# Patient Record
Sex: FEMALE | Race: WHITE | Employment: UNEMPLOYED | URBAN - METROPOLITAN AREA
[De-identification: names, ages, dates, MRNs, and addresses within clinical notes are randomized per-mention and may not be internally consistent; named-entity substitution may affect disease eponyms.]

---

## 2018-05-22 ENCOUNTER — HOSPITAL ENCOUNTER (EMERGENCY)
Age: 38
Discharge: HOME | End: 2018-05-23
Attending: EMERGENCY MEDICINE
Payer: SELF-PAY

## 2018-05-22 VITALS
HEIGHT: 62 IN | TEMPERATURE: 97.9 F | HEART RATE: 86 BPM | RESPIRATION RATE: 18 BRPM | DIASTOLIC BLOOD PRESSURE: 76 MMHG | WEIGHT: 180 LBS | BODY MASS INDEX: 33.13 KG/M2 | SYSTOLIC BLOOD PRESSURE: 116 MMHG | OXYGEN SATURATION: 99 %

## 2018-05-22 DIAGNOSIS — Z87.42 HX OF OVARIAN CYST: ICD-10-CM

## 2018-05-22 DIAGNOSIS — Z86.59 HISTORY OF DEPRESSION: ICD-10-CM

## 2018-05-22 DIAGNOSIS — R10.2 PELVIC PAIN IN FEMALE: Primary | ICD-10-CM

## 2018-05-22 PROCEDURE — 99285 EMERGENCY DEPT VISIT HI MDM: CPT

## 2018-05-22 RX ORDER — SERTRALINE HYDROCHLORIDE 100 MG/1
100 TABLET, FILM COATED ORAL DAILY
COMMUNITY
End: 2019-11-28

## 2018-05-22 RX ORDER — RISPERIDONE 0.25 MG/1
0.25 TABLET, FILM COATED ORAL
COMMUNITY
End: 2019-11-28

## 2018-05-23 ENCOUNTER — APPOINTMENT (OUTPATIENT)
Dept: CT IMAGING | Age: 38
End: 2018-05-23
Attending: EMERGENCY MEDICINE
Payer: SELF-PAY

## 2018-05-23 LAB
ALBUMIN SERPL-MCNC: 3.4 G/DL (ref 3.4–5)
ALBUMIN/GLOB SERPL: 1 {RATIO} (ref 0.8–1.7)
ALP SERPL-CCNC: 139 U/L (ref 45–117)
ALT SERPL-CCNC: 26 U/L (ref 13–56)
AMPHET UR QL SCN: NEGATIVE
ANION GAP SERPL CALC-SCNC: 10 MMOL/L (ref 3–18)
APPEARANCE UR: CLEAR
AST SERPL-CCNC: 17 U/L (ref 15–37)
BARBITURATES UR QL SCN: NEGATIVE
BASOPHILS # BLD: 0 K/UL (ref 0–0.06)
BASOPHILS NFR BLD: 0 % (ref 0–2)
BENZODIAZ UR QL: POSITIVE
BILIRUB SERPL-MCNC: 0.4 MG/DL (ref 0.2–1)
BILIRUB UR QL: NEGATIVE
BUN SERPL-MCNC: 5 MG/DL (ref 7–18)
BUN/CREAT SERPL: 7 (ref 12–20)
CALCIUM SERPL-MCNC: 8.2 MG/DL (ref 8.5–10.1)
CANNABINOIDS UR QL SCN: NEGATIVE
CHLORIDE SERPL-SCNC: 109 MMOL/L (ref 100–108)
CK MB CFR SERPL CALC: 0.7 % (ref 0–4)
CK MB SERPL-MCNC: 1.9 NG/ML (ref 5–25)
CK SERPL-CCNC: 274 U/L (ref 26–192)
CO2 SERPL-SCNC: 22 MMOL/L (ref 21–32)
COCAINE UR QL SCN: NEGATIVE
COLOR UR: YELLOW
CREAT SERPL-MCNC: 0.69 MG/DL (ref 0.6–1.3)
DIFFERENTIAL METHOD BLD: ABNORMAL
EOSINOPHIL # BLD: 0.3 K/UL (ref 0–0.4)
EOSINOPHIL NFR BLD: 3 % (ref 0–5)
ERYTHROCYTE [DISTWIDTH] IN BLOOD BY AUTOMATED COUNT: 14.7 % (ref 11.6–14.5)
ETHANOL SERPL-MCNC: 31 MG/DL (ref 0–3)
GLOBULIN SER CALC-MCNC: 3.4 G/DL (ref 2–4)
GLUCOSE SERPL-MCNC: 92 MG/DL (ref 74–99)
GLUCOSE UR STRIP.AUTO-MCNC: NEGATIVE MG/DL
HCG SERPL QL: NEGATIVE
HCT VFR BLD AUTO: 36.8 % (ref 35–45)
HDSCOM,HDSCOM: ABNORMAL
HGB BLD-MCNC: 11.8 G/DL (ref 12–16)
HGB UR QL STRIP: NEGATIVE
KETONES UR QL STRIP.AUTO: NEGATIVE MG/DL
LEUKOCYTE ESTERASE UR QL STRIP.AUTO: NEGATIVE
LIPASE SERPL-CCNC: 132 U/L (ref 73–393)
LYMPHOCYTES # BLD: 3.5 K/UL (ref 0.9–3.6)
LYMPHOCYTES NFR BLD: 43 % (ref 21–52)
MAGNESIUM SERPL-MCNC: 2 MG/DL (ref 1.6–2.6)
MCH RBC QN AUTO: 28 PG (ref 24–34)
MCHC RBC AUTO-ENTMCNC: 32.1 G/DL (ref 31–37)
MCV RBC AUTO: 87.2 FL (ref 74–97)
METHADONE UR QL: NEGATIVE
MONOCYTES # BLD: 0.3 K/UL (ref 0.05–1.2)
MONOCYTES NFR BLD: 4 % (ref 3–10)
NEUTS SEG # BLD: 4 K/UL (ref 1.8–8)
NEUTS SEG NFR BLD: 50 % (ref 40–73)
NITRITE UR QL STRIP.AUTO: NEGATIVE
OPIATES UR QL: NEGATIVE
PCP UR QL: POSITIVE
PH UR STRIP: 6 [PH] (ref 5–8)
PLATELET # BLD AUTO: 292 K/UL (ref 135–420)
PMV BLD AUTO: 9.8 FL (ref 9.2–11.8)
POTASSIUM SERPL-SCNC: 3.5 MMOL/L (ref 3.5–5.5)
PROT SERPL-MCNC: 6.8 G/DL (ref 6.4–8.2)
PROT UR STRIP-MCNC: NEGATIVE MG/DL
RBC # BLD AUTO: 4.22 M/UL (ref 4.2–5.3)
SERVICE CMNT-IMP: NORMAL
SODIUM SERPL-SCNC: 141 MMOL/L (ref 136–145)
SP GR UR REFRACTOMETRY: 1.01 (ref 1–1.03)
TROPONIN I SERPL-MCNC: <0.02 NG/ML (ref 0–0.06)
UROBILINOGEN UR QL STRIP.AUTO: 0.2 EU/DL (ref 0.2–1)
WBC # BLD AUTO: 8.1 K/UL (ref 4.6–13.2)
WET PREP GENITAL: NORMAL

## 2018-05-23 PROCEDURE — 85025 COMPLETE CBC W/AUTO DIFF WBC: CPT | Performed by: EMERGENCY MEDICINE

## 2018-05-23 PROCEDURE — 84703 CHORIONIC GONADOTROPIN ASSAY: CPT | Performed by: EMERGENCY MEDICINE

## 2018-05-23 PROCEDURE — 82550 ASSAY OF CK (CPK): CPT | Performed by: EMERGENCY MEDICINE

## 2018-05-23 PROCEDURE — 74011636320 HC RX REV CODE- 636/320: Performed by: EMERGENCY MEDICINE

## 2018-05-23 PROCEDURE — 96374 THER/PROPH/DIAG INJ IV PUSH: CPT

## 2018-05-23 PROCEDURE — 83690 ASSAY OF LIPASE: CPT | Performed by: EMERGENCY MEDICINE

## 2018-05-23 PROCEDURE — 81003 URINALYSIS AUTO W/O SCOPE: CPT | Performed by: EMERGENCY MEDICINE

## 2018-05-23 PROCEDURE — 80053 COMPREHEN METABOLIC PANEL: CPT | Performed by: EMERGENCY MEDICINE

## 2018-05-23 PROCEDURE — 87210 SMEAR WET MOUNT SALINE/INK: CPT | Performed by: EMERGENCY MEDICINE

## 2018-05-23 PROCEDURE — 83735 ASSAY OF MAGNESIUM: CPT | Performed by: EMERGENCY MEDICINE

## 2018-05-23 PROCEDURE — 80307 DRUG TEST PRSMV CHEM ANLYZR: CPT | Performed by: EMERGENCY MEDICINE

## 2018-05-23 PROCEDURE — 74011250636 HC RX REV CODE- 250/636: Performed by: EMERGENCY MEDICINE

## 2018-05-23 PROCEDURE — 74177 CT ABD & PELVIS W/CONTRAST: CPT

## 2018-05-23 PROCEDURE — 87491 CHLMYD TRACH DNA AMP PROBE: CPT | Performed by: EMERGENCY MEDICINE

## 2018-05-23 PROCEDURE — 74011250637 HC RX REV CODE- 250/637: Performed by: EMERGENCY MEDICINE

## 2018-05-23 RX ORDER — NALBUPHINE HYDROCHLORIDE 10 MG/ML
10 INJECTION, SOLUTION INTRAMUSCULAR; INTRAVENOUS; SUBCUTANEOUS
Status: COMPLETED | OUTPATIENT
Start: 2018-05-23 | End: 2018-05-23

## 2018-05-23 RX ORDER — DICYCLOMINE HYDROCHLORIDE 20 MG/1
20 TABLET ORAL EVERY 6 HOURS
Qty: 20 TAB | Refills: 0 | Status: SHIPPED | OUTPATIENT
Start: 2018-05-23 | End: 2018-05-28

## 2018-05-23 RX ORDER — DICYCLOMINE HYDROCHLORIDE 10 MG/1
20 CAPSULE ORAL
Status: COMPLETED | OUTPATIENT
Start: 2018-05-23 | End: 2018-05-23

## 2018-05-23 RX ORDER — SODIUM CHLORIDE 0.9 % (FLUSH) 0.9 %
5-10 SYRINGE (ML) INJECTION AS NEEDED
Status: DISCONTINUED | OUTPATIENT
Start: 2018-05-23 | End: 2018-05-23 | Stop reason: HOSPADM

## 2018-05-23 RX ORDER — ONDANSETRON 4 MG/1
TABLET, ORALLY DISINTEGRATING ORAL
Qty: 10 TAB | Refills: 0 | Status: SHIPPED | OUTPATIENT
Start: 2018-05-23 | End: 2019-11-28

## 2018-05-23 RX ORDER — ONDANSETRON 4 MG/1
4 TABLET, ORALLY DISINTEGRATING ORAL
Status: DISCONTINUED | OUTPATIENT
Start: 2018-05-23 | End: 2018-05-23

## 2018-05-23 RX ORDER — ONDANSETRON 4 MG/1
4 TABLET, ORALLY DISINTEGRATING ORAL
Status: COMPLETED | OUTPATIENT
Start: 2018-05-23 | End: 2018-05-23

## 2018-05-23 RX ORDER — BUTORPHANOL TARTRATE 2 MG/ML
1 INJECTION INTRAMUSCULAR; INTRAVENOUS
Status: DISCONTINUED | OUTPATIENT
Start: 2018-05-23 | End: 2018-05-23 | Stop reason: HOSPADM

## 2018-05-23 RX ORDER — SODIUM CHLORIDE 0.9 % (FLUSH) 0.9 %
5-10 SYRINGE (ML) INJECTION EVERY 8 HOURS
Status: DISCONTINUED | OUTPATIENT
Start: 2018-05-23 | End: 2018-05-23 | Stop reason: HOSPADM

## 2018-05-23 RX ADMIN — IOPAMIDOL 100 ML: 612 INJECTION, SOLUTION INTRAVENOUS at 03:04

## 2018-05-23 RX ADMIN — ONDANSETRON 4 MG: 4 TABLET, ORALLY DISINTEGRATING ORAL at 01:04

## 2018-05-23 RX ADMIN — DICYCLOMINE HYDROCHLORIDE 20 MG: 10 CAPSULE ORAL at 01:04

## 2018-05-23 RX ADMIN — NALBUPHINE HYDROCHLORIDE 10 MG: 10 INJECTION, SOLUTION INTRAMUSCULAR; INTRAVENOUS; SUBCUTANEOUS at 01:04

## 2018-05-23 NOTE — ED PROVIDER NOTES
EMERGENCY DEPARTMENT HISTORY AND PHYSICAL EXAM    Date: 2018  Patient Name: Nazia Montalvo    History of Presenting Illness     Chief Complaint   Patient presents with    Abdominal Pain         History Provided By: Patient    Chief Complaint: abd pain  Duration: 2 Hours  Timing:  Acute  Location: diffuse  Quality: Sharp  Severity: Severe  Associated Symptoms: nausea    Additional History (Context):     12:36 AM    Nazia Montalvo is a M4 45 y.o. female with pertinent PMHx of pancreatitis, ovarian cysts, and CA (pancreatic) presenting via EMS to the ED c/o acute onset of 10/10 diffuse abdominal pain, right > left, x ~2 hours. Pt states that she was at rest when the pain began. Pt notes \"it feels like something is going to come out of me\", while pointing at her vaginal area. Pt states \"it feels like something explaided in my stomach. Pt notes an associated sx of nausea. Pt denies any history of abdominal surgery. Pt states that her menstrual cycle just ended yesterday. Pt denies any regular medications for ovarian cysts or hormone use. Pt does not see an OB/GYN regularly. Pt states that she had 2 beers while cooking breakfast this morning. Pt specifically denies any vaginal bleeding, blood in her stool, or fever/chills. PCP: Luisa Burch MD  Social Hx: + tobacco use, + alcohol use, - illicit drug use    There are no other complaints, changes, or physical findings at this time.      Current Facility-Administered Medications   Medication Dose Route Frequency Provider Last Rate Last Dose    sodium chloride (NS) flush 5-10 mL  5-10 mL IntraVENous Q8H Sophie Conley MD        sodium chloride (NS) flush 5-10 mL  5-10 mL IntraVENous PRN Sophie Conley MD        sodium chloride 0.9 % bolus infusion 1,000 mL  1,000 mL IntraVENous ONCE Sophie Conley MD        butorphanol (STADOL) injection 1 mg  1 mg IntraVENous NOW Sophie Conley MD         Current Outpatient Prescriptions   Medication Sig Dispense Refill    dicyclomine (BENTYL) 20 mg tablet Take 1 Tab by mouth every six (6) hours for 20 doses. 20 Tab 0    ondansetron (ZOFRAN ODT) 4 mg disintegrating tablet Take 1-2 tablets every 6-8 hours as needed for nausea and vomiting. 10 Tab 0    sertraline (ZOLOFT) 100 mg tablet Take 100 mg by mouth daily.  risperiDONE (RISPERDAL) 0.25 mg tablet Take 0.25 mg by mouth. Past History     Past Medical History:  Past Medical History:   Diagnosis Date    Asthma     Bilateral ovarian cysts     Cancer ([facility] [address]     Pancreatic and Bone Marrow    Pancreatitis     Pancreatitis        Past Surgical History:  Past Surgical History:   Procedure Laterality Date    HX GYN      D&C       Family History:  History reviewed. No pertinent family history. Social History:  Social History   Substance Use Topics    Smoking status: Current Every Day Smoker     Packs/day: 0.50    Smokeless tobacco: Never Used    Alcohol use Yes       Allergies: Allergies   Allergen Reactions    Norco [Hydrocodone-Acetaminophen] Itching     Patient states she is not allergic      Toradol [Ketorolac] Rash         Review of Systems   Review of Systems   Constitutional: Negative for chills and fever. Gastrointestinal: Positive for abdominal pain (right > left) and nausea. Negative for blood in stool and vomiting. Genitourinary: Negative for vaginal bleeding. All other systems reviewed and are negative. Physical Exam     Vitals:    05/22/18 2320   BP: 116/76   Pulse: 86   Resp: 18   Temp: 97.9 °F (36.6 °C)   SpO2: 99%   Weight: 81.6 kg (180 lb)   Height: 5' 2\" (1.575 m)     Physical Exam   Constitutional: She is oriented to person, place, and time. She appears well-developed and well-nourished. No distress. Comfortable appearing, nontoxic  Smells of ETOH   HENT:   Head: Normocephalic and atraumatic.    Right Ear: External ear normal.   Left Ear: External ear normal.   Mouth/Throat: Oropharynx is clear and moist. Mucous membranes are dry. No oropharyngeal exudate. Eyes: EOM are normal. Pupils are equal, round, and reactive to light. No scleral icterus. No pallor  Conjunctival injection   Neck: Normal range of motion. Neck supple. No JVD present. No tracheal deviation present. No thyromegaly present. Cardiovascular: Normal rate, regular rhythm and normal heart sounds. Pulmonary/Chest: Effort normal and breath sounds normal. No stridor. No respiratory distress. Abdominal: Soft. She exhibits distension (extensive, with stria). Bowel sounds are decreased. There is tenderness in the right upper quadrant and right lower quadrant. There is guarding (Abd with voluntary guarding to the RU and RL quadrants). There is no rebound. Musculoskeletal: Normal range of motion. She exhibits no edema or tenderness. No soft tissue injuries  No back TTP   Lymphadenopathy:     She has no cervical adenopathy. Neurological: She is alert and oriented to person, place, and time. She has normal reflexes. No cranial nerve deficit. Coordination normal.   Slurred speech, but no signs of ataxia or fine motor deficits at this time   Skin: Skin is warm and dry. No rash noted. She is not diaphoretic. No erythema. Psychiatric: She has a normal mood and affect. Her behavior is normal. Judgment and thought content normal.   Nursing note and vitals reviewed.       Diagnostic Study Results     Labs -     Recent Results (from the past 12 hour(s))   HCG QL SERUM    Collection Time: 05/23/18 12:55 AM   Result Value Ref Range    HCG, Ql. NEGATIVE  NEG     CBC WITH AUTOMATED DIFF    Collection Time: 05/23/18 12:55 AM   Result Value Ref Range    WBC 8.1 4.6 - 13.2 K/uL    RBC 4.22 4.20 - 5.30 M/uL    HGB 11.8 (L) 12.0 - 16.0 g/dL    HCT 36.8 35.0 - 45.0 %    MCV 87.2 74.0 - 97.0 FL    MCH 28.0 24.0 - 34.0 PG    MCHC 32.1 31.0 - 37.0 g/dL    RDW 14.7 (H) 11.6 - 14.5 %    PLATELET 623 658 - 056 K/uL    MPV 9.8 9.2 - 11.8 FL    NEUTROPHILS 50 40 - 73 % LYMPHOCYTES 43 21 - 52 %    MONOCYTES 4 3 - 10 %    EOSINOPHILS 3 0 - 5 %    BASOPHILS 0 0 - 2 %    ABS. NEUTROPHILS 4.0 1.8 - 8.0 K/UL    ABS. LYMPHOCYTES 3.5 0.9 - 3.6 K/UL    ABS. MONOCYTES 0.3 0.05 - 1.2 K/UL    ABS. EOSINOPHILS 0.3 0.0 - 0.4 K/UL    ABS. BASOPHILS 0.0 0.0 - 0.06 K/UL    DF AUTOMATED     METABOLIC PANEL, COMPREHENSIVE    Collection Time: 05/23/18 12:55 AM   Result Value Ref Range    Sodium 141 136 - 145 mmol/L    Potassium 3.5 3.5 - 5.5 mmol/L    Chloride 109 (H) 100 - 108 mmol/L    CO2 22 21 - 32 mmol/L    Anion gap 10 3.0 - 18 mmol/L    Glucose 92 74 - 99 mg/dL    BUN 5 (L) 7.0 - 18 MG/DL    Creatinine 0.69 0.6 - 1.3 MG/DL    BUN/Creatinine ratio 7 (L) 12 - 20      GFR est AA >60 >60 ml/min/1.73m2    GFR est non-AA >60 >60 ml/min/1.73m2    Calcium 8.2 (L) 8.5 - 10.1 MG/DL    Bilirubin, total 0.4 0.2 - 1.0 MG/DL    ALT (SGPT) 26 13 - 56 U/L    AST (SGOT) 17 15 - 37 U/L    Alk.  phosphatase 139 (H) 45 - 117 U/L    Protein, total 6.8 6.4 - 8.2 g/dL    Albumin 3.4 3.4 - 5.0 g/dL    Globulin 3.4 2.0 - 4.0 g/dL    A-G Ratio 1.0 0.8 - 1.7     LIPASE    Collection Time: 05/23/18 12:55 AM   Result Value Ref Range    Lipase 132 73 - 393 U/L   CARDIAC PANEL,(CK, CKMB & TROPONIN)    Collection Time: 05/23/18 12:55 AM   Result Value Ref Range     (H) 26 - 192 U/L    CK - MB 1.9 <3.6 ng/ml    CK-MB Index 0.7 0.0 - 4.0 %    Troponin-I, Qt. <0.02 0.00 - 0.06 NG/ML   MAGNESIUM    Collection Time: 05/23/18 12:55 AM   Result Value Ref Range    Magnesium 2.0 1.6 - 2.6 mg/dL   ETHYL ALCOHOL    Collection Time: 05/23/18 12:55 AM   Result Value Ref Range    ALCOHOL(ETHYL),SERUM 31 (H) 0 - 3 MG/DL   URINALYSIS W/ RFLX MICROSCOPIC    Collection Time: 05/23/18 12:56 AM   Result Value Ref Range    Color YELLOW      Appearance CLEAR      Specific gravity 1.011 1.005 - 1.030      pH (UA) 6.0 5.0 - 8.0      Protein NEGATIVE  NEG mg/dL    Glucose NEGATIVE  NEG mg/dL    Ketone NEGATIVE  NEG mg/dL    Bilirubin NEGATIVE NEG      Blood NEGATIVE  NEG      Urobilinogen 0.2 0.2 - 1.0 EU/dL    Nitrites NEGATIVE  NEG      Leukocyte Esterase NEGATIVE  NEG     DRUG SCREEN, URINE    Collection Time: 05/23/18 12:57 AM   Result Value Ref Range    BENZODIAZEPINES POSITIVE (A) NEG      BARBITURATES NEGATIVE  NEG      THC (TH-CANNABINOL) NEGATIVE  NEG      OPIATES NEGATIVE  NEG      PCP(PHENCYCLIDINE) POSITIVE (A) NEG      COCAINE NEGATIVE  NEG      AMPHETAMINES NEGATIVE  NEG      METHADONE NEGATIVE  NEG      HDSCOM (NOTE)        Radiologic Studies -   CT ABD PELV W CONT   Final Result   IMPRESSION:      No acute findings are identified. No bowel obstruction or inflammation. Normal appendix. Punctate nonobstructing right lower pole calculus. CT Results  (Last 48 hours)               05/23/18 0312  CT ABD PELV W CONT Final result    Impression:  IMPRESSION:       No acute findings are identified. No bowel obstruction or inflammation. Normal appendix. Punctate nonobstructing right lower pole calculus. Narrative:  EXAM: CT of the abdomen and pelvis       INDICATION: Abdominal pain       COMPARISON: 11/29/2015       TECHNIQUE: Axial CT imaging of the abdomen and pelvis was performed with   intravenous contrast. Multiplanar reformats were generated. One or more dose   reduction techniques were used on this CT: automated exposure control,   adjustment of the mAs and/or kVp according to patient's size, and iterative   reconstruction techniques. The specific techniques utilized on this CT exam have   been documented in the patient's electronic medical record.       _______________       FINDINGS:       LOWER CHEST: No basilar infiltrates. Stable 2 mm nodular right lower lobe   density laterally. LIVER, BILIARY: Liver is stable. No biliary dilation. Gallbladder is present. PANCREAS: Normal.       SPLEEN: Normal.       ADRENALS: Normal.       KIDNEYS: No acute findings.  Several small subcentimeter hypodensities which are   too small to characterize. No perinephric stranding. 2 mm calculus lower pole of   right kidney. LYMPH NODES: No enlarged lymph nodes. GASTROINTESTINAL TRACT: No bowel dilation or wall thickening. GI tract   evaluation limited without oral contrast. No bowel obstruction. No free air or   free fluid. Normal appendix. PELVIC ORGANS: Unremarkable. VASCULATURE: Unremarkable. BONES: No acute or aggressive osseous abnormalities identified. OTHER: None.       _______________                   Medical Decision Making   I am the first provider for this patient. I reviewed the vital signs, available nursing notes, past medical history, past surgical history, family history and social history. Vital Signs-Reviewed the patient's vital signs. Pulse Oximetry Analysis - 99% on RA     Records Reviewed: Nursing Notes and Old Medical Records    Provider Notes (Medical Decision Making): DDx: gallbladder, pancreatitis, or appendix, small intestine disease. Prolapse of urinary bladder are high on the differential. Unlikely to be vascular or cardiogenic. Multiple visits for ovarian cyst problems, but no hemorrhage or surgical intervention. PROCEDURES:  Pelvic Exam  Date/Time: 5/23/2018 4:01 AM  Performed by: attending  Procedure duration:  6 minutes. Documented by:  Franchesca Kruse ED Scribe. As dictated by:  Dr. Mariana Sutherland assisted by:  Nakul Jones RN. Type of exam performed: bimanual and speculum. External genitalia appearance: normal.    Vaginal exam:  normal.    Cervical exam:  normal.    Specimen(s) collected:  chlamydia, GC and vaginal culture.   Bimanual exam:  normal.    Patient tolerance: Patient tolerated the procedure well with no immediate complications  Comments: TTP to the suprapubic region and large intestine          MEDICATIONS GIVEN IN THE ED:  Medications   sodium chloride (NS) flush 5-10 mL (not administered)   sodium chloride (NS) flush 5-10 mL (not administered)   sodium chloride 0.9 % bolus infusion 1,000 mL (not administered)   butorphanol (STADOL) injection 1 mg (not administered)   dicyclomine (BENTYL) capsule 20 mg (20 mg Oral Given 5/23/18 0104)   nalbuphine (NUBAIN) injection 10 mg (10 mg IntraVENous Given 5/23/18 0104)   ondansetron (ZOFRAN ODT) tablet 4 mg (4 mg Oral Given 5/23/18 0104)   iopamidol (ISOVUE 300) 61 % contrast injection 100 mL (100 mL IntraVENous Given 5/23/18 0304)        ED COURSE:   12:36 AM   Initial assessment performed. PROGRESS NOTE:  4:10 AM  Pt and/or family have been updated on their results. Pt and/or pt's family are aware of the plan of care and are in agreement. Written by ULISSES Valentine, as dictated by Eduardo La MD.      Diagnosis and Disposition       DISCHARGE NOTE:  4:28 AM  The patient is ready for discharge. The patient's signs, symptoms, diagnosis, and discharge instructions have been discussed and the patient and/or family has conveyed their understanding. The patient and/or family is to follow up as recommended or return to the ER should their symptoms worsen. Plan has been discussed and the patient and/or family is in agreement. Written by ULISSES Valentine, as dictated by Eduardo La MD.     CLINICAL IMPRESSION:  1. Pelvic pain in female    2. Hx of ovarian cyst    3. History of depression        PLAN:  1. D/C Home  2. Current Discharge Medication List      START taking these medications    Details   dicyclomine (BENTYL) 20 mg tablet Take 1 Tab by mouth every six (6) hours for 20 doses. Qty: 20 Tab, Refills: 0      ondansetron (ZOFRAN ODT) 4 mg disintegrating tablet Take 1-2 tablets every 6-8 hours as needed for nausea and vomiting. Qty: 10 Tab, Refills: 0         CONTINUE these medications which have NOT CHANGED    Details   sertraline (ZOLOFT) 100 mg tablet Take 100 mg by mouth daily.       risperiDONE (RISPERDAL) 0.25 mg tablet Take 0.25 mg by mouth. 3.   Follow-up Information     Follow up With Details Comments Contact Info    Anat Main MD Schedule an appointment as soon as possible for a visit for OB/GYN follow up, as needed [facility] [id number]  [phone]      [facility] EMERGENCY DEPT  As needed, If symptoms worsen [address] [place] [id number]  [phone]        _______________________________    Attestations: This note is prepared by Ingrid Timmons, acting as Scribe for Licha Oliveira. Wojciech Calderon MD.    Licha Oliveira. Wojciech Calderon MD:  The scribe's documentation has been prepared under my direction and personally reviewed by me in its entirety.   I confirm that the note above accurately reflects all work, treatment, procedures, and medical decision making performed by me.  _______________________________

## 2018-05-23 NOTE — DISCHARGE INSTRUCTIONS
Pelvic Pain: Care Instructions  Your Care Instructions    Pelvic pain, or pain in the lower belly, can have many causes. Often pelvic pain is not serious and gets better in a few days. If your pain continues or gets worse, you may need tests and treatment. Tell your doctor about any new symptoms. These may be signs of a serious problem. Follow-up care is a key part of your treatment and safety. Be sure to make and go to all appointments, and call your doctor if you are having problems. It's also a good idea to know your test results and keep a list of the medicines you take. How can you care for yourself at home? · Rest until you feel better. Lie down, and raise your legs by placing a pillow under your knees. · Drink plenty of fluids. You may find that small, frequent sips are easier on your stomach than if you drink a lot at once. Avoid drinks with carbonation or caffeine, such as soda pop, tea, or coffee. · Try eating several small meals instead of 2 or 3 large ones. Eat mild foods, such as rice, dry toast or crackers, bananas, and applesauce. Avoid fatty and spicy foods, other fruits, and alcohol until 48 hours after your symptoms have gone away. · Take an over-the-counter pain medicine, such as acetaminophen (Tylenol), ibuprofen (Advil, Motrin), or naproxen (Aleve). Read and follow all instructions on the label. · Do not take two or more pain medicines at the same time unless the doctor told you to. Many pain medicines have acetaminophen, which is Tylenol. Too much acetaminophen (Tylenol) can be harmful. · You can put a heating pad, a warm cloth, or moist heat on your belly to relieve pain. When should you call for help? Call your doctor now or seek immediate medical care if:  · You have a new or higher fever. · You have unusual vaginal bleeding. · You have new or worse belly or pelvic pain. · You have vaginal discharge that has increased in amount or smells bad.   Watch closely for changes in your health, and be sure to contact your doctor if:  · You do not get better as expected. Where can you learn more? Go to http://denis-davin.info/. Enter 458-994-013 in the search box to learn more about \"Pelvic Pain: Care Instructions. \"  Current as of: October 13, 2016  Content Version: 11.4  © 1974-5271 [employer]. Care instructions adapted under license by [employer] (which disclaims liability or warranty for this information). If you have questions about a medical condition or this instruction, always ask your healthcare professional. [place] [address] any warranty or liability for your use of this information.

## 2018-05-23 NOTE — ED NOTES
Pt found out in lobby, this nurse informed pt that she cannot leave ER with IV in place, pt sts she was going to get  from car because he is asleep in the car

## 2018-05-23 NOTE — ED NOTES
Pt stable. No signs of acute distress. Pt being discharged home. I have reviewed discharge instructions with the patient. The patient verbalized understanding. No further questions/concerns.  Patient armband removed and shredded

## 2018-05-23 NOTE — ED TRIAGE NOTES
Patient distressed in triage, states severe sudden onset lower abdomen pain, states she feels like something is going to fall out of her vagina.

## 2018-05-23 NOTE — ED NOTES
Pt alert and oriented x4. No signs of acute distress. Pt with abdominal pain with sudden onset x 2 hours. Pt states \"I feel like something popped in my stomach\" Pt with nausea. NO vomiting. Continue to monitor.

## 2018-05-24 LAB
C TRACH RRNA SPEC QL NAA+PROBE: NEGATIVE
N GONORRHOEA RRNA SPEC QL NAA+PROBE: NEGATIVE
SPECIMEN SOURCE: NORMAL

## 2018-07-06 ENCOUNTER — APPOINTMENT (OUTPATIENT)
Dept: GENERAL RADIOLOGY | Age: 38
End: 2018-07-06
Attending: NURSE PRACTITIONER
Payer: SELF-PAY

## 2018-07-06 ENCOUNTER — HOSPITAL ENCOUNTER (EMERGENCY)
Age: 38
Discharge: HOME | End: 2018-07-06
Attending: EMERGENCY MEDICINE
Payer: SELF-PAY

## 2018-07-06 VITALS
SYSTOLIC BLOOD PRESSURE: 106 MMHG | BODY MASS INDEX: 33.13 KG/M2 | TEMPERATURE: 98.8 F | HEIGHT: 62 IN | WEIGHT: 180 LBS | DIASTOLIC BLOOD PRESSURE: 55 MMHG | RESPIRATION RATE: 18 BRPM | HEART RATE: 70 BPM | OXYGEN SATURATION: 99 %

## 2018-07-06 DIAGNOSIS — F19.20 ADDICTION TO DRUG (HCC): ICD-10-CM

## 2018-07-06 DIAGNOSIS — F41.9 ANXIETY: ICD-10-CM

## 2018-07-06 DIAGNOSIS — R00.2 PALPITATIONS: Primary | ICD-10-CM

## 2018-07-06 DIAGNOSIS — E87.6 HYPOKALEMIA: ICD-10-CM

## 2018-07-06 LAB
ALBUMIN SERPL-MCNC: 3.3 G/DL (ref 3.4–5)
ALBUMIN/GLOB SERPL: 0.9 {RATIO} (ref 0.8–1.7)
ALP SERPL-CCNC: 133 U/L (ref 45–117)
ALT SERPL-CCNC: 25 U/L (ref 13–56)
AMPHET UR QL SCN: NEGATIVE
ANION GAP SERPL CALC-SCNC: 16 MMOL/L (ref 3–18)
APPEARANCE UR: CLEAR
AST SERPL-CCNC: 13 U/L (ref 15–37)
BACTERIA URNS QL MICRO: NEGATIVE /HPF
BARBITURATES UR QL SCN: NEGATIVE
BASOPHILS # BLD: 0 K/UL (ref 0–0.06)
BASOPHILS NFR BLD: 0 % (ref 0–2)
BENZODIAZ UR QL: NEGATIVE
BILIRUB SERPL-MCNC: 0.3 MG/DL (ref 0.2–1)
BILIRUB UR QL: NEGATIVE
BUN SERPL-MCNC: 7 MG/DL (ref 7–18)
BUN/CREAT SERPL: 9 (ref 12–20)
CALCIUM SERPL-MCNC: 8.8 MG/DL (ref 8.5–10.1)
CANNABINOIDS UR QL SCN: NEGATIVE
CHLORIDE SERPL-SCNC: 106 MMOL/L (ref 100–108)
CK MB CFR SERPL CALC: NORMAL % (ref 0–4)
CK MB SERPL-MCNC: <1 NG/ML (ref 5–25)
CK SERPL-CCNC: 68 U/L (ref 26–192)
CO2 SERPL-SCNC: 17 MMOL/L (ref 21–32)
COCAINE UR QL SCN: NEGATIVE
COLOR UR: YELLOW
CREAT SERPL-MCNC: 0.76 MG/DL (ref 0.6–1.3)
D DIMER PPP FEU-MCNC: 0.36 UG/ML(FEU)
DIFFERENTIAL METHOD BLD: ABNORMAL
EOSINOPHIL # BLD: 0.1 K/UL (ref 0–0.4)
EOSINOPHIL NFR BLD: 1 % (ref 0–5)
EPITH CASTS URNS QL MICRO: ABNORMAL /LPF (ref 0–5)
ERYTHROCYTE [DISTWIDTH] IN BLOOD BY AUTOMATED COUNT: 14.5 % (ref 11.6–14.5)
GLOBULIN SER CALC-MCNC: 3.6 G/DL (ref 2–4)
GLUCOSE SERPL-MCNC: 108 MG/DL (ref 74–99)
GLUCOSE UR STRIP.AUTO-MCNC: NEGATIVE MG/DL
HCG UR QL: NEGATIVE
HCT VFR BLD AUTO: 36.6 % (ref 35–45)
HDSCOM,HDSCOM: ABNORMAL
HGB BLD-MCNC: 12 G/DL (ref 12–16)
HGB UR QL STRIP: NEGATIVE
KETONES UR QL STRIP.AUTO: NEGATIVE MG/DL
LEUKOCYTE ESTERASE UR QL STRIP.AUTO: NEGATIVE
LIPASE SERPL-CCNC: 179 U/L (ref 73–393)
LYMPHOCYTES # BLD: 2.6 K/UL (ref 0.9–3.6)
LYMPHOCYTES NFR BLD: 24 % (ref 21–52)
MCH RBC QN AUTO: 29.1 PG (ref 24–34)
MCHC RBC AUTO-ENTMCNC: 32.8 G/DL (ref 31–37)
MCV RBC AUTO: 88.6 FL (ref 74–97)
METHADONE UR QL: NEGATIVE
MONOCYTES # BLD: 0.5 K/UL (ref 0.05–1.2)
MONOCYTES NFR BLD: 4 % (ref 3–10)
MUCOUS THREADS URNS QL MICRO: ABNORMAL /LPF
NEUTS SEG # BLD: 7.5 K/UL (ref 1.8–8)
NEUTS SEG NFR BLD: 71 % (ref 40–73)
NITRITE UR QL STRIP.AUTO: NEGATIVE
OPIATES UR QL: POSITIVE
PCP UR QL: POSITIVE
PH UR STRIP: 6 [PH] (ref 5–8)
PLATELET # BLD AUTO: 220 K/UL (ref 135–420)
PMV BLD AUTO: 10.5 FL (ref 9.2–11.8)
POTASSIUM SERPL-SCNC: 3.2 MMOL/L (ref 3.5–5.5)
PROT SERPL-MCNC: 6.9 G/DL (ref 6.4–8.2)
PROT UR STRIP-MCNC: 100 MG/DL
RBC # BLD AUTO: 4.13 M/UL (ref 4.2–5.3)
RBC #/AREA URNS HPF: NEGATIVE /HPF (ref 0–5)
SODIUM SERPL-SCNC: 139 MMOL/L (ref 136–145)
SP GR UR REFRACTOMETRY: 1.03 (ref 1–1.03)
TROPONIN I SERPL-MCNC: <0.02 NG/ML (ref 0–0.06)
UROBILINOGEN UR QL STRIP.AUTO: 1 EU/DL (ref 0.2–1)
WBC # BLD AUTO: 10.7 K/UL (ref 4.6–13.2)
WBC URNS QL MICRO: ABNORMAL /HPF (ref 0–5)

## 2018-07-06 PROCEDURE — 74011250637 HC RX REV CODE- 250/637: Performed by: NURSE PRACTITIONER

## 2018-07-06 PROCEDURE — 82550 ASSAY OF CK (CPK): CPT | Performed by: NURSE PRACTITIONER

## 2018-07-06 PROCEDURE — 80307 DRUG TEST PRSMV CHEM ANLYZR: CPT | Performed by: NURSE PRACTITIONER

## 2018-07-06 PROCEDURE — 71046 X-RAY EXAM CHEST 2 VIEWS: CPT

## 2018-07-06 PROCEDURE — 81025 URINE PREGNANCY TEST: CPT

## 2018-07-06 PROCEDURE — 85379 FIBRIN DEGRADATION QUANT: CPT | Performed by: NURSE PRACTITIONER

## 2018-07-06 PROCEDURE — 80053 COMPREHEN METABOLIC PANEL: CPT | Performed by: NURSE PRACTITIONER

## 2018-07-06 PROCEDURE — 83690 ASSAY OF LIPASE: CPT | Performed by: NURSE PRACTITIONER

## 2018-07-06 PROCEDURE — 99284 EMERGENCY DEPT VISIT MOD MDM: CPT

## 2018-07-06 PROCEDURE — 85025 COMPLETE CBC W/AUTO DIFF WBC: CPT | Performed by: NURSE PRACTITIONER

## 2018-07-06 PROCEDURE — 93005 ELECTROCARDIOGRAM TRACING: CPT

## 2018-07-06 PROCEDURE — 81001 URINALYSIS AUTO W/SCOPE: CPT | Performed by: NURSE PRACTITIONER

## 2018-07-06 RX ORDER — POTASSIUM CHLORIDE 20 MEQ/1
20 TABLET, EXTENDED RELEASE ORAL DAILY
Qty: 5 TAB | Refills: 0 | Status: SHIPPED | OUTPATIENT
Start: 2018-07-06 | End: 2018-07-11

## 2018-07-06 RX ORDER — QUETIAPINE FUMARATE 400 MG/1
400 TABLET, FILM COATED ORAL
COMMUNITY
End: 2019-11-28

## 2018-07-06 RX ORDER — POTASSIUM CHLORIDE 20 MEQ/1
40 TABLET, EXTENDED RELEASE ORAL
Status: COMPLETED | OUTPATIENT
Start: 2018-07-06 | End: 2018-07-06

## 2018-07-06 RX ORDER — GUAIFENESIN 600 MG/1
600 TABLET, EXTENDED RELEASE ORAL 2 TIMES DAILY
COMMUNITY
End: 2019-08-15

## 2018-07-06 RX ADMIN — POTASSIUM CHLORIDE 40 MEQ: 20 TABLET, EXTENDED RELEASE ORAL at 16:38

## 2018-07-06 NOTE — ED TRIAGE NOTES
Pt arrives alert and oriented c/o shortness of breath. Pt states she uses 30-40 mucinex a day reports she has a addiction. Pt reports last night she had a heart burn sensation as well as this a with some shortness of breath. Pt took 30 mucinex tablets by mouth this am (reoprts that is her usual dose x 8 months). Pt states she took 2 shots of liquor this am as well.

## 2018-07-06 NOTE — ED PROVIDER NOTES
EMERGENCY DEPARTMENT HISTORY AND PHYSICAL EXAM    Date: 7/6/2018  Patient Name: Saintclair Falls    History of Presenting Illness     Chief Complaint   Patient presents with    Shortness of Breath         History Provided By: Patient    Chief Complaint: SOB  Duration: 24 Hours  Timing:  Gradual and Progressive  Location: Generalized  Quality: Burning  Modifying Factors: States that she is addicted to Mucinex  Associated Symptoms: CP, burning sensation in epigastric region, palpitations    Additional History (Context):   2:12 PM  Saintclair Falls is a 45 y.o. female with PMHX of pancreatitis, bilateral ovarian cysts, asthma, cancer, anxiety, and bipolar disorder who presents to the emergency department C/O SOB, onset last night. Associated sxs include CP, burning sensation in epigastric region, palpitations. Pt states that she has been having ~10-11 of these episodes per day for several weeks. 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 Denies FHX blood clots/premature cardiac death, recent travel, birth control use, leg pain/swelling, and any other sxs or complaints. PCP: Luisa Burch MD    Current Outpatient Prescriptions   Medication Sig Dispense Refill    QUEtiapine (SEROQUEL) 400 mg tablet Take 400 mg by mouth nightly. Indications: DEPRESSION ASSOCIATED WITH BIPOLAR DISORDER      guaiFENesin ER (MUCINEX) 600 mg ER tablet Take 600 mg by mouth two (2) times a day. Indications: \"addiction\"      potassium chloride (K-DUR, KLOR-CON) 20 mEq tablet Take 1 Tab by mouth daily for 5 days. 5 Tab 0    sertraline (ZOLOFT) 100 mg tablet Take 100 mg by mouth daily.  ondansetron (ZOFRAN ODT) 4 mg disintegrating tablet Take 1-2 tablets every 6-8 hours as needed for nausea and vomiting. 10 Tab 0    risperiDONE (RISPERDAL) 0.25 mg tablet Take 0.25 mg by mouth.          Past History     Past Medical History:  Past Medical History:   Diagnosis Date    Asthma     Bilateral ovarian cysts     Cancer ([facility] [address] Pancreatic and Bone Marrow    Pancreatitis     Pancreatitis        Past Surgical History:  Past Surgical History:   Procedure Laterality Date    HX GYN      D&C       Family History:  History reviewed. No pertinent family history. Social History:  Social History   Substance Use Topics    Smoking status: Current Every Day Smoker     Packs/day: 0.50    Smokeless tobacco: Never Used    Alcohol use Yes       Allergies: Allergies   Allergen Reactions    Norco [Hydrocodone-Acetaminophen] Itching     Patient states she is not allergic      Toradol [Ketorolac] Rash     Pt reports she is not allergic to this medication. Review of Systems   Review of Systems   Constitutional: Negative for chills and fever. Respiratory: Positive for shortness of breath. Cardiovascular: Positive for chest pain and palpitations. Negative for leg swelling. All other systems reviewed and are negative. Physical Exam     Vitals:    07/06/18 1427 07/06/18 1428 07/06/18 1632   BP: 91/60  106/55   Pulse: 92  70   Resp: 18  18   Temp: 98.9 °F (37.2 °C)  98.8 °F (37.1 °C)   SpO2: 99%  99%   Weight:  81.6 kg (180 lb)    Height:  5' 2\" (1.575 m)      Physical Exam   Constitutional: She is oriented to person, place, and time. She appears well-developed and well-nourished. Mildly anxious   HENT:   Head: Normocephalic and atraumatic. Eyes: Conjunctivae are normal.   Neck: Normal range of motion. Cardiovascular: Normal rate and regular rhythm. Pulmonary/Chest: Effort normal and breath sounds normal.   Abdominal: Soft. Bowel sounds are normal. There is no tenderness. Musculoskeletal: Normal range of motion. Neurological: She is alert and oriented to person, place, and time. Skin: Skin is warm and dry. Nursing note and vitals reviewed. Diagnostic Study Results     Labs -     No results found for this or any previous visit (from the past 12 hour(s)).     Radiologic Studies -   XR CHEST PA LAT   Final Result   IMPRESSION: No acute radiographic cardiopulmonary abnormality  As read by the radiologist.     CT Results  (Last 48 hours)    None        CXR Results  (Last 48 hours)               07/06/18 1444  XR CHEST PA LAT Final result    Impression:  IMPRESSION: No acute radiographic cardiopulmonary abnormality. Narrative:  EXAM:  XR CHEST PA LAT       INDICATION:   Chest pain for several hours       COMPARISON: March 24, 2015. FINDINGS: PA and lateral radiographs of the chest demonstrate clear lungs. The   cardiac and mediastinal contours and pulmonary vascularity are normal.  No acute   osseous abnormality. Medications given in the ED-  Medications   potassium chloride (K-DUR, KLOR-CON) SR tablet 40 mEq (40 mEq Oral Given 7/6/18 7219)         Medical Decision Making   I am the first provider for this patient. I reviewed the vital signs, available nursing notes, past medical history, past surgical history, family history and social history. Vital Signs-Reviewed the patient's vital signs. Pulse Oximetry Analysis - 99% on RA    EKG interpretation: (Preliminary)  2:10 PM   93 bpm. NSR. No STEMI. No MAR.  EKG read by Art Martin, NP at 2:10 PM     Records Reviewed: Nursing Notes    Provider Notes (Medical Decision Making): Patient with history of anxiety and addiction to mucinex. Symptoms were SOB and anxiety. Labs show mild hypokalemia which was replaced and additional script for potassium given. Cardiac and EKG Negative. Patient has low heart score and DDimer negative as patient is low risk for PE. I have encouraged her to decrease her use of Mucinex. She will follow up with  for PCP follow up. Understands reasons to return and is offering no questions or concerns at this time. Procedures:  Procedures    ED Course:   2:12 PM Initial assessment performed.  The patients presenting problems have been discussed, and they are in agreement with the care plan formulated and outlined with them. I have encouraged them to ask questions as they arise throughout their visit. 4:56 PM  Pt having no sxs. Encouraged to discontinue taking Mucinex. Will place  consult to help pt f/u with PCP. Understands reasons to return and is offering no questions or concerns at this time. CONSULT NOTE:   5:00 PM  Freeman Schilling NP spoke with Mercedez Gardner DO   Specialty: ED Medicine  Discussed pt's hx, disposition, and available diagnostic and imaging results, and he reviewed EKG  in person. Reviewed care plans. Consulting physician agrees with plans as outlined. Agrees with discharge and does not need second set of enzymes. F/u with PCP and Cardiology. Diagnosis and Disposition       DISCHARGE NOTE:  5:00 PM  Shilpi Goldstein's  results have been reviewed with her. She has been counseled regarding her diagnosis, treatment, and plan. She verbally conveys understanding and agreement of the signs, symptoms, diagnosis, treatment and prognosis and additionally agrees to follow up as discussed. She also agrees with the care-plan and conveys that all of her questions have been answered. I have also provided discharge instructions for her that include: educational information regarding their diagnosis and treatment, and list of reasons why they would want to return to the ED prior to their follow-up appointment, should her condition change. She has been provided with education for proper emergency department utilization. CLINICAL IMPRESSION:    1. Palpitations    2. Hypokalemia    3. Anxiety    4. Addiction to drug ([facility] [address]        PLAN:  1. D/C Home  2. Discharge Medication List as of 7/6/2018  5:07 PM      CONTINUE these medications which have NOT CHANGED    Details   QUEtiapine (SEROQUEL) 400 mg tablet Take 400 mg by mouth nightly.  Indications: DEPRESSION ASSOCIATED WITH BIPOLAR DISORDER, Historical Med      guaiFENesin ER (MUCINEX) 600 mg ER tablet Take 600 mg by mouth two (2) times a day. Indications: \"addiction\", Historical Med      sertraline (ZOLOFT) 100 mg tablet Take 100 mg by mouth daily. , Historical Med      ondansetron (ZOFRAN ODT) 4 mg disintegrating tablet Take 1-2 tablets every 6-8 hours as needed for nausea and vomiting., Print, Disp-10 Tab, R-0      risperiDONE (RISPERDAL) 0.25 mg tablet Take 0.25 mg by mouth., Historical Med           3. Follow-up Information     Follow up With Details Comments Contact Info    Patrizia Cochran MD Schedule an appointment as soon as possible for a visit in 2 days For follow up with PCP Kalani. [address]      Katie Maya MD Schedule an appointment as soon as possible for a visit in 2 days For cardiology follow up [address]      THE [facility] EMERGENCY DEPT Go to As needed, If symptoms worsen [address]   [facility] [id number] [phone]        _______________________________    Attestations: This note is prepared by Heber Larkin, acting as Scribe for ZENY Mccann. ZENY Mccann:  The scribe's documentation has been prepared under my direction and personally reviewed by me in its entirety.   I confirm that the note above accurately reflects all work, treatment, procedures, and medical decision making performed by me.  _______________________________

## 2018-07-06 NOTE — ED NOTES
Patient (s)  given copy of dc instructions and 1 script(s). Patient (s)  verbalized understanding of instructions and script (s). Patient given a current medication reconciliation form and verbalized understanding of their medications. Patient (s) verbalized understanding of the importance of discussing medications with  his or her physician or clinic they will be following up with. Patient alert and oriented and in no acute distress. Patient discharged home ambulatory with friend.

## 2018-07-08 LAB
ATRIAL RATE: 93 BPM
CALCULATED P AXIS, ECG09: 49 DEGREES
CALCULATED R AXIS, ECG10: 46 DEGREES
CALCULATED T AXIS, ECG11: -25 DEGREES
DIAGNOSIS, 93000: NORMAL
P-R INTERVAL, ECG05: 160 MS
Q-T INTERVAL, ECG07: 342 MS
QRS DURATION, ECG06: 76 MS
QTC CALCULATION (BEZET), ECG08: 425 MS
VENTRICULAR RATE, ECG03: 93 BPM

## 2019-08-15 ENCOUNTER — HOSPITAL ENCOUNTER (EMERGENCY)
Age: 39
Discharge: HOME | End: 2019-08-15
Attending: EMERGENCY MEDICINE
Payer: MEDICAID

## 2019-08-15 VITALS
DIASTOLIC BLOOD PRESSURE: 54 MMHG | OXYGEN SATURATION: 96 % | HEART RATE: 63 BPM | SYSTOLIC BLOOD PRESSURE: 104 MMHG | TEMPERATURE: 98.9 F | RESPIRATION RATE: 16 BRPM

## 2019-08-15 DIAGNOSIS — F29 PSYCHOSIS, UNSPECIFIED PSYCHOSIS TYPE (HCC): Primary | ICD-10-CM

## 2019-08-15 DIAGNOSIS — F19.90 RECREATIONAL DRUG USE: ICD-10-CM

## 2019-08-15 DIAGNOSIS — E87.6 HYPOKALEMIA: ICD-10-CM

## 2019-08-15 LAB
ALBUMIN SERPL-MCNC: 4 G/DL (ref 3.4–5)
ALBUMIN/GLOB SERPL: 1.2 {RATIO} (ref 0.8–1.7)
ALP SERPL-CCNC: 122 U/L (ref 45–117)
ALT SERPL-CCNC: 28 U/L (ref 13–56)
AMPHET UR QL SCN: NEGATIVE
ANION GAP SERPL CALC-SCNC: 4 MMOL/L (ref 3–18)
APAP SERPL-MCNC: <2 UG/ML (ref 10–30)
APPEARANCE UR: CLEAR
AST SERPL-CCNC: 9 U/L (ref 10–38)
BARBITURATES UR QL SCN: NEGATIVE
BASOPHILS # BLD: 0 K/UL (ref 0–0.1)
BASOPHILS NFR BLD: 0 % (ref 0–2)
BENZODIAZ UR QL: NEGATIVE
BILIRUB SERPL-MCNC: 0.8 MG/DL (ref 0.2–1)
BILIRUB UR QL: NEGATIVE
BUN SERPL-MCNC: 10 MG/DL (ref 7–18)
BUN/CREAT SERPL: 13 (ref 12–20)
CALCIUM SERPL-MCNC: 8.8 MG/DL (ref 8.5–10.1)
CANNABINOIDS UR QL SCN: NEGATIVE
CHLORIDE SERPL-SCNC: 114 MMOL/L (ref 100–111)
CO2 SERPL-SCNC: 23 MMOL/L (ref 21–32)
COCAINE UR QL SCN: NEGATIVE
COLOR UR: YELLOW
CREAT SERPL-MCNC: 0.78 MG/DL (ref 0.6–1.3)
DIFFERENTIAL METHOD BLD: ABNORMAL
EOSINOPHIL # BLD: 0.1 K/UL (ref 0–0.4)
EOSINOPHIL NFR BLD: 1 % (ref 0–5)
ERYTHROCYTE [DISTWIDTH] IN BLOOD BY AUTOMATED COUNT: 14.1 % (ref 11.6–14.5)
ETHANOL SERPL-MCNC: <3 MG/DL (ref 0–3)
GLOBULIN SER CALC-MCNC: 3.3 G/DL (ref 2–4)
GLUCOSE SERPL-MCNC: 89 MG/DL (ref 74–99)
GLUCOSE UR STRIP.AUTO-MCNC: NEGATIVE MG/DL
HCG UR QL: NEGATIVE
HCT VFR BLD AUTO: 40.3 % (ref 35–45)
HDSCOM,HDSCOM: NORMAL
HGB BLD-MCNC: 13.5 G/DL (ref 12–16)
HGB UR QL STRIP: NEGATIVE
KETONES UR QL STRIP.AUTO: NEGATIVE MG/DL
LEUKOCYTE ESTERASE UR QL STRIP.AUTO: NEGATIVE
LYMPHOCYTES # BLD: 3.4 K/UL (ref 0.9–3.6)
LYMPHOCYTES NFR BLD: 25 % (ref 21–52)
MCH RBC QN AUTO: 29.4 PG (ref 24–34)
MCHC RBC AUTO-ENTMCNC: 33.5 G/DL (ref 31–37)
MCV RBC AUTO: 87.8 FL (ref 74–97)
METHADONE UR QL: NEGATIVE
MONOCYTES # BLD: 0.7 K/UL (ref 0.05–1.2)
MONOCYTES NFR BLD: 5 % (ref 3–10)
NEUTS SEG # BLD: 9.3 K/UL (ref 1.8–8)
NEUTS SEG NFR BLD: 69 % (ref 40–73)
NITRITE UR QL STRIP.AUTO: NEGATIVE
OPIATES UR QL: NEGATIVE
PCP UR QL: NEGATIVE
PH UR STRIP: 5.5 [PH] (ref 5–8)
PLATELET # BLD AUTO: 268 K/UL (ref 135–420)
PMV BLD AUTO: 10.7 FL (ref 9.2–11.8)
POTASSIUM SERPL-SCNC: 3.1 MMOL/L (ref 3.5–5.5)
PROT SERPL-MCNC: 7.3 G/DL (ref 6.4–8.2)
PROT UR STRIP-MCNC: NEGATIVE MG/DL
RBC # BLD AUTO: 4.59 M/UL (ref 4.2–5.3)
SALICYLATES SERPL-MCNC: 3.4 MG/DL (ref 2.8–20)
SODIUM SERPL-SCNC: 141 MMOL/L (ref 136–145)
SP GR UR REFRACTOMETRY: 1.02 (ref 1–1.03)
UROBILINOGEN UR QL STRIP.AUTO: 0.2 EU/DL (ref 0.2–1)
WBC # BLD AUTO: 13.5 K/UL (ref 4.6–13.2)

## 2019-08-15 PROCEDURE — 80307 DRUG TEST PRSMV CHEM ANLYZR: CPT

## 2019-08-15 PROCEDURE — 74011250637 HC RX REV CODE- 250/637: Performed by: EMERGENCY MEDICINE

## 2019-08-15 PROCEDURE — 99285 EMERGENCY DEPT VISIT HI MDM: CPT

## 2019-08-15 PROCEDURE — 80053 COMPREHEN METABOLIC PANEL: CPT

## 2019-08-15 PROCEDURE — 93005 ELECTROCARDIOGRAM TRACING: CPT

## 2019-08-15 PROCEDURE — 85025 COMPLETE CBC W/AUTO DIFF WBC: CPT

## 2019-08-15 PROCEDURE — 81003 URINALYSIS AUTO W/O SCOPE: CPT

## 2019-08-15 PROCEDURE — 81025 URINE PREGNANCY TEST: CPT

## 2019-08-15 RX ORDER — POTASSIUM CHLORIDE 20 MEQ/1
40 TABLET, EXTENDED RELEASE ORAL
Status: COMPLETED | OUTPATIENT
Start: 2019-08-15 | End: 2019-08-15

## 2019-08-15 RX ADMIN — POTASSIUM CHLORIDE 40 MEQ: 20 TABLET, EXTENDED RELEASE ORAL at 21:23

## 2019-08-15 NOTE — ED TRIAGE NOTES
Pt presents with female visitor stating she \"needs help. \" Pt denies SI/HI, reports being off psych meds for a couple day. Pt becomes easily frustrated when asking questions. Continues to repeatedly say \"please lord josue, help me josue. \"

## 2019-08-15 NOTE — ED NOTES
Patient provided with paper scrubs to change into. Patient compliant.    Patient denies SI/HI but admits to alcohol use

## 2019-08-15 NOTE — ED PROVIDER NOTES
EMERGENCY DEPARTMENT HISTORY AND PHYSICAL EXAM    7:13 PM  Date: 8/15/2019  Patient Name: Mariela Penny    History of Presenting Illness     Chief Complaint   Patient presents with   [address] Problem        History Provided By: Patient    HPI: Mariela Penny is a 44 y.o. female with history of psychiatric disorder. Patient is presenting after ingesting 30 tablets of Mucinex at 2 PM today. Was found wandering into a [employer]. Patient denies taking any medications for her psychiatric disorder. States that she needs psychiatric help. Denies SI or HI. Reports that she ingested the Mucinex to get high. Denies hallucinations. Location:  Severity:  Timing/course: Onset/Duration:     PCP: Danna Andres MD    Past History     Past Medical History:  Past Medical History:   Diagnosis Date    Asthma     Bilateral ovarian cysts     Cancer ([facility] [address]     Pancreatic and Bone Marrow    Pancreatitis     Pancreatitis     Psychosis ([facility] [address]        Past Surgical History:  Past Surgical History:   Procedure Laterality Date    HX GYN      D&C       Family History:  History reviewed. No pertinent family history. Social History:  Social History     Tobacco Use    Smoking status: Current Every Day Smoker     Packs/day: 0.50    Smokeless tobacco: Never Used   Substance Use Topics    Alcohol use: Yes    Drug use: No     Comment: clean 4 months,        Allergies: Allergies   Allergen Reactions    Norco [Hydrocodone-Acetaminophen] Itching     Patient states she is not allergic      Toradol [Ketorolac] Rash     Pt reports she is not allergic to this medication. Review of Systems   Review of Systems   Unable to perform ROS: Psychiatric disorder        Physical Exam     Patient Vitals for the past 12 hrs:   Temp Pulse Resp BP SpO2   08/15/19 1649 98.9 °F (37.2 °C) 70 16 123/78 97 %       Physical Exam   Constitutional: She is oriented to person, place, and time. She appears well-developed and well-nourished. HENT:   Head: Normocephalic and atraumatic. Eyes: Pupils are equal, round, and reactive to light. Conjunctivae and EOM are normal.   Neck: Normal range of motion. Neck supple. Cardiovascular: Normal rate and intact distal pulses. Pulmonary/Chest: Effort normal. No respiratory distress. Musculoskeletal: Normal range of motion. She exhibits no deformity. Neurological: She is alert and oriented to person, place, and time. Skin: Skin is warm and dry. Psychiatric: Her affect is blunt. Her speech is delayed. She is slowed and withdrawn. She expresses no suicidal plans and no homicidal plans. She is inattentive. Diagnostic Study Results     Labs -  Recent Results (from the past 12 hour(s))   CBC WITH AUTOMATED DIFF    Collection Time: 08/15/19  5:00 PM   Result Value Ref Range    WBC 13.5 (H) 4.6 - 13.2 K/uL    RBC 4.59 4.20 - 5.30 M/uL    HGB 13.5 12.0 - 16.0 g/dL    HCT 40.3 35.0 - 45.0 %    MCV 87.8 74.0 - 97.0 FL    MCH 29.4 24.0 - 34.0 PG    MCHC 33.5 31.0 - 37.0 g/dL    RDW 14.1 11.6 - 14.5 %    PLATELET 061 960 - 553 K/uL    MPV 10.7 9.2 - 11.8 FL    NEUTROPHILS 69 40 - 73 %    LYMPHOCYTES 25 21 - 52 %    MONOCYTES 5 3 - 10 %    EOSINOPHILS 1 0 - 5 %    BASOPHILS 0 0 - 2 %    ABS. NEUTROPHILS 9.3 (H) 1.8 - 8.0 K/UL    ABS. LYMPHOCYTES 3.4 0.9 - 3.6 K/UL    ABS. MONOCYTES 0.7 0.05 - 1.2 K/UL    ABS. EOSINOPHILS 0.1 0.0 - 0.4 K/UL    ABS.  BASOPHILS 0.0 0.0 - 0.1 K/UL    DF AUTOMATED     METABOLIC PANEL, COMPREHENSIVE    Collection Time: 08/15/19  5:00 PM   Result Value Ref Range    Sodium 141 136 - 145 mmol/L    Potassium 3.1 (L) 3.5 - 5.5 mmol/L    Chloride 114 (H) 100 - 111 mmol/L    CO2 23 21 - 32 mmol/L    Anion gap 4 3.0 - 18 mmol/L    Glucose 89 74 - 99 mg/dL    BUN 10 7.0 - 18 MG/DL    Creatinine 0.78 0.6 - 1.3 MG/DL    BUN/Creatinine ratio 13 12 - 20      GFR est AA >60 >60 ml/min/1.73m2    GFR est non-AA >60 >60 ml/min/1.73m2    Calcium 8.8 8.5 - 10.1 MG/DL    Bilirubin, total 0.8 0.2 - 1.0 MG/DL    ALT (SGPT) 28 13 - 56 U/L    AST (SGOT) 9 (L) 10 - 38 U/L    Alk. phosphatase 122 (H) 45 - 117 U/L    Protein, total 7.3 6.4 - 8.2 g/dL    Albumin 4.0 3.4 - 5.0 g/dL    Globulin 3.3 2.0 - 4.0 g/dL    A-G Ratio 1.2 0.8 - 1.7     ETHYL ALCOHOL    Collection Time: 08/15/19  5:00 PM   Result Value Ref Range    ALCOHOL(ETHYL),SERUM <3 0 - 3 MG/DL   SALICYLATE    Collection Time: 08/15/19  5:00 PM   Result Value Ref Range    Salicylate level 3.4 2.8 - 20.0 MG/DL   ACETAMINOPHEN    Collection Time: 08/15/19  5:00 PM   Result Value Ref Range    Acetaminophen level <2 (L) 10.0 - 30.0 ug/mL   HCG URINE, QL    Collection Time: 08/15/19  5:45 PM   Result Value Ref Range    HCG urine, QL NEGATIVE  NEG     URINALYSIS W/ RFLX MICROSCOPIC    Collection Time: 08/15/19  5:45 PM   Result Value Ref Range    Color YELLOW      Appearance CLEAR      Specific gravity 1.022 1.005 - 1.030      pH (UA) 5.5 5.0 - 8.0      Protein NEGATIVE  NEG mg/dL    Glucose NEGATIVE  NEG mg/dL    Ketone NEGATIVE  NEG mg/dL    Bilirubin NEGATIVE  NEG      Blood NEGATIVE  NEG      Urobilinogen 0.2 0.2 - 1.0 EU/dL    Nitrites NEGATIVE  NEG      Leukocyte Esterase NEGATIVE  NEG     EKG, 12 LEAD, INITIAL    Collection Time: 08/15/19  6:49 PM   Result Value Ref Range    Ventricular Rate 58 BPM    Atrial Rate 58 BPM    P-R Interval 172 ms    QRS Duration 78 ms    Q-T Interval 414 ms    QTC Calculation (Bezet) 406 ms    Calculated P Axis 58 degrees    Calculated R Axis 45 degrees    Calculated T Axis 23 degrees    Diagnosis       Sinus bradycardia  Otherwise normal ECG  When compared with ECG of 06-JUL-2018 14:10,  Vent. rate has decreased BY  35 BPM  Nonspecific T wave abnormality, improved in Inferior leads  T wave inversion no longer evident in Anterior leads         Radiologic Studies -   No results found. Medical Decision Making     ED Course: Progress Notes, Reevaluation, and Consults:    7:13 PM Initial assessment performed.  The patients presenting problems have been discussed, and they/their family are in agreement with the care plan formulated and outlined with them. I have encouraged them to ask questions as they arise throughout their visit. Discussed the case with poison control and the recommendation is to give Ativan as needed for agitation. If the Mucinex was DX then the recommendation is to observe her for 6 hours from the time of ingestion which would clear her by 8 PM.  Reviewed that her labs in the EKG with them. Crisis team was updated. Provider Notes (Medical Decision Making): 42-year-old female with unknown psychiatric disorder presenting after ingesting 30 tabs of Mucinex at 2 PM.  She is presenting and what looks like a catatonic state and altered mental status. Unknown baseline. She is hemodynamically stable and has normal EKG intervals. No need for medication at this point we will continue to observe her and consult crisis team.    Procedures:     Critical Care Time:     Vital Signs-Reviewed the patient's vital signs. Reviewed pt's pulse ox reading. EKG: Interpreted by the EP. Time Interpreted:    Rate:    Rhythm: Normal Sinus Rhythm 58   Interpretation: Normal EKG   Comparison: No significant interval changes    Records Reviewed: Nursing Notes, Old Medical Records and Previous electrocardiograms (Time of Review: 7:13 PM)  -I am the first provider for this patient.  -I reviewed the vital signs, available nursing notes, past medical history, past surgical history, family history and social history. Current Outpatient Medications   Medication Sig Dispense Refill    QUEtiapine (SEROQUEL) 400 mg tablet Take 400 mg by mouth nightly. Indications: DEPRESSION ASSOCIATED WITH BIPOLAR DISORDER      guaiFENesin ER (MUCINEX) 600 mg ER tablet Take 600 mg by mouth two (2) times a day.  Indications: \"addiction\"      ondansetron (ZOFRAN ODT) 4 mg disintegrating tablet Take 1-2 tablets every 6-8 hours as needed for nausea and vomiting. 10 Tab 0    sertraline (ZOLOFT) 100 mg tablet Take 100 mg by mouth daily.  risperiDONE (RISPERDAL) 0.25 mg tablet Take 0.25 mg by mouth. Clinical Impression     Clinical Impression: No diagnosis found. Disposition: per crisis team    This note was dictated utilizing voice recognition software which may lead to typographical errors. I apologize in advance if the situation occurs. If questions arise please do not hesitate to contact me or call our department.     Radha Max MD  7:13 PM

## 2019-08-15 NOTE — ED TRIAGE NOTES
Patient arrived to ER with a friend (Susan Martinez) from [facility] for medical evaluation. Susan Martinez reports she noticed patient arrived to [facility] stating she took 30 pills of Mucinex. Patient has flat affect, minimal verbal communication. Patient does not respond if asked if she is suicidal or has taken anything. Patient states, \"tell me lord. \" \"Tell me lord. I performed a brief evaluation, including history and physical, of the patient here in triage and I have determined that pt will need further treatment and evaluation from the main side ER physician. I have placed initial orders to help in expediting patients care.      August 15, 2019 at 4:51 PM - Tosha Karimi NP        Visit Vitals  /78 (BP 1 Location: Left arm, BP Patient Position: Sitting)   Pulse 70   Temp 98.9 °F (37.2 °C)   Resp 16   SpO2 97%

## 2019-08-16 LAB
ATRIAL RATE: 58 BPM
CALCULATED P AXIS, ECG09: 58 DEGREES
CALCULATED R AXIS, ECG10: 45 DEGREES
CALCULATED T AXIS, ECG11: 23 DEGREES
DIAGNOSIS, 93000: NORMAL
P-R INTERVAL, ECG05: 172 MS
Q-T INTERVAL, ECG07: 414 MS
QRS DURATION, ECG06: 78 MS
QTC CALCULATION (BEZET), ECG08: 406 MS
VENTRICULAR RATE, ECG03: 58 BPM

## 2019-08-16 NOTE — ED NOTES
Patient is in bed resting. Friend from [facility] that was at bedside left for the night. She states, \"You guys can call me anytime and so can Adolfo Munson. \" Friend's number verified in the chart.

## 2019-08-16 NOTE — ED NOTES
Patient re-evaluated by provider. Pupils are now reactive and no delayed responses when answering providers questions.

## 2019-08-16 NOTE — ED NOTES
Patient's movements are very slow, has delayed responses when asked questions and her eyes are pinpoint. Will continue to monitor.

## 2019-08-16 NOTE — ED NOTES
7:00 PM: Pt care assumed from Dr. Andi Ribeiro, ED provider. Pt complaint(s), current treatment plan, progression and available diagnostic results have been discussed thoroughly. Rounding occurred: Yes  Intended Disposition: Pending  Pending diagnostic reports and/or labs (please list): LOREN, Crisis    9:30 PM: Patient wide-awake and has no complaints at this time. Her slight hypokalemia was repleted. UDS negative. She is medically cleared for evaluation by Crisis. She mentions that she only took the Mucinex to get high and not because she wanted to kill herself. Spoke to Osbaldo from Crisis and she will see the patient. 10:00 PM: Osbaldo saw the patient. The patient is not suicidal, homicidal or psychotic. She says she is addicted to dextromethorphan, but she does not want detox at this time. [facility] in a [facility]. Will discharge her home and provide her outpatient resources for when she is ready to go through detox.

## 2019-08-16 NOTE — ED NOTES
Spoke with poison control. Updated them on patient's status.  They will check back later for another update

## 2019-08-16 NOTE — DISCHARGE INSTRUCTIONS
Patient Education        Use of Multiple Drugs: Care Instructions  Your Care Instructions    You have had treatment to help your body get rid of a combination of any of these drugs:  · Prescription medicines  · Over-the-counter medicines  · Alcohol  · Illegal drugs  Taking some drugs together may cause a bad reaction. They can have unexpected or stronger effects on your body and mind. For example, benzodiazepines (such as alprazolam and lorazepam) and alcohol both depress the nervous system. Taken together, each one is stronger than when it is taken by itself. You are getting better, but it takes time for the drugs to leave your body. It may take up to 2 weeks for your mind to clear and your mood to improve. Depending on the drugs you took, the doctor might have:  · Watched your symptoms or done tests to find out what drugs were in your body. · Treated you to control your breathing, blood pressure, and heart rate. · Tried to remove the drugs from your body by pumping your stomach or giving you a substance by mouth that absorbs chemicals. · Given you a substance that neutralizes chemicals (antidote). · Given you oxygen to help you breathe. · Given you fluids. The doctor also watched you carefully to make sure you were recovering safely. Follow-up care is a key part of your treatment and safety. Be sure to make and go to all appointments, and call your doctor if you are having problems. It's also a good idea to know your test results and keep a list of the medicines you take. How can you care for yourself at home? · Adopt healthy habits to ease withdrawal symptoms. When you use substances like alcohol and some drugs regularly, your body gets used to them. This is called physical dependence. If you are physically dependent on substances, you may have withdrawal symptoms when you stop taking them. These symptoms may include trembling, feeling restless, and sweating.  To help get past these:  ? Get plenty of rest.  ? Drink lots of fluids. ? Stay active. ? Eat a healthy diet. · Drink fluids to soothe a sore throat. If you had a tube in your throat to help you breathe, you may have a sore throat or hoarseness that can last a few days. Drinking fluids may help. · If you use opioids, ask your doctor or pharmacist about having a naloxone rescue kit on hand. · Get help to stop using drugs. Talk to your doctor about substance use treatment programs. When should you call for help? Call 911 anytime you think you may need emergency care. For example, call if:    · You feel you cannot stop from hurting yourself or someone else.   [facility] your doctor now or seek immediate medical care if:    · You have new or worse symptoms of withdrawal, such as trembling, feeling restless, and sweating, that you can't manage at home.    Watch closely for changes in your health, and be sure to contact your doctor if:    · You do not get better as expected.     · You need help finding the right place to get help with drug or alcohol problems. Where can you learn more? Go to http://denis-davin.info/. Enter B109 in the search box to learn more about \"Use of Multiple Drugs: Care Instructions. \"  Current as of: February 5, 2019  Content Version: 12.1  © 4353-7751 [employer], [employer]. Care instructions adapted under license by [employer] (which disclaims liability or warranty for this information). If you have questions about a medical condition or this instruction, always ask your healthcare professional. [address] any warranty or liability for your use of this information.

## 2019-08-16 NOTE — BSMART NOTE
Comprehensive Assessment Form Part 1    Integrated Summary    Patient is a 44year old female who presented to the emergency room for \"I take Mucinex to get high. I steal it when I don't have the money to buy it. I'm addicted to Mucinex. \" Patient also that she \"used to smoke crack-cocaine, but has not smoked crack in about 1 year, and drinks 1 beer on occasions with last use 1-2 months, ago. \"    Patient also stated that she does not need substance abuse treatment; however, patient is willing to follow-up with an outpatient provider. Patient is alert and oriented x 4, calm, cooperative. Patient denied thoughts, plans, or intentions of harm towards self or others. Patient does not exhibit psychotic behavior. Patient verbalized that she resides at the [facility] and she plans to return there with Claire Horowitz, [phone], when discharged. Disposition    Discussed with Dr. Karyle Abrahams, patient does not meet criteria for acute psychiatric admission. Patient given list of local providers to follow-up with provider of choice. Patient discharged per ED.      Chidi Roberts, RN, BSN

## 2019-11-28 ENCOUNTER — HOSPITAL ENCOUNTER (EMERGENCY)
Age: 39
Discharge: PSYCHIATRIC OR SUBSTANCE USE FACILITY | End: 2019-11-28
Attending: EMERGENCY MEDICINE
Payer: MEDICAID

## 2019-11-28 VITALS
DIASTOLIC BLOOD PRESSURE: 64 MMHG | WEIGHT: 147 LBS | BODY MASS INDEX: 27.05 KG/M2 | SYSTOLIC BLOOD PRESSURE: 103 MMHG | HEIGHT: 62 IN | TEMPERATURE: 98.5 F | RESPIRATION RATE: 11 BRPM | OXYGEN SATURATION: 97 % | HEART RATE: 76 BPM

## 2019-11-28 DIAGNOSIS — F19.90 RECREATIONAL DRUG USE: Primary | ICD-10-CM

## 2019-11-28 DIAGNOSIS — F19.10 POLYSUBSTANCE ABUSE (HCC): ICD-10-CM

## 2019-11-28 LAB
ALBUMIN SERPL-MCNC: 3.6 G/DL (ref 3.4–5)
ALBUMIN/GLOB SERPL: 1.1 {RATIO} (ref 0.8–1.7)
ALP SERPL-CCNC: 109 U/L (ref 45–117)
ALT SERPL-CCNC: 15 U/L (ref 13–56)
AMPHET UR QL SCN: NEGATIVE
ANION GAP SERPL CALC-SCNC: 9 MMOL/L (ref 3–18)
APAP SERPL-MCNC: <2 UG/ML (ref 10–30)
APPEARANCE UR: NORMAL
AST SERPL-CCNC: 11 U/L (ref 10–38)
BARBITURATES UR QL SCN: NEGATIVE
BASOPHILS # BLD: 0 K/UL (ref 0–0.1)
BASOPHILS NFR BLD: 0 % (ref 0–2)
BENZODIAZ UR QL: NEGATIVE
BILIRUB SERPL-MCNC: 0.7 MG/DL (ref 0.2–1)
BILIRUB UR QL: NEGATIVE
BUN SERPL-MCNC: 13 MG/DL (ref 7–18)
BUN/CREAT SERPL: 17 (ref 12–20)
CALCIUM SERPL-MCNC: 8.6 MG/DL (ref 8.5–10.1)
CANNABINOIDS UR QL SCN: NEGATIVE
CHLORIDE SERPL-SCNC: 111 MMOL/L (ref 100–111)
CO2 SERPL-SCNC: 16 MMOL/L (ref 21–32)
COCAINE UR QL SCN: NEGATIVE
COLOR UR: YELLOW
CREAT SERPL-MCNC: 0.75 MG/DL (ref 0.6–1.3)
DIFFERENTIAL METHOD BLD: ABNORMAL
EOSINOPHIL # BLD: 0.2 K/UL (ref 0–0.4)
EOSINOPHIL NFR BLD: 2 % (ref 0–5)
ERYTHROCYTE [DISTWIDTH] IN BLOOD BY AUTOMATED COUNT: 14.6 % (ref 11.6–14.5)
ETHANOL SERPL-MCNC: <3 MG/DL (ref 0–3)
GLOBULIN SER CALC-MCNC: 3.2 G/DL (ref 2–4)
GLUCOSE SERPL-MCNC: 80 MG/DL (ref 74–99)
GLUCOSE UR STRIP.AUTO-MCNC: NEGATIVE MG/DL
HCT VFR BLD AUTO: 45.6 % (ref 35–45)
HDSCOM,HDSCOM: ABNORMAL
HGB BLD-MCNC: 14.7 G/DL (ref 12–16)
HGB UR QL STRIP: NEGATIVE
KETONES UR QL STRIP.AUTO: NEGATIVE MG/DL
LEUKOCYTE ESTERASE UR QL STRIP.AUTO: NEGATIVE
LYMPHOCYTES # BLD: 3.6 K/UL (ref 0.9–3.6)
LYMPHOCYTES NFR BLD: 26 % (ref 21–52)
MCH RBC QN AUTO: 28.7 PG (ref 24–34)
MCHC RBC AUTO-ENTMCNC: 32.2 G/DL (ref 31–37)
MCV RBC AUTO: 89.1 FL (ref 74–97)
METHADONE UR QL: NEGATIVE
MONOCYTES # BLD: 0.6 K/UL (ref 0.05–1.2)
MONOCYTES NFR BLD: 4 % (ref 3–10)
NEUTS SEG # BLD: 9.2 K/UL (ref 1.8–8)
NEUTS SEG NFR BLD: 68 % (ref 40–73)
NITRITE UR QL STRIP.AUTO: NEGATIVE
OPIATES UR QL: POSITIVE
PCP UR QL: POSITIVE
PH UR STRIP: 5.5 [PH] (ref 5–8)
PLATELET # BLD AUTO: 290 K/UL (ref 135–420)
PMV BLD AUTO: 10.5 FL (ref 9.2–11.8)
POTASSIUM SERPL-SCNC: 4.2 MMOL/L (ref 3.5–5.5)
PROT SERPL-MCNC: 6.8 G/DL (ref 6.4–8.2)
PROT UR STRIP-MCNC: NEGATIVE MG/DL
RBC # BLD AUTO: 5.12 M/UL (ref 4.2–5.3)
SALICYLATES SERPL-MCNC: 3.6 MG/DL (ref 2.8–20)
SODIUM SERPL-SCNC: 136 MMOL/L (ref 136–145)
SP GR UR REFRACTOMETRY: 1.02 (ref 1–1.03)
UROBILINOGEN UR QL STRIP.AUTO: 0.2 EU/DL (ref 0.2–1)
WBC # BLD AUTO: 13.6 K/UL (ref 4.6–13.2)

## 2019-11-28 PROCEDURE — 80053 COMPREHEN METABOLIC PANEL: CPT

## 2019-11-28 PROCEDURE — 81003 URINALYSIS AUTO W/O SCOPE: CPT

## 2019-11-28 PROCEDURE — 80307 DRUG TEST PRSMV CHEM ANLYZR: CPT

## 2019-11-28 PROCEDURE — 99285 EMERGENCY DEPT VISIT HI MDM: CPT

## 2019-11-28 PROCEDURE — 85025 COMPLETE CBC W/AUTO DIFF WBC: CPT

## 2019-11-28 PROCEDURE — 93005 ELECTROCARDIOGRAM TRACING: CPT

## 2019-11-28 RX ORDER — LABETALOL 100 MG/1
TABLET, FILM COATED ORAL 2 TIMES DAILY
COMMUNITY
End: 2019-12-06

## 2019-11-28 RX ORDER — ESCITALOPRAM OXALATE 10 MG/1
10 TABLET ORAL DAILY
COMMUNITY
End: 2021-12-17

## 2019-11-28 NOTE — ED TRIAGE NOTES
Patient brought in via EMS, ambulated to bed stating she wants help and wants to get clean. Patient states she has been using mucinex D for months as a way to get and then switched to crack.  Patient states she wants to get clean but before getting clean wanted to take mucinex D for a \"last time high\"

## 2019-11-28 NOTE — ED PROVIDER NOTES
EMERGENCY DEPARTMENT HISTORY AND PHYSICAL EXAM    Date: 11/28/2019  Patient Name: Madan Chao    History of Presenting Illness     Chief Complaint   Patient presents with    Drug Overdose          History Provided By: Patient    Additional History (Context):   Madan Chao is a 44 y.o. female with PMHX of polysubstance abuse presents to the emergency department by EMS requesting inpatient detox. Patient reports chronic abuse of dextromethorphan, Mucinex and states that she usually takes 12,000 mg daily. She states that she usually takes 6000 mg in the morning and 6000 mg at night. Last dose was 1 hour prior to arrival.  Patient states that she also abuses crack. Patient states that she has been alternating crack and Mucinex for several years. Denies suicidal ideation. Pt denies abdominal pain, nausea, vomiting, fever, chills, and any other sxs or complaints. PCP: García Griffith MD    Current Outpatient Medications   Medication Sig Dispense Refill    escitalopram oxalate (LEXAPRO) 10 mg tablet Take 10 mg by mouth daily.  labetalol (NORMODYNE) 100 mg tablet Take  by mouth two (2) times a day.  lurasidone (LATUDA) 80 mg tab tablet Take  by mouth. Past History     Past Medical History:  Past Medical History:   Diagnosis Date    Asthma     Bilateral ovarian cysts     Cancer ([facility] [address]     Pancreatic and Bone Marrow    Pancreatitis     Pancreatitis     Psychosis ([facility] [address]        Past Surgical History:  Past Surgical History:   Procedure Laterality Date    HX GYN      D&C, Hysterectomy       Family History:  History reviewed. No pertinent family history. Social History:  Social History     Tobacco Use    Smoking status: Current Every Day Smoker     Packs/day: 1.00    Smokeless tobacco: Never Used   Substance Use Topics    Alcohol use: Not Currently    Drug use: Yes     Comment: Crack       Allergies:   Allergies   Allergen Reactions    Norco [Hydrocodone-Acetaminophen] Itching Patient states she is not allergic      Toradol [Ketorolac] Rash     Pt reports she is not allergic to this medication. Review of Systems   Review of Systems   Constitutional: Negative for chills and fever. HENT: Negative for congestion, ear pain, sinus pain and sore throat. Eyes: Negative for pain and visual disturbance. Respiratory: Negative for cough and shortness of breath. Cardiovascular: Negative for chest pain and leg swelling. Gastrointestinal: Negative for abdominal pain, constipation, diarrhea, nausea and vomiting. Genitourinary: Negative for dysuria, hematuria, vaginal bleeding and vaginal discharge. Musculoskeletal: Negative for back pain and neck pain. Skin: Negative for rash and wound. Neurological: Negative for dizziness, tremors, weakness, light-headedness and numbness. Psychiatric/Behavioral: Negative for hallucinations, self-injury and suicidal ideas. The patient is not nervous/anxious and is not hyperactive. All other systems reviewed and are negative. Physical Exam     Vitals:    11/28/19 1730 11/28/19 1800 11/28/19 1900 11/28/19 1930   BP: (!) 125/93 122/83 134/77 103/64   Pulse: 77 79 84 76   Resp: 10 8 8 11   Temp:       SpO2: 98% 98% 97% 97%   Weight:       Height:         Physical Exam    Nursing note and vitals reviewed    Constitutional: Disheveled  female, appears older than stated age  Head: Normocephalic, Atraumatic  Eyes: Pupils are equal, round, and reactive to light, EOMI  Neck: Supple, non-tender  Cardiovascular: Regular rate and rhythm, no murmurs, rubs, or gallops  Chest: Normal work of breathing and chest excursion bilaterally  Lungs: Clear to ausculation bilaterally, no wheezes, no rhonchi  Abdomen: Soft, non tender, non distended, normoactive bowel sounds  Back: No evidence of trauma or deformity  Extremities: No evidence of trauma or deformity, no LE edema.  No streaking erythema, vesicular lesions, ulcerations or bulla  Skin: Warm and dry, normal cap refill  Neuro: Alert and appropriate, CN intact, normal speech, moving all 4 extremities freely and symmetrically      Diagnostic Study Results     Labs -   No results found for this or any previous visit (from the past 12 hour(s)). Radiologic Studies -   No orders to display     CT Results  (Last 48 hours)    None        CXR Results  (Last 48 hours)    None            Medical Decision Making   I am the first provider for this patient. I reviewed the vital signs, available nursing notes, past medical history, past surgical history, family history and social history. Vital Signs-Reviewed the patient's vital signs. Pulse Oximetry Analysis -98 % on room air    Cardiac Monitor:  Rate: 80 bpm  Rhythm: Regular    EKG interpretation: (Preliminary)  5:36 PM  Normal sinus rhythm at 76 bpm.  QTc 441 ms. No acute ST elevation  Records Reviewed: Nursing Notes and Old Medical Records    Provider Notes:   44 y.o. female presenting requesting inpatient detox from chronic Mucinex and crack abuse. On exam although she appears disheveled and older than stated age she has appropriate vital signs. Benign abdominal exam.  Will obtain labs, UDS for medical clearance. Upon medical clearance, will consult case management for voluntary inpatient detox. Procedures:  Procedures    ED Course:   5:11 PM   Initial assessment performed. The patients presenting problems have been discussed, and they are in agreement with the care plan formulated and outlined with them. I have encouraged them to ask questions as they arise throughout their visit. 6:37 PM  Patient medically cleared. 6:38 PM Discussed patient's history, exam, and available diagnostics results with Micheline Paredes, case management, will attempt to find placement. Reviewed patient's labs and past medical history. Questioning past medical history of possible bone marrow and pancreatic cancer.   Discussed with the patient who denies any prior history of cancer. 7:00 PM  Patient's presentation, labs/imaging and plan of care was reviewed with Dr. Tia Ambriz as part of sign out. They will review labs and pending consultation with case management and placement as part of the plan discussed with the patient. Ahmet Chowdary MD  Patient was transferred to inpatient drug facility without complication       Diagnosis and Disposition       DISCHARGE NOTE:    Hi Goldstein's  results have been reviewed with her. She has been counseled regarding her diagnosis, treatment, and plan. She verbally conveys understanding and agreement of the signs, symptoms, diagnosis, treatment and prognosis and additionally agrees to follow up as discussed. She also agrees with the care-plan and conveys that all of her questions have been answered. I have also provided discharge instructions for her that include: educational information regarding their diagnosis and treatment, and list of reasons why they would want to return to the ED prior to their follow-up appointment, should her condition change. She has been provided with education for proper emergency department utilization. CLINICAL IMPRESSION:    1. Recreational drug use    2. Polysubstance abuse ([facility] [address]        PLAN:  1. Transfer to inpatient rehab  2. Discharge Medication List as of 11/28/2019 10:16 PM        3. Follow-up Information    None       ____________________________________     Please note that this dictation was completed with [employer], the computer voice recognition software. Quite often unanticipated grammatical, syntax, homophones, and other interpretive errors are inadvertently transcribed by the computer software. Please disregard these errors. Please excuse any errors that have escaped final proofreading.

## 2019-11-28 NOTE — PROGRESS NOTES
Eliza received, spoke with  informed cm pt seeking inpatient drug detox, denies pt having SI, per  pt medically cleared, cm will initiate bed search for inpatient detox services in the state of [place].  contacted by ED for voluntary inpt psych placement. If at any time Patient becomes involuntary- ED will need to contact Police for a paperless ECO (Emergency Custody Order) who will then call [facility] directly to assist with TDO as needed.  will contact all facilities and they will contact ED directly for questions or acceptances. CM does not need to be notified by staff once bed confirmed to ED by facility. Once all facilities have been contacted should a bed not be available through today. CM will resume search in the morning and continue to work toward transition of care. 2058- at this time no accepting facilities.         CM contacted and provided MRN  to [facility] for review- no beds available    CM contacted and provided MRN  To [facility], and [facility] for review       CM faxed clinical information to [facility] for review- does not accept insurance    CM faxed clinical information to [facility] for review- unable to accept    CM faxed clinical information to [facility] for review     CM faxed clinical information to [facility]  for review- one bed available will review but has others waiting- does not treat pcp addiction     CM faxed clinical information to [facility]  for review    CM faxed clinical information to [facility] for review     CM faxed clinical information to [facility]  for review    CM faxed clinical information to [facility] for review- no detox beds    CM fax[place] clinical [place] [facility] [place]  for review- pt accepted @ [facility].     faxed clinical information to [facility] for review     CM faxed clinical information to [facility]  for review- no female beds available    CM faxed clinical information to [facility]  for review    CM faxed clinical information to  [facility] for review- accepts no outside referrals     CM faxed clinical information to [facility]   for review    CM faxed clinical information to [facility] for review     CM faxed clinical information to [facility]  for review- no beds available    CM faxed clinical information to [facility]  for review- no detox beds  CM faxed clinical information to [facility] for review     CM faxed clinical information to [facility] for review    CM faxed clinical information to [facility] for review    CM faxed clinical information to [facility] for review    CM faxed clinical information to [facility]  for review- no detox beds

## 2019-11-29 NOTE — ED NOTES
Called [facility] and spoke with Fadia CARMONA, in behavioral health and gave report. Was informed patient can not have IV line, informed RN will d/c before transport.

## 2019-12-01 LAB
ATRIAL RATE: 76 BPM
CALCULATED P AXIS, ECG09: 49 DEGREES
CALCULATED R AXIS, ECG10: 41 DEGREES
CALCULATED T AXIS, ECG11: 33 DEGREES
DIAGNOSIS, 93000: NORMAL
P-R INTERVAL, ECG05: 166 MS
Q-T INTERVAL, ECG07: 392 MS
QRS DURATION, ECG06: 86 MS
QTC CALCULATION (BEZET), ECG08: 441 MS
VENTRICULAR RATE, ECG03: 76 BPM

## 2019-12-06 ENCOUNTER — HOSPITAL ENCOUNTER (EMERGENCY)
Age: 39
Discharge: HOME | End: 2019-12-07
Attending: EMERGENCY MEDICINE
Payer: MEDICAID

## 2019-12-06 VITALS
DIASTOLIC BLOOD PRESSURE: 108 MMHG | BODY MASS INDEX: 28.89 KG/M2 | TEMPERATURE: 97.4 F | OXYGEN SATURATION: 98 % | HEART RATE: 89 BPM | SYSTOLIC BLOOD PRESSURE: 129 MMHG | RESPIRATION RATE: 20 BRPM | HEIGHT: 62 IN | WEIGHT: 157 LBS

## 2019-12-06 DIAGNOSIS — F19.20: Primary | ICD-10-CM

## 2019-12-06 LAB
APPEARANCE UR: CLEAR
BILIRUB UR QL: NEGATIVE
COLOR UR: YELLOW
GLUCOSE UR STRIP.AUTO-MCNC: NEGATIVE MG/DL
HGB UR QL STRIP: NEGATIVE
KETONES UR QL STRIP.AUTO: NEGATIVE MG/DL
LEUKOCYTE ESTERASE UR QL STRIP.AUTO: NEGATIVE
NITRITE UR QL STRIP.AUTO: NEGATIVE
PH UR STRIP: 5 [PH] (ref 5–8)
PROT UR STRIP-MCNC: NEGATIVE MG/DL
SP GR UR REFRACTOMETRY: 1.02 (ref 1–1.03)
UROBILINOGEN UR QL STRIP.AUTO: 0.2 EU/DL (ref 0.2–1)

## 2019-12-06 PROCEDURE — 99284 EMERGENCY DEPT VISIT MOD MDM: CPT

## 2019-12-06 PROCEDURE — 80307 DRUG TEST PRSMV CHEM ANLYZR: CPT

## 2019-12-06 PROCEDURE — 81003 URINALYSIS AUTO W/O SCOPE: CPT

## 2019-12-06 PROCEDURE — 93005 ELECTROCARDIOGRAM TRACING: CPT

## 2019-12-07 PROBLEM — F19.20: Status: ACTIVE | Noted: 2019-12-07

## 2019-12-07 LAB
ALBUMIN SERPL-MCNC: 3.4 G/DL (ref 3.4–5)
ALBUMIN/GLOB SERPL: 1.2 {RATIO} (ref 0.8–1.7)
ALP SERPL-CCNC: 105 U/L (ref 45–117)
ALT SERPL-CCNC: 43 U/L (ref 13–56)
AMPHET UR QL SCN: NEGATIVE
ANION GAP SERPL CALC-SCNC: 7 MMOL/L (ref 3–18)
APAP SERPL-MCNC: <2 UG/ML (ref 10–30)
AST SERPL-CCNC: 18 U/L (ref 10–38)
BARBITURATES UR QL SCN: NEGATIVE
BASOPHILS # BLD: 0 K/UL (ref 0–0.1)
BASOPHILS NFR BLD: 0 % (ref 0–2)
BENZODIAZ UR QL: NEGATIVE
BILIRUB SERPL-MCNC: 0.2 MG/DL (ref 0.2–1)
BUN SERPL-MCNC: 9 MG/DL (ref 7–18)
BUN/CREAT SERPL: 14 (ref 12–20)
CALCIUM SERPL-MCNC: 8.5 MG/DL (ref 8.5–10.1)
CANNABINOIDS UR QL SCN: NEGATIVE
CHLORIDE SERPL-SCNC: 112 MMOL/L (ref 100–111)
CK MB CFR SERPL CALC: NORMAL % (ref 0–4)
CK MB SERPL-MCNC: <1 NG/ML (ref 5–25)
CK SERPL-CCNC: 63 U/L (ref 26–192)
CO2 SERPL-SCNC: 22 MMOL/L (ref 21–32)
COCAINE UR QL SCN: NEGATIVE
CREAT SERPL-MCNC: 0.66 MG/DL (ref 0.6–1.3)
DIFFERENTIAL METHOD BLD: ABNORMAL
EOSINOPHIL # BLD: 0.2 K/UL (ref 0–0.4)
EOSINOPHIL NFR BLD: 2 % (ref 0–5)
ERYTHROCYTE [DISTWIDTH] IN BLOOD BY AUTOMATED COUNT: 14.6 % (ref 11.6–14.5)
ETHANOL SERPL-MCNC: <3 MG/DL (ref 0–3)
GLOBULIN SER CALC-MCNC: 2.8 G/DL (ref 2–4)
GLUCOSE SERPL-MCNC: 107 MG/DL (ref 74–99)
HCT VFR BLD AUTO: 37.2 % (ref 35–45)
HDSCOM,HDSCOM: ABNORMAL
HGB BLD-MCNC: 11.6 G/DL (ref 12–16)
LYMPHOCYTES # BLD: 3.5 K/UL (ref 0.9–3.6)
LYMPHOCYTES NFR BLD: 31 % (ref 21–52)
MCH RBC QN AUTO: 28.5 PG (ref 24–34)
MCHC RBC AUTO-ENTMCNC: 31.2 G/DL (ref 31–37)
MCV RBC AUTO: 91.4 FL (ref 74–97)
METHADONE UR QL: NEGATIVE
MONOCYTES # BLD: 0.6 K/UL (ref 0.05–1.2)
MONOCYTES NFR BLD: 5 % (ref 3–10)
NEUTS SEG # BLD: 7.2 K/UL (ref 1.8–8)
NEUTS SEG NFR BLD: 62 % (ref 40–73)
OPIATES UR QL: POSITIVE
PCP UR QL: POSITIVE
PLATELET # BLD AUTO: 274 K/UL (ref 135–420)
PMV BLD AUTO: 10.4 FL (ref 9.2–11.8)
POTASSIUM SERPL-SCNC: 4.6 MMOL/L (ref 3.5–5.5)
PROT SERPL-MCNC: 6.2 G/DL (ref 6.4–8.2)
RBC # BLD AUTO: 4.07 M/UL (ref 4.2–5.3)
SALICYLATES SERPL-MCNC: 2.7 MG/DL (ref 2.8–20)
SODIUM SERPL-SCNC: 141 MMOL/L (ref 136–145)
TROPONIN I SERPL-MCNC: <0.02 NG/ML (ref 0–0.04)
WBC # BLD AUTO: 11.5 K/UL (ref 4.6–13.2)

## 2019-12-07 PROCEDURE — 80307 DRUG TEST PRSMV CHEM ANLYZR: CPT

## 2019-12-07 PROCEDURE — 82550 ASSAY OF CK (CPK): CPT

## 2019-12-07 PROCEDURE — 85025 COMPLETE CBC W/AUTO DIFF WBC: CPT

## 2019-12-07 PROCEDURE — 80053 COMPREHEN METABOLIC PANEL: CPT

## 2019-12-07 NOTE — ED TRIAGE NOTES
Pt sts she took 40 tablets of mucinex today;  sts she was told to get checked out  Pt sts she has taken this amount in the past;

## 2019-12-07 NOTE — ED NOTES
Pt cooperative, nervous, states she had a vision that she was going to [place]. Denies auditory/visual hallucinations. Denies SI/HI. Denies taking any other substance other than musinex. Reassured pt safety.

## 2019-12-07 NOTE — ED PROVIDER NOTES
EMERGENCY DEPARTMENT HISTORY AND PHYSICAL EXAM    Date: 12/6/2019  Patient Name: Elaina Lundborg    History of Presenting Illness     Chief Complaint   Patient presents with    Drug Overdose         History Provided By: Patient      Elaina Lundborg is a 44 y.o. female who presents to the emergency department C/O drug overdose. Patient states she took 40 pills of Mucinex DM this evening with her significant other around 6 PM.  States she has been feeling some muscle spasms as well as dizziness. Its noted that she has had this problem before and was recently seen November 28, 2019 for the same problem. States that she usually takes 12,000 mg of Mucinex daily 6000 mg in the morning and 6000 at night but decided to take more this evening. She also admits to abusing crack. Denies any suicidal ideation. States she was encouraged by the ICU nurse at our facility to get evaluated as her significant other is currently admitted to the critical care unit for similar issue. PCP: Ravi Davalos MD    Current Outpatient Medications   Medication Sig Dispense Refill    trazodone HCl (TRAZODONE PO) Take  by mouth.  escitalopram oxalate (LEXAPRO) 10 mg tablet Take 10 mg by mouth daily.  lurasidone (LATUDA) 80 mg tab tablet Take  by mouth. Past History     Past Medical History:  Past Medical History:   Diagnosis Date    Asthma     Bilateral ovarian cysts     Cancer ([address]     Pancreatic and Bone Marrow    Pancreatitis     Pancreatitis     Psychosis ([address]        Past Surgical History:  Past Surgical History:   Procedure Laterality Date    HX GYN      D&C, Hysterectomy       Family History:  History reviewed. No pertinent family history. Social History:  Social History     Tobacco Use    Smoking status: Current Every Day Smoker     Packs/day: 1.00    Smokeless tobacco: Never Used   Substance Use Topics    Alcohol use: Not Currently    Drug use: Yes     Comment: Crack       Allergies:   Allergies Allergen Reactions    Norco [Hydrocodone-Acetaminophen] Itching     Patient states she is not allergic      Toradol [Ketorolac] Rash     Pt reports she is not allergic to this medication. Review of Systems   Review of Systems   Constitutional: Negative for fever. Eyes: Positive for visual disturbance. Respiratory: Negative for shortness of breath. Cardiovascular: Negative for chest pain. Gastrointestinal: Negative for abdominal pain, diarrhea, nausea and vomiting. Genitourinary: Negative for dysuria. Musculoskeletal: Positive for arthralgias and myalgias. Negative for back pain, neck pain and neck stiffness. Neurological: Positive for numbness. Negative for dizziness and weakness.          Physical Exam     Vitals:    12/06/19 2325   BP: (!) 129/108   Pulse: 89   Resp: 20   Temp: 97.4 °F (36.3 °C)   SpO2: 98%   Weight: 71.2 kg (157 lb)   Height: 5' 2\" (1.575 m)     Physical Exam    Nursing notes and vital signs reviewed    Constitutional: Non toxic appearing, no acute distress  HEENT: Normocephalic, Atraumatic, Pupils are equal, round, and reactive to light, EOMI  Cardiovascular: Regular rate and rhythm, no murmurs  Chest: Normal work of breathing and chest excursion bilaterally  Lungs: Clear to ausculation bilaterally  Abdomen: Soft, non tender, non distended, normoactive bowel sounds  Back: No evidence of trauma or deformity  Extremities: No evidence of trauma or deformity, no LE edema  Skin: Warm and dry, normal cap refill  Neuro: Alert and oriented x 3, CN intact, normal speech, strength and sensation full and symmetric bilaterally, normal coordination  Psychiatric: Patient appears intoxicated      Diagnostic Study Results     Labs -     Recent Results (from the past 72 hour(s))   URINALYSIS W/ RFLX MICROSCOPIC    Collection Time: 12/06/19 11:45 PM   Result Value Ref Range    Color YELLOW      Appearance CLEAR      Specific gravity 1.022 1.005 - 1.030      pH (UA) 5.0 5.0 - 8.0 Protein NEGATIVE  NEG mg/dL    Glucose NEGATIVE  NEG mg/dL    Ketone NEGATIVE  NEG mg/dL    Bilirubin NEGATIVE  NEG      Blood NEGATIVE  NEG      Urobilinogen 0.2 0.2 - 1.0 EU/dL    Nitrites NEGATIVE  NEG      Leukocyte Esterase NEGATIVE  NEG     DRUG SCREEN, URINE    Collection Time: 12/06/19 11:45 PM   Result Value Ref Range    BENZODIAZEPINES NEGATIVE  NEG      BARBITURATES NEGATIVE  NEG      THC (TH-CANNABINOL) NEGATIVE  NEG      OPIATES POSITIVE (A) NEG      PCP(PHENCYCLIDINE) POSITIVE (A) NEG      COCAINE NEGATIVE  NEG      AMPHETAMINES NEGATIVE  NEG      METHADONE NEGATIVE  NEG      HDSCOM (NOTE)    EKG, 12 LEAD, INITIAL    Collection Time: 12/06/19 11:49 PM   Result Value Ref Range    Ventricular Rate 72 BPM    Atrial Rate 72 BPM    P-R Interval 166 ms    QRS Duration 74 ms    Q-T Interval 378 ms    QTC Calculation (Bezet) 413 ms    Calculated P Axis 53 degrees    Calculated R Axis 26 degrees    Calculated T Axis 29 degrees    Diagnosis       Normal sinus rhythm  Normal ECG  When compared with ECG of 28-NOV-2019 17:29,  No significant change was found     CBC WITH AUTOMATED DIFF    Collection Time: 12/07/19 12:20 AM   Result Value Ref Range    WBC 11.5 4.6 - 13.2 K/uL    RBC 4.07 (L) 4.20 - 5.30 M/uL    HGB 11.6 (L) 12.0 - 16.0 g/dL    HCT 37.2 35.0 - 45.0 %    MCV 91.4 74.0 - 97.0 FL    MCH 28.5 24.0 - 34.0 PG    MCHC 31.2 31.0 - 37.0 g/dL    RDW 14.6 (H) 11.6 - 14.5 %    PLATELET 099 319 - 836 K/uL    MPV 10.4 9.2 - 11.8 FL    NEUTROPHILS 62 40 - 73 %    LYMPHOCYTES 31 21 - 52 %    MONOCYTES 5 3 - 10 %    EOSINOPHILS 2 0 - 5 %    BASOPHILS 0 0 - 2 %    ABS. NEUTROPHILS 7.2 1.8 - 8.0 K/UL    ABS. LYMPHOCYTES 3.5 0.9 - 3.6 K/UL    ABS. MONOCYTES 0.6 0.05 - 1.2 K/UL    ABS. EOSINOPHILS 0.2 0.0 - 0.4 K/UL    ABS.  BASOPHILS 0.0 0.0 - 0.1 K/UL    DF AUTOMATED     METABOLIC PANEL, COMPREHENSIVE    Collection Time: 12/07/19 12:20 AM   Result Value Ref Range    Sodium 141 136 - 145 mmol/L    Potassium 4.6 3.5 - 5.5 mmol/L    Chloride 112 (H) 100 - 111 mmol/L    CO2 22 21 - 32 mmol/L    Anion gap 7 3.0 - 18 mmol/L    Glucose 107 (H) 74 - 99 mg/dL    BUN 9 7.0 - 18 MG/DL    Creatinine 0.66 0.6 - 1.3 MG/DL    BUN/Creatinine ratio 14 12 - 20      GFR est AA >60 >60 ml/min/1.73m2    GFR est non-AA >60 >60 ml/min/1.73m2    Calcium 8.5 8.5 - 10.1 MG/DL    Bilirubin, total 0.2 0.2 - 1.0 MG/DL    ALT (SGPT) 43 13 - 56 U/L    AST (SGOT) 18 10 - 38 U/L    Alk. phosphatase 105 45 - 117 U/L    Protein, total 6.2 (L) 6.4 - 8.2 g/dL    Albumin 3.4 3.4 - 5.0 g/dL    Globulin 2.8 2.0 - 4.0 g/dL    A-G Ratio 1.2 0.8 - 1.7     SALICYLATE    Collection Time: 12/07/19 12:20 AM   Result Value Ref Range    Salicylate level 2.7 (L) 2.8 - 20.0 MG/DL   ACETAMINOPHEN    Collection Time: 12/07/19 12:20 AM   Result Value Ref Range    Acetaminophen level <2 (L) 10.0 - 30.0 ug/mL   ETHYL ALCOHOL    Collection Time: 12/07/19 12:20 AM   Result Value Ref Range    ALCOHOL(ETHYL),SERUM <3 0 - 3 MG/DL   CARDIAC PANEL,(CK, CKMB & TROPONIN)    Collection Time: 12/07/19 12:20 AM   Result Value Ref Range    CK 63 26 - 192 U/L    CK - MB <1.0 <3.6 ng/ml    CK-MB Index  0.0 - 4.0 %     CALCULATION NOT PERFORMED WHEN RESULT IS BELOW LINEAR LIMIT    Troponin-I, QT <0.02 0.0 - 0.045 NG/ML       Radiologic Studies -   No orders to display     CT Results  (Last 48 hours)    None        CXR Results  (Last 48 hours)    None          Medications given in the ED-  Medications - No data to display      Medical Decision Making   I am the first provider for this patient. I reviewed the vital signs, available nursing notes, past medical history, past surgical history, family history and social history. Vital Signs-Reviewed the patient's vital signs.     Pulse Oximetry Analysis - 98% on room air     Cardiac Monitor:  Rate: 89 bpm  Rhythm: sinus    EKG interpretation: (Preliminary)  EKG read by Dr. Polly Iraheta 72 normal sinus rhythm normal axis, no ST changes    Records Reviewed: Nursing Notes and Old Medical Records    Procedures:  Procedures    ED Course:   Laboratory findings unremarkable. Patient is awake, alert and ambulatory oriented x3. She states she would like to be discharged to spend the remainder of her night with her . I recommended she refrain from further substance abuse and follow-up with her primary care physician for long-term management of her addiction. She understands and agrees to plan    Diagnosis and Disposition       DISCHARGE NOTE:    Delvin Goldstein's  results have been reviewed with her. She has been counseled regarding her diagnosis, treatment, and plan. She verbally conveys understanding and agreement of the signs, symptoms, diagnosis, treatment and prognosis and additionally agrees to follow up as discussed. She also agrees with the care-plan and conveys that all of her questions have been answered. I have also provided discharge instructions for her that include: educational information regarding their diagnosis and treatment, and list of reasons why they would want to return to the ED prior to their follow-up appointment, should her condition change. She has been provided with education for proper emergency department utilization. CLINICAL IMPRESSION:    1. Dextromethorphan use disorder, moderate (HCC)        PLAN:  1. D/C Home  2. Current Discharge Medication List        3.    Follow-up Information     Follow up With Specialties Details Why Contact Info    Paul Wu MD [facility] Schedule an appointment as soon as possible for a visit in 2 days As needed, If symptoms worsen [facility] [address] [place] [address],[address]      [facility] Emergency Medicine Go to  If symptoms worsen [address] [address]  [phone]        _______________________________      Please note that this dictation was completed with [employer], the [employer] voice recognition software. Quite often unanticipated grammatical, syntax, homophones, and other interpretive errors are inadvertently transcribed by the computer software. Please disregard these errors. Please excuse any errors that have escaped final proofreading.

## 2019-12-08 LAB
ATRIAL RATE: 72 BPM
CALCULATED P AXIS, ECG09: 53 DEGREES
CALCULATED R AXIS, ECG10: 26 DEGREES
CALCULATED T AXIS, ECG11: 29 DEGREES
DIAGNOSIS, 93000: NORMAL
P-R INTERVAL, ECG05: 166 MS
Q-T INTERVAL, ECG07: 378 MS
QRS DURATION, ECG06: 74 MS
QTC CALCULATION (BEZET), ECG08: 413 MS
VENTRICULAR RATE, ECG03: 72 BPM

## 2019-12-12 ENCOUNTER — HOSPITAL ENCOUNTER (EMERGENCY)
Age: 39
Discharge: HOME | End: 2019-12-12
Attending: EMERGENCY MEDICINE
Payer: MEDICAID

## 2019-12-12 VITALS
HEART RATE: 74 BPM | RESPIRATION RATE: 18 BRPM | OXYGEN SATURATION: 100 % | BODY MASS INDEX: 28.89 KG/M2 | WEIGHT: 157 LBS | TEMPERATURE: 98.6 F | SYSTOLIC BLOOD PRESSURE: 155 MMHG | DIASTOLIC BLOOD PRESSURE: 126 MMHG | HEIGHT: 62 IN

## 2019-12-12 DIAGNOSIS — F19.20: Primary | ICD-10-CM

## 2019-12-12 LAB
AMPHET UR QL SCN: NEGATIVE
ANION GAP SERPL CALC-SCNC: 6 MMOL/L (ref 3–18)
APPEARANCE UR: CLEAR
ATRIAL RATE: 77 BPM
BARBITURATES UR QL SCN: NEGATIVE
BASOPHILS # BLD: 0 K/UL (ref 0–0.1)
BASOPHILS NFR BLD: 0 % (ref 0–2)
BENZODIAZ UR QL: NEGATIVE
BILIRUB UR QL: NEGATIVE
BUN SERPL-MCNC: 14 MG/DL (ref 7–18)
BUN/CREAT SERPL: 23 (ref 12–20)
CALCIUM SERPL-MCNC: 8.9 MG/DL (ref 8.5–10.1)
CALCULATED P AXIS, ECG09: 75 DEGREES
CALCULATED R AXIS, ECG10: 64 DEGREES
CALCULATED T AXIS, ECG11: 46 DEGREES
CANNABINOIDS UR QL SCN: NEGATIVE
CHLORIDE SERPL-SCNC: 112 MMOL/L (ref 100–111)
CO2 SERPL-SCNC: 20 MMOL/L (ref 21–32)
COCAINE UR QL SCN: NEGATIVE
COLOR UR: YELLOW
CREAT SERPL-MCNC: 0.61 MG/DL (ref 0.6–1.3)
DIAGNOSIS, 93000: NORMAL
DIFFERENTIAL METHOD BLD: ABNORMAL
EOSINOPHIL # BLD: 0.2 K/UL (ref 0–0.4)
EOSINOPHIL NFR BLD: 2 % (ref 0–5)
ERYTHROCYTE [DISTWIDTH] IN BLOOD BY AUTOMATED COUNT: 14.9 % (ref 11.6–14.5)
GLUCOSE SERPL-MCNC: 87 MG/DL (ref 74–99)
GLUCOSE UR STRIP.AUTO-MCNC: NEGATIVE MG/DL
HCG UR QL: NEGATIVE
HCT VFR BLD AUTO: 42.3 % (ref 35–45)
HDSCOM,HDSCOM: ABNORMAL
HGB BLD-MCNC: 13.5 G/DL (ref 12–16)
HGB UR QL STRIP: NEGATIVE
KETONES UR QL STRIP.AUTO: NEGATIVE MG/DL
LEUKOCYTE ESTERASE UR QL STRIP.AUTO: NEGATIVE
LYMPHOCYTES # BLD: 3.8 K/UL (ref 0.9–3.6)
LYMPHOCYTES NFR BLD: 31 % (ref 21–52)
MCH RBC QN AUTO: 28.6 PG (ref 24–34)
MCHC RBC AUTO-ENTMCNC: 31.9 G/DL (ref 31–37)
MCV RBC AUTO: 89.6 FL (ref 74–97)
METHADONE UR QL: NEGATIVE
MONOCYTES # BLD: 0.6 K/UL (ref 0.05–1.2)
MONOCYTES NFR BLD: 5 % (ref 3–10)
NEUTS SEG # BLD: 7.7 K/UL (ref 1.8–8)
NEUTS SEG NFR BLD: 62 % (ref 40–73)
NITRITE UR QL STRIP.AUTO: NEGATIVE
OPIATES UR QL: POSITIVE
P-R INTERVAL, ECG05: 174 MS
PCP UR QL: NEGATIVE
PH UR STRIP: 5.5 [PH] (ref 5–8)
PLATELET # BLD AUTO: 320 K/UL (ref 135–420)
PMV BLD AUTO: 10.4 FL (ref 9.2–11.8)
POTASSIUM SERPL-SCNC: 4.6 MMOL/L (ref 3.5–5.5)
PROT UR STRIP-MCNC: NEGATIVE MG/DL
Q-T INTERVAL, ECG07: 378 MS
QRS DURATION, ECG06: 70 MS
QTC CALCULATION (BEZET), ECG08: 427 MS
RBC # BLD AUTO: 4.72 M/UL (ref 4.2–5.3)
SODIUM SERPL-SCNC: 138 MMOL/L (ref 136–145)
SP GR UR REFRACTOMETRY: 1.02 (ref 1–1.03)
UROBILINOGEN UR QL STRIP.AUTO: 0.2 EU/DL (ref 0.2–1)
VENTRICULAR RATE, ECG03: 77 BPM
WBC # BLD AUTO: 12.3 K/UL (ref 4.6–13.2)

## 2019-12-12 PROCEDURE — 80048 BASIC METABOLIC PNL TOTAL CA: CPT

## 2019-12-12 PROCEDURE — 93005 ELECTROCARDIOGRAM TRACING: CPT

## 2019-12-12 PROCEDURE — 80307 DRUG TEST PRSMV CHEM ANLYZR: CPT

## 2019-12-12 PROCEDURE — 99284 EMERGENCY DEPT VISIT MOD MDM: CPT

## 2019-12-12 PROCEDURE — 85025 COMPLETE CBC W/AUTO DIFF WBC: CPT

## 2019-12-12 PROCEDURE — 81025 URINE PREGNANCY TEST: CPT

## 2019-12-12 PROCEDURE — 81003 URINALYSIS AUTO W/O SCOPE: CPT

## 2019-12-12 NOTE — ED TRIAGE NOTES
Pt states \" A few hours ago I ingested mucinex DM to get high but denies wanting to hurt herself or anyone else. \" Pt states \"I take this amount everyday but today I want help to stop this addiction. \"

## 2019-12-12 NOTE — ED PROVIDER NOTES
EMERGENCY DEPARTMENT HISTORY AND PHYSICAL EXAM    Date: 12/12/2019  Patient Name: Inna Scott    History of Presenting Illness     Chief Complaint   Patient presents with    Drug Overdose         History Provided By: Patient    2:44 PM  Inna Scott is a 44 y.o. female with PMHX of substance abuse, bipolar disorder who presents to the emergency department C/O requesting substance abuse help. Patient states she takes dextromethorphan daily and has been doing so for a long time. She also has used crack cocaine but denies any use of this in about a month. She presents today wanting inpatient substance abuse/psychiatric admission. She takes dextromethorphan recreationally \" to get high. \"  Last dose of 600 mg of dextromethorphan this morning. Usually takes 600 to 1200 mg a day. She denies any intent to harm herself and denies any suicidal or homicidal ideation. Patient has an appoint with her psychiatrist today at 5:15 PM but does not want to go to that and seeks inpatient treatment. Pt denies fever, tremors, hallucinations, any complaints of pain, dizziness, and any other sxs or complaints. PCP: Ludy Casanova MD    Current Outpatient Medications   Medication Sig Dispense Refill    trazodone HCl (TRAZODONE PO) Take  by mouth.  escitalopram oxalate (LEXAPRO) 10 mg tablet Take 10 mg by mouth daily.  lurasidone (LATUDA) 80 mg tab tablet Take  by mouth. Past History     Past Medical History:  Past Medical History:   Diagnosis Date    Asthma     Bilateral ovarian cysts     [address] [address]     Pancreatic and Bone Marrow    Pancreatitis     Pancreatitis     Psychosis ([facility] [address]        Past Surgical History:  Past Surgical History:   Procedure Laterality Date    HX GYN      D&C, Hysterectomy       Family History:  History reviewed. No pertinent family history.     Social History:  Social History     Tobacco Use    Smoking status: Current Every Day Smoker     Packs/day: 1.00    Smokeless tobacco: Never Used   Substance Use Topics    Alcohol use: Not Currently    Drug use: Yes     Comment: Crack       Allergies: Allergies   Allergen Reactions    Norco [Hydrocodone-Acetaminophen] Itching     Patient states she is not allergic      Toradol [Ketorolac] Rash     Pt reports she is not allergic to this medication. Review of Systems   Review of Systems      Physical Exam     Vitals:    12/12/19 1440 12/12/19 1500   BP: (!) 155/126    Pulse: 74    Resp: 18    Temp: 98.6 °F (37 °C)    SpO2: 100% 100%   Weight: 71.2 kg (157 lb)    Height: 5' 2\" (1.575 m)      Physical Exam    Vital signs and nursing notes reviewed. CONSTITUTIONAL: Alert. Well-appearing; well-nourished; in no apparent distress. HEAD: Normocephalic; atraumatic. EYES: PERRL; EOM's intact. No nystagmus. Conjunctiva clear. ENT: Moist mucus membranes. NECK: Supple; FROM without difficulty, non-tender; no cervical lymphadenopathy. CV: Normal S1, S2; no murmurs, rubs, or gallops. No chest wall tenderness. RESPIRATORY: Normal chest excursion with respiration; breath sounds clear and equal bilaterally; no wheezes, rhonchi, or rales. GI: Normal bowel sounds; non-distended; non-tender; no guarding or rigidity; no palpable organomegaly. No CVA tenderness. BACK:  No evidence of trauma or deformity. Non-tender to palpation. FROM without difficulty. EXT: Normal ROM in all four extremities; non-tender to palpation. No edema or calf tenderness. SKIN: Normal for age and race; warm; dry; good turgor; no apparent lesions or exudate. NEURO: A & O x3. Cranial nerves 2-12 intact. Motor 5/5 bilaterally. Sensation intact. PSYCH:  Mood and affect appropriate.          Diagnostic Study Results     Labs -     Recent Results (from the past 12 hour(s))   EKG, 12 LEAD, INITIAL    Collection Time: 12/12/19  2:42 PM   Result Value Ref Range    Ventricular Rate 77 BPM    Atrial Rate 77 BPM    P-R Interval 174 ms    QRS Duration 70 ms Q-T Interval 378 ms    QTC Calculation (Bezet) 427 ms    Calculated P Axis 75 degrees    Calculated R Axis 64 degrees    Calculated T Axis 46 degrees    Diagnosis       Normal sinus rhythm  Normal ECG  When compared with ECG of 06-DEC-2019 23:49,  No significant change was found     CBC WITH AUTOMATED DIFF    Collection Time: 12/12/19  2:45 PM   Result Value Ref Range    WBC 12.3 4.6 - 13.2 K/uL    RBC 4.72 4.20 - 5.30 M/uL    HGB 13.5 12.0 - 16.0 g/dL    HCT 42.3 35.0 - 45.0 %    MCV 89.6 74.0 - 97.0 FL    MCH 28.6 24.0 - 34.0 PG    MCHC 31.9 31.0 - 37.0 g/dL    RDW 14.9 (H) 11.6 - 14.5 %    PLATELET 950 971 - 360 K/uL    MPV 10.4 9.2 - 11.8 FL    NEUTROPHILS 62 40 - 73 %    LYMPHOCYTES 31 21 - 52 %    MONOCYTES 5 3 - 10 %    EOSINOPHILS 2 0 - 5 %    BASOPHILS 0 0 - 2 %    ABS. NEUTROPHILS 7.7 1.8 - 8.0 K/UL    ABS. LYMPHOCYTES 3.8 (H) 0.9 - 3.6 K/UL    ABS. MONOCYTES 0.6 0.05 - 1.2 K/UL    ABS. EOSINOPHILS 0.2 0.0 - 0.4 K/UL    ABS. BASOPHILS 0.0 0.0 - 0.1 K/UL    DF AUTOMATED     URINALYSIS W/ RFLX MICROSCOPIC    Collection Time: 12/12/19  3:00 PM   Result Value Ref Range    Color YELLOW      Appearance CLEAR      Specific gravity 1.021 1.005 - 1.030      pH (UA) 5.5 5.0 - 8.0      Protein NEGATIVE  NEG mg/dL    Glucose NEGATIVE  NEG mg/dL    Ketone NEGATIVE  NEG mg/dL    Bilirubin NEGATIVE  NEG      Blood NEGATIVE  NEG      Urobilinogen 0.2 0.2 - 1.0 EU/dL    Nitrites NEGATIVE  NEG      Leukocyte Esterase NEGATIVE  NEG     HCG URINE, QL. - POC    Collection Time: 12/12/19  3:39 PM   Result Value Ref Range    Pregnancy test,urine (POC) NEGATIVE  NEG         Radiologic Studies - none  No orders to display     CT Results  (Last 48 hours)    None        CXR Results  (Last 48 hours)    None          Medications given in the ED-  Medications - No data to display      Medical Decision Making   I am the first provider for this patient.     I reviewed the vital signs, available nursing notes, past medical history, past surgical history, family history and social history. Vital Signs-Reviewed the patient's vital signs. Pulse Oximetry Analysis - 100% on RA     Cardiac Monitor:  Rate: 74 bpm  Rhythm: NSR    EKG interpretation: (Preliminary)  Normal sinus rhythm, rate 77, no acute changes, normal QTC    Records Reviewed: Nursing Notes and Old Medical Records      Procedures:  Procedures    ED Course:  2:44 PM   Initial assessment performed. The patients presenting problems have been discussed, and they are in agreement with the care plan formulated and outlined with them. I have encouraged them to ask questions as they arise throughout their visit. 3:45 PM progress note  Patient states she changed her mind. She would like to keep her appointment with her psychiatrist Dr. Amadeo Paul today at 5:15 PM.  She states she has been in contact with [facility] for substance abuse counseling, but is on some type of a wait list.  She has no complaints, is nontoxic-appearing and of sound mind to make her own decisions at this time. She is not suicidal or homicidal or experiencing any obvious hallucinations, and does not appear intoxicated. She no longer seeks inpatient treatment for her substance abuse and there are no obvious complications related to this at this time. Will discharge her now, so that she can make her psychiatrist appointment. Diagnosis and Disposition       DISCHARGE NOTE:    Leesa Goldstein's  results have been reviewed with her. She has been counseled regarding her diagnosis, treatment, and plan. She verbally conveys understanding and agreement of the signs, symptoms, diagnosis, treatment and prognosis and additionally agrees to follow up as discussed. She also agrees with the care-plan and conveys that all of her questions have been answered.   I have also provided discharge instructions for her that include: educational information regarding their diagnosis and treatment, and list of reasons why they would want to return to the ED prior to their follow-up appointment, should her condition change. She has been provided with education for proper emergency department utilization. CLINICAL IMPRESSION:    1. Dextromethorphan use disorder, moderate (HCC)        PLAN:  1. D/C Home  2. Current Discharge Medication List        3. Follow-up Information     Follow up With Specialties Details Why Contact Info    Your Psychiatrist, Dr. Poornima Thakkar   today at 5:15 PM, as scheduled     Tommie Ro MD [facility] Schedule an appointment as soon as possible for a visit  [address]      [facility] Emergency Medicine  As needed, If symptoms worsen [address] [address]  [phone]        _______________________________      Please note that this dictation was completed with [employer], the computer voice recognition software. Quite often unanticipated grammatical, syntax, homophones, and other interpretive errors are inadvertently transcribed by the computer software. Please disregard these errors. Please excuse any errors that have escaped final proofreading.

## 2019-12-12 NOTE — ED NOTES
Patient requested her IV out and states her [employer] cab was on its way to take her to her therapy appt. [facility]. Patient states \"if I mess up one time I'll come back. I promise. \"     Informed patient she can always come back to the ED.

## 2019-12-12 NOTE — PROGRESS NOTES
Chart reviewed noted pt arrived to ED for mucinex ingestion ,denies wanting to harm self, noted pt does have an scheduled appointment today with her  Psychiatrist at [phone], per  pt will be discharged today.

## 2019-12-12 NOTE — ED NOTES
Patient brought to ED via EMS after c/o a drug overdose. Patient states she takes approximately 700 mg of Mucinex DM daily to \"get high. \" Patient states she \"wants help. \" Patient is alert and oriented and able to make needs known. NAD noted. Patient states she also does crack cocaine but has not in approximately one month.

## 2019-12-12 NOTE — DISCHARGE INSTRUCTIONS
Patient Education        Learning About Substance Use Disorder  What is substance use disorder? Substance use disorder means that a person uses substances even though it causes harm to themselves or others. It can range from mild to severe. The more signs of this disorder you have, the more severe it may be. Moderate to severe substance use disorder is sometimes called addiction. People who have it find it hard to control their use. This disorder can develop from the use of almost any type of substance. This includes alcohol, illegal drugs, prescription medicines, and over-the-counter medicines. Could you have substance use disorder? If there's a chance you may have substance use disorder, it's important to find out. Ask yourself the following questions. You may have substance use disorder if your answer is \"yes\" to two or more of them. · Do you use larger amounts of the substance than you ever meant to? Or have you been using it for a longer time than you ever meant to? · Are you not able to cut down or control your use? Or do you constantly wish you could cut down? · Do you spend a lot of time getting or using the substance or recovering from the effects? · Do you have strong cravings for the substance? · Do you find that you can no longer do your main jobs at work, at school, or at home? · Do you keep using, even though your substance use hurts your relationships? · Have you stopped doing important activities because of your substance use? · Do you use substances in situations where doing so is dangerous? · Do you keep using the substance even though you know it's causing health problems? · Do you need more and more of the substance to get the same effect, or do you get less effect from the same amount over time? This is called tolerance. · Do you have uncomfortable symptoms (withdrawal) when you stop using the substance or use less? Substance use disorder can range from mild to severe.  The more signs of this disorder you have, the more severe it may be. Do you think you might have substance use disorder? If you do, then you've just taken an important first step. Many people have overcome this problem. And most of them started by reaching out to others, like caring friends or family, their doctor, or a support group. How is substance use disorder treated? If you think you may have substance use disorder, talk to your doctor. You and your doctor can decide whether you have this disorder and what type of treatment might help you. If you are physically dependent on the substance, you may need to stay in a hospital at first. There you can be treated for withdrawal symptoms. One of the goals of treatment for substance use disorder is to help you get used to life without the substance. Counseling can help you prepare for people or situations that might tempt you to start using again. You can practice these skills through one-on-one counseling, family therapy, or group therapy. Therapy may be part of inpatient treatment, where you stay in a treatment center. Or it may be part of outpatient treatment, where you can fit your therapy around your job or other responsibilities. Another goal of treatment is to help you find ongoing support for your sober life. Many people find support by going to meetings like Alcoholics Anonymous, Narcotics Anonymous, or SMART Recovery. This type of support can help you feel less alone and more motivated to stay sober. You might talk to your doctor or do an online search for local treatment programs. Or you might tell a friend or loved one that you need help. Follow-up care is a key part of your treatment and safety. Be sure to make and go to all appointments, and call your doctor if you are having problems. It's also a good idea to know your test results and keep a list of the medicines you take. Where can you learn more?   Go to http://cosme.info/. Enter [phone] in the search box to learn more about \"Learning About Substance Use Disorder. \"  Current as of: February 5, 2019  Content Version: 12.2  © 4717-7523 [employer]. Care instructions adapted under license by [employer] (which disclaims liability or warranty for this information). If you have questions about a medical condition or this instruction, always ask your healthcare professional. [place] [address] any warranty or liability for your use of this information.

## 2019-12-13 ENCOUNTER — HOSPITAL ENCOUNTER (EMERGENCY)
Age: 39
Discharge: HOME | End: 2019-12-13
Attending: EMERGENCY MEDICINE
Payer: MEDICAID

## 2019-12-13 VITALS
DIASTOLIC BLOOD PRESSURE: 76 MMHG | OXYGEN SATURATION: 99 % | SYSTOLIC BLOOD PRESSURE: 126 MMHG | HEIGHT: 62 IN | WEIGHT: 157 LBS | HEART RATE: 88 BPM | TEMPERATURE: 98.2 F | BODY MASS INDEX: 28.89 KG/M2 | RESPIRATION RATE: 18 BRPM

## 2019-12-13 DIAGNOSIS — F19.20: Primary | ICD-10-CM

## 2019-12-13 LAB
ALBUMIN SERPL-MCNC: 3.7 G/DL (ref 3.4–5)
ALBUMIN/GLOB SERPL: 1.2 {RATIO} (ref 0.8–1.7)
ALP SERPL-CCNC: 121 U/L (ref 45–117)
ALT SERPL-CCNC: 33 U/L (ref 13–56)
AMPHET UR QL SCN: NEGATIVE
ANION GAP SERPL CALC-SCNC: 6 MMOL/L (ref 3–18)
APAP SERPL-MCNC: <2 UG/ML (ref 10–30)
APPEARANCE UR: CLEAR
AST SERPL-CCNC: 9 U/L (ref 10–38)
ATRIAL RATE: 90 BPM
BARBITURATES UR QL SCN: NEGATIVE
BASOPHILS # BLD: 0 K/UL (ref 0–0.1)
BASOPHILS NFR BLD: 0 % (ref 0–2)
BENZODIAZ UR QL: NEGATIVE
BILIRUB SERPL-MCNC: 0.7 MG/DL (ref 0.2–1)
BILIRUB UR QL: NEGATIVE
BUN SERPL-MCNC: 16 MG/DL (ref 7–18)
BUN/CREAT SERPL: 27 (ref 12–20)
CALCIUM SERPL-MCNC: 8.6 MG/DL (ref 8.5–10.1)
CALCULATED P AXIS, ECG09: 58 DEGREES
CALCULATED R AXIS, ECG10: 38 DEGREES
CALCULATED T AXIS, ECG11: 22 DEGREES
CANNABINOIDS UR QL SCN: NEGATIVE
CHLORIDE SERPL-SCNC: 116 MMOL/L (ref 100–111)
CO2 SERPL-SCNC: 21 MMOL/L (ref 21–32)
COCAINE UR QL SCN: NEGATIVE
COLOR UR: YELLOW
CREAT SERPL-MCNC: 0.59 MG/DL (ref 0.6–1.3)
DIAGNOSIS, 93000: NORMAL
DIFFERENTIAL METHOD BLD: ABNORMAL
EOSINOPHIL # BLD: 0.2 K/UL (ref 0–0.4)
EOSINOPHIL NFR BLD: 2 % (ref 0–5)
ERYTHROCYTE [DISTWIDTH] IN BLOOD BY AUTOMATED COUNT: 15 % (ref 11.6–14.5)
ETHANOL SERPL-MCNC: <3 MG/DL (ref 0–3)
GLOBULIN SER CALC-MCNC: 3.2 G/DL (ref 2–4)
GLUCOSE SERPL-MCNC: 65 MG/DL (ref 74–99)
GLUCOSE UR STRIP.AUTO-MCNC: NEGATIVE MG/DL
HCT VFR BLD AUTO: 41.6 % (ref 35–45)
HDSCOM,HDSCOM: NORMAL
HGB BLD-MCNC: 13.2 G/DL (ref 12–16)
HGB UR QL STRIP: NEGATIVE
KETONES UR QL STRIP.AUTO: NEGATIVE MG/DL
LEUKOCYTE ESTERASE UR QL STRIP.AUTO: NEGATIVE
LYMPHOCYTES # BLD: 2.9 K/UL (ref 0.9–3.6)
LYMPHOCYTES NFR BLD: 29 % (ref 21–52)
MCH RBC QN AUTO: 28.6 PG (ref 24–34)
MCHC RBC AUTO-ENTMCNC: 31.7 G/DL (ref 31–37)
MCV RBC AUTO: 90 FL (ref 74–97)
METHADONE UR QL: NEGATIVE
MONOCYTES # BLD: 0.5 K/UL (ref 0.05–1.2)
MONOCYTES NFR BLD: 5 % (ref 3–10)
NEUTS SEG # BLD: 6.3 K/UL (ref 1.8–8)
NEUTS SEG NFR BLD: 64 % (ref 40–73)
NITRITE UR QL STRIP.AUTO: NEGATIVE
OPIATES UR QL: NEGATIVE
P-R INTERVAL, ECG05: 154 MS
PCP UR QL: NEGATIVE
PH UR STRIP: 5 [PH] (ref 5–8)
PLATELET # BLD AUTO: 292 K/UL (ref 135–420)
PMV BLD AUTO: 10.8 FL (ref 9.2–11.8)
POTASSIUM SERPL-SCNC: 4.9 MMOL/L (ref 3.5–5.5)
PROT SERPL-MCNC: 6.9 G/DL (ref 6.4–8.2)
PROT UR STRIP-MCNC: NEGATIVE MG/DL
Q-T INTERVAL, ECG07: 364 MS
QRS DURATION, ECG06: 74 MS
QTC CALCULATION (BEZET), ECG08: 445 MS
RBC # BLD AUTO: 4.62 M/UL (ref 4.2–5.3)
SALICYLATES SERPL-MCNC: 3.1 MG/DL (ref 2.8–20)
SODIUM SERPL-SCNC: 143 MMOL/L (ref 136–145)
SP GR UR REFRACTOMETRY: 1.02 (ref 1–1.03)
UROBILINOGEN UR QL STRIP.AUTO: 0.2 EU/DL (ref 0.2–1)
VENTRICULAR RATE, ECG03: 90 BPM
WBC # BLD AUTO: 9.9 K/UL (ref 4.6–13.2)

## 2019-12-13 PROCEDURE — 80053 COMPREHEN METABOLIC PANEL: CPT

## 2019-12-13 PROCEDURE — 99284 EMERGENCY DEPT VISIT MOD MDM: CPT

## 2019-12-13 PROCEDURE — 85025 COMPLETE CBC W/AUTO DIFF WBC: CPT

## 2019-12-13 PROCEDURE — 81003 URINALYSIS AUTO W/O SCOPE: CPT

## 2019-12-13 PROCEDURE — 80307 DRUG TEST PRSMV CHEM ANLYZR: CPT

## 2019-12-13 PROCEDURE — 93005 ELECTROCARDIOGRAM TRACING: CPT

## 2019-12-13 RX ORDER — TRAZODONE HYDROCHLORIDE 50 MG/1
TABLET ORAL
Refills: 0 | COMMUNITY
Start: 2019-12-04 | End: 2021-12-17

## 2019-12-13 RX ORDER — IBUPROFEN 200 MG
TABLET ORAL
Refills: 0 | COMMUNITY
Start: 2019-12-04 | End: 2021-12-17

## 2019-12-13 RX ORDER — METFORMIN HYDROCHLORIDE 500 MG/1
TABLET ORAL
Refills: 3 | COMMUNITY
Start: 2019-11-12

## 2019-12-13 RX ORDER — ALPRAZOLAM 0.5 MG/1
TABLET ORAL
Refills: 1 | COMMUNITY
Start: 2019-12-04 | End: 2021-12-17

## 2019-12-13 NOTE — ED PROVIDER NOTES
EMERGENCY DEPARTMENT HISTORY AND PHYSICAL EXAM    Date: 12/13/2019  Patient Name: Yanira Giles    History of Presenting Illness     Chief Complaint   Patient presents with    Addiction problem         History Provided By: Patient    Additional History (Context):   Yanira Giles is a 44 y.o. female with PMHX substance abuse, nicotine dependence presents to the emergency department C/O Mucinex DM abuse and requesting inpatient detox. Patient was seen here yesterday for similar complaints however did not want inpatient detox. Saw her psychiatrist after the appointment. Reports that she changed her mind and is now requesting detox. States that she took 18 tabs of Mucinex DM. States that she usually takes 10 tabs in the morning and 10 tabs at night however because she missed a dose yesterday took more than her usual dosage. Patient denies any suicidal ideation or intent. Pt denies nausea, vomiting, chest pain, and any other sxs or complaints. Also took a dose of trazodone and Xanax that was prescribed by her psychiatrist.    PCP: Cece Thibodeaux MD    Current Outpatient Medications   Medication Sig Dispense Refill    ALPRAZolam (XANAX) 0.5 mg tablet TAKE 1 TABLET BY MOUTH ONCE DAILY AS NEEDED FOR ANXIETY  1    metFORMIN (GLUCOPHAGE) 500 mg tablet TAKE 1 TABLET BY MOUTH ONCE DAILY  3    nicotine (NICODERM CQ) 21 mg/24 hr APPLY 1 PATCH TOPICALLY TO THE SKIN DAILY FOR CRAVINGS AS DIRECTED  0    traZODone (DESYREL) 50 mg tablet TAKE 1 TABLET BY MOUTH AT BEDTIME AS NEEDED FOR INSOMNIA  0    escitalopram oxalate (LEXAPRO) 10 mg tablet Take 10 mg by mouth daily.  lurasidone (LATUDA) 80 mg tab tablet Take 80 mg by mouth daily (with dinner).          Past History     Past Medical History:  Past Medical History:   Diagnosis Date    Asthma     Bilateral ovarian cysts     Cancer ([facility] [address]     Pancreatic and Bone Marrow    Pancreatitis     Pancreatitis     Psychosis ([facility] [address]        Past Surgical History:  Past Surgical History:   Procedure Laterality Date    HX GYN      D&C, Hysterectomy       Family History:  No family history on file. Social History:  Social History     Tobacco Use    Smoking status: Current Every Day Smoker     Packs/day: 1.00    Smokeless tobacco: Never Used   Substance Use Topics    Alcohol use: Not Currently    Drug use: Yes     Comment: Crack       Allergies:  No Active Allergies      Review of Systems   Review of Systems   Constitutional: Negative for chills and fever. HENT: Negative for congestion, ear pain, sinus pain and sore throat. Eyes: Negative for pain and visual disturbance. Respiratory: Negative for cough and shortness of breath. Cardiovascular: Negative for chest pain and leg swelling. Gastrointestinal: Negative for abdominal pain, constipation, diarrhea, nausea and vomiting. Genitourinary: Negative for dysuria, hematuria, vaginal bleeding and vaginal discharge. Musculoskeletal: Negative for back pain and neck pain. Skin: Negative for rash and wound. Neurological: Negative for dizziness, tremors, weakness, light-headedness and numbness. Psychiatric/Behavioral: Negative for self-injury and suicidal ideas. The patient is nervous/anxious. All other systems reviewed and are negative.       Physical Exam     Vitals:    12/13/19 0938   BP: 124/77   Pulse: 86   Resp: 18   Temp: 98.2 °F (36.8 °C)   SpO2: 98%   Weight: 71.2 kg (157 lb)   Height: 5' 2\" (1.575 m)     Physical Exam    Nursing note and vitals reviewed    Constitutional: Young  female, appears older than stated age  Head: Normocephalic, Atraumatic  Eyes: Pupils are equal 3 mm, round, and reactive to light, EOMI  Neck: Supple, non-tender  Cardiovascular: Regular rate and rhythm, no murmurs, rubs, or gallops  Chest: Normal work of breathing and chest excursion bilaterally  Lungs: Clear to ausculation bilaterally, no wheezes, no rhonchi  Abdomen: Soft, non tender, non distended, normoactive bowel sounds  Back: No evidence of trauma or deformity  Extremities: No evidence of trauma or deformity, no LE edema. No streaking erythema, vesicular lesions, ulcerations or bulla  Skin: Warm and dry, normal cap refill  Neuro: Alert and appropriate, CN intact, normal speech, moving all 4 extremities freely and symmetrically  Psychiatric: Normal mood and affect       Diagnostic Study Results     Labs -     Recent Results (from the past 12 hour(s))   EKG, 12 LEAD, INITIAL    Collection Time: 12/13/19  9:51 AM   Result Value Ref Range    Ventricular Rate 90 BPM    Atrial Rate 90 BPM    P-R Interval 154 ms    QRS Duration 74 ms    Q-T Interval 364 ms    QTC Calculation (Bezet) 445 ms    Calculated P Axis 58 degrees    Calculated R Axis 38 degrees    Calculated T Axis 22 degrees    Diagnosis       Normal sinus rhythm  Normal ECG  When compared with ECG of 12-DEC-2019 14:42,  No significant change was found     CBC WITH AUTOMATED DIFF    Collection Time: 12/13/19 10:00 AM   Result Value Ref Range    WBC 9.9 4.6 - 13.2 K/uL    RBC 4.62 4.20 - 5.30 M/uL    HGB 13.2 12.0 - 16.0 g/dL    HCT 41.6 35.0 - 45.0 %    MCV 90.0 74.0 - 97.0 FL    MCH 28.6 24.0 - 34.0 PG    MCHC 31.7 31.0 - 37.0 g/dL    RDW 15.0 (H) 11.6 - 14.5 %    PLATELET 698 354 - 305 K/uL    MPV 10.8 9.2 - 11.8 FL    NEUTROPHILS 64 40 - 73 %    LYMPHOCYTES 29 21 - 52 %    MONOCYTES 5 3 - 10 %    EOSINOPHILS 2 0 - 5 %    BASOPHILS 0 0 - 2 %    ABS. NEUTROPHILS 6.3 1.8 - 8.0 K/UL    ABS. LYMPHOCYTES 2.9 0.9 - 3.6 K/UL    ABS. MONOCYTES 0.5 0.05 - 1.2 K/UL    ABS. EOSINOPHILS 0.2 0.0 - 0.4 K/UL    ABS.  BASOPHILS 0.0 0.0 - 0.1 K/UL    DF AUTOMATED     URINALYSIS W/ RFLX MICROSCOPIC    Collection Time: 12/13/19 10:15 AM   Result Value Ref Range    Color YELLOW      Appearance CLEAR      Specific gravity 1.022 1.005 - 1.030      pH (UA) 5.0 5.0 - 8.0      Protein NEGATIVE  NEG mg/dL    Glucose NEGATIVE  NEG mg/dL    Ketone NEGATIVE  NEG mg/dL    Bilirubin NEGATIVE  NEG Blood NEGATIVE  NEG      Urobilinogen 0.2 0.2 - 1.0 EU/dL    Nitrites NEGATIVE  NEG      Leukocyte Esterase NEGATIVE  NEG     DRUG SCREEN, URINE    Collection Time: 12/13/19 10:15 AM   Result Value Ref Range    BENZODIAZEPINES NEGATIVE  NEG      BARBITURATES NEGATIVE  NEG      THC (TH-CANNABINOL) NEGATIVE  NEG      OPIATES NEGATIVE  NEG      PCP(PHENCYCLIDINE) NEGATIVE  NEG      COCAINE NEGATIVE  NEG      AMPHETAMINES NEGATIVE  NEG      METHADONE NEGATIVE  NEG      HDSCOM (NOTE)    ACETAMINOPHEN    Collection Time: 12/13/19 10:33 AM   Result Value Ref Range    Acetaminophen level <2 (L) 10.0 - 30.0 ug/mL   ETHYL ALCOHOL    Collection Time: 12/13/19 10:33 AM   Result Value Ref Range    ALCOHOL(ETHYL),SERUM <3 0 - 3 MG/DL   METABOLIC PANEL, COMPREHENSIVE    Collection Time: 12/13/19 10:33 AM   Result Value Ref Range    Sodium 143 136 - 145 mmol/L    Potassium 4.9 3.5 - 5.5 mmol/L    Chloride 116 (H) 100 - 111 mmol/L    CO2 21 21 - 32 mmol/L    Anion gap 6 3.0 - 18 mmol/L    Glucose 65 (L) 74 - 99 mg/dL    BUN 16 7.0 - 18 MG/DL    Creatinine 0.59 (L) 0.6 - 1.3 MG/DL    BUN/Creatinine ratio 27 (H) 12 - 20      GFR est AA >60 >60 ml/min/1.73m2    GFR est non-AA >60 >60 ml/min/1.73m2    Calcium 8.6 8.5 - 10.1 MG/DL    Bilirubin, total 0.7 0.2 - 1.0 MG/DL    ALT (SGPT) 33 13 - 56 U/L    AST (SGOT) 9 (L) 10 - 38 U/L    Alk. phosphatase 121 (H) 45 - 117 U/L    Protein, total 6.9 6.4 - 8.2 g/dL    Albumin 3.7 3.4 - 5.0 g/dL    Globulin 3.2 2.0 - 4.0 g/dL    A-G Ratio 1.2 0.8 - 1.7     SALICYLATE    Collection Time: 12/13/19 10:33 AM   Result Value Ref Range    Salicylate level 3.1 2.8 - 20.0 MG/DL       Radiologic Studies -   No orders to display     CT Results  (Last 48 hours)    None        CXR Results  (Last 48 hours)    None            Medical Decision Making   I am the first provider for this patient.     I reviewed the vital signs, available nursing notes, past medical history, past surgical history, family history and social history. Vital Signs-Reviewed the patient's vital signs. Pulse Oximetry Analysis -98 % on room air    Cardiac Monitor:  Rate: 86 bpm  Rhythm: Regular    EKG interpretation: (Preliminary)  10:02 AM  Normal sinus rhythm at 90 bpm.  QTc 445 ms. No acute ST elevation    Records Reviewed: Nursing Notes and Old Medical Records    Provider Notes:   44 y.o. female with a history of polysubstance abuse, nicotine dependence, abuse Mucinex DM presenting requesting detox. On exam, patient exhibited appropriate VS, non toxic and NAD. Will obtain screening labwork including UDS, ethyl alcohol for medical clearance and consult case management for further recommendations. Procedures:  Procedures    ED Course:   9:40 AM   Initial assessment performed. The patients presenting problems have been discussed, and they are in agreement with the care plan formulated and outlined with them. I have encouraged them to ask questions as they arise throughout their visit. SMOKING CESSATION:  The patient was counseled on the dangers of tobacco use, and was advised to quit. Reviewed strategies to maximize success, including removing cigarettes and smoking materials from environment. Discussion took 3-5 minutes, and pt expressed understanding. 12:09 PM  Patient medically cleared for voluntary placement. 2:19 PM  Patient requests discharge after medical clearance. Case management has provided the patient outpatient resources. Diagnosis and Disposition       DISCHARGE NOTE:  2:19 PM    Jeniffer Goldstein's  results have been reviewed with her. She has been counseled regarding her diagnosis, treatment, and plan. She verbally conveys understanding and agreement of the signs, symptoms, diagnosis, treatment and prognosis and additionally agrees to follow up as discussed. She also agrees with the care-plan and conveys that all of her questions have been answered.   I have also provided discharge instructions for her that include: educational information regarding their diagnosis and treatment, and list of reasons why they would want to return to the ED prior to their follow-up appointment, should her condition change. She has been provided with education for proper emergency department utilization. CLINICAL IMPRESSION:    1. Dextromethorphan use disorder, moderate (HCC)        PLAN:  1. D/C Home  2. Current Discharge Medication List        3. Follow-up Information     Follow up With Specialties Details Why Contact Info    Julia Maldonado MD [facility] Schedule an appointment as soon as possible for a visit  [address] [place] [address],[address]      [facility] EMERGENCY DEPT Emergency Medicine In 2 days  [address] [facility] [address]  [phone]        ____________________________________     Please note that this dictation was completed with [employer], the computer voice recognition software. Quite often unanticipated grammatical, syntax, homophones, and other interpretive errors are inadvertently transcribed by the computer software. Please disregard these errors. Please excuse any errors that have escaped final proofreading.

## 2019-12-13 NOTE — ED NOTES
Spoke with Brenna at [facility] [place] [address] control. Recommends EKG. Aspirin, Tylenol levels. Observe 4-6 hours from ingestion. Care is symptomatic and supportive. If psychosis symptoms develop, use benzos to treat.

## 2019-12-13 NOTE — ED NOTES
Spoke from Carol at [facility], was given update, will call back in a few hours to check on patient status.

## 2019-12-13 NOTE — ED TRIAGE NOTES
Triage: pt arrives to ED via EMS with complaint of substance abuse. Pt states that she has been abusing Mucinex for approx 1 year. Typically takes 10 tablets in AM and 10 in evening. Pt reports that she took 18 tablets Mucinex DM this morning. Pt denies any intent to self-harm. States that the medication just \"mellows\" her out. Pt seen in ED yesterday for same. States that she was offered inpatient admission but declined and stated that she wanted to try to quit outpatient. Pt unable to stop taking medication. Pt desires inpatient treatment.  Last inpatient treatment was in November in [place], [place].

## 2019-12-13 NOTE — PROGRESS NOTES
MEDICATION RECONCILIATION      Medication Reconciliation has been performed by pharmacist on this patient in the ED today. Added/updated outpatient [facility] to include: [facility]. Updated PAML with current home medications as reported filled by [facility] Did not interview pt at this time to confirm date/time of last dose. Updated allergy information to remove Toradol and Norco allergies as there was documentation that pt denies these allergies. Marissa Cook is a 44 y.o. female with the following Problems:  Assessment This Admission:   Chief complaint:   Chief Complaint   Patient presents with    Addiction problem      Active Problems:    * No active hospital problems. *       Allergies as of 12/13/2019    (No Active Allergies)      Prior to Admission Medications   Prescriptions Last Dose Informant Patient Reported? Taking? ALPRAZolam (XANAX) 0.5 mg tablet   Yes No   Sig: TAKE 1 TABLET BY MOUTH ONCE DAILY AS NEEDED FOR ANXIETY   escitalopram oxalate (LEXAPRO) 10 mg tablet   Yes No   Sig: Take 10 mg by mouth daily. lurasidone (LATUDA) 80 mg tab tablet   Yes No   Sig: Take 80 mg by mouth daily (with dinner). metFORMIN (GLUCOPHAGE) 500 mg tablet   Yes No   Sig: TAKE 1 TABLET BY MOUTH ONCE DAILY   nicotine (NICODERM CQ) 21 mg/24 hr   Yes No   Sig: APPLY 1 PATCH TOPICALLY TO THE SKIN DAILY FOR CRAVINGS AS DIRECTED   traZODone (DESYREL) 50 mg tablet   Yes No   Sig: TAKE 1 TABLET BY MOUTH AT BEDTIME AS NEEDED FOR INSOMNIA      Facility-Administered Medications: None            PAML was not  marked complete and did require modification.              Damari Melendrez [facility], Alex [place]    Clinical Pharmacist  [phone]

## 2019-12-13 NOTE — ED NOTES
Pt hourly rounding competed. Safety   Pt (*) resting on stretcher with side rails up and call bell in reach. () in chair    () in parents arms. Toileting   Pt offered ()Bedpan     ()Assistance to Restroom     ()Urinal  Ongoing Updates  Updated on plan of care and status of test results. Pain Management  Inquired as to comfort and offered comfort measures:    () warm blankets   () dimmed lights    Patient resting comfortably w/no needs at current time.

## 2019-12-13 NOTE — DISCHARGE INSTRUCTIONS
You were seen and evaluated in the Emergency Department. Please understand that your work up is not all encompassing and you should follow up with your primary care physician for further management and continuity of care. Please return to Emergency Department or seek medical attention immediately if you have acute worsening in your symptoms or develop chest pain, shortness of breath, repeated vomiting, fever, altered level of consciousness, coughing up blood, or start sweating and feel clammy. If you were prescribed any medicine for home, please take as prescribed by your health-care provider. If you were given any follow-up appointments or numbers to call, please do so as instructed. Avoid any tobacco products or excessive alcohol. Patient Education        Substance Use Disorder: Care Instructions  Overview    You can improve your life and health by stopping your use of alcohol or drugs. When you don't drink or use drugs, you may feel and sleep better. You may get along better with your family, friends, and coworkers. There are medicines and programs that can help with substance use disorder. How can you care for yourself at home? · If you have been given medicine to help keep you sober or reduce your cravings, be sure to take it as prescribed. · Talk to your doctor about programs that can help you stop using drugs or drinking alcohol. · If your doctor prescribes disulfiram (Antabuse), do not drink any alcohol while you are taking this medicine. You may have severe, even life-threatening, side effects from even small amounts of alcohol. · Do not tempt yourself by keeping alcohol or drugs in your home. · Learn how to say no when other people drink or use drugs. Or don't spend time with people who drink or use drugs. · Use the time and money spent on drinking or drugs to do something fun with your family or friends. Preventing a relapse  · Do not drink alcohol or use drugs at all.  Using any amount of alcohol or drugs greatly increases your risk for relapse. · Seek help from organizations such as Alcoholics Anonymous, Narcotics Anonymous, or treatment facilities if you feel the need to drink alcohol or use drugs again. · Remember that recovery is a lifelong process. · Stay away from situations, friends, or places that may lead you to drink or use drugs. · Have a plan to spot and deal with relapse. Learn to recognize changes in your thinking that lead you to drink or use drugs. These are warning signs. Get help before you start to drink or use drugs again. · Get help as soon as you can if you relapse. Some people make a plan with another person that outlines what they want that person to do for them if they relapse. The plan usually includes how to handle the relapse and who to notify in case of relapse. · Don't give up. Remember that a relapse does not mean that you have failed. Use the experience to learn the triggers that lead you to drink or use drugs. Then quit again. Many people have several relapses before they are able to quit for good. Follow-up care is a key part of your treatment and safety. Be sure to make and go to all appointments, and call your doctor if you are having problems. It's also a good idea to know your test results and keep a list of the medicines you take. When should you call for help? Call  911 anytime you think you may need emergency care. For example, call if:    · You feel you cannot stop from hurting yourself or someone else.   [facility] your doctor now or seek immediate medical care if:    · You have serious withdrawal symptoms, such as confusion, hallucinations, severe trembling, or seizures.    Watch closely for changes in your health, and be sure to contact your doctor if:    · You have a relapse.     · You need more help or support to stop. Where can you learn more? Go to http://denis-davin.info/.   Enter [id number] in the search box to learn more about \"Substance Use Disorder: Care Instructions. \"  Current as of: February 5, 2019  Content Version: 12.2  © 2001-6391 [employer]. Care instructions adapted under license by [employer] (which disclaims liability or warranty for this information). If you have questions about a medical condition or this instruction, always ask your healthcare professional. [address] any warranty or liability for your use of this information.

## 2019-12-13 NOTE — PROGRESS NOTES
contacted by ED for voluntary inpt psych placement. If at any time Patient becomes involuntary- ED will need to contact Police for a paperless ECO (Emergency Custody Order) who will then call [facility] directly to assist with TDO as needed.  will contact all facilities and they will contact ED directly for questions or acceptances. CM does not need to be notified by staff once bed confirmed to ED by facility. Once all facilities have been contacted should a bed not be available through today. CM will resume search in the morning and continue to work toward transition of care.           CM contacted and provided MRN  to [facility], for review- unable to assist  CM contacted and provided MRN  To [facility] and [facility] for review       CM faxed clinical information to Yahir Skaggs for review- still viewing    CM faxed clinical information to [facility] for review  -  Unable to asst  CM faxed clinical information to [facility] for review not accepting    CM faxed clinical information to [facility]  for review    CM faxed clinical information to [facility]  for review-unable to assist    CM faxed clinical information to Kim Khan for review     CM faxed clinical information to [facility]  for review- unable to assist    CM faxed clinical information to [facility] for review-unable to asst    CM faxed clinical information to [address], [facility], [facility], Anabel Hawk  for review- does not meet criteria per Maria Isabel    CM faxed clinical information to [facility] for review     CM faxed clinical information to [facility]  for review    CM faxed clinical information to [facility]  for review    CM faxed clinical information to  [facility] for review     CM faxed clinical information to [facility]   for review    CM faxed clinical information to [facility] for review     CM faxed clinical information to [facility]  for review    CM faxed clinical information to [facility]  for review    CM faxed clinical information to [facility] for review     CM faxed clinical information to [facility] for review    CM faxed clinical information to Zair[facility] Askew for review    CM faxed clinical information to [facility] for review    CM faxed clinical information to [facility]  for review    1400- at this time no accepting facilities, most facilities declining due to pt not meeting criteria, at this time UDS neg, cm met updated  regarding pt not meeting criteria for inpatient after conversation cm met with pt which now is saying she wants to home, cm gave pt resources for outpt services.

## 2020-09-06 ENCOUNTER — HOSPITAL ENCOUNTER (EMERGENCY)
Age: 40
Discharge: HOME | End: 2020-09-07
Attending: EMERGENCY MEDICINE
Payer: MEDICAID

## 2020-09-06 VITALS
DIASTOLIC BLOOD PRESSURE: 67 MMHG | SYSTOLIC BLOOD PRESSURE: 108 MMHG | RESPIRATION RATE: 18 BRPM | BODY MASS INDEX: 33.13 KG/M2 | HEIGHT: 62 IN | TEMPERATURE: 98.5 F | OXYGEN SATURATION: 98 % | HEART RATE: 84 BPM | WEIGHT: 180 LBS

## 2020-09-06 DIAGNOSIS — F22 EKBOM'S DELUSIONAL PARASITOSIS (HCC): ICD-10-CM

## 2020-09-06 DIAGNOSIS — H92.02 LEFT EAR PAIN: Primary | ICD-10-CM

## 2020-09-06 PROCEDURE — 99283 EMERGENCY DEPT VISIT LOW MDM: CPT

## 2020-09-06 RX ORDER — IBUPROFEN 400 MG/1
800 TABLET ORAL
Status: COMPLETED | OUTPATIENT
Start: 2020-09-07 | End: 2020-09-07

## 2020-09-07 PROCEDURE — 74011250637 HC RX REV CODE- 250/637: Performed by: EMERGENCY MEDICINE

## 2020-09-07 RX ORDER — IBUPROFEN 800 MG/1
800 TABLET ORAL
Qty: 20 TAB | Refills: 0 | Status: SHIPPED | OUTPATIENT
Start: 2020-09-06 | End: 2020-09-13

## 2020-09-07 RX ADMIN — IBUPROFEN 800 MG: 400 TABLET, FILM COATED ORAL at 00:02

## 2020-09-07 NOTE — ED TRIAGE NOTES
Transported via medics this evening after seeking treatment at [facility] earlier in the day for having a bug in her left ear. Pt reports that it feels as though it's  Maggots that are crawling around in her ear and up to her sinus cavity.

## 2020-09-07 NOTE — ED PROVIDER NOTES
EMERGENCY DEPARTMENT HISTORY AND PHYSICAL EXAM    Date: 9/6/2020  Patient Name: Jozef Land    History of Presenting Illness     Chief Complaint   Patient presents with    Ear Pain     bug in left ear         History Provided By: Patient and EMS    Jozef Land is a 36 y.o. female who presents to the emergency department C/O pain in her left ear. Patient states she feels like there is a bug in her left ear. Patient was seen at [facility] earlier today and was diagnosed with a otitis externa on the left side and given antibiotic eardrops. They did not see any foreign body or insect in the patient's ear during that visit. . Patient states his symptoms started on Friday, 3 days ago. She states she feels like there are maggots crawling out of the left side of her ear onto her face. She states the maggots came from a fly that flew into her ear on Friday. PCP: Richard Brown MD    Current Outpatient Medications   Medication Sig Dispense Refill    ibuprofen (MOTRIN) 800 mg tablet Take 1 Tab by mouth every six (6) hours as needed for Pain for up to 7 days. 20 Tab 0    ALPRAZolam (XANAX) 0.5 mg tablet TAKE 1 TABLET BY MOUTH ONCE DAILY AS NEEDED FOR ANXIETY  1    metFORMIN (GLUCOPHAGE) 500 mg tablet TAKE 1 TABLET BY MOUTH ONCE DAILY  3    nicotine (NICODERM CQ) 21 mg/24 hr APPLY 1 PATCH TOPICALLY TO THE SKIN DAILY FOR CRAVINGS AS DIRECTED  0    traZODone (DESYREL) 50 mg tablet TAKE 1 TABLET BY MOUTH AT BEDTIME AS NEEDED FOR INSOMNIA  0    escitalopram oxalate (LEXAPRO) 10 mg tablet Take 10 mg by mouth daily.  lurasidone (LATUDA) 80 mg tab tablet Take 80 mg by mouth daily (with dinner).          Past History     Past Medical History:  Past Medical History:   Diagnosis Date    Asthma     Bilateral ovarian cysts     Pancreatitis     Pancreatitis     Psychosis ([address]        Past Surgical History:  Past Surgical History:   Procedure Laterality Date    HX GYN      D&C, Hysterectomy Family History:  History reviewed. No pertinent family history. Social History:  Social History     Tobacco Use    Smoking status: Current Every Day Smoker     Packs/day: 1.00    Smokeless tobacco: Never Used   Substance Use Topics    Alcohol use: Yes     Alcohol/week: 4.0 standard drinks     Types: 4 Cans of beer per week     Comment: 4 beers this evening    Drug use: Yes     Types: Cocaine, Marijuana     Comment: Crack       Allergies:  No Active Allergies      Review of Systems   Review of Systems   Constitutional: Negative for fever. HENT: Positive for ear discharge and ear pain. Negative for facial swelling, sneezing, sore throat and voice change. Eyes: Negative for pain. Respiratory: Negative for shortness of breath. Cardiovascular: Negative for chest pain. Gastrointestinal: Negative for abdominal pain. Neurological: Positive for headaches. All other systems reviewed and are negative.         Physical Exam     Vitals:    09/06/20 2345   BP: 108/67   Pulse: 84   Resp: 18   Temp: 98.5 °F (36.9 °C)   SpO2: 98%   Weight: 81.6 kg (180 lb)   Height: 5' 2\" (1.575 m)     Physical Exam    Nursing notes and vital signs reviewed    Airway: intact, speaking normally  Breathing: No apparent distress, no cyanosis  Circulation: Peripheral pulses equal    Constitutional: Non toxic appearing, chronically ill-appearing, unkempt  HEENT & Neck: Normocephalic, Atraumatic, PERRL, EOMI, No rhinorrhea, external nose normal, external ears normal, mucous membranes moist, No stridor, No JVD, no foreign body noted to the ears bilaterally, they appear well, non-erythematous, tympanic membrane is normal bilaterally  Cardiovascular: Regular rate and rhythm, no murmurs  Chest: Normal work of breathing and chest excursion bilaterally  Lungs: Clear to ausculation bilaterally  Abdomen: Soft, non tender, non distended, normoactive bowel sounds, No rigidity, no peritoneal signs  Musculoskeletal: No evidence of trauma or deformity to the back  Extremities: No evidence of trauma or deformity, no LE edema  Skin: Warm, No obvious rashes  Neuro: Alert and oriented x 3, CN 2-12 intact, normal speech, strength and sensation full and symmetric bilaterally, normal coordination  Psychiatric: Appears anxious and irritable      Diagnostic Study Results     Labs -   No results found for this or any previous visit (from the past 72 hour(s)). Radiologic Studies -   No orders to display     CT Results  (Last 48 hours)    None        CXR Results  (Last 48 hours)    None          Medications given in the ED-  Medications   ibuprofen (MOTRIN) tablet 800 mg (800 mg Oral Given 9/7/20 0002)         Medical Decision Making   I am the first provider for this patient. I reviewed the vital signs, available nursing notes, past medical history, past surgical history, family history and social history. Vital Signs-Reviewed the patient's vital signs. Pulse Oximetry Analysis - 98% on Room air     Cardiac Monitor:  Rate: 84 bpm  Rhythm: sinus      Records Reviewed: Nursing Notes and Old Medical Records    Procedures:  Procedures    ED Course:     Patient given Motrin for pain. I discussed with her that there is no foreign body or insect in her ear on her face. She does have history of some psychiatric disorders, question delusional parasitosis. I recommended she continue with her antibiotic eardrops and Motrin for pain. Diagnosis and Disposition         DISCHARGE NOTE:    Delvin Goldstein's  results have been reviewed with her. She has been counseled regarding her diagnosis, treatment, and plan. She verbally conveys understanding and agreement of the signs, symptoms, diagnosis, treatment and prognosis and additionally agrees to follow up as discussed. She also agrees with the care-plan and conveys that all of her questions have been answered.   I have also provided discharge instructions for her that include: educational information regarding their diagnosis and treatment, and list of reasons why they would want to return to the ED prior to their follow-up appointment, should her condition change. She has been provided with education for proper emergency department utilization. CLINICAL IMPRESSION:    1. Left ear pain    2. Ekbom's delusional parasitosis ([address]        PLAN:  1. D/C Home  2. Current Discharge Medication List      START taking these medications    Details   ibuprofen (MOTRIN) 800 mg tablet Take 1 Tab by mouth every six (6) hours as needed for Pain for up to 7 days. Qty: 20 Tab, Refills: 0           3. Follow-up Information     Follow up With Specialties Details Why Contact Info    Senia Yee MD Otolaryngology, Surgery Schedule an appointment as soon as possible for a visit  for ear nose and throat doctor [address]   [phone]          _______________________________      Please note that this dictation was completed with [employer], the computer voice recognition software. Quite often unanticipated grammatical, syntax, homophones, and other interpretive errors are inadvertently transcribed by the computer software. Please disregard these errors. Please excuse any errors that have escaped final proofreading.

## 2020-12-03 ENCOUNTER — HOSPITAL ENCOUNTER (EMERGENCY)
Age: 40
Discharge: PSYCHIATRIC OR SUBSTANCE USE FACILITY | End: 2020-12-03
Attending: EMERGENCY MEDICINE
Payer: MEDICAID

## 2020-12-03 VITALS
SYSTOLIC BLOOD PRESSURE: 117 MMHG | HEIGHT: 62 IN | OXYGEN SATURATION: 100 % | BODY MASS INDEX: 33.13 KG/M2 | DIASTOLIC BLOOD PRESSURE: 85 MMHG | HEART RATE: 65 BPM | WEIGHT: 180 LBS | TEMPERATURE: 98.5 F | RESPIRATION RATE: 16 BRPM

## 2020-12-03 DIAGNOSIS — F19.10 POLYSUBSTANCE ABUSE (HCC): ICD-10-CM

## 2020-12-03 DIAGNOSIS — R45.851 SUICIDAL IDEATION: ICD-10-CM

## 2020-12-03 DIAGNOSIS — F29 PSYCHOSIS, UNSPECIFIED PSYCHOSIS TYPE (HCC): Primary | ICD-10-CM

## 2020-12-03 LAB
ALBUMIN SERPL-MCNC: 3.9 G/DL (ref 3.4–5)
ALBUMIN/GLOB SERPL: 1.3 {RATIO} (ref 0.8–1.7)
ALP SERPL-CCNC: 93 U/L (ref 45–117)
ALT SERPL-CCNC: 35 U/L (ref 13–56)
AMORPH CRY URNS QL MICRO: ABNORMAL
AMPHET UR QL SCN: NEGATIVE
ANION GAP SERPL CALC-SCNC: 4 MMOL/L (ref 3–18)
APAP SERPL-MCNC: <2 UG/ML (ref 10–30)
APPEARANCE UR: ABNORMAL
AST SERPL-CCNC: 19 U/L (ref 10–38)
BACTERIA URNS QL MICRO: ABNORMAL /HPF
BARBITURATES UR QL SCN: NEGATIVE
BASOPHILS # BLD: 0 K/UL (ref 0–0.1)
BASOPHILS NFR BLD: 0 % (ref 0–2)
BENZODIAZ UR QL: NEGATIVE
BILIRUB SERPL-MCNC: 0.9 MG/DL (ref 0.2–1)
BILIRUB UR QL: NEGATIVE
BUN SERPL-MCNC: 17 MG/DL (ref 7–18)
BUN/CREAT SERPL: 22 (ref 12–20)
CALCIUM SERPL-MCNC: 8.8 MG/DL (ref 8.5–10.1)
CANNABINOIDS UR QL SCN: NEGATIVE
CHLORIDE SERPL-SCNC: 122 MMOL/L (ref 100–111)
CO2 SERPL-SCNC: 20 MMOL/L (ref 21–32)
COCAINE UR QL SCN: POSITIVE
COLOR UR: YELLOW
CREAT SERPL-MCNC: 0.76 MG/DL (ref 0.6–1.3)
DIFFERENTIAL METHOD BLD: ABNORMAL
EOSINOPHIL # BLD: 0.1 K/UL (ref 0–0.4)
EOSINOPHIL NFR BLD: 1 % (ref 0–5)
EPITH CASTS URNS QL MICRO: ABNORMAL /LPF (ref 0–5)
ERYTHROCYTE [DISTWIDTH] IN BLOOD BY AUTOMATED COUNT: 13.2 % (ref 11.6–14.5)
ETHANOL SERPL-MCNC: <3 MG/DL (ref 0–3)
GLOBULIN SER CALC-MCNC: 3.1 G/DL (ref 2–4)
GLUCOSE SERPL-MCNC: 82 MG/DL (ref 74–99)
GLUCOSE UR STRIP.AUTO-MCNC: NEGATIVE MG/DL
HCG SERPL QL: NEGATIVE
HCT VFR BLD AUTO: 40.4 % (ref 35–45)
HDSCOM,HDSCOM: ABNORMAL
HGB BLD-MCNC: 13.4 G/DL (ref 12–16)
HGB UR QL STRIP: ABNORMAL
KETONES UR QL STRIP.AUTO: NEGATIVE MG/DL
LEUKOCYTE ESTERASE UR QL STRIP.AUTO: NEGATIVE
LYMPHOCYTES # BLD: 2.6 K/UL (ref 0.9–3.6)
LYMPHOCYTES NFR BLD: 21 % (ref 21–52)
MCH RBC QN AUTO: 30 PG (ref 24–34)
MCHC RBC AUTO-ENTMCNC: 33.2 G/DL (ref 31–37)
MCV RBC AUTO: 90.4 FL (ref 74–97)
METHADONE UR QL: NEGATIVE
MONOCYTES # BLD: 0.6 K/UL (ref 0.05–1.2)
MONOCYTES NFR BLD: 5 % (ref 3–10)
NEUTS SEG # BLD: 8.8 K/UL (ref 1.8–8)
NEUTS SEG NFR BLD: 73 % (ref 40–73)
NITRITE UR QL STRIP.AUTO: NEGATIVE
OPIATES UR QL: POSITIVE
PCP UR QL: NEGATIVE
PH UR STRIP: 5 [PH] (ref 5–8)
PLATELET # BLD AUTO: 295 K/UL (ref 135–420)
PMV BLD AUTO: 10.3 FL (ref 9.2–11.8)
POTASSIUM SERPL-SCNC: 4 MMOL/L (ref 3.5–5.5)
PROT SERPL-MCNC: 7 G/DL (ref 6.4–8.2)
PROT UR STRIP-MCNC: 30 MG/DL
RBC # BLD AUTO: 4.47 M/UL (ref 4.2–5.3)
RBC #/AREA URNS HPF: ABNORMAL /HPF (ref 0–5)
SALICYLATES SERPL-MCNC: 2.4 MG/DL (ref 2.8–20)
SODIUM SERPL-SCNC: 146 MMOL/L (ref 136–145)
SP GR UR REFRACTOMETRY: 1.02 (ref 1–1.03)
UROBILINOGEN UR QL STRIP.AUTO: 0.2 EU/DL (ref 0.2–1)
WBC # BLD AUTO: 12 K/UL (ref 4.6–13.2)
WBC URNS QL MICRO: ABNORMAL /HPF (ref 0–5)

## 2020-12-03 PROCEDURE — 84703 CHORIONIC GONADOTROPIN ASSAY: CPT

## 2020-12-03 PROCEDURE — 0100U RESPIRATORY PANEL,PCR,NASOPHARYNGEAL: CPT

## 2020-12-03 PROCEDURE — 81001 URINALYSIS AUTO W/SCOPE: CPT

## 2020-12-03 PROCEDURE — 85025 COMPLETE CBC W/AUTO DIFF WBC: CPT

## 2020-12-03 PROCEDURE — 99285 EMERGENCY DEPT VISIT HI MDM: CPT

## 2020-12-03 PROCEDURE — 80053 COMPREHEN METABOLIC PANEL: CPT

## 2020-12-03 PROCEDURE — 87635 SARS-COV-2 COVID-19 AMP PRB: CPT

## 2020-12-03 PROCEDURE — 80307 DRUG TEST PRSMV CHEM ANLYZR: CPT

## 2020-12-03 NOTE — ED PROVIDER NOTES
EMERGENCY DEPARTMENT HISTORY AND PHYSICAL EXAM    Date: 12/3/2020  Patient Name: Maximo Lei    History of Presenting Illness     Chief Complaint   Patient presents with    Suicidal         History Provided By: Patient    2:17 PM  Maximo Lei is a 36 y.o. female with PMHX of psychosis, polysubstance abuse who presents to the emergency department C/O voices telling her to kill her self. Difficult to get history from patient and she repeatedly says \"tell her to fuck off\". When asked if she is suicidal she says \"tell her yes\". Patient is clearly talking to internal stimuli and is unable to provide any other coherent history. She does not make eye contact and is tearful. PCP: Wiliam Marinelli MD    Current Outpatient Medications   Medication Sig Dispense Refill    ALPRAZolam (XANAX) 0.5 mg tablet TAKE 1 TABLET BY MOUTH ONCE DAILY AS NEEDED FOR ANXIETY  1    metFORMIN (GLUCOPHAGE) 500 mg tablet TAKE 1 TABLET BY MOUTH ONCE DAILY  3    nicotine (NICODERM CQ) 21 mg/24 hr APPLY 1 PATCH TOPICALLY TO THE SKIN DAILY FOR CRAVINGS AS DIRECTED  0    traZODone (DESYREL) 50 mg tablet TAKE 1 TABLET BY MOUTH AT BEDTIME AS NEEDED FOR INSOMNIA  0    escitalopram oxalate (LEXAPRO) 10 mg tablet Take 10 mg by mouth daily.  lurasidone (LATUDA) 80 mg tab tablet Take 80 mg by mouth daily (with dinner). Past History     Past Medical History:  Past Medical History:   Diagnosis Date    Asthma     Bilateral ovarian cysts     Cocaine abuse [facility] [address]     Mental and behavioral problem     Pancreatitis     Pancreatitis     Psychosis ([facility] [address]        Past Surgical History:  Past Surgical History:   Procedure Laterality Date    HX GYN      D&C, Hysterectomy       Family History:  No family history on file.     Social History:  Social History     Tobacco Use    Smoking status: Current Every Day Smoker     Packs/day: 1.00    Smokeless tobacco: Never Used   Substance Use Topics    Alcohol use: Not Currently    Drug use: Yes     Types: Cocaine, Marijuana     Comment: used with in past 24 hours       Allergies:  No Active Allergies      Review of Systems   Review of Systems   Unable to perform ROS: Psychiatric disorder         Physical Exam     Vitals:    12/03/20 1414   BP: 101/62   Pulse: 77   Resp: 14   Temp: 97.7 °F (36.5 °C)   SpO2: 99%   Weight: 81.6 kg (180 lb)   Height: 5' 2\" (1.575 m)     Physical Exam    Nursing notes and vital signs reviewed    Constitutional: Non toxic appearing, moderate distress  Head: Normocephalic, Atraumatic  Eyes: EOMI  Neck: Supple  Cardiovascular: Regular rate and rhythm, no murmurs, rubs, or gallops  Chest: Normal work of breathing and chest excursion bilaterally  Lungs: Clear to ausculation bilaterally  Abdomen: Soft, non tender, non distended  Back: No evidence of trauma or deformity  Extremities: No evidence of trauma or deformity, no LE edema  Skin: Warm and dry, normal cap refill  Neuro: Alert and appropriate, face symmetric, normal speech  Psychiatric: Tearful and agitated mood and affect, does not make eye contact, is clearly speaking to internal stimuli      Diagnostic Study Results     Labs -     Recent Results (from the past 12 hour(s))   CBC WITH AUTOMATED DIFF    Collection Time: 12/03/20  2:55 PM   Result Value Ref Range    WBC 12.0 4.6 - 13.2 K/uL    RBC 4.47 4.20 - 5.30 M/uL    HGB 13.4 12.0 - 16.0 g/dL    HCT 40.4 35.0 - 45.0 %    MCV 90.4 74.0 - 97.0 FL    MCH 30.0 24.0 - 34.0 PG    MCHC 33.2 31.0 - 37.0 g/dL    RDW 13.2 11.6 - 14.5 %    PLATELET 974 469 - 507 K/uL    MPV 10.3 9.2 - 11.8 FL    NEUTROPHILS 73 40 - 73 %    LYMPHOCYTES 21 21 - 52 %    MONOCYTES 5 3 - 10 %    EOSINOPHILS 1 0 - 5 %    BASOPHILS 0 0 - 2 %    ABS. NEUTROPHILS 8.8 (H) 1.8 - 8.0 K/UL    ABS. LYMPHOCYTES 2.6 0.9 - 3.6 K/UL    ABS. MONOCYTES 0.6 0.05 - 1.2 K/UL    ABS. EOSINOPHILS 0.1 0.0 - 0.4 K/UL    ABS.  BASOPHILS 0.0 0.0 - 0.1 K/UL    DF AUTOMATED     METABOLIC PANEL, COMPREHENSIVE    Collection Time: 12/03/20  2:55 PM   Result Value Ref Range    Sodium 146 (H) 136 - 145 mmol/L    Potassium 4.0 3.5 - 5.5 mmol/L    Chloride 122 (H) 100 - 111 mmol/L    CO2 20 (L) 21 - 32 mmol/L    Anion gap 4 3.0 - 18 mmol/L    Glucose 82 74 - 99 mg/dL    BUN 17 7.0 - 18 MG/DL    Creatinine 0.76 0.6 - 1.3 MG/DL    BUN/Creatinine ratio 22 (H) 12 - 20      GFR est AA >60 >60 ml/min/1.73m2    GFR est non-AA >60 >60 ml/min/1.73m2    Calcium 8.8 8.5 - 10.1 MG/DL    Bilirubin, total 0.9 0.2 - 1.0 MG/DL    ALT (SGPT) 35 13 - 56 U/L    AST (SGOT) 19 10 - 38 U/L    Alk.  phosphatase 93 45 - 117 U/L    Protein, total 7.0 6.4 - 8.2 g/dL    Albumin 3.9 3.4 - 5.0 g/dL    Globulin 3.1 2.0 - 4.0 g/dL    A-G Ratio 1.3 0.8 - 1.7     ETHYL ALCOHOL    Collection Time: 12/03/20  2:55 PM   Result Value Ref Range    ALCOHOL(ETHYL),SERUM <3 0 - 3 MG/DL   HCG QL SERUM    Collection Time: 12/03/20  2:55 PM   Result Value Ref Range    HCG, Ql. Negative NEG     SALICYLATE    Collection Time: 12/03/20  2:55 PM   Result Value Ref Range    Salicylate level 2.4 (L) 2.8 - 20.0 MG/DL   ACETAMINOPHEN    Collection Time: 12/03/20  2:55 PM   Result Value Ref Range    Acetaminophen level <2 (L) 10.0 - 30.0 ug/mL   URINALYSIS W/ RFLX MICROSCOPIC    Collection Time: 12/03/20  5:08 PM   Result Value Ref Range    Color YELLOW      Appearance TURBID      Specific gravity 1.021 1.005 - 1.030      pH (UA) 5.0 5.0 - 8.0      Protein 30 (A) NEG mg/dL    Glucose Negative NEG mg/dL    Ketone Negative NEG mg/dL    Bilirubin Negative NEG      Blood LARGE (A) NEG      Urobilinogen 0.2 0.2 - 1.0 EU/dL    Nitrites Negative NEG      Leukocyte Esterase Negative NEG     DRUG SCREEN, URINE    Collection Time: 12/03/20  5:08 PM   Result Value Ref Range    BENZODIAZEPINES Negative NEG      BARBITURATES Negative NEG      THC (TH-CANNABINOL) Negative NEG      OPIATES Positive (A) NEG      PCP(PHENCYCLIDINE) Negative NEG      COCAINE Positive (A) NEG      AMPHETAMINES Negative NEG METHADONE Negative NEG      HDSCOM (NOTE)    URINE MICROSCOPIC ONLY    Collection Time: 12/03/20  5:08 PM   Result Value Ref Range    WBC 0 to 3 0 - 5 /hpf    RBC 20 to 30 0 - 5 /hpf    Epithelial cells 2+ 0 - 5 /lpf    Bacteria FEW (A) NEG /hpf    Amorphous Crystals 4+ (A) NEG   SARS-COV-2    Collection Time: 12/03/20  6:00 PM   Result Value Ref Range    SARS-CoV-2 PENDING     Specimen source NP SWAB     COVID-19 rapid test Not detected NOTD      Specimen type NP Swab      Health status Nasopharyngeal         Radiologic Studies -   No orders to display     CT Results  (Last 48 hours)    None        CXR Results  (Last 48 hours)    None          Medications given in the ED-  Medications - No data to display      Medical Decision Making   I am the first provider for this patient. I reviewed the vital signs, available nursing notes, past medical history, past surgical history, family history and social history. Vital Signs-Reviewed the patient's vital signs. Pulse Oximetry Analysis - 99% on room air, not hypoxic     Records Reviewed: Nursing Notes and Old Medical Records    Provider Notes (Medical Decision Making): Ajay Huggins is a 36 y.o. female presenting with psychosis and suicidal ideation in the setting of polysubstance abuse. Medically cleared. Evaluated by [facility] and TDO initiated. [facility] is actively looking for inpatient psychiatric placement. We will continue supportive care pending inpatient psychiatric bed assignment. Procedures:  Procedures    ED Course:   5:45 PM  Patient has been evaluated by [facility] who is requesting a rapid Covid test for placement. They are arranging for a TDO.    7:00 PM  Patient's presentation, labs/imaging and plan of care was reviewed with Dr. Samy Baugh as part of sign out. They will continue supportive care pending psych placement by [facility] as part of the plan discussed with the patient.     Dr. Lupe Anderson assistance in completion of this plan is greatly appreciated but it should be noted that Dr. Julisa Medina will be the provider of record for this patient. 8:14 PM  Patient accepted to [facility] by. Dr. Jessica Corral      Diagnosis and Disposition     CLINICAL IMPRESSION:    1. Psychosis, unspecified psychosis type ([facility] [address]    2. Suicidal ideation    3. Polysubstance abuse ([facility]        PLAN:  1. Transfer to [place] [id number]  _______________________________      Please note that this dictation was completed with "[employer] the computer voice recognition software. Quite often unanticipated grammatical, syntax, homophones, and other interpretive errors are inadvertently transcribed by the computer software. Please disregard these errors. Please excuse any errors that have escaped final proofreading.

## 2020-12-03 NOTE — ED TRIAGE NOTES
Patient states she wants to kill herself.   Patient obviously hearing voices she is constantly talking to self \" states tell her to fuck off\" patient  Paranoid in triage

## 2020-12-04 LAB

## 2020-12-04 NOTE — ED NOTES
Pt hourly rounding competed. Safety   Pt () resting on stretcher with side rails up and call bell in reach. () in chair    () in parents arms. Toileting   Pt offered ()Bedpan     (x)Assistance to Restroom     ()Urinal  Ongoing Updates  Updated on plan of care and status of test results.   Pain Management  Inquired as to comfort and offered comfort measures:    (x) warm blankets   (x) dimmed lights

## 2020-12-04 NOTE — ED NOTES
Pt D/c'd with AROLDO Powers to transfer to  Psych facility. Pt ambulatory, belongings sent with pt. Copy of chart sent. Copy of TDO to be scanned into chart. VSS.  Pt cooperative at ID

## 2020-12-04 NOTE — ED NOTES
Received a call from FRANCHESCA Tafoya, pt to transfer to  Psych, Dr Nicole Edwards, [address] [facility], [phone]

## 2020-12-04 NOTE — ED NOTES
Assumed c/o pt. Report taken . Sitter at bedside. Pt resting quietly at this time. TDO.  Awaiting further orders

## 2020-12-04 NOTE — ED NOTES
TRANSFER - OUT REPORT:    Verbal report given to Reji Mcgraw (name) on Sera Lopez  being transferred to [address] [facility] (unit) for routine progression of care       Report consisted of patients Situation, Background, Assessment and   Recommendations(SBAR). Information from the following report(s) SBAR, ED Summary and MAR was reviewed with the receiving nurse. Lines:       Opportunity for questions and clarification was provided.       Patient transported with:   Tech     Pt to be transferred with [facility], awaiting ETA

## 2020-12-05 LAB
COVID-19 RAPID TEST, COVR: NOT DETECTED
HEALTH STATUS, XMCV2T: NORMAL
SARS-COV-2, COV2NT: NOT DETECTED
SOURCE, COVRS: NORMAL
SPECIMEN TYPE, XMCV1T: NORMAL

## 2021-08-04 ENCOUNTER — APPOINTMENT (OUTPATIENT)
Dept: GENERAL RADIOLOGY | Age: 41
End: 2021-08-04
Attending: PHYSICIAN ASSISTANT
Payer: MEDICAID

## 2021-08-04 ENCOUNTER — HOSPITAL ENCOUNTER (EMERGENCY)
Age: 41
Discharge: HOME | End: 2021-08-04
Attending: STUDENT IN AN ORGANIZED HEALTH CARE EDUCATION/TRAINING PROGRAM
Payer: MEDICAID

## 2021-08-04 VITALS
OXYGEN SATURATION: 98 % | DIASTOLIC BLOOD PRESSURE: 64 MMHG | TEMPERATURE: 97.3 F | HEIGHT: 62 IN | RESPIRATION RATE: 15 BRPM | SYSTOLIC BLOOD PRESSURE: 108 MMHG | HEART RATE: 96 BPM | BODY MASS INDEX: 30.36 KG/M2 | WEIGHT: 165 LBS

## 2021-08-04 DIAGNOSIS — U07.1 COVID-19: Primary | ICD-10-CM

## 2021-08-04 DIAGNOSIS — Z59.00 HOMELESS: ICD-10-CM

## 2021-08-04 DIAGNOSIS — U07.1 COVID-19 VIRUS INFECTION: Primary | ICD-10-CM

## 2021-08-04 LAB
ALBUMIN SERPL-MCNC: 3.7 G/DL (ref 3.4–5)
ALBUMIN/GLOB SERPL: 1.2 {RATIO} (ref 0.8–1.7)
ALP SERPL-CCNC: 80 U/L (ref 45–117)
ALT SERPL-CCNC: 44 U/L (ref 13–56)
ANION GAP SERPL CALC-SCNC: 5 MMOL/L (ref 3–18)
AST SERPL-CCNC: 27 U/L (ref 10–38)
BASOPHILS # BLD: 0 K/UL (ref 0–0.1)
BASOPHILS NFR BLD: 0 % (ref 0–2)
BILIRUB SERPL-MCNC: 0.2 MG/DL (ref 0.2–1)
BUN SERPL-MCNC: 9 MG/DL (ref 7–18)
BUN/CREAT SERPL: 13 (ref 12–20)
CALCIUM SERPL-MCNC: 8.5 MG/DL (ref 8.5–10.1)
CHLORIDE SERPL-SCNC: 109 MMOL/L (ref 100–111)
CO2 SERPL-SCNC: 26 MMOL/L (ref 21–32)
CREAT SERPL-MCNC: 0.72 MG/DL (ref 0.6–1.3)
DIFFERENTIAL METHOD BLD: ABNORMAL
EOSINOPHIL # BLD: 0.3 K/UL (ref 0–0.4)
EOSINOPHIL NFR BLD: 5 % (ref 0–5)
ERYTHROCYTE [DISTWIDTH] IN BLOOD BY AUTOMATED COUNT: 13.7 % (ref 11.6–14.5)
GLOBULIN SER CALC-MCNC: 3.2 G/DL (ref 2–4)
GLUCOSE SERPL-MCNC: 76 MG/DL (ref 74–99)
HCT VFR BLD AUTO: 36.8 % (ref 35–45)
HGB BLD-MCNC: 11.6 G/DL (ref 12–16)
LIPASE SERPL-CCNC: 93 U/L (ref 73–393)
LYMPHOCYTES # BLD: 1.1 K/UL (ref 0.9–3.6)
LYMPHOCYTES NFR BLD: 17 % (ref 21–52)
MAGNESIUM SERPL-MCNC: 1.8 MG/DL (ref 1.6–2.6)
MCH RBC QN AUTO: 29.3 PG (ref 24–34)
MCHC RBC AUTO-ENTMCNC: 31.5 G/DL (ref 31–37)
MCV RBC AUTO: 92.9 FL (ref 74–97)
MONOCYTES # BLD: 0.8 K/UL (ref 0.05–1.2)
MONOCYTES NFR BLD: 12 % (ref 3–10)
NEUTS SEG # BLD: 4.2 K/UL (ref 1.8–8)
NEUTS SEG NFR BLD: 65 % (ref 40–73)
PLATELET # BLD AUTO: 177 K/UL (ref 135–420)
PMV BLD AUTO: 11.1 FL (ref 9.2–11.8)
POTASSIUM SERPL-SCNC: 3.6 MMOL/L (ref 3.5–5.5)
PROT SERPL-MCNC: 6.9 G/DL (ref 6.4–8.2)
RBC # BLD AUTO: 3.96 M/UL (ref 4.2–5.3)
SODIUM SERPL-SCNC: 140 MMOL/L (ref 136–145)
WBC # BLD AUTO: 6.5 K/UL (ref 4.6–13.2)

## 2021-08-04 PROCEDURE — 71045 X-RAY EXAM CHEST 1 VIEW: CPT

## 2021-08-04 PROCEDURE — 99281 EMR DPT VST MAYX REQ PHY/QHP: CPT

## 2021-08-04 PROCEDURE — 74011250636 HC RX REV CODE- 250/636: Performed by: PHYSICIAN ASSISTANT

## 2021-08-04 PROCEDURE — 74011250637 HC RX REV CODE- 250/637: Performed by: PHYSICIAN ASSISTANT

## 2021-08-04 PROCEDURE — 99284 EMERGENCY DEPT VISIT MOD MDM: CPT

## 2021-08-04 PROCEDURE — 83735 ASSAY OF MAGNESIUM: CPT

## 2021-08-04 PROCEDURE — 85025 COMPLETE CBC W/AUTO DIFF WBC: CPT

## 2021-08-04 PROCEDURE — 80053 COMPREHEN METABOLIC PANEL: CPT

## 2021-08-04 PROCEDURE — 83690 ASSAY OF LIPASE: CPT

## 2021-08-04 PROCEDURE — 96374 THER/PROPH/DIAG INJ IV PUSH: CPT

## 2021-08-04 RX ORDER — IBUPROFEN 600 MG/1
600 TABLET ORAL
Status: COMPLETED | OUTPATIENT
Start: 2021-08-04 | End: 2021-08-04

## 2021-08-04 RX ORDER — ALBUTEROL SULFATE 90 UG/1
2 AEROSOL, METERED RESPIRATORY (INHALATION)
Qty: 1 INHALER | Refills: 1 | Status: SHIPPED | OUTPATIENT
Start: 2021-08-04

## 2021-08-04 RX ORDER — IBUPROFEN 600 MG/1
600 TABLET ORAL
Qty: 20 TABLET | Refills: 0 | Status: SHIPPED | OUTPATIENT
Start: 2021-08-04 | End: 2021-12-17

## 2021-08-04 RX ORDER — BENZONATATE 100 MG/1
100 CAPSULE ORAL
Qty: 21 CAPSULE | Refills: 0 | Status: SHIPPED | OUTPATIENT
Start: 2021-08-04 | End: 2021-08-11

## 2021-08-04 RX ORDER — ONDANSETRON 2 MG/ML
4 INJECTION INTRAMUSCULAR; INTRAVENOUS
Status: COMPLETED | OUTPATIENT
Start: 2021-08-04 | End: 2021-08-04

## 2021-08-04 RX ADMIN — IBUPROFEN 600 MG: 600 TABLET, FILM COATED ORAL at 10:52

## 2021-08-04 RX ADMIN — ONDANSETRON 4 MG: 2 INJECTION INTRAMUSCULAR; INTRAVENOUS at 10:52

## 2021-08-04 RX ADMIN — SODIUM CHLORIDE 1000 ML: 900 INJECTION, SOLUTION INTRAVENOUS at 10:52

## 2021-08-04 SDOH — ECONOMIC STABILITY - HOUSING INSECURITY: HOMELESSNESS UNSPECIFIED: Z59.00

## 2021-08-04 NOTE — ED NOTES
Patient has ambulated to the bathroom x4 for urine sample. Patient has not provided one. Patient has also been told to keep her arm straight so that her IV fluids infuse. Patient is unwilling to do so.

## 2021-08-04 NOTE — ED PROVIDER NOTES
EMERGENCY DEPARTMENT HISTORY AND PHYSICAL EXAM    Date: 8/4/2021  Patient Name: Wilbert Yepez    History of Presenting Illness     Chief Complaint   Patient presents with    Abdominal Pain    Cough         History Provided By: Patient    Chief Complaint: cough, myalgias, abdominal pain    HPI(Context):   10:12 AM  Wilbert Yepez is a 39 y.o. female with PMHX of asthma, bipolar who presents to the emergency department C/O cough. Associated sxs include myalgias, headache, nausea, diarrhea, and abdominal pain. Pt notes + COVID test at [facility] [place] (roommate was sick). Sxs x 3-4 days. Pt has not taken anything for sxs. Pt denies fever, sore throat, CP, SOB, SI, HI, and any other sxs or complaints. Pt notes she is currently homeless but plans to stay with her sister-in-law. Pt  Is not vaccinated for COVID-19. Pt endorses active smoker. PCP: Marcin, MD Luisa    Current Outpatient Medications   Medication Sig Dispense Refill    albuterol (PROVENTIL HFA, VENTOLIN HFA, PROAIR HFA) 90 mcg/actuation inhaler Take 2 Puffs by inhalation every four (4) hours as needed for Wheezing or Shortness of Breath. 1 Inhaler 1    benzonatate (Tessalon Perles) 100 mg capsule Take 1 Capsule by mouth three (3) times daily as needed for Cough for up to 7 days. 21 Capsule 0    ibuprofen (MOTRIN) 600 mg tablet Take 1 Tablet by mouth every six (6) hours as needed for Pain. Take with food. 20 Tablet 0    ALPRAZolam (XANAX) 0.5 mg tablet TAKE 1 TABLET BY MOUTH ONCE DAILY AS NEEDED FOR ANXIETY  1    metFORMIN (GLUCOPHAGE) 500 mg tablet TAKE 1 TABLET BY MOUTH ONCE DAILY  3    nicotine (NICODERM CQ) 21 mg/24 hr APPLY 1 PATCH TOPICALLY TO THE SKIN DAILY FOR CRAVINGS AS DIRECTED  0    traZODone (DESYREL) 50 mg tablet TAKE 1 TABLET BY MOUTH AT BEDTIME AS NEEDED FOR INSOMNIA  0    escitalopram oxalate (LEXAPRO) 10 mg tablet Take 10 mg by mouth daily.  lurasidone (LATUDA) 80 mg tab tablet Take 80 mg by mouth daily (with dinner). Past History     Past Medical History:  Past Medical History:   Diagnosis Date    Asthma     Bilateral ovarian cysts     [facility] ([facility] [address]     Mental and behavioral problem     Pancreatitis     Pancreatitis     Psychosis ([facility] [address]        Past Surgical History:  Past Surgical History:   Procedure Laterality Date    HX GYN      D&C, Hysterectomy       Family History:  History reviewed. No pertinent family history. Social History:  Social History     Tobacco Use    Smoking status: Current Every Day Smoker     Packs/day: 1.00    Smokeless tobacco: Never Used   Substance Use Topics    Alcohol use: Not Currently    Drug use: Not Currently     Types: Cocaine, Marijuana     Comment: used with in past 24 hours       Allergies: Allergies   Allergen Reactions    Toradol [Ketorolac] Rash     Pt reports she is not allergic to this medication. Review of Systems   Review of Systems   Constitutional: Positive for chills, fatigue and fever. HENT: Positive for congestion. Negative for sore throat. Respiratory: Positive for cough. Gastrointestinal: Positive for abdominal pain, diarrhea and nausea. Musculoskeletal: Positive for myalgias. Allergic/Immunologic: Negative for immunocompromised state. Neurological: Positive for headaches. All other systems reviewed and are negative. Physical Exam     Vitals:    08/04/21 0952 08/04/21 1009 08/04/21 1015   BP: 120/88  108/64   Pulse: (!) 106 99 96   Resp: 18 16 15   Temp: 97.3 °F (36.3 °C)     SpO2: 100% 100% 98%   Weight: 74.8 kg (165 lb)     Height: 5' 2\" (1.575 m)       Physical Exam  Vitals and nursing note reviewed. Constitutional:       General: She is not in acute distress. Appearance: She is well-developed. She is not diaphoretic. Comments:  female in NAD. Alert. Appears comfortable. Mildly anxious   HENT:      Head: Normocephalic and atraumatic.       Right Ear: External ear normal.      Left Ear: External ear normal.      Nose: Nose normal.      Mouth/Throat:      Mouth: Mucous membranes are dry. Eyes:      General: No scleral icterus. Right eye: No discharge. Left eye: No discharge. Conjunctiva/sclera: Conjunctivae normal.   Cardiovascular:      Rate and Rhythm: Normal rate and regular rhythm. Heart sounds: Normal heart sounds. No murmur heard. No friction rub. No gallop. Pulmonary:      Effort: Pulmonary effort is normal. No tachypnea, accessory muscle usage or respiratory distress. Breath sounds: Normal breath sounds. No decreased breath sounds, wheezing, rhonchi or rales. Abdominal:      General: There is no distension. Palpations: Abdomen is soft. Tenderness: There is no abdominal tenderness. Musculoskeletal:         General: Normal range of motion. Cervical back: Normal range of motion. Right lower leg: No edema. Left lower leg: No edema. Lymphadenopathy:      Cervical: No cervical adenopathy. Skin:     General: Skin is warm and dry. Neurological:      Mental Status: She is alert and oriented to person, place, and time. Psychiatric:         Mood and Affect: Mood is anxious. Judgment: Judgment normal.             Diagnostic Study Results     Labs -     Recent Results (from the past 12 hour(s))   CBC WITH AUTOMATED DIFF    Collection Time: 08/04/21 10:00 AM   Result Value Ref Range    WBC 6.5 4.6 - 13.2 K/uL    RBC 3.96 (L) 4.20 - 5.30 M/uL    HGB 11.6 (L) 12.0 - 16.0 g/dL    HCT 36.8 35.0 - 45.0 %    MCV 92.9 74.0 - 97.0 FL    MCH 29.3 24.0 - 34.0 PG    MCHC 31.5 31.0 - 37.0 g/dL    RDW 13.7 11.6 - 14.5 %    PLATELET 153 640 - 790 K/uL    MPV 11.1 9.2 - 11.8 FL    NEUTROPHILS 65 40 - 73 %    LYMPHOCYTES 17 (L) 21 - 52 %    MONOCYTES 12 (H) 3 - 10 %    EOSINOPHILS 5 0 - 5 %    BASOPHILS 0 0 - 2 %    ABS. NEUTROPHILS 4.2 1.8 - 8.0 K/UL    ABS. LYMPHOCYTES 1.1 0.9 - 3.6 K/UL    ABS. MONOCYTES 0.8 0.05 - 1.2 K/UL    ABS.  EOSINOPHILS 0.3 0.0 - 0.4 K/UL    ABS. BASOPHILS 0.0 0.0 - 0.1 K/UL    DF AUTOMATED     METABOLIC PANEL, COMPREHENSIVE    Collection Time: 08/04/21 10:00 AM   Result Value Ref Range    Sodium 140 136 - 145 mmol/L    Potassium 3.6 3.5 - 5.5 mmol/L    Chloride 109 100 - 111 mmol/L    CO2 26 21 - 32 mmol/L    Anion gap 5 3.0 - 18 mmol/L    Glucose 76 74 - 99 mg/dL    BUN 9 7.0 - 18 MG/DL    Creatinine 0.72 0.6 - 1.3 MG/DL    BUN/Creatinine ratio 13 12 - 20      GFR est AA >60 >60 ml/min/1.73m2    GFR est non-AA >60 >60 ml/min/1.73m2    Calcium 8.5 8.5 - 10.1 MG/DL    Bilirubin, total 0.2 0.2 - 1.0 MG/DL    ALT (SGPT) 44 13 - 56 U/L    AST (SGOT) 27 10 - 38 U/L    Alk. phosphatase 80 45 - 117 U/L    Protein, total 6.9 6.4 - 8.2 g/dL    Albumin 3.7 3.4 - 5.0 g/dL    Globulin 3.2 2.0 - 4.0 g/dL    A-G Ratio 1.2 0.8 - 1.7     LIPASE    Collection Time: 08/04/21 10:00 AM   Result Value Ref Range    Lipase 93 73 - 393 U/L   MAGNESIUM    Collection Time: 08/04/21 10:00 AM   Result Value Ref Range    Magnesium 1.8 1.6 - 2.6 mg/dL         XR CHEST PORT   Final Result      AP chest x-ray. CT Results  (Last 48 hours)    None        CXR Results  (Last 48 hours)               08/04/21 1108  XR CHEST PORT Final result    Impression:      AP chest x-ray. Narrative:  CHEST AP PORTABLE       Indication: Covid positive, abdominal pain and sore throat. Comparison: X-ray 07/06/2018. Findings: The lungs appear clear. The cardiac silhouette and pulmonary   vascularity appear within normal limits. No evidence for pneumothorax or pleural   effusion. Medications given in the ED-  Medications   sodium chloride 0.9 % bolus infusion 1,000 mL (0 mL IntraVENous IV Completed 8/4/21 1152)   ondansetron (ZOFRAN) injection 4 mg (4 mg IntraVENous Given 8/4/21 1052)   ibuprofen (MOTRIN) tablet 600 mg (600 mg Oral Given 8/4/21 1052)         Medical Decision Making   I am the first provider for this patient.     I reviewed the vital signs, available nursing notes, past medical history, past surgical history, family history and social history. Vital Signs-Reviewed the patient's vital signs. Pulse Oximetry Analysis - 98% on RA. NORMAL     Records Reviewed: Nursing Notes and Old Medical Records    Provider Notes (Medical Decision Making): COVID, PNA, dehydration, metabolic derangement, UTI. No CP or SOB complaints. Procedures:  Procedures    ED Course:   10:12 AM Initial assessment performed. The patients presenting problems have been discussed, and they are in agreement with the care plan formulated and outlined with them. I have encouraged them to ask questions as they arise throughout their visit. Diagnosis and Disposition       Workup unremarkable. CXR clear. Lungs CTAB. Denies CP or SOB. Will tx sxs. Rest. PO fluids. Pt does not meet criteria for admission for COVID-19. Reasons to RTED discussed with pt. All questions answered. Pt feels comfortable going home at this time. Pt expressed understanding and she agrees with plan. 1. COVID-19 virus infection        PLAN:  1. D/C Home  2. Discharge Medication List as of 8/4/2021 11:44 AM      START taking these medications    Details   albuterol (PROVENTIL HFA, VENTOLIN HFA, PROAIR HFA) 90 mcg/actuation inhaler Take 2 Puffs by inhalation every four (4) hours as needed for Wheezing or Shortness of Breath., Normal, Disp-1 Inhaler, R-1      benzonatate (Tessalon Perles) 100 mg capsule Take 1 Capsule by mouth three (3) times daily as needed for Cough for up to 7 days. , Normal, Disp-21 Capsule, R-0      ibuprofen (MOTRIN) 600 mg tablet Take 1 Tablet by mouth every six (6) hours as needed for Pain.  Take with food., Normal, Disp-20 Tablet, R-0         CONTINUE these medications which have NOT CHANGED    Details   ALPRAZolam (XANAX) 0.5 mg tablet TAKE 1 TABLET BY MOUTH ONCE DAILY AS NEEDED FOR ANXIETY, Historical Med, R-1      metFORMIN (GLUCOPHAGE) 500 mg tablet TAKE 1 TABLET BY MOUTH ONCE DAILY, Historical Med, R-3      nicotine (NICODERM CQ) 21 mg/24 hr APPLY 1 PATCH TOPICALLY TO THE SKIN DAILY FOR CRAVINGS AS DIRECTED, Historical Med, R-0      traZODone (DESYREL) 50 mg tablet TAKE 1 TABLET BY MOUTH AT BEDTIME AS NEEDED FOR INSOMNIA, Historical Med, R-0      escitalopram oxalate (LEXAPRO) 10 mg tablet Take 10 mg by mouth daily. , Historical Med      lurasidone (LATUDA) 80 mg tab tablet Take 80 mg by mouth daily (with dinner). , Historical Med           3. Follow-up Information     Follow up With Specialties Details Why [address] [place] [address]  [phone]    [facility] EMERGENCY DEPT Emergency Medicine   [address] [place] [address] bypass [phone]        _______________________________    Attestations: This note is prepared by Marcela Ying PA-C.  _______________________________        Please note that this dictation was completed with [employer], the computer voice recognition software. Quite often unanticipated grammatical, syntax, homophones, and other interpretive errors are inadvertently transcribed by the computer software. Please disregard these errors. Please excuse any errors that have escaped final proofreading.

## 2021-08-05 ENCOUNTER — PATIENT OUTREACH (OUTPATIENT)
Dept: CASE MANAGEMENT | Age: 41
End: 2021-08-05

## 2021-08-05 NOTE — PROGRESS NOTES
.Date/Time:  8/5/2021 9:09 AM   Call within 2 business days of discharge: Yes   Attempted to reach patient by telephone. Left HIPPA compliant message requesting a return call. Will attempt to reach patient again.

## 2021-08-06 ENCOUNTER — PATIENT OUTREACH (OUTPATIENT)
Dept: CASE MANAGEMENT | Age: 41
End: 2021-08-06

## 2021-08-06 NOTE — PROGRESS NOTES
.Date/Time:  8/6/2021 11:56 AM   Call within 2 business days of discharge: Yes   Attempted to reach patient by telephone. Message on phone subscriber can't recieve any calls at this time. SANJUANITA COVID19 resolved.

## 2021-08-19 ENCOUNTER — HOSPITAL ENCOUNTER (EMERGENCY)
Age: 41
Discharge: HOME | End: 2021-08-19
Attending: EMERGENCY MEDICINE
Payer: MEDICAID

## 2021-08-19 ENCOUNTER — APPOINTMENT (OUTPATIENT)
Dept: GENERAL RADIOLOGY | Age: 41
End: 2021-08-19
Attending: EMERGENCY MEDICINE
Payer: MEDICAID

## 2021-08-19 VITALS
BODY MASS INDEX: 30.18 KG/M2 | WEIGHT: 165 LBS | HEART RATE: 99 BPM | SYSTOLIC BLOOD PRESSURE: 139 MMHG | DIASTOLIC BLOOD PRESSURE: 77 MMHG | TEMPERATURE: 98.8 F | RESPIRATION RATE: 16 BRPM

## 2021-08-19 DIAGNOSIS — S52.502A CLOSED FRACTURE OF DISTAL END OF LEFT RADIUS, UNSPECIFIED FRACTURE MORPHOLOGY, INITIAL ENCOUNTER: Primary | ICD-10-CM

## 2021-08-19 PROCEDURE — 73110 X-RAY EXAM OF WRIST: CPT

## 2021-08-19 PROCEDURE — 75810000053 HC SPLINT APPLICATION

## 2021-08-19 PROCEDURE — 2709999900 HC NON-CHARGEABLE SUPPLY

## 2021-08-19 PROCEDURE — 74011250637 HC RX REV CODE- 250/637: Performed by: PHYSICIAN ASSISTANT

## 2021-08-19 PROCEDURE — 99283 EMERGENCY DEPT VISIT LOW MDM: CPT

## 2021-08-19 RX ORDER — ACETAMINOPHEN 325 MG/1
975 TABLET ORAL
Status: COMPLETED | OUTPATIENT
Start: 2021-08-19 | End: 2021-08-19

## 2021-08-19 RX ADMIN — ACETAMINOPHEN 975 MG: 325 TABLET ORAL at 10:12

## 2021-08-19 NOTE — ED NOTES
I have reviewed discharge instructions with the patient. The patient verbalized understanding. Pt ambulatory out of ED at this time.

## 2021-08-19 NOTE — ED TRIAGE NOTES
Pt reports that she was walking to [address] when she tripped and fell. PT attempted to catch herself with her LEFT arm, and injured it in the process. Pt states that she is COVID POSITIVE. Pt has no other complaints. No medications administered by EMS.

## 2021-08-19 NOTE — ED PROVIDER NOTES
EMERGENCY DEPARTMENT HISTORY AND PHYSICAL EXAM    Date: 8/19/2021  Patient Name: Vashti Seals    History of Presenting Illness     Chief Complaint   Patient presents with    Wrist Pain         History Provided By: Patient    9:30 AM  Vashti Seals is a 39 y.o. female with PMHX of asthma, mental health disorder who presents to the emergency department C/O left wrist pain. Patient states she was walking outside tripped over the curb and landed on the left outstretched arm. Diagnosed with COVID-19 around 8/1/2021. Pt denies head injury, loss of consciousness, any other injuries or trauma, and any other sxs or complaints. PCP: Luisa Burch MD    Current Facility-Administered Medications   Medication Dose Route Frequency Provider Last Rate Last Admin    acetaminophen (TYLENOL) tablet 975 mg  975 mg Oral NOW YARELIS Shepard         Current Outpatient Medications   Medication Sig Dispense Refill    albuterol (PROVENTIL HFA, VENTOLIN HFA, PROAIR HFA) 90 mcg/actuation inhaler Take 2 Puffs by inhalation every four (4) hours as needed for Wheezing or Shortness of Breath. 1 Inhaler 1    ibuprofen (MOTRIN) 600 mg tablet Take 1 Tablet by mouth every six (6) hours as needed for Pain. Take with food. 20 Tablet 0    ALPRAZolam (XANAX) 0.5 mg tablet TAKE 1 TABLET BY MOUTH ONCE DAILY AS NEEDED FOR ANXIETY  1    metFORMIN (GLUCOPHAGE) 500 mg tablet TAKE 1 TABLET BY MOUTH ONCE DAILY  3    nicotine (NICODERM CQ) 21 mg/24 hr APPLY 1 PATCH TOPICALLY TO THE SKIN DAILY FOR CRAVINGS AS DIRECTED  0    traZODone (DESYREL) 50 mg tablet TAKE 1 TABLET BY MOUTH AT BEDTIME AS NEEDED FOR INSOMNIA  0    escitalopram oxalate (LEXAPRO) 10 mg tablet Take 10 mg by mouth daily.  lurasidone (LATUDA) 80 mg tab tablet Take 80 mg by mouth daily (with dinner).          Past History     Past Medical History:  Past Medical History:   Diagnosis Date    Asthma     Bilateral ovarian cysts     [facility] [address]     Mental and behavioral problem     Pancreatitis     Pancreatitis     Psychosis ([facility] [address]        Past Surgical History:  Past Surgical History:   Procedure Laterality Date    HX GYN      D&C, Hysterectomy       Family History:  No family history on file. Social History:  Social History     Tobacco Use    Smoking status: Current Every Day Smoker     Packs/day: 1.00    Smokeless tobacco: Never Used   Substance Use Topics    Alcohol use: Not Currently    Drug use: Not Currently     Types: Cocaine, Marijuana     Comment: used with in past 24 hours       Allergies: Allergies   Allergen Reactions    Toradol [Ketorolac] Rash     Pt reports she is not allergic to this medication. Review of Systems   Review of Systems   Musculoskeletal: Positive for arthralgias and myalgias. Skin: Negative. Neurological: Negative for weakness and numbness. All other systems reviewed and are negative. Physical Exam     Vitals:    08/19/21 0922 08/19/21 0924   BP: 139/77    Pulse: 99    Resp: 16    Temp:  98.8 °F (37.1 °C)   Weight: 74.8 kg (165 lb)      Physical Exam  Vital signs and nursing notes reviewed. CONSTITUTIONAL: Alert. Well-appearing; well-nourished; in mild pain distress. HEAD: Normocephalic; atraumatic. EXT: LUE: Mild swelling and generalized tenderness to left wrist without deformity. Distal sensation intact. 2+radial pulse. Able to move fingers but with pain. Cap refill less than 2 seconds. Rest of hand, fingers, proximal forearm, elbow upper arm, shoulder nontender/atraumatic. SKIN: Normal for age and race; warm; dry; good turgor; no apparent lesions or exudate. NEURO: A & O x3. PSYCH:  Mood and affect appropriate. Diagnostic Study Results     Labs -   No results found for this or any previous visit (from the past 12 hour(s)). Radiologic Studies -   Left wrist x-ray shows nondisplaced distal radius fracture.   Pending review by radiologist  XR WRIST LT AP/LAT/OBL MIN 3V   Final Result Comminuted, impacted fracture of the distal radius. CT Results  (Last 48 hours)    None        CXR Results  (Last 48 hours)    None          Medications given in the ED-  Medications   acetaminophen (TYLENOL) tablet 975 mg (has no administration in time range)         Medical Decision Making   I am the first provider for this patient. I reviewed the vital signs, available nursing notes, past medical history, past surgical history, family history and social history. Vital Signs-Reviewed the patient's vital signs. Records Reviewed: Nursing Notes      Procedures:  Procedures    ED Course:  9:30 AM   Initial assessment performed. The patients presenting problems have been discussed, and they are in agreement with the care plan formulated and outlined with them. I have encouraged them to ask questions as they arise throughout their visit. Provider Notes (Medical Decision Making): Domingo Tim is a 39 y.o. female with left wrist distal radius fracture s/p trip and fall and landing on outstretched arm PTA. No other injuries. NV intact. XR shows slightly angulated, nondisplaced comminuted distal radius fracture. Placed in volar Ortho-Glass splint, sling for comfort and referred to Ortho. Patient to alternate Tylenol and ibuprofen for pain. Would not recommend narcotics given her history of substance abuse. Diagnosis and Disposition       DISCHARGE NOTE:    Josie Goldstein's  results have been reviewed with her. She has been counseled regarding her diagnosis, treatment, and plan. She verbally conveys understanding and agreement of the signs, symptoms, diagnosis, treatment and prognosis and additionally agrees to follow up as discussed. She also agrees with the care-plan and conveys that all of her questions have been answered.   I have also provided discharge instructions for her that include: educational information regarding their diagnosis and treatment, and list of reasons why they would want to return to the ED prior to their follow-up appointment, should her condition change. She has been provided with education for proper emergency department utilization. CLINICAL IMPRESSION:    1. Closed fracture of distal end of left radius, unspecified fracture morphology, initial encounter        PLAN:  1. D/C Home  2. Current Discharge Medication List        3. Follow-up Information     Follow up With Specialties Details Why Contact Info    Jumana Fowler MD Orthopedic Surgery Schedule an appointment as soon as possible for a visit   [address] [facility]. [place] [address]      [facility] EMERGENCY DEPT Emergency Medicine  As needed, If symptoms worsen [address] [address]  [phone]        _______________________________      Please note that this dictation was completed with [employer], the computer voice recognition software. Quite often unanticipated grammatical, syntax, homophones, and other interpretive errors are inadvertently transcribed by the computer software. Please disregard these errors. Please excuse any errors that have escaped final proofreading.

## 2021-08-20 ENCOUNTER — HOSPITAL ENCOUNTER (EMERGENCY)
Age: 41
Discharge: HOME | End: 2021-08-20
Attending: EMERGENCY MEDICINE
Payer: MEDICAID

## 2021-08-20 VITALS
BODY MASS INDEX: 30.18 KG/M2 | SYSTOLIC BLOOD PRESSURE: 106 MMHG | DIASTOLIC BLOOD PRESSURE: 62 MMHG | TEMPERATURE: 98.3 F | WEIGHT: 165 LBS | HEART RATE: 84 BPM | RESPIRATION RATE: 18 BRPM

## 2021-08-20 DIAGNOSIS — S52.501A CLOSED FRACTURE OF DISTAL END OF RIGHT RADIUS, UNSPECIFIED FRACTURE MORPHOLOGY, INITIAL ENCOUNTER: Primary | ICD-10-CM

## 2021-08-20 PROCEDURE — 74011000250 HC RX REV CODE- 250: Performed by: EMERGENCY MEDICINE

## 2021-08-20 PROCEDURE — 74011250637 HC RX REV CODE- 250/637: Performed by: EMERGENCY MEDICINE

## 2021-08-20 PROCEDURE — 96372 THER/PROPH/DIAG INJ SC/IM: CPT

## 2021-08-20 PROCEDURE — 75810000053 HC SPLINT APPLICATION

## 2021-08-20 PROCEDURE — 74011250636 HC RX REV CODE- 250/636: Performed by: EMERGENCY MEDICINE

## 2021-08-20 PROCEDURE — 99284 EMERGENCY DEPT VISIT MOD MDM: CPT

## 2021-08-20 RX ORDER — FAMOTIDINE 20 MG/1
20 TABLET, FILM COATED ORAL
Status: COMPLETED | OUTPATIENT
Start: 2021-08-20 | End: 2021-08-20

## 2021-08-20 RX ORDER — HYDROCODONE BITARTRATE AND ACETAMINOPHEN 5; 325 MG/1; MG/1
1 TABLET ORAL
Qty: 12 TABLET | Refills: 0 | Status: SHIPPED | OUTPATIENT
Start: 2021-08-20 | End: 2021-08-23

## 2021-08-20 RX ORDER — BUTORPHANOL TARTRATE 2 MG/ML
1 INJECTION INTRAMUSCULAR; INTRAVENOUS
Status: COMPLETED | OUTPATIENT
Start: 2021-08-20 | End: 2021-08-20

## 2021-08-20 RX ADMIN — FAMOTIDINE 20 MG: 20 TABLET, FILM COATED ORAL at 05:37

## 2021-08-20 RX ADMIN — BUTORPHANOL TARTRATE 1 MG: 2 INJECTION, SOLUTION INTRAMUSCULAR; INTRAVENOUS at 05:36

## 2021-08-20 RX ADMIN — ALUMINUM HYDROXIDE, MAGNESIUM HYDROXIDE, AND SIMETHICONE 40 ML: 200; 200; 20 SUSPENSION ORAL at 05:36

## 2021-08-21 ENCOUNTER — PATIENT OUTREACH (OUTPATIENT)
Dept: CASE MANAGEMENT | Age: 41
End: 2021-08-21

## 2021-08-21 NOTE — PROGRESS NOTES
Date/Time:  8/21/2021 2:39 PM   Call within 2 business days of discharge: Yes     Care Transitions Nurse (CTN) made two separate attempts to contact patient on 08/21/21  to complete COVID care transition. Unable to reach due to phone number not in service. Resolving COVID Care Transitions episode.

## 2021-08-26 ENCOUNTER — HOSPITAL ENCOUNTER (EMERGENCY)
Age: 41
Discharge: HOME | End: 2021-08-26
Attending: EMERGENCY MEDICINE
Payer: MEDICAID

## 2021-08-26 VITALS
WEIGHT: 160 LBS | BODY MASS INDEX: 29.44 KG/M2 | HEART RATE: 86 BPM | OXYGEN SATURATION: 100 % | RESPIRATION RATE: 20 BRPM | SYSTOLIC BLOOD PRESSURE: 112 MMHG | HEIGHT: 62 IN | DIASTOLIC BLOOD PRESSURE: 74 MMHG | TEMPERATURE: 99.2 F

## 2021-08-26 DIAGNOSIS — S62.102D WRIST FRACTURE, LEFT, WITH ROUTINE HEALING, SUBSEQUENT ENCOUNTER: Primary | ICD-10-CM

## 2021-08-26 DIAGNOSIS — Z76.5 DRUG-SEEKING BEHAVIOR: ICD-10-CM

## 2021-08-26 PROCEDURE — 74011250637 HC RX REV CODE- 250/637: Performed by: PHYSICIAN ASSISTANT

## 2021-08-26 PROCEDURE — 99284 EMERGENCY DEPT VISIT MOD MDM: CPT

## 2021-08-26 RX ORDER — HYDROMORPHONE HYDROCHLORIDE 2 MG/1
2 TABLET ORAL
Status: COMPLETED | OUTPATIENT
Start: 2021-08-26 | End: 2021-08-26

## 2021-08-26 RX ORDER — ONDANSETRON 4 MG/1
4 TABLET, ORALLY DISINTEGRATING ORAL
Status: COMPLETED | OUTPATIENT
Start: 2021-08-26 | End: 2021-08-26

## 2021-08-26 RX ORDER — MORPHINE SULFATE 4 MG/ML
8 INJECTION INTRAVENOUS
Status: DISCONTINUED | OUTPATIENT
Start: 2021-08-26 | End: 2021-08-26

## 2021-08-26 RX ADMIN — ONDANSETRON 4 MG: 4 TABLET, ORALLY DISINTEGRATING ORAL at 21:12

## 2021-08-26 RX ADMIN — HYDROMORPHONE HYDROCHLORIDE 2 MG: 2 TABLET ORAL at 21:12

## 2021-08-27 ENCOUNTER — PATIENT OUTREACH (OUTPATIENT)
Dept: CASE MANAGEMENT | Age: 41
End: 2021-08-27

## 2021-08-27 NOTE — ED PROVIDER NOTES
EMERGENCY DEPARTMENT HISTORY AND PHYSICAL EXAM    Date: 8/26/2021  Patient Name: Lonny Blount    History of Presenting Illness     Chief Complaint   Patient presents with    Wrist Pain         History Provided By: Patient    Chief Complaint: wrist pain       Additional History (Context):   8:24 PM  Lonny Blount is a 39 y.o. female with PMHX left wrist pain presents to the emergency department C/O pain. Patient had a wrist fracture and had surgery on it by Dr. Saint Clair at [facility] today. His repair with external fixation. She does have Percocet at home which she tried taking which did not help with her pain. Patient states she tried to call her surgeon's office but they were closed. Decided she should take EMS here    PCP: Marcin, MD Luisa    Current Outpatient Medications   Medication Sig Dispense Refill    albuterol (PROVENTIL HFA, VENTOLIN HFA, PROAIR HFA) 90 mcg/actuation inhaler Take 2 Puffs by inhalation every four (4) hours as needed for Wheezing or Shortness of Breath. 1 Inhaler 1    ibuprofen (MOTRIN) 600 mg tablet Take 1 Tablet by mouth every six (6) hours as needed for Pain. Take with food. 20 Tablet 0    ALPRAZolam (XANAX) 0.5 mg tablet TAKE 1 TABLET BY MOUTH ONCE DAILY AS NEEDED FOR ANXIETY  1    metFORMIN (GLUCOPHAGE) 500 mg tablet TAKE 1 TABLET BY MOUTH ONCE DAILY  3    nicotine (NICODERM CQ) 21 mg/24 hr APPLY 1 PATCH TOPICALLY TO THE SKIN DAILY FOR CRAVINGS AS DIRECTED  0    traZODone (DESYREL) 50 mg tablet TAKE 1 TABLET BY MOUTH AT BEDTIME AS NEEDED FOR INSOMNIA  0    escitalopram oxalate (LEXAPRO) 10 mg tablet Take 10 mg by mouth daily.  lurasidone (LATUDA) 80 mg tab tablet Take 80 mg by mouth daily (with dinner).          Past History     Past Medical History:  Past Medical History:   Diagnosis Date    Asthma     Bilateral ovarian cysts     Cocaine abuse ([address]     Mental and behavioral problem     Pancreatitis     Pancreatitis     Psychosis ([address]        Past Surgical History:  Past Surgical History:   Procedure Laterality Date    HX GYN      D&C, Hysterectomy       Family History:  No family history on file. Social History:  Social History     Tobacco Use    Smoking status: Current Every Day Smoker     Packs/day: 1.00    Smokeless tobacco: Never Used   Substance Use Topics    Alcohol use: Not Currently    Drug use: Not Currently     Types: Cocaine, Marijuana     Comment: used with in past 24 hours       Allergies: Allergies   Allergen Reactions    Fish Containing Products Itching    Toradol [Ketorolac] Rash     Pt reports she is not allergic to this medication. Review of Systems   Review of Systems   Constitutional: Negative for chills and fever. Respiratory: Negative for shortness of breath. Cardiovascular: Negative for chest pain. Musculoskeletal: Positive for arthralgias (left wrist pain ). Negative for neck pain. Skin: Negative for rash. Neurological: Negative for weakness and numbness. All other systems reviewed and are negative. Physical Exam     Vitals:    08/26/21 2031   BP: 112/74   Pulse: 86   Resp: 20   Temp: 99.2 °F (37.3 °C)   SpO2: 100%   Weight: 72.6 kg (160 lb)   Height: 5' 2\" (1.575 m)     Physical Exam  Vitals and nursing note reviewed. Constitutional:       Appearance: She is well-developed. Comments: Patient is pacing around in room leaving the room yelling into the hallway demanding pain medication   HENT:      Head: Normocephalic and atraumatic. Cardiovascular:      Rate and Rhythm: Normal rate and regular rhythm. Heart sounds: Normal heart sounds. No murmur heard. Pulmonary:      Effort: Pulmonary effort is normal. No respiratory distress. Breath sounds: Normal breath sounds. No wheezing or rales. Musculoskeletal:      Cervical back: Normal range of motion and neck supple.       Comments: Left wrist TTP, wrapped in Ace wrap with external fixation device under dressing   Neurological:      Mental Status: She is alert and oriented to person, place, and time. Psychiatric:         Judgment: Judgment normal.         Diagnostic Study Results     Labs:   No results found for this or any previous visit (from the past 12 hour(s)). Radiologic Studies:   No orders to display     CT Results  (Last 48 hours)    None        CXR Results  (Last 48 hours)    None          Medical Decision Making   I am the first provider for this patient. I reviewed the vital signs, available nursing notes, past medical history, past surgical history, family history and social history. Vital Signs: Reviewed the patient's vital signs. Pulse Oximetry Analysis: 100% on RA       Records Reviewed: Nursing Notes and Old Medical Records    Procedures:  Procedures    ED Course:   8:24 PM Initial assessment performed. The patients presenting problems have been discussed, and they are in agreement with the care plan formulated and outlined with them. I have encouraged them to ask questions as they arise throughout their visit. Discussion:  Pt presents with left wrist pain. Patient has known fracture of the wrist and had surgery on it today. No new injury she has Percocet at home which she took which has not been helping with her pain so she took a ambulance to the ER for additional pain medication. Patient exhibits drug-seeking behavior. 1 dose of p.o Dilaudid 2 mg given in ED. Stressed to patient she will have to follow-up with her surgeon for any further pain management. Strict return precautions given, pt offering no questions or complaints. Diagnosis and Disposition     DISCHARGE NOTE  Madeline Goldstein's  results have been reviewed with her. She has been counseled regarding her diagnosis, treatment, and plan. She verbally conveys understanding and agreement of the signs, symptoms, diagnosis, treatment and prognosis and additionally agrees to follow up as discussed.   She also agrees with the care-plan and conveys that all of her questions have been answered. I have also provided discharge instructions for her that include: educational information regarding their diagnosis and treatment, and list of reasons why they would want to return to the ED prior to their follow-up appointment, should her condition change. She has been provided with education for proper emergency department utilization. CLINICAL IMPRESSION:    1. Wrist fracture, left, with routine healing, subsequent encounter    2. Drug-seeking behavior        PLAN:  1. D/C Home  2. Current Discharge Medication List        3. Follow-up Information     Follow up With Specialties Details Why Contact Info    Tonie Joseph MD Orthopedic Surgery Schedule an appointment as soon as possible for a visit   [address] [place] [phone]  [phone]      [facility] EMERGENCY DEPT Emergency Medicine  If symptoms worsen [address] [phone]  [phone]          Please note that this dictation was completed with [employer], the computer voice recognition software. Quite often unanticipated grammatical, syntax, homophones, and other interpretive errors are inadvertently transcribed by the computer software. Please disregard these errors. Please excuse any errors that have escaped final proofreading.

## 2021-08-27 NOTE — ED TRIAGE NOTES
Pt arrives via ems with c/o left wrist pain. Pt is s/p left wrist surgery at [facility] this morning.

## 2021-08-27 NOTE — ED NOTES
Pt in hallway multiple times demanding to be seen this instant. Pt educated that a provider will see her as soon as possible. Pt now in room screaming as loud as she possibly can. Provider aware.

## 2021-08-29 NOTE — ED PROVIDER NOTES
EMERGENCY DEPARTMENT HISTORY AND PHYSICAL EXAM    Date: 8/20/2021  Patient Name: Anand Mansfield    History of Presenting Illness     Chief Complaint   Patient presents with    Cast repair         History Provided By: Patient    Additional History (Context):   6:39 AM  Anand Mansfield is a 39 y.o. female with PMHX of recent Covid diagnosis and August 2nd of 2021, polysubstance abuse with alcohol cocaine and opiates, bipolar disorder, recreational drug use rhabdomyolysis, various electrolyte disorders including low K low mag indication pancreatitis who presents to the emergency department C/O pain after splint placement. She sustained a fracture of the distal left arm on [facility] mechanism and was placed in a long-arm splint yesterday on 19th today. She returns today complaining of pain in the same region. There is no new fall or injury. Given history of substance abuse she was given Tylenol in the ER and recommended to use Tylenol Motrin for pain control. .    Social History  She acknowledges smoking drinking alcohol but denies any recent use. She denies any recent use of any street drugs. Family History  Brother  with alcohol abuse    PCP: Luisa Burch MD    Current Outpatient Medications   Medication Sig Dispense Refill    albuterol (PROVENTIL HFA, VENTOLIN HFA, PROAIR HFA) 90 mcg/actuation inhaler Take 2 Puffs by inhalation every four (4) hours as needed for Wheezing or Shortness of Breath. 1 Inhaler 1    ibuprofen (MOTRIN) 600 mg tablet Take 1 Tablet by mouth every six (6) hours as needed for Pain. Take with food.  20 Tablet 0    ALPRAZolam (XANAX) 0.5 mg tablet TAKE 1 TABLET BY MOUTH ONCE DAILY AS NEEDED FOR ANXIETY  1    metFORMIN (GLUCOPHAGE) 500 mg tablet TAKE 1 TABLET BY MOUTH ONCE DAILY  3    nicotine (NICODERM CQ) 21 mg/24 hr APPLY 1 PATCH TOPICALLY TO THE SKIN DAILY FOR CRAVINGS AS DIRECTED  0    traZODone (DESYREL) 50 mg tablet TAKE 1 TABLET BY MOUTH AT BEDTIME AS NEEDED FOR INSOMNIA  0    escitalopram oxalate (LEXAPRO) 10 mg tablet Take 10 mg by mouth daily.  lurasidone (LATUDA) 80 mg tab tablet Take 80 mg by mouth daily (with dinner). Past History     Past Medical History:  Past Medical History:   Diagnosis Date    Asthma     Bilateral ovarian cysts     [address]     Mental and behavioral problem     Pancreatitis     Pancreatitis     Psychosis ([facility] [facility] [address]        Past Surgical History:  Past Surgical History:   Procedure Laterality Date    HX GYN      D&C, Hysterectomy       Family History:  No family history on file. Social History:  Social History     Tobacco Use    Smoking status: Current Every Day Smoker     Packs/day: 1.00    Smokeless tobacco: Never Used   Substance Use Topics    Alcohol use: Not Currently    Drug use: Not Currently     Types: Cocaine, Marijuana     Comment: used with in past 24 hours       Allergies: Allergies   Allergen Reactions    Fish Containing Products Itching    Toradol [Ketorolac] Rash     Pt reports she is not allergic to this medication. Review of Systems   Review of Systems   Constitutional: Negative. HENT: Negative. Eyes: Negative. Respiratory: Negative. Cardiovascular: Negative. Gastrointestinal: Negative. Endocrine: Negative. Genitourinary: Negative. Musculoskeletal: Positive for arthralgias. Skin: Negative. Allergic/Immunologic: Negative. Neurological: Negative. Hematological: Negative. Psychiatric/Behavioral: Positive for behavioral problems. Negative for self-injury, sleep disturbance and suicidal ideas. All other systems reviewed and are negative. Physical Exam     Vitals:    08/20/21 0430   BP: 106/62   Pulse: 84   Resp: 18   Temp: 98.3 °F (36.8 °C)   Weight: 74.8 kg (165 lb)     Physical Exam  Vitals and nursing note reviewed. Constitutional:       General: She is not in acute distress. Appearance: She is well-developed. She is not diaphoretic.    HENT:      Head: Normocephalic and atraumatic. Eyes:      General: No scleral icterus. Extraocular Movements:      Right eye: Normal extraocular motion. Left eye: Normal extraocular motion. Conjunctiva/sclera: Conjunctivae normal.      Pupils: Pupils are equal, round, and reactive to light. Neck:      Trachea: No tracheal deviation. Cardiovascular:      Rate and Rhythm: Normal rate and regular rhythm. Heart sounds: Normal heart sounds. Pulmonary:      Effort: Pulmonary effort is normal. No respiratory distress. Breath sounds: Normal breath sounds. No stridor. Abdominal:      General: Bowel sounds are normal. There is no distension. Palpations: Abdomen is soft. Tenderness: There is no abdominal tenderness. There is no rebound. Musculoskeletal:         General: No tenderness. Normal range of motion. Cervical back: Normal range of motion and neck supple. Comments: Left arm wrapped in long-arm splint with no cyanosis of fingertips. She moves them all voluntarily but with exquisite discomfort. All other extremities are grossly unremarkable without abnormalities   Skin:     General: Skin is warm and dry. Capillary Refill: Capillary refill takes less than 2 seconds. Findings: No erythema or rash. Neurological:      Mental Status: She is alert and oriented to person, place, and time. GCS: GCS eye subscore is 4. GCS verbal subscore is 5. GCS motor subscore is 6. Cranial Nerves: No cranial nerve deficit. Motor: No weakness. Psychiatric:         Attention and Perception: Attention normal.         Mood and Affect: Mood is anxious. Affect is tearful. Speech: Speech normal.         Behavior: Behavior is agitated. Thought Content: Thought content does not include homicidal or suicidal ideation. Diagnostic Study Results     Labs -  No results found for this or any previous visit (from the past 24 hour(s)).      Radiologic Studies -   No orders to display     CT Results  (Last 48 hours)    None        CXR Results  (Last 48 hours)    None          Medications given in the ED-  Medications   butorphanol (STADOL) injection 1 mg (1 mg IntraMUSCular Given 8/20/21 0536)   famotidine (PEPCID) tablet 20 mg (20 mg Oral Given 8/20/21 0537)   mylanta/viscous lidocaine (GI COCKTAIL) (40 mL Oral Given 8/20/21 0536)         Medical Decision Making   I am the first provider for this patient. I reviewed the vital signs, available nursing notes, past medical history, past surgical history, family history and social history. Vital Signs-Reviewed the patient's vital signs. Records Reviewed: NURSING NOTES AND PREVIOUS MEDICAL RECORDS    Provider Notes (Medical Decision Making):   During examination Ace wrap was removed providing immediate improvement of her pain. That said, the hand and finger showed no evidence of neurovascular compromise or changes in cap refill. We rewrapped a long-arm splint a little less tight and offer a single shot for pain relief. Patient was given information to follow-up with orthopedics for eventual casting or operative repair. Procedures:  Splint, Long Arm    Date/Time: 8/20/2021 9:21 AM  Performed by: Eileen Carreon MD  Authorized by: Eileen Carreon MD     Consent:     Consent obtained:  Verbal    Consent given by:  Patient    Risks discussed:  Discoloration, numbness, pain and swelling    Alternatives discussed:  No treatment and delayed treatment  Pre-procedure details:     Sensation:  Normal    Skin color:  Pink  Procedure details:     Laterality:  Left    Location:  Arm    Arm:  L lower arm    Splint type:  Long arm    Supplies:  Ortho-Glass  Post-procedure details:     Pain:  Improved    Sensation:  Normal    Skin color:  Pink    Patient tolerance of procedure: Tolerated well, no immediate complications        ED Course:   6:39 AM: Initial assessment performed.  The patients presenting problems have been discussed, and they are in agreement with the care plan formulated and outlined with them. I have encouraged them to ask questions as they arise throughout their visit. Diagnosis and Disposition       DISCHARGE NOTE:  7:15 AM  Gavino Goldstein's  results have been reviewed with her. She has been counseled regarding her diagnosis, treatment, and plan. She verbally conveys understanding and agreement of the signs, symptoms, diagnosis, treatment and prognosis and additionally agrees to follow up as discussed. She also agrees with the care-plan and conveys that all of her questions have been answered. I have also provided discharge instructions for her that include: educational information regarding their diagnosis and treatment, and list of reasons why they would want to return to the ED prior to their follow-up appointment, should her condition change. She has been provided with education for proper emergency department utilization. CLINICAL IMPRESSION:    1. Closed fracture of distal end of right radius, unspecified fracture morphology, initial encounter        PLAN:  1. D/C Home  2. Discharge Medication List as of 8/20/2021  7:22 AM        3. Follow-up Information     Follow up With Specialties Details Why Contact Info    Ashley Delgadillo MD Orthopedic Surgery In 1 week  [id number] [facility] [address] [id number]  [phone]          _______________________________    This note was partially transcribed via voice recognition software. Although efforts have been made to catch any discrepancies, it may contain sound alike words, grammatical errors, or nonsensical words.

## 2021-08-30 ENCOUNTER — PATIENT OUTREACH (OUTPATIENT)
Dept: CASE MANAGEMENT | Age: 41
End: 2021-08-30

## 2021-09-11 ENCOUNTER — APPOINTMENT (OUTPATIENT)
Dept: GENERAL RADIOLOGY | Age: 41
DRG: 004 | End: 2021-09-11
Attending: EMERGENCY MEDICINE
Payer: MEDICAID

## 2021-09-11 ENCOUNTER — HOSPITAL ENCOUNTER (INPATIENT)
Age: 41
LOS: 62 days | Discharge: LEFT BEFORE DISCHARGE | DRG: 004 | End: 2021-11-13
Attending: EMERGENCY MEDICINE | Admitting: FAMILY MEDICINE
Payer: MEDICAID

## 2021-09-11 DIAGNOSIS — T42.6X4A GABAPENTIN OVERDOSE, UNDETERMINED INTENT, INITIAL ENCOUNTER: Primary | ICD-10-CM

## 2021-09-11 DIAGNOSIS — R40.1 STUPOROUS: ICD-10-CM

## 2021-09-11 LAB
ALBUMIN SERPL-MCNC: 3.8 G/DL (ref 3.4–5)
ALBUMIN/GLOB SERPL: 1.3 {RATIO} (ref 0.8–1.7)
ALP SERPL-CCNC: 96 U/L (ref 45–117)
ALT SERPL-CCNC: 22 U/L (ref 13–56)
ANION GAP SERPL CALC-SCNC: 9 MMOL/L (ref 3–18)
AST SERPL-CCNC: 18 U/L (ref 10–38)
BASOPHILS # BLD: 0 K/UL (ref 0–0.1)
BASOPHILS NFR BLD: 0 % (ref 0–2)
BILIRUB SERPL-MCNC: 0.2 MG/DL (ref 0.2–1)
BUN SERPL-MCNC: 20 MG/DL (ref 7–18)
BUN/CREAT SERPL: 29 (ref 12–20)
CALCIUM SERPL-MCNC: 9.7 MG/DL (ref 8.5–10.1)
CHLORIDE SERPL-SCNC: 112 MMOL/L (ref 100–111)
CK MB CFR SERPL CALC: NORMAL % (ref 0–4)
CK MB SERPL-MCNC: <1 NG/ML (ref 5–25)
CK SERPL-CCNC: 67 U/L (ref 26–192)
CO2 SERPL-SCNC: 23 MMOL/L (ref 21–32)
CREAT SERPL-MCNC: 0.68 MG/DL (ref 0.6–1.3)
DIFFERENTIAL METHOD BLD: NORMAL
EOSINOPHIL # BLD: 0.2 K/UL (ref 0–0.4)
EOSINOPHIL NFR BLD: 3 % (ref 0–5)
ERYTHROCYTE [DISTWIDTH] IN BLOOD BY AUTOMATED COUNT: 13.1 % (ref 11.6–14.5)
GLOBULIN SER CALC-MCNC: 3 G/DL (ref 2–4)
GLUCOSE SERPL-MCNC: 88 MG/DL (ref 74–99)
HCT VFR BLD AUTO: 37.4 % (ref 35–45)
HGB BLD-MCNC: 12.5 G/DL (ref 12–16)
LYMPHOCYTES # BLD: 3.4 K/UL (ref 0.9–3.6)
LYMPHOCYTES NFR BLD: 42 % (ref 21–52)
MCH RBC QN AUTO: 29.8 PG (ref 24–34)
MCHC RBC AUTO-ENTMCNC: 33.4 G/DL (ref 31–37)
MCV RBC AUTO: 89 FL (ref 78–100)
MONOCYTES # BLD: 0.5 K/UL (ref 0.05–1.2)
MONOCYTES NFR BLD: 6 % (ref 3–10)
NEUTS SEG # BLD: 4 K/UL (ref 1.8–8)
NEUTS SEG NFR BLD: 49 % (ref 40–73)
PLATELET # BLD AUTO: 259 K/UL (ref 135–420)
PMV BLD AUTO: 10.8 FL (ref 9.2–11.8)
POTASSIUM SERPL-SCNC: 4 MMOL/L (ref 3.5–5.5)
PROT SERPL-MCNC: 6.8 G/DL (ref 6.4–8.2)
RBC # BLD AUTO: 4.2 M/UL (ref 4.2–5.3)
SODIUM SERPL-SCNC: 144 MMOL/L (ref 136–145)
TROPONIN I SERPL-MCNC: <0.02 NG/ML (ref 0–0.04)
WBC # BLD AUTO: 8.1 K/UL (ref 4.6–13.2)

## 2021-09-11 PROCEDURE — 74011250636 HC RX REV CODE- 250/636

## 2021-09-11 PROCEDURE — 82077 ASSAY SPEC XCP UR&BREATH IA: CPT

## 2021-09-11 PROCEDURE — 82553 CREATINE MB FRACTION: CPT

## 2021-09-11 PROCEDURE — 99285 EMERGENCY DEPT VISIT HI MDM: CPT

## 2021-09-11 PROCEDURE — 80179 DRUG ASSAY SALICYLATE: CPT

## 2021-09-11 PROCEDURE — 5A1955Z RESPIRATORY VENTILATION, GREATER THAN 96 CONSECUTIVE HOURS: ICD-10-PCS | Performed by: FAMILY MEDICINE

## 2021-09-11 PROCEDURE — 94762 N-INVAS EAR/PLS OXIMTRY CONT: CPT

## 2021-09-11 PROCEDURE — 74011000250 HC RX REV CODE- 250: Performed by: EMERGENCY MEDICINE

## 2021-09-11 PROCEDURE — 96376 TX/PRO/DX INJ SAME DRUG ADON: CPT

## 2021-09-11 PROCEDURE — 80143 DRUG ASSAY ACETAMINOPHEN: CPT

## 2021-09-11 PROCEDURE — 96374 THER/PROPH/DIAG INJ IV PUSH: CPT

## 2021-09-11 PROCEDURE — 31500 INSERT EMERGENCY AIRWAY: CPT

## 2021-09-11 PROCEDURE — 74011250636 HC RX REV CODE- 250/636: Performed by: EMERGENCY MEDICINE

## 2021-09-11 PROCEDURE — 71045 X-RAY EXAM CHEST 1 VIEW: CPT

## 2021-09-11 PROCEDURE — 85025 COMPLETE CBC W/AUTO DIFF WBC: CPT

## 2021-09-11 PROCEDURE — 80053 COMPREHEN METABOLIC PANEL: CPT

## 2021-09-11 PROCEDURE — 51702 INSERT TEMP BLADDER CATH: CPT

## 2021-09-11 PROCEDURE — 93005 ELECTROCARDIOGRAM TRACING: CPT

## 2021-09-11 RX ORDER — ETOMIDATE 2 MG/ML
20 INJECTION INTRAVENOUS
Status: COMPLETED | OUTPATIENT
Start: 2021-09-11 | End: 2021-09-11

## 2021-09-11 RX ORDER — LORAZEPAM 2 MG/ML
4 INJECTION INTRAMUSCULAR
Status: COMPLETED | OUTPATIENT
Start: 2021-09-11 | End: 2021-09-11

## 2021-09-11 RX ORDER — SUCCINYLCHOLINE CHLORIDE 100 MG/5ML
100 SYRINGE (ML) INTRAVENOUS
Status: COMPLETED | OUTPATIENT
Start: 2021-09-11 | End: 2021-09-11

## 2021-09-11 RX ORDER — PROPOFOL 10 MG/ML
0-50 VIAL (ML) INTRAVENOUS
Status: DISCONTINUED | OUTPATIENT
Start: 2021-09-11 | End: 2021-09-12

## 2021-09-11 RX ORDER — PROPOFOL 10 MG/ML
INJECTION, EMULSION INTRAVENOUS
Status: COMPLETED
Start: 2021-09-11 | End: 2021-09-12

## 2021-09-11 RX ORDER — LORAZEPAM 2 MG/ML
INJECTION INTRAMUSCULAR
Status: COMPLETED
Start: 2021-09-11 | End: 2021-09-11

## 2021-09-11 RX ORDER — PROPOFOL 10 MG/ML
0-50 VIAL (ML) INTRAVENOUS
Status: DISCONTINUED | OUTPATIENT
Start: 2021-09-11 | End: 2021-09-11

## 2021-09-11 RX ADMIN — PROPOFOL 25 MCG/KG/MIN: 10 INJECTION, EMULSION INTRAVENOUS at 20:25

## 2021-09-11 RX ADMIN — ETOMIDATE INJECTION 20 MG: 2 SOLUTION INTRAVENOUS at 20:12

## 2021-09-11 RX ADMIN — PROPOFOL 50 MCG/KG/MIN: 10 INJECTION, EMULSION INTRAVENOUS at 21:22

## 2021-09-11 RX ADMIN — LORAZEPAM 4 MG: 2 INJECTION INTRAMUSCULAR; INTRAVENOUS at 20:31

## 2021-09-11 RX ADMIN — PROPOFOL 50 MCG/KG/MIN: 10 INJECTION, EMULSION INTRAVENOUS at 21:59

## 2021-09-11 RX ADMIN — LORAZEPAM 4 MG: 2 INJECTION INTRAMUSCULAR; INTRAVENOUS at 21:55

## 2021-09-11 RX ADMIN — ACTIVATED CHARCOAL 50 G: 208 SUSPENSION ORAL at 21:39

## 2021-09-11 RX ADMIN — LORAZEPAM 1 MG/HR: 2 INJECTION INTRAMUSCULAR; INTRAVENOUS at 22:53

## 2021-09-11 RX ADMIN — Medication 100 MG: at 20:13

## 2021-09-11 RX ADMIN — LORAZEPAM 4 MG: 2 INJECTION INTRAMUSCULAR at 20:31

## 2021-09-11 NOTE — ED TRIAGE NOTES
Arrives via ems. Pt prescribed and filled gabapentin 300mg caps 90 caps in script. Pt found lethargic with decreased responsiveness. Pt gabapentin bottle is now empty and pt will not endorse having taking them all but pt condition is highly suspicious for gabapentin overdose. Provider at bedside.

## 2021-09-11 NOTE — Clinical Note
Status[de-identified] INPATIENT [101]   Type of Bed: Intensive Care [6]   Cardiac Monitoring Required?: Yes   Inpatient Hospitalization Certified Necessary for the Following Reasons: 4.  Patient requires ICU level of care interventions (further clarification in H&P documentation)   Admitting Diagnosis: Drug overdose, intentional self-harm, initial encounter [facility]) [[id number]]   Admitting Diagnosis: Left wrist fracture, with delayed healing, subsequent encounter [[id number]]   Admitting Physician: Leonid Armas [[id number]]   Attending Physician: Leonid Armas [[id number]]   Estimated Length of Stay: 2 Midnights   Discharge Plan[de-identified] Home with Office Follow-up

## 2021-09-12 ENCOUNTER — APPOINTMENT (OUTPATIENT)
Dept: GENERAL RADIOLOGY | Age: 41
DRG: 004 | End: 2021-09-12
Attending: INTERNAL MEDICINE
Payer: MEDICAID

## 2021-09-12 PROBLEM — H92.02 LEFT EAR PAIN: Status: RESOLVED | Noted: 2020-09-06 | Resolved: 2021-09-12

## 2021-09-12 PROBLEM — R09.02 HYPOXIA: Status: ACTIVE | Noted: 2021-09-12

## 2021-09-12 PROBLEM — I95.81 HYPOTENSION AFTER PROCEDURE: Status: ACTIVE | Noted: 2021-09-12

## 2021-09-12 PROBLEM — G93.41 ACUTE METABOLIC ENCEPHALOPATHY: Status: ACTIVE | Noted: 2021-09-12

## 2021-09-12 PROBLEM — S62.102G: Status: ACTIVE | Noted: 2021-09-12

## 2021-09-12 PROBLEM — T50.902A DRUG OVERDOSE, INTENTIONAL SELF-HARM, INITIAL ENCOUNTER (HCC): Status: ACTIVE | Noted: 2021-09-12

## 2021-09-12 LAB
ALBUMIN SERPL-MCNC: 3.5 G/DL (ref 3.4–5)
ALBUMIN/GLOB SERPL: 1.1 {RATIO} (ref 0.8–1.7)
ALP SERPL-CCNC: 87 U/L (ref 45–117)
ALT SERPL-CCNC: 17 U/L (ref 13–56)
AMPHET UR QL SCN: NEGATIVE
ANION GAP BLD CALC-SCNC: 14 MMOL/L (ref 10–20)
ANION GAP SERPL CALC-SCNC: 6 MMOL/L (ref 3–18)
APAP SERPL-MCNC: <2 UG/ML (ref 10–30)
APPEARANCE UR: CLEAR
ARTERIAL PATENCY WRIST A: POSITIVE
AST SERPL-CCNC: 20 U/L (ref 10–38)
BARBITURATES UR QL SCN: NEGATIVE
BASE DEFICIT BLD-SCNC: 1.5 MMOL/L
BASOPHILS # BLD: 0 K/UL (ref 0–0.1)
BASOPHILS NFR BLD: 0 % (ref 0–2)
BDY SITE: ABNORMAL
BENZODIAZ UR QL: NEGATIVE
BILIRUB SERPL-MCNC: 0.2 MG/DL (ref 0.2–1)
BILIRUB UR QL: NEGATIVE
BUN SERPL-MCNC: 13 MG/DL (ref 7–18)
BUN/CREAT SERPL: 26 (ref 12–20)
CA-I BLD-MCNC: 1.24 MMOL/L (ref 1.12–1.32)
CALCIUM SERPL-MCNC: 8.7 MG/DL (ref 8.5–10.1)
CANNABINOIDS UR QL SCN: NEGATIVE
CHLORIDE BLD-SCNC: 109 MMOL/L (ref 98–107)
CHLORIDE SERPL-SCNC: 115 MMOL/L (ref 100–111)
CO2 BLD-SCNC: 23 MMOL/L (ref 19–24)
CO2 SERPL-SCNC: 25 MMOL/L (ref 21–32)
COCAINE UR QL SCN: NEGATIVE
COLOR UR: YELLOW
CREAT BLD-MCNC: 0.78 MG/DL (ref 0.6–1.3)
CREAT SERPL-MCNC: 0.5 MG/DL (ref 0.6–1.3)
DIFFERENTIAL METHOD BLD: ABNORMAL
EOSINOPHIL # BLD: 0.2 K/UL (ref 0–0.4)
EOSINOPHIL NFR BLD: 3 % (ref 0–5)
ERYTHROCYTE [DISTWIDTH] IN BLOOD BY AUTOMATED COUNT: 13.2 % (ref 11.6–14.5)
ETHANOL SERPL-MCNC: <3 MG/DL (ref 0–3)
FIO2 ON VENT: 100 %
GAS FLOW.O2 O2 DELIVERY SYS: ABNORMAL L/MIN
GLOBULIN SER CALC-MCNC: 3.1 G/DL (ref 2–4)
GLUCOSE BLD-MCNC: 104 MG/DL (ref 65–100)
GLUCOSE SERPL-MCNC: 99 MG/DL (ref 74–99)
GLUCOSE UR STRIP.AUTO-MCNC: NEGATIVE MG/DL
HCG UR QL: NEGATIVE
HCO3 BLD-SCNC: 22.8 MMOL/L (ref 22–26)
HCT VFR BLD AUTO: 37.3 % (ref 35–45)
HDSCOM,HDSCOM: NORMAL
HGB BLD-MCNC: 12.2 G/DL (ref 12–16)
HGB UR QL STRIP: NEGATIVE
KETONES UR QL STRIP.AUTO: NEGATIVE MG/DL
LACTATE BLD-SCNC: 1.89 MMOL/L (ref 0.4–2)
LEUKOCYTE ESTERASE UR QL STRIP.AUTO: NEGATIVE
LYMPHOCYTES # BLD: 2.7 K/UL (ref 0.9–3.6)
LYMPHOCYTES NFR BLD: 32 % (ref 21–52)
MAGNESIUM SERPL-MCNC: 2 MG/DL (ref 1.6–2.6)
MCH RBC QN AUTO: 30.2 PG (ref 24–34)
MCHC RBC AUTO-ENTMCNC: 32.7 G/DL (ref 31–37)
MCV RBC AUTO: 92.3 FL (ref 78–100)
METHADONE UR QL: NEGATIVE
MONOCYTES # BLD: 0.9 K/UL (ref 0.05–1.2)
MONOCYTES NFR BLD: 10 % (ref 3–10)
NEUTS SEG # BLD: 4.7 K/UL (ref 1.8–8)
NEUTS SEG NFR BLD: 55 % (ref 40–73)
NITRITE UR QL STRIP.AUTO: NEGATIVE
OPIATES UR QL: NEGATIVE
PCO2 BLD: 36.1 MMHG (ref 35–45)
PCP UR QL: NEGATIVE
PH BLD: 7.41 [PH] (ref 7.35–7.45)
PH UR STRIP: 5.5 [PH] (ref 5–8)
PLATELET # BLD AUTO: 257 K/UL (ref 135–420)
PMV BLD AUTO: 10.3 FL (ref 9.2–11.8)
PO2 BLD: 511 MMHG (ref 80–100)
POTASSIUM BLD-SCNC: 3.2 MMOL/L (ref 3.5–5.1)
POTASSIUM SERPL-SCNC: 3.9 MMOL/L (ref 3.5–5.5)
PROT SERPL-MCNC: 6.6 G/DL (ref 6.4–8.2)
PROT UR STRIP-MCNC: NEGATIVE MG/DL
RBC # BLD AUTO: 4.04 M/UL (ref 4.2–5.3)
SALICYLATES SERPL-MCNC: 2.7 MG/DL (ref 2.8–20)
SAO2 % BLD: 100 %
SERVICE CMNT-IMP: ABNORMAL
SODIUM BLD-SCNC: 145 MMOL/L (ref 136–145)
SODIUM SERPL-SCNC: 146 MMOL/L (ref 136–145)
SP GR UR REFRACTOMETRY: 1.01 (ref 1–1.03)
SPECIMEN SITE: ABNORMAL
UROBILINOGEN UR QL STRIP.AUTO: 0.2 EU/DL (ref 0.2–1)
VENTILATION MODE VENT: ABNORMAL
VT SETTING VENT: 350 ML
WBC # BLD AUTO: 8.5 K/UL (ref 4.6–13.2)

## 2021-09-12 PROCEDURE — 74011250636 HC RX REV CODE- 250/636: Performed by: EMERGENCY MEDICINE

## 2021-09-12 PROCEDURE — 74011000258 HC RX REV CODE- 258: Performed by: INTERNAL MEDICINE

## 2021-09-12 PROCEDURE — 65270000029 HC RM PRIVATE

## 2021-09-12 PROCEDURE — 80053 COMPREHEN METABOLIC PANEL: CPT

## 2021-09-12 PROCEDURE — 74011250636 HC RX REV CODE- 250/636: Performed by: FAMILY MEDICINE

## 2021-09-12 PROCEDURE — 80307 DRUG TEST PRSMV CHEM ANLYZR: CPT

## 2021-09-12 PROCEDURE — 85025 COMPLETE CBC W/AUTO DIFF WBC: CPT

## 2021-09-12 PROCEDURE — 74011000250 HC RX REV CODE- 250: Performed by: FAMILY MEDICINE

## 2021-09-12 PROCEDURE — 77010033678 HC OXYGEN DAILY

## 2021-09-12 PROCEDURE — 94002 VENT MGMT INPAT INIT DAY: CPT

## 2021-09-12 PROCEDURE — 84295 ASSAY OF SERUM SODIUM: CPT

## 2021-09-12 PROCEDURE — 74011250636 HC RX REV CODE- 250/636

## 2021-09-12 PROCEDURE — 81003 URINALYSIS AUTO W/O SCOPE: CPT

## 2021-09-12 PROCEDURE — 81025 URINE PREGNANCY TEST: CPT

## 2021-09-12 PROCEDURE — 74011000250 HC RX REV CODE- 250: Performed by: INTERNAL MEDICINE

## 2021-09-12 PROCEDURE — 74011250636 HC RX REV CODE- 250/636: Performed by: INTERNAL MEDICINE

## 2021-09-12 PROCEDURE — 74018 RADEX ABDOMEN 1 VIEW: CPT

## 2021-09-12 PROCEDURE — 83735 ASSAY OF MAGNESIUM: CPT

## 2021-09-12 RX ORDER — CHLORHEXIDINE GLUCONATE 1.2 MG/ML
10 RINSE ORAL EVERY 12 HOURS
Status: DISCONTINUED | OUTPATIENT
Start: 2021-09-12 | End: 2021-10-10

## 2021-09-12 RX ORDER — ONDANSETRON 4 MG/1
4 TABLET, ORALLY DISINTEGRATING ORAL
Status: DISCONTINUED | OUTPATIENT
Start: 2021-09-12 | End: 2021-11-13 | Stop reason: HOSPADM

## 2021-09-12 RX ORDER — ENOXAPARIN SODIUM 100 MG/ML
40 INJECTION SUBCUTANEOUS DAILY
Status: DISCONTINUED | OUTPATIENT
Start: 2021-09-12 | End: 2021-09-13 | Stop reason: SDUPTHER

## 2021-09-12 RX ORDER — DEXTROSE MONOHYDRATE AND SODIUM CHLORIDE 5; .9 G/100ML; G/100ML
100 INJECTION, SOLUTION INTRAVENOUS CONTINUOUS
Status: DISCONTINUED | OUTPATIENT
Start: 2021-09-12 | End: 2021-09-16

## 2021-09-12 RX ORDER — ACETAMINOPHEN 650 MG/1
650 SUPPOSITORY RECTAL
Status: DISCONTINUED | OUTPATIENT
Start: 2021-09-12 | End: 2021-11-13 | Stop reason: HOSPADM

## 2021-09-12 RX ORDER — NOREPINEPHRINE BIT/0.9 % NACL 8 MG/250ML
INFUSION BOTTLE (ML) INTRAVENOUS
Status: DISPENSED
Start: 2021-09-12 | End: 2021-09-13

## 2021-09-12 RX ORDER — PROPOFOL 10 MG/ML
INJECTION, EMULSION INTRAVENOUS
Status: DISPENSED
Start: 2021-09-12 | End: 2021-09-12

## 2021-09-12 RX ORDER — SODIUM CHLORIDE 0.9 % (FLUSH) 0.9 %
5-40 SYRINGE (ML) INJECTION EVERY 8 HOURS
Status: DISCONTINUED | OUTPATIENT
Start: 2021-09-12 | End: 2021-10-14

## 2021-09-12 RX ORDER — SODIUM CHLORIDE 0.9 % (FLUSH) 0.9 %
5-40 SYRINGE (ML) INJECTION AS NEEDED
Status: DISCONTINUED | OUTPATIENT
Start: 2021-09-12 | End: 2021-10-14

## 2021-09-12 RX ORDER — ACETAMINOPHEN 325 MG/1
650 TABLET ORAL
Status: DISCONTINUED | OUTPATIENT
Start: 2021-09-12 | End: 2021-11-13 | Stop reason: HOSPADM

## 2021-09-12 RX ORDER — ONDANSETRON 2 MG/ML
4 INJECTION INTRAMUSCULAR; INTRAVENOUS
Status: DISCONTINUED | OUTPATIENT
Start: 2021-09-12 | End: 2021-11-10

## 2021-09-12 RX ORDER — PROPOFOL 10 MG/ML
0-50 VIAL (ML) INTRAVENOUS
Status: DISCONTINUED | OUTPATIENT
Start: 2021-09-12 | End: 2021-09-13 | Stop reason: SDUPTHER

## 2021-09-12 RX ORDER — NOREPINEPHRINE BIT/0.9 % NACL 8 MG/250ML
.5-16 INFUSION BOTTLE (ML) INTRAVENOUS
Status: DISCONTINUED | OUTPATIENT
Start: 2021-09-12 | End: 2021-09-13

## 2021-09-12 RX ORDER — LORAZEPAM 2 MG/ML
1 INJECTION INTRAMUSCULAR
Status: DISCONTINUED | OUTPATIENT
Start: 2021-09-12 | End: 2021-09-13 | Stop reason: SDUPTHER

## 2021-09-12 RX ORDER — POLYETHYLENE GLYCOL 3350 17 G/17G
17 POWDER, FOR SOLUTION ORAL DAILY PRN
Status: DISCONTINUED | OUTPATIENT
Start: 2021-09-12 | End: 2021-11-13 | Stop reason: HOSPADM

## 2021-09-12 RX ADMIN — PROPOFOL 25 MCG/KG/MIN: 10 INJECTION, EMULSION INTRAVENOUS at 05:27

## 2021-09-12 RX ADMIN — PROPOFOL 30 MCG/KG/MIN: 10 INJECTION, EMULSION INTRAVENOUS at 05:07

## 2021-09-12 RX ADMIN — PROPOFOL 50 MCG/KG/MIN: 10 INJECTION, EMULSION INTRAVENOUS at 22:14

## 2021-09-12 RX ADMIN — ENOXAPARIN SODIUM 40 MG: 40 INJECTION SUBCUTANEOUS at 09:15

## 2021-09-12 RX ADMIN — FAMOTIDINE 20 MG: 10 INJECTION, SOLUTION INTRAVENOUS at 22:04

## 2021-09-12 RX ADMIN — PROPOFOL 45 MCG/KG/MIN: 10 INJECTION, EMULSION INTRAVENOUS at 07:01

## 2021-09-12 RX ADMIN — SODIUM CHLORIDE 10 ML: 9 INJECTION, SOLUTION INTRAMUSCULAR; INTRAVENOUS; SUBCUTANEOUS at 22:00

## 2021-09-12 RX ADMIN — PROPOFOL 21.8 MCG/KG/MIN: 10 INJECTION, EMULSION INTRAVENOUS at 13:10

## 2021-09-12 RX ADMIN — DEXTROSE MONOHYDRATE AND SODIUM CHLORIDE 100 ML/HR: 5; .9 INJECTION, SOLUTION INTRAVENOUS at 22:51

## 2021-09-12 RX ADMIN — PROPOFOL 21.8 MCG/KG/MIN: 10 INJECTION, EMULSION INTRAVENOUS at 17:25

## 2021-09-12 RX ADMIN — DEXTROSE MONOHYDRATE AND SODIUM CHLORIDE 50 ML/HR: 5; .9 INJECTION, SOLUTION INTRAVENOUS at 09:29

## 2021-09-12 RX ADMIN — PROPOFOL 50 MCG/KG/MIN: 10 INJECTION, EMULSION INTRAVENOUS at 09:02

## 2021-09-12 RX ADMIN — PROPOFOL 35 MCG/KG/MIN: 10 INJECTION, EMULSION INTRAVENOUS at 06:55

## 2021-09-12 RX ADMIN — PROPOFOL 50 MCG/KG/MIN: 10 INJECTION, EMULSION INTRAVENOUS at 02:51

## 2021-09-12 RX ADMIN — LORAZEPAM 1 MG/HR: 2 INJECTION INTRAMUSCULAR; INTRAVENOUS at 13:52

## 2021-09-12 RX ADMIN — 0.12% CHLORHEXIDINE GLUCONATE 10 ML: 1.2 RINSE ORAL at 22:19

## 2021-09-12 RX ADMIN — FAMOTIDINE 20 MG: 10 INJECTION, SOLUTION INTRAVENOUS at 09:15

## 2021-09-12 RX ADMIN — PROPOFOL 50 MCG/KG/MIN: 10 INJECTION, EMULSION INTRAVENOUS at 22:28

## 2021-09-12 NOTE — H&P
History & Physical    Patient: Linn Pearce MRN: [id number]  CSN: [id number]    YOB: 1980  Age: 39 y.o. Sex: female      DOA: 2021  Primary Care Provider:  Marcin, MD Luisa      Assessment/Plan   Linn Pearce is a 39 y.o. female who is standing history of multidrug use/abuse, previous drug-seeking behavior and mental health issues otherwise unspecified arrives via EMS with acute metabolic encephalopathy and lethargy. Admitted for likely gabapentin overdose. CRITICAL CARE PLAN    Resp -    Hypoxic, presenting 02 83%  S/p intubation for safety   See vent orders, VAP bundle. HOB>30 degrees. Bronchodilators. Follow sputum cx. Daily CXR. AB.41/36.1/511/22.8  ED plans to consult intensivist     ID -   No s/sxs of infection   Will follow CBC     CVS -   Hypotension after intubation , but now resolved  Monitor HD  Wean pressors, levo for MAP>65. Heme/onc -   Follow H&H, plts. INR. Renal -   Trend BUN, Cr, follow I/O, zamora in place. Check and replace Mg, K, phos. Endocrine -  Follow FSG    Neuro -  Acute metabolic encephalopathy with unsafe combative behavior requiring intubation   Gabapentin overdose with lethargy and AMS   S/p procedural stomach emptying in ED with charcoal and sorbitol   Well-maintained on profofol and ativan gtts   ETOH level wnl   Acetaminophen and salicylate levels wnl     Sedation -   Propofol and ativan gtts  Sedation bundle.     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 female who is standing history of multidrug use/abuse, previous drug-seeking behavior and mental health issues otherwise unspecified arrives via EMS. Patient apparently prescribed and filled a prescription for gabapentin 300 mg capsules 90 capsules on the prescription. She was found lethargic with decreased responsiveness and the gabapentin bottle was noted to be empty. Highly likely for gabapentin overdose. Patient initially had slurred speech and very lethargic but then became very combative. Given sedative but patient continued to be unsafe and combative and had to be intubated for safety. History was obtained from speaking with the ER physician and chart review. Patient unable to give history as she is intubated and vented. Past Medical History:   Diagnosis Date    Asthma     Bilateral ovarian cysts     [facility] abuse [facility]     Mental and behavioral problem     Pancreatitis     Pancreatitis     Psychosis ([facility] [address]        Past Surgical History:   Procedure Laterality Date    HX GYN      D&C, Hysterectomy       No family history on file. Social History     Socioeconomic History    Marital status:      Spouse name: Not on file    Number of children: Not on file    Years of education: Not on file    Highest education level: Not on file   Tobacco Use    Smoking status: Current Every Day Smoker     Packs/day: 1.00    Smokeless tobacco: Never Used   Substance and Sexual Activity    Alcohol use: Not Currently    Drug use: Not Currently     Types: Cocaine, Marijuana     Comment: used with in past 24 hours    Sexual activity: Not Currently     Social Determinants of Health     Financial Resource Strain:     Difficulty of Paying Living Expenses:    Food Insecurity:     Worried About Running Out of Food in the Last Year:     Ran Out of Food in the Last Year:    Transportation Needs:     Lack of Transportation (Medical):      Lack of Transportation (Non-Medical):    Physical Activity:     Days of Exercise per Week:     Minutes of Exercise per Session:    Stress:     Feeling of Stress :    Social Connections:     Frequency of Communication with Friends and Family:     Frequency of Social Gatherings with Friends and Family:     Attends Restorationist Services:     Active Member of Clubs or Organizations:     Attends Club or Organization Meetings:     Marital Status:        Prior to Admission medications    Medication Sig Start Date End Date Taking? Authorizing Provider   albuterol (PROVENTIL HFA, VENTOLIN HFA, PROAIR HFA) 90 mcg/actuation inhaler Take 2 Puffs by inhalation every four (4) hours as needed for Wheezing or Shortness of Breath. 21   Teodora Ramachandran PA-C   ibuprofen (MOTRIN) 600 mg tablet Take 1 Tablet by mouth every six (6) hours as needed for Pain. Take with food. 21   Teodora Ramachandran PA-C   ALPRAZolam (XANAX) 0.5 mg tablet TAKE 1 TABLET BY MOUTH ONCE DAILY AS NEEDED FOR ANXIETY 19   Provider, Historical   metFORMIN (GLUCOPHAGE) 500 mg tablet TAKE 1 TABLET BY MOUTH ONCE DAILY 19   Provider, Historical   nicotine (NICODERM CQ) 21 mg/24 hr APPLY 1 PATCH TOPICALLY TO THE SKIN DAILY FOR CRAVINGS AS DIRECTED 19   Provider, Historical   traZODone (DESYREL) 50 mg tablet TAKE 1 TABLET BY MOUTH AT BEDTIME AS NEEDED FOR INSOMNIA 19   Provider, Historical   escitalopram oxalate (LEXAPRO) 10 mg tablet Take 10 mg by mouth daily. Other, MD Luisa   lurasidone (LATUDA) 80 mg tab tablet Take 80 mg by mouth daily (with dinner). Other, MD Luisa       Allergies   Allergen Reactions    Fish Containing Products Itching    Toradol [Ketorolac] Rash     Pt reports she is not allergic to this medication.         Review of Systems  UTO       Physical Exam:     Physical Exam:  Visit Vitals  /80   Pulse (!) 110   Temp 98.3 °F (36.8 °C)   Resp 14   Wt 72.6 kg (160 lb 0.9 oz)   LMP 05/15/2018   SpO2 100%   BMI 29.27 kg/m²      O2 Device: None (Room air)    Temp (24hrs), Av.3 °F (36.8 °C), Min:98.3 °F (36.8 °C), Max:98.3 °F (36.8 °C)    No intake/output data recorded. No intake/output data recorded. General:  Vented and intubated, sedated    Head:  Normocephalic, without obvious abnormality, atraumatic. Eyes:  Conjunctivae/corneas clear, sclera anicteric, PERRL, EOMs intact. Nose: Nares normal. No drainage or sinus tenderness. Throat: Lips, mucosa, and tongue normal.    Neck: Supple, symmetrical, trachea midline, no adenopathy. Lungs:   Clear to auscultation bilaterally. Heart:  tachycardic rate and rhythm, S1, S2 normal, no murmur, click, rub or gallop. Abdomen: Soft, non-tender. Bowel sounds normal. No masses,  No organomegaly. Extremities: Extremities normal, atraumatic, no cyanosis or edema. Capillary refill normal.   Pulses: 2+ and symmetric all extremities. Skin: Skin color as per ethnicity, turgor normal. No rashes or lesions   Neurologic: Vented and intubated      Labs Reviewed:  Recent Results (from the past 24 hour(s))   CBC WITH AUTOMATED DIFF    Collection Time: 09/11/21  8:34 PM   Result Value Ref Range    WBC 8.1 4.6 - 13.2 K/uL    RBC 4.20 4. 20 - 5.30 M/uL    HGB 12.5 12.0 - 16.0 g/dL    HCT 37.4 35.0 - 45.0 %    MCV 89.0 78.0 - 100.0 FL    MCH 29.8 24.0 - 34.0 PG    MCHC 33.4 31.0 - 37.0 g/dL    RDW 13.1 11.6 - 14.5 %    PLATELET 032 664 - 977 K/uL    MPV 10.8 9.2 - 11.8 FL    NEUTROPHILS 49 40 - 73 %    LYMPHOCYTES 42 21 - 52 %    MONOCYTES 6 3 - 10 %    EOSINOPHILS 3 0 - 5 %    BASOPHILS 0 0 - 2 %    ABS. NEUTROPHILS 4.0 1.8 - 8.0 K/UL    ABS. LYMPHOCYTES 3.4 0.9 - 3.6 K/UL    ABS. MONOCYTES 0.5 0.05 - 1.2 K/UL    ABS. EOSINOPHILS 0.2 0.0 - 0.4 K/UL    ABS.  BASOPHILS 0.0 0.0 - 0.1 K/UL    DF AUTOMATED     METABOLIC PANEL, COMPREHENSIVE    Collection Time: 09/11/21  8:34 PM   Result Value Ref Range    Sodium 144 136 - 145 mmol/L    Potassium 4.0 3.5 - 5.5 mmol/L    Chloride 112 (H) 100 - 111 mmol/L    CO2 23 21 - 32 mmol/L    Anion gap 9 3.0 - 18 mmol/L    Glucose 88 74 - 99 mg/dL    BUN 20 (H) 7.0 - 18 MG/DL    Creatinine 0.68 0.6 - 1.3 MG/DL    BUN/Creatinine ratio 29 (H) 12 - 20      GFR est AA >60 >60 ml/min/1.73m2    GFR est non-AA >60 >60 ml/min/1.73m2    Calcium 9.7 8.5 - 10.1 MG/DL    Bilirubin, total 0.2 0.2 - 1.0 MG/DL    ALT (SGPT) 22 13 - 56 U/L    AST (SGOT) 18 10 - 38 U/L    Alk.  phosphatase 96 45 - 117 U/L    Protein, total 6.8 6.4 - 8.2 g/dL    Albumin 3.8 3.4 - 5.0 g/dL    Globulin 3.0 2.0 - 4.0 g/dL    A-G Ratio 1.3 0.8 - 1.7     CARDIAC PANEL,(CK, CKMB & TROPONIN)    Collection Time: 09/11/21  8:34 PM   Result Value Ref Range    CK - MB <1.0 <3.6 ng/ml    CK-MB Index  0.0 - 4.0 %     CALCULATION NOT PERFORMED WHEN RESULT IS BELOW LINEAR LIMIT    CK 67 26 - 192 U/L    Troponin-I, QT <0.02 0.0 - 0.045 NG/ML   EKG, 12 LEAD, INITIAL    Collection Time: 09/11/21  8:46 PM   Result Value Ref Range    Ventricular Rate 118 BPM    Atrial Rate 118 BPM    P-R Interval 158 ms    QRS Duration 76 ms    Q-T Interval 322 ms    QTC Calculation (Bezet) 451 ms    Calculated P Axis 65 degrees    Calculated R Axis -7 degrees    Calculated T Axis 36 degrees    Diagnosis       Sinus tachycardia  Possible Inferior infarct , age undetermined  Abnormal ECG  When compared with ECG of 13-DEC-2019 09:51,  Borderline criteria for Inferior infarct are now present     BLOOD GAS,CHEM8,LACTIC ACID POC    Collection Time: 09/12/21 12:34 AM   Result Value Ref Range    pH (POC) 7.41 7.35 - 7.45      pCO2 (POC) 36.1 35.0 - 45.0 MMHG    pO2 (POC) 511 (H) 80 - 100 MMHG    Calcium, ionized (POC) 1.24 1.12 - 1.32 mmol/L    Base deficit (POC) 1.5 mmol/L    HCO3 (POC) 22.8 22 - 26 MMOL/L    CO2, POC 23 19 - 24 MMOL/L    O2  %    Sample source ARTERIAL      SITE RIGHT RADIAL      DERRICK'S TEST Positive      Device: AEROSOL MASK      MODE BILEVEL      FIO2 100 %    Tidal volume 350      Performed by Jessica Maurice     Sodium (POC) 145 136 - 145 mmol/L    Potassium (POC) 3.2 (L) 3.5 - 5.1 mmol/L    Glucose (POC) 104 (H) 65 - 100 mg/dL    Creatinine (POC) 0.78 0.6 - 1.3 mg/dL    Lactic Acid (POC) 1.89 0.40 - 2.00 mmol/L    Chloride (POC) 109 (H) 98 - 107 mmol/L    Anion gap, POC 14 10 - 20      GFRAA, POC >60 >60 ml/min/1.73m2    GFRNA, POC >60 >60 ml/min/1.73m2       Procedures/imaging: see electronic medical records for all procedures/Xrays and details which were not copied into this note but were reviewed prior to creation of Plan    [id number], 60 minutes of critical care time spent in the direct evaluation and treatment of this high risk patient. The reason for providing this level of medical care for this critically ill patient was due a critical illness that impaired one or more vital organ systems such that there was a high probability of imminent or life threatening deterioration in the patients condition. This care involved high complexity decision making to assess, manipulate, and support vital system functions, to treat this degreee vital organ system failure and to prevent further life threatening deterioration of the patients condition.       CC: Other, MD Luisa

## 2021-09-12 NOTE — PROGRESS NOTES
Addendum 1 PM [CC minute 5 min excluding any family discussion or procedure]  Received call from patient's RN  Patient had drop in her systolic blood pressure  Remains on propofol  Advised RN to wean off of Propofol  NS bolus 500 mL over 30 minutes ordered  Hopefully restarting Ativan would provide sedation allowing to be off of propofol and may not affect blood pressure as much  Increase maintenance IV fluid D5 NS at 100 mL/hr  Levophed ordered for backup  RN aware for parameter, in goal SBP more than 100  Monitor response and staff agrees to update    Lanie Queen MD

## 2021-09-12 NOTE — PROGRESS NOTES
Called for overdose & elective intubation; pt orally intubated per dr. Lakeisha Ramirez with size 7.5 mm et tube & secured with tube rashid @ 24 cm kirill at the teeth; breath sounds were course throughout & pt suctioned for small amount thick, white secretions; pt placed on transport vent with hr 119 [id number]

## 2021-09-12 NOTE — CONSULTS
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 Ventilated patients- aim to keep peak plateau pressure less than or equal to 30cm H2O.  Titrate FiO2 for goal SPO2> 91%  XR chest and ABG pre-intubation reviewed  Follow up ABG post-intubation and CXR ordered; monitor daily while intubated  Discuss CT chest option with pt when more stable  Not a candidate for LD CT chest  Discussed with RN to get Urine pregnancy test before any radiological test  VAP prevention bundle and sedation bundle followed, head of the bed at 30' all times  Daily sedation holiday and assessment for weaning with SBT as tolerated  Continue pulmonary hygiene care  ID: No evidence for sepsis   Lactic acid normal  CVS: Monitor Hemodynamics- stable  IVF: D5NS at 50 mL/hr  EKG in AM  Renal: Monitor renal functions, lytes  Replace lytes per unit protocol  CK in AM  Heme: Monitor CBC H/H is expected to drop with IVF  No evidence of active bleeding  Endo: TSH ordered  GI: diet - NPO  LFT normal  Neurology: sedated  Toxicology: gabapentin OD  Supportive care  CT head per clinical course  Sedation: Propofol - titrate  Hold Ativan drip as tolerated; may use PRN Ativan  Ammonia    Will defer respective systems problem management to primary and other consultant and follow patient in ICU with primary and other medical team  Further recommendations will be based on the patient's response to recommended treatment and results of the investigation ordered. Quality Care: PPI, DVT prophylaxis, HOB elevated, Infection control all reviewed and addressed. PAIN AND SEDATION: Propofol; PRN Ativan  Skin/Wound: none  Prophylaxis: DVT and GI Prophylaxis reviewed. Restraints: Wrist soft restraints for patient interfering with medical therapy/management and patient safety. PT/OT eval and treat: as needed when stable   Lines/Tubes: PIV   ETT: 9/11/21  OGT/NGT: 9/11/21  Other drains: 9/11/21  ADVANCE DIRECTIVE: Full code. DISCUSSION: Events and notes from last 24 hours reviewed. Care plan discussed with nursing, hospitalist, RT    Quality Care: PPI, DVT prophylaxis, HOB elevated, Infection control all reviewed and addressed. High complexity decision making was performed during the evaluation of this patient at high risk for decompensation with multiple organ involvement. Total critical care time spent rendering complex decision making and > 50% time spent in face to face evaluation exclusive of procedures/family discussion/coordination of care: 55 minutes. Subjective/History:   Ms. Linn Pearce has been seen and evaluated as Dr. Destiney Carrillo requested now for assisting with Acute respiratory failure and ventilator management. Patient is a 39 y.o. female with following PMhx presented to ER with lethargy via EMS and admitted for Gabapentin overdose. Pt required intubation for airways protection. Position control was contacted that recommended supportive care per ER provider. Pt seen at bedside in ER rm#2. The patient can not provide additional history due to Ventilated.      Review of Systems:  Review of systems not obtained due to patient factors. Past Medical History:  Past Medical History:   Diagnosis Date    Asthma     Bilateral ovarian cysts     Cocaine abuse ([facility] [address]     Mental and behavioral problem     Pancreatitis     Pancreatitis     Psychosis ([facility] [address]         Past Surgical History:  Past Surgical History:   Procedure Laterality Date    HX GYN      D&C, Hysterectomy        Medications:  Prior to Admission medications    Medication Sig Start Date End Date Taking? Authorizing Provider   albuterol (PROVENTIL HFA, VENTOLIN HFA, PROAIR HFA) 90 mcg/actuation inhaler Take 2 Puffs by inhalation every four (4) hours as needed for Wheezing or Shortness of Breath. 8/4/21 Marlowe Prior, MAGALYS   ibuprofen (MOTRIN) 600 mg tablet Take 1 Tablet by mouth every six (6) hours as needed for Pain. Take with food. 8/4/21 Marlowe PriorMAGALYS   ALPRAZolam (XANAX) 0.5 mg tablet TAKE 1 TABLET BY MOUTH ONCE DAILY AS NEEDED FOR ANXIETY 12/4/19   Provider, Historical   metFORMIN (GLUCOPHAGE) 500 mg tablet TAKE 1 TABLET BY MOUTH ONCE DAILY 11/12/19   Provider, Historical   nicotine (NICODERM CQ) 21 mg/24 hr APPLY 1 PATCH TOPICALLY TO THE SKIN DAILY FOR CRAVINGS AS DIRECTED 12/4/19   Provider, Historical   traZODone (DESYREL) 50 mg tablet TAKE 1 TABLET BY MOUTH AT BEDTIME AS NEEDED FOR INSOMNIA 12/4/19   Provider, Historical   escitalopram oxalate (LEXAPRO) 10 mg tablet Take 10 mg by mouth daily. Luisa Burch MD   lurasidone (LATUDA) 80 mg tab tablet Take 80 mg by mouth daily (with dinner).     Luisa Burch MD       Current Facility-Administered Medications   Medication Dose Route Frequency    sodium chloride (NS) flush 5-40 mL  5-40 mL IntraVENous Q8H    enoxaparin (LOVENOX) injection 40 mg  40 mg SubCUTAneous DAILY    famotidine (PF) (PEPCID) 20 mg in 0.9% sodium chloride 10 mL injection  20 mg IntraVENous BID    LORazepam (ATIVAN) 1 mg/mL in D5W infusion  0.2 mg/kg/hr IntraVENous TITRATE    propofol (DIPRIVAN) 10 mg/mL infusion  0-50 mcg/kg/min IntraVENous TITRATE       Allergy:  Allergies   Allergen Reactions    Fish Containing Products Itching    Toradol [Ketorolac] Rash     Pt reports she is not allergic to this medication. Social History:  Social History     Tobacco Use    Smoking status: Current Every Day Smoker     Packs/day: 1.00    Smokeless tobacco: Never Used   Substance Use Topics    Alcohol use: Not Currently    Drug use: Not Currently     Types: Cocaine, Marijuana     Comment: used with in past 24 hours        Family History:  No family history on file. Objective:   Vital Signs:    Blood pressure (!) 122/55, pulse 90, temperature 98.3 °F (36.8 °C), resp. rate 14, weight 72.6 kg (160 lb 0.9 oz), last menstrual period 05/15/2018, SpO2 100 %. Body mass index is 29.27 kg/m². O2 Device: None (Room air)       Temp (24hrs), Av.3 °F (36.8 °C), Min:98.3 °F (36.8 °C), Max:98.3 °F (36.8 °C)         Intake/Output:   Last shift:      No intake/output data recorded. Last 3 shifts: No intake/output data recorded. No intake or output data in the 24 hours ending 21 0756    Ventilator Settings:  Mode Rate Tidal Volume Pressure FiO2 PEEP   Assist control   350 ml    50 % 5 cm H20     Peak airway pressure:      Minute ventilation:        Lung protective strategy, Pl pressure goals less than or equal to 30.     Physical Exam:  General: sedated, in no respiratory distress, appears stated age, on ventilator  HEENT: PERRL, fundi benign, ET and OG tubes in place  Neck: No abnormally enlarged lymph nodes or thyroid, supple  Chest: normal  Lungs: normal air entry, breathing normal , clear to auscultation bilaterally, normal percussion bilaterally, no tenderness/ rash  Heart: Regular rate and rhythm, S1S2 present or without murmur or extra heart sounds  Abdomen: non distended, bowel sounds normoactive, tympanic, abdomen is soft without significant tenderness, masses, organomegaly or guarding, rigidity, rebound  Extremity: negative for edema, cyanosis, clubbing  Capillary refill: normal  Neuro: sedated, moves all extremities spontaneously in bed, no involuntary movements, exam limitation on ventilator  Skin: Skin color, texture, turgor fair. Skin dry, warm, non-diaphoretic    Data:     Recent Results (from the past 24 hour(s))   CBC WITH AUTOMATED DIFF    Collection Time: 09/11/21  8:34 PM   Result Value Ref Range    WBC 8.1 4.6 - 13.2 K/uL    RBC 4.20 4. 20 - 5.30 M/uL    HGB 12.5 12.0 - 16.0 g/dL    HCT 37.4 35.0 - 45.0 %    MCV 89.0 78.0 - 100.0 FL    MCH 29.8 24.0 - 34.0 PG    MCHC 33.4 31.0 - 37.0 g/dL    RDW 13.1 11.6 - 14.5 %    PLATELET 295 345 - 231 K/uL    MPV 10.8 9.2 - 11.8 FL    NEUTROPHILS 49 40 - 73 %    LYMPHOCYTES 42 21 - 52 %    MONOCYTES 6 3 - 10 %    EOSINOPHILS 3 0 - 5 %    BASOPHILS 0 0 - 2 %    ABS. NEUTROPHILS 4.0 1.8 - 8.0 K/UL    ABS. LYMPHOCYTES 3.4 0.9 - 3.6 K/UL    ABS. MONOCYTES 0.5 0.05 - 1.2 K/UL    ABS. EOSINOPHILS 0.2 0.0 - 0.4 K/UL    ABS. BASOPHILS 0.0 0.0 - 0.1 K/UL    DF AUTOMATED     METABOLIC PANEL, COMPREHENSIVE    Collection Time: 09/11/21  8:34 PM   Result Value Ref Range    Sodium 144 136 - 145 mmol/L    Potassium 4.0 3.5 - 5.5 mmol/L    Chloride 112 (H) 100 - 111 mmol/L    CO2 23 21 - 32 mmol/L    Anion gap 9 3.0 - 18 mmol/L    Glucose 88 74 - 99 mg/dL    BUN 20 (H) 7.0 - 18 MG/DL    Creatinine 0.68 0.6 - 1.3 MG/DL    BUN/Creatinine ratio 29 (H) 12 - 20      GFR est AA >60 >60 ml/min/1.73m2    GFR est non-AA >60 >60 ml/min/1.73m2    Calcium 9.7 8.5 - 10.1 MG/DL    Bilirubin, total 0.2 0.2 - 1.0 MG/DL    ALT (SGPT) 22 13 - 56 U/L    AST (SGOT) 18 10 - 38 U/L    Alk.  phosphatase 96 45 - 117 U/L    Protein, total 6.8 6.4 - 8.2 g/dL    Albumin 3.8 3.4 - 5.0 g/dL    Globulin 3.0 2.0 - 4.0 g/dL    A-G Ratio 1.3 0.8 - 1.7     CARDIAC PANEL,(CK, CKMB & TROPONIN)    Collection Time: 09/11/21  8:34 PM   Result Value Ref Range    CK - MB <1.0 <3.6 ng/ml    CK-MB Index  0.0 - 4.0 % CALCULATION NOT PERFORMED WHEN RESULT IS BELOW LINEAR LIMIT    CK 67 26 - 192 U/L    Troponin-I, QT <0.02 0.0 - 0.045 NG/ML   ETHYL ALCOHOL    Collection Time: 09/11/21  8:34 PM   Result Value Ref Range    ALCOHOL(ETHYL),SERUM <3 0 - 3 MG/DL   SALICYLATE    Collection Time: 09/11/21  8:34 PM   Result Value Ref Range    Salicylate level 2.7 (L) 2.8 - 20.0 MG/DL   ACETAMINOPHEN    Collection Time: 09/11/21  8:34 PM   Result Value Ref Range    Acetaminophen level <2 (L) 10.0 - 30.0 ug/mL   EKG, 12 LEAD, INITIAL    Collection Time: 09/11/21  8:46 PM   Result Value Ref Range    Ventricular Rate 118 BPM    Atrial Rate 118 BPM    P-R Interval 158 ms    QRS Duration 76 ms    Q-T Interval 322 ms    QTC Calculation (Bezet) 451 ms    Calculated P Axis 65 degrees    Calculated R Axis -7 degrees    Calculated T Axis 36 degrees    Diagnosis       Sinus tachycardia  Possible Inferior infarct , age undetermined  Abnormal ECG  When compared with ECG of 13-DEC-2019 09:51,  Borderline criteria for Inferior infarct are now present     BLOOD GAS,CHEM8,LACTIC ACID POC    Collection Time: 09/12/21 12:34 AM   Result Value Ref Range    pH (POC) 7.41 7.35 - 7.45      pCO2 (POC) 36.1 35.0 - 45.0 MMHG    pO2 (POC) 511 (H) 80 - 100 MMHG    Calcium, ionized (POC) 1.24 1.12 - 1.32 mmol/L    Base deficit (POC) 1.5 mmol/L    HCO3 (POC) 22.8 22 - 26 MMOL/L    CO2, POC 23 19 - 24 MMOL/L    O2  %    Sample source ARTERIAL      SITE RIGHT RADIAL      DERRICK'S TEST Positive      Device: AEROSOL MASK      MODE BILEVEL      FIO2 100 %    Tidal volume 350      Performed by Gosia Manrique     Sodium (POC) 145 136 - 145 mmol/L    Potassium (POC) 3.2 (L) 3.5 - 5.1 mmol/L    Glucose (POC) 104 (H) 65 - 100 mg/dL    Creatinine (POC) 0.78 0.6 - 1.3 mg/dL    Lactic Acid (POC) 1.89 0.40 - 2.00 mmol/L    Chloride (POC) 109 (H) 98 - 107 mmol/L    Anion gap, POC 14 10 - 20      GFRAA, POC >60 >60 ml/min/1.73m2    GFRNA, POC >60 >60 ml/min/1.73m2   URINALYSIS W/ RFLX MICROSCOPIC    Collection Time: 09/12/21 12:59 AM   Result Value Ref Range    Color YELLOW      Appearance CLEAR      Specific gravity 1.012 1.005 - 1.030      pH (UA) 5.5 5.0 - 8.0      Protein Negative NEG mg/dL    Glucose Negative NEG mg/dL    Ketone Negative NEG mg/dL    Bilirubin Negative NEG      Blood Negative NEG      Urobilinogen 0.2 0.2 - 1.0 EU/dL    Nitrites Negative NEG      Leukocyte Esterase Negative NEG     DRUG SCREEN, URINE    Collection Time: 09/12/21  1:00 AM   Result Value Ref Range    BENZODIAZEPINES Negative NEG      BARBITURATES Negative NEG      THC (TH-CANNABINOL) Negative NEG      OPIATES Negative NEG      PCP(PHENCYCLIDINE) Negative NEG      COCAINE Negative NEG      AMPHETAMINES Negative NEG      METHADONE Negative NEG      HDSCOM (NOTE)    CBC WITH AUTOMATED DIFF    Collection Time: 09/12/21  5:50 AM   Result Value Ref Range    WBC 8.5 4.6 - 13.2 K/uL    RBC 4.04 (L) 4.20 - 5.30 M/uL    HGB 12.2 12.0 - 16.0 g/dL    HCT 37.3 35.0 - 45.0 %    MCV 92.3 78.0 - 100.0 FL    MCH 30.2 24.0 - 34.0 PG    MCHC 32.7 31.0 - 37.0 g/dL    RDW 13.2 11.6 - 14.5 %    PLATELET 991 381 - 015 K/uL    MPV 10.3 9.2 - 11.8 FL    NEUTROPHILS 55 40 - 73 %    LYMPHOCYTES 32 21 - 52 %    MONOCYTES 10 3 - 10 %    EOSINOPHILS 3 0 - 5 %    BASOPHILS 0 0 - 2 %    ABS. NEUTROPHILS 4.7 1.8 - 8.0 K/UL    ABS. LYMPHOCYTES 2.7 0.9 - 3.6 K/UL    ABS. MONOCYTES 0.9 0.05 - 1.2 K/UL    ABS. EOSINOPHILS 0.2 0.0 - 0.4 K/UL    ABS.  BASOPHILS 0.0 0.0 - 0.1 K/UL    DF AUTOMATED     METABOLIC PANEL, COMPREHENSIVE    Collection Time: 09/12/21  5:50 AM   Result Value Ref Range    Sodium 146 (H) 136 - 145 mmol/L    Potassium 3.9 3.5 - 5.5 mmol/L    Chloride 115 (H) 100 - 111 mmol/L    CO2 25 21 - 32 mmol/L    Anion gap 6 3.0 - 18 mmol/L    Glucose 99 74 - 99 mg/dL    BUN 13 7.0 - 18 MG/DL    Creatinine 0.50 (L) 0.6 - 1.3 MG/DL    BUN/Creatinine ratio 26 (H) 12 - 20      GFR est AA >60 >60 ml/min/1.73m2    GFR est non-AA >60 >60 ml/min/1.73m2    Calcium 8.7 8.5 - 10.1 MG/DL    Bilirubin, total 0.2 0.2 - 1.0 MG/DL    ALT (SGPT) 17 13 - 56 U/L    AST (SGOT) 20 10 - 38 U/L    Alk. phosphatase 87 45 - 117 U/L    Protein, total 6.6 6.4 - 8.2 g/dL    Albumin 3.5 3.4 - 5.0 g/dL    Globulin 3.1 2.0 - 4.0 g/dL    A-G Ratio 1.1 0.8 - 1.7     MAGNESIUM    Collection Time: 09/12/21  5:50 AM   Result Value Ref Range    Magnesium 2.0 1.6 - 2.6 mg/dL           Recent Labs     09/12/21  0034   HCO3I 22.8   PCO2I 36.1   PHI 7.41   PO2I 511*       All Micro Results     None          Telemetry: normal sinus rhythm      Imaging:  [x]I have personally reviewed the patients chest radiographs images and report     Results from Hospital Encounter encounter on 09/11/21    XR CHEST PORT    Narrative  MEDICAL RECORDS NUMBER: [id number]    PROCEDURE:  Single view of the chest    DATE: 9/11/2021 9:09 PM    HISTORY: 39years old Female. post ett    Comparison: 8/4/2021    FINDINGS:    The patient has been intubated with appropriate placement of the endotracheal  tube. There is no enteric tube passing below the level of the diaphragm. There  is no significant effusion. There is no significant pneumothorax. Cardiomediastinal silhouette is within normal limits. There is no evidence of a  focal pulmonary infiltrate or mass. Impression  1. There is no significant or acute cardiopulmonary process. The patient has  been intubated with appropriate placement of the endotracheal tube. There is no  enteric tube passing below the level of the diaphragm. This report has been generated using voice recognition software. Results from [place] encounter on 05/22/18    CT ABD PELV W CONT    Narrative  EXAM: CT of the abdomen and pelvis    INDICATION: Abdominal pain    COMPARISON: 11/29/2015    TECHNIQUE: Axial CT imaging of the abdomen and pelvis was performed with  intravenous contrast. Multiplanar reformats were generated.  One or more dose  reduction techniques were used on this CT: automated exposure control,  adjustment of the mAs and/or kVp according to patient's size, and iterative  reconstruction techniques. The specific techniques utilized on this CT exam have  been documented in the patient's electronic medical record.    _______________    FINDINGS:    LOWER CHEST: No basilar infiltrates. Stable 2 mm nodular right lower lobe  density laterally. LIVER, BILIARY: Liver is stable. No biliary dilation. Gallbladder is present. PANCREAS: Normal.    SPLEEN: Normal.    ADRENALS: Normal.    KIDNEYS: No acute findings. Several small subcentimeter hypodensities which are  too small to characterize. No perinephric stranding. 2 mm calculus lower pole of  right kidney. LYMPH NODES: No enlarged lymph nodes. GASTROINTESTINAL TRACT: No bowel dilation or wall thickening. GI tract  evaluation limited without oral contrast. No bowel obstruction. No free air or  free fluid. Normal appendix. PELVIC ORGANS: Unremarkable. VASCULATURE: Unremarkable. BONES: No acute or aggressive osseous abnormalities identified. OTHER: None.    _______________    Impression  IMPRESSION:    No acute findings are identified. No bowel obstruction or inflammation. Normal appendix. Punctate nonobstructing right lower pole calculus.       [x]See my orders for details          Rehana Alexander MD

## 2021-09-12 NOTE — ED PROVIDER NOTES
EMERGENCY DEPARTMENT HISTORY AND PHYSICAL EXAM    Date: 9/11/2021  Patient Name: Hayley Tovar    History of Presenting Illness     Chief Complaint   Patient presents with    Drug Overdose         History Provided By: Patient and EMS    Additional History (Context):   8:58 PM  Hayley Tovar is a 39 y.o. female with PMHX of chronic pancreatitis, chronic ovarian cyst, chronic pain syndrome with psychosis, cocaine abuse polysubstance abuse and drug-seeking behavior who presents to the emergency department C/O drug overdose. Patient was brought in by ambulance after sister called stating she had overdosed on a full bottle of her newly prescribed gabapentin. They brought an empty bottle which is prescribed for her yesterday of 300 mg capsules of gabapentin x90 tablets. Patient is groggy but with a barely notable gag reflex. She does not provide any feedback for history. Because of her mental status and her drug overdose and her longstanding history of drug abuse we moved to intubate no further history is available. Social History  Known chronic polysubstance abuse known chronic smoker. Born in [place], raised in [place] by parents, GED education, has 2 y/o son    Family History  We will review old records for this.   \"Cousin with mental disorder\"    PCP: Marcin, MD Luisa    Current Facility-Administered Medications   Medication Dose Route Frequency Provider Last Rate Last Admin    LORazepam (ATIVAN) 1 mg/mL in D5W infusion  0.2 mg/kg/hr IntraVENous TITRATE Nitin Turpin MD 2 mL/hr at 09/12/21 0109 2 mg/hr at 09/12/21 0109    propofol (DIPRIVAN) 10 mg/mL infusion  0-50 mcg/kg/min IntraVENous TITRATE Nitin Turpin MD 21.8 mL/hr at 09/11/21 2159 50 mcg/kg/min at 09/11/21 2159    propofoL (DIPRIVAN) 10 mg/mL injection              Current Outpatient Medications   Medication Sig Dispense Refill    albuterol (PROVENTIL HFA, VENTOLIN HFA, PROAIR HFA) 90 mcg/actuation inhaler Take 2 Puffs by inhalation every four (4) hours as needed for Wheezing or Shortness of Breath. 1 Inhaler 1    ibuprofen (MOTRIN) 600 mg tablet Take 1 Tablet by mouth every six (6) hours as needed for Pain. Take with food. 20 Tablet 0    ALPRAZolam (XANAX) 0.5 mg tablet TAKE 1 TABLET BY MOUTH ONCE DAILY AS NEEDED FOR ANXIETY  1    metFORMIN (GLUCOPHAGE) 500 mg tablet TAKE 1 TABLET BY MOUTH ONCE DAILY  3    nicotine (NICODERM CQ) 21 mg/24 hr APPLY 1 PATCH TOPICALLY TO THE SKIN DAILY FOR CRAVINGS AS DIRECTED  0    traZODone (DESYREL) 50 mg tablet TAKE 1 TABLET BY MOUTH AT BEDTIME AS NEEDED FOR INSOMNIA  0    escitalopram oxalate (LEXAPRO) 10 mg tablet Take 10 mg by mouth daily.  lurasidone (LATUDA) 80 mg tab tablet Take 80 mg by mouth daily (with dinner). Past History     Past Medical History:  Past Medical History:   Diagnosis Date    Asthma     Bilateral ovarian cysts     Cocaine abuse ([facility] [address]     Mental and behavioral problem     Pancreatitis     Pancreatitis     Psychosis ([facility] [address]        Past Surgical History:  Past Surgical History:   Procedure Laterality Date    HX GYN      D&C, Hysterectomy       Family History:  No family history on file. Social History:  Social History     Tobacco Use    Smoking status: Current Every Day Smoker     Packs/day: 1.00    Smokeless tobacco: Never Used   Substance Use Topics    Alcohol use: Not Currently    Drug use: Not Currently     Types: Cocaine, Marijuana     Comment: used with in past 24 hours       Allergies: Allergies   Allergen Reactions    Fish Containing Products Itching    Toradol [Ketorolac] Rash     Pt reports she is not allergic to this medication.           Review of Systems   Review of Systems   Unable to perform ROS: Mental status change     Physical Exam     Vitals:    09/12/21 0000 09/12/21 0015 09/12/21 0030 09/12/21 0045   BP: 116/80 114/81 109/89 106/74   Pulse: (!) 113 (!) 109 (!) 105 (!) 104   Resp: 14 14 14 14   SpO2: 100% 100% 100% 100%   Weight:         Physical Exam  Vitals and nursing note reviewed. Constitutional:       General: She is not in acute distress. Appearance: She is well-developed. She is not diaphoretic. Comments: Obtunded and somnolent patient with poor gag reflex. O2 sats are low however she is not cyanotic. HENT:      Head: Normocephalic and atraumatic. Mouth/Throat:      Mouth: Mucous membranes are dry. Eyes:      General: No scleral icterus. Extraocular Movements:      Right eye: Normal extraocular motion. Left eye: Normal extraocular motion. Conjunctiva/sclera: Conjunctivae normal.      Pupils: Pupils are equal, round, and reactive to light. Neck:      Trachea: No tracheal deviation. Cardiovascular:      Rate and Rhythm: Regular rhythm. Tachycardia present. Heart sounds: Normal heart sounds. Pulmonary:      Effort: No respiratory distress. Breath sounds: Decreased air movement present. No stridor. Comments: Few scattered rhonchi with diminished respiratory effort  Abdominal:      General: Bowel sounds are normal. There is no distension. Palpations: Abdomen is soft. Tenderness: There is no abdominal tenderness. There is no rebound. Musculoskeletal:         General: No tenderness. Normal range of motion. Cervical back: Normal range of motion and neck supple. Comments: Exfix attached to left upper extremity. Distal perfusion looks good with cap refill of fingers between 2 and 3 seconds. Patient has briskly responsive to noxious stimuli. Grossly unremarkable without abnormalities   Skin:     General: Skin is warm and dry. Capillary Refill: Capillary refill takes less than 2 seconds. Findings: No erythema or rash. Neurological:      Mental Status: She is alert and oriented to person, place, and time. GCS: GCS eye subscore is 3. GCS verbal subscore is 3. GCS motor subscore is 5. Cranial Nerves: No cranial nerve deficit.       Coordination: Coordination abnormal.      Comments: Patient is stuporous but briskly responsive to noxious stimuli. Without noxious stimuli she is very sluggish slumped with borderline O2 saturations as well as   Psychiatric:         Mood and Affect: Mood normal.         Behavior: Behavior normal.         Thought Content: Thought content normal.         Judgment: Judgment normal.       Diagnostic Study Results     Labs -  Recent Results (from the past 24 hour(s))   CBC WITH AUTOMATED DIFF    Collection Time: 09/11/21  8:34 PM   Result Value Ref Range    WBC 8.1 4.6 - 13.2 K/uL    RBC 4.20 4. 20 - 5.30 M/uL    HGB 12.5 12.0 - 16.0 g/dL    HCT 37.4 35.0 - 45.0 %    MCV 89.0 78.0 - 100.0 FL    MCH 29.8 24.0 - 34.0 PG    MCHC 33.4 31.0 - 37.0 g/dL    RDW 13.1 11.6 - 14.5 %    PLATELET 216 700 - 730 K/uL    MPV 10.8 9.2 - 11.8 FL    NEUTROPHILS 49 40 - 73 %    LYMPHOCYTES 42 21 - 52 %    MONOCYTES 6 3 - 10 %    EOSINOPHILS 3 0 - 5 %    BASOPHILS 0 0 - 2 %    ABS. NEUTROPHILS 4.0 1.8 - 8.0 K/UL    ABS. LYMPHOCYTES 3.4 0.9 - 3.6 K/UL    ABS. MONOCYTES 0.5 0.05 - 1.2 K/UL    ABS. EOSINOPHILS 0.2 0.0 - 0.4 K/UL    ABS. BASOPHILS 0.0 0.0 - 0.1 K/UL    DF AUTOMATED     METABOLIC PANEL, COMPREHENSIVE    Collection Time: 09/11/21  8:34 PM   Result Value Ref Range    Sodium 144 136 - 145 mmol/L    Potassium 4.0 3.5 - 5.5 mmol/L    Chloride 112 (H) 100 - 111 mmol/L    CO2 23 21 - 32 mmol/L    Anion gap 9 3.0 - 18 mmol/L    Glucose 88 74 - 99 mg/dL    BUN 20 (H) 7.0 - 18 MG/DL    Creatinine 0.68 0.6 - 1.3 MG/DL    BUN/Creatinine ratio 29 (H) 12 - 20      GFR est AA >60 >60 ml/min/1.73m2    GFR est non-AA >60 >60 ml/min/1.73m2    Calcium 9.7 8.5 - 10.1 MG/DL    Bilirubin, total 0.2 0.2 - 1.0 MG/DL    ALT (SGPT) 22 13 - 56 U/L    AST (SGOT) 18 10 - 38 U/L    Alk.  phosphatase 96 45 - 117 U/L    Protein, total 6.8 6.4 - 8.2 g/dL    Albumin 3.8 3.4 - 5.0 g/dL    Globulin 3.0 2.0 - 4.0 g/dL    A-G Ratio 1.3 0.8 - 1.7     CARDIAC PANEL,(CK, CKMB & TROPONIN) Collection Time: 09/11/21  8:34 PM   Result Value Ref Range    CK - MB <1.0 <3.6 ng/ml    CK-MB Index  0.0 - 4.0 %     CALCULATION NOT PERFORMED WHEN RESULT IS BELOW LINEAR LIMIT    CK 67 26 - 192 U/L    Troponin-I, QT <0.02 0.0 - 0.045 NG/ML   EKG, 12 LEAD, INITIAL    Collection Time: 09/11/21  8:46 PM   Result Value Ref Range    Ventricular Rate 118 BPM    Atrial Rate 118 BPM    P-R Interval 158 ms    QRS Duration 76 ms    Q-T Interval 322 ms    QTC Calculation (Bezet) 451 ms    Calculated P Axis 65 degrees    Calculated R Axis -7 degrees    Calculated T Axis 36 degrees    Diagnosis       Sinus tachycardia  Possible Inferior infarct , age undetermined  Abnormal ECG  When compared with ECG of 13-DEC-2019 09:51,  Borderline criteria for Inferior infarct are now present     BLOOD GAS,CHEM8,LACTIC ACID POC    Collection Time: 09/12/21 12:34 AM   Result Value Ref Range    pH (POC) 7.41 7.35 - 7.45      pCO2 (POC) 36.1 35.0 - 45.0 MMHG    pO2 (POC) 511 (H) 80 - 100 MMHG    Calcium, ionized (POC) 1.24 1.12 - 1.32 mmol/L    Base deficit (POC) 1.5 mmol/L    HCO3 (POC) 22.8 22 - 26 MMOL/L    CO2, POC 23 19 - 24 MMOL/L    O2  %    Sample source ARTERIAL      SITE RIGHT RADIAL      DERRICK'S TEST Positive      Device: AEROSOL MASK      MODE BILEVEL      FIO2 100 %    Tidal volume 350      Performed by Gracie Quale     Sodium (POC) 145 136 - 145 mmol/L    Potassium (POC) 3.2 (L) 3.5 - 5.1 mmol/L    Glucose (POC) 104 (H) 65 - 100 mg/dL    Creatinine (POC) 0.78 0.6 - 1.3 mg/dL    Lactic Acid (POC) 1.89 0.40 - 2.00 mmol/L    Chloride (POC) 109 (H) 98 - 107 mmol/L    Anion gap, POC 14 10 - 20      GFRAA, POC >60 >60 ml/min/1.73m2    GFRNA, POC >60 >60 ml/min/1.73m2        Radiologic Studies -   XR CHEST PORT   Final Result      1. There is no significant or acute cardiopulmonary process. The patient has   been intubated with appropriate placement of the endotracheal tube.  There is no   enteric tube passing below the level of the diaphragm. This report has been generated using voice recognition software. CT Results  (Last 48 hours)    None        CXR Results  (Last 48 hours)               09/11/21 2109  XR CHEST PORT Final result    Impression:      1. There is no significant or acute cardiopulmonary process. The patient has   been intubated with appropriate placement of the endotracheal tube. There is no   enteric tube passing below the level of the diaphragm. This report has been generated using voice recognition software. Narrative:  MEDICAL RECORDS NUMBER: [id number]       PROCEDURE:  Single view of the chest       DATE: 9/11/2021 9:09 PM       HISTORY: 39years old Female. post ett       Comparison: 8/4/2021       FINDINGS:       The patient has been intubated with appropriate placement of the endotracheal   tube. There is no enteric tube passing below the level of the diaphragm. There   is no significant effusion. There is no significant pneumothorax. Cardiomediastinal silhouette is within normal limits. There is no evidence of a   focal pulmonary infiltrate or mass.                          Medications given in the ED-  Medications   LORazepam (ATIVAN) 1 mg/mL in D5W infusion (2 mg/hr IntraVENous Rate Change 9/12/21 0109)   propofol (DIPRIVAN) 10 mg/mL infusion (50 mcg/kg/min × 72.6 kg IntraVENous New Bag 9/11/21 2159)   propofoL (DIPRIVAN) 10 mg/mL injection (has no administration in time range)   etomidate (AMIDATE) 2 mg/mL injection 20 mg (20 mg IntraVENous Given 9/11/21 2012)   succinylcholine (ANECTINE) 20 mg/mL syringe 100 mg (100 mg IntraVENous Given 9/11/21 2013)   LORazepam (ATIVAN) injection 4 mg (4 mg IntraVENous Given 9/11/21 2031)   activated charcoal-sorbitoL (ACTIDOSE) oral suspension 50 g (50 g Oral Given 9/11/21 2139)   LORazepam (ATIVAN) injection 4 mg (4 mg IntraVENous Given 9/11/21 2155)         Medical Decision Making   I am the first provider for this patient. I reviewed the vital signs, available nursing notes, past medical history, past surgical history, family history and social history. Vital Signs-Reviewed the patient's vital signs. Pulse Oximetry Analysis -  83% on room air  100% on nasal cannula 2 L    Cardiac Monitor:  Rate: 126 bpm  Rhythm: Sinus tach    EKG interpretation: (Preliminary)  8:58 PM   Sinus tachycardia, rate 118, nonspecific ST changes, QTC is 451  EKG read by Blu Siegel MD      Records Reviewed: NURSING NOTES AND PREVIOUS MEDICAL RECORDS    Provider Notes (Medical Decision Making):   Patient with altered mental status in diminished cognitive impairment after drug overdose of gabapentin. She has extensive medical history of cocaine abuse and alcohol in addition other substances. We moved to intubate her early due to our known regular difficulties in managing her in addition to her significantly decrease in mental status and low O2 saturations which did respond to oxygen. We will continue to follow for toxicity although gabapentin by itself may not be problematic however we will watch for polysubstance. She tolerated intubation reasonably well but remarkably bounced back very quickly even after paralytics and etomidate and therefore gave her large bolus of both Ativan and propofol to perform an awake intubation. Procedures:  INTUBATE PATIENT    Date/Time: 9/11/2021 7:57 PM  Performed by: Lisa Barker MD  Authorized by: Lisa Barker MD     Consent:     Consent obtained:  Emergent situation  Pre-procedure details:     Patient status:  Altered mental status    Mallampati score:  III (very anterior airway)    Pretreatment meds: etomidate.     Paralytics:  Succinylcholine  Procedure details:     Preoxygenation:  Nasal cannula    CPR in progress: no      Intubation method:  Oral    Oral intubation technique:  Video-assisted    Tube size (mm):  7.5    Tube type:  Cuffed    Number of attempts:  2    Cricoid pressure: no      Tube visualized through cords: yes    Placement assessment:     ETT to teeth:  24    Tube secured with:  ETT rashid    Breath sounds:  Absent over the epigastrium and equal    Placement verification: chest rise, condensation, CXR verification, direct visualization, equal breath sounds, ETCO2 detector, fiberoptic scope and tube exhalation    Post-procedure details:     Patient tolerance of procedure: Tolerated well, no immediate complications  Comments:      Of note, she was given etomidate and Succs, unable to immediately ETT. She awoke enough (under 5 minutes)  to require second dosing of meds. Bolus Ativan 4 mg and 10 mL of propofol , pushed by me, to allow awake intubation    Extremely anterior airway req'd Glidescope to visually assist intubation, after clinical confirmation of ETT placement equal BS, good chest rise and 100 O2 sats, gastric tube was placed, any residual contents of medications and dissolved content was aspirated and 50 G of charcoal and sorbitol given to help prevent absorption for drug (unknown time of ingestion - last time known normal was 2 PM, 6 hrs prior to intubation)         ED Course:   8:58 PM: Initial assessment performed. The patients presenting problems have been discussed, and they are in agreement with the care plan formulated and outlined with them. I have encouraged them to ask questions as they arise throughout their visit. CONSULT NOTE:   12:55 AM  Carlos Manuel Lyles MD   spoke with Dr. Case Biswas  Specialty: Hospitalist  Discussed pt's hx, disposition, and available diagnostic and imaging results  over the telephone. Reviewed care plans. Consulting physician agrees with plans as outlined. Will accept patient for admission ICU. CONSULT NOTE:     Rio Lindo MD spoke with Parris Estes  Specialty: Poison control [place]  Discussed pt's hx, disposition, and available diagnostic and imaging results  over the telephone. Reviewed care plans. Consulting physician agrees with plans as outlined. Agree with management of the patient. This recommends titrating propofol and Ativan as we have been doing throughout the evening. She did add Precedex would likely be an appropriate substance to help with sedation for long-term chronic polysubstance abuse. Otherwise continue to follow for vomiting for rhabdo although her numbers have been normal. Otherwise moves towards extubation once metabolism clears substances. Deep CNS sedation along with nausea vomiting and rhabdo or the anticipated complications with gabapentin overdose. .        Diagnosis and Disposition     Critical Care Time: 77 minutes  CRITICAL CARE NOTE:  7:37 AM  I have spent 77minutes of critical care time involved in lab review, consultations with specialist, family decision-making, and documentation. During this entire length of time I was immediately available to the patient. Critical Care: The reason for providing this level of medical care for this critically ill patient was due a critical illness that impaired one or more vital organ systems such that there was a high probability of imminent or life threatening deterioration in the patients condition. This care involved high complexity decision making to assess, manipulate, and support vital system functions, to treat this degreee vital organ system failure and to prevent further life threatening deterioration of the patients condition. Core Measures:  For Hospitalized Patients:    1. Hospitalization Decision Time:  The decision to hospitalize the patient was made by Kaela Myles MD   at 8:00 PM on 9/11/2021    2. Aspirin: Aspirin was not given because the patient is a bleeding risk    7:43 AM  Patient is being admitted to the hospital by Dr. Calista Cornejo. The results of their tests and reasons for their admission have been discussed with them and/or available family.  They convey agreement and understanding for the need to be admitted and for their admission diagnosis. CONDITIONS ON ADMISSION:  Sepsis is not present at the time of admission. Deep Vein Thrombosis is not present at the time of admission. Thrombosis is not present at the time of admission. Urinary Tract Infection is not present at the time of admission. Pneumonia is not present at the time of admission. MRSA is not present at the time of admission. Wound infection is not present at the time of admission. Pressure Ulcer is not present at the time of admission. CLINICAL IMPRESSION:    1. Gabapentin overdose, undetermined intent, initial encounter    2. Stuporous        _______________________________    This note was partially transcribed via voice recognition software. Although efforts have been made to catch any discrepancies, it may contain sound alike words, grammatical errors, or nonsensical words.

## 2021-09-12 NOTE — ED NOTES
Verbal shift change report given to Davin Persaud, RN (oncoming nurse) by Maegan Mandel RN (offgoing nurse). Report included the following information SBAR, Kardex, ED Summary, [facility] and Recent Results.

## 2021-09-12 NOTE — PROGRESS NOTES
Reason for Admission:  Chart reviewed; per H&P, patient is a \"45 y.o. female who is standing history of multidrug use/abuse, previous drug-seeking behavior and mental health issues otherwise unspecified arrives via EMS. Patient apparently prescribed and filled a prescription for gabapentin 300 mg capsules 90 capsules on the prescription. She was found lethargic with decreased responsiveness and the gabapentin bottle was noted to be empty. Highly likely for gabapentin overdose. Patient initially had slurred speech and very lethargic but then became very combative. Given sedative but patient continued to be unsafe and combative and had to be intubated for safety. \"                     RUR Score:       Low, 15%              Plan for utilizing home health:          PCP: First and Last name:  Other, MD Luisa     Name of Practice:    Are you a current patient: Yes/No:    Approximate date of last visit:    Can you participate in a virtual visit with your PCP:                     Current Advanced Directive/Advance Care Plan: Full Code      Healthcare Decision Maker:   Click here to complete [employer] including selection of the Healthcare Decision Maker Relationship (ie \"Primary\")                             Transition of Care Plan:    Patient is awaiting ICU bed availability and remains in ED, intubated and on diprivan drip. Care manager will follow and assist as appropriate for discharge planning.

## 2021-09-12 NOTE — ED NOTES
Assumed c/o pt. Report taken. Pt intubated, deeply sedated, resting on the monitor. Ativan gtt at 2mg/hr and propofol infusing at 21.8mg/kg/min. D5NS infusing at 100/hr. [place] in place. OG in place, and draining coffee ground emesis. To be turned and repositioned.

## 2021-09-12 NOTE — PROGRESS NOTES
Received page from RN for BP concerns, but she said she saw the order and levophed and fluid bolus -not need provider at this point

## 2021-09-12 NOTE — PROGRESS NOTES
Et tube pulled out 1 cm per dr. Leonarda Joseph order after cxr; et tube now secured @ 23 cm kirill at teeth

## 2021-09-13 ENCOUNTER — APPOINTMENT (OUTPATIENT)
Dept: GENERAL RADIOLOGY | Age: 41
DRG: 004 | End: 2021-09-13
Attending: INTERNAL MEDICINE
Payer: MEDICAID

## 2021-09-13 ENCOUNTER — APPOINTMENT (OUTPATIENT)
Dept: CT IMAGING | Age: 41
DRG: 004 | End: 2021-09-13
Attending: HOSPITALIST
Payer: MEDICAID

## 2021-09-13 LAB
ALBUMIN SERPL-MCNC: 3 G/DL (ref 3.4–5)
ALBUMIN/GLOB SERPL: 1.1 {RATIO} (ref 0.8–1.7)
ALP SERPL-CCNC: 79 U/L (ref 45–117)
ALT SERPL-CCNC: 16 U/L (ref 13–56)
AMMONIA PLAS-SCNC: 51 UMOL/L (ref 11–32)
ANION GAP SERPL CALC-SCNC: 6 MMOL/L (ref 3–18)
ARTERIAL PATENCY WRIST A: ABNORMAL
AST SERPL-CCNC: 14 U/L (ref 10–38)
ATRIAL RATE: 115 BPM
ATRIAL RATE: 118 BPM
BASE DEFICIT BLD-SCNC: 0.9 MMOL/L
BASOPHILS # BLD: 0 K/UL (ref 0–0.1)
BASOPHILS NFR BLD: 0 % (ref 0–2)
BDY SITE: ABNORMAL
BILIRUB SERPL-MCNC: 0.4 MG/DL (ref 0.2–1)
BUN SERPL-MCNC: 5 MG/DL (ref 7–18)
BUN/CREAT SERPL: 10 (ref 12–20)
CALCIUM SERPL-MCNC: 8.3 MG/DL (ref 8.5–10.1)
CALCULATED P AXIS, ECG09: 52 DEGREES
CALCULATED P AXIS, ECG09: 65 DEGREES
CALCULATED R AXIS, ECG10: -7 DEGREES
CALCULATED R AXIS, ECG10: 66 DEGREES
CALCULATED T AXIS, ECG11: -156 DEGREES
CALCULATED T AXIS, ECG11: 36 DEGREES
CHLORIDE SERPL-SCNC: 116 MMOL/L (ref 100–111)
CK SERPL-CCNC: 81 U/L (ref 26–192)
CO2 SERPL-SCNC: 24 MMOL/L (ref 21–32)
COVID-19 RAPID TEST, COVR: NOT DETECTED
CREAT SERPL-MCNC: 0.48 MG/DL (ref 0.6–1.3)
DIAGNOSIS, 93000: NORMAL
DIAGNOSIS, 93000: NORMAL
DIFFERENTIAL METHOD BLD: ABNORMAL
EOSINOPHIL # BLD: 0.2 K/UL (ref 0–0.4)
EOSINOPHIL NFR BLD: 1 % (ref 0–5)
ERYTHROCYTE [DISTWIDTH] IN BLOOD BY AUTOMATED COUNT: 13.2 % (ref 11.6–14.5)
GAS FLOW.O2 O2 DELIVERY SYS: ABNORMAL L/MIN
GAS FLOW.O2 SETTING OXYMISER: 14 BPM
GLOBULIN SER CALC-MCNC: 2.8 G/DL (ref 2–4)
GLUCOSE BLD STRIP.AUTO-MCNC: 134 MG/DL (ref 70–110)
GLUCOSE SERPL-MCNC: 116 MG/DL (ref 74–99)
HCO3 BLD-SCNC: 25.1 MMOL/L (ref 22–26)
HCT VFR BLD AUTO: 35 % (ref 35–45)
HGB BLD-MCNC: 11.4 G/DL (ref 12–16)
LYMPHOCYTES # BLD: 1.5 K/UL (ref 0.9–3.6)
LYMPHOCYTES NFR BLD: 14 % (ref 21–52)
MAGNESIUM SERPL-MCNC: 1.9 MG/DL (ref 1.6–2.6)
MCH RBC QN AUTO: 29.5 PG (ref 24–34)
MCHC RBC AUTO-ENTMCNC: 32.6 G/DL (ref 31–37)
MCV RBC AUTO: 90.7 FL (ref 78–100)
MONOCYTES # BLD: 0.9 K/UL (ref 0.05–1.2)
MONOCYTES NFR BLD: 9 % (ref 3–10)
NEUTS SEG # BLD: 8.3 K/UL (ref 1.8–8)
NEUTS SEG NFR BLD: 76 % (ref 40–73)
O2/TOTAL GAS SETTING VFR VENT: 50 %
P-R INTERVAL, ECG05: 158 MS
P-R INTERVAL, ECG05: 188 MS
PCO2 BLD: 46.4 MMHG (ref 35–45)
PEEP RESPIRATORY: 5 CMH2O
PH BLD: 7.34 [PH] (ref 7.35–7.45)
PHOSPHATE SERPL-MCNC: 2.7 MG/DL (ref 2.5–4.9)
PIP ISTAT,IPIP: 16
PLATELET # BLD AUTO: 225 K/UL (ref 135–420)
PMV BLD AUTO: 10.3 FL (ref 9.2–11.8)
PO2 BLD: 290 MMHG (ref 80–100)
POTASSIUM SERPL-SCNC: 3.7 MMOL/L (ref 3.5–5.5)
PROT SERPL-MCNC: 5.8 G/DL (ref 6.4–8.2)
Q-T INTERVAL, ECG07: 290 MS
Q-T INTERVAL, ECG07: 322 MS
QRS DURATION, ECG06: 72 MS
QRS DURATION, ECG06: 76 MS
QTC CALCULATION (BEZET), ECG08: 401 MS
QTC CALCULATION (BEZET), ECG08: 451 MS
RBC # BLD AUTO: 3.86 M/UL (ref 4.2–5.3)
SAO2 % BLD: 99.9 % (ref 92–97)
SERVICE CMNT-IMP: ABNORMAL
SODIUM SERPL-SCNC: 146 MMOL/L (ref 136–145)
SOURCE, COVRS: NORMAL
SPECIMEN TYPE: ABNORMAL
TOTAL RESP. RATE, ITRR: 14
VENTILATION MODE VENT: ABNORMAL
VENTRICULAR RATE, ECG03: 115 BPM
VENTRICULAR RATE, ECG03: 118 BPM
VT SETTING VENT: 350 ML
WBC # BLD AUTO: 10.9 K/UL (ref 4.6–13.2)

## 2021-09-13 PROCEDURE — 74011250636 HC RX REV CODE- 250/636: Performed by: INTERNAL MEDICINE

## 2021-09-13 PROCEDURE — 94640 AIRWAY INHALATION TREATMENT: CPT

## 2021-09-13 PROCEDURE — 74011250637 HC RX REV CODE- 250/637: Performed by: INTERNAL MEDICINE

## 2021-09-13 PROCEDURE — 82140 ASSAY OF AMMONIA: CPT

## 2021-09-13 PROCEDURE — 94003 VENT MGMT INPAT SUBQ DAY: CPT

## 2021-09-13 PROCEDURE — 74011000258 HC RX REV CODE- 258: Performed by: INTERNAL MEDICINE

## 2021-09-13 PROCEDURE — 87635 SARS-COV-2 COVID-19 AMP PRB: CPT

## 2021-09-13 PROCEDURE — 65610000006 HC RM INTENSIVE CARE

## 2021-09-13 PROCEDURE — 71045 X-RAY EXAM CHEST 1 VIEW: CPT

## 2021-09-13 PROCEDURE — 74011000250 HC RX REV CODE- 250

## 2021-09-13 PROCEDURE — 84100 ASSAY OF PHOSPHORUS: CPT

## 2021-09-13 PROCEDURE — 74011000250 HC RX REV CODE- 250: Performed by: INTERNAL MEDICINE

## 2021-09-13 PROCEDURE — 83735 ASSAY OF MAGNESIUM: CPT

## 2021-09-13 PROCEDURE — 82803 BLOOD GASES ANY COMBINATION: CPT

## 2021-09-13 PROCEDURE — 85025 COMPLETE CBC W/AUTO DIFF WBC: CPT

## 2021-09-13 PROCEDURE — 36600 WITHDRAWAL OF ARTERIAL BLOOD: CPT

## 2021-09-13 PROCEDURE — 36415 COLL VENOUS BLD VENIPUNCTURE: CPT

## 2021-09-13 PROCEDURE — 74011250637 HC RX REV CODE- 250/637: Performed by: FAMILY MEDICINE

## 2021-09-13 PROCEDURE — 80053 COMPREHEN METABOLIC PANEL: CPT

## 2021-09-13 PROCEDURE — 74011250636 HC RX REV CODE- 250/636: Performed by: FAMILY MEDICINE

## 2021-09-13 PROCEDURE — 82962 GLUCOSE BLOOD TEST: CPT

## 2021-09-13 PROCEDURE — 93005 ELECTROCARDIOGRAM TRACING: CPT

## 2021-09-13 PROCEDURE — 82550 ASSAY OF CK (CPK): CPT

## 2021-09-13 PROCEDURE — 74011000250 HC RX REV CODE- 250: Performed by: FAMILY MEDICINE

## 2021-09-13 PROCEDURE — 77010033678 HC OXYGEN DAILY

## 2021-09-13 RX ORDER — IPRATROPIUM BROMIDE AND ALBUTEROL SULFATE 2.5; .5 MG/3ML; MG/3ML
3 SOLUTION RESPIRATORY (INHALATION)
Status: DISCONTINUED | OUTPATIENT
Start: 2021-09-13 | End: 2021-09-26

## 2021-09-13 RX ORDER — OLANZAPINE 2.5 MG/1
5 TABLET ORAL EVERY EVENING
Status: DISCONTINUED | OUTPATIENT
Start: 2021-09-13 | End: 2021-09-14

## 2021-09-13 RX ORDER — IPRATROPIUM BROMIDE AND ALBUTEROL SULFATE 2.5; .5 MG/3ML; MG/3ML
3 SOLUTION RESPIRATORY (INHALATION) 3 TIMES DAILY
Status: DISCONTINUED | OUTPATIENT
Start: 2021-09-13 | End: 2021-09-13

## 2021-09-13 RX ORDER — ENOXAPARIN SODIUM 100 MG/ML
40 INJECTION SUBCUTANEOUS EVERY 24 HOURS
Status: DISCONTINUED | OUTPATIENT
Start: 2021-09-13 | End: 2021-11-13 | Stop reason: HOSPADM

## 2021-09-13 RX ORDER — FENTANYL CITRATE 50 UG/ML
50 INJECTION, SOLUTION INTRAMUSCULAR; INTRAVENOUS
Status: DISCONTINUED | OUTPATIENT
Start: 2021-09-13 | End: 2021-09-16 | Stop reason: ALTCHOICE

## 2021-09-13 RX ORDER — HYDROMORPHONE HYDROCHLORIDE 1 MG/ML
1 INJECTION, SOLUTION INTRAMUSCULAR; INTRAVENOUS; SUBCUTANEOUS
Status: DISCONTINUED | OUTPATIENT
Start: 2021-09-13 | End: 2021-10-05 | Stop reason: SDUPTHER

## 2021-09-13 RX ORDER — PROPOFOL 10 MG/ML
0-50 VIAL (ML) INTRAVENOUS
Status: DISCONTINUED | OUTPATIENT
Start: 2021-09-13 | End: 2021-09-16

## 2021-09-13 RX ORDER — IPRATROPIUM BROMIDE AND ALBUTEROL SULFATE 2.5; .5 MG/3ML; MG/3ML
SOLUTION RESPIRATORY (INHALATION)
Status: COMPLETED
Start: 2021-09-13 | End: 2021-09-13

## 2021-09-13 RX ORDER — PROPOFOL 10 MG/ML
0-50 VIAL (ML) INTRAVENOUS
Status: DISCONTINUED | OUTPATIENT
Start: 2021-09-13 | End: 2021-09-13

## 2021-09-13 RX ORDER — LORAZEPAM 2 MG/ML
2 INJECTION INTRAMUSCULAR
Status: DISCONTINUED | OUTPATIENT
Start: 2021-09-13 | End: 2021-11-10

## 2021-09-13 RX ADMIN — SODIUM CHLORIDE 0.7 MCG/KG/HR: 9 INJECTION, SOLUTION INTRAVENOUS at 08:05

## 2021-09-13 RX ADMIN — LORAZEPAM 3 MG/HR: 2 INJECTION INTRAMUSCULAR; INTRAVENOUS at 08:19

## 2021-09-13 RX ADMIN — HYDROMORPHONE HYDROCHLORIDE 1 MG: 1 INJECTION, SOLUTION INTRAMUSCULAR; INTRAVENOUS; SUBCUTANEOUS at 12:04

## 2021-09-13 RX ADMIN — SODIUM CHLORIDE 10 ML: 9 INJECTION, SOLUTION INTRAMUSCULAR; INTRAVENOUS; SUBCUTANEOUS at 06:00

## 2021-09-13 RX ADMIN — ENOXAPARIN SODIUM 40 MG: 100 INJECTION SUBCUTANEOUS at 17:58

## 2021-09-13 RX ADMIN — FAMOTIDINE 20 MG: 10 INJECTION, SOLUTION INTRAVENOUS at 17:57

## 2021-09-13 RX ADMIN — LORAZEPAM 2 MG: 2 INJECTION INTRAMUSCULAR at 08:05

## 2021-09-13 RX ADMIN — PROPOFOL 45 MCG/KG/MIN: 10 INJECTION, EMULSION INTRAVENOUS at 10:39

## 2021-09-13 RX ADMIN — LORAZEPAM 7 MG/HR: 2 INJECTION INTRAMUSCULAR; INTRAVENOUS at 21:47

## 2021-09-13 RX ADMIN — PROPOFOL 25 MCG/KG/MIN: 10 INJECTION, EMULSION INTRAVENOUS at 08:44

## 2021-09-13 RX ADMIN — OLANZAPINE 5 MG: 2.5 TABLET, FILM COATED ORAL at 17:57

## 2021-09-13 RX ADMIN — DEXTROSE MONOHYDRATE AND SODIUM CHLORIDE 100 ML/HR: 5; .9 INJECTION, SOLUTION INTRAVENOUS at 08:07

## 2021-09-13 RX ADMIN — LORAZEPAM 3 MG/HR: 2 INJECTION INTRAMUSCULAR; INTRAVENOUS at 02:53

## 2021-09-13 RX ADMIN — ACETAMINOPHEN 650 MG: 325 TABLET ORAL at 21:30

## 2021-09-13 RX ADMIN — SODIUM CHLORIDE 0.4 MCG/KG/HR: 9 INJECTION, SOLUTION INTRAVENOUS at 07:42

## 2021-09-13 RX ADMIN — IPRATROPIUM BROMIDE AND ALBUTEROL SULFATE 3 ML: .5; 3 SOLUTION RESPIRATORY (INHALATION) at 15:49

## 2021-09-13 RX ADMIN — IPRATROPIUM BROMIDE AND ALBUTEROL SULFATE 3 ML: .5; 3 SOLUTION RESPIRATORY (INHALATION) at 20:17

## 2021-09-13 RX ADMIN — PROPOFOL 50 MCG/KG/MIN: 10 INJECTION, EMULSION INTRAVENOUS at 10:54

## 2021-09-13 RX ADMIN — PROPOFOL 50 MCG/KG/MIN: 10 INJECTION, EMULSION INTRAVENOUS at 17:58

## 2021-09-13 RX ADMIN — HYDROMORPHONE HYDROCHLORIDE 1 MG: 1 INJECTION, SOLUTION INTRAMUSCULAR; INTRAVENOUS; SUBCUTANEOUS at 22:57

## 2021-09-13 RX ADMIN — LORAZEPAM 3 MG/HR: 2 INJECTION INTRAMUSCULAR; INTRAVENOUS at 07:12

## 2021-09-13 RX ADMIN — SODIUM CHLORIDE 0.5 MCG/KG/HR: 9 INJECTION, SOLUTION INTRAVENOUS at 08:02

## 2021-09-13 RX ADMIN — SODIUM CHLORIDE 10 ML: 9 INJECTION, SOLUTION INTRAMUSCULAR; INTRAVENOUS; SUBCUTANEOUS at 14:45

## 2021-09-13 RX ADMIN — LORAZEPAM 2 MG: 2 INJECTION INTRAMUSCULAR at 19:28

## 2021-09-13 RX ADMIN — 0.12% CHLORHEXIDINE GLUCONATE 10 ML: 1.2 RINSE ORAL at 21:30

## 2021-09-13 RX ADMIN — PROPOFOL 50 MCG/KG/MIN: 10 INJECTION, EMULSION INTRAVENOUS at 03:54

## 2021-09-13 RX ADMIN — PROPOFOL 50 MCG/KG/MIN: 10 INJECTION, EMULSION INTRAVENOUS at 22:22

## 2021-09-13 RX ADMIN — PROPOFOL 40 MCG/KG/MIN: 10 INJECTION, EMULSION INTRAVENOUS at 10:33

## 2021-09-13 NOTE — ED NOTES
Pt becoming more agitaed again. Pt restless in the bed. Pt propofol to be increased as all talking , positiong comfort measures failed.

## 2021-09-13 NOTE — ED NOTES
Pt hourly rounding completed. Safety   Pt (x) resting on stretcher with side rails up and call bell in reach. () in chair    () in parents arms. Toileting   Pt offered ()Bedpan     ()Assistance to Restroom     ()Urinal  Ongoing Updates  Updated on plan of care and status of test results.   Pain Management  Inquired as to comfort and offered comfort measures:    (x) warm blankets   (x) dimmed lights

## 2021-09-13 NOTE — ED NOTES
TRANSFER - OUT REPORT:    Verbal report given to Rani Kowalski on Milton Sharp  being transferred to ICU for routine progression of care       Report consisted of patients Situation, Background, Assessment and   Recommendations(SBAR). Information from the following report(s) SBAR, ED Summary, Intake/Output, MAR, Recent Results and Cardiac Rhythm Sinus Tachycardia  was reviewed with the receiving nurse. Lines:   Peripheral IV 09/11/21 Right Arm (Active)   Site Assessment Clean, dry, & intact 09/11/21 2000   Phlebitis Assessment 0 09/11/21 2000   Infiltration Assessment 0 09/11/21 2000   Dressing Status Clean, dry, & intact 09/11/21 2000   Dressing Type Transparent 09/11/21 2000   Hub Color/Line Status Pink 09/11/21 2000       Peripheral IV 09/11/21 Left Arm (Active)   Site Assessment Clean, dry, & intact 09/11/21 2010   Phlebitis Assessment 0 09/11/21 2010   Infiltration Assessment 0 09/11/21 2010   Dressing Status Clean, dry, & intact 09/11/21 2010   Dressing Type Transparent 09/11/21 2010   Hub Color/Line Status Pink 09/11/21 2010       Peripheral IV 09/12/21 Right;Proximal Cephalic (Active)   Site Assessment Clean, dry, & intact 09/12/21 1410   Phlebitis Assessment 0 09/12/21 1410   Infiltration Assessment 0 09/12/21 1410   Dressing Status Clean, dry, & intact 09/12/21 1410   Dressing Type Disk with Chlorhexadine gluconate (CHG); Transparent 09/12/21 1410   Hub Color/Line Status Green 09/12/21 1410   Action Taken Other (comment) 09/12/21 1410   Alcohol Cap Used No 09/12/21 1410       Peripheral IV 09/12/21 Left;Proximal Basilic (Active)   Site Assessment Clean, dry, & intact 09/12/21 1410   Phlebitis Assessment 0 09/12/21 1410   Infiltration Assessment 0 09/12/21 1410   Dressing Status Clean, dry, & intact 09/12/21 1410   Dressing Type Disk with Chlorhexadine gluconate (CHG); Transparent 09/12/21 1410   Hub Color/Line Status Green 09/12/21 1410   Action Taken Other (comment) 09/12/21 1410   Alcohol Cap Used Yes 09/12/21 1410        Opportunity for questions and clarification was provided.       Patient transported with:   Monitor  O2 @ vent liters  Registered Nurse  Tech

## 2021-09-13 NOTE — PROGRESS NOTES
CM notified pt's point of contact information is not valid. CM looked in care everywhere and have contacted [facility] as pt has been serviced by this agency recently. [employer] has been provided with pt's 's Diana Baltazar [phone]) contact information. CM provided this information to ICU.

## 2021-09-13 NOTE — ED NOTES
Pt becoming more agitated again, restless in bed. RN attempts to calm the pt with talking and environmental changes  w/o success.

## 2021-09-13 NOTE — ED NOTES
Pt hourly rounding completed. Pt remains sedated  Safety   Pt (x) resting on stretcher with side rails up and call bell in reach. () in chair    () in parents arms. Toileting     [place] in place    Ongoing Updates  Updated on plan of care and status of test results.   Pain Management  Inquired as to comfort and offered comfort measures:    (x) warm blankets   (x) dimmed lights

## 2021-09-13 NOTE — PROGRESS NOTES
Patient is very agitated off propofol drip pulling, reaching for Et tube unable to wean off propofol even on max precedex and ativan pushes. Unable to safe extubate today   Switch to regular ventilator  Start SBT again tomorrow in AM  ICU bed should be ready soon. Full note to follow  JAIDEN Ward MD

## 2021-09-13 NOTE — PROGRESS NOTES
attempted an initial consultation and spiritual assessment for Willow Bender, who is a 39 y.o.,female. Patient is on a ventilator and not responsive to me. Her chart lists Francesca Yu - Other non-relative - and a phone number that is not in service.  Evangelinaann Eisenmenger was able to find a Pleasant Terence -  - in an older chart but the number listed for him was also not in service. Case management will continue to look for contact information. The reason the Patient came to the hospital is:   Patient Active Problem List    Diagnosis Date Noted    [facility]osis [facility]     Hyponatremia     Acute respiratory failure with hypoxia ([facility]     Left wrist fracture, with delayed healing, subsequent encounter 09/12/2021    Drug overdose, intentional self-harm, initial encounter ([facility] [facility] 09/12/2021    Hypoxia 09/12/2021    Hypotension after procedure 09/12/2021    Acute metabolic encephalopathy 12/79/7003    Ekbom's delusional parasitosis ([facility] [address] 09/06/2020    Dextromethorphan use disorder, moderate ([facility] [address] 12/07/2019      Chaplains will continue to follow and will provide pastoral care as needed or requested.  recommends bedside caregivers page  on duty if patient shows signs of acute spiritual or emotional distress. [address], M.Div.   Board Certified   [phone] - Office

## 2021-09-13 NOTE — PROGRESS NOTES
[facility]  Pulmonary, Critical Care, and Sleep Medicine    Name: Joaquina Rivas MRN: [id number]   : 1980 Hospital: [facility] [place]   Date: 2021        Pulmonary Critical Care Note    IMPRESSION:   · Acute respiratory failure · J96.00 ·   Stable 2 mm nodular right lower lobe density laterally from 2015; smoker, no prior CT chest for any additional nodule w/up · R91.1     Patient Active Problem List   Diagnosis Code    Dextromethorphan use disorder, moderate ([address] F19.20    Ekbom's delusional parasitosis ([address]    Left wrist fracture, with delayed healing, subsequent encounter S62.102G    Drug overdose, intentional self-harm, initial encounter ([address] T50.902A    Hypoxia R09.02    Hypotension after procedure I95.81    Acute metabolic encephalopathy X90.55    Psychosis ([address] F29    Hyponatremia E87.1    Acute respiratory failure with hypoxia ([address] J96.01 ·   Code status: full code   RECOMMENDATIONS:   Respiratory: Mech. Ventilated patients- aim to keep peak plateau pressure less than or equal to 30cm H2O. Titrate FiO2 for goal SPO2> 91%  Chest x-ray stable today  ABG 7.3 4//290/99 on assist control 350 FiO2 50% PEEP 5. With turn off sedation started spontaneous breathing trial patient was extremely agitated. In spite of maximum dose of Precedex and Ativan pushes. Trying to pull IVs and ET tube. Back on propofol drip. Keep daily spontaneous breathing trial off sedation  Speck DTs? Withdrawal from drugs?   Discuss CT chest option with pt when more stable  Not a candidate for LD CT chest  Discussed with RN to get Urine pregnancy test before any radiological test  VAP prevention bundle and sedation bundle followed, head of the bed at 30' all times  Daily sedation holiday and assessment for weaning with SBT as tolerated  Continue pulmonary hygiene care  ID: No evidence for sepsis   Lactic acid normal  CVS: Monitor Hemodynamics- stable  IVF: D5NS at 50 mL/hr  EKG in AM  Renal: Monitor renal functions, lytes  Replace lytes per unit protocol  CK in AM  Heme: Monitor CBC H/H is expected to drop with IVF  No evidence of active bleeding  Endo: TSH ordered  GI: diet - NPO  LFT normal  Neurology: sedated  Toxicology: gabapentin OD  Supportive care  CT head per clinical course  Sedation: Propofol - titrate  Hold Ativan drip as tolerated; may use PRN Ativan  Ammonia    Will defer respective systems problem management to primary and other consultant and follow patient in ICU with primary and other medical team  Further recommendations will be based on the patient's response to recommended treatment and results of the investigation ordered. Quality Care: PPI, DVT prophylaxis, HOB elevated, Infection control all reviewed and addressed. PAIN AND SEDATION: Propofol; PRN Ativan  Skin/Wound: none  Prophylaxis: DVT and GI Prophylaxis reviewed. Restraints: Wrist soft restraints for patient interfering with medical therapy/management and patient safety. PT/OT eval and treat: as needed when stable   Lines/Tubes: PIV   ETT: 9/11/21  OGT/NGT: 9/11/21  Other drains: 9/11/21  ADVANCE DIRECTIVE: Full code. DISCUSSION: Events and notes from last 24 hours reviewed. Care plan discussed with nursing, hospitalist, RT    Quality Care: PPI, DVT prophylaxis, HOB elevated, Infection control all reviewed and addressed. High complexity decision making was performed during the evaluation of this patient at high risk for decompensation with multiple organ involvement. Total critical care time spent rendering complex decision making and > 50% time spent in face to face evaluation exclusive of procedures/family discussion/coordination of care: 38 minutes. Subjective/History:   Ms. Juliette Gutierrez has been seen and evaluated as Dr. Shorty Cleveland requested now for assisting with Acute respiratory failure and ventilator management.   Patient is a 39 y.o. female with following PMhx presented to ER with lethargy via EMS and admitted for Gabapentin overdose. Pt required intubation for airways protection. Position control was contacted that recommended supportive care per ER provider. Pt seen at bedside in ER rm#2. The patient can not provide additional history due to Ventilated. 9/14/2021 patient evaluated in intensive care unit bed 4 and previously evacuated in emergency room bed 2. Weaning trials tried. Off sedation on Precedex. Very agitated does not follow commands. Dangerously pulls on IVs and ET tube. Back on propofol drip again try spontaneous breathing trial in the morning. Add at night low-dose of Zyprexa  Review of Systems:  Review of systems not obtained due to patient factors. Past Medical History:  Past Medical History:   Diagnosis Date    Asthma     Bilateral ovarian cysts     Cocaine abuse ([facility]     Mental and behavioral problem     Pancreatitis     Pancreatitis     Psychosis ([facility] [address]         Past Surgical History:  Past Surgical History:   Procedure Laterality Date    HX GYN      D&C, Hysterectomy        Medications:  Prior to Admission medications    Medication Sig Start Date End Date Taking? Authorizing Provider   albuterol (PROVENTIL HFA, VENTOLIN HFA, PROAIR HFA) 90 mcg/actuation inhaler Take 2 Puffs by inhalation every four (4) hours as needed for Wheezing or Shortness of Breath. 8/4/21   Kiana Chi PA-C   ibuprofen (MOTRIN) 600 mg tablet Take 1 Tablet by mouth every six (6) hours as needed for Pain. Take with food.  8/4/21   Kiana Chi PA-C   ALPRAZolam (XANAX) 0.5 mg tablet TAKE 1 TABLET BY MOUTH ONCE DAILY AS NEEDED FOR ANXIETY 12/4/19   Provider, Historical   metFORMIN (GLUCOPHAGE) 500 mg tablet TAKE 1 TABLET BY MOUTH ONCE DAILY 11/12/19   Provider, Historical   nicotine (NICODERM CQ) 21 mg/24 hr APPLY 1 PATCH TOPICALLY TO THE SKIN DAILY FOR CRAVINGS AS DIRECTED 12/4/19   Provider, Historical   traZODone (DESYREL) 50 mg tablet TAKE 1 TABLET BY MOUTH AT BEDTIME AS NEEDED FOR INSOMNIA 19   Provider, Silvino   escitalopram oxalate (LEXAPRO) 10 mg tablet Take 10 mg by mouth daily. Luisa Burch MD   lurasidone (LATUDA) 80 mg tab tablet Take 80 mg by mouth daily (with dinner). Luisa Burch MD       Current Facility-Administered Medications   Medication Dose Route Frequency    propofol (DIPRIVAN) 10 mg/mL infusion  0-50 mcg/kg/min IntraVENous TITRATE    albuterol-ipratropium (DUO-NEB) 2.5 MG-0.5 MG/3 ML  3 mL Nebulization TID    [START ON 2021] lactulose (CHRONULAC) 10 gram/15 mL solution 30 mL  20 g Oral DAILY    sodium chloride (NS) flush 5-40 mL  5-40 mL IntraVENous Q8H    enoxaparin (LOVENOX) injection 40 mg  40 mg SubCUTAneous DAILY    famotidine (PF) (PEPCID) 20 mg in 0.9% sodium chloride 10 mL injection  20 mg IntraVENous BID    chlorhexidine (PERIDEX) 0.12 % mouthwash 10 mL  10 mL Oral Q12H    dextrose 5% and 0.9% NaCl infusion  100 mL/hr IntraVENous CONTINUOUS    LORazepam (ATIVAN) 1 mg/mL in D5W infusion  0-0.1 mg/kg/hr IntraVENous TITRATE       Allergy:  Allergies   Allergen Reactions    Fish Containing Products Itching    Toradol [Ketorolac] Rash     Pt reports she is not allergic to this medication. Social History:  Social History     Tobacco Use    Smoking status: Current Every Day Smoker     Packs/day: 1.00    Smokeless tobacco: Never Used   Substance Use Topics    Alcohol use: Not Currently    Drug use: Not Currently     Types: Cocaine, Marijuana     Comment: used with in past 24 hours        Family History:  No family history on file. Objective:   Vital Signs:    Blood pressure 110/66, pulse (!) 107, temperature 99.4 °F (37.4 °C), resp. rate 22, height 5' 2\" (1.575 m), weight 72.6 kg (160 lb 0.9 oz), last menstrual period 05/15/2018, SpO2 100 %. Body mass index is 29.27 kg/m².    O2 Device: Ventilator       Temp (24hrs), Av.9 °F (37.2 °C), Min:97.8 °F (36.6 °C), Max:99.4 °F (37.4 °C) Intake/Output:   Last shift:      09/13 0701 - 09/13 1900  In: 626.2 [I.V.:626.2]  Out: 575 [Urine:575]  Last 3 shifts: 09/11 1901 - 09/13 0700  In: -   Out: 900 [Urine:900]    Intake/Output Summary (Last 24 hours) at 9/13/2021 1535  Last data filed at 9/13/2021 1219  Gross per 24 hour   Intake 626.15 ml   Output 1475 ml   Net -848. 85 ml       Ventilator Settings:  Mode Rate Tidal Volume Pressure FiO2 PEEP   Assist control, VC+   350 ml    50 % 5 cm H20     Peak airway pressure: 16 cm H2O    Minute ventilation: 7.7 l/min      Lung protective strategy, Pl pressure goals less than or equal to 30. Physical Exam: Intubated sedated off sedation moving all 4 extremities but did not follow commands  General: sedated, in no respiratory distress, appears stated age, on ventilator  HEENT: PERRL, fundi benign, ET and OG tubes in place  Neck: No abnormally enlarged lymph nodes or thyroid, supple  Chest: normal  Lungs: normal air entry, breathing normal , clear to auscultation bilaterally, slightly decreased breath sounds at the right base normal percussion bilaterally, no tenderness/ rash  Heart: Regular rate and rhythm, S1S2 present or without murmur or extra heart sounds  Abdomen: non distended, bowel sounds normoactive, tympanic, abdomen is soft without significant tenderness, masses, organomegaly or guarding, rigidity, rebound  Extremity: negative for edema, cyanosis, clubbing  Capillary refill: normal  Neuro: sedated, moves all extremities spontaneously in bed, no involuntary movements, exam limitation on ventilator  Skin: Skin color, texture, turgor fair.  Skin dry, warm, non-diaphoretic    Data:     Recent Results (from the past 24 hour(s))   BLOOD GAS, ARTERIAL POC    Collection Time: 09/13/21  5:08 AM   Result Value Ref Range    Device: ADULT VENT      FIO2 (POC) 50 %    pH (POC) 7.34 (L) 7.35 - 7.45      pCO2 (POC) 46.4 (H) 35.0 - 45.0 MMHG    pO2 (POC) 290 (H) 80 - 100 MMHG    HCO3 (POC) 25.1 22 - 26 MMOL/L sO2 (POC) 99.9 (H) 92 - 97 %    Base deficit (POC) 0.9 mmol/L    Mode ASSIST CONTROL      Tidal volume 350 ml    Set Rate 14 bpm    PEEP/CPAP (POC) 5 cmH2O    PIP (POC) 16      Allens test (POC) NOT APPLICABLE      Total resp. rate 14      Site RIGHT RADIAL      Specimen type (POC) ARTERIAL      Performed by Freedom Bunch    EKG, 12 LEAD, SUBSEQUENT    Collection Time: 09/13/21  6:08 AM   Result Value Ref Range    Ventricular Rate 115 BPM    Atrial Rate 115 BPM    P-R Interval 188 ms    QRS Duration 72 ms    Q-T Interval 290 ms    QTC Calculation (Bezet) 401 ms    Calculated P Axis 52 degrees    Calculated R Axis 66 degrees    Calculated T Axis -156 degrees    Diagnosis       Sinus tachycardia  Nonspecific T wave abnormality  Abnormal ECG  When compared with ECG of 11-SEP-2021 20:46,  Questionable change in QRS axis  Nonspecific T wave abnormality, worse in Inferior leads  Nonspecific T wave abnormality, worse in Lateral leads     COVID-19 RAPID TEST    Collection Time: 09/13/21  7:37 AM   Result Value Ref Range    Specimen source Nasopharyngeal      COVID-19 rapid test Not detected NOTD     CBC WITH AUTOMATED DIFF    Collection Time: 09/13/21 12:00 PM   Result Value Ref Range    WBC 10.9 4.6 - 13.2 K/uL    RBC 3.86 (L) 4.20 - 5.30 M/uL    HGB 11.4 (L) 12.0 - 16.0 g/dL    HCT 35.0 35.0 - 45.0 %    MCV 90.7 78.0 - 100.0 FL    MCH 29.5 24.0 - 34.0 PG    MCHC 32.6 31.0 - 37.0 g/dL    RDW 13.2 11.6 - 14.5 %    PLATELET 825 732 - 943 K/uL    MPV 10.3 9.2 - 11.8 FL    NEUTROPHILS 76 (H) 40 - 73 %    LYMPHOCYTES 14 (L) 21 - 52 %    MONOCYTES 9 3 - 10 %    EOSINOPHILS 1 0 - 5 %    BASOPHILS 0 0 - 2 %    ABS. NEUTROPHILS 8.3 (H) 1.8 - 8.0 K/UL    ABS. LYMPHOCYTES 1.5 0.9 - 3.6 K/UL    ABS. MONOCYTES 0.9 0.05 - 1.2 K/UL    ABS. EOSINOPHILS 0.2 0.0 - 0.4 K/UL    ABS.  BASOPHILS 0.0 0.0 - 0.1 K/UL    DF AUTOMATED     MAGNESIUM    Collection Time: 09/13/21 12:00 PM   Result Value Ref Range    Magnesium 1.9 1.6 - 2.6 mg/dL METABOLIC PANEL, COMPREHENSIVE    Collection Time: 09/13/21 12:00 PM   Result Value Ref Range    Sodium 146 (H) 136 - 145 mmol/L    Potassium 3.7 3.5 - 5.5 mmol/L    Chloride 116 (H) 100 - 111 mmol/L    CO2 24 21 - 32 mmol/L    Anion gap 6 3.0 - 18 mmol/L    Glucose 116 (H) 74 - 99 mg/dL    BUN 5 (L) 7.0 - 18 MG/DL    Creatinine 0.48 (L) 0.6 - 1.3 MG/DL    BUN/Creatinine ratio 10 (L) 12 - 20      GFR est AA >60 >60 ml/min/1.73m2    GFR est non-AA >60 >60 ml/min/1.73m2    Calcium 8.3 (L) 8.5 - 10.1 MG/DL    Bilirubin, total 0.4 0.2 - 1.0 MG/DL    ALT (SGPT) 16 13 - 56 U/L    AST (SGOT) 14 10 - 38 U/L    Alk. phosphatase 79 45 - 117 U/L    Protein, total 5.8 (L) 6.4 - 8.2 g/dL    Albumin 3.0 (L) 3.4 - 5.0 g/dL    Globulin 2.8 2.0 - 4.0 g/dL    A-G Ratio 1.1 0.8 - 1.7     PHOSPHORUS    Collection Time: 09/13/21 12:00 PM   Result Value Ref Range    Phosphorus 2.7 2.5 - 4.9 MG/DL   CK    Collection Time: 09/13/21 12:00 PM   Result Value Ref Range    CK 81 26 - 192 U/L   AMMONIA    Collection Time: 09/13/21 12:00 PM   Result Value Ref Range    Ammonia 51 (H) 11 - 32 UMOL/L           Recent Labs     09/13/21  0508 09/12/21  0034   FIO2I 50  --    HCO3I 25.1 22.8   PCO2I 46.4* 36.1   PHI 7.34* 7.41   PO2I 290* 511*       All Micro Results     Procedure Component Value Units Date/Time    COVID-19 RAPID TEST [[id number]] Collected: 09/13/21 0737    Order Status: Completed Specimen: Nasopharyngeal Updated: 09/13/21 0811     Specimen source Nasopharyngeal        COVID-19 rapid test Not detected        Comment: Rapid Abbott ID Now       Rapid NAAT:  The specimen is NEGATIVE for SARS-CoV-2, the novel coronavirus associated with COVID-19. Negative results should be treated as presumptive and, if inconsistent with clinical signs and symptoms or necessary for patient management, should be tested with an alternative molecular assay.   Negative results do not preclude SARS-CoV-2 infection and should not be used as the sole basis for patient management decisions. This test has been authorized by the FDA under an Emergency Use Authorization (EUA) for use by authorized laboratories. Fact sheet for Healthcare Providers: ConventionUpdate.co.nz  Fact sheet for Patients: ConventionUpdate.co.nz       Methodology: Isothermal Nucleic Acid Amplification         COVID-19 RAPID TEST [[id number]]     Order Status: Sent           Telemetry: normal sinus rhythm      Imaging:  [x]I have personally reviewed the patients chest radiographs images and report     Results from Hospital Encounter encounter on 09/11/21    XR ABD PORT  1 V    Narrative  EXAM: Frontal view of the abdomen    CLINICAL INDICATION/HISTORY: NG tube placement    COMPARISON: None.    _______________    FINDINGS:    NG/OG tube in place with the tip in the left upper quadrant in the region of the  gastric fundus. No bowel obstruction. Moderate colonic stool burden.    _______________    Impression  1. NG/OG tube is in good position with the tip in the left upper quadrant in the  region of the gastric fundus. 2. Moderate colonic stool burden. Results from [place] encounter on 05/22/18    CT ABD PELV W CONT    Narrative  EXAM: CT of the abdomen and pelvis    INDICATION: Abdominal pain    COMPARISON: 11/29/2015    TECHNIQUE: Axial CT imaging of the abdomen and pelvis was performed with  intravenous contrast. Multiplanar reformats were generated. One or more dose  reduction techniques were used on this CT: automated exposure control,  adjustment of the mAs and/or kVp according to patient's size, and iterative  reconstruction techniques. The specific techniques utilized on this CT exam have  been documented in the patient's electronic medical record.    _______________    FINDINGS:    LOWER CHEST: No basilar infiltrates. Stable 2 mm nodular right lower lobe  density laterally. LIVER, BILIARY: Liver is stable. No biliary dilation. Gallbladder is present. PANCREAS: Normal.    SPLEEN: Normal.    ADRENALS: Normal.    KIDNEYS: No acute findings. Several small subcentimeter hypodensities which are  too small to characterize. No perinephric stranding. 2 mm calculus lower pole of  right kidney. LYMPH NODES: No enlarged lymph nodes. GASTROINTESTINAL TRACT: No bowel dilation or wall thickening. GI tract  evaluation limited without oral contrast. No bowel obstruction. No free air or  free fluid. Normal appendix. PELVIC ORGANS: Unremarkable. VASCULATURE: Unremarkable. BONES: No acute or aggressive osseous abnormalities identified. OTHER: None.    _______________    Impression  IMPRESSION:    No acute findings are identified. No bowel obstruction or inflammation. Normal appendix. Punctate nonobstructing right lower pole calculus.       [x]See my orders for details          Zach Officer, MD

## 2021-09-13 NOTE — ED NOTES
Waiting for ICU bed to become available. Pt more relaxed and calm since dilaudid given. Pt resting in bed at this time.

## 2021-09-13 NOTE — PROGRESS NOTES
Hospitalist Progress Note-critical care note     Patient: Bro Taveras MRN: [id number]  CSN: [id number]    YOB: 1980  Age: 39 y.o. Sex: female    DOA: 9/11/2021 LOS:  LOS: 1 day            Chief complaint: wrist fracture , drug overdose , acute respiratory failure with hypoxia, psychosis     Assessment/Plan         Hospital Problems  Date Reviewed: 9/6/2015        Codes Class Noted POA    Psychosis ([facility] 75.) ICD-10-CM: F29  ICD-9-CM: 298.9  Unknown Unknown        Hyponatremia ICD-10-CM: E87.1  ICD-9-CM: 276.1  Unknown Unknown        Left wrist fracture, with delayed healing, subsequent encounter ICD-10-CM: S62.102G  ICD-9-CM: V54.19  9/12/2021 Unknown        * (Principal) Drug overdose, intentional self-harm, [facility] encounter ([facility] ICD-10-CM: T50.902A  ICD-9-CM: 977.9, E950.5  9/12/2021 Unknown        Hypoxia ICD-10-CM: R09.02  ICD-9-CM: 799.02  9/12/2021 Unknown        Hypotension after procedure ICD-10-CM: I95.81  ICD-9-CM: 458.29  9/12/2021 Unknown        Acute metabolic encephalopathy [facility]-08-WA: G93.41  ICD-9-CM: 348.31  9/12/2021 Unknown                  Bro Taveras is a 39 y.o. female who is standing history of multidrug use/abuse, previous drug-seeking behavior and mental health issues otherwise unspecified arrives via EMS with acute metabolic encephalopathy and lethargy. Admitted for likely gabapentin overdose.        Resp -      Acute respiratory failure with hypoxia   Intubated on 9/12     VAP bundle. HOB>30 degrees. Bronchodilators. Follow sputum cx. Daily CXR. Case discussed with dr. Duckworth Bound    cxr :No acute cardiopulmonary abnormality  covid 19 rapid negative     CVS -   Hypotension after intubation   So far bp remained well -not need vasopressor     ID -   No s/sxs of infection   UA clear      Heme/onc -   Hemoglobin mild low -like due to dilution   Follow H&H, plts.  INR.     Renal -   Hypernatremia   On d5  Continue monitoring   icu electrolytes replacement protocol   Trend BUN, Cr, follow I/O, zamora in place. Check and replace Mg, K, phos.     Endocrine -  Follow FSG     Neuro -  Acute metabolic encephalopathy with unsafe combative behavior requiring intubation   Gabapentin overdose with lethargy and AMS   S/p procedural stomach emptying in ED with charcoal and sorbitol   Continue monitoring the side affect   Need psych evaluation after extubation    On profofol and ativan gtts   ETOH level wnl   Acetaminophen and salicylate levels wnl   Mild elevated ammonia level  Will add lactulose-continue monitoring ammonia level    no ct head on admission-will order     Psych   Psychosis , hx of  Ekbom's delusional parasitosis   Need psych evaluation later     GI - NPO while intubated      Msk: left wrist fracture: immobilized -poa   Pt agitated  While weaning propofol   30 minutes of critical care time spent in the direct evaluation and treatment of this high risk patient. The reason for providing this level of medical care for this critically ill patient was due a critical illness that impaired one or more vital organ systems such that there was a high probability of imminent or life threatening deterioration in the patients condition. This care involved high complexity decision making to assess, manipulate, and support vital system functions, to treat this degreee vital organ system failure and to prevent further life threatening deterioration of the patients condition.     Disposition :tbd,   Review of systems:  Unable to obtain due to intubation   Vital signs/Intake and Output:  Visit Vitals  BP (!) 125/90 (BP 1 Location: Right lower arm, BP Patient Position: At rest)   Pulse (!) 112   Temp 99.4 °F (37.4 °C)   Resp 15   Ht 5' 2\" (1.575 m)   Wt 72.6 kg (160 lb 0.9 oz)   SpO2 100%   BMI 29.27 kg/m²     Current Shift:  09/13 0701 - 09/13 1900  In: 626.2 [I.V.:626.2]  Out: 575 [Urine:575]  Last three shifts:  09/11 1901 - 09/13 0700  In: -   Out: 900 [Urine:900]    Physical Exam:  General: Intubated    HEENT: NC, Atraumatic. ET tube noted   Lungs: CTA Bilaterally. No Wheezing/Rhonchi/Rales. Heart:  Tachy ,  No murmur, No Rubs, No Gallops  Abdomen: Soft, Non distended, Non tender. +Bowel sounds,   Extremities: No c/c.immobilzer noted on left wrist   Psych:   Calm   Neurologic:  On sedation           Labs: Results:       Chemistry Recent Labs     09/12/21  0550 09/11/21 2034   GLU 99 88   * 144   K 3.9 4.0   * 112*   CO2 25 23   BUN 13 20*   CREA 0.50* 0.68   CA 8.7 9.7   AGAP 6 9   BUCR 26* 29*   AP 87 96   TP 6.6 6.8   ALB 3.5 3.8   GLOB 3.1 3.0   AGRAT 1.1 1.3      CBC w/Diff Recent Labs     09/13/21  1200 09/12/21  0550 09/11/21 2034   WBC 10.9 8.5 8.1   RBC 3.86* 4.04* 4.20   HGB 11.4* 12.2 12.5   HCT 35.0 37.3 37.4    257 259   GRANS 76* 55 49   LYMPH 14* 32 42   EOS 1 3 3      Cardiac Enzymes Recent Labs     09/11/21 2034   CPK 67   CKND1 CALCULATION NOT PERFORMED WHEN RESULT IS BELOW LINEAR LIMIT      Coagulation No results for input(s): PTP, INR, APTT, INREXT, INREXT in the last 72 hours. Lipid Panel No results found for: CHOL, CHOLPOCT, CHOLX, CHLST, CHOLV, 275361, HDL, HDLP, LDL, LDLC, DLDLP, 612255, VLDLC, VLDL, TGLX, TRIGL, TRIGP, TGLPOCT, CHHD, CHHDX   BNP No results for input(s): BNPP in the last 72 hours.    Liver Enzymes Recent Labs     09/12/21  0550   TP 6.6   ALB 3.5   AP 87      Thyroid Studies No results found for: T4, T3U, TSH, TSHEXT, TSHEXT     Procedures/imaging: see electronic medical records for all procedures/Xrays and details which were not copied into this note but were reviewed prior to creation of Plan    XR WRIST LT AP/LAT/OBL MIN 3V    Result Date: 8/19/2021  EXAM: XR WRIST LT AP/LAT/OBL MIN 3V CLINICAL INDICATION/HISTORY: Fall with LEFT wrist pain and swelling -Additional: None COMPARISON: None TECHNIQUE: 3 views of the left wrist _______________ FINDINGS: BONES: Comminuted, impacted fracture of the distal radius with slight dorsal angulation of the distal fracture fragment. No aggressive appearing osseous lytic or blastic lesion. SOFT TISSUES: Unremarkable. _______________     Comminuted, impacted fracture of the distal radius. XR CHEST PORT    Result Date: 9/13/2021  EXAM: XR CHEST PORT CLINICAL INDICATION/HISTORY: Acute respiratory failure, ET tub position -Additional: None COMPARISON: One day prior TECHNIQUE: Portable frontal view of the chest _______________ FINDINGS: SUPPORT DEVICES: Endotracheal and enteric tubes unchanged. HEART AND MEDIASTINUM: Cardiomediastinal silhouette within normal limits. LUNGS AND PLEURAL SPACES: No dense consolidation, large effusion or pneumothorax. _______________     No acute cardiopulmonary abnormality. XR CHEST PORT    Result Date: 9/11/2021  MEDICAL RECORDS NUMBER: [id number] PROCEDURE:  Single view of the chest DATE: 9/11/2021 9:09 PM HISTORY: 39years old Female. post ett Comparison: 8/4/2021 FINDINGS: The patient has been intubated with appropriate placement of the endotracheal tube. There is no enteric tube passing below the level of the diaphragm. There is no significant effusion. There is no significant pneumothorax. Cardiomediastinal silhouette is within normal limits. There is no evidence of a focal pulmonary infiltrate or mass. 1. There is no significant or acute cardiopulmonary process. The patient has been intubated with appropriate placement of the endotracheal tube. There is no enteric tube passing below the level of the diaphragm. This report has been generated using voice recognition software. XR ABD PORT  1 V    Result Date: 9/12/2021  EXAM: Frontal view of the abdomen CLINICAL INDICATION/HISTORY: NG tube placement COMPARISON: None. _______________ FINDINGS: NG/OG tube in place with the tip in the left upper quadrant in the region of the gastric fundus. No bowel obstruction. Moderate colonic stool burden. _______________     1.  NG/OG tube is in good position with the tip in the left upper quadrant in the region of the gastric fundus. 2. Moderate colonic stool burden.       Akshat Marley MD

## 2021-09-13 NOTE — PROGRESS NOTES
1300: Patient arrived in room 4 in ICU from ED on the Ventilator and sedation/restraints/NS. 1700: Informed that patient has a fever, Tachy, restless. She is on sedation. However, even at 50 mcg of Propofol/hr, she is still restless. Completed a CIWA Score.  Aiyana Hospitalist.

## 2021-09-13 NOTE — ED NOTES
2 RNs at bedside. Pt's OG tube flushed to ensure patent  with 10 ml sterile water. Pt still restless. Pts propofol to be adjusted as pt remains restless in bed.

## 2021-09-13 NOTE — ED NOTES
Bedside report received from Hilario Fay RN. Pt in bed restless at this time. RN talks to pt able to calm pt down minimally. ICU MD at bedside aware will change pt back to propofol. RN to change when ordered. Pt has wrist restraints in place and a external fixator type of device to L wrist, doesn't seem interrupted with wrist restraint. Pt has + pulses and cap refill in place all extremities.  Pt on ventilator attempting to breathe over vent on this RN's arrival.

## 2021-09-13 NOTE — ED NOTES
Spoke with Dr Lana Tran. Aware that pt is maxed out on propofol drip. Reports plan is attempt breathing trial and extubation tomorrow. Aware pt has been in restraints with order needed.

## 2021-09-13 NOTE — ED NOTES
Pt resting more comfortable at this time. Pt arousal to verbal/spontaneous stimuli. Pt has arm soft restraints in place.

## 2021-09-13 NOTE — PROGRESS NOTES
09/13/21 1240   RCP   RCP Time 15 min. RCP Tasks Transports;ER   Transported patient to ICU from ER. Transport went well. Patient airway is secured and patent. Patient is stable. No respiratory distress is noted at this time.

## 2021-09-13 NOTE — ED NOTES
Pt hourly rounding completed. Safety   Pt () resting on stretcher with side rails up and call bell in reach. () in chair    () in parents arms. Toileting   Pt offered ()Bedpan     ()Assistance to Restroom     ()Urinal  Ongoing Updates  Updated on plan of care and status of test results.   Pain Management  Inquired as to comfort and offered comfort measures:    () warm blankets   () dimmed lights

## 2021-09-14 ENCOUNTER — APPOINTMENT (OUTPATIENT)
Dept: GENERAL RADIOLOGY | Age: 41
DRG: 004 | End: 2021-09-14
Attending: INTERNAL MEDICINE
Payer: MEDICAID

## 2021-09-14 PROBLEM — R79.89 INCREASED AMMONIA LEVEL: Status: ACTIVE | Noted: 2021-09-14

## 2021-09-14 LAB
ALBUMIN SERPL-MCNC: 2.5 G/DL (ref 3.4–5)
ALBUMIN/GLOB SERPL: 0.9 {RATIO} (ref 0.8–1.7)
ALP SERPL-CCNC: 73 U/L (ref 45–117)
ALT SERPL-CCNC: 18 U/L (ref 13–56)
AMMONIA PLAS-SCNC: 74 UMOL/L (ref 11–32)
ANION GAP SERPL CALC-SCNC: 7 MMOL/L (ref 3–18)
ARTERIAL PATENCY WRIST A: POSITIVE
AST SERPL-CCNC: 11 U/L (ref 10–38)
B PERT DNA SPEC QL NAA+PROBE: NOT DETECTED
BASE DEFICIT BLD-SCNC: 2.2 MMOL/L
BASOPHILS # BLD: 0 K/UL (ref 0–0.1)
BASOPHILS NFR BLD: 0 % (ref 0–2)
BDY SITE: ABNORMAL
BILIRUB SERPL-MCNC: 0.2 MG/DL (ref 0.2–1)
BORDETELLA PARAPERTUSSIS PCR, BORPAR: NOT DETECTED
BUN SERPL-MCNC: 5 MG/DL (ref 7–18)
BUN/CREAT SERPL: 15 (ref 12–20)
C PNEUM DNA SPEC QL NAA+PROBE: NOT DETECTED
CA-I SERPL-SCNC: 1.17 MMOL/L (ref 1.12–1.32)
CALCIUM SERPL-MCNC: 7.9 MG/DL (ref 8.5–10.1)
CHLORIDE SERPL-SCNC: 115 MMOL/L (ref 100–111)
CO2 SERPL-SCNC: 23 MMOL/L (ref 21–32)
CREAT SERPL-MCNC: 0.33 MG/DL (ref 0.6–1.3)
DIFFERENTIAL METHOD BLD: ABNORMAL
EOSINOPHIL # BLD: 0.2 K/UL (ref 0–0.4)
EOSINOPHIL NFR BLD: 2 % (ref 0–5)
ERYTHROCYTE [DISTWIDTH] IN BLOOD BY AUTOMATED COUNT: 13.4 % (ref 11.6–14.5)
EST. AVERAGE GLUCOSE BLD GHB EST-MCNC: 105 MG/DL
FLUAV H1 2009 PAND RNA SPEC QL NAA+PROBE: NOT DETECTED
FLUAV H1 RNA SPEC QL NAA+PROBE: NOT DETECTED
FLUAV H3 RNA SPEC QL NAA+PROBE: NOT DETECTED
FLUAV SUBTYP SPEC NAA+PROBE: NOT DETECTED
FLUBV RNA SPEC QL NAA+PROBE: NOT DETECTED
GAS FLOW.O2 O2 DELIVERY SYS: ABNORMAL L/MIN
GAS FLOW.O2 SETTING OXYMISER: 12 BPM
GLOBULIN SER CALC-MCNC: 2.7 G/DL (ref 2–4)
GLUCOSE BLD STRIP.AUTO-MCNC: 102 MG/DL (ref 70–110)
GLUCOSE BLD STRIP.AUTO-MCNC: 123 MG/DL (ref 70–110)
GLUCOSE BLD STRIP.AUTO-MCNC: 136 MG/DL (ref 70–110)
GLUCOSE BLD STRIP.AUTO-MCNC: 94 MG/DL (ref 70–110)
GLUCOSE SERPL-MCNC: 113 MG/DL (ref 74–99)
HADV DNA SPEC QL NAA+PROBE: NOT DETECTED
HBA1C MFR BLD: 5.3 % (ref 4.2–5.6)
HCO3 BLD-SCNC: 23.1 MMOL/L (ref 22–26)
HCOV 229E RNA SPEC QL NAA+PROBE: NOT DETECTED
HCOV HKU1 RNA SPEC QL NAA+PROBE: NOT DETECTED
HCOV NL63 RNA SPEC QL NAA+PROBE: NOT DETECTED
HCOV OC43 RNA SPEC QL NAA+PROBE: NOT DETECTED
HCT VFR BLD AUTO: 32.5 % (ref 35–45)
HGB BLD-MCNC: 10.4 G/DL (ref 12–16)
HMPV RNA SPEC QL NAA+PROBE: NOT DETECTED
HPIV1 RNA SPEC QL NAA+PROBE: NOT DETECTED
HPIV2 RNA SPEC QL NAA+PROBE: NOT DETECTED
HPIV3 RNA SPEC QL NAA+PROBE: NOT DETECTED
HPIV4 RNA SPEC QL NAA+PROBE: NOT DETECTED
LACTATE SERPL-SCNC: 1.6 MMOL/L (ref 0.4–2)
LYMPHOCYTES # BLD: 1.5 K/UL (ref 0.9–3.6)
LYMPHOCYTES NFR BLD: 14 % (ref 21–52)
M PNEUMO DNA SPEC QL NAA+PROBE: NOT DETECTED
MAGNESIUM SERPL-MCNC: 1.9 MG/DL (ref 1.6–2.6)
MAGNESIUM SERPL-MCNC: 2 MG/DL (ref 1.6–2.6)
MCH RBC QN AUTO: 30.6 PG (ref 24–34)
MCHC RBC AUTO-ENTMCNC: 32 G/DL (ref 31–37)
MCV RBC AUTO: 95.6 FL (ref 78–100)
MONOCYTES # BLD: 1 K/UL (ref 0.05–1.2)
MONOCYTES NFR BLD: 10 % (ref 3–10)
NEUTS SEG # BLD: 7.7 K/UL (ref 1.8–8)
NEUTS SEG NFR BLD: 73 % (ref 40–73)
O2/TOTAL GAS SETTING VFR VENT: 30 %
PCO2 BLD: 40.9 MMHG (ref 35–45)
PEEP RESPIRATORY: 5 CMH2O
PH BLD: 7.36 [PH] (ref 7.35–7.45)
PHOSPHATE SERPL-MCNC: 1.9 MG/DL (ref 2.5–4.9)
PHOSPHATE SERPL-MCNC: 2.7 MG/DL (ref 2.5–4.9)
PLATELET # BLD AUTO: 192 K/UL (ref 135–420)
PMV BLD AUTO: 11.2 FL (ref 9.2–11.8)
PO2 BLD: 95 MMHG (ref 80–100)
POTASSIUM SERPL-SCNC: 3.5 MMOL/L (ref 3.5–5.5)
POTASSIUM SERPL-SCNC: 4 MMOL/L (ref 3.5–5.5)
PROT SERPL-MCNC: 5.2 G/DL (ref 6.4–8.2)
RBC # BLD AUTO: 3.4 M/UL (ref 4.2–5.3)
RSV RNA SPEC QL NAA+PROBE: NOT DETECTED
RV+EV RNA SPEC QL NAA+PROBE: NOT DETECTED
SAO2 % BLD: 97.1 % (ref 92–97)
SARS-COV-2 PCR, COVPCR: NOT DETECTED
SERVICE CMNT-IMP: ABNORMAL
SODIUM SERPL-SCNC: 145 MMOL/L (ref 136–145)
SPECIMEN TYPE: ABNORMAL
VENTILATION MODE VENT: ABNORMAL
VT SETTING VENT: 350 ML
WBC # BLD AUTO: 10.5 K/UL (ref 4.6–13.2)

## 2021-09-14 PROCEDURE — 65610000006 HC RM INTENSIVE CARE

## 2021-09-14 PROCEDURE — 71045 X-RAY EXAM CHEST 1 VIEW: CPT

## 2021-09-14 PROCEDURE — 83036 HEMOGLOBIN GLYCOSYLATED A1C: CPT

## 2021-09-14 PROCEDURE — 87449 NOS EACH ORGANISM AG IA: CPT

## 2021-09-14 PROCEDURE — 74011000258 HC RX REV CODE- 258: Performed by: INTERNAL MEDICINE

## 2021-09-14 PROCEDURE — 77010033678 HC OXYGEN DAILY

## 2021-09-14 PROCEDURE — 74011250636 HC RX REV CODE- 250/636: Performed by: INTERNAL MEDICINE

## 2021-09-14 PROCEDURE — 83605 ASSAY OF LACTIC ACID: CPT

## 2021-09-14 PROCEDURE — 83735 ASSAY OF MAGNESIUM: CPT

## 2021-09-14 PROCEDURE — 74011250636 HC RX REV CODE- 250/636: Performed by: FAMILY MEDICINE

## 2021-09-14 PROCEDURE — 74011250637 HC RX REV CODE- 250/637: Performed by: FAMILY MEDICINE

## 2021-09-14 PROCEDURE — 85025 COMPLETE CBC W/AUTO DIFF WBC: CPT

## 2021-09-14 PROCEDURE — 74011000250 HC RX REV CODE- 250: Performed by: INTERNAL MEDICINE

## 2021-09-14 PROCEDURE — 94640 AIRWAY INHALATION TREATMENT: CPT

## 2021-09-14 PROCEDURE — 82140 ASSAY OF AMMONIA: CPT

## 2021-09-14 PROCEDURE — 74011250637 HC RX REV CODE- 250/637: Performed by: HOSPITALIST

## 2021-09-14 PROCEDURE — 87040 BLOOD CULTURE FOR BACTERIA: CPT

## 2021-09-14 PROCEDURE — 36600 WITHDRAWAL OF ARTERIAL BLOOD: CPT

## 2021-09-14 PROCEDURE — 76450000000

## 2021-09-14 PROCEDURE — 82330 ASSAY OF CALCIUM: CPT

## 2021-09-14 PROCEDURE — 84132 ASSAY OF SERUM POTASSIUM: CPT

## 2021-09-14 PROCEDURE — 84100 ASSAY OF PHOSPHORUS: CPT

## 2021-09-14 PROCEDURE — 74011000250 HC RX REV CODE- 250: Performed by: FAMILY MEDICINE

## 2021-09-14 PROCEDURE — 82962 GLUCOSE BLOOD TEST: CPT

## 2021-09-14 PROCEDURE — 74011250637 HC RX REV CODE- 250/637: Performed by: INTERNAL MEDICINE

## 2021-09-14 PROCEDURE — 94003 VENT MGMT INPAT SUBQ DAY: CPT

## 2021-09-14 PROCEDURE — 0202U NFCT DS 22 TRGT SARS-COV-2: CPT

## 2021-09-14 PROCEDURE — 36415 COLL VENOUS BLD VENIPUNCTURE: CPT

## 2021-09-14 PROCEDURE — 82803 BLOOD GASES ANY COMBINATION: CPT

## 2021-09-14 RX ORDER — OLANZAPINE 10 MG/1
10 TABLET ORAL EVERY EVENING
Status: DISCONTINUED | OUTPATIENT
Start: 2021-09-14 | End: 2021-09-27

## 2021-09-14 RX ORDER — BUDESONIDE 0.5 MG/2ML
500 INHALANT ORAL
Status: DISCONTINUED | OUTPATIENT
Start: 2021-09-14 | End: 2021-09-29

## 2021-09-14 RX ORDER — INSULIN LISPRO 100 [IU]/ML
INJECTION, SOLUTION INTRAVENOUS; SUBCUTANEOUS EVERY 6 HOURS
Status: DISCONTINUED | OUTPATIENT
Start: 2021-09-14 | End: 2021-10-25

## 2021-09-14 RX ORDER — VANCOMYCIN 1.75 GRAM/500 ML IN 0.9 % SODIUM CHLORIDE INTRAVENOUS
1750 ONCE
Status: COMPLETED | OUTPATIENT
Start: 2021-09-14 | End: 2021-09-15

## 2021-09-14 RX ORDER — MAGNESIUM SULFATE 100 %
4 CRYSTALS MISCELLANEOUS AS NEEDED
Status: DISCONTINUED | OUTPATIENT
Start: 2021-09-14 | End: 2021-11-13 | Stop reason: HOSPADM

## 2021-09-14 RX ORDER — ARFORMOTEROL TARTRATE 15 UG/2ML
15 SOLUTION RESPIRATORY (INHALATION)
Status: DISCONTINUED | OUTPATIENT
Start: 2021-09-14 | End: 2021-09-29

## 2021-09-14 RX ORDER — POTASSIUM CHLORIDE 750 MG/1
10 TABLET, EXTENDED RELEASE ORAL
Status: COMPLETED | OUTPATIENT
Start: 2021-09-14 | End: 2021-09-14

## 2021-09-14 RX ORDER — DEXTROSE 50 % IN WATER (D50W) INTRAVENOUS SYRINGE
25-50 AS NEEDED
Status: DISCONTINUED | OUTPATIENT
Start: 2021-09-14 | End: 2021-11-13 | Stop reason: HOSPADM

## 2021-09-14 RX ORDER — MAGNESIUM SULFATE 1 G/100ML
1 INJECTION INTRAVENOUS ONCE
Status: COMPLETED | OUTPATIENT
Start: 2021-09-14 | End: 2021-09-14

## 2021-09-14 RX ORDER — SODIUM CHLORIDE 9 MG/ML
1000 INJECTION, SOLUTION INTRAVENOUS ONCE
Status: COMPLETED | OUTPATIENT
Start: 2021-09-14 | End: 2021-09-15

## 2021-09-14 RX ADMIN — SODIUM CHLORIDE 10 ML: 9 INJECTION, SOLUTION INTRAMUSCULAR; INTRAVENOUS; SUBCUTANEOUS at 21:08

## 2021-09-14 RX ADMIN — PROPOFOL 50 MCG/KG/MIN: 10 INJECTION, EMULSION INTRAVENOUS at 03:17

## 2021-09-14 RX ADMIN — POTASSIUM BICARBONATE 40 MEQ: 782 TABLET, EFFERVESCENT ORAL at 09:10

## 2021-09-14 RX ADMIN — LACTULOSE 30 ML: 10 SOLUTION ORAL at 16:05

## 2021-09-14 RX ADMIN — ENOXAPARIN SODIUM 40 MG: 100 INJECTION SUBCUTANEOUS at 16:05

## 2021-09-14 RX ADMIN — VANCOMYCIN HYDROCHLORIDE 1750 MG: 10 INJECTION, POWDER, LYOPHILIZED, FOR SOLUTION INTRAVENOUS at 18:26

## 2021-09-14 RX ADMIN — ACETAMINOPHEN 650 MG: 325 TABLET ORAL at 12:26

## 2021-09-14 RX ADMIN — 0.12% CHLORHEXIDINE GLUCONATE 10 ML: 1.2 RINSE ORAL at 20:13

## 2021-09-14 RX ADMIN — HYDROMORPHONE HYDROCHLORIDE 1 MG: 1 INJECTION, SOLUTION INTRAMUSCULAR; INTRAVENOUS; SUBCUTANEOUS at 09:13

## 2021-09-14 RX ADMIN — DEXTROSE MONOHYDRATE AND SODIUM CHLORIDE 100 ML/HR: 5; .9 INJECTION, SOLUTION INTRAVENOUS at 01:41

## 2021-09-14 RX ADMIN — HYDROMORPHONE HYDROCHLORIDE 1 MG: 1 INJECTION, SOLUTION INTRAMUSCULAR; INTRAVENOUS; SUBCUTANEOUS at 16:32

## 2021-09-14 RX ADMIN — LACTULOSE 15 ML: 20 SOLUTION ORAL at 09:11

## 2021-09-14 RX ADMIN — DEXTROSE MONOHYDRATE AND SODIUM CHLORIDE 100 ML/HR: 5; .9 INJECTION, SOLUTION INTRAVENOUS at 16:06

## 2021-09-14 RX ADMIN — LORAZEPAM 2 MG: 2 INJECTION INTRAMUSCULAR at 20:12

## 2021-09-14 RX ADMIN — SODIUM CHLORIDE 1000 ML/HR: 900 INJECTION, SOLUTION INTRAVENOUS at 17:00

## 2021-09-14 RX ADMIN — SODIUM CHLORIDE 10 ML: 9 INJECTION, SOLUTION INTRAMUSCULAR; INTRAVENOUS; SUBCUTANEOUS at 16:07

## 2021-09-14 RX ADMIN — IPRATROPIUM BROMIDE AND ALBUTEROL SULFATE 3 ML: .5; 3 SOLUTION RESPIRATORY (INHALATION) at 14:00

## 2021-09-14 RX ADMIN — PROPOFOL 50 MCG/KG/MIN: 10 INJECTION, EMULSION INTRAVENOUS at 07:30

## 2021-09-14 RX ADMIN — HYDROMORPHONE HYDROCHLORIDE 1 MG: 1 INJECTION, SOLUTION INTRAMUSCULAR; INTRAVENOUS; SUBCUTANEOUS at 06:18

## 2021-09-14 RX ADMIN — IPRATROPIUM BROMIDE AND ALBUTEROL SULFATE 3 ML: .5; 3 SOLUTION RESPIRATORY (INHALATION) at 08:00

## 2021-09-14 RX ADMIN — FENTANYL CITRATE 50 MCG: 50 INJECTION INTRAMUSCULAR; INTRAVENOUS at 23:02

## 2021-09-14 RX ADMIN — DOXYCYCLINE 100 MG: 100 INJECTION, POWDER, LYOPHILIZED, FOR SOLUTION INTRAVENOUS at 18:24

## 2021-09-14 RX ADMIN — POTASSIUM CHLORIDE 10 MEQ: 750 TABLET, EXTENDED RELEASE ORAL at 20:13

## 2021-09-14 RX ADMIN — PROPOFOL 50 MCG/KG/MIN: 10 INJECTION, EMULSION INTRAVENOUS at 15:00

## 2021-09-14 RX ADMIN — PROPOFOL 50 MCG/KG/MIN: 10 INJECTION, EMULSION INTRAVENOUS at 22:59

## 2021-09-14 RX ADMIN — LORAZEPAM 7 MG/HR: 2 INJECTION INTRAMUSCULAR; INTRAVENOUS at 18:26

## 2021-09-14 RX ADMIN — DEXTROSE MONOHYDRATE AND SODIUM CHLORIDE 100 ML/HR: 5; .9 INJECTION, SOLUTION INTRAVENOUS at 16:19

## 2021-09-14 RX ADMIN — ACETAMINOPHEN 650 MG: 325 TABLET ORAL at 16:05

## 2021-09-14 RX ADMIN — MAGNESIUM SULFATE HEPTAHYDRATE 1 G: 1 INJECTION, SOLUTION INTRAVENOUS at 09:10

## 2021-09-14 RX ADMIN — HYDROMORPHONE HYDROCHLORIDE 1 MG: 1 INJECTION, SOLUTION INTRAMUSCULAR; INTRAVENOUS; SUBCUTANEOUS at 20:30

## 2021-09-14 RX ADMIN — LORAZEPAM 7 MG/HR: 2 INJECTION INTRAMUSCULAR; INTRAVENOUS at 05:32

## 2021-09-14 RX ADMIN — FAMOTIDINE 20 MG: 10 INJECTION, SOLUTION INTRAVENOUS at 17:34

## 2021-09-14 RX ADMIN — FAMOTIDINE 20 MG: 10 INJECTION, SOLUTION INTRAVENOUS at 05:22

## 2021-09-14 RX ADMIN — LACTULOSE 30 ML: 10 SOLUTION ORAL at 21:07

## 2021-09-14 RX ADMIN — POTASSIUM PHOSPHATE, MONOBASIC AND POTASSIUM PHOSPHATE, DIBASIC: 224; 236 INJECTION, SOLUTION, CONCENTRATE INTRAVENOUS at 20:12

## 2021-09-14 RX ADMIN — OLANZAPINE 10 MG: 10 TABLET, FILM COATED ORAL at 17:34

## 2021-09-14 RX ADMIN — 0.12% CHLORHEXIDINE GLUCONATE 10 ML: 1.2 RINSE ORAL at 09:10

## 2021-09-14 RX ADMIN — ACETAMINOPHEN 650 MG: 325 TABLET ORAL at 03:18

## 2021-09-14 RX ADMIN — PIPERACILLIN AND TAZOBACTAM 3.38 G: 3; .375 INJECTION, POWDER, LYOPHILIZED, FOR SOLUTION INTRAVENOUS at 21:08

## 2021-09-14 RX ADMIN — PIPERACILLIN AND TAZOBACTAM 3.38 G: 3; .375 INJECTION, POWDER, LYOPHILIZED, FOR SOLUTION INTRAVENOUS at 16:05

## 2021-09-14 RX ADMIN — HYDROMORPHONE HYDROCHLORIDE 1 MG: 1 INJECTION, SOLUTION INTRAMUSCULAR; INTRAVENOUS; SUBCUTANEOUS at 12:58

## 2021-09-14 NOTE — PROGRESS NOTES
Zosyn (Piperacillin/Tazobactam) Extended Infusion    Earnestine Vance, a 39 y.o. yo female, has been converted to an extended infusion of Zosyn while in the intensive care unit. A loading dose of 3.375 or 4.5 gm will be given over 30 minutes depending on indication. Extended infusions will run over 4 hours (240 minutes).     Recent Labs     09/14/21  0500 09/13/21  1200 09/12/21  0550   CREA 0.33* 0.48* 0.50*     Ht Readings from Last 1 Encounters:   09/14/21 157.5 cm (62\")     Wt Readings from Last 1 Encounters:   09/13/21 71.3 kg (157 lb 3 oz)       CrCl : Serum creatinine: 0.33 mg/dL (L) 09/14/21 0500  Estimated creatinine clearance: 97.8 mL/min (A)    Renal adjustment of extended infusion of Zosyn  3.375 or 4.5 gm every 8 hours for CrCl >/= 20 ml/min  3.375 or 4.5 gm every 12 hours for CrCl < 20 ml/min, intermittent HD or PD

## 2021-09-14 NOTE — PROGRESS NOTES
[facility]  Pulmonary, Critical Care, and Sleep Medicine    Name: Lyssa Lang MRN: [id number]   : 1980 Hospital: [facility] [place]   Date: 2021        Pulmonary Critical Care Note    IMPRESSION:   · Acute respiratory failure · J96.00 ·   Stable 2 mm nodular right lower lobe density laterally from 2015; smoker, no prior CT chest for any additional nodule w/up · R91.1     Patient Active Problem List   Diagnosis Code    Dextromethorphan use disorder, moderate ([address] F19.20    Ekbom's delusional parasitosis ([address]    Left wrist fracture, with delayed healing, subsequent encounter S62.102G    Drug overdose, intentional self-harm, initial encounter ([address] T50.902A    Hypoxia R09.02    Hypotension after procedure I95.81    Acute metabolic encephalopathy S50.31    Psychosis ([facility] F29    Hyponatremia E87.1    Acute respiratory failure with hypoxia ([address] J96.01    Increased ammonia level R79.89 ·   Code status: full code   RECOMMENDATIONS:   Respiratory: Mech. Ventilated patients- aim to keep peak plateau pressure less than or equal to 30cm H2O. Titrate FiO2 for goal SPO2> 91%  Ac 350/12/30/5  ABG 7.36/40/95/97  Chest x-ray developing pneumonia atelectasis RLL  Repeat  again covid test but suspect aspiration pneumonia   Fever 101  Failed SBT  On 2021  With turn off sedation started spontaneous breathing trial patient was extremely agitated. In spite of maximum dose of Precedex and Ativan pushes. Trying to pull IVs and ET tube. Back on propofol drip and low dose ativan drip   Prn opiates   Keep daily spontaneous breathing trial off sedation  Speck DTs? Withdrawal from drugs?   Discuss CT chest option with pt when more stable  Not a candidate for LD CT chest  Discussed with RN to get Urine pregnancy test before any radiological test  VAP prevention bundle and sedation bundle followed, head of the bed at 30' all times  Daily sedation holiday and assessment for weaning with SBT as tolerated  Continue pulmonary hygiene care  ID:  Fever  WBC stable  initial culture negative   I suspect aspiration pneumonia but I will reapt Covid again today due to persistent fever and lack of vaccination  Urine strep and legionellla pending  On triple abx van/zosyn/doxy  procal and lactic acid repeat   CVS: Monitor Hemodynamics- stable  IVF: D5NS at 50 mL/hr  EKG in AM  Renal: Monitor renal functions, lytes  Replace lytes per unit protocol  CK in AM  Heme: Monitor CBC H/H is expected to drop with IVF  No evidence of active bleeding  Endo: TSH ordered  GI: diet - NPO  LFT normal  Neurology: sedated  CT of the head ordered on 9/13 still not done spoke to staff to do ASAP  Toxicology: gabapentin OD  Supportive care  CT head pending   Sedation: Propofol - titrate  Hold Ativan drip as tolerated; may use PRN Ativan  Ammonia    Will defer respective systems problem management to primary and other consultant and follow patient in ICU with primary and other medical team  Further recommendations will be based on the patient's response to recommended treatment and results of the investigation ordered. Quality Care: PPI, DVT prophylaxis, HOB elevated, Infection control all reviewed and addressed. PAIN AND SEDATION: Propofol; PRN Ativan  Skin/Wound: none  Prophylaxis: DVT and GI Prophylaxis reviewed. Restraints: Wrist soft restraints for patient interfering with medical therapy/management and patient safety. PT/OT eval and treat: as needed when stable   Lines/Tubes: PIV   ETT: 9/11/21  OGT/NGT: 9/11/21  Other drains: 9/11/21  ADVANCE DIRECTIVE: Full code. DISCUSSION: Events and notes from last 24 hours reviewed. Care plan discussed with nursing, hospitalist, RT    Quality Care: PPI, DVT prophylaxis, HOB elevated, Infection control all reviewed and addressed.     High complexity decision making was performed during the evaluation of this patient at high risk for decompensation with multiple organ involvement. Cc time 45 minutes   Total critical care time spent rendering complex decision making and > 50% time spent in face to face evaluation exclusive of procedures/family discussion/coordination of care: 38 minutes. Subjective/History:   Ms. Corrales  has been seen and evaluated as Dr. Michael Lopes requested now for assisting with Acute respiratory failure and ventilator management. Patient is a 39 y.o. female with following PMhx presented to ER with lethargy via EMS and admitted for Gabapentin overdose. Pt required intubation for airways protection. Position control was contacted that recommended supportive care per ER provider. Pt seen at bedside in ER rm#2. The patient can not provide additional history due to Ventilated. 2021  Remains in ICU bed 4. Intubated sedated. Failed spontaneous breathing trial.  Persistent fever. Yesterday COVID-19 test was negative but persistent fever and white blood cells not elevated I will repeat again panel today. On antibiotics. Give IV fluids. Wean sedation and spontaneous breathing trials daily. No family. We called multiple times    Review of Systems:  Review of systems not obtained due to patient factors. Past Medical History:  Past Medical History:   Diagnosis Date    Asthma     Bilateral ovarian cysts     Cocaine abuse ([facility]     Mental and behavioral problem     Pancreatitis     Pancreatitis     Psychosis ([facility] [address] [facility]         Past Surgical History:  Past Surgical History:   Procedure Laterality Date    HX GYN      D&C, Hysterectomy        Medications:  Prior to Admission medications    Medication Sig Start Date End Date Taking? Authorizing Provider   albuterol (PROVENTIL HFA, VENTOLIN HFA, PROAIR HFA) 90 mcg/actuation inhaler Take 2 Puffs by inhalation every four (4) hours as needed for Wheezing or Shortness of Breath.  21   Lisa Valenzuela PA-C   ibuprofen (MOTRIN) 600 mg tablet Take 1 Tablet by mouth every six (6) hours as needed for Pain. Take with food. 8/4/21   Pradip Espinal PA-C   ALPRAZolam (XANAX) 0.5 mg tablet TAKE 1 TABLET BY MOUTH ONCE DAILY AS NEEDED FOR ANXIETY 12/4/19   Provider, Historical   metFORMIN (GLUCOPHAGE) 500 mg tablet TAKE 1 TABLET BY MOUTH ONCE DAILY 11/12/19   Provider, Historical   nicotine (NICODERM CQ) 21 mg/24 hr APPLY 1 PATCH TOPICALLY TO THE SKIN DAILY FOR CRAVINGS AS DIRECTED 12/4/19   Provider, Historical   traZODone (DESYREL) 50 mg tablet TAKE 1 TABLET BY MOUTH AT BEDTIME AS NEEDED FOR INSOMNIA 12/4/19   Provider, Historical   escitalopram oxalate (LEXAPRO) 10 mg tablet Take 10 mg by mouth daily. Other, MD Luisa   lurasidone (LATUDA) 80 mg tab tablet Take 80 mg by mouth daily (with dinner).     Other, MD Luisa       Current Facility-Administered Medications   Medication Dose Route Frequency    insulin lispro (HUMALOG) injection   SubCUTAneous Q6H    lactulose (CHRONULAC) 10 gram/15 mL solution 30 mL  30 mL Oral TID    arformoteroL (BROVANA) neb solution 15 mcg  15 mcg Nebulization BID RT    budesonide (PULMICORT) 500 mcg/2 ml nebulizer suspension  500 mcg Nebulization BID RT    piperacillin-tazobactam (ZOSYN) 3.375 g in 0.9% sodium chloride (MBP/ADV) 100 mL MBP  3.375 g IntraVENous Q8H    doxycycline (VIBRAMYCIN) 100 mg in 0.9% sodium chloride (MBP/ADV) 100 mL MBP  100 mg IntraVENous Q12H    OLANZapine (ZyPREXA) tablet 10 mg  10 mg Oral QPM    0.9% sodium chloride infusion  1,000 mL/hr IntraVENous ONCE    albuterol-ipratropium (DUO-NEB) 2.5 MG-0.5 MG/3 ML  3 mL Nebulization TID RT    enoxaparin (LOVENOX) injection 40 mg  40 mg SubCUTAneous Q24H    propofol (DIPRIVAN) 10 mg/mL infusion  0-50 mcg/kg/min IntraVENous TITRATE    sodium chloride (NS) flush 5-40 mL  5-40 mL IntraVENous Q8H    famotidine (PF) (PEPCID) 20 mg in 0.9% sodium chloride 10 mL injection  20 mg IntraVENous BID    chlorhexidine (PERIDEX) 0.12 % mouthwash 10 mL  10 mL Oral Q12H    dextrose 5% and 0.9% NaCl infusion  100 mL/hr IntraVENous CONTINUOUS    LORazepam (ATIVAN) 1 mg/mL in D5W infusion  0-0.1 mg/kg/hr IntraVENous TITRATE       Allergy:  Allergies   Allergen Reactions    Fish Containing Products Itching    Toradol [Ketorolac] Rash     Pt reports she is not allergic to this medication. Social History:  Social History     Tobacco Use    Smoking status: Current Every Day Smoker     Packs/day: 1.00    Smokeless tobacco: Never Used   Substance Use Topics    Alcohol use: Not Currently    Drug use: Not Currently     Types: Cocaine, Marijuana     Comment: used with in past 24 hours        Family History:  No family history on file. Objective:   Vital Signs:    Blood pressure 119/68, pulse (!) 110, temperature (!) 101.3 °F (38.5 °C), resp. rate 24, height 5' 2\" (1.575 m), weight 71.3 kg (157 lb 3 oz), last menstrual period 05/15/2018, SpO2 100 %. Body mass index is 28.75 kg/m². O2 Device: Ventilator       Temp (24hrs), Av.5 °F (38.1 °C), Min:98.6 °F (37 °C), Max:103 °F (39.4 °C)         Intake/Output:   Last shift:       07 -  1900  In: 1784.7 [I.V.:1284.7]  Out: 975 [Urine:650]  Last 3 shifts: 1901 -  0700  In: 4385.9 [P.O.:30; I.V.:4177.9]  Out: 4616 [XWCFT:3320]    Intake/Output Summary (Last 24 hours) at 2021 1649  Last data filed at 2021 1200  Gross per 24 hour   Intake 5544.5 ml   Output 2925 ml   Net 2619.5 ml       Ventilator Settings:  Mode Rate Tidal Volume Pressure FiO2 PEEP   Assist control, VC+   350 ml  7 cm H2O 30 % 5 cm H20     Peak airway pressure: 16 cm H2O    Minute ventilation: 7.61 l/min      Lung protective strategy, Pl pressure goals less than or equal to 30.     Physical Exam: Intubated sedated off sedation moving all 4 extremities but did not follow commands  General: sedated, in no respiratory distress, appears stated age, on ventilator  HEENT: PERRL, fundi benign, ET and OG tubes in place  Neck: No abnormally enlarged lymph nodes or thyroid, supple  Chest: normal  Lungs: normal air entry, breathing normal , clear to auscultation bilaterally, slightly decreased breath sounds at the right base normal percussion bilaterally, no tenderness/ rash  Heart: Regular rate and rhythm, S1S2 present or without murmur or extra heart sounds  Abdomen: non distended, bowel sounds normoactive, tympanic, abdomen is soft without significant tenderness, masses, organomegaly or guarding, rigidity, rebound  Extremity: negative for edema, cyanosis, clubbing  Capillary refill: normal  Neuro: sedated, moves all extremities spontaneously in bed, no involuntary movements, exam limitation on ventilator  Skin: Skin color, texture, turgor fair.  Skin dry, warm, non-diaphoretic    Data:     Recent Results (from the past 24 hour(s))   GLUCOSE, POC    Collection Time: 09/13/21 11:58 PM   Result Value Ref Range    Glucose (POC) 134 (H) 70 - 110 mg/dL   BLOOD GAS, ARTERIAL POC    Collection Time: 09/14/21  4:52 AM   Result Value Ref Range    Device: ADULT VENT      FIO2 (POC) 30 %    pH (POC) 7.36 7.35 - 7.45      pCO2 (POC) 40.9 35.0 - 45.0 MMHG    pO2 (POC) 95 80 - 100 MMHG    HCO3 (POC) 23.1 22 - 26 MMOL/L    sO2 (POC) 97.1 (H) 92 - 97 %    Base deficit (POC) 2.2 mmol/L    Mode ASSIST CONTROL      Tidal volume 350 ml    Set Rate 12 bpm    PEEP/CPAP (POC) 5 cmH2O    Allens test (POC) Positive      Site RIGHT RADIAL      Specimen type (POC) ARTERIAL      Performed by Gabriella Fernández    CBC WITH AUTOMATED DIFF    Collection Time: 09/14/21  5:00 AM   Result Value Ref Range    WBC 10.5 4.6 - 13.2 K/uL    RBC 3.40 (L) 4.20 - 5.30 M/uL    HGB 10.4 (L) 12.0 - 16.0 g/dL    HCT 32.5 (L) 35.0 - 45.0 %    MCV 95.6 78.0 - 100.0 FL    MCH 30.6 24.0 - 34.0 PG    MCHC 32.0 31.0 - 37.0 g/dL    RDW 13.4 11.6 - 14.5 %    PLATELET 045 271 - 316 K/uL    MPV 11.2 9.2 - 11.8 FL    NEUTROPHILS 73 40 - 73 %    LYMPHOCYTES 14 (L) 21 - 52 %    MONOCYTES 10 3 - 10 %    EOSINOPHILS 2 0 - 5 %    BASOPHILS 0 0 - 2 %    ABS. NEUTROPHILS 7.7 1.8 - 8.0 K/UL    ABS. LYMPHOCYTES 1.5 0.9 - 3.6 K/UL    ABS. MONOCYTES 1.0 0.05 - 1.2 K/UL    ABS. EOSINOPHILS 0.2 0.0 - 0.4 K/UL    ABS. BASOPHILS 0.0 0.0 - 0.1 K/UL    DF AUTOMATED     MAGNESIUM    Collection Time: 09/14/21  5:00 AM   Result Value Ref Range    Magnesium 1.9 1.6 - 2.6 mg/dL   METABOLIC PANEL, COMPREHENSIVE    Collection Time: 09/14/21  5:00 AM   Result Value Ref Range    Sodium 145 136 - 145 mmol/L    Potassium 3.5 3.5 - 5.5 mmol/L    Chloride 115 (H) 100 - 111 mmol/L    CO2 23 21 - 32 mmol/L    Anion gap 7 3.0 - 18 mmol/L    Glucose 113 (H) 74 - 99 mg/dL    BUN 5 (L) 7.0 - 18 MG/DL    Creatinine 0.33 (L) 0.6 - 1.3 MG/DL    BUN/Creatinine ratio 15 12 - 20      GFR est AA >60 >60 ml/min/1.73m2    GFR est non-AA >60 >60 ml/min/1.73m2    Calcium 7.9 (L) 8.5 - 10.1 MG/DL    Bilirubin, total 0.2 0.2 - 1.0 MG/DL    ALT (SGPT) 18 13 - 56 U/L    AST (SGOT) 11 10 - 38 U/L    Alk.  phosphatase 73 45 - 117 U/L    Protein, total 5.2 (L) 6.4 - 8.2 g/dL    Albumin 2.5 (L) 3.4 - 5.0 g/dL    Globulin 2.7 2.0 - 4.0 g/dL    A-G Ratio 0.9 0.8 - 1.7     PHOSPHORUS    Collection Time: 09/14/21  5:00 AM   Result Value Ref Range    Phosphorus 2.7 2.5 - 4.9 MG/DL   AMMONIA    Collection Time: 09/14/21  5:00 AM   Result Value Ref Range    Ammonia 74 (H) 11 - 32 UMOL/L   CULTURE, BLOOD    Collection Time: 09/14/21  5:00 AM    Specimen: Blood   Result Value Ref Range    Special Requests: NO SPECIAL REQUESTS      Culture result: NO GROWTH AFTER 1 HOUR     HEMOGLOBIN A1C WITH EAG    Collection Time: 09/14/21  5:00 AM   Result Value Ref Range    Hemoglobin A1c 5.3 4.2 - 5.6 %    Est. average glucose 105 mg/dL   GLUCOSE, POC    Collection Time: 09/14/21  5:10 AM   Result Value Ref Range    Glucose (POC) 136 (H) 70 - 110 mg/dL   CULTURE, BLOOD    Collection Time: 09/14/21  5:15 AM    Specimen: Blood   Result Value Ref Range    Special Requests: NO SPECIAL REQUESTS      Culture result: NO GROWTH AFTER 1 HOUR     GLUCOSE, POC    Collection Time: 09/14/21 11:48 AM   Result Value Ref Range    Glucose (POC) 94 70 - 110 mg/dL           Recent Labs     09/14/21  0452 09/13/21  0508 09/12/21  0034   FIO2I 30 50  --    HCO3I 23.1 25.1 22.8   PCO2I 40.9 46.4* 36.1   PHI 7.36 7.34* 7.41   PO2I 95 290* 511*       All Micro Results     Procedure Component Value Units Date/Time    RESPIRATORY VIRUS PANEL W/COVID-19, PCR [[id number]]     Order Status: Sent Specimen: NASOPHARYNGEAL SWAB     STREP PNEUMO AG, URINE [[id number]]     Order Status: Sent Specimen: Urine     LEGIONELLA PNEUMOPHILA AG, URINE [[id number]]     Order Status: Sent Specimen: Urine     CULTURE, BLOOD [[id number]]     Order Status: Sent Specimen: Blood     CULTURE, BLOOD [[id number]] Collected: 09/14/21 0515    Order Status: Completed Specimen: Blood Updated: 09/14/21 0732     Special Requests: NO SPECIAL REQUESTS        Culture result: NO GROWTH AFTER 1 HOUR       CULTURE, BLOOD [[id number]] Collected: 09/14/21 0500    Order Status: Completed Specimen: Blood Updated: 09/14/21 0732     Special Requests: NO SPECIAL REQUESTS        Culture result: NO GROWTH AFTER 1 HOUR       CULTURE, URINE [[id number]]     Order Status: Sent Specimen: Cath Urine     RESPIRATORY VIRUS PANEL W/COVID-19, PCR [[id number]]     Order Status: Canceled Specimen: NASOPHARYNGEAL SWAB     COVID-19 RAPID TEST [[id number]]     Order Status: Canceled     COVID-19 RAPID TEST [[id number]] Collected: 09/13/21 0737    Order Status: Completed Specimen: Nasopharyngeal Updated: 09/13/21 0811     Specimen source Nasopharyngeal        COVID-19 rapid test Not detected        Comment: Rapid Abbott ID Now       Rapid NAAT:  The specimen is NEGATIVE for SARS-CoV-2, the novel coronavirus associated with COVID-19.        Negative results should be treated as presumptive and, if inconsistent with clinical signs and symptoms or necessary for patient management, should be tested with an alternative molecular assay.  Negative results do not preclude SARS-CoV-2 infection and should not be used as the sole basis for patient management decisions. This test has been authorized by the FDA under an Emergency Use Authorization (EUA) for use by authorized laboratories. Fact sheet for Healthcare Providers: ConventionUpdate.co.nz  Fact sheet for Patients: ConventionUpdate.co.nz       Methodology: Isothermal Nucleic Acid Amplification         COVID-19 RAPID TEST [[id number]]     Order Status: Canceled           Telemetry: normal sinus rhythm      Imaging:  [x]I have personally reviewed the patients chest radiographs images and report     Results from Hospital Encounter encounter on 09/11/21    XR ABD PORT  1 V    Narrative  EXAM: Frontal view of the abdomen    CLINICAL INDICATION/HISTORY: NG tube placement    COMPARISON: None.    _______________    FINDINGS:    NG/OG tube in place with the tip in the left upper quadrant in the region of the  gastric fundus. No bowel obstruction. Moderate colonic stool burden.    _______________    Impression  1. NG/OG tube is in good position with the tip in the left upper quadrant in the  region of the gastric fundus. 2. Moderate colonic stool burden. Results from [place] encounter on 05/22/18    CT ABD PELV W CONT    Narrative  EXAM: CT of the abdomen and pelvis    INDICATION: Abdominal pain    COMPARISON: 11/29/2015    TECHNIQUE: Axial CT imaging of the abdomen and pelvis was performed with  intravenous contrast. Multiplanar reformats were generated. One or more dose  reduction techniques were used on this CT: automated exposure control,  adjustment of the mAs and/or kVp according to patient's size, and iterative  reconstruction techniques. The specific techniques utilized on this CT exam have  been documented in the patient's electronic medical record.    _______________    FINDINGS:    LOWER CHEST: No basilar infiltrates.  Stable 2 mm nodular right lower lobe  density laterally. LIVER, BILIARY: Liver is stable. No biliary dilation. Gallbladder is present. PANCREAS: Normal.    SPLEEN: Normal.    ADRENALS: Normal.    KIDNEYS: No acute findings. Several small subcentimeter hypodensities which are  too small to characterize. No perinephric stranding. 2 mm calculus lower pole of  right kidney. LYMPH NODES: No enlarged lymph nodes. GASTROINTESTINAL TRACT: No bowel dilation or wall thickening. GI tract  evaluation limited without oral contrast. No bowel obstruction. No free air or  free fluid. Normal appendix. PELVIC ORGANS: Unremarkable. VASCULATURE: Unremarkable. BONES: No acute or aggressive osseous abnormalities identified. OTHER: None.    _______________    Impression  IMPRESSION:    No acute findings are identified. No bowel obstruction or inflammation. Normal appendix. Punctate nonobstructing right lower pole calculus.       [x]See my orders for details          Carolann Pitts MD

## 2021-09-14 NOTE — PROGRESS NOTES
1900: Bedside and Verbal shift change report given to [address] [place] [address] (oncoming nurse) by Julio Cesar Cuevas RN (offgoing nurse). Report included the following information SBAR, Kardex, ED Summary, Intake/Output, MAR, Recent Results, Med Rec Status, Cardiac Rhythm Sinus Tach and Alarm Parameters . 2000: Shift assessment completed. Pt agitated - (SEE MAR) but tolerating ventilator settings at this time with SPO2 of 100%. Ortho fixation device noted on left lower arm, no redness or inflammation noted. Color, temp, pulses and sensation present and palpable. 2234: Paged Dr. Mikey Uriarte concerning persistent agitation. 2308: Dr. Mikey Uriarte returned page and orders received for Fentanyl 50mcg IVP q3 PRN. 0000: Reassessment completed. 0400: Reassessment completed. 0715: Bedside and Verbal shift change report given to Julio Cesar Cuevas RN (oncoming nurse) by Nikki Hamilton RN (offgoing nurse). Report included the following information SBAR, Kardex, Intake/Output, MAR, Recent Results, Med Rec Status, Cardiac Rhythm Sinus Tach and Alarm Parameters .

## 2021-09-14 NOTE — PROGRESS NOTES
09/14/21 1103   Weaning Parameters   Spontaneous Breathing Trial Complete Yes   Resp Rate Observed 17   Ve 7      RSBI 45   Patient tolerated SBT with PS 7/PEEP 5 Fio2 30% for 1hour 45 minutes Placed patient back on full vent support to rest. Dr. Duckworth Bound aware.

## 2021-09-14 NOTE — PROGRESS NOTES
Pharmacy Dosing Services: Vancomycin    Indication: Other:    Day of therapy: 1    Other Antimicrobials (Include dose, start day & day of therapy):  Doxycycline, Piperacillin/Tazobactam (Zosyn), and Vancomycin  Loading dose (date given): 1750 mg  Goal Vancomycin Level: Vancomycin Trough: 15 - 20 mcg/mL (most infections)  AUC between 400-600    Vancomycin Level (if drawn): No results for input(s): Celesta Horseman in the last 72 hours. Pt Weight Weight: 71.3 kg (157 lb 3 oz)   Temperature Temp: (!) 101.3 °F (38.5 °C)   Temp (72hrs), Av.4 °F (38 °C), Min:97.8 °F (36.6 °C), Max:103 °F (39.4 °C)     HR Pulse (Heart Rate): (!) 110     BP BP: 119/68     Recent Labs     21  0500 21  1200 21  0550   CREA 0.33* 0.48* 0.50*   BUN 5* 5* 13   WBC 10.5 10.9 8.5     Estimated Creatinine Clearance Estimated Creatinine Clearance: 97.8 mL/min (A) (by C-G formula based on SCr of 0.33 mg/dL (L)). Estimated Creatinine Clearance (using IBW): 83.6 mL/min      CAPD, Hemodialysis or Renal Replacement Therapy: N/A  Significant Cultures: pending      Regimen assessment: Febrile/Temp 101. Asp Pneumonia poss. Maintenance dose:  New Start  Next scheduled level: [employer] will follow daily and adjust medications as appropriate for renal function and/or serum levels.     Thank you,  Mario King 46 Pharmacist  [phone]

## 2021-09-14 NOTE — PROGRESS NOTES
Hospitalist Progress Note-critical care note     Patient: Ginger Severs MRN: [id number]  CSN: [id number]    YOB: 1980  Age: 39 y.o. Sex: female    DOA: 9/11/2021 LOS:  LOS: 2 days            Chief complaint: wrist fracture , drug overdose , acute respiratory failure with hypoxia, psychosis     Assessment/Plan         Hospital Problems  Date Reviewed: 9/6/2015        Codes Class Noted POA    Psychosis ([facility] ICD-10-CM: F29  ICD-9-CM: 298.9  Unknown Unknown        Hyponatremia ICD-10-CM: E87.1  ICD-9-CM: 276.1  Unknown Yes        Acute respiratory failure with hypoxia ([facility] ICD-10-CM: J96.01  ICD-9-CM: 518.81  Unknown Yes        Left wrist fracture, with delayed healing, subsequent encounter ICD-10-CM: S62.102G  ICD-9-CM: V54.19  9/12/2021 Unknown        * (Principal) Drug overdose, intentional self-harm, initial encounter ([facility] ICD-10-CM: T50.902A  ICD-9-CM: 977.9, E950.5  9/12/2021 Yes        Hypoxia ICD-10-CM: R09.02  ICD-9-CM: 799.02  9/12/2021 Yes        Hypotension after procedure ICD-10-CM: I95.81  ICD-9-CM: 458.29  9/12/2021 Yes        Acute metabolic encephalopathy [facility]--MW: G93.41  ICD-9-CM: 348.31  9/12/2021 Yes                  Ginger Severs is a 39 y.o. female who is standing history of multidrug use/abuse, previous drug-seeking behavior and mental health issues otherwise unspecified arrives via EMS with acute metabolic encephalopathy and lethargy. Admitted for likely gabapentin overdose.        Resp -      Acute respiratory failure with hypoxia   Intubated on 9/12     VAP bundle. HOB>30 degrees.    Weaning per protocol    cxr :No acute cardiopulmonary abnormality  covid 19 rapid negative     CVS -   Hypotension after intubation   So far bp remained well -not need vasopressor     ID -   No s/sxs of infection   UA clear      Heme/onc -   Anemia-so far no bleeding reported and will continue monitoring        Renal -   Hypernatremia resolved per On d5  Continue monitoring   icu electrolytes replacement protocol      Endocrine -  Follow FSG     Neuro -  Acute metabolic encephalopathy with unsafe combative behavior requiring intubation   Gabapentin overdose with lethargy and AMS   S/p procedural stomach emptying in ED with charcoal and sorbitol   Continue monitoring the side affect   On profofol and ativan gtts   ETOH level wnl   Acetaminophen and salicylate levels wnl    elevated ammonia level  Will increase  Lactulose   ct head ordered-not done yet     Psych   Psychosis , hx of  Ekbom's delusional parasitosis   Need psych evaluation later     GI - ppi      Msk: left wrist fracture: immobilized -poa       Disposition :tbd,   Review of systems:  Unable to obtain due to intubation   Vital signs/Intake and Output:  Visit Vitals  /75   Pulse 99   Temp 98.6 °F (37 °C)   Resp 14   Ht 5' 2\" (1.575 m)   Wt 71.3 kg (157 lb 3 oz)   SpO2 100%   BMI 28.75 kg/m²     Current Shift:  09/14 0701 - 09/14 1900  In: -   Out: 650 [Urine:650]  Last three shifts:  09/12 1901 - 09/14 0700  In: 4385.9 [P.O.:30; I.V.:4177.9]  Out: 3425 [Urine:3425]    Physical Exam:  General: Intubated    HEENT: NC, Atraumatic. ET tube noted   Lungs: CTA Bilaterally. No Wheezing/Rhonchi/Rales. Heart:  Tachy ,  No murmur, No Rubs, No Gallops  Abdomen: Soft, Non distended, Non tender.   +Bowel sounds,   Extremities: No c/c.immobilzer noted on left wrist   Psych:   Calm   Neurologic:  On sedation           Labs: Results:       Chemistry Recent Labs     09/14/21  0500 09/13/21  1200 09/12/21  0550   * 116* 99    146* 146*   K 3.5 3.7 3.9   * 116* 115*   CO2 23 24 25   BUN 5* 5* 13   CREA 0.33* 0.48* 0.50*   CA 7.9* 8.3* 8.7   AGAP 7 6 6   BUCR 15 10* 26*   AP 73 79 87   TP 5.2* 5.8* 6.6   ALB 2.5* 3.0* 3.5   GLOB 2.7 2.8 3.1   AGRAT 0.9 1.1 1.1      CBC w/Diff Recent Labs     09/14/21  0500 09/13/21  1200 09/12/21  0550   WBC 10.5 10.9 8.5   RBC 3.40* 3.86* 4.04*   HGB 10.4* 11.4* 12.2   HCT 32.5* 35.0 37.3  225 257   GRANS 73 76* 55   LYMPH 14* 14* 32   EOS 2 1 3      Cardiac Enzymes Recent Labs     09/13/21  1200 09/11/21  2034   CPK 81 67   CKND1  --  CALCULATION NOT PERFORMED WHEN RESULT IS BELOW LINEAR LIMIT      Coagulation No results for input(s): PTP, INR, APTT, INREXT, INREXT in the last 72 hours. Lipid Panel No results found for: CHOL, CHOLPOCT, CHOLX, CHLST, CHOLV, 047408, HDL, HDLP, LDL, LDLC, DLDLP, 912826, VLDLC, VLDL, TGLX, TRIGL, TRIGP, TGLPOCT, CHHD, CHHDX   BNP No results for input(s): BNPP in the last 72 hours. Liver Enzymes Recent Labs     09/14/21  0500   TP 5.2*   ALB 2.5*   AP 73      Thyroid Studies No results found for: T4, T3U, TSH, TSHEXT, TSHEXT     Procedures/imaging: see electronic medical records for all procedures/Xrays and details which were not copied into this note but were reviewed prior to creation of Plan    XR WRIST LT AP/LAT/OBL MIN 3V    Result Date: 8/19/2021  EXAM: XR WRIST LT AP/LAT/OBL MIN 3V CLINICAL INDICATION/HISTORY: Fall with LEFT wrist pain and swelling -Additional: None COMPARISON: None TECHNIQUE: 3 views of the left wrist _______________ FINDINGS: BONES: Comminuted, impacted fracture of the distal radius with slight dorsal angulation of the distal fracture fragment. No aggressive appearing osseous lytic or blastic lesion. SOFT TISSUES: Unremarkable. _______________     Comminuted, impacted fracture of the distal radius. XR CHEST PORT    Result Date: 9/13/2021  EXAM: XR CHEST PORT CLINICAL INDICATION/HISTORY: Acute respiratory failure, ET tub position -Additional: None COMPARISON: One day prior TECHNIQUE: Portable frontal view of the chest _______________ FINDINGS: SUPPORT DEVICES: Endotracheal and enteric tubes unchanged. HEART AND MEDIASTINUM: Cardiomediastinal silhouette within normal limits. LUNGS AND PLEURAL SPACES: No dense consolidation, large effusion or pneumothorax. _______________     No acute cardiopulmonary abnormality.     XR CHEST PORT    Result Date: 9/11/2021  MEDICAL RECORDS NUMBER: [id number] PROCEDURE:  Single view of the chest DATE: 9/11/2021 9:09 PM HISTORY: 39years old Female. post ett Comparison: 8/4/2021 FINDINGS: The patient has been intubated with appropriate placement of the endotracheal tube. There is no enteric tube passing below the level of the diaphragm. There is no significant effusion. There is no significant pneumothorax. Cardiomediastinal silhouette is within normal limits. There is no evidence of a focal pulmonary infiltrate or mass. 1. There is no significant or acute cardiopulmonary process. The patient has been intubated with appropriate placement of the endotracheal tube. There is no enteric tube passing below the level of the diaphragm. This report has been generated using voice recognition software. XR ABD PORT  1 V    Result Date: 9/12/2021  EXAM: Frontal view of the abdomen CLINICAL INDICATION/HISTORY: NG tube placement COMPARISON: None. _______________ FINDINGS: NG/OG tube in place with the tip in the left upper quadrant in the region of the gastric fundus. No bowel obstruction. Moderate colonic stool burden. _______________     1. NG/OG tube is in good position with the tip in the left upper quadrant in the region of the gastric fundus. 2. Moderate colonic stool burden.       Mela Fink MD

## 2021-09-14 NOTE — PROGRESS NOTES
0700: Received report from Dang Del Rio RN. Received updates. Plan: sedation vacation on patient. 0900: AM medications administered. Dilaudid administered to help with w/d's.     7085: Paused sedation (Propofol and Ativan). RT informed. Sedation vacation in process. [id number] [facility] from [facility] 7715: Sedation restarted; HR Tachy, Agitated, CIWA w/d's.     1600: Discussed the need for further assessment with Dr. Aris Nissen d/t change in secretions and the inability to stabalize her temperature. It remains elevated >101.0. Patient placed on Airborne precautions. 1800: Respiratory Viral Panel Sent to lab to test for COVID amongst other items.

## 2021-09-14 NOTE — PROGRESS NOTES
Care manager rounded in ICU and performed chart review, patient remains intubated since 9/11 and is on diprivan and ativan to assist with protecting airway; continuing to attempt to locate family to assist with patient care decisions.

## 2021-09-14 NOTE — PROGRESS NOTES
None of the numbers in the chart are working. Unable to contact any family or friend  JAIDEN Hernandez MD

## 2021-09-14 NOTE — DIABETES MGMT
GLYCEMIC CONTROL PLAN OF CARE        Diabetes Management:      Assessment: known h/o T2DM, HbA1C ordered per protocol, oral home regimen  Admitted for acute metabolic encephalopathy and lethargy.  Admitted for likely gabapentin overdose.      Recommend:  - Humalog Normal Insulin Sensitivity Corrective Coverage ordered per protocol    BG in target range (non-ICU\" < 180 ; -180):  [] Yes  [x] No      Steroids: decadron 6 mg x1    TDD previous day = 0    Most recent blood glucose results:   Lab Results   Component Value Date/Time     (H) 09/14/2021 05:00 AM     (H) 09/13/2021 12:00 PM    GLUCPOC 136 (H) 09/14/2021 05:10 AM    GLUCPOC 134 (H) 09/13/2021 11:58 PM         Hypo: no    HbA1C: equivalent  to ave BGlucose of  mg/dl for 2-3 months prior to admission (pending)    Adequate glycemic control PTA:  [] Yes  [] No      Home diabetes medications:  Key Antihyperglycemic Medications             metFORMIN (GLUCOPHAGE) 500 mg tablet TAKE 1 TABLET BY MOUTH ONCE DAILY          Goals:  Blood glucose will be within target range of 70 - 180 mg/dL by: 9/30    Diet:   Active Orders   Diet    DIET NPO       Education:  _____ Refer to Diabetes Education Record                       ___X__ Education not indicated at this time      Shelia Delgado, RN, CDE  Glycemic Control Team  [phone]  Pager [phone] (M-TH 8:00-4:30P)  *After Hours pager [phone]

## 2021-09-14 NOTE — PROGRESS NOTES
Comprehensive Nutrition Assessment    Type and Reason for Visit: Initial, NPO/clear liquid    Nutrition Recommendations/Plan: Recommend starting tube feeding per MD when possible. If extubated, advance diet past NPO and clear liquids to meet nutritional needs- will evaluate for ONS if extubated. 09/14/21 1032   Enteral Nutrition   Feeding Route Orogastric   EN Formula Diabetic  (Glucerna 1.5)   Schedule Continuous   Feeding Regimen Goal rate @ 45ml. Start at 10ml/hr, increase by 10ml Q6 until goal rate. Water Flushes Will defer to MD   Goal EN & Flush Order Provides Glucerna 1.5 @ 45ml will provide 1485kcals, 82g PRO, 132g CHO, and 751ml free water. Nutrition Assessment:  H/o multidrug use/abuse, prevous drug-seeking behavior and mental issue. patient admitted for gabapentin overdose, S/p intubation for safety, S/p procedural stomach emptying in ED. Patient currently intubated in ICU. Estimated Daily Nutrient Needs:  Energy (kcal): 1525; Weight Used for Energy Requirements: Current  Protein (g): 71; Weight Used for Protein Requirements: Current (1g)  Fluid (ml/day): 1525; Method Used for Fluid Requirements: 1 ml/kcal    Nutrition Related Findings:  Labs reviewed. Med reviewed- D5% and 0.9% @ 100ml. No recent BM noted. NPO x3 days      Wounds:    None       Current Nutrition Therapies:  DIET NPO    Anthropometric Measures:  · Height:  5' 2\" (157.5 cm)  · Current Body Wt:  71.3 kg (157 lb 3 oz)   · Usual Body Wt:  72.6 kg (160 lb) (8/2021)     · Ideal Body Wt:  110 lbs:  142.9 %   · BMI Category: Overweight (BMI 25.0-29. 9)       Nutrition Diagnosis:   · Inadequate oral intake related to impaired respiratory function as evidenced by NPO or clear liquid status due to medical condition, intubation    Nutrition Interventions:   Food and/or Nutrient Delivery: Start tube feeding  Nutrition Education and Counseling: No recommendations at this time  Coordination of Nutrition Care: Continue to monitor while inpatient, Interdisciplinary rounds    Goals:  Start tube feeding or PO diet if extubated within the enxt 2-3 days to meet nutritional needs. Nutrition Monitoring and Evaluation:   Behavioral-Environmental Outcomes: None identified  Food/Nutrient Intake Outcomes: Diet advancement/tolerance  Physical Signs/Symptoms Outcomes: Biochemical data, Weight, Skin, GI status    Discharge Planning:     Too soon to determine     Electronically signed by Mackenzie Mooney RD on 9/14/2021 at 10:37 AM

## 2021-09-14 NOTE — PROGRESS NOTES
Palliative Medicine    Thank you for the consult to the Palliative Medicine team. Chart review in progress, We will see Seth Steve as quickly as our schedule allows.     Giana Morse RN, MSN  Palliative Medicine  [phone]

## 2021-09-14 NOTE — ACP (ADVANCE CARE PLANNING)
Please note that none of the phone numbers we have for patient are in service.  called home health and got name of  Stanford Santiago and two phone numbers but both have been disconnected. So has Other Non-relative [employer] - number no longer in service. Found number for Vanesa Shaffer - [phone] - listed as emergency contact from Sept 2020. Left text on her number as mailbox was full.  will reach out to patient's PCP for more info. [address] [facility]   Board Certified   [phone] - Office

## 2021-09-15 ENCOUNTER — APPOINTMENT (OUTPATIENT)
Dept: VASCULAR SURGERY | Age: 41
DRG: 004 | End: 2021-09-15
Attending: INTERNAL MEDICINE
Payer: MEDICAID

## 2021-09-15 ENCOUNTER — APPOINTMENT (OUTPATIENT)
Dept: GENERAL RADIOLOGY | Age: 41
DRG: 004 | End: 2021-09-15
Attending: INTERNAL MEDICINE
Payer: MEDICAID

## 2021-09-15 ENCOUNTER — APPOINTMENT (OUTPATIENT)
Dept: CT IMAGING | Age: 41
DRG: 004 | End: 2021-09-15
Attending: INTERNAL MEDICINE
Payer: MEDICAID

## 2021-09-15 LAB
ALBUMIN SERPL-MCNC: 2.2 G/DL (ref 3.4–5)
ALBUMIN/GLOB SERPL: 0.8 {RATIO} (ref 0.8–1.7)
ALP SERPL-CCNC: 80 U/L (ref 45–117)
ALT SERPL-CCNC: 26 U/L (ref 13–56)
AMMONIA PLAS-SCNC: 67 UMOL/L (ref 11–32)
ANION GAP SERPL CALC-SCNC: 7 MMOL/L (ref 3–18)
ARTERIAL PATENCY WRIST A: ABNORMAL
AST SERPL-CCNC: 33 U/L (ref 10–38)
BASE DEFICIT BLD-SCNC: 4.1 MMOL/L
BASOPHILS # BLD: 0 K/UL (ref 0–0.1)
BASOPHILS NFR BLD: 0 % (ref 0–2)
BDY SITE: ABNORMAL
BILIRUB SERPL-MCNC: 0.5 MG/DL (ref 0.2–1)
BODY TEMPERATURE: 103
BUN SERPL-MCNC: 4 MG/DL (ref 7–18)
BUN/CREAT SERPL: 7 (ref 12–20)
CA-I SERPL-SCNC: 1.12 MMOL/L (ref 1.12–1.32)
CALCIUM SERPL-MCNC: 7.8 MG/DL (ref 8.5–10.1)
CHLORIDE SERPL-SCNC: 116 MMOL/L (ref 100–111)
CO2 SERPL-SCNC: 22 MMOL/L (ref 21–32)
CREAT SERPL-MCNC: 0.55 MG/DL (ref 0.6–1.3)
DIFFERENTIAL METHOD BLD: ABNORMAL
EOSINOPHIL # BLD: 0.2 K/UL (ref 0–0.4)
EOSINOPHIL NFR BLD: 2 % (ref 0–5)
ERYTHROCYTE [DISTWIDTH] IN BLOOD BY AUTOMATED COUNT: 13.2 % (ref 11.6–14.5)
GAS FLOW.O2 O2 DELIVERY SYS: ABNORMAL L/MIN
GAS FLOW.O2 SETTING OXYMISER: 14 BPM
GLOBULIN SER CALC-MCNC: 2.8 G/DL (ref 2–4)
GLUCOSE BLD STRIP.AUTO-MCNC: 104 MG/DL (ref 70–110)
GLUCOSE BLD STRIP.AUTO-MCNC: 110 MG/DL (ref 70–110)
GLUCOSE BLD STRIP.AUTO-MCNC: 116 MG/DL (ref 70–110)
GLUCOSE BLD STRIP.AUTO-MCNC: 97 MG/DL (ref 70–110)
GLUCOSE SERPL-MCNC: 103 MG/DL (ref 74–99)
HCO3 BLD-SCNC: 20.8 MMOL/L (ref 22–26)
HCT VFR BLD AUTO: 32.6 % (ref 35–45)
HGB BLD-MCNC: 10.3 G/DL (ref 12–16)
INR PPP: 1.2 (ref 0.8–1.2)
INSPIRATION.DURATION SETTING TIME VENT: 1.2 SEC
L PNEUMO AG UR QL IA: NEGATIVE
LACTATE SERPL-SCNC: 1 MMOL/L (ref 0.4–2)
LYMPHOCYTES # BLD: 1.9 K/UL (ref 0.9–3.6)
LYMPHOCYTES NFR BLD: 15 % (ref 21–52)
MAGNESIUM SERPL-MCNC: 1.8 MG/DL (ref 1.6–2.6)
MAGNESIUM SERPL-MCNC: 2.2 MG/DL (ref 1.6–2.6)
MCH RBC QN AUTO: 30.2 PG (ref 24–34)
MCHC RBC AUTO-ENTMCNC: 31.6 G/DL (ref 31–37)
MCV RBC AUTO: 95.6 FL (ref 78–100)
MONOCYTES # BLD: 1.2 K/UL (ref 0.05–1.2)
MONOCYTES NFR BLD: 10 % (ref 3–10)
NEUTS SEG # BLD: 8.9 K/UL (ref 1.8–8)
NEUTS SEG NFR BLD: 73 % (ref 40–73)
O2/TOTAL GAS SETTING VFR VENT: 30 %
PAW @ MEAN EXP FLOW ON VENT: 8.6 CMH2O
PCO2 BLD: 40 MMHG (ref 35–45)
PEEP RESPIRATORY: 5 CMH2O
PH BLD: 7.33 [PH] (ref 7.35–7.45)
PHOSPHATE SERPL-MCNC: 2.1 MG/DL (ref 2.5–4.9)
PHOSPHATE SERPL-MCNC: 2.8 MG/DL (ref 2.5–4.9)
PIP ISTAT,IPIP: 16
PLATELET # BLD AUTO: 186 K/UL (ref 135–420)
PMV BLD AUTO: 10.8 FL (ref 9.2–11.8)
PO2 BLD: 77 MMHG (ref 80–100)
POTASSIUM SERPL-SCNC: 4.4 MMOL/L (ref 3.5–5.5)
PROCALCITONIN SERPL-MCNC: <0.05 NG/ML
PROT SERPL-MCNC: 5 G/DL (ref 6.4–8.2)
PROTHROMBIN TIME: 14.5 SEC (ref 11.5–15.2)
RBC # BLD AUTO: 3.41 M/UL (ref 4.2–5.3)
S PNEUM AG UR QL: NEGATIVE
SAO2 % BLD: 92.1 % (ref 92–97)
SERVICE CMNT-IMP: ABNORMAL
SODIUM SERPL-SCNC: 145 MMOL/L (ref 136–145)
SPECIMEN TYPE: ABNORMAL
TOTAL RESP. RATE, ITRR: 18
VENTILATION MODE VENT: ABNORMAL
VT SETTING VENT: 350 ML
WBC # BLD AUTO: 12.2 K/UL (ref 4.6–13.2)

## 2021-09-15 PROCEDURE — 74011250637 HC RX REV CODE- 250/637: Performed by: HOSPITALIST

## 2021-09-15 PROCEDURE — 74011000250 HC RX REV CODE- 250: Performed by: FAMILY MEDICINE

## 2021-09-15 PROCEDURE — 74011000258 HC RX REV CODE- 258: Performed by: INTERNAL MEDICINE

## 2021-09-15 PROCEDURE — 74011250636 HC RX REV CODE- 250/636: Performed by: INTERNAL MEDICINE

## 2021-09-15 PROCEDURE — 84100 ASSAY OF PHOSPHORUS: CPT

## 2021-09-15 PROCEDURE — 85610 PROTHROMBIN TIME: CPT

## 2021-09-15 PROCEDURE — 94003 VENT MGMT INPAT SUBQ DAY: CPT

## 2021-09-15 PROCEDURE — 36600 WITHDRAWAL OF ARTERIAL BLOOD: CPT

## 2021-09-15 PROCEDURE — 82803 BLOOD GASES ANY COMBINATION: CPT

## 2021-09-15 PROCEDURE — 36415 COLL VENOUS BLD VENIPUNCTURE: CPT

## 2021-09-15 PROCEDURE — 83605 ASSAY OF LACTIC ACID: CPT

## 2021-09-15 PROCEDURE — 70450 CT HEAD/BRAIN W/O DYE: CPT

## 2021-09-15 PROCEDURE — 71045 X-RAY EXAM CHEST 1 VIEW: CPT

## 2021-09-15 PROCEDURE — 74018 RADEX ABDOMEN 1 VIEW: CPT

## 2021-09-15 PROCEDURE — 85025 COMPLETE CBC W/AUTO DIFF WBC: CPT

## 2021-09-15 PROCEDURE — 74011000636 HC RX REV CODE- 636: Performed by: FAMILY MEDICINE

## 2021-09-15 PROCEDURE — 74011250637 HC RX REV CODE- 250/637: Performed by: FAMILY MEDICINE

## 2021-09-15 PROCEDURE — 82140 ASSAY OF AMMONIA: CPT

## 2021-09-15 PROCEDURE — 65610000006 HC RM INTENSIVE CARE

## 2021-09-15 PROCEDURE — 82330 ASSAY OF CALCIUM: CPT

## 2021-09-15 PROCEDURE — 93005 ELECTROCARDIOGRAM TRACING: CPT

## 2021-09-15 PROCEDURE — 83735 ASSAY OF MAGNESIUM: CPT

## 2021-09-15 PROCEDURE — 80053 COMPREHEN METABOLIC PANEL: CPT

## 2021-09-15 PROCEDURE — 99223 1ST HOSP IP/OBS HIGH 75: CPT | Performed by: NURSE PRACTITIONER

## 2021-09-15 PROCEDURE — 94640 AIRWAY INHALATION TREATMENT: CPT

## 2021-09-15 PROCEDURE — 74011250636 HC RX REV CODE- 250/636: Performed by: FAMILY MEDICINE

## 2021-09-15 PROCEDURE — 74011250637 HC RX REV CODE- 250/637: Performed by: INTERNAL MEDICINE

## 2021-09-15 PROCEDURE — 87389 HIV-1 AG W/HIV-1&-2 AB AG IA: CPT

## 2021-09-15 PROCEDURE — 93970 EXTREMITY STUDY: CPT

## 2021-09-15 PROCEDURE — 84145 PROCALCITONIN (PCT): CPT

## 2021-09-15 PROCEDURE — 71260 CT THORAX DX C+: CPT

## 2021-09-15 PROCEDURE — 77010033678 HC OXYGEN DAILY

## 2021-09-15 PROCEDURE — 82962 GLUCOSE BLOOD TEST: CPT

## 2021-09-15 PROCEDURE — 74011000250 HC RX REV CODE- 250: Performed by: INTERNAL MEDICINE

## 2021-09-15 RX ORDER — TRIPROLIDINE/PSEUDOEPHEDRINE 2.5MG-60MG
400 TABLET ORAL
Status: DISCONTINUED | OUTPATIENT
Start: 2021-09-15 | End: 2021-10-16

## 2021-09-15 RX ORDER — SODIUM,POTASSIUM PHOSPHATES 280-250MG
2 POWDER IN PACKET (EA) ORAL
Status: COMPLETED | OUTPATIENT
Start: 2021-09-15 | End: 2021-09-15

## 2021-09-15 RX ORDER — MIDAZOLAM IN 0.9 % SOD.CHLORID 1 MG/ML
0-10 PLASTIC BAG, INJECTION (ML) INTRAVENOUS
Status: DISCONTINUED | OUTPATIENT
Start: 2021-09-15 | End: 2021-09-18

## 2021-09-15 RX ORDER — VANCOMYCIN HYDROCHLORIDE
1250 EVERY 12 HOURS
Status: DISCONTINUED | OUTPATIENT
Start: 2021-09-15 | End: 2021-09-16 | Stop reason: DRUGHIGH

## 2021-09-15 RX ORDER — MAGNESIUM SULFATE 1 G/100ML
1 INJECTION INTRAVENOUS ONCE
Status: COMPLETED | OUTPATIENT
Start: 2021-09-15 | End: 2021-09-15

## 2021-09-15 RX ADMIN — BUDESONIDE 500 MCG: 0.5 INHALANT RESPIRATORY (INHALATION) at 20:25

## 2021-09-15 RX ADMIN — LACTULOSE 30 ML: 10 SOLUTION ORAL at 08:34

## 2021-09-15 RX ADMIN — POTASSIUM & SODIUM PHOSPHATES POWDER PACK 280-160-250 MG 2 PACKET: 280-160-250 PACK at 11:15

## 2021-09-15 RX ADMIN — IBUPROFEN 400 MG: 100 SUSPENSION ORAL at 18:20

## 2021-09-15 RX ADMIN — ACETAMINOPHEN 650 MG: 325 TABLET ORAL at 14:30

## 2021-09-15 RX ADMIN — FAMOTIDINE 20 MG: 10 INJECTION, SOLUTION INTRAVENOUS at 05:05

## 2021-09-15 RX ADMIN — MIDAZOLAM 2 MG/HR: 5 INJECTION, SOLUTION INTRAMUSCULAR; INTRAVENOUS at 13:45

## 2021-09-15 RX ADMIN — OLANZAPINE 10 MG: 10 TABLET, FILM COATED ORAL at 18:20

## 2021-09-15 RX ADMIN — POTASSIUM & SODIUM PHOSPHATES POWDER PACK 280-160-250 MG 2 PACKET: 280-160-250 PACK at 10:06

## 2021-09-15 RX ADMIN — ARFORMOTEROL TARTRATE 15 MCG: 15 SOLUTION RESPIRATORY (INHALATION) at 07:51

## 2021-09-15 RX ADMIN — PIPERACILLIN AND TAZOBACTAM 3.38 G: 3; .375 INJECTION, POWDER, LYOPHILIZED, FOR SOLUTION INTRAVENOUS at 14:00

## 2021-09-15 RX ADMIN — 0.12% CHLORHEXIDINE GLUCONATE 10 ML: 1.2 RINSE ORAL at 09:00

## 2021-09-15 RX ADMIN — PROPOFOL 50 MCG/KG/MIN: 10 INJECTION, EMULSION INTRAVENOUS at 10:06

## 2021-09-15 RX ADMIN — HYDROMORPHONE HYDROCHLORIDE 1 MG: 1 INJECTION, SOLUTION INTRAMUSCULAR; INTRAVENOUS; SUBCUTANEOUS at 08:34

## 2021-09-15 RX ADMIN — HYDROMORPHONE HYDROCHLORIDE 1 MG: 1 INJECTION, SOLUTION INTRAMUSCULAR; INTRAVENOUS; SUBCUTANEOUS at 04:21

## 2021-09-15 RX ADMIN — POTASSIUM & SODIUM PHOSPHATES POWDER PACK 280-160-250 MG 2 PACKET: 280-160-250 PACK at 08:33

## 2021-09-15 RX ADMIN — PIPERACILLIN AND TAZOBACTAM 3.38 G: 3; .375 INJECTION, POWDER, LYOPHILIZED, FOR SOLUTION INTRAVENOUS at 21:14

## 2021-09-15 RX ADMIN — PIPERACILLIN AND TAZOBACTAM 3.38 G: 3; .375 INJECTION, POWDER, LYOPHILIZED, FOR SOLUTION INTRAVENOUS at 05:06

## 2021-09-15 RX ADMIN — PROPOFOL 50 MCG/KG/MIN: 10 INJECTION, EMULSION INTRAVENOUS at 04:12

## 2021-09-15 RX ADMIN — ARFORMOTEROL TARTRATE 15 MCG: 15 SOLUTION RESPIRATORY (INHALATION) at 20:25

## 2021-09-15 RX ADMIN — IPRATROPIUM BROMIDE AND ALBUTEROL SULFATE 3 ML: .5; 3 SOLUTION RESPIRATORY (INHALATION) at 20:25

## 2021-09-15 RX ADMIN — FAMOTIDINE 20 MG: 10 INJECTION, SOLUTION INTRAVENOUS at 18:20

## 2021-09-15 RX ADMIN — ACETAMINOPHEN 650 MG: 325 TABLET ORAL at 07:01

## 2021-09-15 RX ADMIN — ENOXAPARIN SODIUM 40 MG: 100 INJECTION SUBCUTANEOUS at 17:00

## 2021-09-15 RX ADMIN — DOXYCYCLINE 100 MG: 100 INJECTION, POWDER, LYOPHILIZED, FOR SOLUTION INTRAVENOUS at 04:13

## 2021-09-15 RX ADMIN — LORAZEPAM 2 MG: 2 INJECTION INTRAMUSCULAR at 15:50

## 2021-09-15 RX ADMIN — DEXTROSE MONOHYDRATE AND SODIUM CHLORIDE 100 ML/HR: 5; .9 INJECTION, SOLUTION INTRAVENOUS at 10:06

## 2021-09-15 RX ADMIN — SODIUM CHLORIDE 10 ML: 9 INJECTION, SOLUTION INTRAMUSCULAR; INTRAVENOUS; SUBCUTANEOUS at 05:06

## 2021-09-15 RX ADMIN — HYDROMORPHONE HYDROCHLORIDE 1 MG: 1 INJECTION, SOLUTION INTRAMUSCULAR; INTRAVENOUS; SUBCUTANEOUS at 18:20

## 2021-09-15 RX ADMIN — IPRATROPIUM BROMIDE AND ALBUTEROL SULFATE 3 ML: .5; 3 SOLUTION RESPIRATORY (INHALATION) at 13:14

## 2021-09-15 RX ADMIN — ACETAMINOPHEN 650 MG: 325 TABLET ORAL at 00:56

## 2021-09-15 RX ADMIN — LACTULOSE 30 ML: 10 SOLUTION ORAL at 21:14

## 2021-09-15 RX ADMIN — MAGNESIUM SULFATE HEPTAHYDRATE 1 G: 1 INJECTION, SOLUTION INTRAVENOUS at 08:34

## 2021-09-15 RX ADMIN — PROPOFOL 50 MCG/KG/MIN: 10 INJECTION, EMULSION INTRAVENOUS at 23:20

## 2021-09-15 RX ADMIN — IPRATROPIUM BROMIDE AND ALBUTEROL SULFATE 3 ML: .5; 3 SOLUTION RESPIRATORY (INHALATION) at 07:51

## 2021-09-15 RX ADMIN — IPRATROPIUM BROMIDE AND ALBUTEROL SULFATE 3 ML: .5; 3 SOLUTION RESPIRATORY (INHALATION) at 13:02

## 2021-09-15 RX ADMIN — 0.12% CHLORHEXIDINE GLUCONATE 10 ML: 1.2 RINSE ORAL at 20:02

## 2021-09-15 RX ADMIN — PROPOFOL 50 MCG/KG/MIN: 10 INJECTION, EMULSION INTRAVENOUS at 14:33

## 2021-09-15 RX ADMIN — LORAZEPAM 7 MG/HR: 2 INJECTION INTRAMUSCULAR; INTRAVENOUS at 03:13

## 2021-09-15 RX ADMIN — HYDROMORPHONE HYDROCHLORIDE 1 MG: 1 INJECTION, SOLUTION INTRAMUSCULAR; INTRAVENOUS; SUBCUTANEOUS at 00:46

## 2021-09-15 RX ADMIN — SODIUM CHLORIDE 10 ML: 9 INJECTION, SOLUTION INTRAMUSCULAR; INTRAVENOUS; SUBCUTANEOUS at 21:15

## 2021-09-15 RX ADMIN — PROPOFOL 50 MCG/KG/MIN: 10 INJECTION, EMULSION INTRAVENOUS at 19:24

## 2021-09-15 RX ADMIN — BUDESONIDE 500 MCG: 0.5 INHALANT RESPIRATORY (INHALATION) at 07:51

## 2021-09-15 RX ADMIN — IOPAMIDOL 100 ML: 755 INJECTION, SOLUTION INTRAVENOUS at 13:00

## 2021-09-15 RX ADMIN — VANCOMYCIN HYDROCHLORIDE 1250 MG: 10 INJECTION, POWDER, LYOPHILIZED, FOR SOLUTION INTRAVENOUS at 13:50

## 2021-09-15 NOTE — CONSULTS
[facility]  (A Division of [address] [place] [address])                                                                                                                       Heather Rivera MD  Office #:     [phone]   Office Fax:      Date of Admission: 9/11/2021Date of Note: 9/15/2021      Reason for Referral: I was asked to see this critically ill  patient by Dr Dea Flaherty for evaluation and antibiotic management of fever. Thank you for involving me in the care of this patient. Please do not hesitate to contact me on the above number if question or concern. Current Antimicrobials:    Prior Antimicrobials:  Zosyn  Vanco  Doxycycline   NA       Assessment- ID related:  --------------------------------------------------------------------------  · Fever-- in drug over dose patient. Etiology unclear-no acute CPD on cxr, viral test including COVID neg        Urine leg/pneumo--neg  · Intubated 9/11  · History of positive drug screen--opiates/cocaine. Drug screen on admission: negative    covid test rapid neg, RVP neg  HIV test pending-9/15  Other Medical Issues- Mx per respective team:    Drug overdose( stated neurontin)   Recommendation for ID issues I am following:  ------------------------------------------------------------------------------    CT CAP and head --report  pending  Check procal. Lactic acid nl  Order TTE    Cont broad ABX coverage with vanco and zosyn. Will hold off doxycycline    Type of abx and further CORNEJO TBD based on data on CORNEJO so far and her progress. Will follow closely       HPI:  Gloria Lundberg is a 39 y.o. WHITE/NON- with PMH as listed below. Patient is intubated, limited history found on her and  ICU team.MD.    Admitted with drug overdose - Neurontin. Not much history known before that. On admission, afebrile, wbc of 8.1, CMP ok, drug screen for opaites/cocaine/benzo neg.  UA was normal.  She was intubated 9/11/21. Developed fever to 102.9 on 9/13, no leucocytosis but NH3 elevated, Legionella/Pneum urine ag good. NO DVT  On LE 9/15. Currently on VAnco/Zosyn and doxycycline. Further CORNEJO with CT for source work up in progress       C/Remy Smith 1106 Problems    Diagnosis Date Noted    Increased ammonia level 09/14/2021    Psychosis ([facility]     Hyponatremia     Acute respiratory failure with hypoxia (HCC)     Left wrist fracture, with delayed healing, subsequent encounter 09/12/2021    Drug overdose, intentional self-harm, initial encounter ([facility] 09/12/2021    Hypoxia 09/12/2021    Hypotension after procedure 09/12/2021    Acute metabolic encephalopathy 14/96/5542     Past Medical History:   Diagnosis Date    Asthma     Bilateral ovarian cysts     Cocaine abuse ([facility]     Mental and behavioral problem     Pancreatitis     Pancreatitis     Psychosis ([facility]      Past Surgical History:   Procedure Laterality Date    HX GYN      D&C, Hysterectomy     No family history on file. Social History     Socioeconomic History    Marital status:      Spouse name: Not on file    Number of children: Not on file    Years of education: Not on file    Highest education level: Not on file   Occupational History    Not on file   Tobacco Use    Smoking status: Current Every Day Smoker     Packs/day: 1.00    Smokeless tobacco: Never Used   Substance and Sexual Activity    Alcohol use: Not Currently    Drug use: Not Currently     Types: Cocaine, Marijuana     Comment: used with in past 24 hours    Sexual activity: Not Currently   Other Topics Concern    Not on file   Social History Narrative    Not on file     Social Determinants of Health     Financial Resource Strain:     Difficulty of Paying Living Expenses:    Food Insecurity:     Worried About Running Out of Food in the Last Year:     Ran Out of Food in the Last Year:    Transportation Needs:     Lack of Transportation (Medical):      Lack of Transportation (Non-Medical):    Physical Activity:     Days of Exercise per Week:     Minutes of Exercise per Session:    Stress:     Feeling of Stress :    Social Connections:     Frequency of Communication with Friends and Family:     Frequency of Social Gatherings with Friends and Family:     Attends Latter-day Services:     Active Member of Clubs or Organizations:     Attends Club or Organization Meetings:     Marital Status:    Intimate Partner Violence:     Fear of Current or Ex-Partner:     Emotionally Abused:     Physically Abused:     Sexually Abused:         Allergies:  Fish containing products and Toradol [ketorolac]     Medications:  Current Facility-Administered Medications   Medication Dose Route Frequency    potassium, sodium phosphates (NEUTRA-PHOS) packet 2 Packet  2 Packet Oral Q2H    midazolam in normal saline (VERSED) 1 mg/mL infusion  2-4 mg/hr IntraVENous TITRATE    insulin lispro (HUMALOG) injection   SubCUTAneous Q6H    glucose chewable tablet 16 g  4 Tablet Oral PRN    glucagon (GLUCAGEN) injection 1 mg  1 mg IntraMUSCular PRN    dextrose (D50W) injection syrg 12.5-25 g  25-50 mL IntraVENous PRN    lactulose (CHRONULAC) 10 gram/15 mL solution 30 mL  30 mL Oral TID    arformoteroL (BROVANA) neb solution 15 mcg  15 mcg Nebulization BID RT    budesonide (PULMICORT) 500 mcg/2 ml nebulizer suspension  500 mcg Nebulization BID RT    piperacillin-tazobactam (ZOSYN) 3.375 g in 0.9% sodium chloride (MBP/ADV) 100 mL MBP  3.375 g IntraVENous Q8H    doxycycline (VIBRAMYCIN) 100 mg in 0.9% sodium chloride (MBP/ADV) 100 mL MBP  100 mg IntraVENous Q12H    OLANZapine (ZyPREXA) tablet 10 mg  10 mg Oral QPM    Vancomycin Pharmacy to Dose  1 Each Other Rx Dosing/Monitoring    LORazepam (ATIVAN) injection 2 mg  2 mg IntraVENous Q6H PRN    HYDROmorphone (PF) (DILAUDID) injection 1 mg  1 mg IntraVENous Q4H PRN    albuterol-ipratropium (DUO-NEB) 2.5 MG-0.5 MG/3 ML  3 mL Nebulization TID RT    enoxaparin (LOVENOX) injection 40 mg  40 mg SubCUTAneous Q24H    propofol (DIPRIVAN) 10 mg/mL infusion  0-50 mcg/kg/min IntraVENous TITRATE    fentaNYL citrate (PF) injection 50 mcg  50 mcg IntraVENous Q3H PRN    sodium chloride (NS) flush 5-40 mL  5-40 mL IntraVENous Q8H    sodium chloride (NS) flush 5-40 mL  5-40 mL IntraVENous PRN    acetaminophen (TYLENOL) tablet 650 mg  650 mg Oral Q6H PRN    Or    acetaminophen (TYLENOL) suppository 650 mg  650 mg Rectal Q6H PRN    polyethylene glycol (MIRALAX) packet 17 g  17 g Oral DAILY PRN    ondansetron (ZOFRAN ODT) tablet 4 mg  4 mg Oral Q8H PRN    Or    ondansetron (ZOFRAN) injection 4 mg  4 mg IntraVENous Q6H PRN    famotidine (PF) (PEPCID) 20 mg in 0.9% sodium chloride 10 mL injection  20 mg IntraVENous BID    chlorhexidine (PERIDEX) 0.12 % mouthwash 10 mL  10 mL Oral Q12H    ELECTROLYTE REPLACEMENT PROTOCOL - Potassium Standard Dosing   1 Each Other PRN    ELECTROLYTE REPLACEMENT PROTOCOL - Magnesium   1 Each Other PRN    ELECTROLYTE REPLACEMENT PROTOCOL - Phosphorus  Standard Dosing  1 Each Other PRN    ELECTROLYTE REPLACEMENT PROTOCOL - Calcium   1 Each Other PRN    dextrose 5% and 0.9% NaCl infusion  100 mL/hr IntraVENous CONTINUOUS        ROS:  Review of systems not obtained due to patient factors. Denies nausea or vomiting or abd pain or diarrhea  Denies chest pain, productive cough  Denies new joint pian or swelling  Denies dysuria/Flank pain/ hematuria  Denies ext weakness-new onset  Denies rash/ itching         Physical Exam:    Temp (24hrs), Av.7 °F (38.7 °C), Min:98.6 °F (37 °C), Max:103.8 °F (39.9 °C)    Visit Vitals  /67   Pulse (!) 106   Temp 98.6 °F (37 °C)   Resp 19   Ht 5' 2\" (1.575 m)   Wt 71.3 kg (157 lb 3 oz)   LMP 05/15/2018   SpO2 (!) 89%   BMI 28.75 kg/m²          GEN: WD Inbutaed and restless. HEENT: Unicteric. EOMI intact  CHEST: Non laboured breathing. CTA  CVS:RRR, no mur/gallop  ABD: Obese/soft.  Non tender. ULISES: Deferred  EXT: No apparent swelling or redness on UE/LE joints. Skin: Dry and intact. No rash, no redness. CNS: Restrained, moves all extremities.       Microbiology  All Micro Results     Procedure Component Value Units Date/Time    LEGIONELLA PNEUMOPHILA AG, URINE [[id number]] Collected: 09/14/21 2315    Order Status: Completed Specimen: Urine, random Updated: 09/15/21 1042     Legionella Ag, urine Negative       STREP PNEUMO AG, URINE [[id number]] Collected: 09/14/21 2315    Order Status: Completed Specimen: Urine, random Updated: 09/15/21 1042     Strep pneumo Ag, urine Negative       CULTURE, BLOOD [[id number]] Collected: 09/14/21 0500    Order Status: Completed Specimen: Blood Updated: 09/15/21 0754     Special Requests: NO SPECIAL REQUESTS        Culture result: NO GROWTH 1 DAY       CULTURE, BLOOD [[id number]] Collected: 09/14/21 0515    Order Status: Completed Specimen: Blood Updated: 09/15/21 0754     Special Requests: NO SPECIAL REQUESTS        Culture result: NO GROWTH 1 DAY       RESPIRATORY VIRUS PANEL W/COVID-19, PCR [[id number]] Collected: 09/14/21 1832    Order Status: Completed Specimen: Nasopharyngeal Updated: 09/14/21 2234     Adenovirus Not detected        Coronavirus 229E Not detected        Coronavirus HKU1 Not detected        Coronavirus CVNL63 Not detected        Coronavirus OC43 Not detected        SARS-CoV-2, PCR Not detected        Metapneumovirus Not detected        Rhinovirus and Enterovirus Not detected        Influenza A Not detected        Influenza A, subtype H1 Not detected        Influenza A, subtype H3 Not detected        INFLUENZA A H1N1 PCR Not detected        Influenza B Not detected        Parainfluenza 1 Not detected        Parainfluenza 2 Not detected        Parainfluenza 3 Not detected        Parainfluenza virus 4 Not detected        RSV by PCR Not detected        B. parapertussis, PCR Not detected        Bordetella pertussis - PCR Not detected Chlamydophila pneumoniae DNA, QL, PCR Not detected        Mycoplasma pneumoniae DNA, QL, PCR Not detected       CULTURE, BLOOD [[id number]] Collected: 09/14/21 1700    Order Status: Canceled Specimen: Blood     CULTURE, URINE [[id number]]     Order Status: Sent Specimen: Cath Urine     RESPIRATORY VIRUS PANEL W/COVID-19, PCR [[id number]]     Order Status: Canceled Specimen: NASOPHARYNGEAL SWAB     COVID-19 RAPID TEST [[id number]]     Order Status: Canceled     COVID-19 RAPID TEST [[id number]] Collected: 09/13/21 0737    Order Status: Completed Specimen: Nasopharyngeal Updated: 09/13/21 0811     Specimen source Nasopharyngeal        COVID-19 rapid test Not detected        Comment: Rapid Abbott ID Now       Rapid NAAT:  The specimen is NEGATIVE for SARS-CoV-2, the novel coronavirus associated with COVID-19. Negative results should be treated as presumptive and, if inconsistent with clinical signs and symptoms or necessary for patient management, should be tested with an alternative molecular assay. Negative results do not preclude SARS-CoV-2 infection and should not be used as the sole basis for patient management decisions. This test has been authorized by the FDA under an Emergency Use Authorization (EUA) for use by authorized laboratories.    Fact sheet for Healthcare Providers: ConventionUpdate.co.nz  Fact sheet for Patients: ConventionUpdate.co.nz       Methodology: Isothermal Nucleic Acid Amplification         COVID-19 RAPID TEST [[id number]]     Order Status: Canceled            Lab results:    Chemistry  Recent Labs     09/15/21  0555 09/14/21  1715 09/14/21  0500 09/13/21  1200 09/13/21  1200   *  --  113*  --  116*     --  145  --  146*   K 4.4 4.0 3.5   < > 3.7   *  --  115*  --  116*   CO2 22  --  23  --  24   BUN 4*  --  5*  --  5*   CREA 0.55*  --  0.33*  --  0.48*   CA 7.8*  --  7.9*  --  8.3*   AGAP 7  --  7  --  6   BUCR 7*  --  15  -- 10*   AP 80  --  73  --  79   TP 5.0*  --  5.2*  --  5.8*   ALB 2.2*  --  2.5*  --  3.0*   GLOB 2.8  --  2.7  --  2.8   AGRAT 0.8  --  0.9  --  1.1    < > = values in this interval not displayed.        CBC w/ Diff  Recent Labs     09/15/21  0555 09/14/21  0500 09/13/21  1200   WBC 12.2 10.5 10.9   RBC 3.41* 3.40* 3.86*   HGB 10.3* 10.4* 11.4*   HCT 32.6* 32.5* 35.0    192 225   GRANS 73 73 76*   LYMPH 15* 14* 14*   EOS 2 2 1       Imaging: report posted below as per radiologist

## 2021-09-15 NOTE — PROGRESS NOTES
0700 Bedside and Verbal shift change report given to DIA Carter (oncoming nurse) by Bernard Medrano RN (offgoing nurse). Report included the following information SBAR, Kardex, Intake/Output, Recent Results and Cardiac Rhythm ST.     0800 Shift assessment completed, see flowsheet. Patient remains intubated and sedated. Restraints in place for patient safety and line integrity. Fixator to L wrist intact. Will plan for CT of head today and stop Ativan gtt for neuro assessment. Temperature decreasing. Respiratory panel negative, will D/C COVID precautions. Electrolyte replacement as indicated. Critical care continues. 0830 Spoke w/ CT. Will coordinate time with RT to complete CT.     1000 Rounds completed. Will do SBT after CT. Message left with CT to coordinate time. 65 Brenna with poison control provided with update. 1200 Reassessment, no changes. 1300 Patient transported to CT accompanied by RT.     1550 Reassessment completed Tylenol given for fever. Ice packs placed to axilla. Ativan given for agitation. 1715 No improvement with fever. Dr. Devika newton, order obtained for Ibuprofen. 1900 Bedside and Verbal shift change report given to Bernard Medrano, KRISTINE (oncoming nurse) by Jil Wei RN (offgoing nurse).  Report included the following information SBAR, Kardex, Intake/Output, Recent Results and Cardiac Rhythm ST.

## 2021-09-15 NOTE — PROGRESS NOTES
@2000 pt awake , restless in bed ,on ventilator via et tube. OG tube in-situ remains clamped. [place] in-situ draining bonnie urine. Pt remains on Diprivan and Ativan drips. External fixator seen on left lower hand to wrist remains intact. Assessment completed and charted in appropriate flow sheets. Nursing management continues. @0000 pt reassessed no new developments care continues. @0400 reassessment completed no changes pt continues to be monitored. @6414 Bedside and Verbal shift change report given to Yvonne Gutiérrez (oncoming nurse) by Sweta Andrews (offgoing nurse). Report included the following information SBAR, Kardex, Intake/Output, MAR, Recent Results, Med Rec Status and Alarm Parameters .

## 2021-09-15 NOTE — PROGRESS NOTES
Called patient's \"foster sister\" Kaleigh Cheung to follow up and let her know that patient's  Denisha Paul has been contacted at the custodial and will be the one to make consents if needed. She stated that Yolanda's parents adopted her when she was 15. She is her sister and lives with her. Patient's mother is dead so patient's father who lives in [place] would be the legal next of kin after the  if needed. We do not have his contact info at this time. [address] [facility], M.Div.   Board Certified   [phone] - Office

## 2021-09-15 NOTE — PROGRESS NOTES
[facility]  Pulmonary, Critical Care, and Sleep Medicine    Name: Earnestine Vance MRN: [id number]   : 1980 Hospital: [facility] [place]   Date: 9/15/2021        Pulmonary Critical Care Note    IMPRESSION:   · Acute respiratory failure · J96.00 ·   Stable 2 mm nodular right lower lobe density laterally from 2015; smoker, no prior CT chest for any additional nodule w/up · R91.1     Patient Active Problem List   Diagnosis Code    Dextromethorphan use disorder, moderate ([address] F19.20    Ekbom's delusional parasitosis ([address]    Left wrist fracture, with delayed healing, subsequent encounter S62.102G    Drug overdose, intentional self-harm, initial encounter ([facility] T50.902A    Hypoxia R09.02    Hypotension after procedure I95.81    Acute metabolic encephalopathy U42.85    Psychosis ([facility] F29    Hyponatremia E87.1    Acute respiratory failure with hypoxia ([facility] J96.01    Increased ammonia level R79.89 ·   Code status: full code   RECOMMENDATIONS:   Respiratory: Mech. Ventilated patients- aim to keep peak plateau pressure less than or equal to 30cm H2O. Titrate FiO2 for goal SPO2> 91%  7.33/40/77/92 on ac 350/14 30/5    IMPRESSION     Endotracheal tube tip in the lower thoracic trachea 1.5 cm above the dipak.      Bilateral lower lobar atelectasis, possible endobronchial lesion/mucous plug in  the left lower lobe bronchus.      No acute intra-abdominal abnormality. Ac 350/12/30/5  ABG 7.36/40/95/97  Chest x-ray developing pneumonia atelectasis RLL  Repeat  again covid test but suspect aspiration pneumonia   Fever 101  Failed SBT  On 2021  With turn off sedation started spontaneous breathing trial patient was extremely agitated. In spite of maximum dose of Precedex and Ativan pushes. Trying to pull IVs and ET tube. Back on propofol drip and low dose ativan drip   Prn opiates   Keep daily spontaneous breathing trial off sedation  Speck DTs? Withdrawal from drugs?   Discuss CT chest option with pt when more stable  Not a candidate for LD CT chest  Discussed with RN to get Urine pregnancy test before any radiological test  VAP prevention bundle and sedation bundle followed, head of the bed at 30' all times  Daily sedation holiday and assessment for weaning with SBT as tolerated  Continue pulmonary hygiene care  ID:contineu abx  Second covid test negative  CAP on triple abx   Add chest pt/bronchodilators and consider steroids if not better  Fever blood culure negative   WBC stable  initial culture negative   I suspect aspiration pneumonia/CAP  Urine strep and legionellla pending  On triple abx van/zosyn/doxy  procal and lactic acid repeat   CVS: Monitor Hemodynamics- stable  IVF: D5NS at 50 mL/hr  EKG in AM  Renal: Monitor renal functions, lytes  Replace lytes per unit protocol  CK in AM  Heme: Monitor CBC H/H is expected to drop with IVF  No evidence of active bleeding  Endo: TSH ordered  GI: diet - NPO  LFT normal  Neurology: sedated  CT of the head ordered on 9/13 still not done spoke to staff to do ASAP  Toxicology: gabapentin OD  Supportive care  CT head pending   Sedation: Propofol - titrate  Hold Ativan drip as tolerated; may use PRN Ativan  Ammonia    Will defer respective systems problem management to primary and other consultant and follow patient in ICU with primary and other medical team  Further recommendations will be based on the patient's response to recommended treatment and results of the investigation ordered. Quality Care: PPI, DVT prophylaxis, HOB elevated, Infection control all reviewed and addressed. PAIN AND SEDATION: Propofol; PRN Ativan  Skin/Wound: none  Prophylaxis: DVT and GI Prophylaxis reviewed. Restraints: Wrist soft restraints for patient interfering with medical therapy/management and patient safety. PT/OT eval and treat: as needed when stable   Lines/Tubes: PIV   ETT: 9/11/21  OGT/NGT: 9/11/21  Other drains: 9/11/21  ADVANCE DIRECTIVE: Full code. DISCUSSION: Events and notes from last 24 hours reviewed. Care plan discussed with nursing, hospitalist, RT    Quality Care: PPI, DVT prophylaxis, HOB elevated, Infection control all reviewed and addressed. No family made multiple attempts  High complexity decision making was performed during the evaluation of this patient at high risk for decompensation with multiple organ involvement. Cc time 40 minutes   Total critical care time spent rendering complex decision making and > 50% time spent in face to face evaluation exclusive of procedures/family discussion/coordination of care: 40 minutes. Subjective/History:   Ms. Lida Rae has been seen and evaluated as Dr. Marlyn Gordon requested now for assisting with Acute respiratory failure and ventilator management. Patient is a 39 y.o. female with following PMhx presented to ER with lethargy via EMS and admitted for Gabapentin overdose. Pt required intubation for airways protection. Position control was contacted that recommended supportive care per ER provider. Pt seen at bedside in ER rm#2. The patient can not provide additional history due to Ventilated. 9/14/2021  Remains in ICU bed 4. Intubated sedated. The of the chest pneumonia. Mucus plugging. Add chest PT bronchodilators. CT of the head normal.  Sedation try again spontaneous breathing trial.  Overnight no phone calls    Review of Systems:  Review of systems not obtained due to patient factors. Past Medical History:  Past Medical History:   Diagnosis Date    Asthma     Bilateral ovarian cysts     Cocaine abuse ([address]     Mental and behavioral problem     Pancreatitis     Pancreatitis     Psychosis ([facility]         Past Surgical History:  Past Surgical History:   Procedure Laterality Date    HX GYN      D&C, Hysterectomy        Medications:  Prior to Admission medications    Medication Sig Start Date End Date Taking?  Authorizing Provider   albuterol (PROVENTIL HFA, VENTOLIN HFA, PROAIR HFA) 90 mcg/actuation inhaler Take 2 Puffs by inhalation every four (4) hours as needed for Wheezing or Shortness of Breath. 8/4/21   Tracy Gandhi PA-C   ibuprofen (MOTRIN) 600 mg tablet Take 1 Tablet by mouth every six (6) hours as needed for Pain. Take with food. 8/4/21   Tracy Gandhi PA-C   ALPRAZolam (XANAX) 0.5 mg tablet TAKE 1 TABLET BY MOUTH ONCE DAILY AS NEEDED FOR ANXIETY 12/4/19   Provider, Historical   metFORMIN (GLUCOPHAGE) 500 mg tablet TAKE 1 TABLET BY MOUTH ONCE DAILY 11/12/19   Provider, Historical   nicotine (NICODERM CQ) 21 mg/24 hr APPLY 1 PATCH TOPICALLY TO THE SKIN DAILY FOR CRAVINGS AS DIRECTED 12/4/19   Provider, Historical   traZODone (DESYREL) 50 mg tablet TAKE 1 TABLET BY MOUTH AT BEDTIME AS NEEDED FOR INSOMNIA 12/4/19   Provider, Historical   escitalopram oxalate (LEXAPRO) 10 mg tablet Take 10 mg by mouth daily. Other, MD Luisa   lurasidone (LATUDA) 80 mg tab tablet Take 80 mg by mouth daily (with dinner).     Other, MD Luisa       Current Facility-Administered Medications   Medication Dose Route Frequency    midazolam in normal saline (VERSED) 1 mg/mL infusion  2-4 mg/hr IntraVENous TITRATE    vancomycin (VANCOCIN) 1250 mg in  ml infusion  1,250 mg IntraVENous Q12H    insulin lispro (HUMALOG) injection   SubCUTAneous Q6H    lactulose (CHRONULAC) 10 gram/15 mL solution 30 mL  30 mL Oral TID    arformoteroL (BROVANA) neb solution 15 mcg  15 mcg Nebulization BID RT    budesonide (PULMICORT) 500 mcg/2 ml nebulizer suspension  500 mcg Nebulization BID RT    piperacillin-tazobactam (ZOSYN) 3.375 g in 0.9% sodium chloride (MBP/ADV) 100 mL MBP  3.375 g IntraVENous Q8H    OLANZapine (ZyPREXA) tablet 10 mg  10 mg Oral QPM    Vancomycin Pharmacy to Dose  1 Each Other Rx Dosing/Monitoring    albuterol-ipratropium (DUO-NEB) 2.5 MG-0.5 MG/3 ML  3 mL Nebulization TID RT    enoxaparin (LOVENOX) injection 40 mg  40 mg SubCUTAneous Q24H    propofol (DIPRIVAN) 10 mg/mL infusion  0-50 mcg/kg/min IntraVENous TITRATE    sodium chloride (NS) flush 5-40 mL  5-40 mL IntraVENous Q8H    famotidine (PF) (PEPCID) 20 mg in 0.9% sodium chloride 10 mL injection  20 mg IntraVENous BID    chlorhexidine (PERIDEX) 0.12 % mouthwash 10 mL  10 mL Oral Q12H    dextrose 5% and 0.9% NaCl infusion  100 mL/hr IntraVENous CONTINUOUS       Allergy:  Allergies   Allergen Reactions    Fish Containing Products Itching    Toradol [Ketorolac] Rash     Pt reports she is not allergic to this medication. Social History:  Social History     Tobacco Use    Smoking status: Current Every Day Smoker     Packs/day: 1.00    Smokeless tobacco: Never Used   Substance Use Topics    Alcohol use: Not Currently    Drug use: Not Currently     Types: Cocaine, Marijuana     Comment: used with in past 24 hours        Family History:  No family history on file. Objective:   Vital Signs:    Blood pressure 124/68, pulse (!) 116, temperature (!) 101.5 °F (38.6 °C), resp. rate 22, height 5' 2\" (1.575 m), weight 71.3 kg (157 lb 3 oz), last menstrual period 05/15/2018, SpO2 98 %. Body mass index is 28.75 kg/m². O2 Device: Croup tent, Ventilator       Temp (24hrs), Av.5 °F (38.6 °C), Min:98.4 °F (36.9 °C), Max:103.8 °F (39.9 °C)         Intake/Output:   Last shift:      09/15 0701 - 09/15 1900  In: -   Out: 9104 [Urine:1750]  Last 3 shifts: 1901 - 09/15 07  In: 8970.8 [P.O.:30; I.V.:8262.8]  Out: 4375 [Urine:4050]    Intake/Output Summary (Last 24 hours) at 9/15/2021 1626  Last data filed at 9/15/2021 1622  Gross per 24 hour   Intake 3426.34 ml   Output 3201 ml   Net 225.34 ml       Ventilator Settings:  Mode Rate Tidal Volume Pressure FiO2 PEEP   VC+   350 ml  7 cm H2O 30 % 5 cm H20     Peak airway pressure: 15 cm H2O    Minute ventilation: 9.18 l/min      Lung protective strategy, Pl pressure goals less than or equal to 30.     Physical Exam: Intubated sedated off sedation moving all 4 extremities but did not follow commands  General: sedated, in no respiratory distress, appears stated age, on ventilator  HEENT: PERRL, fundi benign, ET and OG tubes in place  Neck: No abnormally enlarged lymph nodes or thyroid, supple  Chest: normal  Lungs: normal air entry, breathing normal , clear to auscultation bilaterally, slightly decreased breath sounds at the right base normal percussion bilaterally, no tenderness/ rash  Heart: Regular rate and rhythm, S1S2 present or without murmur or extra heart sounds  Abdomen: non distended, bowel sounds normoactive, tympanic, abdomen is soft without significant tenderness, masses, organomegaly or guarding, rigidity, rebound  Extremity: negative for edema, cyanosis, clubbing  Capillary refill: normal  Neuro: sedated, moves all extremities spontaneously in bed, no involuntary movements, exam limitation on ventilator  Skin: Skin color, texture, turgor fair.  Skin dry, warm, non-diaphoretic    Data:     Recent Results (from the past 24 hour(s))   POTASSIUM    Collection Time: 09/14/21  5:15 PM   Result Value Ref Range    Potassium 4.0 3.5 - 5.5 mmol/L   MAGNESIUM    Collection Time: 09/14/21  5:15 PM   Result Value Ref Range    Magnesium 2.0 1.6 - 2.6 mg/dL   LACTIC ACID    Collection Time: 09/14/21  5:15 PM   Result Value Ref Range    Lactic acid 1.6 0.4 - 2.0 MMOL/L   CALCIUM, IONIZED    Collection Time: 09/14/21  5:15 PM   Result Value Ref Range    Ionized Calcium 1.17 1.12 - 1.32 MMOL/L   PHOSPHORUS    Collection Time: 09/14/21  5:15 PM   Result Value Ref Range    Phosphorus 1.9 (L) 2.5 - 4.9 MG/DL   GLUCOSE, POC    Collection Time: 09/14/21  5:18 PM   Result Value Ref Range    Glucose (POC) 123 (H) 70 - 110 mg/dL   RESPIRATORY VIRUS PANEL W/COVID-19, PCR    Collection Time: 09/14/21  6:32 PM    Specimen: Nasopharyngeal   Result Value Ref Range    Adenovirus Not detected NOTD      Coronavirus 229E Not detected NOTD      Coronavirus HKU1 Not detected NOTD Coronavirus CVNL63 Not detected NOTD      Coronavirus OC43 Not detected NOTD      SARS-CoV-2, PCR Not detected NOTD      Metapneumovirus Not detected NOTD      Rhinovirus and Enterovirus Not detected NOTD      Influenza A Not detected NOTD      Influenza A, subtype H1 Not detected NOTD      Influenza A, subtype H3 Not detected NOTD      INFLUENZA A H1N1 PCR Not detected NOTD      Influenza B Not detected NOTD      Parainfluenza 1 Not detected NOTD      Parainfluenza 2 Not detected NOTD      Parainfluenza 3 Not detected NOTD      Parainfluenza virus 4 Not detected NOTD      RSV by PCR Not detected NOTD      B. parapertussis, PCR Not detected NOTD      Bordetella pertussis - PCR Not detected NOTD      Chlamydophila pneumoniae DNA, QL, PCR Not detected NOTD      Mycoplasma pneumoniae DNA, QL, PCR Not detected NOTD     GLUCOSE, POC    Collection Time: 09/14/21 11:11 PM   Result Value Ref Range    Glucose (POC) 102 70 - 110 mg/dL   LEGIONELLA PNEUMOPHILA AG, URINE    Collection Time: 09/14/21 11:15 PM    Specimen: Urine, random   Result Value Ref Range    Legionella Ag, urine Negative NEG     STREP PNEUMO AG, URINE    Collection Time: 09/14/21 11:15 PM    Specimen: Urine, random   Result Value Ref Range    Strep pneumo Ag, urine Negative NEG     GLUCOSE, POC    Collection Time: 09/15/21  5:10 AM   Result Value Ref Range    Glucose (POC) 110 70 - 110 mg/dL   BLOOD GAS, ARTERIAL POC    Collection Time: 09/15/21  5:30 AM   Result Value Ref Range    Device: ADULT VENT      FIO2 (POC) 30 %    pH (POC) 7.33 (L) 7.35 - 7.45      pCO2 (POC) 40.0 35.0 - 45.0 MMHG    pO2 (POC) 77 (L) 80 - 100 MMHG    HCO3 (POC) 20.8 (L) 22 - 26 MMOL/L    sO2 (POC) 92.1 92 - 97 %    Base deficit (POC) 4.1 mmol/L    Mode ASSIST CONTROL      Tidal volume 350 ml    Set Rate 14 bpm    PEEP/CPAP (POC) 5 cmH2O    Mean Airway Pressure 8.6 cmH2O    PIP (POC) 16      Allens test (POC) NOT APPLICABLE      Inspiratory Time 1.2 sec    Total resp.  rate 18 Site RIGHT RADIAL      Patient temp. 103      Specimen type (POC) ARTERIAL      Performed by Palma Soto    CBC WITH AUTOMATED DIFF    Collection Time: 09/15/21  5:55 AM   Result Value Ref Range    WBC 12.2 4.6 - 13.2 K/uL    RBC 3.41 (L) 4.20 - 5.30 M/uL    HGB 10.3 (L) 12.0 - 16.0 g/dL    HCT 32.6 (L) 35.0 - 45.0 %    MCV 95.6 78.0 - 100.0 FL    MCH 30.2 24.0 - 34.0 PG    MCHC 31.6 31.0 - 37.0 g/dL    RDW 13.2 11.6 - 14.5 %    PLATELET 378 554 - 945 K/uL    MPV 10.8 9.2 - 11.8 FL    NEUTROPHILS 73 40 - 73 %    LYMPHOCYTES 15 (L) 21 - 52 %    MONOCYTES 10 3 - 10 %    EOSINOPHILS 2 0 - 5 %    BASOPHILS 0 0 - 2 %    ABS. NEUTROPHILS 8.9 (H) 1.8 - 8.0 K/UL    ABS. LYMPHOCYTES 1.9 0.9 - 3.6 K/UL    ABS. MONOCYTES 1.2 0.05 - 1.2 K/UL    ABS. EOSINOPHILS 0.2 0.0 - 0.4 K/UL    ABS. BASOPHILS 0.0 0.0 - 0.1 K/UL    DF AUTOMATED     MAGNESIUM    Collection Time: 09/15/21  5:55 AM   Result Value Ref Range    Magnesium 1.8 1.6 - 2.6 mg/dL   METABOLIC PANEL, COMPREHENSIVE    Collection Time: 09/15/21  5:55 AM   Result Value Ref Range    Sodium 145 136 - 145 mmol/L    Potassium 4.4 3.5 - 5.5 mmol/L    Chloride 116 (H) 100 - 111 mmol/L    CO2 22 21 - 32 mmol/L    Anion gap 7 3.0 - 18 mmol/L    Glucose 103 (H) 74 - 99 mg/dL    BUN 4 (L) 7.0 - 18 MG/DL    Creatinine 0.55 (L) 0.6 - 1.3 MG/DL    BUN/Creatinine ratio 7 (L) 12 - 20      GFR est AA >60 >60 ml/min/1.73m2    GFR est non-AA >60 >60 ml/min/1.73m2    Calcium 7.8 (L) 8.5 - 10.1 MG/DL    Bilirubin, total 0.5 0.2 - 1.0 MG/DL    ALT (SGPT) 26 13 - 56 U/L    AST (SGOT) 33 10 - 38 U/L    Alk.  phosphatase 80 45 - 117 U/L    Protein, total 5.0 (L) 6.4 - 8.2 g/dL    Albumin 2.2 (L) 3.4 - 5.0 g/dL    Globulin 2.8 2.0 - 4.0 g/dL    A-G Ratio 0.8 0.8 - 1.7     PHOSPHORUS    Collection Time: 09/15/21  5:55 AM   Result Value Ref Range    Phosphorus 2.1 (L) 2.5 - 4.9 MG/DL   AMMONIA    Collection Time: 09/15/21  5:55 AM   Result Value Ref Range    Ammonia 67 (H) 11 - 32 UMOL/L CALCIUM, IONIZED    Collection Time: 09/15/21  5:55 AM   Result Value Ref Range    Ionized Calcium 1.12 1.12 - 1.32 MMOL/L   PROTHROMBIN TIME + INR    Collection Time: 09/15/21 12:00 PM   Result Value Ref Range    Prothrombin time 14.5 11.5 - 15.2 sec    INR 1.2 0.8 - 1.2     LACTIC ACID    Collection Time: 09/15/21 12:00 PM   Result Value Ref Range    Lactic acid 1.0 0.4 - 2.0 MMOL/L   PHOSPHORUS    Collection Time: 09/15/21 12:10 PM   Result Value Ref Range    Phosphorus 2.8 2.5 - 4.9 MG/DL   MAGNESIUM    Collection Time: 09/15/21 12:10 PM   Result Value Ref Range    Magnesium 2.2 1.6 - 2.6 mg/dL   GLUCOSE, POC    Collection Time: 09/15/21 12:15 PM   Result Value Ref Range    Glucose (POC) 97 70 - 110 mg/dL           Recent Labs     09/15/21  0530 09/14/21  0452 09/13/21  0508   FIO2I 30 30 50   HCO3I 20.8* 23.1 25.1   PCO2I 40.0 40.9 46.4*   PHI 7.33* 7.36 7.34*   PO2I 77* 95 290*       All Micro Results     Procedure Component Value Units Date/Time    LEGIONELLA PNEUMOPHILA AG, URINE [[id number]] Collected: 09/14/21 2315    Order Status: Completed Specimen: Urine, random Updated: 09/15/21 1042     Legionella Ag, urine Negative       STREP PNEUMO AG, URINE [[id number]] Collected: 09/14/21 2315    Order Status: Completed Specimen: Urine, random Updated: 09/15/21 1042     Strep pneumo Ag, urine Negative       CULTURE, BLOOD [[id number]] Collected: 09/14/21 0500    Order Status: Completed Specimen: Blood Updated: 09/15/21 0754     Special Requests: NO SPECIAL REQUESTS        Culture result: NO GROWTH 1 DAY       CULTURE, BLOOD [[id number]] Collected: 09/14/21 0515    Order Status: Completed Specimen: Blood Updated: 09/15/21 0754     Special Requests: NO SPECIAL REQUESTS        Culture result: NO GROWTH 1 DAY       RESPIRATORY VIRUS PANEL W/COVID-19, PCR [[id number]] Collected: 09/14/21 1832    Order Status: Completed Specimen: Nasopharyngeal Updated: 09/14/21 6701     Adenovirus Not detected        Coronavirus 229E Not detected        Coronavirus HKU1 Not detected        Coronavirus CVNL63 Not detected        Coronavirus OC43 Not detected        SARS-CoV-2, PCR Not detected        Metapneumovirus Not detected        Rhinovirus and Enterovirus Not detected        Influenza A Not detected        Influenza A, subtype H1 Not detected        Influenza A, subtype H3 Not detected        INFLUENZA A H1N1 PCR Not detected        Influenza B Not detected        Parainfluenza 1 Not detected        Parainfluenza 2 Not detected        Parainfluenza 3 Not detected        Parainfluenza virus 4 Not detected        RSV by PCR Not detected        B. parapertussis, PCR Not detected        Bordetella pertussis - PCR Not detected        Chlamydophila pneumoniae DNA, QL, PCR Not detected        Mycoplasma pneumoniae DNA, QL, PCR Not detected       CULTURE, BLOOD [[id number]] Collected: 09/14/21 1700    Order Status: Canceled Specimen: Blood     CULTURE, URINE [[id number]]     Order Status: Sent Specimen: Cath Urine     RESPIRATORY VIRUS PANEL W/COVID-19, PCR [[id number]]     Order Status: Canceled Specimen: NASOPHARYNGEAL SWAB     COVID-19 RAPID TEST [[id number]]     Order Status: Canceled     COVID-19 RAPID TEST [[id number]] Collected: 09/13/21 0737    Order Status: Completed Specimen: Nasopharyngeal Updated: 09/13/21 0811     Specimen source Nasopharyngeal        COVID-19 rapid test Not detected        Comment: Rapid Abbott ID Now       Rapid NAAT:  The specimen is NEGATIVE for SARS-CoV-2, the novel coronavirus associated with COVID-19. Negative results should be treated as presumptive and, if inconsistent with clinical signs and symptoms or necessary for patient management, should be tested with an alternative molecular assay. Negative results do not preclude SARS-CoV-2 infection and should not be used as the sole basis for patient management decisions.        This test has been authorized by the FDA under an Emergency Use Authorization (EUA) for use by authorized laboratories. Fact sheet for Healthcare Providers: ConventionUpdate.co.nz  Fact sheet for Patients: ConventionUpdate.co.nz       Methodology: Isothermal Nucleic Acid Amplification         COVID-19 RAPID TEST [[id number]]     Order Status: Canceled           Telemetry: normal sinus rhythm      Imaging:  [x]I have personally reviewed the patients chest radiographs images and report     Results from Hospital Encounter encounter on 09/11/21    XR ABD (KUB)    Addendum 9/15/2021 10:32 AM  Addendum: Dany Agrawal: SUPINE ABDOMINAL RADIOGRAPH    CLINICAL INDICATION/HISTORY: ogt placement  -Additional: None    COMPARISON: 9/12/2021    TECHNIQUE: Single supine view of the abdomen.    _______________    FINDINGS:    BOWEL GAS PATTERN: Enteric tube within the distal stomach. No evidence of  obstruction. No visible free air, given limitations of supine view. BONES: Unremarkable. OTHER: None.    _______________    IMPRESSION:    Nonobstructive bowel gas pattern. Enteric tube within the distal stomach. Narrative  EXAM: XR ABD (KUB)    CLINICAL INDICATION/HISTORY: ogt placement  -Additional: None    COMPARISON: 2 days prior    TECHNIQUE: Frontal view of the chest    _______________    FINDINGS:    HEART AND MEDIASTINUM: Normal cardiac size and mediastinal contours. LUNGS AND PLEURAL SPACES: No focal pneumonic consolidation, pneumothorax, or  pleural effusion. BONY THORAX AND SOFT TISSUES: No acute osseous abnormality    _______________    Impression  Nonobstructive bowel gas pattern.           [x]See my orders for details          Natalie Mcknight MD

## 2021-09-15 NOTE — PROGRESS NOTES
Hospitalist Progress Note-critical care note     Patient: Deann Gallagher MRN: [id number]  CSN: [id number]    YOB: 1980  Age: 39 y.o. Sex: female    DOA: 9/11/2021 LOS:  LOS: 3 days            Chief complaint: wrist fracture , drug overdose , acute respiratory failure with hypoxia, psychosis     Assessment/Plan         Hospital Problems  Date Reviewed: 9/6/2015        Codes Class Noted POA    Increased ammonia level ICD-10-CM: R79.89  ICD-9-CM: 790.6  9/14/2021 Unknown        Psychosis ([facility] ICD-10-CM: F29  ICD-9-CM: 298.9  Unknown Unknown        Hyponatremia ICD-10-CM: E87.1  ICD-9-CM: 276.1  Unknown Yes        Acute respiratory failure with hypoxia ([facility] ICD-10-CM: J96.01  ICD-9-CM: 518.81  Unknown Yes        Left wrist fracture, with delayed healing, subsequent encounter ICD-10-CM: S62.102G  ICD-9-CM: V54.19  9/12/2021 Unknown        * (Principal) Drug overdose, intentional self-harm, initial encounter ([facility] ICD-10-CM: T50.902A  ICD-9-CM: 977.9, E950.5  9/12/2021 Yes        Hypoxia ICD-10-CM: R09.02  ICD-9-CM: 799.02  9/12/2021 Yes        Hypotension after procedure ICD-10-CM: I95.81  ICD-9-CM: 458.29  9/12/2021 Yes        Acute metabolic encephalopathy ABL-33-IW: G93.41  ICD-9-CM: 348.31  9/12/2021 Yes                  Deann Gallagher is a 39 y.o. female who is standing history of multidrug use/abuse, previous drug-seeking behavior and mental health issues otherwise unspecified arrives via EMS with acute metabolic encephalopathy and lethargy. Admitted for likely gabapentin overdose.        Resp -      Acute respiratory failure with hypoxia   Intubated on 9/12     VAP bundle. HOB>30 degrees.    Weaning per protocol   sbt per protocol    cxr :No acute cardiopulmonary abnormality  covid 19 rapid negative     CVS -   Hypotension after intubation   So far bp remained well     ID -   No s/sxs of infection   UA clear      Heme/onc -   Anemia-  Stable so far no bleeding reported and will continue monitoring   Upper PVL negative for dvt      Renal -   Hypernatremia resolved   Mg replace as protocol   Continue monitoring   icu electrolytes replacement protocol      Endocrine -  Follow FSG     Neuro -  Acute metabolic encephalopathy   Ct has been ordered, not done     Gabapentin overdose with lethargy and AMS   S/p procedural stomach emptying in ED with charcoal and sorbitol   Continue monitoring the side affect   On profofol and ativan gtts   ETOH and Acetaminophen and salicylate levels wnl on admission      elevated ammonia level  Improving per  Lactulose, continue monitoring        Psych   Psychosis , hx of  Ekbom's delusional parasitosis   Need psych evaluation later   On olanzapine     GI - ppi      Msk: left wrist fracture: immobilized -poa       Disposition :tbd,   Review of systems:  Unable to obtain due to intubation   Vital signs/Intake and Output:  Visit Vitals  /67   Pulse (!) 106   Temp 98.6 °F (37 °C)   Resp 19   Ht 5' 2\" (1.575 m)   Wt 71.3 kg (157 lb 3 oz)   SpO2 (!) 89%   BMI 28.75 kg/m²     Current Shift:  09/15 0701 - 09/15 1900  In: -   Out: 1001 [Urine:1000]  Last three shifts:  09/13 1901 - 09/15 0700  In: 8970.8 [P.O.:30; I.V.:8262.8]  Out: 4375 [Urine:4050]    Physical Exam:  General: Intubated    HEENT: NC, Atraumatic. ET tube noted   Lungs: CTA Bilaterally. No Wheezing/Rhonchi/Rales. Heart:  Tachy ,  No murmur, No Rubs, No Gallops  Abdomen: Soft, Non distended, Non tender.   +Bowel sounds,   Extremities: No c/c.immobilzer noted on left wrist   Psych:   Calm   Neurologic:  On sedation           Labs: Results:       Chemistry Recent Labs     09/15/21  0555 09/14/21  1715 09/14/21  0500 09/13/21  1200 09/13/21  1200   *  --  113*  --  116*     --  145  --  146*   K 4.4 4.0 3.5   < > 3.7   *  --  115*  --  116*   CO2 22  --  23  --  24   BUN 4*  --  5*  --  5*   CREA 0.55*  --  0.33*  --  0.48*   CA 7.8*  --  7.9*  --  8.3*   AGAP 7  --  7  --  6   BUCR 7*  --  15 --  10*   AP 80  --  73  --  79   TP 5.0*  --  5.2*  --  5.8*   ALB 2.2*  --  2.5*  --  3.0*   GLOB 2.8  --  2.7  --  2.8   AGRAT 0.8  --  0.9  --  1.1    < > = values in this interval not displayed. CBC w/Diff Recent Labs     09/15/21  0555 09/14/21  0500 09/13/21  1200   WBC 12.2 10.5 10.9   RBC 3.41* 3.40* 3.86*   HGB 10.3* 10.4* 11.4*   HCT 32.6* 32.5* 35.0    192 225   GRANS 73 73 76*   LYMPH 15* 14* 14*   EOS 2 2 1      Cardiac Enzymes Recent Labs     09/13/21  1200   CPK 81      Coagulation No results for input(s): PTP, INR, APTT, INREXT, INREXT in the last 72 hours. Lipid Panel No results found for: CHOL, CHOLPOCT, CHOLX, CHLST, CHOLV, 782811, HDL, HDLP, LDL, LDLC, DLDLP, 408907, VLDLC, VLDL, TGLX, TRIGL, TRIGP, TGLPOCT, CHHD, CHHDX   BNP No results for input(s): BNPP in the last 72 hours. Liver Enzymes Recent Labs     09/15/21  0555   TP 5.0*   ALB 2.2*   AP 80      Thyroid Studies No results found for: T4, T3U, TSH, TSHEXT, TSHEXT     Procedures/imaging: see electronic medical records for all procedures/Xrays and details which were not copied into this note but were reviewed prior to creation of Plan    XR WRIST LT AP/LAT/OBL MIN 3V    Result Date: 8/19/2021  EXAM: XR WRIST LT AP/LAT/OBL MIN 3V CLINICAL INDICATION/HISTORY: Fall with LEFT wrist pain and swelling -Additional: None COMPARISON: None TECHNIQUE: 3 views of the left wrist _______________ FINDINGS: BONES: Comminuted, impacted fracture of the distal radius with slight dorsal angulation of the distal fracture fragment. No aggressive appearing osseous lytic or blastic lesion. SOFT TISSUES: Unremarkable. _______________     Comminuted, impacted fracture of the distal radius.     XR CHEST PORT    Result Date: 9/13/2021  EXAM: XR CHEST PORT CLINICAL INDICATION/HISTORY: Acute respiratory failure, ET tub position -Additional: None COMPARISON: One day prior TECHNIQUE: Portable frontal view of the chest _______________ FINDINGS: SUPPORT DEVICES: Endotracheal and enteric tubes unchanged. HEART AND MEDIASTINUM: Cardiomediastinal silhouette within normal limits. LUNGS AND PLEURAL SPACES: No dense consolidation, large effusion or pneumothorax. _______________     No acute cardiopulmonary abnormality. XR CHEST PORT    Result Date: 9/11/2021  MEDICAL RECORDS NUMBER: [id number] PROCEDURE:  Single view of the chest DATE: 9/11/2021 9:09 PM HISTORY: 39years old Female. post ett Comparison: 8/4/2021 FINDINGS: The patient has been intubated with appropriate placement of the endotracheal tube. There is no enteric tube passing below the level of the diaphragm. There is no significant effusion. There is no significant pneumothorax. Cardiomediastinal silhouette is within normal limits. There is no evidence of a focal pulmonary infiltrate or mass. 1. There is no significant or acute cardiopulmonary process. The patient has been intubated with appropriate placement of the endotracheal tube. There is no enteric tube passing below the level of the diaphragm. This report has been generated using voice recognition software. XR ABD PORT  1 V    Result Date: 9/12/2021  EXAM: Frontal view of the abdomen CLINICAL INDICATION/HISTORY: NG tube placement COMPARISON: None. _______________ FINDINGS: NG/OG tube in place with the tip in the left upper quadrant in the region of the gastric fundus. No bowel obstruction. Moderate colonic stool burden. _______________     1. NG/OG tube is in good position with the tip in the left upper quadrant in the region of the gastric fundus. 2. Moderate colonic stool burden.       Shira Diaz MD

## 2021-09-15 NOTE — ACP (ADVANCE CARE PLANNING)
Advance Care Planning     General Advance Care Planning (ACP) Conversation    Date of Conversation: 9/11/2021  Conducted with: Healthcare Decision Maker: Next of Kin by law (only applies in absence of a Healthcare Power of  or Legal Guardian)    Healthcare Decision Maker:     Primary Decision Maker: [facility]. He is currently incarcerated at the [facility]. He has a court date on October 18, 2021. The phone number for the correctional facility is [phone]. Today we documented Decision Maker(s) consistent with Legal Next of Kin hierarchy. Content/Action Overview:   Has NO ACP documents/care preferences - information provided, considering goals and options  Reviewed DNR/DNI and patient elects Full Code (Attempt Resuscitation)    9/15/2021 0925 Seen today in room ICU 4 along with Chino Vargas NP. Intubated/ventilated. Receiving dilaudid IVP regularly. Diprivan infusing    Came to the ED on 9/11/2021 per EMS. She had been found lethargic with a recently filled bottle of gabapentin 300 mg (90 caps) near by. Urgently intubated in ED for airway protection. OGT placed and charcoal/sorbitol given. Poison control consulted. Admitted to ICU for ventilator management, sedation and medical management. Has been febrile and had COVID testing done which came back negative (she had COVID earlier this year) She has remained intubated. ICU staff and Care management staff have had no success locating family to discuss case    PMH significant for polysubstance abuse, mental health disorder (multiple diagnoses seen in medical record review to include Bipolar 1, substance abuse, OCD, panic disorder, PTSD) hysterectomy     1055: Ms Yakov Solano was able to find a phone number for Bear Amezcua (Yolanda's 's sister) Jamil Thornton said that her brother, Swathi Marshall, is  to [facility] and he is currently incarcerated in [place]. Medical update given and she was very taken aback.  She aid that Anat Andersen has a brother (name unknown) who is currently incarcerated in [place] and a foster sister Brittaney Hinton. I contacted the [employer] and the deputy was kind enough to allow Kaci Khoury to call us. When he called I gave a brief medical update. Explained that he is the legal next of kin and that until his wife is ab le to make her own medical decisions, that responsibility would fall to him. Discussed code status and his options for no CPR in the event of cardiac arrest or aggressive resuscitation in that same event. He chose full aggressive measures. I explained that if his wife cannot be liberated from the ventilator, there may need to be discussion about possible tracheostomy. Verbalized understanding. 1300: Phone call from Brittaney Hinton (foster sister). She stated that she was the person who called the ambulance on the day Anat Andersen came to the ED. Anat Andersen lives with her. Brittaney Hinton said that she expected to get a call from Anat Andersen in a few days that she was ready to be discharged from a [facility] hospital as that scenario had happened many times before. Brief medical update given. Explained that annalee is the legal NOK and the default decision maker. Stressed to [address] that one person should be the [employer] and that is who should call the hospital for informations. It is too taxing on the nursing staff to tell multiple people the same things. They both verbalized understanding. Also stressed that there is no visitation in the ICU currently 2/2 COVID restrictions. CODE STATUS:  FULL CODE    Disposition plan: to be determined based on response to treatment    Palliative care will continue to follow Magnus Acosta  and her family during her hospitalization and support them as they make healthcare decisions and define goals of care.       Kate Gay, RN, MSN  Palliative Medicine  P: [phone]

## 2021-09-15 NOTE — CONSULTS
Palliative Medicine Consult    Patient Name: Shanell Hinton  YOB: 1980    Date of Initial Consult: 9/15/2021  Reason for Consult: Goals of care discussions  Requesting Provider: Dr. Kyle Hernandez  Primary Care Physician: Luisa Burch MD      SUMMARY:   Shanell Hinton is a 39 y.o. with a past history of multi drug use/abuse, previous drug-seeking behavior, and mental health disorders with psychosis, who was admitted on 9/11/2021 from home with a diagnosis of Drug overdose, acute respiratory failure with hypoxia, wrist fracture, and psychosis. Current medical issues leading to Palliative Medicine involvement include: support and goals of care discussions. PALLIATIVE DIAGNOSES:   1. Goals of care discussions  2. Acute respiratory failure with hypoxia  3. Drug overdose  4. Unspecified mental health disorder       PLAN:   1. Goals of care discussions: Palliative medicine team including Celestine Ibarra RN and I met with patient at patient's bedside. Patient is orally intubated and sedated, no response to verbal or tactile stimuli. Staff at bedside attempting blood draw. Numerous phone numbers in patient's chart were out of service; it was difficult to obtain information for emergency contacts. Reviewed care everywhere and we were able to get in contact with her sister-in-law Jazmine Lopes who informed us patient's legal next of kin is patient's spouse Mustapha Moreira who is currently an inmate at [facility]. We were able to successfully speak with spouse Mustapha Moreira who confirms he is legally  to patient. During discussion with Ellerbe Karthikeyan and Oj Mckinley, we introduced our role as palliative medicine and provided a medical update. Per Oj Mckinley, patient reportedly is known for taking more medication than prescribed to help manage pain.  Discussed the benefits and burdens of CPR in the event of cardiopulmonary arrest. At this time Oj Mckinley (spouse/LNOK) requests to continue full code with full interventions. Since updating Klarissa Landers and Aydin Frances, multiple family/friends have called our team requesting information. Encouraged family and friends to elect a spokesperson for daily updates, and this spokesperson can share the information with the rest of the group. Klarissa Landers states he has money on a card that allows him to make phone calls and he will call the ICU daily for an update. If Klarissa Landers needs to be reached for urgent healthcare decisions, please contact [facility] at [phone]. We will continue to follow closely for support and further discussions. 2. Acute respiratory failure with hypoxia: Secondary to #3. She is orally intubated on a mechanical ventilator at this time for airway protection. She is receiving care by the ICU team.  3. Drug overdose: Patient presented with acute metabolic encephalopathy with unsafe combative behavior which required intubation for airway protection. Suspect gabapentin overdose, she is status post procedural stomach emptying in the ED with charcoal and sorbitol  4. Unspecified mental health disorder: Patient presented with psychosis. Per discussion with family she has had multiple admissions to behavioral health units. 5. Initial consult note routed to primary continuity provider  6. Communicated plan of care with: Palliative IDT       GOALS OF CARE / TREATMENT PREFERENCES:   [====Goals of Care====]  GOALS OF CARE: Full code with full interventions  Patient/Health Care Proxy Stated Goals: Prolong life      TREATMENT PREFERENCES:   Code Status: Full Code    Advance Care Planning:  No flowsheet data found.     Medical Interventions: Full interventions           The palliative care team has discussed with patient / health care proxy about goals of care / treatment preferences for patient.  [====Goals of Care====]         HISTORY:     History obtained from: patient's chart, family    CHIEF COMPLAINT: n/a    HPI/SUBJECTIVE:    The patient is:   [] Verbal and participatory  [x] Non-participatory due to:   Orally intubated and sedated    Clinical Pain Assessment (nonverbal scale for severity on nonverbal patients): Adult Nonverbal Pain Scale  Face: No particular expression or smile  Activity (Movement): Lying quietly, normal position  Guarding: Lying quietly, no positioning of hands over areas of body  Physiology (Vital Signs): Stable vital signs  Respiratory: Baseline RR/SpO2 compliant with ventilator  Total Score: 0       FUNCTIONAL ASSESSMENT:     Palliative Performance Scale (PPS):  PPS: 30       PSYCHOSOCIAL/SPIRITUAL SCREENING:     Advance Care Planning:  No flowsheet data found. Any spiritual / Mormon concerns: Unable to assess  [] Yes /  [] No    Caregiver Burnout: Unable to assess  [] Yes /  [] No /  [] No Caregiver Present      Anticipatory grief assessment: Unable to assess  [] Normal  / [] Maladaptive            REVIEW OF SYSTEMS:     Positive and pertinent negative findings in ROS are noted above in HPI. The following systems were [] reviewed / [x] unable to be reviewed as noted in HPI  Other findings are noted below. Systems: constitutional, ears/nose/mouth/throat, respiratory, gastrointestinal, genitourinary, musculoskeletal, integumentary, neurologic, psychiatric, endocrine. Positive findings noted below. Modified ESAS Completed by: provider                                            PHYSICAL EXAM:     From RN flowsheet:  Wt Readings from Last 3 Encounters:   09/13/21 71.3 kg (157 lb 3 oz)   08/26/21 72.6 kg (160 lb)   08/20/21 74.8 kg (165 lb)     Blood pressure 129/88, pulse (!) 116, temperature 99 °F (37.2 °C), resp. rate 22, height 5' 2\" (1.575 m), weight 71.3 kg (157 lb 3 oz), last menstrual period 05/15/2018, SpO2 100 %.     Pain Scale 1: Adult Nonverbal Pain Scale  Pain Intensity 1: 0                     Constitutional: sedated and orally intubated  Eyes: closed  ENMT: orally intubated  Cardiovascular: regular rhythm, distal pulses intact  Respiratory: orally intubated on mechanical ventilation   Gastrointestinal: soft, +bowel sounds  Musculoskeletal: no deformity  Skin: warm, dry  Neurologic: sedated           HISTORY:     Principal Problem:    Drug overdose, intentional self-harm, initial encounter ([facility] (9/12/2021)    Active Problems:    Left wrist fracture, with delayed healing, subsequent encounter (9/12/2021)      Hypoxia (9/12/2021)      Hypotension after procedure (9/12/2021)      Acute metabolic encephalopathy (0/30/8300)      Psychosis (HCC) ()      Hyponatremia ()      Acute respiratory failure with hypoxia (HCC) ()      Increased ammonia level (9/14/2021)      Past Medical History:   Diagnosis Date    Asthma     Bilateral ovarian cysts     Cocaine abuse ([facility]     Mental and behavioral problem     Pancreatitis     Pancreatitis     Psychosis ([facility]       Past Surgical History:   Procedure Laterality Date    HX GYN      D&C, Hysterectomy      No family history on file. History reviewed, no pertinent family history. Social History     Tobacco Use    Smoking status: Current Every Day Smoker     Packs/day: 1.00    Smokeless tobacco: Never Used   Substance Use Topics    Alcohol use: Not Currently     Allergies   Allergen Reactions    Fish Containing Products Itching    Toradol [Ketorolac] Rash     Pt reports she is not allergic to this medication.        Current Facility-Administered Medications   Medication Dose Route Frequency    midazolam in normal saline (VERSED) 1 mg/mL infusion  2-4 mg/hr IntraVENous TITRATE    vancomycin (VANCOCIN) 1250 mg in  ml infusion  1,250 mg IntraVENous Q12H    iopamidoL (ISOVUE-370) 76 % injection 100 mL  100 mL IntraVENous RAD ONCE    insulin lispro (HUMALOG) injection   SubCUTAneous Q6H    glucose chewable tablet 16 g  4 Tablet Oral PRN    glucagon (GLUCAGEN) injection 1 mg  1 mg IntraMUSCular PRN    dextrose (D50W) injection syrg 12.5-25 g  25-50 mL IntraVENous PRN    lactulose (CHRONULAC) 10 gram/15 mL solution 30 mL  30 mL Oral TID    arformoteroL (BROVANA) neb solution 15 mcg  15 mcg Nebulization BID RT    budesonide (PULMICORT) 500 mcg/2 ml nebulizer suspension  500 mcg Nebulization BID RT    piperacillin-tazobactam (ZOSYN) 3.375 g in 0.9% sodium chloride (MBP/ADV) 100 mL MBP  3.375 g IntraVENous Q8H    doxycycline (VIBRAMYCIN) 100 mg in 0.9% sodium chloride (MBP/ADV) 100 mL MBP  100 mg IntraVENous Q12H    OLANZapine (ZyPREXA) tablet 10 mg  10 mg Oral QPM    Vancomycin Pharmacy to Dose  1 Each Other Rx Dosing/Monitoring    LORazepam (ATIVAN) injection 2 mg  2 mg IntraVENous Q6H PRN    HYDROmorphone (PF) (DILAUDID) injection 1 mg  1 mg IntraVENous Q4H PRN    albuterol-ipratropium (DUO-NEB) 2.5 MG-0.5 MG/3 ML  3 mL Nebulization TID RT    enoxaparin (LOVENOX) injection 40 mg  40 mg SubCUTAneous Q24H    propofol (DIPRIVAN) 10 mg/mL infusion  0-50 mcg/kg/min IntraVENous TITRATE    fentaNYL citrate (PF) injection 50 mcg  50 mcg IntraVENous Q3H PRN    sodium chloride (NS) flush 5-40 mL  5-40 mL IntraVENous Q8H    sodium chloride (NS) flush 5-40 mL  5-40 mL IntraVENous PRN    acetaminophen (TYLENOL) tablet 650 mg  650 mg Oral Q6H PRN    Or    acetaminophen (TYLENOL) suppository 650 mg  650 mg Rectal Q6H PRN    polyethylene glycol (MIRALAX) packet 17 g  17 g Oral DAILY PRN    ondansetron (ZOFRAN ODT) tablet 4 mg  4 mg Oral Q8H PRN    Or    ondansetron (ZOFRAN) injection 4 mg  4 mg IntraVENous Q6H PRN    famotidine (PF) (PEPCID) 20 mg in 0.9% sodium chloride 10 mL injection  20 mg IntraVENous BID    chlorhexidine (PERIDEX) 0.12 % mouthwash 10 mL  10 mL Oral Q12H    ELECTROLYTE REPLACEMENT PROTOCOL - Potassium Standard Dosing   1 Each Other PRN    ELECTROLYTE REPLACEMENT PROTOCOL - Magnesium   1 Each Other PRN    ELECTROLYTE REPLACEMENT PROTOCOL - Phosphorus  Standard Dosing  1 Each Other PRN    ELECTROLYTE REPLACEMENT PROTOCOL - Calcium   1 Each Other PRN    dextrose 5% and 0.9% NaCl infusion  100 mL/hr IntraVENous CONTINUOUS          LAB AND IMAGING FINDINGS:     Lab Results   Component Value Date/Time    WBC 12.2 09/15/2021 05:55 AM    HGB 10.3 (L) 09/15/2021 05:55 AM    PLATELET 026 29/94/7618 05:55 AM     Lab Results   Component Value Date/Time    Sodium 145 09/15/2021 05:55 AM    Potassium 4.4 09/15/2021 05:55 AM    Chloride 116 (H) 09/15/2021 05:55 AM    CO2 22 09/15/2021 05:55 AM    BUN 4 (L) 09/15/2021 05:55 AM    Creatinine 0.55 (L) 09/15/2021 05:55 AM    Calcium 7.8 (L) 09/15/2021 05:55 AM    Magnesium 2.2 09/15/2021 12:10 PM    Phosphorus 2.8 09/15/2021 12:10 PM      Lab Results   Component Value Date/Time    Alk. phosphatase 80 09/15/2021 05:55 AM    Protein, total 5.0 (L) 09/15/2021 05:55 AM    Albumin 2.2 (L) 09/15/2021 05:55 AM    Globulin 2.8 09/15/2021 05:55 AM     Lab Results   Component Value Date/Time    INR 1.2 09/15/2021 12:00 PM    Prothrombin time 14.5 09/15/2021 12:00 PM      No results found for: IRON, FE, TIBC, IBCT, PSAT, FERR   No results found for: PH, PCO2, PO2  No components found for: [place]   Lab Results   Component Value Date/Time    CK 81 09/13/2021 12:00 PM    CK - MB <1.0 09/11/2021 08:34 PM                Total time: 70 minutes  Counseling / coordination time, spent as noted above: 65 minutes  > 50% counseling / coordination?: yes, RN and MD, family      Prolonged service was provided for  []30 min   []75 min in face to face time in the presence of the patient, spent as noted above. Time Start:   Time End:   Note: this can only be billed with 09767 (initial) or 21272 (follow up). If multiple start / stop times, list each separately.

## 2021-09-15 NOTE — PROGRESS NOTES
Problem: Ventilator Management  Goal: *Adequate oxygenation and ventilation  Outcome: Progressing Towards Goal  Goal: *Patient maintains clear airway/free of aspiration  Outcome: Progressing Towards Goal  Goal: *Absence of infection signs and symptoms  Outcome: Progressing Towards Goal  Goal: *Normal spontaneous ventilation  Outcome: Progressing Towards Goal     Problem: Patient Education: Go to Patient Education Activity  Goal: Patient/Family Education  Outcome: Progressing Towards Goal     Problem: Non-Violent Restraints  Goal: Removal from restraints as soon as assessed to be safe  Outcome: Progressing Towards Goal  Goal: No harm/injury to patient while restraints in use  Outcome: Progressing Towards Goal  Goal: Patient's dignity will be maintained  Outcome: Progressing Towards Goal  Goal: Patient Interventions  Outcome: Progressing Towards Goal     Problem: Falls - Risk of  Goal: *Absence of Falls  Description: Document Shaheedtennille Fausto Fall Risk and appropriate interventions in the flowsheet. Outcome: Progressing Towards Goal  Note: Fall Risk Interventions:       Mentation Interventions: Adequate sleep, hydration, pain control, Door open when patient unattended, Evaluate medications/consider consulting pharmacy, Reorient patient, Toileting rounds    Medication Interventions: Evaluate medications/consider consulting pharmacy    Elimination Interventions: Call light in reach, Toileting schedule/hourly rounds              Problem: Patient Education: Go to Patient Education Activity  Goal: Patient/Family Education  Outcome: Progressing Towards Goal     Problem: Risk for Spread of Infection  Goal: Prevent transmission of infectious organism to others  Description: Prevent the transmission of infectious organisms to other patients, staff members, and visitors.   Outcome: Progressing Towards Goal     Problem: Patient Education:  Go to Education Activity  Goal: Patient/Family Education  Outcome: Progressing Towards Goal

## 2021-09-15 NOTE — PROGRESS NOTES
Unable to perform SBT at this time. Patient waiting to go to CT and vascular study at this time. Waiting for sedation to be turned off.

## 2021-09-15 NOTE — PROGRESS NOTES
Pharmacy Dosing Services: Vancomycin    Consult for Vancomycin Dosing by Pharmacy by Dr. Tiffanie Kendrick provided for this 39y.o. year old female , for indication of Aspiration Pneumonia. Day of Therapy 2    Ht Readings from Last 1 Encounters:   09/14/21 157.5 cm (62\")        Wt Readings from Last 1 Encounters:   09/13/21 71.3 kg (157 lb 3 oz)        Other Current Antibiotics Zosyn  / doxycycline   Significant Cultures pending   Serum Creatinine Lab Results   Component Value Date/Time    Creatinine 0.55 (L) 09/15/2021 05:55 AM    Creatinine (POC) 0.78 09/12/2021 12:34 AM      Creatinine Clearance Estimated Creatinine Clearance: 97.8 mL/min (A) (by C-G formula based on SCr of 0.55 mg/dL (L)). BUN Lab Results   Component Value Date/Time    BUN 4 (L) 09/15/2021 05:55 AM      WBC Lab Results   Component Value Date/Time    WBC 12.2 09/15/2021 05:55 AM      H/H Lab Results   Component Value Date/Time    HGB 10.3 (L) 09/15/2021 05:55 AM      Platelets Lab Results   Component Value Date/Time    PLATELET 559 09/75/1043 05:55 AM      Temp 98.6 °F (37 °C)       Continue therapy with maintenance dose:  Vancomycin 1250 mg IV q12h, next dose scheduled for 9/15/21 at ~ 13:00    Estimated AUC: 462 mg/L.hr      Goal AUC:  400 - 600 mg/L/hr     Pharmacy to follow daily and will make changes to dose and/or frequency based on clinical status.     Pharmacist Nathan Miller, [facility]

## 2021-09-16 ENCOUNTER — APPOINTMENT (OUTPATIENT)
Dept: GENERAL RADIOLOGY | Age: 41
DRG: 004 | End: 2021-09-16
Attending: INTERNAL MEDICINE
Payer: MEDICAID

## 2021-09-16 ENCOUNTER — APPOINTMENT (OUTPATIENT)
Dept: INTERVENTIONAL RADIOLOGY/VASCULAR | Age: 41
DRG: 004 | End: 2021-09-16
Attending: INTERNAL MEDICINE
Payer: MEDICAID

## 2021-09-16 LAB
ALBUMIN SERPL-MCNC: 2 G/DL (ref 3.4–5)
ALBUMIN/GLOB SERPL: 0.7 {RATIO} (ref 0.8–1.7)
ALP SERPL-CCNC: 72 U/L (ref 45–117)
ALT SERPL-CCNC: 21 U/L (ref 13–56)
AMMONIA PLAS-SCNC: 83 UMOL/L (ref 11–32)
ANION GAP SERPL CALC-SCNC: 7 MMOL/L (ref 3–18)
APPEARANCE CSF: COLORLESS
ARTERIAL PATENCY WRIST A: ABNORMAL
AST SERPL-CCNC: 24 U/L (ref 10–38)
ATRIAL RATE: 96 BPM
BASE DEFICIT BLD-SCNC: 1.6 MMOL/L
BASOPHILS # BLD: 0 K/UL (ref 0–0.1)
BASOPHILS NFR BLD: 0 % (ref 0–2)
BDY SITE: ABNORMAL
BILIRUB SERPL-MCNC: 0.4 MG/DL (ref 0.2–1)
BODY TEMPERATURE: 98.3
BUN SERPL-MCNC: 7 MG/DL (ref 7–18)
BUN/CREAT SERPL: 18 (ref 12–20)
CA-I SERPL-SCNC: 1.12 MMOL/L (ref 1.12–1.32)
CALCIUM SERPL-MCNC: 7.8 MG/DL (ref 8.5–10.1)
CALCULATED P AXIS, ECG09: 57 DEGREES
CALCULATED R AXIS, ECG10: 54 DEGREES
CALCULATED T AXIS, ECG11: 52 DEGREES
CHLORIDE SERPL-SCNC: 117 MMOL/L (ref 100–111)
CO2 SERPL-SCNC: 23 MMOL/L (ref 21–32)
COLOR CSF: CLEAR
COLOR SPUN CSF: CLEAR
COVID-19 RAPID TEST, COVR: NOT DETECTED
CREAT SERPL-MCNC: 0.38 MG/DL (ref 0.6–1.3)
DIAGNOSIS, 93000: NORMAL
DIFFERENTIAL METHOD BLD: ABNORMAL
EOSINOPHIL # BLD: 0.3 K/UL (ref 0–0.4)
EOSINOPHIL NFR BLD: 4 % (ref 0–5)
ERYTHROCYTE [DISTWIDTH] IN BLOOD BY AUTOMATED COUNT: 13.1 % (ref 11.6–14.5)
GAS FLOW.O2 O2 DELIVERY SYS: ABNORMAL L/MIN
GAS FLOW.O2 SETTING OXYMISER: 14 BPM
GLOBULIN SER CALC-MCNC: 2.7 G/DL (ref 2–4)
GLUCOSE BLD STRIP.AUTO-MCNC: 110 MG/DL (ref 70–110)
GLUCOSE BLD STRIP.AUTO-MCNC: 121 MG/DL (ref 70–110)
GLUCOSE BLD STRIP.AUTO-MCNC: 121 MG/DL (ref 70–110)
GLUCOSE CSF-MCNC: 74 MG/DL (ref 40–70)
GLUCOSE SERPL-MCNC: 95 MG/DL (ref 74–99)
HCO3 BLD-SCNC: 22.3 MMOL/L (ref 22–26)
HCT VFR BLD AUTO: 30.9 % (ref 35–45)
HGB BLD-MCNC: 9.7 G/DL (ref 12–16)
HIV 1+2 AB+HIV1 P24 AG SERPL QL IA: NONREACTIVE
HIV12 RESULT COMMENT, HHIVC: NORMAL
INSPIRATION.DURATION SETTING TIME VENT: 1.2 SEC
LYMPHOCYTES # BLD: 1.2 K/UL (ref 0.9–3.6)
LYMPHOCYTES NFR BLD: 16 % (ref 21–52)
MAGNESIUM SERPL-MCNC: 2 MG/DL (ref 1.6–2.6)
MCH RBC QN AUTO: 29.8 PG (ref 24–34)
MCHC RBC AUTO-ENTMCNC: 31.4 G/DL (ref 31–37)
MCV RBC AUTO: 95.1 FL (ref 78–100)
MONOCYTES # BLD: 0.7 K/UL (ref 0.05–1.2)
MONOCYTES NFR BLD: 9 % (ref 3–10)
NEUTS SEG # BLD: 5.6 K/UL (ref 1.8–8)
NEUTS SEG NFR BLD: 71 % (ref 40–73)
O2/TOTAL GAS SETTING VFR VENT: 30 %
P-R INTERVAL, ECG05: 170 MS
PAW @ MEAN EXP FLOW ON VENT: 8.7 CMH2O
PCO2 BLD: 33.4 MMHG (ref 35–45)
PEEP RESPIRATORY: 5 CMH2O
PH BLD: 7.43 [PH] (ref 7.35–7.45)
PHOSPHATE SERPL-MCNC: 3.2 MG/DL (ref 2.5–4.9)
PIP ISTAT,IPIP: 14
PLATELET # BLD AUTO: 187 K/UL (ref 135–420)
PMV BLD AUTO: 10.8 FL (ref 9.2–11.8)
PO2 BLD: 90 MMHG (ref 80–100)
POTASSIUM SERPL-SCNC: 3.6 MMOL/L (ref 3.5–5.5)
POTASSIUM SERPL-SCNC: 4 MMOL/L (ref 3.5–5.5)
POTASSIUM SERPL-SCNC: 4.9 MMOL/L (ref 3.5–5.5)
PROT CSF-MCNC: 14 MG/DL (ref 15–45)
PROT SERPL-MCNC: 4.7 G/DL (ref 6.4–8.2)
Q-T INTERVAL, ECG07: 332 MS
QRS DURATION, ECG06: 68 MS
QTC CALCULATION (BEZET), ECG08: 419 MS
RBC # BLD AUTO: 3.25 M/UL (ref 4.2–5.3)
RBC # CSF: 1 /CU MM
SAO2 % BLD: 97.3 % (ref 92–97)
SERVICE CMNT-IMP: ABNORMAL
SODIUM SERPL-SCNC: 147 MMOL/L (ref 136–145)
SOURCE, COVRS: NORMAL
SPECIMEN TYPE: ABNORMAL
TOTAL RESP. RATE, ITRR: 20
TUBE # CSF: 1
TUBE # CSF: 1
TUBE # CSF: 2
VANCOMYCIN SERPL-MCNC: 7.1 UG/ML (ref 5–40)
VENTILATION MODE VENT: ABNORMAL
VENTRICULAR RATE, ECG03: 96 BPM
VT SETTING VENT: 350 ML
WBC # BLD AUTO: 7.8 K/UL (ref 4.6–13.2)
WBC # CSF: 3 /CU MM

## 2021-09-16 PROCEDURE — 74011000258 HC RX REV CODE- 258: Performed by: INTERNAL MEDICINE

## 2021-09-16 PROCEDURE — 83735 ASSAY OF MAGNESIUM: CPT

## 2021-09-16 PROCEDURE — 87015 SPECIMEN INFECT AGNT CONCNTJ: CPT

## 2021-09-16 PROCEDURE — 94640 AIRWAY INHALATION TREATMENT: CPT

## 2021-09-16 PROCEDURE — 80202 ASSAY OF VANCOMYCIN: CPT

## 2021-09-16 PROCEDURE — 74011250637 HC RX REV CODE- 250/637: Performed by: HOSPITALIST

## 2021-09-16 PROCEDURE — 65610000006 HC RM INTENSIVE CARE

## 2021-09-16 PROCEDURE — 82962 GLUCOSE BLOOD TEST: CPT

## 2021-09-16 PROCEDURE — 74011250637 HC RX REV CODE- 250/637: Performed by: FAMILY MEDICINE

## 2021-09-16 PROCEDURE — 87529 HSV DNA AMP PROBE: CPT

## 2021-09-16 PROCEDURE — 74011250637 HC RX REV CODE- 250/637: Performed by: INTERNAL MEDICINE

## 2021-09-16 PROCEDURE — 84132 ASSAY OF SERUM POTASSIUM: CPT

## 2021-09-16 PROCEDURE — 74011250636 HC RX REV CODE- 250/636: Performed by: INTERNAL MEDICINE

## 2021-09-16 PROCEDURE — 74011000250 HC RX REV CODE- 250: Performed by: INTERNAL MEDICINE

## 2021-09-16 PROCEDURE — 82803 BLOOD GASES ANY COMBINATION: CPT

## 2021-09-16 PROCEDURE — 87205 SMEAR GRAM STAIN: CPT

## 2021-09-16 PROCEDURE — 82140 ASSAY OF AMMONIA: CPT

## 2021-09-16 PROCEDURE — 74011000250 HC RX REV CODE- 250: Performed by: FAMILY MEDICINE

## 2021-09-16 PROCEDURE — 77010033678 HC OXYGEN DAILY

## 2021-09-16 PROCEDURE — 82945 GLUCOSE OTHER FLUID: CPT

## 2021-09-16 PROCEDURE — 85025 COMPLETE CBC W/AUTO DIFF WBC: CPT

## 2021-09-16 PROCEDURE — 87635 SARS-COV-2 COVID-19 AMP PRB: CPT

## 2021-09-16 PROCEDURE — 74011250636 HC RX REV CODE- 250/636: Performed by: FAMILY MEDICINE

## 2021-09-16 PROCEDURE — 84100 ASSAY OF PHOSPHORUS: CPT

## 2021-09-16 PROCEDURE — 94003 VENT MGMT INPAT SUBQ DAY: CPT

## 2021-09-16 PROCEDURE — 89050 BODY FLUID CELL COUNT: CPT

## 2021-09-16 PROCEDURE — 82330 ASSAY OF CALCIUM: CPT

## 2021-09-16 PROCEDURE — 88160 CYTOPATH SMEAR OTHER SOURCE: CPT

## 2021-09-16 PROCEDURE — 93005 ELECTROCARDIOGRAM TRACING: CPT

## 2021-09-16 PROCEDURE — 87483 CNS DNA AMP PROBE TYPE 12-25: CPT

## 2021-09-16 PROCEDURE — 71045 X-RAY EXAM CHEST 1 VIEW: CPT

## 2021-09-16 PROCEDURE — 36415 COLL VENOUS BLD VENIPUNCTURE: CPT

## 2021-09-16 PROCEDURE — 36600 WITHDRAWAL OF ARTERIAL BLOOD: CPT

## 2021-09-16 PROCEDURE — 84157 ASSAY OF PROTEIN OTHER: CPT

## 2021-09-16 PROCEDURE — 009U3ZX DRAINAGE OF SPINAL CANAL, PERCUTANEOUS APPROACH, DIAGNOSTIC: ICD-10-PCS | Performed by: PHYSICIAN ASSISTANT

## 2021-09-16 PROCEDURE — 62270 DX LMBR SPI PNXR: CPT

## 2021-09-16 RX ORDER — FLUCONAZOLE 10 MG/ML
100 POWDER, FOR SUSPENSION ORAL
Status: COMPLETED | OUTPATIENT
Start: 2021-09-16 | End: 2021-09-16

## 2021-09-16 RX ORDER — IBUPROFEN 200 MG
1 TABLET ORAL DAILY
Status: DISCONTINUED | OUTPATIENT
Start: 2021-09-16 | End: 2021-09-21

## 2021-09-16 RX ORDER — CLONAZEPAM 0.5 MG/1
0.25 TABLET ORAL 2 TIMES DAILY
Status: DISCONTINUED | OUTPATIENT
Start: 2021-09-16 | End: 2021-09-17

## 2021-09-16 RX ORDER — POTASSIUM CHLORIDE 7.45 MG/ML
10 INJECTION INTRAVENOUS
Status: COMPLETED | OUTPATIENT
Start: 2021-09-16 | End: 2021-09-16

## 2021-09-16 RX ORDER — HALOPERIDOL 5 MG/ML
4 INJECTION INTRAMUSCULAR
Status: COMPLETED | OUTPATIENT
Start: 2021-09-16 | End: 2021-09-29

## 2021-09-16 RX ORDER — VANCOMYCIN/0.9 % SOD CHLORIDE 1.5G/250ML
1500 PLASTIC BAG, INJECTION (ML) INTRAVENOUS EVERY 12 HOURS
Status: DISCONTINUED | OUTPATIENT
Start: 2021-09-16 | End: 2021-09-17

## 2021-09-16 RX ORDER — LIDOCAINE HYDROCHLORIDE 10 MG/ML
1-10 INJECTION, SOLUTION EPIDURAL; INFILTRATION; INTRACAUDAL; PERINEURAL
Status: COMPLETED | OUTPATIENT
Start: 2021-09-16 | End: 2021-09-16

## 2021-09-16 RX ORDER — CHLORPHENIRAMINE MALEATE 4 MG
TABLET ORAL 2 TIMES DAILY
Status: DISCONTINUED | OUTPATIENT
Start: 2021-09-16 | End: 2021-09-21

## 2021-09-16 RX ADMIN — LACTULOSE 45 ML: 20 SOLUTION ORAL at 22:21

## 2021-09-16 RX ADMIN — VANCOMYCIN HYDROCHLORIDE 1250 MG: 10 INJECTION, POWDER, LYOPHILIZED, FOR SOLUTION INTRAVENOUS at 02:18

## 2021-09-16 RX ADMIN — HYDROMORPHONE HYDROCHLORIDE 1 MG: 1 INJECTION, SOLUTION INTRAMUSCULAR; INTRAVENOUS; SUBCUTANEOUS at 14:38

## 2021-09-16 RX ADMIN — PROPOFOL 50 MCG/KG/MIN: 10 INJECTION, EMULSION INTRAVENOUS at 10:29

## 2021-09-16 RX ADMIN — CLOTRIMAZOLE: 10 CREAM TOPICAL at 20:56

## 2021-09-16 RX ADMIN — CLONAZEPAM 0.25 MG: 0.5 TABLET ORAL at 15:38

## 2021-09-16 RX ADMIN — LORAZEPAM 2 MG: 2 INJECTION INTRAMUSCULAR at 07:42

## 2021-09-16 RX ADMIN — IPRATROPIUM BROMIDE AND ALBUTEROL SULFATE 3 ML: .5; 3 SOLUTION RESPIRATORY (INHALATION) at 15:58

## 2021-09-16 RX ADMIN — POTASSIUM BICARBONATE 40 MEQ: 782 TABLET, EFFERVESCENT ORAL at 15:38

## 2021-09-16 RX ADMIN — IPRATROPIUM BROMIDE AND ALBUTEROL SULFATE 3 ML: .5; 3 SOLUTION RESPIRATORY (INHALATION) at 20:25

## 2021-09-16 RX ADMIN — 0.12% CHLORHEXIDINE GLUCONATE 10 ML: 1.2 RINSE ORAL at 07:48

## 2021-09-16 RX ADMIN — SODIUM CHLORIDE 10 ML: 9 INJECTION, SOLUTION INTRAMUSCULAR; INTRAVENOUS; SUBCUTANEOUS at 05:26

## 2021-09-16 RX ADMIN — LACTULOSE 45 ML: 20 SOLUTION ORAL at 15:38

## 2021-09-16 RX ADMIN — POTASSIUM CHLORIDE 10 MEQ: 7.46 INJECTION, SOLUTION INTRAVENOUS at 07:46

## 2021-09-16 RX ADMIN — FENTANYL CITRATE 50 MCG/HR: 50 INJECTION, SOLUTION INTRAMUSCULAR; INTRAVENOUS at 15:35

## 2021-09-16 RX ADMIN — PIPERACILLIN AND TAZOBACTAM 3.38 G: 3; .375 INJECTION, POWDER, LYOPHILIZED, FOR SOLUTION INTRAVENOUS at 15:38

## 2021-09-16 RX ADMIN — LACTULOSE 30 ML: 10 SOLUTION ORAL at 09:00

## 2021-09-16 RX ADMIN — BUDESONIDE 500 MCG: 0.5 INHALANT RESPIRATORY (INHALATION) at 07:11

## 2021-09-16 RX ADMIN — 0.12% CHLORHEXIDINE GLUCONATE 10 ML: 1.2 RINSE ORAL at 20:56

## 2021-09-16 RX ADMIN — FENTANYL CITRATE 125 MCG/HR: 50 INJECTION, SOLUTION INTRAMUSCULAR; INTRAVENOUS at 22:19

## 2021-09-16 RX ADMIN — LIDOCAINE HYDROCHLORIDE 5 ML: 10 INJECTION, SOLUTION EPIDURAL; INFILTRATION; INTRACAUDAL; PERINEURAL at 14:59

## 2021-09-16 RX ADMIN — MIDAZOLAM 4 MG/HR: 5 INJECTION, SOLUTION INTRAMUSCULAR; INTRAVENOUS at 17:05

## 2021-09-16 RX ADMIN — OLANZAPINE 10 MG: 10 TABLET, FILM COATED ORAL at 17:06

## 2021-09-16 RX ADMIN — FAMOTIDINE 20 MG: 10 INJECTION, SOLUTION INTRAVENOUS at 17:06

## 2021-09-16 RX ADMIN — FLUCONAZOLE 100 MG: 10 POWDER, FOR SUSPENSION ORAL at 20:48

## 2021-09-16 RX ADMIN — ARFORMOTEROL TARTRATE 15 MCG: 15 SOLUTION RESPIRATORY (INHALATION) at 20:25

## 2021-09-16 RX ADMIN — LORAZEPAM 2 MG: 2 INJECTION INTRAMUSCULAR at 14:14

## 2021-09-16 RX ADMIN — PIPERACILLIN AND TAZOBACTAM 3.38 G: 3; .375 INJECTION, POWDER, LYOPHILIZED, FOR SOLUTION INTRAVENOUS at 05:28

## 2021-09-16 RX ADMIN — ARFORMOTEROL TARTRATE 15 MCG: 15 SOLUTION RESPIRATORY (INHALATION) at 07:11

## 2021-09-16 RX ADMIN — CLONAZEPAM 0.25 MG: 0.5 TABLET ORAL at 20:56

## 2021-09-16 RX ADMIN — ACETAMINOPHEN 650 MG: 325 TABLET ORAL at 17:06

## 2021-09-16 RX ADMIN — ENOXAPARIN SODIUM 40 MG: 100 INJECTION SUBCUTANEOUS at 16:56

## 2021-09-16 RX ADMIN — IBUPROFEN 400 MG: 100 SUSPENSION ORAL at 08:21

## 2021-09-16 RX ADMIN — VANCOMYCIN HYDROCHLORIDE 1500 MG: 10 INJECTION, POWDER, LYOPHILIZED, FOR SOLUTION INTRAVENOUS at 20:56

## 2021-09-16 RX ADMIN — FENTANYL CITRATE 100 MCG/HR: 50 INJECTION, SOLUTION INTRAMUSCULAR; INTRAVENOUS at 17:45

## 2021-09-16 RX ADMIN — BUDESONIDE 500 MCG: 0.5 INHALANT RESPIRATORY (INHALATION) at 20:25

## 2021-09-16 RX ADMIN — SODIUM CHLORIDE 10 ML: 9 INJECTION, SOLUTION INTRAMUSCULAR; INTRAVENOUS; SUBCUTANEOUS at 22:16

## 2021-09-16 RX ADMIN — FAMOTIDINE 20 MG: 10 INJECTION, SOLUTION INTRAVENOUS at 05:28

## 2021-09-16 RX ADMIN — PROPOFOL 50 MCG/KG/MIN: 10 INJECTION, EMULSION INTRAVENOUS at 04:53

## 2021-09-16 RX ADMIN — PIPERACILLIN AND TAZOBACTAM 3.38 G: 3; .375 INJECTION, POWDER, LYOPHILIZED, FOR SOLUTION INTRAVENOUS at 22:21

## 2021-09-16 RX ADMIN — IPRATROPIUM BROMIDE AND ALBUTEROL SULFATE 3 ML: .5; 3 SOLUTION RESPIRATORY (INHALATION) at 07:11

## 2021-09-16 NOTE — PROCEDURES
Interventional Radiology Brief Procedure Note    Performed By: Rosmery Alonso PA-C    Attending Radiologist: Tawanda Mendenhall MD    Pre-operative Diagnosis: Persistent fever    Post-operative Diagnosis: Same as pre-operative diagnosis    Procedure(s) Performed:  Fluoroscopic Guided Lumbar Puncture    Anesthesia:  Local    Findings:  Informed verbal telephone consent was obtained from the patient's . Lumbar puncture performed with fluorscopic guidance at L3. Accurate opening pressures were not able to be obtained due to patient's positioning and she was restless and coughing during the procedure. 16 cc of clear CSF was removed. Please see final dictated report for full details. Complications: None immediate    Estimated Blood Loss:  None    Specimens:  16 cc of CSF was sent to the lab as requested    Plan:  Return to nursing unit.   Plan per primary team.    Rosmery Alonso PA-C  [facility]  Vascular & Interventional Radiology  9/16/2021

## 2021-09-16 NOTE — PROGRESS NOTES
Nutrition Assessment     Type and Reason for Visit: Reassess    Nutrition Recommendations/Plan: EN: will order Glucerna 1.5 TF. Initiate feeds per rec below to avoid prolong NPO.     09/16/21 1116   Enteral Nutrition   Feeding Route Orogastric   EN Formula Diabetic  (Glucerna 1.5)   Schedule Continuous   Feeding Regimen Goal rate @ 45ml. Start at 10ml/hr, increase by 10ml Q6 until goal rate. Water Flushes defer to MD   Goal EN & Flush Order Provides Glucerna 1.5 @ 45ml will provide 1485kcals, 82g PRO, 132g CHO, and 751ml free water. Nutrition Assessment:  H/o multidrug use/abuse, prevous drug-seeking behavior and mental issue, Ekbom's delusional parasitosis, Per Diabetes Mgmt note, known h/o of T2DM. Patient admitted for gabapentin overdose and AMS, S/p procedural stomach emptying in ED. Patient remains in ICU- intubated and sedated. Noted SBT trails. Estimated Daily Nutrient Needs:  Energy (kcal):  1525  Protein (g):  71       Fluid (ml/day):  1525    Nutrition Related Findings:  Lab: Na 147. Med: lactulose, humalog, pepcid, D5% and 0.9% @ 100ml. Per rounds- had BM yesterday 9/15. Current Nutrition Therapies:  DIET NPO    Anthropometric Measures:  Height:  5' 2\" (157.5 cm)  Current Body Wt:  71.3 kg (157 lb 3 oz)  BMI: 28.7    Nutrition Diagnosis:   Inadequate oral intake related to impaired respiratory function as evidenced by NPO or clear liquid status due to medical condition, intubation    Nutrition Intervention:  Food and/or Nutrient Delivery: Start tube feeding  Nutrition Education and Counseling: No recommendations at this time  Coordination of Nutrition Care: Continue to monitor while inpatient, Interdisciplinary rounds    Goals:  Start tube feeding and increase to goal within the next 3-5 days to meet nutritional needs.        Nutrition Monitoring and Evaluation:   Behavioral-Environmental Outcomes: None identified  Food/Nutrient Intake Outcomes: Diet advancement/tolerance  Physical Signs/Symptoms Outcomes: Biochemical data, Skin, Weight, GI status, Nausea/vomiting    Discharge Planning:     Too soon to determine     Electronically signed by Greyson Montoya RD on 2021 at 11:17 AM

## 2021-09-16 NOTE — PROGRESS NOTES
0700 Bedside and Verbal shift change report given to DIA Henry (oncoming nurse) by Deb Kelley RN (offgoing nurse). Report included the following information SBAR, Kardex, Intake/Output, Recent Results and Cardiac Rhythm ST.     0715 Patient restless. Sedation stopped for SBT. 0740 Ativan given. 6782 Patient did not tolerate SBT. Became tachypneic, tachycardic, and hypertensive. Tolerated about 5 minutes before being placed back on full vent support. 0930 Consent obtained from  Filipe Oaethan for LP later today. Will also send another rapid COVID due to ongoing fevers. 1200 Reassessment. Urine is now green in color. 1400 Patient transported to angio for LP, accompanied by RT.     1414 Ativan given for agitation during LP.     1438 Dilaudid given during LP as patient was uncomfortable. 1600 Reassessment, no changes. No leakage noted from lumbar site. 1900 Bedside and Verbal shift change report given to Tato Vera RN (oncoming nurse) by Fay Hodgkins, RN (offgoing nurse).  Report included the following information SBAR, Kardex, Intake/Output, Recent Results and Cardiac Rhythm ST.

## 2021-09-16 NOTE — PROGRESS NOTES
[facility]  Pulmonary, Critical Care, and Sleep Medicine    Name: Savanna Montoya MRN: [id number]   : 1980 Hospital: [facility] [place]   Date: 2021        Pulmonary Critical Care Note    IMPRESSION:   · Acute respiratory failure · J96.00 ·   Stable 2 mm nodular right lower lobe density laterally from 2015; smoker, no prior CT chest for any additional nodule w/up · R91.1     Patient Active Problem List   Diagnosis Code    Dextromethorphan use disorder, moderate ([address] F19.20    Ekbom's delusional parasitosis ([address]    Left wrist fracture, with delayed healing, subsequent encounter S62.102G    Drug overdose, intentional self-harm, initial encounter ([facility] [address] T50.902A    Hypoxia R09.02    Hypotension after procedure I95.81    Acute metabolic encephalopathy N01.54    Psychosis ([facility] [address] F29    Hyponatremia E87.1    Acute respiratory failure with hypoxia ([facility] [address] J96.01    Increased ammonia level R79.89 ·   Code status: full code   RECOMMENDATIONS:   Respiratory: Mech. Ventilated patients- aim to keep peak plateau pressure less than or equal to 30cm H2O. Titrate FiO2 for goal SPO2> 91%   Failed Spontaneous breathing trial on 2021  7.4 3/90/97 on [facility] 350/14/30 percent/5  CXR personally reviewed   IMPRESSION     Bilateral layering effusions, right greater than left with adjacent atelectasis     No acute intra-abdominal abnormality. Repeat  again covid test but suspect aspiration pneumonia   Fever persistent fever in spite of antibiotics LP today  Failed SBT daily  With turn off sedation started spontaneous breathing trial patient was extremely agitated. In spite of maximum dose of Precedex and Ativan pushes. Trying to pull IVs and ET tube. Sedation patient was on propofol drip and as needed opiates and low-dose of Versed however we noticed some urine discoloration so high risk for propofol syndrome. Stop propofol.   Start Versed drip, start fentanyl, at as needed Haldol with monitoring QTCs  Keep daily spontaneous breathing trial off sedation  Speck DTs? Withdrawal from drugs? Discuss CT chest option with pt when more stable  Not a candidate for LD CT chest  Discussed with RN to get Urine pregnancy test before any radiological test  VAP prevention bundle and sedation bundle followed, head of the bed at 30' all times  Daily sedation holiday and assessment for weaning with SBT as tolerated  Continue pulmonary hygiene care  ID:contineu abx  Covid test x3 -. Persistent fever in spite of antibiotics for aspiration and community-acquired pneumonia. Lumbar puncture today. HIV test negative. White blood cells low procalcitonin low if lumbar puncture negative consider drug fever? [place] Billing CAP on triple abx   Add chest pt/bronchodilators and consider steroids if not better  Fever blood culure negative   WBC stable  initial culture negative   I suspect aspiration pneumonia/CAP  Urine strep and legionellla pending  On triple abx van/zosyn doxy for 2 days now off.    procal and lactic acid repeat   CVS: Monitor Hemodynamics- stable  IVF: D5NS at 50 mL/hr  EKG in AM  Renal: Monitor renal functions, lytes  Replace lytes per unit protocol  CK in AM  Heme: Monitor CBC H/H is expected to drop with IVF  No evidence of active bleeding  Endo: TSH ordered  GI: diet - NPO  LFT normal  Neurology: sedated  CT of the head ordered on 9/13 still not done spoke to staff to do ASAP  Toxicology: gabapentin OD  Supportive care  CT head pending   Sedation: Propofol - titrate  Hold Ativan drip as tolerated; may use PRN Ativan  Ammonia    Will defer respective systems problem management to primary and other consultant and follow patient in ICU with primary and other medical team  Further recommendations will be based on the patient's response to recommended treatment and results of the investigation ordered. Quality Care: PPI, DVT prophylaxis, HOB elevated, Infection control all reviewed and addressed.   PAIN AND SEDATION: Propofol; PRN Ativan  Skin/Wound: none  Prophylaxis: DVT and GI Prophylaxis reviewed. Restraints: Wrist soft restraints for patient interfering with medical therapy/management and patient safety. PT/OT eval and treat: as needed when stable   Lines/Tubes: PIV   ETT: 9/11/21  OGT/NGT: 9/11/21  Other drains: 9/11/21  ADVANCE DIRECTIVE: Full code. DISCUSSION: Events and notes from last 24 hours reviewed. Care plan discussed with nursing, hospitalist, RT   from intermediate called. Had long discussion with him updated on all events. He also gives permission for lumbar puncture  Quality Care: PPI, DVT prophylaxis, HOB elevated, Infection control all reviewed and addressed. High complexity decision making was performed during the evaluation of this patient at high risk for decompensation with multiple organ involvement. Cc time 40 minutes   Total critical care time spent rendering complex decision making and > 50% time spent in face to face evaluation exclusive of procedures/family discussion/coordination of care: 40 minutes. Subjective/History:   Ms. Zoe Mondragon has been seen and evaluated as Dr. Dalila Hidalgo requested now for assisting with Acute respiratory failure and ventilator management. Patient is a 39 y.o. female with following PMhx presented to ER with lethargy via EMS and admitted for Gabapentin overdose. Pt required intubation for airways protection. Position control was contacted that recommended supportive care per ER provider. Pt seen at bedside in ER rm#2. The patient can not provide additional history due to Ventilated. 9/14/2021  Remains in ICU bed 4. Intubated sedated. Sedation very agitated moving all 4 extremities but does not follow commands. Failed spontaneous breathing trial.  Due to persistent fever in spite of antibiotics lumbar puncture. Review of Systems:  Review of systems not obtained due to patient factors.         Past Medical History:  Past Medical History:   Diagnosis Date    Asthma     Bilateral ovarian cysts     Cocaine abuse ([facility] [address]     Mental and behavioral problem     Pancreatitis     Pancreatitis     Psychosis ([facility] [address]         Past Surgical History:  Past Surgical History:   Procedure Laterality Date    HX GYN      D&C, Hysterectomy        Medications:  Prior to Admission medications    Medication Sig Start Date End Date Taking? Authorizing Provider   albuterol (PROVENTIL HFA, VENTOLIN HFA, PROAIR HFA) 90 mcg/actuation inhaler Take 2 Puffs by inhalation every four (4) hours as needed for Wheezing or Shortness of Breath. 8/4/21   Vasquez Singh PA-C   ibuprofen (MOTRIN) 600 mg tablet Take 1 Tablet by mouth every six (6) hours as needed for Pain. Take with food. 8/4/21   Vasquez Singh PA-C   ALPRAZolam (XANAX) 0.5 mg tablet TAKE 1 TABLET BY MOUTH ONCE DAILY AS NEEDED FOR ANXIETY 12/4/19   Provider, Historical   metFORMIN (GLUCOPHAGE) 500 mg tablet TAKE 1 TABLET BY MOUTH ONCE DAILY 11/12/19   Provider, Historical   nicotine (NICODERM CQ) 21 mg/24 hr APPLY 1 PATCH TOPICALLY TO THE SKIN DAILY FOR CRAVINGS AS DIRECTED 12/4/19   Provider, Historical   traZODone (DESYREL) 50 mg tablet TAKE 1 TABLET BY MOUTH AT BEDTIME AS NEEDED FOR INSOMNIA 12/4/19   Provider, Historical   escitalopram oxalate (LEXAPRO) 10 mg tablet Take 10 mg by mouth daily. Marcin, MD Luisa   lurasidone (LATUDA) 80 mg tab tablet Take 80 mg by mouth daily (with dinner).     Marcin, MD Luisa       Current Facility-Administered Medications   Medication Dose Route Frequency    Vancomycin Random Level due 9/16/21 at 12:00  1 Each Other ONCE    nicotine (NICODERM CQ) 14 mg/24 hr patch 1 Patch  1 Patch TransDERmal DAILY    clonazePAM (KlonoPIN) tablet 0.25 mg  0.25 mg Oral BID    lactulose (CHRONULAC) 10 gram/15 mL solution 45 mL  45 mL Oral TID    potassium bicarb-citric acid (EFFER-K) tablet 40 mEq  40 mEq Oral NOW    fentaNYL (PF) 900 mcg/30 ml infusion soln  0-200 mcg/hr IntraVENous TITRATE    fluconazole (DIFLUCAN) 10 mg/mL oral suspension 100 mg  100 mg Per NG tube NOW    clotrimazole (LOTRIMIN) 1 % cream   Topical BID    midazolam in normal saline (VERSED) 1 mg/mL infusion  0-10 mg/hr IntraVENous TITRATE    vancomycin (VANCOCIN) 1250 mg in  ml infusion  1,250 mg IntraVENous Q12H    insulin lispro (HUMALOG) injection   SubCUTAneous Q6H    arformoteroL (BROVANA) neb solution 15 mcg  15 mcg Nebulization BID RT    budesonide (PULMICORT) 500 mcg/2 ml nebulizer suspension  500 mcg Nebulization BID RT    piperacillin-tazobactam (ZOSYN) 3.375 g in 0.9% sodium chloride (MBP/ADV) 100 mL MBP  3.375 g IntraVENous Q8H    OLANZapine (ZyPREXA) tablet 10 mg  10 mg Oral QPM    Vancomycin Pharmacy to Dose  1 Each Other Rx Dosing/Monitoring    albuterol-ipratropium (DUO-NEB) 2.5 MG-0.5 MG/3 ML  3 mL Nebulization TID RT    enoxaparin (LOVENOX) injection 40 mg  40 mg SubCUTAneous Q24H    sodium chloride (NS) flush 5-40 mL  5-40 mL IntraVENous Q8H    famotidine (PF) (PEPCID) 20 mg in 0.9% sodium chloride 10 mL injection  20 mg IntraVENous BID    chlorhexidine (PERIDEX) 0.12 % mouthwash 10 mL  10 mL Oral Q12H       Allergy:  Allergies   Allergen Reactions    Fish Containing Products Itching    Toradol [Ketorolac] Rash     Pt reports she is not allergic to this medication. Social History:  Social History     Tobacco Use    Smoking status: Current Every Day Smoker     Packs/day: 1.00    Smokeless tobacco: Never Used   Substance Use Topics    Alcohol use: Not Currently    Drug use: Not Currently     Types: Cocaine, Marijuana     Comment: used with in past 24 hours        Family History:  No family history on file. Objective:   Vital Signs:    Blood pressure (!) 143/84, pulse (!) 105, temperature 98.8 °F (37.1 °C), resp. rate 19, height 5' 2\" (1.575 m), weight 71.3 kg (157 lb 3 oz), last menstrual period 05/15/2018, SpO2 100 %. Body mass index is 28.75 kg/m².    O2 Device: Ventilator, Endotracheal tube       Temp (24hrs), Av.8 °F (37.7 °C), Min:97.5 °F (36.4 °C), Max:101.5 °F (38.6 °C)         Intake/Output:   Last shift:      701 - 1900  In: -   Out: 600 [Urine:600]  Last 3 shifts: 1901 - 700  In: 1853.9 [I.V.:1643.9]  Out: 2172 [Urine:3575]    Intake/Output Summary (Last 24 hours) at 2021 1533  Last data filed at 2021 1130  Gross per 24 hour   Intake 924.84 ml   Output 2275 ml   Net -1350.16 ml       Ventilator Settings:  Mode Rate Tidal Volume Pressure FiO2 PEEP   Spontaneous   350 ml  7 cm H2O 25 % 5 cm H20     Peak airway pressure: 17 cm H2O    Minute ventilation: 10.6 l/min      Lung protective strategy, Pl pressure goals less than or equal to 30. Physical Exam: Intubated sedated off sedation moving all 4 extremities but did not follow commands  General: sedated, in no respiratory distress, appears stated age, on ventilator  HEENT: PERRL, fundi benign, ET and OG tubes in place  Neck: No abnormally enlarged lymph nodes or thyroid, supple  Chest: normal  Lungs: normal air entry, breathing normal , clear to auscultation bilaterally, slightly decreased breath sounds at the right base normal percussion bilaterally, no tenderness/ rash mild wheezing. Bilaterally. Heart: Regular rate and rhythm, S1S2 present or without murmur or extra heart sounds  Abdomen: non distended, bowel sounds normoactive, tympanic, abdomen is soft without significant tenderness, masses, organomegaly or guarding, rigidity, rebound  Extremity: negative for edema, cyanosis, clubbing  Capillary refill: normal  Neuro: sedated, moves all extremities spontaneously in bed, no involuntary movements, exam limitation on ventilator  Skin: Skin color, texture, turgor fair.  Skin dry, warm, non-diaphoretic    Data:     Recent Results (from the past 24 hour(s))   GLUCOSE, POC    Collection Time: 09/15/21  5:24 PM   Result Value Ref Range    Glucose (POC) 104 70 - 110 mg/dL   GLUCOSE, POC    Collection Time: 09/15/21 11:02 PM   Result Value Ref Range    Glucose (POC) 116 (H) 70 - 110 mg/dL   CBC WITH AUTOMATED DIFF    Collection Time: 09/16/21  3:54 AM   Result Value Ref Range    WBC 7.8 4.6 - 13.2 K/uL    RBC 3.25 (L) 4.20 - 5.30 M/uL    HGB 9.7 (L) 12.0 - 16.0 g/dL    HCT 30.9 (L) 35.0 - 45.0 %    MCV 95.1 78.0 - 100.0 FL    MCH 29.8 24.0 - 34.0 PG    MCHC 31.4 31.0 - 37.0 g/dL    RDW 13.1 11.6 - 14.5 %    PLATELET 819 773 - 165 K/uL    MPV 10.8 9.2 - 11.8 FL    NEUTROPHILS 71 40 - 73 %    LYMPHOCYTES 16 (L) 21 - 52 %    MONOCYTES 9 3 - 10 %    EOSINOPHILS 4 0 - 5 %    BASOPHILS 0 0 - 2 %    ABS. NEUTROPHILS 5.6 1.8 - 8.0 K/UL    ABS. LYMPHOCYTES 1.2 0.9 - 3.6 K/UL    ABS. MONOCYTES 0.7 0.05 - 1.2 K/UL    ABS. EOSINOPHILS 0.3 0.0 - 0.4 K/UL    ABS. BASOPHILS 0.0 0.0 - 0.1 K/UL    DF AUTOMATED     MAGNESIUM    Collection Time: 09/16/21  3:54 AM   Result Value Ref Range    Magnesium 2.0 1.6 - 2.6 mg/dL   METABOLIC PANEL, COMPREHENSIVE    Collection Time: 09/16/21  3:54 AM   Result Value Ref Range    Sodium 147 (H) 136 - 145 mmol/L    Potassium 4.0 3.5 - 5.5 mmol/L    Chloride 117 (H) 100 - 111 mmol/L    CO2 23 21 - 32 mmol/L    Anion gap 7 3.0 - 18 mmol/L    Glucose 95 74 - 99 mg/dL    BUN 7 7.0 - 18 MG/DL    Creatinine 0.38 (L) 0.6 - 1.3 MG/DL    BUN/Creatinine ratio 18 12 - 20      GFR est AA >60 >60 ml/min/1.73m2    GFR est non-AA >60 >60 ml/min/1.73m2    Calcium 7.8 (L) 8.5 - 10.1 MG/DL    Bilirubin, total 0.4 0.2 - 1.0 MG/DL    ALT (SGPT) 21 13 - 56 U/L    AST (SGOT) 24 10 - 38 U/L    Alk.  phosphatase 72 45 - 117 U/L    Protein, total 4.7 (L) 6.4 - 8.2 g/dL    Albumin 2.0 (L) 3.4 - 5.0 g/dL    Globulin 2.7 2.0 - 4.0 g/dL    A-G Ratio 0.7 (L) 0.8 - 1.7     PHOSPHORUS    Collection Time: 09/16/21  3:54 AM   Result Value Ref Range    Phosphorus 3.2 2.5 - 4.9 MG/DL   AMMONIA    Collection Time: 09/16/21  3:54 AM   Result Value Ref Range    Ammonia 83 (H) 11 - 32 UMOL/L   BLOOD GAS, ARTERIAL POC    Collection Time: 09/16/21  4:56 AM   Result Value Ref Range    Device: ADULT VENT      FIO2 (POC) 30 %    pH (POC) 7.43 7.35 - 7.45      pCO2 (POC) 33.4 (L) 35.0 - 45.0 MMHG    pO2 (POC) 90 80 - 100 MMHG    HCO3 (POC) 22.3 22 - 26 MMOL/L    sO2 (POC) 97.3 (H) 92 - 97 %    Base deficit (POC) 1.6 mmol/L    Mode ASSIST CONTROL      Tidal volume 350 ml    Set Rate 14 bpm    PEEP/CPAP (POC) 5 cmH2O    Mean Airway Pressure 8.7 cmH2O    PIP (POC) 14      Allens test (POC) NOT APPLICABLE      Inspiratory Time 1.2 sec    Total resp.  rate 20      Site RIGHT RADIAL      Patient temp. 98.3      Specimen type (POC) ARTERIAL      Performed by Farideh SLATER POC    Collection Time: 09/16/21  5:00 AM   Result Value Ref Range    Glucose (POC) 110 70 - 110 mg/dL   COVID-19 RAPID TEST    Collection Time: 09/16/21 10:00 AM   Result Value Ref Range    Specimen source Nasopharyngeal      COVID-19 rapid test Not detected NOTD     GLUCOSE, POC    Collection Time: 09/16/21 11:10 AM   Result Value Ref Range    Glucose (POC) 121 (H) 70 - 110 mg/dL   POTASSIUM    Collection Time: 09/16/21 11:50 AM   Result Value Ref Range    Potassium 3.6 3.5 - 5.5 mmol/L   VANCOMYCIN, RANDOM    Collection Time: 09/16/21 11:50 AM   Result Value Ref Range    Vancomycin, random 7.1 5.0 - 40.0 UG/ML           Recent Labs     09/16/21  0456 09/15/21  0530 09/14/21  0452   FIO2I 30 30 30   HCO3I 22.3 20.8* 23.1   PCO2I 33.4* 40.0 40.9   PHI 7.43 7.33* 7.36   PO2I 90 77* 95       All Micro Results     Procedure Component Value Units Date/Time    MENINGITIS PATHOGENS PANEL, CSF (BY PCR) [[id number]]     Order Status: Sent Specimen: Cerebrospinal Fluid     MENINGITIS PATHOGENS PANEL, CSF (BY PCR) [[id number]] Collected: 09/16/21 1434    Order Status: Completed Specimen: Cerebrospinal Fluid Updated: 09/16/21 1502    HSV 1 AND 2 BY PCR [[id number]] Collected: 09/16/21 3809    Order Status: Completed Specimen: Other Updated: 09/16/21 7926 CULTURE, CSF  TUBE 2 [[id number]] Collected: 09/16/21 1434    Order Status: Completed Specimen: Cerebrospinal Fluid Updated: 09/16/21 1458    COVID-19 RAPID TEST [[id number]] Collected: 09/16/21 1000    Order Status: Completed Specimen: Nasopharyngeal Updated: 09/16/21 1032     Specimen source Nasopharyngeal        COVID-19 rapid test Not detected        Comment: Rapid Abbott ID Now       Rapid NAAT:  The specimen is NEGATIVE for SARS-CoV-2, the novel coronavirus associated with COVID-19. Negative results should be treated as presumptive and, if inconsistent with clinical signs and symptoms or necessary for patient management, should be tested with an alternative molecular assay. Negative results do not preclude SARS-CoV-2 infection and should not be used as the sole basis for patient management decisions. This test has been authorized by the FDA under an Emergency Use Authorization (EUA) for use by authorized laboratories.    Fact sheet for Healthcare Providers: ConventionUpdate.co.nz  Fact sheet for Patients: ConventionUpdate.co.nz       Methodology: Isothermal Nucleic Acid Amplification         CULTURE, BLOOD [[id number]] Collected: 09/14/21 0515    Order Status: Completed Specimen: Blood Updated: 09/16/21 0734     Special Requests: NO SPECIAL REQUESTS        Culture result: NO GROWTH 2 DAYS       CULTURE, BLOOD [[id number]] Collected: 09/14/21 0500    Order Status: Completed Specimen: Blood Updated: 09/16/21 0734     Special Requests: NO SPECIAL REQUESTS        Culture result: NO GROWTH 2 DAYS       LEGIONELLA PNEUMOPHILA AG, URINE [[id number]] Collected: 09/14/21 2315    Order Status: Completed Specimen: Urine, random Updated: 09/15/21 1042     Legionella Ag, urine Negative       STREP PNEUMO AG, URINE [[id number]] Collected: 09/14/21 2315    Order Status: Completed Specimen: Urine, random Updated: 09/15/21 1042     Strep pneumo Ag, urine Negative       RESPIRATORY VIRUS PANEL W/COVID-19, PCR [[id number]] Collected: 09/14/21 1832    Order Status: Completed Specimen: Nasopharyngeal Updated: 09/14/21 2234     Adenovirus Not detected        Coronavirus 229E Not detected        Coronavirus HKU1 Not detected        Coronavirus CVNL63 Not detected        Coronavirus OC43 Not detected        SARS-CoV-2, PCR Not detected        Metapneumovirus Not detected        Rhinovirus and Enterovirus Not detected        Influenza A Not detected        Influenza A, subtype H1 Not detected        Influenza A, subtype H3 Not detected        INFLUENZA A H1N1 PCR Not detected        Influenza B Not detected        Parainfluenza 1 Not detected        Parainfluenza 2 Not detected        Parainfluenza 3 Not detected        Parainfluenza virus 4 Not detected        RSV by PCR Not detected        B. parapertussis, PCR Not detected        Bordetella pertussis - PCR Not detected        Chlamydophila pneumoniae DNA, QL, PCR Not detected        Mycoplasma pneumoniae DNA, QL, PCR Not detected       CULTURE, BLOOD [[id number]] Collected: 09/14/21 1700    Order Status: Canceled Specimen: Blood     CULTURE, URINE [[id number]] Collected: 09/13/21 2330    Order Status: Canceled Specimen: Cath Urine     RESPIRATORY VIRUS PANEL W/COVID-19, PCR [[id number]]     Order Status: Canceled Specimen: NASOPHARYNGEAL SWAB     COVID-19 RAPID TEST [[id number]]     Order Status: Canceled     COVID-19 RAPID TEST [[id number]] Collected: 09/13/21 0737    Order Status: Completed Specimen: Nasopharyngeal Updated: 09/13/21 0811     Specimen source Nasopharyngeal        COVID-19 rapid test Not detected        Comment: Rapid Abbott ID Now       Rapid NAAT:  The specimen is NEGATIVE for SARS-CoV-2, the novel coronavirus associated with COVID-19.        Negative results should be treated as presumptive and, if inconsistent with clinical signs and symptoms or necessary for patient management, should be tested with an alternative molecular assay.  Negative results do not preclude SARS-CoV-2 infection and should not be used as the sole basis for patient management decisions. This test has been authorized by the FDA under an Emergency Use Authorization (EUA) for use by authorized laboratories. Fact sheet for Healthcare Providers: ConventionUpdate.co.nz  Fact sheet for Patients: ConventionUpdate.co.nz       Methodology: Isothermal Nucleic Acid Amplification         COVID-19 RAPID TEST [[id number]]     Order Status: Canceled           Telemetry: normal sinus rhythm      Imaging:  [x]I have personally reviewed the patients chest radiographs images and report     Results from Hospital Encounter encounter on 09/11/21    XR ABD (KUB)    Addendum 9/15/2021 10:32 AM  Addendum: Masoud Griffin: SUPINE ABDOMINAL RADIOGRAPH    CLINICAL INDICATION/HISTORY: ogt placement  -Additional: None    COMPARISON: 9/12/2021    TECHNIQUE: Single supine view of the abdomen.    _______________    FINDINGS:    BOWEL GAS PATTERN: Enteric tube within the distal stomach. No evidence of  obstruction. No visible free air, given limitations of supine view. BONES: Unremarkable. OTHER: None.    _______________    IMPRESSION:    Nonobstructive bowel gas pattern. Enteric tube within the distal stomach. Narrative  EXAM: XR ABD (KUB)    CLINICAL INDICATION/HISTORY: ogt placement  -Additional: None    COMPARISON: 2 days prior    TECHNIQUE: Frontal view of the chest    _______________    FINDINGS:    HEART AND MEDIASTINUM: Normal cardiac size and mediastinal contours. LUNGS AND PLEURAL SPACES: No focal pneumonic consolidation, pneumothorax, or  pleural effusion. BONY THORAX AND SOFT TISSUES: No acute osseous abnormality    _______________    Impression  Nonobstructive bowel gas pattern.           [x]See my orders for details          Frida Kulkarni MD

## 2021-09-16 NOTE — PROGRESS NOTES
@2000 pt awake , restless in bed ,on ventilator via et tube. OG tube in-situ remains clamped. Barahona in-situ draining bonnie urine. Pt remains on Diprivan and versed drips. External fixator seen on left lower hand to wrist remains intact. Assessment done and documented in relevant  flow sheets. Nursing management continues. @0000 pt reassessed no changes, care continues. @0400 reassessment continues no changes. @2590 Bedside and Verbal shift change report given to Angie Murillo (oncoming nurse) by Noemi Tobias (offgoing nurse).  Report included the following information SBAR, Kardex, Intake/Output, MAR, Recent Results, Med Rec Status and Alarm Parameters .

## 2021-09-16 NOTE — PROGRESS NOTES
is healthcare proxy he is incarcerated. I have discussed with him current medical status. Severity of the disease. He understands his wife is critically ill in ICU. He gave us permission today for lumbar puncture. We will give him updates daily he will be calling from correction every day.   Sharlene Fernández MD

## 2021-09-16 NOTE — PROGRESS NOTES
SBT trialed for 5 minutes. Talked with patient before and during SBT. RR in the 40's , , patient kicking. Patient had ativan before SBT.

## 2021-09-16 NOTE — PROGRESS NOTES
Pharmacy Dosing Services: Vancomycin    Consult for Vancomycin Dosing by Pharmacy by Dr. Bernie Nguyen provided for this 39y.o. year old female , for indication of Aspiration Pneumonia  Day of Therapy 3    Scr = 0.38  CrCl > 100 ml/min   WBC = 7.8     Vancomycin Random Level:  7.1 (9/16/21 at 11:50)     Increase dose to:  Vancomycin 1500 mg IV q12h, next dose scheduled for 9/16/21 at 18:00. Goal AUC:  400 - 600 mg/L.hr    Pharmacy to follow daily and will make changes to dose and/or frequency based on clinical status. Ht Readings from Last 1 Encounters:   09/16/21 157.5 cm (62\")        Wt Readings from Last 1 Encounters:   09/13/21 71.3 kg (157 lb 3 oz)        Previous Regimen Vancomycin 1250 mg IV q12h   Other Current Antibiotics Zosyn    Serum Creatinine Lab Results   Component Value Date/Time    Creatinine 0.38 (L) 09/16/2021 03:54 AM    Creatinine (POC) 0.78 09/12/2021 12:34 AM      Creatinine Clearance Estimated Creatinine Clearance: 97.8 mL/min (A) (by C-G formula based on SCr of 0.38 mg/dL (L)).    BUN Lab Results   Component Value Date/Time    BUN 7 09/16/2021 03:54 AM      WBC Lab Results   Component Value Date/Time    WBC 7.8 09/16/2021 03:54 AM      H/H Lab Results   Component Value Date/Time    HGB 9.7 (L) 09/16/2021 03:54 AM      Platelets Lab Results   Component Value Date/Time    PLATELET 116 41/50/4323 03:54 AM      Temp 98.8 °F (37.1 °C)     Pharmacist Kendal Edward, [facility]

## 2021-09-16 NOTE — PROGRESS NOTES
Care manager participated in [address]; patient has been intubated sicne 9/11 and is currently on diprivan and versed; SBT this morning lasted 5 minutes due to patient's aggitation. Lumbar puncture scheduled for 1330. Will continue to follow.     Care Management Interventions  Transition of Care Consult (CM Consult): Discharge Planning  The Plan for Transition of Care is Related to the Following Treatment Goals : intentional drug overdose  Discharge Location  Discharge Placement:  (TBD)

## 2021-09-16 NOTE — PROGRESS NOTES
Palliative Medicine    CODE STATUS: FULL CODE     AMD Status: No AMD on file. She is  and her , Kalyani Hobbs, is her legal next of kin. Contacting him can be difficult as he is incarcerated at [facility].The phone number for the correctional facility is [phone].     9/16/2021 0857 Seen today in room 104 along with Doug Steve NP. Seen through the window in accordance with COVID protocol. Intubated/ventilated/sedated. Propofol, sedated, and potassium gtts infusing. Lying supine. Occasionally moves her legs. Left wrist external fixator on. Continues to have fevers. There are plans for LP. Disposition plan: to be determined based on response to treatment and family decisions    Palliative care will continue to follow Leesa Gordillo  and her family during her hospitalization and support them as they make healthcare decisions and define goals of care.       Andrew Black RN, MSN  Palliative Medicine  P: [phone]

## 2021-09-16 NOTE — PROGRESS NOTES
Hospitalist Progress Note-critical care note     Patient: Shayna Sofia MRN: [id number]  CSN: [id number]    YOB: 1980  Age: 39 y.o. Sex: female    DOA: 9/11/2021 LOS:  LOS: 4 days            Chief complaint: wrist fracture , drug overdose , acute respiratory failure with hypoxia, psychosis     Assessment/Plan         Hospital Problems  Date Reviewed: 9/6/2015        Codes Class Noted POA    Increased ammonia level ICD-10-CM: R79.89  ICD-9-CM: 790.6  9/14/2021 Unknown        Psychosis ([facility] ICD-10-CM: F29  ICD-9-CM: 298.9  Unknown Unknown        Hyponatremia ICD-10-CM: E87.1  ICD-9-CM: 276.1  Unknown Yes        Acute respiratory failure with hypoxia ([facility] ICD-10-CM: J96.01  ICD-9-CM: 518.81  Unknown Yes        Left wrist fracture, with delayed healing, subsequent encounter ICD-10-CM: S62.102G  ICD-9-CM: V54.19  9/12/2021 Unknown        * (Principal) Drug overdose, intentional self-harm, initial encounter ([facility] ICD-10-CM: T50.902A  ICD-9-CM: 977.9, E950.5  9/12/2021 Yes        Hypoxia ICD-10-CM: R09.02  ICD-9-CM: 799.02  9/12/2021 Yes        Hypotension after procedure ICD-10-CM: I95.81  ICD-9-CM: 458.29  9/12/2021 Yes        Acute metabolic encephalopathy YANDY-39-GW: G93.41  ICD-9-CM: 348.31  9/12/2021 Yes                  Shayna Sofia is a 39 y.o. female who is standing history of multidrug use/abuse, previous drug-seeking behavior and mental health issues otherwise unspecified arrives via EMS with acute metabolic encephalopathy and lethargy. Admitted for likely gabapentin overdose. Acute respiratory failure with hypoxia   Intubated on 9/12     VAP bundle. HOB>30 degrees.    sbt per protocol    cxr :No acute cardiopulmonary abnormality  covid 19 rapid negative     Hypotension after intubation   So far bp remained well          Anemia-Stable so far no bleeding reported and will continue monitoring   Upper PVL negative for dvt        Hypernatremia resolved   Mg replace as protocol Continue monitoring   icu electrolytes replacement protocol        Acute metabolic encephalopathy   Ct head no acute process       Gabapentin overdose with lethargy and AMS   S/p procedural stomach emptying in ED with charcoal and sorbitol   Continue monitoring the side affect        elevated ammonia level  Worsening, increase Lactulose, continue monitoring      Psych   Psychosis , hx of  Ekbom's delusional parasitosis   Need psych evaluation later   On olanzapine   Add klonopin     left wrist fracture: immobilized -poa     rn agitated on vent      Will give ativan   Disposition :tbd,   Review of systems:  Unable to obtain due to intubation   Vital signs/Intake and Output:  Visit Vitals  BP (!) 155/83   Pulse (!) 123   Temp 100.4 °F (38 °C)   Resp (!) 32   Ht 5' 2\" (1.575 m)   Wt 71.3 kg (157 lb 3 oz)   SpO2 97%   BMI 28.75 kg/m²     Current Shift:  No intake/output data recorded. Last three shifts:  09/14 1901 - 09/16 0700  In: 1853.9 [I.V.:1643.9]  Out: 3576 [Urine:3575]    Physical Exam:  General: Intubated    HEENT: NC, Atraumatic. ET tube noted   Lungs: CTA Bilaterally. No Wheezing/Rhonchi/Rales. Heart:  Tachy ,  No murmur, No Rubs, No Gallops  Abdomen: Soft, Non distended, Non tender. +Bowel sounds,   Extremities: No c/c.immobilzer noted on left wrist   Psych:   Calm   Neurologic:  On sedation           Labs: Results:       Chemistry Recent Labs     09/16/21  0354 09/15/21  0555 09/14/21  1715 09/14/21  0500 09/14/21  0500   GLU 95 103*  --   --  113*   * 145  --   --  145   K 4.0 4.4 4.0   < > 3.5   * 116*  --   --  115*   CO2 23 22  --   --  23   BUN 7 4*  --   --  5*   CREA 0.38* 0.55*  --   --  0.33*   CA 7.8* 7.8*  --   --  7.9*   AGAP 7 7  --   --  7   BUCR 18 7*  --   --  15   AP 72 80  --   --  73   TP 4.7* 5.0*  --   --  5.2*   ALB 2.0* 2.2*  --   --  2.5*   GLOB 2.7 2.8  --   --  2.7   AGRAT 0.7* 0.8  --   --  0.9    < > = values in this interval not displayed.       CBC w/Diff Recent Labs     09/16/21  0354 09/15/21  0555 09/14/21  0500   WBC 7.8 12.2 10.5   RBC 3.25* 3.41* 3.40*   HGB 9.7* 10.3* 10.4*   HCT 30.9* 32.6* 32.5*    186 192   GRANS 71 73 73   LYMPH 16* 15* 14*   EOS 4 2 2      Cardiac Enzymes Recent Labs     09/13/21  1200   CPK 81      Coagulation Recent Labs     09/15/21  1200   PTP 14.5   INR 1.2       Lipid Panel No results found for: CHOL, CHOLPOCT, CHOLX, CHLST, CHOLV, 486453, HDL, HDLP, LDL, LDLC, DLDLP, 311053, VLDLC, VLDL, TGLX, TRIGL, TRIGP, TGLPOCT, CHHD, CHHDX   BNP No results for input(s): BNPP in the last 72 hours. Liver Enzymes Recent Labs     09/16/21  0354   TP 4.7*   ALB 2.0*   AP 72      Thyroid Studies No results found for: T4, T3U, TSH, TSHEXT, TSHEXT     Procedures/imaging: see electronic medical records for all procedures/Xrays and details which were not copied into this note but were reviewed prior to creation of Plan    XR WRIST LT AP/LAT/OBL MIN 3V    Result Date: 8/19/2021  EXAM: XR WRIST LT AP/LAT/OBL MIN 3V CLINICAL INDICATION/HISTORY: Fall with LEFT wrist pain and swelling -Additional: None COMPARISON: None TECHNIQUE: 3 views of the left wrist _______________ FINDINGS: BONES: Comminuted, impacted fracture of the distal radius with slight dorsal angulation of the distal fracture fragment. No aggressive appearing osseous lytic or blastic lesion. SOFT TISSUES: Unremarkable. _______________     Comminuted, impacted fracture of the distal radius. XR CHEST PORT    Result Date: 9/13/2021  EXAM: XR CHEST PORT CLINICAL INDICATION/HISTORY: Acute respiratory failure, ET tub position -Additional: None COMPARISON: One day prior TECHNIQUE: Portable frontal view of the chest _______________ FINDINGS: SUPPORT DEVICES: Endotracheal and enteric tubes unchanged. HEART AND MEDIASTINUM: Cardiomediastinal silhouette within normal limits.  LUNGS AND PLEURAL SPACES: No dense consolidation, large effusion or pneumothorax. _______________     No acute cardiopulmonary abnormality. XR CHEST PORT    Result Date: 9/11/2021  MEDICAL RECORDS NUMBER: [id number] PROCEDURE:  Single view of the chest DATE: 9/11/2021 9:09 PM HISTORY: 39years old Female. post ett Comparison: 8/4/2021 FINDINGS: The patient has been intubated with appropriate placement of the endotracheal tube. There is no enteric tube passing below the level of the diaphragm. There is no significant effusion. There is no significant pneumothorax. Cardiomediastinal silhouette is within normal limits. There is no evidence of a focal pulmonary infiltrate or mass. 1. There is no significant or acute cardiopulmonary process. The patient has been intubated with appropriate placement of the endotracheal tube. There is no enteric tube passing below the level of the diaphragm. This report has been generated using voice recognition software. XR ABD PORT  1 V    Result Date: 9/12/2021  EXAM: Frontal view of the abdomen CLINICAL INDICATION/HISTORY: NG tube placement COMPARISON: None. _______________ FINDINGS: NG/OG tube in place with the tip in the left upper quadrant in the region of the gastric fundus. No bowel obstruction. Moderate colonic stool burden. _______________     1. NG/OG tube is in good position with the tip in the left upper quadrant in the region of the gastric fundus. 2. Moderate colonic stool burden.       Akshat Marley MD

## 2021-09-16 NOTE — PROGRESS NOTES
Problem: Ventilator Management  Goal: *Adequate oxygenation and ventilation  Outcome: Progressing Towards Goal  Goal: *Patient maintains clear airway/free of aspiration  Outcome: Progressing Towards Goal  Goal: *Absence of infection signs and symptoms  Outcome: Progressing Towards Goal  Goal: *Normal spontaneous ventilation  Outcome: Progressing Towards Goal     Problem: Patient Education: Go to Patient Education Activity  Goal: Patient/Family Education  Outcome: Progressing Towards Goal     Problem: Non-Violent Restraints  Goal: Removal from restraints as soon as assessed to be safe  Outcome: Progressing Towards Goal  Goal: No harm/injury to patient while restraints in use  Outcome: Progressing Towards Goal  Goal: Patient's dignity will be maintained  Outcome: Progressing Towards Goal  Goal: Patient Interventions  Outcome: Progressing Towards Goal     Problem: Falls - Risk of  Goal: *Absence of Falls  Description: Document Danne Lobe Fall Risk and appropriate interventions in the flowsheet. Outcome: Progressing Towards Goal  Note: Fall Risk Interventions:       Mentation Interventions: Adequate sleep, hydration, pain control, Door open when patient unattended, Evaluate medications/consider consulting pharmacy, Reorient patient, More frequent rounding, Toileting rounds    Medication Interventions: Evaluate medications/consider consulting pharmacy    Elimination Interventions: Toileting schedule/hourly rounds              Problem: Patient Education: Go to Patient Education Activity  Goal: Patient/Family Education  Outcome: Progressing Towards Goal     Problem: Risk for Spread of Infection  Goal: Prevent transmission of infectious organism to others  Description: Prevent the transmission of infectious organisms to other patients, staff members, and visitors.   Outcome: Progressing Towards Goal     Problem: Patient Education:  Go to Education Activity  Goal: Patient/Family Education  Outcome: Progressing Towards Goal

## 2021-09-16 NOTE — PROGRESS NOTES
[facility]  (A Division of [address] [place] [address])                                                                                                                       Heather Cesar MD  Office #:     [phone]  [id number] [phone]   Office Fax: [phone]     Date of Admission: 9/11/2021Date of Note: 9/16/2021      Reason for FU: Fever in patient with OD    Current Antimicrobials:    Prior Antimicrobials:  Zosyn 9/14 to date  Vanco 9/14 to date     Doxycycline 9/14 to 9/15       Assessment- ID related:  --------------------------------------------------------------------------      · Fever-- in drug over dose patient. Onset 48 hours after admission-- Etiology unclear-no acute CPD on CXR initially but showing layering effusion with atelectasis on current cxr, viral test including COVID neg. BCX negative so far. Withdrawal?        Urine leg/pneumo--neg        CT CAP-no infectious finding on CT        No acute DVT on LE  · Intubated 9/11  · History of positive drug screen--opiates/cocaine. Drug screen on admission: negative  · Left wrist fracture with external fixer-- site look mariola    covid test rapid neg, RVP neg  HIV test neg-9/15    Other Medical Issues- Mx per respective team:    Drug overdose( stated neurontin)  Hyperammonia level   Recommendation for ID issues I am following:  ------------------------------------------------------------------------------      Cont Vanco and Zosyn    FU cultures until final  Echo ordered    LP pending- for source CORNEJO-- routine CSF analysis + MEP panel. Hold 1-2 tubes in case additional labs needed. DW ICU MD       Condition: remains in critical care- intubated/ fever    Subjective:  Non verbal  Fever recurrent, <101  Not on pressor  Not much secretion via trach  Current abx-- vanco/zosyn, no issue with rash           HPI:  Jayla Hare is a 39 y.o. WHITE/NON- with PMH as listed below.  Patient is intubated, limited history found on her and  ICU team.MD.    Admitted with drug overdose - Neurontin. Not much history known before that. On admission, afebrile, wbc of 8.1, CMP ok, drug screen for opaites/cocaine/benzo neg. UA was normal.  She was intubated 9/11/21. Developed fever to 102.9 on 9/13, no leucocytosis but NH3 elevated, Legionella/Pneum urine ag good. NO DVT  On LE 9/15. Currently on VAnco/Zosyn and doxycycline. Further CORNEJO with CT for source work up in progress       C/Remy Smith 1106 Problems    Diagnosis Date Noted    Increased ammonia level 09/14/2021    [facility]osis [facility] [facility]     Hyponatremia     Acute respiratory failure with hypoxia (HCC)     Left wrist fracture, with delayed healing, subsequent encounter 09/12/2021    Drug overdose, intentional self-harm, initial encounter ([facility] [address] 09/12/2021    Hypoxia 09/12/2021    Hypotension after procedure 09/12/2021    Acute metabolic encephalopathy 72/70/8187     Past Medical History:   Diagnosis Date    Asthma     Bilateral ovarian cysts     Cocaine abuse ([facility] [address] [facility]     Mental and behavioral problem     Pancreatitis     Pancreatitis     Psychosis ([facility] [address]      Past Surgical History:   Procedure Laterality Date    HX GYN      D&C, Hysterectomy     No family history on file.   Social History     Socioeconomic History    Marital status:      Spouse name: Not on file    Number of children: Not on file    Years of education: Not on file    Highest education level: Not on file   Occupational History    Not on file   Tobacco Use    Smoking status: Current Every Day Smoker     Packs/day: 1.00    Smokeless tobacco: Never Used   Substance and Sexual Activity    Alcohol use: Not Currently    Drug use: Not Currently     Types: Cocaine, Marijuana     Comment: used with in past 24 hours    Sexual activity: Not Currently   Other Topics Concern    Not on file   Social History Narrative    Not on file     Social Determinants of Health     Financial Resource Strain:     Difficulty of Paying Living Expenses:    Food Insecurity:     Worried About Running Out of Food in the Last Year:     [address] in the Last Year:    Transportation Needs:     Lack of Transportation (Medical):  Lack of Transportation (Non-Medical):    Physical Activity:     Days of Exercise per Week:     Minutes of Exercise per Session:    Stress:     Feeling of Stress :    Social Connections:     Frequency of Communication with Friends and Family:     Frequency of Social Gatherings with Friends and Family:     Attends Holiness Services:     Active Member of Clubs or Organizations:     Attends Club or Organization Meetings:     Marital Status:    Intimate Partner Violence:     Fear of Current or Ex-Partner:     Emotionally Abused:     Physically Abused:     Sexually Abused:         Allergies:  Fish containing products and Toradol [ketorolac]     Medications:  Current Facility-Administered Medications   Medication Dose Route Frequency    Vancomycin Random Level due 9/16/21 at 12:00  1 Each Other ONCE    nicotine (NICODERM CQ) 14 mg/24 hr patch 1 Patch  1 Patch TransDERmal DAILY    clonazePAM (KlonoPIN) tablet 0.25 mg  0.25 mg Oral BID    lactulose (CHRONULAC) 10 gram/15 mL solution 45 mL  45 mL Oral TID    lidocaine (PF) (XYLOCAINE) 10 mg/mL (1 %) injection 1-10 mL  1-10 mL SubCUTAneous RAD ONCE    potassium bicarb-citric acid (EFFER-K) tablet 40 mEq  40 mEq Oral NOW    midazolam in normal saline (VERSED) 1 mg/mL infusion  2-4 mg/hr IntraVENous TITRATE    vancomycin (VANCOCIN) 1250 mg in  ml infusion  1,250 mg IntraVENous Q12H    ibuprofen (ADVIL;MOTRIN) 100 mg/5 mL oral suspension 400 mg  400 mg Per NG tube Q6H PRN    insulin lispro (HUMALOG) injection   SubCUTAneous Q6H    glucose chewable tablet 16 g  4 Tablet Oral PRN    glucagon (GLUCAGEN) injection 1 mg  1 mg IntraMUSCular PRN    dextrose (D50W) injection syrg 12.5-25 g  25-50 mL IntraVENous PRN    arformoteroL (BROVANA) neb solution 15 mcg  15 mcg Nebulization BID RT    budesonide (PULMICORT) 500 mcg/2 ml nebulizer suspension  500 mcg Nebulization BID RT    piperacillin-tazobactam (ZOSYN) 3.375 g in 0.9% sodium chloride (MBP/ADV) 100 mL MBP  3.375 g IntraVENous Q8H    OLANZapine (ZyPREXA) tablet 10 mg  10 mg Oral QPM    Vancomycin Pharmacy to Dose  1 Each Other Rx Dosing/Monitoring    LORazepam (ATIVAN) injection 2 mg  2 mg IntraVENous Q6H PRN    HYDROmorphone (PF) (DILAUDID) injection 1 mg  1 mg IntraVENous Q4H PRN    albuterol-ipratropium (DUO-NEB) 2.5 MG-0.5 MG/3 ML  3 mL Nebulization TID RT    enoxaparin (LOVENOX) injection 40 mg  40 mg SubCUTAneous Q24H    propofol (DIPRIVAN) 10 mg/mL infusion  0-50 mcg/kg/min IntraVENous TITRATE    sodium chloride (NS) flush 5-40 mL  5-40 mL IntraVENous Q8H    sodium chloride (NS) flush 5-40 mL  5-40 mL IntraVENous PRN    acetaminophen (TYLENOL) tablet 650 mg  650 mg Oral Q6H PRN    Or    acetaminophen (TYLENOL) suppository 650 mg  650 mg Rectal Q6H PRN    polyethylene glycol (MIRALAX) packet 17 g  17 g Oral DAILY PRN    ondansetron (ZOFRAN ODT) tablet 4 mg  4 mg Oral Q8H PRN    Or    ondansetron (ZOFRAN) injection 4 mg  4 mg IntraVENous Q6H PRN    famotidine (PF) (PEPCID) 20 mg in 0.9% sodium chloride 10 mL injection  20 mg IntraVENous BID    chlorhexidine (PERIDEX) 0.12 % mouthwash 10 mL  10 mL Oral Q12H    ELECTROLYTE REPLACEMENT PROTOCOL - Potassium Standard Dosing   1 Each Other PRN    ELECTROLYTE REPLACEMENT PROTOCOL - Magnesium   1 Each Other PRN    ELECTROLYTE REPLACEMENT PROTOCOL - Phosphorus  Standard Dosing  1 Each Other PRN    ELECTROLYTE REPLACEMENT PROTOCOL - Calcium   1 Each Other PRN        ROS:  Review of systems not obtained due to patient factors.   Denies nausea or vomiting or abd pain or diarrhea  Denies chest pain, productive cough  Denies new joint pian or swelling  Denies dysuria/Flank pain/ hematuria  Denies ext weakness-new onset  Denies rash/ itching         Physical Exam:    Temp (24hrs), Av.9 °F (37.7 °C), Min:97.5 °F (36.4 °C), Max:101.5 °F (38.6 °C)    Visit Vitals  /60   Pulse 94   Temp 99.1 °F (37.3 °C)   Resp 15   Ht 5' 2\" (1.575 m)   Wt 71.3 kg (157 lb 3 oz)   LMP 05/15/2018   SpO2 95%   BMI 28.75 kg/m²          GEN: WD Inbutaed /sedated  HEENT: Unicteric. EOMI intact  CHEST: Non laboured breathing. CTA  CVS:RRR, no mur/gallop  ABD: Obese/soft. Non tender. ULISES: Deferred  EXT: No apparent swelling or redness on UE/LE joints. Skin: Dry and intact. No rash, no redness. CNS: Restrained, moves all extremities. Microbiology  All Micro Results     Procedure Component Value Units Date/Time    MENINGITIS PATHOGENS PANEL, CSF (BY PCR) [[id number]]     Order Status: Sent Specimen: Cerebrospinal Fluid     HSV 1 AND 2 BY PCR [[id number]]     Order Status: Sent Specimen: Other     CULTURE, CSF  TUBE 2 [[id number]]     Order Status: Sent Specimen: Cerebrospinal Fluid     COVID-19 RAPID TEST [[id number]] Collected: 21 1000    Order Status: Completed Specimen: Nasopharyngeal Updated: 21 1032     Specimen source Nasopharyngeal        COVID-19 rapid test Not detected        Comment: Rapid Abbott ID Now       Rapid NAAT:  The specimen is NEGATIVE for SARS-CoV-2, the novel coronavirus associated with COVID-19. Negative results should be treated as presumptive and, if inconsistent with clinical signs and symptoms or necessary for patient management, should be tested with an alternative molecular assay. Negative results do not preclude SARS-CoV-2 infection and should not be used as the sole basis for patient management decisions. This test has been authorized by the FDA under an Emergency Use Authorization (EUA) for use by authorized laboratories.    Fact sheet for Healthcare Providers: ConventionUpdate.co.nz  Fact sheet for Patients: ConventionUpdate.co.nz       Methodology: Isothermal Nucleic Acid Amplification         CULTURE, BLOOD [[id number]] Collected: 09/14/21 0515    Order Status: Completed Specimen: Blood Updated: 09/16/21 0734     Special Requests: NO SPECIAL REQUESTS        Culture result: NO GROWTH 2 DAYS       CULTURE, BLOOD [[id number]] Collected: 09/14/21 0500    Order Status: Completed Specimen: Blood Updated: 09/16/21 0734     Special Requests: NO SPECIAL REQUESTS        Culture result: NO GROWTH 2 DAYS       LEGIONELLA PNEUMOPHILA AG, URINE [[id number]] Collected: 09/14/21 2315    Order Status: Completed Specimen: Urine, random Updated: 09/15/21 1042     Legionella Ag, urine Negative       STREP PNEUMO AG, URINE [[id number]] Collected: 09/14/21 2315    Order Status: Completed Specimen: Urine, random Updated: 09/15/21 1042     Strep pneumo Ag, urine Negative       RESPIRATORY VIRUS PANEL W/COVID-19, PCR [[id number]] Collected: 09/14/21 1832    Order Status: Completed Specimen: Nasopharyngeal Updated: 09/14/21 2234     Adenovirus Not detected        Coronavirus 229E Not detected        Coronavirus HKU1 Not detected        Coronavirus CVNL63 Not detected        Coronavirus OC43 Not detected        SARS-CoV-2, PCR Not detected        Metapneumovirus Not detected        Rhinovirus and Enterovirus Not detected        Influenza A Not detected        Influenza A, subtype H1 Not detected        Influenza A, subtype H3 Not detected        INFLUENZA A H1N1 PCR Not detected        Influenza B Not detected        Parainfluenza 1 Not detected        Parainfluenza 2 Not detected        Parainfluenza 3 Not detected        Parainfluenza virus 4 Not detected        RSV by PCR Not detected        B. parapertussis, PCR Not detected        Bordetella pertussis - PCR Not detected        Chlamydophila pneumoniae DNA, QL, PCR Not detected        Mycoplasma pneumoniae DNA, QL, PCR Not detected       CULTURE, BLOOD [[id number]] Collected: 09/14/21 1700    Order Status: Canceled Specimen: Blood     CULTURE, URINE [[id number]] Collected: 09/13/21 2330    Order Status: Canceled Specimen: Cath Urine     RESPIRATORY VIRUS PANEL W/COVID-19, PCR [[id number]]     Order Status: Canceled Specimen: NASOPHARYNGEAL SWAB     COVID-19 RAPID TEST [[id number]]     Order Status: Canceled     COVID-19 RAPID TEST [[id number]] Collected: 09/13/21 0737    Order Status: Completed Specimen: Nasopharyngeal Updated: 09/13/21 0811     Specimen source Nasopharyngeal        COVID-19 rapid test Not detected        Comment: Rapid Abbott ID Now       Rapid NAAT:  The specimen is NEGATIVE for SARS-CoV-2, the novel coronavirus associated with COVID-19. Negative results should be treated as presumptive and, if inconsistent with clinical signs and symptoms or necessary for patient management, should be tested with an alternative molecular assay. Negative results do not preclude SARS-CoV-2 infection and should not be used as the sole basis for patient management decisions. This test has been authorized by the FDA under an Emergency Use Authorization (EUA) for use by authorized laboratories. Fact sheet for Healthcare Providers: ConventionUpdate.co.nz  Fact sheet for Patients: ConventionUpdate.co.nz       Methodology: Isothermal Nucleic Acid Amplification         COVID-19 RAPID TEST [[id number]]     Order Status: Canceled            Lab results:    Chemistry  Recent Labs     09/16/21  1150 09/16/21  0354 09/15/21  0555 09/14/21  1715 09/14/21  0500   GLU  --  95 103*  --  113*   NA  --  147* 145  --  145   K 3.6 4.0 4.4   < > 3.5   CL  --  117* 116*  --  115*   CO2  --  23 22  --  23   BUN  --  7 4*  --  5*   CREA  --  0.38* 0.55*  --  0.33*   CA  --  7.8* 7.8*  --  7.9*   AGAP  --  7 7  --  7   BUCR  --  18 7*  --  15   AP  --  72 80  --  73   TP  --  4.7* 5.0*  --  5.2*   ALB  --  2.0* 2.2*  --  2.5*   GLOB  --  2.7 2.8  --  2.7   AGRAT  --  0.7* 0.8  --  0.9    < > = values in this interval not displayed. CBC w/ Diff  Recent Labs     09/16/21  0354 09/15/21  0555 09/14/21  0500   WBC 7.8 12.2 10.5   RBC 3.25* 3.41* 3.40*   HGB 9.7* 10.3* 10.4*   HCT 30.9* 32.6* 32.5*    186 192   GRANS 71 73 73   LYMPH 16* 15* 14*   EOS 4 2 2       Imaging: report posted below as per radiologist

## 2021-09-17 ENCOUNTER — APPOINTMENT (OUTPATIENT)
Dept: GENERAL RADIOLOGY | Age: 41
DRG: 004 | End: 2021-09-17
Attending: INTERNAL MEDICINE
Payer: MEDICAID

## 2021-09-17 ENCOUNTER — APPOINTMENT (OUTPATIENT)
Dept: NON INVASIVE DIAGNOSTICS | Age: 41
DRG: 004 | End: 2021-09-17
Attending: INTERNAL MEDICINE
Payer: MEDICAID

## 2021-09-17 LAB
ALBUMIN SERPL-MCNC: 1.9 G/DL (ref 3.4–5)
ALBUMIN/GLOB SERPL: 0.7 {RATIO} (ref 0.8–1.7)
ALP SERPL-CCNC: 87 U/L (ref 45–117)
ALT SERPL-CCNC: 32 U/L (ref 13–56)
AMMONIA PLAS-SCNC: 79 UMOL/L (ref 11–32)
ANION GAP SERPL CALC-SCNC: 7 MMOL/L (ref 3–18)
AST SERPL-CCNC: 33 U/L (ref 10–38)
BASOPHILS # BLD: 0 K/UL (ref 0–0.1)
BASOPHILS NFR BLD: 0 % (ref 0–2)
BILIRUB SERPL-MCNC: 0.5 MG/DL (ref 0.2–1)
BUN SERPL-MCNC: 7 MG/DL (ref 7–18)
BUN/CREAT SERPL: 21 (ref 12–20)
CALCIUM SERPL-MCNC: 7.8 MG/DL (ref 8.5–10.1)
CHLORIDE SERPL-SCNC: 113 MMOL/L (ref 100–111)
CO2 SERPL-SCNC: 25 MMOL/L (ref 21–32)
CREAT SERPL-MCNC: 0.33 MG/DL (ref 0.6–1.3)
CRYPTOCOCCUS NEOFORMANS/GATTII, CRNEOG: NOT DETECTED
CYTOMEGALOVIRUS, CYMEG: NOT DETECTED
DIFFERENTIAL METHOD BLD: ABNORMAL
ECHO AO ROOT DIAM: 3.07 CM
ECHO AV AREA PEAK VELOCITY: 3.34 CM2
ECHO AV AREA VTI: 3.14 CM2
ECHO AV AREA/BSA PEAK VELOCITY: 1.9 CM2/M2
ECHO AV AREA/BSA VTI: 1.8 CM2/M2
ECHO AV MEAN GRADIENT: 6.46 MMHG
ECHO AV PEAK GRADIENT: 11.08 MMHG
ECHO AV VTI: 29.14 CM
ECHO EST RA PRESSURE: 8 MMHG
ECHO IVC PROX: 2.19 CM
ECHO LA MAJOR AXIS: 3.59 CM
ECHO LA MINOR AXIS: 2.09 CM
ECHO LA VOL 2C: 50.22 ML (ref 22–52)
ECHO LA VOL 4C: 36.85 ML (ref 22–52)
ECHO LA VOL BP: 45.14 ML (ref 22–52)
ECHO LA VOL/BSA BIPLANE: 26.24 ML/M2 (ref 16–28)
ECHO LA VOLUME INDEX A2C: 29.2 ML/M2 (ref 16–28)
ECHO LA VOLUME INDEX A4C: 21.42 ML/M2 (ref 16–28)
ECHO LV E' LATERAL VELOCITY: 12 CM/S
ECHO LV E' SEPTAL VELOCITY: 12 CM/S
ECHO LV EDV A2C: 37.81 ML
ECHO LV EDV A4C: 120.01 ML
ECHO LV EDV BP: 77.6 ML (ref 56–104)
ECHO LV EDV INDEX A4C: 69.8 ML/M2
ECHO LV EDV INDEX BP: 45.1 ML/M2
ECHO LV EDV NDEX A2C: 22 ML/M2
ECHO LV EJECTION FRACTION A2C: 68 PERCENT
ECHO LV EJECTION FRACTION A4C: 68 PERCENT
ECHO LV EJECTION FRACTION BIPLANE: 68.2 PERCENT (ref 55–100)
ECHO LV ESV A2C: 12.04 ML
ECHO LV ESV A4C: 38.73 ML
ECHO LV ESV BP: 24.68 ML (ref 19–49)
ECHO LV ESV INDEX A2C: 7 ML/M2
ECHO LV ESV INDEX A4C: 22.5 ML/M2
ECHO LV ESV INDEX BP: 14.3 ML/M2
ECHO LV INTERNAL DIMENSION DIASTOLIC: 4.89 CM (ref 3.9–5.3)
ECHO LV INTERNAL DIMENSION SYSTOLIC: 2.96 CM
ECHO LV IVSD: 0.79 CM (ref 0.6–0.9)
ECHO LV MASS 2D: 132.9 G (ref 67–162)
ECHO LV MASS INDEX 2D: 77.2 G/M2 (ref 43–95)
ECHO LV POSTERIOR WALL DIASTOLIC: 0.83 CM (ref 0.6–0.9)
ECHO LVOT DIAM: 2.27 CM
ECHO LVOT PEAK GRADIENT: 7.56 MMHG
ECHO LVOT PEAK VELOCITY: 137.46 CM/S
ECHO LVOT SV: 25.8 ML
ECHO LVOT SV: 91.4 ML
ECHO LVOT VTI: 22.6 CM
ECHO MV E VELOCITY: 85 CM/S
ECHO MV E/E' LATERAL: 7.08
ECHO MV E/E' RATIO (AVERAGED): 7.08
ECHO MV E/E' SEPTAL: 7.08
ECHO RA AREA 4C: 13.74 CM2
ECHO RIGHT VENTRICULAR SYSTOLIC PRESSURE (RVSP): 28.98 MMHG
ECHO RV TAPSE: 2.7 CM (ref 1.5–2)
ECHO TV REGURGITANT MAX VELOCITY: 229.02 CM/S
ECHO TV REGURGITANT PEAK GRADIENT: 20.98 MMHG
ENTEROVIRUS, ENTVIR: NOT DETECTED
EOSINOPHIL # BLD: 0.2 K/UL (ref 0–0.4)
EOSINOPHIL NFR BLD: 3 % (ref 0–5)
ERYTHROCYTE [DISTWIDTH] IN BLOOD BY AUTOMATED COUNT: 13.2 % (ref 11.6–14.5)
ESCHERICHIA COLI K1, ECK1: NOT DETECTED
GLOBULIN SER CALC-MCNC: 2.8 G/DL (ref 2–4)
GLUCOSE BLD STRIP.AUTO-MCNC: 103 MG/DL (ref 70–110)
GLUCOSE BLD STRIP.AUTO-MCNC: 104 MG/DL (ref 70–110)
GLUCOSE BLD STRIP.AUTO-MCNC: 108 MG/DL (ref 70–110)
GLUCOSE BLD STRIP.AUTO-MCNC: 113 MG/DL (ref 70–110)
GLUCOSE SERPL-MCNC: 96 MG/DL (ref 74–99)
HAEMOPHILUS INFLUENZAE, HAEFLU: NOT DETECTED
HBV SURFACE AG SER QL: <0.1 INDEX
HBV SURFACE AG SER QL: NEGATIVE
HCT VFR BLD AUTO: 26.2 % (ref 35–45)
HCV AB SER IA-ACNC: 0.08 INDEX
HCV AB SERPL QL IA: NEGATIVE
HCV COMMENT,HCGAC: NORMAL
HERPES SIMPLEX VIRUS 2, HSIMV2: NOT DETECTED
HGB BLD-MCNC: 8.5 G/DL (ref 12–16)
HSV1 DNA CSF QL NAA+PROBE: NOT DETECTED
HUMAN HERPESVIRUS 6, HUHV6: NOT DETECTED
HUMAN PARECHOVIRUS, HUPARV: NOT DETECTED
LISTERIA MONOCYTOGENES, LISMON: NOT DETECTED
LVOT MG: 4.51 MMHG
LYMPHOCYTES # BLD: 1.4 K/UL (ref 0.9–3.6)
LYMPHOCYTES NFR BLD: 21 % (ref 21–52)
MAGNESIUM SERPL-MCNC: 2 MG/DL (ref 1.6–2.6)
MCH RBC QN AUTO: 30 PG (ref 24–34)
MCHC RBC AUTO-ENTMCNC: 32.4 G/DL (ref 31–37)
MCV RBC AUTO: 92.6 FL (ref 78–100)
MONOCYTES # BLD: 0.6 K/UL (ref 0.05–1.2)
MONOCYTES NFR BLD: 9 % (ref 3–10)
NEISSERIA MENINGITIDIS, NEIMEN: NOT DETECTED
NEUTS SEG # BLD: 4.4 K/UL (ref 1.8–8)
NEUTS SEG NFR BLD: 66 % (ref 40–73)
PHOSPHATE SERPL-MCNC: 3.5 MG/DL (ref 2.5–4.9)
PLATELET # BLD AUTO: 212 K/UL (ref 135–420)
PMV BLD AUTO: 10.5 FL (ref 9.2–11.8)
POTASSIUM SERPL-SCNC: 3.8 MMOL/L (ref 3.5–5.5)
PROT SERPL-MCNC: 4.7 G/DL (ref 6.4–8.2)
RBC # BLD AUTO: 2.83 M/UL (ref 4.2–5.3)
SODIUM SERPL-SCNC: 145 MMOL/L (ref 136–145)
STREPTOCOCCUS AGALACTIAE, SAGA: NOT DETECTED
STREPTOCOCCUS PNEUMONIAE, STRPNE: NOT DETECTED
VARICELLA ZOSTER VIRUS, VAZOVI: NOT DETECTED
WBC # BLD AUTO: 6.6 K/UL (ref 4.6–13.2)

## 2021-09-17 PROCEDURE — 74011250637 HC RX REV CODE- 250/637: Performed by: FAMILY MEDICINE

## 2021-09-17 PROCEDURE — 94640 AIRWAY INHALATION TREATMENT: CPT

## 2021-09-17 PROCEDURE — 85025 COMPLETE CBC W/AUTO DIFF WBC: CPT

## 2021-09-17 PROCEDURE — 36415 COLL VENOUS BLD VENIPUNCTURE: CPT

## 2021-09-17 PROCEDURE — 74011250637 HC RX REV CODE- 250/637: Performed by: INTERNAL MEDICINE

## 2021-09-17 PROCEDURE — 82140 ASSAY OF AMMONIA: CPT

## 2021-09-17 PROCEDURE — 74011000250 HC RX REV CODE- 250: Performed by: INTERNAL MEDICINE

## 2021-09-17 PROCEDURE — P9047 ALBUMIN (HUMAN), 25%, 50ML: HCPCS | Performed by: HOSPITALIST

## 2021-09-17 PROCEDURE — 87340 HEPATITIS B SURFACE AG IA: CPT

## 2021-09-17 PROCEDURE — 71045 X-RAY EXAM CHEST 1 VIEW: CPT

## 2021-09-17 PROCEDURE — 86704 HEP B CORE ANTIBODY TOTAL: CPT

## 2021-09-17 PROCEDURE — 74011000258 HC RX REV CODE- 258: Performed by: INTERNAL MEDICINE

## 2021-09-17 PROCEDURE — 93306 TTE W/DOPPLER COMPLETE: CPT

## 2021-09-17 PROCEDURE — 83735 ASSAY OF MAGNESIUM: CPT

## 2021-09-17 PROCEDURE — 82962 GLUCOSE BLOOD TEST: CPT

## 2021-09-17 PROCEDURE — 74011250636 HC RX REV CODE- 250/636: Performed by: FAMILY MEDICINE

## 2021-09-17 PROCEDURE — 80053 COMPREHEN METABOLIC PANEL: CPT

## 2021-09-17 PROCEDURE — 94003 VENT MGMT INPAT SUBQ DAY: CPT

## 2021-09-17 PROCEDURE — 74011250637 HC RX REV CODE- 250/637: Performed by: HOSPITALIST

## 2021-09-17 PROCEDURE — 74011000250 HC RX REV CODE- 250: Performed by: FAMILY MEDICINE

## 2021-09-17 PROCEDURE — 74011250636 HC RX REV CODE- 250/636: Performed by: INTERNAL MEDICINE

## 2021-09-17 PROCEDURE — 77010033678 HC OXYGEN DAILY

## 2021-09-17 PROCEDURE — 74011250636 HC RX REV CODE- 250/636: Performed by: HOSPITALIST

## 2021-09-17 PROCEDURE — 65610000006 HC RM INTENSIVE CARE

## 2021-09-17 PROCEDURE — 86803 HEPATITIS C AB TEST: CPT

## 2021-09-17 PROCEDURE — 84100 ASSAY OF PHOSPHORUS: CPT

## 2021-09-17 RX ORDER — ALBUMIN HUMAN 250 G/1000ML
25 SOLUTION INTRAVENOUS EVERY 6 HOURS
Status: DISCONTINUED | OUTPATIENT
Start: 2021-09-17 | End: 2021-09-18

## 2021-09-17 RX ORDER — CLONAZEPAM 0.5 MG/1
0.5 TABLET ORAL 2 TIMES DAILY
Status: DISCONTINUED | OUTPATIENT
Start: 2021-09-17 | End: 2021-10-06

## 2021-09-17 RX ORDER — FUROSEMIDE 10 MG/ML
20 INJECTION INTRAMUSCULAR; INTRAVENOUS DAILY
Status: COMPLETED | OUTPATIENT
Start: 2021-09-17 | End: 2021-09-19

## 2021-09-17 RX ADMIN — POTASSIUM BICARBONATE 20 MEQ: 782 TABLET, EFFERVESCENT ORAL at 08:08

## 2021-09-17 RX ADMIN — IPRATROPIUM BROMIDE AND ALBUTEROL SULFATE 3 ML: .5; 3 SOLUTION RESPIRATORY (INHALATION) at 22:02

## 2021-09-17 RX ADMIN — ARFORMOTEROL TARTRATE 15 MCG: 15 SOLUTION RESPIRATORY (INHALATION) at 07:30

## 2021-09-17 RX ADMIN — ARFORMOTEROL TARTRATE 15 MCG: 15 SOLUTION RESPIRATORY (INHALATION) at 22:02

## 2021-09-17 RX ADMIN — FENTANYL CITRATE 125 MCG/HR: 50 INJECTION, SOLUTION INTRAMUSCULAR; INTRAVENOUS at 19:47

## 2021-09-17 RX ADMIN — IPRATROPIUM BROMIDE AND ALBUTEROL SULFATE 3 ML: .5; 3 SOLUTION RESPIRATORY (INHALATION) at 14:43

## 2021-09-17 RX ADMIN — PIPERACILLIN AND TAZOBACTAM 3.38 G: 3; .375 INJECTION, POWDER, LYOPHILIZED, FOR SOLUTION INTRAVENOUS at 21:40

## 2021-09-17 RX ADMIN — FUROSEMIDE 20 MG: 10 INJECTION, SOLUTION INTRAMUSCULAR; INTRAVENOUS at 11:46

## 2021-09-17 RX ADMIN — ACETAMINOPHEN 650 MG: 325 TABLET ORAL at 11:57

## 2021-09-17 RX ADMIN — LACTULOSE 45 ML: 20 SOLUTION ORAL at 21:13

## 2021-09-17 RX ADMIN — SODIUM CHLORIDE 10 ML: 9 INJECTION, SOLUTION INTRAMUSCULAR; INTRAVENOUS; SUBCUTANEOUS at 14:05

## 2021-09-17 RX ADMIN — BUDESONIDE 500 MCG: 0.5 INHALANT RESPIRATORY (INHALATION) at 07:30

## 2021-09-17 RX ADMIN — OLANZAPINE 10 MG: 10 TABLET, FILM COATED ORAL at 17:52

## 2021-09-17 RX ADMIN — IPRATROPIUM BROMIDE AND ALBUTEROL SULFATE 3 ML: .5; 3 SOLUTION RESPIRATORY (INHALATION) at 07:30

## 2021-09-17 RX ADMIN — FENTANYL CITRATE 125 MCG/HR: 50 INJECTION, SOLUTION INTRAMUSCULAR; INTRAVENOUS at 12:37

## 2021-09-17 RX ADMIN — 0.12% CHLORHEXIDINE GLUCONATE 10 ML: 1.2 RINSE ORAL at 08:09

## 2021-09-17 RX ADMIN — ENOXAPARIN SODIUM 40 MG: 100 INJECTION SUBCUTANEOUS at 17:52

## 2021-09-17 RX ADMIN — ALBUMIN (HUMAN) 25 G: 0.25 INJECTION, SOLUTION INTRAVENOUS at 23:38

## 2021-09-17 RX ADMIN — CLOTRIMAZOLE: 10 CREAM TOPICAL at 21:39

## 2021-09-17 RX ADMIN — ALBUMIN (HUMAN) 25 G: 0.25 INJECTION, SOLUTION INTRAVENOUS at 11:46

## 2021-09-17 RX ADMIN — CLOTRIMAZOLE: 10 CREAM TOPICAL at 08:10

## 2021-09-17 RX ADMIN — SODIUM CHLORIDE 10 ML: 9 INJECTION, SOLUTION INTRAMUSCULAR; INTRAVENOUS; SUBCUTANEOUS at 05:12

## 2021-09-17 RX ADMIN — FAMOTIDINE 20 MG: 10 INJECTION, SOLUTION INTRAVENOUS at 17:52

## 2021-09-17 RX ADMIN — DEXMEDETOMIDINE HYDROCHLORIDE 0.4 MCG/KG/HR: 100 INJECTION, SOLUTION INTRAVENOUS at 13:28

## 2021-09-17 RX ADMIN — FAMOTIDINE 20 MG: 10 INJECTION, SOLUTION INTRAVENOUS at 05:11

## 2021-09-17 RX ADMIN — CLONAZEPAM 0.25 MG: 0.5 TABLET ORAL at 08:09

## 2021-09-17 RX ADMIN — BUDESONIDE 500 MCG: 0.5 INHALANT RESPIRATORY (INHALATION) at 22:02

## 2021-09-17 RX ADMIN — 0.12% CHLORHEXIDINE GLUCONATE 10 ML: 1.2 RINSE ORAL at 21:39

## 2021-09-17 RX ADMIN — VANCOMYCIN HYDROCHLORIDE 1500 MG: 10 INJECTION, POWDER, LYOPHILIZED, FOR SOLUTION INTRAVENOUS at 05:22

## 2021-09-17 RX ADMIN — CLONAZEPAM 0.5 MG: 0.5 TABLET ORAL at 21:39

## 2021-09-17 RX ADMIN — LORAZEPAM 2 MG: 2 INJECTION INTRAMUSCULAR at 11:39

## 2021-09-17 RX ADMIN — LORAZEPAM 2 MG: 2 INJECTION INTRAMUSCULAR at 05:11

## 2021-09-17 RX ADMIN — ALBUMIN (HUMAN) 25 G: 0.25 INJECTION, SOLUTION INTRAVENOUS at 17:52

## 2021-09-17 RX ADMIN — MIDAZOLAM 1 MG/HR: 5 INJECTION, SOLUTION INTRAMUSCULAR; INTRAVENOUS at 23:38

## 2021-09-17 RX ADMIN — PIPERACILLIN AND TAZOBACTAM 3.38 G: 3; .375 INJECTION, POWDER, LYOPHILIZED, FOR SOLUTION INTRAVENOUS at 05:11

## 2021-09-17 RX ADMIN — PIPERACILLIN AND TAZOBACTAM 3.38 G: 3; .375 INJECTION, POWDER, LYOPHILIZED, FOR SOLUTION INTRAVENOUS at 13:22

## 2021-09-17 RX ADMIN — LACTULOSE 45 ML: 20 SOLUTION ORAL at 08:08

## 2021-09-17 RX ADMIN — FENTANYL CITRATE 125 MCG/HR: 50 INJECTION, SOLUTION INTRAMUSCULAR; INTRAVENOUS at 05:25

## 2021-09-17 NOTE — PROGRESS NOTES
[facility]  (A Division of [address] [place] [address])                                                                                                                                                                                Heather Monreal MD                                                         Office #:     [phone]  [phone] [phone]                                                          Office Fax: [phone]     Date of Admission: 9/11/2021Date of Note: 9/17/2021  Reason for FU: Fever in patient with OD    Dr Madi Quiroz covering during the weekend. Please call via  or [employer] for questions/consults. I will be back Monday, Sept 20 2021. Thanks. Current Antimicrobials:    Prior Antimicrobials:  Zosyn 9/14 to date  Vanco 9/14 to date     Doxycycline 9/14 to 9/15       Assessment- ID related:  --------------------------------------------------------------------------    · Fever-- in drug over dose patient. Suspect withdrawal Vs aspiration. Onset 48 hours after admission-- Etiology unclear-no acute CPD on CXR initially but showing layering effusion with atelectasis on current cxr, viral test including COVID neg. BCX negative so far. Withdrawal?        Urine leg/pneumo--neg        CT CAP-no infectious finding on CT        No acute DVT on LE        LP- CSF appears benign. MEP negative  · Intubated 9/11  · History of positive drug screen--opiates/cocaine.  Drug screen on admission: negative  · Left wrist fracture with external fixer-- site look mariola    covid test rapid neg, RVP neg  HIV test neg-9/15    Other Medical Issues- Mx per respective team:    Drug overdose( stated neurontin)  Hyperammonia level- etiology?  anemia   Recommendation for ID issues I am following:  ------------------------------------------------------------------------------    Cont Zosyn to complete 7 days for emperic asp coverage  DC vanco  LP looks benign    FU cultures until final- [facility]  FU Echo report    Dont see reason to escalate up as withdrawal or asp seem likely source for fever. Complete just with Zosyn course. If hypotension/clinical decline, pancutlure and broaden to meropenem + vanco. DW Dr Joleen Odell  LP pending- for source CORNEJO-- routine CSF analysis + MEP panel. Hold 1-2 tubes in case additional labs needed. DW ICU MD           Condition: remains in critical care- intubated/ fever    Subjective:  Non verbal/ weaning off vent  Fever curve decreased  LP finding benign  CXR with stable changes   Not on pressor  Not much secretion via trach    Current abx-- vanco/zosyn, no issue with rash           HPI:  Anjelica Bush is a 39 y.o. WHITE/NON- with PMH as listed below. Patient is intubated, limited history found on her and DW ICU team.MD.    Admitted with drug overdose - Neurontin. Not much history known before that. On admission, afebrile, wbc of 8.1, CMP ok, drug screen for opaites/cocaine/benzo neg. UA was normal.  She was intubated 9/11/21. Developed fever to 102.9 on 9/13, no leucocytosis but NH3 elevated, Legionella/Pneum urine ag good. NO DVT  On LE 9/15. Currently on VAnco/Zosyn and doxycycline.  Further CORNEJO with CT for source work up in progress       C/Remy Smith 1106 Problems    Diagnosis Date Noted    Increased ammonia level 09/14/2021    [facility]osis [facility]     Hyponatremia     Acute respiratory failure with hypoxia (HCC)     Left wrist fracture, with delayed healing, subsequent encounter 09/12/2021    Drug overdose, intentional self-harm, initial encounter ([address] 09/12/2021    Hypoxia 09/12/2021    Hypotension after procedure 09/12/2021    Acute metabolic encephalopathy 27/91/0148     Past Medical History:   Diagnosis Date    Asthma     Bilateral ovarian cysts     Cocaine abuse ([address]     Mental and behavioral problem     Pancreatitis     Pancreatitis     Psychosis ([address]      Past Surgical History:   Procedure Laterality Date    HX GYN D&C, Hysterectomy     No family history on file. Social History     Socioeconomic History    Marital status:      Spouse name: Not on file    Number of children: Not on file    Years of education: Not on file    Highest education level: Not on file   Occupational History    Not on file   Tobacco Use    Smoking status: Current Every Day Smoker     Packs/day: 1.00    Smokeless tobacco: Never Used   Substance and Sexual Activity    Alcohol use: Not Currently    Drug use: Not Currently     Types: Cocaine, Marijuana     Comment: used with in past 24 hours    Sexual activity: Not Currently   Other Topics Concern    Not on file   Social History Narrative    Not on file     Social Determinants of Health     Financial Resource Strain:     Difficulty of Paying Living Expenses:    Food Insecurity:     Worried About Running Out of Food in the Last Year:     Ran Out of Food in the Last Year:    Transportation Needs:     Lack of Transportation (Medical):  Lack of Transportation (Non-Medical):    Physical Activity:     Days of Exercise per Week:     Minutes of Exercise per Session:    Stress:     Feeling of Stress :    Social Connections:     Frequency of Communication with Friends and Family:     Frequency of Social Gatherings with Friends and Family:     Attends Zoroastrian Services:     Active Member of Clubs or Organizations:     Attends Club or Organization Meetings:     Marital Status:    Intimate Partner Violence:     Fear of Current or Ex-Partner:     Emotionally Abused:     Physically Abused:     Sexually Abused:         Allergies:  Fish containing products and Toradol [ketorolac]     Medications:  Current Facility-Administered Medications   Medication Dose Route Frequency    furosemide (LASIX) injection 20 mg  20 mg IntraVENous DAILY    clonazePAM (KlonoPIN) tablet 0.5 mg  0.5 mg Oral BID    dexmedeTOMidine (PRECEDEX) 400 mcg in 0.9% sodium chloride 100 mL infusion  0.1-1.5 mcg/kg/hr IntraVENous TITRATE    albumin human 25% (BUMINATE) solution 25 g  25 g IntraVENous Q6H    nicotine (NICODERM CQ) 14 mg/24 hr patch 1 Patch  1 Patch TransDERmal DAILY    lactulose (CHRONULAC) 10 gram/15 mL solution 45 mL  45 mL Oral TID    fentaNYL (PF) 900 mcg/30 ml infusion soln  0-200 mcg/hr IntraVENous TITRATE    haloperidol lactate (HALDOL) injection 4 mg  4 mg IntraVENous Q6H PRN    clotrimazole (LOTRIMIN) 1 % cream   Topical BID    vancomycin (VANCOCIN) 1500 mg in  ml infusion  1,500 mg IntraVENous Q12H    midazolam in normal saline (VERSED) 1 mg/mL infusion  0-10 mg/hr IntraVENous TITRATE    ibuprofen (ADVIL;MOTRIN) 100 mg/5 mL oral suspension 400 mg  400 mg Per NG tube Q6H PRN    insulin lispro (HUMALOG) injection   SubCUTAneous Q6H    glucose chewable tablet 16 g  4 Tablet Oral PRN    glucagon (GLUCAGEN) injection 1 mg  1 mg IntraMUSCular PRN    dextrose (D50W) injection syrg 12.5-25 g  25-50 mL IntraVENous PRN    arformoteroL (BROVANA) neb solution 15 mcg  15 mcg Nebulization BID RT    budesonide (PULMICORT) 500 mcg/2 ml nebulizer suspension  500 mcg Nebulization BID RT    piperacillin-tazobactam (ZOSYN) 3.375 g in 0.9% sodium chloride (MBP/ADV) 100 mL MBP  3.375 g IntraVENous Q8H    OLANZapine (ZyPREXA) tablet 10 mg  10 mg Oral QPM    Vancomycin Pharmacy to Dose  1 Each Other Rx Dosing/Monitoring    LORazepam (ATIVAN) injection 2 mg  2 mg IntraVENous Q6H PRN    HYDROmorphone (PF) (DILAUDID) injection 1 mg  1 mg IntraVENous Q4H PRN    albuterol-ipratropium (DUO-NEB) 2.5 MG-0.5 MG/3 ML  3 mL Nebulization TID RT    enoxaparin (LOVENOX) injection 40 mg  40 mg SubCUTAneous Q24H    sodium chloride (NS) flush 5-40 mL  5-40 mL IntraVENous Q8H    sodium chloride (NS) flush 5-40 mL  5-40 mL IntraVENous PRN    acetaminophen (TYLENOL) tablet 650 mg  650 mg Oral Q6H PRN    Or    acetaminophen (TYLENOL) suppository 650 mg  650 mg Rectal Q6H PRN    polyethylene glycol (MIRALAX) packet 17 g  17 g Oral DAILY PRN    ondansetron (ZOFRAN ODT) tablet 4 mg  4 mg Oral Q8H PRN    Or    ondansetron (ZOFRAN) injection 4 mg  4 mg IntraVENous Q6H PRN    famotidine (PF) (PEPCID) 20 mg in 0.9% sodium chloride 10 mL injection  20 mg IntraVENous BID    chlorhexidine (PERIDEX) 0.12 % mouthwash 10 mL  10 mL Oral Q12H    ELECTROLYTE REPLACEMENT PROTOCOL - Potassium Standard Dosing   1 Each Other PRN    ELECTROLYTE REPLACEMENT PROTOCOL - Magnesium   1 Each Other PRN    ELECTROLYTE REPLACEMENT PROTOCOL - Phosphorus  Standard Dosing  1 Each Other PRN    ELECTROLYTE REPLACEMENT PROTOCOL - Calcium   1 Each Other PRN        ROS:  Review of systems not obtained due to patient factors. Denies nausea or vomiting or abd pain or diarrhea  Denies chest pain, productive cough  Denies new joint pian or swelling  Denies dysuria/Flank pain/ hematuria  Denies ext weakness-new onset  Denies rash/ itching         Physical Exam:    Temp (24hrs), Av.5 °F (38.1 °C), Min:99.1 °F (37.3 °C), Max:101.1 °F (38.4 °C)    Visit Vitals  BP (!) 87/48   Pulse (!) 104   Temp 100.4 °F (38 °C)   Resp 16   Ht 5' 2\" (1.575 m)   Wt 71.2 kg (157 lb)   LMP 05/15/2018   SpO2 95%   BMI 28.72 kg/m²          GEN: WD Inbutaed /sedated  HEENT: Unicteric. EOMI intact  CHEST: Non laboured breathing. CTA  CVS:RRR, no mur/gallop  ABD: Obese/soft. Non tender. ULISES: Deferred  EXT: No apparent swelling or redness on UE/LE joints. Skin: Dry and intact. No rash, no redness. CNS: Restrained, moves all extremities.       Microbiology  All Micro Results     Procedure Component Value Units Date/Time    CULTURE, BLOOD [[id number]] Collected: 21 0500    Order Status: Completed Specimen: Blood Updated: 21     Special Requests: NO SPECIAL REQUESTS        Culture result: NO GROWTH 3 DAYS       CULTURE, BLOOD [[id number]] Collected: 21 0515    Order Status: Completed Specimen: Blood Updated: 21     Special Requests: NO SPECIAL REQUESTS        Culture result: NO GROWTH 3 DAYS       MENINGITIS PATHOGENS PANEL, CSF (BY PCR) [[id number]] Collected: 09/16/21 1434    Order Status: Completed Specimen: Cerebrospinal Fluid Updated: 09/17/21 0050     Escherichia coli K1 Not detected        Haemophilus Influenzae Not detected        Listeria Monocytogenes Not detected        Neisseria Meningitidis Not detected        Streptococcus Agalactiae Not detected        Streptococcus Pneumoniae Not detected        Cytomegalovirus Not detected        Enterovirus Not detected        Herpes Simplex Virus 1 Not detected        Comment: In patients who have negative herpes simplex 1 and 2 PCR results, do not modify treatment, confirm with alternate testing. Herpes Simplex Virus 2 Not detected        Comment: In patients who have negative herpes simplex 1 and 2 PCR results, do not modify treatment, confirm with alternate testing. Human Herpesvirus 6 Not detected        Human Parechovirus Not detected        Varicella Zoster Virus Not detected        Crypto. neoformans/gattii Not detected       CULTURE, CSF  TUBE 2 [[id number]] Collected: 09/16/21 1434    Order Status: Completed Specimen: Cerebrospinal Fluid Updated: 09/16/21 2341     Special Requests: TUBE 3, CULTURE AND SENSITIVTY AND GRAM STAIN.      GRAM STAIN RARE WBCS SEEN         NO ORGANISMS SEEN        Culture result: PENDING    MENINGITIS PATHOGENS PANEL, CSF (BY PCR) [[id number]]     Order Status: Sent Specimen: Cerebrospinal Fluid     HSV 1 AND 2 BY PCR [[id number]] Collected: 09/16/21 1434    Order Status: Completed Specimen: Other Updated: 09/16/21 1502    COVID-19 RAPID TEST [[id number]] Collected: 09/16/21 1000    Order Status: Completed Specimen: Nasopharyngeal Updated: 09/16/21 1032     Specimen source Nasopharyngeal        COVID-19 rapid test Not detected        Comment: Rapid Abbott ID Now       Rapid NAAT:  The specimen is NEGATIVE for SARS-CoV-2, the novel coronavirus associated with COVID-19. Negative results should be treated as presumptive and, if inconsistent with clinical signs and symptoms or necessary for patient management, should be tested with an alternative molecular assay. Negative results do not preclude SARS-CoV-2 infection and should not be used as the sole basis for patient management decisions. This test has been authorized by the FDA under an Emergency Use Authorization (EUA) for use by authorized laboratories.    Fact sheet for Healthcare Providers: [employer] Florally.co.nz  Fact sheet for Patients: [employer] coinco.nz       Methodology: Isothermal Nucleic Acid Amplification         LEGIONELLA PNEUMOPHILA AG, URINE [[id number]] Collected: 09/14/21 2315    Order Status: Completed Specimen: Urine, random Updated: 09/15/21 1042     Legionella Ag, urine Negative       STREP PNEUMO AG, URINE [[id number]] Collected: 09/14/21 2315    Order Status: Completed Specimen: Urine, random Updated: 09/15/21 1042     Strep pneumo Ag, urine Negative       RESPIRATORY VIRUS PANEL W/COVID-19, PCR [[id number]] Collected: 09/14/21 1832    Order Status: Completed Specimen: Nasopharyngeal Updated: 09/14/21 2234     Adenovirus Not detected        Coronavirus 229E Not detected        Coronavirus HKU1 Not detected        Coronavirus CVNL63 Not detected        Coronavirus OC43 Not detected        SARS-CoV-2, PCR Not detected        Metapneumovirus Not detected        Rhinovirus and Enterovirus Not detected        Influenza A Not detected        Influenza A, subtype H1 Not detected        Influenza A, subtype H3 Not detected        INFLUENZA A H1N1 PCR Not detected        Influenza B Not detected        Parainfluenza 1 Not detected        Parainfluenza 2 Not detected        Parainfluenza 3 Not detected        Parainfluenza virus 4 Not detected        RSV by PCR Not detected        B. parapertussis, PCR Not detected        Bordetella pertussis - PCR Not detected        Chlamydophila pneumoniae DNA, QL, PCR Not detected        Mycoplasma pneumoniae DNA, QL, PCR Not detected       CULTURE, BLOOD [[id number]] Collected: 09/14/21 1700    Order Status: Canceled Specimen: Blood     CULTURE, URINE [[id number]] Collected: 09/13/21 2330    Order Status: Canceled Specimen: Cath Urine     RESPIRATORY VIRUS PANEL W/COVID-19, PCR [[id number]]     Order Status: Canceled Specimen: NASOPHARYNGEAL SWAB     COVID-19 RAPID TEST [[id number]]     Order Status: Canceled     COVID-19 RAPID TEST [[id number]] Collected: 09/13/21 0737    Order Status: Completed Specimen: Nasopharyngeal Updated: 09/13/21 0811     Specimen source Nasopharyngeal        COVID-19 rapid test Not detected        Comment: Rapid Abbott ID Now       Rapid NAAT:  The specimen is NEGATIVE for SARS-CoV-2, the novel coronavirus associated with COVID-19. Negative results should be treated as presumptive and, if inconsistent with clinical signs and symptoms or necessary for patient management, should be tested with an alternative molecular assay. Negative results do not preclude SARS-CoV-2 infection and should not be used as the sole basis for patient management decisions. This test has been authorized by the FDA under an Emergency Use Authorization (EUA) for use by authorized laboratories.    Fact sheet for Healthcare Providers: [employer].co.nz  Fact sheet for Patients: [employer].co.nz       Methodology: Isothermal Nucleic Acid Amplification         COVID-19 RAPID TEST [[id number]]     Order Status: Canceled            Lab results:    Chemistry  Recent Labs     09/17/21  0520 09/16/21  1700 09/16/21  1150 09/16/21  0354 09/16/21  0354 09/15/21  0555 09/15/21  0555   GLU 96  --   --   --  95  --  103*     --   --   --  147*  --  145   K 3.8 4.9 3.6   < > 4.0   < > 4.4   *  --   --   --  117*  --  116*   CO2 25  --   --   --  23  --  22   BUN 7 --   --   --  7  --  4*   CREA 0.33*  --   --   --  0.38*  --  0.55*   CA 7.8*  --   --   --  7.8*  --  7.8*   AGAP 7  --   --   --  7  --  7   BUCR 21*  --   --   --  18  --  7*   AP 87  --   --   --  72  --  80   TP 4.7*  --   --   --  4.7*  --  5.0*   ALB 1.9*  --   --   --  2.0*  --  2.2*   GLOB 2.8  --   --   --  2.7  --  2.8   AGRAT 0.7*  --   --   --  0.7*  --  0.8    < > = values in this interval not displayed. CBC w/ Diff  Recent Labs     09/17/21  0520 09/16/21  0354 09/15/21  0555   WBC 6.6 7.8 12.2   RBC 2.83* 3.25* 3.41*   HGB 8.5* 9.7* 10.3*   HCT 26.2* 30.9* 32.6*    187 186   GRANS 66 71 73   LYMPH 21 16* 15*   EOS 3 4 2       Imaging: report posted below as per radiologist   CXR- 9/17    1. Unchanged, adequately positioned endotracheal tube and enteric tube. 2. Unchanged left and right basilar patchy opacities, atelectasis versus  infiltrate.

## 2021-09-17 NOTE — PROGRESS NOTES
Physician Progress Note      Jessie Ellis  [facility] #:                  [id number]  :                       1980  ADMIT DATE:       2021 7:44 PM  [address] [place] DATE:  RESPONDING  PROVIDER #:        Cole ARAMBULA MD          QUERY TEXT:    Patient admitted with Gabapentin overdose. Noted documentation of intubation for airway protection in ED notes, H&P, and Pulm consult. Documentation of acute respiratory failure in Pulmonary Consult and in  IM progress note Acute respiratory failure with hypoxia Intubated on . Please indicate one of the following and document in the medical record: The medical record reflects the following:    Risk Factors: Lethargy, acute metabolic encephalopathy 2/2 OD    Clinical Indicators: ED MD- We moved to intubate her early due to our known regular difficulties in managing her in addition to her significantly decrease in mental status and low O2 saturations which did respond to oxygen. H&P- Hypoxic, presenting 02 83% S/p intubation for safety  Pulm cons--  presented to ER with lethargy via EMS and admitted for Gabapentin overdose. Pt required intubation for airways protection. Acute respiratory failure. - IM- Acute respiratory failure with hypoxia Intubated on   Admitting ABGs- ABGs- pH 7.34- pCO2 46.4; pO2 290- sO2  99.9 on vent  Pulse ox sat 88%; respirations 12    Treatment: Intubation    Thank you,  [address] [place] [address],[address], CCDS  [phone]  Options provided:  -- Intubated for Acute Respiratory Failure as evidenced by, Please document evidence.   -- Intubated for airway protection only, Acute Respiratory Failure ruled out after study  -- Other - I will add my own diagnosis  -- Disagree - Not applicable / Not valid  -- Disagree - Clinically unable to determine / Unknown  -- Refer to Clinical Documentation Reviewer    PROVIDER RESPONSE TEXT:    This patient was intubated for airway protection only, Acute Respiratory Failure has been ruled out after study.    Query created by:  Yolie Loera on 9/15/2021 3:46 PM      Electronically signed by:  Faby Richter MD 9/17/2021 5:09 PM

## 2021-09-17 NOTE — PROGRESS NOTES
[facility]ists  Pulmonary, Critical Care, and Sleep Medicine    Name: Jose Kunz MRN: [id number]   : 1980 Hospital: [facility] [place]   Date: 2021        Pulmonary Critical Care Note    IMPRESSION:   · Acute respiratory failure · J96.00 ·   Stable 2 mm nodular right lower lobe density laterally from 2015; smoker, no prior CT chest for any additional nodule w/up · R91.1     Patient Active Problem List   Diagnosis Code    Dextromethorphan use disorder, moderate ([address] F19.20    Ekbom's delusional parasitosis ([address]    Left wrist fracture, with delayed healing, subsequent encounter S62.102G    Drug overdose, intentional self-harm, initial encounter ([facility] T50.902A    Hypoxia R09.02    Hypotension after procedure I95.81    Acute metabolic encephalopathy Z60.58    Psychosis ([facility] F29    Hyponatremia E87.1    Acute respiratory failure with hypoxia ([facility] J96.01    Increased ammonia level R79.89 ·   Code status: full code   RECOMMENDATIONS:   Respiratory: Mech. Ventilated patients- aim to keep peak plateau pressure less than or equal to 30cm H2O. Titrate FiO2 for goal SPO2> 91%  Aspiration pneumonia improved    Failed Spontaneous breathing trial on  due to severe agitation  chanaged to precedex   Wean versed to off  7.43/33/90/97  Ac 350/20/30%/5  CXR personally reviewed   IMPRESSION     IMPRESSION     1. Unchanged, adequately positioned endotracheal tube and enteric tube. 2. Unchanged left and right basilar patchy opacities, atelectasis versus  infiltrate.     No acute intra-abdominal abnormality. Covid test negative x2  To fever will perform lumbar puncture negative for meningitis  Failed SBT daily  Severe delirium due to EtOH, smoking, drug use. Trying to pull IVs and ET tube. Sedation patient was on propofol drip and as needed opiates and low-dose of Versed however we noticed some urine discoloration so high risk for propofol syndrome. Stop propofol.   Switch Versed to Precedex, start fentanyl, at as needed Haldol with monitoring QTCs  Keep daily spontaneous breathing trial off sedation  Speck DTs? Withdrawal from drugs? Discussed with RN to get Urine pregnancy test before any radiological test  VAP prevention bundle and sedation bundle followed, head of the bed at 30' all times  Daily sedation holiday and assessment for weaning with SBT as tolerated  Continue pulmonary hygiene care  ID: White blood cells normal all cultures negative including lumbar puncture negative meningeal panel. No signs of meningitis. Discussed with infectious disease. Downgrade antibiotics. Continue Zosyn for aspiration pneumonia but DC vancomycin. Possible drug-related fever? Does not have any rash. PVL negative for DVT  CT confirming aspiration pneumonia CT of the abdomen normal  IMPRESSION     Endotracheal tube tip in the lower thoracic trachea 1.5 cm above the dipak.      Bilateral lower lobar atelectasis, possible endobronchial lesion/mucous plug in  the left lower lobe bronchus.      No acute intra-abdominal abnormality  Covid test x3 -. Lumbar puncture negative including viral encephalitis  HIV test negative. White blood cells low procalcitonin low if lumbar puncture negative consider drug fever? Lilly Mitchell     CAP on triple abx   Add chest pt/bronchodilators and consider steroids if not better  Fever blood culure negative   WBC stable  initial culture negative   I suspect aspiration pneumonia/CAP  Urine strep and legionellla pending  procal and lactic acid labile  CVS: Monitor Hemodynamics- stable  IVF: Reduce IV fluids received large amount on first 24 hours  EKG in AM  Renal: Monitor renal functions, lytes  Replace lytes per unit protocol  CK in AM  Heme: Monitor CBC H/H is expected to drop with IVF  No evidence of active bleeding  Endo: TSH ordered  GI: diet - NPO  LFT normal  Neurology: sedated  CT of the head ordered on 9/13 still not done spoke to staff to do ASAP  Toxicology: gabapentin OD  Supportive care  CT head pending   Sedation: Propofol - titrate  Hold Ativan drip as tolerated; may use PRN Ativan  Ammonia    Will defer respective systems problem management to primary and other consultant and follow patient in ICU with primary and other medical team  Further recommendations will be based on the patient's response to recommended treatment and results of the investigation ordered. Quality Care: PPI, DVT prophylaxis, HOB elevated, Infection control all reviewed and addressed. PAIN AND SEDATION: Propofol; PRN Ativan  Skin/Wound: none  Prophylaxis: DVT and GI Prophylaxis reviewed. Restraints: Wrist soft restraints for patient interfering with medical therapy/management and patient safety. PT/OT eval and treat: as needed when stable   Lines/Tubes: PIV   ETT: 9/11/21  OGT/NGT: 9/11/21  Other drains: 9/11/21  ADVANCE DIRECTIVE: Full code. DISCUSSION: Events and notes from last 24 hours reviewed. Care plan discussed with nursing, hospitalist, RT   from senior care called I updated 9/17/2021  Had long discussion with him updated on all events. He also gives permission for lumbar puncture  Quality Care: PPI, DVT prophylaxis, HOB elevated, Infection control all reviewed and addressed. High complexity decision making was performed during the evaluation of this patient at high risk for decompensation with multiple organ involvement. Cc time 38 minutes   Total critical care time spent rendering complex decision making and > 50% time spent in face to face evaluation exclusive of procedures/family discussion/coordination of care: 38minutes. Subjective/History:   Ms. Natalio Cary has been seen and evaluated as Dr. Vonda Aranda requested now for assisting with Acute respiratory failure and ventilator management. Patient is a 39 y.o. female with following PMhx presented to ER with lethargy via EMS and admitted for Gabapentin overdose.  Pt required intubation for airways protection. Position control was contacted that recommended supportive care per ER provider. Pt seen at bedside in ER rm#2. The patient can not provide additional history due to Ventilated. 9/14/2021  Remains in ICU bed 4. Intubated failed spontaneous breathing trial today primarily due to extreme agitation pulling IVs and ET tube. Does not follow command but moves all 4 extremities. Lumbar puncture is negative she does not have meningitis with downgrade antibiotics. I will switch Versed to Precedex. Continue pain management. Other neuroleptics for agitation. Patient has a history of drug use and smoker and alcohol use. Continue daily spontaneous breathing trials. Infectious disease on board. Pulmonary critical care was not called last night    Review of Systems:  Review of systems not obtained due to patient factors. Past Medical History:  Past Medical History:   Diagnosis Date    Asthma     Bilateral ovarian cysts     Cocaine abuse ([facility]     Mental and behavioral problem     Pancreatitis     Pancreatitis     Psychosis ([facility]         Past Surgical History:  Past Surgical History:   Procedure Laterality Date    HX GYN      D&C, Hysterectomy        Medications:  Prior to Admission medications    Medication Sig Start Date End Date Taking? Authorizing Provider   albuterol (PROVENTIL HFA, VENTOLIN HFA, PROAIR HFA) 90 mcg/actuation inhaler Take 2 Puffs by inhalation every four (4) hours as needed for Wheezing or Shortness of Breath. 8/4/21   Floridalma Campuzano PA-C   ibuprofen (MOTRIN) 600 mg tablet Take 1 Tablet by mouth every six (6) hours as needed for Pain. Take with food.  8/4/21   Floridalma Campuzano PA-C   ALPRAZolam (XANAX) 0.5 mg tablet TAKE 1 TABLET BY MOUTH ONCE DAILY AS NEEDED FOR ANXIETY 12/4/19   Provider, Historical   metFORMIN (GLUCOPHAGE) 500 mg tablet TAKE 1 TABLET BY MOUTH ONCE DAILY 11/12/19   Provider, Historical   nicotine (NICODERM CQ) 21 mg/24 hr APPLY 1 PATCH TOPICALLY TO THE SKIN DAILY FOR CRAVINGS AS DIRECTED 12/4/19   Provider, Historical   traZODone (DESYREL) 50 mg tablet TAKE 1 TABLET BY MOUTH AT BEDTIME AS NEEDED FOR INSOMNIA 12/4/19   Provider, Historical   escitalopram oxalate (LEXAPRO) 10 mg tablet Take 10 mg by mouth daily. Luisa Burch MD   lurasidone (LATUDA) 80 mg tab tablet Take 80 mg by mouth daily (with dinner).     Luisa Burch MD       Current Facility-Administered Medications   Medication Dose Route Frequency    furosemide (LASIX) injection 20 mg  20 mg IntraVENous DAILY    clonazePAM (KlonoPIN) tablet 0.5 mg  0.5 mg Oral BID    dexmedeTOMidine (PRECEDEX) 400 mcg in 0.9% sodium chloride 100 mL infusion  0.1-1.5 mcg/kg/hr IntraVENous TITRATE    albumin human 25% (BUMINATE) solution 25 g  25 g IntraVENous Q6H    nicotine (NICODERM CQ) 14 mg/24 hr patch 1 Patch  1 Patch TransDERmal DAILY    lactulose (CHRONULAC) 10 gram/15 mL solution 45 mL  45 mL Oral TID    fentaNYL (PF) 900 mcg/30 ml infusion soln  0-200 mcg/hr IntraVENous TITRATE    clotrimazole (LOTRIMIN) 1 % cream   Topical BID    midazolam in normal saline (VERSED) 1 mg/mL infusion  0-10 mg/hr IntraVENous TITRATE    insulin lispro (HUMALOG) injection   SubCUTAneous Q6H    arformoteroL (BROVANA) neb solution 15 mcg  15 mcg Nebulization BID RT    budesonide (PULMICORT) 500 mcg/2 ml nebulizer suspension  500 mcg Nebulization BID RT    piperacillin-tazobactam (ZOSYN) 3.375 g in 0.9% sodium chloride (MBP/ADV) 100 mL MBP  3.375 g IntraVENous Q8H    OLANZapine (ZyPREXA) tablet 10 mg  10 mg Oral QPM    albuterol-ipratropium (DUO-NEB) 2.5 MG-0.5 MG/3 ML  3 mL Nebulization TID RT    enoxaparin (LOVENOX) injection 40 mg  40 mg SubCUTAneous Q24H    sodium chloride (NS) flush 5-40 mL  5-40 mL IntraVENous Q8H    famotidine (PF) (PEPCID) 20 mg in 0.9% sodium chloride 10 mL injection  20 mg IntraVENous BID    chlorhexidine (PERIDEX) 0.12 % mouthwash 10 mL  10 mL Oral Q12H       Allergy:  Allergies Allergen Reactions    Fish Containing Products Itching    Toradol [Ketorolac] Rash     Pt reports she is not allergic to this medication. Social History:  Social History     Tobacco Use    Smoking status: Current Every Day Smoker     Packs/day: 1.00    Smokeless tobacco: Never Used   Substance Use Topics    Alcohol use: Not Currently    Drug use: Not Currently     Types: Cocaine, Marijuana     Comment: used with in past 24 hours        Family History:  No family history on file. Objective:   Vital Signs:    Blood pressure (!) 87/48, pulse 93, temperature 100.4 °F (38 °C), resp. rate 16, height 5' 2\" (1.575 m), weight 71.2 kg (157 lb), last menstrual period 05/15/2018, SpO2 95 %. Body mass index is 28.72 kg/m². O2 Device: Endotracheal tube       Temp (24hrs), Av.5 °F (38.1 °C), Min:99.1 °F (37.3 °C), Max:101.1 °F (38.4 °C)         Intake/Output:   Last shift:       07 - 1900  In: 27 [I.V.:7]  Out: 600 [Urine:600]  Last 3 shifts: 09/15 1901 -  0700  In: 1820.6 [I.V.:1345.6]  Out: 2611 [Urine:3175]    Intake/Output Summary (Last 24 hours) at 2021 1456  Last data filed at 2021 1245  Gross per 24 hour   Intake 1727.57 ml   Output 2050 ml   Net -322.43 ml       Ventilator Settings:  Mode Rate Tidal Volume Pressure FiO2 PEEP   Assist control, VC+   350 ml  7 cm H2O 25 % 5 cm H20     Peak airway pressure: 12 cm H2O    Minute ventilation: 8.35 l/min      Lung protective strategy, Pl pressure goals less than or equal to 30.     Physical Exam: Intubated sedated off sedation moving all 4 extremities but did not follow commands but nonfocal  General: sedated, in no respiratory distress, appears stated age, on ventilator  HEENT: PERRL, fundi benign, ET and OG tubes in place  Neck: No abnormally enlarged lymph nodes or thyroid, supple  Chest: normal  Lungs: normal air entry, breathing normal , right lower lobe crackles, slightly decreased breath sounds at the right base normal percussion bilaterally, no tenderness/ rash mild wheezing. Bilaterally. Heart: Regular rate and rhythm, S1S2 present or without murmur or extra heart sounds  Abdomen: non distended, bowel sounds normoactive, tympanic, abdomen is soft without significant tenderness, masses, organomegaly or guarding, rigidity, rebound  Extremity: negative for edema, cyanosis, clubbing  Capillary refill: normal  Neuro: sedated, moves all extremities spontaneously in bed, no involuntary movements, exam limitation on ventilator  Skin: Skin color, texture, turgor fair.  Skin dry, warm, non-diaphoretic    Data:     Recent Results (from the past 24 hour(s))   EKG, 12 LEAD, INITIAL    Collection Time: 09/16/21  3:40 PM   Result Value Ref Range    Ventricular Rate 96 BPM    Atrial Rate 96 BPM    P-R Interval 170 ms    QRS Duration 68 ms    Q-T Interval 332 ms    QTC Calculation (Bezet) 419 ms    Calculated P Axis 57 degrees    Calculated R Axis 54 degrees    Calculated T Axis 52 degrees    Diagnosis       Normal sinus rhythm  Normal ECG  Confirmed by Debora Tran MD, RUST ([id number]) on 9/16/2021 8:59:56 PM     CALCIUM, IONIZED    Collection Time: 09/16/21  5:00 PM   Result Value Ref Range    Ionized Calcium 1.12 1.12 - 1.32 MMOL/L   POTASSIUM    Collection Time: 09/16/21  5:00 PM   Result Value Ref Range    Potassium 4.9 3.5 - 5.5 mmol/L   GLUCOSE, POC    Collection Time: 09/16/21 11:08 PM   Result Value Ref Range    Glucose (POC) 121 (H) 70 - 110 mg/dL   GLUCOSE, POC    Collection Time: 09/17/21  5:07 AM   Result Value Ref Range    Glucose (POC) 103 70 - 110 mg/dL   CBC WITH AUTOMATED DIFF    Collection Time: 09/17/21  5:20 AM   Result Value Ref Range    WBC 6.6 4.6 - 13.2 K/uL    RBC 2.83 (L) 4.20 - 5.30 M/uL    HGB 8.5 (L) 12.0 - 16.0 g/dL    HCT 26.2 (L) 35.0 - 45.0 %    MCV 92.6 78.0 - 100.0 FL    MCH 30.0 24.0 - 34.0 PG    MCHC 32.4 31.0 - 37.0 g/dL    RDW 13.2 11.6 - 14.5 %    PLATELET 115 026 - 541 K/uL    MPV 10.5 9.2 - 11.8 FL NEUTROPHILS 66 40 - 73 %    LYMPHOCYTES 21 21 - 52 %    MONOCYTES 9 3 - 10 %    EOSINOPHILS 3 0 - 5 %    BASOPHILS 0 0 - 2 %    ABS. NEUTROPHILS 4.4 1.8 - 8.0 K/UL    ABS. LYMPHOCYTES 1.4 0.9 - 3.6 K/UL    ABS. MONOCYTES 0.6 0.05 - 1.2 K/UL    ABS. EOSINOPHILS 0.2 0.0 - 0.4 K/UL    ABS. BASOPHILS 0.0 0.0 - 0.1 K/UL    DF AUTOMATED     MAGNESIUM    Collection Time: 09/17/21  5:20 AM   Result Value Ref Range    Magnesium 2.0 1.6 - 2.6 mg/dL   METABOLIC PANEL, COMPREHENSIVE    Collection Time: 09/17/21  5:20 AM   Result Value Ref Range    Sodium 145 136 - 145 mmol/L    Potassium 3.8 3.5 - 5.5 mmol/L    Chloride 113 (H) 100 - 111 mmol/L    CO2 25 21 - 32 mmol/L    Anion gap 7 3.0 - 18 mmol/L    Glucose 96 74 - 99 mg/dL    BUN 7 7.0 - 18 MG/DL    Creatinine 0.33 (L) 0.6 - 1.3 MG/DL    BUN/Creatinine ratio 21 (H) 12 - 20      GFR est AA >60 >60 ml/min/1.73m2    GFR est non-AA >60 >60 ml/min/1.73m2    Calcium 7.8 (L) 8.5 - 10.1 MG/DL    Bilirubin, total 0.5 0.2 - 1.0 MG/DL    ALT (SGPT) 32 13 - 56 U/L    AST (SGOT) 33 10 - 38 U/L    Alk. phosphatase 87 45 - 117 U/L    Protein, total 4.7 (L) 6.4 - 8.2 g/dL    Albumin 1.9 (L) 3.4 - 5.0 g/dL    Globulin 2.8 2.0 - 4.0 g/dL    A-G Ratio 0.7 (L) 0.8 - 1.7     PHOSPHORUS    Collection Time: 09/17/21  5:20 AM   Result Value Ref Range    Phosphorus 3.5 2.5 - 4.9 MG/DL   AMMONIA    Collection Time: 09/17/21  5:20 AM   Result Value Ref Range    Ammonia 79 (H) 11 - 32 UMOL/L   HEPATITIS C AB    Collection Time: 09/17/21  5:20 AM   Result Value Ref Range    Hepatitis C virus Ab 0.08 <0.80 Index    Hep C virus Ab Interp. Negative NEG      Hep C  virus Ab comment         HEP B SURFACE AG    Collection Time: 09/17/21  5:20 AM   Result Value Ref Range    Hepatitis B surface Ag <0.10 <1.00 Index    Hep B surface Ag Interp.  Negative NEG     ECHO ADULT COMPLETE    Collection Time: 09/17/21 10:41 AM   Result Value Ref Range    IVSd 0.79 0.60 - 0.90 cm    LVIDd 4.89 3.90 - 5.30 cm    LVIDs 2.96 cm    LVOT d 2.27 cm    LVPWd 0.83 0.60 - 0.90 cm    BP EF 68.2 55.0 - 100.0 percent    LV Ejection Fraction MOD 2C 68 percent    LV Ejection Fraction MOD 4C 68 percent    LV ED Vol A2C 37.81 mL    LV ED Vol A4C 120.01 mL    LV ED Vol BP 77.60 56.0 - 104.0 mL    LV ES Vol A2C 12.04 mL    LV ES Vol A4C 38.73 mL    LV ES Vol BP 24.68 19.0 - 49.0 mL    LVOT SV 25.8 mL    LVOT Peak Gradient 7.56 mmHg    Left Ventricular Outflow Tract Mean Gradient 4.51 mmHg    LVOT SV 91.4 mL    LVOT Peak Velocity 137.46 cm/s    LVOT VTI 22.60 cm    RVSP 28.98 mmHg    Left Atrium Major Axis 3.59 cm    LA Volume 45.14 22.0 - 52.0 mL    LA Vol 2C 50.22 22.00 - 52.00 mL    LA Vol 4C 36.85 22.00 - 52.00 mL    Right Atrial Area 4C 13.74 cm2    Est. RA Pressure 8.00 mmHg    AoV PG 11.08 mmHg    Aortic Valve Systolic Mean Gradient 5.43 mmHg    AoV VTI 29.14 cm    Aortic Valve Area by Continuity of Peak Velocity 3.34 cm2    Aortic Valve Area by Continuity of VTI 3.14 cm2    Triscuspid Valve Regurgitation Peak Gradient 20.98 mmHg    TR Max Velocity 229.02 cm/s    Ao Root D 3.07 cm    IVC proximal 2.19 cm    LV E' Septal Velocity 12.00 cm/s    LV E' Lateral Velocity 12.00 cm/s    MV E Steve 85.00 cm/s    LV Mass .9 67.0 - 162.0 g    LV Mass AL Index 77.2 43.0 - 95.0 g/m2    E/E' lateral 7.08     E/E' septal 7.08     E/E' ratio (averaged) 7.08     LVES Vol Index BP 14.3 mL/m2    LVED Vol Index BP 45.1 mL/m2    Left Atrium Minor Axis 2.09 cm    Tapse 2.70 1.50 - 2.00 cm    LA Vol Index 26.24 16.00 - 28.00 ml/m2    LA Vol Index 29.20 16.00 - 28.00 ml/m2    LA Vol Index 21.42 16.00 - 28.00 ml/m2    LVED Vol Index A4C 69.8 mL/m2    LVED Vol Index A2C 22.0 mL/m2    LVES Vol Index A4C 22.5 mL/m2    LVES Vol Index A2C 7.0 mL/m2    GONZÁLEZ/BSA Pk Steve 1.9 cm2/m2    GONZÁLEZ/BSA VTI 1.8 cm2/m2   GLUCOSE, POC    Collection Time: 09/17/21 11:25 AM   Result Value Ref Range    Glucose (POC) 104 70 - 110 mg/dL           Recent Labs     09/16/21  0456 09/15/21  0591 FIO2I 30 30   HCO3I 22.3 20.8*   PCO2I 33.4* 40.0   PHI 7.43 7.33*   PO2I 90 77*       All Micro Results     Procedure Component Value Units Date/Time    CULTURE, BLOOD [[id number]] Collected: 09/14/21 0500    Order Status: Completed Specimen: Blood Updated: 09/17/21 0806     Special Requests: NO SPECIAL REQUESTS        Culture result: NO GROWTH 3 DAYS       CULTURE, BLOOD [[id number]] Collected: 09/14/21 0515    Order Status: Completed Specimen: Blood Updated: 09/17/21 0806     Special Requests: NO SPECIAL REQUESTS        Culture result: NO GROWTH 3 DAYS       MENINGITIS PATHOGENS PANEL, CSF (BY PCR) [[id number]] Collected: 09/16/21 1434    Order Status: Completed Specimen: Cerebrospinal Fluid Updated: 09/17/21 0050     Escherichia coli K1 Not detected        Haemophilus Influenzae Not detected        Listeria Monocytogenes Not detected        Neisseria Meningitidis Not detected        Streptococcus Agalactiae Not detected        Streptococcus Pneumoniae Not detected        Cytomegalovirus Not detected        Enterovirus Not detected        Herpes Simplex Virus 1 Not detected        Comment: In patients who have negative herpes simplex 1 and 2 PCR results, do not modify treatment, confirm with alternate testing. Herpes Simplex Virus 2 Not detected        Comment: In patients who have negative herpes simplex 1 and 2 PCR results, do not modify treatment, confirm with alternate testing. Human Herpesvirus 6 Not detected        Human Parechovirus Not detected        Varicella Zoster Virus Not detected        Crypto. neoformans/gattii Not detected       CULTURE, CSF  TUBE 2 [[id number]] Collected: 09/16/21 1434    Order Status: Completed Specimen: Cerebrospinal Fluid Updated: 09/16/21 2341     Special Requests: TUBE 3, CULTURE AND SENSITIVTY AND GRAM STAIN.      GRAM STAIN RARE WBCS SEEN         NO ORGANISMS SEEN        Culture result: PENDING    MENINGITIS PATHOGENS PANEL, CSF (BY PCR) [[id number]]     Order Status: Sent Specimen: Cerebrospinal Fluid     HSV 1 AND 2 BY PCR [[id number]] Collected: 09/16/21 1434    Order Status: Completed Specimen: Other Updated: 09/16/21 1502    COVID-19 RAPID TEST [[id number]] Collected: 09/16/21 1000    Order Status: Completed Specimen: Nasopharyngeal Updated: 09/16/21 1032     Specimen source Nasopharyngeal        COVID-19 rapid test Not detected        Comment: Rapid Abbott ID Now       Rapid NAAT:  The specimen is NEGATIVE for SARS-CoV-2, the novel coronavirus associated with COVID-19. Negative results should be treated as presumptive and, if inconsistent with clinical signs and symptoms or necessary for patient management, should be tested with an alternative molecular assay. Negative results do not preclude SARS-CoV-2 infection and should not be used as the sole basis for patient management decisions. This test has been authorized by the FDA under an Emergency Use Authorization (EUA) for use by authorized laboratories.    Fact sheet for Healthcare Providers: PoliticalMakeover.com.ee  Fact sheet for Patients: PoliticalMakeover.com.ee       Methodology: Isothermal Nucleic Acid Amplification         LEGIONELLA PNEUMOPHILA AG, URINE [[id number]] Collected: 09/14/21 2315    Order Status: Completed Specimen: Urine, random Updated: 09/15/21 1042     Legionella Ag, urine Negative       STREP PNEUMO AG, URINE [[id number]] Collected: 09/14/21 2315    Order Status: Completed Specimen: Urine, random Updated: 09/15/21 1042     Strep pneumo Ag, urine Negative       RESPIRATORY VIRUS PANEL W/COVID-19, PCR [[id number]] Collected: 09/14/21 1832    Order Status: Completed Specimen: Nasopharyngeal Updated: 09/14/21 2234     Adenovirus Not detected        Coronavirus 229E Not detected        Coronavirus HKU1 Not detected        Coronavirus CVNL63 Not detected        Coronavirus OC43 Not detected        SARS-CoV-2, PCR Not detected        Metapneumovirus Not detected        Rhinovirus and Enterovirus Not detected        Influenza A Not detected        Influenza A, subtype H1 Not detected        Influenza A, subtype H3 Not detected        INFLUENZA A H1N1 PCR Not detected        Influenza B Not detected        Parainfluenza 1 Not detected        Parainfluenza 2 Not detected        Parainfluenza 3 Not detected        Parainfluenza virus 4 Not detected        RSV by PCR Not detected        B. parapertussis, PCR Not detected        Bordetella pertussis - PCR Not detected        Chlamydophila pneumoniae DNA, QL, PCR Not detected        Mycoplasma pneumoniae DNA, QL, PCR Not detected       CULTURE, BLOOD [[id number]] Collected: 09/14/21 1700    Order Status: Canceled Specimen: Blood     CULTURE, URINE [[id number]] Collected: 09/13/21 2330    Order Status: Canceled Specimen: Cath Urine     RESPIRATORY VIRUS PANEL W/COVID-19, PCR [[id number]]     Order Status: Canceled Specimen: NASOPHARYNGEAL SWAB     COVID-19 RAPID TEST [[id number]]     Order Status: Canceled     COVID-19 RAPID TEST [[id number]] Collected: 09/13/21 0737    Order Status: Completed Specimen: Nasopharyngeal Updated: 09/13/21 0811     Specimen source Nasopharyngeal        COVID-19 rapid test Not detected        Comment: Rapid Abbott ID Now       Rapid NAAT:  The specimen is NEGATIVE for SARS-CoV-2, the novel coronavirus associated with COVID-19. Negative results should be treated as presumptive and, if inconsistent with clinical signs and symptoms or necessary for patient management, should be tested with an alternative molecular assay. Negative results do not preclude SARS-CoV-2 infection and should not be used as the sole basis for patient management decisions. This test has been authorized by the FDA under an Emergency Use Authorization (EUA) for use by authorized laboratories.    Fact sheet for Healthcare Providers: ConventionUpdate.co.nz  Fact sheet for Patients: ConventionUpdate.co.nz       Methodology: Isothermal Nucleic Acid Amplification         COVID-19 RAPID TEST [[id number]]     Order Status: Canceled           Telemetry: normal sinus rhythm      Imaging:  [x]I have personally reviewed the patients chest radiographs images and report     Results from Hospital Encounter encounter on 09/11/21    XR ABD (KUB)    Addendum 9/15/2021 10:32 AM  Addendum: Koffi Chen: SUPINE ABDOMINAL RADIOGRAPH    CLINICAL INDICATION/HISTORY: ogt placement  -Additional: None    COMPARISON: 9/12/2021    TECHNIQUE: Single supine view of the abdomen.    _______________    FINDINGS:    BOWEL GAS PATTERN: Enteric tube within the distal stomach. No evidence of  obstruction. No visible free air, given limitations of supine view. BONES: Unremarkable. OTHER: None.    _______________    IMPRESSION:    Nonobstructive bowel gas pattern. Enteric tube within the distal stomach. Narrative  EXAM: XR ABD (KUB)    CLINICAL INDICATION/HISTORY: ogt placement  -Additional: None    COMPARISON: 2 days prior    TECHNIQUE: Frontal view of the chest    _______________    FINDINGS:    HEART AND MEDIASTINUM: Normal cardiac size and mediastinal contours. LUNGS AND PLEURAL SPACES: No focal pneumonic consolidation, pneumothorax, or  pleural effusion. BONY THORAX AND SOFT TISSUES: No acute osseous abnormality    _______________    Impression  Nonobstructive bowel gas pattern.           [x]See my orders for details          Isha Rai MD

## 2021-09-17 NOTE — PROGRESS NOTES
Hospitalist Progress Note-critical care note     Patient: Zoe Mondragon MRN: [id number]  [facility]: [id number]    YOB: 1980  Age: 39 y.o. Sex: female    DOA: 9/11/2021 LOS:  LOS: 5 days            Chief complaint: wrist fracture , drug overdose , acute respiratory failure with hypoxia, psychosis     Assessment/Plan         Hospital Problems  Date Reviewed: 9/6/2015        Codes Class Noted POA    Increased ammonia level ICD-10-CM: R79.89  ICD-9-CM: 790.6  9/14/2021 Unknown        Psychosis ([facility] ICD-10-CM: F29  ICD-9-CM: 298.9  Unknown Unknown        Hyponatremia ICD-10-CM: E87.1  ICD-9-CM: 276.1  Unknown Yes        Acute respiratory failure with hypoxia ([facility] ICD-10-CM: J96.01  ICD-9-CM: 518.81  Unknown Yes        Left wrist fracture, with delayed healing, subsequent encounter ICD-10-CM: S62.102G  ICD-9-CM: V54.19  9/12/2021 Unknown        * (Principal) Drug overdose, intentional self-harm, initial encounter ([facility] ICD-10-CM: T50.902A  ICD-9-CM: 977.9, E950.5  9/12/2021 Yes        Hypoxia ICD-10-CM: R09.02  ICD-9-CM: 799.02  9/12/2021 Yes        Hypotension after procedure ICD-10-CM: I95.81  ICD-9-CM: 458.29  9/12/2021 Yes        Acute metabolic encephalopathy LFB-10-FB: G93.41  ICD-9-CM: 348.31  9/12/2021 Yes                  Zoe Mondragon is a 39 y.o. female who is standing history of multidrug use/abuse, previous drug-seeking behavior and mental health issues otherwise unspecified arrives via EMS with acute metabolic encephalopathy and lethargy. Admitted for likely gabapentin overdose. She was intubated in ER, ct head no acute issue, LP done due to fever even on abx          Acute respiratory failure with hypoxia   Intubated on 9/12     VAP bundle. HOB>30 degrees.    sbt per protocol    cxr :No acute cardiopulmonary abnormality  covid 19 rapid negative     Hypotension after intubation   Add albumin for maintain bp     Fever   On vanc and zosyn   LP done no bacterial meningitis indicated Anemia-Stable so far no bleeding reported and will continue monitoring   Upper PVL negative for dvt        Hypernatremia resolved   Mg replace as protocol   Continue monitoring   icu electrolytes replacement protocol        Acute metabolic encephalopathy   Ct head no acute process       Gabapentin overdose with lethargy and AMS   S/p procedural stomach emptying in ED with charcoal and sorbitol   Continue monitoring the side affect        elevated ammonia level  Improving  Lactulose, continue monitoring      Psych   Psychosis , hx of  Ekbom's delusional parasitosis   Need psych evaluation later   On olanzapine and klonopin     left wrist fracture: immobilized -poa     Case discussed with Dr. Yury Majano      Disposition :tbd,   Review of systems:  Unable to obtain due to intubation   Vital signs/Intake and Output:  Visit Vitals  BP (!) 87/48   Pulse (!) 104   Temp 100.4 °F (38 °C)   Resp 16   Ht 5' 2\" (1.575 m)   Wt 71.2 kg (157 lb)   SpO2 95%   BMI 28.72 kg/m²     Current Shift:  09/17 0701 - 09/17 1900  In: 27 [I.V.:7]  Out: -   Last three shifts:  09/15 1901 - 09/17 0700  In: 1820.6 [I.V.:1345.6]  Out: 5446 [Urine:3175]    Physical Exam:  General: Intubated    HEENT: NC, Atraumatic. ET tube noted   Lungs: CTA Bilaterally. No Wheezing/Rhonchi/Rales. Heart:  Tachy ,  No murmur, No Rubs, No Gallops  Abdomen: Soft, Non distended, Non tender.   +Bowel sounds,   Extremities: No c/c.immobilzer noted on left wrist   Psych:   Calm   Neurologic:  On sedation           Labs: Results:       Chemistry Recent Labs     09/17/21  0520 09/16/21  1700 09/16/21  1150 09/16/21  0354 09/16/21  0354 09/15/21  0555 09/15/21  0555   GLU 96  --   --   --  95  --  103*     --   --   --  147*  --  145   K 3.8 4.9 3.6   < > 4.0   < > 4.4   *  --   --   --  117*  --  116*   CO2 25  --   --   --  23  --  22   BUN 7  --   --   --  7  --  4*   CREA 0.33*  --   --   --  0.38*  --  0.55*   CA 7.8*  --   --   --  7.8*  --  7.8*   AGAP 7  --   --   --  7  --  7   BUCR 21*  --   --   --  18  --  7*   AP 87  --   --   --  72  --  80   TP 4.7*  --   --   --  4.7*  --  5.0*   ALB 1.9*  --   --   --  2.0*  --  2.2*   GLOB 2.8  --   --   --  2.7  --  2.8   AGRAT 0.7*  --   --   --  0.7*  --  0.8    < > = values in this interval not displayed. CBC w/Diff Recent Labs     09/17/21  0520 09/16/21  0354 09/15/21  0555   WBC 6.6 7.8 12.2   RBC 2.83* 3.25* 3.41*   HGB 8.5* 9.7* 10.3*   HCT 26.2* 30.9* 32.6*    187 186   GRANS 66 71 73   LYMPH 21 16* 15*   EOS 3 4 2      Cardiac Enzymes No results for input(s): CPK, CKND1, ZENON in the last 72 hours. No lab exists for component: CKRMB, TROIP   Coagulation Recent Labs     09/15/21  1200   PTP 14.5   INR 1.2       Lipid Panel No results found for: CHOL, CHOLPOCT, CHOLX, CHLST, CHOLV, 971606, HDL, HDLP, LDL, LDLC, DLDLP, 806063, VLDLC, VLDL, TGLX, TRIGL, TRIGP, TGLPOCT, CHHD, CHHDX   BNP No results for input(s): BNPP in the last 72 hours. Liver Enzymes Recent Labs     09/17/21  0520   TP 4.7*   ALB 1.9*   AP 87      Thyroid Studies No results found for: T4, T3U, TSH, TSHEXT, TSHEXT     Procedures/imaging: see electronic medical records for all procedures/Xrays and details which were not copied into this note but were reviewed prior to creation of Plan    XR WRIST LT AP/LAT/OBL MIN 3V    Result Date: 8/19/2021  EXAM: XR WRIST LT AP/LAT/OBL MIN 3V CLINICAL INDICATION/HISTORY: Fall with LEFT wrist pain and swelling -Additional: None COMPARISON: None TECHNIQUE: 3 views of the left wrist _______________ FINDINGS: BONES: Comminuted, impacted fracture of the distal radius with slight dorsal angulation of the distal fracture fragment. No aggressive appearing osseous lytic or blastic lesion. SOFT TISSUES: Unremarkable. _______________     Comminuted, impacted fracture of the distal radius.     XR CHEST PORT    Result Date: 9/13/2021  EXAM: XR CHEST PORT CLINICAL INDICATION/HISTORY: Acute respiratory failure, ET tub position -Additional: None COMPARISON: One day prior TECHNIQUE: Portable frontal view of the chest _______________ FINDINGS: SUPPORT DEVICES: Endotracheal and enteric tubes unchanged. HEART AND MEDIASTINUM: Cardiomediastinal silhouette within normal limits. LUNGS AND PLEURAL SPACES: No dense consolidation, large effusion or pneumothorax. _______________     No acute cardiopulmonary abnormality. XR CHEST PORT    Result Date: 9/11/2021  MEDICAL RECORDS NUMBER: [id number] PROCEDURE:  Single view of the chest DATE: 9/11/2021 9:09 PM HISTORY: 39years old Female. post ett Comparison: 8/4/2021 FINDINGS: The patient has been intubated with appropriate placement of the endotracheal tube. There is no enteric tube passing below the level of the diaphragm. There is no significant effusion. There is no significant pneumothorax. Cardiomediastinal silhouette is within normal limits. There is no evidence of a focal pulmonary infiltrate or mass. 1. There is no significant or acute cardiopulmonary process. The patient has been intubated with appropriate placement of the endotracheal tube. There is no enteric tube passing below the level of the diaphragm. This report has been generated using voice recognition software. XR ABD PORT  1 V    Result Date: 9/12/2021  EXAM: Frontal view of the abdomen CLINICAL INDICATION/HISTORY: NG tube placement COMPARISON: None. _______________ FINDINGS: NG/OG tube in place with the tip in the left upper quadrant in the region of the gastric fundus. No bowel obstruction. Moderate colonic stool burden. _______________     1. NG/OG tube is in good position with the tip in the left upper quadrant in the region of the gastric fundus. 2. Moderate colonic stool burden.       David López MD

## 2021-09-17 NOTE — PROGRESS NOTES
Palliative Medicine    CODE STATUS: FULL CODE                AMD Status: No AMD on file. She is  and her , Axel Johnson, is her legal next of kin. Contacting him can be difficult as he is incarcerated at [facility] phone number for the correctional facility [facility] [phone].     9/17/2021 0841 Seen today in room ICU 4 along with Zoya Mary NP. Seen through the window in accordance with COVID protocol. Intubated/ventilated/sedated. Fentanyl and versed gtts infusing. Sedation vacation attempted today without success. Left wrist external fixator remains. ICU staff has been communicating with her incarcerated . Ammonia elevated (51 on 9/13, apex yesterday at 83, today level is 79). LP done yesterday--micro pending. All COVID tests have been negative. Continues to run temperatures averaging 100 this morning. Disposition plan: to be determined based on response to treatment and family decisions    Palliative care will continue to follow Dory Mccrary  and her family during her hospitalization and support them as they make healthcare decisions and define goals of care.       James Healy, RN, MSN  Palliative Medicine  P: [phone]

## 2021-09-17 NOTE — PROGRESS NOTES
0730-Received pt resting in bed with eyes closed, no sign of distress on sedation and ventilator, will continue to monitor  1000-Rounded on pt with treatment team,  updated on pt status  1140-Pt unable to tolerate sedation vacation, O2sat dropped to high 80s, pt thrashing in bed and reaching for tube, RN attempted to reorient patient but pt needs reinforcement, physician aware, plan to start precedex to assist with wean  1300-Pt had large dark green liquid stool, pericare performed and pad changed, tolerated well  1515-Pt tolerating breathing trial so far, will continue to monitor  1800-Pt status remains stable, with precedex added, no sign of distress  1920-Bedside and Verbal shift change report given to [facility] (oncoming nurse) by Rayna Aggarwal RN (offgoing nurse). Report included the following information SBAR, Kardex, Intake/Output, MAR, Recent Results and Cardiac Rhythm SR/ST.

## 2021-09-17 NOTE — PROGRESS NOTES
RESPIRATORY NOTE:       Pt was comfortable and on sedation but easily arousable, initiated SBT 7/5 for approximately 1 hour then back to full support, tolerated well, will continue to monitor.

## 2021-09-18 ENCOUNTER — ANESTHESIA (OUTPATIENT)
Dept: ICU | Age: 41
DRG: 004 | End: 2021-09-18
Payer: MEDICAID

## 2021-09-18 ENCOUNTER — ANESTHESIA EVENT (OUTPATIENT)
Dept: ICU | Age: 41
DRG: 004 | End: 2021-09-18
Payer: MEDICAID

## 2021-09-18 ENCOUNTER — APPOINTMENT (OUTPATIENT)
Dept: GENERAL RADIOLOGY | Age: 41
DRG: 004 | End: 2021-09-18
Attending: INTERNAL MEDICINE
Payer: MEDICAID

## 2021-09-18 LAB
ALBUMIN SERPL-MCNC: 3 G/DL (ref 3.4–5)
ALBUMIN/GLOB SERPL: 1.1 {RATIO} (ref 0.8–1.7)
ALP SERPL-CCNC: 104 U/L (ref 45–117)
ALT SERPL-CCNC: 40 U/L (ref 13–56)
AMMONIA PLAS-SCNC: 72 UMOL/L (ref 11–32)
ANION GAP SERPL CALC-SCNC: 7 MMOL/L (ref 3–18)
ARTERIAL PATENCY WRIST A: NORMAL
AST SERPL-CCNC: 30 U/L (ref 10–38)
BASE EXCESS BLD CALC-SCNC: 0.4 MMOL/L
BASOPHILS # BLD: 0 K/UL (ref 0–0.1)
BASOPHILS NFR BLD: 0 % (ref 0–2)
BDY SITE: NORMAL
BILIRUB SERPL-MCNC: 0.4 MG/DL (ref 0.2–1)
BODY TEMPERATURE: 99.5
BUN SERPL-MCNC: 10 MG/DL (ref 7–18)
BUN/CREAT SERPL: 27 (ref 12–20)
CALCIUM SERPL-MCNC: 8.7 MG/DL (ref 8.5–10.1)
CHLORIDE SERPL-SCNC: 110 MMOL/L (ref 100–111)
CO2 SERPL-SCNC: 26 MMOL/L (ref 21–32)
CREAT SERPL-MCNC: 0.37 MG/DL (ref 0.6–1.3)
DIFFERENTIAL METHOD BLD: ABNORMAL
EOSINOPHIL # BLD: 0.3 K/UL (ref 0–0.4)
EOSINOPHIL NFR BLD: 5 % (ref 0–5)
ERYTHROCYTE [DISTWIDTH] IN BLOOD BY AUTOMATED COUNT: 13.2 % (ref 11.6–14.5)
GAS FLOW.O2 O2 DELIVERY SYS: NORMAL L/MIN
GAS FLOW.O2 SETTING OXYMISER: 14 BPM
GLOBULIN SER CALC-MCNC: 2.8 G/DL (ref 2–4)
GLUCOSE BLD STRIP.AUTO-MCNC: 108 MG/DL (ref 70–110)
GLUCOSE BLD STRIP.AUTO-MCNC: 147 MG/DL (ref 70–110)
GLUCOSE BLD STRIP.AUTO-MCNC: 163 MG/DL (ref 70–110)
GLUCOSE BLD STRIP.AUTO-MCNC: 99 MG/DL (ref 70–110)
GLUCOSE SERPL-MCNC: 109 MG/DL (ref 74–99)
HBV CORE AB SERPL QL IA: NEGATIVE
HCO3 BLD-SCNC: 25.6 MMOL/L (ref 22–26)
HCT VFR BLD AUTO: 25.2 % (ref 35–45)
HGB BLD-MCNC: 8.1 G/DL (ref 12–16)
LYMPHOCYTES # BLD: 1.7 K/UL (ref 0.9–3.6)
LYMPHOCYTES NFR BLD: 26 % (ref 21–52)
MAGNESIUM SERPL-MCNC: 2.2 MG/DL (ref 1.6–2.6)
MCH RBC QN AUTO: 29.8 PG (ref 24–34)
MCHC RBC AUTO-ENTMCNC: 32.1 G/DL (ref 31–37)
MCV RBC AUTO: 92.6 FL (ref 78–100)
MONOCYTES # BLD: 0.6 K/UL (ref 0.05–1.2)
MONOCYTES NFR BLD: 9 % (ref 3–10)
NEUTS SEG # BLD: 4.1 K/UL (ref 1.8–8)
NEUTS SEG NFR BLD: 60 % (ref 40–73)
O2/TOTAL GAS SETTING VFR VENT: 50 %
PCO2 BLD: 43.8 MMHG (ref 35–45)
PEEP RESPIRATORY: 7 CMH2O
PH BLD: 7.38 [PH] (ref 7.35–7.45)
PHOSPHATE SERPL-MCNC: 3.4 MG/DL (ref 2.5–4.9)
PLATELET # BLD AUTO: 252 K/UL (ref 135–420)
PMV BLD AUTO: 10.4 FL (ref 9.2–11.8)
PO2 BLD: 91 MMHG (ref 80–100)
POTASSIUM SERPL-SCNC: 3.8 MMOL/L (ref 3.5–5.5)
PROT SERPL-MCNC: 5.8 G/DL (ref 6.4–8.2)
RBC # BLD AUTO: 2.72 M/UL (ref 4.2–5.3)
SAO2 % BLD: 96.6 % (ref 92–97)
SERVICE CMNT-IMP: NORMAL
SODIUM SERPL-SCNC: 143 MMOL/L (ref 136–145)
SPECIMEN TYPE: NORMAL
VENTILATION MODE VENT: NORMAL
VT SETTING VENT: 400 ML
WBC # BLD AUTO: 6.7 K/UL (ref 4.6–13.2)

## 2021-09-18 PROCEDURE — 71045 X-RAY EXAM CHEST 1 VIEW: CPT

## 2021-09-18 PROCEDURE — 74011250636 HC RX REV CODE- 250/636: Performed by: INTERNAL MEDICINE

## 2021-09-18 PROCEDURE — 94640 AIRWAY INHALATION TREATMENT: CPT

## 2021-09-18 PROCEDURE — 74011250636 HC RX REV CODE- 250/636: Performed by: HOSPITALIST

## 2021-09-18 PROCEDURE — 65610000006 HC RM INTENSIVE CARE

## 2021-09-18 PROCEDURE — 74011000250 HC RX REV CODE- 250: Performed by: FAMILY MEDICINE

## 2021-09-18 PROCEDURE — 74011250636 HC RX REV CODE- 250/636: Performed by: FAMILY MEDICINE

## 2021-09-18 PROCEDURE — 74011000250 HC RX REV CODE- 250

## 2021-09-18 PROCEDURE — P9047 ALBUMIN (HUMAN), 25%, 50ML: HCPCS | Performed by: HOSPITALIST

## 2021-09-18 PROCEDURE — 31500 INSERT EMERGENCY AIRWAY: CPT

## 2021-09-18 PROCEDURE — 74011250637 HC RX REV CODE- 250/637: Performed by: INTERNAL MEDICINE

## 2021-09-18 PROCEDURE — 36415 COLL VENOUS BLD VENIPUNCTURE: CPT

## 2021-09-18 PROCEDURE — 36600 WITHDRAWAL OF ARTERIAL BLOOD: CPT

## 2021-09-18 PROCEDURE — 74011636637 HC RX REV CODE- 636/637: Performed by: INTERNAL MEDICINE

## 2021-09-18 PROCEDURE — 80053 COMPREHEN METABOLIC PANEL: CPT

## 2021-09-18 PROCEDURE — 77030020291 HC FLEXSEAL FMS BMS -C

## 2021-09-18 PROCEDURE — 85025 COMPLETE CBC W/AUTO DIFF WBC: CPT

## 2021-09-18 PROCEDURE — 83735 ASSAY OF MAGNESIUM: CPT

## 2021-09-18 PROCEDURE — 94003 VENT MGMT INPAT SUBQ DAY: CPT

## 2021-09-18 PROCEDURE — 82962 GLUCOSE BLOOD TEST: CPT

## 2021-09-18 PROCEDURE — 0BH17EZ INSERTION OF ENDOTRACHEAL AIRWAY INTO TRACHEA, VIA NATURAL OR ARTIFICIAL OPENING: ICD-10-PCS | Performed by: FAMILY MEDICINE

## 2021-09-18 PROCEDURE — 74011000250 HC RX REV CODE- 250: Performed by: INTERNAL MEDICINE

## 2021-09-18 PROCEDURE — 82803 BLOOD GASES ANY COMBINATION: CPT

## 2021-09-18 PROCEDURE — 84100 ASSAY OF PHOSPHORUS: CPT

## 2021-09-18 PROCEDURE — 74011250636 HC RX REV CODE- 250/636

## 2021-09-18 PROCEDURE — 82140 ASSAY OF AMMONIA: CPT

## 2021-09-18 PROCEDURE — 74011250637 HC RX REV CODE- 250/637: Performed by: HOSPITALIST

## 2021-09-18 PROCEDURE — 74011000258 HC RX REV CODE- 258: Performed by: INTERNAL MEDICINE

## 2021-09-18 RX ORDER — FENTANYL CITRATE 50 UG/ML
50 INJECTION, SOLUTION INTRAMUSCULAR; INTRAVENOUS
Status: DISCONTINUED | OUTPATIENT
Start: 2021-09-18 | End: 2021-09-28

## 2021-09-18 RX ORDER — FENTANYL CITRATE 50 UG/ML
100 INJECTION, SOLUTION INTRAMUSCULAR; INTRAVENOUS ONCE
Status: COMPLETED | OUTPATIENT
Start: 2021-09-19 | End: 2021-09-18

## 2021-09-18 RX ADMIN — ALBUMIN (HUMAN) 25 G: 0.25 INJECTION, SOLUTION INTRAVENOUS at 11:24

## 2021-09-18 RX ADMIN — DEXMEDETOMIDINE HYDROCHLORIDE 1 MCG/KG/HR: 100 INJECTION, SOLUTION INTRAVENOUS at 14:41

## 2021-09-18 RX ADMIN — HALOPERIDOL LACTATE 4 MG: 5 INJECTION, SOLUTION INTRAMUSCULAR at 05:24

## 2021-09-18 RX ADMIN — LORAZEPAM 2 MG: 2 INJECTION INTRAMUSCULAR at 13:34

## 2021-09-18 RX ADMIN — INSULIN LISPRO 2 UNITS: 100 INJECTION, SOLUTION INTRAVENOUS; SUBCUTANEOUS at 18:13

## 2021-09-18 RX ADMIN — FENTANYL CITRATE 100 MCG: 50 INJECTION, SOLUTION INTRAMUSCULAR; INTRAVENOUS at 23:19

## 2021-09-18 RX ADMIN — METHYLPREDNISOLONE SODIUM SUCCINATE 40 MG: 40 INJECTION, POWDER, FOR SOLUTION INTRAMUSCULAR; INTRAVENOUS at 23:14

## 2021-09-18 RX ADMIN — LORAZEPAM 2 MG: 2 INJECTION INTRAMUSCULAR at 22:55

## 2021-09-18 RX ADMIN — ENOXAPARIN SODIUM 40 MG: 100 INJECTION SUBCUTANEOUS at 17:14

## 2021-09-18 RX ADMIN — IPRATROPIUM BROMIDE AND ALBUTEROL SULFATE 3 ML: .5; 3 SOLUTION RESPIRATORY (INHALATION) at 13:07

## 2021-09-18 RX ADMIN — FAMOTIDINE 20 MG: 10 INJECTION, SOLUTION INTRAVENOUS at 05:47

## 2021-09-18 RX ADMIN — BUDESONIDE 500 MCG: 0.5 INHALANT RESPIRATORY (INHALATION) at 08:18

## 2021-09-18 RX ADMIN — IPRATROPIUM BROMIDE AND ALBUTEROL SULFATE 3 ML: .5; 3 SOLUTION RESPIRATORY (INHALATION) at 08:18

## 2021-09-18 RX ADMIN — 0.12% CHLORHEXIDINE GLUCONATE 10 ML: 1.2 RINSE ORAL at 08:33

## 2021-09-18 RX ADMIN — PIPERACILLIN AND TAZOBACTAM 3.38 G: 3; .375 INJECTION, POWDER, LYOPHILIZED, FOR SOLUTION INTRAVENOUS at 21:02

## 2021-09-18 RX ADMIN — CLONAZEPAM 0.5 MG: 0.5 TABLET ORAL at 08:31

## 2021-09-18 RX ADMIN — ARFORMOTEROL TARTRATE 15 MCG: 15 SOLUTION RESPIRATORY (INHALATION) at 08:18

## 2021-09-18 RX ADMIN — CLONAZEPAM 0.5 MG: 0.5 TABLET ORAL at 20:50

## 2021-09-18 RX ADMIN — FENTANYL CITRATE 125 MCG/HR: 50 INJECTION, SOLUTION INTRAMUSCULAR; INTRAVENOUS at 02:57

## 2021-09-18 RX ADMIN — PIPERACILLIN AND TAZOBACTAM 3.38 G: 3; .375 INJECTION, POWDER, LYOPHILIZED, FOR SOLUTION INTRAVENOUS at 05:47

## 2021-09-18 RX ADMIN — METHYLPREDNISOLONE SODIUM SUCCINATE 60 MG: 125 INJECTION, POWDER, FOR SOLUTION INTRAMUSCULAR; INTRAVENOUS at 12:28

## 2021-09-18 RX ADMIN — METHYLPREDNISOLONE SODIUM SUCCINATE 40 MG: 40 INJECTION, POWDER, FOR SOLUTION INTRAMUSCULAR; INTRAVENOUS at 20:50

## 2021-09-18 RX ADMIN — SODIUM CHLORIDE 10 ML: 9 INJECTION, SOLUTION INTRAMUSCULAR; INTRAVENOUS; SUBCUTANEOUS at 13:25

## 2021-09-18 RX ADMIN — PIPERACILLIN AND TAZOBACTAM 3.38 G: 3; .375 INJECTION, POWDER, LYOPHILIZED, FOR SOLUTION INTRAVENOUS at 13:34

## 2021-09-18 RX ADMIN — ALBUMIN (HUMAN) 25 G: 0.25 INJECTION, SOLUTION INTRAVENOUS at 17:13

## 2021-09-18 RX ADMIN — DEXMEDETOMIDINE HYDROCHLORIDE 1.1 MCG/KG/HR: 100 INJECTION, SOLUTION INTRAVENOUS at 21:58

## 2021-09-18 RX ADMIN — BUDESONIDE 500 MCG: 0.5 INHALANT RESPIRATORY (INHALATION) at 19:14

## 2021-09-18 RX ADMIN — 0.12% CHLORHEXIDINE GLUCONATE 10 ML: 1.2 RINSE ORAL at 20:50

## 2021-09-18 RX ADMIN — METHYLPREDNISOLONE SODIUM SUCCINATE 60 MG: 40 INJECTION, POWDER, FOR SOLUTION INTRAMUSCULAR; INTRAVENOUS at 12:28

## 2021-09-18 RX ADMIN — IPRATROPIUM BROMIDE AND ALBUTEROL SULFATE 3 ML: .5; 3 SOLUTION RESPIRATORY (INHALATION) at 19:14

## 2021-09-18 RX ADMIN — ALBUMIN (HUMAN) 25 G: 0.25 INJECTION, SOLUTION INTRAVENOUS at 05:47

## 2021-09-18 RX ADMIN — OLANZAPINE 10 MG: 10 TABLET, FILM COATED ORAL at 17:38

## 2021-09-18 RX ADMIN — LACTULOSE 45 ML: 20 SOLUTION ORAL at 20:50

## 2021-09-18 RX ADMIN — FAMOTIDINE 20 MG: 10 INJECTION, SOLUTION INTRAVENOUS at 17:14

## 2021-09-18 RX ADMIN — FUROSEMIDE 20 MG: 10 INJECTION, SOLUTION INTRAMUSCULAR; INTRAVENOUS at 08:31

## 2021-09-18 RX ADMIN — DEXMEDETOMIDINE HYDROCHLORIDE 0.4 MCG/KG/HR: 100 INJECTION, SOLUTION INTRAVENOUS at 01:58

## 2021-09-18 RX ADMIN — HYDROMORPHONE HYDROCHLORIDE 1 MG: 1 INJECTION, SOLUTION INTRAMUSCULAR; INTRAVENOUS; SUBCUTANEOUS at 05:24

## 2021-09-18 RX ADMIN — HYDROMORPHONE HYDROCHLORIDE 1 MG: 1 INJECTION, SOLUTION INTRAMUSCULAR; INTRAVENOUS; SUBCUTANEOUS at 17:38

## 2021-09-18 RX ADMIN — ARFORMOTEROL TARTRATE 15 MCG: 15 SOLUTION RESPIRATORY (INHALATION) at 19:14

## 2021-09-18 RX ADMIN — DEXMEDETOMIDINE HYDROCHLORIDE 1 MCG/KG/HR: 100 INJECTION, SOLUTION INTRAVENOUS at 20:49

## 2021-09-18 RX ADMIN — LACTULOSE 45 ML: 20 SOLUTION ORAL at 08:31

## 2021-09-18 RX ADMIN — RACEPINEPHRINE HYDROCHLORIDE 0.5 ML: 11.25 SOLUTION RESPIRATORY (INHALATION) at 12:17

## 2021-09-18 RX ADMIN — HYDROMORPHONE HYDROCHLORIDE 1 MG: 1 INJECTION, SOLUTION INTRAMUSCULAR; INTRAVENOUS; SUBCUTANEOUS at 21:20

## 2021-09-18 NOTE — PROGRESS NOTES
Hospitalist Progress Note    Patient: Natalio Cary MRN: [id number]  CSN: [id number]    YOB: 1980  Age: 39 y.o. Sex: female    DOA: 9/11/2021 LOS:  LOS: 6 days                Assessment and Plan:    Principal Problem:    Drug overdose, intentional self-harm, initial encounter ([facility] (9/12/2021)    Active Problems:    Left wrist fracture, with delayed healing, subsequent encounter (9/12/2021)      Hypoxia (9/12/2021)      Hypotension after procedure (9/12/2021)      Acute metabolic encephalopathy (0/76/3468)      Psychosis ([facility] ()      Hyponatremia ()      Acute respiratory failure with hypoxia (HCC) ()      Increased ammonia level (9/14/2021)        Acute respiratory failure with hypoxia: she failed extubation trial at night    Hypotension: Seems improved we will stop the albumin     Fever :no clear source. Infectious disease has been consulted on empiric antibiotics currently      Chief complaint:  Myriam Hernandes a 39 y.o. female who is standing history of multidrug use/abuse, previous drug-seeking behavior and mental health issues otherwise unspecified arrives via EMS with acute metabolic encephalopathy and lethargy. Admitted for likely gabapentin overdose. She was intubated in ER, ct head no acute issue, LP done due to fever even on abx       Subjective:    Sedated and intubated      Review of systems:    General: No fevers or chills. Cardiovascular: No chest pain or pressure. No palpitations. Pulmonary: No shortness of breath. Gastrointestinal: No nausea, vomiting. Objective:    Vital signs/Intake and Output:  Visit Vitals  /82   Pulse 83   Temp 99.5 °F (37.5 °C)   Resp 14   Ht 5' 2\" (1.575 m)   Wt 71.2 kg (157 lb)   SpO2 98%   BMI 28.72 kg/m²     Current Shift:  09/18 0701 - 09/18 1900  In: 480.5 [I.V.:400.5]  Out: 1500 [Urine:1500]  Last three shifts:  09/16 1901 - 09/18 0700  In: 2174.6 [I.V.:1149.6]  Out: 5314 [Urine:3150]    Physical Exam:  General: NAD, AAOx3. Non-toxic. HEENT: NC/AT. PERRLA, EOMI.  MMM. Lungs: Nml inspection. CTA B/L. No wheezing, rales or rhonchi. Heart:  S1S2 RRR,  PMI mid 5th IC space. No M/RG. Abdomen: Soft, NT/ND.  BS+. No peritoneal signs. Extremities: No C/C/E. Psych:   Nml affect. Neurologic:  2-12 intact. Strength 5/5 throughout. Sensation symmetrical.          Labs: Results:       Chemistry Recent Labs     09/18/21  0525 09/17/21  0520 09/16/21  1700 09/16/21  1150 09/16/21  0354   * 96  --   --  95    145  --   --  147*   K 3.8 3.8 4.9   < > 4.0    113*  --   --  117*   CO2 26 25  --   --  23   BUN 10 7  --   --  7   CREA 0.37* 0.33*  --   --  0.38*   CA 8.7 7.8*  --   --  7.8*   AGAP 7 7  --   --  7   BUCR 27* 21*  --   --  18    87  --   --  72   TP 5.8* 4.7*  --   --  4.7*   ALB 3.0* 1.9*  --   --  2.0*   GLOB 2.8 2.8  --   --  2.7   AGRAT 1.1 0.7*  --   --  0.7*    < > = values in this interval not displayed. CBC w/Diff Recent Labs     09/18/21 0525 09/17/21  0520 09/16/21  0354   WBC 6.7 6.6 7.8   RBC 2.72* 2.83* 3.25*   HGB 8.1* 8.5* 9.7*   HCT 25.2* 26.2* 30.9*    212 187   GRANS 60 66 71   LYMPH 26 21 16*   EOS 5 3 4      Cardiac Enzymes No results for input(s): CPK, CKND1, ZENON in the last 72 hours. No lab exists for component: CKRMB, TROIP   Coagulation No results for input(s): PTP, INR, APTT, INREXT in the last 72 hours. Lipid Panel No results found for: CHOL, CHOLPOCT, CHOLX, CHLST, CHOLV, 745836, HDL, HDLP, LDL, LDLC, DLDLP, 682376, VLDLC, VLDL, TGLX, TRIGL, TRIGP, TGLPOCT, CHHD, CHHDX   BNP No results for input(s): BNPP in the last 72 hours.    Liver Enzymes Recent Labs     09/18/21  0525   TP 5.8*   ALB 3.0*         Thyroid Studies No results found for: T4, T3U, TSH, TSHEXT     Procedures/imaging: see electronic medical records for all procedures/Xrays and details which were not copied into this note but were reviewed prior to creation of Plan

## 2021-09-18 NOTE — PROGRESS NOTES
09/18/21 1001 09/18/21 1056   Weaning Parameters   Spontaneous Breathing Trial Complete  --  Yes   Resp Rate Observed 9 9   Ve 7.6 7    704   RSBI 14 14   Gar Agitation Sedation Scale (RASS) -1 -1   Patient tested positive for cuff leak.

## 2021-09-18 NOTE — PROGRESS NOTES
Pulm/CC    Patient did well during SBT; had cuff leak present. Patient was extubated; she remains confused and agitated. She has harsh breath sounds from the neck; she had mild hemoptysis immediately after extubation. Plan for nebulized racemic epinephrine. Solu-Medrol 60 mg IV 1 dose. Monitor closely; if patient not improving, will need to consider reintubation. I have called and updated patient's sister Taj Jaimes.     Aneta Brennan MD

## 2021-09-18 NOTE — ANESTHESIA PROCEDURE NOTES
Emergent Intubation  Performed by: Demetrius Lesch, CRNA  Authorized by: Demetrius Lesch, CRNA     Emergent Intubation:   Location:  ICU  Date/Time:  9/18/2021 12:48 PM  Indications:  Impending respiratory failure  Spontaneous Ventilation: present    Level of Consciousness: unresponsive  Preoxygenated: Yes      Airway Documentation:   Airway:  ETT - Cuffed  Technique:  Direct laryngoscopy  Advanced Technique:  Edmondson  Insertion Site:  Oral  Blade Type:  Shravan  Blade Size:  3  ETT size (mm):  7.0  ETT Line Nacho:  Lips  ETT Insertion depth (cm):  22  Attempts:  2  Difficult airway: Yes    100 mg propofol, 100mg succinylcholine administered IV prior to intubation. First attempt unsuccessful. Patient's airway VERY edematous with secretions present. Second attempt with poor to no view ; O2 saturations dropped to 0; pulse weak; 200mcg phenylephrine IV administered. Good color change; breath sounds coarse but equal bilaterally. Manual ventilation by respiratory therapist and O2 saturation increased to 90's, strong pulse present. ICU MD updated on intubation.

## 2021-09-18 NOTE — PROGRESS NOTES
[facility]  Pulmonary, Critical Care, and Sleep Medicine    Name: Sudhakar Rao MRN: [id number]   : 1980 Hospital: [facility] [place]   Date: 2021        Pulmonary Critical Care Note    IMPRESSION:   · Acute respiratory failure · J96.00         Patient Active Problem List   Diagnosis Code    Dextromethorphan use disorder, moderate ([facility] F19.20    Ekbom's delusional parasitosis ([facility] [address]    Left wrist fracture, with delayed healing, subsequent encounter S62.102G    Drug overdose, intentional self-harm, initial encounter ([facility] T50.902A    Hypoxia R09.02    Hypotension after procedure I95.81    Acute metabolic encephalopathy G80.28    Psychosis ([facility] F29    Hyponatremia E87.1    Acute respiratory failure with hypoxia (HCC) J96.01    Increased ammonia level R79.89 ·   Code status: full code     RECOMMENDATIONS:   Respiratory: Patient on ventilator support; seems to be improving from respiratory standpoint. ABG reviewedstable; PaO2/FiO2 ratio 300  Chest x-ray reviewedstable ET tube and OG tube position; mild bibasal atelectasis/effusions; otherwise lungs clear with no focal infiltrates or consolidations  Ventilator and sedation bundles reviewed  Sedationmidazolam and fentanyl stopped; continue Precedex drip considering history of substance abuse  Patient doing well on spontaneous breathing trial; check cuff leak; plan for trial of extubation  today morning  Continue bronchodilators via nebulization  ID: Patient on Zosyn antibiotic. S/p LP with negative CSF cultures. Covid test negative this admission  CVS: Stable hemodynamics;  Lasix 20 mg daily for 3 days  Renal: Stable renal function and urine output; continue to monitor; replace electrolytes as needed  Heme: Mild anemia; no active bleeding issues; continue to monitor  Endo: Monitor blood sugarsstable; no active hypoglycemic episodes  GI: diet -tube feeding; currently on hold for spontaneous breathing trials  Neurology: Wean sedation; continue Precedex drip for now  Inocente Montes today; continue lactulose twice daily  CT headnil acute  Toxicology: gabapentin OD  Skin: ICU nursing care  MS: Left wrist fracture in immobilization external fixator  Prophylaxis: Enoxaparin and famotidine  Restraints: Wrist soft restraints for patient interfering with medical therapy/management and patient safety. Prognosis guarded overall. CODE STATUSfull code. Palliative care on case. Management plans discussed with ICU RN and RT in detail. Quality Care: PPI, DVT prophylaxis, HOB elevated, Infection control all reviewed and addressed. Lines/Tubes: PIV   ETT: 9/11/21  OGT/NGT: 9/11/21  Other drains: 9/11/21  ADVANCE DIRECTIVE: Full code. Patient's  in correction; he had called this morning and discussed with ICU RN. High complexity decision making was performed during the evaluation of this patient at high risk for decompensation with multiple organ involvement. Critical care time excludes discussion or procedures37 minutes. Devon Mancera sister-in-law Other Non-relative   [phone]     I have called and updated patient's sister-in-law; discussed plans for breathing trials, and possibly trial of extubation today if patient remains stable. Subjective/History:   Ms. Shabbir Bailey has been seen and evaluated as Dr. Linda Burrows requested now for assisting with Acute respiratory failure and ventilator management. Patient is a 39 y.o. female with following PMhx presented to ER with lethargy via EMS and admitted for Gabapentin overdose. Pt required intubation for airways protection. Position control was contacted that recommended supportive care per ER provider. Pt seen at bedside in ER rm#2. The patient can not provide additional history due to Ventilated. 9/18/2021  Patient seen in [address] [place] [address] 4. Patient remains intubated and mildly sedated.   Patient arousable; appears generally weak.  No acute issues overnight. No significant respiratory secretions reported. Patient has a history of drug use and smoker and alcohol use. Review of Systems:  Review of systems not obtained due to patient factors. Past Medical History:  Past Medical History:   Diagnosis Date    Asthma     Bilateral ovarian cysts     Cocaine abuse ([facility]     Mental and behavioral problem     Pancreatitis     Pancreatitis     Psychosis ([facility]         Past Surgical History:  Past Surgical History:   Procedure Laterality Date    HX GYN      D&C, Hysterectomy        Medications:  Prior to Admission medications    Medication Sig Start Date End Date Taking? Authorizing Provider   albuterol (PROVENTIL HFA, VENTOLIN HFA, PROAIR HFA) 90 mcg/actuation inhaler Take 2 Puffs by inhalation every four (4) hours as needed for Wheezing or Shortness of Breath. 8/4/21   Pauline Burkett PA-C   ibuprofen (MOTRIN) 600 mg tablet Take 1 Tablet by mouth every six (6) hours as needed for Pain. Take with food. 8/4/21   Pauline Burkett PA-C   ALPRAZolam (XANAX) 0.5 mg tablet TAKE 1 TABLET BY MOUTH ONCE DAILY AS NEEDED FOR ANXIETY 12/4/19   Provider, Historical   metFORMIN (GLUCOPHAGE) 500 mg tablet TAKE 1 TABLET BY MOUTH ONCE DAILY 11/12/19   Provider, Historical   nicotine (NICODERM CQ) 21 mg/24 hr APPLY 1 PATCH TOPICALLY TO THE SKIN DAILY FOR CRAVINGS AS DIRECTED 12/4/19   Provider, Historical   traZODone (DESYREL) 50 mg tablet TAKE 1 TABLET BY MOUTH AT BEDTIME AS NEEDED FOR INSOMNIA 12/4/19   Provider, Historical   escitalopram oxalate (LEXAPRO) 10 mg tablet Take 10 mg by mouth daily. Luisa Burch MD   lurasidone (LATUDA) 80 mg tab tablet Take 80 mg by mouth daily (with dinner).     Luisa Burch MD       Current Facility-Administered Medications   Medication Dose Route Frequency    furosemide (LASIX) injection 20 mg  20 mg IntraVENous DAILY    clonazePAM (KlonoPIN) tablet 0.5 mg  0.5 mg Oral BID    dexmedeTOMidine (PRECEDEX) 400 mcg in 0.9% sodium chloride 100 mL infusion  0.1-1.5 mcg/kg/hr IntraVENous TITRATE    albumin human 25% (BUMINATE) solution 25 g  25 g IntraVENous Q6H    nicotine (NICODERM CQ) 14 mg/24 hr patch 1 Patch  1 Patch TransDERmal DAILY    lactulose (CHRONULAC) 10 gram/15 mL solution 45 mL  45 mL Oral TID    fentaNYL (PF) 900 mcg/30 ml infusion soln  0-200 mcg/hr IntraVENous TITRATE    clotrimazole (LOTRIMIN) 1 % cream   Topical BID    midazolam in normal saline (VERSED) 1 mg/mL infusion  0-10 mg/hr IntraVENous TITRATE    insulin lispro (HUMALOG) injection   SubCUTAneous Q6H    arformoteroL (BROVANA) neb solution 15 mcg  15 mcg Nebulization BID RT    budesonide (PULMICORT) 500 mcg/2 ml nebulizer suspension  500 mcg Nebulization BID RT    piperacillin-tazobactam (ZOSYN) 3.375 g in 0.9% sodium chloride (MBP/ADV) 100 mL MBP  3.375 g IntraVENous Q8H    OLANZapine (ZyPREXA) tablet 10 mg  10 mg Oral QPM    albuterol-ipratropium (DUO-NEB) 2.5 MG-0.5 MG/3 ML  3 mL Nebulization TID RT    enoxaparin (LOVENOX) injection 40 mg  40 mg SubCUTAneous Q24H    sodium chloride (NS) flush 5-40 mL  5-40 mL IntraVENous Q8H    famotidine (PF) (PEPCID) 20 mg in 0.9% sodium chloride 10 mL injection  20 mg IntraVENous BID    chlorhexidine (PERIDEX) 0.12 % mouthwash 10 mL  10 mL Oral Q12H       Allergy:  Allergies   Allergen Reactions    Fish Containing Products Itching    Toradol [Ketorolac] Rash     Pt reports she is not allergic to this medication. Social History:  Social History     Tobacco Use    Smoking status: Current Every Day Smoker     Packs/day: 1.00    Smokeless tobacco: Never Used   Substance Use Topics    Alcohol use: Not Currently    Drug use: Not Currently     Types: Cocaine, Marijuana     Comment: used with in past 24 hours        Family History:  No family history on file. Objective:   Vital Signs:    Blood pressure (!) 101/59, pulse 95, temperature 98.6 °F (37 °C), resp.  rate 15, height 5' 2\" (1.575 m), weight 71.2 kg (157 lb), last menstrual period 05/15/2018, SpO2 95 %. Body mass index is 28.72 kg/m². O2 Device: Endotracheal tube, Ventilator       Temp (24hrs), Av.5 °F (37.5 °C), Min:98.2 °F (36.8 °C), Max:100.9 °F (38.3 °C)         Intake/Output:   Last shift:       07 - 1900  In: 333.8 [I.V.:253.8]  Out: -   Last 3 shifts: 1901 -  0700  In: 2174.6 [I.V.:1149.6]  Out: 3150 [Urine:3150]    Intake/Output Summary (Last 24 hours) at 2021 1031  Last data filed at 2021 0900  Gross per 24 hour   Intake 1145.47 ml   Output 1700 ml   Net -554.53 ml       Ventilator Settings:  Mode Rate Tidal Volume Pressure FiO2 PEEP   Assist control, VC+   350 ml  7 cm H2O 25 % 5 cm H20     Peak airway pressure: 14 cm H2O    Minute ventilation: 7 l/min      Lung protective strategy, Pl pressure goals less than or equal to 30.     Physical Exam:   Patient appears older than stated age; appears generally weak; appears comfortable; acyanotic   HEENT: pupils not dilated, reactive, no scleral jaundice, moist oral mucosa, no nasal drainage; neck supple  Neck: No adenopathy or thyroid swelling  Orotracheal and orogastric tubeno bleeding or secretions  CVS: S1S2 no murmurs; JVD not elevated; telemetrysinus rhythm  RS: Mod air entry bilaterally, decreased BS at bases, no wheezes or crackles; not tachypneic or in distress  Abd: soft, non tender, no hepatosplenomegaly, no abd distension, no guarding or rigidity, bowel sounds heard  Neuro: Mildly sedated; arousable; not purposeful; moving extremities  Extrm: Mild bilateral pitting feet edema; no focal swelling or clubbing  Skin: no rash  Lymphatic: no cervical or supraclavicular adenopathy  Vasc: DPs palpable bilateral; no distal ischemia findings      Data:     Recent Results (from the past 12 hour(s))   GLUCOSE, POC    Collection Time: 21 11:10 PM   Result Value Ref Range    Glucose (POC) 113 (H) 70 - 110 mg/dL   GLUCOSE, POC Collection Time: 09/18/21  5:10 AM   Result Value Ref Range    Glucose (POC) 108 70 - 110 mg/dL   CBC WITH AUTOMATED DIFF    Collection Time: 09/18/21  5:25 AM   Result Value Ref Range    WBC 6.7 4.6 - 13.2 K/uL    RBC 2.72 (L) 4.20 - 5.30 M/uL    HGB 8.1 (L) 12.0 - 16.0 g/dL    HCT 25.2 (L) 35.0 - 45.0 %    MCV 92.6 78.0 - 100.0 FL    MCH 29.8 24.0 - 34.0 PG    MCHC 32.1 31.0 - 37.0 g/dL    RDW 13.2 11.6 - 14.5 %    PLATELET 418 863 - 688 K/uL    MPV 10.4 9.2 - 11.8 FL    NEUTROPHILS 60 40 - 73 %    LYMPHOCYTES 26 21 - 52 %    MONOCYTES 9 3 - 10 %    EOSINOPHILS 5 0 - 5 %    BASOPHILS 0 0 - 2 %    ABS. NEUTROPHILS 4.1 1.8 - 8.0 K/UL    ABS. LYMPHOCYTES 1.7 0.9 - 3.6 K/UL    ABS. MONOCYTES 0.6 0.05 - 1.2 K/UL    ABS. EOSINOPHILS 0.3 0.0 - 0.4 K/UL    ABS. BASOPHILS 0.0 0.0 - 0.1 K/UL    DF AUTOMATED     MAGNESIUM    Collection Time: 09/18/21  5:25 AM   Result Value Ref Range    Magnesium 2.2 1.6 - 2.6 mg/dL   METABOLIC PANEL, COMPREHENSIVE    Collection Time: 09/18/21  5:25 AM   Result Value Ref Range    Sodium 143 136 - 145 mmol/L    Potassium 3.8 3.5 - 5.5 mmol/L    Chloride 110 100 - 111 mmol/L    CO2 26 21 - 32 mmol/L    Anion gap 7 3.0 - 18 mmol/L    Glucose 109 (H) 74 - 99 mg/dL    BUN 10 7.0 - 18 MG/DL    Creatinine 0.37 (L) 0.6 - 1.3 MG/DL    BUN/Creatinine ratio 27 (H) 12 - 20      GFR est AA >60 >60 ml/min/1.73m2    GFR est non-AA >60 >60 ml/min/1.73m2    Calcium 8.7 8.5 - 10.1 MG/DL    Bilirubin, total 0.4 0.2 - 1.0 MG/DL    ALT (SGPT) 40 13 - 56 U/L    AST (SGOT) 30 10 - 38 U/L    Alk.  phosphatase 104 45 - 117 U/L    Protein, total 5.8 (L) 6.4 - 8.2 g/dL    Albumin 3.0 (L) 3.4 - 5.0 g/dL    Globulin 2.8 2.0 - 4.0 g/dL    A-G Ratio 1.1 0.8 - 1.7     PHOSPHORUS    Collection Time: 09/18/21  5:25 AM   Result Value Ref Range    Phosphorus 3.4 2.5 - 4.9 MG/DL   AMMONIA    Collection Time: 09/18/21  5:25 AM   Result Value Ref Range    Ammonia 72 (H) 11 - 32 UMOL/L             Recent Labs     09/16/21  0458 FIO2I 30   HCO3I 22.3   PCO2I 33.4*   PHI 7.43   PO2I 90       All Micro Results     Procedure Component Value Units Date/Time    CULTURE, BLOOD [[id number]] Collected: 09/14/21 0515    Order Status: Completed Specimen: Blood Updated: 09/18/21 0711     Special Requests: NO SPECIAL REQUESTS        Culture result: NO GROWTH 4 DAYS       CULTURE, BLOOD [[id number]] Collected: 09/14/21 0500    Order Status: Completed Specimen: Blood Updated: 09/18/21 0711     Special Requests: NO SPECIAL REQUESTS        Culture result: NO GROWTH 4 DAYS       CULTURE, CSF  TUBE 2 [[id number]] Collected: 09/16/21 1434    Order Status: Completed Specimen: Cerebrospinal Fluid Updated: 09/17/21 1615     Special Requests: TUBE 3, CULTURE AND SENSITIVTY AND GRAM STAIN. GRAM STAIN RARE WBCS SEEN         NO ORGANISMS SEEN        Culture result:       NO GROWTH THUS FAR HOLDING 7 DAYS          MENINGITIS PATHOGENS PANEL, CSF (BY PCR) [[id number]] Collected: 09/16/21 1434    Order Status: Completed Specimen: Cerebrospinal Fluid Updated: 09/17/21 0050     Escherichia coli K1 Not detected        Haemophilus Influenzae Not detected        Listeria Monocytogenes Not detected        Neisseria Meningitidis Not detected        Streptococcus Agalactiae Not detected        Streptococcus Pneumoniae Not detected        Cytomegalovirus Not detected        Enterovirus Not detected        Herpes Simplex Virus 1 Not detected        Comment: In patients who have negative herpes simplex 1 and 2 PCR results, do not modify treatment, confirm with alternate testing. Herpes Simplex Virus 2 Not detected        Comment: In patients who have negative herpes simplex 1 and 2 PCR results, do not modify treatment, confirm with alternate testing. Human Herpesvirus 6 Not detected        Human Parechovirus Not detected        Varicella Zoster Virus Not detected        Crypto.  neoformans/gattii Not detected       MENINGITIS PATHOGENS PANEL, CSF (BY PCR) [[id number]]     Order Status: Sent Specimen: Cerebrospinal Fluid     HSV 1 AND 2 BY PCR [[id number]] Collected: 09/16/21 1434    Order Status: Completed Specimen: Other Updated: 09/16/21 1502    COVID-19 RAPID TEST [[id number]] Collected: 09/16/21 1000    Order Status: Completed Specimen: Nasopharyngeal Updated: 09/16/21 1032     Specimen source Nasopharyngeal        COVID-19 rapid test Not detected        Comment: Rapid Abbott ID Now       Rapid NAAT:  The specimen is NEGATIVE for SARS-CoV-2, the novel coronavirus associated with COVID-19. Negative results should be treated as presumptive and, if inconsistent with clinical signs and symptoms or necessary for patient management, should be tested with an alternative molecular assay. Negative results do not preclude SARS-CoV-2 infection and should not be used as the sole basis for patient management decisions. This test has been authorized by the FDA under an Emergency Use Authorization (EUA) for use by authorized laboratories.    Fact sheet for Healthcare Providers: ConventionUpdate.co.nz  Fact sheet for Patients: ConventionUpdate.co.nz       Methodology: Isothermal Nucleic Acid Amplification         LEGIONELLA PNEUMOPHILA AG, URINE [[id number]] Collected: 09/14/21 2315    Order Status: Completed Specimen: Urine, random Updated: 09/15/21 1042     Legionella Ag, urine Negative       STREP PNEUMO AG, URINE [[id number]] Collected: 09/14/21 2315    Order Status: Completed Specimen: Urine, random Updated: 09/15/21 1042     Strep pneumo Ag, urine Negative       RESPIRATORY VIRUS PANEL W/COVID-19, PCR [[id number]] Collected: 09/14/21 1832    Order Status: Completed Specimen: Nasopharyngeal Updated: 09/14/21 2234     Adenovirus Not detected        Coronavirus 229E Not detected        Coronavirus HKU1 Not detected        Coronavirus CVNL63 Not detected        Coronavirus OC43 Not detected        SARS-CoV-2, PCR Not detected Eugune Salvia Not detected        Rhinovirus and Enterovirus Not detected        Influenza A Not detected        Influenza A, subtype H1 Not detected        Influenza A, subtype H3 Not detected        INFLUENZA A H1N1 PCR Not detected        Influenza B Not detected        Parainfluenza 1 Not detected        Parainfluenza 2 Not detected        Parainfluenza 3 Not detected        Parainfluenza virus 4 Not detected        RSV by PCR Not detected        B. parapertussis, PCR Not detected        Bordetella pertussis - PCR Not detected        Chlamydophila pneumoniae DNA, QL, PCR Not detected        Mycoplasma pneumoniae DNA, QL, PCR Not detected       CULTURE, BLOOD [[id number]] Collected: 09/14/21 1700    Order Status: Canceled Specimen: Blood     CULTURE, URINE [[id number]] Collected: 09/13/21 2330    Order Status: Canceled Specimen: Cath Urine     RESPIRATORY VIRUS PANEL W/COVID-19, PCR [[id number]]     Order Status: Canceled Specimen: NASOPHARYNGEAL SWAB     COVID-19 RAPID TEST [[id number]]     Order Status: Canceled     COVID-19 RAPID TEST [[id number]] Collected: 09/13/21 0737    Order Status: Completed Specimen: Nasopharyngeal Updated: 09/13/21 0811     Specimen source Nasopharyngeal        COVID-19 rapid test Not detected        Comment: Rapid Abbott ID Now       Rapid NAAT:  The specimen is NEGATIVE for SARS-CoV-2, the novel coronavirus associated with COVID-19. Negative results should be treated as presumptive and, if inconsistent with clinical signs and symptoms or necessary for patient management, should be tested with an alternative molecular assay. Negative results do not preclude SARS-CoV-2 infection and should not be used as the sole basis for patient management decisions. This test has been authorized by the FDA under an Emergency Use Authorization (EUA) for use by authorized laboratories.    Fact sheet for Healthcare Providers: ConventionUpdate.co.nz  Fact sheet for Patients: ChristianaCareUpdate.co.nz       Methodology: Isothermal Nucleic Acid Amplification         COVID-19 RAPID TEST [[id number]]     Order Status: Canceled             Imaging:  [x]I have personally reviewed the patients chest radiographs images and report       Chest x-ray 9/18  COMPARISON: One day prior  TECHNIQUE: Portable frontal view of the chest  FINDINGS:  SUPPORT DEVICES: Endotracheal and enteric tubes unchanged. HEART AND MEDIASTINUM: Cardiomediastinal silhouette within normal limits. LUNGS AND PLEURAL SPACES: Bilateral layering effusions, right greater than left  with adjacent atelectasis. No pneumothorax. IMPRESSION  Bilateral layering effusions, right greater than left, with adjacent  atelectasis, unchanged.       CT head 9/15/1021  IMPRESSION   No acute intracranial abnormality. CT chest, abdomen, pelvis 9/15/1021  IMPRESSION   Endotracheal tube tip in the lower thoracic trachea 1.5 cm above the dipak.    Bilateral lower lobar atelectasis, possible endobronchial lesion/mucous plug in  the left lower lobe bronchus.    No acute intra-abdominal abnormality. Please note: Voice-recognition software may have been used to generate this report, which may have resulted in some phonetic-based errors in grammar and contents. Even though attempts were made to correct all the mistakes, some may have been missed, and remained in the body of the document.       Severiano Good, MD

## 2021-09-18 NOTE — PROGRESS NOTES
1900- shift change report given to JAIDEN Holly (oncoming nurse) by Parris Grissom (offgoing nurse). Report included the following information SBAR, Kardex, ED Summary, Intake/Output, MAR, Recent Results, Med Rec Status and Cardiac Rhythm NSR.      2000- Shift assessment completed. Pt sedated and intubated. Squeezes hands on commands and responds to voice. Does not appear to be in pain. Oral, long, and external catheter care performed. Pt resting comfortably in bed. Will continue to monitor. 2120- Pt vent alarming and pt restless, moving around in bed and grimacing. When asked if having pain the pt nodded yes. Dilaudid 1mg given, and precedex titrated up to 1.1mcg. Will continue to monitor. 2200- When sitting down to chart noticed that the restraints hadn't been renewed during the day. Dr. Seb Singh pageten and restraints reordered. 2255- Pt becoming agitated and thrashing around in bed. Can't be calmed by words. Ativan 2mg IV push given. 2310- Pt agitated and thrashing around in bed still despite Ativan. Dr. Seb Singh pageten and fentanyl 100mcg one time IV push ordered along w/ 50mcg PRN IV pushes. 2319- Scheduled fentanyl 100mcg one time IV push given. Will continue to monitor. 0000- Reassessment completed. No changes from previous assessment. 0244- Fentanyl 50mcg IV push given to pt d/t elevated vitals, alarming vent, and thrashing in the bed. Will continue to monitor. 0400- Reassessment completed. No changes from previous assessment. 0444- Fentanyl 50mcg IV push given to pt d/t elevated vitals, alarming vent, and thrashing in the bed. Will continue to monitor. 0723- Fentanyl 50mcg IV push given to pt d/t elevated vitals, alarming vent, and thrashing in the bed. Will continue to monitor. 0700- shift change report given to NORA Love (oncoming nurse) by JAIDEN Holly (offgoing nurse).  Report included the following information SBAR, Kardex, ED Summary, Intake/Output, MAR, Recent Results, Med Rec Status and Cardiac Rhythm NSR.

## 2021-09-18 NOTE — PROGRESS NOTES
0720-Received pt resting in bed with eyes closed, no sign of distress, vss on ventilator and sedation, will continue to monitor  0800-Pt status discussed with intensivist plan for breathing trial this AM  0950-Poison control updated via phone, will continue to follow every other day  1207-Pt extubated, coughing a lot, thick secretions, some bloody and some clear  1217-Pt has labored breathing, stridor, physician aware, receiving recepinephrine with mild improvement  1228-Solumedrol given to decrease swelling  1235-Pt still in distress, anesthesia called to reintubate  1255-Pt O2 sats stable now and appears in less distress with sedation and ventilator back on board, difficult intubation with swelling  1400-Large dark green watery stool, long care performed, linen and pad changed, tolerated well  1600-Resting in bed with eyes closed, no sign of distress on precedex drip  1800-No sign of distress, vss  1908-Bedside and Verbal shift change report given to [facility] (oncoming nurse) by Billie Chris RN (offgoing nurse). Report included the following information SBAR, Kardex, Intake/Output, MAR, Recent Results and Cardiac Rhythm SR/ST.

## 2021-09-18 NOTE — PROGRESS NOTES
Problem: Ventilator Management  Goal: *Adequate oxygenation and ventilation  Outcome: Progressing Towards Goal  Goal: *Patient maintains clear airway/free of aspiration  Outcome: Progressing Towards Goal  Goal: *Absence of infection signs and symptoms  Outcome: Progressing Towards Goal  Goal: *Normal spontaneous ventilation  Outcome: Progressing Towards Goal     Problem: Patient Education: Go to Patient Education Activity  Goal: Patient/Family Education  Outcome: Progressing Towards Goal     Problem: Non-Violent Restraints  Goal: Removal from restraints as soon as assessed to be safe  Outcome: Progressing Towards Goal  Goal: No harm/injury to patient while restraints in use  Outcome: Progressing Towards Goal  Goal: Patient's dignity will be maintained  Outcome: Progressing Towards Goal  Goal: Patient Interventions  Outcome: Progressing Towards Goal     Problem: Falls - Risk of  Goal: *Absence of Falls  Description: Document Martinez Hidalgo Fall Risk and appropriate interventions in the flowsheet.   9/17/2021 2259 by Radha Galo RN  Outcome: Progressing Towards Goal  Note: Fall Risk Interventions:       Mentation Interventions: Bed/chair exit alarm, Adequate sleep, hydration, pain control, Evaluate medications/consider consulting pharmacy, Door open when patient unattended, More frequent rounding, Reorient patient, Room close to nurse's station, Toileting rounds    Medication Interventions: Bed/chair exit alarm, Evaluate medications/consider consulting pharmacy    Elimination Interventions: Bed/chair exit alarm, Toileting schedule/hourly rounds    9/17/2021 2259 by Radha Galo RN  Note: Fall Risk Interventions:       Mentation Interventions: Bed/chair exit alarm, Adequate sleep, hydration, pain control, Evaluate medications/consider consulting pharmacy, Door open when patient unattended, More frequent rounding, Reorient patient, Room close to nurse's station, Toileting rounds    Medication Interventions: Bed/chair exit alarm, Evaluate medications/consider consulting pharmacy    Elimination Interventions: Bed/chair exit alarm, Toileting schedule/hourly rounds    Problem: Patient Education: Go to Patient Education Activity  Goal: Patient/Family Education  Outcome: Progressing Towards Goal     Problem: Risk for Spread of Infection  Goal: Prevent transmission of infectious organism to others  Description: Prevent the transmission of infectious organisms to other patients, staff members, and visitors. Outcome: Progressing Towards Goal     Problem: Patient Education:  Go to Education Activity  Goal: Patient/Family Education  Outcome: Progressing Towards Goal     Problem: Pressure Injury - Risk of  Goal: *Prevention of pressure injury  Description: Document Remy Scale and appropriate interventions in the flowsheet.   Outcome: Progressing Towards Goal     Problem: Patient Education: Go to Patient Education Activity  Goal: Patient/Family Education  Outcome: Progressing Towards Goal     Anna Moralez RN

## 2021-09-18 NOTE — PROGRESS NOTES
Pulm/CC    Patient intubated this afternoon afetr post-extubation failure and stridor; VC edematous during intubation by anesthesia CRNA; will likely need trach early next week due to associated drug abuse and psych hx. VCV vent support; CXR stable findings post intubation.     Silas Torres MD

## 2021-09-19 ENCOUNTER — APPOINTMENT (OUTPATIENT)
Dept: GENERAL RADIOLOGY | Age: 41
DRG: 004 | End: 2021-09-19
Attending: INTERNAL MEDICINE
Payer: MEDICAID

## 2021-09-19 LAB
ALBUMIN SERPL-MCNC: 4 G/DL (ref 3.4–5)
ALBUMIN/GLOB SERPL: 1.3 {RATIO} (ref 0.8–1.7)
ALP SERPL-CCNC: 138 U/L (ref 45–117)
ALT SERPL-CCNC: 62 U/L (ref 13–56)
AMMONIA PLAS-SCNC: 94 UMOL/L (ref 11–32)
ANION GAP SERPL CALC-SCNC: 11 MMOL/L (ref 3–18)
AST SERPL-CCNC: 56 U/L (ref 10–38)
ATRIAL RATE: 109 BPM
BASOPHILS # BLD: 0 K/UL (ref 0–0.1)
BASOPHILS NFR BLD: 0 % (ref 0–2)
BILIRUB SERPL-MCNC: 0.5 MG/DL (ref 0.2–1)
BUN SERPL-MCNC: 16 MG/DL (ref 7–18)
BUN/CREAT SERPL: 48 (ref 12–20)
CALCIUM SERPL-MCNC: 9.7 MG/DL (ref 8.5–10.1)
CALCULATED P AXIS, ECG09: 53 DEGREES
CALCULATED R AXIS, ECG10: 50 DEGREES
CALCULATED T AXIS, ECG11: 42 DEGREES
CHLORIDE SERPL-SCNC: 108 MMOL/L (ref 100–111)
CO2 SERPL-SCNC: 25 MMOL/L (ref 21–32)
CREAT SERPL-MCNC: 0.33 MG/DL (ref 0.6–1.3)
DIAGNOSIS, 93000: NORMAL
DIFFERENTIAL METHOD BLD: ABNORMAL
EOSINOPHIL # BLD: 0 K/UL (ref 0–0.4)
EOSINOPHIL NFR BLD: 0 % (ref 0–5)
ERYTHROCYTE [DISTWIDTH] IN BLOOD BY AUTOMATED COUNT: 12.5 % (ref 11.6–14.5)
GLOBULIN SER CALC-MCNC: 3 G/DL (ref 2–4)
GLUCOSE BLD STRIP.AUTO-MCNC: 157 MG/DL (ref 70–110)
GLUCOSE BLD STRIP.AUTO-MCNC: 157 MG/DL (ref 70–110)
GLUCOSE BLD STRIP.AUTO-MCNC: 165 MG/DL (ref 70–110)
GLUCOSE BLD STRIP.AUTO-MCNC: 178 MG/DL (ref 70–110)
GLUCOSE SERPL-MCNC: 137 MG/DL (ref 74–99)
HCT VFR BLD AUTO: 27 % (ref 35–45)
HGB BLD-MCNC: 8.8 G/DL (ref 12–16)
LYMPHOCYTES # BLD: 0.9 K/UL (ref 0.9–3.6)
LYMPHOCYTES NFR BLD: 10 % (ref 21–52)
MAGNESIUM SERPL-MCNC: 2.5 MG/DL (ref 1.6–2.6)
MCH RBC QN AUTO: 29.4 PG (ref 24–34)
MCHC RBC AUTO-ENTMCNC: 32.6 G/DL (ref 31–37)
MCV RBC AUTO: 90.3 FL (ref 78–100)
MONOCYTES # BLD: 0.2 K/UL (ref 0.05–1.2)
MONOCYTES NFR BLD: 2 % (ref 3–10)
NEUTS SEG # BLD: 7.1 K/UL (ref 1.8–8)
NEUTS SEG NFR BLD: 86 % (ref 40–73)
P-R INTERVAL, ECG05: 168 MS
PHOSPHATE SERPL-MCNC: 3 MG/DL (ref 2.5–4.9)
PLATELET # BLD AUTO: 313 K/UL (ref 135–420)
PMV BLD AUTO: 10.7 FL (ref 9.2–11.8)
POTASSIUM SERPL-SCNC: 4.1 MMOL/L (ref 3.5–5.5)
PROT SERPL-MCNC: 7 G/DL (ref 6.4–8.2)
Q-T INTERVAL, ECG07: 320 MS
QRS DURATION, ECG06: 72 MS
QTC CALCULATION (BEZET), ECG08: 430 MS
RBC # BLD AUTO: 2.99 M/UL (ref 4.2–5.3)
SODIUM SERPL-SCNC: 144 MMOL/L (ref 136–145)
VENTRICULAR RATE, ECG03: 109 BPM
WBC # BLD AUTO: 8.3 K/UL (ref 4.6–13.2)

## 2021-09-19 PROCEDURE — 74011250636 HC RX REV CODE- 250/636: Performed by: INTERNAL MEDICINE

## 2021-09-19 PROCEDURE — 82962 GLUCOSE BLOOD TEST: CPT

## 2021-09-19 PROCEDURE — 84100 ASSAY OF PHOSPHORUS: CPT

## 2021-09-19 PROCEDURE — 80053 COMPREHEN METABOLIC PANEL: CPT

## 2021-09-19 PROCEDURE — 94640 AIRWAY INHALATION TREATMENT: CPT

## 2021-09-19 PROCEDURE — 74011000258 HC RX REV CODE- 258: Performed by: INTERNAL MEDICINE

## 2021-09-19 PROCEDURE — 94003 VENT MGMT INPAT SUBQ DAY: CPT

## 2021-09-19 PROCEDURE — 71045 X-RAY EXAM CHEST 1 VIEW: CPT

## 2021-09-19 PROCEDURE — 74011636637 HC RX REV CODE- 636/637: Performed by: INTERNAL MEDICINE

## 2021-09-19 PROCEDURE — 74011000250 HC RX REV CODE- 250: Performed by: FAMILY MEDICINE

## 2021-09-19 PROCEDURE — 85025 COMPLETE CBC W/AUTO DIFF WBC: CPT

## 2021-09-19 PROCEDURE — 65610000006 HC RM INTENSIVE CARE

## 2021-09-19 PROCEDURE — 82140 ASSAY OF AMMONIA: CPT

## 2021-09-19 PROCEDURE — 74011000250 HC RX REV CODE- 250: Performed by: INTERNAL MEDICINE

## 2021-09-19 PROCEDURE — 36415 COLL VENOUS BLD VENIPUNCTURE: CPT

## 2021-09-19 PROCEDURE — 31500 INSERT EMERGENCY AIRWAY: CPT

## 2021-09-19 PROCEDURE — 74011250637 HC RX REV CODE- 250/637: Performed by: INTERNAL MEDICINE

## 2021-09-19 PROCEDURE — 74011250636 HC RX REV CODE- 250/636: Performed by: FAMILY MEDICINE

## 2021-09-19 PROCEDURE — 83735 ASSAY OF MAGNESIUM: CPT

## 2021-09-19 RX ORDER — ASPIRIN 325 MG/1
100 TABLET, FILM COATED ORAL DAILY
Status: DISCONTINUED | OUTPATIENT
Start: 2021-09-19 | End: 2021-11-05

## 2021-09-19 RX ORDER — MIDAZOLAM IN 0.9 % SOD.CHLORID 1 MG/ML
2-10 PLASTIC BAG, INJECTION (ML) INTRAVENOUS
Status: DISPENSED | OUTPATIENT
Start: 2021-09-19 | End: 2021-09-26

## 2021-09-19 RX ORDER — FOLIC ACID 1 MG/1
1 TABLET ORAL DAILY
Status: DISCONTINUED | OUTPATIENT
Start: 2021-09-19 | End: 2021-10-23

## 2021-09-19 RX ADMIN — METHYLPREDNISOLONE SODIUM SUCCINATE 40 MG: 40 INJECTION, POWDER, FOR SOLUTION INTRAMUSCULAR; INTRAVENOUS at 11:00

## 2021-09-19 RX ADMIN — FUROSEMIDE 20 MG: 10 INJECTION, SOLUTION INTRAMUSCULAR; INTRAVENOUS at 08:26

## 2021-09-19 RX ADMIN — FAMOTIDINE 20 MG: 10 INJECTION, SOLUTION INTRAVENOUS at 05:55

## 2021-09-19 RX ADMIN — FENTANYL CITRATE 50 MCG: 50 INJECTION INTRAMUSCULAR; INTRAVENOUS at 02:44

## 2021-09-19 RX ADMIN — INSULIN LISPRO 2 UNITS: 100 INJECTION, SOLUTION INTRAVENOUS; SUBCUTANEOUS at 05:55

## 2021-09-19 RX ADMIN — DEXMEDETOMIDINE HYDROCHLORIDE 1.5 MCG/KG/HR: 100 INJECTION, SOLUTION INTRAVENOUS at 11:59

## 2021-09-19 RX ADMIN — SODIUM CHLORIDE 10 ML: 9 INJECTION, SOLUTION INTRAMUSCULAR; INTRAVENOUS; SUBCUTANEOUS at 13:30

## 2021-09-19 RX ADMIN — BUDESONIDE 500 MCG: 0.5 INHALANT RESPIRATORY (INHALATION) at 21:15

## 2021-09-19 RX ADMIN — INSULIN LISPRO 2 UNITS: 100 INJECTION, SOLUTION INTRAVENOUS; SUBCUTANEOUS at 17:20

## 2021-09-19 RX ADMIN — FAMOTIDINE 20 MG: 10 INJECTION, SOLUTION INTRAVENOUS at 17:09

## 2021-09-19 RX ADMIN — DEXMEDETOMIDINE HYDROCHLORIDE 1.4 MCG/KG/HR: 100 INJECTION, SOLUTION INTRAVENOUS at 07:39

## 2021-09-19 RX ADMIN — ARFORMOTEROL TARTRATE 15 MCG: 15 SOLUTION RESPIRATORY (INHALATION) at 08:19

## 2021-09-19 RX ADMIN — HYDROMORPHONE HYDROCHLORIDE 1 MG: 1 INJECTION, SOLUTION INTRAMUSCULAR; INTRAVENOUS; SUBCUTANEOUS at 08:26

## 2021-09-19 RX ADMIN — HALOPERIDOL LACTATE 4 MG: 5 INJECTION, SOLUTION INTRAMUSCULAR at 10:59

## 2021-09-19 RX ADMIN — METHYLPREDNISOLONE SODIUM SUCCINATE 40 MG: 40 INJECTION, POWDER, FOR SOLUTION INTRAMUSCULAR; INTRAVENOUS at 05:55

## 2021-09-19 RX ADMIN — PIPERACILLIN AND TAZOBACTAM 3.38 G: 3; .375 INJECTION, POWDER, LYOPHILIZED, FOR SOLUTION INTRAVENOUS at 21:06

## 2021-09-19 RX ADMIN — INSULIN LISPRO 2 UNITS: 100 INJECTION, SOLUTION INTRAVENOUS; SUBCUTANEOUS at 11:45

## 2021-09-19 RX ADMIN — FOLIC ACID 1 MG: 1 TABLET ORAL at 13:34

## 2021-09-19 RX ADMIN — PIPERACILLIN AND TAZOBACTAM 3.38 G: 3; .375 INJECTION, POWDER, LYOPHILIZED, FOR SOLUTION INTRAVENOUS at 13:34

## 2021-09-19 RX ADMIN — METHYLPREDNISOLONE SODIUM SUCCINATE 40 MG: 40 INJECTION, POWDER, FOR SOLUTION INTRAMUSCULAR; INTRAVENOUS at 17:08

## 2021-09-19 RX ADMIN — FENTANYL CITRATE 50 MCG: 50 INJECTION INTRAMUSCULAR; INTRAVENOUS at 11:19

## 2021-09-19 RX ADMIN — 0.12% CHLORHEXIDINE GLUCONATE 10 ML: 1.2 RINSE ORAL at 21:06

## 2021-09-19 RX ADMIN — FENTANYL CITRATE 50 MCG: 50 INJECTION INTRAMUSCULAR; INTRAVENOUS at 06:21

## 2021-09-19 RX ADMIN — PIPERACILLIN AND TAZOBACTAM 3.38 G: 3; .375 INJECTION, POWDER, LYOPHILIZED, FOR SOLUTION INTRAVENOUS at 05:55

## 2021-09-19 RX ADMIN — IPRATROPIUM BROMIDE AND ALBUTEROL SULFATE 3 ML: .5; 3 SOLUTION RESPIRATORY (INHALATION) at 08:19

## 2021-09-19 RX ADMIN — FENTANYL CITRATE 50 MCG: 50 INJECTION INTRAMUSCULAR; INTRAVENOUS at 04:44

## 2021-09-19 RX ADMIN — CLOTRIMAZOLE: 10 CREAM TOPICAL at 21:06

## 2021-09-19 RX ADMIN — CLONAZEPAM 0.5 MG: 0.5 TABLET ORAL at 08:26

## 2021-09-19 RX ADMIN — IPRATROPIUM BROMIDE AND ALBUTEROL SULFATE 3 ML: .5; 3 SOLUTION RESPIRATORY (INHALATION) at 21:15

## 2021-09-19 RX ADMIN — OLANZAPINE 10 MG: 10 TABLET, FILM COATED ORAL at 17:09

## 2021-09-19 RX ADMIN — IPRATROPIUM BROMIDE AND ALBUTEROL SULFATE 3 ML: .5; 3 SOLUTION RESPIRATORY (INHALATION) at 14:41

## 2021-09-19 RX ADMIN — 0.12% CHLORHEXIDINE GLUCONATE 10 ML: 1.2 RINSE ORAL at 08:26

## 2021-09-19 RX ADMIN — DEXMEDETOMIDINE HYDROCHLORIDE 1.5 MCG/KG/HR: 100 INJECTION, SOLUTION INTRAVENOUS at 19:58

## 2021-09-19 RX ADMIN — DEXMEDETOMIDINE HYDROCHLORIDE 1.2 MCG/KG/HR: 100 INJECTION, SOLUTION INTRAVENOUS at 02:53

## 2021-09-19 RX ADMIN — LACTULOSE 45 ML: 20 SOLUTION ORAL at 08:26

## 2021-09-19 RX ADMIN — BUDESONIDE 500 MCG: 0.5 INHALANT RESPIRATORY (INHALATION) at 08:19

## 2021-09-19 RX ADMIN — CLONAZEPAM 0.5 MG: 0.5 TABLET ORAL at 21:06

## 2021-09-19 RX ADMIN — DEXMEDETOMIDINE HYDROCHLORIDE 1.5 MCG/KG/HR: 100 INJECTION, SOLUTION INTRAVENOUS at 15:34

## 2021-09-19 RX ADMIN — LACTULOSE 45 ML: 20 SOLUTION ORAL at 21:06

## 2021-09-19 RX ADMIN — MIDAZOLAM 8 MG/HR: 5 INJECTION, SOLUTION INTRAMUSCULAR; INTRAVENOUS at 18:22

## 2021-09-19 RX ADMIN — ARFORMOTEROL TARTRATE 15 MCG: 15 SOLUTION RESPIRATORY (INHALATION) at 21:15

## 2021-09-19 RX ADMIN — Medication 1 TABLET: at 13:34

## 2021-09-19 RX ADMIN — LORAZEPAM 2 MG: 2 INJECTION INTRAMUSCULAR at 10:59

## 2021-09-19 RX ADMIN — FENTANYL CITRATE 50 MCG: 50 INJECTION INTRAMUSCULAR; INTRAVENOUS at 15:09

## 2021-09-19 RX ADMIN — ENOXAPARIN SODIUM 40 MG: 100 INJECTION SUBCUTANEOUS at 17:09

## 2021-09-19 RX ADMIN — THIAMINE HCL TAB 100 MG 100 MG: 100 TAB at 13:34

## 2021-09-19 NOTE — PROGRESS NOTES
Assumed pt care approx 0730. Assessments performed, see flowsheet. Pt intubated w/precedex infusing, bilat wrist restraints in place. Pt agitated and restless during shift change, precedex titrated and eventually settles down. Pt assessed, oral and long care performed. Pt restless/agitated in bed, kicking legs and wiggling head. Precedex increased to 1.4mcg and pt given dilaudid. Pt settles down. Pt restless in bed, pt given haldol and ativan. No improvement. Pt begins kicking more frequently, attempting to get oob, and thrashing head. Precedex increased to 1.5mcg and fentanyl given. Notified  of pt's increased restlessness and agitation despite medication and repositioning. Md reorders versed gtt. Versed started at 2mg/hr. Pt has bouts of restlessness and agitation, versed increased and fentanyl given prn. Spoke w/ concerning pt's restlessness/agitation. Md aware of current sedation settings and given meds. Md states to continue to give prn pushes prn. Pt had large loose bm, rectal tube placed after speaking w/md.     Pt eventually calms down. Pt reassessed, resting comfortably.  No distress noted, vss.

## 2021-09-19 NOTE — PROGRESS NOTES
Hospitalist Progress Note    Patient: Lito Mo MRN: [id number]  CSN: [id number]    YOB: 1980  Age: 39 y.o. Sex: female    DOA: 9/11/2021 LOS:  LOS: 7 days                Assessment and Plan:    Principal Problem:    Drug overdose, intentional self-harm, initial encounter ([facility] (9/12/2021)    Active Problems:    Left wrist fracture, with delayed healing, subsequent encounter (9/12/2021)      Hypoxia (9/12/2021)      Hypotension after procedure (9/12/2021)      Acute metabolic encephalopathy (6/99/1655)      Psychosis ([facility] ()      Hyponatremia ()      Acute respiratory failure with hypoxia (HCC) ()      Increased ammonia level (9/14/2021)        Acute respiratory failure with hypoxia: she failed extubation trial and we are now considering trach. See note from pulmonary       Hypotension: Seems improved stopped the albumin and watching with the sedation   Fever :no clear source. Infectious disease has been consulted on empiric antibiotics currently     Discussed with nursing staff       Chief complaint:  Robina Young a 39 y.o. female who is standing history of multidrug use/abuse, previous drug-seeking behavior and mental health issues otherwise unspecified arrives via EMS with acute metabolic encephalopathy and lethargy. Admitted for likely gabapentin overdose. She was intubated in ER, ct head no acute issue, LP done due to fever even on abx          Subjective:    Very wild at times and requiring a lot of meds for calming to keep her from hurting herself or self extubating       Review of systems:    General: No fevers or chills. Cardiovascular: No chest pain or pressure. No palpitations. Pulmonary: No shortness of breath. Gastrointestinal: No nausea, vomiting.      Objective:    Vital signs/Intake and Output:  Visit Vitals  /81   Pulse 77   Temp 98.2 °F (36.8 °C)   Resp 14   Ht 5' 2\" (1.575 m)   Wt 71.2 kg (157 lb)   SpO2 94%   BMI 28.72 kg/m²     Current Shift:  09/19 0701 - 09/19 1900  In: 110   Out: 1650 [HDKDV:0001]  Last three shifts:  09/17 1901 - 09/19 0700  In: 710.5 [I.V.:400.5]  Out: 3500 [Urine:3500]    Physical Exam:  General: Nsedated and intubated  HEENT: NC/AT. PERRLA, EOMI.  MMM. Lungs: Nml inspection. CTA B/L. No wheezing, rales or rhonchi. Heart:  S1S2 RRR,  PMI mid 5th IC space. No M/RG. Abdomen: Soft, NT/ND.  BS+. No peritoneal signs. Extremities: No C/C/E. Psych:   Nml affect. Neurologic:  No purpoeful movements but will move all extremities l. Labs: Results:       Chemistry Recent Labs     09/19/21  0607 09/18/21  0525 09/17/21  0520   * 109* 96    143 145   K 4.1 3.8 3.8    110 113*   CO2 25 26 25   BUN 16 10 7   CREA 0.33* 0.37* 0.33*   CA 9.7 8.7 7.8*   AGAP 11 7 7   BUCR 48* 27* 21*   * 104 87   TP 7.0 5.8* 4.7*   ALB 4.0 3.0* 1.9*   GLOB 3.0 2.8 2.8   AGRAT 1.3 1.1 0.7*      CBC w/Diff Recent Labs     09/19/21  0607 09/18/21  0525 09/17/21  0520   WBC 8.3 6.7 6.6   RBC 2.99* 2.72* 2.83*   HGB 8.8* 8.1* 8.5*   HCT 27.0* 25.2* 26.2*    252 212   GRANS 86* 60 66   LYMPH 10* 26 21   EOS 0 5 3      Cardiac Enzymes No results for input(s): CPK, CKND1, ZENON in the last 72 hours. No lab exists for component: CKRMB, TROIP   Coagulation No results for input(s): PTP, INR, APTT, INREXT in the last 72 hours. Lipid Panel No results found for: CHOL, CHOLPOCT, CHOLX, CHLST, CHOLV, 725180, HDL, HDLP, LDL, LDLC, DLDLP, 105720, VLDLC, VLDL, TGLX, TRIGL, TRIGP, TGLPOCT, CHHD, CHHDX   BNP No results for input(s): BNPP in the last 72 hours.    Liver Enzymes Recent Labs     09/19/21  0607   TP 7.0   ALB 4.0   *      Thyroid Studies No results found for: T4, T3U, TSH, TSHEXT     Procedures/imaging: see electronic medical records for all procedures/Xrays and details which were not copied into this note but were reviewed prior to creation of Plan

## 2021-09-19 NOTE — PROGRESS NOTES
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    Left wrist fracture, with delayed healing, subsequent encounter S62.102G    Drug overdose, intentional self-harm, initial encounter ([facility] T50.902A    Hypoxia R09.02    Hypotension after procedure I95.81    Acute metabolic encephalopathy R44.41    Psychosis ([facility] F29    Hyponatremia E87.1    Acute respiratory failure with hypoxia (HCC) J96.01    Increased ammonia level R79.89 ·   Code status: full code     RECOMMENDATIONS:   Respiratory: Patient on ventilator support; intubated 2021 due to encephalopathy and drug overdose. Patient extubated yesterday, and reintubated within an hour due to respiratory distress and upper airway edema. Due to prolonged intubation, and patient agitation with history of substance abuserecommend tracheostomy tube placement. Consult ENTDr. Colt Montelongo aware. Chest x-ray reviewedET tube and OG tube in good position; bibasal atelectasis noted. ABGstable; FiO2 35%; PEEP 7; VCV mode of ventilation  Continue ventilator and sedation bundles. SedationPrecedex with agitation reported on top of that; hence midazolam infusion added today; prn fentanyl. Continue bronchodilators via nebulization  ID: Patient on Zosyn antibiotic. S/p LP with negative CSF cultures.   Covid test negative this admission  CVS: Blood pressure stable; telemetrysinus rhythm; continue to monitor   Renal: Normal renal function and good urine output; creatinine normal; continue to monitor; replace electrolytes as needed per ICU protocol  Heme: Chronic mild anemia; hemoglobin remained stable; no active bleeding issues; platelets normal; continue to monitor  Endo: Stable blood sugars; continue tube feeding; no hypoglycemic episodes  GI: Continue tube feeding as tolerated; thiamine, folic acid and multivitamin supplements  Neurology: Wean sedation; continue Precedex drip for now  Ammoniaremains elevated94 today; continue lactulose2 times daily  CT headnil acute  Toxicology: gabapentin OD  Skin: ICU nursing care  MS: Left wrist fracture in immobilization external fixator  Prophylaxis: Enoxaparin and famotidine  Restraints: Wrist soft restraints for patient interfering with medical therapy/management and patient safety. Prognosis guarded overall. CODE STATUSfull code. Palliative care on case. Management plans discussed with ICU RN and RT in detail. Quality Care: PPI, DVT prophylaxis, HOB elevated, Infection control all reviewed and addressed. Lines/Tubes: PIV   ETT: 9/11/21  OGT/NGT: 9/11/21  Other drains: 9/11/21  ADVANCE DIRECTIVE: Full code. Patient's  in nursing home; he had called this morning and discussed with ICU RN. High complexity decision making was performed during the evaluation of this patient at high risk for decompensation with multiple organ involvement. Critical care time excludes discussion or procedures36 minutes. Andreina Howard   sister-in-law   [phone]     Patient  Mr. Kirsty Patel called ICU; he is currently in nursing home; updated management plans; discussed that patient failed extubation yesterday, and needed to be reintubated due to upper airway swelling from prolonged intubation; due to patient's medical condition, recommended tracheostomy, and patient  is agreeable.   Patient  would like information to be provided as needed to his 2 sisterNiravHetalsneha Patel (above) and Nick Rascon [[phone]]; patient's  will call ICU daily from nursing home to get updates; he does not want information to be provided to anyone else other than his 2 sisters. Subjective/History:   Ms. Justina Flowers has been seen and evaluated as Dr. Madelyn Hoffman requested now for assisting with Acute respiratory failure and ventilator management. Patient is a 39 y.o. female with following PMhx presented to ER with lethargy via EMS and admitted for Gabapentin overdose. Pt required intubation for airways protection. Position control was contacted that recommended supportive care per ER provider. Pt seen at bedside in ER rm#2. The patient can not provide additional history due to Ventilated. 9/19/2021  Patient seen in [address] [place] [address] 4. Intubated 9/11/2021. Patient remains intubated and mildly sedated. Patient failed extubation yesterday; upper airway edema, and needed to be reintubated shortly thereafter  Patient on  Precedex infusion; agitated  Otherwise, no acute issues overnight. No significant respiratory secretions reported. Patient has a history of drug use and smoker and alcohol use. Review of Systems:  Review of systems not obtained due to patient factors. Past Medical History:  Past Medical History:   Diagnosis Date    Asthma     Bilateral ovarian cysts     Cocaine abuse ([facility] [facility]     Mental and behavioral problem     Pancreatitis     Pancreatitis     Psychosis ([facility] [facility]         Past Surgical History:  Past Surgical History:   Procedure Laterality Date    HX GYN      D&C, Hysterectomy        Medications:  Prior to Admission medications    Medication Sig Start Date End Date Taking? Authorizing Provider   albuterol (PROVENTIL HFA, VENTOLIN HFA, PROAIR HFA) 90 mcg/actuation inhaler Take 2 Puffs by inhalation every four (4) hours as needed for Wheezing or Shortness of Breath. 8/4/21   Oakes Morning, PA-C   ibuprofen (MOTRIN) 600 mg tablet Take 1 Tablet by mouth every six (6) hours as needed for Pain. Take with food.  8/4/21   Mark Morning, PA-C   ALPRAZolam (XANAX) 0.5 mg tablet TAKE 1 TABLET BY MOUTH ONCE DAILY AS NEEDED FOR ANXIETY 12/4/19 Provider, Historical   metFORMIN (GLUCOPHAGE) 500 mg tablet TAKE 1 TABLET BY MOUTH ONCE DAILY 11/12/19   Provider, Historical   nicotine (NICODERM CQ) 21 mg/24 hr APPLY 1 PATCH TOPICALLY TO THE SKIN DAILY FOR CRAVINGS AS DIRECTED 12/4/19   Provider, Historical   traZODone (DESYREL) 50 mg tablet TAKE 1 TABLET BY MOUTH AT BEDTIME AS NEEDED FOR INSOMNIA 12/4/19   Provider, Historical   escitalopram oxalate (LEXAPRO) 10 mg tablet Take 10 mg by mouth daily. Other, MD Luisa   lurasidone (LATUDA) 80 mg tab tablet Take 80 mg by mouth daily (with dinner).     Marcin, MD Luisa       Current Facility-Administered Medications   Medication Dose Route Frequency    midazolam in normal saline (VERSED) 1 mg/mL infusion  2-10 mg/hr IntraVENous TITRATE    lactulose (CHRONULAC) 10 gram/15 mL solution 45 mL  30 g Oral BID    methylPREDNISolone (PF) (SOLU-MEDROL) injection 40 mg  40 mg IntraVENous Q6H    clonazePAM (KlonoPIN) tablet 0.5 mg  0.5 mg Oral BID    dexmedeTOMidine (PRECEDEX) 400 mcg in 0.9% sodium chloride 100 mL infusion  0.1-1.5 mcg/kg/hr IntraVENous TITRATE    nicotine (NICODERM CQ) 14 mg/24 hr patch 1 Patch  1 Patch TransDERmal DAILY    clotrimazole (LOTRIMIN) 1 % cream   Topical BID    insulin lispro (HUMALOG) injection   SubCUTAneous Q6H    arformoteroL (BROVANA) neb solution 15 mcg  15 mcg Nebulization BID RT    budesonide (PULMICORT) 500 mcg/2 ml nebulizer suspension  500 mcg Nebulization BID RT    piperacillin-tazobactam (ZOSYN) 3.375 g in 0.9% sodium chloride (MBP/ADV) 100 mL MBP  3.375 g IntraVENous Q8H    OLANZapine (ZyPREXA) tablet 10 mg  10 mg Oral QPM    albuterol-ipratropium (DUO-NEB) 2.5 MG-0.5 MG/3 ML  3 mL Nebulization TID RT    enoxaparin (LOVENOX) injection 40 mg  40 mg SubCUTAneous Q24H    sodium chloride (NS) flush 5-40 mL  5-40 mL IntraVENous Q8H    famotidine (PF) (PEPCID) 20 mg in 0.9% sodium chloride 10 mL injection  20 mg IntraVENous BID    chlorhexidine (PERIDEX) 0.12 % mouthwash 10 mL  10 mL Oral Q12H       Allergy:  Allergies   Allergen Reactions    Fish Containing Products Itching    Toradol [Ketorolac] Rash     Pt reports she is not allergic to this medication. Social History:  Social History     Tobacco Use    Smoking status: Current Every Day Smoker     Packs/day: 1.00    Smokeless tobacco: Never Used   Substance Use Topics    Alcohol use: Not Currently    Drug use: Not Currently     Types: Cocaine, Marijuana     Comment: used with in past 24 hours        Family History:  No family history on file. Objective:   Vital Signs:    Blood pressure (!) 148/94, pulse 73, temperature 98.4 °F (36.9 °C), resp. rate 15, height 5' 2\" (1.575 m), weight 71.2 kg (157 lb), last menstrual period 05/15/2018, SpO2 96 %. Body mass index is 28.72 kg/m². O2 Device: Endotracheal tube, Ventilator       Temp (24hrs), Av.1 °F (36.7 °C), Min:97.8 °F (36.6 °C), Max:98.4 °F (36.9 °C)         Intake/Output:   Last shift:       07 - 1900  In: 110   Out: 0430 [Urine:1650]  Last 3 shifts: 1901 -  0700  In: 710.5 [I.V.:400.5]  Out: 3500 [Urine:3500]    Intake/Output Summary (Last 24 hours) at 2021 1241  Last data filed at 2021 1103  Gross per 24 hour   Intake 376.74 ml   Output 2950 ml   Net -2573.26 ml       Ventilator Settings:  Mode Rate Tidal Volume Pressure FiO2 PEEP   Assist control, VC+   400 ml  7 cm H2O 30 % 7 cm H20     Peak airway pressure: 15 cm H2O    Minute ventilation: 7.4 l/min      Lung protective strategy, Pl pressure goals less than or equal to 30.     Physical Exam:   Patient appears older than stated age; appears generally weak; appears comfortable; acyanotic   HEENT: pupils not dilated, reactive, no scleral jaundice, moist oral mucosa, no nasal drainage; neck supple  Neck: No adenopathy or thyroid swelling  Orotracheal and orogastric tubeno bleeding or secretions  CVS: Normal heart sounds; S1 and S2 with no murmurs; telemetrysinus rhythm; JVD not elevated   RS: Breath sounds symmetrical; moderate air entry bilateral; no obvious wheezes or crackles; not tachypneic or in distress   Abd: soft, no tenderness, no distention, no guarding or rigidity, bowel sounds present, no hepatosplenomegaly rigidity  Neuro: Sedated; limited exam  Extrm: Mild bilateral pitting feet edema; no focal swelling or clubbing  Skin: no rash  Lymphatic: no cervical or supraclavicular adenopathy  Vasc: DPs palpable bilateral; no distal ischemia findings      Data:     Recent Results (from the past 12 hour(s))   GLUCOSE, POC    Collection Time: 09/19/21  5:02 AM   Result Value Ref Range    Glucose (POC) 165 (H) 70 - 110 mg/dL   CBC WITH AUTOMATED DIFF    Collection Time: 09/19/21  6:07 AM   Result Value Ref Range    WBC 8.3 4.6 - 13.2 K/uL    RBC 2.99 (L) 4.20 - 5.30 M/uL    HGB 8.8 (L) 12.0 - 16.0 g/dL    HCT 27.0 (L) 35.0 - 45.0 %    MCV 90.3 78.0 - 100.0 FL    MCH 29.4 24.0 - 34.0 PG    MCHC 32.6 31.0 - 37.0 g/dL    RDW 12.5 11.6 - 14.5 %    PLATELET 377 489 - 477 K/uL    MPV 10.7 9.2 - 11.8 FL    NEUTROPHILS 86 (H) 40 - 73 %    LYMPHOCYTES 10 (L) 21 - 52 %    MONOCYTES 2 (L) 3 - 10 %    EOSINOPHILS 0 0 - 5 %    BASOPHILS 0 0 - 2 %    ABS. NEUTROPHILS 7.1 1.8 - 8.0 K/UL    ABS. LYMPHOCYTES 0.9 0.9 - 3.6 K/UL    ABS. MONOCYTES 0.2 0.05 - 1.2 K/UL    ABS. EOSINOPHILS 0.0 0.0 - 0.4 K/UL    ABS.  BASOPHILS 0.0 0.0 - 0.1 K/UL    DF AUTOMATED     MAGNESIUM    Collection Time: 09/19/21  6:07 AM   Result Value Ref Range    Magnesium 2.5 1.6 - 2.6 mg/dL   METABOLIC PANEL, COMPREHENSIVE    Collection Time: 09/19/21  6:07 AM   Result Value Ref Range    Sodium 144 136 - 145 mmol/L    Potassium 4.1 3.5 - 5.5 mmol/L    Chloride 108 100 - 111 mmol/L    CO2 25 21 - 32 mmol/L    Anion gap 11 3.0 - 18 mmol/L    Glucose 137 (H) 74 - 99 mg/dL    BUN 16 7.0 - 18 MG/DL    Creatinine 0.33 (L) 0.6 - 1.3 MG/DL    BUN/Creatinine ratio 48 (H) 12 - 20      GFR est AA >60 >60 ml/min/1.73m2    GFR est non-AA >60 >60 ml/min/1.73m2    Calcium 9.7 8.5 - 10.1 MG/DL    Bilirubin, total 0.5 0.2 - 1.0 MG/DL    ALT (SGPT) 62 (H) 13 - 56 U/L    AST (SGOT) 56 (H) 10 - 38 U/L    Alk. phosphatase 138 (H) 45 - 117 U/L    Protein, total 7.0 6.4 - 8.2 g/dL    Albumin 4.0 3.4 - 5.0 g/dL    Globulin 3.0 2.0 - 4.0 g/dL    A-G Ratio 1.3 0.8 - 1.7     PHOSPHORUS    Collection Time: 09/19/21  6:07 AM   Result Value Ref Range    Phosphorus 3.0 2.5 - 4.9 MG/DL   AMMONIA    Collection Time: 09/19/21  6:07 AM   Result Value Ref Range    Ammonia 94 (H) 11 - 32 UMOL/L   GLUCOSE, POC    Collection Time: 09/19/21 11:23 AM   Result Value Ref Range    Glucose (POC) 157 (H) 70 - 110 mg/dL             Recent Labs     09/18/21  1401   FIO2I 50   HCO3I 25.6   PCO2I 43.8   PHI 7.38   PO2I 91       All Micro Results     Procedure Component Value Units Date/Time    CULTURE, BLOOD [[id number]] Collected: 09/14/21 0515    Order Status: Completed Specimen: Blood Updated: 09/19/21 0716     Special Requests: NO SPECIAL REQUESTS        Culture result: NO GROWTH 5 DAYS       CULTURE, BLOOD [[id number]] Collected: 09/14/21 0500    Order Status: Completed Specimen: Blood Updated: 09/19/21 0716     Special Requests: NO SPECIAL REQUESTS        Culture result: NO GROWTH 5 DAYS       CULTURE, CSF  TUBE 2 [[id number]] Collected: 09/16/21 1434    Order Status: Completed Specimen: Cerebrospinal Fluid Updated: 09/17/21 1615     Special Requests: TUBE 3, CULTURE AND SENSITIVTY AND GRAM STAIN.      GRAM STAIN RARE WBCS SEEN         NO ORGANISMS SEEN        Culture result:       NO GROWTH THUS FAR HOLDING 7 DAYS          MENINGITIS PATHOGENS PANEL, CSF (BY PCR) [[id number]] Collected: 09/16/21 1434    Order Status: Completed Specimen: Cerebrospinal Fluid Updated: 09/17/21 0050     Escherichia coli K1 Not detected        Haemophilus Influenzae Not detected        Listeria Monocytogenes Not detected        Neisseria Meningitidis Not detected        Streptococcus Agalactiae Not detected        Streptococcus Pneumoniae Not detected        Cytomegalovirus Not detected        Enterovirus Not detected        Herpes Simplex Virus 1 Not detected        Comment: In patients who have negative herpes simplex 1 and 2 PCR results, do not modify treatment, confirm with alternate testing. Herpes Simplex Virus 2 Not detected        Comment: In patients who have negative herpes simplex 1 and 2 PCR results, do not modify treatment, confirm with alternate testing. Human Herpesvirus 6 Not detected        Human Parechovirus Not detected        Varicella Zoster Virus Not detected        Crypto. neoformans/gattii Not detected       MENINGITIS PATHOGENS PANEL, CSF (BY PCR) [[id number]] Collected: 09/16/21 1545    Order Status: Canceled Specimen: Cerebrospinal Fluid     HSV 1 AND 2 BY PCR [[id number]] Collected: 09/16/21 1434    Order Status: Completed Specimen: Other Updated: 09/16/21 1502    COVID-19 RAPID TEST [[id number]] Collected: 09/16/21 1000    Order Status: Completed Specimen: Nasopharyngeal Updated: 09/16/21 1032     Specimen source Nasopharyngeal        COVID-19 rapid test Not detected        Comment: Rapid Abbott ID Now       Rapid NAAT:  The specimen is NEGATIVE for SARS-CoV-2, the novel coronavirus associated with COVID-19. Negative results should be treated as presumptive and, if inconsistent with clinical signs and symptoms or necessary for patient management, should be tested with an alternative molecular assay. Negative results do not preclude SARS-CoV-2 infection and should not be used as the sole basis for patient management decisions. This test has been authorized by the FDA under an Emergency Use Authorization (EUA) for use by authorized laboratories.    Fact sheet for Healthcare Providers: ConventionUpdate.co.nz  Fact sheet for Patients: ConventionUpdate.co.nz       Methodology: Isothermal Nucleic Acid Amplification LEGIONELLA PNEUMOPHILA AG, URINE [[id number]] Collected: 09/14/21 2315    Order Status: Completed Specimen: Urine, random Updated: 09/15/21 1042     Legionella Ag, urine Negative       STREP PNEUMO AG, URINE [[id number]] Collected: 09/14/21 2315    Order Status: Completed Specimen: Urine, random Updated: 09/15/21 1042     Strep pneumo Ag, urine Negative       RESPIRATORY VIRUS PANEL W/COVID-19, PCR [[id number]] Collected: 09/14/21 1832    Order Status: Completed Specimen: Nasopharyngeal Updated: 09/14/21 2234     Adenovirus Not detected        Coronavirus 229E Not detected        Coronavirus HKU1 Not detected        Coronavirus CVNL63 Not detected        Coronavirus OC43 Not detected        SARS-CoV-2, PCR Not detected        Metapneumovirus Not detected        Rhinovirus and Enterovirus Not detected        Influenza A Not detected        Influenza A, subtype H1 Not detected        Influenza A, subtype H3 Not detected        INFLUENZA A H1N1 PCR Not detected        Influenza B Not detected        Parainfluenza 1 Not detected        Parainfluenza 2 Not detected        Parainfluenza 3 Not detected        Parainfluenza virus 4 Not detected        RSV by PCR Not detected        B. parapertussis, PCR Not detected        Bordetella pertussis - PCR Not detected        Chlamydophila pneumoniae DNA, QL, PCR Not detected        Mycoplasma pneumoniae DNA, QL, PCR Not detected       CULTURE, BLOOD [[id number]] Collected: 09/14/21 1700    Order Status: Canceled Specimen: Blood     CULTURE, URINE [[id number]] Collected: 09/13/21 2330    Order Status: Canceled Specimen: Cath Urine     RESPIRATORY VIRUS PANEL W/COVID-19, PCR [[id number]]     Order Status: Canceled Specimen: NASOPHARYNGEAL SWAB     COVID-19 RAPID TEST [[id number]]     Order Status: Canceled     COVID-19 RAPID TEST [[id number]] Collected: 09/13/21 0737    Order Status: Completed Specimen: Nasopharyngeal Updated: 09/13/21 0811     Specimen source Nasopharyngeal COVID-19 rapid test Not detected        Comment: Rapid Abbott ID Now       Rapid NAAT:  The specimen is NEGATIVE for SARS-CoV-2, the novel coronavirus associated with COVID-19. Negative results should be treated as presumptive and, if inconsistent with clinical signs and symptoms or necessary for patient management, should be tested with an alternative molecular assay. Negative results do not preclude SARS-CoV-2 infection and should not be used as the sole basis for patient management decisions. This test has been authorized by the FDA under an Emergency Use Authorization (EUA) for use by authorized laboratories. Fact sheet for Healthcare Providers: kstattoo.com  Fact sheet for Patients: kstattoo.com       Methodology: Isothermal Nucleic Acid Amplification         COVID-19 RAPID TEST [[id number]]     Order Status: Canceled             Imaging:  [x]I have personally reviewed the patients chest radiographs images and report       Chest x-ray 9/19  COMPARISON: One day prior   TECHNIQUE: Portable frontal view of the chest   FINDINGS:   SUPPORT DEVICES: Endotracheal and enteric tubes unchanged.   HEART AND MEDIASTINUM: Cardiomediastinal silhouette within normal limits.   LUNGS AND PLEURAL SPACES: Bilateral mid to lower lung zone opacities,  atelectasis/effusion, improved from prior study. No pneumothorax. IMPRESSION   Bilateral mid to lower lung zone opacities, atelectasis/effusion, improved from  prior study.        CT head 9/15/1021  IMPRESSION   No acute intracranial abnormality. CT chest, abdomen, pelvis 9/15/1021  IMPRESSION   Endotracheal tube tip in the lower thoracic trachea 1.5 cm above the dipak.    Bilateral lower lobar atelectasis, possible endobronchial lesion/mucous plug in  the left lower lobe bronchus.    No acute intra-abdominal abnormality.         Please note: Voice-recognition software may have been used to generate this report, which may have resulted in some phonetic-based errors in grammar and contents. Even though attempts were made to correct all the mistakes, some may have been missed, and remained in the body of the document.       Ligia Mann MD

## 2021-09-19 NOTE — PROGRESS NOTES
Reason for Admission:   CM reviewed chart and H&P \"  Patient is a 39 y.o. female with following PMhx presented to ER with lethargy via EMS and admitted for Gabapentin overdose. Pt required intubation for airways protection. Position control was contacted that recommended supportive care per ER provider. Pt seen at bedside in ER rm#2. The patient can not provide additional history due to Ventilated.      9/19/2021  Patient seen in [address] [place] [address]. Intubated 9/11/2021. Patient remains intubated and mildly sedated. Patient failed extubation yesterday; upper airway edema, and needed to be reintubated shortly thereafter  Patient on  Precedex infusion; agitated  Otherwise, no acute issues overnight. No significant respiratory secretions reported.     Patient has a history of drug use and smoker and alcohol use. \"                 RUR Score:     32%      PCP: First and Last name:  Luisa Bucrh MD     Name of Practice:   Are you a current patient: Yes/No:   Approximate date of last visit:    Can you do a virtual visit with your PCP:              Resources/supports as identified by patient/family:                   Top Challenges facing patient (as identified by patient/family and CM): Finances/Medication cost?                    Transportation? Support system or lack thereof? Living arrangements? Self-care/ADLs/Cognition?             Current Advanced Directive/Advance Care Plan:  Full Code      Healthcare Decision Maker:   Click here to complete HealthCare Decision Makers including selection of the Healthcare Decision Maker Relationship (ie \"Primary\")      Primary Decision Maker: Katia Macias - [phone]    Payor Source Payor: [facility] MEDICAID / Plan: Karen WELLS CCCP / Product Type: Managed Care Medicaid /                             Plan for utilizing home health:    Not at this time                 Transition of Care Plan: Patient remains intubated since 9/11.  CM here to assist with any discharge needs

## 2021-09-19 NOTE — PROGRESS NOTES
Problem: Ventilator Management  Goal: *Adequate oxygenation and ventilation  Outcome: Progressing Towards Goal  Goal: *Patient maintains clear airway/free of aspiration  Outcome: Progressing Towards Goal  Goal: *Absence of infection signs and symptoms  Outcome: Progressing Towards Goal  Goal: *Normal spontaneous ventilation  Outcome: Progressing Towards Goal     Problem: Patient Education: Go to Patient Education Activity  Goal: Patient/Family Education  Outcome: Progressing Towards Goal     Problem: Non-Violent Restraints  Goal: Removal from restraints as soon as assessed to be safe  Outcome: Progressing Towards Goal  Goal: No harm/injury to patient while restraints in use  Outcome: Progressing Towards Goal  Goal: Patient's dignity will be maintained  Outcome: Progressing Towards Goal  Goal: Patient Interventions  Outcome: Progressing Towards Goal     Problem: Falls - Risk of  Goal: *Absence of Falls  Description: Document Laurens Fall Risk and appropriate interventions in the flowsheet. Outcome: Progressing Towards Goal  Note: Fall Risk Interventions:       Mentation Interventions: Bed/chair exit alarm, Door open when patient unattended, Adequate sleep, hydration, pain control, Evaluate medications/consider consulting pharmacy, More frequent rounding, Room close to nurse's station, Toileting rounds    Medication Interventions: Bed/chair exit alarm, Evaluate medications/consider consulting pharmacy    Elimination Interventions: Bed/chair exit alarm, Toileting schedule/hourly rounds    Problem: Patient Education: Go to Patient Education Activity  Goal: Patient/Family Education  Outcome: Progressing Towards Goal     Problem: Risk for Spread of Infection  Goal: Prevent transmission of infectious organism to others  Description: Prevent the transmission of infectious organisms to other patients, staff members, and visitors.   Outcome: Progressing Towards Goal     Problem: Patient Education:  Go to Education Activity  Goal: Patient/Family Education  Outcome: Progressing Towards Goal     Problem: Pressure Injury - Risk of  Goal: *Prevention of pressure injury  Description: Document Remy Scale and appropriate interventions in the flowsheet.   Outcome: Progressing Towards Goal     Problem: Patient Education: Go to Patient Education Activity  Goal: Patient/Family Education  Outcome: Progressing Towards Goal     Ashley Lowe RN

## 2021-09-20 ENCOUNTER — APPOINTMENT (OUTPATIENT)
Dept: GENERAL RADIOLOGY | Age: 41
DRG: 004 | End: 2021-09-20
Attending: INTERNAL MEDICINE
Payer: MEDICAID

## 2021-09-20 LAB
ALBUMIN SERPL-MCNC: 3.7 G/DL (ref 3.4–5)
ALBUMIN/GLOB SERPL: 1.2 {RATIO} (ref 0.8–1.7)
ALP SERPL-CCNC: 146 U/L (ref 45–117)
ALT SERPL-CCNC: 172 U/L (ref 13–56)
AMMONIA PLAS-SCNC: 72 UMOL/L (ref 11–32)
ANION GAP SERPL CALC-SCNC: 8 MMOL/L (ref 3–18)
AST SERPL-CCNC: 119 U/L (ref 10–38)
BACTERIA SPEC CULT: NORMAL
BACTERIA SPEC CULT: NORMAL
BASOPHILS # BLD: 0 K/UL (ref 0–0.1)
BASOPHILS NFR BLD: 0 % (ref 0–2)
BILIRUB SERPL-MCNC: 0.3 MG/DL (ref 0.2–1)
BUN SERPL-MCNC: 22 MG/DL (ref 7–18)
BUN/CREAT SERPL: 49 (ref 12–20)
CALCIUM SERPL-MCNC: 9.4 MG/DL (ref 8.5–10.1)
CHLORIDE SERPL-SCNC: 110 MMOL/L (ref 100–111)
CO2 SERPL-SCNC: 26 MMOL/L (ref 21–32)
CREAT SERPL-MCNC: 0.45 MG/DL (ref 0.6–1.3)
DIFFERENTIAL METHOD BLD: ABNORMAL
EOSINOPHIL # BLD: 0 K/UL (ref 0–0.4)
EOSINOPHIL NFR BLD: 0 % (ref 0–5)
ERYTHROCYTE [DISTWIDTH] IN BLOOD BY AUTOMATED COUNT: 12.4 % (ref 11.6–14.5)
GLOBULIN SER CALC-MCNC: 3.1 G/DL (ref 2–4)
GLUCOSE BLD STRIP.AUTO-MCNC: 115 MG/DL (ref 70–110)
GLUCOSE BLD STRIP.AUTO-MCNC: 123 MG/DL (ref 70–110)
GLUCOSE BLD STRIP.AUTO-MCNC: 139 MG/DL (ref 70–110)
GLUCOSE BLD STRIP.AUTO-MCNC: 160 MG/DL (ref 70–110)
GLUCOSE SERPL-MCNC: 146 MG/DL (ref 74–99)
HCT VFR BLD AUTO: 30 % (ref 35–45)
HGB BLD-MCNC: 9.7 G/DL (ref 12–16)
LYMPHOCYTES # BLD: 0.9 K/UL (ref 0.9–3.6)
LYMPHOCYTES NFR BLD: 9 % (ref 21–52)
MAGNESIUM SERPL-MCNC: 2.5 MG/DL (ref 1.6–2.6)
MCH RBC QN AUTO: 30.1 PG (ref 24–34)
MCHC RBC AUTO-ENTMCNC: 32.3 G/DL (ref 31–37)
MCV RBC AUTO: 93.2 FL (ref 78–100)
MONOCYTES # BLD: 0.6 K/UL (ref 0.05–1.2)
MONOCYTES NFR BLD: 6 % (ref 3–10)
NEUTS SEG # BLD: 8.2 K/UL (ref 1.8–8)
NEUTS SEG NFR BLD: 81 % (ref 40–73)
PHOSPHATE SERPL-MCNC: 3.2 MG/DL (ref 2.5–4.9)
PLATELET # BLD AUTO: 406 K/UL (ref 135–420)
PMV BLD AUTO: 10.1 FL (ref 9.2–11.8)
POTASSIUM SERPL-SCNC: 4 MMOL/L (ref 3.5–5.5)
PROT SERPL-MCNC: 6.8 G/DL (ref 6.4–8.2)
RBC # BLD AUTO: 3.22 M/UL (ref 4.2–5.3)
SERVICE CMNT-IMP: NORMAL
SERVICE CMNT-IMP: NORMAL
SODIUM SERPL-SCNC: 144 MMOL/L (ref 136–145)
WBC # BLD AUTO: 10.2 K/UL (ref 4.6–13.2)

## 2021-09-20 PROCEDURE — 74011250636 HC RX REV CODE- 250/636: Performed by: INTERNAL MEDICINE

## 2021-09-20 PROCEDURE — 74011000258 HC RX REV CODE- 258: Performed by: INTERNAL MEDICINE

## 2021-09-20 PROCEDURE — 65610000006 HC RM INTENSIVE CARE

## 2021-09-20 PROCEDURE — 82140 ASSAY OF AMMONIA: CPT

## 2021-09-20 PROCEDURE — 74011250637 HC RX REV CODE- 250/637: Performed by: INTERNAL MEDICINE

## 2021-09-20 PROCEDURE — 36415 COLL VENOUS BLD VENIPUNCTURE: CPT

## 2021-09-20 PROCEDURE — 85025 COMPLETE CBC W/AUTO DIFF WBC: CPT

## 2021-09-20 PROCEDURE — 74011000250 HC RX REV CODE- 250: Performed by: INTERNAL MEDICINE

## 2021-09-20 PROCEDURE — 74011250636 HC RX REV CODE- 250/636: Performed by: FAMILY MEDICINE

## 2021-09-20 PROCEDURE — 84100 ASSAY OF PHOSPHORUS: CPT

## 2021-09-20 PROCEDURE — 80053 COMPREHEN METABOLIC PANEL: CPT

## 2021-09-20 PROCEDURE — 71045 X-RAY EXAM CHEST 1 VIEW: CPT

## 2021-09-20 PROCEDURE — 74011636637 HC RX REV CODE- 636/637: Performed by: INTERNAL MEDICINE

## 2021-09-20 PROCEDURE — 94640 AIRWAY INHALATION TREATMENT: CPT

## 2021-09-20 PROCEDURE — 74011000250 HC RX REV CODE- 250: Performed by: FAMILY MEDICINE

## 2021-09-20 PROCEDURE — 82962 GLUCOSE BLOOD TEST: CPT

## 2021-09-20 PROCEDURE — 83735 ASSAY OF MAGNESIUM: CPT

## 2021-09-20 PROCEDURE — 94003 VENT MGMT INPAT SUBQ DAY: CPT

## 2021-09-20 RX ORDER — FAMOTIDINE 20 MG/1
20 TABLET, FILM COATED ORAL 2 TIMES DAILY
Status: DISCONTINUED | OUTPATIENT
Start: 2021-09-20 | End: 2021-11-05

## 2021-09-20 RX ORDER — FAMOTIDINE 20 MG/1
20 TABLET, FILM COATED ORAL 2 TIMES DAILY
Status: DISCONTINUED | OUTPATIENT
Start: 2021-09-20 | End: 2021-09-20

## 2021-09-20 RX ORDER — PROPOFOL 10 MG/ML
0-50 VIAL (ML) INTRAVENOUS
Status: DISCONTINUED | OUTPATIENT
Start: 2021-09-20 | End: 2021-09-27

## 2021-09-20 RX ADMIN — FENTANYL CITRATE 50 MCG/HR: 0.05 INJECTION, SOLUTION INTRAMUSCULAR; INTRAVENOUS at 09:45

## 2021-09-20 RX ADMIN — PIPERACILLIN AND TAZOBACTAM 3.38 G: 3; .375 INJECTION, POWDER, LYOPHILIZED, FOR SOLUTION INTRAVENOUS at 13:43

## 2021-09-20 RX ADMIN — PROPOFOL 40 MCG/KG/MIN: 10 INJECTION, EMULSION INTRAVENOUS at 23:52

## 2021-09-20 RX ADMIN — DEXMEDETOMIDINE HYDROCHLORIDE 1.5 MCG/KG/HR: 100 INJECTION, SOLUTION INTRAVENOUS at 04:34

## 2021-09-20 RX ADMIN — METHYLPREDNISOLONE SODIUM SUCCINATE 40 MG: 40 INJECTION, POWDER, FOR SOLUTION INTRAMUSCULAR; INTRAVENOUS at 12:56

## 2021-09-20 RX ADMIN — BUDESONIDE 500 MCG: 0.5 INHALANT RESPIRATORY (INHALATION) at 23:12

## 2021-09-20 RX ADMIN — HYDROMORPHONE HYDROCHLORIDE 1 MG: 1 INJECTION, SOLUTION INTRAMUSCULAR; INTRAVENOUS; SUBCUTANEOUS at 02:54

## 2021-09-20 RX ADMIN — FOLIC ACID 1 MG: 1 TABLET ORAL at 08:02

## 2021-09-20 RX ADMIN — METHYLPREDNISOLONE SODIUM SUCCINATE 40 MG: 40 INJECTION, POWDER, FOR SOLUTION INTRAMUSCULAR; INTRAVENOUS at 06:25

## 2021-09-20 RX ADMIN — CLOTRIMAZOLE: 10 CREAM TOPICAL at 21:40

## 2021-09-20 RX ADMIN — FAMOTIDINE 20 MG: 10 INJECTION, SOLUTION INTRAVENOUS at 06:25

## 2021-09-20 RX ADMIN — CLONAZEPAM 0.5 MG: 0.5 TABLET ORAL at 08:02

## 2021-09-20 RX ADMIN — THIAMINE HCL TAB 100 MG 100 MG: 100 TAB at 08:02

## 2021-09-20 RX ADMIN — 0.12% CHLORHEXIDINE GLUCONATE 10 ML: 1.2 RINSE ORAL at 21:39

## 2021-09-20 RX ADMIN — INSULIN LISPRO 2 UNITS: 100 INJECTION, SOLUTION INTRAVENOUS; SUBCUTANEOUS at 06:25

## 2021-09-20 RX ADMIN — FENTANYL CITRATE 150 MCG/HR: 0.05 INJECTION, SOLUTION INTRAMUSCULAR; INTRAVENOUS at 21:44

## 2021-09-20 RX ADMIN — LORAZEPAM 2 MG: 2 INJECTION INTRAMUSCULAR at 09:28

## 2021-09-20 RX ADMIN — Medication 1 TABLET: at 08:02

## 2021-09-20 RX ADMIN — IPRATROPIUM BROMIDE AND ALBUTEROL SULFATE 3 ML: .5; 3 SOLUTION RESPIRATORY (INHALATION) at 23:12

## 2021-09-20 RX ADMIN — 0.12% CHLORHEXIDINE GLUCONATE 10 ML: 1.2 RINSE ORAL at 08:01

## 2021-09-20 RX ADMIN — METHYLPREDNISOLONE SODIUM SUCCINATE 40 MG: 40 INJECTION, POWDER, FOR SOLUTION INTRAMUSCULAR; INTRAVENOUS at 00:25

## 2021-09-20 RX ADMIN — IPRATROPIUM BROMIDE AND ALBUTEROL SULFATE 3 ML: .5; 3 SOLUTION RESPIRATORY (INHALATION) at 07:49

## 2021-09-20 RX ADMIN — ENOXAPARIN SODIUM 40 MG: 100 INJECTION SUBCUTANEOUS at 17:40

## 2021-09-20 RX ADMIN — MIDAZOLAM 8 MG/HR: 5 INJECTION, SOLUTION INTRAMUSCULAR; INTRAVENOUS at 21:36

## 2021-09-20 RX ADMIN — HALOPERIDOL LACTATE 4 MG: 5 INJECTION, SOLUTION INTRAMUSCULAR at 09:28

## 2021-09-20 RX ADMIN — ARFORMOTEROL TARTRATE 15 MCG: 15 SOLUTION RESPIRATORY (INHALATION) at 23:12

## 2021-09-20 RX ADMIN — FENTANYL CITRATE 200 MCG/HR: 0.05 INJECTION, SOLUTION INTRAMUSCULAR; INTRAVENOUS at 13:30

## 2021-09-20 RX ADMIN — OLANZAPINE 10 MG: 10 TABLET, FILM COATED ORAL at 17:38

## 2021-09-20 RX ADMIN — CLONAZEPAM 0.5 MG: 0.5 TABLET ORAL at 21:39

## 2021-09-20 RX ADMIN — FAMOTIDINE 20 MG: 20 TABLET ORAL at 21:39

## 2021-09-20 RX ADMIN — FENTANYL CITRATE 50 MCG: 50 INJECTION INTRAMUSCULAR; INTRAVENOUS at 05:16

## 2021-09-20 RX ADMIN — PROPOFOL 40 MCG/KG/MIN: 10 INJECTION, EMULSION INTRAVENOUS at 18:13

## 2021-09-20 RX ADMIN — PIPERACILLIN AND TAZOBACTAM 3.38 G: 3; .375 INJECTION, POWDER, LYOPHILIZED, FOR SOLUTION INTRAVENOUS at 21:39

## 2021-09-20 RX ADMIN — BUDESONIDE 500 MCG: 0.5 INHALANT RESPIRATORY (INHALATION) at 07:49

## 2021-09-20 RX ADMIN — ARFORMOTEROL TARTRATE 15 MCG: 15 SOLUTION RESPIRATORY (INHALATION) at 07:49

## 2021-09-20 RX ADMIN — SODIUM CHLORIDE 10 ML: 9 INJECTION, SOLUTION INTRAMUSCULAR; INTRAVENOUS; SUBCUTANEOUS at 14:00

## 2021-09-20 RX ADMIN — FENTANYL CITRATE 200 MCG/HR: 0.05 INJECTION, SOLUTION INTRAMUSCULAR; INTRAVENOUS at 17:17

## 2021-09-20 RX ADMIN — MIDAZOLAM 8 MG/HR: 5 INJECTION, SOLUTION INTRAMUSCULAR; INTRAVENOUS at 07:23

## 2021-09-20 RX ADMIN — LACTULOSE 45 ML: 20 SOLUTION ORAL at 08:01

## 2021-09-20 RX ADMIN — IPRATROPIUM BROMIDE AND ALBUTEROL SULFATE 3 ML: .5; 3 SOLUTION RESPIRATORY (INHALATION) at 15:00

## 2021-09-20 RX ADMIN — PIPERACILLIN AND TAZOBACTAM 3.38 G: 3; .375 INJECTION, POWDER, LYOPHILIZED, FOR SOLUTION INTRAVENOUS at 06:25

## 2021-09-20 RX ADMIN — FENTANYL CITRATE 50 MCG: 50 INJECTION INTRAMUSCULAR; INTRAVENOUS at 07:59

## 2021-09-20 RX ADMIN — DEXMEDETOMIDINE HYDROCHLORIDE 1.5 MCG/KG/HR: 100 INJECTION, SOLUTION INTRAVENOUS at 08:21

## 2021-09-20 RX ADMIN — PROPOFOL 20 MCG/KG/MIN: 10 INJECTION, EMULSION INTRAVENOUS at 13:26

## 2021-09-20 NOTE — PROGRESS NOTES
Patient's  called and stated that he does not wish for Katy Gomes to have any information. He stated, Jaspal Rudolph is not a blood sister and she is bad news. \"

## 2021-09-20 NOTE — PROGRESS NOTES
Assumed pt care approx 0730. Assessments performed, see flowsheet. Pt remains intubated, w/precedex and versed infusing. Bilat wrist restraints remain in place. Pt restless/agitated upon arival, versed increased. Pt remains agitated and restless, pt given prn fentanyl. Tube feeds held to help prevent aspiration d/t pt scooting down in bed. Pt remains agitated, restless and attempting to get oob. Pt given haldol and ativan. Pt remains agitated, spoke w/. Order for fentanyl gtt obtained and started. Fentanyl increased to decrease agitation restlessness. Spoke w/ again concerning pt's agitation, restlessness and attempts to get oob despite maxing out on gtts and prn pushes given. Md states to start propofol. Propofol obtained and titrated for restlessness and agitation. Pt has breakthrough restlessness and agitation, gtts titrated accordingly. Pt reassessed, oral care attempted. Pt thrashes head around. Pt repositioned. Pt reassessed, pt thrashes head when oral care attempted. Pt settles down after cleaning attempts stop.     No distress noted, vss.

## 2021-09-20 NOTE — PROGRESS NOTES
Clinical Pharmacy Note: IV to PO Automatic Conversion    Patient Name: Te Gustafson   YOB: 1980   Medical Record Number: [id number]   Date of Admission: 9/11/2021    Daily Progress Note: 9/20/2021 2:56 PM     Please note the following medication(s) (pepcid) has/have been changed from IV to PO based on the following critiera:    Patient is taking scheduled oral medications  Patient is tolerating tube feeds at goal rate or an NPO (except for meds), full liquid, soft or regular diet      Current Diet Orders  Active Orders   Diet    DIET NPO        New Order:  pepcid po 20 mg twice a day    This IV to PO conversion is based on the [facility] [employer] approved automatic conversion policy for eligible patients. Please call with questions.     Wanda Teixeira, [facility]  Clinical Pharmacist  [phone]

## 2021-09-20 NOTE — DIABETES MGMT
GLYCEMIC CONTROL PLAN OF CARE        Diabetes Management:      Assessment: known h/o T2DM, HbA1C within recommended range for age + comorbids, oral home regimen  Admitted for acute metabolic encephalopathy and lethargy.  Admitted for likely gabapentin overdose.      Recommend:  Continue corrective coverage    BG in target range (non-ICU\" < 180 ; -180):  [] Yes  [x] No      Steroids: Solumedrol 40 mg q6h, steroid associated hyperglycemia    TDD previous day = 6 - Humalog Normal Insulin Sensitivity Corrective Coverage    Most recent blood glucose results:   Lab Results   Component Value Date/Time     (H) 09/20/2021 05:57 AM    GLUCPOC 160 (H) 09/20/2021 05:17 AM    GLUCPOC 178 (H) 09/19/2021 11:25 PM    GLUCPOC 157 (H) 09/19/2021 05:16 PM         Hypo: no    HbA1C: equivalent  to ave BGlucose of 105 mg/dl for 2-3 months prior to admission (pending)    Lab Results   Component Value Date/Time    Hemoglobin A1c 5.3 09/14/2021 05:00 AM         Adequate glycemic control PTA:  [x] Yes  [] No      Home diabetes medications:  Key Antihyperglycemic Medications             metFORMIN (GLUCOPHAGE) 500 mg tablet TAKE 1 TABLET BY MOUTH ONCE DAILY          Goals:  Blood glucose will be within target range of 70 - 180 mg/dL by: 9/30    Diet:   Active Orders   Diet    DIET NPO       Education:  _____ Refer to Diabetes Education Record                       ___X__ Education not indicated at this time      Louis Ivey 87, RN, CDE  Glycemic Control Team  [phone]  Pager [phone] (M-TH 8:00-4:30P)  *After Hours pager [phone]

## 2021-09-20 NOTE — PROGRESS NOTES
Comprehensive Nutrition Assessment    Type and Reason for Visit: Reassess    Nutrition Recommendations/Plan: continue EN at goal. Monitor        09/20/21 0925   Enteral Nutrition   Feeding Route Orogastric   EN Formula Diabetic   Schedule Continuous   Feeding Regimen Will [email or web address] provides 1485kcal, 82g protein, 132g CHO. 751ml free water. No additional free water at this time. Nutrition Assessment:  PMH: drug use/abuse. Pt admit for gabapentin overdose, pt remaind in ICU intubated and sedated. Estimated Daily Nutrient Needs:  Energy (kcal): 6721; Weight Used for Energy Requirements: Current  Protein (g): 71g; Weight Used for Protein Requirements: Current (1g/kg)  Fluid (ml/day): 1525; Method Used for Fluid Requirements: 1 ml/kcal      Nutrition Related Findings:  Labs: glucose 146, ammonia-72. Meds: lactulose, MVI+M, folic acid, pepcid, thiamine. BM 9/19. Skin intact. No wt loss noted. Enteral nutrition at goal      Wounds:    None       Current Nutrition Therapies:  Current Tube Feeding (TF) Orders:   Feeding Route: Orogastric  Formula: Diabetic  Schedule:Continuous    Regimen: (P) Will [email or web address] provides 1485kcal, 82g protein, 132g CHO. 751ml free water. No additional free water at this time. Water Flushes: defer to MD  Current TF & Flush Orders Provides:    Goal TF & Flush Orders Provides: Will 1.5 @ 45ml will provide 1485kcals, 82g PRO, 132g CHO, and 751ml free water. Anthropometric Measures:  Height:  5' 2\" (157.5 cm)  Current Body Wt:  71.2 kg (156 lb 15.5 oz)   Usual Body Wt:  72 kg (158 lb 11.7 oz)     Ideal Body Wt:  110 lbs:  142.7 %   BMI Category: Overweight (BMI 25.0-29. 9)       Nutrition Diagnosis:   Inadequate oral intake related to impaired respiratory function as evidenced by NPO or clear liquid status due to medical condition, intubation, nutrition support-enteral nutrition    Nutrition Interventions:   Food and/or Nutrient Delivery: Continue tube feeding  Nutrition Education and Counseling: No recommendations at this time  Coordination of Nutrition Care: Continue to monitor while inpatient, Interdisciplinary rounds    Goals:  Continue enteral nutrition at goal throughout the next 5-7 days       Nutrition Monitoring and Evaluation:   Behavioral-Environmental Outcomes: None identified  Food/Nutrient Intake Outcomes: Diet advancement/tolerance  Physical Signs/Symptoms Outcomes: Biochemical data, GI status, Weight, Nutrition focused physical findings    Discharge Planning:     Too soon to determine     Electronically signed by Leo Queen RD on 9/20/2021 at 9:27 AM

## 2021-09-20 NOTE — PROGRESS NOTES
1900- shift change report given to JAIDEN Healy (oncoming nurse) by NORA Love (offgoing nurse). Report included the following information SBAR, Kardex, ED Summary, Intake/Output, MAR, Recent Results, Med Rec Status and Cardiac Rhythm NSR.      2000- Shift assessment completed. Pt sedated and intubated. No command following and only opens eyes to pain. Oral, long, and external catheter care performed. Pt resting comfortably in bed. Will continue to monitor. 0000- Reassessment completed. No changes from previous assessment. 0254- Dilaudid 1mg IV push given to pt d/t vent alarming, pt thrashing in bed, and elevated vitals. Will continue to monitor. 0400- Reassessment completed. No changes from previous assessment. Pt given a full CHG bath. 1212- Fentanyl 50mcg IV push given to pt d/t vent alarming, pt thrashing in bed, and elevated vitals. Will continue to monitor. 0700- shift change report given to NORA Love (oncoming nurse) by JAIDEN Butler (offgoing nurse). Report included the following information SBAR, Kardex, ED Summary, Intake/Output, MAR, Recent Results, Med Rec Status and Cardiac Rhythm NSR.

## 2021-09-20 NOTE — PROGRESS NOTES
[facility]  (A Division of [address] [place] [address])                                                                                                                                                                                Heather Cannon MD                                                         Office #:     [phone]  [phone]                                                          Office Fax: [phone]     Date of Admission: 9/11/2021Date of Note: 9/20/2021  Reason for FU: Fever in patient with OD      Current Antimicrobials:    Prior Antimicrobials:  Zosyn 9/14 to date       Doxycycline 9/14 to 9/15  Vanco 9/14 to 9/17       Assessment- ID related:  --------------------------------------------------------------------------    · Fever-- in drug over dose patient. Suspect withdrawal Vs aspiration.- improved         Onset 48 hours after admission        Urine leg/pneumo--neg        CT CAP-no infectious finding on CT        No acute DVT on LE        LP- CSF appears benign. MEP/cx  negative  · Intubated 9/11, re-intubated 9/18  · History of positive drug screen--opiates/cocaine. Drug screen on admission: negative  · Left wrist fracture with external fixer-- site look mariola    covid test rapid neg, RVP neg  HIV test neg-9/15    Other Medical Issues- Mx per respective team:    Drug overdose( stated neurontin)  Hyperammonia level- etiology?  anemia   Recommendation for ID issues I am following:  ------------------------------------------------------------------------------    Last dose of Zosyn- Day 7 today, dc    Supportive care per ICU    No additional suggestion from ID side    Will sign off. Please re-consult as needed.                Condition: remains in critical care- reintubated 9/18    Subjective:  Non verbal  Needed re-intubation in short while on 9/18- restless/agitation  Fever improved  No new changes on CXR in am  Not on pressor    Labs/imaging/Notes reviewed         HPI:  Shruthi Mireles is a 39 y.o. WHITE/NON- with PMH as listed below. Patient is intubated, limited history found on her and [facility] ICU team.MD.    Admitted with drug overdose - Neurontin. Not much history known before that. On admission, afebrile, wbc of 8.1, CMP ok, drug screen for opaites/cocaine/benzo neg. UA was normal.  She was intubated 9/11/21. Developed fever to 102.9 on 9/13, no leucocytosis but NH3 elevated, Legionella/Pneum urine ag good. NO DVT  On LE 9/15. Currently on VAnco/Zosyn and doxycycline. Further CORNEJO with CT for source work up in progress       C/Remy Smith 1106 Problems    Diagnosis Date Noted    Increased ammonia level 09/14/2021    Psychosis ([facility]     Hyponatremia     Acute respiratory failure with hypoxia (HCC)     Left wrist fracture, with delayed healing, subsequent encounter 09/12/2021    Drug overdose, intentional self-harm, initial encounter ([facility] 09/12/2021    Hypoxia 09/12/2021    Hypotension after procedure 09/12/2021    Acute metabolic encephalopathy 09/29/5393     Past Medical History:   Diagnosis Date    Asthma     Bilateral ovarian cysts     Cocaine abuse ([facility]     Mental and behavioral problem     Pancreatitis     Pancreatitis     Psychosis ([facility]      Past Surgical History:   Procedure Laterality Date    HX GYN      D&C, Hysterectomy     No family history on file.   Social History     Socioeconomic History    Marital status:      Spouse name: Not on file    Number of children: Not on file    Years of education: Not on file    Highest education level: Not on file   Occupational History    Not on file   Tobacco Use    Smoking status: Current Every Day Smoker     Packs/day: 1.00    Smokeless tobacco: Never Used   Substance and Sexual Activity    Alcohol use: Not Currently    Drug use: Not Currently     Types: Cocaine, Marijuana     Comment: used with in past 24 hours    Sexual activity: Not Currently   Other Topics Concern  Not on file   Social History Narrative    Not on file     Social Determinants of Health     Financial Resource Strain:     Difficulty of Paying Living Expenses:    Food Insecurity:     Worried About Running Out of Food in the Last Year:     [address] in the Last Year:    Transportation Needs:     Lack of Transportation (Medical):  Lack of Transportation (Non-Medical):    Physical Activity:     Days of Exercise per Week:     Minutes of Exercise per Session:    Stress:     Feeling of Stress :    Social Connections:     Frequency of Communication with Friends and Family:     Frequency of Social Gatherings with Friends and Family:     Attends Protestant Services:     Active Member of Clubs or Organizations:     Attends Club or Organization Meetings:     Marital Status:    Intimate Partner Violence:     Fear of Current or Ex-Partner:     Emotionally Abused:     Physically Abused:     Sexually Abused:         Allergies:  Fish containing products and Toradol [ketorolac]     Medications:  Current Facility-Administered Medications   Medication Dose Route Frequency    fentaNYL (PF) 900 mcg/30 ml infusion soln  0-200 mcg/hr IntraVENous TITRATE    propofol (DIPRIVAN) 10 mg/mL infusion  0-50 mcg/kg/min IntraVENous TITRATE    famotidine (PEPCID) tablet 20 mg  20 mg Oral BID    midazolam in normal saline (VERSED) 1 mg/mL infusion  2-10 mg/hr IntraVENous TITRATE    thiamine mononitrate (B-1) tablet 100 mg  100 mg Oral DAILY    folic acid (FOLVITE) tablet 1 mg  1 mg Oral DAILY    multivitamin, tx-iron-ca-min (THERA-M w/ IRON) tablet 1 Tablet  1 Tablet Oral DAILY    lactulose (CHRONULAC) 10 gram/15 mL solution 45 mL  30 g Oral BID    fentaNYL citrate (PF) injection 50 mcg  50 mcg IntraVENous Q2H PRN    clonazePAM (KlonoPIN) tablet 0.5 mg  0.5 mg Oral BID    nicotine (NICODERM CQ) 14 mg/24 hr patch 1 Patch  1 Patch TransDERmal DAILY    haloperidol lactate (HALDOL) injection 4 mg  4 mg IntraVENous Q6H PRN    clotrimazole (LOTRIMIN) 1 % cream   Topical BID    ibuprofen (ADVIL;MOTRIN) 100 mg/5 mL oral suspension 400 mg  400 mg Per NG tube Q6H PRN    insulin lispro (HUMALOG) injection   SubCUTAneous Q6H    glucose chewable tablet 16 g  4 Tablet Oral PRN    glucagon (GLUCAGEN) injection 1 mg  1 mg IntraMUSCular PRN    dextrose (D50W) injection syrg 12.5-25 g  25-50 mL IntraVENous PRN    arformoteroL (BROVANA) neb solution 15 mcg  15 mcg Nebulization BID RT    budesonide (PULMICORT) 500 mcg/2 ml nebulizer suspension  500 mcg Nebulization BID RT    piperacillin-tazobactam (ZOSYN) 3.375 g in 0.9% sodium chloride (MBP/ADV) 100 mL MBP  3.375 g IntraVENous Q8H    OLANZapine (ZyPREXA) tablet 10 mg  10 mg Oral QPM    LORazepam (ATIVAN) injection 2 mg  2 mg IntraVENous Q6H PRN    HYDROmorphone (PF) (DILAUDID) injection 1 mg  1 mg IntraVENous Q4H PRN    albuterol-ipratropium (DUO-NEB) 2.5 MG-0.5 MG/3 ML  3 mL Nebulization TID RT    enoxaparin (LOVENOX) injection 40 mg  40 mg SubCUTAneous Q24H    sodium chloride (NS) flush 5-40 mL  5-40 mL IntraVENous Q8H    sodium chloride (NS) flush 5-40 mL  5-40 mL IntraVENous PRN    acetaminophen (TYLENOL) tablet 650 mg  650 mg Oral Q6H PRN    Or    acetaminophen (TYLENOL) suppository 650 mg  650 mg Rectal Q6H PRN    polyethylene glycol (MIRALAX) packet 17 g  17 g Oral DAILY PRN    ondansetron (ZOFRAN ODT) tablet 4 mg  4 mg Oral Q8H PRN    Or    ondansetron (ZOFRAN) injection 4 mg  4 mg IntraVENous Q6H PRN    chlorhexidine (PERIDEX) 0.12 % mouthwash 10 mL  10 mL Oral Q12H    ELECTROLYTE REPLACEMENT PROTOCOL - Potassium Standard Dosing   1 Each Other PRN    ELECTROLYTE REPLACEMENT PROTOCOL - Magnesium   1 Each Other PRN    ELECTROLYTE REPLACEMENT PROTOCOL - Phosphorus  Standard Dosing  1 Each Other PRN    ELECTROLYTE REPLACEMENT PROTOCOL - Calcium   1 Each Other PRN     Physical Exam:    Temp (24hrs), Av.9 °F (36.6 °C), Min:97.4 °F (36.3 °C), Max:98.2 °F (36.8 °C)    Visit Vitals  /87   Pulse 60   Temp 97.9 °F (36.6 °C)   Resp 14   Ht 5' 2\" (1.575 m)   Wt 71.2 kg (157 lb)   LMP 05/15/2018   SpO2 99%   BMI 28.72 kg/m²          GEN: WD Intubated /sedated  HEENT: Unicteric  CHEST: Non laboured breathing. CTA  CVS:RRR, no mur/gallop  ABD: Obese/soft. Non tender. ULISES: Deferred  EXT: No apparent swelling or redness on UE/LE joints. Skin: Dry and intact. No rash, no redness. CNS: Restrained, moves all extremities. Microbiology  All Micro Results     Procedure Component Value Units Date/Time    CULTURE, BLOOD [[id number]] Collected: 09/14/21 0515    Order Status: Completed Specimen: Blood Updated: 09/20/21 0832     Special Requests: NO SPECIAL REQUESTS        Culture result: NO GROWTH 6 DAYS       CULTURE, BLOOD [[id number]] Collected: 09/14/21 0500    Order Status: Completed Specimen: Blood Updated: 09/20/21 0832     Special Requests: NO SPECIAL REQUESTS        Culture result: NO GROWTH 6 DAYS       CULTURE, CSF  TUBE 2 [[id number]] Collected: 09/16/21 1434    Order Status: Completed Specimen: Cerebrospinal Fluid Updated: 09/17/21 1615     Special Requests: TUBE 3, CULTURE AND SENSITIVTY AND GRAM STAIN. GRAM STAIN RARE WBCS SEEN         NO ORGANISMS SEEN        Culture result:       NO GROWTH THUS FAR HOLDING 7 DAYS          MENINGITIS PATHOGENS PANEL, CSF (BY PCR) [[id number]] Collected: 09/16/21 1434    Order Status: Completed Specimen: Cerebrospinal Fluid Updated: 09/17/21 0050     Escherichia coli K1 Not detected        Haemophilus Influenzae Not detected        Listeria Monocytogenes Not detected        Neisseria Meningitidis Not detected        Streptococcus Agalactiae Not detected        Streptococcus Pneumoniae Not detected        Cytomegalovirus Not detected        Enterovirus Not detected        Herpes Simplex Virus 1 Not detected        Comment:  In patients who have negative herpes simplex 1 and 2 PCR results, do not modify treatment, confirm with alternate testing. Herpes Simplex Virus 2 Not detected        Comment: In patients who have negative herpes simplex 1 and 2 PCR results, do not modify treatment, confirm with alternate testing. Human Herpesvirus 6 Not detected        Human Parechovirus Not detected        Varicella Zoster Virus Not detected        Crypto. neoformans/gattii Not detected       MENINGITIS PATHOGENS PANEL, CSF (BY PCR) [[id number]] Collected: 09/16/21 1545    Order Status: Canceled Specimen: Cerebrospinal Fluid     HSV 1 AND 2 BY PCR [[id number]] Collected: 09/16/21 1434    Order Status: Completed Specimen: Other Updated: 09/16/21 1502    COVID-19 RAPID TEST [[id number]] Collected: 09/16/21 1000    Order Status: Completed Specimen: Nasopharyngeal Updated: 09/16/21 1032     Specimen source Nasopharyngeal        COVID-19 rapid test Not detected        Comment: Rapid Abbott ID Now       Rapid NAAT:  The specimen is NEGATIVE for SARS-CoV-2, the novel coronavirus associated with COVID-19. Negative results should be treated as presumptive and, if inconsistent with clinical signs and symptoms or necessary for patient management, should be tested with an alternative molecular assay. Negative results do not preclude SARS-CoV-2 infection and should not be used as the sole basis for patient management decisions. This test has been authorized by the FDA under an Emergency Use Authorization (EUA) for use by authorized laboratories.    Fact sheet for Healthcare Providers: ConventionUpdate.co.nz  Fact sheet for Patients: ConventionUpdate.co.nz       Methodology: Isothermal Nucleic Acid Amplification         LEGIONELLA PNEUMOPHILA AG, URINE [[id number]] Collected: 09/14/21 2315    Order Status: Completed Specimen: Urine, random Updated: 09/15/21 1042     Legionella Ag, urine Negative       STREP PNEUMO AG, URINE [[id number]] Collected: 09/14/21 2315    Order Status: Completed Specimen: Urine, random Updated: 09/15/21 1042     Strep pneumo Ag, urine Negative       RESPIRATORY VIRUS PANEL W/COVID-19, PCR [[id number]] Collected: 09/14/21 1832    Order Status: Completed Specimen: Nasopharyngeal Updated: 09/14/21 2234     Adenovirus Not detected        Coronavirus 229E Not detected        Coronavirus HKU1 Not detected        Coronavirus CVNL63 Not detected        Coronavirus OC43 Not detected        SARS-CoV-2, PCR Not detected        Metapneumovirus Not detected        Rhinovirus and Enterovirus Not detected        Influenza A Not detected        Influenza A, subtype H1 Not detected        Influenza A, subtype H3 Not detected        INFLUENZA A H1N1 PCR Not detected        Influenza B Not detected        Parainfluenza 1 Not detected        Parainfluenza 2 Not detected        Parainfluenza 3 Not detected        Parainfluenza virus 4 Not detected        RSV by PCR Not detected        B. parapertussis, PCR Not detected        Bordetella pertussis - PCR Not detected        Chlamydophila pneumoniae DNA, QL, PCR Not detected        Mycoplasma pneumoniae DNA, QL, PCR Not detected       CULTURE, BLOOD [[id number]] Collected: 09/14/21 1700    Order Status: Canceled Specimen: Blood     CULTURE, URINE [[id number]] Collected: 09/13/21 2330    Order Status: Canceled Specimen: Cath Urine     RESPIRATORY VIRUS PANEL W/COVID-19, PCR [[id number]]     Order Status: Canceled Specimen: NASOPHARYNGEAL SWAB     COVID-19 RAPID TEST [[id number]]     Order Status: Canceled     COVID-19 RAPID TEST [[id number]] Collected: 09/13/21 0737    Order Status: Completed Specimen: Nasopharyngeal Updated: 09/13/21 0811     Specimen source Nasopharyngeal        COVID-19 rapid test Not detected        Comment: Rapid Abbott ID Now       Rapid NAAT:  The specimen is NEGATIVE for SARS-CoV-2, the novel coronavirus associated with COVID-19.        Negative results should be treated as presumptive and, if inconsistent with clinical signs and symptoms or necessary for patient management, should be tested with an alternative molecular assay. Negative results do not preclude SARS-CoV-2 infection and should not be used as the sole basis for patient management decisions. This test has been authorized by the FDA under an Emergency Use Authorization (EUA) for use by authorized laboratories. Fact sheet for Healthcare Providers: ConventionUpdate.co.nz  Fact sheet for Patients: ConventionUpdate.co.nz       Methodology: Isothermal Nucleic Acid Amplification         COVID-19 RAPID TEST [[id number]]     Order Status: Canceled            Lab results:    Chemistry  Recent Labs     09/20/21  0557 09/19/21  0607 09/18/21  0525   * 137* 109*    144 143   K 4.0 4.1 3.8    108 110   CO2 26 25 26   BUN 22* 16 10   CREA 0.45* 0.33* 0.37*   CA 9.4 9.7 8.7   AGAP 8 11 7   BUCR 49* 48* 27*   * 138* 104   TP 6.8 7.0 5.8*   ALB 3.7 4.0 3.0*   GLOB 3.1 3.0 2.8   AGRAT 1.2 1.3 1.1       CBC w/ Diff  Recent Labs     09/20/21  0557 09/19/21  0607 09/18/21  0525   WBC 10.2 8.3 6.7   RBC 3.22* 2.99* 2.72*   HGB 9.7* 8.8* 8.1*   HCT 30.0* 27.0* 25.2*    313 252   GRANS 81* 86* 60   LYMPH 9* 10* 26   EOS 0 0 5       Imaging: report posted below as per radiologist   CXR- 9/17    1. Unchanged, adequately positioned endotracheal tube and enteric tube. 2. Unchanged left and right basilar patchy opacities, atelectasis versus  infiltrate.

## 2021-09-20 NOTE — PROGRESS NOTES
Palliative Medicine    CODE STATUS: FULL CODE    AMD Status: No AMD on file. She is  and her , Bailey Campuzano, is her legal next of kin. Contacting him can be difficult as he is incarcerated at [facility] phone number for the correctional facility PO [phone].     9/20/2021 9056 Seen today in room ICU 4 along with Lili Blankenship, NP. Intubated/ventilated/sedated. Fentanyl and versed gtt infusing. Yesterday (9/19/2021) pt was extubated but developed stridor and required reintubation. May require tracheostomy. Her  has been calling in daily from the correctional facility to get medical updates. Disposition plan: to be determined based on response to treatment and family decisions    Palliative care will continue to follow Karin Vera  and her family during her hospitalization and support them as they make healthcare decisions and define goals of care.       Tari Noel RN, MSN  Palliative Medicine  P: [phone]

## 2021-09-20 NOTE — PROGRESS NOTES
Problem: Ventilator Management  Goal: *Adequate oxygenation and ventilation  Outcome: Progressing Towards Goal  Goal: *Patient maintains clear airway/free of aspiration  Outcome: Progressing Towards Goal  Goal: *Absence of infection signs and symptoms  Outcome: Progressing Towards Goal  Goal: *Normal spontaneous ventilation  Outcome: Progressing Towards Goal     Problem: Patient Education: Go to Patient Education Activity  Goal: Patient/Family Education  Outcome: Progressing Towards Goal     Problem: Non-Violent Restraints  Goal: Removal from restraints as soon as assessed to be safe  Outcome: Progressing Towards Goal  Goal: No harm/injury to patient while restraints in use  Outcome: Progressing Towards Goal  Goal: Patient's dignity will be maintained  Outcome: Progressing Towards Goal  Goal: Patient Interventions  Outcome: Progressing Towards Goal     Problem: Falls - Risk of  Goal: *Absence of Falls  Description: Document Kenneth Flow Fall Risk and appropriate interventions in the flowsheet. Outcome: Progressing Towards Goal  Note: Fall Risk Interventions:     Mentation Interventions: Bed/chair exit alarm, Adequate sleep, hydration, pain control, Evaluate medications/consider consulting pharmacy, Reorient patient, More frequent rounding, Toileting rounds    Medication Interventions: Bed/chair exit alarm, Evaluate medications/consider consulting pharmacy    Elimination Interventions: Bed/chair exit alarm, Toileting schedule/hourly rounds      Problem: Patient Education: Go to Patient Education Activity  Goal: Patient/Family Education  Outcome: Progressing Towards Goal     Problem: Risk for Spread of Infection  Goal: Prevent transmission of infectious organism to others  Description: Prevent the transmission of infectious organisms to other patients, staff members, and visitors.   Outcome: Progressing Towards Goal     Problem: Patient Education:  Go to Education Activity  Goal: Patient/Family Education  Outcome: Progressing Towards Goal     Problem: Pressure Injury - Risk of  Goal: *Prevention of pressure injury  Description: Document Remy Scale and appropriate interventions in the flowsheet. Outcome: Progressing Towards Goal  Note: Pressure Injury Interventions:  Sensory Interventions: Assess changes in LOC, Avoid rigorous massage over bony prominences, Float heels, Keep linens dry and wrinkle-free, Minimize linen layers, Pressure redistribution bed/mattress (bed type), Turn and reposition approx. every two hours (pillows and wedges if needed)    Moisture Interventions: Absorbent underpads, Check for incontinence Q2 hours and as needed, Internal/External urinary devices, Internal/External fecal devices, Minimize layers    Activity Interventions: Pressure redistribution bed/mattress(bed type)    Mobility Interventions: HOB 30 degrees or less, Pressure redistribution bed/mattress (bed type), Turn and reposition approx.  every two hours(pillow and wedges)    Nutrition Interventions: Document food/fluid/supplement intake    Friction and Shear Interventions: HOB 30 degrees or less, Minimize layers    Problem: Patient Education: Go to Patient Education Activity  Goal: Patient/Family Education  Outcome: Progressing Towards Goal     Deepak Echeverria RN

## 2021-09-20 NOTE — PROGRESS NOTES
Care manager rounded and discussed patient case w/ primary nurse and performed chart review; patient has remained very active on vent despite being on precedex, versed and fentanyl; has required soft restraints due to danger of self extubation. Will continue to follow and anticipate placement by initiating a UAI.     Care Management Interventions  Transition of Care Consult (CM Consult): Discharge Planning  The Plan for Transition of Care is Related to the Following Treatment Goals : intentional drug overdose  Discharge Location  Discharge Placement:  (TBD)

## 2021-09-20 NOTE — PROGRESS NOTES
[facility]ists  Pulmonary, Critical Care, and Sleep Medicine    Name: Alexis Pinzon MRN: [id number]   : 1980 Hospital: [facility]   Date: 2021        Pulmonary Critical Care Note    IMPRESSION:   · Acute respiratory failure · J96.00         Patient Active Problem List   Diagnosis Code    Dextromethorphan use disorder, moderate ([facility] F19.20    Ekbom's delusional parasitosis ([facility] [address]    Left wrist fracture, with delayed healing, subsequent encounter S62.102G    Drug overdose, intentional self-harm, initial encounter ([facility] [address] T50.902A    Hypoxia R09.02    Hypotension after procedure I95.81    Acute metabolic encephalopathy Y17.07    Psychosis ([facility] [address] F29    Hyponatremia E87.1    Acute respiratory failure with hypoxia (HCC) J96.01    Increased ammonia level R79.89 ·   Code status: full code     RECOMMENDATIONS:   Respiratory: Patient on ventilator support; intubated 2021 due to encephalopathy and drug overdose. Patient was extubated on 2021, and reintubated within an hour due to respiratory distress and upper airway edema. Continue Solu-Medrol 40 mg IV every 6 hours x8 doses started on 2021 for upper airway edema. Due to prolonged intubation, and patient agitation with history of substance abuserecommend tracheostomy tube placement. Waiting for Dr. Scott Menon evaluation for tracheostomy placement. Chest x-ray reviewed: ETT at dipak; discussed with RN to pull ETT outward by 2 cm; order placed to RT.  AB.38/43.8/91/96.6%. On [facility] 14/400/30/7  Continue ventilator bundle and sedation. Sedation: Even with Versed drip 10 Precedex drip, she is agitated and so Precedex drip changed to fentanyl drip with improved agitation. Bronchodilators: DuoNeb 3 times daily, Brovana twice daily, Pulmicort twice daily. ID: No fever or leukocytosis. Patient on Zosyn antibiotic. S/p LP with negative CSF cultures.   Covid test 2021 negative this admission  CVS: Blood pressure stable; telemetrysinus rhythm; continue to monitor  Echo 9/17/2021: LVEF 60 to 65%. RVSP 29 mmHg. Renal: Normal creatinine and urine output; monitor. Replace electrolytes per ICU protocol. Heme: Chronic mild anemia; hemoglobin remained stable; no active bleeding issues; platelets normal; continue to monitor  Endo: Stable blood sugars; continue tube feeding; no hypoglycemic episodes  GI: Continue tube feeding as tolerated; thiamine, folic acid and multivitamin supplements. AST and ALT and ALP increasing; monitor closely. Neurology: Sedation as above. Ammonia improving to 72; continue lactulose 30 g twice daily. CT headnil acute  Toxicology: gabapentin OD  Skin: ICU nursing care  MS: Left wrist fracture in immobilization external fixator  Prophylaxis: Enoxaparin and famotidine  Restraints: Wrist soft restraints for patient interfering with medical therapy/management and patient safety. Prognosis guarded overall. CODE STATUSfull code. Palliative care on case. Quality Care: PPI, DVT prophylaxis, HOB elevated, Infection control all reviewed and addressed. Lines/Tubes: PIV   ETT: 9/11/21  OGT/NGT: 9/11/21    ADVANCE DIRECTIVE: Full code. Patient's  in [facility]; he had called this morning and discussed with ICU RN. Discussed with RN, RT, MDR. High complexity decision making was performed during the evaluation of this patient at high risk for decompensation with multiple organ involvement. Critical care time excludes discussion or procedure: 37 minutes. Alice Wooten   sister-in-law   [phone]       Family discussion 9/19/2021:Patient  Mr. Myra De Oliveira called ICU; he is currently in [facility]; updated management plans; discussed that patient failed extubation yesterday, and needed to be reintubated due to upper airway swelling from prolonged intubation; due to patient's medical condition, recommended tracheostomy, and patient  is agreeable.   Patient  would like information to be provided as needed to his 2 sisterMary Jane Batres (above) and Jeffy Hurley [[phone]]; patient's  will call ICU daily from residential to get updates; he does not want information to be provided to anyone else other than his 2 sisters. Subjective/History:   Ms. Te Gustafson has been seen and evaluated as Dr. Abbe Brewer requested now for assisting with Acute respiratory failure and ventilator management. Patient is a 39 y.o. female with following PMhx presented to ER with lethargy via EMS and admitted for Gabapentin overdose. Pt required intubation for airways protection. Position control was contacted that recommended supportive care per ER provider. Pt seen at bedside in ER rm#2. The patient can not provide additional history due to Ventilated. 9/20/2021   Remains in ICU room 104. Intubated and sedated. With Versed 8 mg/h and fentanyl as needed and Precedex at 1.5; but still agitated and so Precedex drip changed to fentanyl drip. Waiting for Dr. KARSON RUTH [facility] consult for tracheostomy. No fever. Hemodynamically stable. No other overnight issues reported. PCCM was not called for any issues overnight. Patient has a history of drug use and smoker and alcohol use. Review of Systems:  Review of systems not obtained due to patient factors. Past Medical History:  Past Medical History:   Diagnosis Date    Asthma     Bilateral ovarian cysts     Cocaine abuse ([facility]     Mental and behavioral problem     Pancreatitis     Pancreatitis     Psychosis ([facility] [facility]         Past Surgical History:  Past Surgical History:   Procedure Laterality Date    HX GYN      D&C, Hysterectomy        Medications:  Prior to Admission medications    Medication Sig Start Date End Date Taking? Authorizing Provider   albuterol (PROVENTIL HFA, VENTOLIN HFA, PROAIR HFA) 90 mcg/actuation inhaler Take 2 Puffs by inhalation every four (4) hours as needed for Wheezing or Shortness of Breath. 8/4/21   Horacio Rodarte PA-C   ibuprofen (MOTRIN) 600 mg tablet Take 1 Tablet by mouth every six (6) hours as needed for Pain. Take with food. 8/4/21   Horacio Rodarte PA-C   ALPRAZolam (XANAX) 0.5 mg tablet TAKE 1 TABLET BY MOUTH ONCE DAILY AS NEEDED FOR ANXIETY 12/4/19   Provider, Historical   metFORMIN (GLUCOPHAGE) 500 mg tablet TAKE 1 TABLET BY MOUTH ONCE DAILY 11/12/19   Provider, Historical   nicotine (NICODERM CQ) 21 mg/24 hr APPLY 1 PATCH TOPICALLY TO THE SKIN DAILY FOR CRAVINGS AS DIRECTED 12/4/19   Provider, Historical   traZODone (DESYREL) 50 mg tablet TAKE 1 TABLET BY MOUTH AT BEDTIME AS NEEDED FOR INSOMNIA 12/4/19   Provider, Historical   escitalopram oxalate (LEXAPRO) 10 mg tablet Take 10 mg by mouth daily. Other, MD Luisa   lurasidone (LATUDA) 80 mg tab tablet Take 80 mg by mouth daily (with dinner).     Other, MD Luisa       Current Facility-Administered Medications   Medication Dose Route Frequency    fentaNYL (PF) 900 mcg/30 ml infusion soln  0-200 mcg/hr IntraVENous TITRATE    midazolam in normal saline (VERSED) 1 mg/mL infusion  2-10 mg/hr IntraVENous TITRATE    thiamine mononitrate (B-1) tablet 100 mg  100 mg Oral DAILY    folic acid (FOLVITE) tablet 1 mg  1 mg Oral DAILY    multivitamin, tx-iron-ca-min (THERA-M w/ IRON) tablet 1 Tablet  1 Tablet Oral DAILY    lactulose (CHRONULAC) 10 gram/15 mL solution 45 mL  30 g Oral BID    methylPREDNISolone (PF) (SOLU-MEDROL) injection 40 mg  40 mg IntraVENous Q6H    clonazePAM (KlonoPIN) tablet 0.5 mg  0.5 mg Oral BID    nicotine (NICODERM CQ) 14 mg/24 hr patch 1 Patch  1 Patch TransDERmal DAILY    clotrimazole (LOTRIMIN) 1 % cream   Topical BID    insulin lispro (HUMALOG) injection   SubCUTAneous Q6H    arformoteroL (BROVANA) neb solution 15 mcg  15 mcg Nebulization BID RT    budesonide (PULMICORT) 500 mcg/2 ml nebulizer suspension  500 mcg Nebulization BID RT    piperacillin-tazobactam (ZOSYN) 3.375 g in 0.9% sodium chloride (MBP/ADV) 100 mL MBP  3.375 g IntraVENous Q8H    OLANZapine (ZyPREXA) tablet 10 mg  10 mg Oral QPM    albuterol-ipratropium (DUO-NEB) 2.5 MG-0.5 MG/3 ML  3 mL Nebulization TID RT    enoxaparin (LOVENOX) injection 40 mg  40 mg SubCUTAneous Q24H    sodium chloride (NS) flush 5-40 mL  5-40 mL IntraVENous Q8H    famotidine (PF) (PEPCID) 20 mg in 0.9% sodium chloride 10 mL injection  20 mg IntraVENous BID    chlorhexidine (PERIDEX) 0.12 % mouthwash 10 mL  10 mL Oral Q12H       Allergy:  Allergies   Allergen Reactions    Fish Containing Products Itching    Toradol [Ketorolac] Rash     Pt reports she is not allergic to this medication. Social History:  Social History     Tobacco Use    Smoking status: Current Every Day Smoker     Packs/day: 1.00    Smokeless tobacco: Never Used   Substance Use Topics    Alcohol use: Not Currently    Drug use: Not Currently     Types: Cocaine, Marijuana     Comment: used with in past 24 hours        Family History:  No family history on file. Objective:   Vital Signs:    Blood pressure (!) 145/94, pulse 60, temperature 98.1 °F (36.7 °C), resp. rate 16, height 5' 2\" (1.575 m), weight 71.2 kg (157 lb), last menstrual period 05/15/2018, SpO2 98 %. Body mass index is 28.72 kg/m².    O2 Device: Endotracheal tube, Ventilator       Temp (24hrs), Av °F (36.7 °C), Min:97.4 °F (36.3 °C), Max:98.4 °F (36.9 °C)         Intake/Output:   Last shift:       07 - 1900  In: -   Out: 525 [Urine:525]  Last 3 shifts:  190 -  0700  In: 360   Out: 2400 [Urine:2400]    Intake/Output Summary (Last 24 hours) at 2021 1031  Last data filed at 2021 0723  Gross per 24 hour   Intake 130 ml   Output 2025 ml   Net -1895 ml       Ventilator Settings:  Mode Rate Tidal Volume Pressure FiO2 PEEP   Assist control, VC+   400 ml  7 cm H2O 30 % 7 cm H20     Peak airway pressure: 20 cm H2O    Minute ventilation: 7 l/min      Lung protective strategy, Pl pressure goals less than or equal to 30. Physical Exam:     General/Neurology: Intubated and sedated. NAD. Head:   NCAT. Eye:   No icterus/pallor/cyanosis. Nose:   No nasal drainage/discharge. Neck:   Trachea midline. Lung: Moderate air entry bilateral equal.  No rales, rhonchi. No wheezing or stridor. No prolonged expiration or accessory muscle use. Heart:   S1 S2 present. No murmur or JVD. Abdomen:  Soft. NT. ND. No palpable masses. Extremities:  No edema. No cyanosis or clubbing. Pulses: 2+ and symmetric in DP. Data:     Recent Results (from the past 12 hour(s))   GLUCOSE, POC    Collection Time: 09/19/21 11:25 PM   Result Value Ref Range    Glucose (POC) 178 (H) 70 - 110 mg/dL   AMMONIA    Collection Time: 09/20/21 12:57 AM   Result Value Ref Range    Ammonia 72 (H) 11 - 32 UMOL/L   GLUCOSE, POC    Collection Time: 09/20/21  5:17 AM   Result Value Ref Range    Glucose (POC) 160 (H) 70 - 110 mg/dL   CBC WITH AUTOMATED DIFF    Collection Time: 09/20/21  5:57 AM   Result Value Ref Range    WBC 10.2 4.6 - 13.2 K/uL    RBC 3.22 (L) 4.20 - 5.30 M/uL    HGB 9.7 (L) 12.0 - 16.0 g/dL    HCT 30.0 (L) 35.0 - 45.0 %    MCV 93.2 78.0 - 100.0 FL    MCH 30.1 24.0 - 34.0 PG    MCHC 32.3 31.0 - 37.0 g/dL    RDW 12.4 11.6 - 14.5 %    PLATELET 348 770 - 088 K/uL    MPV 10.1 9.2 - 11.8 FL    NEUTROPHILS 81 (H) 40 - 73 %    LYMPHOCYTES 9 (L) 21 - 52 %    MONOCYTES 6 3 - 10 %    EOSINOPHILS 0 0 - 5 %    BASOPHILS 0 0 - 2 %    ABS. NEUTROPHILS 8.2 (H) 1.8 - 8.0 K/UL    ABS. LYMPHOCYTES 0.9 0.9 - 3.6 K/UL    ABS. MONOCYTES 0.6 0.05 - 1.2 K/UL    ABS. EOSINOPHILS 0.0 0.0 - 0.4 K/UL    ABS.  BASOPHILS 0.0 0.0 - 0.1 K/UL    DF AUTOMATED     MAGNESIUM    Collection Time: 09/20/21  5:57 AM   Result Value Ref Range    Magnesium 2.5 1.6 - 2.6 mg/dL   METABOLIC PANEL, COMPREHENSIVE    Collection Time: 09/20/21  5:57 AM   Result Value Ref Range    Sodium 144 136 - 145 mmol/L    Potassium 4.0 3.5 - 5.5 mmol/L    Chloride 110 100 - 111 mmol/L CO2 26 21 - 32 mmol/L    Anion gap 8 3.0 - 18 mmol/L    Glucose 146 (H) 74 - 99 mg/dL    BUN 22 (H) 7.0 - 18 MG/DL    Creatinine 0.45 (L) 0.6 - 1.3 MG/DL    BUN/Creatinine ratio 49 (H) 12 - 20      GFR est AA >60 >60 ml/min/1.73m2    GFR est non-AA >60 >60 ml/min/1.73m2    Calcium 9.4 8.5 - 10.1 MG/DL    Bilirubin, total 0.3 0.2 - 1.0 MG/DL    ALT (SGPT) 172 (H) 13 - 56 U/L    AST (SGOT) 119 (H) 10 - 38 U/L    Alk. phosphatase 146 (H) 45 - 117 U/L    Protein, total 6.8 6.4 - 8.2 g/dL    Albumin 3.7 3.4 - 5.0 g/dL    Globulin 3.1 2.0 - 4.0 g/dL    A-G Ratio 1.2 0.8 - 1.7     PHOSPHORUS    Collection Time: 09/20/21  5:57 AM   Result Value Ref Range    Phosphorus 3.2 2.5 - 4.9 MG/DL             Recent Labs     09/18/21  1401   FIO2I 50   HCO3I 25.6   PCO2I 43.8   PHI 7.38   PO2I 91       All Micro Results     Procedure Component Value Units Date/Time    CULTURE, BLOOD [[id number]] Collected: 09/14/21 0515    Order Status: Completed Specimen: Blood Updated: 09/20/21 0832     Special Requests: NO SPECIAL REQUESTS        Culture result: NO GROWTH 6 DAYS       CULTURE, BLOOD [[id number]] Collected: 09/14/21 0500    Order Status: Completed Specimen: Blood Updated: 09/20/21 0832     Special Requests: NO SPECIAL REQUESTS        Culture result: NO GROWTH 6 DAYS       CULTURE, CSF  TUBE 2 [[id number]] Collected: 09/16/21 1434    Order Status: Completed Specimen: Cerebrospinal Fluid Updated: 09/17/21 1615     Special Requests: TUBE 3, CULTURE AND SENSITIVTY AND GRAM STAIN.      GRAM STAIN RARE WBCS SEEN         NO ORGANISMS SEEN        Culture result:       NO GROWTH THUS FAR HOLDING 7 DAYS          MENINGITIS PATHOGENS PANEL, CSF (BY PCR) [[id number]] Collected: 09/16/21 1434    Order Status: Completed Specimen: Cerebrospinal Fluid Updated: 09/17/21 0050     Escherichia coli K1 Not detected        Haemophilus Influenzae Not detected        Listeria Monocytogenes Not detected        Neisseria Meningitidis Not detected Streptococcus Agalactiae Not detected        Streptococcus Pneumoniae Not detected        Cytomegalovirus Not detected        Enterovirus Not detected        Herpes Simplex Virus 1 Not detected        Comment: In patients who have negative herpes simplex 1 and 2 PCR results, do not modify treatment, confirm with alternate testing. Herpes Simplex Virus 2 Not detected        Comment: In patients who have negative herpes simplex 1 and 2 PCR results, do not modify treatment, confirm with alternate testing. Human Herpesvirus 6 Not detected        Human Parechovirus Not detected        Varicella Zoster Virus Not detected        Crypto. neoformans/gattii Not detected       MENINGITIS PATHOGENS PANEL, CSF (BY PCR) [[id number]] Collected: 09/16/21 1545    Order Status: Canceled Specimen: Cerebrospinal Fluid     HSV 1 AND 2 BY PCR [[id number]] Collected: 09/16/21 1434    Order Status: Completed Specimen: Other Updated: 09/16/21 1502    COVID-19 RAPID TEST [[id number]] Collected: 09/16/21 1000    Order Status: Completed Specimen: Nasopharyngeal Updated: 09/16/21 1032     Specimen source Nasopharyngeal        COVID-19 rapid test Not detected        Comment: Rapid Abbott ID Now       Rapid NAAT:  The specimen is NEGATIVE for SARS-CoV-2, the novel coronavirus associated with COVID-19. Negative results should be treated as presumptive and, if inconsistent with clinical signs and symptoms or necessary for patient management, should be tested with an alternative molecular assay. Negative results do not preclude SARS-CoV-2 infection and should not be used as the sole basis for patient management decisions. This test has been authorized by the FDA under an Emergency Use Authorization (EUA) for use by authorized laboratories.    Fact sheet for Healthcare Providers: ConventionUpdate.co.nz  Fact sheet for Patients: ConventionUpdate.co.nz       Methodology: Isothermal Nucleic Acid Amplification         LEGIONELLA PNEUMOPHILA AG, URINE [[id number]] Collected: 09/14/21 2315    Order Status: Completed Specimen: Urine, random Updated: 09/15/21 1042     Legionella Ag, urine Negative       STREP PNEUMO AG, URINE [[id number]] Collected: 09/14/21 2315    Order Status: Completed Specimen: Urine, random Updated: 09/15/21 1042     Strep pneumo Ag, urine Negative       RESPIRATORY VIRUS PANEL W/COVID-19, PCR [[id number]] Collected: 09/14/21 1832    Order Status: Completed Specimen: Nasopharyngeal Updated: 09/14/21 2234     Adenovirus Not detected        Coronavirus 229E Not detected        Coronavirus HKU1 Not detected        Coronavirus CVNL63 Not detected        Coronavirus OC43 Not detected        SARS-CoV-2, PCR Not detected        Metapneumovirus Not detected        Rhinovirus and Enterovirus Not detected        Influenza A Not detected        Influenza A, subtype H1 Not detected        Influenza A, subtype H3 Not detected        INFLUENZA A H1N1 PCR Not detected        Influenza B Not detected        Parainfluenza 1 Not detected        Parainfluenza 2 Not detected        Parainfluenza 3 Not detected        Parainfluenza virus 4 Not detected        RSV by PCR Not detected        B. parapertussis, PCR Not detected        Bordetella pertussis - PCR Not detected        Chlamydophila pneumoniae DNA, QL, PCR Not detected        Mycoplasma pneumoniae DNA, QL, PCR Not detected       CULTURE, BLOOD [[id number]] Collected: 09/14/21 1700    Order Status: Canceled Specimen: Blood     CULTURE, URINE [[id number]] Collected: 09/13/21 2330    Order Status: Canceled Specimen: Cath Urine     RESPIRATORY VIRUS PANEL W/COVID-19, PCR [[id number]]     Order Status: Canceled Specimen: NASOPHARYNGEAL SWAB     COVID-19 RAPID TEST [[id number]]     Order Status: Canceled     COVID-19 RAPID TEST [[id number]] Collected: 09/13/21 0737    Order Status: Completed Specimen: Nasopharyngeal Updated: 09/13/21 0811     Specimen source Nasopharyngeal        COVID-19 rapid test Not detected        Comment: Rapid Abbott ID Now       Rapid NAAT:  The specimen is NEGATIVE for SARS-CoV-2, the novel coronavirus associated with COVID-19. Negative results should be treated as presumptive and, if inconsistent with clinical signs and symptoms or necessary for patient management, should be tested with an alternative molecular assay. Negative results do not preclude SARS-CoV-2 infection and should not be used as the sole basis for patient management decisions. This test has been authorized by the FDA under an Emergency Use Authorization (EUA) for use by authorized laboratories. Fact sheet for Healthcare Providers: [employer]bles Alley.co.nz  Fact sheet for Patients: [employer] Al[employer].co.nz       Methodology: Isothermal Nucleic Acid Amplification         COVID-19 RAPID TEST [[id number]]     Order Status: Canceled         Echo 9/17/2021:  Left Ventricle Normal cavity size, wall thickness and systolic function (ejection fraction normal). The estimated EF is 60 - 65%. There is inconclusive left ventricular diastolic function E/E' ratio = 7.08  Heart rate is over 100. Wall Scoring The left ventricular wall motion is normal.            Left Atrium Normal cavity size. Right Ventricle Normal cavity size and global systolic function. Assessment of RV function:   TAPSE = 27 mm. Right Atrium Normal cavity size. Interatrial Septum Interatrial septum not well visualized   Aortic Valve Aortic valve not well visualized. Trileaflet valve structure, no stenosis and no regurgitation. Mitral Valve No stenosis. Mitral valve non-specific thickening. Mild regurgitation. Tricuspid Valve Tricuspid valve not well visualized. No stenosis. Mild regurgitation. Pulmonic Valve Pulmonic valve not well visualized. No stenosis and no regurgitation. Aorta The aorta was not well visualized. Normal aortic root.    Pulmonary Artery Pulmonary arteries not well visualized. Pulmonary arterial systolic pressure (PASP) is 29 mmHg. Pulmonary hypertension not suggested by Doppler findings. IVC/Hepatic Veins Mechanically ventilated; cannot use inferior caval vein diameter to estimate central venous pressure. Pericardium Normal pericardium and no evidence of pericardial effusion. CT head 9/15/1021  IMPRESSION   No acute intracranial abnormality. CT chest, abdomen, pelvis 9/15/1021  IMPRESSION   Endotracheal tube tip in the lower thoracic trachea 1.5 cm above the dipak.    Bilateral lower lobar atelectasis, possible endobronchial lesion/mucous plug in  the left lower lobe bronchus.    No acute intra-abdominal abnormality. Imaging:  [x]I have personally reviewed the patients chest radiographs images and report     Results from Hospital Encounter encounter on 09/11/21    XR CHEST PORT    Narrative  EXAM:  AP Portable Chest X-ray 1 view    INDICATION: ET tube placement    COMPARISON: September 18, 2021    _______________    FINDINGS: There is an endotracheal tube 2.4 cm above the dipak. There is an  orogastric sump pump tube with tip below the diaphragm with tip overlying the  fundus of the stomach. Heart and mediastinal contours are within normal limits for portable radiograph  there are bibasilar densities. There is silhouetting of the left hemidiaphragm. Findings are suggestive of atelectasis and/or infiltrate. Underlying left  effusion not excluded. No acute osseous findings. ________________    Impression  Bibasilar densities suggesting atelectasis and/or infiltrate with  possible left effusion. Support tubes in place. Please note: Voice-recognition software may have been used to generate this report, which may have resulted in some phonetic-based errors in grammar and contents.  Even though attempts were made to correct all the mistakes, some may have been missed, and remained in the body of the document.       Jen Zhang MD  9/20/2021

## 2021-09-20 NOTE — PROGRESS NOTES
Hospitalist Progress Note-critical care note     Patient: Earnestine Vance MRN: [id number]  [facility]: [id number]    YOB: 1980  Age: 39 y.o. Sex: female    DOA: 9/11/2021 LOS:  LOS: 8 days            Chief complaint: wrist fracture , drug overdose , acute respiratory failure with hypoxia, psychosis     Assessment/Plan         Hospital Problems  Date Reviewed: 9/6/2015        Codes Class Noted POA    Increased ammonia level ICD-10-CM: R79.89  ICD-9-CM: 790.6  9/14/2021 Unknown        Psychosis ([facility] ICD-10-CM: F29  ICD-9-CM: 298.9  Unknown Unknown        Hyponatremia ICD-10-CM: E87.1  ICD-9-CM: 276.1  Unknown Yes        Acute respiratory failure with hypoxia ([facility] ICD-10-CM: J96.01  ICD-9-CM: 518.81  Unknown Yes        Left wrist fracture, with delayed healing, subsequent encounter ICD-10-CM: S62.102G  ICD-9-CM: V54.19  9/12/2021 Unknown        * (Principal) Drug overdose, intentional self-harm, initial encounter ([facility] ICD-10-CM: T50.902A  ICD-9-CM: 977.9, E950.5  9/12/2021 Yes        Hypoxia ICD-10-CM: R09.02  ICD-9-CM: 799.02  9/12/2021 Yes        Hypotension after procedure ICD-10-CM: I95.81  ICD-9-CM: 458.29  9/12/2021 Yes        Acute metabolic encephalopathy FGO-67-KO: G93.41  ICD-9-CM: 348.31  9/12/2021 Yes                  Earnestine Vance is a 39 y.o. female who is standing history of multidrug use/abuse, previous drug-seeking behavior and mental health issues otherwise unspecified arrives via EMS with acute metabolic encephalopathy and lethargy. Admitted for likely gabapentin overdose. She was intubated in ER, ct head no acute issue, LP done due to fever even on abx          Acute respiratory failure with hypoxia   Intubated on 9/12    Have to increase  Sedation due to agiation on vent last night    VAP bundle. HOB>30 degrees.    sbt per protocol    covid 19 rapid negative     Hypotension after intubation   Received albumin   So far bp remained well     Fever   Afebrile overnight zosyn    LP done no bacterial meningitis indicated     Anemia-Stable so far no bleeding reported and will continue monitoring   Upper PVL negative for dvt        Hypernatremia resolved   Mg replace as protocol   Continue monitoring   icu electrolytes replacement protocol        Acute metabolic encephalopathy   Ct head no acute process       Gabapentin overdose with lethargy and AMS   S/p procedural stomach emptying in ED with charcoal and sorbitol   Continue monitoring the side affect        elevated ammonia level  Continue   Lactulose, ammonia level check     Psych   Psychosis , hx of  Ekbom's delusional parasitosis   Need psych evaluation later   On olanzapine and klonopin     left wrist fracture: immobilized -poa     RN agitated on vent, have to increase the sedation     Disposition :tbd,   Review of systems:  Unable to obtain due to intubation   Vital signs/Intake and Output:  Visit Vitals  BP (!) 145/94   Pulse 60   Temp 98.1 °F (36.7 °C)   Resp 16   Ht 5' 2\" (1.575 m)   Wt 71.2 kg (157 lb)   SpO2 98%   BMI 28.72 kg/m²     Current Shift:  09/20 0701 - 09/20 1900  In: -   Out: 525 [Urine:525]  Last three shifts:  09/18 1901 - 09/20 0700  In: 360   Out: 2400 [Urine:2400]    Physical Exam:  General: Intubated    HEENT: NC, Atraumatic. ET tube noted   Lungs: CTA Bilaterally. No Wheezing/Rhonchi/Rales. Heart:  rrr ,  No murmur, No Rubs, No Gallops  Abdomen: Soft, Non distended, Non tender.   +Bowel sounds,   Extremities: No c/c.immobilzer noted on left wrist   Psych:   Calm   Neurologic:  On sedation           Labs: Results:       Chemistry Recent Labs     09/20/21  0557 09/19/21  0607 09/18/21  0525   * 137* 109*    144 143   K 4.0 4.1 3.8    108 110   CO2 26 25 26   BUN 22* 16 10   CREA 0.45* 0.33* 0.37*   CA 9.4 9.7 8.7   AGAP 8 11 7   BUCR 49* 48* 27*   * 138* 104   TP 6.8 7.0 5.8*   ALB 3.7 4.0 3.0*   GLOB 3.1 3.0 2.8   AGRAT 1.2 1.3 1.1      CBC w/Diff Recent Labs     09/20/21  0557 09/19/21  6034 09/18/21  0525   WBC 10.2 8.3 6.7   RBC 3.22* 2.99* 2.72*   HGB 9.7* 8.8* 8.1*   HCT 30.0* 27.0* 25.2*    313 252   GRANS 81* 86* 60   LYMPH 9* 10* 26   EOS 0 0 5      Cardiac Enzymes No results for input(s): CPK, CKND1, ZENON in the last 72 hours. No lab exists for component: CKRMB, TROIP   Coagulation No results for input(s): PTP, INR, APTT, INREXT, INREXT in the last 72 hours. Lipid Panel No results found for: CHOL, CHOLPOCT, CHOLX, CHLST, CHOLV, 666272, HDL, HDLP, LDL, LDLC, DLDLP, 306587, VLDLC, VLDL, TGLX, TRIGL, TRIGP, TGLPOCT, CHHD, CHHDX   BNP No results for input(s): BNPP in the last 72 hours. Liver Enzymes Recent Labs     09/20/21  0557   TP 6.8   ALB 3.7   *      Thyroid Studies No results found for: T4, T3U, TSH, TSHEXT, TSHEXT     Procedures/imaging: see electronic medical records for all procedures/Xrays and details which were not copied into this note but were reviewed prior to creation of Plan    XR WRIST LT AP/LAT/OBL MIN 3V    Result Date: 8/19/2021  EXAM: XR WRIST LT AP/LAT/OBL MIN 3V CLINICAL INDICATION/HISTORY: Fall with LEFT wrist pain and swelling -Additional: None COMPARISON: None TECHNIQUE: 3 views of the left wrist _______________ FINDINGS: BONES: Comminuted, impacted fracture of the distal radius with slight dorsal angulation of the distal fracture fragment. No aggressive appearing osseous lytic or blastic lesion. SOFT TISSUES: Unremarkable. _______________     Comminuted, impacted fracture of the distal radius. XR CHEST PORT    Result Date: 9/13/2021  EXAM: XR CHEST PORT CLINICAL INDICATION/HISTORY: Acute respiratory failure, ET tub position -Additional: None COMPARISON: One day prior TECHNIQUE: Portable frontal view of the chest _______________ FINDINGS: SUPPORT DEVICES: Endotracheal and enteric tubes unchanged. HEART AND MEDIASTINUM: Cardiomediastinal silhouette within normal limits. LUNGS AND PLEURAL SPACES: No dense consolidation, large effusion or pneumothorax. _______________     No acute cardiopulmonary abnormality. XR CHEST PORT    Result Date: 9/11/2021  MEDICAL RECORDS NUMBER: [id number] PROCEDURE:  Single view of the chest DATE: 9/11/2021 9:09 PM HISTORY: 39years old Female. post ett Comparison: 8/4/2021 FINDINGS: The patient has been intubated with appropriate placement of the endotracheal tube. There is no enteric tube passing below the level of the diaphragm. There is no significant effusion. There is no significant pneumothorax. Cardiomediastinal silhouette is within normal limits. There is no evidence of a focal pulmonary infiltrate or mass. 1. There is no significant or acute cardiopulmonary process. The patient has been intubated with appropriate placement of the endotracheal tube. There is no enteric tube passing below the level of the diaphragm. This report has been generated using voice recognition software. XR ABD PORT  1 V    Result Date: 9/12/2021  EXAM: Frontal view of the abdomen CLINICAL INDICATION/HISTORY: NG tube placement COMPARISON: None. _______________ FINDINGS: NG/OG tube in place with the tip in the left upper quadrant in the region of the gastric fundus. No bowel obstruction. Moderate colonic stool burden. _______________     1. NG/OG tube is in good position with the tip in the left upper quadrant in the region of the gastric fundus. 2. Moderate colonic stool burden.       Mukul Roberts MD

## 2021-09-21 ENCOUNTER — APPOINTMENT (OUTPATIENT)
Dept: GENERAL RADIOLOGY | Age: 41
DRG: 004 | End: 2021-09-21
Attending: INTERNAL MEDICINE
Payer: MEDICAID

## 2021-09-21 PROBLEM — E87.6 HYPOKALEMIA: Status: ACTIVE | Noted: 2021-09-21

## 2021-09-21 LAB
ALBUMIN SERPL-MCNC: 3.1 G/DL (ref 3.4–5)
ALBUMIN/GLOB SERPL: 1.1 {RATIO} (ref 0.8–1.7)
ALP SERPL-CCNC: 115 U/L (ref 45–117)
ALT SERPL-CCNC: 136 U/L (ref 13–56)
AMMONIA PLAS-SCNC: 44 UMOL/L (ref 11–32)
ANION GAP SERPL CALC-SCNC: 7 MMOL/L (ref 3–18)
AST SERPL-CCNC: 49 U/L (ref 10–38)
BASOPHILS # BLD: 0 K/UL (ref 0–0.1)
BASOPHILS NFR BLD: 0 % (ref 0–2)
BILIRUB SERPL-MCNC: 0.3 MG/DL (ref 0.2–1)
BUN SERPL-MCNC: 21 MG/DL (ref 7–18)
BUN/CREAT SERPL: 42 (ref 12–20)
CALCIUM SERPL-MCNC: 9 MG/DL (ref 8.5–10.1)
CHLORIDE SERPL-SCNC: 111 MMOL/L (ref 100–111)
CO2 SERPL-SCNC: 28 MMOL/L (ref 21–32)
CREAT SERPL-MCNC: 0.5 MG/DL (ref 0.6–1.3)
DIFFERENTIAL METHOD BLD: ABNORMAL
EOSINOPHIL # BLD: 0 K/UL (ref 0–0.4)
EOSINOPHIL NFR BLD: 0 % (ref 0–5)
ERYTHROCYTE [DISTWIDTH] IN BLOOD BY AUTOMATED COUNT: 12.4 % (ref 11.6–14.5)
GLOBULIN SER CALC-MCNC: 2.8 G/DL (ref 2–4)
GLUCOSE BLD STRIP.AUTO-MCNC: 101 MG/DL (ref 70–110)
GLUCOSE BLD STRIP.AUTO-MCNC: 103 MG/DL (ref 70–110)
GLUCOSE BLD STRIP.AUTO-MCNC: 111 MG/DL (ref 70–110)
GLUCOSE BLD STRIP.AUTO-MCNC: 95 MG/DL (ref 70–110)
GLUCOSE SERPL-MCNC: 98 MG/DL (ref 74–99)
HCT VFR BLD AUTO: 27.5 % (ref 35–45)
HGB BLD-MCNC: 8.9 G/DL (ref 12–16)
LYMPHOCYTES # BLD: 2.1 K/UL (ref 0.9–3.6)
LYMPHOCYTES NFR BLD: 20 % (ref 21–52)
MAGNESIUM SERPL-MCNC: 2.4 MG/DL (ref 1.6–2.6)
MCH RBC QN AUTO: 29.8 PG (ref 24–34)
MCHC RBC AUTO-ENTMCNC: 32.4 G/DL (ref 31–37)
MCV RBC AUTO: 92 FL (ref 78–100)
MONOCYTES # BLD: 0.1 K/UL (ref 0.05–1.2)
MONOCYTES NFR BLD: 1 % (ref 3–10)
NEUTS BAND NFR BLD MANUAL: 3 % (ref 0–5)
NEUTS SEG # BLD: 8.1 K/UL (ref 1.8–8)
NEUTS SEG NFR BLD: 76 % (ref 40–73)
PHOSPHATE SERPL-MCNC: 2.8 MG/DL (ref 2.5–4.9)
PLATELET # BLD AUTO: 437 K/UL (ref 135–420)
PLATELET COMMENTS,PCOM: ABNORMAL
PMV BLD AUTO: 9.6 FL (ref 9.2–11.8)
POTASSIUM SERPL-SCNC: 3.3 MMOL/L (ref 3.5–5.5)
POTASSIUM SERPL-SCNC: 3.8 MMOL/L (ref 3.5–5.5)
PROT SERPL-MCNC: 5.9 G/DL (ref 6.4–8.2)
RBC # BLD AUTO: 2.99 M/UL (ref 4.2–5.3)
RBC MORPH BLD: ABNORMAL
SODIUM SERPL-SCNC: 146 MMOL/L (ref 136–145)
WBC # BLD AUTO: 10.3 K/UL (ref 4.6–13.2)

## 2021-09-21 PROCEDURE — 85025 COMPLETE CBC W/AUTO DIFF WBC: CPT

## 2021-09-21 PROCEDURE — 36415 COLL VENOUS BLD VENIPUNCTURE: CPT

## 2021-09-21 PROCEDURE — 71045 X-RAY EXAM CHEST 1 VIEW: CPT

## 2021-09-21 PROCEDURE — 74011250637 HC RX REV CODE- 250/637: Performed by: INTERNAL MEDICINE

## 2021-09-21 PROCEDURE — 74011000258 HC RX REV CODE- 258: Performed by: INTERNAL MEDICINE

## 2021-09-21 PROCEDURE — 65610000006 HC RM INTENSIVE CARE

## 2021-09-21 PROCEDURE — 84132 ASSAY OF SERUM POTASSIUM: CPT

## 2021-09-21 PROCEDURE — 74011250636 HC RX REV CODE- 250/636: Performed by: INTERNAL MEDICINE

## 2021-09-21 PROCEDURE — 82140 ASSAY OF AMMONIA: CPT

## 2021-09-21 PROCEDURE — 82962 GLUCOSE BLOOD TEST: CPT

## 2021-09-21 PROCEDURE — 74011250636 HC RX REV CODE- 250/636: Performed by: FAMILY MEDICINE

## 2021-09-21 PROCEDURE — 84100 ASSAY OF PHOSPHORUS: CPT

## 2021-09-21 PROCEDURE — 94003 VENT MGMT INPAT SUBQ DAY: CPT

## 2021-09-21 PROCEDURE — 83735 ASSAY OF MAGNESIUM: CPT

## 2021-09-21 PROCEDURE — 74011000250 HC RX REV CODE- 250: Performed by: INTERNAL MEDICINE

## 2021-09-21 PROCEDURE — 94640 AIRWAY INHALATION TREATMENT: CPT

## 2021-09-21 PROCEDURE — 77010033678 HC OXYGEN DAILY

## 2021-09-21 RX ADMIN — LACTULOSE 45 ML: 20 SOLUTION ORAL at 21:16

## 2021-09-21 RX ADMIN — PROPOFOL 50 MCG/KG/MIN: 10 INJECTION, EMULSION INTRAVENOUS at 15:33

## 2021-09-21 RX ADMIN — FAMOTIDINE 20 MG: 20 TABLET ORAL at 21:17

## 2021-09-21 RX ADMIN — HALOPERIDOL LACTATE 4 MG: 5 INJECTION, SOLUTION INTRAMUSCULAR at 00:31

## 2021-09-21 RX ADMIN — CLONAZEPAM 0.5 MG: 0.5 TABLET ORAL at 08:32

## 2021-09-21 RX ADMIN — POTASSIUM BICARBONATE 10 MEQ: 391 TABLET, EFFERVESCENT ORAL at 12:26

## 2021-09-21 RX ADMIN — Medication 1 TABLET: at 08:32

## 2021-09-21 RX ADMIN — POTASSIUM BICARBONATE 10 MEQ: 391 TABLET, EFFERVESCENT ORAL at 08:34

## 2021-09-21 RX ADMIN — BUDESONIDE 500 MCG: 0.5 INHALANT RESPIRATORY (INHALATION) at 07:42

## 2021-09-21 RX ADMIN — FENTANYL CITRATE 150 MCG/HR: 0.05 INJECTION, SOLUTION INTRAMUSCULAR; INTRAVENOUS at 03:41

## 2021-09-21 RX ADMIN — ARFORMOTEROL TARTRATE 15 MCG: 15 SOLUTION RESPIRATORY (INHALATION) at 19:49

## 2021-09-21 RX ADMIN — LORAZEPAM 2 MG: 2 INJECTION INTRAMUSCULAR at 00:21

## 2021-09-21 RX ADMIN — FENTANYL CITRATE 150 MCG/HR: 0.05 INJECTION, SOLUTION INTRAMUSCULAR; INTRAVENOUS at 21:42

## 2021-09-21 RX ADMIN — CLONAZEPAM 0.5 MG: 0.5 TABLET ORAL at 21:17

## 2021-09-21 RX ADMIN — LACTULOSE 45 ML: 20 SOLUTION ORAL at 08:32

## 2021-09-21 RX ADMIN — POTASSIUM BICARBONATE 10 MEQ: 391 TABLET, EFFERVESCENT ORAL at 11:25

## 2021-09-21 RX ADMIN — PROPOFOL 50 MCG/KG/MIN: 10 INJECTION, EMULSION INTRAVENOUS at 20:14

## 2021-09-21 RX ADMIN — FENTANYL CITRATE 150 MCG/HR: 0.05 INJECTION, SOLUTION INTRAMUSCULAR; INTRAVENOUS at 09:44

## 2021-09-21 RX ADMIN — POTASSIUM BICARBONATE 10 MEQ: 391 TABLET, EFFERVESCENT ORAL at 09:22

## 2021-09-21 RX ADMIN — IPRATROPIUM BROMIDE AND ALBUTEROL SULFATE 3 ML: .5; 3 SOLUTION RESPIRATORY (INHALATION) at 07:42

## 2021-09-21 RX ADMIN — IPRATROPIUM BROMIDE AND ALBUTEROL SULFATE 3 ML: .5; 3 SOLUTION RESPIRATORY (INHALATION) at 19:49

## 2021-09-21 RX ADMIN — OLANZAPINE 10 MG: 10 TABLET, FILM COATED ORAL at 17:07

## 2021-09-21 RX ADMIN — POTASSIUM BICARBONATE 10 MEQ: 391 TABLET, EFFERVESCENT ORAL at 10:21

## 2021-09-21 RX ADMIN — 0.12% CHLORHEXIDINE GLUCONATE 10 ML: 1.2 RINSE ORAL at 21:16

## 2021-09-21 RX ADMIN — BUDESONIDE 500 MCG: 0.5 INHALANT RESPIRATORY (INHALATION) at 19:49

## 2021-09-21 RX ADMIN — FENTANYL CITRATE 150 MCG/HR: 0.05 INJECTION, SOLUTION INTRAMUSCULAR; INTRAVENOUS at 15:41

## 2021-09-21 RX ADMIN — FENTANYL CITRATE 50 MCG: 50 INJECTION INTRAMUSCULAR; INTRAVENOUS at 00:56

## 2021-09-21 RX ADMIN — THIAMINE HCL TAB 100 MG 100 MG: 100 TAB at 08:32

## 2021-09-21 RX ADMIN — IPRATROPIUM BROMIDE AND ALBUTEROL SULFATE 3 ML: .5; 3 SOLUTION RESPIRATORY (INHALATION) at 14:31

## 2021-09-21 RX ADMIN — POTASSIUM BICARBONATE 20 MEQ: 782 TABLET, EFFERVESCENT ORAL at 19:43

## 2021-09-21 RX ADMIN — 0.12% CHLORHEXIDINE GLUCONATE 10 ML: 1.2 RINSE ORAL at 08:08

## 2021-09-21 RX ADMIN — PROPOFOL 50 MCG/KG/MIN: 10 INJECTION, EMULSION INTRAVENOUS at 09:48

## 2021-09-21 RX ADMIN — SODIUM CHLORIDE 10 ML: 9 INJECTION, SOLUTION INTRAMUSCULAR; INTRAVENOUS; SUBCUTANEOUS at 15:34

## 2021-09-21 RX ADMIN — FAMOTIDINE 20 MG: 20 TABLET ORAL at 08:32

## 2021-09-21 RX ADMIN — PROPOFOL 50 MCG/KG/MIN: 10 INJECTION, EMULSION INTRAVENOUS at 05:07

## 2021-09-21 RX ADMIN — ARFORMOTEROL TARTRATE 15 MCG: 15 SOLUTION RESPIRATORY (INHALATION) at 07:42

## 2021-09-21 RX ADMIN — FOLIC ACID 1 MG: 1 TABLET ORAL at 08:32

## 2021-09-21 RX ADMIN — ENOXAPARIN SODIUM 40 MG: 100 INJECTION SUBCUTANEOUS at 17:07

## 2021-09-21 RX ADMIN — MIDAZOLAM 7 MG/HR: 5 INJECTION, SOLUTION INTRAMUSCULAR; INTRAVENOUS at 10:52

## 2021-09-21 NOTE — DIABETES MGMT
GLYCEMIC CONTROL PLAN OF CARE        Diabetes Management:      Assessment: known h/o T2DM, HbA1C within recommended range for age + comorbids, oral home regimen  Admitted for acute metabolic encephalopathy and lethargy.  Admitted for likely gabapentin overdose.      Recommend:  Continue corrective coverage    BG in target range (non-ICU\" < 180 ; -180):  [] Yes  [x] No      Steroids: Solumedrol 40 mg q6h, steroid associated hyperglycemia    TDD previous day = 2 - Humalog Normal Insulin Sensitivity Corrective Coverage    Most recent blood glucose results:   Lab Results   Component Value Date/Time    GLU 98 09/21/2021 04:52 AM    GLUCPOC 103 09/21/2021 05:07 AM    GLUCPOC 115 (H) 09/20/2021 11:24 PM    GLUCPOC 139 (H) 09/20/2021 05:16 PM     Hypo: no    HbA1C: equivalent  to ave BGlucose of 105 mg/dl for 2-3 months prior to admission (pending)    Lab Results   Component Value Date/Time    Hemoglobin A1c 5.3 09/14/2021 05:00 AM     Adequate glycemic control PTA:  [x] Yes  [] No      Home diabetes medications:  Key Antihyperglycemic Medications             metFORMIN (GLUCOPHAGE) 500 mg tablet TAKE 1 TABLET BY MOUTH ONCE DAILY          Goals:  Blood glucose will be within target range of 70 - 180 mg/dL by: 9/30    Diet:   Active Orders   Diet    DIET NPO       Education:  _____ Refer to Diabetes Education Record                       ___X__ Education not indicated at this time      Donnie Ivey 87, RN, CDE  Glycemic Control Team  [phone]  Pager [phone] (M-TH 8:00-4:30P)  *After Hours pager [phone]

## 2021-09-21 NOTE — PROGRESS NOTES
0720-Received pt resting in bed with eyes closed, vss on sedation and ventilator, will continue to monitor  0900-Pt status discussed with intensivist  1100-Lucian updated on pt status via phone  1400-Sister in law Jamil Thornton updated on pt status via phone  1600-Pt resting in bed with eyes closed, no sign of distress, vss  1745-Pt remains stable on ventilator, no sign of distress  1911-Bedside and Verbal shift change report given to [facility] (oncoming nurse) by Vargas Triplett RN (offgoing nurse). Report included the following information SBAR, Kardex, Intake/Output, MAR, Recent Results and Cardiac Rhythm SR/ST.

## 2021-09-21 NOTE — PROGRESS NOTES
Physician Progress Note      Fannie Lazo  CSN #:                  [id number]  :                       1980  ADMIT DATE:       2021 7:44 PM  [address] [place] DATE:  RESPONDING  PROVIDER #:        Emily ARAMBULA MD        QUERY TEXT:    Type of Anemia: Please provide further specificity, if known. Clinical indicators include: anemia, bleeding, rbc, hgb, hct  Options provided:  -- Anemia due to acute blood loss  -- Anemia due to chronic blood loss  -- Anemia due to iron deficiency  -- Anemia due to postoperative blood loss  -- Anemia due to chronic disease  -- Other - I will add my own diagnosis  -- Disagree - Not applicable / Not valid  -- Disagree - Clinically Unable to determine / Unknown        PROVIDER RESPONSE TEXT:    Provider was unable to determine a response for this query.       Electronically signed by:  Emily ARAMBULA MD 2021 4:29 PM

## 2021-09-21 NOTE — PROGRESS NOTES
Hospitalist Progress Note-critical care note     Patient: Justina Flowers MRN: [id number]  CSN: [id number]    YOB: 1980  Age: 39 y.o. Sex: female    DOA: 9/11/2021 LOS:  LOS: 9 days            Chief complaint: wrist fracture , drug overdose , acute respiratory failure with hypoxia, psychosis     Assessment/Plan         Hospital Problems  Date Reviewed: 9/6/2015        Codes Class Noted POA    Increased ammonia level ICD-10-CM: R79.89  ICD-9-CM: 790.6  9/14/2021 Unknown        Psychosis ([facility].) ICD-10-CM: F29  ICD-9-CM: 298.9  Unknown Unknown        Hyponatremia ICD-10-CM: E87.1  ICD-9-CM: 276.1  Unknown Yes        Acute respiratory failure with hypoxia ([facility] ICD-10-CM: J96.01  ICD-9-CM: 518.81  Unknown Yes        Left wrist fracture, with delayed healing, subsequent encounter ICD-10-CM: S62.102G  ICD-9-CM: V54.19  9/12/2021 Unknown        * (Principal) Drug overdose, intentional self-harm, initial encounter ([facility] ICD-10-CM: T50.902A  ICD-9-CM: 977.9, E950.5  9/12/2021 Yes        Hypoxia ICD-10-CM: R09.02  ICD-9-CM: 799.02  9/12/2021 Yes        Hypotension after procedure ICD-10-CM: I95.81  ICD-9-CM: 458.29  9/12/2021 Yes        Acute metabolic encephalopathy PNU-57-DZ: G93.41  ICD-9-CM: 348.31  9/12/2021 Yes                  Justina Flowers is a 39 y.o. female who is standing history of multidrug use/abuse, previous drug-seeking behavior and mental health issues otherwise unspecified arrives via EMS with acute metabolic encephalopathy and lethargy. Admitted for likely gabapentin overdose. She was intubated in ER, ct head no acute issue, LP done due to fever even on abx          Acute respiratory failure with hypoxia   Intubated on 9/12    Have to increase  Sedation due to agiation on vent last night    VAP bundle. HOB>30 degrees.    Failed sbt ,  Possible trach per ent    covid 19 rapid negative     Hypotension after intubation   Received albumin   So far bp remained well     Fever   Afebrile overnight   ID on board, will complete last day zosyn   LP done no bacterial meningitis indicated     Anemia-Stable so far no bleeding reported and will continue monitoring   Upper PVL negative for dvt        Hypernatremia resolved   Mg replace as protocol     Hypokalemia   K replacement  AM   Continue monitoring   icu electrolytes replacement protocol        Acute metabolic encephalopathy   Ct head no acute process       Gabapentin overdose with lethargy and AMS   S/p procedural stomach emptying in ED with charcoal and sorbitol   Continue monitoring the side affect        elevated ammonia level  Continue   Lactulose, ammonia level check     Psych   Psychosis , hx of  Ekbom's delusional parasitosis   Need psych evaluation later   On olanzapine and klonopin     left wrist fracture: immobilized -poa     RN agitated on vent when weaning sedation   Disposition :tbd,   Review of systems:  Unable to obtain due to intubation   Vital signs/Intake and Output:  Visit Vitals  BP (!) 93/51   Pulse 73   Temp 97.6 °F (36.4 °C)   Resp 14   Ht 5' 2\" (1.575 m)   Wt 71.2 kg (157 lb)   SpO2 97%   BMI 28.72 kg/m²     Current Shift:  No intake/output data recorded. Last three shifts:  09/19 1901 - 09/21 0700  In: 850.4 [I.V.:360.4]  Out: 2050 [Urine:2050]    Physical Exam:  General: Intubated    HEENT: NC, Atraumatic. ET tube noted   Lungs: CTA Bilaterally. No Wheezing/Rhonchi/Rales. Heart:  rrr ,  No murmur, No Rubs, No Gallops  Abdomen: Soft, Non distended, Non tender.   +Bowel sounds,   Extremities: No c/c.immobilzer noted on left wrist   Psych:   Calm   Neurologic:  On sedation           Labs: Results:       Chemistry Recent Labs     09/21/21  0452 09/20/21  0557 09/19/21  0607   GLU 98 146* 137*   * 144 144   K 3.3* 4.0 4.1    110 108   CO2 28 26 25   BUN 21* 22* 16   CREA 0.50* 0.45* 0.33*   CA 9.0 9.4 9.7   AGAP 7 8 11   BUCR 42* 49* 48*    146* 138*   TP 5.9* 6.8 7.0   ALB 3.1* 3.7 4.0   GLOB 2.8 3.1 3.0 AGRAT 1.1 1.2 1.3      CBC w/Diff Recent Labs     09/21/21  0452 09/20/21  0557 09/19/21  0607   WBC 10.3 10.2 8.3   RBC 2.99* 3.22* 2.99*   HGB 8.9* 9.7* 8.8*   HCT 27.5* 30.0* 27.0*   * 406 313   GRANS 76* 81* 86*   LYMPH 20* 9* 10*   EOS 0 0 0      Cardiac Enzymes No results for input(s): CPK, CKND1, ZENON in the last 72 hours. No lab exists for component: CKRMB, TROIP   Coagulation No results for input(s): PTP, INR, APTT, INREXT, INREXT in the last 72 hours. Lipid Panel No results found for: CHOL, CHOLPOCT, CHOLX, CHLST, CHOLV, 004900, HDL, HDLP, LDL, LDLC, DLDLP, 364303, VLDLC, VLDL, TGLX, TRIGL, TRIGP, TGLPOCT, CHHD, CHHDX   BNP No results for input(s): BNPP in the last 72 hours. Liver Enzymes Recent Labs     09/21/21  0452   TP 5.9*   ALB 3.1*         Thyroid Studies No results found for: T4, T3U, TSH, TSHEXT, TSHEXT     Procedures/imaging: see electronic medical records for all procedures/Xrays and details which were not copied into this note but were reviewed prior to creation of Plan    XR WRIST LT AP/LAT/OBL MIN 3V    Result Date: 8/19/2021  EXAM: XR WRIST LT AP/LAT/OBL MIN 3V CLINICAL INDICATION/HISTORY: Fall with LEFT wrist pain and swelling -Additional: None COMPARISON: None TECHNIQUE: 3 views of the left wrist _______________ FINDINGS: BONES: Comminuted, impacted fracture of the distal radius with slight dorsal angulation of the distal fracture fragment. No aggressive appearing osseous lytic or blastic lesion. SOFT TISSUES: Unremarkable. _______________     Comminuted, impacted fracture of the distal radius. XR CHEST PORT    Result Date: 9/13/2021  EXAM: XR CHEST PORT CLINICAL INDICATION/HISTORY: Acute respiratory failure, ET tub position -Additional: None COMPARISON: One day prior TECHNIQUE: Portable frontal view of the chest _______________ FINDINGS: SUPPORT DEVICES: Endotracheal and enteric tubes unchanged.  HEART AND MEDIASTINUM: Cardiomediastinal silhouette within normal limits. LUNGS AND PLEURAL SPACES: No dense consolidation, large effusion or pneumothorax. _______________     No acute cardiopulmonary abnormality. XR CHEST PORT    Result Date: 9/11/2021  MEDICAL RECORDS NUMBER: [id number] PROCEDURE:  Single view of the chest DATE: 9/11/2021 9:09 PM HISTORY: 39years old Female. post ett Comparison: 8/4/2021 FINDINGS: The patient has been intubated with appropriate placement of the endotracheal tube. There is no enteric tube passing below the level of the diaphragm. There is no significant effusion. There is no significant pneumothorax. Cardiomediastinal silhouette is within normal limits. There is no evidence of a focal pulmonary infiltrate or mass. 1. There is no significant or acute cardiopulmonary process. The patient has been intubated with appropriate placement of the endotracheal tube. There is no enteric tube passing below the level of the diaphragm. This report has been generated using voice recognition software. XR ABD PORT  1 V    Result Date: 9/12/2021  EXAM: Frontal view of the abdomen CLINICAL INDICATION/HISTORY: NG tube placement COMPARISON: None. _______________ FINDINGS: NG/OG tube in place with the tip in the left upper quadrant in the region of the gastric fundus. No bowel obstruction. Moderate colonic stool burden. _______________     1. NG/OG tube is in good position with the tip in the left upper quadrant in the region of the gastric fundus. 2. Moderate colonic stool burden.       Asya Norris MD

## 2021-09-21 NOTE — PROGRESS NOTES
Palliative Medicine    Goals of care are well defined. The Palliative Care team will sign off. Please reconsult team as needed/if appropriate. Thank you for the Palliative Medicine consult and allowing us to participate in the care of Max Perez. Jason Gillespie [facility]  Palliative Medicine Inpatient   [address] [place] [address]: [phone] ([phone])

## 2021-09-21 NOTE — PROGRESS NOTES
1930- Report and care received, assessment completed per flow sheet. Intubated, sedated, NAD, soft bilateral wrist restraints in place to protect integrity of lines/tubes, flow sheet in progress. 2300- Reassessment without change, calm, tube feeding restarted at previous rate. Bath and linen change completed. 0400- Reassessment without change.

## 2021-09-21 NOTE — PROGRESS NOTES
[facility] Specialists  Pulmonary, Critical Care, and Sleep Medicine    Name: Shayna Sofia MRN: [id number]   : 1980 Hospital: [facility] [place]   Date: 2021        Pulmonary Critical Care Note    IMPRESSION:   · Acute respiratory failure · J96.00         Patient Active Problem List   Diagnosis Code    Dextromethorphan use disorder, moderate ([facility] [address] F19.20    Ekbom's delusional parasitosis ([facility] [address]    Left wrist fracture, with delayed healing, subsequent encounter S62.102G    Drug overdose, intentional self-harm, initial encounter ([facility] [address] T50.902A    Hypoxia R09.02    Hypotension after procedure I95.81    Acute metabolic encephalopathy L22.96    Psychosis ([facility] [address] F29    Hyponatremia E87.1    Acute respiratory failure with hypoxia (HCC) J96.01    Increased ammonia level R79.89 ·   Code status: full code     RECOMMENDATIONS:   Respiratory: Patient on ventilator support; intubated 2021 due to encephalopathy and drug overdose. Patient was extubated on 2021, and reintubated within an hour due to respiratory distress and upper airway edema. Completed Solu-Medrol 40 mg IV every 6 hours x8 doses  for upper airway edema. Due to prolonged intubation, and patient agitation with history of substance abuserecommend tracheostomy tube placement. Discussed with Dr. Laith Newell 2021: Waiting for evaluation and plan for tracheostomy. Chest x-ray reviewed: ETT and OGT in place. Subsegmental basilar atelectasis. On AC 14//30/7; unchanged. Continue ventilator bundle and sedation. Sedation: On Versed 8, fentanyl 150, propofol 50. Severe agitation when sedation lowered, due to underlying psych issues. Bronchodilators: DuoNeb 3 times daily, Brovana twice daily, Pulmicort twice daily. ID: No fever or leukocytosis. S/p LP with negative CSF cultures. Covid test 2021 negative this admission  Completed zosyn for 5 days for aspiration on 2021. ID has signed off.    CVS: NSR on telemetry. Hemodynamically stable; monitor. Echo 9/17/2021: LVEF 60 to 65%. RVSP 29 mmHg. Renal: Normal creatinine and urine output; monitor. Replace electrolytes per ICU protocol. Hypokalemia being replaced. Mild hypernatremia; add free water 250 mL every 4 hours through OGT feed. Heme: Chronic mild anemia; hemoglobin low today but no active external bleeding; platelets normal; continue to monitor  Endo: Stable blood sugars; continue tube feeding, tolerating tube feed at 45 mill per hour; no hypoglycemic episodes  GI: Continue tube feeding as tolerated; thiamine, folic acid and multivitamin supplements. LFT improving; continue to monitor. Neurology: Sedation as above. Ammonia improving to 44; continue lactulose 30 g twice daily. CT headnil acute  Psych: Continue Zyprexa and Klonopin. Toxicology: gabapentin OD  Skin: ICU nursing care  MS: Left wrist fracture in immobilization external fixator  Prophylaxis: Enoxaparin and famotidine  Restraints: Wrist soft restraints for patient interfering with medical therapy/management and patient safety. Prognosis guarded overall. CODE STATUSfull code. Palliative care on case. Quality Care: PPI, DVT prophylaxis, HOB elevated, Infection control all reviewed and addressed. Lines/Tubes: PIV   ETT: 9/11/21  NGT: 9/11/21  [place]: 9/11/21  FMS in place    ADVANCE DIRECTIVE: Full code. Patient's  in [facility]; he had called this morning and discussed with ICU RN. Discussed with RN, RT, MDR. High complexity decision making was performed during the evaluation of this patient at high risk for decompensation with multiple organ involvement. Critical care time excludes discussion or procedure: 36 minutes. Master Beaulieu   sister-in-law   [phone]       Family discussion 9/19/2021:Patient  Mr. Mina Crabtree called ICU; he is currently in [facility]; updated management plans; discussed that patient failed extubation yesterday, and needed to be reintubated due to upper airway swelling from prolonged intubation; due to patient's medical condition, recommended tracheostomy, and patient  is agreeable. Patient  would like information to be provided as needed to his 2 sisteraMry Jane Ray (above) and [place] [[phone]]; patient's  will call ICU daily from residential to get updates; he does not want information to be provided to anyone else other than his 2 sisters. Subjective/History:   Ms. Deann Gallagher has been seen and evaluated as Dr. Shoshana Wang requested now for assisting with Acute respiratory failure and ventilator management. Patient is a 39 y.o. female with following PMhx presented to ER with lethargy via EMS and admitted for Gabapentin overdose. Pt required intubation for airways protection. Position control was contacted that recommended supportive care per ER provider. Pt seen at bedside in ER rm#2. The patient can not provide additional history due to Ventilated. 9/21/2021   Remains in ICU room 104. Intubated and sedated. Currently on Versed 8, fentanyl 150, propofol 50. When sedation lowered down, she gets agitated. Waiting for Dr. Shaista Downey for tracheostomy placement decision. No fever. Hemodynamically stable. No ETT secretions. CXR stable. Ammonia improved to normal on lactulose with loose bowel movement in FMS. Mild hypernatremia, will start free water. Hypokalemia being replaced. Patient's  called from [facility] yesterday; was updated by RN. PCCM was not called for any issues. No other overnight issues reported. Patient has a history of drug use and smoker and alcohol use. Review of Systems:  Review of systems not obtained due to patient factors.         Past Medical History:  Past Medical History:   Diagnosis Date    Asthma     Bilateral ovarian cysts     Cocaine abuse ([facility] [address]     Mental and behavioral problem     Pancreatitis     Pancreatitis     Psychosis ([facility] [address] Past Surgical History:  Past Surgical History:   Procedure Laterality Date    HX GYN      D&C, Hysterectomy        Medications:  Prior to Admission medications    Medication Sig Start Date End Date Taking? Authorizing Provider   albuterol (PROVENTIL HFA, VENTOLIN HFA, PROAIR HFA) 90 mcg/actuation inhaler Take 2 Puffs by inhalation every four (4) hours as needed for Wheezing or Shortness of Breath. 8/4/21   Lisa Gamez PA-C   ibuprofen (MOTRIN) 600 mg tablet Take 1 Tablet by mouth every six (6) hours as needed for Pain. Take with food. 8/4/21   Lisa Gamez PA-C   ALPRAZolam (XANAX) 0.5 mg tablet TAKE 1 TABLET BY MOUTH ONCE DAILY AS NEEDED FOR ANXIETY 12/4/19   Provider, Historical   metFORMIN (GLUCOPHAGE) 500 mg tablet TAKE 1 TABLET BY MOUTH ONCE DAILY 11/12/19   Provider, Historical   nicotine (NICODERM CQ) 21 mg/24 hr APPLY 1 PATCH TOPICALLY TO THE SKIN DAILY FOR CRAVINGS AS DIRECTED 12/4/19   Provider, Historical   traZODone (DESYREL) 50 mg tablet TAKE 1 TABLET BY MOUTH AT BEDTIME AS NEEDED FOR INSOMNIA 12/4/19   Provider, Historical   escitalopram oxalate (LEXAPRO) 10 mg tablet Take 10 mg by mouth daily. Other, MD Luisa   lurasidone (LATUDA) 80 mg tab tablet Take 80 mg by mouth daily (with dinner).     Other, MD Luisa       Current Facility-Administered Medications   Medication Dose Route Frequency    potassium bicarb-citric acid (EFFER-K) tablet 10 mEq  10 mEq Oral Q1H    fentaNYL (PF) 900 mcg/30 ml infusion soln  0-200 mcg/hr IntraVENous TITRATE    propofol (DIPRIVAN) 10 mg/mL infusion  0-50 mcg/kg/min IntraVENous TITRATE    famotidine (PEPCID) tablet 20 mg  20 mg Oral BID    midazolam in normal saline (VERSED) 1 mg/mL infusion  2-10 mg/hr IntraVENous TITRATE    thiamine mononitrate (B-1) tablet 100 mg  100 mg Oral DAILY    folic acid (FOLVITE) tablet 1 mg  1 mg Oral DAILY    multivitamin, tx-iron-ca-min (THERA-M w/ IRON) tablet 1 Tablet  1 Tablet Oral DAILY    lactulose (CHRONULAC) 10 gram/15 mL solution 45 mL  30 g Oral BID    clonazePAM (KlonoPIN) tablet 0.5 mg  0.5 mg Oral BID    insulin lispro (HUMALOG) injection   SubCUTAneous Q6H    arformoteroL (BROVANA) neb solution 15 mcg  15 mcg Nebulization BID RT    budesonide (PULMICORT) 500 mcg/2 ml nebulizer suspension  500 mcg Nebulization BID RT    OLANZapine (ZyPREXA) tablet 10 mg  10 mg Oral QPM    albuterol-ipratropium (DUO-NEB) 2.5 MG-0.5 MG/3 ML  3 mL Nebulization TID RT    enoxaparin (LOVENOX) injection 40 mg  40 mg SubCUTAneous Q24H    sodium chloride (NS) flush 5-40 mL  5-40 mL IntraVENous Q8H    chlorhexidine (PERIDEX) 0.12 % mouthwash 10 mL  10 mL Oral Q12H       Allergy:  Allergies   Allergen Reactions    Fish Containing Products Itching    Toradol [Ketorolac] Rash     Pt reports she is not allergic to this medication. Social History:  Social History     Tobacco Use    Smoking status: Current Every Day Smoker     Packs/day: 1.00    Smokeless tobacco: Never Used   Substance Use Topics    Alcohol use: Not Currently    Drug use: Not Currently     Types: Cocaine, Marijuana     Comment: used with in past 24 hours        Family History:  No family history on file. Objective:   Vital Signs:    Blood pressure 120/74, pulse (!) 113, temperature 97.6 °F (36.4 °C), resp. rate 12, height 5' 2\" (1.575 m), weight 71.2 kg (157 lb), last menstrual period 05/15/2018, SpO2 100 %. Body mass index is 28.72 kg/m².    O2 Device: Endotracheal tube, Ventilator       Temp (24hrs), Av.6 °F (36.4 °C), Min:97.4 °F (36.3 °C), Max:97.9 °F (36.6 °C)         Intake/Output:   Last shift:      701 - 1900  In: 408.8 [I.V.:68.8]  Out: -   Last 3 shifts: 1901 - 700  In: 850.4 [I.V.:360.4]  Out:  [Urine:]    Intake/Output Summary (Last 24 hours) at 2021 1027  Last data filed at 2021 0900  Gross per 24 hour   Intake 1129.2 ml   Output 1525 ml   Net -395.8 ml       Ventilator Settings:  Mode Rate Tidal Volume Pressure FiO2 PEEP   Assist control, VC+   400 ml  7 cm H2O 30 % 7 cm H20     Peak airway pressure: 17 cm H2O    Minute ventilation: 6.96 l/min      Lung protective strategy, Pl pressure goals less than or equal to 30. Physical Exam:       General/Neurology: Intubated and sedated. Head:   NCAT. Eye:   No icterus/pallor/cyanosis. Nose:   No nasal drainage/discharge. Neck:   Trachea midline. Lung: Moderate air entry bilateral equal.  No rales, rhonchi. No wheezing or stridor. No prolonged expiration or accessory muscle use. Heart:   S1 S2 present. No murmur or JVD. Abdomen:  Soft. NT. ND. No palpable masses. Extremities:  No edema. No cyanosis or clubbing. Pulses: 2+ and symmetric in DP. Data:     Recent Results (from the past 12 hour(s))   GLUCOSE, POC    Collection Time: 09/20/21 11:24 PM   Result Value Ref Range    Glucose (POC) 115 (H) 70 - 110 mg/dL   CBC WITH AUTOMATED DIFF    Collection Time: 09/21/21  4:52 AM   Result Value Ref Range    WBC 10.3 4.6 - 13.2 K/uL    RBC 2.99 (L) 4.20 - 5.30 M/uL    HGB 8.9 (L) 12.0 - 16.0 g/dL    HCT 27.5 (L) 35.0 - 45.0 %    MCV 92.0 78.0 - 100.0 FL    MCH 29.8 24.0 - 34.0 PG    MCHC 32.4 31.0 - 37.0 g/dL    RDW 12.4 11.6 - 14.5 %    PLATELET 383 (H) 102 - 420 K/uL    MPV 9.6 9.2 - 11.8 FL    NEUTROPHILS 76 (H) 40 - 73 %    BAND NEUTROPHILS 3 0 - 5 %    LYMPHOCYTES 20 (L) 21 - 52 %    MONOCYTES 1 (L) 3 - 10 %    EOSINOPHILS 0 0 - 5 %    BASOPHILS 0 0 - 2 %    ABS. NEUTROPHILS 8.1 (H) 1.8 - 8.0 K/UL    ABS. LYMPHOCYTES 2.1 0.9 - 3.6 K/UL    ABS. MONOCYTES 0.1 0.05 - 1.2 K/UL    ABS. EOSINOPHILS 0.0 0.0 - 0.4 K/UL    ABS.  BASOPHILS 0.0 0.0 - 0.1 K/UL    DF MANUAL      PLATELET COMMENTS Increased Platelets      RBC COMMENTS NORMOCYTIC, NORMOCHROMIC     MAGNESIUM    Collection Time: 09/21/21  4:52 AM   Result Value Ref Range    Magnesium 2.4 1.6 - 2.6 mg/dL   METABOLIC PANEL, COMPREHENSIVE    Collection Time: 09/21/21  4:52 AM   Result Value Ref Range    Sodium 146 (H) 136 - 145 mmol/L    Potassium 3.3 (L) 3.5 - 5.5 mmol/L    Chloride 111 100 - 111 mmol/L    CO2 28 21 - 32 mmol/L    Anion gap 7 3.0 - 18 mmol/L    Glucose 98 74 - 99 mg/dL    BUN 21 (H) 7.0 - 18 MG/DL    Creatinine 0.50 (L) 0.6 - 1.3 MG/DL    BUN/Creatinine ratio 42 (H) 12 - 20      GFR est AA >60 >60 ml/min/1.73m2    GFR est non-AA >60 >60 ml/min/1.73m2    Calcium 9.0 8.5 - 10.1 MG/DL    Bilirubin, total 0.3 0.2 - 1.0 MG/DL    ALT (SGPT) 136 (H) 13 - 56 U/L    AST (SGOT) 49 (H) 10 - 38 U/L    Alk. phosphatase 115 45 - 117 U/L    Protein, total 5.9 (L) 6.4 - 8.2 g/dL    Albumin 3.1 (L) 3.4 - 5.0 g/dL    Globulin 2.8 2.0 - 4.0 g/dL    A-G Ratio 1.1 0.8 - 1.7     PHOSPHORUS    Collection Time: 09/21/21  4:52 AM   Result Value Ref Range    Phosphorus 2.8 2.5 - 4.9 MG/DL   AMMONIA    Collection Time: 09/21/21  4:52 AM   Result Value Ref Range    Ammonia 44 (H) 11 - 32 UMOL/L   GLUCOSE, POC    Collection Time: 09/21/21  5:07 AM   Result Value Ref Range    Glucose (POC) 103 70 - 110 mg/dL             Recent Labs     09/18/21  1401   FIO2I 50   HCO3I 25.6   PCO2I 43.8   PHI 7.38   PO2I 91       All Micro Results     Procedure Component Value Units Date/Time    CULTURE, BLOOD [[id number]] Collected: 09/14/21 0515    Order Status: Completed Specimen: Blood Updated: 09/20/21 0832     Special Requests: NO SPECIAL REQUESTS        Culture result: NO GROWTH 6 DAYS       CULTURE, BLOOD [[id number]] Collected: 09/14/21 0500    Order Status: Completed Specimen: Blood Updated: 09/20/21 0832     Special Requests: NO SPECIAL REQUESTS        Culture result: NO GROWTH 6 DAYS       CULTURE, CSF  TUBE 2 [[id number]] Collected: 09/16/21 1434    Order Status: Completed Specimen: Cerebrospinal Fluid Updated: 09/17/21 1615     Special Requests: TUBE 3, CULTURE AND SENSITIVTY AND GRAM STAIN.      GRAM STAIN RARE WBCS SEEN         NO ORGANISMS SEEN        Culture result:       NO GROWTH THUS FAR HOLDING 7 DAYS MENINGITIS PATHOGENS PANEL, CSF (BY PCR) [[id number]] Collected: 09/16/21 1434    Order Status: Completed Specimen: Cerebrospinal Fluid Updated: 09/17/21 0050     Escherichia coli K1 Not detected        Haemophilus Influenzae Not detected        Listeria Monocytogenes Not detected        Neisseria Meningitidis Not detected        Streptococcus Agalactiae Not detected        Streptococcus Pneumoniae Not detected        Cytomegalovirus Not detected        Enterovirus Not detected        Herpes Simplex Virus 1 Not detected        Comment: In patients who have negative herpes simplex 1 and 2 PCR results, do not modify treatment, confirm with alternate testing. Herpes Simplex Virus 2 Not detected        Comment: In patients who have negative herpes simplex 1 and 2 PCR results, do not modify treatment, confirm with alternate testing. Human Herpesvirus 6 Not detected        Human Parechovirus Not detected        Varicella Zoster Virus Not detected        Crypto. neoformans/gattii Not detected       MENINGITIS PATHOGENS PANEL, CSF (BY PCR) [[id number]] Collected: 09/16/21 1545    Order Status: Canceled Specimen: Cerebrospinal Fluid     HSV 1 AND 2 BY PCR [[id number]] Collected: 09/16/21 1434    Order Status: Completed Specimen: Other Updated: 09/16/21 1502    COVID-19 RAPID TEST [[id number]] Collected: 09/16/21 1000    Order Status: Completed Specimen: Nasopharyngeal Updated: 09/16/21 1032     Specimen source Nasopharyngeal        COVID-19 rapid test Not detected        Comment: Rapid Abbott ID Now       Rapid NAAT:  The specimen is NEGATIVE for SARS-CoV-2, the novel coronavirus associated with COVID-19. Negative results should be treated as presumptive and, if inconsistent with clinical signs and symptoms or necessary for patient management, should be tested with an alternative molecular assay.   Negative results do not preclude SARS-CoV-2 infection and should not be used as the sole basis for patient management decisions. This test has been authorized by the FDA under an Emergency Use Authorization (EUA) for use by authorized laboratories.    Fact sheet for Healthcare Providers: ConventionUpdate.co.nz  Fact sheet for Patients: ConventionUpdate.co.nz       Methodology: Isothermal Nucleic Acid Amplification         LEGIONELLA PNEUMOPHILA AG, URINE [[id number]] Collected: 09/14/21 2315    Order Status: Completed Specimen: Urine, random Updated: 09/15/21 1042     Legionella Ag, urine Negative       STREP PNEUMO AG, URINE [[id number]] Collected: 09/14/21 2315    Order Status: Completed Specimen: Urine, random Updated: 09/15/21 1042     Strep pneumo Ag, urine Negative       RESPIRATORY VIRUS PANEL W/COVID-19, PCR [[id number]] Collected: 09/14/21 1832    Order Status: Completed Specimen: Nasopharyngeal Updated: 09/14/21 2234     Adenovirus Not detected        Coronavirus 229E Not detected        Coronavirus HKU1 Not detected        Coronavirus CVNL63 Not detected        Coronavirus OC43 Not detected        SARS-CoV-2, PCR Not detected        Metapneumovirus Not detected        Rhinovirus and Enterovirus Not detected        Influenza A Not detected        Influenza A, subtype H1 Not detected        Influenza A, subtype H3 Not detected        INFLUENZA A H1N1 PCR Not detected        Influenza B Not detected        Parainfluenza 1 Not detected        Parainfluenza 2 Not detected        Parainfluenza 3 Not detected        Parainfluenza virus 4 Not detected        RSV by PCR Not detected        B. parapertussis, PCR Not detected        Bordetella pertussis - PCR Not detected        Chlamydophila pneumoniae DNA, QL, PCR Not detected        Mycoplasma pneumoniae DNA, QL, PCR Not detected       CULTURE, BLOOD [[id number]] Collected: 09/14/21 1700    Order Status: Canceled Specimen: Blood     CULTURE, URINE [[id number]] Collected: 09/13/21 2330    Order Status: Canceled Specimen: Cath Urine RESPIRATORY VIRUS PANEL W/COVID-19, PCR [[id number]]     Order Status: Canceled Specimen: NASOPHARYNGEAL SWAB     COVID-19 RAPID TEST [[id number]]     Order Status: Canceled     COVID-19 RAPID TEST [[id number]] Collected: 09/13/21 0737    Order Status: Completed Specimen: Nasopharyngeal Updated: 09/13/21 0811     Specimen source Nasopharyngeal        COVID-19 rapid test Not detected        Comment: Rapid Abbott ID Now       Rapid NAAT:  The specimen is NEGATIVE for SARS-CoV-2, the novel coronavirus associated with COVID-19. Negative results should be treated as presumptive and, if inconsistent with clinical signs and symptoms or necessary for patient management, should be tested with an alternative molecular assay. Negative results do not preclude SARS-CoV-2 infection and should not be used as the sole basis for patient management decisions. This test has been authorized by the FDA under an Emergency Use Authorization (EUA) for use by authorized laboratories. Fact sheet for Healthcare Providers: iTendency.uy  Fact sheet for Patients: iTendency.uy       Methodology: Isothermal Nucleic Acid Amplification         COVID-19 RAPID TEST [[id number]]     Order Status: Canceled         Echo 9/17/2021:  Left Ventricle Normal cavity size, wall thickness and systolic function (ejection fraction normal). The estimated EF is 60 - 65%. There is inconclusive left ventricular diastolic function E/E' ratio = 7.08  Heart rate is over 100. Wall Scoring The left ventricular wall motion is normal.            Left Atrium Normal cavity size. Right Ventricle Normal cavity size and global systolic function. Assessment of RV function:   TAPSE = 27 mm. Right Atrium Normal cavity size. Interatrial Septum Interatrial septum not well visualized   Aortic Valve Aortic valve not well visualized. Trileaflet valve structure, no stenosis and no regurgitation.    Mitral Valve No stenosis. Mitral valve non-specific thickening. Mild regurgitation. Tricuspid Valve Tricuspid valve not well visualized. No stenosis. Mild regurgitation. Pulmonic Valve Pulmonic valve not well visualized. No stenosis and no regurgitation. Aorta The aorta was not well visualized. Normal aortic root. Pulmonary Artery Pulmonary arteries not well visualized. Pulmonary arterial systolic pressure (PASP) is 29 mmHg. Pulmonary hypertension not suggested by Doppler findings. IVC/Hepatic Veins Mechanically ventilated; cannot use inferior caval vein diameter to estimate central venous pressure. Pericardium Normal pericardium and no evidence of pericardial effusion. CT head 9/15/1021  IMPRESSION   No acute intracranial abnormality. CT chest, abdomen, pelvis 9/15/1021  IMPRESSION   Endotracheal tube tip in the lower thoracic trachea 1.5 cm above the dipak.    Bilateral lower lobar atelectasis, possible endobronchial lesion/mucous plug in  the left lower lobe bronchus.    No acute intra-abdominal abnormality. Imaging:  [x]I have personally reviewed the patients chest radiographs images and report     Results from Hospital Encounter encounter on 09/11/21    XR CHEST PORT    Narrative  EXAM:  AP Portable Chest X-ray 1 view    INDICATION: ET tube placement    COMPARISON: September 18, 2021    _______________    FINDINGS: There is an endotracheal tube 2.4 cm above the dipak. There is an  orogastric sump pump tube with tip below the diaphragm with tip overlying the  fundus of the stomach. Heart and mediastinal contours are within normal limits for portable radiograph  there are bibasilar densities. There is silhouetting of the left hemidiaphragm. Findings are suggestive of atelectasis and/or infiltrate. Underlying left  effusion not excluded. No acute osseous findings.     ________________    Impression  Bibasilar densities suggesting atelectasis and/or infiltrate with  possible left effusion. Support tubes in place. Please note: Voice-recognition software may have been used to generate this report, which may have resulted in some phonetic-based errors in grammar and contents. Even though attempts were made to correct all the mistakes, some may have been missed, and remained in the body of the document.       eBe Mahan MD  9/21/2021

## 2021-09-22 ENCOUNTER — ANESTHESIA EVENT (OUTPATIENT)
Dept: SURGERY | Age: 41
DRG: 004 | End: 2021-09-22
Payer: MEDICAID

## 2021-09-22 ENCOUNTER — APPOINTMENT (OUTPATIENT)
Dept: GENERAL RADIOLOGY | Age: 41
DRG: 004 | End: 2021-09-22
Attending: INTERNAL MEDICINE
Payer: MEDICAID

## 2021-09-22 ENCOUNTER — ANESTHESIA (OUTPATIENT)
Dept: SURGERY | Age: 41
DRG: 004 | End: 2021-09-22
Payer: MEDICAID

## 2021-09-22 LAB
ALBUMIN SERPL-MCNC: 3.1 G/DL (ref 3.4–5)
ALBUMIN/GLOB SERPL: 1.1 {RATIO} (ref 0.8–1.7)
ALP SERPL-CCNC: 116 U/L (ref 45–117)
ALT SERPL-CCNC: 146 U/L (ref 13–56)
AMMONIA PLAS-SCNC: 33 UMOL/L (ref 11–32)
ANION GAP SERPL CALC-SCNC: 8 MMOL/L (ref 3–18)
AST SERPL-CCNC: 29 U/L (ref 10–38)
BASOPHILS # BLD: 0 K/UL (ref 0–0.1)
BASOPHILS NFR BLD: 0 % (ref 0–2)
BILIRUB SERPL-MCNC: 0.4 MG/DL (ref 0.2–1)
BUN SERPL-MCNC: 23 MG/DL (ref 7–18)
BUN/CREAT SERPL: 48 (ref 12–20)
CALCIUM SERPL-MCNC: 8.9 MG/DL (ref 8.5–10.1)
CHLORIDE SERPL-SCNC: 109 MMOL/L (ref 100–111)
CO2 SERPL-SCNC: 29 MMOL/L (ref 21–32)
CREAT SERPL-MCNC: 0.48 MG/DL (ref 0.6–1.3)
DIFFERENTIAL METHOD BLD: ABNORMAL
EOSINOPHIL # BLD: 0.1 K/UL (ref 0–0.4)
EOSINOPHIL NFR BLD: 1 % (ref 0–5)
ERYTHROCYTE [DISTWIDTH] IN BLOOD BY AUTOMATED COUNT: 13.1 % (ref 11.6–14.5)
GLOBULIN SER CALC-MCNC: 2.8 G/DL (ref 2–4)
GLUCOSE BLD STRIP.AUTO-MCNC: 102 MG/DL (ref 70–110)
GLUCOSE BLD STRIP.AUTO-MCNC: 104 MG/DL (ref 70–110)
GLUCOSE BLD STRIP.AUTO-MCNC: 104 MG/DL (ref 70–110)
GLUCOSE BLD STRIP.AUTO-MCNC: 99 MG/DL (ref 70–110)
GLUCOSE SERPL-MCNC: 93 MG/DL (ref 74–99)
HCG SERPL QL: NEGATIVE
HCT VFR BLD AUTO: 30.7 % (ref 35–45)
HGB BLD-MCNC: 9.7 G/DL (ref 12–16)
LYMPHOCYTES # BLD: 3.5 K/UL (ref 0.9–3.6)
LYMPHOCYTES NFR BLD: 33 % (ref 21–52)
MAGNESIUM SERPL-MCNC: 2.2 MG/DL (ref 1.6–2.6)
MCH RBC QN AUTO: 29.7 PG (ref 24–34)
MCHC RBC AUTO-ENTMCNC: 31.6 G/DL (ref 31–37)
MCV RBC AUTO: 93.9 FL (ref 78–100)
MONOCYTES # BLD: 0.7 K/UL (ref 0.05–1.2)
MONOCYTES NFR BLD: 7 % (ref 3–10)
NEUTS SEG # BLD: 5.4 K/UL (ref 1.8–8)
NEUTS SEG NFR BLD: 51 % (ref 40–73)
PHOSPHATE SERPL-MCNC: 4.9 MG/DL (ref 2.5–4.9)
PLATELET # BLD AUTO: 485 K/UL (ref 135–420)
PMV BLD AUTO: 9.4 FL (ref 9.2–11.8)
POTASSIUM SERPL-SCNC: 4.1 MMOL/L (ref 3.5–5.5)
PROT SERPL-MCNC: 5.9 G/DL (ref 6.4–8.2)
RBC # BLD AUTO: 3.27 M/UL (ref 4.2–5.3)
SODIUM SERPL-SCNC: 146 MMOL/L (ref 136–145)
WBC # BLD AUTO: 10.5 K/UL (ref 4.6–13.2)

## 2021-09-22 PROCEDURE — 0B110F4 BYPASS TRACHEA TO CUTANEOUS WITH TRACHEOSTOMY DEVICE, OPEN APPROACH: ICD-10-PCS | Performed by: OTOLARYNGOLOGY

## 2021-09-22 PROCEDURE — 77030002996 HC SUT SLK J&J -A: Performed by: OTOLARYNGOLOGY

## 2021-09-22 PROCEDURE — 74011000250 HC RX REV CODE- 250

## 2021-09-22 PROCEDURE — 84100 ASSAY OF PHOSPHORUS: CPT

## 2021-09-22 PROCEDURE — 74011000250 HC RX REV CODE- 250: Performed by: INTERNAL MEDICINE

## 2021-09-22 PROCEDURE — 74011000258 HC RX REV CODE- 258: Performed by: INTERNAL MEDICINE

## 2021-09-22 PROCEDURE — 74011250637 HC RX REV CODE- 250/637: Performed by: INTERNAL MEDICINE

## 2021-09-22 PROCEDURE — 74011250636 HC RX REV CODE- 250/636

## 2021-09-22 PROCEDURE — 83735 ASSAY OF MAGNESIUM: CPT

## 2021-09-22 PROCEDURE — 74011250636 HC RX REV CODE- 250/636: Performed by: INTERNAL MEDICINE

## 2021-09-22 PROCEDURE — 82140 ASSAY OF AMMONIA: CPT

## 2021-09-22 PROCEDURE — 84703 CHORIONIC GONADOTROPIN ASSAY: CPT

## 2021-09-22 PROCEDURE — 77030018390 HC SPNG HEMSTAT2 J&J -B: Performed by: OTOLARYNGOLOGY

## 2021-09-22 PROCEDURE — 65610000006 HC RM INTENSIVE CARE

## 2021-09-22 PROCEDURE — 2709999900 HC NON-CHARGEABLE SUPPLY: Performed by: OTOLARYNGOLOGY

## 2021-09-22 PROCEDURE — 36415 COLL VENOUS BLD VENIPUNCTURE: CPT

## 2021-09-22 PROCEDURE — 74011000250 HC RX REV CODE- 250: Performed by: OTOLARYNGOLOGY

## 2021-09-22 PROCEDURE — 76060000032 HC ANESTHESIA 0.5 TO 1 HR: Performed by: OTOLARYNGOLOGY

## 2021-09-22 PROCEDURE — 80053 COMPREHEN METABOLIC PANEL: CPT

## 2021-09-22 PROCEDURE — 71045 X-RAY EXAM CHEST 1 VIEW: CPT

## 2021-09-22 PROCEDURE — 94640 AIRWAY INHALATION TREATMENT: CPT

## 2021-09-22 PROCEDURE — 94003 VENT MGMT INPAT SUBQ DAY: CPT

## 2021-09-22 PROCEDURE — 77030040361 HC SLV COMPR DVT MDII -B: Performed by: OTOLARYNGOLOGY

## 2021-09-22 PROCEDURE — 76010000138 HC OR TIME 0.5 TO 1 HR: Performed by: OTOLARYNGOLOGY

## 2021-09-22 PROCEDURE — 77030020010 HC FCPS BPLR DISP INLC -E: Performed by: OTOLARYNGOLOGY

## 2021-09-22 PROCEDURE — 82962 GLUCOSE BLOOD TEST: CPT

## 2021-09-22 PROCEDURE — 85025 COMPLETE CBC W/AUTO DIFF WBC: CPT

## 2021-09-22 RX ORDER — PROPOFOL 10 MG/ML
INJECTION, EMULSION INTRAVENOUS AS NEEDED
Status: DISCONTINUED | OUTPATIENT
Start: 2021-09-22 | End: 2021-09-22 | Stop reason: HOSPADM

## 2021-09-22 RX ORDER — MIDAZOLAM HYDROCHLORIDE 1 MG/ML
INJECTION, SOLUTION INTRAMUSCULAR; INTRAVENOUS AS NEEDED
Status: DISCONTINUED | OUTPATIENT
Start: 2021-09-22 | End: 2021-09-22 | Stop reason: HOSPADM

## 2021-09-22 RX ORDER — DEXMEDETOMIDINE HYDROCHLORIDE 100 UG/ML
INJECTION, SOLUTION INTRAVENOUS AS NEEDED
Status: DISCONTINUED | OUTPATIENT
Start: 2021-09-22 | End: 2021-09-22 | Stop reason: HOSPADM

## 2021-09-22 RX ORDER — FENTANYL CITRATE 50 UG/ML
INJECTION, SOLUTION INTRAMUSCULAR; INTRAVENOUS AS NEEDED
Status: DISCONTINUED | OUTPATIENT
Start: 2021-09-22 | End: 2021-09-22 | Stop reason: HOSPADM

## 2021-09-22 RX ORDER — ROCURONIUM BROMIDE 10 MG/ML
INJECTION, SOLUTION INTRAVENOUS AS NEEDED
Status: DISCONTINUED | OUTPATIENT
Start: 2021-09-22 | End: 2021-09-22 | Stop reason: HOSPADM

## 2021-09-22 RX ORDER — SODIUM CHLORIDE, SODIUM LACTATE, POTASSIUM CHLORIDE, CALCIUM CHLORIDE 600; 310; 30; 20 MG/100ML; MG/100ML; MG/100ML; MG/100ML
INJECTION, SOLUTION INTRAVENOUS
Status: DISCONTINUED | OUTPATIENT
Start: 2021-09-22 | End: 2021-09-22 | Stop reason: HOSPADM

## 2021-09-22 RX ADMIN — MIDAZOLAM 7 MG/HR: 5 INJECTION, SOLUTION INTRAMUSCULAR; INTRAVENOUS at 18:05

## 2021-09-22 RX ADMIN — CLONAZEPAM 0.5 MG: 0.5 TABLET ORAL at 09:06

## 2021-09-22 RX ADMIN — FENTANYL CITRATE 50 MCG: 50 INJECTION, SOLUTION INTRAMUSCULAR; INTRAVENOUS at 18:58

## 2021-09-22 RX ADMIN — LACTULOSE 45 ML: 20 SOLUTION ORAL at 20:15

## 2021-09-22 RX ADMIN — PROPOFOL 50 MG: 10 INJECTION, EMULSION INTRAVENOUS at 18:25

## 2021-09-22 RX ADMIN — BUDESONIDE 500 MCG: 0.5 INHALANT RESPIRATORY (INHALATION) at 19:56

## 2021-09-22 RX ADMIN — 0.12% CHLORHEXIDINE GLUCONATE 10 ML: 1.2 RINSE ORAL at 09:06

## 2021-09-22 RX ADMIN — LACTULOSE 45 ML: 20 SOLUTION ORAL at 09:05

## 2021-09-22 RX ADMIN — THIAMINE HCL TAB 100 MG 100 MG: 100 TAB at 09:06

## 2021-09-22 RX ADMIN — 0.12% CHLORHEXIDINE GLUCONATE 10 ML: 1.2 RINSE ORAL at 20:15

## 2021-09-22 RX ADMIN — FOLIC ACID 1 MG: 1 TABLET ORAL at 09:06

## 2021-09-22 RX ADMIN — OLANZAPINE 10 MG: 10 TABLET, FILM COATED ORAL at 18:07

## 2021-09-22 RX ADMIN — FENTANYL CITRATE 150 MCG/HR: 0.05 INJECTION, SOLUTION INTRAMUSCULAR; INTRAVENOUS at 03:42

## 2021-09-22 RX ADMIN — PROPOFOL 50 MCG/KG/MIN: 10 INJECTION, EMULSION INTRAVENOUS at 06:40

## 2021-09-22 RX ADMIN — PROPOFOL 50 MCG/KG/MIN: 10 INJECTION, EMULSION INTRAVENOUS at 16:30

## 2021-09-22 RX ADMIN — SODIUM CHLORIDE 10 ML: 9 INJECTION, SOLUTION INTRAMUSCULAR; INTRAVENOUS; SUBCUTANEOUS at 14:46

## 2021-09-22 RX ADMIN — MIDAZOLAM HYDROCHLORIDE 2 MG: 1 INJECTION, SOLUTION INTRAMUSCULAR; INTRAVENOUS at 18:25

## 2021-09-22 RX ADMIN — PROPOFOL 50 MG: 10 INJECTION, EMULSION INTRAVENOUS at 18:36

## 2021-09-22 RX ADMIN — FAMOTIDINE 20 MG: 20 TABLET ORAL at 20:15

## 2021-09-22 RX ADMIN — FENTANYL CITRATE 50 MCG: 50 INJECTION, SOLUTION INTRAMUSCULAR; INTRAVENOUS at 18:56

## 2021-09-22 RX ADMIN — MIDAZOLAM HYDROCHLORIDE 3 MG: 1 INJECTION, SOLUTION INTRAMUSCULAR; INTRAVENOUS at 19:17

## 2021-09-22 RX ADMIN — FENTANYL CITRATE 50 MCG: 50 INJECTION, SOLUTION INTRAMUSCULAR; INTRAVENOUS at 18:49

## 2021-09-22 RX ADMIN — PROPOFOL 50 MCG/KG/MIN: 10 INJECTION, EMULSION INTRAVENOUS at 22:37

## 2021-09-22 RX ADMIN — MIDAZOLAM 7 MG/HR: 5 INJECTION, SOLUTION INTRAMUSCULAR; INTRAVENOUS at 02:14

## 2021-09-22 RX ADMIN — IPRATROPIUM BROMIDE AND ALBUTEROL SULFATE 3 ML: .5; 3 SOLUTION RESPIRATORY (INHALATION) at 08:18

## 2021-09-22 RX ADMIN — FENTANYL CITRATE 50 MCG: 50 INJECTION, SOLUTION INTRAMUSCULAR; INTRAVENOUS at 19:01

## 2021-09-22 RX ADMIN — IPRATROPIUM BROMIDE AND ALBUTEROL SULFATE 3 ML: .5; 3 SOLUTION RESPIRATORY (INHALATION) at 19:56

## 2021-09-22 RX ADMIN — CLONAZEPAM 0.5 MG: 0.5 TABLET ORAL at 20:15

## 2021-09-22 RX ADMIN — SODIUM CHLORIDE, SODIUM LACTATE, POTASSIUM CHLORIDE, AND CALCIUM CHLORIDE: 600; 310; 30; 20 INJECTION, SOLUTION INTRAVENOUS at 18:32

## 2021-09-22 RX ADMIN — BUDESONIDE 500 MCG: 0.5 INHALANT RESPIRATORY (INHALATION) at 08:18

## 2021-09-22 RX ADMIN — FENTANYL CITRATE 50 MCG: 50 INJECTION INTRAMUSCULAR; INTRAVENOUS at 19:50

## 2021-09-22 RX ADMIN — PROPOFOL 50 MCG/KG/MIN: 10 INJECTION, EMULSION INTRAVENOUS at 01:01

## 2021-09-22 RX ADMIN — FENTANYL CITRATE 175 MCG/HR: 0.05 INJECTION, SOLUTION INTRAMUSCULAR; INTRAVENOUS at 22:36

## 2021-09-22 RX ADMIN — FENTANYL CITRATE 150 MCG/HR: 0.05 INJECTION, SOLUTION INTRAMUSCULAR; INTRAVENOUS at 15:35

## 2021-09-22 RX ADMIN — FAMOTIDINE 20 MG: 20 TABLET ORAL at 09:06

## 2021-09-22 RX ADMIN — DEXMEDETOMIDINE HYDROCHLORIDE 6 MCG: 100 INJECTION, SOLUTION INTRAVENOUS at 19:12

## 2021-09-22 RX ADMIN — DEXMEDETOMIDINE HYDROCHLORIDE 6 MCG: 100 INJECTION, SOLUTION INTRAVENOUS at 19:05

## 2021-09-22 RX ADMIN — ROCURONIUM BROMIDE 50 MG: 10 INJECTION, SOLUTION INTRAVENOUS at 18:37

## 2021-09-22 RX ADMIN — Medication 1 TABLET: at 09:06

## 2021-09-22 RX ADMIN — IPRATROPIUM BROMIDE AND ALBUTEROL SULFATE 3 ML: .5; 3 SOLUTION RESPIRATORY (INHALATION) at 13:25

## 2021-09-22 RX ADMIN — PROPOFOL 50 MCG/KG/MIN: 10 INJECTION, EMULSION INTRAVENOUS at 12:25

## 2021-09-22 RX ADMIN — FENTANYL CITRATE 150 MCG/HR: 0.05 INJECTION, SOLUTION INTRAMUSCULAR; INTRAVENOUS at 09:38

## 2021-09-22 RX ADMIN — ARFORMOTEROL TARTRATE 15 MCG: 15 SOLUTION RESPIRATORY (INHALATION) at 19:55

## 2021-09-22 RX ADMIN — ARFORMOTEROL TARTRATE 15 MCG: 15 SOLUTION RESPIRATORY (INHALATION) at 08:18

## 2021-09-22 NOTE — NURSE NAVIGATOR
Telephone consent obtained for surgical procedure and anesthesia from spouse, Aj Ratliff. Done per hospital protocol with two R.N. as witnesses.

## 2021-09-22 NOTE — CONSULTS
[id number] [facility]    Name:  Oj Jaimes  MR#:   [id number]  :  1980  ACCOUNT #:  [de-identified]  DATE OF SERVICE:  2021      REASON FOR EVALUATION:  Ventilator dependency. HISTORY OF PRESENT ILLNESS:  The patient is a 43-year-old female who presents with past medical history after presenting in the emergency room with lethargy. The patient was noted to have an overdose of gabapentin. She was intubated, requiring airway protection. The patient was weaned from vent; however, the patient was noted to have some significant stridor within one hour of extubation. The patient was reintubated due to the difficulties. The patient now being evaluated for tracheotomy placement. ALLERGIES:  TORADOL. MEDICATION USE:  Includes the medications listed in nursing notes. PAST SURGICAL HISTORY:  Includes history of D and C and hysterectomy. PAST MEDICAL HISTORY:  Significant for asthma, cocaine abuse, mental and behavioral problems, pancreatitis, psychosis, attempted suicide. REVIEW OF SYSTEMS:  Noncontributory. PHYSICAL EXAMINATION:  GENERAL:  Reveals a well-developed, well-nourished 43-year-old female, intubated and sedated. HEENT:  Head:  Normocephalic, atraumatic. Eyes:  No icterus. Nose:  Nasal drainage. NECK:  Trachea midline. CHEST:  No wheezing or stridor. HEART:  S1, S2.  ABDOMEN:  Soft, nontender. EXTREMITIES:  Unremarkable. Pulse is symmetric. IMPRESSION:  Ventilator dependency as well as some significant stridor after the patient was reintubated. PLAN:  We will make arrangements for tracheotomy. Once the patient's trach has settled down, re-examination of the larynx may be indicated. My suspicion is the cause of the stridor may have been simply some inflammatory change from irritation from the endotracheal tube. Hopefully, with removal of the source of irritation, the irritation may resolve and swelling may resolve as well.   The patient will need further endoscopy once trach has been placed and inflammation has settled down. Thank you for consult.       Gayle Anton MD      [id number]  D:  09/21/2021 22:14  T:  09/21/2021 23:46  JOB #:  [id number]

## 2021-09-22 NOTE — PROGRESS NOTES
Problem: Ventilator Management  Goal: *Adequate oxygenation and ventilation  Outcome: Progressing Towards Goal  Goal: *Patient maintains clear airway/free of aspiration  Outcome: Progressing Towards Goal  Goal: *Absence of infection signs and symptoms  Outcome: Progressing Towards Goal  Goal: *Normal spontaneous ventilation  Outcome: Progressing Towards Goal     Problem: Patient Education: Go to Patient Education Activity  Goal: Patient/Family Education  Outcome: Progressing Towards Goal     Problem: Non-Violent Restraints  Goal: Removal from restraints as soon as assessed to be safe  Outcome: Progressing Towards Goal  Goal: No harm/injury to patient while restraints in use  Outcome: Progressing Towards Goal  Goal: Patient's dignity will be maintained  Outcome: Progressing Towards Goal  Goal: Patient Interventions  Outcome: Progressing Towards Goal     Problem: Falls - Risk of  Goal: *Absence of Falls  Description: Document [facility] Fall Risk and appropriate interventions in the flowsheet. Outcome: Progressing Towards Goal  Note: Fall Risk Interventions:       Mentation Interventions: Bed/chair exit alarm, Adequate sleep, hydration, pain control, Door open when patient unattended, Evaluate medications/consider consulting pharmacy, More frequent rounding, Room close to nurse's station, Toileting rounds    Medication Interventions: Evaluate medications/consider consulting pharmacy, Bed/chair exit alarm    Elimination Interventions: Bed/chair exit alarm, Toileting schedule/hourly rounds     Problem: Patient Education: Go to Patient Education Activity  Goal: Patient/Family Education  Outcome: Progressing Towards Goal     Problem: Risk for Spread of Infection  Goal: Prevent transmission of infectious organism to others  Description: Prevent the transmission of infectious organisms to other patients, staff members, and visitors.   Outcome: Progressing Towards Goal     Problem: Patient Education:  Go to Education Activity  Goal: Patient/Family Education  Outcome: Progressing Towards Goal     Problem: Pressure Injury - Risk of  Goal: *Prevention of pressure injury  Description: Document Remy Scale and appropriate interventions in the flowsheet.   Outcome: Progressing Towards Goal     Problem: Patient Education: Go to Patient Education Activity  Goal: Patient/Family Education  Outcome: Progressing Towards Goal     Shai Walters RN

## 2021-09-22 NOTE — BRIEF OP NOTE
Brief Postoperative Note    Patient: Natalio Cary  YOB: 1980  MRN: [id number]    Date of Procedure: 9/22/2021     Pre-Op Diagnosis: VENTILATOR DEPENDENCY    Post-Op Diagnosis: Same as preoperative diagnosis.       Procedure(s):  SKIN FLAP TRACHEOSTOMY, PT IS IN ICU BED 4    Surgeon(s):  Aure Templeton MD    Surgical Assistant: None    Anesthesia: General     Estimated Blood Loss (mL): Minimal    Complications: None    Specimens: * No specimens in log *     Implants: * No implants in log *    Drains:   Nasogastric Tube 09/18/21 (Active)   Site Assessment Clean, dry, & intact 09/22/21 1600   Securement Device Tape 09/22/21 1600   G Port Status Clamped 09/22/21 1600   Action Taken Placement verified (comment) 09/22/21 1600   Drainage Description Tan 09/22/21 0800   Gastric Residual (mL) 10 ml 09/22/21 0800   Tube Feeding/Formula Options Diabetic 09/21/21 2000   Tube Feeding/Verify Rate (mL/hr) 0 09/22/21 0800   Water Flush Volume (mL) 240 mL 09/22/21 0800   Intake (ml) 30 ml 09/21/21 2000   Medication Volume 100 ml 09/22/21 0800       Fecal Management (Active)   Stool Consistency Liquid 09/22/21 1600   Position of Indicator Line Visible 09/22/21 1600   Signal Indicator Bubble Appropriate 09/22/21 1600   Skin Assessment of the Anal Area Unremarkable 09/22/21 1600   Tube Irrigated No 09/22/21 1600   Drainage Bag Level (mL) 700 09/22/21 0400   Output (ml) 400 ml 09/22/21 0400       [REMOVED] Orogastric Tube 09/11/21 (Removed)   Site Assessment Clean, dry, & intact 09/18/21 0800   Securement Device Tape 09/18/21 0800   G Port Status Infusing 09/17/21 2000   Action Taken Placement verified (comment) 09/18/21 0800   Drainage Description Tan 09/18/21 0800   Gastric Residual (mL) 10 ml 09/18/21 0800   Tube Feeding/Formula Options Diabetic 09/18/21 0800   Tube Feeding/Verify Rate (mL/hr) 0 09/18/21 0900   Water Flush Volume (mL) 30 mL 09/17/21 2000   Intake (ml) 40 ml 09/18/21 0900   Medication Volume 50 ml 09/17/21 2000       Findings: trach placed trach ring #2    Electronically Signed by Joanie Braden MD on 9/22/2021 at 7:36 PM

## 2021-09-22 NOTE — PROGRESS NOTES
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    Left wrist fracture, with delayed healing, subsequent encounter S62.102G    Drug overdose, intentional self-harm, initial encounter ([facility] [address] T50.902A    Hypoxia R09.02    Hypotension after procedure I95.81    Acute metabolic encephalopathy X98.75    Psychosis ([facility] [address] F29    Hyponatremia E87.1    Acute respiratory failure with hypoxia (HCC) J96.01    Increased ammonia level R79.89    Hypokalemia E87.6 ·   Code status: full code     RECOMMENDATIONS:   Respiratory: Patient on ventilator support; intubated 2021 due to encephalopathy and drug overdose. Patient was extubated on 2021, and reintubated within an hour due to respiratory distress and upper airway edema. Completed Solu-Medrol 40 mg IV every 6 hours x8 doses  for upper airway edema. Due to prolonged intubation, and patient agitation with history of substance abuse and unable to wean from ventilator/sedation; scheduled for tracheostomy later today 2021. CXR reviewed: ETT and OGT in place. Subsegmental atelectasis; unchanged. On AC 14//30/7; unchanged. Continue ventilator bundle and sedation. Sedation: On Versed, fentanyl, propofol drip. Severe agitation when sedation lowered, due to underlying psych issues. Bronchodilators: DuoNeb 3 times daily, Brovana twice daily, Pulmicort twice daily. ID: No fever or leukocytosis. S/p LP with negative CSF cultures. Covid test 2021 negative this admission  Completed zosyn for 5 days for aspiration on 2021. ID has signed off.    CVS: NSR on telemetry. Hemodynamically stable; monitor. Echo 9/17/2021: LVEF 60 to 65%. RVSP 29 mmHg. Renal: Normal creatinine and urine output; monitor. Replace electrolytes per ICU protocol. Hypokalemia replaced. Mild hypernatremia; stable sodium 146; continue free water 250 mL every 4 hours through OGT feed which is on hold for PEG tube placement. Heme: Chronic mild anemia; hemoglobin low today but no active external bleeding; platelets normal; continue to monitor  Endo: Stable blood sugars; continue tube feeding, tolerating tube feed at 45 mill per hour; no hypoglycemic episodes  GI: Continue tube feeding as tolerated; thiamine, folic acid and multivitamin supplements. LFT overall improving; continue to monitor. hCG negative. Neurology: Sedation as above. Ammonia improving to 33; continue lactulose 30 g twice daily. CT headnil acute  Psych: Continue Zyprexa and Klonopin. Toxicology: gabapentin OD  Skin: ICU nursing care  MS: Left wrist fracture in immobilization external fixator  Prophylaxis: Lovenox held for tracheostomy placement. Continue famotidine. Restraints: Wrist soft restraints for patient interfering with medical therapy/management and patient safety. Prognosis guarded overall. CODE STATUSfull code. Palliative care on case. Quality Care: PPI, DVT prophylaxis, HOB elevated, Infection control all reviewed and addressed. Lines/Tubes: PIV   ETT: 9/11/21  NGT: 9/11/21  [place]: 9/11/21  FMS in place    ADVANCE DIRECTIVE: Full code. Patient's  in residential; he had called this morning and discussed with ICU RN. Discussed with RN, RT, MDR. High complexity decision making was performed during the evaluation of this patient at high risk for decompensation with multiple organ involvement. Critical care time excludes discussion or procedure: 37 minutes. Humble Arora   sister-in-law   [phone]       Family discussion 9/19/2021:Patient  Mr. Corazon Jackson called ICU; he is currently in long term; updated management plans; discussed that patient failed extubation yesterday, and needed to be reintubated due to upper airway swelling from prolonged intubation; due to patient's medical condition, recommended tracheostomy, and patient  is agreeable. Patient  would like information to be provided as needed to his 2 sisterMary Jane Lopes (above) and Arianne Dekomal [[phone]]; patient's  will call ICU daily from long term to get updates; he does not want information to be provided to anyone else other than his 2 sisters. Patient's  is calling every day and nurses are operating him. Subjective/History:   Ms. Lyssa Lang has been seen and evaluated as Dr. Sangita Forte requested now for assisting with Acute respiratory failure and ventilator management. Patient is a 39 y.o. female with following PMhx presented to ER with lethargy via EMS and admitted for Gabapentin overdose. Pt required intubation for airways protection. Position control was contacted that recommended supportive care per ER provider. Pt seen at bedside in ER rm#2. The patient can not provide additional history due to Ventilated. 9/22/2021   Remains in ICU room 104. Remains intubated and sedated. When sedation lowered, she moves all around and agitated and not cooperative. Currently on Versed 7, fentanyl 150, propofol 50. Scheduled for tracheostomy later today. Tube feed on hold. No fever. Hemodynamically stable. No ETT secretions. No other overnight issues reported. PCCM was not called for any issues overnight. Patient's  called from long term; nurses has updated him. Patient has a history of drug use and smoker and alcohol use. Review of Systems:  Review of systems not obtained due to patient factors.         Past Medical History:  Past Medical History:   Diagnosis Date    Asthma     Bilateral ovarian cysts     Cocaine abuse ([facility] [address]     Mental and behavioral problem  Pancreatitis     Pancreatitis     Psychosis ([facility] [address]         Past Surgical History:  Past Surgical History:   Procedure Laterality Date    HX GYN      D&C, Hysterectomy        Medications:  Prior to Admission medications    Medication Sig Start Date End Date Taking? Authorizing Provider   albuterol (PROVENTIL HFA, VENTOLIN HFA, PROAIR HFA) 90 mcg/actuation inhaler Take 2 Puffs by inhalation every four (4) hours as needed for Wheezing or Shortness of Breath. 8/4/21   Masha Arredondo PA-C   ibuprofen (MOTRIN) 600 mg tablet Take 1 Tablet by mouth every six (6) hours as needed for Pain. Take with food. 8/4/21   Masha Arredondo PA-C   ALPRAZolam (XANAX) 0.5 mg tablet TAKE 1 TABLET BY MOUTH ONCE DAILY AS NEEDED FOR ANXIETY 12/4/19   Provider, Historical   metFORMIN (GLUCOPHAGE) 500 mg tablet TAKE 1 TABLET BY MOUTH ONCE DAILY 11/12/19   Provider, Historical   nicotine (NICODERM CQ) 21 mg/24 hr APPLY 1 PATCH TOPICALLY TO THE SKIN DAILY FOR CRAVINGS AS DIRECTED 12/4/19   Provider, Historical   traZODone (DESYREL) 50 mg tablet TAKE 1 TABLET BY MOUTH AT BEDTIME AS NEEDED FOR INSOMNIA 12/4/19   Provider, Historical   escitalopram oxalate (LEXAPRO) 10 mg tablet Take 10 mg by mouth daily. Other, MD Luisa   lurasidone (LATUDA) 80 mg tab tablet Take 80 mg by mouth daily (with dinner).     Other, MD Luisa       Current Facility-Administered Medications   Medication Dose Route Frequency    fentaNYL (PF) 900 mcg/30 ml infusion soln  0-200 mcg/hr IntraVENous TITRATE    propofol (DIPRIVAN) 10 mg/mL infusion  0-50 mcg/kg/min IntraVENous TITRATE    famotidine (PEPCID) tablet 20 mg  20 mg Oral BID    midazolam in normal saline (VERSED) 1 mg/mL infusion  2-10 mg/hr IntraVENous TITRATE    thiamine mononitrate (B-1) tablet 100 mg  100 mg Oral DAILY    folic acid (FOLVITE) tablet 1 mg  1 mg Oral DAILY    multivitamin, tx-iron-ca-min (THERA-M w/ IRON) tablet 1 Tablet  1 Tablet Oral DAILY    lactulose (CHRONULAC) 10 gram/15 mL solution 45 mL  30 g Oral BID    clonazePAM (KlonoPIN) tablet 0.5 mg  0.5 mg Oral BID    insulin lispro (HUMALOG) injection   SubCUTAneous Q6H    arformoteroL (BROVANA) neb solution 15 mcg  15 mcg Nebulization BID RT    budesonide (PULMICORT) 500 mcg/2 ml nebulizer suspension  500 mcg Nebulization BID RT    OLANZapine (ZyPREXA) tablet 10 mg  10 mg Oral QPM    albuterol-ipratropium (DUO-NEB) 2.5 MG-0.5 MG/3 ML  3 mL Nebulization TID RT    [Held by provider] enoxaparin (LOVENOX) injection 40 mg  40 mg SubCUTAneous Q24H    sodium chloride (NS) flush 5-40 mL  5-40 mL IntraVENous Q8H    chlorhexidine (PERIDEX) 0.12 % mouthwash 10 mL  10 mL Oral Q12H       Allergy:  Allergies   Allergen Reactions    Fish Containing Products Itching    Toradol [Ketorolac] Rash     Pt reports she is not allergic to this medication. Social History:  Social History     Tobacco Use    Smoking status: Current Every Day Smoker     Packs/day: 1.00    Smokeless tobacco: Never Used   Substance Use Topics    Alcohol use: Not Currently    Drug use: Not Currently     Types: Cocaine, Marijuana     Comment: used with in past 24 hours        Family History:  No family history on file. Objective:   Vital Signs:    Blood pressure 109/60, pulse (!) 118, temperature 98.5 °F (36.9 °C), resp. rate 14, height 5' 2\" (1.575 m), weight 71.2 kg (157 lb), last menstrual period 05/15/2018, SpO2 (!) 89 %. Body mass index is 28.72 kg/m².    O2 Device: Endotracheal tube, Ventilator       Temp (24hrs), Av.5 °F (36.9 °C), Min:98.3 °F (36.8 °C), Max:98.8 °F (37.1 °C)         Intake/Output:   Last shift:       07 - 1900  In: -   Out: 500 [Urine:500]  Last 3 shifts: 1901 -  0700  In: 2530.6 [I.V.:685.6]  Out: 2175 [Urine:1475; Drains:700]    Intake/Output Summary (Last 24 hours) at 2021 1113  Last data filed at 2021 0800  Gross per 24 hour   Intake 1311.35 ml   Output 1775 ml   Net -463.65 ml       Ventilator Settings:  Mode Rate Tidal Volume Pressure FiO2 PEEP   Assist control, VC+   400 ml  7 cm H2O 30 % 7 cm H20     Peak airway pressure: 17 cm H2O    Minute ventilation: 7.46 l/min      Lung protective strategy, Pl pressure goals less than or equal to 30. Physical Exam:     General/Neurology: Intubated and sedated. Head:   NCAT. Eye:   No icterus/pallor/cyanosis. Nose:   No nasal drainage/discharge. Neck:   Trachea midline. Lung: Moderate air entry bilateral equal.  No rales, rhonchi. No wheezing or stridor. No prolonged expiration or accessory muscle use. Heart:   S1 S2 present. No murmur or JVD. Abdomen:  Soft. NT. ND. No palpable masses. Extremities:  No edema. No cyanosis or clubbing. Pulses: 2+ and symmetric in DP. Data:     Recent Results (from the past 12 hour(s))   GLUCOSE, POC    Collection Time: 09/21/21 11:15 PM   Result Value Ref Range    Glucose (POC) 101 70 - 110 mg/dL   HCG QL SERUM    Collection Time: 09/22/21  4:19 AM   Result Value Ref Range    HCG, Ql. Negative NEG     CBC WITH AUTOMATED DIFF    Collection Time: 09/22/21  4:19 AM   Result Value Ref Range    WBC 10.5 4.6 - 13.2 K/uL    RBC 3.27 (L) 4.20 - 5.30 M/uL    HGB 9.7 (L) 12.0 - 16.0 g/dL    HCT 30.7 (L) 35.0 - 45.0 %    MCV 93.9 78.0 - 100.0 FL    MCH 29.7 24.0 - 34.0 PG    MCHC 31.6 31.0 - 37.0 g/dL    RDW 13.1 11.6 - 14.5 %    PLATELET 613 (H) 573 - 420 K/uL    MPV 9.4 9.2 - 11.8 FL    NEUTROPHILS 51 40 - 73 %    LYMPHOCYTES 33 21 - 52 %    MONOCYTES 7 3 - 10 %    EOSINOPHILS 1 0 - 5 %    BASOPHILS 0 0 - 2 %    ABS. NEUTROPHILS 5.4 1.8 - 8.0 K/UL    ABS. LYMPHOCYTES 3.5 0.9 - 3.6 K/UL    ABS. MONOCYTES 0.7 0.05 - 1.2 K/UL    ABS. EOSINOPHILS 0.1 0.0 - 0.4 K/UL    ABS.  BASOPHILS 0.0 0.0 - 0.1 K/UL    DF AUTOMATED     MAGNESIUM    Collection Time: 09/22/21  4:19 AM   Result Value Ref Range    Magnesium 2.2 1.6 - 2.6 mg/dL   METABOLIC PANEL, COMPREHENSIVE    Collection Time: 09/22/21  4:19 AM   Result Value Ref Range Sodium 146 (H) 136 - 145 mmol/L    Potassium 4.1 3.5 - 5.5 mmol/L    Chloride 109 100 - 111 mmol/L    CO2 29 21 - 32 mmol/L    Anion gap 8 3.0 - 18 mmol/L    Glucose 93 74 - 99 mg/dL    BUN 23 (H) 7.0 - 18 MG/DL    Creatinine 0.48 (L) 0.6 - 1.3 MG/DL    BUN/Creatinine ratio 48 (H) 12 - 20      GFR est AA >60 >60 ml/min/1.73m2    GFR est non-AA >60 >60 ml/min/1.73m2    Calcium 8.9 8.5 - 10.1 MG/DL    Bilirubin, total 0.4 0.2 - 1.0 MG/DL    ALT (SGPT) 146 (H) 13 - 56 U/L    AST (SGOT) 29 10 - 38 U/L    Alk. phosphatase 116 45 - 117 U/L    Protein, total 5.9 (L) 6.4 - 8.2 g/dL    Albumin 3.1 (L) 3.4 - 5.0 g/dL    Globulin 2.8 2.0 - 4.0 g/dL    A-G Ratio 1.1 0.8 - 1.7     PHOSPHORUS    Collection Time: 09/22/21  4:19 AM   Result Value Ref Range    Phosphorus 4.9 2.5 - 4.9 MG/DL   AMMONIA    Collection Time: 09/22/21  4:19 AM   Result Value Ref Range    Ammonia 33 (H) 11 - 32 UMOL/L   GLUCOSE, POC    Collection Time: 09/22/21  5:23 AM   Result Value Ref Range    Glucose (POC) 99 70 - 110 mg/dL             No results for input(s): FIO2I, IFO2, HCO3I, IHCO3, HCOPOC, PCO2I, PCOPOC, IPHI, PHI, PHPOC, PO2I, PO2POC in the last 72 hours. No lab exists for component: IPOC2    All Micro Results     Procedure Component Value Units Date/Time    CULTURE, BLOOD [[id number]] Collected: 09/14/21 0515    Order Status: Completed Specimen: Blood Updated: 09/20/21 0832     Special Requests: NO SPECIAL REQUESTS        Culture result: NO GROWTH 6 DAYS       CULTURE, BLOOD [[id number]] Collected: 09/14/21 0500    Order Status: Completed Specimen: Blood Updated: 09/20/21 0832     Special Requests: NO SPECIAL REQUESTS        Culture result: NO GROWTH 6 DAYS       CULTURE, CSF  TUBE 2 [[id number]] Collected: 09/16/21 1434    Order Status: Completed Specimen: Cerebrospinal Fluid Updated: 09/17/21 1615     Special Requests: TUBE 3, CULTURE AND SENSITIVTY AND GRAM STAIN.      GRAM STAIN RARE WBCS SEEN         NO ORGANISMS SEEN        Culture result: NO GROWTH THUS FAR HOLDING 7 DAYS          MENINGITIS PATHOGENS PANEL, CSF (BY PCR) [[id number]] Collected: 09/16/21 1434    Order Status: Completed Specimen: Cerebrospinal Fluid Updated: 09/17/21 0050     Escherichia coli K1 Not detected        Haemophilus Influenzae Not detected        Listeria Monocytogenes Not detected        Neisseria Meningitidis Not detected        Streptococcus Agalactiae Not detected        Streptococcus Pneumoniae Not detected        Cytomegalovirus Not detected        Enterovirus Not detected        Herpes Simplex Virus 1 Not detected        Comment: In patients who have negative herpes simplex 1 and 2 PCR results, do not modify treatment, confirm with alternate testing. Herpes Simplex Virus 2 Not detected        Comment: In patients who have negative herpes simplex 1 and 2 PCR results, do not modify treatment, confirm with alternate testing. Human Herpesvirus 6 Not detected        Human Parechovirus Not detected        Varicella Zoster Virus Not detected        Crypto. neoformans/gattii Not detected       MENINGITIS PATHOGENS PANEL, CSF (BY PCR) [[id number]] Collected: 09/16/21 1545    Order Status: Canceled Specimen: Cerebrospinal Fluid     HSV 1 AND 2 BY PCR [[id number]] Collected: 09/16/21 1434    Order Status: Completed Specimen: Other Updated: 09/16/21 1502    COVID-19 RAPID TEST [[id number]] Collected: 09/16/21 1000    Order Status: Completed Specimen: Nasopharyngeal Updated: 09/16/21 1032     Specimen source Nasopharyngeal        COVID-19 rapid test Not detected        Comment: Rapid Abbott ID Now       Rapid NAAT:  The specimen is NEGATIVE for SARS-CoV-2, the novel coronavirus associated with COVID-19. Negative results should be treated as presumptive and, if inconsistent with clinical signs and symptoms or necessary for patient management, should be tested with an alternative molecular assay.   Negative results do not preclude SARS-CoV-2 infection and should not be used as the sole basis for patient management decisions. This test has been authorized by the FDA under an Emergency Use Authorization (EUA) for use by authorized laboratories.    Fact sheet for Healthcare Providers: ConventionUpdate.co.nz  Fact sheet for Patients: ConventionUpdate.co.nz       Methodology: Isothermal Nucleic Acid Amplification         LEGIONELLA PNEUMOPHILA AG, URINE [[id number]] Collected: 09/14/21 2315    Order Status: Completed Specimen: Urine, random Updated: 09/15/21 1042     Legionella Ag, urine Negative       STREP PNEUMO AG, URINE [[id number]] Collected: 09/14/21 2315    Order Status: Completed Specimen: Urine, random Updated: 09/15/21 1042     Strep pneumo Ag, urine Negative       RESPIRATORY VIRUS PANEL W/COVID-19, PCR [[id number]] Collected: 09/14/21 1832    Order Status: Completed Specimen: Nasopharyngeal Updated: 09/14/21 2234     Adenovirus Not detected        Coronavirus 229E Not detected        Coronavirus HKU1 Not detected        Coronavirus CVNL63 Not detected        Coronavirus OC43 Not detected        SARS-CoV-2, PCR Not detected        Metapneumovirus Not detected        Rhinovirus and Enterovirus Not detected        Influenza A Not detected        Influenza A, subtype H1 Not detected        Influenza A, subtype H3 Not detected        INFLUENZA A H1N1 PCR Not detected        Influenza B Not detected        Parainfluenza 1 Not detected        Parainfluenza 2 Not detected        Parainfluenza 3 Not detected        Parainfluenza virus 4 Not detected        RSV by PCR Not detected        B. parapertussis, PCR Not detected        Bordetella pertussis - PCR Not detected        Chlamydophila pneumoniae DNA, QL, PCR Not detected        Mycoplasma pneumoniae DNA, QL, PCR Not detected       CULTURE, BLOOD [[id number]] Collected: 09/14/21 1700    Order Status: Canceled Specimen: Blood     CULTURE, URINE [[id number]] Collected: 09/13/21 2330 Order Status: Canceled Specimen: Cath Urine     RESPIRATORY VIRUS PANEL W/COVID-19, PCR [[id number]]     Order Status: Canceled Specimen: NASOPHARYNGEAL SWAB     COVID-19 RAPID TEST [[id number]]     Order Status: Canceled     COVID-19 RAPID TEST [[id number]] Collected: 09/13/21 0737    Order Status: Completed Specimen: Nasopharyngeal Updated: 09/13/21 0811     Specimen source Nasopharyngeal        COVID-19 rapid test Not detected        Comment: Rapid Abbott ID Now       Rapid NAAT:  The specimen is NEGATIVE for SARS-CoV-2, the novel coronavirus associated with COVID-19. Negative results should be treated as presumptive and, if inconsistent with clinical signs and symptoms or necessary for patient management, should be tested with an alternative molecular assay. Negative results do not preclude SARS-CoV-2 infection and should not be used as the sole basis for patient management decisions. This test has been authorized by the FDA under an Emergency Use Authorization (EUA) for use by authorized laboratories. Fact sheet for Healthcare Providers: ConventionUpdate.co.nz  Fact sheet for Patients: ConventionUpdate.co.nz       Methodology: Isothermal Nucleic Acid Amplification         COVID-19 RAPID TEST [[id number]]     Order Status: Canceled         Echo 9/17/2021:  Left Ventricle Normal cavity size, wall thickness and systolic function (ejection fraction normal). The estimated EF is 60 - 65%. There is inconclusive left ventricular diastolic function E/E' ratio = 7.08  Heart rate is over 100. Wall Scoring The left ventricular wall motion is normal.            Left Atrium Normal cavity size. Right Ventricle Normal cavity size and global systolic function. Assessment of RV function:   TAPSE = 27 mm. Right Atrium Normal cavity size. Interatrial Septum Interatrial septum not well visualized   Aortic Valve Aortic valve not well visualized.  Trileaflet valve structure, no stenosis and no regurgitation. Mitral Valve No stenosis. Mitral valve non-specific thickening. Mild regurgitation. Tricuspid Valve Tricuspid valve not well visualized. No stenosis. Mild regurgitation. Pulmonic Valve Pulmonic valve not well visualized. No stenosis and no regurgitation. Aorta The aorta was not well visualized. Normal aortic root. Pulmonary Artery Pulmonary arteries not well visualized. Pulmonary arterial systolic pressure (PASP) is 29 mmHg. Pulmonary hypertension not suggested by Doppler findings. IVC/Hepatic Veins Mechanically ventilated; cannot use inferior caval vein diameter to estimate central venous pressure. Pericardium Normal pericardium and no evidence of pericardial effusion. CT head 9/15/1021  IMPRESSION   No acute intracranial abnormality. CT chest, abdomen, pelvis 9/15/1021  IMPRESSION   Endotracheal tube tip in the lower thoracic trachea 1.5 cm above the dipak.    Bilateral lower lobar atelectasis, possible endobronchial lesion/mucous plug in  the left lower lobe bronchus.    No acute intra-abdominal abnormality. Imaging:  [x]I have personally reviewed the patients chest radiographs images and report     Results from Hospital Encounter encounter on 09/11/21    XR CHEST PORT    Narrative  EXAM: XR CHEST PORT    CLINICAL INDICATION/HISTORY: Acute respiratory failure, ET tub position  -Additional: None    COMPARISON: 9/21/2021    TECHNIQUE: Portable frontal view of the chest    _______________    FINDINGS:    SUPPORT DEVICES: Endotracheal tube and visualized nasogastric tube both project  in stable position. HEART AND MEDIASTINUM: Stable appearing cardiac size and mediastinal contours. LUNGS AND PLEURAL SPACES: Similar degree of pulmonary inflation with areas of  bandlike opacity at the lung bases, right greater than left. No pneumothorax or  pleural effusion. BONY THORAX AND SOFT TISSUES: Unremarkable.    _______________    Impression  1. Support devices in stable/appropriate position as visualized. 2.  Bandlike areas of atelectasis. Essentially unchanged. **          Please note: Voice-recognition software may have been used to generate this report, which may have resulted in some phonetic-based errors in grammar and contents. Even though attempts were made to correct all the mistakes, some may have been missed, and remained in the body of the document.       Kym Decker MD  9/22/2021

## 2021-09-22 NOTE — ANESTHESIA PREPROCEDURE EVALUATION
Relevant Problems   No relevant active problems       Anesthetic History          Comments: Intubated due to overdose     Review of Systems / Medical History  Patient summary reviewed, nursing notes reviewed and pertinent labs reviewed    Pulmonary            Asthma        Neuro/Psych              Cardiovascular                  Exercise tolerance: <4 METS     GI/Hepatic/Renal                Endo/Other             Other Findings              Physical Exam    Airway          Intubated   Cardiovascular               Dental         Pulmonary                 Abdominal         Other Findings            Anesthetic Plan    ASA: 4  Anesthesia type: general            Anesthetic plan and risks discussed with: Patient and Family      Spoke to  on phone and reviewed the record

## 2021-09-22 NOTE — PROGRESS NOTES
0720-received report from [facility] included SBAR MAR and Kardex. Shift Summary-Pt maintained on sedation and vent. [place] in place. FMS draining liquid stool. PT taken to OR for trach placement. Consent obtained from  via phone conversation. Bedside and Verbal shift change report given to Angelica Ralph RN (oncoming nurse) by Mila Nolan RN (offgoing nurse). Report included the following information SBAR, Kardex and MAR.

## 2021-09-22 NOTE — PROGRESS NOTES
Hospitalist Progress Note-critical care note     Patient: Julia Montana MRN: [id number]  CSN: [id number]    YOB: 1980  Age: 39 y.o. Sex: female    DOA: 9/11/2021 LOS:  LOS: 10 days            Chief complaint: wrist fracture , drug overdose , acute respiratory failure with hypoxia, psychosis     Assessment/Plan         Hospital Problems  Date Reviewed: 9/6/2015        Codes Class Noted POA    Hypokalemia ICD-10-CM: E87.6  ICD-9-CM: 276.8  9/21/2021 Unknown        Increased ammonia level ICD-10-CM: R79.89  ICD-9-CM: 790.6  9/14/2021 Unknown        [facility] ([facility] ICD-10-CM: F29  ICD-9-CM: 298.9  Unknown Unknown        Hyponatremia ICD-10-CM: E87.1  ICD-9-CM: 276.1  Unknown Yes        Acute respiratory failure with hypoxia ([facility] ICD-10-CM: J96.01  ICD-9-CM: 518.81  Unknown Yes        Left wrist fracture, with delayed healing, subsequent encounter ICD-10-CM: S62.102G  ICD-9-CM: V54.19  9/12/2021 Unknown        * (Principal) Drug overdose, intentional self-harm, initial encounter ([facility] ICD-10-CM: T50.902A  ICD-9-CM: 977.9, E950.5  9/12/2021 Yes        Hypoxia ICD-10-CM: R09.02  ICD-9-CM: 799.02  9/12/2021 Yes        Hypotension after procedure ICD-10-CM: I95.81  ICD-9-CM: 458.29  9/12/2021 Yes        Acute metabolic encephalopathy UGS-85-VO: G93.41  ICD-9-CM: 348.31  9/12/2021 Yes                  Julia Montana is a 39 y.o. female who is standing history of multidrug use/abuse, previous drug-seeking behavior and mental health issues otherwise unspecified arrives via EMS with acute metabolic encephalopathy and lethargy. Admitted for likely gabapentin overdose. She was intubated in ER, ct head no acute issue, LP done due to fever even on abx          Acute respiratory failure with hypoxia   Intubated on 9/12    Have to increase  Sedation due to agiation on vent last night    VAP bundle. HOB>30 degrees.    Failed sbt ,planning trach tonight    covid 19 rapid negative     Hypotension after intubation Received albumin   So far bp remained well     Fever   Afebrile overnight   ID on board, completed zosyn   LP done no bacterial meningitis indicated     Anemia-Stable so far no bleeding reported and will continue monitoring   Upper PVL negative for dvt        Hypernatremia resolved   Mg replace as protocol     Hypokalemia   K replacement  AM   Continue monitoring   icu electrolytes replacement protocol        Acute metabolic encephalopathy   Ct head no acute process       Gabapentin overdose with lethargy and AMS   S/p procedural stomach emptying in ED with charcoal and sorbitol   Continue monitoring the side affect        elevated ammonia level  Continue   Lactulose,   Continue ammonia monitoring     Psych   Psychosis , hx of  Ekbom's delusional parasitosis   Need psych evaluation later   On olanzapine and klonopin     left wrist fracture: immobilized -poa     RN trach tonight   Disposition :tbd,   Review of systems:  Unable to obtain due to intubation   Vital signs/Intake and Output:  Visit Vitals  BP (!) 104/55   Pulse 92   Temp 98.3 °F (36.8 °C)   Resp 14   Ht 5' 2\" (1.575 m)   Wt 71.2 kg (157 lb)   SpO2 92%   BMI 28.72 kg/m²     Current Shift:  No intake/output data recorded. Last three shifts:  09/20 1901 - 09/22 0700  In: 2530.6 [I.V.:685.6]  Out: 2175 [Urine:1475; Drains:700]    Physical Exam:  General: Intubated ,eyes open, not following the command   HEENT: NC, Atraumatic. ET tube noted   Lungs: CTA Bilaterally. No Wheezing/Rhonchi/Rales. Heart:  rrr ,  No murmur, No Rubs, No Gallops  Abdomen: Soft, Non distended, Non tender.   +Bowel sounds,   Extremities: No c/c.immobilzer noted on left wrist   Psych:   Calm   Neurologic:  On sedation           Labs: Results:       Chemistry Recent Labs     09/22/21  0419 09/21/21  1646 09/21/21  0452 09/20/21  0557 09/20/21  0557   GLU 93  --  98  --  146*   *  --  146*  --  144   K 4.1 3.8 3.3*   < > 4.0     --  111  --  110   CO2 29  --  28  --  26 BUN 23*  --  21*  --  22*   CREA 0.48*  --  0.50*  --  0.45*   CA 8.9  --  9.0  --  9.4   AGAP 8  --  7  --  8   BUCR 48*  --  42*  --  49*     --  115  --  146*   TP 5.9*  --  5.9*  --  6.8   ALB 3.1*  --  3.1*  --  3.7   GLOB 2.8  --  2.8  --  3.1   AGRAT 1.1  --  1.1  --  1.2    < > = values in this interval not displayed. CBC w/Diff Recent Labs     09/22/21  0419 09/21/21  0452 09/20/21  0557   WBC 10.5 10.3 10.2   RBC 3.27* 2.99* 3.22*   HGB 9.7* 8.9* 9.7*   HCT 30.7* 27.5* 30.0*   * 437* 406   GRANS 51 76* 81*   LYMPH 33 20* 9*   EOS 1 0 0      Cardiac Enzymes No results for input(s): CPK, CKND1, ZENON in the last 72 hours. No lab exists for component: CKRMB, TROIP   Coagulation No results for input(s): PTP, INR, APTT, INREXT, INREXT in the last 72 hours. Lipid Panel No results found for: CHOL, CHOLPOCT, CHOLX, CHLST, CHOLV, 994887, HDL, HDLP, LDL, LDLC, DLDLP, 398384, VLDLC, VLDL, TGLX, TRIGL, TRIGP, TGLPOCT, CHHD, CHHDX   BNP No results for input(s): BNPP in the last 72 hours. Liver Enzymes Recent Labs     09/22/21  0419   TP 5.9*   ALB 3.1*         Thyroid Studies No results found for: T4, T3U, TSH, TSHEXT, TSHEXT     Procedures/imaging: see electronic medical records for all procedures/Xrays and details which were not copied into this note but were reviewed prior to creation of Plan    XR WRIST LT AP/LAT/OBL MIN 3V    Result Date: 8/19/2021  EXAM: XR WRIST LT AP/LAT/OBL MIN 3V CLINICAL INDICATION/HISTORY: Fall with LEFT wrist pain and swelling -Additional: None COMPARISON: None TECHNIQUE: 3 views of the left wrist _______________ FINDINGS: BONES: Comminuted, impacted fracture of the distal radius with slight dorsal angulation of the distal fracture fragment. No aggressive appearing osseous lytic or blastic lesion. SOFT TISSUES: Unremarkable. _______________     Comminuted, impacted fracture of the distal radius.     XR CHEST PORT    Result Date: 9/13/2021  EXAM: XR CHEST PORT CLINICAL INDICATION/HISTORY: Acute respiratory failure, ET tub position -Additional: None COMPARISON: One day prior TECHNIQUE: Portable frontal view of the chest _______________ FINDINGS: SUPPORT DEVICES: Endotracheal and enteric tubes unchanged. HEART AND MEDIASTINUM: Cardiomediastinal silhouette within normal limits. LUNGS AND PLEURAL SPACES: No dense consolidation, large effusion or pneumothorax. _______________     No acute cardiopulmonary abnormality. XR CHEST PORT    Result Date: 9/11/2021  MEDICAL RECORDS NUMBER: [id number] PROCEDURE:  Single view of the chest DATE: 9/11/2021 9:09 PM HISTORY: 39years old Female. post ett Comparison: 8/4/2021 FINDINGS: The patient has been intubated with appropriate placement of the endotracheal tube. There is no enteric tube passing below the level of the diaphragm. There is no significant effusion. There is no significant pneumothorax. Cardiomediastinal silhouette is within normal limits. There is no evidence of a focal pulmonary infiltrate or mass. 1. There is no significant or acute cardiopulmonary process. The patient has been intubated with appropriate placement of the endotracheal tube. There is no enteric tube passing below the level of the diaphragm. This report has been generated using voice recognition software. XR ABD PORT  1 V    Result Date: 9/12/2021  EXAM: Frontal view of the abdomen CLINICAL INDICATION/HISTORY: NG tube placement COMPARISON: None. _______________ FINDINGS: NG/OG tube in place with the tip in the left upper quadrant in the region of the gastric fundus. No bowel obstruction. Moderate colonic stool burden. _______________     1. NG/OG tube is in good position with the tip in the left upper quadrant in the region of the gastric fundus. 2. Moderate colonic stool burden.       Jose Thurston MD

## 2021-09-23 ENCOUNTER — APPOINTMENT (OUTPATIENT)
Dept: GENERAL RADIOLOGY | Age: 41
DRG: 004 | End: 2021-09-23
Attending: INTERNAL MEDICINE
Payer: MEDICAID

## 2021-09-23 LAB
ALBUMIN SERPL-MCNC: 3.1 G/DL (ref 3.4–5)
ALBUMIN/GLOB SERPL: 1.1 {RATIO} (ref 0.8–1.7)
ALP SERPL-CCNC: 114 U/L (ref 45–117)
ALT SERPL-CCNC: 98 U/L (ref 13–56)
AMMONIA PLAS-SCNC: 44 UMOL/L (ref 11–32)
ANION GAP SERPL CALC-SCNC: 10 MMOL/L (ref 3–18)
AST SERPL-CCNC: 17 U/L (ref 10–38)
BACTERIA SPEC CULT: NORMAL
BASOPHILS # BLD: 0 K/UL (ref 0–0.1)
BASOPHILS NFR BLD: 0 % (ref 0–2)
BILIRUB SERPL-MCNC: 0.3 MG/DL (ref 0.2–1)
BUN SERPL-MCNC: 15 MG/DL (ref 7–18)
BUN/CREAT SERPL: 33 (ref 12–20)
CALCIUM SERPL-MCNC: 8.7 MG/DL (ref 8.5–10.1)
CHLORIDE SERPL-SCNC: 106 MMOL/L (ref 100–111)
CO2 SERPL-SCNC: 26 MMOL/L (ref 21–32)
CREAT SERPL-MCNC: 0.46 MG/DL (ref 0.6–1.3)
DIFFERENTIAL METHOD BLD: ABNORMAL
EOSINOPHIL # BLD: 0.3 K/UL (ref 0–0.4)
EOSINOPHIL NFR BLD: 2 % (ref 0–5)
ERYTHROCYTE [DISTWIDTH] IN BLOOD BY AUTOMATED COUNT: 13.3 % (ref 11.6–14.5)
GLOBULIN SER CALC-MCNC: 2.8 G/DL (ref 2–4)
GLUCOSE BLD STRIP.AUTO-MCNC: 108 MG/DL (ref 70–110)
GLUCOSE BLD STRIP.AUTO-MCNC: 115 MG/DL (ref 70–110)
GLUCOSE BLD STRIP.AUTO-MCNC: 118 MG/DL (ref 70–110)
GLUCOSE BLD STRIP.AUTO-MCNC: 120 MG/DL (ref 70–110)
GLUCOSE SERPL-MCNC: 101 MG/DL (ref 74–99)
GRAM STN SPEC: NORMAL
GRAM STN SPEC: NORMAL
HCT VFR BLD AUTO: 33.5 % (ref 35–45)
HGB BLD-MCNC: 10.3 G/DL (ref 12–16)
LYMPHOCYTES # BLD: 3.1 K/UL (ref 0.9–3.6)
LYMPHOCYTES NFR BLD: 22 % (ref 21–52)
Lab: NORMAL
MAGNESIUM SERPL-MCNC: 2 MG/DL (ref 1.6–2.6)
MCH RBC QN AUTO: 29.8 PG (ref 24–34)
MCHC RBC AUTO-ENTMCNC: 30.7 G/DL (ref 31–37)
MCV RBC AUTO: 96.8 FL (ref 78–100)
MONOCYTES # BLD: 0.8 K/UL (ref 0.05–1.2)
MONOCYTES NFR BLD: 6 % (ref 3–10)
NEUTS SEG # BLD: 9.8 K/UL (ref 1.8–8)
NEUTS SEG NFR BLD: 70 % (ref 40–73)
PHOSPHATE SERPL-MCNC: 5.8 MG/DL (ref 2.5–4.9)
PLATELET # BLD AUTO: 489 K/UL (ref 135–420)
PLATELET COMMENTS,PCOM: ABNORMAL
PMV BLD AUTO: 9.4 FL (ref 9.2–11.8)
POTASSIUM SERPL-SCNC: 4.4 MMOL/L (ref 3.5–5.5)
PROT SERPL-MCNC: 5.9 G/DL (ref 6.4–8.2)
RBC # BLD AUTO: 3.46 M/UL (ref 4.2–5.3)
RBC MORPH BLD: ABNORMAL
REFERENCE LAB,REFLB: NORMAL
SERVICE CMNT-IMP: NORMAL
SODIUM SERPL-SCNC: 142 MMOL/L (ref 136–145)
TEST DESCRIPTION:,ATST: NORMAL
WBC # BLD AUTO: 14 K/UL (ref 4.6–13.2)

## 2021-09-23 PROCEDURE — 74011000258 HC RX REV CODE- 258: Performed by: INTERNAL MEDICINE

## 2021-09-23 PROCEDURE — 74011250637 HC RX REV CODE- 250/637: Performed by: INTERNAL MEDICINE

## 2021-09-23 PROCEDURE — 82962 GLUCOSE BLOOD TEST: CPT

## 2021-09-23 PROCEDURE — 36415 COLL VENOUS BLD VENIPUNCTURE: CPT

## 2021-09-23 PROCEDURE — 74011250636 HC RX REV CODE- 250/636: Performed by: INTERNAL MEDICINE

## 2021-09-23 PROCEDURE — 74011000250 HC RX REV CODE- 250: Performed by: INTERNAL MEDICINE

## 2021-09-23 PROCEDURE — 84100 ASSAY OF PHOSPHORUS: CPT

## 2021-09-23 PROCEDURE — 80053 COMPREHEN METABOLIC PANEL: CPT

## 2021-09-23 PROCEDURE — 94003 VENT MGMT INPAT SUBQ DAY: CPT

## 2021-09-23 PROCEDURE — 83735 ASSAY OF MAGNESIUM: CPT

## 2021-09-23 PROCEDURE — 74011250637 HC RX REV CODE- 250/637: Performed by: FAMILY MEDICINE

## 2021-09-23 PROCEDURE — 77030020291 HC FLEXSEAL FMS BMS -C

## 2021-09-23 PROCEDURE — 94640 AIRWAY INHALATION TREATMENT: CPT

## 2021-09-23 PROCEDURE — 65610000006 HC RM INTENSIVE CARE

## 2021-09-23 PROCEDURE — 85025 COMPLETE CBC W/AUTO DIFF WBC: CPT

## 2021-09-23 PROCEDURE — 77030041175 HC BAG DIGNSHLD BARD -A

## 2021-09-23 PROCEDURE — 71045 X-RAY EXAM CHEST 1 VIEW: CPT

## 2021-09-23 PROCEDURE — 82140 ASSAY OF AMMONIA: CPT

## 2021-09-23 RX ADMIN — ARFORMOTEROL TARTRATE 15 MCG: 15 SOLUTION RESPIRATORY (INHALATION) at 07:11

## 2021-09-23 RX ADMIN — IPRATROPIUM BROMIDE AND ALBUTEROL SULFATE 3 ML: .5; 3 SOLUTION RESPIRATORY (INHALATION) at 15:47

## 2021-09-23 RX ADMIN — FAMOTIDINE 20 MG: 20 TABLET ORAL at 08:31

## 2021-09-23 RX ADMIN — OLANZAPINE 10 MG: 10 TABLET, FILM COATED ORAL at 19:05

## 2021-09-23 RX ADMIN — LORAZEPAM 2 MG: 2 INJECTION INTRAMUSCULAR at 21:36

## 2021-09-23 RX ADMIN — ACETAMINOPHEN 650 MG: 325 TABLET ORAL at 21:10

## 2021-09-23 RX ADMIN — FAMOTIDINE 20 MG: 20 TABLET ORAL at 20:25

## 2021-09-23 RX ADMIN — FENTANYL CITRATE 175 MCG/HR: 0.05 INJECTION, SOLUTION INTRAMUSCULAR; INTRAVENOUS at 08:48

## 2021-09-23 RX ADMIN — BUDESONIDE 500 MCG: 0.5 INHALANT RESPIRATORY (INHALATION) at 07:11

## 2021-09-23 RX ADMIN — PROPOFOL 50 MCG/KG/MIN: 10 INJECTION, EMULSION INTRAVENOUS at 08:31

## 2021-09-23 RX ADMIN — IPRATROPIUM BROMIDE AND ALBUTEROL SULFATE 3 ML: .5; 3 SOLUTION RESPIRATORY (INHALATION) at 21:53

## 2021-09-23 RX ADMIN — ARFORMOTEROL TARTRATE 15 MCG: 15 SOLUTION RESPIRATORY (INHALATION) at 21:53

## 2021-09-23 RX ADMIN — MIDAZOLAM 9 MG/HR: 5 INJECTION, SOLUTION INTRAMUSCULAR; INTRAVENOUS at 23:27

## 2021-09-23 RX ADMIN — THIAMINE HCL TAB 100 MG 100 MG: 100 TAB at 08:31

## 2021-09-23 RX ADMIN — IPRATROPIUM BROMIDE AND ALBUTEROL SULFATE 3 ML: .5; 3 SOLUTION RESPIRATORY (INHALATION) at 07:11

## 2021-09-23 RX ADMIN — FENTANYL CITRATE 175 MCG/HR: 0.05 INJECTION, SOLUTION INTRAMUSCULAR; INTRAVENOUS at 03:37

## 2021-09-23 RX ADMIN — LACTULOSE 45 ML: 20 SOLUTION ORAL at 08:54

## 2021-09-23 RX ADMIN — CLONAZEPAM 0.5 MG: 0.5 TABLET ORAL at 08:31

## 2021-09-23 RX ADMIN — LACTULOSE 45 ML: 20 SOLUTION ORAL at 20:24

## 2021-09-23 RX ADMIN — FOLIC ACID 1 MG: 1 TABLET ORAL at 08:31

## 2021-09-23 RX ADMIN — FENTANYL CITRATE 175 MCG/HR: 0.05 INJECTION, SOLUTION INTRAMUSCULAR; INTRAVENOUS at 19:00

## 2021-09-23 RX ADMIN — SODIUM CHLORIDE 10 ML: 9 INJECTION, SOLUTION INTRAMUSCULAR; INTRAVENOUS; SUBCUTANEOUS at 21:37

## 2021-09-23 RX ADMIN — PROPOFOL 50 MCG/KG/MIN: 10 INJECTION, EMULSION INTRAVENOUS at 03:37

## 2021-09-23 RX ADMIN — PROPOFOL 50 MCG/KG/MIN: 10 INJECTION, EMULSION INTRAVENOUS at 23:31

## 2021-09-23 RX ADMIN — Medication 1 TABLET: at 08:31

## 2021-09-23 RX ADMIN — SODIUM CHLORIDE 10 ML: 9 INJECTION, SOLUTION INTRAMUSCULAR; INTRAVENOUS; SUBCUTANEOUS at 13:08

## 2021-09-23 RX ADMIN — CLONAZEPAM 0.5 MG: 0.5 TABLET ORAL at 20:25

## 2021-09-23 RX ADMIN — BUDESONIDE 500 MCG: 0.5 INHALANT RESPIRATORY (INHALATION) at 21:53

## 2021-09-23 RX ADMIN — MIDAZOLAM 8 MG/HR: 5 INJECTION, SOLUTION INTRAMUSCULAR; INTRAVENOUS at 08:33

## 2021-09-23 RX ADMIN — FENTANYL CITRATE 175 MCG/HR: 0.05 INJECTION, SOLUTION INTRAMUSCULAR; INTRAVENOUS at 13:53

## 2021-09-23 RX ADMIN — 0.12% CHLORHEXIDINE GLUCONATE 10 ML: 1.2 RINSE ORAL at 20:25

## 2021-09-23 RX ADMIN — 0.12% CHLORHEXIDINE GLUCONATE 10 ML: 1.2 RINSE ORAL at 08:55

## 2021-09-23 RX ADMIN — PROPOFOL 50 MCG/KG/MIN: 10 INJECTION, EMULSION INTRAVENOUS at 19:01

## 2021-09-23 NOTE — OP NOTES
[facility]  OPERATIVE REPORT    Name:  Oj Jaimes  MR#:   [id number]  :  1980  ACCOUNT #:  [de-identified]  DATE OF SERVICE:  2021    PREOPERATIVE DIAGNOSIS:  Ventilator dependency. POSTOPERATIVE DIAGNOSIS:  Ventilator dependency. PROCEDURE PERFORMED:  Skin flap tracheotomy. SURGEON:  Marilee Nguyễn MD    ASSISTANT:  None. ANESTHESIA:  General endotracheal anesthesia. COMPLICATIONS:  None. DRAINS:  None. SPECIMENS REMOVED:  None. IMPLANTS:  #8 Shiley cuffed trach tube. ESTIMATED BLOOD LOSS:  5ml. PROCEDURE:  The patient was brought to the operating room and placed supine on the operating room table. General endotracheal anesthesia was given per Anesthesiology Department. Once the patient was sufficiently anesthetized, 1% lidocaine with epinephrine was injected in the patient's anterior skin approximately at the level of the cricoid. Once this was accomplished and a proper time-out was obtained, a standard tracheotomy incision was made approximately one fingerbreadth above the sternal notch. A lower base skin flap was then created to facilitate skin flap tracheotomy. After this was complete, the strap muscles were identified and opened in the midline and taken down to the level of the thyroid isthmus. The thyroid isthmus was  from the cricoid and then a plane was made just above the trachea in the pretracheal fascia just below the thyroid isthmus. Thyroid isthmus was then ligated with the use of the harmonic scalpel and the bipolar cautery was then used to cauterize the cut edges of the thyroid isthmus. The thyroid isthmus was retracted laterally. Pretracheal fascia was once more cleared and the cricoid hook was then placed at the cricoid. The cricoid was elevated superiorly to facilitate the tracheotomy incision itself. An inferior trapdoor incision was made in tracheal ring #2.   This was sutured into the skin thus facilitating the skin flap tracheotomy. After this was complete, the endotracheal tube was then withdrawn to a point just above the tracheotomy site and a #8 Shiley trach tube was then passed. Good air exchange was demonstrated. Surgicel soaked with thrombin was then packed around the tracheotomy tube and the tracheotomy tube sutured to the skin. Javy Mounds ties were then placed. The patient was then subsequently taken from the operating room back to the ICU in stable condition. The patient's sister was contacted regarding the above.       MD PAYAL Gilliam/[email or web address]  D:  09/22/2021 20:50  T:  09/23/2021 4:02  JOB #:  [id number]

## 2021-09-23 NOTE — ADDENDUM NOTE
Addendum  created 09/22/21 2114 by Estelita Dickerson MD    Attestation recorded in [facility], [facility] 97 filed

## 2021-09-23 NOTE — PROGRESS NOTES
09/22/21 2000   RCP   RCP Time 30 min. RCP Tasks Other (comment)  (Patient returned from getting a trach.  6 DCT& 8DCT at bedsid)

## 2021-09-23 NOTE — PROGRESS NOTES
1900 - Bedside and Verbal shift change report given to Kei Moscoso RN (oncoming nurse) by EV Bauer RN. Report included the following information SBAR, Kardex, Intake/Output, MAR and Recent Results. 2000 - Patient back in room from placement of tracheostomy. Pt showed signs of pain and restlessness. Infusions continued and PRN medication given (see MAR). Restraints maintained. RT at bedside to resume ventilator settings. 0000 - Reassessment completed. No changes at this time. 0400 - Reassessment completed. No changes at this time. 0700 - Bedside and Verbal shift change report given to EV Bauer RN (oncoming nurse) by Sebas Gomez RN  (offgoing nurse). Report included the following information SBAR, Kardex, Intake/Output, MAR, Recent Results and Cardiac Rhythm NSR.

## 2021-09-23 NOTE — PROGRESS NOTES
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    Left wrist fracture, with delayed healing, subsequent encounter S62.102G    Drug overdose, intentional self-harm, initial encounter ([facility] T50.902A    Hypoxia R09.02    Hypotension after procedure I95.81    Acute metabolic encephalopathy E18.45    Psychosis ([facility] F29    Hyponatremia E87.1    Acute respiratory failure with hypoxia (HCC) J96.01    Increased ammonia level R79.89    Hypokalemia E87.6 ·   Code status: full code     RECOMMENDATIONS:   Respiratory: Patient on ventilator support; intubated 2021 due to encephalopathy and drug overdose. Patient was extubated on 2021, and reintubated within an hour due to respiratory distress and upper airway edema. Completed Solu-Medrol 40 mg IV every 6 hours x8 doses  for upper airway edema. S/p tracheostomy 2021. CXR reviewed: Tracheostomy in place. Left basilar atelectasis. No tracheal secretions. On [facility] 14/400/30/7. Sedation: On Versed, fentanyl, propofol; propofol actively being titrated down. Consider Precedex if required when turning off other sedation. Nurses reported severe agitation when sedation lowered down; likely due to underlying psych issues. Continue ventilator bundle and sedation bundle/protocol. Bronchodilators: DuoNeb 3 times daily, Brovana twice daily, Pulmicort twice daily. ID: No fever or leukocytosis. S/p LP with negative CSF cultures. Covid test 2021 negative this admission  Completed zosyn for 5 days for aspiration on 2021. ID has signed off.    Mild leukocytosis but no fever; likely due to stress after tracheostomy placement. Monitor. CVS: NSR on telemetry. Hemodynamically stable; monitor. Echo 9/17/2021: LVEF 60 to 65%. RVSP 29 mmHg. Renal: Normal creatinine and urine output; monitor. Replace electrolytes per ICU protocol. Hypernatremia resolved. Heme: Chronic mild anemia, stable; platelets normal; continue to monitor  Endo: Stable blood sugars; continue NGT feeds Glucerna at 45 mL/h with free water to 50 mL every 4 hours. GI: Continue tube feeding as tolerated; thiamine, folic acid and multivitamin supplements. LFT continue to improve; monitor. hCG negative. Neurology: Sedation as above. Ammonia slightly increased to 44 today; continue lactulose 30 g twice daily. CT headnil acute  Psych: Continue Zyprexa and Klonopin. Toxicology: gabapentin OD  Skin: ICU nursing care  MS: Left wrist fracture in immobilization external fixator  Prophylaxis: Restart Lovenox for DVT prophylaxis (monitor for any bleeding at tracheostomy site). Continue famotidine. Restraints: Wrist soft restraints for patient interfering with medical therapy/management and patient safety. Prognosis guarded overall. CODE STATUSfull code. Palliative care on case. Quality Care: PPI, DVT prophylaxis, HOB elevated, Infection control all reviewed and addressed. Lines/Tubes: PIV   ETT: 9/11/219/22/2021. NGT: 9/11/21  [place]: 9/11/21  FMS in place    ADVANCE DIRECTIVE: Full code. Patient's  in halfway; he had called this morning and discussed with ICU RN. Discussed with RN, RT, MDR. High complexity decision making was performed during the evaluation of this patient at high risk for decompensation with multiple organ involvement. Critical care time excludes discussion or procedure: 36 minutes. López Singh   sister-in-law   [phone]       Family discussion 9/19/2021:Patient  Mr. Shaista Lopes called ICU; he is currently in halfway; updated management plans; discussed that patient failed extubation yesterday, and needed to be reintubated due to upper airway swelling from prolonged intubation; due to patient's medical condition, recommended tracheostomy, and patient  is agreeable. Patient  would like information to be provided as needed to his 2 sisterMary Jane Westfall (above) and Michelle Riojas [[phone]]; patient's  will call ICU daily from half-way to get updates; he does not want information to be provided to anyone else other than his 2 sisters. Patient's  is calling every day and nurses are operating him. Subjective/History:   Ms. Juliette Gutierrez has been seen and evaluated as Dr. Shorty Cleveland requested now for assisting with Acute respiratory failure and ventilator management. Patient is a 39 y.o. female with following PMhx presented to ER with lethargy via EMS and admitted for Gabapentin overdose. Pt required intubation for airways protection. Position control was contacted that recommended supportive care per ER provider. Pt seen at bedside in ER rm#2. The patient can not provide additional history due to Ventilated. 9/23/2021   Remains in ICU room 104. S/p tracheostomy yesterday. No bleeding at tracheostomy site. On ventilator. Sedated with Versed 8, fentanyl 175, propofol 50. Discussed with RN to titrate off propofol first and then after we can consider changing sedation to Precedex drip. No fever. Hemodynamically stable. No tracheal secretions. No overnight pulmonary issues reported. [facility]. Patient's  calls from half-way, nurses are operating room. Patient has a history of drug use and smoker and alcohol use. Review of Systems:  Review of systems not obtained due to patient factors.         Past Medical History:  Past Medical History:   Diagnosis Date    Asthma     Bilateral ovarian cysts     Cocaine abuse ([facility]     Mental and behavioral problem     Pancreatitis     Pancreatitis     Psychosis [facility])         Past Surgical History:  Past Surgical History:   Procedure Laterality Date    HX GYN      D&C, Hysterectomy        Medications:  Prior to Admission medications    Medication Sig Start Date End Date Taking? Authorizing Provider   albuterol (PROVENTIL HFA, VENTOLIN HFA, PROAIR HFA) 90 mcg/actuation inhaler Take 2 Puffs by inhalation every four (4) hours as needed for Wheezing or Shortness of Breath. 8/4/21   Dinorah Lorenzo PA-C   ibuprofen (MOTRIN) 600 mg tablet Take 1 Tablet by mouth every six (6) hours as needed for Pain. Take with food. 8/4/21   Dinorah Lorenzo PA-C   ALPRAZolam (XANAX) 0.5 mg tablet TAKE 1 TABLET BY MOUTH ONCE DAILY AS NEEDED FOR ANXIETY 12/4/19   Provider, Historical   metFORMIN (GLUCOPHAGE) 500 mg tablet TAKE 1 TABLET BY MOUTH ONCE DAILY 11/12/19   Provider, Historical   nicotine (NICODERM CQ) 21 mg/24 hr APPLY 1 PATCH TOPICALLY TO THE SKIN DAILY FOR CRAVINGS AS DIRECTED 12/4/19   Provider, Historical   traZODone (DESYREL) 50 mg tablet TAKE 1 TABLET BY MOUTH AT BEDTIME AS NEEDED FOR INSOMNIA 12/4/19   Provider, Historical   escitalopram oxalate (LEXAPRO) 10 mg tablet Take 10 mg by mouth daily. Other, MD Luisa   lurasidone (LATUDA) 80 mg tab tablet Take 80 mg by mouth daily (with dinner).     Other, MD Luisa       Current Facility-Administered Medications   Medication Dose Route Frequency    fentaNYL (PF) 900 mcg/30 ml infusion soln  0-200 mcg/hr IntraVENous TITRATE    propofol (DIPRIVAN) 10 mg/mL infusion  0-50 mcg/kg/min IntraVENous TITRATE    famotidine (PEPCID) tablet 20 mg  20 mg Oral BID    midazolam in normal saline (VERSED) 1 mg/mL infusion  2-10 mg/hr IntraVENous TITRATE    thiamine mononitrate (B-1) tablet 100 mg  100 mg Oral DAILY    folic acid (FOLVITE) tablet 1 mg  1 mg Oral DAILY    multivitamin, tx-iron-ca-min (THERA-M w/ IRON) tablet 1 Tablet  1 Tablet Oral DAILY    lactulose (CHRONULAC) 10 gram/15 mL solution 45 mL  30 g Oral BID    clonazePAM (KlonoPIN) tablet 0.5 mg  0.5 mg Oral BID    insulin lispro (HUMALOG) injection   SubCUTAneous Q6H    arformoteroL (BROVANA) neb solution 15 mcg  15 mcg Nebulization BID RT    budesonide (PULMICORT) 500 mcg/2 ml nebulizer suspension  500 mcg Nebulization BID RT    OLANZapine (ZyPREXA) tablet 10 mg  10 mg Oral QPM    albuterol-ipratropium (DUO-NEB) 2.5 MG-0.5 MG/3 ML  3 mL Nebulization TID RT    [Held by provider] enoxaparin (LOVENOX) injection 40 mg  40 mg SubCUTAneous Q24H    sodium chloride (NS) flush 5-40 mL  5-40 mL IntraVENous Q8H    chlorhexidine (PERIDEX) 0.12 % mouthwash 10 mL  10 mL Oral Q12H       Allergy:  Allergies   Allergen Reactions    Fish Containing Products Itching    Toradol [Ketorolac] Rash     Pt reports she is not allergic to this medication. Social History:  Social History     Tobacco Use    Smoking status: Current Every Day Smoker     Packs/day: 1.00    Smokeless tobacco: Never Used   Substance Use Topics    Alcohol use: Not Currently    Drug use: Not Currently     Types: Cocaine, Marijuana     Comment: used with in past 24 hours        Family History:  History reviewed. No pertinent family history. Objective:   Vital Signs:    Blood pressure (!) 139/92, pulse (!) 123, temperature (!) 100.9 °F (38.3 °C), resp. rate 16, height 5' 2\" (1.575 m), weight 71.2 kg (157 lb), last menstrual period 05/15/2018, SpO2 94 %. Body mass index is 28.72 kg/m². O2 Device: Tracheostomy, Ventilator       Temp (24hrs), Av.1 °F (37.3 °C), Min:98.2 °F (36.8 °C), Max:100.9 °F (38.3 °C)         Intake/Output:   Last shift:      No intake/output data recorded.   Last 3 shifts:  1901 -  0700  In: 3912 [I.V.:]  Out: 2625 [Urine:2100; Drains:525]    Intake/Output Summary (Last 24 hours) at 2021 1355  Last data filed at 2021 0400  Gross per 24 hour   Intake 2074.02 ml   Output 1375 ml   Net 699.02 ml       Ventilator Settings:  Mode Rate Tidal Volume Pressure FiO2 PEEP   Assist control   400 ml  7 cm H2O 30 % 7 cm H20     Peak airway pressure: 17 cm H2O    Minute ventilation: 7.61 l/min      Lung protective strategy, Pl pressure goals less than or equal to 30. Physical Exam:     General/Neurology: Sedated. On ventilator through tracheostomy. Head:   NCAT. Eye:   No icterus/pallor/cyanosis. Nose:   No nasal drainage/discharge. Neck:   Trachea midline. Lung: Moderate air entry bilateral equal.  No rales, rhonchi. No wheezing or stridor. No prolonged expiration or accessory muscle use. Heart:   S1 S2 present. No murmur or JVD. Abdomen:  Soft. NT. ND. No palpable masses. Extremities:  No edema. No cyanosis or clubbing. Pulses: 2+ and symmetric in DP. Data:     Recent Results (from the past 12 hour(s))   GLUCOSE, POC    Collection Time: 09/23/21  5:16 AM   Result Value Ref Range    Glucose (POC) 108 70 - 110 mg/dL   CBC WITH AUTOMATED DIFF    Collection Time: 09/23/21  6:15 AM   Result Value Ref Range    WBC 14.0 (H) 4.6 - 13.2 K/uL    RBC 3.46 (L) 4.20 - 5.30 M/uL    HGB 10.3 (L) 12.0 - 16.0 g/dL    HCT 33.5 (L) 35.0 - 45.0 %    MCV 96.8 78.0 - 100.0 FL    MCH 29.8 24.0 - 34.0 PG    MCHC 30.7 (L) 31.0 - 37.0 g/dL    RDW 13.3 11.6 - 14.5 %    PLATELET 097 (H) 924 - 420 K/uL    MPV 9.4 9.2 - 11.8 FL    NEUTROPHILS 70 40 - 73 %    LYMPHOCYTES 22 21 - 52 %    MONOCYTES 6 3 - 10 %    EOSINOPHILS 2 0 - 5 %    BASOPHILS 0 0 - 2 %    ABS. NEUTROPHILS 9.8 (H) 1.8 - 8.0 K/UL    ABS. LYMPHOCYTES 3.1 0.9 - 3.6 K/UL    ABS. MONOCYTES 0.8 0.05 - 1.2 K/UL    ABS. EOSINOPHILS 0.3 0.0 - 0.4 K/UL    ABS.  BASOPHILS 0.0 0.0 - 0.1 K/UL    DF AUTOMATED      PLATELET COMMENTS Increased Platelets      RBC COMMENTS NORMOCYTIC, NORMOCHROMIC     MAGNESIUM    Collection Time: 09/23/21  6:15 AM   Result Value Ref Range    Magnesium 2.0 1.6 - 2.6 mg/dL   METABOLIC PANEL, COMPREHENSIVE    Collection Time: 09/23/21  6:15 AM   Result Value Ref Range    Sodium 142 136 - 145 mmol/L    Potassium 4.4 3.5 - 5.5 mmol/L    Chloride 106 100 - 111 mmol/L    CO2 26 21 - 32 mmol/L    Anion gap 10 3.0 - 18 mmol/L    Glucose 101 (H) 74 - 99 mg/dL    BUN 15 7.0 - 18 MG/DL    Creatinine 0.46 (L) 0.6 - 1.3 MG/DL    BUN/Creatinine ratio 33 (H) 12 - 20      GFR est AA >60 >60 ml/min/1.73m2    GFR est non-AA >60 >60 ml/min/1.73m2    Calcium 8.7 8.5 - 10.1 MG/DL    Bilirubin, total 0.3 0.2 - 1.0 MG/DL    ALT (SGPT) 98 (H) 13 - 56 U/L    AST (SGOT) 17 10 - 38 U/L    Alk. phosphatase 114 45 - 117 U/L    Protein, total 5.9 (L) 6.4 - 8.2 g/dL    Albumin 3.1 (L) 3.4 - 5.0 g/dL    Globulin 2.8 2.0 - 4.0 g/dL    A-G Ratio 1.1 0.8 - 1.7     PHOSPHORUS    Collection Time: 09/23/21  6:15 AM   Result Value Ref Range    Phosphorus 5.8 (H) 2.5 - 4.9 MG/DL   AMMONIA    Collection Time: 09/23/21  6:15 AM   Result Value Ref Range    Ammonia 44 (H) 11 - 32 UMOL/L   GLUCOSE, POC    Collection Time: 09/23/21 12:51 PM   Result Value Ref Range    Glucose (POC) 120 (H) 70 - 110 mg/dL             No results for input(s): FIO2I, IFO2, HCO3I, IHCO3, HCOPOC, PCO2I, PCOPOC, IPHI, PHI, PHPOC, PO2I, PO2POC in the last 72 hours. No lab exists for component: IPOC2    All Micro Results     Procedure Component Value Units Date/Time    CULTURE, CSF  TUBE 2 [[id number]] Collected: 09/16/21 1434    Order Status: Completed Specimen: Cerebrospinal Fluid Updated: 09/23/21 1241     Special Requests: TUBE 3, CULTURE AND SENSITIVTY AND GRAM STAIN.      GRAM STAIN RARE WBCS SEEN         NO ORGANISMS SEEN        Culture result: NO GROWTH 7 DAYS       CULTURE, BLOOD [[id number]] Collected: 09/14/21 0515    Order Status: Completed Specimen: Blood Updated: 09/20/21 0832     Special Requests: NO SPECIAL REQUESTS        Culture result: NO GROWTH 6 DAYS       CULTURE, BLOOD [[id number]] Collected: 09/14/21 0500    Order Status: Completed Specimen: Blood Updated: 09/20/21 0832     Special Requests: NO SPECIAL REQUESTS        Culture result: NO GROWTH 6 DAYS       MENINGITIS PATHOGENS PANEL, CSF (BY PCR) [[id number]] Collected: 09/16/21 1434    Order Status: Completed Specimen: Cerebrospinal Fluid Updated: 09/17/21 0050     Escherichia coli K1 Not detected        Haemophilus Influenzae Not detected        Listeria Monocytogenes Not detected        Neisseria Meningitidis Not detected        Streptococcus Agalactiae Not detected        Streptococcus Pneumoniae Not detected        Cytomegalovirus Not detected        Enterovirus Not detected        Herpes Simplex Virus 1 Not detected        Comment: In patients who have negative herpes simplex 1 and 2 PCR results, do not modify treatment, confirm with alternate testing. Herpes Simplex Virus 2 Not detected        Comment: In patients who have negative herpes simplex 1 and 2 PCR results, do not modify treatment, confirm with alternate testing. Human Herpesvirus 6 Not detected        Human Parechovirus Not detected        Varicella Zoster Virus Not detected        Crypto. neoformans/gattii Not detected       MENINGITIS PATHOGENS PANEL, CSF (BY PCR) [[id number]] Collected: 09/16/21 1545    Order Status: Canceled Specimen: Cerebrospinal Fluid     HSV 1 AND 2 BY PCR [[id number]] Collected: 09/16/21 1434    Order Status: Completed Specimen: Other Updated: 09/16/21 1502    COVID-19 RAPID TEST [[id number]] Collected: 09/16/21 1000    Order Status: Completed Specimen: Nasopharyngeal Updated: 09/16/21 1032     Specimen source Nasopharyngeal        COVID-19 rapid test Not detected        Comment: Rapid Abbott ID Now       Rapid NAAT:  The specimen is NEGATIVE for SARS-CoV-2, the novel coronavirus associated with COVID-19. Negative results should be treated as presumptive and, if inconsistent with clinical signs and symptoms or necessary for patient management, should be tested with an alternative molecular assay.   Negative results do not preclude SARS-CoV-2 infection and should not be used as the sole basis for patient management decisions. This test has been authorized by the FDA under an Emergency Use Authorization (EUA) for use by authorized laboratories.    Fact sheet for Healthcare Providers: ConventionUpdate.co.nz  Fact sheet for Patients: ConventionUpdate.co.nz       Methodology: Isothermal Nucleic Acid Amplification         LEGIONELLA PNEUMOPHILA AG, URINE [[id number]] Collected: 09/14/21 2315    Order Status: Completed Specimen: Urine, random Updated: 09/15/21 1042     Legionella Ag, urine Negative       STREP PNEUMO AG, URINE [[id number]] Collected: 09/14/21 2315    Order Status: Completed Specimen: Urine, random Updated: 09/15/21 1042     Strep pneumo Ag, urine Negative       RESPIRATORY VIRUS PANEL W/COVID-19, PCR [[id number]] Collected: 09/14/21 1832    Order Status: Completed Specimen: Nasopharyngeal Updated: 09/14/21 2234     Adenovirus Not detected        Coronavirus 229E Not detected        Coronavirus HKU1 Not detected        Coronavirus CVNL63 Not detected        Coronavirus OC43 Not detected        SARS-CoV-2, PCR Not detected        Metapneumovirus Not detected        Rhinovirus and Enterovirus Not detected        Influenza A Not detected        Influenza A, subtype H1 Not detected        Influenza A, subtype H3 Not detected        INFLUENZA A H1N1 PCR Not detected        Influenza B Not detected        Parainfluenza 1 Not detected        Parainfluenza 2 Not detected        Parainfluenza 3 Not detected        Parainfluenza virus 4 Not detected        RSV by PCR Not detected        B. parapertussis, PCR Not detected        Bordetella pertussis - PCR Not detected        Chlamydophila pneumoniae DNA, QL, PCR Not detected        Mycoplasma pneumoniae DNA, QL, PCR Not detected       CULTURE, BLOOD [[id number]] Collected: 09/14/21 1700    Order Status: Canceled Specimen: Blood     CULTURE, URINE [[id number]] Collected: 09/13/21 2330    Order Status: Canceled Specimen: Cath Urine     RESPIRATORY VIRUS PANEL W/COVID-19, PCR [[id number]]     Order Status: Canceled Specimen: NASOPHARYNGEAL SWAB     COVID-19 RAPID TEST [[id number]]     Order Status: Canceled     COVID-19 RAPID TEST [[id number]] Collected: 09/13/21 0737    Order Status: Completed Specimen: Nasopharyngeal Updated: 09/13/21 0811     Specimen source Nasopharyngeal        COVID-19 rapid test Not detected        Comment: Rapid Abbott ID Now       Rapid NAAT:  The specimen is NEGATIVE for SARS-CoV-2, the novel coronavirus associated with COVID-19. Negative results should be treated as presumptive and, if inconsistent with clinical signs and symptoms or necessary for patient management, should be tested with an alternative molecular assay. Negative results do not preclude SARS-CoV-2 infection and should not be used as the sole basis for patient management decisions. This test has been authorized by the FDA under an Emergency Use Authorization (EUA) for use by authorized laboratories. Fact sheet for Healthcare Providers: ConventionInterleukin Geneticsdate.co.nz  Fact sheet for Patients: ConventionInterleukin Geneticsdate.co.nz       Methodology: Isothermal Nucleic Acid Amplification         COVID-19 RAPID TEST [[id number]]     Order Status: Canceled         Echo 9/17/2021:  Left Ventricle Normal cavity size, wall thickness and systolic function (ejection fraction normal). The estimated EF is 60 - 65%. There is inconclusive left ventricular diastolic function E/E' ratio = 7.08  Heart rate is over 100. Wall Scoring The left ventricular wall motion is normal.            Left Atrium Normal cavity size. Right Ventricle Normal cavity size and global systolic function. Assessment of RV function:   TAPSE = 27 mm. Right Atrium Normal cavity size. Interatrial Septum Interatrial septum not well visualized   Aortic Valve Aortic valve not well visualized. Trileaflet valve structure, no stenosis and no regurgitation. Mitral Valve No stenosis. Mitral valve non-specific thickening. Mild regurgitation. Tricuspid Valve Tricuspid valve not well visualized. No stenosis. Mild regurgitation. Pulmonic Valve Pulmonic valve not well visualized. No stenosis and no regurgitation. Aorta The aorta was not well visualized. Normal aortic root. Pulmonary Artery Pulmonary arteries not well visualized. Pulmonary arterial systolic pressure (PASP) is 29 mmHg. Pulmonary hypertension not suggested by Doppler findings. IVC/Hepatic Veins Mechanically ventilated; cannot use inferior caval vein diameter to estimate central venous pressure. Pericardium Normal pericardium and no evidence of pericardial effusion. CT head 9/15/1021  IMPRESSION   No acute intracranial abnormality. CT chest, abdomen, pelvis 9/15/1021  IMPRESSION   Endotracheal tube tip in the lower thoracic trachea 1.5 cm above the dipak.    Bilateral lower lobar atelectasis, possible endobronchial lesion/mucous plug in  the left lower lobe bronchus.    No acute intra-abdominal abnormality. Imaging:  [x]I have personally reviewed the patients chest radiographs images and report     Results from Hospital Encounter encounter on 09/11/21    XR CHEST PORT    Narrative  EXAM: Portable frontal view of the chest.    CLINICAL INDICATION/HISTORY: Tracheostomy tube placement    COMPARISON: 9/22/2021    _______________    FINDINGS:    Interval placement tracheostomy tube in satisfactory position with NG tube tip  in fundus of stomach. Normal mediastinal and cardiac silhouettes. Interval  development mild left basilar atelectasis with remainder of the lungs clear with  no pleural effusion or pneumothorax.    _______________    Impression  1. Satisfactory position tracheostomy tube. 2. Interval development mild left basilar atelectasis.           Please note: Voice-recognition software may have been used to generate this report, which may have resulted in some phonetic-based errors in grammar and contents. Even though attempts were made to correct all the mistakes, some may have been missed, and remained in the body of the document.       Arlette Raza MD  9/23/2021

## 2021-09-23 NOTE — ANESTHESIA POSTPROCEDURE EVALUATION
Procedure(s):  TRACHEOSTOMY, PT IS IN ICU BED 4.    general    Anesthesia Post Evaluation      Multimodal analgesia: multimodal analgesia not used between 6 hours prior to anesthesia start to PACU discharge  Patient location during evaluation: bedside  Patient participation: complete - patient cannot participate  Post-procedure mental status: sedated and paralyzed. Pain score: 0  Airway patency: patent  Anesthetic complications: no  Cardiovascular status: acceptable  Respiratory status: ventilator  Hydration status: acceptable  Post anesthesia nausea and vomiting:  none  Final Post Anesthesia Temperature Assessment:  Normothermia (36.0-37.5 degrees C)      INITIAL Post-op Vital signs:   Vitals Value Taken Time   /85 09/22/21 2100   Temp 36.8 °C (98.2 °F) 09/22/21 2000   Pulse 111 09/22/21 2109   Resp 14 09/22/21 2109   SpO2 97 % 09/22/21 2109   Vitals shown include unvalidated device data.

## 2021-09-23 NOTE — PROGRESS NOTES
DC Plan: [facility] when medically stable    Care manager noted that patient had tracheostomy yesterday; to prepare for plan for [facility], will complete [facility].     Care Management Interventions  Transition of Care Consult (CM Consult): Discharge Planning  The Plan for Transition of Care is Related to the Following Treatment Goals : intentional drug overdose  Discharge Location  Discharge Placement:  (TBD) - [facility]

## 2021-09-23 NOTE — PROGRESS NOTES
Hospitalist Progress Note    Patient: Willow Bender MRN: [id number]  CSN: [id number]    YOB: 1980  Age: 39 y.o. Sex: female    DOA: 9/11/2021 LOS:  LOS: 11 days          Chief Complaint:    Overdose and resp failure      Assessment/Plan     Flores Goldstein is a 39 y.o. female with  standing history of multidrug use/abuse, previous drug-seeking behavior and mental health issues otherwise unspecified arrived via EMS with acute metabolic encephalopathy and lethargy. Admitted for likely gabapentin overdose.   She was intubated in ER, ct head no acute issue, LP done due to fever even on abx      Acute respiratory failure with hypoxia   Intubated on 9/12    Now with trach placed    covid 19 rapid negative      BP stable  Labs reviewed  Sedation as per intensivist     completed zosyn course  LP done no bacterial meningitis indicated      Anemia-Stable so far no bleeding reported and will continue monitoring   Upper PVL negative for dvt      Hypernatremia resolved     Hypokalemia resolved   icu electrolytes replacement protocol      Severe Acute metabolic encephalopathy   Ct head no acute process      Gabapentin overdose with lethargy and AMS   S/p procedural stomach emptying in ED with charcoal and sorbitol      elevated ammonia level  Continue Lactulose     Psychiatric disease  Psychosis , hx of  Ekbom's delusional parasitosis   Need psych evaluation once able and if able to participate  On olanzapine and klonopin      left wrist fracture: immobilized -poa     Will need LTAC lacement     Disposition :  Patient Active Problem List   Diagnosis Code    Dextromethorphan use disorder, moderate ([facility] [address] F19.20    Ekbom's delusional parasitosis ([facility] [address] F22    Left wrist fracture, with delayed healing, subsequent encounter S62.102G    Drug overdose, intentional self-harm, initial encounter ([facility] [address] T50.902A    Hypoxia R09.02    Hypotension after procedure I95.81    Acute metabolic encephalopathy H27.72    Psychosis ([address] F29    Hyponatremia E87.1    Acute respiratory failure with hypoxia (HCC) J96.01    Increased ammonia level R79.89    Hypokalemia E87.6       Subjective:    No new events  Patient has FMS  Intubated  trached  sedated    Review of systems:    UTO      Vital signs/Intake and Output:  Visit Vitals  /75   Pulse (!) 113   Temp 99.7 °F (37.6 °C)   Resp 14   Ht 5' 2\" (1.575 m)   Wt 71.2 kg (157 lb)   SpO2 90%   BMI 28.72 kg/m²     Current Shift:  No intake/output data recorded. Last three shifts:  09/21 1901 - 09/23 0700  In: 3912 [I.V.:1917]  Out: 2625 [Urine:2100; Drains:525]    Exam:    General: sedated WF NAD  Head/Neck: NCAT, supple, No masses, No lymphadenopathy  CVS:Regular rate and rhythm, no M/R/G, S1/S2 heard, no thrill  Lungs:Clear to auscultation bilaterally, no wheezes, rhonchi, or rales  Abdomen: Soft, Nontender, No distention, Normal Bowel sounds  Extremities: No C/C/E, pulses palpable 2+                  Labs: Results:       Chemistry Recent Labs     09/23/21  0615 09/22/21  0419 09/21/21  1646 09/21/21  0452 09/21/21  0452   * 93  --   --  98    146*  --   --  146*   K 4.4 4.1 3.8   < > 3.3*    109  --   --  111   CO2 26 29  --   --  28   BUN 15 23*  --   --  21*   CREA 0.46* 0.48*  --   --  0.50*   CA 8.7 8.9  --   --  9.0   AGAP 10 8  --   --  7   BUCR 33* 48*  --   --  42*    116  --   --  115   TP 5.9* 5.9*  --   --  5.9*   ALB 3.1* 3.1*  --   --  3.1*   GLOB 2.8 2.8  --   --  2.8   AGRAT 1.1 1.1  --   --  1.1    < > = values in this interval not displayed. CBC w/Diff Recent Labs     09/23/21  0615 09/22/21  0419 09/21/21  0452   WBC 14.0* 10.5 10.3   RBC 3.46* 3.27* 2.99*   HGB 10.3* 9.7* 8.9*   HCT 33.5* 30.7* 27.5*   * 485* 437*   GRANS 70 51 76*   LYMPH 22 33 20*   EOS 2 1 0      Cardiac Enzymes No results for input(s): CPK, CKND1, ZENON in the last 72 hours.     No lab exists for component: CKRMB, TROIP   Coagulation No results for input(s): PTP, INR, APTT, INREXT in the last 72 hours. Lipid Panel No results found for: CHOL, CHOLPOCT, CHOLX, CHLST, CHOLV, 547794, HDL, HDLP, LDL, LDLC, DLDLP, 237712, VLDLC, VLDL, TGLX, TRIGL, TRIGP, TGLPOCT, CHHD, CHHDX   BNP No results for input(s): BNPP in the last 72 hours.    Liver Enzymes Recent Labs     09/23/21  0615   TP 5.9*   ALB 3.1*         Thyroid Studies No results found for: T4, T3U, TSH, TSHEXT     Procedures/imaging: see electronic medical records for all procedures/Xrays and details which were not copied into this note but were reviewed prior to creation of Cindy Varela MD

## 2021-09-23 NOTE — PROGRESS NOTES
0710-received report from Gomez Holly RN included SBAR MAR and Kardex. 1800-Dr Curtis Arzola ordered lovenox held until 9/24 1200. Shift Summary- maintained on vent with trach and sedation. FMS and zamora in place. sedation maintained due to elevated heart rate when attempted to lower. Bedside and Verbal shift change report given to Glen Bumpers RN (oncoming nurse) by Katerine Mendez RN (offgoing nurse). Report included the following information SBAR, Kardex and MAR.

## 2021-09-24 ENCOUNTER — APPOINTMENT (OUTPATIENT)
Dept: GENERAL RADIOLOGY | Age: 41
DRG: 004 | End: 2021-09-24
Attending: INTERNAL MEDICINE
Payer: MEDICAID

## 2021-09-24 LAB
ALBUMIN SERPL-MCNC: 3.1 G/DL (ref 3.4–5)
ALBUMIN/GLOB SERPL: 1.1 {RATIO} (ref 0.8–1.7)
ALP SERPL-CCNC: 109 U/L (ref 45–117)
ALT SERPL-CCNC: 67 U/L (ref 13–56)
AMMONIA PLAS-SCNC: 22 UMOL/L (ref 11–32)
ANION GAP SERPL CALC-SCNC: 11 MMOL/L (ref 3–18)
APPEARANCE UR: CLEAR
ARTERIAL PATENCY WRIST A: POSITIVE
AST SERPL-CCNC: 12 U/L (ref 10–38)
BACTERIA URNS QL MICRO: ABNORMAL /HPF
BASE EXCESS BLD CALC-SCNC: 1 MMOL/L
BASOPHILS # BLD: 0 K/UL (ref 0–0.1)
BASOPHILS # BLD: 0 K/UL (ref 0–0.1)
BASOPHILS NFR BLD: 0 % (ref 0–2)
BASOPHILS NFR BLD: 0 % (ref 0–2)
BDY SITE: ABNORMAL
BILIRUB SERPL-MCNC: 0.4 MG/DL (ref 0.2–1)
BILIRUB UR QL: NEGATIVE
BODY TEMPERATURE: 99
BUN SERPL-MCNC: 15 MG/DL (ref 7–18)
BUN/CREAT SERPL: 32 (ref 12–20)
CALCIUM SERPL-MCNC: 9.1 MG/DL (ref 8.5–10.1)
CHLORIDE SERPL-SCNC: 102 MMOL/L (ref 100–111)
CO2 SERPL-SCNC: 27 MMOL/L (ref 21–32)
COLOR UR: YELLOW
COVID-19 RAPID TEST, COVR: NOT DETECTED
CREAT SERPL-MCNC: 0.47 MG/DL (ref 0.6–1.3)
DIFFERENTIAL METHOD BLD: ABNORMAL
DIFFERENTIAL METHOD BLD: ABNORMAL
EOSINOPHIL # BLD: 0 K/UL (ref 0–0.4)
EOSINOPHIL # BLD: 0.9 K/UL (ref 0–0.4)
EOSINOPHIL NFR BLD: 0 % (ref 0–5)
EOSINOPHIL NFR BLD: 4 % (ref 0–5)
EPITH CASTS URNS QL MICRO: ABNORMAL /LPF (ref 0–5)
ERYTHROCYTE [DISTWIDTH] IN BLOOD BY AUTOMATED COUNT: 13.2 % (ref 11.6–14.5)
ERYTHROCYTE [DISTWIDTH] IN BLOOD BY AUTOMATED COUNT: 13.2 % (ref 11.6–14.5)
GAS FLOW.O2 O2 DELIVERY SYS: ABNORMAL L/MIN
GAS FLOW.O2 SETTING OXYMISER: 16 BPM
GLOBULIN SER CALC-MCNC: 2.8 G/DL (ref 2–4)
GLUCOSE BLD STRIP.AUTO-MCNC: 118 MG/DL (ref 70–110)
GLUCOSE BLD STRIP.AUTO-MCNC: 119 MG/DL (ref 70–110)
GLUCOSE BLD STRIP.AUTO-MCNC: 136 MG/DL (ref 70–110)
GLUCOSE SERPL-MCNC: 119 MG/DL (ref 74–99)
GLUCOSE UR STRIP.AUTO-MCNC: NEGATIVE MG/DL
HCO3 BLD-SCNC: 26.2 MMOL/L (ref 22–26)
HCT VFR BLD AUTO: 31.6 % (ref 35–45)
HCT VFR BLD AUTO: 33.1 % (ref 35–45)
HGB BLD-MCNC: 10.5 G/DL (ref 12–16)
HGB BLD-MCNC: 10.6 G/DL (ref 12–16)
HGB UR QL STRIP: NEGATIVE
KETONES UR QL STRIP.AUTO: ABNORMAL MG/DL
LACTATE SERPL-SCNC: 0.7 MMOL/L (ref 0.4–2)
LEUKOCYTE ESTERASE UR QL STRIP.AUTO: ABNORMAL
LYMPHOCYTES # BLD: 2.8 K/UL (ref 0.9–3.6)
LYMPHOCYTES # BLD: 3.4 K/UL (ref 0.9–3.6)
LYMPHOCYTES NFR BLD: 15 % (ref 21–52)
LYMPHOCYTES NFR BLD: 9 % (ref 21–52)
MAGNESIUM SERPL-MCNC: 2 MG/DL (ref 1.6–2.6)
MCH RBC QN AUTO: 29.4 PG (ref 24–34)
MCH RBC QN AUTO: 29.7 PG (ref 24–34)
MCHC RBC AUTO-ENTMCNC: 32 G/DL (ref 31–37)
MCHC RBC AUTO-ENTMCNC: 33.2 G/DL (ref 31–37)
MCV RBC AUTO: 89.5 FL (ref 78–100)
MCV RBC AUTO: 91.9 FL (ref 78–100)
MONOCYTES # BLD: 0.2 K/UL (ref 0.05–1.2)
MONOCYTES # BLD: 0.9 K/UL (ref 0.05–1.2)
MONOCYTES NFR BLD: 1 % (ref 3–10)
MONOCYTES NFR BLD: 3 % (ref 3–10)
NEUTS BAND NFR BLD MANUAL: 1 % (ref 0–5)
NEUTS BAND NFR BLD MANUAL: 2 % (ref 0–5)
NEUTS SEG # BLD: 18.4 K/UL (ref 1.8–8)
NEUTS SEG # BLD: 27 K/UL (ref 1.8–8)
NEUTS SEG NFR BLD: 78 % (ref 40–73)
NEUTS SEG NFR BLD: 87 % (ref 40–73)
NITRITE UR QL STRIP.AUTO: NEGATIVE
O2/TOTAL GAS SETTING VFR VENT: 30 %
PCO2 BLD: 43.7 MMHG (ref 35–45)
PEEP RESPIRATORY: 7 CMH2O
PH BLD: 7.39 [PH] (ref 7.35–7.45)
PH UR STRIP: 6.5 [PH] (ref 5–8)
PHOSPHATE SERPL-MCNC: 4.6 MG/DL (ref 2.5–4.9)
PLATELET # BLD AUTO: 407 K/UL (ref 135–420)
PLATELET # BLD AUTO: 431 K/UL (ref 135–420)
PLATELET COMMENTS,PCOM: ABNORMAL
PLATELET COMMENTS,PCOM: ABNORMAL
PMV BLD AUTO: 9.3 FL (ref 9.2–11.8)
PMV BLD AUTO: 9.9 FL (ref 9.2–11.8)
PO2 BLD: 68 MMHG (ref 80–100)
POTASSIUM SERPL-SCNC: 4.5 MMOL/L (ref 3.5–5.5)
PROT SERPL-MCNC: 5.9 G/DL (ref 6.4–8.2)
PROT UR STRIP-MCNC: ABNORMAL MG/DL
RBC # BLD AUTO: 3.53 M/UL (ref 4.2–5.3)
RBC # BLD AUTO: 3.6 M/UL (ref 4.2–5.3)
RBC #/AREA URNS HPF: ABNORMAL /HPF (ref 0–5)
RBC MORPH BLD: ABNORMAL
RBC MORPH BLD: ABNORMAL
SAO2 % BLD: 92.8 % (ref 92–97)
SERVICE CMNT-IMP: ABNORMAL
SODIUM SERPL-SCNC: 140 MMOL/L (ref 136–145)
SOURCE, COVRS: NORMAL
SP GR UR REFRACTOMETRY: >1.03 (ref 1–1.03)
SPECIMEN TYPE: ABNORMAL
UROBILINOGEN UR QL STRIP.AUTO: 0.2 EU/DL (ref 0.2–1)
VENTILATION MODE VENT: ABNORMAL
VT SETTING VENT: 531 ML
WBC # BLD AUTO: 22.9 K/UL (ref 4.6–13.2)
WBC # BLD AUTO: 30.7 K/UL (ref 4.6–13.2)
WBC URNS QL MICRO: ABNORMAL /HPF (ref 0–5)

## 2021-09-24 PROCEDURE — 87070 CULTURE OTHR SPECIMN AEROBIC: CPT

## 2021-09-24 PROCEDURE — 84100 ASSAY OF PHOSPHORUS: CPT

## 2021-09-24 PROCEDURE — 74011000258 HC RX REV CODE- 258: Performed by: INTERNAL MEDICINE

## 2021-09-24 PROCEDURE — 36415 COLL VENOUS BLD VENIPUNCTURE: CPT

## 2021-09-24 PROCEDURE — 77030006998

## 2021-09-24 PROCEDURE — 94003 VENT MGMT INPAT SUBQ DAY: CPT

## 2021-09-24 PROCEDURE — 87040 BLOOD CULTURE FOR BACTERIA: CPT

## 2021-09-24 PROCEDURE — 83735 ASSAY OF MAGNESIUM: CPT

## 2021-09-24 PROCEDURE — 74011250637 HC RX REV CODE- 250/637: Performed by: FAMILY MEDICINE

## 2021-09-24 PROCEDURE — 74011250637 HC RX REV CODE- 250/637: Performed by: HOSPITALIST

## 2021-09-24 PROCEDURE — 87077 CULTURE AEROBIC IDENTIFY: CPT

## 2021-09-24 PROCEDURE — 71045 X-RAY EXAM CHEST 1 VIEW: CPT

## 2021-09-24 PROCEDURE — 74011250636 HC RX REV CODE- 250/636: Performed by: FAMILY MEDICINE

## 2021-09-24 PROCEDURE — 82962 GLUCOSE BLOOD TEST: CPT

## 2021-09-24 PROCEDURE — 87186 SC STD MICRODIL/AGAR DIL: CPT

## 2021-09-24 PROCEDURE — 82803 BLOOD GASES ANY COMBINATION: CPT

## 2021-09-24 PROCEDURE — 74011250637 HC RX REV CODE- 250/637: Performed by: INTERNAL MEDICINE

## 2021-09-24 PROCEDURE — 87635 SARS-COV-2 COVID-19 AMP PRB: CPT

## 2021-09-24 PROCEDURE — 74011000250 HC RX REV CODE- 250: Performed by: INTERNAL MEDICINE

## 2021-09-24 PROCEDURE — 74011000258 HC RX REV CODE- 258: Performed by: FAMILY MEDICINE

## 2021-09-24 PROCEDURE — 94640 AIRWAY INHALATION TREATMENT: CPT

## 2021-09-24 PROCEDURE — 65610000006 HC RM INTENSIVE CARE

## 2021-09-24 PROCEDURE — 74011250636 HC RX REV CODE- 250/636: Performed by: INTERNAL MEDICINE

## 2021-09-24 PROCEDURE — 77030018798 HC PMP KT ENTRL FED COVD -A

## 2021-09-24 PROCEDURE — 82140 ASSAY OF AMMONIA: CPT

## 2021-09-24 PROCEDURE — 81001 URINALYSIS AUTO W/SCOPE: CPT

## 2021-09-24 PROCEDURE — 77010033678 HC OXYGEN DAILY

## 2021-09-24 PROCEDURE — 80053 COMPREHEN METABOLIC PANEL: CPT

## 2021-09-24 PROCEDURE — 85025 COMPLETE CBC W/AUTO DIFF WBC: CPT

## 2021-09-24 PROCEDURE — 36600 WITHDRAWAL OF ARTERIAL BLOOD: CPT

## 2021-09-24 PROCEDURE — 77030010538 HC BG FLEXSEAL FMS BMS -A

## 2021-09-24 PROCEDURE — 2709999900 HC NON-CHARGEABLE SUPPLY

## 2021-09-24 PROCEDURE — 83605 ASSAY OF LACTIC ACID: CPT

## 2021-09-24 RX ADMIN — ENOXAPARIN SODIUM 40 MG: 100 INJECTION SUBCUTANEOUS at 17:59

## 2021-09-24 RX ADMIN — PIPERACILLIN AND TAZOBACTAM 3.38 G: 3; .375 INJECTION, POWDER, LYOPHILIZED, FOR SOLUTION INTRAVENOUS at 23:42

## 2021-09-24 RX ADMIN — FAMOTIDINE 20 MG: 20 TABLET ORAL at 08:16

## 2021-09-24 RX ADMIN — IBUPROFEN 400 MG: 100 SUSPENSION ORAL at 00:11

## 2021-09-24 RX ADMIN — OLANZAPINE 10 MG: 10 TABLET, FILM COATED ORAL at 17:59

## 2021-09-24 RX ADMIN — FENTANYL CITRATE 175 MCG/HR: 0.05 INJECTION, SOLUTION INTRAMUSCULAR; INTRAVENOUS at 00:06

## 2021-09-24 RX ADMIN — SODIUM CHLORIDE 10 ML: 9 INJECTION, SOLUTION INTRAMUSCULAR; INTRAVENOUS; SUBCUTANEOUS at 05:21

## 2021-09-24 RX ADMIN — DEXMEDETOMIDINE HYDROCHLORIDE 0.4 MCG/KG/HR: 100 INJECTION, SOLUTION INTRAVENOUS at 10:18

## 2021-09-24 RX ADMIN — FAMOTIDINE 20 MG: 20 TABLET ORAL at 21:08

## 2021-09-24 RX ADMIN — PROPOFOL 50 MCG/KG/MIN: 10 INJECTION, EMULSION INTRAVENOUS at 14:10

## 2021-09-24 RX ADMIN — PIPERACILLIN AND TAZOBACTAM 3.38 G: 3; .375 INJECTION, POWDER, LYOPHILIZED, FOR SOLUTION INTRAVENOUS at 05:21

## 2021-09-24 RX ADMIN — BUDESONIDE 500 MCG: 0.5 INHALANT RESPIRATORY (INHALATION) at 20:32

## 2021-09-24 RX ADMIN — MIDAZOLAM 9 MG/HR: 5 INJECTION, SOLUTION INTRAMUSCULAR; INTRAVENOUS at 23:41

## 2021-09-24 RX ADMIN — BUDESONIDE 500 MCG: 0.5 INHALANT RESPIRATORY (INHALATION) at 08:06

## 2021-09-24 RX ADMIN — FOLIC ACID 1 MG: 1 TABLET ORAL at 08:16

## 2021-09-24 RX ADMIN — PIPERACILLIN AND TAZOBACTAM 3.38 G: 3; .375 INJECTION, POWDER, LYOPHILIZED, FOR SOLUTION INTRAVENOUS at 12:36

## 2021-09-24 RX ADMIN — PROPOFOL 50 MCG/KG/MIN: 10 INJECTION, EMULSION INTRAVENOUS at 09:15

## 2021-09-24 RX ADMIN — SODIUM CHLORIDE 10 ML: 9 INJECTION, SOLUTION INTRAMUSCULAR; INTRAVENOUS; SUBCUTANEOUS at 13:05

## 2021-09-24 RX ADMIN — LACTULOSE 45 ML: 20 SOLUTION ORAL at 21:08

## 2021-09-24 RX ADMIN — IPRATROPIUM BROMIDE AND ALBUTEROL SULFATE 3 ML: .5; 3 SOLUTION RESPIRATORY (INHALATION) at 20:32

## 2021-09-24 RX ADMIN — LORAZEPAM 2 MG: 2 INJECTION INTRAMUSCULAR at 06:58

## 2021-09-24 RX ADMIN — CLONAZEPAM 0.5 MG: 0.5 TABLET ORAL at 21:09

## 2021-09-24 RX ADMIN — ARFORMOTEROL TARTRATE 15 MCG: 15 SOLUTION RESPIRATORY (INHALATION) at 20:32

## 2021-09-24 RX ADMIN — FENTANYL CITRATE 175 MCG/HR: 0.05 INJECTION, SOLUTION INTRAMUSCULAR; INTRAVENOUS at 10:16

## 2021-09-24 RX ADMIN — Medication 1 TABLET: at 08:16

## 2021-09-24 RX ADMIN — ACETAMINOPHEN 650 MG: 325 TABLET ORAL at 18:19

## 2021-09-24 RX ADMIN — IPRATROPIUM BROMIDE AND ALBUTEROL SULFATE 3 ML: .5; 3 SOLUTION RESPIRATORY (INHALATION) at 08:06

## 2021-09-24 RX ADMIN — MIDAZOLAM 9 MG/HR: 5 INJECTION, SOLUTION INTRAMUSCULAR; INTRAVENOUS at 11:50

## 2021-09-24 RX ADMIN — ARFORMOTEROL TARTRATE 15 MCG: 15 SOLUTION RESPIRATORY (INHALATION) at 08:06

## 2021-09-24 RX ADMIN — CLONAZEPAM 0.5 MG: 0.5 TABLET ORAL at 08:16

## 2021-09-24 RX ADMIN — ACETAMINOPHEN 650 MG: 325 TABLET ORAL at 12:36

## 2021-09-24 RX ADMIN — SODIUM CHLORIDE 10 ML: 9 INJECTION, SOLUTION INTRAMUSCULAR; INTRAVENOUS; SUBCUTANEOUS at 21:09

## 2021-09-24 RX ADMIN — PROPOFOL 50 MCG/KG/MIN: 10 INJECTION, EMULSION INTRAVENOUS at 19:24

## 2021-09-24 RX ADMIN — 0.12% CHLORHEXIDINE GLUCONATE 10 ML: 1.2 RINSE ORAL at 08:16

## 2021-09-24 RX ADMIN — PROPOFOL 50 MCG/KG/MIN: 10 INJECTION, EMULSION INTRAVENOUS at 04:29

## 2021-09-24 RX ADMIN — PIPERACILLIN AND TAZOBACTAM 3.38 G: 3; .375 INJECTION, POWDER, LYOPHILIZED, FOR SOLUTION INTRAVENOUS at 17:59

## 2021-09-24 RX ADMIN — THIAMINE HCL TAB 100 MG 100 MG: 100 TAB at 08:16

## 2021-09-24 RX ADMIN — DEXMEDETOMIDINE HYDROCHLORIDE 0.4 MCG/KG/HR: 100 INJECTION, SOLUTION INTRAVENOUS at 22:28

## 2021-09-24 RX ADMIN — LACTULOSE 45 ML: 20 SOLUTION ORAL at 08:16

## 2021-09-24 RX ADMIN — FENTANYL CITRATE 175 MCG/HR: 0.05 INJECTION, SOLUTION INTRAMUSCULAR; INTRAVENOUS at 05:15

## 2021-09-24 RX ADMIN — IPRATROPIUM BROMIDE AND ALBUTEROL SULFATE 3 ML: .5; 3 SOLUTION RESPIRATORY (INHALATION) at 15:32

## 2021-09-24 RX ADMIN — 0.12% CHLORHEXIDINE GLUCONATE 10 ML: 1.2 RINSE ORAL at 21:08

## 2021-09-24 NOTE — PROGRESS NOTES
Nutrition Note    Due to product shortage, will need to modify current TF order from Glucerna 1.5 to Jevity 1.5 with goal rate of 45ml/hr. Modification will add an additional 82g CHO. Please increase blood sugar control as needed. 09/24/21 0910   Enteral Nutrition   Feeding Route Nasogastric   EN Formula Standard with fiber  (Jevity 1.5)   Schedule Continuous   Feeding Regimen Switch from Glucerna 1.5 to Jevity 1.5. Goal rate of 45ml/hr. Water Flushes 250ml Q4   Current EN & Flush Order Provides Current:     Glucerna 1.5 @ 45ml + water flushes provides:   1485kcals, 82g PRO, 132g CHO, and 2251ml free water. Goal EN & Flush Order Provides New formula:     Jevity 1.5 @ 45ml + water flushes provides: 1485kcals, 63g PRO, 214g CHO, and 2252ml free water.      Electronically signed by Dima Mccauley RD on 9/24/2021 at 9:13 AM

## 2021-09-24 NOTE — PROGRESS NOTES
Cleaned trach and changed inner cannula. Replaced trach tie. Increased Fio2 to 50% for a Spo2 90%.  Patient currently has a temperature of 102.5 and

## 2021-09-24 NOTE — PROGRESS NOTES
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    Left wrist fracture, with delayed healing, subsequent encounter S62.102G    Drug overdose, intentional self-harm, initial encounter ([facility] [address] [facility] T50.902A    Hypoxia R09.02    Hypotension after procedure I95.81    Acute metabolic encephalopathy N47.07    Psychosis ([facility] [address] [facility] F29    Hyponatremia E87.1    Acute respiratory failure with hypoxia (HCC) J96.01    Increased ammonia level R79.89    Hypokalemia E87.6 ·   Code status: full code     RECOMMENDATIONS:   Respiratory: Patient on ventilator support; intubated 2021 due to encephalopathy and drug overdose. Patient was extubated on 2021, and reintubated within an hour due to respiratory distress and upper airway edema. Completed Solu-Medrol 40 mg IV every 6 hours x8 doses  for upper airway edema. S/p tracheostomy 2021. CXR 2021 reviewed: Tracheostomy in place. Left basilar minimal atelectasis. No acute infiltrates. No tracheal secretions. ABG 2021: 7.39/43.7/68/92.8%. [facility]30[facility]7. Unchanged. Discussed with RT to attempt SBT when sedation changed to Precedex drip. Sedation: On Versed, fentanyl, propofol. Discussed with RN to start Precedex and stop fentanyl. Also aggressively titrate off Versed and propofol. Nurses reported severe agitation when sedation lowered down; likely due to underlying psych issues. Continue ventilator bundle and sedation bundle/protocol. Bronchodilators: DuoNeb 3 times daily, Brovana twice daily, Pulmicort twice daily. ID: No fever or leukocytosis.   S/p LP with negative CSF cultures. Covid test 9/16/2021 negative this admission  Completed zosyn for 5 days for aspiration on 9/20/2021. Restarted Zosyn 9/23/2021 night for fever and leukocytosis. CXR left basilar atelectasis. Sputum culture 9/24/2021: Pending. Blood culture 9/24/2021: Pending. Discussed with RN to change the Barahona catheter. ID has signed off; reconsult as needed. CVS: NSR on telemetry. Hemodynamically stable; monitor. Echo 9/17/2021: LVEF 60 to 65%. RVSP 29 mmHg. Renal: Normal creatinine and urine output; monitor. Replace electrolytes per ICU protocol. Hypernatremia resolved. Heme: Chronic mild anemia, stable; platelets normal; continue to monitor  Endo: Stable blood sugars; continue NGT feeds Glucerna at 45 mL/h with free water to 50 mL every 4 hours. GI: Continue tube feeding as tolerated; thiamine, folic acid and multivitamin supplements. LFT normalized. hCG negative. Nutrition: Tolerating NGT feed at 45 mill per hour with free water to 50 mL every 4 hours. Neurology: Sedation as above. Ammonia improved/normalized to 22; continue lactulose 30 g twice daily. CT headnil acute  Psych: Continue Zyprexa and Klonopin. Toxicology: gabapentin OD  Skin: ICU nursing care  MS: Left wrist fracture in immobilization external fixator  Prophylaxis: Restart Lovenox for DVT prophylaxis (monitor for any bleeding at tracheostomy site). Continue famotidine. Restraints: Wrist soft restraints for patient interfering with medical therapy/management and patient safety. Prognosis guarded overall. CODE STATUSfull code. Palliative care on case. Quality Care: PPI, DVT prophylaxis, HOB elevated, Infection control all reviewed and addressed. Lines/Tubes: PIV   ETT: 9/11/219/22/2021. Tracheostomy 9/22/2021. NGT: 9/11/21  Barahona: 9/11/21; to be changed 9/24/2021. FMS in place    ADVANCE DIRECTIVE: Full code. Patient's  in assisted; he had called this morning and discussed with ICU RN.     Discussed with RN, RT, MDR. High complexity decision making was performed during the evaluation of this patient at high risk for decompensation with multiple organ involvement. Critical care time excludes discussion or procedure: 36 minutes. Roselyn Phillips   sister-in-law   [phone]       Family discussion 9/19/2021:Patient  Mr. Otto Morrell called ICU; he is currently in long-term; updated management plans; discussed that patient failed extubation yesterday, and needed to be reintubated due to upper airway swelling from prolonged intubation; due to patient's medical condition, recommended tracheostomy, and patient  is agreeable. Patient  would like information to be provided as needed to his 2 sisterMary Jane Morrell (above) and Estefani Enid [[phone]]; patient's  will call ICU daily from long-term to get updates; he does not want information to be provided to anyone else other than his 2 sisters. Patient's  is calling every day and nurses are operating him. Subjective/History:   Ms. Hayley Tovar has been seen and evaluated as Dr. Teresa Gomez requested now for assisting with Acute respiratory failure and ventilator management. Patient is a 39 y.o. female with following PMhx presented to ER with lethargy via EMS and admitted for Gabapentin overdose. Pt required intubation for airways protection. Position control was contacted that recommended supportive care per ER provider. Pt seen at bedside in ER rm#2. The patient can not provide additional history due to Ventilated. 9/24/2021   Remains in ICU room 104. On ventilator through tracheostomy. Sedated with Versed 9, propofol 50, fentanyl 175. Discussed with RN to start Precedex drip, stop fentanyl drip and then I aggressively titrate off Versed and propofol. Temperatures max 103. No ETT secretions. Discussed with RN to change Barahona catheter. Sputum culture and blood culture were sent last night.   Blood pressure stable. No vomiting or diarrhea reported. No other overnight issues reported. [facility]. Patient's  calls from [facility], nurses are operating room. Patient has a history of drug use and smoker and alcohol use. Review of Systems:  Review of systems not obtained due to patient factors. Past Medical History:  Past Medical History:   Diagnosis Date    Asthma     Bilateral ovarian cysts     Cocaine abuse ([facility]     Mental and behavioral problem     Pancreatitis     Pancreatitis     Psychosis ([facility]         Past Surgical History:  Past Surgical History:   Procedure Laterality Date    HX GYN      D&C, Hysterectomy        Medications:  Prior to Admission medications    Medication Sig Start Date End Date Taking? Authorizing Provider   albuterol (PROVENTIL HFA, VENTOLIN HFA, PROAIR HFA) 90 mcg/actuation inhaler Take 2 Puffs by inhalation every four (4) hours as needed for Wheezing or Shortness of Breath. 8/4/21   Henry Zhang PA-C   ibuprofen (MOTRIN) 600 mg tablet Take 1 Tablet by mouth every six (6) hours as needed for Pain. Take with food. 8/4/21 Marlowe PriorMAGALYS   ALPRAZolam (XANAX) 0.5 mg tablet TAKE 1 TABLET BY MOUTH ONCE DAILY AS NEEDED FOR ANXIETY 12/4/19   Provider, Historical   metFORMIN (GLUCOPHAGE) 500 mg tablet TAKE 1 TABLET BY MOUTH ONCE DAILY 11/12/19   Provider, Historical   nicotine (NICODERM CQ) 21 mg/24 hr APPLY 1 PATCH TOPICALLY TO THE SKIN DAILY FOR CRAVINGS AS DIRECTED 12/4/19   Provider, Historical   traZODone (DESYREL) 50 mg tablet TAKE 1 TABLET BY MOUTH AT BEDTIME AS NEEDED FOR INSOMNIA 12/4/19   Provider, Historical   escitalopram oxalate (LEXAPRO) 10 mg tablet Take 10 mg by mouth daily. Luisa Burch MD   lurasidone (LATUDA) 80 mg tab tablet Take 80 mg by mouth daily (with dinner).     Luisa Burch MD       Current Facility-Administered Medications   Medication Dose Route Frequency    piperacillin-tazobactam (ZOSYN) 3.375 g in 0.9% sodium chloride (MBP/ADV) 100 mL MBP  3.375 g IntraVENous Q6H    dexmedeTOMidine (PRECEDEX) 400 mcg in 0.9% sodium chloride 100 mL infusion  0.1-1.5 mcg/kg/hr IntraVENous TITRATE    fentaNYL (PF) 900 mcg/30 ml infusion soln  0-200 mcg/hr IntraVENous TITRATE    propofol (DIPRIVAN) 10 mg/mL infusion  0-50 mcg/kg/min IntraVENous TITRATE    famotidine (PEPCID) tablet 20 mg  20 mg Oral BID    midazolam in normal saline (VERSED) 1 mg/mL infusion  2-10 mg/hr IntraVENous TITRATE    thiamine mononitrate (B-1) tablet 100 mg  100 mg Oral DAILY    folic acid (FOLVITE) tablet 1 mg  1 mg Oral DAILY    multivitamin, tx-iron-ca-min (THERA-M w/ IRON) tablet 1 Tablet  1 Tablet Oral DAILY    lactulose (CHRONULAC) 10 gram/15 mL solution 45 mL  30 g Oral BID    clonazePAM (KlonoPIN) tablet 0.5 mg  0.5 mg Oral BID    insulin lispro (HUMALOG) injection   SubCUTAneous Q6H    arformoteroL (BROVANA) neb solution 15 mcg  15 mcg Nebulization BID RT    budesonide (PULMICORT) 500 mcg/2 ml nebulizer suspension  500 mcg Nebulization BID RT    OLANZapine (ZyPREXA) tablet 10 mg  10 mg Oral QPM    albuterol-ipratropium (DUO-NEB) 2.5 MG-0.5 MG/3 ML  3 mL Nebulization TID RT    enoxaparin (LOVENOX) injection 40 mg  40 mg SubCUTAneous Q24H    sodium chloride (NS) flush 5-40 mL  5-40 mL IntraVENous Q8H    chlorhexidine (PERIDEX) 0.12 % mouthwash 10 mL  10 mL Oral Q12H       Allergy:  Allergies   Allergen Reactions    Fish Containing Products Itching    Toradol [Ketorolac] Rash     Pt reports she is not allergic to this medication. Social History:  Social History     Tobacco Use    Smoking status: Current Every Day Smoker     Packs/day: 1.00    Smokeless tobacco: Never Used   Substance Use Topics    Alcohol use: Not Currently    Drug use: Not Currently     Types: Cocaine, Marijuana     Comment: used with in past 24 hours        Family History:  History reviewed. No pertinent family history.        Objective: Vital Signs:    Blood pressure 118/77, pulse (!) 127, temperature (!) 101.3 °F (38.5 °C), resp. rate 14, height 5' 2\" (1.575 m), weight 71.2 kg (157 lb), last menstrual period 05/15/2018, SpO2 90 %. Body mass index is 28.72 kg/m². O2 Device: Ventilator, Tracheostomy       Temp (24hrs), Av.1 °F (38.4 °C), Min:99 °F (37.2 °C), Max:103.2 °F (39.6 °C)         Intake/Output:   Last shift:      No intake/output data recorded. Last 3 shifts:  1901 -  0700  In: 4441.9 [I.V.:2116.9]  Out: 4488 [Urine:3000; Drains:1950]    Intake/Output Summary (Last 24 hours) at 2021 1107  Last data filed at 2021 0600  Gross per 24 hour   Intake 2120.71 ml   Output 3575 ml   Net -1454.29 ml       Ventilator Settings:  Mode Rate Tidal Volume Pressure FiO2 PEEP   Assist control, VC+   400 ml  7 cm H2O 30 % 7 cm H20     Peak airway pressure: 17 cm H2O    Minute ventilation: 10.1 l/min      Lung protective strategy, Pl pressure goals less than or equal to 30. Physical Exam:     General/Neurology: Sedated. On ventilator through tracheostomy. No bleeding at trach site. Head:   NCAT. Eye:   No icterus/pallor/cyanosis. Nose:   No nasal drainage/discharge. Neck:   Trachea midline. Lung: Moderate air entry bilateral equal.  No rales, rhonchi. No wheezing or stridor. No prolonged expiration or accessory muscle use. Heart:   S1 S2 present. No murmur or JVD. Abdomen:  Soft. NT. ND. No palpable masses. Extremities:  No edema. No cyanosis or clubbing. Pulses: 2+ and symmetric in DP.       Data:     Recent Results (from the past 12 hour(s))   CBC WITH AUTOMATED DIFF    Collection Time: 21  3:41 AM   Result Value Ref Range    WBC 22.9 (H) 4.6 - 13.2 K/uL    RBC 3.60 (L) 4.20 - 5.30 M/uL    HGB 10.6 (L) 12.0 - 16.0 g/dL    HCT 33.1 (L) 35.0 - 45.0 %    MCV 91.9 78.0 - 100.0 FL    MCH 29.4 24.0 - 34.0 PG    MCHC 32.0 31.0 - 37.0 g/dL    RDW 13.2 11.6 - 14.5 %    PLATELET 891 (H) 274 - 420 K/uL    MPV 9.3 9.2 - 11.8 FL    NEUTROPHILS 78 (H) 40 - 73 %    BAND NEUTROPHILS 2 0 - 5 %    LYMPHOCYTES 15 (L) 21 - 52 %    MONOCYTES 1 (L) 3 - 10 %    EOSINOPHILS 4 0 - 5 %    BASOPHILS 0 0 - 2 %    ABS. NEUTROPHILS 18.4 (H) 1.8 - 8.0 K/UL    ABS. LYMPHOCYTES 3.4 0.9 - 3.6 K/UL    ABS. MONOCYTES 0.2 0.05 - 1.2 K/UL    ABS. EOSINOPHILS 0.9 (H) 0.0 - 0.4 K/UL    ABS. BASOPHILS 0.0 0.0 - 0.1 K/UL    DF MANUAL      PLATELET COMMENTS Increased Platelets      RBC COMMENTS NORMOCYTIC, NORMOCHROMIC     MAGNESIUM    Collection Time: 09/24/21  3:41 AM   Result Value Ref Range    Magnesium 2.0 1.6 - 2.6 mg/dL   METABOLIC PANEL, COMPREHENSIVE    Collection Time: 09/24/21  3:41 AM   Result Value Ref Range    Sodium 140 136 - 145 mmol/L    Potassium 4.5 3.5 - 5.5 mmol/L    Chloride 102 100 - 111 mmol/L    CO2 27 21 - 32 mmol/L    Anion gap 11 3.0 - 18 mmol/L    Glucose 119 (H) 74 - 99 mg/dL    BUN 15 7.0 - 18 MG/DL    Creatinine 0.47 (L) 0.6 - 1.3 MG/DL    BUN/Creatinine ratio 32 (H) 12 - 20      GFR est AA >60 >60 ml/min/1.73m2    GFR est non-AA >60 >60 ml/min/1.73m2    Calcium 9.1 8.5 - 10.1 MG/DL    Bilirubin, total 0.4 0.2 - 1.0 MG/DL    ALT (SGPT) 67 (H) 13 - 56 U/L    AST (SGOT) 12 10 - 38 U/L    Alk.  phosphatase 109 45 - 117 U/L    Protein, total 5.9 (L) 6.4 - 8.2 g/dL    Albumin 3.1 (L) 3.4 - 5.0 g/dL    Globulin 2.8 2.0 - 4.0 g/dL    A-G Ratio 1.1 0.8 - 1.7     PHOSPHORUS    Collection Time: 09/24/21  3:41 AM   Result Value Ref Range    Phosphorus 4.6 2.5 - 4.9 MG/DL   AMMONIA    Collection Time: 09/24/21  3:41 AM   Result Value Ref Range    Ammonia 22 11 - 32 UMOL/L   BLOOD GAS, ARTERIAL POC    Collection Time: 09/24/21  4:28 AM   Result Value Ref Range    Device: ADULT VENT      FIO2 (POC) 30 %    pH (POC) 7.39 7.35 - 7.45      pCO2 (POC) 43.7 35.0 - 45.0 MMHG    pO2 (POC) 68 (L) 80 - 100 MMHG    HCO3 (POC) 26.2 (H) 22 - 26 MMOL/L    sO2 (POC) 92.8 92 - 97 %    Base excess (POC) 1.0 mmol/L    Mode ASSIST CONTROL      Tidal volume 531 ml    Set Rate 16 bpm    PEEP/CPAP (POC) 7 cmH2O    Allens test (POC) Positive      Site RIGHT RADIAL      Patient temp. 99      Specimen type (POC) ARTERIAL      Performed by DAINA Turpin    Collection Time: 09/24/21  5:12 AM   Result Value Ref Range    Glucose (POC) 119 (H) 70 - 110 mg/dL             Recent Labs     09/24/21  0428   FIO2I 30   HCO3I 26.2*   PCO2I 43.7   PHI 7.39   PO2I 68*       All Micro Results     Procedure Component Value Units Date/Time    COVID-19 RAPID TEST [[id number]]     Order Status: Sent     CULTURE, URINE [[id number]]     Order Status: Sent Specimen: Urine from [address] [facility], BLOOD [[id number]] Collected: 09/24/21 0740    Order Status: Completed Specimen: Blood Updated: 09/24/21 0752    CULTURE, RESPIRATORY/SPUTUM/BRONCH [facility] [employer] STAIN [[id number]] Collected: 09/24/21 0654    Order Status: Completed Specimen: Sputum from Tracheal Aspirate Updated: 09/24/21 0704    CULTURE, CSF  TUBE 2 [[id number]] Collected: 09/16/21 1434    Order Status: Completed Specimen: Cerebrospinal Fluid Updated: 09/23/21 1241     Special Requests: TUBE 3, CULTURE AND SENSITIVTY AND GRAM STAIN.      GRAM STAIN RARE WBCS SEEN         NO ORGANISMS SEEN        Culture result: NO GROWTH 7 DAYS       CULTURE, BLOOD [[id number]] Collected: 09/14/21 0515    Order Status: Completed Specimen: Blood Updated: 09/20/21 0832     Special Requests: NO SPECIAL REQUESTS        Culture result: NO GROWTH 6 DAYS       CULTURE, BLOOD [[id number]] Collected: 09/14/21 0500    Order Status: Completed Specimen: Blood Updated: 09/20/21 0832     Special Requests: NO SPECIAL REQUESTS        Culture result: NO GROWTH 6 DAYS       MENINGITIS PATHOGENS PANEL, CSF (BY PCR) [[id number]] Collected: 09/16/21 1434    Order Status: Completed Specimen: Cerebrospinal Fluid Updated: 09/17/21 0050     Escherichia coli K1 Not detected        Haemophilus Influenzae Not detected        Listeria Monocytogenes Not detected Neisseria Meningitidis Not detected        Streptococcus Agalactiae Not detected        Streptococcus Pneumoniae Not detected        Cytomegalovirus Not detected        Enterovirus Not detected        Herpes Simplex Virus 1 Not detected        Comment: In patients who have negative herpes simplex 1 and 2 PCR results, do not modify treatment, confirm with alternate testing. Herpes Simplex Virus 2 Not detected        Comment: In patients who have negative herpes simplex 1 and 2 PCR results, do not modify treatment, confirm with alternate testing. Human Herpesvirus 6 Not detected        Human Parechovirus Not detected        Varicella Zoster Virus Not detected        Crypto. neoformans/gattii Not detected       MENINGITIS PATHOGENS PANEL, CSF (BY PCR) [[id number]] Collected: 09/16/21 1545    Order Status: Canceled Specimen: Cerebrospinal Fluid     HSV 1 AND 2 BY PCR [[id number]] Collected: 09/16/21 1434    Order Status: Canceled Specimen: Other     EGMWB-02 RAPID TEST [[id number]] Collected: 09/16/21 1000    Order Status: Completed Specimen: Nasopharyngeal Updated: 09/16/21 1032     Specimen source Nasopharyngeal        COVID-19 rapid test Not detected        Comment: Rapid Abbott ID Now       Rapid NAAT:  The specimen is NEGATIVE for SARS-CoV-2, the novel coronavirus associated with COVID-19. Negative results should be treated as presumptive and, if inconsistent with clinical signs and symptoms or necessary for patient management, should be tested with an alternative molecular assay. Negative results do not preclude SARS-CoV-2 infection and should not be used as the sole basis for patient management decisions. This test has been authorized by the FDA under an Emergency Use Authorization (EUA) for use by authorized laboratories.    Fact sheet for Healthcare Providers: ConventionUpdate.co.nz  Fact sheet for Patients: ConventionUpdate.co.nz       Methodology: Isothermal Nucleic Acid Amplification         LEGIONELLA PNEUMOPHILA AG, URINE [[id number]] Collected: 09/14/21 2315    Order Status: Completed Specimen: Urine, random Updated: 09/15/21 1042     Legionella Ag, urine Negative       STREP PNEUMO AG, URINE [[id number]] Collected: 09/14/21 2315    Order Status: Completed Specimen: Urine, random Updated: 09/15/21 1042     Strep pneumo Ag, urine Negative       RESPIRATORY VIRUS PANEL W/COVID-19, PCR [[id number]] Collected: 09/14/21 1832    Order Status: Completed Specimen: Nasopharyngeal Updated: 09/14/21 2234     Adenovirus Not detected        Coronavirus 229E Not detected        Coronavirus HKU1 Not detected        Coronavirus CVNL63 Not detected        Coronavirus OC43 Not detected        SARS-CoV-2, PCR Not detected        Metapneumovirus Not detected        Rhinovirus and Enterovirus Not detected        Influenza A Not detected        Influenza A, subtype H1 Not detected        Influenza A, subtype H3 Not detected        INFLUENZA A H1N1 PCR Not detected        Influenza B Not detected        Parainfluenza 1 Not detected        Parainfluenza 2 Not detected        Parainfluenza 3 Not detected        Parainfluenza virus 4 Not detected        RSV by PCR Not detected        B. parapertussis, PCR Not detected        Bordetella pertussis - PCR Not detected        Chlamydophila pneumoniae DNA, QL, PCR Not detected        Mycoplasma pneumoniae DNA, QL, PCR Not detected       CULTURE, BLOOD [[id number]] Collected: 09/14/21 1700    Order Status: Canceled Specimen: Blood     CULTURE, URINE [[id number]] Collected: 09/13/21 2330    Order Status: Canceled Specimen: Cath Urine     RESPIRATORY VIRUS PANEL W/COVID-19, PCR [[id number]]     Order Status: Canceled Specimen: NASOPHARYNGEAL SWAB     COVID-19 RAPID TEST [[id number]]     Order Status: Canceled     COVID-19 RAPID TEST [[id number]] Collected: 09/13/21 0737    Order Status: Completed Specimen: Nasopharyngeal Updated: 09/13/21 2684 Specimen source Nasopharyngeal        COVID-19 rapid test Not detected        Comment: Rapid Abbott ID Now       Rapid NAAT:  The specimen is NEGATIVE for SARS-CoV-2, the novel coronavirus associated with COVID-19. Negative results should be treated as presumptive and, if inconsistent with clinical signs and symptoms or necessary for patient management, should be tested with an alternative molecular assay. Negative results do not preclude SARS-CoV-2 infection and should not be used as the sole basis for patient management decisions. This test has been authorized by the FDA under an Emergency Use Authorization (EUA) for use by authorized laboratories. Fact sheet for Healthcare Providers: [employer].co.nz  Fact sheet for Patients: DC Devices.co.nz       Methodology: Isothermal Nucleic Acid Amplification         COVID-19 RAPID TEST [[id number]]     Order Status: Canceled         Echo 9/17/2021:  Left Ventricle Normal cavity size, wall thickness and systolic function (ejection fraction normal). The estimated EF is 60 - 65%. There is inconclusive left ventricular diastolic function E/E' ratio = 7.08  Heart rate is over 100. Wall Scoring The left ventricular wall motion is normal.            Left Atrium Normal cavity size. Right Ventricle Normal cavity size and global systolic function. Assessment of RV function:   TAPSE = 27 mm. Right Atrium Normal cavity size. Interatrial Septum Interatrial septum not well visualized   Aortic Valve Aortic valve not well visualized. Trileaflet valve structure, no stenosis and no regurgitation. Mitral Valve No stenosis. Mitral valve non-specific thickening. Mild regurgitation. Tricuspid Valve Tricuspid valve not well visualized. No stenosis. Mild regurgitation. Pulmonic Valve Pulmonic valve not well visualized. No stenosis and no regurgitation. Aorta The aorta was not well visualized. Normal aortic root. Pulmonary Artery Pulmonary arteries not well visualized. Pulmonary arterial systolic pressure (PASP) is 29 mmHg. Pulmonary hypertension not suggested by Doppler findings. IVC/Hepatic Veins Mechanically ventilated; cannot use inferior caval vein diameter to estimate central venous pressure. Pericardium Normal pericardium and no evidence of pericardial effusion. CT head 9/15/1021  IMPRESSION   No acute intracranial abnormality. CT chest, abdomen, pelvis 9/15/1021  IMPRESSION   Endotracheal tube tip in the lower thoracic trachea 1.5 cm above the dipak.    Bilateral lower lobar atelectasis, possible endobronchial lesion/mucous plug in  the left lower lobe bronchus.    No acute intra-abdominal abnormality. Imaging:  [x]I have personally reviewed the patients chest radiographs images and report     Results from Hospital Encounter encounter on 09/11/21    XR CHEST PORT    Narrative  EXAM: XR CHEST PORT    CLINICAL INDICATION/HISTORY: Acute respiratory failure, ET tub position  -Additional: None    COMPARISON: 9/23/2021    TECHNIQUE: Portable frontal view of the chest    _______________    FINDINGS:    SUPPORT DEVICES: Tracheostomy tube and nasogastric tube both project in stable  position. HEART AND MEDIASTINUM: Normal cardiac size and mediastinal contours. LUNGS AND PLEURAL SPACES: Similar degree of pulmonary inflation. Faint lower  lung zone opacities noted without evidence of pneumothorax or pleural effusion. No dense alveolar opacity. BONY THORAX AND SOFT TISSUES: Unremarkable.    _______________    Impression  1. Support devices in stable/appropriate position. 2. Mild bibasilar atelectasis. Please note: Voice-recognition software may have been used to generate this report, which may have resulted in some phonetic-based errors in grammar and contents.  Even though attempts were made to correct all the mistakes, some may have been missed, and remained in the body of the document.       Luis E Gonzalez MD  9/24/2021

## 2021-09-24 NOTE — PROGRESS NOTES
Barahona catheter changed today. Send urinalysis with micro. Discussed with RN.   Torsten Blakely MD 9/24/2021 3:18 PM

## 2021-09-24 NOTE — PROGRESS NOTES
1900 - Bedside and Verbal shift change report given to Kei Blanco RN (oncoming nurse) by EV Medina RN (offgoing nurse). Report included the following information SBAR, Kardex, Intake/Output, MAR, Recent Results and Cardiac Rhythm Sinus tach. 2000 - Shift assessment. Pt remains on ventilator with tracheostomy in place. Sedation infusions maintained per order. Bilateral wrist restraints continued due to periods of restlessness. Barahona catheter and FMS patent and draining. 2100 - Pt febrile. Ice packs applied to axillae and groin. PRN Tylenol given via NG tube. 0000 - Reassessment. Pt remained febrile. PRN Motrin given via NG tube. 0400 - Reassessment. Temp within normal limits at this time. 0500 - Dr. Mounika Turner made aware of upward trend in [facility] and temps for shift. Orders entered for blood and sputum cultures and IV antibiotic.     0600 - Bathed with CHG wipes. Cary care, mouth care, and linen changed. Sputum cx collected and sent to lab.     0700 - Bedside and Verbal shift change report given to EV Medina RN (oncoming nurse) by Regla Massey RN  (offgoing nurse). Report included the following information SBAR, Kardex, Intake/Output, MAR, Recent Results and Cardiac Rhythm sinus tachycardia.

## 2021-09-24 NOTE — PROGRESS NOTES
Chart reviewed, noted pt with elevated temp and WBC, cm will cont to monitor and initiate LTACH search once temp and wbc start to wean down, facilities require patients to be 24.hr free of temp.

## 2021-09-24 NOTE — PROGRESS NOTES
Hospitalist Progress Note    Patient: Jayla Hare MRN: [id number]  CSN: [id number]    YOB: 1980  Age: 39 y.o. Sex: female    DOA: 9/11/2021 LOS:  LOS: 12 days          Chief Complaint:    resp failure      Assessment/Plan        Jayla Hare is a 39 y.o. female with  standing history of multidrug use/abuse, previous drug-seeking behavior and mental health issues otherwise unspecified arrived via EMS with acute metabolic encephalopathy and lethargy. Admitted for likely gabapentin overdose.        Acute respiratory failure with hypoxia   Intubated on 9/12    Now with trach placed    Fevers, tachycardia, leukocytosis  Blood cx sent  Zosyn started empirically  Get UA and urine cx this am  Repeat covid test     covid 19 rapid negative on admission     BP stable  Labs reviewed  Sedation as per intensivist     LP done no bacterial meningitis indicated      Anemia-Stable so far no bleeding reported and will continue monitoring   Upper PVL negative for dvt      Hypernatremia resolved      Hypokalemia resolved   icu electrolytes replacement protocol      Severe Acute metabolic encephalopathy   Ct head no acute process      Gabapentin overdose with lethargy and AMS   S/p procedural stomach emptying in ED with charcoal and sorbitol      elevated ammonia level  Continue Lactulose      Psychiatric disease  Psychosis , hx of  Ekbom's delusional parasitosis   Need psych evaluation once able to participate  On olanzapine and klonopin      left wrist fracture: immobilized -poa     DVT prev-lovenox    Critical illness  Will need LTAC placement and extensive recovery with psych as well  Disposition :  Patient Active Problem List   Diagnosis Code    Dextromethorphan use disorder, moderate ([address] F19.20    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    Left wrist fracture, with delayed healing, subsequent encounter S62.102G    Drug overdose, intentional self-harm, initial encounter ([address] T50.902A    Hypoxia R09.02 Hypotension after procedure F70.30    Acute metabolic encephalopathy B97.11    Psychosis ([facility]CC) F29    Hyponatremia E87.1    Acute respiratory failure with hypoxia (HCC) J96.01    Increased ammonia level R79.89    Hypokalemia E87.6       Subjective:  Fevers  Tests ordered  FMS in place  Barahona to be changed out        Review of systems:    UTO  Sedate on vent      Vital signs/Intake and Output:  Visit Vitals  /77   Pulse (!) 124   Temp (!) 101.3 °F (38.5 °C)   Resp 18   Ht 5' 2\" (1.575 m)   Wt 71.2 kg (157 lb)   SpO2 92%   BMI 28.72 kg/m²     Current Shift:  No intake/output data recorded. Last three shifts:  09/22 1901 - 09/24 0700  In: 4441.9 [I.V.:2116.9]  Out: 6661 [Urine:3000; Drains:1950]    Exam:    General: WF, sedate, intubated to trach  CVS:Regular rate and rhythm, no M/R/G, S1/S2 heard, no thrill  Lungs:Clear to auscultation bilaterally, no wheezes, rhonchi, or rales  Abdomen: Soft, Nontender, No distention, Normal Bowel sounds  Extremities: No C/C/E, pulses palpable 2+                  Labs: Results:       Chemistry Recent Labs     09/24/21 0341 09/23/21 0615 09/22/21 0419   * 101* 93    142 146*   K 4.5 4.4 4.1    106 109   CO2 27 26 29   BUN 15 15 23*   CREA 0.47* 0.46* 0.48*   CA 9.1 8.7 8.9   AGAP 11 10 8   BUCR 32* 33* 48*    114 116   TP 5.9* 5.9* 5.9*   ALB 3.1* 3.1* 3.1*   GLOB 2.8 2.8 2.8   AGRAT 1.1 1.1 1.1      CBC w/Diff Recent Labs     09/24/21 0341 09/23/21 0615 09/22/21 0419   WBC 22.9* 14.0* 10.5   RBC 3.60* 3.46* 3.27*   HGB 10.6* 10.3* 9.7*   HCT 33.1* 33.5* 30.7*   * 489* 485*   GRANS 78* 70 51   LYMPH 15* 22 33   EOS 4 2 1      Cardiac Enzymes No results for input(s): CPK, CKND1, ZENON in the last 72 hours. No lab exists for component: CKRMB, TROIP   Coagulation No results for input(s): PTP, INR, APTT, INREXT in the last 72 hours.     Lipid Panel No results found for: CHOL, CHOLPOCT, CHOLX, CHLST, CHOLV, 800198, HDL, HDLP, LDL, LDLC, DLDLP, 354113, VLDLC, VLDL, TGLX, TRIGL, TRIGP, TGLPOCT, CHHD, CHHDX   BNP No results for input(s): BNPP in the last 72 hours.    Liver Enzymes Recent Labs     09/24/21  0341   TP 5.9*   ALB 3.1*         Thyroid Studies No results found for: T4, T3U, TSH, TSHEXT     Procedures/imaging: see electronic medical records for all procedures/Xrays and details which were not copied into this note but were reviewed prior to creation of Sejal Weathers MD

## 2021-09-24 NOTE — PROGRESS NOTES
0710-received report from [address] included SBAR MAR and Kardex. Shift Summary-UA and Rapid Covid sent. Fentanyl stopped and precedex started. Elevated temp of 102.5 tylenol given. Maintained on vent and sedation. Bedside and Verbal shift change report given to Lenka Perdue RN (oncoming nurse) by Kevin Simpson RN (offgoing nurse). Report included the following information SBAR, Kardex and MAR.

## 2021-09-25 ENCOUNTER — APPOINTMENT (OUTPATIENT)
Dept: GENERAL RADIOLOGY | Age: 41
DRG: 004 | End: 2021-09-25
Attending: INTERNAL MEDICINE
Payer: MEDICAID

## 2021-09-25 LAB
ALBUMIN SERPL-MCNC: 2.5 G/DL (ref 3.4–5)
ALBUMIN/GLOB SERPL: 0.8 {RATIO} (ref 0.8–1.7)
ALP SERPL-CCNC: 100 U/L (ref 45–117)
ALT SERPL-CCNC: 44 U/L (ref 13–56)
AMMONIA PLAS-SCNC: 27 UMOL/L (ref 11–32)
ANION GAP SERPL CALC-SCNC: 9 MMOL/L (ref 3–18)
ARTERIAL PATENCY WRIST A: ABNORMAL
AST SERPL-CCNC: 17 U/L (ref 10–38)
BASE EXCESS BLD CALC-SCNC: 2.6 MMOL/L
BASOPHILS # BLD: 0 K/UL (ref 0–0.1)
BASOPHILS NFR BLD: 0 % (ref 0–2)
BILIRUB SERPL-MCNC: 0.3 MG/DL (ref 0.2–1)
BODY TEMPERATURE: 98.5
BUN SERPL-MCNC: 15 MG/DL (ref 7–18)
BUN/CREAT SERPL: 43 (ref 12–20)
CALCIUM SERPL-MCNC: 8.8 MG/DL (ref 8.5–10.1)
CHLORIDE SERPL-SCNC: 105 MMOL/L (ref 100–111)
CO2 SERPL-SCNC: 26 MMOL/L (ref 21–32)
CREAT SERPL-MCNC: 0.35 MG/DL (ref 0.6–1.3)
DIFFERENTIAL METHOD BLD: ABNORMAL
EOSINOPHIL # BLD: 0.5 K/UL (ref 0–0.4)
EOSINOPHIL NFR BLD: 2 % (ref 0–5)
ERYTHROCYTE [DISTWIDTH] IN BLOOD BY AUTOMATED COUNT: 13.3 % (ref 11.6–14.5)
GAS FLOW.O2 O2 DELIVERY SYS: ABNORMAL L/MIN
GAS FLOW.O2 SETTING OXYMISER: 14 BPM
GLOBULIN SER CALC-MCNC: 3.2 G/DL (ref 2–4)
GLUCOSE BLD STRIP.AUTO-MCNC: 118 MG/DL (ref 70–110)
GLUCOSE BLD STRIP.AUTO-MCNC: 130 MG/DL (ref 70–110)
GLUCOSE BLD STRIP.AUTO-MCNC: 145 MG/DL (ref 70–110)
GLUCOSE BLD STRIP.AUTO-MCNC: 147 MG/DL (ref 70–110)
GLUCOSE SERPL-MCNC: 119 MG/DL (ref 74–99)
HCO3 BLD-SCNC: 28.2 MMOL/L (ref 22–26)
HCT VFR BLD AUTO: 28.7 % (ref 35–45)
HGB BLD-MCNC: 9.3 G/DL (ref 12–16)
LYMPHOCYTES # BLD: 2.1 K/UL (ref 0.9–3.6)
LYMPHOCYTES NFR BLD: 9 % (ref 21–52)
MAGNESIUM SERPL-MCNC: 2.3 MG/DL (ref 1.6–2.6)
MCH RBC QN AUTO: 29.5 PG (ref 24–34)
MCHC RBC AUTO-ENTMCNC: 32.4 G/DL (ref 31–37)
MCV RBC AUTO: 91.1 FL (ref 78–100)
MONOCYTES # BLD: 1.4 K/UL (ref 0.05–1.2)
MONOCYTES NFR BLD: 6 % (ref 3–10)
NEUTS SEG # BLD: 19.5 K/UL (ref 1.8–8)
NEUTS SEG NFR BLD: 83 % (ref 40–73)
O2/TOTAL GAS SETTING VFR VENT: 50 %
PCO2 BLD: 47.3 MMHG (ref 35–45)
PH BLD: 7.38 [PH] (ref 7.35–7.45)
PHOSPHATE SERPL-MCNC: 4.6 MG/DL (ref 2.5–4.9)
PLATELET # BLD AUTO: 336 K/UL (ref 135–420)
PLATELET COMMENTS,PCOM: ABNORMAL
PMV BLD AUTO: 9.9 FL (ref 9.2–11.8)
PO2 BLD: 92 MMHG (ref 80–100)
POTASSIUM SERPL-SCNC: 3.9 MMOL/L (ref 3.5–5.5)
PROT SERPL-MCNC: 5.7 G/DL (ref 6.4–8.2)
RBC # BLD AUTO: 3.15 M/UL (ref 4.2–5.3)
RBC MORPH BLD: ABNORMAL
SAO2 % BLD: 96.9 % (ref 92–97)
SERVICE CMNT-IMP: ABNORMAL
SODIUM SERPL-SCNC: 140 MMOL/L (ref 136–145)
SPECIMEN TYPE: ABNORMAL
VENTILATION MODE VENT: ABNORMAL
VT SETTING VENT: 400 ML
WBC # BLD AUTO: 23.5 K/UL (ref 4.6–13.2)

## 2021-09-25 PROCEDURE — 84100 ASSAY OF PHOSPHORUS: CPT

## 2021-09-25 PROCEDURE — 74011000258 HC RX REV CODE- 258: Performed by: INTERNAL MEDICINE

## 2021-09-25 PROCEDURE — 74011250636 HC RX REV CODE- 250/636: Performed by: FAMILY MEDICINE

## 2021-09-25 PROCEDURE — 82803 BLOOD GASES ANY COMBINATION: CPT

## 2021-09-25 PROCEDURE — 74011250636 HC RX REV CODE- 250/636: Performed by: INTERNAL MEDICINE

## 2021-09-25 PROCEDURE — 82962 GLUCOSE BLOOD TEST: CPT

## 2021-09-25 PROCEDURE — 65610000006 HC RM INTENSIVE CARE

## 2021-09-25 PROCEDURE — 74011000250 HC RX REV CODE- 250: Performed by: INTERNAL MEDICINE

## 2021-09-25 PROCEDURE — 94003 VENT MGMT INPAT SUBQ DAY: CPT

## 2021-09-25 PROCEDURE — 82140 ASSAY OF AMMONIA: CPT

## 2021-09-25 PROCEDURE — 85025 COMPLETE CBC W/AUTO DIFF WBC: CPT

## 2021-09-25 PROCEDURE — 74011000258 HC RX REV CODE- 258: Performed by: FAMILY MEDICINE

## 2021-09-25 PROCEDURE — 74011250637 HC RX REV CODE- 250/637: Performed by: INTERNAL MEDICINE

## 2021-09-25 PROCEDURE — 80053 COMPREHEN METABOLIC PANEL: CPT

## 2021-09-25 PROCEDURE — 36415 COLL VENOUS BLD VENIPUNCTURE: CPT

## 2021-09-25 PROCEDURE — 74011250637 HC RX REV CODE- 250/637: Performed by: FAMILY MEDICINE

## 2021-09-25 PROCEDURE — 94640 AIRWAY INHALATION TREATMENT: CPT

## 2021-09-25 PROCEDURE — 83735 ASSAY OF MAGNESIUM: CPT

## 2021-09-25 PROCEDURE — 71045 X-RAY EXAM CHEST 1 VIEW: CPT

## 2021-09-25 RX ORDER — VANCOMYCIN 1.75 GRAM/500 ML IN 0.9 % SODIUM CHLORIDE INTRAVENOUS
1750 ONCE
Status: COMPLETED | OUTPATIENT
Start: 2021-09-25 | End: 2021-09-25

## 2021-09-25 RX ORDER — POTASSIUM CHLORIDE 7.45 MG/ML
10 INJECTION INTRAVENOUS ONCE
Status: COMPLETED | OUTPATIENT
Start: 2021-09-25 | End: 2021-09-26

## 2021-09-25 RX ORDER — VANCOMYCIN HYDROCHLORIDE
1250 EVERY 12 HOURS
Status: DISCONTINUED | OUTPATIENT
Start: 2021-09-25 | End: 2021-09-27

## 2021-09-25 RX ADMIN — PROPOFOL 50 MCG/KG/MIN: 10 INJECTION, EMULSION INTRAVENOUS at 00:23

## 2021-09-25 RX ADMIN — SODIUM CHLORIDE 10 ML: 9 INJECTION, SOLUTION INTRAMUSCULAR; INTRAVENOUS; SUBCUTANEOUS at 14:00

## 2021-09-25 RX ADMIN — PROPOFOL 30 MCG/KG/MIN: 10 INJECTION, EMULSION INTRAVENOUS at 15:19

## 2021-09-25 RX ADMIN — FAMOTIDINE 20 MG: 20 TABLET ORAL at 08:40

## 2021-09-25 RX ADMIN — POTASSIUM CHLORIDE 10 MEQ: 7.46 INJECTION, SOLUTION INTRAVENOUS at 07:16

## 2021-09-25 RX ADMIN — ACETAMINOPHEN 650 MG: 325 TABLET ORAL at 20:34

## 2021-09-25 RX ADMIN — OLANZAPINE 10 MG: 10 TABLET, FILM COATED ORAL at 17:28

## 2021-09-25 RX ADMIN — LACTULOSE 45 ML: 20 SOLUTION ORAL at 20:34

## 2021-09-25 RX ADMIN — ENOXAPARIN SODIUM 40 MG: 100 INJECTION SUBCUTANEOUS at 17:29

## 2021-09-25 RX ADMIN — VANCOMYCIN HYDROCHLORIDE 1250 MG: 10 INJECTION, POWDER, LYOPHILIZED, FOR SOLUTION INTRAVENOUS at 21:00

## 2021-09-25 RX ADMIN — ARFORMOTEROL TARTRATE 15 MCG: 15 SOLUTION RESPIRATORY (INHALATION) at 08:15

## 2021-09-25 RX ADMIN — VANCOMYCIN HYDROCHLORIDE 1750 MG: 10 INJECTION, POWDER, LYOPHILIZED, FOR SOLUTION INTRAVENOUS at 11:28

## 2021-09-25 RX ADMIN — CLONAZEPAM 0.5 MG: 0.5 TABLET ORAL at 08:40

## 2021-09-25 RX ADMIN — IPRATROPIUM BROMIDE AND ALBUTEROL SULFATE 3 ML: .5; 3 SOLUTION RESPIRATORY (INHALATION) at 13:00

## 2021-09-25 RX ADMIN — PIPERACILLIN AND TAZOBACTAM 3.38 G: 3; .375 INJECTION, POWDER, LYOPHILIZED, FOR SOLUTION INTRAVENOUS at 06:15

## 2021-09-25 RX ADMIN — BUDESONIDE 500 MCG: 0.5 INHALANT RESPIRATORY (INHALATION) at 20:45

## 2021-09-25 RX ADMIN — THIAMINE HCL TAB 100 MG 100 MG: 100 TAB at 08:40

## 2021-09-25 RX ADMIN — DEXMEDETOMIDINE HYDROCHLORIDE 0.4 MCG/KG/HR: 100 INJECTION, SOLUTION INTRAVENOUS at 14:09

## 2021-09-25 RX ADMIN — LACTULOSE 45 ML: 20 SOLUTION ORAL at 08:40

## 2021-09-25 RX ADMIN — MEROPENEM 1 G: 1 INJECTION, POWDER, FOR SOLUTION INTRAVENOUS at 10:00

## 2021-09-25 RX ADMIN — CLONAZEPAM 0.5 MG: 0.5 TABLET ORAL at 20:34

## 2021-09-25 RX ADMIN — SODIUM CHLORIDE 10 ML: 9 INJECTION, SOLUTION INTRAMUSCULAR; INTRAVENOUS; SUBCUTANEOUS at 06:15

## 2021-09-25 RX ADMIN — PROPOFOL 30 MCG/KG/MIN: 10 INJECTION, EMULSION INTRAVENOUS at 23:28

## 2021-09-25 RX ADMIN — BUDESONIDE 500 MCG: 0.5 INHALANT RESPIRATORY (INHALATION) at 08:15

## 2021-09-25 RX ADMIN — Medication 1 TABLET: at 08:40

## 2021-09-25 RX ADMIN — ARFORMOTEROL TARTRATE 15 MCG: 15 SOLUTION RESPIRATORY (INHALATION) at 20:45

## 2021-09-25 RX ADMIN — IPRATROPIUM BROMIDE AND ALBUTEROL SULFATE 3 ML: .5; 3 SOLUTION RESPIRATORY (INHALATION) at 20:45

## 2021-09-25 RX ADMIN — MEROPENEM 1 G: 1 INJECTION, POWDER, FOR SOLUTION INTRAVENOUS at 17:28

## 2021-09-25 RX ADMIN — 0.12% CHLORHEXIDINE GLUCONATE 10 ML: 1.2 RINSE ORAL at 20:35

## 2021-09-25 RX ADMIN — FAMOTIDINE 20 MG: 20 TABLET ORAL at 20:34

## 2021-09-25 RX ADMIN — PROPOFOL 30 MCG/KG/MIN: 10 INJECTION, EMULSION INTRAVENOUS at 07:20

## 2021-09-25 RX ADMIN — MIDAZOLAM 9 MG/HR: 5 INJECTION, SOLUTION INTRAMUSCULAR; INTRAVENOUS at 10:59

## 2021-09-25 RX ADMIN — SODIUM CHLORIDE 10 ML: 9 INJECTION, SOLUTION INTRAMUSCULAR; INTRAVENOUS; SUBCUTANEOUS at 21:00

## 2021-09-25 RX ADMIN — IPRATROPIUM BROMIDE AND ALBUTEROL SULFATE 3 ML: .5; 3 SOLUTION RESPIRATORY (INHALATION) at 08:15

## 2021-09-25 RX ADMIN — 0.12% CHLORHEXIDINE GLUCONATE 10 ML: 1.2 RINSE ORAL at 08:40

## 2021-09-25 RX ADMIN — FOLIC ACID 1 MG: 1 TABLET ORAL at 08:40

## 2021-09-25 RX ADMIN — ACETAMINOPHEN 650 MG: 325 TABLET ORAL at 00:55

## 2021-09-25 NOTE — PROGRESS NOTES
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    Left wrist fracture, with delayed healing, subsequent encounter S62.102G    Drug overdose, intentional self-harm, initial encounter ([facility] [address] T50.902A    Hypoxia R09.02    Hypotension after procedure I95.81    Acute metabolic encephalopathy C46.17    Psychosis ([facility] [address] F29    Hyponatremia E87.1    Acute respiratory failure with hypoxia (HCC) J96.01    Increased ammonia level R79.89    Hypokalemia E87.6 ·   Code status: full code     RECOMMENDATIONS:   Respiratory: Patient on ventilator support; intubated 2021 due to encephalopathy and drug overdose. Patient was extubated on 2021, and reintubated within an hour due to respiratory distress and upper airway edema. Completed Solu-Medrol 40 mg IV every 6 hours x8 doses  for upper airway edema. S/p tracheostomy 2021. CXR 2021 reviewed: Tracheostomy in place. RLL atelectasis/collapse with volume loss. Endotracheal suctioning ordered. ABG 2021: 7.38/47.3/92/96.9%. [facility]30/7. Unchanged. Sedation: On Versed, propofol, Precedex 0.4. Discussed with RN to titrate off Versed drip 1st followed by propofol and then attempt SBT. Nurses reported severe agitation when sedation lowered down; likely due to underlying psych issues. Continue ventilator bundle and sedation bundle/protocol. Bronchodilators: DuoNeb 3 times daily, Brovana twice daily, Pulmicort twice daily. ID: No fever or leukocytosis. S/p LP with negative CSF cultures. Covid test 2021 and 2021: Negative.   CXR with RLL atelectasis/volume loss, likely mucous plug; will do directional suctioning. Sputum culture 9/24/2021: No organisms; culture pending. Blood culture 9/24/2021: NGTD. Barahona catheter changed on 9/24/2021. Urinalysis 9/24/2021: Negative nitrites, small leukocyte esterase, WBC 0-3, bacteria 1+. Normal lactate 0.7. Leukocytosis improving  Loose bowel movements due to lactulose. Antibiotics: Completed Zosyn 5-day course for aspiration on 9/20/2021. Zosyn was restarted 9/20/2021 night due to fever and leukocytosis. Since patient has recently completed Zosyn, will change to meropenem 9/24/2021. Start vancomycin as well. Follow cultures and narrow antibiotics accordingly. ID has signed off; reconsult as needed. CVS: NSR on telemetry. Hemodynamically stable. Monitor. Echo 9/17/2021: LVEF 60 to 65%. RVSP 29 mmHg. Renal: Normal creatinine and electrolytes; monitor. Hypernatremia resolved. Heme: Chronic mild anemia, stable; platelets normal; continue to monitor  Endo: Stable blood sugars. GI: Continue tube feeding as tolerated; thiamine, folic acid and multivitamin supplements. LFT normalized. hCG negative. Nutrition: Tolerating NGT feed at 45 mill per hour with free water 250 mL every 4 hours. Neurology: Sedation as above. Ammonia improved/normalized to 27; continue lactulose 30 g twice daily. CT headnil acute  Psych: Continue Zyprexa and Klonopin. Toxicology: gabapentin OD  Skin: ICU nursing care  MS: Left wrist fracture in immobilization external fixator  Prophylaxis: DVT prophylaxis: Lovenox 40 daily. GI prophylaxis: Famotidine. Restraints: Wrist soft restraints as needed for patient interfering with medical therapy/management and patient safety. Prognosis guarded overall. CODE STATUSfull code. Palliative care on case. Quality Care: PPI, DVT prophylaxis, HOB elevated, Infection control all reviewed and addressed. Lines/Tubes: PIV   ETT: 9/11/219/22/2021. Tracheostomy 9/22/2021.   NGT: 9/11/21  Barahona: 9/11/21; changed 9/24/2021. FMS in place    ADVANCE DIRECTIVE: Full code. Patient's  in snf; he had called this morning and discussed with ICU RN. Discussed with RN, RT, MDR. High complexity decision making was performed during the evaluation of this patient at high risk for decompensation with multiple organ involvement. Critical care time excludes discussion or procedure: 37 minutes. Jose Cheng   sister-in-law   [phone]       Family discussion 9/19/2021:Patient  Mr. Devin Lambert called ICU; he is currently in snf; updated management plans; discussed that patient failed extubation yesterday, and needed to be reintubated due to upper airway swelling from prolonged intubation; due to patient's medical condition, recommended tracheostomy, and patient  is agreeable. Patient  would like information to be provided as needed to his 2 sisterMary Jane Lambert (above) and Saulo Loja [[phone]]; patient's  will call ICU daily from snf to get updates; he does not want information to be provided to anyone else other than his 2 sisters. Patient's  is calling every day and nurses are operating him. Subjective/History:   Ms. Linn Pearce has been seen and evaluated as Dr. Destiney Carrillo requested now for assisting with Acute respiratory failure and ventilator management. Patient is a 39 y.o. female with following PMhx presented to ER with lethargy via EMS and admitted for Gabapentin overdose. Pt required intubation for airways protection. Position control was contacted that recommended supportive care per ER provider. Pt seen at bedside in ER rm#2. The patient can not provide additional history due to Ventilated. 9/25/2021   Remains in ICU room 102. On ventilator through tracheostomy. Sedated with Versed 9, propofol 30, Precedex 0.4; discussed with RN to titrate off Versed. Tolerating tube feed Jevity 1.5 at 45 mL/h. Temperature max 102.7.   CXR with RLL volume loss with possible collapse/atelectasis; discussed with RN to do endotracheal suctioning. Leukocytosis peaked to 30, now improving. On Zosyn. Barahona catheter changed 9/24/2021; UA noted. Blood pressure stable. Lactic acid normal.  No vomiting. Loose bowel movement in FMS due to lactulose. No other overnight issues reported. PCCM was not called for any issues overnight. Patient's  calls from halfway, nurses are operating room. Patient has a history of drug use and smoker and alcohol use. Review of Systems:  Review of systems not obtained due to patient factors. Past Medical History:  Past Medical History:   Diagnosis Date    Asthma     Bilateral ovarian cysts     Cocaine abuse ([facility]     Mental and behavioral problem     Pancreatitis     Pancreatitis     Psychosis ([facility] [address]         Past Surgical History:  Past Surgical History:   Procedure Laterality Date    HX GYN      D&C, Hysterectomy        Medications:  Prior to Admission medications    Medication Sig Start Date End Date Taking? Authorizing Provider   albuterol (PROVENTIL HFA, VENTOLIN HFA, PROAIR HFA) 90 mcg/actuation inhaler Take 2 Puffs by inhalation every four (4) hours as needed for Wheezing or Shortness of Breath. 8/4/21   Vu Kaba PA-C   ibuprofen (MOTRIN) 600 mg tablet Take 1 Tablet by mouth every six (6) hours as needed for Pain. Take with food.  8/4/21   Vu Kaba PA-C   ALPRAZolam (XANAX) 0.5 mg tablet TAKE 1 TABLET BY MOUTH ONCE DAILY AS NEEDED FOR ANXIETY 12/4/19   Provider, Historical   metFORMIN (GLUCOPHAGE) 500 mg tablet TAKE 1 TABLET BY MOUTH ONCE DAILY 11/12/19   Provider, Historical   nicotine (NICODERM CQ) 21 mg/24 hr APPLY 1 PATCH TOPICALLY TO THE SKIN DAILY FOR CRAVINGS AS DIRECTED 12/4/19   Provider, Historical   traZODone (DESYREL) 50 mg tablet TAKE 1 TABLET BY MOUTH AT BEDTIME AS NEEDED FOR INSOMNIA 12/4/19   Provider, Historical   escitalopram oxalate (LEXAPRO) 10 mg tablet Take 10 mg by mouth daily. Luisa Burch MD   lurasidone (LATUDA) 80 mg tab tablet Take 80 mg by mouth daily (with dinner). Luisa Burch MD       Current Facility-Administered Medications   Medication Dose Route Frequency    piperacillin-tazobactam (ZOSYN) 3.375 g in 0.9% sodium chloride (MBP/ADV) 100 mL MBP  3.375 g IntraVENous Q6H    dexmedeTOMidine (PRECEDEX) 400 mcg in 0.9% sodium chloride 100 mL infusion  0.1-1.5 mcg/kg/hr IntraVENous TITRATE    fentaNYL (PF) 900 mcg/30 ml infusion soln  0-200 mcg/hr IntraVENous TITRATE    propofol (DIPRIVAN) 10 mg/mL infusion  0-50 mcg/kg/min IntraVENous TITRATE    famotidine (PEPCID) tablet 20 mg  20 mg Oral BID    midazolam in normal saline (VERSED) 1 mg/mL infusion  2-10 mg/hr IntraVENous TITRATE    thiamine mononitrate (B-1) tablet 100 mg  100 mg Oral DAILY    folic acid (FOLVITE) tablet 1 mg  1 mg Oral DAILY    multivitamin, tx-iron-ca-min (THERA-M w/ IRON) tablet 1 Tablet  1 Tablet Oral DAILY    lactulose (CHRONULAC) 10 gram/15 mL solution 45 mL  30 g Oral BID    clonazePAM (KlonoPIN) tablet 0.5 mg  0.5 mg Oral BID    insulin lispro (HUMALOG) injection   SubCUTAneous Q6H    arformoteroL (BROVANA) neb solution 15 mcg  15 mcg Nebulization BID RT    budesonide (PULMICORT) 500 mcg/2 ml nebulizer suspension  500 mcg Nebulization BID RT    OLANZapine (ZyPREXA) tablet 10 mg  10 mg Oral QPM    albuterol-ipratropium (DUO-NEB) 2.5 MG-0.5 MG/3 ML  3 mL Nebulization TID RT    enoxaparin (LOVENOX) injection 40 mg  40 mg SubCUTAneous Q24H    sodium chloride (NS) flush 5-40 mL  5-40 mL IntraVENous Q8H    chlorhexidine (PERIDEX) 0.12 % mouthwash 10 mL  10 mL Oral Q12H       Allergy:  Allergies   Allergen Reactions    Fish Containing Products Itching    Toradol [Ketorolac] Rash     Pt reports she is not allergic to this medication.          Social History:  Social History     Tobacco Use    Smoking status: Current Every Day Smoker     Packs/day: 1.00    Smokeless tobacco: Never Used   Substance Use Topics    Alcohol use: Not Currently    Drug use: Not Currently     Types: Cocaine, Marijuana     Comment: used with in past 24 hours        Family History:  History reviewed. No pertinent family history. Objective:   Vital Signs:    Blood pressure 113/82, pulse 99, temperature 98.7 °F (37.1 °C), resp. rate 19, height 5' 2\" (1.575 m), weight 71.2 kg (157 lb), last menstrual period 05/15/2018, SpO2 98 %. Body mass index is 28.72 kg/m². O2 Device: Tracheostomy, Ventilator       Temp (24hrs), Av.8 °F (38.2 °C), Min:98.5 °F (36.9 °C), Max:102.7 °F (39.3 °C)         Intake/Output:   Last shift:      No intake/output data recorded. Last 3 shifts:  1901 -  0700  In: 4197.2 [I.V.:1677.2]  Out: 8952 [Urine:3140; Drains:1400]    Intake/Output Summary (Last 24 hours) at 2021 0853  Last data filed at 2021 7432  Gross per 24 hour   Intake 2410.21 ml   Output 2965 ml   Net -554.79 ml       Ventilator Settings:  Mode Rate Tidal Volume Pressure FiO2 PEEP   Assist control, VC+   400 ml  7 cm H2O 50 % 7 cm H20     Peak airway pressure: 19 cm H2O    Minute ventilation: 8.3 l/min      Lung protective strategy, Pl pressure goals less than or equal to 30. Physical Exam:     General/Neurology: On ventilator through tracheostomy. Sedated. No bleeding at trach site. Head:   NCAT. Eye:   Pupils reactive to light. No icterus/pallor/cyanosis. Nose:   No nasal drainage/discharge. Neck:   Trachea midline. Lung: Moderate air entry bilateral equal.  No rales, rhonchi. No wheezing or stridor. No prolonged expiration or accessory muscle use. Heart:   S1 S2 present. No murmur or JVD. Abdomen:  Soft. NT. ND. No palpable masses. Extremities:  No edema. No cyanosis or clubbing. Pulses: 2+ and symmetric in DP.       Data:     Recent Results (from the past 12 hour(s))   LACTIC ACID    Collection Time: 21  9:30 PM   Result Value Ref Range    Lactic acid 0.7 0.4 - 2.0 MMOL/L   GLUCOSE, POC    Collection Time: 09/25/21  1:07 AM   Result Value Ref Range    Glucose (POC) 130 (H) 70 - 110 mg/dL   BLOOD GAS, ARTERIAL POC    Collection Time: 09/25/21  3:55 AM   Result Value Ref Range    Device: ADULT VENT      FIO2 (POC) 50 %    pH (POC) 7.38 7.35 - 7.45      pCO2 (POC) 47.3 (H) 35.0 - 45.0 MMHG    pO2 (POC) 92 80 - 100 MMHG    HCO3 (POC) 28.2 (H) 22 - 26 MMOL/L    sO2 (POC) 96.9 92 - 97 %    Base excess (POC) 2.6 mmol/L    Mode ASSIST CONTROL      Tidal volume 400 ml    Set Rate 14 bpm    Allens test (POC) NOT APPLICABLE      Patient temp. 98.5      Specimen type (POC) ARTERIAL      Performed by [address], POC    Collection Time: 09/25/21  5:10 AM   Result Value Ref Range    Glucose (POC) 145 (H) 70 - 110 mg/dL   CBC WITH AUTOMATED DIFF    Collection Time: 09/25/21  5:48 AM   Result Value Ref Range    WBC 23.5 (H) 4.6 - 13.2 K/uL    RBC 3.15 (L) 4.20 - 5.30 M/uL    HGB 9.3 (L) 12.0 - 16.0 g/dL    HCT 28.7 (L) 35.0 - 45.0 %    MCV 91.1 78.0 - 100.0 FL    MCH 29.5 24.0 - 34.0 PG    MCHC 32.4 31.0 - 37.0 g/dL    RDW 13.3 11.6 - 14.5 %    PLATELET 992 800 - 826 K/uL    MPV 9.9 9.2 - 11.8 FL    NEUTROPHILS 83 (H) 40 - 73 %    LYMPHOCYTES 9 (L) 21 - 52 %    MONOCYTES 6 3 - 10 %    EOSINOPHILS 2 0 - 5 %    BASOPHILS 0 0 - 2 %    ABS. NEUTROPHILS 19.5 (H) 1.8 - 8.0 K/UL    ABS. LYMPHOCYTES 2.1 0.9 - 3.6 K/UL    ABS. MONOCYTES 1.4 (H) 0.05 - 1.2 K/UL    ABS. EOSINOPHILS 0.5 (H) 0.0 - 0.4 K/UL    ABS.  BASOPHILS 0.0 0.0 - 0.1 K/UL    DF AUTOMATED      PLATELET COMMENTS ADEQUATE PLATELETS      RBC COMMENTS        POLYCHROMASIA  SLIGHT  BASOPHILIC STIPPLING  SLIGHT     MAGNESIUM    Collection Time: 09/25/21  5:48 AM   Result Value Ref Range    Magnesium 2.3 1.6 - 2.6 mg/dL   METABOLIC PANEL, COMPREHENSIVE    Collection Time: 09/25/21  5:48 AM   Result Value Ref Range    Sodium 140 136 - 145 mmol/L    Potassium 3.9 3.5 - 5.5 mmol/L    Chloride 105 100 - 111 mmol/L CO2 26 21 - 32 mmol/L    Anion gap 9 3.0 - 18 mmol/L    Glucose 119 (H) 74 - 99 mg/dL    BUN 15 7.0 - 18 MG/DL    Creatinine 0.35 (L) 0.6 - 1.3 MG/DL    BUN/Creatinine ratio 43 (H) 12 - 20      GFR est AA >60 >60 ml/min/1.73m2    GFR est non-AA >60 >60 ml/min/1.73m2    Calcium 8.8 8.5 - 10.1 MG/DL    Bilirubin, total 0.3 0.2 - 1.0 MG/DL    ALT (SGPT) 44 13 - 56 U/L    AST (SGOT) 17 10 - 38 U/L    Alk. phosphatase 100 45 - 117 U/L    Protein, total 5.7 (L) 6.4 - 8.2 g/dL    Albumin 2.5 (L) 3.4 - 5.0 g/dL    Globulin 3.2 2.0 - 4.0 g/dL    A-G Ratio 0.8 0.8 - 1.7     PHOSPHORUS    Collection Time: 09/25/21  5:48 AM   Result Value Ref Range    Phosphorus 4.6 2.5 - 4.9 MG/DL   AMMONIA    Collection Time: 09/25/21  5:48 AM   Result Value Ref Range    Ammonia 27 11 - 32 UMOL/L             Recent Labs     09/25/21  0355 09/24/21  0428   FIO2I 50 30   HCO3I 28.2* 26.2*   PCO2I 47.3* 43.7   PHI 7.38 7.39   PO2I 92 68*       All Micro Results     Procedure Component Value Units Date/Time    CULTURE, BLOOD [[id number]] Collected: 09/24/21 0740    Order Status: Completed Specimen: Blood Updated: 09/25/21 0730     Special Requests: NO SPECIAL REQUESTS        Culture result: NO GROWTH AFTER 23 HOURS       CULTURE, RESPIRATORY/SPUTUM/BRONCH Elicia Mulch STAIN [[id number]] Collected: 09/24/21 0654    Order Status: Completed Specimen: Sputum from Tracheal Aspirate Updated: 09/25/21 0040     Special Requests: NO SPECIAL REQUESTS        GRAM STAIN RARE WBCS SEEN               RARE EPITHELIAL CELLS SEEN            NO ORGANISMS SEEN        Culture result: PENDING    COVID-19 RAPID TEST [[id number]] Collected: 09/24/21 1700    Order Status: Completed Specimen: Nasopharyngeal Updated: 09/24/21 1849     Specimen source Nasopharyngeal        COVID-19 rapid test Not detected        Comment: Rapid Abbott ID Now       Rapid NAAT:  The specimen is NEGATIVE for SARS-CoV-2, the novel coronavirus associated with COVID-19.        Negative results should be treated as presumptive and, if inconsistent with clinical signs and symptoms or necessary for patient management, should be tested with an alternative molecular assay. Negative results do not preclude SARS-CoV-2 infection and should not be used as the sole basis for patient management decisions. This test has been authorized by the FDA under an Emergency Use Authorization (EUA) for use by authorized laboratories. Fact sheet for Healthcare Providers: ConventionFlowtowndate.co.nz  Fact sheet for Patients: [employer] Labdate.co.nz       Methodology: Isothermal Nucleic Acid Amplification         CULTURE, BLOOD [[id number]]     Order Status: Sent Specimen: Blood     CULTURE, BLOOD [[id number]]     Order Status: Sent Specimen: Blood     CULTURE, URINE [[id number]]     Order Status: Canceled Specimen: Urine from Clean catch     CULTURE, CSF  TUBE 2 [[id number]] Collected: 09/16/21 1434    Order Status: Completed Specimen: Cerebrospinal Fluid Updated: 09/23/21 1241     Special Requests: TUBE 3, CULTURE AND SENSITIVTY AND GRAM STAIN.      GRAM STAIN RARE WBCS SEEN         NO ORGANISMS SEEN        Culture result: NO GROWTH 7 DAYS       CULTURE, BLOOD [[id number]] Collected: 09/14/21 0515    Order Status: Completed Specimen: Blood Updated: 09/20/21 0832     Special Requests: NO SPECIAL REQUESTS        Culture result: NO GROWTH 6 DAYS       CULTURE, BLOOD [[id number]] Collected: 09/14/21 0500    Order Status: Completed Specimen: Blood Updated: 09/20/21 0832     Special Requests: NO SPECIAL REQUESTS        Culture result: NO GROWTH 6 DAYS       MENINGITIS PATHOGENS PANEL, CSF (BY PCR) [[id number]] Collected: 09/16/21 1434    Order Status: Completed Specimen: Cerebrospinal Fluid Updated: 09/17/21 0050     Escherichia coli K1 Not detected        Haemophilus Influenzae Not detected        Listeria Monocytogenes Not detected        Neisseria Meningitidis Not detected        Streptococcus Agalactiae Not detected        Streptococcus Pneumoniae Not detected        Cytomegalovirus Not detected        Enterovirus Not detected        Herpes Simplex Virus 1 Not detected        Comment: In patients who have negative herpes simplex 1 and 2 PCR results, do not modify treatment, confirm with alternate testing. Herpes Simplex Virus 2 Not detected        Comment: In patients who have negative herpes simplex 1 and 2 PCR results, do not modify treatment, confirm with alternate testing. Human Herpesvirus 6 Not detected        Human Parechovirus Not detected        Varicella Zoster Virus Not detected        Crypto. neoformans/gattii Not detected       MENINGITIS PATHOGENS PANEL, CSF (BY PCR) [[id number]] Collected: 09/16/21 1545    Order Status: Canceled Specimen: Cerebrospinal Fluid     HSV 1 AND 2 BY PCR [[id number]] Collected: 09/16/21 1434    Order Status: Canceled Specimen: Other     HPMBI-76 RAPID TEST [[id number]] Collected: 09/16/21 1000    Order Status: Completed Specimen: Nasopharyngeal Updated: 09/16/21 1032     Specimen source Nasopharyngeal        COVID-19 rapid test Not detected        Comment: Rapid Abbott ID Now       Rapid NAAT:  The specimen is NEGATIVE for SARS-CoV-2, the novel coronavirus associated with COVID-19. Negative results should be treated as presumptive and, if inconsistent with clinical signs and symptoms or necessary for patient management, should be tested with an alternative molecular assay. Negative results do not preclude SARS-CoV-2 infection and should not be used as the sole basis for patient management decisions. This test has been authorized by the FDA under an Emergency Use Authorization (EUA) for use by authorized laboratories.    Fact sheet for Healthcare Providers: ConventionUpdate.co.nz  Fact sheet for Patients: ConventionUpdate.co.nz       Methodology: Isothermal Nucleic Acid Amplification         LEGIONELLA PNEUMOPHILA AG, URINE [[id number]] Collected: 09/14/21 2315    Order Status: Completed Specimen: Urine, random Updated: 09/15/21 1042     Legionella Ag, urine Negative       STREP PNEUMO AG, URINE [[id number]] Collected: 09/14/21 2315    Order Status: Completed Specimen: Urine, random Updated: 09/15/21 1042     Strep pneumo Ag, urine Negative       RESPIRATORY VIRUS PANEL W/COVID-19, PCR [[id number]] Collected: 09/14/21 1832    Order Status: Completed Specimen: Nasopharyngeal Updated: 09/14/21 2234     Adenovirus Not detected        Coronavirus 229E Not detected        Coronavirus HKU1 Not detected        Coronavirus CVNL63 Not detected        Coronavirus OC43 Not detected        SARS-CoV-2, PCR Not detected        Metapneumovirus Not detected        Rhinovirus and Enterovirus Not detected        Influenza A Not detected        Influenza A, subtype H1 Not detected        Influenza A, subtype H3 Not detected        INFLUENZA A H1N1 PCR Not detected        Influenza B Not detected        Parainfluenza 1 Not detected        Parainfluenza 2 Not detected        Parainfluenza 3 Not detected        Parainfluenza virus 4 Not detected        RSV by PCR Not detected        B. parapertussis, PCR Not detected        Bordetella pertussis - PCR Not detected        Chlamydophila pneumoniae DNA, QL, PCR Not detected        Mycoplasma pneumoniae DNA, QL, PCR Not detected       CULTURE, BLOOD [[id number]] Collected: 09/14/21 1700    Order Status: Canceled Specimen: Blood     CULTURE, URINE [[id number]] Collected: 09/13/21 2330    Order Status: Canceled Specimen: Cath Urine     RESPIRATORY VIRUS PANEL W/COVID-19, PCR [[id number]]     Order Status: Canceled Specimen: NASOPHARYNGEAL SWAB     COVID-19 RAPID TEST [[id number]]     Order Status: Canceled     COVID-19 RAPID TEST [[id number]] Collected: 09/13/21 0737    Order Status: Completed Specimen: Nasopharyngeal Updated: 09/13/21 0811     Specimen source Nasopharyngeal        COVID-19 rapid test Not detected Comment: Rapid Abbott ID Now       Rapid NAAT:  The specimen is NEGATIVE for SARS-CoV-2, the novel coronavirus associated with COVID-19. Negative results should be treated as presumptive and, if inconsistent with clinical signs and symptoms or necessary for patient management, should be tested with an alternative molecular assay. Negative results do not preclude SARS-CoV-2 infection and should not be used as the sole basis for patient management decisions. This test has been authorized by the FDA under an Emergency Use Authorization (EUA) for use by authorized laboratories. Fact sheet for Healthcare Providers: ConventionKarmaKeydate.co.nz  Fact sheet for Patients: Adlogixdate.co.nz       Methodology: Isothermal Nucleic Acid Amplification         COVID-19 RAPID TEST [[id number]]     Order Status: Canceled         Echo 9/17/2021:  Left Ventricle Normal cavity size, wall thickness and systolic function (ejection fraction normal). The estimated EF is 60 - 65%. There is inconclusive left ventricular diastolic function E/E' ratio = 7.08  Heart rate is over 100. Wall Scoring The left ventricular wall motion is normal.            Left Atrium Normal cavity size. Right Ventricle Normal cavity size and global systolic function. Assessment of RV function:   TAPSE = 27 mm. Right Atrium Normal cavity size. Interatrial Septum Interatrial septum not well visualized   Aortic Valve Aortic valve not well visualized. Trileaflet valve structure, no stenosis and no regurgitation. Mitral Valve No stenosis. Mitral valve non-specific thickening. Mild regurgitation. Tricuspid Valve Tricuspid valve not well visualized. No stenosis. Mild regurgitation. Pulmonic Valve Pulmonic valve not well visualized. No stenosis and no regurgitation. Aorta The aorta was not well visualized. Normal aortic root. Pulmonary Artery Pulmonary arteries not well visualized.  Pulmonary arterial systolic pressure (PASP) is 29 mmHg. Pulmonary hypertension not suggested by Doppler findings. IVC/Hepatic Veins Mechanically ventilated; cannot use inferior caval vein diameter to estimate central venous pressure. Pericardium Normal pericardium and no evidence of pericardial effusion. CT head 9/15/1021  IMPRESSION   No acute intracranial abnormality. CT chest, abdomen, pelvis 9/15/1021  IMPRESSION   Endotracheal tube tip in the lower thoracic trachea 1.5 cm above the dipak.    Bilateral lower lobar atelectasis, possible endobronchial lesion/mucous plug in  the left lower lobe bronchus.    No acute intra-abdominal abnormality. Imaging:  [x]I have personally reviewed the patients chest radiographs images and report     Results from Hospital Encounter encounter on 09/11/21    XR CHEST PORT    Narrative  EXAM: XR CHEST PORT    CLINICAL INDICATION/HISTORY: Acute respiratory failure, ET tub position  > Additional: None. COMPARISON: September 24, 2021    TECHNIQUE: Portable chest    _______________    FINDINGS:    SUPPORT DEVICES: Tracheostomy tube and nasogastric tube remain unchanged. HEART AND MEDIASTINUM: The heart is stable in size and contour. LUNGS AND PLEURAL SPACES: Volume loss and atelectasis with partial collapse of  the lower lobes is unchanged. Lungs are otherwise clear. BONY THORAX AND SOFT TISSUES: No acute osseous abnormality. _______________    Impression  1. Support lines and tubes as above. 2. Ongoing bibasilar volume loss and partial collapse/atelectasis of the lower  lobes. Please note: Voice-recognition software may have been used to generate this report, which may have resulted in some phonetic-based errors in grammar and contents. Even though attempts were made to correct all the mistakes, some may have been missed, and remained in the body of the document.       Bee Mahan MD  9/25/2021

## 2021-09-25 NOTE — PROGRESS NOTES
Vancomycin - Pharmacy to Dose    Consult provided for this 39y.o. year old ,female for indication of Sepsis of Unknown Etiology. Therapy day 1        Wt Readings from Last 1 Encounters:   09/17/21 71.2 kg (157 lb)       Ht Readings from Last 1 Encounters:   09/20/21 157.5 cm (62\")     Dosing Weight:  71.2 kg     Date:  9/25/21   Lab Results   Component Value Date/Time    Creatinine 0.35 (L) 09/25/2021 05:48 AM    Creatinine (POC) 0.78 09/12/2021 12:34 AM     creatinine clearance:  97 ml/min    white blood cell count:  23.5    Will dose Vancomycin 1750 mg IV now, then 1250 mg IV q12hrs. Est  mg/l.hr   Tr 12.8 mg/l    Pharmacy to follow daily and will make changes to dose and/or frequency based on clinical status.       [id number] [facility]

## 2021-09-25 NOTE — PROGRESS NOTES
1915: Report received from EV Pederson RN. Assumed care of pt at this time. 2000: Shift assessment completed. Pt remains on ventilator with tracheostomy in place. No command following noted -- responds to painful stimuli. Pupils brisk and reactive to light. Sedation infusions maintained per order. Bilateral wrist restraints continued due to periods of restlessness. Barahona catheter and FMS patent and draining. Pt febrile. Ice packs applied to axillae and groin. 2130: Lactic acid resulted 0.7.      0055: Reassessment completed. Pt remains febrile. PRN Tylenol given via NG tube.    0400: Patient's temperature is 98.5.

## 2021-09-25 NOTE — PROGRESS NOTES
Bedside shift change report given to Ruby Giang (oncoming nurse) by Avinash Craig RN (offgoing nurse). Report included the following information SBAR, Kardex and MAR.

## 2021-09-25 NOTE — PROGRESS NOTES
Bedside shift change report given to Artur Rolon RN (oncoming nurse) by Leila Dakins, RN (offgoing nurse). Report included the following information SBAR, Kardex and MAR.

## 2021-09-25 NOTE — PROGRESS NOTES
Pharmacy Dosing Services:  Meropenem    Indication:  Sepsis of Unknown Etiology  Previous Regimen Meropenem 1 gm IV q24hrs   Serum Creatinine Lab Results   Component Value Date/Time    Creatinine 0.35 (L) 09/25/2021 05:48 AM    Creatinine (POC) 0.78 09/12/2021 12:34 AM      Creatinine Clearance Estimated Creatinine Clearance: 97.7 mL/min (A) (by C-G formula based on SCr of 0.35 mg/dL (L)). BUN Lab Results   Component Value Date/Time    BUN 15 09/25/2021 05:48 AM         Dose administration notes:   Changed to Meropenem 1 gm IV q8hrs per renal dosing protocol    Plan:  Continue to monitor     Mark D. Tama Goldmann  [phone]

## 2021-09-25 NOTE — PROGRESS NOTES
Hospitalist Progress Note    Patient: Shayna Sofia MRN: [id number]  CSN: [id number]    YOB: 1980  Age: 39 y.o. Sex: female    DOA: 9/11/2021 LOS:  LOS: 13 days          Chief Complaint:    resp failure      Assessment/Plan        Shayna Sofia is a 39 y.o. female with  standing history of multidrug use/abuse, previous drug-seeking behavior and mental health issues otherwise unspecified arrived via EMS with acute metabolic encephalopathy and lethargy. Admitted for likely gabapentin overdose.        Acute respiratory failure with hypoxia   Intubated on 9/12    Now with trach placed    Fevers, tachycardia, leukocytosis  Blood cx sent  Meropenemstarted empirically  Get UA and urine cx this am  Repeat covid test neg  lekocytosis slowly improving     covid 19 rapid negative on admission     BP stable  Labs reviewed  Sedation as per intensivist     LP done no bacterial meningitis indicated      Anemia-Stable so far no bleeding reported and will continue monitoring   Upper PVL negative for dvt      Hypernatremia resolved      Hypokalemia resolved   icu electrolytes replacement protocol      Severe Acute metabolic encephalopathy   Ct head no acute process      Gabapentin overdose with lethargy and AMS   S/p procedural stomach emptying in ED with charcoal and sorbitol      elevated ammonia level  Continue Lactulose      Psychiatric disease  Psychosis , hx of  Ekbom's delusional parasitosis   Need psych evaluation once able to participate  On olanzapine and klonopin      left wrist fracture: immobilized -poa     DVT prev-lovenox    Critical illness  Will need LTAC placement and extensive recovery with psych as well  Disposition :  Patient Active Problem List   Diagnosis Code    Dextromethorphan use disorder, moderate ([address] F19.20    Ekbom's delusional parasitosis ([facility] [address] F22    Left wrist fracture, with delayed healing, subsequent encounter S62.102G    Drug overdose, intentional self-harm, initial encounter ([facility] [address] T50.902A    Hypoxia R09.02    Hypotension after procedure I95.81    Acute metabolic encephalopathy K15.15    Psychosis (HCC) F29    Hyponatremia E87.1    Acute respiratory failure with hypoxia (HCC) J96.01    Increased ammonia level R79.89    Hypokalemia E87.6       Subjective:  Fevers  Mostly nocuturnal  FMS not cdiff  No real change weaning off of versed        Review of systems:    UTO  Sedate on vent      Vital signs/Intake and Output:  Visit Vitals  /82   Pulse 97   Temp 98.7 °F (37.1 °C)   Resp 25   Ht 5' 2\" (1.575 m)   Wt 71.2 kg (157 lb)   SpO2 98%   BMI 28.72 kg/m²     Current Shift:  No intake/output data recorded. Last three shifts:  09/23 1901 - 09/25 0700  In: 4197.2 [I.V.:1677.2]  Out: 2577 [Urine:3140; Drains:1400]    Exam:    General: WF, sedate, intubated to trach  CVS:Regular rate and rhythm, no M/R/G, S1/S2 heard, no thrill  Lungs:Clear to auscultation bilaterally, no wheezes, rhonchi, or rales  Abdomen: Soft, Nontender, No distention, Normal Bowel sounds  Extremities: No C/C/E, pulses palpable 2+                  Labs: Results:       Chemistry Recent Labs     09/25/21  0548 09/24/21  0341 09/23/21  0615   * 119* 101*    140 142   K 3.9 4.5 4.4    102 106   CO2 26 27 26   BUN 15 15 15   CREA 0.35* 0.47* 0.46*   CA 8.8 9.1 8.7   AGAP 9 11 10   BUCR 43* 32* 33*    109 114   TP 5.7* 5.9* 5.9*   ALB 2.5* 3.1* 3.1*   GLOB 3.2 2.8 2.8   AGRAT 0.8 1.1 1.1      CBC w/Diff Recent Labs     09/25/21  0548 09/24/21  1811 09/24/21  0341   WBC 23.5* 30.7* 22.9*   RBC 3.15* 3.53* 3.60*   HGB 9.3* 10.5* 10.6*   HCT 28.7* 31.6* 33.1*    407 431*   GRANS 83* 87* 78*   LYMPH 9* 9* 15*   EOS 2 0 4      Cardiac Enzymes No results for input(s): CPK, CKND1, ZENON in the last 72 hours. No lab exists for component: CKRMB, TROIP   Coagulation No results for input(s): PTP, INR, APTT, INREXT, INREXT in the last 72 hours.     Lipid Panel No results found for: CHOL, CHOLPOCT, CHOLX, CHLST, CHOLV, 330662, HDL, HDLP, LDL, LDLC, DLDLP, 462611, VLDLC, VLDL, TGLX, TRIGL, TRIGP, TGLPOCT, CHHD, CHHDX   BNP No results for input(s): BNPP in the last 72 hours.    Liver Enzymes Recent Labs     09/25/21  0548   TP 5.7*   ALB 2.5*         Thyroid Studies No results found for: T4, T3U, TSH, TSHEXT, TSHEXT     Procedures/imaging: see electronic medical records for all procedures/Xrays and details which were not copied into this note but were reviewed prior to creation of Tonya Suarez MD

## 2021-09-26 ENCOUNTER — APPOINTMENT (OUTPATIENT)
Dept: GENERAL RADIOLOGY | Age: 41
DRG: 004 | End: 2021-09-26
Attending: INTERNAL MEDICINE
Payer: MEDICAID

## 2021-09-26 LAB
ALBUMIN SERPL-MCNC: 2.3 G/DL (ref 3.4–5)
ALBUMIN/GLOB SERPL: 0.7 {RATIO} (ref 0.8–1.7)
ALP SERPL-CCNC: 105 U/L (ref 45–117)
ALT SERPL-CCNC: 44 U/L (ref 13–56)
AMMONIA PLAS-SCNC: 40 UMOL/L (ref 11–32)
ANION GAP SERPL CALC-SCNC: 7 MMOL/L (ref 3–18)
AST SERPL-CCNC: 20 U/L (ref 10–38)
BASOPHILS # BLD: 0 K/UL (ref 0–0.1)
BASOPHILS NFR BLD: 0 % (ref 0–2)
BILIRUB SERPL-MCNC: 0.2 MG/DL (ref 0.2–1)
BUN SERPL-MCNC: 11 MG/DL (ref 7–18)
BUN/CREAT SERPL: 39 (ref 12–20)
CALCIUM SERPL-MCNC: 8.6 MG/DL (ref 8.5–10.1)
CHLORIDE SERPL-SCNC: 107 MMOL/L (ref 100–111)
CO2 SERPL-SCNC: 27 MMOL/L (ref 21–32)
CREAT SERPL-MCNC: 0.28 MG/DL (ref 0.6–1.3)
DIFFERENTIAL METHOD BLD: ABNORMAL
EOSINOPHIL # BLD: 0.5 K/UL (ref 0–0.4)
EOSINOPHIL NFR BLD: 2 % (ref 0–5)
ERYTHROCYTE [DISTWIDTH] IN BLOOD BY AUTOMATED COUNT: 13.3 % (ref 11.6–14.5)
GLOBULIN SER CALC-MCNC: 3.4 G/DL (ref 2–4)
GLUCOSE BLD STRIP.AUTO-MCNC: 124 MG/DL (ref 70–110)
GLUCOSE BLD STRIP.AUTO-MCNC: 128 MG/DL (ref 70–110)
GLUCOSE BLD STRIP.AUTO-MCNC: 152 MG/DL (ref 70–110)
GLUCOSE BLD STRIP.AUTO-MCNC: 154 MG/DL (ref 70–110)
GLUCOSE SERPL-MCNC: 143 MG/DL (ref 74–99)
HCT VFR BLD AUTO: 27.9 % (ref 35–45)
HGB BLD-MCNC: 8.8 G/DL (ref 12–16)
LYMPHOCYTES # BLD: 1.4 K/UL (ref 0.9–3.6)
LYMPHOCYTES NFR BLD: 7 % (ref 21–52)
MAGNESIUM SERPL-MCNC: 2 MG/DL (ref 1.6–2.6)
MCH RBC QN AUTO: 28.8 PG (ref 24–34)
MCHC RBC AUTO-ENTMCNC: 31.5 G/DL (ref 31–37)
MCV RBC AUTO: 91.2 FL (ref 78–100)
MONOCYTES # BLD: 1 K/UL (ref 0.05–1.2)
MONOCYTES NFR BLD: 5 % (ref 3–10)
NEUTS SEG # BLD: 17.2 K/UL (ref 1.8–8)
NEUTS SEG NFR BLD: 85 % (ref 40–73)
PHOSPHATE SERPL-MCNC: 2.8 MG/DL (ref 2.5–4.9)
PLATELET # BLD AUTO: 342 K/UL (ref 135–420)
PMV BLD AUTO: 10 FL (ref 9.2–11.8)
POTASSIUM SERPL-SCNC: 3.8 MMOL/L (ref 3.5–5.5)
POTASSIUM SERPL-SCNC: 5.3 MMOL/L (ref 3.5–5.5)
PROT SERPL-MCNC: 5.7 G/DL (ref 6.4–8.2)
RBC # BLD AUTO: 3.06 M/UL (ref 4.2–5.3)
SODIUM SERPL-SCNC: 141 MMOL/L (ref 136–145)
WBC # BLD AUTO: 20.4 K/UL (ref 4.6–13.2)

## 2021-09-26 PROCEDURE — 74011250637 HC RX REV CODE- 250/637: Performed by: INTERNAL MEDICINE

## 2021-09-26 PROCEDURE — 74011250636 HC RX REV CODE- 250/636: Performed by: INTERNAL MEDICINE

## 2021-09-26 PROCEDURE — 74011250636 HC RX REV CODE- 250/636: Performed by: FAMILY MEDICINE

## 2021-09-26 PROCEDURE — 71045 X-RAY EXAM CHEST 1 VIEW: CPT

## 2021-09-26 PROCEDURE — 83735 ASSAY OF MAGNESIUM: CPT

## 2021-09-26 PROCEDURE — 84132 ASSAY OF SERUM POTASSIUM: CPT

## 2021-09-26 PROCEDURE — 77030018798 HC PMP KT ENTRL FED COVD -A

## 2021-09-26 PROCEDURE — 82962 GLUCOSE BLOOD TEST: CPT

## 2021-09-26 PROCEDURE — 84100 ASSAY OF PHOSPHORUS: CPT

## 2021-09-26 PROCEDURE — 94003 VENT MGMT INPAT SUBQ DAY: CPT

## 2021-09-26 PROCEDURE — 85025 COMPLETE CBC W/AUTO DIFF WBC: CPT

## 2021-09-26 PROCEDURE — 65610000006 HC RM INTENSIVE CARE

## 2021-09-26 PROCEDURE — 74011000258 HC RX REV CODE- 258: Performed by: INTERNAL MEDICINE

## 2021-09-26 PROCEDURE — 36415 COLL VENOUS BLD VENIPUNCTURE: CPT

## 2021-09-26 PROCEDURE — 74011636637 HC RX REV CODE- 636/637: Performed by: INTERNAL MEDICINE

## 2021-09-26 PROCEDURE — 74011000250 HC RX REV CODE- 250: Performed by: INTERNAL MEDICINE

## 2021-09-26 PROCEDURE — 94640 AIRWAY INHALATION TREATMENT: CPT

## 2021-09-26 PROCEDURE — 82140 ASSAY OF AMMONIA: CPT

## 2021-09-26 PROCEDURE — 74011250637 HC RX REV CODE- 250/637: Performed by: FAMILY MEDICINE

## 2021-09-26 RX ORDER — MIDAZOLAM IN 0.9 % SOD.CHLORID 1 MG/ML
0-10 PLASTIC BAG, INJECTION (ML) INTRAVENOUS
Status: DISCONTINUED | OUTPATIENT
Start: 2021-09-26 | End: 2021-09-27

## 2021-09-26 RX ORDER — IPRATROPIUM BROMIDE AND ALBUTEROL SULFATE 2.5; .5 MG/3ML; MG/3ML
3 SOLUTION RESPIRATORY (INHALATION)
Status: DISCONTINUED | OUTPATIENT
Start: 2021-09-26 | End: 2021-11-13 | Stop reason: HOSPADM

## 2021-09-26 RX ORDER — IPRATROPIUM BROMIDE 0.5 MG/2.5ML
0.5 SOLUTION RESPIRATORY (INHALATION)
Status: DISCONTINUED | OUTPATIENT
Start: 2021-09-26 | End: 2021-09-29

## 2021-09-26 RX ORDER — POTASSIUM CHLORIDE 7.45 MG/ML
10 INJECTION INTRAVENOUS
Status: COMPLETED | OUTPATIENT
Start: 2021-09-26 | End: 2021-09-26

## 2021-09-26 RX ADMIN — IPRATROPIUM BROMIDE 0.5 MG: 0.5 SOLUTION RESPIRATORY (INHALATION) at 19:57

## 2021-09-26 RX ADMIN — OLANZAPINE 10 MG: 10 TABLET, FILM COATED ORAL at 19:48

## 2021-09-26 RX ADMIN — PROPOFOL 30 MCG/KG/MIN: 10 INJECTION, EMULSION INTRAVENOUS at 16:39

## 2021-09-26 RX ADMIN — CLONAZEPAM 0.5 MG: 0.5 TABLET ORAL at 19:51

## 2021-09-26 RX ADMIN — 0.12% CHLORHEXIDINE GLUCONATE 10 ML: 1.2 RINSE ORAL at 09:09

## 2021-09-26 RX ADMIN — FAMOTIDINE 20 MG: 20 TABLET ORAL at 09:09

## 2021-09-26 RX ADMIN — ARFORMOTEROL TARTRATE 15 MCG: 15 SOLUTION RESPIRATORY (INHALATION) at 19:57

## 2021-09-26 RX ADMIN — BUDESONIDE 500 MCG: 0.5 INHALANT RESPIRATORY (INHALATION) at 08:04

## 2021-09-26 RX ADMIN — ENOXAPARIN SODIUM 40 MG: 100 INJECTION SUBCUTANEOUS at 16:40

## 2021-09-26 RX ADMIN — LACTULOSE 45 ML: 20 SOLUTION ORAL at 19:52

## 2021-09-26 RX ADMIN — FAMOTIDINE 20 MG: 20 TABLET ORAL at 19:51

## 2021-09-26 RX ADMIN — ACETAMINOPHEN 650 MG: 325 TABLET ORAL at 19:48

## 2021-09-26 RX ADMIN — CLONAZEPAM 0.5 MG: 0.5 TABLET ORAL at 09:09

## 2021-09-26 RX ADMIN — MEROPENEM 1 G: 1 INJECTION, POWDER, FOR SOLUTION INTRAVENOUS at 02:28

## 2021-09-26 RX ADMIN — IPRATROPIUM BROMIDE 0.5 MG: 0.5 SOLUTION RESPIRATORY (INHALATION) at 15:02

## 2021-09-26 RX ADMIN — SODIUM CHLORIDE 10 ML: 9 INJECTION, SOLUTION INTRAMUSCULAR; INTRAVENOUS; SUBCUTANEOUS at 16:41

## 2021-09-26 RX ADMIN — POTASSIUM CHLORIDE 10 MEQ: 10 INJECTION, SOLUTION INTRAVENOUS at 07:45

## 2021-09-26 RX ADMIN — DEXMEDETOMIDINE HYDROCHLORIDE 0.6 MCG/KG/HR: 100 INJECTION, SOLUTION INTRAVENOUS at 11:55

## 2021-09-26 RX ADMIN — VANCOMYCIN HYDROCHLORIDE 1250 MG: 10 INJECTION, POWDER, LYOPHILIZED, FOR SOLUTION INTRAVENOUS at 22:46

## 2021-09-26 RX ADMIN — MEROPENEM 1 G: 1 INJECTION, POWDER, FOR SOLUTION INTRAVENOUS at 19:48

## 2021-09-26 RX ADMIN — MEROPENEM 1 G: 1 INJECTION, POWDER, FOR SOLUTION INTRAVENOUS at 09:09

## 2021-09-26 RX ADMIN — 0.12% CHLORHEXIDINE GLUCONATE 10 ML: 1.2 RINSE ORAL at 22:45

## 2021-09-26 RX ADMIN — SODIUM CHLORIDE 10 ML: 9 INJECTION, SOLUTION INTRAMUSCULAR; INTRAVENOUS; SUBCUTANEOUS at 06:06

## 2021-09-26 RX ADMIN — DEXMEDETOMIDINE HYDROCHLORIDE 0.6 MCG/KG/HR: 100 INJECTION, SOLUTION INTRAVENOUS at 20:19

## 2021-09-26 RX ADMIN — FENTANYL CITRATE 50 MCG: 50 INJECTION INTRAMUSCULAR; INTRAVENOUS at 19:52

## 2021-09-26 RX ADMIN — INSULIN LISPRO 2 UNITS: 100 INJECTION, SOLUTION INTRAVENOUS; SUBCUTANEOUS at 06:05

## 2021-09-26 RX ADMIN — MIDAZOLAM 6 MG/HR: 5 INJECTION, SOLUTION INTRAMUSCULAR; INTRAVENOUS at 13:43

## 2021-09-26 RX ADMIN — VANCOMYCIN HYDROCHLORIDE 1250 MG: 10 INJECTION, POWDER, LYOPHILIZED, FOR SOLUTION INTRAVENOUS at 11:52

## 2021-09-26 RX ADMIN — ARFORMOTEROL TARTRATE 15 MCG: 15 SOLUTION RESPIRATORY (INHALATION) at 08:04

## 2021-09-26 RX ADMIN — THIAMINE HCL TAB 100 MG 100 MG: 100 TAB at 09:09

## 2021-09-26 RX ADMIN — PROPOFOL 30 MCG/KG/MIN: 10 INJECTION, EMULSION INTRAVENOUS at 07:37

## 2021-09-26 RX ADMIN — DEXMEDETOMIDINE HYDROCHLORIDE 0.6 MCG/KG/HR: 100 INJECTION, SOLUTION INTRAVENOUS at 01:56

## 2021-09-26 RX ADMIN — Medication 1 TABLET: at 09:09

## 2021-09-26 RX ADMIN — BUDESONIDE 500 MCG: 0.5 INHALANT RESPIRATORY (INHALATION) at 19:57

## 2021-09-26 RX ADMIN — POTASSIUM CHLORIDE 10 MEQ: 10 INJECTION, SOLUTION INTRAVENOUS at 09:07

## 2021-09-26 RX ADMIN — IPRATROPIUM BROMIDE AND ALBUTEROL SULFATE 3 ML: .5; 3 SOLUTION RESPIRATORY (INHALATION) at 08:04

## 2021-09-26 RX ADMIN — MIDAZOLAM 8 MG/HR: 5 INJECTION, SOLUTION INTRAMUSCULAR; INTRAVENOUS at 00:06

## 2021-09-26 RX ADMIN — LACTULOSE 45 ML: 20 SOLUTION ORAL at 09:09

## 2021-09-26 RX ADMIN — FOLIC ACID 1 MG: 1 TABLET ORAL at 09:10

## 2021-09-26 NOTE — PROGRESS NOTES
0805-received report from [address] [place] [address] included SBAR MAR and Kardex. Bedside and Verbal shift change report given to Hanh Roman RN (oncoming nurse) by Emanuel Cruz RN (offgoing nurse). Report included the following information SBAR, Kardex and MAR.

## 2021-09-26 NOTE — PROGRESS NOTES
Problem: Ventilator Management  Goal: *Adequate oxygenation and ventilation  Outcome: Progressing Towards Goal  Goal: *Patient maintains clear airway/free of aspiration  Outcome: Progressing Towards Goal  Goal: *Absence of infection signs and symptoms  Outcome: Progressing Towards Goal  Goal: *Normal spontaneous ventilation  Outcome: Progressing Towards Goal     Problem: Patient Education: Go to Patient Education Activity  Goal: Patient/Family Education  Outcome: Progressing Towards Goal     Problem: Non-Violent Restraints  Goal: Removal from restraints as soon as assessed to be safe  Outcome: Progressing Towards Goal  Goal: No harm/injury to patient while restraints in use  Outcome: Progressing Towards Goal  Goal: Patient's dignity will be maintained  Outcome: Progressing Towards Goal  Goal: Patient Interventions  Outcome: Progressing Towards Goal     Problem: Falls - Risk of  Goal: *Absence of Falls  Description: Document [facility] Fall Risk and appropriate interventions in the flowsheet.   Outcome: Progressing Towards Goal  Note: Fall Risk Interventions:       Mentation Interventions: Door open when patient unattended, Adequate sleep, hydration, pain control, Evaluate medications/consider consulting pharmacy, More frequent rounding, Room close to nurse's station, Toileting rounds, Update white board    Medication Interventions: Bed/chair exit alarm, Evaluate medications/consider consulting pharmacy    Elimination Interventions: Bed/chair exit alarm, Call light in reach, Toileting schedule/hourly rounds    History of Falls Interventions: Bed/chair exit alarm, Consult care management for discharge planning, Door open when patient unattended, Evaluate medications/consider consulting pharmacy, Investigate reason for fall, Room close to nurse's station         Problem: Patient Education: Go to Patient Education Activity  Goal: Patient/Family Education  Outcome: Progressing Towards Goal     Problem: Risk for Spread of Infection  Goal: Prevent transmission of infectious organism to others  Description: Prevent the transmission of infectious organisms to other patients, staff members, and visitors. Outcome: Progressing Towards Goal     Problem: Patient Education:  Go to Education Activity  Goal: Patient/Family Education  Outcome: Progressing Towards Goal     Problem: Pressure Injury - Risk of  Goal: *Prevention of pressure injury  Description: Document Remy Scale and appropriate interventions in the flowsheet. Outcome: Progressing Towards Goal  Note: Pressure Injury Interventions:  Sensory Interventions: Assess changes in LOC, Check visual cues for pain, Pressure redistribution bed/mattress (bed type), Turn and reposition approx. every two hours (pillows and wedges if needed)    Moisture Interventions: Absorbent underpads, Check for incontinence Q2 hours and as needed, Internal/External fecal devices, Internal/External urinary devices    Activity Interventions: Pressure redistribution bed/mattress(bed type)    Mobility Interventions: Pressure redistribution bed/mattress (bed type), Turn and reposition approx.  every two hours(pillow and wedges), Suspension boots    Nutrition Interventions: Document food/fluid/supplement intake    Friction and Shear Interventions: HOB 30 degrees or less, Lift sheet, Transferring/repositioning devices                Problem: Patient Education: Go to Patient Education Activity  Goal: Patient/Family Education  Outcome: Progressing Towards Goal

## 2021-09-26 NOTE — PROGRESS NOTES
[facility]  Pulmonary, Critical Care, and Sleep Medicine    Name: Ginger Severs MRN: [id number]   : 1980 Hospital: 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    Left wrist fracture, with delayed healing, subsequent encounter S62.102G    Drug overdose, intentional self-harm, initial encounter ([facility] [address] [facility] T50.902A    Hypoxia R09.02    Hypotension after procedure I95.81    Acute metabolic encephalopathy P44.67    Psychosis ([facility] [facility] F29    Hyponatremia E87.1    Acute respiratory failure with hypoxia (HCC) J96.01    Increased ammonia level R79.89    Hypokalemia E87.6 ·   Code status: full code     RECOMMENDATIONS:   Respiratory: Patient on ventilator support; intubated 2021 due to encephalopathy and drug overdose. Patient was extubated on 2021, and reintubated within an hour due to respiratory distress and upper airway edema. Completed Solu-Medrol 40 mg IV every 6 hours x8 doses  for upper airway edema. S/p tracheostomy 2021. CXR 2021 reviewed: Tracheostomy in place. Persistent RML/RLL atelectasis/collapse and volume loss more than LLL. Continue endotracheal suctioning. Vibrating bed/chest percussion ordered. ABG 2021: 7.38/47.3/92/96.9%. Today's ABG pending. On [facility], [place], operated by [facility] 14/400/50/7. FiO2 requirement increased compared to yesterday. Sedation: On Versed, propofol, Precedex. Discussed with RN to titrate off Versed first followed by propofol. Once off sedation, then attempt SBT. Nurses reported severe agitation when sedation lowered down; likely due to underlying psych issues. Continue ventilator bundle and sedation bundle/protocol. Bronchodilators: Continue Brovana twice daily, Pulmicort twice daily.   Change DuoNeb to as needed. Start ipratropium 4 times daily. ID:   S/p LP with negative CSF cultures. Covid test 9/16/2021 and 9/24/2021: Negative. CXR as above. Sputum culture 9/24/2021: Possible staph aureus; final culture and sensitivity pending. Blood culture 9/24/2021: NGTD. Barahona catheter changed on 9/24/2021. Urinalysis 9/24/2021: Negative nitrites, small leukocyte esterase, WBC 0-3, bacteria 1+. Normal lactate 0.7. Fever and leukocytosis improving. Loose bowel movements due to lactulose. Antibiotics: Completed Zosyn 5-day course for aspiration on 9/20/2021. Zosyn was restarted 9/20/2021 night due to fever and leukocytosis. Zosyn was changed to meropenem and added vancomycin on 9/25/2021. Follow cultures and narrow antibiotics accordingly. ID has signed off; reconsult as needed. CVS: NSR on telemetry. Hemodynamically stable. Monitor. Echo 9/17/2021: LVEF 60 to 65%. RVSP 29 mmHg. Renal: Normal creatinine and electrolytes; monitor. Hyponatremia resolved; monitor. Heme: Chronic mild anemia; stable. Normal platelets. Monitor. Endo: Stable blood sugars. GI: Continue tube feeding as tolerated; thiamine, folic acid and multivitamin supplements. LFT normalized. hCG negative. Nutrition: Tolerating NGT feed at 45 mill per hour with free water 250 mL every 4 hours. Neurology: Sedation as above. Ammonia intermittently increasing, 40 today; continue lactulose 30 g twice daily. CT headnil acute  Psych: Continue Zyprexa and Klonopin. Toxicology: gabapentin OD  Skin: ICU nursing care  MS: Left wrist fracture in immobilization external fixator  Prophylaxis: DVT prophylaxis: Lovenox 40 daily. GI prophylaxis: Famotidine. Restraints: Wrist soft restraints as needed for patient interfering with medical therapy/management and patient safety. Prognosis guarded overall. CODE STATUSfull code. Palliative care on case.   Quality Care: PPI, DVT prophylaxis, HOB elevated, Infection control all reviewed and addressed. Lines/Tubes: PIV   S/p ETT: 9/11/219/22/2021. Tracheostomy 9/22/2021. NGT: 9/11/21  Barahona: 9/11/21; changed 9/24/2021. FMS in place    ADVANCE DIRECTIVE: Full code. Patient's  in correction; he had called this morning and discussed with ICU RN. Discussed with RN, RT, MDR. High complexity decision making was performed during the evaluation of this patient at high risk for decompensation with multiple organ involvement. Critical care time excludes discussion or procedure: 38 minutes. Roseann Mendoza   sister-in-law   [phone]     Patient  would like information to be provided as needed to his 2 sisterMary Jane Mora (above) and Ashley Ford [[phone]]; patient's  will call ICU daily from correction to get updates; he does not want information to be provided to anyone else other than his 2 sisters. Patient's  is calling every day and nurses are updating him. Family discussion 9/26/2021: I called Jennifer Germain and spoke with her and updated; answered all questions to her satisfaction. Subjective/History:   Ms. Anjelica Buhs has been seen and evaluated as Dr. Greg Saeed requested now for assisting with Acute respiratory failure and ventilator management. Patient is a 39 y.o. female with following PMhx presented to ER with lethargy via EMS and admitted for Gabapentin overdose. Pt required intubation for airways protection. Position control was contacted that recommended supportive care per ER provider. Pt seen at bedside in ER rm#2. The patient can not provide additional history due to Ventilated. 9/26/2021  Remains in ICU room 102. ([address] RN who is taking care of the patient along with hospital supervisor role). Remains on ventilator through tracheostomy.   Sedated with Versed 8, propofol 30, Precedex 0.6; RN not able to titrate off sedation; discussed with RN to titrate off Versed first.  Tolerating tube feed at 45 mill per hour.  Barahona catheter was changed on 9/24/2021. Zosyn was changed to [address] on 9/25/2021. Temperature max 101.3; improving. Hemodynamically stable. Endotracheal aspirate culture: Moderate probable Staph aureus. Leukocytosis improving. Anemia but no external bleeding. No vomiting. Persistent loose bowel movement in FMS likely due to lactulose use. Abdomen soft and benign on exam.  No other overnight issues reported. PCCM was not called for any issues overnight. Patient's  calls from nursing home, nurses are operating room. Patient has a history of drug use and smoker and alcohol use. Review of Systems:  Review of systems not obtained due to patient factors. Past Medical History:  Past Medical History:   Diagnosis Date    Asthma     Bilateral ovarian cysts     Cocaine abuse ([facility] [address] [facility]     Mental and behavioral problem     Pancreatitis     Pancreatitis     Psychosis ([facility] [address]         Past Surgical History:  Past Surgical History:   Procedure Laterality Date    HX GYN      D&C, Hysterectomy        Medications:  Prior to Admission medications    Medication Sig Start Date End Date Taking? Authorizing Provider   albuterol (PROVENTIL HFA, VENTOLIN HFA, PROAIR HFA) 90 mcg/actuation inhaler Take 2 Puffs by inhalation every four (4) hours as needed for Wheezing or Shortness of Breath. 8/4/21   Mary Palm PA-C   ibuprofen (MOTRIN) 600 mg tablet Take 1 Tablet by mouth every six (6) hours as needed for Pain. Take with food.  8/4/21   Mary Palm PA-C   ALPRAZolam (XANAX) 0.5 mg tablet TAKE 1 TABLET BY MOUTH ONCE DAILY AS NEEDED FOR ANXIETY 12/4/19   Provider, Historical   metFORMIN (GLUCOPHAGE) 500 mg tablet TAKE 1 TABLET BY MOUTH ONCE DAILY 11/12/19   Provider, Historical   nicotine (NICODERM CQ) 21 mg/24 hr APPLY 1 PATCH TOPICALLY TO THE SKIN DAILY FOR CRAVINGS AS DIRECTED 12/4/19   Provider, Historical   traZODone (DESYREL) 50 mg tablet TAKE 1 TABLET BY MOUTH AT BEDTIME AS NEEDED FOR INSOMNIA 12/4/19   Provider, Historical   escitalopram oxalate (LEXAPRO) 10 mg tablet Take 10 mg by mouth daily. Luisa Burch MD   lurasidone (LATUDA) 80 mg tab tablet Take 80 mg by mouth daily (with dinner).     Luisa Burch MD       Current Facility-Administered Medications   Medication Dose Route Frequency    potassium chloride 10 mEq in 100 ml IVPB  10 mEq IntraVENous Q1H    Vancomycin - Pharmacy to Dose  1 Each Other Rx Dosing/Monitoring    vancomycin (VANCOCIN) 1250 mg in  ml infusion  1,250 mg IntraVENous Q12H    meropenem (MERREM) 1 g in sterile water (preservative free) 20 mL IV syringe  1 g IntraVENous Q8H    dexmedeTOMidine (PRECEDEX) 400 mcg in 0.9% sodium chloride 100 mL infusion  0.1-1.5 mcg/kg/hr IntraVENous TITRATE    fentaNYL (PF) 900 mcg/30 ml infusion soln  0-200 mcg/hr IntraVENous TITRATE    propofol (DIPRIVAN) 10 mg/mL infusion  0-50 mcg/kg/min IntraVENous TITRATE    famotidine (PEPCID) tablet 20 mg  20 mg Oral BID    midazolam in normal saline (VERSED) 1 mg/mL infusion  2-10 mg/hr IntraVENous TITRATE    thiamine mononitrate (B-1) tablet 100 mg  100 mg Oral DAILY    folic acid (FOLVITE) tablet 1 mg  1 mg Oral DAILY    multivitamin, tx-iron-ca-min (THERA-M w/ IRON) tablet 1 Tablet  1 Tablet Oral DAILY    lactulose (CHRONULAC) 10 gram/15 mL solution 45 mL  30 g Oral BID    clonazePAM (KlonoPIN) tablet 0.5 mg  0.5 mg Oral BID    insulin lispro (HUMALOG) injection   SubCUTAneous Q6H    arformoteroL (BROVANA) neb solution 15 mcg  15 mcg Nebulization BID RT    budesonide (PULMICORT) 500 mcg/2 ml nebulizer suspension  500 mcg Nebulization BID RT    OLANZapine (ZyPREXA) tablet 10 mg  10 mg Oral QPM    albuterol-ipratropium (DUO-NEB) 2.5 MG-0.5 MG/3 ML  3 mL Nebulization TID RT    enoxaparin (LOVENOX) injection 40 mg  40 mg SubCUTAneous Q24H    sodium chloride (NS) flush 5-40 mL  5-40 mL IntraVENous Q8H    chlorhexidine (PERIDEX) 0.12 % mouthwash 10 mL  10 mL Oral Q12H Allergy:  Allergies   Allergen Reactions    Fish Containing Products Itching    Toradol [Ketorolac] Rash     Pt reports she is not allergic to this medication. Social History:  Social History     Tobacco Use    Smoking status: Current Every Day Smoker     Packs/day: 1.00    Smokeless tobacco: Never Used   Substance Use Topics    Alcohol use: Not Currently    Drug use: Not Currently     Types: Cocaine, Marijuana     Comment: used with in past 24 hours        Family History:  History reviewed. No pertinent family history. Objective:   Vital Signs:    Blood pressure 120/74, pulse 97, temperature 99.3 °F (37.4 °C), resp. rate 22, height 5' 2\" (1.575 m), weight 71.2 kg (157 lb), last menstrual period 05/15/2018, SpO2 97 %. Body mass index is 28.72 kg/m². O2 Device: Tracheostomy, Ventilator       Temp (24hrs), Av.8 °F (37.7 °C), Min:98.8 °F (37.1 °C), Max:101.3 °F (38.5 °C)         Intake/Output:   Last shift:       07 - 1900  In: -   Out: 301 [Urine:300]  Last 3 shifts: 1901 -  07  In: 2515.3 [I.V.:1465.3]  Out: 3343 [Urine:3025; Drains:1200]    Intake/Output Summary (Last 24 hours) at 2021 0917  Last data filed at 2021 0904  Gross per 24 hour   Intake 971.2 ml   Output 3076 ml   Net -2104.8 ml       Ventilator Settings:  Mode Rate Tidal Volume Pressure FiO2 PEEP   Assist control, VC+   400 ml  7 cm H2O 50 % 7 cm H20     Peak airway pressure: 23 cm H2O    Minute ventilation: 12 l/min      Lung protective strategy, Pl pressure goals less than or equal to 30. Physical Exam:     General/Neurology: On ventilator through tracheostomy. Sedated. No bleeding at trach site. Head:   NCAT. Eye:   No icterus/pallor/cyanosis. Nose:   No nasal drainage/discharge. Neck:   Trachea midline. Lung: Moderate air entry bilateral equal.  No rales, rhonchi. No wheezing or stridor. No prolonged expiration or accessory muscle use. Heart:   S1 S2 present.  No murmur or JVD. Abdomen:  Soft. NT. ND. No palpable masses. Extremities:  No edema. No cyanosis or clubbing. Pulses: 2+ and symmetric in DP      Data:     Recent Results (from the past 12 hour(s))   GLUCOSE, POC    Collection Time: 09/26/21  1:04 AM   Result Value Ref Range    Glucose (POC) 152 (H) 70 - 110 mg/dL   GLUCOSE, POC    Collection Time: 09/26/21  6:04 AM   Result Value Ref Range    Glucose (POC) 154 (H) 70 - 110 mg/dL   CBC WITH AUTOMATED DIFF    Collection Time: 09/26/21  6:25 AM   Result Value Ref Range    WBC 20.4 (H) 4.6 - 13.2 K/uL    RBC 3.06 (L) 4.20 - 5.30 M/uL    HGB 8.8 (L) 12.0 - 16.0 g/dL    HCT 27.9 (L) 35.0 - 45.0 %    MCV 91.2 78.0 - 100.0 FL    MCH 28.8 24.0 - 34.0 PG    MCHC 31.5 31.0 - 37.0 g/dL    RDW 13.3 11.6 - 14.5 %    PLATELET 865 770 - 048 K/uL    MPV 10.0 9.2 - 11.8 FL    NEUTROPHILS 85 (H) 40 - 73 %    LYMPHOCYTES 7 (L) 21 - 52 %    MONOCYTES 5 3 - 10 %    EOSINOPHILS 2 0 - 5 %    BASOPHILS 0 0 - 2 %    ABS. NEUTROPHILS 17.2 (H) 1.8 - 8.0 K/UL    ABS. LYMPHOCYTES 1.4 0.9 - 3.6 K/UL    ABS. MONOCYTES 1.0 0.05 - 1.2 K/UL    ABS. EOSINOPHILS 0.5 (H) 0.0 - 0.4 K/UL    ABS. BASOPHILS 0.0 0.0 - 0.1 K/UL    DF AUTOMATED     MAGNESIUM    Collection Time: 09/26/21  6:25 AM   Result Value Ref Range    Magnesium 2.0 1.6 - 2.6 mg/dL   METABOLIC PANEL, COMPREHENSIVE    Collection Time: 09/26/21  6:25 AM   Result Value Ref Range    Sodium 141 136 - 145 mmol/L    Potassium 3.8 3.5 - 5.5 mmol/L    Chloride 107 100 - 111 mmol/L    CO2 27 21 - 32 mmol/L    Anion gap 7 3.0 - 18 mmol/L    Glucose 143 (H) 74 - 99 mg/dL    BUN 11 7.0 - 18 MG/DL    Creatinine 0.28 (L) 0.6 - 1.3 MG/DL    BUN/Creatinine ratio 39 (H) 12 - 20      GFR est AA >60 >60 ml/min/1.73m2    GFR est non-AA >60 >60 ml/min/1.73m2    Calcium 8.6 8.5 - 10.1 MG/DL    Bilirubin, total 0.2 0.2 - 1.0 MG/DL    ALT (SGPT) 44 13 - 56 U/L    AST (SGOT) 20 10 - 38 U/L    Alk.  phosphatase 105 45 - 117 U/L    Protein, total 5.7 (L) 6.4 - 8.2 g/dL    Albumin 2.3 (L) 3.4 - 5.0 g/dL    Globulin 3.4 2.0 - 4.0 g/dL    A-G Ratio 0.7 (L) 0.8 - 1.7     PHOSPHORUS    Collection Time: 09/26/21  6:25 AM   Result Value Ref Range    Phosphorus 2.8 2.5 - 4.9 MG/DL   AMMONIA    Collection Time: 09/26/21  6:25 AM   Result Value Ref Range    Ammonia 40 (H) 11 - 32 UMOL/L             Recent Labs     09/25/21  0355 09/24/21  0428   FIO2I 50 30   HCO3I 28.2* 26.2*   PCO2I 47.3* 43.7   PHI 7.38 7.39   PO2I 92 68*       All Micro Results     Procedure Component Value Units Date/Time    CULTURE, RESPIRATORY/SPUTUM/BRONCH Alejandro Pryor STAIN [[id number]]  (Abnormal) Collected: 09/24/21 0654    Order Status: Completed Specimen: Sputum from Tracheal Aspirate Updated: 09/25/21 1406     Special Requests: NO SPECIAL REQUESTS        GRAM STAIN RARE WBCS SEEN               RARE EPITHELIAL CELLS SEEN            NO ORGANISMS SEEN        Culture result:       MODERATE PROBABLE STAPHYLOCOCCUS AUREUS                  NO NORMAL RESPIRATORY DINA ISOLATED          CULTURE, BLOOD [[id number]] Collected: 09/24/21 0740    Order Status: Completed Specimen: Blood Updated: 09/25/21 0730     Special Requests: NO SPECIAL REQUESTS        Culture result: NO GROWTH AFTER 23 HOURS       COVID-19 RAPID TEST [[id number]] Collected: 09/24/21 1700    Order Status: Completed Specimen: Nasopharyngeal Updated: 09/24/21 1849     Specimen source Nasopharyngeal        COVID-19 rapid test Not detected        Comment: Rapid Abbott ID Now       Rapid NAAT:  The specimen is NEGATIVE for SARS-CoV-2, the novel coronavirus associated with COVID-19. Negative results should be treated as presumptive and, if inconsistent with clinical signs and symptoms or necessary for patient management, should be tested with an alternative molecular assay. Negative results do not preclude SARS-CoV-2 infection and should not be used as the sole basis for patient management decisions.        This test has been authorized by the FDA under an Emergency Use Authorization (EUA) for use by authorized laboratories. Fact sheet for Healthcare Providers: ConventionUpdate.co.nz  Fact sheet for Patients: ConventionUpdate.co.nz       Methodology: Isothermal Nucleic Acid Amplification         CULTURE, BLOOD [[id number]]     Order Status: Sent Specimen: Blood     CULTURE, BLOOD [[id number]]     Order Status: Sent Specimen: Blood     CULTURE, URINE [[id number]]     Order Status: Canceled Specimen: Urine from Clean catch     CULTURE, CSF  TUBE 2 [[id number]] Collected: 09/16/21 1434    Order Status: Completed Specimen: Cerebrospinal Fluid Updated: 09/23/21 1241     Special Requests: TUBE 3, CULTURE AND SENSITIVTY AND GRAM STAIN. GRAM STAIN RARE WBCS SEEN         NO ORGANISMS SEEN        Culture result: NO GROWTH 7 DAYS       CULTURE, BLOOD [[id number]] Collected: 09/14/21 0515    Order Status: Completed Specimen: Blood Updated: 09/20/21 0832     Special Requests: NO SPECIAL REQUESTS        Culture result: NO GROWTH 6 DAYS       CULTURE, BLOOD [[id number]] Collected: 09/14/21 0500    Order Status: Completed Specimen: Blood Updated: 09/20/21 0832     Special Requests: NO SPECIAL REQUESTS        Culture result: NO GROWTH 6 DAYS       MENINGITIS PATHOGENS PANEL, CSF (BY PCR) [[id number]] Collected: 09/16/21 1434    Order Status: Completed Specimen: Cerebrospinal Fluid Updated: 09/17/21 0050     Escherichia coli K1 Not detected        Haemophilus Influenzae Not detected        Listeria Monocytogenes Not detected        Neisseria Meningitidis Not detected        Streptococcus Agalactiae Not detected        Streptococcus Pneumoniae Not detected        Cytomegalovirus Not detected        Enterovirus Not detected        Herpes Simplex Virus 1 Not detected        Comment: In patients who have negative herpes simplex 1 and 2 PCR results, do not modify treatment, confirm with alternate testing.         Herpes Simplex Virus 2 Not detected Comment: In patients who have negative herpes simplex 1 and 2 PCR results, do not modify treatment, confirm with alternate testing. Human Herpesvirus 6 Not detected        Human Parechovirus Not detected        Varicella Zoster Virus Not detected        Crypto. neoformans/gattii Not detected       MENINGITIS PATHOGENS PANEL, CSF (BY PCR) [[id number]] Collected: 09/16/21 1545    Order Status: Canceled Specimen: Cerebrospinal Fluid     HSV 1 AND 2 BY PCR [[id number]] Collected: 09/16/21 1434    Order Status: Canceled Specimen: Other     IEIFP-34 RAPID TEST [[id number]] Collected: 09/16/21 1000    Order Status: Completed Specimen: Nasopharyngeal Updated: 09/16/21 1032     Specimen source Nasopharyngeal        COVID-19 rapid test Not detected        Comment: Rapid Abbott ID Now       Rapid NAAT:  The specimen is NEGATIVE for SARS-CoV-2, the novel coronavirus associated with COVID-19. Negative results should be treated as presumptive and, if inconsistent with clinical signs and symptoms or necessary for patient management, should be tested with an alternative molecular assay. Negative results do not preclude SARS-CoV-2 infection and should not be used as the sole basis for patient management decisions. This test has been authorized by the FDA under an Emergency Use Authorization (EUA) for use by authorized laboratories.    Fact sheet for Healthcare Providers: ConventionUpdate.co.nz  Fact sheet for Patients: ConventionUpdate.co.nz       Methodology: Isothermal Nucleic Acid Amplification         LEGIONELLA PNEUMOPHILA AG, URINE [[id number]] Collected: 09/14/21 2315    Order Status: Completed Specimen: Urine, random Updated: 09/15/21 1042     Legionella Ag, urine Negative       STREP PNEUMO AG, URINE [[id number]] Collected: 09/14/21 2315    Order Status: Completed Specimen: Urine, random Updated: 09/15/21 1042     Strep pneumo Ag, urine Negative       RESPIRATORY VIRUS PANEL W/COVID-19, PCR [[id number]] Collected: 09/14/21 1832    Order Status: Completed Specimen: Nasopharyngeal Updated: 09/14/21 2234     Adenovirus Not detected        Coronavirus 229E Not detected        Coronavirus HKU1 Not detected        Coronavirus CVNL63 Not detected        Coronavirus OC43 Not detected        SARS-CoV-2, PCR Not detected        Metapneumovirus Not detected        Rhinovirus and Enterovirus Not detected        Influenza A Not detected        Influenza A, subtype H1 Not detected        Influenza A, subtype H3 Not detected        INFLUENZA A H1N1 PCR Not detected        Influenza B Not detected        Parainfluenza 1 Not detected        Parainfluenza 2 Not detected        Parainfluenza 3 Not detected        Parainfluenza virus 4 Not detected        RSV by PCR Not detected        B. parapertussis, PCR Not detected        Bordetella pertussis - PCR Not detected        Chlamydophila pneumoniae DNA, QL, PCR Not detected        Mycoplasma pneumoniae DNA, QL, PCR Not detected       CULTURE, BLOOD [[id number]] Collected: 09/14/21 1700    Order Status: Canceled Specimen: Blood     CULTURE, URINE [[id number]] Collected: 09/13/21 2330    Order Status: Canceled Specimen: Cath Urine     RESPIRATORY VIRUS PANEL W/COVID-19, PCR [[id number]]     Order Status: Canceled Specimen: NASOPHARYNGEAL SWAB     COVID-19 RAPID TEST [[id number]]     Order Status: Canceled     COVID-19 RAPID TEST [[id number]] Collected: 09/13/21 0737    Order Status: Completed Specimen: Nasopharyngeal Updated: 09/13/21 0811     Specimen source Nasopharyngeal        COVID-19 rapid test Not detected        Comment: Rapid Abbott ID Now       Rapid NAAT:  The specimen is NEGATIVE for SARS-CoV-2, the novel coronavirus associated with COVID-19.        Negative results should be treated as presumptive and, if inconsistent with clinical signs and symptoms or necessary for patient management, should be tested with an alternative molecular assay.  Negative results do not preclude SARS-CoV-2 infection and should not be used as the sole basis for patient management decisions. This test has been authorized by the FDA under an Emergency Use Authorization (EUA) for use by authorized laboratories. Fact sheet for Healthcare Providers: ConventionUpdate.co.nz  Fact sheet for Patients: ConventionUpdate.co.nz       Methodology: Isothermal Nucleic Acid Amplification         COVID-19 RAPID TEST [[id number]]     Order Status: Canceled         Echo 9/17/2021:  Left Ventricle Normal cavity size, wall thickness and systolic function (ejection fraction normal). The estimated EF is 60 - 65%. There is inconclusive left ventricular diastolic function E/E' ratio = 7.08  Heart rate is over 100. Wall Scoring The left ventricular wall motion is normal.            Left Atrium Normal cavity size. Right Ventricle Normal cavity size and global systolic function. Assessment of RV function:   TAPSE = 27 mm. Right Atrium Normal cavity size. Interatrial Septum Interatrial septum not well visualized   Aortic Valve Aortic valve not well visualized. Trileaflet valve structure, no stenosis and no regurgitation. Mitral Valve No stenosis. Mitral valve non-specific thickening. Mild regurgitation. Tricuspid Valve Tricuspid valve not well visualized. No stenosis. Mild regurgitation. Pulmonic Valve Pulmonic valve not well visualized. No stenosis and no regurgitation. Aorta The aorta was not well visualized. Normal aortic root. Pulmonary Artery Pulmonary arteries not well visualized. Pulmonary arterial systolic pressure (PASP) is 29 mmHg. Pulmonary hypertension not suggested by Doppler findings. IVC/Hepatic Veins Mechanically ventilated; cannot use inferior caval vein diameter to estimate central venous pressure. Pericardium Normal pericardium and no evidence of pericardial effusion.          CT head 9/15/1021  IMPRESSION   No acute intracranial abnormality. CT chest, abdomen, pelvis 9/15/1021  IMPRESSION   Endotracheal tube tip in the lower thoracic trachea 1.5 cm above the dipak.    Bilateral lower lobar atelectasis, possible endobronchial lesion/mucous plug in  the left lower lobe bronchus.    No acute intra-abdominal abnormality. Imaging:  [x]I have personally reviewed the patients chest radiographs images and report     Results from Hospital Encounter encounter on 09/11/21    XR CHEST PORT    Narrative  EXAM: XR CHEST PORT    CLINICAL INDICATION/HISTORY: Acute respiratory failure, ET tub position  > Additional: None. COMPARISON: September 25, 2021    TECHNIQUE: Portable chest    _______________    FINDINGS:    SUPPORT DEVICES: Tracheostomy tube and nasogastric tubes remain unchanged. Additional overlying monitor leads noted. HEART AND MEDIASTINUM: Normal size and contour. Normal pulmonary vasculature. LUNGS AND PLEURAL SPACES: There is bibasilar volume loss and atelectasis  including the right middle and lower lobes as well as the left lower lobe. BONY THORAX AND SOFT TISSUES: No acute osseous abnormality. _______________    Impression  1. Support lines and tubes remain unchanged as above. 2. Ongoing bibasilar volume loss and atelectasis. Please note: Voice-recognition software may have been used to generate this report, which may have resulted in some phonetic-based errors in grammar and contents. Even though attempts were made to correct all the mistakes, some may have been missed, and remained in the body of the document.       Pascale Lee MD  9/26/2021

## 2021-09-26 NOTE — PROGRESS NOTES
1900: Report received from Rafa Trinidad RN. Assumed care of pt at this time. 1930: Dr. Claude Dorantes rounded on patient -- aware of persistent temperature. 2000: Shift assessment completed. Pt remains on ventilator with tracheostomy in place. No command following noted -- responds to painful stimuli. Pupils brisk and reactive to light. Sedation infusions maintained per order. Bilateral wrist restraints continued due to periods of restlessness. Barahona catheter and FMS patent and draining. Pt febrile. Ice packs applied to axillae and groin.     0000: Reassessment completed. Pt remains febrile.      0400: Reassessment completed. Patient is not in distress. HR remains 100-110s.     0805: Shift change report given to EV Barnes RN (oncoming nurse) by Tonia VALENZUELA RN (offgoing nurse). Report included the following information SBAR, Kardex and MAR.

## 2021-09-27 ENCOUNTER — APPOINTMENT (OUTPATIENT)
Dept: GENERAL RADIOLOGY | Age: 41
DRG: 004 | End: 2021-09-27
Attending: INTERNAL MEDICINE
Payer: MEDICAID

## 2021-09-27 LAB
ALBUMIN SERPL-MCNC: 2.2 G/DL (ref 3.4–5)
ALBUMIN/GLOB SERPL: 0.7 {RATIO} (ref 0.8–1.7)
ALP SERPL-CCNC: 132 U/L (ref 45–117)
ALT SERPL-CCNC: 115 U/L (ref 13–56)
AMMONIA PLAS-SCNC: 51 UMOL/L (ref 11–32)
ANION GAP SERPL CALC-SCNC: 7 MMOL/L (ref 3–18)
ARTERIAL PATENCY WRIST A: NORMAL
AST SERPL-CCNC: 83 U/L (ref 10–38)
BACTERIA SPEC CULT: ABNORMAL
BACTERIA SPEC CULT: ABNORMAL
BASE EXCESS BLD CALC-SCNC: 1.3 MMOL/L
BASOPHILS # BLD: 0 K/UL (ref 0–0.1)
BASOPHILS NFR BLD: 0 % (ref 0–2)
BDY SITE: NORMAL
BILIRUB SERPL-MCNC: 0.2 MG/DL (ref 0.2–1)
BODY TEMPERATURE: 100.1
BUN SERPL-MCNC: 14 MG/DL (ref 7–18)
BUN/CREAT SERPL: 48 (ref 12–20)
CALCIUM SERPL-MCNC: 8.5 MG/DL (ref 8.5–10.1)
CHLORIDE SERPL-SCNC: 110 MMOL/L (ref 100–111)
CO2 SERPL-SCNC: 26 MMOL/L (ref 21–32)
CREAT SERPL-MCNC: 0.29 MG/DL (ref 0.6–1.3)
DIFFERENTIAL METHOD BLD: ABNORMAL
EOSINOPHIL # BLD: 0.4 K/UL (ref 0–0.4)
EOSINOPHIL NFR BLD: 2 % (ref 0–5)
ERYTHROCYTE [DISTWIDTH] IN BLOOD BY AUTOMATED COUNT: 13.2 % (ref 11.6–14.5)
GAS FLOW.O2 O2 DELIVERY SYS: NORMAL L/MIN
GAS FLOW.O2 SETTING OXYMISER: 16 BPM
GLOBULIN SER CALC-MCNC: 3.3 G/DL (ref 2–4)
GLUCOSE BLD STRIP.AUTO-MCNC: 109 MG/DL (ref 70–110)
GLUCOSE BLD STRIP.AUTO-MCNC: 117 MG/DL (ref 70–110)
GLUCOSE BLD STRIP.AUTO-MCNC: 133 MG/DL (ref 70–110)
GLUCOSE BLD STRIP.AUTO-MCNC: 134 MG/DL (ref 70–110)
GLUCOSE BLD STRIP.AUTO-MCNC: 134 MG/DL (ref 70–110)
GLUCOSE BLD STRIP.AUTO-MCNC: 142 MG/DL (ref 70–110)
GLUCOSE SERPL-MCNC: 116 MG/DL (ref 74–99)
GRAM STN SPEC: ABNORMAL
HCO3 BLD-SCNC: 25.5 MMOL/L (ref 22–26)
HCT VFR BLD AUTO: 27.1 % (ref 35–45)
HGB BLD-MCNC: 8.6 G/DL (ref 12–16)
INSPIRATION.DURATION SETTING TIME VENT: 1.2 SEC
LYMPHOCYTES # BLD: 2.3 K/UL (ref 0.9–3.6)
LYMPHOCYTES NFR BLD: 13 % (ref 21–52)
MAGNESIUM SERPL-MCNC: 1.8 MG/DL (ref 1.6–2.6)
MAGNESIUM SERPL-MCNC: 2 MG/DL (ref 1.6–2.6)
MCH RBC QN AUTO: 30.3 PG (ref 24–34)
MCHC RBC AUTO-ENTMCNC: 31.7 G/DL (ref 31–37)
MCV RBC AUTO: 95.4 FL (ref 78–100)
MONOCYTES # BLD: 1 K/UL (ref 0.05–1.2)
MONOCYTES NFR BLD: 6 % (ref 3–10)
NEUTS SEG # BLD: 13.4 K/UL (ref 1.8–8)
NEUTS SEG NFR BLD: 77 % (ref 40–73)
O2/TOTAL GAS SETTING VFR VENT: 50 %
PAW @ MEAN EXP FLOW ON VENT: 11 CMH2O
PCO2 BLD: 39.1 MMHG (ref 35–45)
PEEP RESPIRATORY: 7 CMH2O
PH BLD: 7.43 [PH] (ref 7.35–7.45)
PHOSPHATE SERPL-MCNC: 3 MG/DL (ref 2.5–4.9)
PIP ISTAT,IPIP: 20
PLATELET # BLD AUTO: 336 K/UL (ref 135–420)
PMV BLD AUTO: 9.9 FL (ref 9.2–11.8)
PO2 BLD: 86 MMHG (ref 80–100)
POTASSIUM SERPL-SCNC: 3.8 MMOL/L (ref 3.5–5.5)
POTASSIUM SERPL-SCNC: 4 MMOL/L (ref 3.5–5.5)
PROT SERPL-MCNC: 5.5 G/DL (ref 6.4–8.2)
RBC # BLD AUTO: 2.84 M/UL (ref 4.2–5.3)
SAO2 % BLD: 96.4 % (ref 92–97)
SERVICE CMNT-IMP: ABNORMAL
SERVICE CMNT-IMP: NORMAL
SODIUM SERPL-SCNC: 143 MMOL/L (ref 136–145)
SPECIMEN TYPE: NORMAL
VANCOMYCIN SERPL-MCNC: 21.4 UG/ML (ref 5–40)
VENTILATION MODE VENT: NORMAL
VT SETTING VENT: 400 ML
WBC # BLD AUTO: 17.3 K/UL (ref 4.6–13.2)

## 2021-09-27 PROCEDURE — 94640 AIRWAY INHALATION TREATMENT: CPT

## 2021-09-27 PROCEDURE — 85025 COMPLETE CBC W/AUTO DIFF WBC: CPT

## 2021-09-27 PROCEDURE — 74011250637 HC RX REV CODE- 250/637: Performed by: HOSPITALIST

## 2021-09-27 PROCEDURE — 74011000250 HC RX REV CODE- 250: Performed by: INTERNAL MEDICINE

## 2021-09-27 PROCEDURE — 71045 X-RAY EXAM CHEST 1 VIEW: CPT

## 2021-09-27 PROCEDURE — 84100 ASSAY OF PHOSPHORUS: CPT

## 2021-09-27 PROCEDURE — 74011250636 HC RX REV CODE- 250/636: Performed by: INTERNAL MEDICINE

## 2021-09-27 PROCEDURE — 36600 WITHDRAWAL OF ARTERIAL BLOOD: CPT

## 2021-09-27 PROCEDURE — 84132 ASSAY OF SERUM POTASSIUM: CPT

## 2021-09-27 PROCEDURE — 74011250637 HC RX REV CODE- 250/637: Performed by: INTERNAL MEDICINE

## 2021-09-27 PROCEDURE — 82962 GLUCOSE BLOOD TEST: CPT

## 2021-09-27 PROCEDURE — 74011250636 HC RX REV CODE- 250/636: Performed by: FAMILY MEDICINE

## 2021-09-27 PROCEDURE — 82803 BLOOD GASES ANY COMBINATION: CPT

## 2021-09-27 PROCEDURE — 94003 VENT MGMT INPAT SUBQ DAY: CPT

## 2021-09-27 PROCEDURE — 36415 COLL VENOUS BLD VENIPUNCTURE: CPT

## 2021-09-27 PROCEDURE — 87040 BLOOD CULTURE FOR BACTERIA: CPT

## 2021-09-27 PROCEDURE — 83735 ASSAY OF MAGNESIUM: CPT

## 2021-09-27 PROCEDURE — 74011250637 HC RX REV CODE- 250/637: Performed by: FAMILY MEDICINE

## 2021-09-27 PROCEDURE — 65610000006 HC RM INTENSIVE CARE

## 2021-09-27 PROCEDURE — 74011000258 HC RX REV CODE- 258: Performed by: INTERNAL MEDICINE

## 2021-09-27 PROCEDURE — 80202 ASSAY OF VANCOMYCIN: CPT

## 2021-09-27 PROCEDURE — 82140 ASSAY OF AMMONIA: CPT

## 2021-09-27 RX ORDER — LEVOFLOXACIN 5 MG/ML
750 INJECTION, SOLUTION INTRAVENOUS EVERY 24 HOURS
Status: DISCONTINUED | OUTPATIENT
Start: 2021-09-27 | End: 2021-09-27

## 2021-09-27 RX ORDER — MAGNESIUM SULFATE 1 G/100ML
1 INJECTION INTRAVENOUS ONCE
Status: COMPLETED | OUTPATIENT
Start: 2021-09-27 | End: 2021-09-27

## 2021-09-27 RX ORDER — POTASSIUM CHLORIDE 7.45 MG/ML
10 INJECTION INTRAVENOUS
Status: COMPLETED | OUTPATIENT
Start: 2021-09-27 | End: 2021-09-27

## 2021-09-27 RX ORDER — LEVOFLOXACIN 5 MG/ML
750 INJECTION, SOLUTION INTRAVENOUS EVERY 24 HOURS
Status: COMPLETED | OUTPATIENT
Start: 2021-09-27 | End: 2021-10-01

## 2021-09-27 RX ADMIN — BUDESONIDE 500 MCG: 0.5 INHALANT RESPIRATORY (INHALATION) at 08:08

## 2021-09-27 RX ADMIN — FAMOTIDINE 20 MG: 20 TABLET ORAL at 08:30

## 2021-09-27 RX ADMIN — SODIUM CHLORIDE 10 ML: 9 INJECTION, SOLUTION INTRAMUSCULAR; INTRAVENOUS; SUBCUTANEOUS at 21:03

## 2021-09-27 RX ADMIN — MEROPENEM 1 G: 1 INJECTION, POWDER, FOR SOLUTION INTRAVENOUS at 03:31

## 2021-09-27 RX ADMIN — LORAZEPAM 2 MG: 2 INJECTION INTRAMUSCULAR at 20:11

## 2021-09-27 RX ADMIN — FENTANYL CITRATE 50 MCG: 50 INJECTION INTRAMUSCULAR; INTRAVENOUS at 03:24

## 2021-09-27 RX ADMIN — DEXMEDETOMIDINE HYDROCHLORIDE 1.1 MCG/KG/HR: 100 INJECTION, SOLUTION INTRAVENOUS at 18:31

## 2021-09-27 RX ADMIN — IPRATROPIUM BROMIDE 0.5 MG: 0.5 SOLUTION RESPIRATORY (INHALATION) at 11:06

## 2021-09-27 RX ADMIN — MIDAZOLAM 6 MG/HR: 5 INJECTION, SOLUTION INTRAMUSCULAR; INTRAVENOUS at 07:43

## 2021-09-27 RX ADMIN — VANCOMYCIN HYDROCHLORIDE 750 MG: 750 INJECTION, POWDER, LYOPHILIZED, FOR SOLUTION INTRAVENOUS at 13:33

## 2021-09-27 RX ADMIN — PROPOFOL 30 MCG/KG/MIN: 10 INJECTION, EMULSION INTRAVENOUS at 00:34

## 2021-09-27 RX ADMIN — MEROPENEM 1 G: 1 INJECTION, POWDER, FOR SOLUTION INTRAVENOUS at 08:42

## 2021-09-27 RX ADMIN — FOLIC ACID 1 MG: 1 TABLET ORAL at 08:30

## 2021-09-27 RX ADMIN — POTASSIUM CHLORIDE 10 MEQ: 10 INJECTION, SOLUTION INTRAVENOUS at 08:42

## 2021-09-27 RX ADMIN — IPRATROPIUM BROMIDE 0.5 MG: 0.5 SOLUTION RESPIRATORY (INHALATION) at 16:08

## 2021-09-27 RX ADMIN — IPRATROPIUM BROMIDE 0.5 MG: 0.5 SOLUTION RESPIRATORY (INHALATION) at 19:54

## 2021-09-27 RX ADMIN — IPRATROPIUM BROMIDE 0.5 MG: 0.5 SOLUTION RESPIRATORY (INHALATION) at 08:08

## 2021-09-27 RX ADMIN — FAMOTIDINE 20 MG: 20 TABLET ORAL at 21:02

## 2021-09-27 RX ADMIN — Medication 1 TABLET: at 08:30

## 2021-09-27 RX ADMIN — ACETAMINOPHEN 650 MG: 325 TABLET ORAL at 20:22

## 2021-09-27 RX ADMIN — 0.12% CHLORHEXIDINE GLUCONATE 10 ML: 1.2 RINSE ORAL at 21:03

## 2021-09-27 RX ADMIN — LACTULOSE 45 ML: 20 SOLUTION ORAL at 21:03

## 2021-09-27 RX ADMIN — FENTANYL CITRATE 50 MCG: 50 INJECTION INTRAMUSCULAR; INTRAVENOUS at 00:34

## 2021-09-27 RX ADMIN — DEXMEDETOMIDINE HYDROCHLORIDE 1.4 MCG/KG/HR: 100 INJECTION, SOLUTION INTRAVENOUS at 23:39

## 2021-09-27 RX ADMIN — CLONAZEPAM 0.5 MG: 0.5 TABLET ORAL at 20:22

## 2021-09-27 RX ADMIN — IBUPROFEN 400 MG: 100 SUSPENSION ORAL at 00:34

## 2021-09-27 RX ADMIN — 0.12% CHLORHEXIDINE GLUCONATE 10 ML: 1.2 RINSE ORAL at 08:30

## 2021-09-27 RX ADMIN — ENOXAPARIN SODIUM 40 MG: 100 INJECTION SUBCUTANEOUS at 17:41

## 2021-09-27 RX ADMIN — PROPOFOL 30 MCG/KG/MIN: 10 INJECTION, EMULSION INTRAVENOUS at 08:46

## 2021-09-27 RX ADMIN — CLONAZEPAM 0.5 MG: 0.5 TABLET ORAL at 08:30

## 2021-09-27 RX ADMIN — POTASSIUM CHLORIDE 10 MEQ: 10 INJECTION, SOLUTION INTRAVENOUS at 12:00

## 2021-09-27 RX ADMIN — DEXMEDETOMIDINE HYDROCHLORIDE 0.6 MCG/KG/HR: 100 INJECTION, SOLUTION INTRAVENOUS at 07:43

## 2021-09-27 RX ADMIN — ARFORMOTEROL TARTRATE 15 MCG: 15 SOLUTION RESPIRATORY (INHALATION) at 08:08

## 2021-09-27 RX ADMIN — FENTANYL CITRATE 50 MCG: 50 INJECTION INTRAMUSCULAR; INTRAVENOUS at 20:25

## 2021-09-27 RX ADMIN — LEVOFLOXACIN 750 MG: 5 INJECTION, SOLUTION INTRAVENOUS at 17:41

## 2021-09-27 RX ADMIN — ACETAMINOPHEN 650 MG: 325 TABLET ORAL at 04:11

## 2021-09-27 RX ADMIN — LACTULOSE 45 ML: 20 SOLUTION ORAL at 08:30

## 2021-09-27 RX ADMIN — IBUPROFEN 400 MG: 100 SUSPENSION ORAL at 17:49

## 2021-09-27 RX ADMIN — ARFORMOTEROL TARTRATE 15 MCG: 15 SOLUTION RESPIRATORY (INHALATION) at 19:53

## 2021-09-27 RX ADMIN — BUDESONIDE 500 MCG: 0.5 INHALANT RESPIRATORY (INHALATION) at 19:54

## 2021-09-27 RX ADMIN — MAGNESIUM SULFATE HEPTAHYDRATE 1 G: 1 INJECTION, SOLUTION INTRAVENOUS at 08:30

## 2021-09-27 RX ADMIN — ACETAMINOPHEN 650 MG: 325 TABLET ORAL at 14:00

## 2021-09-27 RX ADMIN — THIAMINE HCL TAB 100 MG 100 MG: 100 TAB at 08:30

## 2021-09-27 NOTE — PROGRESS NOTES
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    Left wrist fracture, with delayed healing, subsequent encounter S62.102G    Drug overdose, intentional self-harm, initial encounter ([facility] [address] T50.902A    Hypoxia R09.02    Hypotension after procedure I95.81    Acute metabolic encephalopathy E78.83    Psychosis ([facility] [address] [facility] F29    Hyponatremia E87.1    Acute respiratory failure with hypoxia (HCC) J96.01    Increased ammonia level R79.89    Hypokalemia E87.6 ·   Code status: full code     RECOMMENDATIONS:   Respiratory: Patient on ventilator support; intubated 2021 due to encephalopathy and drug overdose. Patient was extubated on 2021, and reintubated within an hour due to respiratory distress and upper airway edema. S/p tracheostomy 2021. Chest x-ray reviewedtracheostomy tube stable; bibasal atelectasis; no focal consolidations or worsening pleural effusions noted; NG tube in good position. Wean off sedation; if stable, plan for spontaneous breathing trials. Sedationmultiplecurrently Precedex, fentanyl, midazolam, propofol. Discontinue all sedation except for Precedexdiscussed with ICU RN. Continue ventilator and sedation bundles. Continue bronchodilators. ID: Leukocytosis improving. S/p LP with negative CSF cultures. Covid test 2021 and 2021: Negative. Sputum culture 2021: MSSA. Blood culture 2021: NGTD. Barahona catheter changed on 2021. Urinalysis 2021: Negative nitrites, small leukocyte esterase, WBC 0-3, bacteria 1+. Normal lactate 0.7.   Antibioticsrespiratory cultures MSSA; chest x-ray stable findingsno focal consolidation; meropenem and vancomycin discontinued, and switched to levofloxacin x 5 doses. ID has signed off; reconsult as needed. CVS: Stable hemodynamics; continue to monitor. Echo 9/17/2021: LVEF 60 to 65%. RVSP 29 mmHg. Renal: Stable renal function and urine output; continue to monitor. Heme: Monitor hemoglobin and platelets; no active bleeding issues. Endo: Stable blood sugarsmonitor; sliding scale insulin as needed. GI: Continue tube feeding as tolerated; patient has NG tube. If not able to wean patient off the ventilator support, would need to consider IR guided PEG tube placement in the next few days. hCG negative on admission. Nutrition: Continue tube feeding as tolerated, along with free water. Neurology: Sedation as above; continue lactulose twice daily; ammonia51. CT headnil acute  Psych: On Klonopin. Toxicology: Gabapentin OD on admission. Skin: ICU nursing care  MS: Left wrist fracture in immobilization external fixator  Prophylaxis: DVT prophylaxis: Lovenox 40 sc daily. GI prophylaxis: Famotidine. Restraints: Wrist soft restraints as needed for patient interfering with medical therapy/management and patient safety. Prognosis guarded overall. CODE STATUSfull code. Palliative care on case. Quality Care: PPI, DVT prophylaxis, HOB elevated, Infection control all reviewed and addressed. Lines/Tubes: PIV   S/p ETT: 9/11/219/22/2021. Tracheostomy 9/22/2021. NGT: 9/11/21  Barahona: 9/11/21; changed 9/24/2021. Discontinue Barahona catheter today. ADVANCE DIRECTIVE: Full code. Patient's  in custodial. Discussed with RN, RT. High complexity decision making was performed during the evaluation of this patient at high risk for decompensation with multiple organ involvement.     Braden Bledsoe   sister-in-law   [phone]            Subjective/History:   Ms. Sameera Reed has been seen and evaluated as Dr. Samantha Lilly requested now for assisting with Acute respiratory failure and ventilator management. Patient is a 39 y.o. female with following PMhx presented to ER with lethargy via EMS and admitted for Gabapentin overdose. Pt required intubation for airways protection. Position control was contacted that recommended supportive care per ER provider. Pt seen at bedside in ER rm#2. The patient can not provide additional history due to Ventilated. 9/27/2021  Remains in ICU room 102. Patient on ventilator support, and sedated; s/p trach. Afebrile; blood pressure stable; telemetry is sinus rhythm. Urine outputgood. Patient has a history of drug use and smoker and alcohol use. Review of Systems:  Review of systems not obtained due to patient factors.         Past Medical History:  Past Medical History:   Diagnosis Date    Asthma     Bilateral ovarian cysts     Cocaine abuse ([facility] [address] [facility]     Mental and behavioral problem     Pancreatitis     Pancreatitis     Psychosis ([facility] [address]         Past Surgical History:  Past Surgical History:   Procedure Laterality Date    HX GYN      D&C, Hysterectomy        Medications:    Current Facility-Administered Medications   Medication Dose Route Frequency    vancomycin (VANCOCIN) 750 mg in 0.9% sodium chloride 250 mL (VIAL-MATE)  750 mg IntraVENous Q12H    ipratropium (ATROVENT) 0.02 % nebulizer solution 0.5 mg  0.5 mg Nebulization QID RT    midazolam in normal saline (VERSED) 1 mg/mL infusion  0-10 mg/hr IntraVENous TITRATE    Vancomycin - Pharmacy to Dose  1 Each Other Rx Dosing/Monitoring    meropenem (MERREM) 1 g in sterile water (preservative free) 20 mL IV syringe  1 g IntraVENous Q8H    dexmedeTOMidine (PRECEDEX) 400 mcg in 0.9% sodium chloride 100 mL infusion  0.1-1.5 mcg/kg/hr IntraVENous TITRATE    fentaNYL (PF) 900 mcg/30 ml infusion soln  0-200 mcg/hr IntraVENous TITRATE    propofol (DIPRIVAN) 10 mg/mL infusion  0-50 mcg/kg/min IntraVENous TITRATE    famotidine (PEPCID) tablet 20 mg  20 mg Oral BID    thiamine mononitrate (B-1) tablet 100 mg  100 mg Oral DAILY    folic acid (FOLVITE) tablet 1 mg  1 mg Oral DAILY    multivitamin, tx-iron-ca-min (THERA-M w/ IRON) tablet 1 Tablet  1 Tablet Oral DAILY    lactulose (CHRONULAC) 10 gram/15 mL solution 45 mL  30 g Oral BID    clonazePAM (KlonoPIN) tablet 0.5 mg  0.5 mg Oral BID    insulin lispro (HUMALOG) injection   SubCUTAneous Q6H    arformoteroL (BROVANA) neb solution 15 mcg  15 mcg Nebulization BID RT    budesonide (PULMICORT) 500 mcg/2 ml nebulizer suspension  500 mcg Nebulization BID RT    OLANZapine (ZyPREXA) tablet 10 mg  10 mg Oral QPM    enoxaparin (LOVENOX) injection 40 mg  40 mg SubCUTAneous Q24H    sodium chloride (NS) flush 5-40 mL  5-40 mL IntraVENous Q8H    chlorhexidine (PERIDEX) 0.12 % mouthwash 10 mL  10 mL Oral Q12H       Allergy:  Allergies   Allergen Reactions    Fish Containing Products Itching    Toradol [Ketorolac] Rash     Pt reports she is not allergic to this medication. Social History:  Social History     Tobacco Use    Smoking status: Current Every Day Smoker     Packs/day: 1.00    Smokeless tobacco: Never Used   Substance Use Topics    Alcohol use: Not Currently    Drug use: Not Currently     Types: Cocaine, Marijuana     Comment: used with in past 24 hours          Objective:   Vital Signs:    Blood pressure 124/79, pulse 98, temperature 99.6 °F (37.6 °C), resp. rate 19, height 5' 2\" (1.575 m), weight 71.2 kg (157 lb), last menstrual period 05/15/2018, SpO2 97 %. Body mass index is 28.72 kg/m².    O2 Device: Tracheostomy, Ventilator       Temp (24hrs), Av °F (37.8 °C), Min:98.7 °F (37.1 °C), Max:101.9 °F (38.8 °C)         Intake/Output:   Last shift:      701 -  1900  In: -   Out: 500 [Urine:500]  Last 3 shifts: 1901 -  0700  In: 3526.8 [I.V.:1986.8]  Out: 4502 [Urine:245; Drains:]    Intake/Output Summary (Last 24 hours) at 2021 1404  Last data filed at 9/27/2021 1109  Gross per 24 hour   Intake 1685.72 ml   Output 2776 ml   Net -1090.28 ml       ABG:    Lab Results   Component Value Date/Time    PHI 7.43 09/27/2021 04:17 AM    PCO2I 39.1 09/27/2021 04:17 AM    PO2I 86 09/27/2021 04:17 AM    HCO3I 25.5 09/27/2021 04:17 AM    FIO2I 50 09/27/2021 04:17 AM     Ventilator Mode: Assist control, VC+  Respiratory Rate  Resp Rate Observed: 9  Back-Up Rate: 14  Insp Time (sec): 1.2 sec  I:E Ratio: 1:1.1  Ventilator Volumes  Vt Set (ml): 400 ml  Vt Exhaled (Machine Breath) (ml): 432 ml  Vt Spont (ml): 565 ml  Ve Observed (l/min): 93 l/min  Ventilator Pressures  Pressure Support (cm H2O): 7 cm H2O  PIP Observed (cm H2O): 19 cm H2O  Plateau Pressure (cm H2O):  (Unable to obtain)  MAP (cm H2O): 20  PEEP/VENT (cm H2O): 7 cm H20  Auto PEEP Observed (cm H2O):  (Unable to obtain)       Physical Exam:   Patient appears older than stated age; on ventilator support; sedated; acyanotic  HEENT: pupils not dilated, reactive, no scleral jaundice, moist oral mucosa, no nasal drainage  Neck: No adenopathy or thyroid swelling; s/p tracheostomy tubeno active bleeding issues  CVS: S1S2 no murmurs; JVD not elevated; telemetrysinus rhythm  RS: Mod air entry bilaterally, decreased BS at bases, no wheezes or crackles  Abd: soft, non tender, no hepatosplenomegaly, no abd distension, no guarding or rigidity, bowel sounds heard  Neuro: Intubated and sedated; limited exam   Extrm: no leg edema or swelling or clubbing  Skin: no rash  Lymphatic: no cervical or supraclavicular adenopathy  Psych: Sedated        Data:     Recent Results (from the past 12 hour(s))   CBC WITH AUTOMATED DIFF    Collection Time: 09/27/21  2:55 AM   Result Value Ref Range    WBC 17.3 (H) 4.6 - 13.2 K/uL    RBC 2.84 (L) 4.20 - 5.30 M/uL    HGB 8.6 (L) 12.0 - 16.0 g/dL    HCT 27.1 (L) 35.0 - 45.0 %    MCV 95.4 78.0 - 100.0 FL    MCH 30.3 24.0 - 34.0 PG    MCHC 31.7 31.0 - 37.0 g/dL    RDW 13.2 11.6 - 14.5 %    PLATELET 336 135 - 420 K/uL    MPV 9.9 9.2 - 11.8 FL    NEUTROPHILS 77 (H) 40 - 73 %    LYMPHOCYTES 13 (L) 21 - 52 %    MONOCYTES 6 3 - 10 %    EOSINOPHILS 2 0 - 5 %    BASOPHILS 0 0 - 2 %    ABS. NEUTROPHILS 13.4 (H) 1.8 - 8.0 K/UL    ABS. LYMPHOCYTES 2.3 0.9 - 3.6 K/UL    ABS. MONOCYTES 1.0 0.05 - 1.2 K/UL    ABS. EOSINOPHILS 0.4 0.0 - 0.4 K/UL    ABS. BASOPHILS 0.0 0.0 - 0.1 K/UL    DF AUTOMATED     MAGNESIUM    Collection Time: 09/27/21  2:55 AM   Result Value Ref Range    Magnesium 1.8 1.6 - 2.6 mg/dL   METABOLIC PANEL, COMPREHENSIVE    Collection Time: 09/27/21  2:55 AM   Result Value Ref Range    Sodium 143 136 - 145 mmol/L    Potassium 3.8 3.5 - 5.5 mmol/L    Chloride 110 100 - 111 mmol/L    CO2 26 21 - 32 mmol/L    Anion gap 7 3.0 - 18 mmol/L    Glucose 116 (H) 74 - 99 mg/dL    BUN 14 7.0 - 18 MG/DL    Creatinine 0.29 (L) 0.6 - 1.3 MG/DL    BUN/Creatinine ratio 48 (H) 12 - 20      GFR est AA >60 >60 ml/min/1.73m2    GFR est non-AA >60 >60 ml/min/1.73m2    Calcium 8.5 8.5 - 10.1 MG/DL    Bilirubin, total 0.2 0.2 - 1.0 MG/DL    ALT (SGPT) 115 (H) 13 - 56 U/L    AST (SGOT) 83 (H) 10 - 38 U/L    Alk.  phosphatase 132 (H) 45 - 117 U/L    Protein, total 5.5 (L) 6.4 - 8.2 g/dL    Albumin 2.2 (L) 3.4 - 5.0 g/dL    Globulin 3.3 2.0 - 4.0 g/dL    A-G Ratio 0.7 (L) 0.8 - 1.7     PHOSPHORUS    Collection Time: 09/27/21  2:55 AM   Result Value Ref Range    Phosphorus 3.0 2.5 - 4.9 MG/DL   VANCOMYCIN, RANDOM    Collection Time: 09/27/21  2:55 AM   Result Value Ref Range    Vancomycin, random 21.4 5.0 - 40.0 UG/ML   AMMONIA    Collection Time: 09/27/21  2:56 AM   Result Value Ref Range    Ammonia 51 (H) 11 - 32 UMOL/L   BLOOD GAS, ARTERIAL POC    Collection Time: 09/27/21  4:17 AM   Result Value Ref Range    Device: ADULT VENT      FIO2 (POC) 50 %    pH (POC) 7.43 7.35 - 7.45      pCO2 (POC) 39.1 35.0 - 45.0 MMHG    pO2 (POC) 86 80 - 100 MMHG    HCO3 (POC) 25.5 22 - 26 MMOL/L    sO2 (POC) 96.4 92 - 97 %    Base excess (POC) 1.3 mmol/L    Mode ASSIST CONTROL      Tidal volume 400 ml    Set Rate 16 bpm    PEEP/CPAP (POC) 7 cmH2O    Mean Airway Pressure 11 cmH2O    PIP (POC) 20      Allens test (POC) NOT APPLICABLE      Inspiratory Time 1.2 sec    Site RIGHT RADIAL      Patient temp. 100.1      Specimen type (POC) ARTERIAL      Performed by Calista Xavier    GLUCOSE, POC    Collection Time: 09/27/21  5:08 AM   Result Value Ref Range    Glucose (POC) 133 (H) 70 - 110 mg/dL   GLUCOSE, POC    Collection Time: 09/27/21 11:22 AM   Result Value Ref Range    Glucose (POC) 134 (H) 70 - 110 mg/dL             Recent Labs     09/27/21  0417 09/25/21  0355   FIO2I 50 50   HCO3I 25.5 28.2*   PCO2I 39.1 47.3*   PHI 7.43 7.38   PO2I 86 92       All Micro Results     Procedure Component Value Units Date/Time    CULTURE, RESPIRATORY/SPUTUM/BRONCH W GRAM STAIN [[id number]]  (Abnormal)  (Susceptibility) Collected: 09/24/21 0654    Order Status: Completed Specimen: Sputum from Tracheal Aspirate Updated: 09/27/21 1147     Special Requests: NO SPECIAL REQUESTS        GRAM STAIN RARE WBCS SEEN               RARE EPITHELIAL CELLS SEEN            NO ORGANISMS SEEN        Culture result:       MODERATE STAPHYLOCOCCUS AUREUS                  NO NORMAL RESPIRATORY DINA ISOLATED          CULTURE, BLOOD [[id number]] Collected: 09/27/21 1005    Order Status: Completed Specimen: Blood Updated: 09/27/21 1019    CULTURE, BLOOD [[id number]] Collected: 09/27/21 1005    Order Status: Completed Specimen: Blood Updated: 09/27/21 1019    CULTURE, BLOOD [[id number]] Collected: 09/24/21 0740    Order Status: Completed Specimen: Blood Updated: 09/27/21 0734     Special Requests: NO SPECIAL REQUESTS        Culture result: NO GROWTH 3 DAYS       COVID-19 RAPID TEST [[id number]] Collected: 09/24/21 1700    Order Status: Completed Specimen: Nasopharyngeal Updated: 09/24/21 1849     Specimen source Nasopharyngeal        COVID-19 rapid test Not detected        Comment: Rapid Abbott ID Now Rapid NAAT:  The specimen is NEGATIVE for SARS-CoV-2, the novel coronavirus associated with COVID-19. Negative results should be treated as presumptive and, if inconsistent with clinical signs and symptoms or necessary for patient management, should be tested with an alternative molecular assay. Negative results do not preclude SARS-CoV-2 infection and should not be used as the sole basis for patient management decisions. This test has been authorized by the FDA under an Emergency Use Authorization (EUA) for use by authorized laboratories. Fact sheet for Healthcare Providers: [employer]co.nz  Fact sheet for Patients: [employer]co[employer]nz       Methodology: Isothermal Nucleic Acid Amplification         CULTURE, URINE [[id number]]     Order Status: Canceled Specimen: Urine from Clean catch     CULTURE, CSF  TUBE 2 [[id number]] Collected: 09/16/21 1434    Order Status: Completed Specimen: Cerebrospinal Fluid Updated: 09/23/21 1241     Special Requests: TUBE 3, CULTURE AND SENSITIVTY AND GRAM STAIN.      GRAM STAIN RARE WBCS SEEN         NO ORGANISMS SEEN        Culture result: NO GROWTH 7 DAYS       CULTURE, BLOOD [[id number]] Collected: 09/14/21 0515    Order Status: Completed Specimen: Blood Updated: 09/20/21 0832     Special Requests: NO SPECIAL REQUESTS        Culture result: NO GROWTH 6 DAYS       CULTURE, BLOOD [[id number]] Collected: 09/14/21 0500    Order Status: Completed Specimen: Blood Updated: 09/20/21 0832     Special Requests: NO SPECIAL REQUESTS        Culture result: NO GROWTH 6 DAYS       MENINGITIS PATHOGENS PANEL, CSF (BY PCR) [[id number]] Collected: 09/16/21 1434    Order Status: Completed Specimen: Cerebrospinal Fluid Updated: 09/17/21 0050     Escherichia coli K1 Not detected        Haemophilus Influenzae Not detected        Listeria Monocytogenes Not detected        Neisseria Meningitidis Not detected        Streptococcus Agalactiae Not detected        Streptococcus Pneumoniae Not detected        Cytomegalovirus Not detected        Enterovirus Not detected        Herpes Simplex Virus 1 Not detected        Comment: In patients who have negative herpes simplex 1 and 2 PCR results, do not modify treatment, confirm with alternate testing. Herpes Simplex Virus 2 Not detected        Comment: In patients who have negative herpes simplex 1 and 2 PCR results, do not modify treatment, confirm with alternate testing. Human Herpesvirus 6 Not detected        Human Parechovirus Not detected        Varicella Zoster Virus Not detected        Crypto. neoformans/gattii Not detected       MENINGITIS PATHOGENS PANEL, CSF (BY PCR) [[id number]] Collected: 09/16/21 1545    Order Status: Canceled Specimen: Cerebrospinal Fluid     HSV 1 AND 2 BY PCR [[id number]] Collected: 09/16/21 1434    Order Status: Canceled Specimen: Other     TDQEC-88 RAPID TEST [[id number]] Collected: 09/16/21 1000    Order Status: Completed Specimen: Nasopharyngeal Updated: 09/16/21 1032     Specimen source Nasopharyngeal        COVID-19 rapid test Not detected        Comment: Rapid Abbott ID Now       Rapid NAAT:  The specimen is NEGATIVE for SARS-CoV-2, the novel coronavirus associated with COVID-19. Negative results should be treated as presumptive and, if inconsistent with clinical signs and symptoms or necessary for patient management, should be tested with an alternative molecular assay. Negative results do not preclude SARS-CoV-2 infection and should not be used as the sole basis for patient management decisions. This test has been authorized by the FDA under an Emergency Use Authorization (EUA) for use by authorized laboratories.    Fact sheet for Healthcare Providers: ConventionUpdate.co.nz  Fact sheet for Patients: ConventionUpdate.co.nz       Methodology: Isothermal Nucleic Acid Amplification         LEGIONELLA PNEUMOPHILA AG, URINE [[id number]] Collected: 09/14/21 2315    Order Status: Completed Specimen: Urine, random Updated: 09/15/21 1042     Legionella Ag, urine Negative       STREP PNEUMO AG, URINE [[id number]] Collected: 09/14/21 2315    Order Status: Completed Specimen: Urine, random Updated: 09/15/21 1042     Strep pneumo Ag, urine Negative       RESPIRATORY VIRUS PANEL W/COVID-19, PCR [[id number]] Collected: 09/14/21 1832    Order Status: Completed Specimen: Nasopharyngeal Updated: 09/14/21 2234     Adenovirus Not detected        Coronavirus 229E Not detected        Coronavirus HKU1 Not detected        Coronavirus CVNL63 Not detected        Coronavirus OC43 Not detected        SARS-CoV-2, PCR Not detected        Metapneumovirus Not detected        Rhinovirus and Enterovirus Not detected        Influenza A Not detected        Influenza A, subtype H1 Not detected        Influenza A, subtype H3 Not detected        INFLUENZA A H1N1 PCR Not detected        Influenza B Not detected        Parainfluenza 1 Not detected        Parainfluenza 2 Not detected        Parainfluenza 3 Not detected        Parainfluenza virus 4 Not detected        RSV by PCR Not detected        B. parapertussis, PCR Not detected        Bordetella pertussis - PCR Not detected        Chlamydophila pneumoniae DNA, QL, PCR Not detected        Mycoplasma pneumoniae DNA, QL, PCR Not detected       CULTURE, BLOOD [[id number]] Collected: 09/14/21 1700    Order Status: Canceled Specimen: Blood     CULTURE, URINE [[id number]] Collected: 09/13/21 2330    Order Status: Canceled Specimen: Cath Urine     RESPIRATORY VIRUS PANEL W/COVID-19, PCR [[id number]]     Order Status: Canceled Specimen: NASOPHARYNGEAL SWAB     COVID-19 RAPID TEST [[id number]]     Order Status: Canceled     COVID-19 RAPID TEST [[id number]] Collected: 09/13/21 0737    Order Status: Completed Specimen: Nasopharyngeal Updated: 09/13/21 0811     Specimen source Nasopharyngeal        COVID-19 rapid test Not detected Comment: Rapid Abbott ID Now       Rapid NAAT:  The specimen is NEGATIVE for SARS-CoV-2, the novel coronavirus associated with COVID-19. Negative results should be treated as presumptive and, if inconsistent with clinical signs and symptoms or necessary for patient management, should be tested with an alternative molecular assay. Negative results do not preclude SARS-CoV-2 infection and should not be used as the sole basis for patient management decisions. This test has been authorized by the FDA under an Emergency Use Authorization (EUA) for use by authorized laboratories. Fact sheet for Healthcare Providers: [employer] Pharmaceuticaldate.co.nz  Fact sheet for Patients: Wellbeatsdate.co.nz       Methodology: Isothermal Nucleic Acid Amplification         COVID-19 RAPID TEST [[id number]]     Order Status: Canceled         Echo 9/17/2021:  Left Ventricle Normal cavity size, wall thickness and systolic function (ejection fraction normal). The estimated EF is 60 - 65%. There is inconclusive left ventricular diastolic function E/E' ratio = 7.08  Heart rate is over 100. Wall Scoring The left ventricular wall motion is normal.            Left Atrium Normal cavity size. Right Ventricle Normal cavity size and global systolic function. Assessment of RV function:   TAPSE = 27 mm. Right Atrium Normal cavity size. Interatrial Septum Interatrial septum not well visualized   Aortic Valve Aortic valve not well visualized. Trileaflet valve structure, no stenosis and no regurgitation. Mitral Valve No stenosis. Mitral valve non-specific thickening. Mild regurgitation. Tricuspid Valve Tricuspid valve not well visualized. No stenosis. Mild regurgitation. Pulmonic Valve Pulmonic valve not well visualized. No stenosis and no regurgitation. Aorta The aorta was not well visualized. Normal aortic root. Pulmonary Artery Pulmonary arteries not well visualized.  Pulmonary arterial systolic pressure (PASP) is 29 mmHg. Pulmonary hypertension not suggested by Doppler findings. IVC/Hepatic Veins Mechanically ventilated; cannot use inferior caval vein diameter to estimate central venous pressure. Pericardium Normal pericardium and no evidence of pericardial effusion. CT head 9/15/1021  IMPRESSION   No acute intracranial abnormality. CT chest, abdomen, pelvis 9/15/1021  IMPRESSION   Endotracheal tube tip in the lower thoracic trachea 1.5 cm above the dipak.    Bilateral lower lobar atelectasis, possible endobronchial lesion/mucous plug in  the left lower lobe bronchus.    No acute intra-abdominal abnormality. Imaging:  [x]I have personally reviewed the patients chest radiographs images and report    Chest x-ray 9/27/1021  Results from Hospital Encounter encounter on 09/11/21    XR CHEST PORT    Narrative  EXAM: XR CHEST PORT    CLINICAL INDICATION/HISTORY: Acute respiratory failure, ET tub position  -Additional: None    COMPARISON: Several prior studies, 10/26/2021    TECHNIQUE: Portable frontal view of the chest    _______________    FINDINGS:    SUPPORT DEVICES: Tracheostomy catheter and nasogastric tube both project in  stable position. Tip of the NG tube is collimated from view. HEART AND MEDIASTINUM: Stable appearing cardiac size and mediastinal contours. LUNGS AND PLEURAL SPACES: Lower lung zone opacities are demonstrated. No  evidence of pneumothorax or definite pleural effusion. BONY THORAX AND SOFT TISSUES: Unremarkable.    _______________    Impression  1. Support devices in stable/appropriate position as visualized. 2. Bibasilar atelectasis overall unchanged. *  *   *      Please note: Voice-recognition software may have been used to generate this report, which may have resulted in some phonetic-based errors in grammar and contents.  Even though attempts were made to correct all the mistakes, some may have been missed, and remained in the body of the document.       Silviano Hernandez MD  9/27/2021

## 2021-09-27 NOTE — PROGRESS NOTES
DC Plan: LTC or TBD    Care manager performed chart review - patient had trach performed 9/22 and is on precedex, diprivan and versed; care manager will perform a UAI to assist with placement but anticipate may have to also request a Level II review due to patient's psych hx. Will update as information is available.     Care Management Interventions  Transition of Care Consult (CM Consult): Discharge Planning  The Plan for Transition of Care is Related to the Following Treatment Goals : intentional drug overdose  Discharge Location  Discharge Placement:  (TBD)

## 2021-09-27 NOTE — PROGRESS NOTES
Problem: Ventilator Management  Goal: *Adequate oxygenation and ventilation  Outcome: Progressing Towards Goal  Goal: *Patient maintains clear airway/free of aspiration  Outcome: Progressing Towards Goal  Goal: *Absence of infection signs and symptoms  Outcome: Progressing Towards Goal  Goal: *Normal spontaneous ventilation  Outcome: Progressing Towards Goal     Problem: Patient Education: Go to Patient Education Activity  Goal: Patient/Family Education  Outcome: Progressing Towards Goal     Problem: Non-Violent Restraints  Goal: Removal from restraints as soon as assessed to be safe  Outcome: Progressing Towards Goal  Goal: No harm/injury to patient while restraints in use  Outcome: Progressing Towards Goal  Goal: Patient's dignity will be maintained  Outcome: Progressing Towards Goal  Goal: Patient Interventions  Outcome: Progressing Towards Goal     Problem: Falls - Risk of  Goal: *Absence of Falls  Description: Document Jackson Fall Risk and appropriate interventions in the flowsheet. Outcome: Progressing Towards Goal  Note: Fall Risk Interventions:       Mentation Interventions: Adequate sleep, hydration, pain control, Bed/chair exit alarm, Door open when patient unattended, More frequent rounding    Medication Interventions: Bed/chair exit alarm, Evaluate medications/consider consulting pharmacy    Elimination Interventions: Toileting schedule/hourly rounds    History of Falls Interventions: Bed/chair exit alarm, Consult care management for discharge planning, Door open when patient unattended         Problem: Patient Education: Go to Patient Education Activity  Goal: Patient/Family Education  Outcome: Progressing Towards Goal     Problem: Risk for Spread of Infection  Goal: Prevent transmission of infectious organism to others  Description: Prevent the transmission of infectious organisms to other patients, staff members, and visitors.   Outcome: Progressing Towards Goal     Problem: Patient Education:  Go to Education Activity  Goal: Patient/Family Education  Outcome: Progressing Towards Goal     Problem: Pressure Injury - Risk of  Goal: *Prevention of pressure injury  Description: Document Remy Scale and appropriate interventions in the flowsheet.   Outcome: Progressing Towards Goal  Note: Pressure Injury Interventions:  Sensory Interventions: Assess changes in LOC, Check visual cues for pain, Keep linens dry and wrinkle-free, Monitor skin under medical devices, Pressure redistribution bed/mattress (bed type)    Moisture Interventions: Absorbent underpads, Internal/External fecal devices, Internal/External urinary devices, Maintain skin hydration (lotion/cream)    Activity Interventions: Pressure redistribution bed/mattress(bed type)    Mobility Interventions: HOB 30 degrees or less, Pressure redistribution bed/mattress (bed type), Float heels    Nutrition Interventions: Document food/fluid/supplement intake, Discuss nutritional consult with provider    Friction and Shear Interventions: HOB 30 degrees or less, Lift sheet, Lift team/patient mobility team                Problem: Patient Education: Go to Patient Education Activity  Goal: Patient/Family Education  Outcome: Progressing Towards Goal

## 2021-09-27 NOTE — PROGRESS NOTES
Nutrition Assessment     Type and Reason for Visit: Reassess    Nutrition Recommendations/Plan: EN: continue with tube feed to meet nutritional needs. Tube feed currently at goal.     09/27/21 0825   Enteral Nutrition   Feeding Route Nasogastric   EN Formula Standard without fiber  (Jevity 1.5)   Schedule Continuous   Feeding Regimen Jevity 1.5 @ 45ml/hr   Water Flushes 250ml Q4   Current EN & Flush Order Provides Currently at goal:     Jevity 1.5 @ 45ml + water flushes provides 1485kcals, 63g PRO, 214g CHO, and 2252ml free water. Nutrition Assessment:  PMH: drug use/abuse. Pt admit for gabapentin overdose, pt remaind in ICU s/p trach, and sedated. Remains on TF at goal rate. Estimated Daily Nutrient Needs:  Energy (kcal):  1525  Protein (g):  71       Fluid (ml/day):  1525    Nutrition Related Findings:  labs reviewed. meds: thiamine, propofol @ 12.8 (338kcals), KCl, miralax. mvi, mag sulfate, lactlose, humalog, folic acid, pepcid. Currently on Jevity 1.5 @ 45ml/hr-goal. +BM 9/26. Current Nutrition Therapies:  DIET NPO  ADULT TUBE FEEDING Nasogastric; Standard with Fiber; Delivery Method: Continuous; Continuous Initial Rate (mL/hr): 10; Continuous Advance Tube Feeding: Yes; Advancement Volume (mL/hr): 10; Advancement Frequency: Q 6 hours; Continuous Goal Rate (mL. ..     Anthropometric Measures:  Height:  5' 2\" (157.5 cm)  Current Body Wt:  71.2 kg (156 lb 15.5 oz)  BMI: 28.7    Nutrition Diagnosis:   Inadequate oral intake related to impaired respiratory function as evidenced by NPO or clear liquid status due to medical condition, intubation, nutrition support-enteral nutrition    Nutrition Intervention:  Food and/or Nutrient Delivery: Continue tube feeding  Nutrition Education and Counseling: No recommendations at this time  Coordination of Nutrition Care: Continue to monitor while inpatient, Interdisciplinary rounds    Goals:  Continue to meet nutritionla needs via enteral nutrition at goal throughout the next 5-7 days       Nutrition Monitoring and Evaluation:   Behavioral-Environmental Outcomes: None identified  Food/Nutrient Intake Outcomes: Diet advancement/tolerance, Enteral nutrition intake/tolerance  Physical Signs/Symptoms Outcomes: Biochemical data, Skin, Weight, GI status    Discharge Planning:     Too soon to determine     Electronically signed by Susan Angeles RD on 9/27/2021 at 8:26 AM

## 2021-09-27 NOTE — PROGRESS NOTES
0700 Bedside and Verbal shift change report given to DIA Montaño (oncoming nurse) by EV Hernandes, KRISTINE (offgoing nurse). Report included the following information SBAR, Kardex, Intake/Output and Recent Results. 0800 Shift assessment completed. Remains ventilated via trach. Jun wrist restraints in place for patient safety. Trach site, red, swollen, with brownish tan drainage. Critical care continues. 1200 Reassessment, no changes. 1330 Sedation titrated off. Will continue with precedex. 1600 Reassessment, no changes. 1900 Bedside and Verbal shift change report given to EV Davison and Edgardo Villegas RN (oncoming nurse) by Cristy Chavez RN (offgoing nurse). Report included the following information SBAR, Kardex, Intake/Output and Recent Results.

## 2021-09-27 NOTE — PROGRESS NOTES
Hospitalist Progress Note-critical care note     Patient: Deann Gallagher MRN: [id number]  [facility]: [id number]    YOB: 1980  Age: 39 y.o. Sex: female    DOA: 9/11/2021 LOS:  LOS: 15 days            Chief complaint: wrist fracture , drug overdose , acute respiratory failure with hypoxia, psychosis     Assessment/Plan         Hospital Problems  Date Reviewed: 9/6/2015        Codes Class Noted POA    Hypokalemia ICD-10-CM: E87.6  ICD-9-CM: 276.8  9/21/2021 Unknown        Increased ammonia level ICD-10-CM: R79.89  ICD-9-CM: 790.6  9/14/2021 Unknown        Psychosis ([facility] ICD-10-CM: F29  ICD-9-CM: 298.9  Unknown Unknown        Hyponatremia ICD-10-CM: E87.1  ICD-9-CM: 276.1  Unknown Yes        Acute respiratory failure with hypoxia ([facility] ICD-10-CM: J96.01  ICD-9-CM: 518.81  Unknown Yes        Left wrist fracture, with delayed healing, subsequent encounter ICD-10-CM: S62.102G  ICD-9-CM: V54.19  9/12/2021 Unknown        * (Principal) Drug overdose, intentional self-harm, initial encounter ([facility] ICD-10-CM: T50.902A  ICD-9-CM: 977.9, E950.5  9/12/2021 Yes        Hypoxia ICD-10-CM: R09.02  ICD-9-CM: 799.02  9/12/2021 Yes        Hypotension after procedure ICD-10-CM: I95.81  ICD-9-CM: 458.29  9/12/2021 Yes        Acute metabolic encephalopathy [facility]-24-K: G93.41  ICD-9-CM: 348.31  9/12/2021 Yes                  Deann Gallagher is a 39 y.o. female who is standing history of multidrug use/abuse, previous drug-seeking behavior and mental health issues otherwise unspecified arrives via EMS with acute metabolic encephalopathy and lethargy. Admitted for likely gabapentin overdose. She was intubated in ER, ct head no acute issue, LP done due to fever even on abx          Acute respiratory failure with hypoxia   Intubated on 9/12    Trach on 9/20/21. Continue trach care   Continue weaning vent    VAP bundle.  HOB>30 degrees.    covid 19 rapid negative       Hypotension after intubation   bp so far remain   Received midodrine  Received albumin   So far bp remained well     Fever   Afebrile overnight   ID on board, completed zosyn   LP done no bacterial meningitis indicated     Anemia  Stable so far no bleeding reported and will continue monitoring   Upper PVL negative for dvt           Acute metabolic encephalopathy   Ct head no acute process       Gabapentin overdose with lethargy and AMS   S/p procedural stomach emptying in ED with charcoal and sorbitol   Continue monitoring the side affect        elevated ammonia level  Continue   Lactulose, ammonia 51 today   Continue ammonia monitoring     Psych   Psychosis , hx of  Ekbom's delusional parasitosis   Need psych evaluation later   On olanzapine and klonopin     left wrist fracture: immobilized -poa     Will have peg today and LTAC placement     RN so far bp remained,   Disposition :tbd,   Review of systems:  Unable to obtain due to intubation   Vital signs/Intake and Output:  Visit Vitals  /83   Pulse (!) 103   Temp 100.4 °F (38 °C)   Resp (!) 6   Ht 5' 2\" (1.575 m)   Wt 71.2 kg (157 lb)   SpO2 98%   BMI 28.72 kg/m²     Current Shift:  09/27 0701 - 09/27 1900  In: -   Out: 1000 [Urine:1000]  Last three shifts:  09/25 1901 - 09/27 0700  In: 3526.8 [I.V.:1986.8]  Out: 4502 [Urine:2450; Drains:2050]    Physical Exam:  General: Intubated ,  HEENT: NC, Atraumatic. Trach noted   Lungs: CTA Bilaterally. No Wheezing/Rhonchi/Rales. Heart:  rrr ,  No murmur, No Rubs, No Gallops  Abdomen: Soft, Non distended, Non tender.   +Bowel sounds,   Extremities: No c/c.immobilzer noted on left wrist   Psych:   Calm   Neurologic:  On sedation           Labs: Results:       Chemistry Recent Labs     09/27/21  0255 09/26/21  1403 09/26/21  0625 09/25/21  0548 09/25/21  0548   *  --  143*  --  119*     --  141  --  140   K 3.8 5.3 3.8   < > 3.9     --  107  --  105   CO2 26  --  27  --  26   BUN 14  --  11  --  15   CREA 0.29*  --  0.28*  --  0.35*   CA 8.5  --  8.6  --  8.8 AGAP 7  --  7  --  9   BUCR 48*  --  39*  --  43*   *  --  105  --  100   TP 5.5*  --  5.7*  --  5.7*   ALB 2.2*  --  2.3*  --  2.5*   GLOB 3.3  --  3.4  --  3.2   AGRAT 0.7*  --  0.7*  --  0.8    < > = values in this interval not displayed. CBC w/Diff Recent Labs     09/27/21  0255 09/26/21  0625 09/25/21  0548   WBC 17.3* 20.4* 23.5*   RBC 2.84* 3.06* 3.15*   HGB 8.6* 8.8* 9.3*   HCT 27.1* 27.9* 28.7*    342 336   GRANS 77* 85* 83*   LYMPH 13* 7* 9*   EOS 2 2 2      Cardiac Enzymes No results for input(s): CPK, CKND1, ZENON in the last 72 hours. No lab exists for component: CKRMB, TROIP   Coagulation No results for input(s): PTP, INR, APTT, INREXT, INREXT in the last 72 hours. Lipid Panel No results found for: CHOL, CHOLPOCT, CHOLX, CHLST, CHOLV, 306144, HDL, HDLP, LDL, LDLC, DLDLP, 302015, VLDLC, VLDL, TGLX, TRIGL, TRIGP, TGLPOCT, CHHD, CHHDX   BNP No results for input(s): BNPP in the last 72 hours. Liver Enzymes Recent Labs     09/27/21  0255   TP 5.5*   ALB 2.2*   *      Thyroid Studies No results found for: T4, T3U, TSH, TSHEXT, TSHEXT     Procedures/imaging: see electronic medical records for all procedures/Xrays and details which were not copied into this note but were reviewed prior to creation of Plan    XR WRIST LT AP/LAT/OBL MIN 3V    Result Date: 8/19/2021  EXAM: XR WRIST LT AP/LAT/OBL MIN 3V CLINICAL INDICATION/HISTORY: Fall with LEFT wrist pain and swelling -Additional: None COMPARISON: None TECHNIQUE: 3 views of the left wrist _______________ FINDINGS: BONES: Comminuted, impacted fracture of the distal radius with slight dorsal angulation of the distal fracture fragment. No aggressive appearing osseous lytic or blastic lesion. SOFT TISSUES: Unremarkable. _______________     Comminuted, impacted fracture of the distal radius.     XR CHEST PORT    Result Date: 9/13/2021  EXAM: XR CHEST PORT CLINICAL INDICATION/HISTORY: Acute respiratory failure, ET tub position -Additional: None COMPARISON: One day prior TECHNIQUE: Portable frontal view of the chest _______________ FINDINGS: SUPPORT DEVICES: Endotracheal and enteric tubes unchanged. HEART AND MEDIASTINUM: Cardiomediastinal silhouette within normal limits. LUNGS AND PLEURAL SPACES: No dense consolidation, large effusion or pneumothorax. _______________     No acute cardiopulmonary abnormality. XR CHEST PORT    Result Date: 9/11/2021  MEDICAL RECORDS NUMBER: [id number] PROCEDURE:  Single view of the chest DATE: 9/11/2021 9:09 PM HISTORY: 39years old Female. post ett Comparison: 8/4/2021 FINDINGS: The patient has been intubated with appropriate placement of the endotracheal tube. There is no enteric tube passing below the level of the diaphragm. There is no significant effusion. There is no significant pneumothorax. Cardiomediastinal silhouette is within normal limits. There is no evidence of a focal pulmonary infiltrate or mass. 1. There is no significant or acute cardiopulmonary process. The patient has been intubated with appropriate placement of the endotracheal tube. There is no enteric tube passing below the level of the diaphragm. This report has been generated using voice recognition software. XR ABD PORT  1 V    Result Date: 9/12/2021  EXAM: Frontal view of the abdomen CLINICAL INDICATION/HISTORY: NG tube placement COMPARISON: None. _______________ FINDINGS: NG/OG tube in place with the tip in the left upper quadrant in the region of the gastric fundus. No bowel obstruction. Moderate colonic stool burden. _______________     1. NG/OG tube is in good position with the tip in the left upper quadrant in the region of the gastric fundus. 2. Moderate colonic stool burden.       Trevor Alcocer MD

## 2021-09-27 NOTE — PROGRESS NOTES
1900 - Bedside and Verbal shift change report given to Kei Mariee, RN (oncoming nurse) by EV Bates RN (offgoing nurse). Report included the following information SBAR, Kardex, Intake/Output, MAR, Recent Results and Cardiac Rhythm NSR, Sinus tach. 2000 - Assessment completed. Febrile at this time, PRN medications given via NG tube (see MAR). Pt remains on ventilator via tracheostomy. Thick, yellow secretions retrieved via inline suction. Incision is red, zack restless at times with IV sedation infusing per order. Bilateral soft wrist restraints maintained for safety. Barahona catheter and FMS patent and draining. 0000 - Reassessment completed. Temp improving.    0400 - Reassessment completed. Pt afebrile at this time. 0700 - Bedside and Verbal shift change report given to Mick Duane, RN (oncoming nurse) by Emerald Pennington RN  (offgoing nurse). Report included the following information SBAR, Kardex, Intake/Output, MAR and Recent Results.

## 2021-09-27 NOTE — PROGRESS NOTES
Hospitalist Progress Note    Patient: Lyssa Lang MRN: [id number]  CSN: [id number]    YOB: 1980  Age: 39 y.o. Sex: female    DOA: 9/11/2021 LOS:  LOS: 14 days          Chief Complaint:    resp failure      Assessment/Plan        Lyssa Lang is a 39 y.o. female with  standing history of multidrug use/abuse, previous drug-seeking behavior and mental health issues otherwise unspecified arrived via EMS with acute metabolic encephalopathy and lethargy. Admitted for likely gabapentin overdose.        Acute respiratory failure with hypoxia   Intubated on 9/12    Now with trach placed    Fevers, tachycardia, leukocytosis  Blood cx sent  Meropenemstarted empirically  Get UA and urine cx this am  Repeat covid test neg  lekocytosis slowly improving     covid 19 rapid negative on admission     BP stable  Labs reviewed  Sedation as per intensivist     LP done no bacterial meningitis indicated      Anemia-Stable so far no bleeding reported and will continue monitoring   Upper PVL negative for dvt      Hypernatremia resolved      Hypokalemia resolved   icu electrolytes replacement protocol      Severe Acute metabolic encephalopathy   Ct head no acute process      Gabapentin overdose with lethargy and AMS   S/p procedural stomach emptying in ED with charcoal and sorbitol      elevated ammonia level  Continue Lactulose      Psychiatric disease  Psychosis , hx of  Ekbom's delusional parasitosis   Need psych evaluation once able to participate  On olanzapine and klonopin      left wrist fracture: immobilized -poa     DVT prev-lovenox    Critical illness  Will need LTAC placement and extensive recovery with psych as well  Disposition :  Patient Active Problem List   Diagnosis Code    Dextromethorphan use disorder, moderate ([address] F19.20    Ekbom's delusional parasitosis ([address] F22    Left wrist fracture, with delayed healing, subsequent encounter S62.102G    Drug overdose, intentional self-harm, initial encounter ([address] T50.902A    Hypoxia R09.02    Hypotension after procedure I95.81    Acute metabolic encephalopathy C66.16    Psychosis (HCC) F29    Hyponatremia E87.1    Acute respiratory failure with hypoxia (HCC) J96.01    Increased ammonia level R79.89    Hypokalemia E87.6       Subjective:  Fevers  Mostly nocuturnal  FMS not cdiff  No real change weaning off of versed        Review of systems:    UTO  Sedate on vent      Vital signs/Intake and Output:  Visit Vitals  /71   Pulse (!) 105   Temp 100.3 °F (37.9 °C)   Resp 22   Ht 5' 2\" (1.575 m)   Wt 71.2 kg (157 lb)   SpO2 95%   BMI 28.72 kg/m²     Current Shift:  09/26 1901 - 09/27 0700  In: -   Out: 600 [Drains:600]  Last three shifts:  09/25 0701 - 09/26 1900  In: 3327.3 [I.V.:2267.3]  Out: 4827 [Urine:3725; Drains:1100]    Exam:    General: WF, sedate, intubated to trach  CVS:Regular rate and rhythm, no M/R/G, S1/S2 heard, no thrill  Lungs:Clear to auscultation bilaterally, no wheezes, rhonchi, or rales  Abdomen: Soft, Nontender, No distention, Normal Bowel sounds  Extremities: No C/C/E, pulses palpable 2+                  Labs: Results:       Chemistry Recent Labs     09/26/21  1403 09/26/21  0625 09/25/21  0548 09/24/21  0341 09/24/21  0341   GLU  --  143* 119*  --  119*   NA  --  141 140  --  140   K 5.3 3.8 3.9   < > 4.5   CL  --  107 105  --  102   CO2  --  27 26  --  27   BUN  --  11 15  --  15   CREA  --  0.28* 0.35*  --  0.47*   CA  --  8.6 8.8  --  9.1   AGAP  --  7 9  --  11   BUCR  --  39* 43*  --  32*   AP  --  105 100  --  109   TP  --  5.7* 5.7*  --  5.9*   ALB  --  2.3* 2.5*  --  3.1*   GLOB  --  3.4 3.2  --  2.8   AGRAT  --  0.7* 0.8  --  1.1    < > = values in this interval not displayed.       CBC w/Diff Recent Labs     09/26/21  0625 09/25/21  0548 09/24/21  1811   WBC 20.4* 23.5* 30.7*   RBC 3.06* 3.15* 3.53*   HGB 8.8* 9.3* 10.5*   HCT 27.9* 28.7* 31.6*    336 407   GRANS 85* 83* 87*   LYMPH 7* 9* 9*   EOS 2 2 0 Cardiac Enzymes No results for input(s): CPK, CKND1, ZENON in the last 72 hours. No lab exists for component: CKRMB, TROIP   Coagulation No results for input(s): PTP, INR, APTT, INREXT, INREXT in the last 72 hours. Lipid Panel No results found for: CHOL, CHOLPOCT, CHOLX, CHLST, CHOLV, 243288, HDL, HDLP, LDL, LDLC, DLDLP, 359156, VLDLC, VLDL, TGLX, TRIGL, TRIGP, TGLPOCT, CHHD, CHHDX   BNP No results for input(s): BNPP in the last 72 hours.    Liver Enzymes Recent Labs     09/26/21  0625   TP 5.7*   ALB 2.3*         Thyroid Studies No results found for: T4, T3U, TSH, TSHEXT, TSHEXT     Procedures/imaging: see electronic medical records for all procedures/Xrays and details which were not copied into this note but were reviewed prior to creation of Clifford Yu MD

## 2021-09-27 NOTE — PROGRESS NOTES
Patient Name: Milton Sharp   Age: 39 y.o. Sex:  female  YOB: 1980   Medical Record Number: [id number]      Antimicrobial  Current Antimicrobial Therapy (168h ago, onward)       Ordered     Start Stop    09/27/21 0657  vancomycin (VANCOCIN) 750 mg in 0.9% sodium chloride 250 mL (VIAL-MATE)  750 mg,   IntraVENous,   EVERY 12 HOURS      09/27/21 1100 --    09/25/21 0951  meropenem (MERREM) 1 g in sterile water (preservative free) 20 mL IV syringe  1 g,   IntraVENous,   EVERY 8 HOURS      09/25/21 1000 --                   Indication Sepsis   Last Level (if applicable) No results found for: DOSE, TMG, DTG  Vancomycin   Lab Results   Component Value Date/Time    Vancomycin, random 21.4 09/27/2021 02:55 AM      Gentamicin   No results found for: GENP, GENT, GENR]  Tobramycin   No results found for: TOBP, TOBT, TOBR   Amikacin   No results found for: Norva Kevin, BROCK, JANINA WILLETT     Cultures (7 most recent)   GRAM STAIN   Date Value Ref Range Status   09/24/2021 RARE WBCS SEEN   Preliminary   09/24/2021 RARE EPITHELIAL CELLS SEEN   Preliminary   09/24/2021 NO ORGANISMS SEEN   Preliminary     Culture result:   Date Value Ref Range Status   09/24/2021 NO GROWTH 2 DAYS   Preliminary   09/24/2021 (A)   Preliminary    MODERATE PROBABLE STAPHYLOCOCCUS AUREUS Preliminary report of probable S. aureus (Negative for antigen detection) confirmation and sensitivities to follow. 09/24/2021 NO NORMAL RESPIRATORY DINA ISOLATED   Preliminary   09/16/2021 NO GROWTH 7 DAYS   Final   09/14/2021 NO GROWTH 6 DAYS   Final   09/14/2021 NO GROWTH 6 DAYS   Final   09/06/2015    Final    NO AEROMONAS,SALMONELLA,SHIGELLA,E. COLI 0:157 OR CAMPYLOBACTER ISOLATED   09/06/2015    Final    Toxigenic C. difficile POSITIVE                         Toxin producing C. difficile target DNA sequences are detected. 09/06/2015 CALLED TO AND CORRECTLY REPEATED BY:   Final   09/06/2015 HARPREET HECTOR TO DF AT 1401 ON 9/8/15.   Final Renal Overview (72 hr)         Recent Labs     2125 21  0548   BUN 14 11 15   CREA 0.29* 0.28* 0.35*       CrCl (Current): Serum creatinine: 0.29 mg/dL (L) 21  Estimated creatinine clearance: 97.7 mL/min (A)      Lactic Acid    (Sepsis Criteria: >2.0 mmol/L) Lab Results   Component Value Date/Time    Lactic acid 0.7 2021 09:30 PM    Lactic acid 1.0 09/15/2021 12:00 PM    Lactic acid 1.6 2021 05:15 PM      Procalcitonin   Lab Results   Component Value Date/Time    Procalcitonin <0.05 09/15/2021 12:00 PM     Suspected Sepsis:   <0.50 ng/mL      Low likelihood of sepsis. 0.50-2.00 ng/mL     Increased likelihood of sepsis. Antibioticsencouraged. >2.00 ng/mL   High risk of sepsis/shock. Antibiotics strongly encouraged. Suspected Lower Resp Tract Infections:   <0.24 ng/mL     Low likelihood of bacterial infection. >0.24 ng/mL     Increased likelihood of bacterial infection. Antibiotics encouraged. Hepatic Overview (72 hr) Recent Labs     21  0548 21  0548   *   < > 44   < > 44   *  --  105  --  100    < > = values in this interval not displayed. Temp (24-hr Max) Temp (24hrs), Av.9 °F (37.7 °C), Min:98.7 °F (37.1 °C), Max:101.9 °F (38.8 °C)       Hematology Recent Labs     21  0548 21   WBC 17.3* 20.4* 23.5*  --    < > 30.7*   HGB 8.6* 8.8* 9.3*  --    < > 10.5*   HCT 27.1* 27.9* 28.7*  --    < > 31.6*    342 336  --    < > 407   GRANS 77* 85* 83*  --    < > 87*   BANDS  --   --   --   --   --  1   ANEU 13.4* 17.2* 19.5*  --    < > 27.0*   LAC  --   --   --  0.7  --   --     < > = values in this interval not displayed.         DOT  03     Notes Random level came back as 21.4 on [email or web address] p[employer] insight model showed a trend of supra therapeutic AUC of 736 mg/L hr with current dose.  Changed and Decreased dose to Vancomycin 750 mg IV q12 with an AUC of 449 mg/L hr for 11am today.          [place]  Clinical Pharmacist  [phone]

## 2021-09-28 ENCOUNTER — APPOINTMENT (OUTPATIENT)
Dept: GENERAL RADIOLOGY | Age: 41
DRG: 004 | End: 2021-09-28
Attending: PHYSICIAN ASSISTANT
Payer: MEDICAID

## 2021-09-28 ENCOUNTER — APPOINTMENT (OUTPATIENT)
Dept: GENERAL RADIOLOGY | Age: 41
DRG: 004 | End: 2021-09-28
Attending: INTERNAL MEDICINE
Payer: MEDICAID

## 2021-09-28 PROBLEM — Z93.0 STATUS POST TRACHEOSTOMY (HCC): Status: ACTIVE | Noted: 2021-09-28

## 2021-09-28 LAB
ALBUMIN SERPL-MCNC: 2.3 G/DL (ref 3.4–5)
ALBUMIN/GLOB SERPL: 0.7 {RATIO} (ref 0.8–1.7)
ALP SERPL-CCNC: 142 U/L (ref 45–117)
ALT SERPL-CCNC: 122 U/L (ref 13–56)
AMMONIA PLAS-SCNC: 54 UMOL/L (ref 11–32)
ANION GAP SERPL CALC-SCNC: 7 MMOL/L (ref 3–18)
AST SERPL-CCNC: 47 U/L (ref 10–38)
BASOPHILS # BLD: 0 K/UL (ref 0–0.1)
BASOPHILS NFR BLD: 0 % (ref 0–2)
BILIRUB SERPL-MCNC: 0.3 MG/DL (ref 0.2–1)
BUN SERPL-MCNC: 13 MG/DL (ref 7–18)
BUN/CREAT SERPL: 43 (ref 12–20)
CALCIUM SERPL-MCNC: 9 MG/DL (ref 8.5–10.1)
CHLORIDE SERPL-SCNC: 111 MMOL/L (ref 100–111)
CO2 SERPL-SCNC: 26 MMOL/L (ref 21–32)
CREAT SERPL-MCNC: 0.3 MG/DL (ref 0.6–1.3)
DIFFERENTIAL METHOD BLD: ABNORMAL
EOSINOPHIL # BLD: 0.2 K/UL (ref 0–0.4)
EOSINOPHIL NFR BLD: 2 % (ref 0–5)
ERYTHROCYTE [DISTWIDTH] IN BLOOD BY AUTOMATED COUNT: 13.2 % (ref 11.6–14.5)
GLOBULIN SER CALC-MCNC: 3.2 G/DL (ref 2–4)
GLUCOSE BLD STRIP.AUTO-MCNC: 111 MG/DL (ref 70–110)
GLUCOSE BLD STRIP.AUTO-MCNC: 126 MG/DL (ref 70–110)
GLUCOSE BLD STRIP.AUTO-MCNC: 128 MG/DL (ref 70–110)
GLUCOSE BLD STRIP.AUTO-MCNC: 130 MG/DL (ref 70–110)
GLUCOSE SERPL-MCNC: 96 MG/DL (ref 74–99)
HCT VFR BLD AUTO: 23.9 % (ref 35–45)
HCT VFR BLD AUTO: 26.7 % (ref 35–45)
HEMOCCULT STL QL: NEGATIVE
HGB BLD-MCNC: 7.7 G/DL (ref 12–16)
HGB BLD-MCNC: 8.5 G/DL (ref 12–16)
LYMPHOCYTES # BLD: 1.7 K/UL (ref 0.9–3.6)
LYMPHOCYTES NFR BLD: 13 % (ref 21–52)
MAGNESIUM SERPL-MCNC: 2.1 MG/DL (ref 1.6–2.6)
MCH RBC QN AUTO: 29.5 PG (ref 24–34)
MCHC RBC AUTO-ENTMCNC: 32.2 G/DL (ref 31–37)
MCV RBC AUTO: 91.6 FL (ref 78–100)
MONOCYTES # BLD: 0.9 K/UL (ref 0.05–1.2)
MONOCYTES NFR BLD: 7 % (ref 3–10)
NEUTS SEG # BLD: 9.9 K/UL (ref 1.8–8)
NEUTS SEG NFR BLD: 77 % (ref 40–73)
PHOSPHATE SERPL-MCNC: 3.2 MG/DL (ref 2.5–4.9)
PLATELET # BLD AUTO: 325 K/UL (ref 135–420)
PMV BLD AUTO: 10.2 FL (ref 9.2–11.8)
POTASSIUM SERPL-SCNC: 3.8 MMOL/L (ref 3.5–5.5)
POTASSIUM SERPL-SCNC: 4.3 MMOL/L (ref 3.5–5.5)
PROT SERPL-MCNC: 5.5 G/DL (ref 6.4–8.2)
RBC # BLD AUTO: 2.61 M/UL (ref 4.2–5.3)
SODIUM SERPL-SCNC: 144 MMOL/L (ref 136–145)
WBC # BLD AUTO: 12.8 K/UL (ref 4.6–13.2)

## 2021-09-28 PROCEDURE — 74011250636 HC RX REV CODE- 250/636: Performed by: INTERNAL MEDICINE

## 2021-09-28 PROCEDURE — 71045 X-RAY EXAM CHEST 1 VIEW: CPT

## 2021-09-28 PROCEDURE — 74011000258 HC RX REV CODE- 258: Performed by: INTERNAL MEDICINE

## 2021-09-28 PROCEDURE — 74011250636 HC RX REV CODE- 250/636: Performed by: FAMILY MEDICINE

## 2021-09-28 PROCEDURE — 74011250637 HC RX REV CODE- 250/637: Performed by: INTERNAL MEDICINE

## 2021-09-28 PROCEDURE — 84100 ASSAY OF PHOSPHORUS: CPT

## 2021-09-28 PROCEDURE — 77030013140 HC MSK NEB VYRM -A

## 2021-09-28 PROCEDURE — 74011000250 HC RX REV CODE- 250: Performed by: INTERNAL MEDICINE

## 2021-09-28 PROCEDURE — 77010033678 HC OXYGEN DAILY

## 2021-09-28 PROCEDURE — 83735 ASSAY OF MAGNESIUM: CPT

## 2021-09-28 PROCEDURE — 94003 VENT MGMT INPAT SUBQ DAY: CPT

## 2021-09-28 PROCEDURE — 94640 AIRWAY INHALATION TREATMENT: CPT

## 2021-09-28 PROCEDURE — 36415 COLL VENOUS BLD VENIPUNCTURE: CPT

## 2021-09-28 PROCEDURE — A4217 STERILE WATER/SALINE, 500 ML: HCPCS

## 2021-09-28 PROCEDURE — 85018 HEMOGLOBIN: CPT

## 2021-09-28 PROCEDURE — 65610000006 HC RM INTENSIVE CARE

## 2021-09-28 PROCEDURE — 85025 COMPLETE CBC W/AUTO DIFF WBC: CPT

## 2021-09-28 PROCEDURE — 84132 ASSAY OF SERUM POTASSIUM: CPT

## 2021-09-28 PROCEDURE — 73110 X-RAY EXAM OF WRIST: CPT

## 2021-09-28 PROCEDURE — 82962 GLUCOSE BLOOD TEST: CPT

## 2021-09-28 PROCEDURE — 82272 OCCULT BLD FECES 1-3 TESTS: CPT

## 2021-09-28 PROCEDURE — 82140 ASSAY OF AMMONIA: CPT

## 2021-09-28 RX ORDER — POTASSIUM CHLORIDE 20 MEQ/1
20 TABLET, EXTENDED RELEASE ORAL ONCE
Status: COMPLETED | OUTPATIENT
Start: 2021-09-28 | End: 2021-09-28

## 2021-09-28 RX ADMIN — LACTULOSE 45 ML: 20 SOLUTION ORAL at 21:06

## 2021-09-28 RX ADMIN — THIAMINE HCL TAB 100 MG 100 MG: 100 TAB at 09:56

## 2021-09-28 RX ADMIN — IPRATROPIUM BROMIDE 0.5 MG: 0.5 SOLUTION RESPIRATORY (INHALATION) at 11:44

## 2021-09-28 RX ADMIN — LEVOFLOXACIN 750 MG: 5 INJECTION, SOLUTION INTRAVENOUS at 16:37

## 2021-09-28 RX ADMIN — ARFORMOTEROL TARTRATE 15 MCG: 15 SOLUTION RESPIRATORY (INHALATION) at 20:16

## 2021-09-28 RX ADMIN — CLONAZEPAM 0.5 MG: 0.5 TABLET ORAL at 09:56

## 2021-09-28 RX ADMIN — DEXMEDETOMIDINE HYDROCHLORIDE 1.1 MCG/KG/HR: 100 INJECTION, SOLUTION INTRAVENOUS at 21:06

## 2021-09-28 RX ADMIN — Medication 1 TABLET: at 09:56

## 2021-09-28 RX ADMIN — DEXMEDETOMIDINE HYDROCHLORIDE 1.4 MCG/KG/HR: 100 INJECTION, SOLUTION INTRAVENOUS at 03:51

## 2021-09-28 RX ADMIN — FOLIC ACID 1 MG: 1 TABLET ORAL at 09:56

## 2021-09-28 RX ADMIN — DEXMEDETOMIDINE HYDROCHLORIDE 1.1 MCG/KG/HR: 100 INJECTION, SOLUTION INTRAVENOUS at 15:19

## 2021-09-28 RX ADMIN — FENTANYL CITRATE 50 MCG: 50 INJECTION INTRAMUSCULAR; INTRAVENOUS at 12:19

## 2021-09-28 RX ADMIN — FAMOTIDINE 20 MG: 20 TABLET ORAL at 09:56

## 2021-09-28 RX ADMIN — IPRATROPIUM BROMIDE 0.5 MG: 0.5 SOLUTION RESPIRATORY (INHALATION) at 15:36

## 2021-09-28 RX ADMIN — ARFORMOTEROL TARTRATE 15 MCG: 15 SOLUTION RESPIRATORY (INHALATION) at 07:14

## 2021-09-28 RX ADMIN — IPRATROPIUM BROMIDE 0.5 MG: 0.5 SOLUTION RESPIRATORY (INHALATION) at 20:17

## 2021-09-28 RX ADMIN — SODIUM CHLORIDE 10 ML: 9 INJECTION, SOLUTION INTRAMUSCULAR; INTRAVENOUS; SUBCUTANEOUS at 21:08

## 2021-09-28 RX ADMIN — HYDROMORPHONE HYDROCHLORIDE 1 MG: 1 INJECTION, SOLUTION INTRAMUSCULAR; INTRAVENOUS; SUBCUTANEOUS at 09:56

## 2021-09-28 RX ADMIN — CLONAZEPAM 0.5 MG: 0.5 TABLET ORAL at 21:07

## 2021-09-28 RX ADMIN — 0.12% CHLORHEXIDINE GLUCONATE 10 ML: 1.2 RINSE ORAL at 09:00

## 2021-09-28 RX ADMIN — POTASSIUM CHLORIDE 20 MEQ: 20 TABLET, EXTENDED RELEASE ORAL at 16:37

## 2021-09-28 RX ADMIN — 0.12% CHLORHEXIDINE GLUCONATE 10 ML: 1.2 RINSE ORAL at 21:06

## 2021-09-28 RX ADMIN — LORAZEPAM 2 MG: 2 INJECTION INTRAMUSCULAR at 16:38

## 2021-09-28 RX ADMIN — BUDESONIDE 500 MCG: 0.5 INHALANT RESPIRATORY (INHALATION) at 07:14

## 2021-09-28 RX ADMIN — BUDESONIDE 500 MCG: 0.5 INHALANT RESPIRATORY (INHALATION) at 20:17

## 2021-09-28 RX ADMIN — LACTULOSE 45 ML: 20 SOLUTION ORAL at 09:56

## 2021-09-28 RX ADMIN — IPRATROPIUM BROMIDE 0.5 MG: 0.5 SOLUTION RESPIRATORY (INHALATION) at 07:14

## 2021-09-28 RX ADMIN — ENOXAPARIN SODIUM 40 MG: 100 INJECTION SUBCUTANEOUS at 16:37

## 2021-09-28 RX ADMIN — FAMOTIDINE 20 MG: 20 TABLET ORAL at 21:07

## 2021-09-28 NOTE — PROGRESS NOTES
Hospitalist Progress Note-critical care note     Patient: Jayla Hare MRN: [id number]  CSN: [id number]    YOB: 1980  Age: 39 y.o. Sex: female    DOA: 9/11/2021 LOS:  LOS: 16 days            Chief complaint: wrist fracture , drug overdose , acute respiratory failure with hypoxia, psychosis     Assessment/Plan         Hospital Problems  Date Reviewed: 9/6/2015        Codes Class Noted POA    Hypokalemia ICD-10-CM: E87.6  ICD-9-CM: 276.8  9/21/2021 Unknown        Increased ammonia level ICD-10-CM: R79.89  ICD-9-CM: 790.6  9/14/2021 Unknown        Psychosis ([facility] ICD-10-CM: F29  ICD-9-CM: 298.9  Unknown Unknown        Hyponatremia ICD-10-CM: E87.1  ICD-9-CM: 276.1  Unknown Yes        Acute respiratory failure with hypoxia ([facility] ICD-10-CM: J96.01  ICD-9-CM: 518.81  Unknown Yes        Left wrist fracture, with delayed healing, subsequent encounter ICD-10-CM: S62.102G  ICD-9-CM: V54.19  9/12/2021 Unknown        * (Principal) Drug overdose, intentional self-harm, initial encounter ([facility] ICD-10-CM: T50.902A  ICD-9-CM: 977.9, E950.5  9/12/2021 Yes        Hypoxia ICD-10-CM: R09.02  ICD-9-CM: 799.02  9/12/2021 Yes        Hypotension after procedure ICD-10-CM: I95.81  ICD-9-CM: 458.29  9/12/2021 Yes        Acute metabolic encephalopathy PVK-93-GD: G93.41  ICD-9-CM: 348.31  9/12/2021 Yes                  Jayla Hare is a 39 y.o. female who is standing history of multidrug use/abuse, previous drug-seeking behavior and mental health issues otherwise unspecified arrives via EMS with acute metabolic encephalopathy and lethargy. Admitted for likely gabapentin overdose. She was intubated in ER, ct head no acute issue, LP done due to fever even on abx, no meningitis noted. Acute respiratory failure with hypoxia   Intubated on 9/12    Trach on 9/20/21. Continue trach care   Will try trach collar today  Continue weaning vent    VAP bundle. HOB>30 degrees  Continue breathing tx .      Post tracheostomy Continue local trach care   ent following  up     Anemia  Stable so far no bleeding reported and will continue monitoring   Upper PVL negative for dvt           Acute metabolic encephalopathy   Ct head no acute process   Alert and follow the command   On precedex       Gabapentin overdose with lethargy and AMS   S/p procedural stomach emptying in ED with charcoal and sorbitol   Continue monitoring the side affect          elevated ammonia level  Continue   Lactulose,   Continue ammonia monitoring       Psychosis , hx of  Ekbom's delusional parasitosis   Need psych evaluation later   On olanzapine and klonopin     left wrist fracture: immobilized -poa     Peg tube is hold for how to see if she can tolerate trach collar. Rn: ent saw pt last night     RN so far bp remained,   Disposition :tbd,   Review of systems:  Unable to obtain due to trach on vent   Vital signs/Intake and Output:  Visit Vitals  BP (!) 104/56   Pulse 76   Temp 99.5 °F (37.5 °C)   Resp 16   Ht 5' 2\" (1.575 m)   Wt 71.2 kg (157 lb)   SpO2 96%   BMI 28.72 kg/m²     Current Shift:  No intake/output data recorded. Last three shifts:  09/26 1901 - 09/28 0700  In: 4097.8 [I.V.:1742.8]  Out: 4125 [Urine:2425; Drains:1700]    Physical Exam:  General: Alert. HEENT: NC, Atraumatic. Trach noted   Lungs: CTA Bilaterally. No Wheezing/Rhonchi/Rales. Heart:  rrr ,  No murmur, No Rubs, No Gallops  Abdomen: Soft, Non distended, Non tender. +Bowel sounds,   Extremities: No c/c.immobilzer noted on left wrist   Psych:   Calm   Neurologic:  Open eyes per voice.  On precedex           Labs: Results:       Chemistry Recent Labs     09/28/21  0445 09/27/21  1715 09/27/21  0255 09/26/21  1403 09/26/21  0625   GLU 96  --  116*  --  143*     --  143  --  141   K 3.8 4.0 3.8   < > 3.8     --  110  --  107   CO2 26  --  26  --  27   BUN 13  --  14  --  11   CREA 0.30*  --  0.29*  --  0.28*   CA 9.0  --  8.5  --  8.6   AGAP 7  --  7  --  7   BUCR 43*  -- 48*  --  39*   *  --  132*  --  105   TP 5.5*  --  5.5*  --  5.7*   ALB 2.3*  --  2.2*  --  2.3*   GLOB 3.2  --  3.3  --  3.4   AGRAT 0.7*  --  0.7*  --  0.7*    < > = values in this interval not displayed. CBC w/Diff Recent Labs     09/28/21  0445 09/27/21  0255 09/26/21  0625   WBC 12.8 17.3* 20.4*   RBC 2.61* 2.84* 3.06*   HGB 7.7* 8.6* 8.8*   HCT 23.9* 27.1* 27.9*    336 342   GRANS 77* 77* 85*   LYMPH 13* 13* 7*   EOS 2 2 2      Cardiac Enzymes No results for input(s): CPK, CKND1, ZENON in the last 72 hours. No lab exists for component: CKRMB, TROIP   Coagulation No results for input(s): PTP, INR, APTT, INREXT, INREXT in the last 72 hours. Lipid Panel No results found for: CHOL, CHOLPOCT, CHOLX, CHLST, CHOLV, 622151, HDL, HDLP, LDL, LDLC, DLDLP, 679153, VLDLC, VLDL, TGLX, TRIGL, TRIGP, TGLPOCT, CHHD, CHHDX   BNP No results for input(s): BNPP in the last 72 hours. Liver Enzymes Recent Labs     09/28/21  0445   TP 5.5*   ALB 2.3*   *      Thyroid Studies No results found for: T4, T3U, TSH, TSHEXT, TSHEXT     Procedures/imaging: see electronic medical records for all procedures/Xrays and details which were not copied into this note but were reviewed prior to creation of Plan    XR WRIST LT AP/LAT/OBL MIN 3V    Result Date: 8/19/2021  EXAM: XR WRIST LT AP/LAT/OBL MIN 3V CLINICAL INDICATION/HISTORY: Fall with LEFT wrist pain and swelling -Additional: None COMPARISON: None TECHNIQUE: 3 views of the left wrist _______________ FINDINGS: BONES: Comminuted, impacted fracture of the distal radius with slight dorsal angulation of the distal fracture fragment. No aggressive appearing osseous lytic or blastic lesion. SOFT TISSUES: Unremarkable. _______________     Comminuted, impacted fracture of the distal radius.     XR CHEST PORT    Result Date: 9/13/2021  EXAM: XR CHEST PORT CLINICAL INDICATION/HISTORY: Acute respiratory failure, ET tub position -Additional: None COMPARISON: One day prior TECHNIQUE: Portable frontal view of the chest _______________ FINDINGS: SUPPORT DEVICES: Endotracheal and enteric tubes unchanged. HEART AND MEDIASTINUM: Cardiomediastinal silhouette within normal limits. LUNGS AND PLEURAL SPACES: No dense consolidation, large effusion or pneumothorax. _______________     No acute cardiopulmonary abnormality. XR CHEST PORT    Result Date: 9/11/2021  MEDICAL RECORDS NUMBER: [id number] PROCEDURE:  Single view of the chest DATE: 9/11/2021 9:09 PM HISTORY: 39years old Female. post ett Comparison: 8/4/2021 FINDINGS: The patient has been intubated with appropriate placement of the endotracheal tube. There is no enteric tube passing below the level of the diaphragm. There is no significant effusion. There is no significant pneumothorax. Cardiomediastinal silhouette is within normal limits. There is no evidence of a focal pulmonary infiltrate or mass. 1. There is no significant or acute cardiopulmonary process. The patient has been intubated with appropriate placement of the endotracheal tube. There is no enteric tube passing below the level of the diaphragm. This report has been generated using voice recognition software. XR ABD PORT  1 V    Result Date: 9/12/2021  EXAM: Frontal view of the abdomen CLINICAL INDICATION/HISTORY: NG tube placement COMPARISON: None. _______________ FINDINGS: NG/OG tube in place with the tip in the left upper quadrant in the region of the gastric fundus. No bowel obstruction. Moderate colonic stool burden. _______________     1. NG/OG tube is in good position with the tip in the left upper quadrant in the region of the gastric fundus. 2. Moderate colonic stool burden.       Dietrich Rinne, MD

## 2021-09-28 NOTE — PROGRESS NOTES
1930 - Bedside and Verbal shift change report given to Kei Mead RN (oncoming nurse) by Sangeetha Mclain RN (offgoing nurse). Report included the following information SBAR, Kardex, Intake/Output, MAR and Recent Results. 2000 - Shift assessment completed. Patient found throwing BLE out of bed. Bilateral soft wrist restraints maintained for safety. Vent unable to provide synchronized respirations due to patient's agitation. PRN IV medications given. 2020 - Patient continued to show signs of agitation. IV sedation infusion titrated up per order. 0000 - Reassessment completed. Patient appears more calm compared to previous assessment, as pt is lying in bed resting with eyes intermittently open. 0400 - Reassessment completed. Patient resting quietly in bed with eyes closed, NAD noted. 0730 - Bedside and Verbal shift change report given to J. Taft Boeck (oncoming nurse) by Vane Greene RN  (offgoing nurse). Report included the following information SBAR, Kardex, Intake/Output, MAR and Recent Results.

## 2021-09-28 NOTE — PROGRESS NOTES
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    Left wrist fracture, with delayed healing, subsequent encounter S62.102G    Drug overdose, intentional self-harm, initial encounter ([address] T50.902A    Hypoxia R09.02    Hypotension after procedure I95.81    Acute metabolic encephalopathy P32.63    Psychosis ([facility] [address] F29    Hyponatremia E87.1    Acute respiratory failure with hypoxia (HCC) J96.01    Increased ammonia level R79.89    Hypokalemia E87.6    Status post tracheostomy ([facility] [address] Z93.0 ·   Code status: full code     RECOMMENDATIONS:   Respiratory: Patient on ventilator support; intubated 2021 due to encephalopathy and drug overdose. Patient was extubated on 2021, and reintubated within an hour due to respiratory distress and upper airway edema. S/p tracheostomy 2021. Patient tolerating trach collar trials; continue as tolerated; if patient has any respiratory difficulty, okay to place on pressure support 10 on ventilator at nighttime. Chest x-raystable findings of bibasal atelectasis/densities; no worsening pleural effusion; trach tube in good position; NG tube in good position. Sedationwean Precedex infusion as tolerated; avoid continuous benzodiazepines. Prn Dilaudid and lorazepam.  ID: Leukocytosis shows further improvement; afebrile overnight. S/p LP with negative CSF cultures. Covid test 2021 and 2021: Negative. Sputum culture 2021: MSSA. Blood culture 2021: NGTD. Barahona catheter changed on 2021.   Antibioticsrespiratory cultures MSSA; antibiotics have been narrowed to levofloxacin x 5 doses. ID has signed off; reconsult as needed. CVS: Stable hemodynamics; continue to monitor. Echo 9/17/2021: LVEF 60 to 65%. RVSP 29 mmHg. Renal: Stable renal function and urine output; creatinine normal; electrolytes stable; continue to monitor. Heme: Hemoglobin drifting downlikely due to ICU stay with daily blood draws; platelets normal; check stool for occult blood; monitor for any bleeding issues. Check H&H this afternoon. Endo: Stable blood sugarsmonitor; sliding scale insulin as needed. GI: Patient tolerating tube feeding wellcontinue; patient has NG tube. SLP nurse consulted since patient on trach collar now. I would hold off on consulting IR for PEG tube placement at this time. hCG negative on admission. Nutrition: Continue tube feeding as tolerated, along with free water; daily chest x-rays while on NG tube feeding. Neurology: Patient confused and delirious currently. Avoid continuous sedation with benzodiazepines. Monitor LFTs; ammonia 54; continue lactulose twice daily. CT headnil acute  Psych: On Klonopin. Toxicology: Gabapentin OD on admission. Skin: ICU nursing care  MS: Left wrist fracture in immobilization external fixator  Prophylaxis: DVT prophylaxis: Lovenox 40 sc daily. GI prophylaxis: Famotidine. Restraints: Wrist soft restraints as needed for patient interfering with medical therapy/management and patient safety. Consulted PT, OT and SLP teams. Prognosis guarded overall. CODE STATUSfull code. Palliative care on case. Quality Care: PPI, DVT prophylaxis, HOB elevated, Infection control all reviewed and addressed. Lines/Tubes: PIV   S/p ETT: 9/11/219/22/2021. Tracheostomy 9/22/2021. NGT: 9/11/21  Barahona: 9/11/21; changed 9/24/2021. Discontinue Barahona catheter today. ADVANCE DIRECTIVE: Full code. Patient's  in senior living. Discussed with RN, RT.     High complexity decision making was performed during the evaluation of this patient at high risk for decompensation with multiple organ involvement. Sharlene Ram   sister-in-law   [phone]     Patient's  called this morning from [facility]; updated regarding patient's medical condition in detail. Subjective/History:   Ms. Lani Turk has been seen and evaluated as Dr. Gloria Engel requested now for assisting with Acute respiratory failure and ventilator management. Patient is a 39 y.o. female with following PMhx presented to ER with lethargy via EMS and admitted for Gabapentin overdose. Pt required intubation for airways protection. Position control was contacted that recommended supportive care per ER provider. Pt seen at bedside in ER rm#2. The patient can not provide additional history due to Ventilated. 9/28/2021  Remains in ICU room 102. Patient placed on trach collar trials this morning. Stable respiratory symptoms; secretions around trach site. Afebrile overnight; telemetrysinus rhythm; blood pressure stable. Patient has a history of drug use and smoker and alcohol use. Review of Systems:  Review of systems not obtained due to patient factors.         Past Medical History:  Past Medical History:   Diagnosis Date    Asthma     Bilateral ovarian cysts     Cocaine abuse ([address]     Mental and behavioral problem     Pancreatitis     Pancreatitis     Psychosis ([facility] [address]         Past Surgical History:  Past Surgical History:   Procedure Laterality Date    HX GYN      D&C, Hysterectomy        Medications:    Current Facility-Administered Medications   Medication Dose Route Frequency    levoFLOXacin (LEVAQUIN) 750 mg in D5W IVPB  750 mg IntraVENous Q24H    ipratropium (ATROVENT) 0.02 % nebulizer solution 0.5 mg  0.5 mg Nebulization QID RT    dexmedeTOMidine (PRECEDEX) 400 mcg in 0.9% sodium chloride 100 mL infusion  0.1-1.5 mcg/kg/hr IntraVENous TITRATE    famotidine (PEPCID) tablet 20 mg  20 mg Oral BID    thiamine mononitrate (B-1) tablet 100 mg  100 mg Oral DAILY    folic acid (FOLVITE) tablet 1 mg  1 mg Oral DAILY    multivitamin, tx-iron-ca-min (THERA-M w/ IRON) tablet 1 Tablet  1 Tablet Oral DAILY    lactulose (CHRONULAC) 10 gram/15 mL solution 45 mL  30 g Oral BID    clonazePAM (KlonoPIN) tablet 0.5 mg  0.5 mg Oral BID    insulin lispro (HUMALOG) injection   SubCUTAneous Q6H    arformoteroL (BROVANA) neb solution 15 mcg  15 mcg Nebulization BID RT    budesonide (PULMICORT) 500 mcg/2 ml nebulizer suspension  500 mcg Nebulization BID RT    enoxaparin (LOVENOX) injection 40 mg  40 mg SubCUTAneous Q24H    sodium chloride (NS) flush 5-40 mL  5-40 mL IntraVENous Q8H    chlorhexidine (PERIDEX) 0.12 % mouthwash 10 mL  10 mL Oral Q12H       Allergy:  Allergies   Allergen Reactions    Fish Containing Products Itching    Toradol [Ketorolac] Rash     Pt reports she is not allergic to this medication. Social History:  Social History     Tobacco Use    Smoking status: Current Every Day Smoker     Packs/day: 1.00    Smokeless tobacco: Never Used   Substance Use Topics    Alcohol use: Not Currently    Drug use: Not Currently     Types: Cocaine, Marijuana     Comment: used with in past 24 hours          Objective:   Vital Signs:    Blood pressure (!) 104/56, pulse 76, temperature 99.5 °F (37.5 °C), resp. rate 16, height 5' 2\" (1.575 m), weight 71.2 kg (157 lb), last menstrual period 05/15/2018, SpO2 96 %. Body mass index is 28.72 kg/m². O2 Device: Tracheal collar       Temp (24hrs), Av.6 °F (37.6 °C), Min:98.5 °F (36.9 °C), Max:101.6 °F (38.7 °C)         Intake/Output:   Last shift:      No intake/output data recorded.   Last 3 shifts:  1901 -  0700  In: 4097.8 [I.V.:1742.8]  Out: 4125 [Urine:2425; Drains:1700]    Intake/Output Summary (Last 24 hours) at 2021 1415  Last data filed at 2021 0646  Gross per 24 hour   Intake 2271.75 ml   Output 2250 ml   Net 21.75 ml       ABG:    No results found for: PH, PHI, PCO2, PCO2I, PO2, PO2I, HCO3, HCO3I, FIO2, FIO2I  Ventilator Mode: Spontaneous  Respiratory Rate  Resp Rate Observed: 9  Back-Up Rate: 14  Insp Time (sec): 1.2 sec  I:E Ratio: 1:2.58  Ventilator Volumes  Vt Set (ml): 400 ml  Vt Exhaled (Machine Breath) (ml): 416 ml  Vt Spont (ml): 481 ml  Ve Observed (l/min): 18 l/min  Ventilator Pressures  Pressure Support (cm H2O): 10 cm H2O  PIP Observed (cm H2O): 14 cm H2O  Plateau Pressure (cm H2O): 12 cm H2O  MAP (cm H2O): 10  PEEP/VENT (cm H2O): 5 cm H20  Auto PEEP Observed (cm H2O): 0 cm H2O       Physical Exam:   Patient appears older than stated age; appears chronically ill; on trach collar support; acyanotic  HEENT: pupils not dilated, reactive, no scleral jaundice, moist oral mucosa, no nasal drainage  Neck: No adenopathy or thyroid swelling; s/p tracheostomy tubeno active bleeding issues; thick clear secretions noted around trach site  Patient head position towards the right side; finds it painful and stiff to move head to the midline  CVS: Normal heart sounds; S1 and S2 with no murmur; telemetrysinus rhythm; JVD not elevated   RS: Symmetrical breath sounds; air entry is moderate bilaterally; no wheezing or crackles; not tachypneic or in distress  Abd: soft, no tenderness, no distention, no guarding or rigidity; bowel sounds present; no hepatosplenomegaly  Neuro: Intubated; awake and confused; not purposeful; moving all extremities; limited exam   Extrm: no leg edema or swelling or clubbing  Skin: no rash  Lymphatic: no cervical or supraclavicular adenopathy  Vasc: Good DPs in both feet         Data:     Recent Results (from the past 12 hour(s))   CBC WITH AUTOMATED DIFF    Collection Time: 09/28/21  4:45 AM   Result Value Ref Range    WBC 12.8 4.6 - 13.2 K/uL    RBC 2.61 (L) 4.20 - 5.30 M/uL    HGB 7.7 (L) 12.0 - 16.0 g/dL    HCT 23.9 (L) 35.0 - 45.0 %    MCV 91.6 78.0 - 100.0 FL    MCH 29.5 24.0 - 34.0 PG    MCHC 32.2 31.0 - 37.0 g/dL    RDW 13.2 11.6 - 14.5 %    PLATELET 928 347 - 420 K/uL    MPV 10.2 9.2 - 11.8 FL    NEUTROPHILS 77 (H) 40 - 73 %    LYMPHOCYTES 13 (L) 21 - 52 %    MONOCYTES 7 3 - 10 %    EOSINOPHILS 2 0 - 5 %    BASOPHILS 0 0 - 2 %    ABS. NEUTROPHILS 9.9 (H) 1.8 - 8.0 K/UL    ABS. LYMPHOCYTES 1.7 0.9 - 3.6 K/UL    ABS. MONOCYTES 0.9 0.05 - 1.2 K/UL    ABS. EOSINOPHILS 0.2 0.0 - 0.4 K/UL    ABS. BASOPHILS 0.0 0.0 - 0.1 K/UL    DF AUTOMATED     MAGNESIUM    Collection Time: 09/28/21  4:45 AM   Result Value Ref Range    Magnesium 2.1 1.6 - 2.6 mg/dL   METABOLIC PANEL, COMPREHENSIVE    Collection Time: 09/28/21  4:45 AM   Result Value Ref Range    Sodium 144 136 - 145 mmol/L    Potassium 3.8 3.5 - 5.5 mmol/L    Chloride 111 100 - 111 mmol/L    CO2 26 21 - 32 mmol/L    Anion gap 7 3.0 - 18 mmol/L    Glucose 96 74 - 99 mg/dL    BUN 13 7.0 - 18 MG/DL    Creatinine 0.30 (L) 0.6 - 1.3 MG/DL    BUN/Creatinine ratio 43 (H) 12 - 20      GFR est AA >60 >60 ml/min/1.73m2    GFR est non-AA >60 >60 ml/min/1.73m2    Calcium 9.0 8.5 - 10.1 MG/DL    Bilirubin, total 0.3 0.2 - 1.0 MG/DL    ALT (SGPT) 122 (H) 13 - 56 U/L    AST (SGOT) 47 (H) 10 - 38 U/L    Alk.  phosphatase 142 (H) 45 - 117 U/L    Protein, total 5.5 (L) 6.4 - 8.2 g/dL    Albumin 2.3 (L) 3.4 - 5.0 g/dL    Globulin 3.2 2.0 - 4.0 g/dL    A-G Ratio 0.7 (L) 0.8 - 1.7     PHOSPHORUS    Collection Time: 09/28/21  4:45 AM   Result Value Ref Range    Phosphorus 3.2 2.5 - 4.9 MG/DL   AMMONIA    Collection Time: 09/28/21  4:45 AM   Result Value Ref Range    Ammonia 54 (H) 11 - 32 UMOL/L   GLUCOSE, POC    Collection Time: 09/28/21  5:03 AM   Result Value Ref Range    Glucose (POC) 111 (H) 70 - 110 mg/dL   GLUCOSE, POC    Collection Time: 09/28/21 11:08 AM   Result Value Ref Range    Glucose (POC) 130 (H) 70 - 110 mg/dL             Recent Labs     09/27/21  0417   FIO2I 50   HCO3I 25.5   PCO2I 39.1   PHI 7.43   PO2I 86       All Micro Results     Procedure Component Value Units Date/Time    CULTURE, BLOOD [[id number]] Collected: 09/27/21 1005 Order Status: Completed Specimen: Blood Updated: 09/28/21 0648     Special Requests: NO SPECIAL REQUESTS        Culture result: NO GROWTH AFTER 20 HOURS       CULTURE, BLOOD [[id number]] Collected: 09/27/21 1005    Order Status: Completed Specimen: Blood Updated: 09/28/21 0648     Special Requests: NO SPECIAL REQUESTS        Culture result: NO GROWTH AFTER 20 HOURS       CULTURE, BLOOD [[id number]] Collected: 09/24/21 0740    Order Status: Completed Specimen: Blood Updated: 09/28/21 0647     Special Requests: NO SPECIAL REQUESTS        Culture result: NO GROWTH 4 DAYS       CULTURE, RESPIRATORY/SPUTUM/BRONCH W GRAM STAIN [[id number]]  (Abnormal)  (Susceptibility) Collected: 09/24/21 0654    Order Status: Completed Specimen: Sputum from Tracheal Aspirate Updated: 09/27/21 1147     Special Requests: NO SPECIAL REQUESTS        GRAM STAIN RARE WBCS SEEN               RARE EPITHELIAL CELLS SEEN            NO ORGANISMS SEEN        Culture result:       MODERATE STAPHYLOCOCCUS AUREUS                  NO NORMAL RESPIRATORY DINA ISOLATED          COVID-19 RAPID TEST [[id number]] Collected: 09/24/21 1700    Order Status: Completed Specimen: Nasopharyngeal Updated: 09/24/21 1849     Specimen source Nasopharyngeal        COVID-19 rapid test Not detected        Comment: Rapid Abbott ID Now       Rapid NAAT:  The specimen is NEGATIVE for SARS-CoV-2, the novel coronavirus associated with COVID-19. Negative results should be treated as presumptive and, if inconsistent with clinical signs and symptoms or necessary for patient management, should be tested with an alternative molecular assay. Negative results do not preclude SARS-CoV-2 infection and should not be used as the sole basis for patient management decisions. This test has been authorized by the FDA under an Emergency Use Authorization (EUA) for use by authorized laboratories.    Fact sheet for Healthcare Providers: ConventionUpdate.co.nz  Fact sheet for Patients: ConventionUpdate.co.nz       Methodology: Isothermal Nucleic Acid Amplification         CULTURE, URINE [[id number]]     Order Status: Canceled Specimen: Urine from Clean catch     CULTURE, CSF  TUBE 2 [[id number]] Collected: 09/16/21 1434    Order Status: Completed Specimen: Cerebrospinal Fluid Updated: 09/23/21 1241     Special Requests: TUBE 3, CULTURE AND SENSITIVTY AND GRAM STAIN. GRAM STAIN RARE WBCS SEEN         NO ORGANISMS SEEN        Culture result: NO GROWTH 7 DAYS       CULTURE, BLOOD [[id number]] Collected: 09/14/21 0515    Order Status: Completed Specimen: Blood Updated: 09/20/21 0832     Special Requests: NO SPECIAL REQUESTS        Culture result: NO GROWTH 6 DAYS       CULTURE, BLOOD [[id number]] Collected: 09/14/21 0500    Order Status: Completed Specimen: Blood Updated: 09/20/21 0832     Special Requests: NO SPECIAL REQUESTS        Culture result: NO GROWTH 6 DAYS       MENINGITIS PATHOGENS PANEL, CSF (BY PCR) [[id number]] Collected: 09/16/21 1434    Order Status: Completed Specimen: Cerebrospinal Fluid Updated: 09/17/21 0050     Escherichia coli K1 Not detected        Haemophilus Influenzae Not detected        Listeria Monocytogenes Not detected        Neisseria Meningitidis Not detected        Streptococcus Agalactiae Not detected        Streptococcus Pneumoniae Not detected        Cytomegalovirus Not detected        Enterovirus Not detected        Herpes Simplex Virus 1 Not detected        Comment: In patients who have negative herpes simplex 1 and 2 PCR results, do not modify treatment, confirm with alternate testing. Herpes Simplex Virus 2 Not detected        Comment: In patients who have negative herpes simplex 1 and 2 PCR results, do not modify treatment, confirm with alternate testing. Human Herpesvirus 6 Not detected        Human Parechovirus Not detected        Varicella Zoster Virus Not detected        Crypto.  neoformans/gattii Not detected       MENINGITIS PATHOGENS PANEL, CSF (BY PCR) [[id number]] Collected: 09/16/21 1545    Order Status: Canceled Specimen: Cerebrospinal Fluid     HSV 1 AND 2 BY PCR [[id number]] Collected: 09/16/21 1434    Order Status: Canceled Specimen: Other     CHARLES-Gabrielle RAPID TEST [[id number]] Collected: 09/16/21 1000    Order Status: Completed Specimen: Nasopharyngeal Updated: 09/16/21 1032     Specimen source Nasopharyngeal        COVID-19 rapid test Not detected        Comment: Rapid Abbott ID Now       Rapid NAAT:  The specimen is NEGATIVE for SARS-CoV-2, the novel coronavirus associated with COVID-19. Negative results should be treated as presumptive and, if inconsistent with clinical signs and symptoms or necessary for patient management, should be tested with an alternative molecular assay. Negative results do not preclude SARS-CoV-2 infection and should not be used as the sole basis for patient management decisions. This test has been authorized by the FDA under an Emergency Use Authorization (EUA) for use by authorized laboratories.    Fact sheet for Healthcare Providers: ConventionUpdate.co.nz  Fact sheet for Patients: ConventionUpdate.co.nz       Methodology: Isothermal Nucleic Acid Amplification         LEGIONELLA PNEUMOPHILA AG, URINE [[id number]] Collected: 09/14/21 2315    Order Status: Completed Specimen: Urine, random Updated: 09/15/21 1042     Legionella Ag, urine Negative       STREP PNEUMO AG, URINE [[id number]] Collected: 09/14/21 2315    Order Status: Completed Specimen: Urine, random Updated: 09/15/21 1042     Strep pneumo Ag, urine Negative       RESPIRATORY VIRUS PANEL W/COVID-19, PCR [[id number]] Collected: 09/14/21 1832    Order Status: Completed Specimen: Nasopharyngeal Updated: 09/14/21 2234     Adenovirus Not detected        Coronavirus 229E Not detected        Coronavirus HKU1 Not detected        Coronavirus CVNL63 Not detected        Coronavirus OC43 Not detected        SARS-CoV-2, PCR Not detected        Metapneumovirus Not detected        Rhinovirus and Enterovirus Not detected        Influenza A Not detected        Influenza A, subtype H1 Not detected        Influenza A, subtype H3 Not detected        INFLUENZA A H1N1 PCR Not detected        Influenza B Not detected        Parainfluenza 1 Not detected        Parainfluenza 2 Not detected        Parainfluenza 3 Not detected        Parainfluenza virus 4 Not detected        RSV by PCR Not detected        B. parapertussis, PCR Not detected        Bordetella pertussis - PCR Not detected        Chlamydophila pneumoniae DNA, QL, PCR Not detected        Mycoplasma pneumoniae DNA, QL, PCR Not detected       CULTURE, BLOOD [[id number]] Collected: 09/14/21 1700    Order Status: Canceled Specimen: Blood     CULTURE, URINE [[id number]] Collected: 09/13/21 2330    Order Status: Canceled Specimen: Cath Urine     RESPIRATORY VIRUS PANEL W/COVID-19, PCR [[id number]]     Order Status: Canceled Specimen: NASOPHARYNGEAL SWAB     COVID-19 RAPID TEST [[id number]]     Order Status: Canceled     COVID-19 RAPID TEST [[id number]] Collected: 09/13/21 0737    Order Status: Completed Specimen: Nasopharyngeal Updated: 09/13/21 0811     Specimen source Nasopharyngeal        COVID-19 rapid test Not detected        Comment: Rapid Abbott ID Now       Rapid NAAT:  The specimen is NEGATIVE for SARS-CoV-2, the novel coronavirus associated with COVID-19. Negative results should be treated as presumptive and, if inconsistent with clinical signs and symptoms or necessary for patient management, should be tested with an alternative molecular assay. Negative results do not preclude SARS-CoV-2 infection and should not be used as the sole basis for patient management decisions. This test has been authorized by the FDA under an Emergency Use Authorization (EUA) for use by authorized laboratories.    Fact sheet for Healthcare Providers: ConventionUpdate.co.nz  Fact sheet for Patients: ConventionUpdate.co.nz       Methodology: Isothermal Nucleic Acid Amplification         COVID-19 RAPID TEST [[id number]]     Order Status: Canceled         Echo 9/17/2021:  Left Ventricle Normal cavity size, wall thickness and systolic function (ejection fraction normal). The estimated EF is 60 - 65%. There is inconclusive left ventricular diastolic function E/E' ratio = 7.08  Heart rate is over 100. Wall Scoring The left ventricular wall motion is normal.            Left Atrium Normal cavity size. Right Ventricle Normal cavity size and global systolic function. Assessment of RV function:   TAPSE = 27 mm. Right Atrium Normal cavity size. Interatrial Septum Interatrial septum not well visualized   Aortic Valve Aortic valve not well visualized. Trileaflet valve structure, no stenosis and no regurgitation. Mitral Valve No stenosis. Mitral valve non-specific thickening. Mild regurgitation. Tricuspid Valve Tricuspid valve not well visualized. No stenosis. Mild regurgitation. Pulmonic Valve Pulmonic valve not well visualized. No stenosis and no regurgitation. Aorta The aorta was not well visualized. Normal aortic root. Pulmonary Artery Pulmonary arteries not well visualized. Pulmonary arterial systolic pressure (PASP) is 29 mmHg. Pulmonary hypertension not suggested by Doppler findings. IVC/Hepatic Veins Mechanically ventilated; cannot use inferior caval vein diameter to estimate central venous pressure. Pericardium Normal pericardium and no evidence of pericardial effusion. CT head 9/15/1021  IMPRESSION   No acute intracranial abnormality.       CT chest, abdomen, pelvis 9/15/1021  IMPRESSION   Endotracheal tube tip in the lower thoracic trachea 1.5 cm above the dipak.    Bilateral lower lobar atelectasis, possible endobronchial lesion/mucous plug in  the left lower lobe bronchus.    No acute intra-abdominal abnormality. Imaging:  [x]I have personally reviewed the patients chest radiographs images and report    Chest x-ray 9/28/1021  Results from Hospital Encounter encounter on 09/11/21    XR CHEST PORT    Narrative  EXAM: XR CHEST PORT    CLINICAL INDICATION/HISTORY: Acute respiratory failure, ET tub position  -Additional: None    COMPARISON: One day prior    TECHNIQUE: Portable frontal view of the chest    _______________    FINDINGS:    SUPPORT DEVICES: Tracheostomy. Enteric tube unchanged. HEART AND MEDIASTINUM: Cardiomediastinal silhouette within normal limits. LUNGS AND PLEURAL SPACES: Bilateral mid to lower lung zone opacities,  atelectasis/effusion, unchanged. No pneumothorax.    _______________    Impression  Bilateral mid to lower lung zone opacities, atelectasis/effusion, unchanged. Please note: Voice-recognition software may have been used to generate this report, which may have resulted in some phonetic-based errors in grammar and contents. Even though attempts were made to correct all the mistakes, some may have been missed, and remained in the body of the document.       Benedict Bartholomew MD  9/28/2021

## 2021-09-29 ENCOUNTER — APPOINTMENT (OUTPATIENT)
Dept: GENERAL RADIOLOGY | Age: 41
DRG: 004 | End: 2021-09-29
Attending: INTERNAL MEDICINE
Payer: MEDICAID

## 2021-09-29 LAB
ALBUMIN SERPL-MCNC: 2.1 G/DL (ref 3.4–5)
ALBUMIN/GLOB SERPL: 0.7 {RATIO} (ref 0.8–1.7)
ALP SERPL-CCNC: 177 U/L (ref 45–117)
ALT SERPL-CCNC: 188 U/L (ref 13–56)
AMMONIA PLAS-SCNC: 65 UMOL/L (ref 11–32)
ANION GAP SERPL CALC-SCNC: 9 MMOL/L (ref 3–18)
AST SERPL-CCNC: 92 U/L (ref 10–38)
BASOPHILS # BLD: 0 K/UL (ref 0–0.1)
BASOPHILS NFR BLD: 0 % (ref 0–2)
BILIRUB SERPL-MCNC: 0.2 MG/DL (ref 0.2–1)
BUN SERPL-MCNC: 14 MG/DL (ref 7–18)
BUN/CREAT SERPL: 54 (ref 12–20)
CA-I SERPL-SCNC: 1.16 MMOL/L (ref 1.12–1.32)
CALCIUM SERPL-MCNC: 8.9 MG/DL (ref 8.5–10.1)
CHLORIDE SERPL-SCNC: 109 MMOL/L (ref 100–111)
CO2 SERPL-SCNC: 25 MMOL/L (ref 21–32)
CREAT SERPL-MCNC: 0.26 MG/DL (ref 0.6–1.3)
DIFFERENTIAL METHOD BLD: ABNORMAL
EOSINOPHIL # BLD: 0.2 K/UL (ref 0–0.4)
EOSINOPHIL NFR BLD: 2 % (ref 0–5)
ERYTHROCYTE [DISTWIDTH] IN BLOOD BY AUTOMATED COUNT: 13 % (ref 11.6–14.5)
GLOBULIN SER CALC-MCNC: 3.2 G/DL (ref 2–4)
GLUCOSE BLD STRIP.AUTO-MCNC: 111 MG/DL (ref 70–110)
GLUCOSE BLD STRIP.AUTO-MCNC: 114 MG/DL (ref 70–110)
GLUCOSE BLD STRIP.AUTO-MCNC: 115 MG/DL (ref 70–110)
GLUCOSE BLD STRIP.AUTO-MCNC: 122 MG/DL (ref 70–110)
GLUCOSE SERPL-MCNC: 106 MG/DL (ref 74–99)
HCT VFR BLD AUTO: 24.8 % (ref 35–45)
HGB BLD-MCNC: 7.9 G/DL (ref 12–16)
LYMPHOCYTES # BLD: 1.7 K/UL (ref 0.9–3.6)
LYMPHOCYTES NFR BLD: 14 % (ref 21–52)
MAGNESIUM SERPL-MCNC: 1.9 MG/DL (ref 1.6–2.6)
MCH RBC QN AUTO: 28.7 PG (ref 24–34)
MCHC RBC AUTO-ENTMCNC: 31.9 G/DL (ref 31–37)
MCV RBC AUTO: 90.2 FL (ref 78–100)
MONOCYTES # BLD: 0.9 K/UL (ref 0.05–1.2)
MONOCYTES NFR BLD: 7 % (ref 3–10)
NEUTS SEG # BLD: 9 K/UL (ref 1.8–8)
NEUTS SEG NFR BLD: 76 % (ref 40–73)
PHOSPHATE SERPL-MCNC: 4.1 MG/DL (ref 2.5–4.9)
PLATELET # BLD AUTO: 365 K/UL (ref 135–420)
PMV BLD AUTO: 9.9 FL (ref 9.2–11.8)
POTASSIUM SERPL-SCNC: 3.6 MMOL/L (ref 3.5–5.5)
POTASSIUM SERPL-SCNC: 4.5 MMOL/L (ref 3.5–5.5)
PROT SERPL-MCNC: 5.3 G/DL (ref 6.4–8.2)
RBC # BLD AUTO: 2.75 M/UL (ref 4.2–5.3)
SODIUM SERPL-SCNC: 143 MMOL/L (ref 136–145)
WBC # BLD AUTO: 11.9 K/UL (ref 4.6–13.2)

## 2021-09-29 PROCEDURE — 74011250636 HC RX REV CODE- 250/636: Performed by: INTERNAL MEDICINE

## 2021-09-29 PROCEDURE — 71045 X-RAY EXAM CHEST 1 VIEW: CPT

## 2021-09-29 PROCEDURE — 94640 AIRWAY INHALATION TREATMENT: CPT

## 2021-09-29 PROCEDURE — 84100 ASSAY OF PHOSPHORUS: CPT

## 2021-09-29 PROCEDURE — 74011000250 HC RX REV CODE- 250: Performed by: INTERNAL MEDICINE

## 2021-09-29 PROCEDURE — 82330 ASSAY OF CALCIUM: CPT

## 2021-09-29 PROCEDURE — 74011250637 HC RX REV CODE- 250/637: Performed by: INTERNAL MEDICINE

## 2021-09-29 PROCEDURE — 2709999900 HC NON-CHARGEABLE SUPPLY

## 2021-09-29 PROCEDURE — 85025 COMPLETE CBC W/AUTO DIFF WBC: CPT

## 2021-09-29 PROCEDURE — 74011250636 HC RX REV CODE- 250/636: Performed by: FAMILY MEDICINE

## 2021-09-29 PROCEDURE — 77010033678 HC OXYGEN DAILY

## 2021-09-29 PROCEDURE — 84132 ASSAY OF SERUM POTASSIUM: CPT

## 2021-09-29 PROCEDURE — 82962 GLUCOSE BLOOD TEST: CPT

## 2021-09-29 PROCEDURE — 74011250637 HC RX REV CODE- 250/637: Performed by: FAMILY MEDICINE

## 2021-09-29 PROCEDURE — 65610000006 HC RM INTENSIVE CARE

## 2021-09-29 PROCEDURE — 82140 ASSAY OF AMMONIA: CPT

## 2021-09-29 PROCEDURE — 83735 ASSAY OF MAGNESIUM: CPT

## 2021-09-29 PROCEDURE — 36415 COLL VENOUS BLD VENIPUNCTURE: CPT

## 2021-09-29 PROCEDURE — P9047 ALBUMIN (HUMAN), 25%, 50ML: HCPCS | Performed by: INTERNAL MEDICINE

## 2021-09-29 PROCEDURE — 74011000258 HC RX REV CODE- 258: Performed by: INTERNAL MEDICINE

## 2021-09-29 RX ORDER — FUROSEMIDE 10 MG/ML
20 INJECTION INTRAMUSCULAR; INTRAVENOUS ONCE
Status: COMPLETED | OUTPATIENT
Start: 2021-09-29 | End: 2021-09-29

## 2021-09-29 RX ORDER — ALBUMIN HUMAN 250 G/1000ML
12.5 SOLUTION INTRAVENOUS ONCE
Status: COMPLETED | OUTPATIENT
Start: 2021-09-29 | End: 2021-09-29

## 2021-09-29 RX ORDER — POTASSIUM CHLORIDE 7.45 MG/ML
10 INJECTION INTRAVENOUS
Status: COMPLETED | OUTPATIENT
Start: 2021-09-29 | End: 2021-09-29

## 2021-09-29 RX ORDER — POTASSIUM CHLORIDE 20 MEQ/1
40 TABLET, EXTENDED RELEASE ORAL EVERY 4 HOURS
Status: ACTIVE | OUTPATIENT
Start: 2021-09-29 | End: 2021-09-29

## 2021-09-29 RX ADMIN — CLONAZEPAM 0.5 MG: 0.5 TABLET ORAL at 08:32

## 2021-09-29 RX ADMIN — ENOXAPARIN SODIUM 40 MG: 100 INJECTION SUBCUTANEOUS at 16:04

## 2021-09-29 RX ADMIN — SODIUM CHLORIDE 10 ML: 9 INJECTION, SOLUTION INTRAMUSCULAR; INTRAVENOUS; SUBCUTANEOUS at 21:57

## 2021-09-29 RX ADMIN — HALOPERIDOL LACTATE 4 MG: 5 INJECTION, SOLUTION INTRAMUSCULAR at 22:12

## 2021-09-29 RX ADMIN — DEXMEDETOMIDINE HYDROCHLORIDE 1.5 MCG/KG/HR: 100 INJECTION, SOLUTION INTRAVENOUS at 18:49

## 2021-09-29 RX ADMIN — FAMOTIDINE 20 MG: 20 TABLET ORAL at 08:32

## 2021-09-29 RX ADMIN — HALOPERIDOL LACTATE 4 MG: 5 INJECTION, SOLUTION INTRAMUSCULAR at 11:33

## 2021-09-29 RX ADMIN — SODIUM CHLORIDE 10 ML: 9 INJECTION, SOLUTION INTRAMUSCULAR; INTRAVENOUS; SUBCUTANEOUS at 15:38

## 2021-09-29 RX ADMIN — 0.12% CHLORHEXIDINE GLUCONATE 10 ML: 1.2 RINSE ORAL at 21:56

## 2021-09-29 RX ADMIN — FAMOTIDINE 20 MG: 20 TABLET ORAL at 21:56

## 2021-09-29 RX ADMIN — CLONAZEPAM 0.5 MG: 0.5 TABLET ORAL at 21:56

## 2021-09-29 RX ADMIN — LEVOFLOXACIN 750 MG: 5 INJECTION, SOLUTION INTRAVENOUS at 15:37

## 2021-09-29 RX ADMIN — HYDROMORPHONE HYDROCHLORIDE 1 MG: 1 INJECTION, SOLUTION INTRAMUSCULAR; INTRAVENOUS; SUBCUTANEOUS at 11:15

## 2021-09-29 RX ADMIN — DEXMEDETOMIDINE HYDROCHLORIDE 1.1 MCG/KG/HR: 100 INJECTION, SOLUTION INTRAVENOUS at 08:37

## 2021-09-29 RX ADMIN — POTASSIUM CHLORIDE 10 MEQ: 7.46 INJECTION, SOLUTION INTRAVENOUS at 08:32

## 2021-09-29 RX ADMIN — 0.12% CHLORHEXIDINE GLUCONATE 10 ML: 1.2 RINSE ORAL at 08:32

## 2021-09-29 RX ADMIN — ARFORMOTEROL TARTRATE 15 MCG: 15 SOLUTION RESPIRATORY (INHALATION) at 11:57

## 2021-09-29 RX ADMIN — Medication 1 TABLET: at 08:32

## 2021-09-29 RX ADMIN — FUROSEMIDE 20 MG: 10 INJECTION, SOLUTION INTRAMUSCULAR; INTRAVENOUS at 13:56

## 2021-09-29 RX ADMIN — HYDROMORPHONE HYDROCHLORIDE 1 MG: 1 INJECTION, SOLUTION INTRAMUSCULAR; INTRAVENOUS; SUBCUTANEOUS at 06:36

## 2021-09-29 RX ADMIN — THIAMINE HCL TAB 100 MG 100 MG: 100 TAB at 08:32

## 2021-09-29 RX ADMIN — HYDROMORPHONE HYDROCHLORIDE 1 MG: 1 INJECTION, SOLUTION INTRAMUSCULAR; INTRAVENOUS; SUBCUTANEOUS at 23:12

## 2021-09-29 RX ADMIN — BUDESONIDE 500 MCG: 0.5 INHALANT RESPIRATORY (INHALATION) at 11:57

## 2021-09-29 RX ADMIN — IPRATROPIUM BROMIDE 0.5 MG: 0.5 SOLUTION RESPIRATORY (INHALATION) at 11:57

## 2021-09-29 RX ADMIN — POTASSIUM CHLORIDE 10 MEQ: 7.46 INJECTION, SOLUTION INTRAVENOUS at 09:30

## 2021-09-29 RX ADMIN — FOLIC ACID 1 MG: 1 TABLET ORAL at 08:32

## 2021-09-29 RX ADMIN — LACTULOSE 45 ML: 20 SOLUTION ORAL at 08:32

## 2021-09-29 RX ADMIN — POTASSIUM CHLORIDE 10 MEQ: 7.46 INJECTION, SOLUTION INTRAVENOUS at 12:00

## 2021-09-29 RX ADMIN — LACTULOSE 45 ML: 20 SOLUTION ORAL at 21:57

## 2021-09-29 RX ADMIN — POTASSIUM CHLORIDE 10 MEQ: 7.46 INJECTION, SOLUTION INTRAVENOUS at 10:50

## 2021-09-29 RX ADMIN — DEXMEDETOMIDINE HYDROCHLORIDE 1.5 MCG/KG/HR: 100 INJECTION, SOLUTION INTRAVENOUS at 22:36

## 2021-09-29 RX ADMIN — SODIUM CHLORIDE 10 ML: 9 INJECTION, SOLUTION INTRAMUSCULAR; INTRAVENOUS; SUBCUTANEOUS at 05:18

## 2021-09-29 RX ADMIN — DEXMEDETOMIDINE HYDROCHLORIDE 1.1 MCG/KG/HR: 100 INJECTION, SOLUTION INTRAVENOUS at 02:28

## 2021-09-29 RX ADMIN — HYDROMORPHONE HYDROCHLORIDE 1 MG: 1 INJECTION, SOLUTION INTRAMUSCULAR; INTRAVENOUS; SUBCUTANEOUS at 18:47

## 2021-09-29 RX ADMIN — ACETAMINOPHEN 650 MG: 325 TABLET ORAL at 05:17

## 2021-09-29 RX ADMIN — LORAZEPAM 2 MG: 2 INJECTION INTRAMUSCULAR at 16:04

## 2021-09-29 RX ADMIN — ALBUMIN (HUMAN) 12.5 G: 0.25 INJECTION, SOLUTION INTRAVENOUS at 13:56

## 2021-09-29 RX ADMIN — DEXMEDETOMIDINE HYDROCHLORIDE 1.1 MCG/KG/HR: 100 INJECTION, SOLUTION INTRAVENOUS at 14:33

## 2021-09-29 NOTE — PROGRESS NOTES
Hospitalist Progress Note-critical care note     Patient: Mike Warner MRN: [id number]  [facility]: [id number]    YOB: 1980  Age: 39 y.o. Sex: female    DOA: 9/11/2021 LOS:  LOS: 17 days            Chief complaint: wrist fracture , drug overdose , acute respiratory failure with hypoxia, psychosis     Assessment/Plan         Hospital Problems  Date Reviewed: 9/6/2015        Codes Class Noted POA    Status post tracheostomy ([facility] ICD-10-CM: Z93.0  ICD-9-CM: V44.0  9/28/2021 Unknown        Hypokalemia ICD-10-CM: E87.6  ICD-9-CM: 276.8  9/21/2021 Unknown        Increased ammonia level ICD-10-CM: R79.89  ICD-9-CM: 790.6  9/14/2021 Unknown        Psychosis ([facility] ICD-10-CM: F29  ICD-9-CM: 298.9  Unknown Unknown        Hyponatremia ICD-10-CM: E87.1  ICD-9-CM: 276.1  Unknown Yes        Acute respiratory failure with hypoxia ([facility] ICD-10-CM: J96.01  ICD-9-CM: 518.81  Unknown Yes        Left wrist fracture, with delayed healing, subsequent encounter ICD-10-CM: S62.102G  ICD-9-CM: V54.19  9/12/2021 Unknown        * (Principal) Drug overdose, intentional self-harm, initial encounter ([facility] ICD-10-CM: T50.902A  ICD-9-CM: 977.9, E950.5  9/12/2021 Yes        Hypoxia ICD-10-CM: R09.02  ICD-9-CM: 799.02  9/12/2021 Yes        Hypotension after procedure ICD-10-CM: I95.81  ICD-9-CM: 458.29  9/12/2021 Yes        Acute metabolic encephalopathy MSX-49-TI: G93.41  ICD-9-CM: 348.31  9/12/2021 Yes                  Mike Warner is a 39 y.o. female who is standing history of multidrug use/abuse, previous drug-seeking behavior and mental health issues otherwise unspecified arrives via EMS with acute metabolic encephalopathy and lethargy. Admitted for likely gabapentin overdose. She was intubated in ER, ct head no acute issue, LP done due to fever even on abx, no meningitis noted. Acute respiratory failure with hypoxia   Intubated on 9/12    Trach on 9/20/21.    Continue trach care   On trach collar -so fat tolerated Continue weaning vent per pulm   Continue breathing tx . Post tracheostomy   Continue local trach care       Anemia  Stable so far no bleeding reported and will continue monitoring   Upper PVL negative for dvt           Acute metabolic encephalopathy   Open eyes per voice stimuli and follow some commands   Ct head no acute process   Alert and follow the command   On precedex       Gabapentin overdose with lethargy and AMS   S/p procedural stomach emptying in ED with charcoal and sorbitol   Continue monitoring the side affect          elevated ammonia level  Continue   Lactulose,   Continue ammonia monitoring       Psychosis , hx of  Ekbom's delusional parasitosis   Need psych evaluation later   On olanzapine and klonopin     left wrist fracture: immobilized -poa   Appreciated ortho on board-f/u with Dr. Alon Almonte as out-pt     Speech follow up    Disposition :tbd,   Review of systems:  Unable to obtain due to trach   Vital signs/Intake and Output:  Visit Vitals  /69   Pulse 70   Temp 99.2 °F (37.3 °C)   Resp 19   Ht 5' 2\" (1.575 m)   Wt 71.2 kg (157 lb)   SpO2 98%   BMI 28.72 kg/m²     Current Shift:  09/29 0701 - 09/29 1900  In: 315.1 [I.V.:255.1]  Out: 500 [Drains:500]  Last three shifts:  09/27 1901 - 09/29 0700  In: 4005.6 [I.V.:643.6]  Out: 3317 [Urine:2250; Drains:1400]    Physical Exam:  General: Alert. HEENT: NC, Atraumatic. Trach noted   Lungs: CTA Bilaterally. No Wheezing/Rhonchi/Rales. Heart:  rrr ,  No murmur, No Rubs, No Gallops  Abdomen: Soft, Non distended, Non tender. +Bowel sounds,   Extremities: No c/c.immobilzer noted on left wrist   Psych:   Calm   Neurologic:  Open eyes per voice.  Follow some command  On precedex           Labs: Results:       Chemistry Recent Labs     09/29/21  0523 09/28/21  1905 09/28/21  0445 09/27/21  1715 09/27/21  0255   *  --  96  --  116*     --  144  --  143   K 3.6 4.3 3.8   < > 3.8     --  111  --  110   CO2 25  --  26  --  26   BUN 14  --  13  --  14   CREA 0.26*  --  0.30*  --  0.29*   CA 8.9  --  9.0  --  8.5   AGAP 9  --  7  --  7   BUCR 54*  --  43*  --  48*   *  --  142*  --  132*   TP 5.3*  --  5.5*  --  5.5*   ALB 2.1*  --  2.3*  --  2.2*   GLOB 3.2  --  3.2  --  3.3   AGRAT 0.7*  --  0.7*  --  0.7*    < > = values in this interval not displayed. CBC w/Diff Recent Labs     09/29/21  0523 09/28/21  1500 09/28/21  0445 09/27/21  0255 09/27/21  0255   WBC 11.9  --  12.8  --  17.3*   RBC 2.75*  --  2.61*  --  2.84*   HGB 7.9* 8.5* 7.7*   < > 8.6*   HCT 24.8* 26.7* 23.9*   < > 27.1*     --  325  --  336   GRANS 76*  --  77*  --  77*   LYMPH 14*  --  13*  --  13*   EOS 2  --  2  --  2    < > = values in this interval not displayed. Cardiac Enzymes No results for input(s): CPK, CKND1, ZENON in the last 72 hours. No lab exists for component: CKRMB, TROIP   Coagulation No results for input(s): PTP, INR, APTT, INREXT, INREXT in the last 72 hours. Lipid Panel No results found for: CHOL, CHOLPOCT, CHOLX, CHLST, CHOLV, 924081, HDL, HDLP, LDL, LDLC, DLDLP, 525345, VLDLC, VLDL, TGLX, TRIGL, TRIGP, TGLPOCT, CHHD, CHHDX   BNP No results for input(s): BNPP in the last 72 hours. Liver Enzymes Recent Labs     09/29/21  0523   TP 5.3*   ALB 2.1*   *      Thyroid Studies No results found for: T4, T3U, TSH, TSHEXT, TSHEXT     Procedures/imaging: see electronic medical records for all procedures/Xrays and details which were not copied into this note but were reviewed prior to creation of Plan    XR WRIST LT AP/LAT/OBL MIN 3V    Result Date: 8/19/2021  EXAM: XR WRIST LT AP/LAT/OBL MIN 3V CLINICAL INDICATION/HISTORY: Fall with LEFT wrist pain and swelling -Additional: None COMPARISON: None TECHNIQUE: 3 views of the left wrist _______________ FINDINGS: BONES: Comminuted, impacted fracture of the distal radius with slight dorsal angulation of the distal fracture fragment. No aggressive appearing osseous lytic or blastic lesion. SOFT TISSUES: Unremarkable. _______________     Comminuted, impacted fracture of the distal radius. XR CHEST PORT    Result Date: 9/13/2021  EXAM: XR CHEST PORT CLINICAL INDICATION/HISTORY: Acute respiratory failure, ET tub position -Additional: None COMPARISON: One day prior TECHNIQUE: Portable frontal view of the chest _______________ FINDINGS: SUPPORT DEVICES: Endotracheal and enteric tubes unchanged. HEART AND MEDIASTINUM: Cardiomediastinal silhouette within normal limits. LUNGS AND PLEURAL SPACES: No dense consolidation, large effusion or pneumothorax. _______________     No acute cardiopulmonary abnormality. XR CHEST PORT    Result Date: 9/11/2021  MEDICAL RECORDS NUMBER: [id number] PROCEDURE:  Single view of the chest DATE: 9/11/2021 9:09 PM HISTORY: 39years old Female. post ett Comparison: 8/4/2021 FINDINGS: The patient has been intubated with appropriate placement of the endotracheal tube. There is no enteric tube passing below the level of the diaphragm. There is no significant effusion. There is no significant pneumothorax. Cardiomediastinal silhouette is within normal limits. There is no evidence of a focal pulmonary infiltrate or mass. 1. There is no significant or acute cardiopulmonary process. The patient has been intubated with appropriate placement of the endotracheal tube. There is no enteric tube passing below the level of the diaphragm. This report has been generated using voice recognition software. XR ABD PORT  1 V    Result Date: 9/12/2021  EXAM: Frontal view of the abdomen CLINICAL INDICATION/HISTORY: NG tube placement COMPARISON: None. _______________ FINDINGS: NG/OG tube in place with the tip in the left upper quadrant in the region of the gastric fundus. No bowel obstruction. Moderate colonic stool burden. _______________     1. NG/OG tube is in good position with the tip in the left upper quadrant in the region of the gastric fundus.  2. Moderate colonic stool burden.       Arnulfo Ordoñez MD

## 2021-09-29 NOTE — PROGRESS NOTES
Patient on trach collar and became extremely agitated, kicking, trying to get out of bed and not cooperative. DR and RN at bedside. Patient placed on vent with pressure support 10/ PEEP 5 Fio2 35%. After sedation patient doing well on Pressure support, with good tidal volumes and SPO2 96% RR 16. Will monitor.

## 2021-09-29 NOTE — PROGRESS NOTES
Occupational Therapy Evaluation Attempt     OT eval orders received. Chart reviewed. Pt currently sedated and intubated. Not appropriate for skilled therapy services at this time. Will discontinue current orders. Please refer again when pt medically stable.    Thank you for the referral.     Pablito Haq OTR/L

## 2021-09-29 NOTE — PROGRESS NOTES
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    Left wrist fracture, with delayed healing, subsequent encounter S62.102G    Drug overdose, intentional self-harm, initial encounter ([address] T50.902A    Hypoxia R09.02    Hypotension after procedure I95.81    Acute metabolic encephalopathy B02.76    Psychosis ([facility] [address] [facility] F29    Hyponatremia E87.1    Acute respiratory failure with hypoxia (HCC) J96.01    Increased ammonia level R79.89    Hypokalemia E87.6    Status post tracheostomy ([facility] [address] Z93.0 ·   Code status: full code     RECOMMENDATIONS:   Respiratory: Patient on ventilator support; intubated 2021 due to encephalopathy and drug overdose. Patient was extubated on 2021, and reintubated within an hour due to respiratory distress and upper airway edema. S/p tracheostomy 2021. Patient was on trach collar; placed back on CPAP pressure support 10 due to agitation and dyspnea; check chest x-ray. Patient on Precedex infusion; prn lorazepam, Dilaudid and haloperidol; will consider phenobarbital while weaning Precedex in the coming days. Continue ventilator and sedation bundles. Chest x-raytrach tube in good position; bibasal densities and small pleural effusion; will give 1 dose of Lasix with albumin; NG tube in good position in the stomach. ID: WBC improved; afebrile. S/p LP with negative CSF cultures. Covid test 2021 and 2021: Negative. Sputum culture 2021: MSSA. Blood culture 2021: NGTD. Barahona catheter changed on 2021.   Antibioticsrespiratory cultures MSSA; antibiotics have been narrowed to levofloxacin x 5 doses. ID has signed off; reconsult as needed. CVS: Stable hemodynamics; continue to monitor. Echo 9/17/2021: LVEF 60 to 65%. RVSP 29 mmHg. Ultrasound leg 9/15negative study for DVTs. Renal: Stable renal function and urine output; creatinine normal.  Replace potassium. Heme: Hemoglobin drifting down, but stable; continue to monitor; no active bleeding; stool occult blood 9/28negative. Endo: Stable blood sugarsmonitor; sliding scale insulin as needed. GI: Continue tube feeding; NG tube in good position. Patient not able to cooperate with SLP nurse since agitated and confused; consult IR for PEG tube placement. hCG negative on admission. Ammonia 65; continue lactulose twice daily. Recent CT abdomen Liver is unremarkable. No abnormal biliary dilation. Gallbladder is unremarkable. Nutrition: Patient tolerating tube feed well; continue free water via NG tube. Patient on thiamine and folic acid. Neurology: Patient confused and delirious currently; known patient with psych issues and drug overdose on presentation during this admission; continue to avoid continuous sedation with benzodiazepines. Transaminases remain elevatedcheck hepatitis virus panel. CT headnil acute  Psych: On Klonopin. Toxicology: Gabapentin OD on admission. Skin: ICU nursing care  MS: Left wrist fracture in immobilization external fixator  Prophylaxis: DVT prophylaxis: Lovenox 40 sc daily. GI prophylaxis: Famotidine. Restraints: Wrist soft restraints as needed for patient interfering with medical therapy/management and patient safety. Consulted PT, OT and SLP teamsappreciate input. Prognosis guarded overall. CODE STATUSfull code. Palliative care on case. Quality Care: PPI, DVT prophylaxis, HOB elevated, Infection control all reviewed and addressed. Lines/Tubes: PIV   S/p ETT: 9/11/219/22/2021. Tracheostomy 9/22/2021. NGT: 9/11/21  Barahona: 9/11/21; changed 9/24/2021.   Discontinue Barahona catheter today.    ADVANCE DIRECTIVE: Full code. Discussed with RN, RT. High complexity decision making was performed during the evaluation of this patient at high risk for decompensation with multiple organ involvement. Steve Pressley   sister-in-law   [phone]            Subjective/History:   Ms. Lito Mo has been seen and evaluated as Dr. Chandana Vidal requested now for assisting with Acute respiratory failure and ventilator management. Patient is a 39 y.o. female with following PMhx presented to ER with lethargy via EMS and admitted for Gabapentin overdose. Pt required intubation for airways protection. Position control was contacted that recommended supportive care per ER provider. Pt seen at bedside in ER rm#2. The patient can not provide additional history due to Ventilated. 9/29/2021  Remains in ICU room 102. Patient has been on trach collar this morning; later, patient started getting agitated and having difficulty in breathing, and was placed on ventilator with pressure support mode. No significant respiratory secretions or tube feeds on suctioning from the airways. No bleeding from trach site. Afebrile; blood pressure stable; telemetrysinus rhythm. Urine output1.2 L yesterday. Patient has a history of drug use and smoker and alcohol use. Review of Systems:  Review of systems not obtained due to patient factors.         Past Medical History:  Past Medical History:   Diagnosis Date    Asthma     Bilateral ovarian cysts     Cocaine abuse ([facility] [facility]     Mental and behavioral problem     Pancreatitis     Pancreatitis     Psychosis ([facility] [address]         Past Surgical History:  Past Surgical History:   Procedure Laterality Date    HX GYN      D&C, Hysterectomy        Medications:    Current Facility-Administered Medications   Medication Dose Route Frequency    levoFLOXacin (LEVAQUIN) 750 mg in D5W IVPB  750 mg IntraVENous Q24H    ipratropium (ATROVENT) 0.02 % nebulizer solution 0.5 mg 0.5 mg Nebulization QID RT    dexmedeTOMidine (PRECEDEX) 400 mcg in 0.9% sodium chloride 100 mL infusion  0.1-1.5 mcg/kg/hr IntraVENous TITRATE    famotidine (PEPCID) tablet 20 mg  20 mg Oral BID    thiamine mononitrate (B-1) tablet 100 mg  100 mg Oral DAILY    folic acid (FOLVITE) tablet 1 mg  1 mg Oral DAILY    multivitamin, tx-iron-ca-min (THERA-M w/ IRON) tablet 1 Tablet  1 Tablet Oral DAILY    lactulose (CHRONULAC) 10 gram/15 mL solution 45 mL  30 g Oral BID    clonazePAM (KlonoPIN) tablet 0.5 mg  0.5 mg Oral BID    insulin lispro (HUMALOG) injection   SubCUTAneous Q6H    arformoteroL (BROVANA) neb solution 15 mcg  15 mcg Nebulization BID RT    budesonide (PULMICORT) 500 mcg/2 ml nebulizer suspension  500 mcg Nebulization BID RT    enoxaparin (LOVENOX) injection 40 mg  40 mg SubCUTAneous Q24H    sodium chloride (NS) flush 5-40 mL  5-40 mL IntraVENous Q8H    chlorhexidine (PERIDEX) 0.12 % mouthwash 10 mL  10 mL Oral Q12H       Allergy:  Allergies   Allergen Reactions    Fish Containing Products Itching    Toradol [Ketorolac] Rash     Pt reports she is not allergic to this medication. Social History:  Social History     Tobacco Use    Smoking status: Current Every Day Smoker     Packs/day: 1.00    Smokeless tobacco: Never Used   Substance Use Topics    Alcohol use: Not Currently    Drug use: Not Currently     Types: Cocaine, Marijuana     Comment: used with in past 24 hours          Objective:   Vital Signs:    Blood pressure 104/72, pulse 83, temperature 98.1 °F (36.7 °C), resp. rate 15, height 5' 2\" (1.575 m), weight 71.2 kg (157 lb), last menstrual period 05/15/2018, SpO2 93 %. Body mass index is 28.72 kg/m².    O2 Device: Ventilator       Temp (24hrs), Av.2 °F (37.3 °C), Min:98.1 °F (36.7 °C), Max:100.3 °F (37.9 °C)         Intake/Output:   Last shift:       07 -  1900  In: 693.5 [I.V.:633.5]  Out: 950 [Urine:450; Drains:500]  Last 3 shifts: 1901 -  0700  In: 4005.6 [I.V.:643.6]  Out: 3650 [Urine:2250; Drains:1400]    Intake/Output Summary (Last 24 hours) at 9/29/2021 1233  Last data filed at 9/29/2021 1200  Gross per 24 hour   Intake 3930.5 ml   Output 3600 ml   Net 330.5 ml       ABG:    No results found for: PH, PHI, PCO2, PCO2I, PO2, PO2I, HCO3, HCO3I, FIO2, FIO2I  Ventilator Mode: Spontaneous  Respiratory Rate  Resp Rate Observed: 9  Back-Up Rate: 14  Insp Time (sec): 1.2 sec  I:E Ratio: 1:2.58  Ventilator Volumes  Vt Set (ml): 400 ml  Vt Exhaled (Machine Breath) (ml): 540 ml  Vt Spont (ml): 481 ml  Ve Observed (l/min): 7.72 l/min  Ventilator Pressures  Pressure Support (cm H2O): 10 cm H2O  PIP Observed (cm H2O): 16 cm H2O  Plateau Pressure (cm H2O): 12 cm H2O  MAP (cm H2O): 7.9  PEEP/VENT (cm H2O): 5 cm H20  Auto PEEP Observed (cm H2O): 0 cm H2O       Physical Exam:   Patient appears older than stated age; appears chronically ill; on trach collar support; acyanotic  HEENT: pupils not dilated, reactive, no scleral jaundice, no nasal drainage  Neck: No adenopathy or thyroid swelling  Tracheostomy tubeno bleeding seen; occasionally,thick clear secretions noted around trach site  Patient head position towards the right side; finds it painful and stiff to move head to the midline  CVS: S1-S2; no murmurs; JVD not elevated; telemetrysinus rhythm  RS: Moderate air entry bilaterally; no wheezing;  few bibasal crackles; not tachypneic or in distress currently   Abd: Soft, no tenderness, not distended, no guarding or rigidity or rebound; bowel sounds present; no hepatosplenomegaly  Neuro: Intubated; confused and agitated; not purposeful; moving all extremities; limited exam   Extrm: no leg edema or swelling or clubbing  Skin: no rash  Lymphatic: no cervical or supraclavicular adenopathy  Vasc: Good DPs in both feet         Data:     Recent Results (from the past 12 hour(s))   GLUCOSE, POC    Collection Time: 09/29/21  5:09 AM   Result Value Ref Range    Glucose (POC) 122 (H) 70 - 110 mg/dL   CBC WITH AUTOMATED DIFF    Collection Time: 09/29/21  5:23 AM   Result Value Ref Range    WBC 11.9 4.6 - 13.2 K/uL    RBC 2.75 (L) 4.20 - 5.30 M/uL    HGB 7.9 (L) 12.0 - 16.0 g/dL    HCT 24.8 (L) 35.0 - 45.0 %    MCV 90.2 78.0 - 100.0 FL    MCH 28.7 24.0 - 34.0 PG    MCHC 31.9 31.0 - 37.0 g/dL    RDW 13.0 11.6 - 14.5 %    PLATELET 341 452 - 664 K/uL    MPV 9.9 9.2 - 11.8 FL    NEUTROPHILS 76 (H) 40 - 73 %    LYMPHOCYTES 14 (L) 21 - 52 %    MONOCYTES 7 3 - 10 %    EOSINOPHILS 2 0 - 5 %    BASOPHILS 0 0 - 2 %    ABS. NEUTROPHILS 9.0 (H) 1.8 - 8.0 K/UL    ABS. LYMPHOCYTES 1.7 0.9 - 3.6 K/UL    ABS. MONOCYTES 0.9 0.05 - 1.2 K/UL    ABS. EOSINOPHILS 0.2 0.0 - 0.4 K/UL    ABS. BASOPHILS 0.0 0.0 - 0.1 K/UL    DF AUTOMATED     MAGNESIUM    Collection Time: 09/29/21  5:23 AM   Result Value Ref Range    Magnesium 1.9 1.6 - 2.6 mg/dL   METABOLIC PANEL, COMPREHENSIVE    Collection Time: 09/29/21  5:23 AM   Result Value Ref Range    Sodium 143 136 - 145 mmol/L    Potassium 3.6 3.5 - 5.5 mmol/L    Chloride 109 100 - 111 mmol/L    CO2 25 21 - 32 mmol/L    Anion gap 9 3.0 - 18 mmol/L    Glucose 106 (H) 74 - 99 mg/dL    BUN 14 7.0 - 18 MG/DL    Creatinine 0.26 (L) 0.6 - 1.3 MG/DL    BUN/Creatinine ratio 54 (H) 12 - 20      GFR est AA >60 >60 ml/min/1.73m2    GFR est non-AA >60 >60 ml/min/1.73m2    Calcium 8.9 8.5 - 10.1 MG/DL    Bilirubin, total 0.2 0.2 - 1.0 MG/DL    ALT (SGPT) 188 (H) 13 - 56 U/L    AST (SGOT) 92 (H) 10 - 38 U/L    Alk.  phosphatase 177 (H) 45 - 117 U/L    Protein, total 5.3 (L) 6.4 - 8.2 g/dL    Albumin 2.1 (L) 3.4 - 5.0 g/dL    Globulin 3.2 2.0 - 4.0 g/dL    A-G Ratio 0.7 (L) 0.8 - 1.7     PHOSPHORUS    Collection Time: 09/29/21  5:23 AM   Result Value Ref Range    Phosphorus 4.1 2.5 - 4.9 MG/DL   AMMONIA    Collection Time: 09/29/21  5:23 AM   Result Value Ref Range    Ammonia 65 (H) 11 - 32 UMOL/L   CALCIUM, IONIZED    Collection Time: 09/29/21  5:23 AM   Result Value Ref Range Ionized Calcium 1.16 1.12 - 1.32 MMOL/L   GLUCOSE, POC    Collection Time: 09/29/21 11:22 AM   Result Value Ref Range    Glucose (POC) 115 (H) 70 - 110 mg/dL             Recent Labs     09/27/21  0417   FIO2I 50   HCO3I 25.5   PCO2I 39.1   PHI 7.43   PO2I 86       All Micro Results     Procedure Component Value Units Date/Time    CULTURE, BLOOD [[id number]] Collected: 09/27/21 1005    Order Status: Completed Specimen: Blood Updated: 09/29/21 0743     Special Requests: NO SPECIAL REQUESTS        Culture result: NO GROWTH 2 DAYS       CULTURE, BLOOD [[id number]] Collected: 09/27/21 1005    Order Status: Completed Specimen: Blood Updated: 09/29/21 0743     Special Requests: NO SPECIAL REQUESTS        Culture result: NO GROWTH 2 DAYS       CULTURE, BLOOD [[id number]] Collected: 09/24/21 0740    Order Status: Completed Specimen: Blood Updated: 09/29/21 0743     Special Requests: NO SPECIAL REQUESTS        Culture result: NO GROWTH 5 DAYS       CULTURE, RESPIRATORY/SPUTUM/BRONCH W GRAM STAIN [[id number]]  (Abnormal)  (Susceptibility) Collected: 09/24/21 0654    Order Status: Completed Specimen: Sputum from Tracheal Aspirate Updated: 09/27/21 1147     Special Requests: NO SPECIAL REQUESTS        GRAM STAIN RARE WBCS SEEN               RARE EPITHELIAL CELLS SEEN            NO ORGANISMS SEEN        Culture result:       MODERATE STAPHYLOCOCCUS AUREUS                  NO NORMAL RESPIRATORY DINA ISOLATED          COVID-19 RAPID TEST [[id number]] Collected: 09/24/21 1700    Order Status: Completed Specimen: Nasopharyngeal Updated: 09/24/21 1849     Specimen source Nasopharyngeal        COVID-19 rapid test Not detected        Comment: Rapid Abbott ID Now       Rapid NAAT:  The specimen is NEGATIVE for SARS-CoV-2, the novel coronavirus associated with COVID-19.        Negative results should be treated as presumptive and, if inconsistent with clinical signs and symptoms or necessary for patient management, should be tested with an alternative molecular assay. Negative results do not preclude SARS-CoV-2 infection and should not be used as the sole basis for patient management decisions. This test has been authorized by the FDA under an Emergency Use Authorization (EUA) for use by authorized laboratories. Fact sheet for Healthcare Providers: ConventionUpdate.co.nz  Fact sheet for Patients: ConventionUpdate.co.nz       Methodology: Isothermal Nucleic Acid Amplification         CULTURE, URINE [[id number]]     Order Status: Canceled Specimen: Urine from Clean catch     CULTURE, CSF  TUBE 2 [[id number]] Collected: 09/16/21 1434    Order Status: Completed Specimen: Cerebrospinal Fluid Updated: 09/23/21 1241     Special Requests: TUBE 3, CULTURE AND SENSITIVTY AND GRAM STAIN. GRAM STAIN RARE WBCS SEEN         NO ORGANISMS SEEN        Culture result: NO GROWTH 7 DAYS       CULTURE, BLOOD [[id number]] Collected: 09/14/21 0515    Order Status: Completed Specimen: Blood Updated: 09/20/21 0832     Special Requests: NO SPECIAL REQUESTS        Culture result: NO GROWTH 6 DAYS       CULTURE, BLOOD [[id number]] Collected: 09/14/21 0500    Order Status: Completed Specimen: Blood Updated: 09/20/21 0832     Special Requests: NO SPECIAL REQUESTS        Culture result: NO GROWTH 6 DAYS       MENINGITIS PATHOGENS PANEL, CSF (BY PCR) [[id number]] Collected: 09/16/21 1434    Order Status: Completed Specimen: Cerebrospinal Fluid Updated: 09/17/21 0050     Escherichia coli K1 Not detected        Haemophilus Influenzae Not detected        Listeria Monocytogenes Not detected        Neisseria Meningitidis Not detected        Streptococcus Agalactiae Not detected        Streptococcus Pneumoniae Not detected        Cytomegalovirus Not detected        Enterovirus Not detected        Herpes Simplex Virus 1 Not detected        Comment:  In patients who have negative herpes simplex 1 and 2 PCR results, do not modify treatment, confirm with alternate testing. Herpes Simplex Virus 2 Not detected        Comment: In patients who have negative herpes simplex 1 and 2 PCR results, do not modify treatment, confirm with alternate testing. Human Herpesvirus 6 Not detected        Human Parechovirus Not detected        Varicella Zoster Virus Not detected        Crypto. neoformans/gattii Not detected       MENINGITIS PATHOGENS PANEL, CSF (BY PCR) [[id number]] Collected: 09/16/21 1545    Order Status: Canceled Specimen: Cerebrospinal Fluid     HSV 1 AND 2 BY PCR [[id number]] Collected: 09/16/21 1434    Order Status: Canceled Specimen: Other     FDLWE-09 RAPID TEST [[id number]] Collected: 09/16/21 1000    Order Status: Completed Specimen: Nasopharyngeal Updated: 09/16/21 1032     Specimen source Nasopharyngeal        COVID-19 rapid test Not detected        Comment: Rapid Abbott ID Now       Rapid NAAT:  The specimen is NEGATIVE for SARS-CoV-2, the novel coronavirus associated with COVID-19. Negative results should be treated as presumptive and, if inconsistent with clinical signs and symptoms or necessary for patient management, should be tested with an alternative molecular assay. Negative results do not preclude SARS-CoV-2 infection and should not be used as the sole basis for patient management decisions. This test has been authorized by the FDA under an Emergency Use Authorization (EUA) for use by authorized laboratories.    Fact sheet for Healthcare Providers: ConventionUpdate.co.nz  Fact sheet for Patients: ConventionUpdate.co.nz       Methodology: Isothermal Nucleic Acid Amplification         LEGIONELLA PNEUMOPHILA AG, URINE [[id number]] Collected: 09/14/21 2315    Order Status: Completed Specimen: Urine, random Updated: 09/15/21 1042     Legionella Ag, urine Negative       STREP PNEUMO AG, URINE [[id number]] Collected: 09/14/21 2315    Order Status: Completed Specimen: Urine, random Updated: 09/15/21 1042     Strep pneumo Ag, urine Negative       RESPIRATORY VIRUS PANEL W/COVID-19, PCR [[id number]] Collected: 09/14/21 1832    Order Status: Completed Specimen: Nasopharyngeal Updated: 09/14/21 2234     Adenovirus Not detected        Coronavirus 229E Not detected        Coronavirus HKU1 Not detected        Coronavirus CVNL63 Not detected        Coronavirus OC43 Not detected        SARS-CoV-2, PCR Not detected        Metapneumovirus Not detected        Rhinovirus and Enterovirus Not detected        Influenza A Not detected        Influenza A, subtype H1 Not detected        Influenza A, subtype H3 Not detected        INFLUENZA A H1N1 PCR Not detected        Influenza B Not detected        Parainfluenza 1 Not detected        Parainfluenza 2 Not detected        Parainfluenza 3 Not detected        Parainfluenza virus 4 Not detected        RSV by PCR Not detected        B. parapertussis, PCR Not detected        Bordetella pertussis - PCR Not detected        Chlamydophila pneumoniae DNA, QL, PCR Not detected        Mycoplasma pneumoniae DNA, QL, PCR Not detected       CULTURE, BLOOD [[id number]] Collected: 09/14/21 1700    Order Status: Canceled Specimen: Blood     CULTURE, URINE [[id number]] Collected: 09/13/21 2330    Order Status: Canceled Specimen: Cath Urine     RESPIRATORY VIRUS PANEL W/COVID-19, PCR [[id number]]     Order Status: Canceled Specimen: NASOPHARYNGEAL SWAB     COVID-19 RAPID TEST [[id number]]     Order Status: Canceled     COVID-19 RAPID TEST [[id number]] Collected: 09/13/21 0737    Order Status: Completed Specimen: Nasopharyngeal Updated: 09/13/21 0811     Specimen source Nasopharyngeal        COVID-19 rapid test Not detected        Comment: Rapid Abbott ID Now       Rapid NAAT:  The specimen is NEGATIVE for SARS-CoV-2, the novel coronavirus associated with COVID-19.        Negative results should be treated as presumptive and, if inconsistent with clinical signs and symptoms or necessary for patient management, should be tested with an alternative molecular assay. Negative results do not preclude SARS-CoV-2 infection and should not be used as the sole basis for patient management decisions. This test has been authorized by the FDA under an Emergency Use Authorization (EUA) for use by authorized laboratories. Fact sheet for Healthcare Providers: ConventionOakmonkeydate.co.nz  Fact sheet for Patients: ConventionOakmonkeydate.co.nz       Methodology: Isothermal Nucleic Acid Amplification         COVID-19 RAPID TEST [[id number]]     Order Status: Canceled         Echo 9/17/2021:  Left Ventricle Normal cavity size, wall thickness and systolic function (ejection fraction normal). The estimated EF is 60 - 65%. There is inconclusive left ventricular diastolic function E/E' ratio = 7.08  Heart rate is over 100. Wall Scoring The left ventricular wall motion is normal.            Left Atrium Normal cavity size. Right Ventricle Normal cavity size and global systolic function. Assessment of RV function:   TAPSE = 27 mm. Right Atrium Normal cavity size. Interatrial Septum Interatrial septum not well visualized   Aortic Valve Aortic valve not well visualized. Trileaflet valve structure, no stenosis and no regurgitation. Mitral Valve No stenosis. Mitral valve non-specific thickening. Mild regurgitation. Tricuspid Valve Tricuspid valve not well visualized. No stenosis. Mild regurgitation. Pulmonic Valve Pulmonic valve not well visualized. No stenosis and no regurgitation. Aorta The aorta was not well visualized. Normal aortic root. Pulmonary Artery Pulmonary arteries not well visualized. Pulmonary arterial systolic pressure (PASP) is 29 mmHg. Pulmonary hypertension not suggested by Doppler findings. IVC/Hepatic Veins Mechanically ventilated; cannot use inferior caval vein diameter to estimate central venous pressure.    Pericardium Normal pericardium and no evidence of pericardial effusion. CT head 9/15/1021  IMPRESSION   No acute intracranial abnormality. CT chest, abdomen, pelvis 9/15/1021  IMPRESSION   Endotracheal tube tip in the lower thoracic trachea 1.5 cm above the dipak.    Bilateral lower lobar atelectasis, possible endobronchial lesion/mucous plug in  the left lower lobe bronchus.    No acute intra-abdominal abnormality. Imaging:  [x]I have personally reviewed the patients chest radiographs images and report    X-ray 9/28/1021  Results from Hospital Encounter encounter on 09/11/21    XR WRIST LT AP/LAT/OBL MIN 3V    Narrative  EXAM: XR WRIST LT AP/LAT/OBL MIN 3V    CLINICAL INDICATION/HISTORY: fracture  -Additional: Left wrist pain    COMPARISON: None    TECHNIQUE: 3 views of the left wrist    _______________    FINDINGS:    BONES: Patient is imaged with an external fixation device in situ. Percutaneous  pin fixation noted reducing and transfixing a comminuted, intra-articular distal  left radius fracture. Patchy osseous demineralization in keeping with limited  mobility/usage. No superimposed acute fracture. SOFT TISSUES: Diffuse soft tissue swelling, greatest along the dorsum of the  right hand.    _______________    Impression  1. Reduced and externally fixated comminuted intra-articular distal left radius  fracture. 2. Diffuse extremity soft tissue swelling, greatest over the dorsum of the left  hand. Chest x-ray 9/29   CLINICAL INDICATION/HISTORY: Acute respiratory failure, ET tub position  -Additional: None   COMPARISON: One day prior   TECHNIQUE: Portable frontal view of the chest   FINDINGS:   SUPPORT DEVICES: Tracheostomy. Enteric tube unchanged.   HEART AND MEDIASTINUM: Cardiomediastinal silhouette within normal limits.   LUNGS AND PLEURAL SPACES: Bilateral mid to lower lung zone opacities,  atelectasis/effusion, unchanged.  No pneumothorax.    IMPRESSION   Bilateral mid to lower lung zone opacities, atelectasis/effusion, unchanged. Please note: Voice-recognition software may have been used to generate this report, which may have resulted in some phonetic-based errors in grammar and contents. Even though attempts were made to correct all the mistakes, some may have been missed, and remained in the body of the document.       Shelia Dunn MD  9/29/2021

## 2021-09-29 NOTE — CONSULTS
Consult Note    Patient: Te Gustafson               Sex: female          DOA: 9/11/2021         YOB: 1980      Age:  39 y.o.        LOS:  LOS: 17 days              HPI:     Te Gustafson is a 39 y.o. female who has been seen for orthopedic evaluation status post external fixation of left distal radius fracture done by Dr. Ludwig Jennings on 8/26/2021. History is obtained from the chart due to patient factors. Review of the chart patient initially sustained a fracture on 8/19/2021 was discharged from the ED and external fixation was performed by Dr. Ludwig Jennings on 8/26/2021 as per the chart. Patient is currently intubated with a trach and sedated so it is difficult to obtain history. She does report pain at the wrist.  But she does report she has light touch and sensation throughout as she is able to nod her head. She is admitted for drug overdose and respiratory failure. Past Medical History:   Diagnosis Date    Asthma     Bilateral ovarian cysts     Cocaine abuse ([facility] [address]     Mental and behavioral problem     Pancreatitis     Pancreatitis     Psychosis ([facility] [address] [facility]        Past Surgical History:   Procedure Laterality Date    HX GYN      D&C, Hysterectomy       History reviewed. No pertinent family history.     Social History     Socioeconomic History    Marital status:      Spouse name: Not on file    Number of children: Not on file    Years of education: Not on file    Highest education level: Not on file   Tobacco Use    Smoking status: Current Every Day Smoker     Packs/day: 1.00    Smokeless tobacco: Never Used   Substance and Sexual Activity    Alcohol use: Not Currently    Drug use: Not Currently     Types: Cocaine, Marijuana     Comment: used with in past 24 hours    Sexual activity: Not Currently     Social Determinants of Health     Financial Resource Strain:     Difficulty of Paying Living Expenses:    Food Insecurity:     Worried About Running Out of Food in the Last Year:  Ran Out of Food in the Last Year:    Transportation Needs:     Lack of Transportation (Medical):  Lack of Transportation (Non-Medical):    Physical Activity:     Days of Exercise per Week:     Minutes of Exercise per Session:    Stress:     Feeling of Stress :    Social Connections:     Frequency of Communication with Friends and Family:     Frequency of Social Gatherings with Friends and Family:     Attends Denominational Services:     Active Member of Clubs or Organizations:     Attends Club or Organization Meetings:     Marital Status:        Prior to Admission medications    Medication Sig Start Date End Date Taking? Authorizing Provider   albuterol (PROVENTIL HFA, VENTOLIN HFA, PROAIR HFA) 90 mcg/actuation inhaler Take 2 Puffs by inhalation every four (4) hours as needed for Wheezing or Shortness of Breath. 8/4/21   Henry Zhang PA-C   ibuprofen (MOTRIN) 600 mg tablet Take 1 Tablet by mouth every six (6) hours as needed for Pain. Take with food. 8/4/21   Henry Zhang PA-C   ALPRAZolam (XANAX) 0.5 mg tablet TAKE 1 TABLET BY MOUTH ONCE DAILY AS NEEDED FOR ANXIETY 12/4/19   Provider, Historical   metFORMIN (GLUCOPHAGE) 500 mg tablet TAKE 1 TABLET BY MOUTH ONCE DAILY 11/12/19   Provider, Historical   nicotine (NICODERM CQ) 21 mg/24 hr APPLY 1 PATCH TOPICALLY TO THE SKIN DAILY FOR CRAVINGS AS DIRECTED 12/4/19   Provider, Historical   traZODone (DESYREL) 50 mg tablet TAKE 1 TABLET BY MOUTH AT BEDTIME AS NEEDED FOR INSOMNIA 12/4/19   Provider, Historical   escitalopram oxalate (LEXAPRO) 10 mg tablet Take 10 mg by mouth daily. Luisa Burch MD   lurasidone (LATUDA) 80 mg tab tablet Take 80 mg by mouth daily (with dinner). Luisa Burch MD       Allergies   Allergen Reactions    Fish Containing Products Itching    Toradol [Ketorolac] Rash     Pt reports she is not allergic to this medication. Review of Systems  Review of systems not obtained due to patient factors.       Physical Exam: Visit Vitals  /70   Pulse 78   Temp 99.2 °F (37.3 °C)   Resp 15   Ht 5' 2\" (1.575 m)   Wt 71.2 kg (157 lb)   SpO2 96%   BMI 28.72 kg/m²       Physical Exam:  There is an external fixator to the left wrist.  Small open wound volarly where there is a percutaneous pin. There is no active bleeding. Pin sites show no evidence of infection. There is no erythema, discharge, or bleeding. Patient has no lymphadenitis and there is no streaking. Patient is intact to light touch and sensation throughout the entirety of the left hand. She does have weakened  strength but it is intact. She is able to move all digits on command. He has brisk capillary refill throughout all digits. Again patient is sedated so exam was difficult. There is some swelling distally over the dorsum of the hand but this is to be expected. Labs Reviewed:  EXAM: XR WRIST LT AP/LAT/OBL MIN 3V     CLINICAL INDICATION/HISTORY: fracture  -Additional: Left wrist pain     COMPARISON: None     TECHNIQUE: 3 views of the left wrist     _______________     FINDINGS:     BONES: Patient is imaged with an external fixation device in situ. Percutaneous  pin fixation noted reducing and transfixing a comminuted, intra-articular distal  left radius fracture. Patchy osseous demineralization in keeping with limited  mobility/usage. No superimposed acute fracture.     SOFT TISSUES: Diffuse soft tissue swelling, greatest along the dorsum of the  right hand.     _______________     IMPRESSION        1. Reduced and externally fixated comminuted intra-articular distal left radius  fracture. 2. Diffuse extremity soft tissue swelling, greatest over the dorsum of the left  hand.     Assessment/Plan     Principal Problem:    Drug overdose, intentional self-harm, initial encounter ([facility] [address] (9/12/2021)    Active Problems:    Left wrist fracture, with delayed healing, subsequent encounter (9/12/2021)      Hypoxia (9/12/2021)      Hypotension after procedure (9/12/2021)      Acute metabolic encephalopathy (3/72/9317)      Psychosis ([facility] [address] ()      Hyponatremia ()      Acute respiratory failure with hypoxia (HCC) ()      Increased ammonia level (9/14/2021)      Hypokalemia (9/21/2021)      Status post tracheostomy ([facility] (9/28/2021)        Plan was discussed with Dr. Kitty Palacios who did review the studies and the presentation. Plan is for outpatient follow-up with Dr. Saint Clair who performed the surgery. Dressing changes to the pin sites as needed. Facture appears stable and there is no evidence of infection. No plan for further surgical intervention at this time to remove the external fixator. Will defer to Dr. Saint Clair outpatient. Patient is orthopedically stable and we are able to sign off at this point.

## 2021-09-29 NOTE — PROGRESS NOTES
Patient remains on Pressure support, appears comfortable with good exhaled volumes, SPO2 97% , RR 14. Will continue monitoring for agitation or increased work of breathing.

## 2021-09-29 NOTE — PROGRESS NOTES
@2000 pt awake , restless in bed ,on ventilator via trach. OG tube in-situ infusing tube feeds. Barahona in-situ draining bonnie urine, flexi seal fecal management continues to drain liquid stool. Pt remains on Precedex drip. External fixator on left lower hand to wrist remains intact. Assessment done  and recorded in relevant flow sheets. Nursing management continues. @0000 pt reassessed no changes, relative called to check in on pt , presented pass code, brief update given, care continues. @0400 reassessment completed care continues. @4053 Bedside and Verbal shift change report given to Herman Overton (oncoming nurse) by Morgan Donato (offgoing nurse).  Report included the following information SBAR, Kardex, Intake/Output, MAR, Recent Results, Med Rec Status and Alarm Parameters .

## 2021-09-29 NOTE — PROGRESS NOTES
9/29/2021 PT note: consult received and chart reviewed. At this time pt remains on vent with trach collar and on Precedex. Will discontinue PT orders at this time; please re-consult when pt medically stable for participation with PT intervention. Nurse notified of above.  Thank you for this referral.  Gregory Rubio, PT

## 2021-09-29 NOTE — PROGRESS NOTES
Spoke with RN [address] [place] [address] in regards to PEG placement. Patients tube feeds have not been held until just now. RN aware that no MD to do procedure tomorrow, PEG will be placed Friday. RN aware for tube feed holds and lovenox hold for Thursday for PEG Friday.

## 2021-09-29 NOTE — PROGRESS NOTES
Bedside shift change report given to Chanel Higginbotham RN (oncoming nurse) by Ross Tim RN (offgoing nurse). Report included the following information SBAR, Kardex and MAR.

## 2021-09-29 NOTE — PROGRESS NOTES
Speech-Language Pathology Evaluation/Treatment Attempt     Chart reviewed. Attempted Speech-Language Pathology Evaluation/Treatment, however, patient unable to be seen due to:  []  Nausea/vomiting  []  Eating  []  Pain  [x]  Patient too lethargic: was kicking side of bed, medicated for pain per nurse, asleep; nursing, Tai Villanueva requests do not awaken patient  []  Off Unit for testing/procedure  []  Dialysis treatment in progress   []  Telemetry Results  [x]  Other: Patient has trach, on/off ventilator support, on precedex; tube fed     Will f/u later as patient's schedule allows. Thank you for this referral.  MARIANNA Baker.Ed, [address] [facility]

## 2021-09-29 NOTE — PROGRESS NOTES
Bedside shift change report given to 1980 Shashank Simmons RN (oncoming nurse) by [address], RN (offgoing nurse). Report included the following information SBAR, Kardex and MAR.

## 2021-09-30 ENCOUNTER — APPOINTMENT (OUTPATIENT)
Dept: GENERAL RADIOLOGY | Age: 41
DRG: 004 | End: 2021-09-30
Attending: INTERNAL MEDICINE
Payer: MEDICAID

## 2021-09-30 LAB
ALBUMIN SERPL-MCNC: 2.7 G/DL (ref 3.4–5)
ALBUMIN/GLOB SERPL: 0.8 {RATIO} (ref 0.8–1.7)
ALP SERPL-CCNC: 199 U/L (ref 45–117)
ALT SERPL-CCNC: 179 U/L (ref 13–56)
AMMONIA PLAS-SCNC: 18 UMOL/L (ref 11–32)
ANION GAP SERPL CALC-SCNC: 7 MMOL/L (ref 3–18)
AST SERPL-CCNC: 56 U/L (ref 10–38)
BACTERIA SPEC CULT: NORMAL
BASOPHILS # BLD: 0 K/UL (ref 0–0.1)
BASOPHILS NFR BLD: 0 % (ref 0–2)
BILIRUB SERPL-MCNC: 0.5 MG/DL (ref 0.2–1)
BUN SERPL-MCNC: 13 MG/DL (ref 7–18)
BUN/CREAT SERPL: 35 (ref 12–20)
CA-I SERPL-SCNC: 1.22 MMOL/L (ref 1.12–1.32)
CALCIUM SERPL-MCNC: 9.5 MG/DL (ref 8.5–10.1)
CHLORIDE SERPL-SCNC: 105 MMOL/L (ref 100–111)
CO2 SERPL-SCNC: 27 MMOL/L (ref 21–32)
CREAT SERPL-MCNC: 0.37 MG/DL (ref 0.6–1.3)
DIFFERENTIAL METHOD BLD: ABNORMAL
EOSINOPHIL # BLD: 0.2 K/UL (ref 0–0.4)
EOSINOPHIL NFR BLD: 1 % (ref 0–5)
ERYTHROCYTE [DISTWIDTH] IN BLOOD BY AUTOMATED COUNT: 12.7 % (ref 11.6–14.5)
GLOBULIN SER CALC-MCNC: 3.5 G/DL (ref 2–4)
GLUCOSE BLD STRIP.AUTO-MCNC: 104 MG/DL (ref 70–110)
GLUCOSE BLD STRIP.AUTO-MCNC: 106 MG/DL (ref 70–110)
GLUCOSE BLD STRIP.AUTO-MCNC: 92 MG/DL (ref 70–110)
GLUCOSE BLD STRIP.AUTO-MCNC: 96 MG/DL (ref 70–110)
GLUCOSE SERPL-MCNC: 99 MG/DL (ref 74–99)
HAV IGM SER QL: NEGATIVE
HBV CORE IGM SER QL: NEGATIVE
HBV SURFACE AG SER QL: <0.1 INDEX
HBV SURFACE AG SER QL: NEGATIVE
HCT VFR BLD AUTO: 25.8 % (ref 35–45)
HCV AB SER IA-ACNC: 0.05 INDEX
HCV AB SERPL QL IA: NEGATIVE
HCV COMMENT,HCGAC: NORMAL
HGB BLD-MCNC: 8.5 G/DL (ref 12–16)
LYMPHOCYTES # BLD: 1.6 K/UL (ref 0.9–3.6)
LYMPHOCYTES NFR BLD: 11 % (ref 21–52)
MAGNESIUM SERPL-MCNC: 1.9 MG/DL (ref 1.6–2.6)
MCH RBC QN AUTO: 29.1 PG (ref 24–34)
MCHC RBC AUTO-ENTMCNC: 32.9 G/DL (ref 31–37)
MCV RBC AUTO: 88.4 FL (ref 78–100)
MONOCYTES # BLD: 0.9 K/UL (ref 0.05–1.2)
MONOCYTES NFR BLD: 6 % (ref 3–10)
NEUTS SEG # BLD: 11.2 K/UL (ref 1.8–8)
NEUTS SEG NFR BLD: 80 % (ref 40–73)
PHOSPHATE SERPL-MCNC: 4.4 MG/DL (ref 2.5–4.9)
PLATELET # BLD AUTO: 477 K/UL (ref 135–420)
PMV BLD AUTO: 10.5 FL (ref 9.2–11.8)
POTASSIUM SERPL-SCNC: 3.8 MMOL/L (ref 3.5–5.5)
POTASSIUM SERPL-SCNC: 4.6 MMOL/L (ref 3.5–5.5)
PROT SERPL-MCNC: 6.2 G/DL (ref 6.4–8.2)
RBC # BLD AUTO: 2.92 M/UL (ref 4.2–5.3)
SERVICE CMNT-IMP: NORMAL
SODIUM SERPL-SCNC: 139 MMOL/L (ref 136–145)
SP1: NORMAL
SP2: NORMAL
SP3: NORMAL
WBC # BLD AUTO: 14 K/UL (ref 4.6–13.2)

## 2021-09-30 PROCEDURE — 82330 ASSAY OF CALCIUM: CPT

## 2021-09-30 PROCEDURE — 84132 ASSAY OF SERUM POTASSIUM: CPT

## 2021-09-30 PROCEDURE — 74011250636 HC RX REV CODE- 250/636: Performed by: INTERNAL MEDICINE

## 2021-09-30 PROCEDURE — 77010033678 HC OXYGEN DAILY

## 2021-09-30 PROCEDURE — 80074 ACUTE HEPATITIS PANEL: CPT

## 2021-09-30 PROCEDURE — 71045 X-RAY EXAM CHEST 1 VIEW: CPT

## 2021-09-30 PROCEDURE — 83735 ASSAY OF MAGNESIUM: CPT

## 2021-09-30 PROCEDURE — 74011000258 HC RX REV CODE- 258: Performed by: INTERNAL MEDICINE

## 2021-09-30 PROCEDURE — 77030006998

## 2021-09-30 PROCEDURE — 74011250637 HC RX REV CODE- 250/637: Performed by: INTERNAL MEDICINE

## 2021-09-30 PROCEDURE — 82140 ASSAY OF AMMONIA: CPT

## 2021-09-30 PROCEDURE — 74011000250 HC RX REV CODE- 250: Performed by: INTERNAL MEDICINE

## 2021-09-30 PROCEDURE — 94003 VENT MGMT INPAT SUBQ DAY: CPT

## 2021-09-30 PROCEDURE — 36415 COLL VENOUS BLD VENIPUNCTURE: CPT

## 2021-09-30 PROCEDURE — 85025 COMPLETE CBC W/AUTO DIFF WBC: CPT

## 2021-09-30 PROCEDURE — 82962 GLUCOSE BLOOD TEST: CPT

## 2021-09-30 PROCEDURE — 65610000006 HC RM INTENSIVE CARE

## 2021-09-30 PROCEDURE — 84100 ASSAY OF PHOSPHORUS: CPT

## 2021-09-30 RX ORDER — CALCIUM CARB/MAGNESIUM CARB 311-232MG
5 TABLET ORAL
Status: DISCONTINUED | OUTPATIENT
Start: 2021-09-30 | End: 2021-11-13 | Stop reason: HOSPADM

## 2021-09-30 RX ORDER — PROPOFOL 10 MG/ML
5 VIAL (ML) INTRAVENOUS
Status: DISCONTINUED | OUTPATIENT
Start: 2021-09-30 | End: 2021-09-30

## 2021-09-30 RX ORDER — PROPOFOL 10 MG/ML
0-50 VIAL (ML) INTRAVENOUS
Status: DISCONTINUED | OUTPATIENT
Start: 2021-09-30 | End: 2021-10-03

## 2021-09-30 RX ORDER — FENTANYL CITRATE 50 UG/ML
50 INJECTION, SOLUTION INTRAMUSCULAR; INTRAVENOUS
Status: DISCONTINUED | OUTPATIENT
Start: 2021-09-30 | End: 2021-10-30

## 2021-09-30 RX ORDER — POTASSIUM CHLORIDE 7.45 MG/ML
10 INJECTION INTRAVENOUS ONCE
Status: COMPLETED | OUTPATIENT
Start: 2021-09-30 | End: 2021-09-30

## 2021-09-30 RX ORDER — HALOPERIDOL 5 MG/ML
4 INJECTION INTRAMUSCULAR
Status: DISCONTINUED | OUTPATIENT
Start: 2021-09-30 | End: 2021-10-03

## 2021-09-30 RX ORDER — MIDAZOLAM HYDROCHLORIDE 1 MG/ML
1 INJECTION, SOLUTION INTRAMUSCULAR; INTRAVENOUS ONCE
Status: COMPLETED | OUTPATIENT
Start: 2021-09-30 | End: 2021-09-30

## 2021-09-30 RX ORDER — PHENOBARBITAL SODIUM 65 MG/ML
65 INJECTION INTRAMUSCULAR ONCE
Status: COMPLETED | OUTPATIENT
Start: 2021-09-30 | End: 2021-09-30

## 2021-09-30 RX ADMIN — FAMOTIDINE 20 MG: 20 TABLET ORAL at 08:47

## 2021-09-30 RX ADMIN — DEXMEDETOMIDINE HYDROCHLORIDE 1.5 MCG/KG/HR: 100 INJECTION, SOLUTION INTRAVENOUS at 07:02

## 2021-09-30 RX ADMIN — HYDROMORPHONE HYDROCHLORIDE 1 MG: 1 INJECTION, SOLUTION INTRAMUSCULAR; INTRAVENOUS; SUBCUTANEOUS at 11:26

## 2021-09-30 RX ADMIN — PHENOBARBITAL SODIUM 65 MG: 65 INJECTION INTRAMUSCULAR at 15:31

## 2021-09-30 RX ADMIN — LORAZEPAM 2 MG: 2 INJECTION INTRAMUSCULAR at 20:13

## 2021-09-30 RX ADMIN — SODIUM CHLORIDE 10 ML: 9 INJECTION, SOLUTION INTRAMUSCULAR; INTRAVENOUS; SUBCUTANEOUS at 21:30

## 2021-09-30 RX ADMIN — PROPOFOL 25 MCG/KG/MIN: 10 INJECTION, EMULSION INTRAVENOUS at 22:24

## 2021-09-30 RX ADMIN — THIAMINE HCL TAB 100 MG 100 MG: 100 TAB at 08:47

## 2021-09-30 RX ADMIN — 0.12% CHLORHEXIDINE GLUCONATE 10 ML: 1.2 RINSE ORAL at 20:13

## 2021-09-30 RX ADMIN — CLONAZEPAM 0.5 MG: 0.5 TABLET ORAL at 08:47

## 2021-09-30 RX ADMIN — CLONAZEPAM 0.5 MG: 0.5 TABLET ORAL at 20:14

## 2021-09-30 RX ADMIN — DEXMEDETOMIDINE HYDROCHLORIDE 1.5 MCG/KG/HR: 100 INJECTION, SOLUTION INTRAVENOUS at 11:19

## 2021-09-30 RX ADMIN — LACTULOSE 45 ML: 20 SOLUTION ORAL at 08:46

## 2021-09-30 RX ADMIN — DEXMEDETOMIDINE HYDROCHLORIDE 1.5 MCG/KG/HR: 100 INJECTION, SOLUTION INTRAVENOUS at 02:43

## 2021-09-30 RX ADMIN — 0.12% CHLORHEXIDINE GLUCONATE 10 ML: 1.2 RINSE ORAL at 08:47

## 2021-09-30 RX ADMIN — HYDROMORPHONE HYDROCHLORIDE 1 MG: 1 INJECTION, SOLUTION INTRAMUSCULAR; INTRAVENOUS; SUBCUTANEOUS at 03:04

## 2021-09-30 RX ADMIN — SODIUM CHLORIDE 10 ML: 9 INJECTION, SOLUTION INTRAMUSCULAR; INTRAVENOUS; SUBCUTANEOUS at 05:36

## 2021-09-30 RX ADMIN — Medication 1 TABLET: at 08:47

## 2021-09-30 RX ADMIN — HALOPERIDOL LACTATE 4 MG: 5 INJECTION, SOLUTION INTRAMUSCULAR at 21:27

## 2021-09-30 RX ADMIN — FAMOTIDINE 20 MG: 20 TABLET ORAL at 20:14

## 2021-09-30 RX ADMIN — HYDROMORPHONE HYDROCHLORIDE 1 MG: 1 INJECTION, SOLUTION INTRAMUSCULAR; INTRAVENOUS; SUBCUTANEOUS at 19:17

## 2021-09-30 RX ADMIN — SODIUM CHLORIDE 10 ML: 9 INJECTION, SOLUTION INTRAMUSCULAR; INTRAVENOUS; SUBCUTANEOUS at 14:00

## 2021-09-30 RX ADMIN — LORAZEPAM 2 MG: 2 INJECTION INTRAMUSCULAR at 16:00

## 2021-09-30 RX ADMIN — MIDAZOLAM HYDROCHLORIDE 1 MG: 1 INJECTION, SOLUTION INTRAMUSCULAR; INTRAVENOUS at 23:00

## 2021-09-30 RX ADMIN — Medication 5 MG: at 23:05

## 2021-09-30 RX ADMIN — DEXMEDETOMIDINE HYDROCHLORIDE 1.5 MCG/KG/HR: 100 INJECTION, SOLUTION INTRAVENOUS at 22:15

## 2021-09-30 RX ADMIN — FENTANYL CITRATE 50 MCG: 50 INJECTION INTRAMUSCULAR; INTRAVENOUS at 21:48

## 2021-09-30 RX ADMIN — LORAZEPAM 2 MG: 2 INJECTION INTRAMUSCULAR at 06:02

## 2021-09-30 RX ADMIN — LEVOFLOXACIN 750 MG: 5 INJECTION, SOLUTION INTRAVENOUS at 15:31

## 2021-09-30 RX ADMIN — POTASSIUM CHLORIDE 10 MEQ: 7.46 INJECTION, SOLUTION INTRAVENOUS at 07:12

## 2021-09-30 RX ADMIN — FOLIC ACID 1 MG: 1 TABLET ORAL at 08:47

## 2021-09-30 NOTE — PROGRESS NOTES
@2000 pt awake , restless in bed ,on ventilator via trach. OG tube in-situ infusing tube feeds. Barahona in-situ draining bonnie urine, flexi seal fecal management continues to drain liquid stool. Pt remains on Precedex drip. External fixator on left lower hand to wrist remains intact. Assessment completed and charted in relevant flow sheets. Nursing management continues. @0000 pt reassessed no changes care continues. @0400 reassessment completed no new changes continues to be monitored . @0700 Bedside and Verbal shift change report given to Cynthia Zaragoza (oncoming nurse) by Syeda Zhu (offgoing nurse). Report included the following information SBAR, Kardex, Intake/Output, MAR, Recent Results, Med Rec Status and Alarm Parameters .

## 2021-09-30 NOTE — PROGRESS NOTES
09/30/21 0843   Oxygen Therapy   O2 Sat (%) 98 %   Pulse via Oximetry 80 beats per minute   O2 Device Tracheal collar   O2 Flow Rate (L/min) 8 l/min   FIO2 (%) 35 %   Placed patient on trach collar. Vitals stable SPO2 100%, RR 15. Tolerating well at this time.

## 2021-09-30 NOTE — PROGRESS NOTES
Care manager rounded on patient who was on trach collar and precedex - group home in bed with leg over side rail; repositioned per nursing. Continuing to complete UAI to see if Level II needs to be completed due to psych hx.     Care Management Interventions  Transition of Care Consult (CM Consult): Discharge Planning  The Plan for Transition of Care is Related to the Following Treatment Goals : intentional drug overdose  Discharge Location  Discharge Placement:  (TBD)

## 2021-09-30 NOTE — PROGRESS NOTES
Speech-Language Pathology Evaluation/Attempt - Discharge order     Chart reviewed. Attempted Speech-Language Pathology Evaluation, however, patient unable to be seen due to:    []  Nausea/vomiting  []  Eating  []  Pain  [x]  Patient too lethargic: Nursing, Devan Adams, states patient either asleep or agitated when awake throughout the day  []  Off Unit for testing/procedure  []  Dialysis treatment in progress   []  Telemetry Results  [x]  Other: Patient has trach, on/off ventilator support, on precedex; tube fed     Will discharge order from caseload at this time. Please re-consult ST if needed. Thank you,    Malissa ATKINSON  [address] [facility] [place], [address] [facility]

## 2021-09-30 NOTE — PROGRESS NOTES
09/30/21 1807   Airway - Tracheostomy Tube 09/22/21 Shiley; Cuffed   Placement Date/Time: 09/22/21 1910   Number of Attempts: 1  Inserted By: Dr. Marilee Millan  Present on Admission/Arrival: No  Placement Verified: Auscultation;BBS;EtCO2;Placement not verified (comment)  Airway Types: Endotracheal, cuffed  Trach Tube Type: Sh... Trach Dressing Changed Yes   Trach Cleaned With Normal saline   Trach Tie Changed Yes   Trach Cannula Changed Yes   Inner Cannula #8 Shiley; Cuffed, cuff inflated     Trach care provided.

## 2021-09-30 NOTE — PROGRESS NOTES
Hospitalist Progress Note-critical care note     Patient: Milton Sharp MRN: [id number]  CSN: [id number]    YOB: 1980  Age: 39 y.o. Sex: female    DOA: 9/11/2021 LOS:  LOS: 18 days            Chief complaint: wrist fracture , drug overdose , acute respiratory failure with hypoxia, psychosis     Assessment/Plan         Hospital Problems  Date Reviewed: 9/6/2015        Codes Class Noted POA    Status post tracheostomy ([facility] ICD-10-CM: Z93.0  ICD-9-CM: V44.0  9/28/2021 Unknown        Hypokalemia ICD-10-CM: E87.6  ICD-9-CM: 276.8  9/21/2021 Unknown        Increased ammonia level ICD-10-CM: R79.89  ICD-9-CM: 790.6  9/14/2021 Unknown        Psychosis ([facility] ICD-10-CM: F29  ICD-9-CM: 298.9  Unknown Unknown        Hyponatremia ICD-10-CM: E87.1  ICD-9-CM: 276.1  Unknown Yes        Acute respiratory failure with hypoxia ([facility] ICD-10-CM: J96.01  ICD-9-CM: 518.81  Unknown Yes        Left wrist fracture, with delayed healing, subsequent encounter ICD-10-CM: S62.102G  ICD-9-CM: V54.19  9/12/2021 Unknown        * (Principal) Drug overdose, intentional self-harm, initial encounter ([facility] ICD-10-CM: T50.902A  ICD-9-CM: 977.9, E950.5  9/12/2021 Yes        Hypoxia ICD-10-CM: R09.02  ICD-9-CM: 799.02  9/12/2021 Yes        Hypotension after procedure ICD-10-CM: I95.81  ICD-9-CM: 458.29  9/12/2021 Yes        Acute metabolic encephalopathy IIV-88-BP: G93.41  ICD-9-CM: 348.31  9/12/2021 Yes                  Milton Sharp is a 39 y.o. female who is standing history of multidrug use/abuse, previous drug-seeking behavior and mental health issues otherwise unspecified arrives via EMS with acute metabolic encephalopathy and lethargy. Admitted for likely gabapentin overdose. She was intubated in ER, ct head no acute issue, LP done due to fever even on abx, no meningitis noted. Acute respiratory failure with hypoxia   Intubated on 9/12    Trach on 9/20/21.    Continue trach care   On trach collar -trail day time, put back vent last night due to agitation, will retry again   Continue weaning vent per pulm   Continue breathing tx . Post tracheostomy   Continue local trach care       Anemia  Stable hb 8.5 today, no bleeding reported   Upper PVL negative for dvt           Acute metabolic encephalopathy   Agitated last night received  Iv haloidal   Open eyes per voice stimuli and follow some commands   Ct head no acute process   On precedex       Gabapentin overdose with lethargy and AMS   S/p procedural stomach emptying in ED with charcoal and sorbitol   Continue monitoring the side affect          elevated ammonia level  Continue   Lactulose,   Continue ammonia monitoring       Psychosis , hx of  Ekbom's delusional parasitosis   Need psych evaluation later   On olanzapine and klonopin     left wrist fracture: immobilized -poa   Appreciated ortho on board-f/u with Dr. Daniela Abad as out-pt     Speech follow up    Will have peg tube placement tomorrow     Disposition :tbd,   Review of systems:  Unable to obtain due to trach   Vital signs/Intake and Output:  Visit Vitals  /66   Pulse 79   Temp 98.6 °F (37 °C)   Resp 17   Ht 5' 2\" (1.575 m)   Wt 71.2 kg (157 lb)   SpO2 99%   BMI 28.72 kg/m²     Current Shift:  09/30 0701 - 09/30 1900  In: -   Out: 300 [Urine:300]  Last three shifts:  09/28 1901 - 09/30 0700  In: 2228.9 [I.V.:911.9]  Out: 6998 [Urine:4075; Drains:900]    Physical Exam:  General: Alert. HEENT: NC, Atraumatic. Trach collar noted   Lungs: CTA Bilaterally. No Wheezing/Rhonchi/Rales. Heart:  rrr ,  No murmur, No Rubs, No Gallops  Abdomen: Soft, Non distended, Non tender. +Bowel sounds,   Extremities: No c/c.immobilzer noted on left wrist   Psych:   Calm   Neurologic:  Open eyes per voice.  Follow some command  On precedex           Labs: Results:       Chemistry Recent Labs     09/30/21  1306 09/30/21  0443 09/29/21  1542 09/29/21  0523 09/29/21  0523 09/28/21  1905 09/28/21  0445   GLU  --  99  --   --  106*  -- 96   NA  --  139  --   --  143  --  144   K 4.6 3.8 4.5   < > 3.6   < > 3.8   CL  --  105  --   --  109  --  111   CO2  --  27  --   --  25  --  26   BUN  --  13  --   --  14  --  13   CREA  --  0.37*  --   --  0.26*  --  0.30*   CA  --  9.5  --   --  8.9  --  9.0   AGAP  --  7  --   --  9  --  7   BUCR  --  35*  --   --  54*  --  43*   AP  --  199*  --   --  177*  --  142*   TP  --  6.2*  --   --  5.3*  --  5.5*   ALB  --  2.7*  --   --  2.1*  --  2.3*   GLOB  --  3.5  --   --  3.2  --  3.2   AGRAT  --  0.8  --   --  0.7*  --  0.7*    < > = values in this interval not displayed. CBC w/Diff Recent Labs     09/30/21  0443 09/29/21  0523 09/28/21  1500 09/28/21  0445 09/28/21 0445   WBC 14.0* 11.9  --   --  12.8   RBC 2.92* 2.75*  --   --  2.61*   HGB 8.5* 7.9* 8.5*   < > 7.7*   HCT 25.8* 24.8* 26.7*   < > 23.9*   * 365  --   --  325   GRANS 80* 76*  --   --  77*   LYMPH 11* 14*  --   --  13*   EOS 1 2  --   --  2    < > = values in this interval not displayed. Cardiac Enzymes No results for input(s): CPK, CKND1, ZENON in the last 72 hours. No lab exists for component: CKRMB, TROIP   Coagulation No results for input(s): PTP, INR, APTT, INREXT, INREXT in the last 72 hours. Lipid Panel No results found for: CHOL, CHOLPOCT, CHOLX, CHLST, CHOLV, 330410, HDL, HDLP, LDL, LDLC, DLDLP, 642534, VLDLC, VLDL, TGLX, TRIGL, TRIGP, TGLPOCT, CHHD, CHHDX   BNP No results for input(s): BNPP in the last 72 hours.    Liver Enzymes Recent Labs     09/30/21  0443   TP 6.2*   ALB 2.7*   *      Thyroid Studies No results found for: T4, T3U, TSH, TSHEXT, TSHEXT     Procedures/imaging: see electronic medical records for all procedures/Xrays and details which were not copied into this note but were reviewed prior to creation of Plan    XR WRIST LT AP/LAT/OBL MIN 3V    Result Date: 8/19/2021  EXAM: XR WRIST LT AP/LAT/OBL MIN 3V CLINICAL INDICATION/HISTORY: Fall with LEFT wrist pain and swelling -Additional: None COMPARISON: None TECHNIQUE: 3 views of the left wrist _______________ FINDINGS: BONES: Comminuted, impacted fracture of the distal radius with slight dorsal angulation of the distal fracture fragment. No aggressive appearing osseous lytic or blastic lesion. SOFT TISSUES: Unremarkable. _______________     Comminuted, impacted fracture of the distal radius. XR CHEST PORT    Result Date: 9/13/2021  EXAM: XR CHEST PORT CLINICAL INDICATION/HISTORY: Acute respiratory failure, ET tub position -Additional: None COMPARISON: One day prior TECHNIQUE: Portable frontal view of the chest _______________ FINDINGS: SUPPORT DEVICES: Endotracheal and enteric tubes unchanged. HEART AND MEDIASTINUM: Cardiomediastinal silhouette within normal limits. LUNGS AND PLEURAL SPACES: No dense consolidation, large effusion or pneumothorax. _______________     No acute cardiopulmonary abnormality. XR CHEST PORT    Result Date: 9/11/2021  MEDICAL RECORDS NUMBER: [id number] PROCEDURE:  Single view of the chest DATE: 9/11/2021 9:09 PM HISTORY: 39years old Female. post ett Comparison: 8/4/2021 FINDINGS: The patient has been intubated with appropriate placement of the endotracheal tube. There is no enteric tube passing below the level of the diaphragm. There is no significant effusion. There is no significant pneumothorax. Cardiomediastinal silhouette is within normal limits. There is no evidence of a focal pulmonary infiltrate or mass. 1. There is no significant or acute cardiopulmonary process. The patient has been intubated with appropriate placement of the endotracheal tube. There is no enteric tube passing below the level of the diaphragm. This report has been generated using voice recognition software.      XR ABD PORT  1 V    Result Date: 9/12/2021  EXAM: Frontal view of the abdomen CLINICAL INDICATION/HISTORY: NG tube placement COMPARISON: None. _______________ FINDINGS: NG/OG tube in place with the tip in the left upper quadrant in the region of the gastric fundus. No bowel obstruction. Moderate colonic stool burden. _______________     1. NG/OG tube is in good position with the tip in the left upper quadrant in the region of the gastric fundus. 2. Moderate colonic stool burden.       Debora Whyte MD

## 2021-09-30 NOTE — PROGRESS NOTES
0730-Received pt resting in bed with eyes open, no sign of distress, vss on ventilator through tracheostomy and precedex drip, pt awake and alert, will continue to monitor  0943-Pt not tolerating trach collar, mouthing \"I Can't breathe\" repeatedly  1000-Pt appears more comfortable on ventilator but still easily agitated and restless  1226-Pt frequently agitated, turning sideways in bed, mouthing for pain medicine, given, will continue to monitor  1230-Bedside and Verbal shift change report given to Venice Finch RN (oncoming nurse) by Allan Santos RN (offgoing nurse). Report included the following information SBAR, Kardex, Intake/Output, MAR, Recent Results and Cardiac Rhythm SR/ST.

## 2021-09-30 NOTE — PROGRESS NOTES
Called to bedside by RN due to increased work of breathing and reduced SPO2. Patient coughing, agitated  suctioned for copious amounts of tenacious green secretions from trach, mouth, and nose. Switched patient from trach collar to vent on Pressure support 10/5 and 35% FIO2. . Patient much improved, RR 18 SPo2 95%. RN and Dr. chambers.

## 2021-09-30 NOTE — PROGRESS NOTES
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    Left wrist fracture, with delayed healing, subsequent encounter S62.102G    Drug overdose, intentional self-harm, initial encounter ([facility] [address] T50.902A    Hypoxia R09.02    Hypotension after procedure I95.81    Acute metabolic encephalopathy O70.24    Psychosis ([facility] [address] F29    Hyponatremia E87.1    Acute respiratory failure with hypoxia (HCC) J96.01    Increased ammonia level R79.89    Hypokalemia E87.6    Status post tracheostomy ([facility] [address] Z93.0 ·   Code status: full code     RECOMMENDATIONS:   Respiratory: Patient on ventilator support; intubated 2021 due to encephalopathy and drug overdose. Patient was extubated on 2021, and reintubated within an hour due to respiratory distress and upper airway edema. S/p tracheostomy 2021. Trach collar during the daytime as tolerated; and patient gets agitated, she goes back on CPAP mode with pressure support 10. Chest x-raystable findings; tracheostomy tube and NG tube in good position; lungs clear with no focal consolidation; Daily chest x-ray while patient on NG tube feeding. Continue Precedex infusion; prn lorazepam, Dilaudid and haloperidol. Continue ventilator and sedation bundles. ID: S/p LP with negative CSF cultures. Covid test 2021 and 2021: Negative. Sputum culture 2021: MSSA. Blood culture 2021: NGTD. Barahona catheter changed on 2021. Antibioticsrespiratory cultures MSSA; complete levofloxacin course. ID has signed off; reconsult as needed.   CVS: Blood pressure stable; continue to monitor. Echo 9/17/2021: LVEF 60 to 65%. RVSP 29 mmHg. Ultrasound leg 9/15negative study for DVTs. Renal: Good urine output; normal renal function; creatinine remains normal; electrolytes stable, and replace as needed. Heme: Stable hemoglobin and platelets; mild anemia due to chronic ICU stay and frequent blood draws; no active bleeding; hemoglobin 8.5 today. Stool occult blood 9/28negative. Endo: Stable blood sugarsmonitor; sliding scale insulin as needed. GI: Patient tolerating tube feeding; NG tube in good position. IR consulted for PEG tube placementplanned for tomorrow. hCG negative on admission. Ammonia down to 18; continue lactulose daily. Transaminases remain elevatedhepatitis virus panelnegative study. Recent CT abdomen Liver is unremarkable. No abnormal biliary dilation. Gallbladder is unremarkable. Nutrition: Patient tolerating tube feed well; continue free water via NG tube. Patient on thiamine and folic acid. Neurology: Patient confused and delirious currently; known patient with psych issues and drug overdose on presentation during this admission; continue to avoid continuous sedation with benzodiazepines. CT headnil acute  Psych: On Klonopin. Phenobarbital 65 mg x 1 dose. Toxicology: Gabapentin OD on admission. Skin: ICU nursing care  MS: Left wrist fracture in immobilization external fixator; patient has been seen by orthopedics during this admission. Prophylaxis: DVT prophylaxis: Lovenox 40 sc dailyhold for PEG tube placement tomorrow. GI prophylaxis: Famotidine. Restraints: Wrist soft restraints as needed for patient interfering with medical therapy/management and patient safety. Consulted PT, OT and SLP teamsappreciate input. Prognosis guarded overall. CODE STATUSfull code. Palliative care on case. Quality Care: PPI, DVT prophylaxis, HOB elevated, Infection control all reviewed and addressed. Lines/Tubes: PIV   S/p ETT: 9/11/219/22/2021.   Tracheostomy 9/22/2021. NGT: 9/11/21  Barahona: 9/11/21; changed 9/24/2021. Discontinue Barahona catheter today. ADVANCE DIRECTIVE: Full code. Discussed with RN, RT. High complexity decision making was performed during the evaluation of this patient at high risk for decompensation with multiple organ involvement. Andreina Howard   sister-in-law   [phone]            Subjective/History:   Ms. Shasha Salinas has been seen and evaluated as Dr. China Murphy requested now for assisting with Acute respiratory failure and ventilator management. Patient is a 39 y.o. female with following PMhx presented to ER with lethargy via EMS and admitted for Gabapentin overdose. Pt required intubation for airways protection. Position control was contacted that recommended supportive care per ER provider. Pt seen at bedside in ER rm#2. The patient can not provide additional history due to Ventilated. 9/30/2021  Remains in ICU room 102. Patient doing trach collar trials in the morning as tolerated; if she gets agitated, patient placed back on CPAP mode. Patient having thick secretions from trach, mouth, and nosesuctioned by RT. No acute issues overnight. No bleeding from tracheostomy tube  Telemetrysinus rhythm. Patient has a history of drug use and smoker and alcohol use. Review of Systems:  Review of systems not obtained due to patient factors.         Past Medical History:  Past Medical History:   Diagnosis Date    Asthma     Bilateral ovarian cysts     Cocaine abuse ([facility] [address]     Mental and behavioral problem     Pancreatitis     Pancreatitis     Psychosis ([facility] [address]         Past Surgical History:  Past Surgical History:   Procedure Laterality Date    HX GYN      D&C, Hysterectomy        Medications:    Current Facility-Administered Medications   Medication Dose Route Frequency    levoFLOXacin (LEVAQUIN) 750 mg in D5W IVPB  750 mg IntraVENous Q24H    dexmedeTOMidine (PRECEDEX) 400 mcg in 0.9% sodium chloride 100 mL infusion  0.1-1.5 mcg/kg/hr IntraVENous TITRATE    famotidine (PEPCID) tablet 20 mg  20 mg Oral BID    thiamine mononitrate (B-1) tablet 100 mg  100 mg Oral DAILY    folic acid (FOLVITE) tablet 1 mg  1 mg Oral DAILY    multivitamin, tx-iron-ca-min (THERA-M w/ IRON) tablet 1 Tablet  1 Tablet Oral DAILY    lactulose (CHRONULAC) 10 gram/15 mL solution 45 mL  30 g Oral BID    clonazePAM (KlonoPIN) tablet 0.5 mg  0.5 mg Oral BID    insulin lispro (HUMALOG) injection   SubCUTAneous Q6H    enoxaparin (LOVENOX) injection 40 mg  40 mg SubCUTAneous Q24H    sodium chloride (NS) flush 5-40 mL  5-40 mL IntraVENous Q8H    chlorhexidine (PERIDEX) 0.12 % mouthwash 10 mL  10 mL Oral Q12H       Allergy:  Allergies   Allergen Reactions    Fish Containing Products Itching    Toradol [Ketorolac] Rash     Pt reports she is not allergic to this medication. Social History:  Social History     Tobacco Use    Smoking status: Current Every Day Smoker     Packs/day: 1.00    Smokeless tobacco: Never Used   Substance Use Topics    Alcohol use: Not Currently    Drug use: Not Currently     Types: Cocaine, Marijuana     Comment: used with in past 24 hours          Objective:   Vital Signs:    Blood pressure 101/66, pulse 79, temperature 99.7 °F (37.6 °C), resp. rate 17, height 5' 2\" (1.575 m), weight 71.2 kg (157 lb), last menstrual period 05/15/2018, SpO2 99 %. Body mass index is 28.72 kg/m². O2 Device: Tracheal collar   O2 Flow Rate (L/min): 8 l/min   Temp (24hrs), Av.4 °F (37.4 °C), Min:98.5 °F (36.9 °C), Max:99.9 °F (37.7 °C)         Intake/Output:   Last shift:      No intake/output data recorded.   Last 3 shifts:  1901 -  0700  In: 2228.9 [I.V.:911.9]  Out: 1338 [Urine:4075; Drains:900]    Intake/Output Summary (Last 24 hours) at 2021 1222  Last data filed at 2021 0400  Gross per 24 hour   Intake 278.4 ml   Output 2925 ml   Net -2646.6 ml       ABG:    No results found for: PH, PHI, PCO2, PCO2I, PO2, PO2I, HCO3, HCO3I, FIO2, FIO2I  Ventilator Mode: Spontaneous  Respiratory Rate  Resp Rate Observed: 9  Back-Up Rate: 14  Insp Time (sec): 1.2 sec  I:E Ratio: 1:2.58  Ventilator Volumes  Vt Set (ml): 400 ml  Vt Exhaled (Machine Breath) (ml): 540 ml  Vt Spont (ml): 571 ml  Ve Observed (l/min): 10.7 l/min  Ventilator Pressures  Pressure Support (cm H2O): 10 cm H2O  PIP Observed (cm H2O): 16 cm H2O  Plateau Pressure (cm H2O): 12 cm H2O  MAP (cm H2O): 8.7  PEEP/VENT (cm H2O): 5 cm H20  Auto PEEP Observed (cm H2O): 0 cm H2O       Physical Exam:   Patient appears older than stated age; appears chronically ill; on trach collar support; acyanotic  HEENT: pupils not dilated, reactive, no scleral jaundice, no nasal drainage  Neck: No adenopathy or thyroid swelling  Tracheostomy tubeno bleeding seen around trach site  Patient head position midline now; finds it painful and stiff to move head to the left.   CVS: Normal heart sounds; S1 and S2 with no murmurs; JVD not elevated; telemetrysinus rhythm   RS: Symmetrical breath sounds; air entry is moderate bilateral; bibasal crackles; no wheezing; not tachypneic or in distress currently    Abd: Soft, nontender, not distended, no guarding or rigidity; bowel sounds present; no hepatosplenomegaly  Neuro: Intubated; confused and agitated frequently; not purposeful; moving all extremities; limited exam   Extrm: no leg edema or swelling or clubbing  Skin: no rash  Lymphatic: no cervical or supraclavicular adenopathy  Vasc: Good DPs in both feet       Data:     Recent Results (from the past 12 hour(s))   CALCIUM, IONIZED    Collection Time: 09/30/21  4:34 AM   Result Value Ref Range    Ionized Calcium 1.22 1.12 - 1.32 MMOL/L   CBC WITH AUTOMATED DIFF    Collection Time: 09/30/21  4:43 AM   Result Value Ref Range    WBC 14.0 (H) 4.6 - 13.2 K/uL    RBC 2.92 (L) 4.20 - 5.30 M/uL    HGB 8.5 (L) 12.0 - 16.0 g/dL    HCT 25.8 (L) 35.0 - 45.0 %    MCV 88.4 78.0 - 100.0 FL    MCH 29.1 24.0 - 34.0 PG    MCHC 32.9 31.0 - 37.0 g/dL    RDW 12.7 11.6 - 14.5 %    PLATELET 754 (H) 367 - 420 K/uL    MPV 10.5 9.2 - 11.8 FL    NEUTROPHILS 80 (H) 40 - 73 %    LYMPHOCYTES 11 (L) 21 - 52 %    MONOCYTES 6 3 - 10 %    EOSINOPHILS 1 0 - 5 %    BASOPHILS 0 0 - 2 %    ABS. NEUTROPHILS 11.2 (H) 1.8 - 8.0 K/UL    ABS. LYMPHOCYTES 1.6 0.9 - 3.6 K/UL    ABS. MONOCYTES 0.9 0.05 - 1.2 K/UL    ABS. EOSINOPHILS 0.2 0.0 - 0.4 K/UL    ABS. BASOPHILS 0.0 0.0 - 0.1 K/UL    DF AUTOMATED     MAGNESIUM    Collection Time: 09/30/21  4:43 AM   Result Value Ref Range    Magnesium 1.9 1.6 - 2.6 mg/dL   METABOLIC PANEL, COMPREHENSIVE    Collection Time: 09/30/21  4:43 AM   Result Value Ref Range    Sodium 139 136 - 145 mmol/L    Potassium 3.8 3.5 - 5.5 mmol/L    Chloride 105 100 - 111 mmol/L    CO2 27 21 - 32 mmol/L    Anion gap 7 3.0 - 18 mmol/L    Glucose 99 74 - 99 mg/dL    BUN 13 7.0 - 18 MG/DL    Creatinine 0.37 (L) 0.6 - 1.3 MG/DL    BUN/Creatinine ratio 35 (H) 12 - 20      GFR est AA >60 >60 ml/min/1.73m2    GFR est non-AA >60 >60 ml/min/1.73m2    Calcium 9.5 8.5 - 10.1 MG/DL    Bilirubin, total 0.5 0.2 - 1.0 MG/DL    ALT (SGPT) 179 (H) 13 - 56 U/L    AST (SGOT) 56 (H) 10 - 38 U/L    Alk. phosphatase 199 (H) 45 - 117 U/L    Protein, total 6.2 (L) 6.4 - 8.2 g/dL    Albumin 2.7 (L) 3.4 - 5.0 g/dL    Globulin 3.5 2.0 - 4.0 g/dL    A-G Ratio 0.8 0.8 - 1.7     PHOSPHORUS    Collection Time: 09/30/21  4:43 AM   Result Value Ref Range    Phosphorus 4.4 2.5 - 4.9 MG/DL   HEPATITIS PANEL, ACUTE    Collection Time: 09/30/21  4:44 AM   Result Value Ref Range    Hepatitis A, IgM Negative NEG      __          Hepatitis B surface Ag <0.10 <1.00 Index    Hep B surface Ag Interp. Negative NEG      __          Hepatitis B core, IgM Negative NEG      __          Hepatitis C virus Ab 0.05 <0.80 Index    Hep C virus Ab Interp.  Negative NEG      Hep C  virus Ab comment         AMMONIA    Collection Time: 09/30/21  4:48 AM   Result Value Ref Range Ammonia 18 11 - 32 UMOL/L   GLUCOSE, POC    Collection Time: 09/30/21  5:22 AM   Result Value Ref Range    Glucose (POC) 96 70 - 110 mg/dL             No results for input(s): FIO2I, IFO2, HCO3I, IHCO3, HCOPOC, PCO2I, PCOPOC, IPHI, PHI, PHPOC, PO2I, PO2POC in the last 72 hours. No lab exists for component: IPOC2    All Micro Results     Procedure Component Value Units Date/Time    CULTURE, BLOOD [[id number]] Collected: 09/27/21 1005    Order Status: Completed Specimen: Blood Updated: 09/30/21 0820     Special Requests: NO SPECIAL REQUESTS        Culture result: NO GROWTH 3 DAYS       CULTURE, BLOOD [[id number]] Collected: 09/27/21 1005    Order Status: Completed Specimen: Blood Updated: 09/30/21 0820     Special Requests: NO SPECIAL REQUESTS        Culture result: NO GROWTH 3 DAYS       CULTURE, BLOOD [[id number]] Collected: 09/24/21 0740    Order Status: Completed Specimen: Blood Updated: 09/30/21 0819     Special Requests: NO SPECIAL REQUESTS        Culture result: NO GROWTH 6 DAYS       CULTURE, RESPIRATORY/SPUTUM/BRONCH W GRAM STAIN [[id number]]  (Abnormal)  (Susceptibility) Collected: 09/24/21 0654    Order Status: Completed Specimen: Sputum from Tracheal Aspirate Updated: 09/27/21 1147     Special Requests: NO SPECIAL REQUESTS        GRAM STAIN RARE WBCS SEEN               RARE EPITHELIAL CELLS SEEN            NO ORGANISMS SEEN        Culture result:       MODERATE STAPHYLOCOCCUS AUREUS                  NO NORMAL RESPIRATORY DINA ISOLATED          COVID-19 RAPID TEST [[id number]] Collected: 09/24/21 1700    Order Status: Completed Specimen: Nasopharyngeal Updated: 09/24/21 1849     Specimen source Nasopharyngeal        COVID-19 rapid test Not detected        Comment: Rapid Abbott ID Now       Rapid NAAT:  The specimen is NEGATIVE for SARS-CoV-2, the novel coronavirus associated with COVID-19.        Negative results should be treated as presumptive and, if inconsistent with clinical signs and symptoms or necessary for patient management, should be tested with an alternative molecular assay. Negative results do not preclude SARS-CoV-2 infection and should not be used as the sole basis for patient management decisions. This test has been authorized by the FDA under an Emergency Use Authorization (EUA) for use by authorized laboratories. Fact sheet for Healthcare Providers: ConventionCleanEdisondate.co.nz  Fact sheet for Patients: [employer] - Amniofilmdate.co.nz       Methodology: Isothermal Nucleic Acid Amplification         CULTURE, URINE [[id number]]     Order Status: Canceled Specimen: Urine from Clean catch     CULTURE, CSF  TUBE 2 [[id number]] Collected: 09/16/21 1434    Order Status: Completed Specimen: Cerebrospinal Fluid Updated: 09/23/21 1241     Special Requests: TUBE 3, CULTURE AND SENSITIVTY AND GRAM STAIN. GRAM STAIN RARE WBCS SEEN         NO ORGANISMS SEEN        Culture result: NO GROWTH 7 DAYS       CULTURE, BLOOD [[id number]] Collected: 09/14/21 0515    Order Status: Completed Specimen: Blood Updated: 09/20/21 0832     Special Requests: NO SPECIAL REQUESTS        Culture result: NO GROWTH 6 DAYS       CULTURE, BLOOD [[id number]] Collected: 09/14/21 0500    Order Status: Completed Specimen: Blood Updated: 09/20/21 0832     Special Requests: NO SPECIAL REQUESTS        Culture result: NO GROWTH 6 DAYS       MENINGITIS PATHOGENS PANEL, CSF (BY PCR) [[id number]] Collected: 09/16/21 1434    Order Status: Completed Specimen: Cerebrospinal Fluid Updated: 09/17/21 0050     Escherichia coli K1 Not detected        Haemophilus Influenzae Not detected        Listeria Monocytogenes Not detected        Neisseria Meningitidis Not detected        Streptococcus Agalactiae Not detected        Streptococcus Pneumoniae Not detected        Cytomegalovirus Not detected        Enterovirus Not detected        Herpes Simplex Virus 1 Not detected        Comment:  In patients who have negative herpes simplex 1 and 2 PCR results, do not modify treatment, confirm with alternate testing. Herpes Simplex Virus 2 Not detected        Comment: In patients who have negative herpes simplex 1 and 2 PCR results, do not modify treatment, confirm with alternate testing. Human Herpesvirus 6 Not detected        Human Parechovirus Not detected        Varicella Zoster Virus Not detected        Crypto. neoformans/gattii Not detected       MENINGITIS PATHOGENS PANEL, CSF (BY PCR) [[id number]] Collected: 09/16/21 1545    Order Status: Canceled Specimen: Cerebrospinal Fluid     HSV 1 AND 2 BY PCR [[id number]] Collected: 09/16/21 1434    Order Status: Canceled Specimen: Other     MLGHG-44 RAPID TEST [[id number]] Collected: 09/16/21 1000    Order Status: Completed Specimen: Nasopharyngeal Updated: 09/16/21 1032     Specimen source Nasopharyngeal        COVID-19 rapid test Not detected        Comment: Rapid Abbott ID Now       Rapid NAAT:  The specimen is NEGATIVE for SARS-CoV-2, the novel coronavirus associated with COVID-19. Negative results should be treated as presumptive and, if inconsistent with clinical signs and symptoms or necessary for patient management, should be tested with an alternative molecular assay. Negative results do not preclude SARS-CoV-2 infection and should not be used as the sole basis for patient management decisions. This test has been authorized by the FDA under an Emergency Use Authorization (EUA) for use by authorized laboratories.    Fact sheet for Healthcare Providers: ConventionUpdate.co.nz  Fact sheet for Patients: ConventionUpdate.co.nz       Methodology: Isothermal Nucleic Acid Amplification         LEGIONELLA PNEUMOPHILA AG, URINE [[id number]] Collected: 09/14/21 2315    Order Status: Completed Specimen: Urine, random Updated: 09/15/21 1042     Legionella Ag, urine Negative       STREP PNEUMO AG, URINE [[id number]] Collected: 09/14/21 2315 Order Status: Completed Specimen: Urine, random Updated: 09/15/21 1042     Strep pneumo Ag, urine Negative       RESPIRATORY VIRUS PANEL W/COVID-19, PCR [[id number]] Collected: 09/14/21 1832    Order Status: Completed Specimen: Nasopharyngeal Updated: 09/14/21 2234     Adenovirus Not detected        Coronavirus 229E Not detected        Coronavirus HKU1 Not detected        Coronavirus CVNL63 Not detected        Coronavirus OC43 Not detected        SARS-CoV-2, PCR Not detected        Metapneumovirus Not detected        Rhinovirus and Enterovirus Not detected        Influenza A Not detected        Influenza A, subtype H1 Not detected        Influenza A, subtype H3 Not detected        INFLUENZA A H1N1 PCR Not detected        Influenza B Not detected        Parainfluenza 1 Not detected        Parainfluenza 2 Not detected        Parainfluenza 3 Not detected        Parainfluenza virus 4 Not detected        RSV by PCR Not detected        B. parapertussis, PCR Not detected        Bordetella pertussis - PCR Not detected        Chlamydophila pneumoniae DNA, QL, PCR Not detected        Mycoplasma pneumoniae DNA, QL, PCR Not detected       CULTURE, BLOOD [[id number]] Collected: 09/14/21 1700    Order Status: Canceled Specimen: Blood     CULTURE, URINE [[id number]] Collected: 09/13/21 2330    Order Status: Canceled Specimen: Cath Urine     RESPIRATORY VIRUS PANEL W/COVID-19, PCR [[id number]]     Order Status: Canceled Specimen: NASOPHARYNGEAL SWAB     COVID-19 RAPID TEST [[id number]]     Order Status: Canceled     COVID-19 RAPID TEST [[id number]] Collected: 09/13/21 0737    Order Status: Completed Specimen: Nasopharyngeal Updated: 09/13/21 0811     Specimen source Nasopharyngeal        COVID-19 rapid test Not detected        Comment: Rapid Abbott ID Now       Rapid NAAT:  The specimen is NEGATIVE for SARS-CoV-2, the novel coronavirus associated with COVID-19.        Negative results should be treated as presumptive and, if inconsistent with clinical signs and symptoms or necessary for patient management, should be tested with an alternative molecular assay. Negative results do not preclude SARS-CoV-2 infection and should not be used as the sole basis for patient management decisions. This test has been authorized by the FDA under an Emergency Use Authorization (EUA) for use by authorized laboratories. Fact sheet for Healthcare Providers: ConventionFindProzdate.co.nz  Fact sheet for Patients: [employer].co.nz       Methodology: Isothermal Nucleic Acid Amplification         COVID-19 RAPID TEST [[id number]]     Order Status: Canceled         Echo 9/17/2021:  Left Ventricle Normal cavity size, wall thickness and systolic function (ejection fraction normal). The estimated EF is 60 - 65%. There is inconclusive left ventricular diastolic function E/E' ratio = 7.08  Heart rate is over 100. Wall Scoring The left ventricular wall motion is normal.            Left Atrium Normal cavity size. Right Ventricle Normal cavity size and global systolic function. Assessment of RV function:   TAPSE = 27 mm. Right Atrium Normal cavity size. Interatrial Septum Interatrial septum not well visualized   Aortic Valve Aortic valve not well visualized. Trileaflet valve structure, no stenosis and no regurgitation. Mitral Valve No stenosis. Mitral valve non-specific thickening. Mild regurgitation. Tricuspid Valve Tricuspid valve not well visualized. No stenosis. Mild regurgitation. Pulmonic Valve Pulmonic valve not well visualized. No stenosis and no regurgitation. Aorta The aorta was not well visualized. Normal aortic root. Pulmonary Artery Pulmonary arteries not well visualized. Pulmonary arterial systolic pressure (PASP) is 29 mmHg. Pulmonary hypertension not suggested by Doppler findings.    IVC/Hepatic Veins Mechanically ventilated; cannot use inferior caval vein diameter to estimate central venous pressure. Pericardium Normal pericardium and no evidence of pericardial effusion. CT head 9/15/1021  IMPRESSION   No acute intracranial abnormality. CT chest, abdomen, pelvis 9/15/1021  IMPRESSION   Endotracheal tube tip in the lower thoracic trachea 1.5 cm above the dipak.    Bilateral lower lobar atelectasis, possible endobronchial lesion/mucous plug in  the left lower lobe bronchus.    No acute intra-abdominal abnormality. Imaging:  [x]I have personally reviewed the patients chest radiographs images and report    X-ray 9/30/1021  Results from Hospital Encounter encounter on 09/11/21    XR CHEST PORT    Narrative  EXAM: XR CHEST PORT    CLINICAL INDICATION/HISTORY: Shortness of breath  -Additional: None    COMPARISON: 9/29/2021    TECHNIQUE: Portable frontal view of the chest    _______________    FINDINGS:    SUPPORT DEVICES: Tracheostomy catheter and nasogastric tube redemonstrated. Tip  of the NG tube is below the diaphragm, collimated from view. HEART AND MEDIASTINUM: Stable cardiac size and mediastinal contours. LUNGS AND PLEURAL SPACES: Improved pulmonary aeration with decreased conspicuity  to perihilar edema opacities noted previously. No pneumothorax or definite  effusion. BONY THORAX AND SOFT TISSUES: Unremarkable.    _______________    Impression  1. Support devices in stable position as visualized. 2. Improved pulmonary aeration with decreased perihilar atelectasis or edema. Please note: Voice-recognition software may have been used to generate this report, which may have resulted in some phonetic-based errors in grammar and contents. Even though attempts were made to correct all the mistakes, some may have been missed, and remained in the body of the document.       Aneta Brennan MD  9/30/2021

## 2021-09-30 NOTE — PROGRESS NOTES
1400 Assumed care of patient. ... Patient in bed restless with decreased command following. .. mouthing words. Lilly Mitchell Restrained  1600 Pt removed inflated FMS with legs. ..  deflated balloon, checked rectum. .. no open areas present. .. peritoneal dermititis. .. hygeine care completed. .. patient following command and mouthing words. .. held precedex. .patient complaining of anxiety. .. ativan given

## 2021-09-30 NOTE — PROGRESS NOTES
Patient remained in Spont Mode all night with only periods of increased RR when she became anxious. Vital otherwise remained stable.

## 2021-10-01 ENCOUNTER — APPOINTMENT (OUTPATIENT)
Dept: GENERAL RADIOLOGY | Age: 41
DRG: 004 | End: 2021-10-01
Attending: INTERNAL MEDICINE
Payer: MEDICAID

## 2021-10-01 ENCOUNTER — APPOINTMENT (OUTPATIENT)
Dept: INTERVENTIONAL RADIOLOGY/VASCULAR | Age: 41
DRG: 004 | End: 2021-10-01
Attending: INTERNAL MEDICINE
Payer: MEDICAID

## 2021-10-01 LAB
ALBUMIN SERPL-MCNC: 2.5 G/DL (ref 3.4–5)
ALBUMIN/GLOB SERPL: 0.7 {RATIO} (ref 0.8–1.7)
ALP SERPL-CCNC: 234 U/L (ref 45–117)
ALT SERPL-CCNC: 147 U/L (ref 13–56)
AMMONIA PLAS-SCNC: 53 UMOL/L (ref 11–32)
ANION GAP SERPL CALC-SCNC: 8 MMOL/L (ref 3–18)
AST SERPL-CCNC: 40 U/L (ref 10–38)
BASOPHILS # BLD: 0 K/UL (ref 0–0.1)
BASOPHILS NFR BLD: 0 % (ref 0–2)
BILIRUB SERPL-MCNC: 0.7 MG/DL (ref 0.2–1)
BUN SERPL-MCNC: 10 MG/DL (ref 7–18)
BUN/CREAT SERPL: 33 (ref 12–20)
CA-I SERPL-SCNC: 1.16 MMOL/L (ref 1.12–1.32)
CALCIUM SERPL-MCNC: 8.4 MG/DL (ref 8.5–10.1)
CHLORIDE SERPL-SCNC: 105 MMOL/L (ref 100–111)
CO2 SERPL-SCNC: 24 MMOL/L (ref 21–32)
CREAT SERPL-MCNC: 0.3 MG/DL (ref 0.6–1.3)
DIFFERENTIAL METHOD BLD: ABNORMAL
EOSINOPHIL # BLD: 0.1 K/UL (ref 0–0.4)
EOSINOPHIL NFR BLD: 1 % (ref 0–5)
ERYTHROCYTE [DISTWIDTH] IN BLOOD BY AUTOMATED COUNT: 12.7 % (ref 11.6–14.5)
GLOBULIN SER CALC-MCNC: 3.4 G/DL (ref 2–4)
GLUCOSE BLD STRIP.AUTO-MCNC: 111 MG/DL (ref 70–110)
GLUCOSE BLD STRIP.AUTO-MCNC: 116 MG/DL (ref 70–110)
GLUCOSE BLD STRIP.AUTO-MCNC: 83 MG/DL (ref 70–110)
GLUCOSE BLD STRIP.AUTO-MCNC: 94 MG/DL (ref 70–110)
GLUCOSE SERPL-MCNC: 100 MG/DL (ref 74–99)
HCT VFR BLD AUTO: 26.4 % (ref 35–45)
HGB BLD-MCNC: 8.7 G/DL (ref 12–16)
LYMPHOCYTES # BLD: 2 K/UL (ref 0.9–3.6)
LYMPHOCYTES NFR BLD: 13 % (ref 21–52)
MAGNESIUM SERPL-MCNC: 1.7 MG/DL (ref 1.6–2.6)
MCH RBC QN AUTO: 29.7 PG (ref 24–34)
MCHC RBC AUTO-ENTMCNC: 33 G/DL (ref 31–37)
MCV RBC AUTO: 90.1 FL (ref 78–100)
MONOCYTES # BLD: 1 K/UL (ref 0.05–1.2)
MONOCYTES NFR BLD: 6 % (ref 3–10)
NEUTS SEG # BLD: 11.7 K/UL (ref 1.8–8)
NEUTS SEG NFR BLD: 79 % (ref 40–73)
PHOSPHATE SERPL-MCNC: 4.1 MG/DL (ref 2.5–4.9)
PLATELET # BLD AUTO: 457 K/UL (ref 135–420)
PMV BLD AUTO: 10 FL (ref 9.2–11.8)
POTASSIUM SERPL-SCNC: 3.6 MMOL/L (ref 3.5–5.5)
PROT SERPL-MCNC: 5.9 G/DL (ref 6.4–8.2)
RBC # BLD AUTO: 2.93 M/UL (ref 4.2–5.3)
SODIUM SERPL-SCNC: 137 MMOL/L (ref 136–145)
WBC # BLD AUTO: 14.8 K/UL (ref 4.6–13.2)

## 2021-10-01 PROCEDURE — 74011000250 HC RX REV CODE- 250: Performed by: INTERNAL MEDICINE

## 2021-10-01 PROCEDURE — 74011250637 HC RX REV CODE- 250/637: Performed by: INTERNAL MEDICINE

## 2021-10-01 PROCEDURE — 74011250636 HC RX REV CODE- 250/636: Performed by: INTERNAL MEDICINE

## 2021-10-01 PROCEDURE — 74011000258 HC RX REV CODE- 258: Performed by: INTERNAL MEDICINE

## 2021-10-01 PROCEDURE — 82140 ASSAY OF AMMONIA: CPT

## 2021-10-01 PROCEDURE — 36415 COLL VENOUS BLD VENIPUNCTURE: CPT

## 2021-10-01 PROCEDURE — 77010033678 HC OXYGEN DAILY

## 2021-10-01 PROCEDURE — 82330 ASSAY OF CALCIUM: CPT

## 2021-10-01 PROCEDURE — 83735 ASSAY OF MAGNESIUM: CPT

## 2021-10-01 PROCEDURE — 85025 COMPLETE CBC W/AUTO DIFF WBC: CPT

## 2021-10-01 PROCEDURE — 94003 VENT MGMT INPAT SUBQ DAY: CPT

## 2021-10-01 PROCEDURE — 74011250636 HC RX REV CODE- 250/636: Performed by: FAMILY MEDICINE

## 2021-10-01 PROCEDURE — 74011250637 HC RX REV CODE- 250/637: Performed by: FAMILY MEDICINE

## 2021-10-01 PROCEDURE — 77030006998

## 2021-10-01 PROCEDURE — 80053 COMPREHEN METABOLIC PANEL: CPT

## 2021-10-01 PROCEDURE — 77030005100 IR INSERT GASTROSTOMY TUBE PERC

## 2021-10-01 PROCEDURE — 82962 GLUCOSE BLOOD TEST: CPT

## 2021-10-01 PROCEDURE — 71045 X-RAY EXAM CHEST 1 VIEW: CPT

## 2021-10-01 PROCEDURE — 74011000636 HC RX REV CODE- 636: Performed by: FAMILY MEDICINE

## 2021-10-01 PROCEDURE — 65610000006 HC RM INTENSIVE CARE

## 2021-10-01 PROCEDURE — 74011000250 HC RX REV CODE- 250: Performed by: FAMILY MEDICINE

## 2021-10-01 PROCEDURE — 84100 ASSAY OF PHOSPHORUS: CPT

## 2021-10-01 RX ORDER — WATER FOR INJECTION,STERILE
VIAL (ML) INJECTION
Status: COMPLETED | OUTPATIENT
Start: 2021-10-01 | End: 2021-10-01

## 2021-10-01 RX ORDER — MAGNESIUM SULFATE 1 G/100ML
1 INJECTION INTRAVENOUS
Status: COMPLETED | OUTPATIENT
Start: 2021-10-01 | End: 2021-10-01

## 2021-10-01 RX ORDER — SODIUM CHLORIDE 9 MG/ML
500 INJECTION, SOLUTION INTRAVENOUS
Status: COMPLETED | OUTPATIENT
Start: 2021-10-01 | End: 2021-10-01

## 2021-10-01 RX ORDER — POTASSIUM CHLORIDE 7.45 MG/ML
10 INJECTION INTRAVENOUS
Status: COMPLETED | OUTPATIENT
Start: 2021-10-01 | End: 2021-10-01

## 2021-10-01 RX ORDER — LIDOCAINE HYDROCHLORIDE 10 MG/ML
1-20 INJECTION INFILTRATION; PERINEURAL
Status: DISCONTINUED | OUTPATIENT
Start: 2021-10-01 | End: 2021-10-06

## 2021-10-01 RX ADMIN — DEXMEDETOMIDINE HYDROCHLORIDE 1.5 MCG/KG/HR: 100 INJECTION, SOLUTION INTRAVENOUS at 02:23

## 2021-10-01 RX ADMIN — POTASSIUM CHLORIDE 10 MEQ: 10 INJECTION, SOLUTION INTRAVENOUS at 17:53

## 2021-10-01 RX ADMIN — HYDROMORPHONE HYDROCHLORIDE 1 MG: 1 INJECTION, SOLUTION INTRAMUSCULAR; INTRAVENOUS; SUBCUTANEOUS at 19:19

## 2021-10-01 RX ADMIN — WATER 3 ML: 1 INJECTION INTRAMUSCULAR; INTRAVENOUS; SUBCUTANEOUS at 11:40

## 2021-10-01 RX ADMIN — POTASSIUM CHLORIDE 10 MEQ: 10 INJECTION, SOLUTION INTRAVENOUS at 11:57

## 2021-10-01 RX ADMIN — 0.12% CHLORHEXIDINE GLUCONATE 10 ML: 1.2 RINSE ORAL at 20:11

## 2021-10-01 RX ADMIN — ACETAMINOPHEN 650 MG: 325 TABLET ORAL at 18:55

## 2021-10-01 RX ADMIN — MAGNESIUM SULFATE HEPTAHYDRATE 1 G: 1 INJECTION, SOLUTION INTRAVENOUS at 08:34

## 2021-10-01 RX ADMIN — 0.12% CHLORHEXIDINE GLUCONATE 10 ML: 1.2 RINSE ORAL at 08:34

## 2021-10-01 RX ADMIN — SODIUM CHLORIDE 10 ML: 9 INJECTION, SOLUTION INTRAMUSCULAR; INTRAVENOUS; SUBCUTANEOUS at 06:00

## 2021-10-01 RX ADMIN — SODIUM CHLORIDE 10 ML: 9 INJECTION, SOLUTION INTRAMUSCULAR; INTRAVENOUS; SUBCUTANEOUS at 13:35

## 2021-10-01 RX ADMIN — Medication 5 MG: at 22:00

## 2021-10-01 RX ADMIN — PROPOFOL 10 MCG/KG/MIN: 10 INJECTION, EMULSION INTRAVENOUS at 09:29

## 2021-10-01 RX ADMIN — SODIUM CHLORIDE 10 ML: 9 INJECTION, SOLUTION INTRAMUSCULAR; INTRAVENOUS; SUBCUTANEOUS at 22:00

## 2021-10-01 RX ADMIN — POTASSIUM CHLORIDE 10 MEQ: 10 INJECTION, SOLUTION INTRAVENOUS at 10:00

## 2021-10-01 RX ADMIN — DEXMEDETOMIDINE HYDROCHLORIDE 1.2 MCG/KG/HR: 100 INJECTION, SOLUTION INTRAVENOUS at 09:30

## 2021-10-01 RX ADMIN — HYDROMORPHONE HYDROCHLORIDE 1 MG: 1 INJECTION, SOLUTION INTRAMUSCULAR; INTRAVENOUS; SUBCUTANEOUS at 11:30

## 2021-10-01 RX ADMIN — FENTANYL CITRATE 50 MCG: 50 INJECTION INTRAMUSCULAR; INTRAVENOUS at 17:49

## 2021-10-01 RX ADMIN — FAMOTIDINE 20 MG: 20 TABLET ORAL at 20:11

## 2021-10-01 RX ADMIN — DEXMEDETOMIDINE HYDROCHLORIDE 1.5 MCG/KG/HR: 100 INJECTION, SOLUTION INTRAVENOUS at 07:49

## 2021-10-01 RX ADMIN — FENTANYL CITRATE 50 MCG: 50 INJECTION INTRAMUSCULAR; INTRAVENOUS at 20:27

## 2021-10-01 RX ADMIN — HYDROMORPHONE HYDROCHLORIDE 1 MG: 1 INJECTION, SOLUTION INTRAMUSCULAR; INTRAVENOUS; SUBCUTANEOUS at 15:34

## 2021-10-01 RX ADMIN — FENTANYL CITRATE 50 MCG: 50 INJECTION INTRAMUSCULAR; INTRAVENOUS at 13:59

## 2021-10-01 RX ADMIN — LIDOCAINE HYDROCHLORIDE 7 ML: 10 INJECTION, SOLUTION INFILTRATION; PERINEURAL at 11:41

## 2021-10-01 RX ADMIN — PROPOFOL 10 MCG/KG/MIN: 10 INJECTION, EMULSION INTRAVENOUS at 12:02

## 2021-10-01 RX ADMIN — SODIUM CHLORIDE 10 ML: 9 INJECTION, SOLUTION INTRAMUSCULAR; INTRAVENOUS; SUBCUTANEOUS at 13:34

## 2021-10-01 RX ADMIN — CLONAZEPAM 0.5 MG: 0.5 TABLET ORAL at 20:11

## 2021-10-01 RX ADMIN — IOPAMIDOL 7 ML: 612 INJECTION, SOLUTION INTRAVENOUS at 11:41

## 2021-10-01 RX ADMIN — LORAZEPAM 2 MG: 2 INJECTION INTRAMUSCULAR at 18:55

## 2021-10-01 RX ADMIN — POTASSIUM CHLORIDE 10 MEQ: 10 INJECTION, SOLUTION INTRAVENOUS at 13:34

## 2021-10-01 RX ADMIN — LEVOFLOXACIN 750 MG: 5 INJECTION, SOLUTION INTRAVENOUS at 14:48

## 2021-10-01 RX ADMIN — SODIUM CHLORIDE 500 ML: 900 INJECTION, SOLUTION INTRAVENOUS at 11:40

## 2021-10-01 NOTE — PROGRESS NOTES
Hospitalist Progress Note-critical care note     Patient: Venice Brizuela MRN: [id number]  [facility]: [id number]    YOB: 1980  Age: 39 y.o. Sex: female    DOA: 9/11/2021 LOS:  LOS: 19 days            Chief complaint: wrist fracture , drug overdose , acute respiratory failure with hypoxia, psychosis     Assessment/Plan         Hospital Problems  Date Reviewed: 9/6/2015        Codes Class Noted POA    Status post tracheostomy ([facility] ICD-10-CM: Z93.0  ICD-9-CM: V44.0  9/28/2021 Unknown        Hypokalemia ICD-10-CM: E87.6  ICD-9-CM: 276.8  9/21/2021 Unknown        Increased ammonia level ICD-10-CM: R79.89  ICD-9-CM: 790.6  9/14/2021 Unknown        Psychosis ([facility] ICD-10-CM: F29  ICD-9-CM: 298.9  Unknown Unknown        Hyponatremia ICD-10-CM: E87.1  ICD-9-CM: 276.1  Unknown Yes        Acute respiratory failure with hypoxia ([facility] ICD-10-CM: J96.01  ICD-9-CM: 518.81  Unknown Yes        Left wrist fracture, with delayed healing, subsequent encounter ICD-10-CM: S62.102G  ICD-9-CM: V54.19  9/12/2021 Unknown        * (Principal) Drug overdose, intentional self-harm, initial encounter ([facility] ICD-10-CM: T50.902A  ICD-9-CM: 977.9, E950.5  9/12/2021 Yes        Hypoxia ICD-10-CM: R09.02  ICD-9-CM: 799.02  9/12/2021 Yes        Hypotension after procedure ICD-10-CM: I95.81  ICD-9-CM: 458.29  9/12/2021 Yes        Acute metabolic encephalopathy LPC-24-DP: G93.41  ICD-9-CM: 348.31  9/12/2021 Yes                  Venice Brizuela is a 39 y.o. female who is standing history of multidrug use/abuse, previous drug-seeking behavior and mental health issues otherwise unspecified arrives via EMS with acute metabolic encephalopathy and lethargy. Admitted for likely gabapentin overdose. She was intubated in ER, ct head no acute issue, LP done due to fever even on abx, no meningitis noted. Acute respiratory failure with hypoxia   Intubated on 9/12    Trach on 9/20/21.    Put back to vent last night, on minimor vent setting   On trach collar as tolerated   Continue breathing tx . Post tracheostomy   Continue local trach care       Anemia  Occult stool negative and h/h stable    no bleeding reported   Upper PVL negative for dvt           Acute metabolic encephalopathy   Agitated last night and received   pecedex and propofol restarted    Open eyes per voice stimuli and follow some commands   Ct head no acute process         Gabapentin overdose with lethargy and AMS   S/p procedural stomach emptying in ED with charcoal and sorbitol   Continue monitoring the side affect          elevated ammonia level  Continue   Lactulose,   Continue ammonia monitoring       Psychosis , hx of  Ekbom's delusional parasitosis   Need psych evaluation later   On olanzapine and klonopin     left wrist fracture: immobilized -poa   Appreciated ortho on board-f/u with Dr. Ivelisse Monsalve as out-pt     Speech follow up    Will have peg tube placement today     rn agitated last night     Disposition :tbd,   Review of systems:  Unable to obtain due to trach   Vital signs/Intake and Output:  Visit Vitals  BP (!) 101/49   Pulse 81   Temp 99.6 °F (37.6 °C)   Resp 17   Ht 5' 2\" (1.575 m)   Wt 71.2 kg (157 lb)   SpO2 96%   BMI 28.72 kg/m²     Current Shift:  No intake/output data recorded. Last three shifts:  09/29 1901 - 10/01 0700  In: 697.4 [I.V.:697.4]  Out: 6414 [Urine:2425]    Physical Exam:  General: Open eyes per voice   HEENT: NC, Atraumatic. Trach collar noted   Lungs: CTA Bilaterally. No Wheezing/Rhonchi/Rales. Heart:  rrr ,  No murmur, No Rubs, No Gallops  Abdomen: Soft, Non distended, Non tender. +Bowel sounds,   Extremities: No c/c.immobilzer noted on left wrist   Psych:   Calm   Neurologic:  Open eyes per voice.  On sedation           Labs: Results:       Chemistry Recent Labs     10/01/21  0540 09/30/21  1306 09/30/21  0443 09/29/21  1542 09/29/21  0523   *  --  99  --  106*     --  139  --  143   K 3.6 4.6 3.8   < > 3.6     --  105  -- 109   CO2 24  --  27  --  25   BUN 10  --  13  --  14   CREA 0.30*  --  0.37*  --  0.26*   CA 8.4*  --  9.5  --  8.9   AGAP 8  --  7  --  9   BUCR 33*  --  35*  --  54*   *  --  199*  --  177*   TP 5.9*  --  6.2*  --  5.3*   ALB 2.5*  --  2.7*  --  2.1*   GLOB 3.4  --  3.5  --  3.2   AGRAT 0.7*  --  0.8  --  0.7*    < > = values in this interval not displayed. CBC w/Diff Recent Labs     10/01/21  0540 09/30/21  0443 09/29/21  0523   WBC 14.8* 14.0* 11.9   RBC 2.93* 2.92* 2.75*   HGB 8.7* 8.5* 7.9*   HCT 26.4* 25.8* 24.8*   * 477* 365   GRANS 79* 80* 76*   LYMPH 13* 11* 14*   EOS 1 1 2      Cardiac Enzymes No results for input(s): CPK, CKND1, ZENON in the last 72 hours. No lab exists for component: CKRMB, TROIP   Coagulation No results for input(s): PTP, INR, APTT, INREXT, INREXT in the last 72 hours. Lipid Panel No results found for: CHOL, CHOLPOCT, CHOLX, CHLST, CHOLV, 906369, HDL, HDLP, LDL, LDLC, DLDLP, 781006, VLDLC, VLDL, TGLX, TRIGL, TRIGP, TGLPOCT, CHHD, CHHDX   BNP No results for input(s): BNPP in the last 72 hours. Liver Enzymes Recent Labs     10/01/21  0540   TP 5.9*   ALB 2.5*   *      Thyroid Studies No results found for: T4, T3U, TSH, TSHEXT, TSHEXT     Procedures/imaging: see electronic medical records for all procedures/Xrays and details which were not copied into this note but were reviewed prior to creation of Plan    XR WRIST LT AP/LAT/OBL MIN 3V    Result Date: 8/19/2021  EXAM: XR WRIST LT AP/LAT/OBL MIN 3V CLINICAL INDICATION/HISTORY: Fall with LEFT wrist pain and swelling -Additional: None COMPARISON: None TECHNIQUE: 3 views of the left wrist _______________ FINDINGS: BONES: Comminuted, impacted fracture of the distal radius with slight dorsal angulation of the distal fracture fragment. No aggressive appearing osseous lytic or blastic lesion. SOFT TISSUES: Unremarkable. _______________     Comminuted, impacted fracture of the distal radius.     XR CHEST PORT    Result Date: 9/13/2021  EXAM: XR CHEST PORT CLINICAL INDICATION/HISTORY: Acute respiratory failure, ET tub position -Additional: None COMPARISON: One day prior TECHNIQUE: Portable frontal view of the chest _______________ FINDINGS: SUPPORT DEVICES: Endotracheal and enteric tubes unchanged. HEART AND MEDIASTINUM: Cardiomediastinal silhouette within normal limits. LUNGS AND PLEURAL SPACES: No dense consolidation, large effusion or pneumothorax. _______________     No acute cardiopulmonary abnormality. XR CHEST PORT    Result Date: 9/11/2021  MEDICAL RECORDS NUMBER: [id number] PROCEDURE:  Single view of the chest DATE: 9/11/2021 9:09 PM HISTORY: 39years old Female. post ett Comparison: 8/4/2021 FINDINGS: The patient has been intubated with appropriate placement of the endotracheal tube. There is no enteric tube passing below the level of the diaphragm. There is no significant effusion. There is no significant pneumothorax. Cardiomediastinal silhouette is within normal limits. There is no evidence of a focal pulmonary infiltrate or mass. 1. There is no significant or acute cardiopulmonary process. The patient has been intubated with appropriate placement of the endotracheal tube. There is no enteric tube passing below the level of the diaphragm. This report has been generated using voice recognition software. XR ABD PORT  1 V    Result Date: 9/12/2021  EXAM: Frontal view of the abdomen CLINICAL INDICATION/HISTORY: NG tube placement COMPARISON: None. _______________ FINDINGS: NG/OG tube in place with the tip in the left upper quadrant in the region of the gastric fundus. No bowel obstruction. Moderate colonic stool burden. _______________     1. NG/OG tube is in good position with the tip in the left upper quadrant in the region of the gastric fundus. 2. Moderate colonic stool burden.       Asya Norris MD

## 2021-10-01 NOTE — PROGRESS NOTES
Called for agitation once all sedation stopped by primary team  Restarted haldol and fentanyl bolus with no improvement  Given one time versed as well all with no improvement likely needs continuous sedation   As plan is to wean off of continuous sedation  Asked nursing to contact intensivist for further sedation recommendation

## 2021-10-01 NOTE — PROGRESS NOTES
0720-Received pt resting with eyes closed, vss, no sign distress on ventilator and sedation, will continue to monitor  1108-Pt taken to IR for Gtube placement, tolerated well, no sign of distress  1150-Pt returned to ICU via bed,G tube in place no sign of distress  1600-Pt intermittently restless, agitated, Tachycardic when agitated,  updated  1910-Bedside and Verbal shift change report given to 1980 Shashank Simmons RN (oncoming nurse) by Orlando Victoria RN (offgoing nurse). Report included the following information SBAR, Kardex, Intake/Output, MAR, Recent Results and Cardiac Rhythm SB/SR/ST.

## 2021-10-01 NOTE — PROGRESS NOTES
@1900 Pt awake restless in bed kicking feet over bedrails trashing around in bed. Remains on ventilator via trach. OG tube remains clamped. Barahona remains in-situ draining bonnie urine. Flexi seal fecal management remains in place draining brown liquid stool. Precedex drip restarted. Assessment completed and charted in relevant flow sheets. Nursing care continues. @6235 this nurse asked pt if she was in pain pt indicated that she was in pain. Dilaudid administered as ordered. @9488 pt very agitated feet attempting to kick staff and sitting up in bed attempting to pull on tubes. Attempt to redirect and calm pt unsuccessful  Ativan administered as prescribed observation continues. Male called to check in on pt verbalized that he was  , he presented pass code, Brief update given care continues. @5277 pt very agitated attempting to kick and climb out of bed . Attempt made to reorient and redirect patient unsuccessful. Dr Holger Sebastian was called and updated on pt . Orders received and carried out. Pt becoming more aggressive. Haldol administered as prescribed care continues. @5600 pt indicated that she was in pain analgesia Fentanyl administered observation continues. @9873 Dr Holger Sebastian was called and updated all attempts to reduce agitation and restlessness unsuccessful, Deferred to Intensivist care continues. @2416 Dr Dinorah Morales was called and updated on patient , Orders received and carried out, Dr. Holger Sebastian came to bedside,care and observation continues. @2350 pt is calm care continues. @0000 pt reassessed vitals stable and pt remains calm at this time. Female called to check in on pt presented pt pass code brief update given care continues. @0400 reassessment completed no changes care continues. @3692 Bedside and Verbal shift change report given to Nunu Hopkins  (oncoming nurse) by Dionicio Gandhi (offgoing nurse).  Report included the following information SBAR, Kardex, Intake/Output, MAR, Recent Results, Med Rec Status and Alarm Parameters .

## 2021-10-01 NOTE — PROGRESS NOTES
Chart reviewed, pt taken for peg placement today, weekend cm will be available thru [facility] .

## 2021-10-01 NOTE — PROGRESS NOTES
Nutrition Note    Patient expected to have PEG placement today. Appears NGT was pulled by pt . Recc restarting tube feed within 24-48 hours after PEG to avoid prolong NPO. 10/01/21 1019   Enteral Nutrition   Feeding Route PEG   EN Formula Standard with fiber  (Jevity 1.5)   Schedule Continuous   Feeding Regimen Jevity 1.5 @ 45ml/hr   Water Flushes 250ml Q4   Goal EN & Flush Order Provides Jevity 1.5 @ 45ml + water flushes provides 1485kcals, 63g PRO, 214g CHO, and 2252ml free water.      Electronically signed by Greyson Montoya RD on 10/1/2021 at 10:20 AM

## 2021-10-01 NOTE — PROGRESS NOTES
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0 ·   Code status: full code     RECOMMENDATIONS:   Respiratory: Patient on ventilator support; intubated 2021 due to encephalopathy and drug overdose. Patient was extubated on 2021, and reintubated within an hour due to respiratory distress and upper airway edema. S/p tracheostomy 2021. Patient currently on VCV ventilator support and sedated with propofol for PEG tube placement today; from tomorrow, she can go back to trach collar trials during the daytime as tolerated, and otherwise CPAP pressure support of 10. Sedationpropofol currently; can go back to Precedex infusion from tomorrow. Prn lorazepam, Dilaudid and haloperidol. Chest x-ray reviewedtracheostomy tube in good position; OG tube in good position; no focal infiltrates or consolidations or pleural effusions noted. Continue ventilator and sedation bundles. ID: S/p LP with negative CSF cultures. Covid test 2021 and 2021: Negative. Sputum culture 2021: MSSA. Blood culture 2021: NGTD. Barahona catheter changed on 2021.   Antibioticsrespiratory cultures MSSA. Levofloxacin course will be completed today; monitor temperature and white cell count. ID has signed off; reconsult as needed. CVS: Stable hemodynamics; telemetrysinus rhythm; blood pressure stable; continue to monitor. Echo 9/17/2021: LVEF 60 to 65%. RVSP 29 mmHg. Ultrasound leg 9/15negative study for DVTs. Renal: Normal renal function, and good urine output; continue to monitor and replace electrolytes as needed. Heme: Mild anemiaprolonged ICU stay and frequent blood draws; no active bleeding issues; hemoglobin stable8.7 today; platelets normal.  Stool occult blood 9/28negative. Endo: Stable blood sugars; continue to monitor; sliding scale insulin if needed. GI: Patient tolerating tube feedingcurrently on hold for PEG tube placement. hCG negative on admission. Ammonia fluctuates53 today; lactulose therapy daily. Transaminases elevated, but coming down; hepatitis virus panelnegative study. Recent CT abdomen Liver is unremarkable. No abnormal biliary dilation. Gallbladder is unremarkable. Nutrition: Resume tube feeding from tomorrow; PEG tube today. Patient on thiamine and folic acid. Neurology: Patient confused and delirious currently; known patient with psych issues and drug overdose on presentation during this admission; continue to avoid continuous sedation with benzodiazepines. CT headnil acute  Psych: On Klonopin. Phenobarbital 65 mg prn. Toxicology: Gabapentin OD on admission. Skin: ICU nursing care  MS: Left wrist fracture in immobilization external fixator; patient has been seen by orthopedics during this admission. Prophylaxis: DVT prophylaxis: Lovenox 40 sc dailyhold for PEG tube placement tomorrow. GI prophylaxis: Famotidine. Restraints: Wrist soft restraints as needed for patient interfering with medical therapy/management and patient safety. Consulted PT, OT and SLP teamsappreciate input. Prognosis guarded overall. CODE STATUSfull code. Palliative care on case. Quality Care: PPI, DVT prophylaxis, HOB elevated, Infection control all reviewed and addressed. Lines/Tubes: PIV   S/p ETT: 9/11/219/22/2021. Tracheostomy 9/22/2021. NGT: 9/11/21  Barahona: 9/11/21; changed 9/24/2021. Discussed with RNunable to discontinue Barahona catheter due to ventilator support, agitated and delirious state; patient will not be able to tolerate external Barahona catheter. ADVANCE DIRECTIVE: Full code. Discussed with RN, RT. High complexity decision making was performed during the evaluation of this patient at high risk for decompensation with multiple organ involvement. Roselyn Phillips   sister-in-law   [phone]            Subjective/History:   Ms. Hayley Tovar has been seen and evaluated as Dr. Teresa Gomez requested now for assisting with Acute respiratory failure and ventilator management. Patient is a 39 y.o. female with following PMhx presented to ER with lethargy via EMS and admitted for Gabapentin overdose. Pt required intubation for airways protection. Position control was contacted that recommended supportive care per ER provider. Pt seen at bedside in ER rm#2. The patient can not provide additional history due to Ventilated. 10/1/2021  Remains in ICU room 102. Patient sedated and on ventilator support; awaiting PEG tube placement today. Thick secretions reported from trach, mouth, and nosesuctioned by RT periodically. No acute issues overnight. Afebrile; telemetrysinus rhythm; blood pressure good. Urine outputgood 1.4 L yesterday. Patient has a history of drug use and smoker and alcohol use. Review of Systems:  Review of systems not obtained due to patient factors.         Past Medical History:  Past Medical History:   Diagnosis Date    Asthma     Bilateral ovarian cysts     Cocaine abuse ([facility] [address]     Mental and behavioral problem     Pancreatitis     Pancreatitis     Psychosis ([facility] [address]         Past Surgical History:  Past Surgical History:   Procedure Laterality Date    HX GYN      D&C, Hysterectomy        Medications:    Current Facility-Administered Medications   Medication Dose Route Frequency    potassium chloride 10 mEq in 100 ml IVPB  10 mEq IntraVENous Q1H    lidocaine (XYLOCAINE) 10 mg/mL (1 %) injection 1-20 mL  1-20 mL SubCUTAneous RAD CONTINUOUS    0.9% sodium chloride infusion 500 mL  500 mL Irrigation RAD ONCE    iopamidoL (ISOVUE 300) 61 % contrast injection 1-50 mL  1-50 mL Other RAD ONCE    sterile water (preservative free) injection   Irrigation RAD ONCE    lactulose (CHRONULAC) 10 gram/15 mL solution 45 mL  30 g Oral DAILY    melatonin (rapid dissolve) tablet 5 mg  5 mg Oral QHS    propofol (DIPRIVAN) 10 mg/mL infusion  0-50 mcg/kg/min IntraVENous TITRATE    levoFLOXacin (LEVAQUIN) 750 mg in D5W IVPB  750 mg IntraVENous Q24H    dexmedeTOMidine (PRECEDEX) 400 mcg in 0.9% sodium chloride 100 mL infusion  0.1-1.5 mcg/kg/hr IntraVENous TITRATE    famotidine (PEPCID) tablet 20 mg  20 mg Oral BID    thiamine mononitrate (B-1) tablet 100 mg  100 mg Oral DAILY    folic acid (FOLVITE) tablet 1 mg  1 mg Oral DAILY    multivitamin, tx-iron-ca-min (THERA-M w/ IRON) tablet 1 Tablet  1 Tablet Oral DAILY    clonazePAM (KlonoPIN) tablet 0.5 mg  0.5 mg Oral BID    insulin lispro (HUMALOG) injection   SubCUTAneous Q6H    [Held by provider] enoxaparin (LOVENOX) injection 40 mg  40 mg SubCUTAneous Q24H    sodium chloride (NS) flush 5-40 mL  5-40 mL IntraVENous Q8H    chlorhexidine (PERIDEX) 0.12 % mouthwash 10 mL  10 mL Oral Q12H       Allergy:  Allergies   Allergen Reactions    Fish Containing Products Itching    Toradol [Ketorolac] Rash     Pt reports she is not allergic to this medication.          Social History:  Social History     Tobacco Use    Smoking status: Current Every Day Smoker     Packs/day: 1.00    Smokeless tobacco: Never Used   Substance Use Topics    Alcohol use: Not Currently    Drug use: Not Currently     Types: Cocaine, Marijuana     Comment: used with in past 24 hours          Objective:   Vital Signs:    Blood pressure (!) 101/49, pulse 81, temperature 99.6 °F (37.6 °C), resp. rate 17, height 5' 2\" (1.575 m), weight 71.2 kg (157 lb), last menstrual period 05/15/2018, SpO2 96 %. Body mass index is 28.72 kg/m². O2 Device: Ventilator, Tracheostomy   O2 Flow Rate (L/min): 8 l/min   Temp (24hrs), Av.5 °F (37.5 °C), Min:98.6 °F (37 °C), Max:99.8 °F (37.7 °C)         Intake/Output:   Last shift:      No intake/output data recorded. Last 3 shifts:  1901 - 10/01 0700  In: 697.4 [I.V.:697.4]  Out: 2425 [Urine:2425]    Intake/Output Summary (Last 24 hours) at 10/1/2021 1108  Last data filed at 10/1/2021 0400  Gross per 24 hour   Intake 697.35 ml   Output 1400 ml   Net -702.65 ml       ABG:    No results found for: PH, PHI, PCO2, PCO2I, PO2, PO2I, HCO3, HCO3I, FIO2, FIO2I  Ventilator Mode: (S) Assist control, VC+  Respiratory Rate  Resp Rate Observed: 9  Back-Up Rate: 14  Insp Time (sec): 1.1 sec  I:E Ratio: 1:2.58  Ventilator Volumes  Vt Set (ml): (S) 420 ml  Vt Exhaled (Machine Breath) (ml): 527 ml  Vt Spont (ml): 712 ml  Ve Observed (l/min): 9.41 l/min  Ventilator Pressures  Pressure Support (cm H2O): 10 cm H2O  PIP Observed (cm H2O): 14 cm H2O  Plateau Pressure (cm H2O): 12 cm H2O  MAP (cm H2O): 8  PEEP/VENT (cm H2O): 5 cm H20  Auto PEEP Observed (cm H2O): 0 cm H2O       Physical Exam:   Patient appears older than stated age; appears chronically ill; on trach collar support; acyanotic  HEENT: pupils not dilated, reactive, no scleral jaundice, no nasal drainage  Neck: No adenopathy or thyroid swelling  Tracheostomy tubeno bleeding seen around trach site  Patient head position midline now; finds it painful and stiff to move head to the left.   CVS: S1-S2; no murmur; telemetrysinus rhythm; JVD not elevated  RS: Moderate air entry bilaterally; no wheezing; bibasal crackles; not tachypneic or in distress while on ventilator support     Abd: Soft, no tenderness, no distention, no guarding or rigidity or rebound; bowel sounds present; no hepatosplenomegaly  Neuro: Intubated; currently sedated with propofol; limited exam   Extrm: no leg edema or swelling or clubbing  Skin: no rash  Lymphatic: no cervical or supraclavicular adenopathy  Vasc: Good DPs in both feet       Data:     Recent Results (from the past 12 hour(s))   GLUCOSE, POC    Collection Time: 10/01/21  5:35 AM   Result Value Ref Range    Glucose (POC) 111 (H) 70 - 110 mg/dL   CBC WITH AUTOMATED DIFF    Collection Time: 10/01/21  5:40 AM   Result Value Ref Range    WBC 14.8 (H) 4.6 - 13.2 K/uL    RBC 2.93 (L) 4.20 - 5.30 M/uL    HGB 8.7 (L) 12.0 - 16.0 g/dL    HCT 26.4 (L) 35.0 - 45.0 %    MCV 90.1 78.0 - 100.0 FL    MCH 29.7 24.0 - 34.0 PG    MCHC 33.0 31.0 - 37.0 g/dL    RDW 12.7 11.6 - 14.5 %    PLATELET 262 (H) 103 - 420 K/uL    MPV 10.0 9.2 - 11.8 FL    NEUTROPHILS 79 (H) 40 - 73 %    LYMPHOCYTES 13 (L) 21 - 52 %    MONOCYTES 6 3 - 10 %    EOSINOPHILS 1 0 - 5 %    BASOPHILS 0 0 - 2 %    ABS. NEUTROPHILS 11.7 (H) 1.8 - 8.0 K/UL    ABS. LYMPHOCYTES 2.0 0.9 - 3.6 K/UL    ABS. MONOCYTES 1.0 0.05 - 1.2 K/UL    ABS. EOSINOPHILS 0.1 0.0 - 0.4 K/UL    ABS.  BASOPHILS 0.0 0.0 - 0.1 K/UL    DF AUTOMATED     MAGNESIUM    Collection Time: 10/01/21  5:40 AM   Result Value Ref Range    Magnesium 1.7 1.6 - 2.6 mg/dL   METABOLIC PANEL, COMPREHENSIVE    Collection Time: 10/01/21  5:40 AM   Result Value Ref Range    Sodium 137 136 - 145 mmol/L    Potassium 3.6 3.5 - 5.5 mmol/L    Chloride 105 100 - 111 mmol/L    CO2 24 21 - 32 mmol/L    Anion gap 8 3.0 - 18 mmol/L    Glucose 100 (H) 74 - 99 mg/dL    BUN 10 7.0 - 18 MG/DL    Creatinine 0.30 (L) 0.6 - 1.3 MG/DL    BUN/Creatinine ratio 33 (H) 12 - 20      GFR est AA >60 >60 ml/min/1.73m2    GFR est non-AA >60 >60 ml/min/1.73m2    Calcium 8.4 (L) 8.5 - 10.1 MG/DL    Bilirubin, total 0.7 0.2 - 1.0 MG/DL    ALT (SGPT) 147 (H) 13 - 56 U/L    AST (SGOT) 40 (H) 10 - 38 U/L    Alk. phosphatase 234 (H) 45 - 117 U/L    Protein, total 5.9 (L) 6.4 - 8.2 g/dL    Albumin 2.5 (L) 3.4 - 5.0 g/dL    Globulin 3.4 2.0 - 4.0 g/dL    A-G Ratio 0.7 (L) 0.8 - 1.7     PHOSPHORUS    Collection Time: 10/01/21  5:40 AM   Result Value Ref Range    Phosphorus 4.1 2.5 - 4.9 MG/DL   AMMONIA    Collection Time: 10/01/21  5:40 AM   Result Value Ref Range    Ammonia 53 (H) 11 - 32 UMOL/L   CALCIUM, IONIZED    Collection Time: 10/01/21  5:40 AM   Result Value Ref Range    Ionized Calcium 1.16 1.12 - 1.32 MMOL/L             No results for input(s): FIO2I, IFO2, HCO3I, IHCO3, HCOPOC, PCO2I, PCOPOC, IPHI, PHI, PHPOC, PO2I, PO2POC in the last 72 hours.     No lab exists for component: IPOC2    All Micro Results     Procedure Component Value Units Date/Time    CULTURE, BLOOD [[id number]] Collected: 09/27/21 1005    Order Status: Completed Specimen: Blood Updated: 10/01/21 0844     Special Requests: NO SPECIAL REQUESTS        Culture result: NO GROWTH 4 DAYS       CULTURE, BLOOD [[id number]] Collected: 09/27/21 1005    Order Status: Completed Specimen: Blood Updated: 10/01/21 0844     Special Requests: NO SPECIAL REQUESTS        Culture result: NO GROWTH 4 DAYS       CULTURE, BLOOD [[id number]] Collected: 09/24/21 0740    Order Status: Completed Specimen: Blood Updated: 09/30/21 0819     Special Requests: NO SPECIAL REQUESTS        Culture result: NO GROWTH 6 DAYS       CULTURE, RESPIRATORY/SPUTUM/BRONCH W GRAM STAIN [[id number]]  (Abnormal)  (Susceptibility) Collected: 09/24/21 0654    Order Status: Completed Specimen: Sputum from Tracheal Aspirate Updated: 09/27/21 1147     Special Requests: NO SPECIAL REQUESTS        GRAM STAIN RARE WBCS SEEN               RARE EPITHELIAL CELLS SEEN            NO ORGANISMS SEEN        Culture result:       MODERATE STAPHYLOCOCCUS AUREUS                  NO NORMAL RESPIRATORY DINA ISOLATED          COVID-19 RAPID TEST [[id number]] Collected: 09/24/21 1700    Order Status: Completed Specimen: Nasopharyngeal Updated: 09/24/21 1849     Specimen source Nasopharyngeal        COVID-19 rapid test Not detected        Comment: Rapid Abbott ID Now       Rapid NAAT:  The specimen is NEGATIVE for SARS-CoV-2, the novel coronavirus associated with COVID-19. Negative results should be treated as presumptive and, if inconsistent with clinical signs and symptoms or necessary for patient management, should be tested with an alternative molecular assay. Negative results do not preclude SARS-CoV-2 infection and should not be used as the sole basis for patient management decisions. This test has been authorized by the FDA under an Emergency Use Authorization (EUA) for use by authorized laboratories. Fact sheet for Healthcare Providers: iTendency.uy  Fact sheet for Patients: iTendency.uy       Methodology: Isothermal Nucleic Acid Amplification         CULTURE, URINE [[id number]]     Order Status: Canceled Specimen: Urine from Clean catch     CULTURE, CSF  TUBE 2 [[id number]] Collected: 09/16/21 1434    Order Status: Completed Specimen: Cerebrospinal Fluid Updated: 09/23/21 1241     Special Requests: TUBE 3, CULTURE AND SENSITIVTY AND GRAM STAIN.      GRAM STAIN RARE WBCS SEEN         NO ORGANISMS SEEN        Culture result: NO GROWTH 7 DAYS       CULTURE, BLOOD [[id number]] Collected: 09/14/21 0515    Order Status: Completed Specimen: Blood Updated: 09/20/21 0832     Special Requests: NO SPECIAL REQUESTS        Culture result: NO GROWTH 6 DAYS       CULTURE, BLOOD [[id number]] Collected: 09/14/21 0500    Order Status: Completed Specimen: Blood Updated: 09/20/21 0832     Special Requests: NO SPECIAL REQUESTS        Culture result: NO GROWTH 6 DAYS       MENINGITIS PATHOGENS PANEL, CSF (BY PCR) [[id number]] Collected: 09/16/21 1434    Order Status: Completed Specimen: Cerebrospinal Fluid Updated: 09/17/21 0050     Escherichia coli K1 Not detected        Haemophilus Influenzae Not detected        Listeria Monocytogenes Not detected        Neisseria Meningitidis Not detected        Streptococcus Agalactiae Not detected        Streptococcus Pneumoniae Not detected        Cytomegalovirus Not detected        Enterovirus Not detected        Herpes Simplex Virus 1 Not detected        Comment: In patients who have negative herpes simplex 1 and 2 PCR results, do not modify treatment, confirm with alternate testing. Herpes Simplex Virus 2 Not detected        Comment: In patients who have negative herpes simplex 1 and 2 PCR results, do not modify treatment, confirm with alternate testing. Human Herpesvirus 6 Not detected        Human Parechovirus Not detected        Varicella Zoster Virus Not detected        Crypto. neoformans/gattii Not detected       MENINGITIS PATHOGENS PANEL, CSF (BY PCR) [[id number]] Collected: 09/16/21 1545    Order Status: Canceled Specimen: Cerebrospinal Fluid     HSV 1 AND 2 BY PCR [[id number]] Collected: 09/16/21 1434    Order Status: Canceled Specimen: Other     FSSIE-57 RAPID TEST [[id number]] Collected: 09/16/21 1000    Order Status: Completed Specimen: Nasopharyngeal Updated: 09/16/21 1032     Specimen source Nasopharyngeal        COVID-19 rapid test Not detected        Comment: Rapid Abbott ID Now       Rapid NAAT:  The specimen is NEGATIVE for SARS-CoV-2, the novel coronavirus associated with COVID-19. Negative results should be treated as presumptive and, if inconsistent with clinical signs and symptoms or necessary for patient management, should be tested with an alternative molecular assay. Negative results do not preclude SARS-CoV-2 infection and should not be used as the sole basis for patient management decisions. This test has been authorized by the FDA under an Emergency Use Authorization (EUA) for use by authorized laboratories.    Fact sheet for Healthcare Providers: ConventionUpdate.co.nz  Fact sheet for Patients: ConventionUpdate.co.nz       Methodology: Isothermal Nucleic Acid Amplification         LEGIONELLA PNEUMOPHILA AG, URINE [[id number]] Collected: 09/14/21 2315    Order Status: Completed Specimen: Urine, random Updated: 09/15/21 1042     Legionella Ag, urine Negative       STREP PNEUMO AG, URINE [[id number]] Collected: 09/14/21 2315    Order Status: Completed Specimen: Urine, random Updated: 09/15/21 1042     Strep pneumo Ag, urine Negative       RESPIRATORY VIRUS PANEL W/COVID-19, PCR [[id number]] Collected: 09/14/21 1832    Order Status: Completed Specimen: Nasopharyngeal Updated: 09/14/21 2234     Adenovirus Not detected        Coronavirus 229E Not detected        Coronavirus HKU1 Not detected        Coronavirus CVNL63 Not detected        Coronavirus OC43 Not detected        SARS-CoV-2, PCR Not detected        Metapneumovirus Not detected        Rhinovirus and Enterovirus Not detected        Influenza A Not detected        Influenza A, subtype H1 Not detected        Influenza A, subtype H3 Not detected        INFLUENZA A H1N1 PCR Not detected        Influenza B Not detected        Parainfluenza 1 Not detected        Parainfluenza 2 Not detected        Parainfluenza 3 Not detected        Parainfluenza virus 4 Not detected        RSV by PCR Not detected        B. parapertussis, PCR Not detected        Bordetella pertussis - PCR Not detected        Chlamydophila pneumoniae DNA, QL, PCR Not detected        Mycoplasma pneumoniae DNA, QL, PCR Not detected       CULTURE, BLOOD [[id number]] Collected: 09/14/21 1700    Order Status: Canceled Specimen: Blood     CULTURE, URINE [[id number]] Collected: 09/13/21 2330    Order Status: Canceled Specimen: Cath Urine     RESPIRATORY VIRUS PANEL W/COVID-19, PCR [[id number]]     Order Status: Canceled Specimen: NASOPHARYNGEAL SWAB     COVID-19 RAPID TEST [[id number]]     Order Status: Canceled     COVID-19 RAPID TEST [[id number]] Collected: 09/13/21 0737    Order Status: Completed Specimen: Nasopharyngeal Updated: 09/13/21 0811     Specimen source Nasopharyngeal        COVID-19 rapid test Not detected        Comment: Rapid Abbott ID Now       Rapid NAAT:  The specimen is NEGATIVE for SARS-CoV-2, the novel coronavirus associated with COVID-19. Negative results should be treated as presumptive and, if inconsistent with clinical signs and symptoms or necessary for patient management, should be tested with an alternative molecular assay. Negative results do not preclude SARS-CoV-2 infection and should not be used as the sole basis for patient management decisions. This test has been authorized by the FDA under an Emergency Use Authorization (EUA) for use by authorized laboratories. Fact sheet for Healthcare Providers: ConventionUpdate.co.nz  Fact sheet for Patients: ConventionUpdate.co.nz       Methodology: Isothermal Nucleic Acid Amplification         COVID-19 RAPID TEST [[id number]]     Order Status: Canceled         Echo 9/17/2021:  Left Ventricle Normal cavity size, wall thickness and systolic function (ejection fraction normal). The estimated EF is 60 - 65%. There is inconclusive left ventricular diastolic function E/E' ratio = 7.08  Heart rate is over 100. Wall Scoring The left ventricular wall motion is normal.            Left Atrium Normal cavity size. Right Ventricle Normal cavity size and global systolic function. Assessment of RV function:   TAPSE = 27 mm. Right Atrium Normal cavity size. Interatrial Septum Interatrial septum not well visualized   Aortic Valve Aortic valve not well visualized. Trileaflet valve structure, no stenosis and no regurgitation. Mitral Valve No stenosis. Mitral valve non-specific thickening. Mild regurgitation. Tricuspid Valve Tricuspid valve not well visualized. No stenosis.  Mild regurgitation. Pulmonic Valve Pulmonic valve not well visualized. No stenosis and no regurgitation. Aorta The aorta was not well visualized. Normal aortic root. Pulmonary Artery Pulmonary arteries not well visualized. Pulmonary arterial systolic pressure (PASP) is 29 mmHg. Pulmonary hypertension not suggested by Doppler findings. IVC/Hepatic Veins Mechanically ventilated; cannot use inferior caval vein diameter to estimate central venous pressure. Pericardium Normal pericardium and no evidence of pericardial effusion. CT head 9/15/1021  IMPRESSION   No acute intracranial abnormality. CT chest, abdomen, pelvis 9/15/1021  IMPRESSION   Endotracheal tube tip in the lower thoracic trachea 1.5 cm above the dipak.    Bilateral lower lobar atelectasis, possible endobronchial lesion/mucous plug in  the left lower lobe bronchus.    No acute intra-abdominal abnormality. Imaging:  [x]I have personally reviewed the patients chest radiographs images and report    X-ray 9/30/1021  Results from Hospital Encounter encounter on 09/11/21    XR CHEST PORT    Narrative  EXAM: XR CHEST PORT    CLINICAL INDICATION/HISTORY: Shortness of breath  -Additional: None    COMPARISON: 9/29/2021    TECHNIQUE: Portable frontal view of the chest    _______________    FINDINGS:    SUPPORT DEVICES: Tracheostomy catheter and nasogastric tube redemonstrated. Tip  of the NG tube is below the diaphragm, collimated from view. HEART AND MEDIASTINUM: Stable cardiac size and mediastinal contours. LUNGS AND PLEURAL SPACES: Improved pulmonary aeration with decreased conspicuity  to perihilar edema opacities noted previously. No pneumothorax or definite  effusion. BONY THORAX AND SOFT TISSUES: Unremarkable.    _______________    Impression  1. Support devices in stable position as visualized. 2. Improved pulmonary aeration with decreased perihilar atelectasis or edema.           Please note: Voice-recognition software may have been used to generate this report, which may have resulted in some phonetic-based errors in grammar and contents. Even though attempts were made to correct all the mistakes, some may have been missed, and remained in the body of the document.       Evie Landry MD  10/1/2021

## 2021-10-02 ENCOUNTER — APPOINTMENT (OUTPATIENT)
Dept: GENERAL RADIOLOGY | Age: 41
DRG: 004 | End: 2021-10-02
Attending: INTERNAL MEDICINE
Payer: MEDICAID

## 2021-10-02 LAB
ALBUMIN SERPL-MCNC: 2.6 G/DL (ref 3.4–5)
ALBUMIN/GLOB SERPL: 0.7 {RATIO} (ref 0.8–1.7)
ALP SERPL-CCNC: 222 U/L (ref 45–117)
ALT SERPL-CCNC: 103 U/L (ref 13–56)
AMMONIA PLAS-SCNC: 44 UMOL/L (ref 11–32)
ANION GAP SERPL CALC-SCNC: 8 MMOL/L (ref 3–18)
AST SERPL-CCNC: 21 U/L (ref 10–38)
BASOPHILS # BLD: 0 K/UL (ref 0–0.1)
BASOPHILS NFR BLD: 0 % (ref 0–2)
BILIRUB SERPL-MCNC: 0.8 MG/DL (ref 0.2–1)
BUN SERPL-MCNC: 8 MG/DL (ref 7–18)
BUN/CREAT SERPL: 28 (ref 12–20)
CA-I SERPL-SCNC: 1.16 MMOL/L (ref 1.12–1.32)
CALCIUM SERPL-MCNC: 8.5 MG/DL (ref 8.5–10.1)
CHLORIDE SERPL-SCNC: 107 MMOL/L (ref 100–111)
CO2 SERPL-SCNC: 24 MMOL/L (ref 21–32)
CREAT SERPL-MCNC: 0.29 MG/DL (ref 0.6–1.3)
DIFFERENTIAL METHOD BLD: ABNORMAL
EOSINOPHIL # BLD: 0.1 K/UL (ref 0–0.4)
EOSINOPHIL NFR BLD: 1 % (ref 0–5)
ERYTHROCYTE [DISTWIDTH] IN BLOOD BY AUTOMATED COUNT: 13 % (ref 11.6–14.5)
GLOBULIN SER CALC-MCNC: 3.5 G/DL (ref 2–4)
GLUCOSE BLD STRIP.AUTO-MCNC: 102 MG/DL (ref 70–110)
GLUCOSE BLD STRIP.AUTO-MCNC: 110 MG/DL (ref 70–110)
GLUCOSE BLD STRIP.AUTO-MCNC: 121 MG/DL (ref 70–110)
GLUCOSE SERPL-MCNC: 94 MG/DL (ref 74–99)
HCT VFR BLD AUTO: 29 % (ref 35–45)
HGB BLD-MCNC: 9.7 G/DL (ref 12–16)
LYMPHOCYTES # BLD: 2.1 K/UL (ref 0.9–3.6)
LYMPHOCYTES NFR BLD: 15 % (ref 21–52)
MAGNESIUM SERPL-MCNC: 1.9 MG/DL (ref 1.6–2.6)
MCH RBC QN AUTO: 30.4 PG (ref 24–34)
MCHC RBC AUTO-ENTMCNC: 33.4 G/DL (ref 31–37)
MCV RBC AUTO: 90.9 FL (ref 78–100)
MONOCYTES # BLD: 0.8 K/UL (ref 0.05–1.2)
MONOCYTES NFR BLD: 6 % (ref 3–10)
NEUTS SEG # BLD: 10.8 K/UL (ref 1.8–8)
NEUTS SEG NFR BLD: 77 % (ref 40–73)
PHOSPHATE SERPL-MCNC: 4 MG/DL (ref 2.5–4.9)
PLATELET # BLD AUTO: 529 K/UL (ref 135–420)
PMV BLD AUTO: 9.6 FL (ref 9.2–11.8)
POTASSIUM SERPL-SCNC: 3.8 MMOL/L (ref 3.5–5.5)
POTASSIUM SERPL-SCNC: 5.3 MMOL/L (ref 3.5–5.5)
PROT SERPL-MCNC: 6.1 G/DL (ref 6.4–8.2)
RBC # BLD AUTO: 3.19 M/UL (ref 4.2–5.3)
SODIUM SERPL-SCNC: 139 MMOL/L (ref 136–145)
WBC # BLD AUTO: 13.9 K/UL (ref 4.6–13.2)

## 2021-10-02 PROCEDURE — 74011250636 HC RX REV CODE- 250/636: Performed by: FAMILY MEDICINE

## 2021-10-02 PROCEDURE — 74011250637 HC RX REV CODE- 250/637: Performed by: INTERNAL MEDICINE

## 2021-10-02 PROCEDURE — 85025 COMPLETE CBC W/AUTO DIFF WBC: CPT

## 2021-10-02 PROCEDURE — 74011250636 HC RX REV CODE- 250/636: Performed by: INTERNAL MEDICINE

## 2021-10-02 PROCEDURE — 94003 VENT MGMT INPAT SUBQ DAY: CPT

## 2021-10-02 PROCEDURE — 83735 ASSAY OF MAGNESIUM: CPT

## 2021-10-02 PROCEDURE — 77010033678 HC OXYGEN DAILY

## 2021-10-02 PROCEDURE — 71045 X-RAY EXAM CHEST 1 VIEW: CPT

## 2021-10-02 PROCEDURE — 74011000250 HC RX REV CODE- 250: Performed by: INTERNAL MEDICINE

## 2021-10-02 PROCEDURE — 74011250637 HC RX REV CODE- 250/637: Performed by: FAMILY MEDICINE

## 2021-10-02 PROCEDURE — 65610000006 HC RM INTENSIVE CARE

## 2021-10-02 PROCEDURE — 74011000258 HC RX REV CODE- 258: Performed by: INTERNAL MEDICINE

## 2021-10-02 PROCEDURE — 36415 COLL VENOUS BLD VENIPUNCTURE: CPT

## 2021-10-02 PROCEDURE — 77030018798 HC PMP KT ENTRL FED COVD -A

## 2021-10-02 PROCEDURE — 74011000250 HC RX REV CODE- 250: Performed by: FAMILY MEDICINE

## 2021-10-02 PROCEDURE — 82962 GLUCOSE BLOOD TEST: CPT

## 2021-10-02 PROCEDURE — 84132 ASSAY OF SERUM POTASSIUM: CPT

## 2021-10-02 PROCEDURE — 82330 ASSAY OF CALCIUM: CPT

## 2021-10-02 PROCEDURE — 84100 ASSAY OF PHOSPHORUS: CPT

## 2021-10-02 PROCEDURE — 77030006998

## 2021-10-02 PROCEDURE — 82140 ASSAY OF AMMONIA: CPT

## 2021-10-02 RX ORDER — QUETIAPINE FUMARATE 25 MG/1
25 TABLET, FILM COATED ORAL 2 TIMES DAILY
Status: DISCONTINUED | OUTPATIENT
Start: 2021-10-02 | End: 2021-10-03

## 2021-10-02 RX ORDER — POTASSIUM CHLORIDE 7.45 MG/ML
10 INJECTION INTRAVENOUS
Status: COMPLETED | OUTPATIENT
Start: 2021-10-02 | End: 2021-10-03

## 2021-10-02 RX ADMIN — POTASSIUM CHLORIDE 10 MEQ: 7.46 INJECTION, SOLUTION INTRAVENOUS at 08:41

## 2021-10-02 RX ADMIN — CLONAZEPAM 0.5 MG: 0.5 TABLET ORAL at 09:42

## 2021-10-02 RX ADMIN — DEXMEDETOMIDINE HYDROCHLORIDE 1.2 MCG/KG/HR: 100 INJECTION, SOLUTION INTRAVENOUS at 10:16

## 2021-10-02 RX ADMIN — DEXMEDETOMIDINE HYDROCHLORIDE 1 MCG/KG/HR: 100 INJECTION, SOLUTION INTRAVENOUS at 16:53

## 2021-10-02 RX ADMIN — 0.12% CHLORHEXIDINE GLUCONATE 10 ML: 1.2 RINSE ORAL at 23:41

## 2021-10-02 RX ADMIN — SODIUM CHLORIDE 10 ML: 9 INJECTION, SOLUTION INTRAMUSCULAR; INTRAVENOUS; SUBCUTANEOUS at 05:19

## 2021-10-02 RX ADMIN — FENTANYL CITRATE 50 MCG: 50 INJECTION INTRAMUSCULAR; INTRAVENOUS at 04:45

## 2021-10-02 RX ADMIN — LACTULOSE 45 ML: 20 SOLUTION ORAL at 09:41

## 2021-10-02 RX ADMIN — QUETIAPINE FUMARATE 25 MG: 25 TABLET ORAL at 23:41

## 2021-10-02 RX ADMIN — CLONAZEPAM 0.5 MG: 0.5 TABLET ORAL at 23:41

## 2021-10-02 RX ADMIN — HYDROMORPHONE HYDROCHLORIDE 1 MG: 1 INJECTION, SOLUTION INTRAMUSCULAR; INTRAVENOUS; SUBCUTANEOUS at 16:13

## 2021-10-02 RX ADMIN — THIAMINE HCL TAB 100 MG 100 MG: 100 TAB at 09:42

## 2021-10-02 RX ADMIN — ENOXAPARIN SODIUM 40 MG: 100 INJECTION SUBCUTANEOUS at 16:13

## 2021-10-02 RX ADMIN — HYDROMORPHONE HYDROCHLORIDE 1 MG: 1 INJECTION, SOLUTION INTRAMUSCULAR; INTRAVENOUS; SUBCUTANEOUS at 13:38

## 2021-10-02 RX ADMIN — FENTANYL CITRATE 50 MCG: 50 INJECTION INTRAMUSCULAR; INTRAVENOUS at 17:18

## 2021-10-02 RX ADMIN — FENTANYL CITRATE 50 MCG: 50 INJECTION INTRAMUSCULAR; INTRAVENOUS at 00:23

## 2021-10-02 RX ADMIN — Medication 5 MG: at 23:41

## 2021-10-02 RX ADMIN — 0.12% CHLORHEXIDINE GLUCONATE 10 ML: 1.2 RINSE ORAL at 09:41

## 2021-10-02 RX ADMIN — PROPOFOL 50 MCG/KG/MIN: 10 INJECTION, EMULSION INTRAVENOUS at 00:22

## 2021-10-02 RX ADMIN — FOLIC ACID 1 MG: 1 TABLET ORAL at 09:42

## 2021-10-02 RX ADMIN — FAMOTIDINE 20 MG: 20 TABLET ORAL at 23:41

## 2021-10-02 RX ADMIN — PROPOFOL 50 MCG/KG/MIN: 10 INJECTION, EMULSION INTRAVENOUS at 05:02

## 2021-10-02 RX ADMIN — LACTULOSE 15 ML: 10 SOLUTION ORAL at 16:15

## 2021-10-02 RX ADMIN — POTASSIUM CHLORIDE 10 MEQ: 7.46 INJECTION, SOLUTION INTRAVENOUS at 09:41

## 2021-10-02 RX ADMIN — LACTULOSE 15 ML: 10 SOLUTION ORAL at 23:41

## 2021-10-02 RX ADMIN — ACETAMINOPHEN 650 MG: 325 TABLET ORAL at 16:13

## 2021-10-02 RX ADMIN — SODIUM CHLORIDE 10 ML: 9 INJECTION, SOLUTION INTRAMUSCULAR; INTRAVENOUS; SUBCUTANEOUS at 14:06

## 2021-10-02 RX ADMIN — QUETIAPINE FUMARATE 25 MG: 25 TABLET ORAL at 09:42

## 2021-10-02 RX ADMIN — ACETAMINOPHEN 650 MG: 325 TABLET ORAL at 09:42

## 2021-10-02 RX ADMIN — FAMOTIDINE 20 MG: 20 TABLET ORAL at 09:42

## 2021-10-02 RX ADMIN — HYDROMORPHONE HYDROCHLORIDE 1 MG: 1 INJECTION, SOLUTION INTRAMUSCULAR; INTRAVENOUS; SUBCUTANEOUS at 03:13

## 2021-10-02 RX ADMIN — Medication 1 TABLET: at 09:42

## 2021-10-02 RX ADMIN — DEXMEDETOMIDINE HYDROCHLORIDE 1.5 MCG/KG/HR: 100 INJECTION, SOLUTION INTRAVENOUS at 21:18

## 2021-10-02 RX ADMIN — SODIUM CHLORIDE 10 ML: 9 INJECTION, SOLUTION INTRAMUSCULAR; INTRAVENOUS; SUBCUTANEOUS at 23:41

## 2021-10-02 NOTE — PROGRESS NOTES
Hospitalist Progress Note    Patient: Earnestine Vance MRN: [id number]  [facility]: [id number]    YOB: 1980  Age: 39 y.o.   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 female with   Patient Active Problem List    Diagnosis Date Noted    Status post tracheostomy ([address] 09/28/2021    Hypokalemia 09/21/2021    Increased ammonia level 09/14/2021    Psychosis ([address]     Hyponatremia     Acute respiratory failure with hypoxia ([address]     Left wrist fracture, with delayed healing, subsequent encounter 09/12/2021    Drug overdose, intentional self-harm, initial encounter ([address] 09/12/2021    Hypoxia 09/12/2021    Hypotension after procedure 09/12/2021    Acute metabolic encephalopathy 56/12/4399    Ekbom's delusional parasitosis ([address] 09/06/2020    Dextromethorphan use disorder, moderate ([address] 12/07/2019     Principal Problem:    Drug overdose, intentional self-harm, initial encounter ([address] (9/12/2021)    Active Problems:    Left wrist fracture, with delayed healing, subsequent encounter (9/12/2021)      Hypoxia (9/12/2021)      Hypotension after procedure (9/12/2021)      Acute metabolic encephalopathy (8/15/2912)      Psychosis ([facility] ()      Hyponatremia ()      Acute respiratory failure with hypoxia (HCC) ()      Increased ammonia level (9/14/2021)      Hypokalemia (9/21/2021)      Status post tracheostomy ([facility] (9/28/2021)         Acute respiratory failure with hypoxia   Intubated on 9/12    Trach on 9/20/21. -- now back on vent     Post tracheostomy   Continue local trach care         Anemia - h/h stable/ fu labs as ordereed            Acute metabolic encephalopathy               Gabapentin overdose with lethargy and AMS   S/p procedural stomach emptying in ED with charcoal and sorbitol                elevated ammonia level  Continue   Lactulose,   Continue ammonia monitoring         Psychosis , hx of  Ekbom's delusional parasitosis   Need psych evaluation later   On olanzapine and klonopin      left wrist fracture: immobilized          S/p peg tube placement      Patient's condition is guarded        Recommend to continue hospitalization. Chief Complaints:   Chief Complaint   Patient presents with    Drug Overdose      Silvia Goldstein is a 39 y.o. female who is standing history of multidrug use/abuse, previous drug-seeking behavior and mental health issues otherwise unspecified arrives via EMS with acute metabolic encephalopathy and lethargy. Admitted for likely gabapentin overdose. She was intubated in ER, ct head no acute issue, LP done due to fever even on abx, no meningitis noted. SUBJECTIVE:  Pt is seen and examined.   chart reviewed    On vent via trach           Review of systems:    Review of Systems   Unable to perform ROS: Intubated       PE:  Patient Vitals for the past 24 hrs:   BP Temp Pulse Resp SpO2   10/02/21 0942  100.2 °F (37.9 °C) (!) 112     10/02/21 0742   (!) 114 14 100 %   10/02/21 0730 123/71  (!) 118 18 100 %   10/02/21 0700 118/69  (!) 118 19 100 %   10/02/21 0630 (!) 115/59  (!) 114 17 100 %   10/02/21 0600 121/62  (!) 117 18 99 %   10/02/21 0555   (!) 119 17 98 %   10/02/21 0530 115/63  (!) 120 18 100 %   10/02/21 0500 100/61  (!) 107 15 98 %   10/02/21 0430 (!) 109/54  (!) 107 15 100 % 10/02/21 0400 115/68 99.3 °F (37.4 °C) (!) 114 16 99 %   10/02/21 0330 (!) 105/50  (!) 110 16 98 %   10/02/21 0300 (!) 140/91  (!) 125 18 100 %   10/02/21 0230 (!) 114/59  (!) 114 17 100 %   10/02/21 0200 115/60  (!) 116 17 100 %   10/02/21 0130 (!) 110/53  (!) 115 17 100 %   10/02/21 0100 112/64  (!) 114 23 100 %   10/02/21 0030 (!) 104/49  (!) 113 14 97 %   10/02/21 0000 116/64  (!) 119 17 100 %   10/01/21 2330 (!) 118/57  (!) 120 18 100 %   10/01/21 2300 108/64 (!) 100.5 °F (38.1 °C) (!) 121 18 100 %   10/01/21 2230 113/62  (!) 127 25 100 %   10/01/21 2200 115/82  (!) 119 (!) 33 100 %   10/01/21 2130 106/76  (!) 117 17 100 %   10/01/21 2100 (!) 102/59  (!) 103 14 100 %   10/01/21 2030 (!) 93/42  (!) 117 14 100 %   10/01/21 2000 113/65 98.9 °F (37.2 °C) (!) 132 18 100 %   10/01/21 1930 114/80  (!) 132 22 100 %   10/01/21 1900 126/88  (!) 126 19 100 %   10/01/21 1849  (!) 101 °F (38.3 °C)      10/01/21 1800 (!) 103/48  (!) 107 23 97 %   10/01/21 1700 131/87  (!) 117 17 100 %   10/01/21 1601 117/67  (!) 113 15 100 %   10/01/21 1600   (!) 112 13 100 %   10/01/21 1536   (!) 110 16 100 %   10/01/21 1500 118/69  (!) 102 15 100 %   10/01/21 1400 (!) 108/57  96 16 100 %   10/01/21 1300 (!) 105/57  88 16 100 %   10/01/21 1230 108/62  90 17 100 %   10/01/21 1200 (!) 90/42 99.1 °F (37.3 °C) 71 14 98 %   10/01/21 1151   76 14 100 %   10/01/21 1100 (!) 101/55  81 18 100 %       Intake/Output Summary (Last 24 hours) at 10/2/2021 1014  Last data filed at 10/2/2021 0400  Gross per 24 hour   Intake    Output 2625 ml   Net -2625 ml     No data found. Physical Exam  Vitals and nursing note reviewed. Constitutional:       General: She is in acute distress. Appearance: She is ill-appearing. Neck:      Vascular: No JVD. Cardiovascular:      Rate and Rhythm: Normal rate and regular rhythm. Heart sounds: Normal heart sounds. Pulmonary:      Effort: Respiratory distress present. Breath sounds: Normal breath sounds. Abdominal:      General: Bowel sounds are normal. There is no distension. Palpations: Abdomen is soft. Tenderness: There is no abdominal tenderness. There is no rebound. Musculoskeletal:         General: Normal range of motion. Cervical back: Normal range of motion and neck supple. Skin:     General: Skin is warm and dry. Neurological:      Mental Status: She is alert. Psychiatric:         Mood and Affect: Affect normal.             Intake and Output:  Current Shift:  No intake/output data recorded. Last three shifts:  09/30 1901 - 10/02 0700  In: -   Out: 3425 [Urine:2425; Drains:1000]    Lab/Data Reviewed:  Recent Results (from the past 8 hour(s))   GLUCOSE, POC    Collection Time: 10/02/21  5:06 AM   Result Value Ref Range    Glucose (POC) 102 70 - 110 mg/dL   CBC WITH AUTOMATED DIFF    Collection Time: 10/02/21  6:00 AM   Result Value Ref Range    WBC 13.9 (H) 4.6 - 13.2 K/uL    RBC 3.19 (L) 4.20 - 5.30 M/uL    HGB 9.7 (L) 12.0 - 16.0 g/dL    HCT 29.0 (L) 35.0 - 45.0 %    MCV 90.9 78.0 - 100.0 FL    MCH 30.4 24.0 - 34.0 PG    MCHC 33.4 31.0 - 37.0 g/dL    RDW 13.0 11.6 - 14.5 %    PLATELET 457 (H) 136 - 420 K/uL    MPV 9.6 9.2 - 11.8 FL    NEUTROPHILS 77 (H) 40 - 73 %    LYMPHOCYTES 15 (L) 21 - 52 %    MONOCYTES 6 3 - 10 %    EOSINOPHILS 1 0 - 5 %    BASOPHILS 0 0 - 2 %    ABS. NEUTROPHILS 10.8 (H) 1.8 - 8.0 K/UL    ABS. LYMPHOCYTES 2.1 0.9 - 3.6 K/UL    ABS. MONOCYTES 0.8 0.05 - 1.2 K/UL    ABS. EOSINOPHILS 0.1 0.0 - 0.4 K/UL    ABS.  BASOPHILS 0.0 0.0 - 0.1 K/UL    DF AUTOMATED     MAGNESIUM    Collection Time: 10/02/21  6:00 AM   Result Value Ref Range    Magnesium 1.9 1.6 - 2.6 mg/dL   METABOLIC PANEL, COMPREHENSIVE    Collection Time: 10/02/21  6:00 AM   Result Value Ref Range    Sodium 139 136 - 145 mmol/L    Potassium 3.8 3.5 - 5.5 mmol/L    Chloride 107 100 - 111 mmol/L    CO2 24 21 - 32 mmol/L    Anion gap 8 3.0 - 18 mmol/L    Glucose 94 74 - 99 mg/dL    BUN 8 7.0 - 18 MG/DL    Creatinine 0.29 (L) 0.6 - 1.3 MG/DL    BUN/Creatinine ratio 28 (H) 12 - 20      GFR est AA >60 >60 ml/min/1.73m2    GFR est non-AA >60 >60 ml/min/1.73m2    Calcium 8.5 8.5 - 10.1 MG/DL    Bilirubin, total 0.8 0.2 - 1.0 MG/DL    ALT (SGPT) 103 (H) 13 - 56 U/L    AST (SGOT) 21 10 - 38 U/L    Alk.  phosphatase 222 (H) 45 - 117 U/L    Protein, total 6.1 (L) 6.4 - 8.2 g/dL    Albumin 2.6 (L) 3.4 - 5.0 g/dL    Globulin 3.5 2.0 - 4.0 g/dL    A-G Ratio 0.7 (L) 0.8 - 1.7     PHOSPHORUS    Collection Time: 10/02/21  6:00 AM   Result Value Ref Range    Phosphorus 4.0 2.5 - 4.9 MG/DL   AMMONIA    Collection Time: 10/02/21  6:00 AM   Result Value Ref Range    Ammonia 44 (H) 11 - 32 UMOL/L   CALCIUM, IONIZED    Collection Time: 10/02/21  6:00 AM   Result Value Ref Range    Ionized Calcium 1.16 1.12 - 1.32 MMOL/L     Medications:  Current Facility-Administered Medications   Medication Dose Route Frequency    potassium chloride 10 mEq in 100 ml IVPB  10 mEq IntraVENous Q1H    QUEtiapine (SEROquel) tablet 25 mg  25 mg Oral BID    lidocaine (XYLOCAINE) 10 mg/mL (1 %) injection 1-20 mL  1-20 mL SubCUTAneous RAD CONTINUOUS    lactulose (CHRONULAC) 10 gram/15 mL solution 45 mL  30 g Oral DAILY    melatonin (rapid dissolve) tablet 5 mg  5 mg Oral QHS    fentaNYL citrate (PF) injection 50 mcg  50 mcg IntraVENous Q2H PRN    haloperidol lactate (HALDOL) injection 4 mg  4 mg IntraVENous Q6H PRN    [Held by provider] propofol (DIPRIVAN) 10 mg/mL infusion  0-50 mcg/kg/min IntraVENous TITRATE    albuterol-ipratropium (DUO-NEB) 2.5 MG-0.5 MG/3 ML  3 mL Nebulization Q4H PRN    dexmedeTOMidine (PRECEDEX) 400 mcg in 0.9% sodium chloride 100 mL infusion  0.1-1.5 mcg/kg/hr IntraVENous TITRATE    famotidine (PEPCID) tablet 20 mg  20 mg Oral BID    thiamine mononitrate (B-1) tablet 100 mg  100 mg Oral DAILY    folic acid (FOLVITE) tablet 1 mg  1 mg Oral DAILY    multivitamin, tx-iron-ca-min (THERA-M w/ IRON) tablet 1 Tablet  1 Tablet Oral DAILY    clonazePAM (KlonoPIN) tablet 0.5 mg  0.5 mg Oral BID    ibuprofen (ADVIL;MOTRIN) 100 mg/5 mL oral suspension 400 mg  400 mg Per NG tube Q6H PRN    insulin lispro (HUMALOG) injection   SubCUTAneous Q6H    glucose chewable tablet 16 g  4 Tablet Oral PRN    glucagon (GLUCAGEN) injection 1 mg  1 mg IntraMUSCular PRN    dextrose (D50W) injection syrg 12.5-25 g  25-50 mL IntraVENous PRN    LORazepam (ATIVAN) injection 2 mg  2 mg IntraVENous Q6H PRN    HYDROmorphone (PF) (DILAUDID) injection 1 mg  1 mg IntraVENous Q4H PRN    [Held by provider] enoxaparin (LOVENOX) injection 40 mg  40 mg SubCUTAneous Q24H    sodium chloride (NS) flush 5-40 mL  5-40 mL IntraVENous Q8H    sodium chloride (NS) flush 5-40 mL  5-40 mL IntraVENous PRN    acetaminophen (TYLENOL) tablet 650 mg  650 mg Oral Q6H PRN    Or    acetaminophen (TYLENOL) suppository 650 mg  650 mg Rectal Q6H PRN    polyethylene glycol (MIRALAX) packet 17 g  17 g Oral DAILY PRN    ondansetron (ZOFRAN ODT) tablet 4 mg  4 mg Oral Q8H PRN    Or    ondansetron (ZOFRAN) injection 4 mg  4 mg IntraVENous Q6H PRN    chlorhexidine (PERIDEX) 0.12 % mouthwash 10 mL  10 mL Oral Q12H    ELECTROLYTE REPLACEMENT PROTOCOL - Potassium Standard Dosing   1 Each Other PRN    ELECTROLYTE REPLACEMENT PROTOCOL - Magnesium   1 Each Other PRN    ELECTROLYTE REPLACEMENT PROTOCOL - Phosphorus  Standard Dosing  1 Each Other PRN    ELECTROLYTE REPLACEMENT PROTOCOL - Calcium   1 Each Other PRN       Recent Results (from the past 24 hour(s))   GLUCOSE, POC    Collection Time: 10/01/21 12:07 PM   Result Value Ref Range    Glucose (POC) 116 (H) 70 - 110 mg/dL   GLUCOSE, POC    Collection Time: 10/01/21  6:29 PM   Result Value Ref Range    Glucose (POC) 83 70 - 110 mg/dL   GLUCOSE, POC    Collection Time: 10/01/21 11:04 PM   Result Value Ref Range    Glucose (POC) 94 70 - 110 mg/dL   GLUCOSE, POC    Collection Time: 10/02/21  5:06 AM Result Value Ref Range    Glucose (POC) 102 70 - 110 mg/dL   CBC WITH AUTOMATED DIFF    Collection Time: 10/02/21  6:00 AM   Result Value Ref Range    WBC 13.9 (H) 4.6 - 13.2 K/uL    RBC 3.19 (L) 4.20 - 5.30 M/uL    HGB 9.7 (L) 12.0 - 16.0 g/dL    HCT 29.0 (L) 35.0 - 45.0 %    MCV 90.9 78.0 - 100.0 FL    MCH 30.4 24.0 - 34.0 PG    MCHC 33.4 31.0 - 37.0 g/dL    RDW 13.0 11.6 - 14.5 %    PLATELET 214 (H) 558 - 420 K/uL    MPV 9.6 9.2 - 11.8 FL    NEUTROPHILS 77 (H) 40 - 73 %    LYMPHOCYTES 15 (L) 21 - 52 %    MONOCYTES 6 3 - 10 %    EOSINOPHILS 1 0 - 5 %    BASOPHILS 0 0 - 2 %    ABS. NEUTROPHILS 10.8 (H) 1.8 - 8.0 K/UL    ABS. LYMPHOCYTES 2.1 0.9 - 3.6 K/UL    ABS. MONOCYTES 0.8 0.05 - 1.2 K/UL    ABS. EOSINOPHILS 0.1 0.0 - 0.4 K/UL    ABS. BASOPHILS 0.0 0.0 - 0.1 K/UL    DF AUTOMATED     MAGNESIUM    Collection Time: 10/02/21  6:00 AM   Result Value Ref Range    Magnesium 1.9 1.6 - 2.6 mg/dL   METABOLIC PANEL, COMPREHENSIVE    Collection Time: 10/02/21  6:00 AM   Result Value Ref Range    Sodium 139 136 - 145 mmol/L    Potassium 3.8 3.5 - 5.5 mmol/L    Chloride 107 100 - 111 mmol/L    CO2 24 21 - 32 mmol/L    Anion gap 8 3.0 - 18 mmol/L    Glucose 94 74 - 99 mg/dL    BUN 8 7.0 - 18 MG/DL    Creatinine 0.29 (L) 0.6 - 1.3 MG/DL    BUN/Creatinine ratio 28 (H) 12 - 20      GFR est AA >60 >60 ml/min/1.73m2    GFR est non-AA >60 >60 ml/min/1.73m2    Calcium 8.5 8.5 - 10.1 MG/DL    Bilirubin, total 0.8 0.2 - 1.0 MG/DL    ALT (SGPT) 103 (H) 13 - 56 U/L    AST (SGOT) 21 10 - 38 U/L    Alk.  phosphatase 222 (H) 45 - 117 U/L    Protein, total 6.1 (L) 6.4 - 8.2 g/dL    Albumin 2.6 (L) 3.4 - 5.0 g/dL    Globulin 3.5 2.0 - 4.0 g/dL    A-G Ratio 0.7 (L) 0.8 - 1.7     PHOSPHORUS    Collection Time: 10/02/21  6:00 AM   Result Value Ref Range    Phosphorus 4.0 2.5 - 4.9 MG/DL   AMMONIA    Collection Time: 10/02/21  6:00 AM   Result Value Ref Range    Ammonia 44 (H) 11 - 32 UMOL/L   CALCIUM, IONIZED    Collection Time: 10/02/21  6:00 AM   Result Value Ref Range    Ionized Calcium 1.16 1.12 - 1.32 MMOL/L       Procedures/imaging: see electronic medical records for all procedures/Xrays and details which were not copied into this note but were reviewed prior to creation of Nomi Morris MD   10/2/2021, 10:14 AM

## 2021-10-02 NOTE — PROGRESS NOTES
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    Left wrist fracture, with delayed healing, subsequent encounter S62.102G    Drug overdose, intentional self-harm, initial encounter ([facility] [address] T50.902A    Hypoxia R09.02    Hypotension after procedure I95.81    Acute metabolic encephalopathy X94.31    Psychosis ([facility] [address] F29    Hyponatremia E87.1    Acute respiratory failure with hypoxia (HCC) J96.01    Increased ammonia level R79.89    Hypokalemia E87.6    Status post tracheostomy ([facility] [address] Z93.0 ·   Code status: full code     RECOMMENDATIONS:   Respiratory: Patient on ventilator support; intubated 2021 due to encephalopathy and drug overdose. Patient was extubated on 2021, and reintubated within an hour due to respiratory distress and upper airway edema. S/p tracheostomy 2021. on minimal ventilatory support  35% fio2 and PEEP 5   CXR 10/2  1. Support lines and tubes as detailed above. 2. Minimal left base subsegmental atelectasis/airspace disease. Patient currently on VCV ventilator support and sedated with propofol for PEG on 10/1/2021  On 10/2 2021 off propofol on SBt on precedex   Sedationpropofol currently stop ; can go back to Precedex infusion today  Prn lorazepam, Dilaudid and haloperidol. Chest x-ray reviewedtracheostomy tube in good position; OG tube in good position; no focal infiltrates or consolidations or pleural effusions noted. Continue ventilator and sedation bundles. ID:WBC stable no fever   S/p LP with negative CSF cultures. Covid test 2021 and 2021: Negative. Sputum culture 2021: MSSA. Blood culture 9/24/2021: NGTD. Barahona catheter changed on 9/24/2021. Antibioticsrespiratory cultures MSSA. Levofloxacin course will be completed today; monitor temperature and white cell count. ID has signed off; reconsult as needed. CVS: Stable hemodynamics; telemetrysinus rhythm; blood pressure stable; continue to monitor. Echo 9/17/2021: LVEF 60 to 65%. RVSP 29 mmHg. Ultrasound leg 9/15negative study for DVTs. Renal: Normal renal function, and good urine output; continue to monitor and replace electrolytes as needed. Heme: Mild anemiaprolonged ICU stay and frequent blood draws; no active bleeding issues; hemoglobin stable8.7 today; platelets normal.  Stool occult blood 9/28negative. Endo: Stable blood sugars; continue to monitor; sliding scale insulin if needed. GI: Patient tolerating tube feedingcurrently on hold for PEG tube placement. hCG negative on admission. Ammonia fluctuates53 today; lactulose therapy daily. Transaminases elevated, but coming down; hepatitis virus panelnegative study. Recent CT abdomen Liver is unremarkable. No abnormal biliary dilation. Gallbladder is unremarkable. Nutrition: Resume tube feeding from tomorrow; PEG tube today. Patient on thiamine and folic acid. Neurology: Patient confused and delirious currently; known patient with psych issues and drug overdose on presentation during this admission; continue to avoid continuous sedation with benzodiazepines. CT headnil acute  Psych: On Klonopin. Phenobarbital 65 mg prn. Toxicology: Gabapentin OD on admission. Skin: ICU nursing care  MS: Left wrist fracture in immobilization external fixator; patient has been seen by orthopedics during this admission. Prophylaxis: DVT prophylaxis: Lovenox 40 sc dailyhold for PEG tube placement tomorrow. GI prophylaxis: Famotidine. Restraints: Wrist soft restraints as needed for patient interfering with medical therapy/management and patient safety.   Consulted PT, OT and SLP teamsappreciate input. Prognosis guarded overall. CODE STATUSfull code. Palliative care on case. Quality Care: PPI, DVT prophylaxis, HOB elevated, Infection control all reviewed and addressed. Lines/Tubes: PIV   S/p ETT: 9/11/219/22/2021. Tracheostomy 9/22/2021. NGT: 9/11/21  Barahona: 9/11/21; changed 9/24/2021. Discussed with RNunable to discontinue Barahona catheter due to ventilator support, agitated and delirious state; patient will not be able to tolerate external Barahona catheter. ADVANCE DIRECTIVE: Full code. Discussed with RN, RT. High complexity decision making was performed during the evaluation of this patient at high risk for decompensation with multiple organ involvement. Meghana Esparza   sister-in-law   [phone]            Subjective/History:   Ms. Alexis Pinzon has been seen and evaluated as Dr. Olivia Fournier requested now for assisting with Acute respiratory failure and ventilator management. Patient is a 39 y.o. female with following PMhx presented to ER with lethargy via EMS and admitted for Gabapentin overdose. Pt required intubation for airways protection. Position control was contacted that recommended supportive care per ER provider. Pt seen at bedside in ER rm#2. The patient can not provide additional history due to Ventilated. 10/2/2021  Remains in ICU room 102. Patient sedated and on ventilator support; awaiting PEG tube placement today. Thick secretions reported from trach, mouth, and nosesuctioned by RT periodically. No acute issues overnight. Afebrile; telemetrysinus rhythm; blood pressure good. Urine outputgood 1.4 L yesterday. Patient has a history of drug use and smoker and alcohol use. Review of Systems:  Review of systems not obtained due to patient factors.         Past Medical History:  Past Medical History:   Diagnosis Date    Asthma     Bilateral ovarian cysts     Cocaine abuse ([facility] [address]     Mental and behavioral problem  Pancreatitis     Pancreatitis     Psychosis ([facility] [address]         Past Surgical History:  Past Surgical History:   Procedure Laterality Date    HX GYN      D&C, Hysterectomy    IR INSERT GASTROSTOMY TUBE PERC  10/1/2021        Medications:    Current Facility-Administered Medications   Medication Dose Route Frequency    QUEtiapine (SEROquel) tablet 25 mg  25 mg Oral BID    lactulose (CHRONULAC) 10 gram/15 mL solution 15 mL  15 mL Oral BID    lidocaine (XYLOCAINE) 10 mg/mL (1 %) injection 1-20 mL  1-20 mL SubCUTAneous RAD CONTINUOUS    melatonin (rapid dissolve) tablet 5 mg  5 mg Oral QHS    [Held by provider] propofol (DIPRIVAN) 10 mg/mL infusion  0-50 mcg/kg/min IntraVENous TITRATE    dexmedeTOMidine (PRECEDEX) 400 mcg in 0.9% sodium chloride 100 mL infusion  0.1-1.5 mcg/kg/hr IntraVENous TITRATE    famotidine (PEPCID) tablet 20 mg  20 mg Oral BID    thiamine mononitrate (B-1) tablet 100 mg  100 mg Oral DAILY    folic acid (FOLVITE) tablet 1 mg  1 mg Oral DAILY    multivitamin, tx-iron-ca-min (THERA-M w/ IRON) tablet 1 Tablet  1 Tablet Oral DAILY    clonazePAM (KlonoPIN) tablet 0.5 mg  0.5 mg Oral BID    insulin lispro (HUMALOG) injection   SubCUTAneous Q6H    enoxaparin (LOVENOX) injection 40 mg  40 mg SubCUTAneous Q24H    sodium chloride (NS) flush 5-40 mL  5-40 mL IntraVENous Q8H    chlorhexidine (PERIDEX) 0.12 % mouthwash 10 mL  10 mL Oral Q12H       Allergy:  Allergies   Allergen Reactions    Fish Containing Products Itching    Toradol [Ketorolac] Rash     Pt reports she is not allergic to this medication.          Social History:  Social History     Tobacco Use    Smoking status: Current Every Day Smoker     Packs/day: 1.00    Smokeless tobacco: Never Used   Substance Use Topics    Alcohol use: Not Currently    Drug use: Not Currently     Types: Cocaine, Marijuana     Comment: used with in past 24 hours          Objective:   Vital Signs:    Blood pressure 117/66, pulse (!) 112, temperature 98.4 °F (36.9 °C), resp. rate 22, height 5' 2\" (1.575 m), weight 71.2 kg (157 lb), last menstrual period 05/15/2018, SpO2 100 %. Body mass index is 28.72 kg/m². O2 Device: Ventilator, Tracheostomy   O2 Flow Rate (L/min): 8 l/min   Temp (24hrs), Av.7 °F (37.6 °C), Min:98.4 °F (36.9 °C), Max:101 °F (38.3 °C)         Intake/Output:   Last shift:      10/02 07 - 10/02 1900  In: -   Out: 325 [Urine:325]  Last 3 shifts: 1901 - 10/02 07  In: -   Out: 0650 [Urine:2425; Drains:1000]    Intake/Output Summary (Last 24 hours) at 10/2/2021 1411  Last data filed at 10/2/2021 1017  Gross per 24 hour   Intake    Output 2750 ml   Net -2750 ml       ABG:    No results found for: PH, PHI, PCO2, PCO2I, PO2, PO2I, HCO3, HCO3I, FIO2, FIO2I  Ventilator Mode: Spontaneous  Respiratory Rate  Resp Rate Observed: 9  Back-Up Rate: 14  Insp Time (sec): 1.1 sec  I:E Ratio: 1:2.1  Ventilator Volumes  Vt Set (ml): 420 ml  Vt Exhaled (Machine Breath) (ml): 571 ml  Vt Spont (ml): 468 ml  Ve Observed (l/min): 10.8 l/min  Ventilator Pressures  Pressure Support (cm H2O): 7 cm H2O  PIP Observed (cm H2O): 18 cm H2O  Plateau Pressure (cm H2O): 13 cm H2O  MAP (cm H2O): 8.8  PEEP/VENT (cm H2O): 5 cm H20  Auto PEEP Observed (cm H2O): 0 cm H2O       Physical Exam:   Patient appears older than stated age; appears chronically ill; on trach collar support; acyanotic  HEENT: pupils not dilated, reactive, no scleral jaundice, no nasal drainage  Neck: No adenopathy or thyroid swelling  Tracheostomy tubeno bleeding seen around trach site  Patient head position midline now; finds it painful and stiff to move head to the left.   CVS: S1-S2; no murmur; telemetrysinus rhythm; JVD not elevated  RS: Moderate air entry bilaterally; no wheezing; bibasal crackles; not tachypneic or in distress while on ventilator support     Abd: Soft, no tenderness, no distention, no guarding or rigidity or rebound; bowel sounds present; no hepatosplenomegaly  Neuro: Intubated; currently sedated with propofol; limited exam   Extrm: no leg edema or swelling or clubbing  Skin: no rash  Lymphatic: no cervical or supraclavicular adenopathy  Vasc: Good DPs in both feet       Data:     Recent Results (from the past 12 hour(s))   GLUCOSE, POC    Collection Time: 10/02/21  5:06 AM   Result Value Ref Range    Glucose (POC) 102 70 - 110 mg/dL   CBC WITH AUTOMATED DIFF    Collection Time: 10/02/21  6:00 AM   Result Value Ref Range    WBC 13.9 (H) 4.6 - 13.2 K/uL    RBC 3.19 (L) 4.20 - 5.30 M/uL    HGB 9.7 (L) 12.0 - 16.0 g/dL    HCT 29.0 (L) 35.0 - 45.0 %    MCV 90.9 78.0 - 100.0 FL    MCH 30.4 24.0 - 34.0 PG    MCHC 33.4 31.0 - 37.0 g/dL    RDW 13.0 11.6 - 14.5 %    PLATELET 542 (H) 878 - 420 K/uL    MPV 9.6 9.2 - 11.8 FL    NEUTROPHILS 77 (H) 40 - 73 %    LYMPHOCYTES 15 (L) 21 - 52 %    MONOCYTES 6 3 - 10 %    EOSINOPHILS 1 0 - 5 %    BASOPHILS 0 0 - 2 %    ABS. NEUTROPHILS 10.8 (H) 1.8 - 8.0 K/UL    ABS. LYMPHOCYTES 2.1 0.9 - 3.6 K/UL    ABS. MONOCYTES 0.8 0.05 - 1.2 K/UL    ABS. EOSINOPHILS 0.1 0.0 - 0.4 K/UL    ABS. BASOPHILS 0.0 0.0 - 0.1 K/UL    DF AUTOMATED     MAGNESIUM    Collection Time: 10/02/21  6:00 AM   Result Value Ref Range    Magnesium 1.9 1.6 - 2.6 mg/dL   METABOLIC PANEL, COMPREHENSIVE    Collection Time: 10/02/21  6:00 AM   Result Value Ref Range    Sodium 139 136 - 145 mmol/L    Potassium 3.8 3.5 - 5.5 mmol/L    Chloride 107 100 - 111 mmol/L    CO2 24 21 - 32 mmol/L    Anion gap 8 3.0 - 18 mmol/L    Glucose 94 74 - 99 mg/dL    BUN 8 7.0 - 18 MG/DL    Creatinine 0.29 (L) 0.6 - 1.3 MG/DL    BUN/Creatinine ratio 28 (H) 12 - 20      GFR est AA >60 >60 ml/min/1.73m2    GFR est non-AA >60 >60 ml/min/1.73m2    Calcium 8.5 8.5 - 10.1 MG/DL    Bilirubin, total 0.8 0.2 - 1.0 MG/DL    ALT (SGPT) 103 (H) 13 - 56 U/L    AST (SGOT) 21 10 - 38 U/L    Alk.  phosphatase 222 (H) 45 - 117 U/L    Protein, total 6.1 (L) 6.4 - 8.2 g/dL    Albumin 2.6 (L) 3.4 - 5.0 g/dL    Globulin 3.5 2.0 - 4.0 g/dL    A-G Ratio 0.7 (L) 0.8 - 1.7     PHOSPHORUS    Collection Time: 10/02/21  6:00 AM   Result Value Ref Range    Phosphorus 4.0 2.5 - 4.9 MG/DL   AMMONIA    Collection Time: 10/02/21  6:00 AM   Result Value Ref Range    Ammonia 44 (H) 11 - 32 UMOL/L   CALCIUM, IONIZED    Collection Time: 10/02/21  6:00 AM   Result Value Ref Range    Ionized Calcium 1.16 1.12 - 1.32 MMOL/L   GLUCOSE, POC    Collection Time: 10/02/21 12:06 PM   Result Value Ref Range    Glucose (POC) 110 70 - 110 mg/dL             No results for input(s): FIO2I, IFO2, HCO3I, IHCO3, HCOPOC, PCO2I, PCOPOC, IPHI, PHI, PHPOC, PO2I, PO2POC in the last 72 hours.     No lab exists for component: IPOC2    All Micro Results     Procedure Component Value Units Date/Time    CULTURE, BLOOD [[id number]] Collected: 09/27/21 1005    Order Status: Completed Specimen: Blood Updated: 10/02/21 0726     Special Requests: NO SPECIAL REQUESTS        Culture result: NO GROWTH 5 DAYS       CULTURE, BLOOD [[id number]] Collected: 09/27/21 1005    Order Status: Completed Specimen: Blood Updated: 10/02/21 0726     Special Requests: NO SPECIAL REQUESTS        Culture result: NO GROWTH 5 DAYS       CULTURE, BLOOD [[id number]] Collected: 09/24/21 0740    Order Status: Completed Specimen: Blood Updated: 09/30/21 0819     Special Requests: NO SPECIAL REQUESTS        Culture result: NO GROWTH 6 DAYS       CULTURE, RESPIRATORY/SPUTUM/BRONCH W GRAM STAIN [[id number]]  (Abnormal)  (Susceptibility) Collected: 09/24/21 0654    Order Status: Completed Specimen: Sputum from Tracheal Aspirate Updated: 09/27/21 1147     Special Requests: NO SPECIAL REQUESTS        GRAM STAIN RARE WBCS SEEN               RARE EPITHELIAL CELLS SEEN            NO ORGANISMS SEEN        Culture result:       MODERATE STAPHYLOCOCCUS AUREUS                  NO NORMAL RESPIRATORY DINA ISOLATED          COVID-19 RAPID TEST [[id number]] Collected: 09/24/21 1700    Order Status: Completed Specimen: Nasopharyngeal Updated: 09/24/21 1849     Specimen source Nasopharyngeal        COVID-19 rapid test Not detected        Comment: Rapid Abbott ID Now       Rapid NAAT:  The specimen is NEGATIVE for SARS-CoV-2, the novel coronavirus associated with COVID-19. Negative results should be treated as presumptive and, if inconsistent with clinical signs and symptoms or necessary for patient management, should be tested with an alternative molecular assay. Negative results do not preclude SARS-CoV-2 infection and should not be used as the sole basis for patient management decisions. This test has been authorized by the FDA under an Emergency Use Authorization (EUA) for use by authorized laboratories. Fact sheet for Healthcare Providers: [employer]Interacting Technologydate.co.nz  Fact sheet for Patients: Your Tributedate.co.nz       Methodology: Isothermal Nucleic Acid Amplification         CULTURE, URINE [[id number]]     Order Status: Canceled Specimen: Urine from Clean catch     CULTURE, CSF  TUBE 2 [[id number]] Collected: 09/16/21 1434    Order Status: Completed Specimen: Cerebrospinal Fluid Updated: 09/23/21 1241     Special Requests: TUBE 3, CULTURE AND SENSITIVTY AND GRAM STAIN.      GRAM STAIN RARE WBCS SEEN         NO ORGANISMS SEEN        Culture result: NO GROWTH 7 DAYS       CULTURE, BLOOD [[id number]] Collected: 09/14/21 0515    Order Status: Completed Specimen: Blood Updated: 09/20/21 0832     Special Requests: NO SPECIAL REQUESTS        Culture result: NO GROWTH 6 DAYS       CULTURE, BLOOD [[id number]] Collected: 09/14/21 0500    Order Status: Completed Specimen: Blood Updated: 09/20/21 0832     Special Requests: NO SPECIAL REQUESTS        Culture result: NO GROWTH 6 DAYS       MENINGITIS PATHOGENS PANEL, CSF (BY PCR) [[id number]] Collected: 09/16/21 1434    Order Status: Completed Specimen: Cerebrospinal Fluid Updated: 09/17/21 0050     Escherichia coli K1 Not detected        Haemophilus Influenzae Not detected        Listeria Monocytogenes Not detected        Neisseria Meningitidis Not detected        Streptococcus Agalactiae Not detected        Streptococcus Pneumoniae Not detected        Cytomegalovirus Not detected        Enterovirus Not detected        Herpes Simplex Virus 1 Not detected        Comment: In patients who have negative herpes simplex 1 and 2 PCR results, do not modify treatment, confirm with alternate testing. Herpes Simplex Virus 2 Not detected        Comment: In patients who have negative herpes simplex 1 and 2 PCR results, do not modify treatment, confirm with alternate testing. Human Herpesvirus 6 Not detected        Human Parechovirus Not detected        Varicella Zoster Virus Not detected        Crypto. neoformans/gattii Not detected       MENINGITIS PATHOGENS PANEL, CSF (BY PCR) [[id number]] Collected: 09/16/21 1545    Order Status: Canceled Specimen: Cerebrospinal Fluid     HSV 1 AND 2 BY PCR [[id number]] Collected: 09/16/21 1434    Order Status: Canceled Specimen: Other     KBYFO-33 RAPID TEST [[id number]] Collected: 09/16/21 1000    Order Status: Completed Specimen: Nasopharyngeal Updated: 09/16/21 1032     Specimen source Nasopharyngeal        COVID-19 rapid test Not detected        Comment: Rapid Abbott ID Now       Rapid NAAT:  The specimen is NEGATIVE for SARS-CoV-2, the novel coronavirus associated with COVID-19. Negative results should be treated as presumptive and, if inconsistent with clinical signs and symptoms or necessary for patient management, should be tested with an alternative molecular assay. Negative results do not preclude SARS-CoV-2 infection and should not be used as the sole basis for patient management decisions. This test has been authorized by the FDA under an Emergency Use Authorization (EUA) for use by authorized laboratories.    Fact sheet for Healthcare Providers: kstattoo.com  Fact sheet for Patients: [email or web address] Emergency[email or web address]       Methodology: Isothermal Nucleic Acid Amplification         LEGIONELLA PNEUMOPHILA AG, URINE [[id number]] Collected: 09/14/21 2315    Order Status: Completed Specimen: Urine, random Updated: 09/15/21 1042     Legionella Ag, urine Negative       STREP PNEUMO AG, URINE [[id number]] Collected: 09/14/21 2315    Order Status: Completed Specimen: Urine, random Updated: 09/15/21 1042     Strep pneumo Ag, urine Negative       RESPIRATORY VIRUS PANEL W/COVID-19, PCR [[id number]] Collected: 09/14/21 1832    Order Status: Completed Specimen: Nasopharyngeal Updated: 09/14/21 2234     Adenovirus Not detected        Coronavirus 229E Not detected        Coronavirus HKU1 Not detected        Coronavirus CVNL63 Not detected        Coronavirus OC43 Not detected        SARS-CoV-2, PCR Not detected        Metapneumovirus Not detected        Rhinovirus and Enterovirus Not detected        Influenza A Not detected        Influenza A, subtype H1 Not detected        Influenza A, subtype H3 Not detected        INFLUENZA A H1N1 PCR Not detected        Influenza B Not detected        Parainfluenza 1 Not detected        Parainfluenza 2 Not detected        Parainfluenza 3 Not detected        Parainfluenza virus 4 Not detected        RSV by PCR Not detected        B. parapertussis, PCR Not detected        Bordetella pertussis - PCR Not detected        Chlamydophila pneumoniae DNA, QL, PCR Not detected        Mycoplasma pneumoniae DNA, QL, PCR Not detected       CULTURE, BLOOD [[id number]] Collected: 09/14/21 1700    Order Status: Canceled Specimen: Blood     CULTURE, URINE [[id number]] Collected: 09/13/21 2330    Order Status: Canceled Specimen: Cath Urine     RESPIRATORY VIRUS PANEL W/COVID-19, PCR [[id number]]     Order Status: Canceled Specimen: NASOPHARYNGEAL SWAB     COVID-19 RAPID TEST [[id number]]     Order Status: Canceled     COVID-19 RAPID TEST [[id number]] Collected: 09/13/21 0737    Order Status: Completed Specimen: Nasopharyngeal Updated: 09/13/21 0811     Specimen source Nasopharyngeal        COVID-19 rapid test Not detected        Comment: Rapid Abbott ID Now       Rapid NAAT:  The specimen is NEGATIVE for SARS-CoV-2, the novel coronavirus associated with COVID-19. Negative results should be treated as presumptive and, if inconsistent with clinical signs and symptoms or necessary for patient management, should be tested with an alternative molecular assay. Negative results do not preclude SARS-CoV-2 infection and should not be used as the sole basis for patient management decisions. This test has been authorized by the FDA under an Emergency Use Authorization (EUA) for use by authorized laboratories. Fact sheet for Healthcare Providers: ConventioneXelatedate.co.nz  Fact sheet for Patients: K121date.co.nz       Methodology: Isothermal Nucleic Acid Amplification         COVID-19 RAPID TEST [[id number]]     Order Status: Canceled         Echo 9/17/2021:  Left Ventricle Normal cavity size, wall thickness and systolic function (ejection fraction normal). The estimated EF is 60 - 65%. There is inconclusive left ventricular diastolic function E/E' ratio = 7.08  Heart rate is over 100. Wall Scoring The left ventricular wall motion is normal.            Left Atrium Normal cavity size. Right Ventricle Normal cavity size and global systolic function. Assessment of RV function:   TAPSE = 27 mm. Right Atrium Normal cavity size. Interatrial Septum Interatrial septum not well visualized   Aortic Valve Aortic valve not well visualized. Trileaflet valve structure, no stenosis and no regurgitation. Mitral Valve No stenosis. Mitral valve non-specific thickening. Mild regurgitation. Tricuspid Valve Tricuspid valve not well visualized. No stenosis. Mild regurgitation. Pulmonic Valve Pulmonic valve not well visualized. No stenosis and no regurgitation. Aorta The aorta was not well visualized. Normal aortic root. Pulmonary Artery Pulmonary arteries not well visualized. Pulmonary arterial systolic pressure (PASP) is 29 mmHg. Pulmonary hypertension not suggested by Doppler findings. IVC/Hepatic Veins Mechanically ventilated; cannot use inferior caval vein diameter to estimate central venous pressure. Pericardium Normal pericardium and no evidence of pericardial effusion. CT head 9/15/1021  IMPRESSION   No acute intracranial abnormality. CT chest, abdomen, pelvis 9/15/1021  IMPRESSION   Endotracheal tube tip in the lower thoracic trachea 1.5 cm above the dipak.    Bilateral lower lobar atelectasis, possible endobronchial lesion/mucous plug in  the left lower lobe bronchus.    No acute intra-abdominal abnormality. Imaging:  [x]I have personally reviewed the patients chest radiographs images and report    X-ray 9/30/1021  Results from Hospital Encounter encounter on 09/11/21    XR CHEST PORT    Narrative  EXAM: XR CHEST PORT    CLINICAL INDICATION/HISTORY: Shortness of breath  -Additional: None    COMPARISON: 9/29/2021    TECHNIQUE: Portable frontal view of the chest    _______________    FINDINGS:    SUPPORT DEVICES: Tracheostomy catheter and nasogastric tube redemonstrated. Tip  of the NG tube is below the diaphragm, collimated from view. HEART AND MEDIASTINUM: Stable cardiac size and mediastinal contours. LUNGS AND PLEURAL SPACES: Improved pulmonary aeration with decreased conspicuity  to perihilar edema opacities noted previously. No pneumothorax or definite  effusion. BONY THORAX AND SOFT TISSUES: Unremarkable.    _______________    Impression  1. Support devices in stable position as visualized. 2. Improved pulmonary aeration with decreased perihilar atelectasis or edema.           Please note: Voice-recognition software may have been used to generate this report, which may have resulted in some phonetic-based errors in grammar and contents. Even though attempts were made to correct all the mistakes, some may have been missed, and remained in the body of the document.       Kelli Wakefield MD  10/2/2021

## 2021-10-02 NOTE — PROGRESS NOTES
@2000 Pt awake restless in bed. Remains on ventilator via trach. PEG tube remains clamped. [place] remains in-situ draining bonnie urine. Flexi seal fecal management remains in place draining brown liquid stool. Diprovan drip in progress. Assessment carried out and recorded in  flow sheets. Nursing management continues. @0000 reassessment done no new developments , pt continues to be monitored. @0400 pt reassessed no changes care continues. @9430 Bedside and Verbal shift change report given to Sylvester Uriarte (oncoming nurse) by Moncho Wynne (offgoing nurse).  Report included the following information SBAR, Kardex, Intake/Output, MAR, Recent Results, Med Rec Status and Alarm Parameters .

## 2021-10-03 ENCOUNTER — APPOINTMENT (OUTPATIENT)
Dept: GENERAL RADIOLOGY | Age: 41
DRG: 004 | End: 2021-10-03
Attending: INTERNAL MEDICINE
Payer: MEDICAID

## 2021-10-03 ENCOUNTER — APPOINTMENT (OUTPATIENT)
Dept: VASCULAR SURGERY | Age: 41
DRG: 004 | End: 2021-10-03
Attending: INTERNAL MEDICINE
Payer: MEDICAID

## 2021-10-03 LAB
ALBUMIN SERPL-MCNC: 2.8 G/DL (ref 3.4–5)
ALBUMIN/GLOB SERPL: 0.8 {RATIO} (ref 0.8–1.7)
ALP SERPL-CCNC: 220 U/L (ref 45–117)
ALT SERPL-CCNC: 83 U/L (ref 13–56)
AMMONIA PLAS-SCNC: 32 UMOL/L (ref 11–32)
ANION GAP SERPL CALC-SCNC: 9 MMOL/L (ref 3–18)
AST SERPL-CCNC: 31 U/L (ref 10–38)
BACTERIA SPEC CULT: NORMAL
BACTERIA SPEC CULT: NORMAL
BASOPHILS # BLD: 0 K/UL (ref 0–0.1)
BASOPHILS NFR BLD: 0 % (ref 0–2)
BILIRUB SERPL-MCNC: 0.7 MG/DL (ref 0.2–1)
BUN SERPL-MCNC: 9 MG/DL (ref 7–18)
BUN/CREAT SERPL: 25 (ref 12–20)
CALCIUM SERPL-MCNC: 9.1 MG/DL (ref 8.5–10.1)
CHLORIDE SERPL-SCNC: 106 MMOL/L (ref 100–111)
CO2 SERPL-SCNC: 23 MMOL/L (ref 21–32)
CREAT SERPL-MCNC: 0.36 MG/DL (ref 0.6–1.3)
DIFFERENTIAL METHOD BLD: ABNORMAL
EOSINOPHIL # BLD: 0.1 K/UL (ref 0–0.4)
EOSINOPHIL NFR BLD: 1 % (ref 0–5)
ERYTHROCYTE [DISTWIDTH] IN BLOOD BY AUTOMATED COUNT: 12.9 % (ref 11.6–14.5)
GLOBULIN SER CALC-MCNC: 3.6 G/DL (ref 2–4)
GLUCOSE BLD STRIP.AUTO-MCNC: 105 MG/DL (ref 70–110)
GLUCOSE BLD STRIP.AUTO-MCNC: 109 MG/DL (ref 70–110)
GLUCOSE BLD STRIP.AUTO-MCNC: 114 MG/DL (ref 70–110)
GLUCOSE BLD STRIP.AUTO-MCNC: 136 MG/DL (ref 70–110)
GLUCOSE SERPL-MCNC: 107 MG/DL (ref 74–99)
HCT VFR BLD AUTO: 28.4 % (ref 35–45)
HGB BLD-MCNC: 9.2 G/DL (ref 12–16)
LYMPHOCYTES # BLD: 2.2 K/UL (ref 0.9–3.6)
LYMPHOCYTES NFR BLD: 11 % (ref 21–52)
MAGNESIUM SERPL-MCNC: 1.7 MG/DL (ref 1.6–2.6)
MCH RBC QN AUTO: 29.5 PG (ref 24–34)
MCHC RBC AUTO-ENTMCNC: 32.4 G/DL (ref 31–37)
MCV RBC AUTO: 91 FL (ref 78–100)
MONOCYTES # BLD: 1.1 K/UL (ref 0.05–1.2)
MONOCYTES NFR BLD: 6 % (ref 3–10)
NEUTS SEG # BLD: 15.6 K/UL (ref 1.8–8)
NEUTS SEG NFR BLD: 82 % (ref 40–73)
PHOSPHATE SERPL-MCNC: 4.5 MG/DL (ref 2.5–4.9)
PLATELET # BLD AUTO: 450 K/UL (ref 135–420)
PMV BLD AUTO: 8.9 FL (ref 9.2–11.8)
POTASSIUM SERPL-SCNC: 4.8 MMOL/L (ref 3.5–5.5)
PROT SERPL-MCNC: 6.4 G/DL (ref 6.4–8.2)
RBC # BLD AUTO: 3.12 M/UL (ref 4.2–5.3)
SERVICE CMNT-IMP: NORMAL
SERVICE CMNT-IMP: NORMAL
SODIUM SERPL-SCNC: 138 MMOL/L (ref 136–145)
WBC # BLD AUTO: 19.2 K/UL (ref 4.6–13.2)

## 2021-10-03 PROCEDURE — 74011250636 HC RX REV CODE- 250/636: Performed by: INTERNAL MEDICINE

## 2021-10-03 PROCEDURE — 83735 ASSAY OF MAGNESIUM: CPT

## 2021-10-03 PROCEDURE — 74011000258 HC RX REV CODE- 258: Performed by: INTERNAL MEDICINE

## 2021-10-03 PROCEDURE — 77030013140 HC MSK NEB VYRM -A

## 2021-10-03 PROCEDURE — 94640 AIRWAY INHALATION TREATMENT: CPT

## 2021-10-03 PROCEDURE — 71045 X-RAY EXAM CHEST 1 VIEW: CPT

## 2021-10-03 PROCEDURE — 84100 ASSAY OF PHOSPHORUS: CPT

## 2021-10-03 PROCEDURE — 74011250637 HC RX REV CODE- 250/637: Performed by: INTERNAL MEDICINE

## 2021-10-03 PROCEDURE — 80053 COMPREHEN METABOLIC PANEL: CPT

## 2021-10-03 PROCEDURE — 74011000250 HC RX REV CODE- 250: Performed by: INTERNAL MEDICINE

## 2021-10-03 PROCEDURE — 77010033678 HC OXYGEN DAILY

## 2021-10-03 PROCEDURE — 85025 COMPLETE CBC W/AUTO DIFF WBC: CPT

## 2021-10-03 PROCEDURE — 36415 COLL VENOUS BLD VENIPUNCTURE: CPT

## 2021-10-03 PROCEDURE — 2709999900 HC NON-CHARGEABLE SUPPLY

## 2021-10-03 PROCEDURE — P9047 ALBUMIN (HUMAN), 25%, 50ML: HCPCS | Performed by: INTERNAL MEDICINE

## 2021-10-03 PROCEDURE — 65610000006 HC RM INTENSIVE CARE

## 2021-10-03 PROCEDURE — 74011250636 HC RX REV CODE- 250/636: Performed by: FAMILY MEDICINE

## 2021-10-03 PROCEDURE — 77030006998

## 2021-10-03 PROCEDURE — 93970 EXTREMITY STUDY: CPT

## 2021-10-03 PROCEDURE — 82140 ASSAY OF AMMONIA: CPT

## 2021-10-03 PROCEDURE — 74011000250 HC RX REV CODE- 250: Performed by: FAMILY MEDICINE

## 2021-10-03 RX ORDER — MAGNESIUM SULFATE 1 G/100ML
1 INJECTION INTRAVENOUS ONCE
Status: COMPLETED | OUTPATIENT
Start: 2021-10-03 | End: 2021-10-03

## 2021-10-03 RX ORDER — BUDESONIDE 0.5 MG/2ML
500 INHALANT ORAL
Status: DISCONTINUED | OUTPATIENT
Start: 2021-10-03 | End: 2021-11-05

## 2021-10-03 RX ORDER — HALOPERIDOL 5 MG/1
5 TABLET ORAL 2 TIMES DAILY
Status: DISCONTINUED | OUTPATIENT
Start: 2021-10-03 | End: 2021-10-08

## 2021-10-03 RX ORDER — ARFORMOTEROL TARTRATE 15 UG/2ML
15 SOLUTION RESPIRATORY (INHALATION)
Status: DISCONTINUED | OUTPATIENT
Start: 2021-10-03 | End: 2021-11-05

## 2021-10-03 RX ORDER — SODIUM CHLORIDE 9 MG/ML
50 INJECTION, SOLUTION INTRAVENOUS CONTINUOUS
Status: DISCONTINUED | OUTPATIENT
Start: 2021-10-03 | End: 2021-10-04

## 2021-10-03 RX ORDER — ALBUMIN HUMAN 250 G/1000ML
25 SOLUTION INTRAVENOUS EVERY 6 HOURS
Status: DISCONTINUED | OUTPATIENT
Start: 2021-10-03 | End: 2021-10-07

## 2021-10-03 RX ADMIN — SODIUM CHLORIDE 50 ML/HR: 900 INJECTION, SOLUTION INTRAVENOUS at 12:38

## 2021-10-03 RX ADMIN — FAMOTIDINE 20 MG: 20 TABLET ORAL at 09:33

## 2021-10-03 RX ADMIN — MEROPENEM 1 G: 1 INJECTION, POWDER, FOR SOLUTION INTRAVENOUS at 12:36

## 2021-10-03 RX ADMIN — ARFORMOTEROL TARTRATE 15 MCG: 15 SOLUTION RESPIRATORY (INHALATION) at 12:15

## 2021-10-03 RX ADMIN — DEXMEDETOMIDINE HYDROCHLORIDE 1.5 MCG/KG/HR: 100 INJECTION, SOLUTION INTRAVENOUS at 02:51

## 2021-10-03 RX ADMIN — SODIUM CHLORIDE 10 ML: 9 INJECTION, SOLUTION INTRAMUSCULAR; INTRAVENOUS; SUBCUTANEOUS at 21:11

## 2021-10-03 RX ADMIN — Medication 1 TABLET: at 09:33

## 2021-10-03 RX ADMIN — 0.12% CHLORHEXIDINE GLUCONATE 10 ML: 1.2 RINSE ORAL at 09:27

## 2021-10-03 RX ADMIN — ALBUMIN (HUMAN) 25 G: 0.25 INJECTION, SOLUTION INTRAVENOUS at 11:38

## 2021-10-03 RX ADMIN — FAMOTIDINE 20 MG: 20 TABLET ORAL at 20:12

## 2021-10-03 RX ADMIN — FENTANYL CITRATE 50 MCG: 50 INJECTION INTRAMUSCULAR; INTRAVENOUS at 15:19

## 2021-10-03 RX ADMIN — QUETIAPINE FUMARATE 25 MG: 25 TABLET ORAL at 09:33

## 2021-10-03 RX ADMIN — ARFORMOTEROL TARTRATE 15 MCG: 15 SOLUTION RESPIRATORY (INHALATION) at 20:06

## 2021-10-03 RX ADMIN — CLONAZEPAM 0.5 MG: 0.5 TABLET ORAL at 09:34

## 2021-10-03 RX ADMIN — SODIUM CHLORIDE 50 ML/HR: 900 INJECTION, SOLUTION INTRAVENOUS at 14:44

## 2021-10-03 RX ADMIN — HYDROMORPHONE HYDROCHLORIDE 1 MG: 1 INJECTION, SOLUTION INTRAMUSCULAR; INTRAVENOUS; SUBCUTANEOUS at 17:02

## 2021-10-03 RX ADMIN — THIAMINE HCL TAB 100 MG 100 MG: 100 TAB at 09:33

## 2021-10-03 RX ADMIN — HYDROMORPHONE HYDROCHLORIDE 1 MG: 1 INJECTION, SOLUTION INTRAMUSCULAR; INTRAVENOUS; SUBCUTANEOUS at 12:42

## 2021-10-03 RX ADMIN — ALBUMIN (HUMAN) 25 G: 0.25 INJECTION, SOLUTION INTRAVENOUS at 05:10

## 2021-10-03 RX ADMIN — LORAZEPAM 2 MG: 2 INJECTION INTRAMUSCULAR at 15:19

## 2021-10-03 RX ADMIN — CLONAZEPAM 0.5 MG: 0.5 TABLET ORAL at 20:12

## 2021-10-03 RX ADMIN — DEXMEDETOMIDINE HYDROCHLORIDE 1.5 MCG/KG/HR: 100 INJECTION, SOLUTION INTRAVENOUS at 09:54

## 2021-10-03 RX ADMIN — FENTANYL CITRATE 50 MCG: 50 INJECTION INTRAMUSCULAR; INTRAVENOUS at 20:11

## 2021-10-03 RX ADMIN — Medication 5 MG: at 21:11

## 2021-10-03 RX ADMIN — HALOPERIDOL 5 MG: 5 TABLET ORAL at 20:18

## 2021-10-03 RX ADMIN — BUDESONIDE 500 MCG: 0.5 INHALANT RESPIRATORY (INHALATION) at 20:06

## 2021-10-03 RX ADMIN — SODIUM CHLORIDE 10 ML: 9 INJECTION, SOLUTION INTRAMUSCULAR; INTRAVENOUS; SUBCUTANEOUS at 15:19

## 2021-10-03 RX ADMIN — FOLIC ACID 1 MG: 1 TABLET ORAL at 09:34

## 2021-10-03 RX ADMIN — ENOXAPARIN SODIUM 40 MG: 100 INJECTION SUBCUTANEOUS at 17:02

## 2021-10-03 RX ADMIN — ALBUMIN (HUMAN) 25 G: 0.25 INJECTION, SOLUTION INTRAVENOUS at 17:02

## 2021-10-03 RX ADMIN — LACTULOSE 15 ML: 10 SOLUTION ORAL at 20:12

## 2021-10-03 RX ADMIN — DEXMEDETOMIDINE HYDROCHLORIDE 0.7 MCG/KG/HR: 100 INJECTION, SOLUTION INTRAVENOUS at 14:43

## 2021-10-03 RX ADMIN — 0.12% CHLORHEXIDINE GLUCONATE 10 ML: 1.2 RINSE ORAL at 20:12

## 2021-10-03 RX ADMIN — HYDROMORPHONE HYDROCHLORIDE 1 MG: 1 INJECTION, SOLUTION INTRAMUSCULAR; INTRAVENOUS; SUBCUTANEOUS at 21:11

## 2021-10-03 RX ADMIN — MAGNESIUM SULFATE HEPTAHYDRATE 1 G: 1 INJECTION, SOLUTION INTRAVENOUS at 09:33

## 2021-10-03 RX ADMIN — DEXMEDETOMIDINE HYDROCHLORIDE 1.5 MCG/KG/HR: 100 INJECTION, SOLUTION INTRAVENOUS at 01:23

## 2021-10-03 RX ADMIN — LACTULOSE 15 ML: 10 SOLUTION ORAL at 09:33

## 2021-10-03 RX ADMIN — BUDESONIDE 500 MCG: 0.5 INHALANT RESPIRATORY (INHALATION) at 12:15

## 2021-10-03 RX ADMIN — SODIUM CHLORIDE 10 ML: 9 INJECTION, SOLUTION INTRAMUSCULAR; INTRAVENOUS; SUBCUTANEOUS at 05:11

## 2021-10-03 NOTE — PROGRESS NOTES
10/02/21 2031   Oxygen Therapy   O2 Sat (%) 100 %   Pulse via Oximetry 107 beats per minute   O2 Device Tracheal collar   FIO2 (%) 35 %     Patient remains on trach collar. Resting quietly at this time.  RR 20

## 2021-10-03 NOTE — PROGRESS NOTES
Care manager rounded and patient continues on trach collar, precedex being weaned and off diprivan at this time; per primary nurse, psych meds have been restarted. Awaiting placement.     Care Management Interventions  Transition of Care Consult (CM Consult): Discharge Planning  The Plan for Transition of Care is Related to the Following Treatment Goals : intentional drug overdose  Discharge Location  Discharge Placement:  (TBD)

## 2021-10-03 NOTE — CONSULTS
Psych consult requested to help with patient's agitation and combativeness while intubated. Becomes agitated whenever attempts made to wean her off sedation. Chart reviewed and case was also discussed with attending physician, Dr Juan R Sosa. Given the patient's past history of Dextromethorphan abuse and recent overdose on Gabapentin (unsure if this was prescribed to her), I would imaging she would have a fairly high tolerance to medications thereby complicating efforts to tranquilize her during times of agitation. Also, if the patient has in fact been taking Gabapentin as a prescribed medication, depending upon the length of time she may have been on it, there may complications relating to withdrawals which can be protracted. These withdrawals often resemble benzodiazepine withdrawals. Haldol 5 mg BID may be increased to TID depending upon the response. Haldol 5 mg IM may be administered in conjunction with IM Ativan 1-2 mg every 4 to 6 hours PRN. Seroquel may be tried as an alternative if positive response not obtained from oral Haldol. I would suggest using 50 mg BID. Hope this helps. Thank you.

## 2021-10-03 NOTE — PROGRESS NOTES
RESPIRATORY NOTE:       Pt remains comfortable on 30% trach collar, nebs given, suctioned for large amt of thick yellow/tan secretions, trach care given/inner cannula changed.

## 2021-10-03 NOTE — PROGRESS NOTES
RESPIRATORY NOTE:       Pt remains slightly sedated but awake on 30% trach collar, large amount of thick yellow purulent secretions, strong cough noted.

## 2021-10-03 NOTE — PROGRESS NOTES
Hospitalist Progress Note    Patient: Shayna Sofia MRN: [id number]  [facility]: [id number]    YOB: 1980  Age: 39 y.o.   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 female with   Patient Active Problem List    Diagnosis Date Noted    Status post tracheostomy ([address] 09/28/2021    Hypokalemia 09/21/2021    Increased ammonia level 09/14/2021    Psychosis ([address]     Hyponatremia     Acute respiratory failure with hypoxia ([address]     Left wrist fracture, with delayed healing, subsequent encounter 09/12/2021    Drug overdose, intentional self-harm, initial encounter ([address] 09/12/2021    Hypoxia 09/12/2021    Hypotension after procedure 09/12/2021    Acute metabolic encephalopathy 27/82/9186    Ekbom's delusional parasitosis ([address] 09/06/2020    Dextromethorphan use disorder, moderate ([address] 12/07/2019     Principal Problem:    Drug overdose, intentional self-harm, initial encounter ([address] (9/12/2021)    Active Problems:    Left wrist fracture, with delayed healing, subsequent encounter (9/12/2021)      Hypoxia (9/12/2021)      Hypotension after procedure (9/12/2021)      Acute metabolic encephalopathy (3/29/4073)      Psychosis ([address] ()      Hyponatremia ()      Acute respiratory failure with hypoxia (HCC) ()      Increased ammonia level (9/14/2021)      Hypokalemia (9/21/2021)      Status post tracheostomy ([facility] (9/28/2021)         Acute respiratory failure with hypoxia   Intubated on 9/12    Khang Howell on 9/20/21. --  On trach collar   Post tracheostomy   Continue local trach care         Anemia - h/h stable/ fu labs as ordereed       eleated wbc-- on meropenem      Acute metabolic encephalopathy               Gabapentin overdose with lethargy and AMS   S/p procedural stomach emptying in ED with charcoal and sorbitol                elevated ammonia level  Continue   Lactulose,   Continue ammonia monitoring         Psychosis , hx of  Ekbom's delusional parasitosis   Need psych evaluation later   On olanzapine and klonopin      left wrist fracture: immobilized          S/p peg tube placement      Patient's condition is guarded        Recommend to continue hospitalization. Chief Complaints:   Chief Complaint   Patient presents with    Drug Overdose      Cyrus Goldstein is a 39 y.o. female who is standing history of multidrug use/abuse, previous drug-seeking behavior and mental health issues otherwise unspecified arrives via EMS with acute metabolic encephalopathy and lethargy. Admitted for likely gabapentin overdose. She was intubated in ER, ct head no acute issue, LP done due to fever even on abx, no meningitis noted. SUBJECTIVE:  Pt is seen and examined.    now on trach collar          Review of systems:    Review of Systems   Unable to perform ROS: Intubated       PE:  Patient Vitals for the past 24 hrs:   BP Temp Pulse Resp SpO2   10/03/21 1000 (!) 94/46  83 (!) 31 94 %   10/03/21 0930 (!) 97/46  87 (!) 38 92 %   10/03/21 0900 (!) 105/59  97 (!) 35 99 %   10/03/21 0830 113/65  92 30 97 %   10/03/21 0800 112/61 99 °F (37.2 °C) 90 20 100 %   10/03/21 0730 109/60  89 29 99 %   10/03/21 0718  99 °F (37.2 °C)      10/03/21 0700 (!) 101/47  84 (!) 33 94 %   10/03/21 0630 (!) 108/57  89 (!) 31 93 %   10/03/21 0530 (!) 99/54  87 29 99 % 10/03/21 0500 (!) 101/51  87 (!) 31 99 %   10/03/21 0453     97 %   10/03/21 0430 (!) 107/51  92 (!) 35 95 %   10/03/21 0400 (!) 98/57  93 23 98 %   10/03/21 0300 (!) 93/50  89 (!) 36 96 %   10/03/21 0200 (!) 94/45  92 (!) 39 95 %   10/03/21 0100 (!) 83/64  94 (!) 31 98 %   10/03/21 0030 (!) 97/50  97 (!) 32 96 %   10/03/21 0015  100 °F (37.8 °C)      10/03/21 0000 (!) 112/59 100 °F (37.8 °C) 99 26 96 %   10/02/21 2330 (!) 105/53  98 26 95 %   10/02/21 2300 (!) 120/58  (!) 102 30 97 %   10/02/21 2230 101/64  97 30 100 %   10/02/21 2100 108/81  95 29 97 %   10/02/21 2055  98.8 °F (37.1 °C)      10/02/21 2031     100 %   10/02/21 2030 114/68  (!) 106 19 100 %   10/02/21 2000 106/63 98.8 °F (37.1 °C) 98 23 100 %   10/02/21 1930 (!) 111/55  89 28 99 %   10/02/21 1900 109/65  (!) 105 20 97 %   10/02/21 1800 (!) 86/69  (!) 118 24 97 %   10/02/21 1700 114/63  (!) 106 18 100 %   10/02/21 1621  99.4 °F (37.4 °C)      10/02/21 1600 110/61 99.4 °F (37.4 °C) (!) 105 21 100 %   10/02/21 1530   (!) 102 19 98 %   10/02/21 1500 (!) 100/43  87 27 98 %   10/02/21 1400 (!) 96/54  85 21 92 %   10/02/21 1300 (!) 103/55  91 20 100 %   10/02/21 1207  98.4 °F (36.9 °C)      10/02/21 1200 (!) 98/49  86 17 100 %       Intake/Output Summary (Last 24 hours) at 10/3/2021 1149  Last data filed at 10/3/2021 0954  Gross per 24 hour   Intake 2357.74 ml   Output 1400 ml   Net 957.74 ml     No data found. Physical Exam  Vitals and nursing note reviewed. Constitutional:       General: She is in acute distress. Appearance: She is ill-appearing. Neck:      Vascular: No JVD. Cardiovascular:      Rate and Rhythm: Normal rate and regular rhythm. Heart sounds: Normal heart sounds. Pulmonary:      Effort: Respiratory distress present. Breath sounds: Normal breath sounds. Abdominal:      General: Bowel sounds are normal. There is no distension. Palpations: Abdomen is soft. Tenderness: There is no abdominal tenderness. There is no rebound. Musculoskeletal:         General: Normal range of motion. Cervical back: Normal range of motion and neck supple. Skin:     General: Skin is warm and dry. Neurological:      Mental Status: She is alert. Psychiatric:         Mood and Affect: Affect normal.             Intake and Output:  Current Shift:  10/03 0701 - 10/03 1900  In: 467.9 [I.V.:177.9]  Out: 350 [Urine:350]  Last three shifts:  10/01 1901 - 10/03 0700  In: 1889.9 [I.V.:869.9]  Out: 3300 [Urine:1800; Drains:1500]    Lab/Data Reviewed:  Recent Results (from the past 8 hour(s))   MAGNESIUM    Collection Time: 10/03/21  4:00 AM   Result Value Ref Range    Magnesium 1.7 1.6 - 2.6 mg/dL   METABOLIC PANEL, COMPREHENSIVE    Collection Time: 10/03/21  4:00 AM   Result Value Ref Range    Sodium 138 136 - 145 mmol/L    Potassium 4.8 3.5 - 5.5 mmol/L    Chloride 106 100 - 111 mmol/L    CO2 23 21 - 32 mmol/L    Anion gap 9 3.0 - 18 mmol/L    Glucose 107 (H) 74 - 99 mg/dL    BUN 9 7.0 - 18 MG/DL    Creatinine 0.36 (L) 0.6 - 1.3 MG/DL    BUN/Creatinine ratio 25 (H) 12 - 20      GFR est AA >60 >60 ml/min/1.73m2    GFR est non-AA >60 >60 ml/min/1.73m2    Calcium 9.1 8.5 - 10.1 MG/DL    Bilirubin, total 0.7 0.2 - 1.0 MG/DL    ALT (SGPT) 83 (H) 13 - 56 U/L    AST (SGOT) 31 10 - 38 U/L    Alk.  phosphatase 220 (H) 45 - 117 U/L    Protein, total 6.4 6.4 - 8.2 g/dL    Albumin 2.8 (L) 3.4 - 5.0 g/dL    Globulin 3.6 2.0 - 4.0 g/dL    A-G Ratio 0.8 0.8 - 1.7     PHOSPHORUS    Collection Time: 10/03/21  4:00 AM   Result Value Ref Range    Phosphorus 4.5 2.5 - 4.9 MG/DL   AMMONIA    Collection Time: 10/03/21  6:30 AM   Result Value Ref Range    Ammonia 32 11 - 32 UMOL/L   CBC WITH AUTOMATED DIFF    Collection Time: 10/03/21  6:30 AM   Result Value Ref Range    WBC 19.2 (H) 4.6 - 13.2 K/uL    RBC 3.12 (L) 4.20 - 5.30 M/uL    HGB 9.2 (L) 12.0 - 16.0 g/dL    HCT 28.4 (L) 35.0 - 45.0 %    MCV 91.0 78.0 - 100.0 FL    MCH 29.5 24.0 - 34.0 PG    MCHC 32.4 31.0 - 37.0 g/dL    RDW 12.9 11.6 - 14.5 %    PLATELET 364 (H) 029 - 420 K/uL    MPV 8.9 (L) 9.2 - 11.8 FL    NEUTROPHILS 82 (H) 40 - 73 %    LYMPHOCYTES 11 (L) 21 - 52 %    MONOCYTES 6 3 - 10 %    EOSINOPHILS 1 0 - 5 %    BASOPHILS 0 0 - 2 %    ABS. NEUTROPHILS 15.6 (H) 1.8 - 8.0 K/UL    ABS. LYMPHOCYTES 2.2 0.9 - 3.6 K/UL    ABS. MONOCYTES 1.1 0.05 - 1.2 K/UL    ABS. EOSINOPHILS 0.1 0.0 - 0.4 K/UL    ABS.  BASOPHILS 0.0 0.0 - 0.1 K/UL    DF AUTOMATED     GLUCOSE, POC    Collection Time: 10/03/21 11:22 AM   Result Value Ref Range    Glucose (POC) 136 (H) 70 - 110 mg/dL     Medications:  Current Facility-Administered Medications   Medication Dose Route Frequency    albumin human 25% (BUMINATE) solution 25 g  25 g IntraVENous Q6H    arformoteroL (BROVANA) neb solution 15 mcg  15 mcg Nebulization BID RT    budesonide (PULMICORT) 500 mcg/2 ml nebulizer suspension  500 mcg Nebulization BID RT    meropenem (MERREM) 1,424 mg in 0.9% sodium chloride 71.2 mL IV syringe  20 mg/kg IntraVENous Q12H    0.9% sodium chloride infusion  50 mL/hr IntraVENous CONTINUOUS    QUEtiapine (SEROquel) tablet 25 mg  25 mg Oral BID    lactulose (CHRONULAC) 10 gram/15 mL solution 15 mL  15 mL Oral BID    lidocaine (XYLOCAINE) 10 mg/mL (1 %) injection 1-20 mL  1-20 mL SubCUTAneous RAD CONTINUOUS    melatonin (rapid dissolve) tablet 5 mg  5 mg Oral QHS    fentaNYL citrate (PF) injection 50 mcg  50 mcg IntraVENous Q2H PRN    haloperidol lactate (HALDOL) injection 4 mg  4 mg IntraVENous Q6H PRN    albuterol-ipratropium (DUO-NEB) 2.5 MG-0.5 MG/3 ML  3 mL Nebulization Q4H PRN    dexmedeTOMidine (PRECEDEX) 400 mcg in 0.9% sodium chloride 100 mL infusion  0.1-1.5 mcg/kg/hr IntraVENous TITRATE    famotidine (PEPCID) tablet 20 mg  20 mg Oral BID    thiamine mononitrate (B-1) tablet 100 mg  100 mg Oral DAILY    folic acid (FOLVITE) tablet 1 mg  1 mg Oral DAILY    multivitamin, tx-iron-ca-min (THERA-M w/ IRON) tablet 1 Tablet  1 Tablet Oral DAILY    clonazePAM (KlonoPIN) tablet 0.5 mg  0.5 mg Oral BID    ibuprofen (ADVIL;MOTRIN) 100 mg/5 mL oral suspension 400 mg  400 mg Per NG tube Q6H PRN    insulin lispro (HUMALOG) injection   SubCUTAneous Q6H    glucose chewable tablet 16 g  4 Tablet Oral PRN    glucagon (GLUCAGEN) injection 1 mg  1 mg IntraMUSCular PRN    dextrose (D50W) injection syrg 12.5-25 g  25-50 mL IntraVENous PRN    LORazepam (ATIVAN) injection 2 mg  2 mg IntraVENous Q6H PRN    HYDROmorphone (PF) (DILAUDID) injection 1 mg  1 mg IntraVENous Q4H PRN    enoxaparin (LOVENOX) injection 40 mg  40 mg SubCUTAneous Q24H    sodium chloride (NS) flush 5-40 mL  5-40 mL IntraVENous Q8H    sodium chloride (NS) flush 5-40 mL  5-40 mL IntraVENous PRN    acetaminophen (TYLENOL) tablet 650 mg  650 mg Oral Q6H PRN    Or    acetaminophen (TYLENOL) suppository 650 mg  650 mg Rectal Q6H PRN    polyethylene glycol (MIRALAX) packet 17 g  17 g Oral DAILY PRN    ondansetron (ZOFRAN ODT) tablet 4 mg  4 mg Oral Q8H PRN    Or    ondansetron (ZOFRAN) injection 4 mg  4 mg IntraVENous Q6H PRN    chlorhexidine (PERIDEX) 0.12 % mouthwash 10 mL  10 mL Oral Q12H    ELECTROLYTE REPLACEMENT PROTOCOL - Potassium Standard Dosing   1 Each Other PRN    ELECTROLYTE REPLACEMENT PROTOCOL - Magnesium   1 Each Other PRN    ELECTROLYTE REPLACEMENT PROTOCOL - Phosphorus  Standard Dosing  1 Each Other PRN    ELECTROLYTE REPLACEMENT PROTOCOL - Calcium   1 Each Other PRN       Recent Results (from the past 24 hour(s))   GLUCOSE, POC    Collection Time: 10/02/21 12:06 PM   Result Value Ref Range    Glucose (POC) 110 70 - 110 mg/dL   POTASSIUM    Collection Time: 10/02/21  1:45 PM   Result Value Ref Range    Potassium 5.3 3.5 - 5.5 mmol/L   GLUCOSE, POC    Collection Time: 10/02/21  6:20 PM   Result Value Ref Range    Glucose (POC) 121 (H) 70 - 110 mg/dL   GLUCOSE, POC    Collection Time: 10/02/21 11:59 PM Result Value Ref Range    Glucose (POC) 109 70 - 110 mg/dL   MAGNESIUM    Collection Time: 10/03/21  4:00 AM   Result Value Ref Range    Magnesium 1.7 1.6 - 2.6 mg/dL   METABOLIC PANEL, COMPREHENSIVE    Collection Time: 10/03/21  4:00 AM   Result Value Ref Range    Sodium 138 136 - 145 mmol/L    Potassium 4.8 3.5 - 5.5 mmol/L    Chloride 106 100 - 111 mmol/L    CO2 23 21 - 32 mmol/L    Anion gap 9 3.0 - 18 mmol/L    Glucose 107 (H) 74 - 99 mg/dL    BUN 9 7.0 - 18 MG/DL    Creatinine 0.36 (L) 0.6 - 1.3 MG/DL    BUN/Creatinine ratio 25 (H) 12 - 20      GFR est AA >60 >60 ml/min/1.73m2    GFR est non-AA >60 >60 ml/min/1.73m2    Calcium 9.1 8.5 - 10.1 MG/DL    Bilirubin, total 0.7 0.2 - 1.0 MG/DL    ALT (SGPT) 83 (H) 13 - 56 U/L    AST (SGOT) 31 10 - 38 U/L    Alk. phosphatase 220 (H) 45 - 117 U/L    Protein, total 6.4 6.4 - 8.2 g/dL    Albumin 2.8 (L) 3.4 - 5.0 g/dL    Globulin 3.6 2.0 - 4.0 g/dL    A-G Ratio 0.8 0.8 - 1.7     PHOSPHORUS    Collection Time: 10/03/21  4:00 AM   Result Value Ref Range    Phosphorus 4.5 2.5 - 4.9 MG/DL   AMMONIA    Collection Time: 10/03/21  6:30 AM   Result Value Ref Range    Ammonia 32 11 - 32 UMOL/L   CBC WITH AUTOMATED DIFF    Collection Time: 10/03/21  6:30 AM   Result Value Ref Range    WBC 19.2 (H) 4.6 - 13.2 K/uL    RBC 3.12 (L) 4.20 - 5.30 M/uL    HGB 9.2 (L) 12.0 - 16.0 g/dL    HCT 28.4 (L) 35.0 - 45.0 %    MCV 91.0 78.0 - 100.0 FL    MCH 29.5 24.0 - 34.0 PG    MCHC 32.4 31.0 - 37.0 g/dL    RDW 12.9 11.6 - 14.5 %    PLATELET 974 (H) 202 - 420 K/uL    MPV 8.9 (L) 9.2 - 11.8 FL    NEUTROPHILS 82 (H) 40 - 73 %    LYMPHOCYTES 11 (L) 21 - 52 %    MONOCYTES 6 3 - 10 %    EOSINOPHILS 1 0 - 5 %    BASOPHILS 0 0 - 2 %    ABS. NEUTROPHILS 15.6 (H) 1.8 - 8.0 K/UL    ABS. LYMPHOCYTES 2.2 0.9 - 3.6 K/UL    ABS. MONOCYTES 1.1 0.05 - 1.2 K/UL    ABS. EOSINOPHILS 0.1 0.0 - 0.4 K/UL    ABS.  BASOPHILS 0.0 0.0 - 0.1 K/UL    DF AUTOMATED     GLUCOSE, POC    Collection Time: 10/03/21 11:22 AM Result Value Ref Range    Glucose (POC) 136 (H) 70 - 110 mg/dL       Procedures/imaging: see electronic medical records for all procedures/Xrays and details which were not copied into this note but were reviewed prior to creation of [facility] MD   10/3/2021, 10:14 AM

## 2021-10-03 NOTE — PROGRESS NOTES
Otolaryngology head neck surgery  Patient seen and examined  Patient off ventilator and on trach collar and seems to be doing well  Significant amount of mucopurulent drainage however from the trach  Sutures removed  We will hold off changing to a uncuffed trach since possibility exists that patient may have a downturn and need to get back on ventilator hence will need cuffed trach  Patient doing well over time, may wish to consider decannulation    Jared Yeboah

## 2021-10-03 NOTE — PROGRESS NOTES
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    Left wrist fracture, with delayed healing, subsequent encounter S62.102G    Drug overdose, intentional self-harm, initial encounter ([address] T50.902A    Hypoxia R09.02    Hypotension after procedure I95.81    Acute metabolic encephalopathy L47.93    Psychosis ([facility] F29    Hyponatremia E87.1    Acute respiratory failure with hypoxia (HCC) J96.01    Increased ammonia level R79.89    Hypokalemia E87.6    Status post tracheostomy ([facility] [address] Z93.0 ·   Code status: full code     RECOMMENDATIONS:   Respiratory: Patient on ventilator support; intubated 2021 due to encephalopathy and drug overdose. Patient was extubated on 2021, and reintubated within an hour due to respiratory distress and upper airway edema. S/p tracheostomy 2021. on minimal ventilatory support  35% fio2 and PEEP 5   CXR 10/3  IMPRESSION  Increasing opacities in the left lung base could be due to worsening atelectasis  or developing infiltrate with possible developing effusion. Patient off ventilator for 24 hrs on Tcollar   tolerating well but moderate trach secretions elevated WBC  Start meropenem on 10/3/2021 ( before had zosyn and vanc possible vanc allergies ?) send procal   On 10/2 2021 off propofol on SBt on precedex weaning to off   Sedationprecedex   Prn lorazepam, Dilaudid and  Due to psych issues given Seroquel tolerated well monitor QTC  Continue ventilator and sedation bundles.   ID:WBC elevated no fever  Large tracheal secretions   CXr LLL infiltrate vs atelectasis start meropenem monitor closely   In the past was on van and zosyn maybe had Binta dunbar ? Reaction fever? For now no need to start unless worse    S/p LP with negative CSF cultures. Covid test 9/16/2021 and 9/24/2021: Negative. Sputum culture 9/24/2021: MSSA. Blood culture 9/24/2021: NGTD. Barahona catheter changed on 9/24/2021. Antibioticsrespiratory cultures MSSA. Levofloxacin course was complete and was on van and zosyn ; monitor temperature and white cell count. ID has signed off; reconsult  Tomorrow   CVS: Stable hemodynamics; telemetrysinus rhythm; blood pressure stable; continue to monitor. Echo 9/17/2021: LVEF 60 to 65%. RVSP 29 mmHg. Ultrasound leg 9/15negative study for DVTs. Renal: Normal renal function, and good urine output; continue to monitor and replace electrolytes as needed. Heme: Mild anemiaprolonged ICU stay and frequent blood draws; no active bleeding issues; hemoglobin stable9.2 today; platelets normal.  Stool occult blood 9/28negative. reapet pvl elevated wbc   Endo: Stable blood sugars; continue to monitor; sliding scale insulin if needed. GI: Patient tolerating tube feedingcurrently on hold for PEG tube placement. hCG negative on admission. Ammonia fluctuates53 today; lactulose therapy daily. Transaminases elevated, but coming down; hepatitis virus panelnegative study. Recent CT abdomen Liver is unremarkable. No abnormal biliary dilation. Gallbladder is unremarkable. Nutrition:peg feeding jevity  Patient on thiamine and folic acid. Neurology: Patient confused and delirious currently; known patient with psych issues and drug overdose on presentation during this admission; continue to avoid continuous sedation with benzodiazepines. CT headnil acute  Psych: On Klonopin. Phenobarbital 65 mg prn. Very agitated off propofol still on precedex drip   [facility]  Toxicology: Gabapentin OD on admission.   Skin: ICU nursing care  MS: Left wrist fracture in immobilization external fixator; patient has been seen by orthopedics during this admission. Prophylaxis: DVT prophylaxis: Lovenox 40 sc kamini. GI prophylaxis: Famotidine. Restraints: Wrist soft restraints as needed for patient interfering with medical therapy/management and patient safety. Consulted PT, OT and SLP teamsappreciate input. Prognosis guarded overall. CODE STATUSfull code. Palliative care on case. Quality Care: PPI, DVT prophylaxis, HOB elevated, Infection control all reviewed and addressed. Lines/Tubes: PIV   S/p ETT: 9/11/219/22/2021. Tracheostomy 9/22/2021. Status post PEG on 10/1/2021   Barahona: 9/11/21; changed 9/24/2021. Discussed with RNunable to discontinue Barahona catheter due to ventilator support, agitated and delirious state; patient will not be able to tolerate external Barahona catheter. ADVANCE DIRECTIVE: Full code. Discussed with RN, RT. High complexity decision making was performed during the evaluation of this patient at high risk for decompensation with multiple organ involvement. Trini Like   sister-in-law   [phone]      updated on 10/2/2021       Subjective/History:   Ms. Garrison Gamboa has been seen and evaluated as Dr. Bobo Lucero requested now for assisting with Acute respiratory failure and ventilator management. Patient is a 39 y.o. female with following PMhx presented to ER with lethargy via EMS and admitted for Gabapentin overdose. Pt required intubation for airways protection. Position control was contacted that recommended supportive care per ER provider. Pt seen at bedside in ER rm#2. The patient can not provide additional history due to Ventilated. 10/3/2021  Remains in ICU room 102.   Patient on Tcollar for 24 hrs  but unable to wean off prece dex  Started low dose Seroquel due to psych history and anxiety meds  Weaning to off slowly  Psych consult tomoorw  Monitor qtc  Wbc elevated, trach secretions meropenem started   No PCCM calls overnight     Patient has a history of drug use and smoker and alcohol use. Review of Systems:  Review of systems not obtained due to patient factors.         Past Medical History:  Past Medical History:   Diagnosis Date    Asthma     Bilateral ovarian cysts     Cocaine abuse ([facility]     Mental and behavioral problem     Pancreatitis     Pancreatitis     Psychosis ([facility] [address]         Past Surgical History:  Past Surgical History:   Procedure Laterality Date    HX GYN      D&C, Hysterectomy    IR INSERT GASTROSTOMY TUBE PERC  10/1/2021        Medications:    Current Facility-Administered Medications   Medication Dose Route Frequency    albumin human 25% (BUMINATE) solution 25 g  25 g IntraVENous Q6H    arformoteroL (BROVANA) neb solution 15 mcg  15 mcg Nebulization BID RT    budesonide (PULMICORT) 500 mcg/2 ml nebulizer suspension  500 mcg Nebulization BID RT    meropenem (MERREM) 1,424 mg in 0.9% sodium chloride 71.2 mL IV syringe  20 mg/kg IntraVENous Q12H    0.9% sodium chloride infusion  50 mL/hr IntraVENous CONTINUOUS    QUEtiapine (SEROquel) tablet 25 mg  25 mg Oral BID    lactulose (CHRONULAC) 10 gram/15 mL solution 15 mL  15 mL Oral BID    lidocaine (XYLOCAINE) 10 mg/mL (1 %) injection 1-20 mL  1-20 mL SubCUTAneous RAD CONTINUOUS    melatonin (rapid dissolve) tablet 5 mg  5 mg Oral QHS    dexmedeTOMidine (PRECEDEX) 400 mcg in 0.9% sodium chloride 100 mL infusion  0.1-1.5 mcg/kg/hr IntraVENous TITRATE    famotidine (PEPCID) tablet 20 mg  20 mg Oral BID    thiamine mononitrate (B-1) tablet 100 mg  100 mg Oral DAILY    folic acid (FOLVITE) tablet 1 mg  1 mg Oral DAILY    multivitamin, tx-iron-ca-min (THERA-M w/ IRON) tablet 1 Tablet  1 Tablet Oral DAILY    clonazePAM (KlonoPIN) tablet 0.5 mg  0.5 mg Oral BID    insulin lispro (HUMALOG) injection   SubCUTAneous Q6H    enoxaparin (LOVENOX) injection 40 mg  40 mg SubCUTAneous Q24H    sodium chloride (NS) flush 5-40 mL  5-40 mL IntraVENous Q8H    chlorhexidine (PERIDEX) 0.12 % mouthwash 10 mL  10 mL Oral Q12H       Allergy:  Allergies   Allergen Reactions    Fish Containing Products Itching    Toradol [Ketorolac] Rash     Pt reports she is not allergic to this medication. Social History:  Social History     Tobacco Use    Smoking status: Current Every Day Smoker     Packs/day: 1.00    Smokeless tobacco: Never Used   Substance Use Topics    Alcohol use: Not Currently    Drug use: Not Currently     Types: Cocaine, Marijuana     Comment: used with in past 24 hours          Objective:   Vital Signs:    Blood pressure (!) 94/46, pulse 83, temperature 99 °F (37.2 °C), resp. rate (!) 31, height 5' 2\" (1.575 m), weight 71.2 kg (157 lb), last menstrual period 05/15/2018, SpO2 94 %. Body mass index is 28.72 kg/m².    O2 Device: Tracheostomy, Tracheal collar   O2 Flow Rate (L/min): 8 l/min   Temp (24hrs), Av.2 °F (37.3 °C), Min:98.4 °F (36.9 °C), Max:100 °F (37.8 °C)         Intake/Output:   Last shift:      10/03 07 - 10/03 1900  In: 467.9 [I.V.:177.9]  Out: 350 [Urine:350]  Last 3 shifts: 10/01 1901 - 10/03 0700  In: 1889.9 [I.V.:869.9]  Out: 3300 [Urine:1800; Drains:1500]    Intake/Output Summary (Last 24 hours) at 10/3/2021 1152  Last data filed at 10/3/2021 0954  Gross per 24 hour   Intake 2357.74 ml   Output 1400 ml   Net 957.74 ml       ABG:    No results found for: PH, PHI, PCO2, PCO2I, PO2, PO2I, HCO3, HCO3I, FIO2, FIO2I  Ventilator Mode: Spontaneous  Respiratory Rate  Resp Rate Observed: 9  Back-Up Rate: 14  Insp Time (sec): 1.1 sec  I:E Ratio: 1:2.1  Ventilator Volumes  Vt Set (ml): 420 ml  Vt Exhaled (Machine Breath) (ml): 571 ml  Vt Spont (ml): 468 ml  Ve Observed (l/min): 10.8 l/min  Ventilator Pressures  Pressure Support (cm H2O): 7 cm H2O  PIP Observed (cm H2O): 18 cm H2O  Plateau Pressure (cm H2O): 13 cm H2O  MAP (cm H2O): 8.8  PEEP/VENT (cm H2O): 5 cm H20  Auto PEEP Observed (cm H2O): 0 cm H2O       Physical Exam:   Patient appears older than stated age; appears chronically ill; on trach collar support; acyanotic  HEENT: pupils not dilated, reactive, no scleral jaundice, no nasal drainage  Neck: No adenopathy or thyroid swelling  Tracheostomy tubeno bleeding but moderate thick secretions   Patient head position midline now; finds it painful and stiff to move head to the left. CVS: S1-S2; no murmur; telemetrysinus rhythm; JVD not elevated  RS: Moderate air entry bilaterally; no wheezing; bibasal crackles; not tachypneic or in distress while on ventilator support     Abd: Soft, no tenderness, no distention, no guarding or rigidity or rebound; bowel sounds present; no hepatosplenomegaly  PEG inplace   Neuro: awake following commands trying to talk  Extrm: no leg edema or swelling or clubbing  Skin: no rash  Lymphatic: no cervical or supraclavicular adenopathy  Vasc: Good DPs in both feet       Data:     Recent Results (from the past 12 hour(s))   GLUCOSE, POC    Collection Time: 10/02/21 11:59 PM   Result Value Ref Range    Glucose (POC) 109 70 - 110 mg/dL   MAGNESIUM    Collection Time: 10/03/21  4:00 AM   Result Value Ref Range    Magnesium 1.7 1.6 - 2.6 mg/dL   METABOLIC PANEL, COMPREHENSIVE    Collection Time: 10/03/21  4:00 AM   Result Value Ref Range    Sodium 138 136 - 145 mmol/L    Potassium 4.8 3.5 - 5.5 mmol/L    Chloride 106 100 - 111 mmol/L    CO2 23 21 - 32 mmol/L    Anion gap 9 3.0 - 18 mmol/L    Glucose 107 (H) 74 - 99 mg/dL    BUN 9 7.0 - 18 MG/DL    Creatinine 0.36 (L) 0.6 - 1.3 MG/DL    BUN/Creatinine ratio 25 (H) 12 - 20      GFR est AA >60 >60 ml/min/1.73m2    GFR est non-AA >60 >60 ml/min/1.73m2    Calcium 9.1 8.5 - 10.1 MG/DL    Bilirubin, total 0.7 0.2 - 1.0 MG/DL    ALT (SGPT) 83 (H) 13 - 56 U/L    AST (SGOT) 31 10 - 38 U/L    Alk.  phosphatase 220 (H) 45 - 117 U/L    Protein, total 6.4 6.4 - 8.2 g/dL    Albumin 2.8 (L) 3.4 - 5.0 g/dL    Globulin 3.6 2.0 - 4.0 g/dL    A-G Ratio 0.8 0.8 - 1.7 PHOSPHORUS    Collection Time: 10/03/21  4:00 AM   Result Value Ref Range    Phosphorus 4.5 2.5 - 4.9 MG/DL   AMMONIA    Collection Time: 10/03/21  6:30 AM   Result Value Ref Range    Ammonia 32 11 - 32 UMOL/L   CBC WITH AUTOMATED DIFF    Collection Time: 10/03/21  6:30 AM   Result Value Ref Range    WBC 19.2 (H) 4.6 - 13.2 K/uL    RBC 3.12 (L) 4.20 - 5.30 M/uL    HGB 9.2 (L) 12.0 - 16.0 g/dL    HCT 28.4 (L) 35.0 - 45.0 %    MCV 91.0 78.0 - 100.0 FL    MCH 29.5 24.0 - 34.0 PG    MCHC 32.4 31.0 - 37.0 g/dL    RDW 12.9 11.6 - 14.5 %    PLATELET 050 (H) 264 - 420 K/uL    MPV 8.9 (L) 9.2 - 11.8 FL    NEUTROPHILS 82 (H) 40 - 73 %    LYMPHOCYTES 11 (L) 21 - 52 %    MONOCYTES 6 3 - 10 %    EOSINOPHILS 1 0 - 5 %    BASOPHILS 0 0 - 2 %    ABS. NEUTROPHILS 15.6 (H) 1.8 - 8.0 K/UL    ABS. LYMPHOCYTES 2.2 0.9 - 3.6 K/UL    ABS. MONOCYTES 1.1 0.05 - 1.2 K/UL    ABS. EOSINOPHILS 0.1 0.0 - 0.4 K/UL    ABS. BASOPHILS 0.0 0.0 - 0.1 K/UL    DF AUTOMATED     GLUCOSE, POC    Collection Time: 10/03/21 11:22 AM   Result Value Ref Range    Glucose (POC) 136 (H) 70 - 110 mg/dL             No results for input(s): FIO2I, IFO2, HCO3I, IHCO3, HCOPOC, PCO2I, PCOPOC, IPHI, PHI, PHPOC, PO2I, PO2POC in the last 72 hours.     No lab exists for component: IPOC2    All Micro Results     Procedure Component Value Units Date/Time    CULTURE, BLOOD [[id number]] Collected: 09/27/21 1005    Order Status: Completed Specimen: Blood Updated: 10/03/21 0723     Special Requests: NO SPECIAL REQUESTS        Culture result: NO GROWTH 6 DAYS       CULTURE, BLOOD [[id number]] Collected: 09/27/21 1005    Order Status: Completed Specimen: Blood Updated: 10/03/21 0723     Special Requests: NO SPECIAL REQUESTS        Culture result: NO GROWTH 6 DAYS       CULTURE, BLOOD [[id number]] Collected: 09/24/21 0740    Order Status: Completed Specimen: Blood Updated: 09/30/21 0819     Special Requests: NO SPECIAL REQUESTS        Culture result: NO GROWTH 6 DAYS       CULTURE, RESPIRATORY/SPUTUM/BRONCH Azra Cordial STAIN [[id number]]  (Abnormal)  (Susceptibility) Collected: 09/24/21 0654    Order Status: Completed Specimen: Sputum from Tracheal Aspirate Updated: 09/27/21 1147     Special Requests: NO SPECIAL REQUESTS        GRAM STAIN RARE WBCS SEEN               RARE EPITHELIAL CELLS SEEN            NO ORGANISMS SEEN        Culture result:       MODERATE STAPHYLOCOCCUS AUREUS                  NO NORMAL RESPIRATORY DINA ISOLATED          COVID-19 RAPID TEST [[id number]] Collected: 09/24/21 1700    Order Status: Completed Specimen: Nasopharyngeal Updated: 09/24/21 1849     Specimen source Nasopharyngeal        COVID-19 rapid test Not detected        Comment: Rapid Abbott ID Now       Rapid NAAT:  The specimen is NEGATIVE for SARS-CoV-2, the novel coronavirus associated with COVID-19. Negative results should be treated as presumptive and, if inconsistent with clinical signs and symptoms or necessary for patient management, should be tested with an alternative molecular assay. Negative results do not preclude SARS-CoV-2 infection and should not be used as the sole basis for patient management decisions. This test has been authorized by the FDA under an Emergency Use Authorization (EUA) for use by authorized laboratories. Fact sheet for Healthcare Providers: ConventionUpdate.co.nz  Fact sheet for Patients: ConventionUpdate.co.nz       Methodology: Isothermal Nucleic Acid Amplification         CULTURE, URINE [[id number]]     Order Status: Canceled Specimen: Urine from Clean catch     CULTURE, CSF  TUBE 2 [[id number]] Collected: 09/16/21 1434    Order Status: Completed Specimen: Cerebrospinal Fluid Updated: 09/23/21 1241     Special Requests: TUBE 3, CULTURE AND SENSITIVTY AND GRAM STAIN.      GRAM STAIN RARE WBCS SEEN         NO ORGANISMS SEEN        Culture result: NO GROWTH 7 DAYS       CULTURE, BLOOD [[id number]] Collected: 09/14/21 0515    Order Status: Completed Specimen: Blood Updated: 09/20/21 0832     Special Requests: NO SPECIAL REQUESTS        Culture result: NO GROWTH 6 DAYS       CULTURE, BLOOD [[id number]] Collected: 09/14/21 0500    Order Status: Completed Specimen: Blood Updated: 09/20/21 0832     Special Requests: NO SPECIAL REQUESTS        Culture result: NO GROWTH 6 DAYS       MENINGITIS PATHOGENS PANEL, CSF (BY PCR) [[id number]] Collected: 09/16/21 1434    Order Status: Completed Specimen: Cerebrospinal Fluid Updated: 09/17/21 0050     Escherichia coli K1 Not detected        Haemophilus Influenzae Not detected        Listeria Monocytogenes Not detected        Neisseria Meningitidis Not detected        Streptococcus Agalactiae Not detected        Streptococcus Pneumoniae Not detected        Cytomegalovirus Not detected        Enterovirus Not detected        Herpes Simplex Virus 1 Not detected        Comment: In patients who have negative herpes simplex 1 and 2 PCR results, do not modify treatment, confirm with alternate testing. Herpes Simplex Virus 2 Not detected        Comment: In patients who have negative herpes simplex 1 and 2 PCR results, do not modify treatment, confirm with alternate testing. Human Herpesvirus 6 Not detected        Human Parechovirus Not detected        Varicella Zoster Virus Not detected        Crypto.  neoformans/gattii Not detected       MENINGITIS PATHOGENS PANEL, CSF (BY PCR) [[id number]] Collected: 09/16/21 1545    Order Status: Canceled Specimen: Cerebrospinal Fluid     HSV 1 AND 2 BY PCR [[id number]] Collected: 09/16/21 1434    Order Status: Canceled Specimen: Other     FOIJS-59 RAPID TEST [[id number]] Collected: 09/16/21 1000    Order Status: Completed Specimen: Nasopharyngeal Updated: 09/16/21 1032     Specimen source Nasopharyngeal        COVID-19 rapid test Not detected        Comment: Rapid Abbott ID Now       Rapid NAAT:  The specimen is NEGATIVE for SARS-CoV-2, the novel coronavirus associated with COVID-19. Negative results should be treated as presumptive and, if inconsistent with clinical signs and symptoms or necessary for patient management, should be tested with an alternative molecular assay. Negative results do not preclude SARS-CoV-2 infection and should not be used as the sole basis for patient management decisions. This test has been authorized by the FDA under an Emergency Use Authorization (EUA) for use by authorized laboratories.    Fact sheet for Healthcare Providers: Beatroboco.nz  Fact sheet for Patients: Beatroboco.nz       Methodology: Isothermal Nucleic Acid Amplification         LEGIONELLA PNEUMOPHILA AG, URINE [[id number]] Collected: 09/14/21 2315    Order Status: Completed Specimen: Urine, random Updated: 09/15/21 1042     Legionella Ag, urine Negative       STREP PNEUMO AG, URINE [[id number]] Collected: 09/14/21 2315    Order Status: Completed Specimen: Urine, random Updated: 09/15/21 1042     Strep pneumo Ag, urine Negative       RESPIRATORY VIRUS PANEL W/COVID-19, PCR [[id number]] Collected: 09/14/21 1832    Order Status: Completed Specimen: Nasopharyngeal Updated: 09/14/21 2234     Adenovirus Not detected        Coronavirus 229E Not detected        Coronavirus HKU1 Not detected        Coronavirus CVNL63 Not detected        Coronavirus OC43 Not detected        SARS-CoV-2, PCR Not detected        Metapneumovirus Not detected        Rhinovirus and Enterovirus Not detected        Influenza A Not detected        Influenza A, subtype H1 Not detected        Influenza A, subtype H3 Not detected        INFLUENZA A H1N1 PCR Not detected        Influenza B Not detected        Parainfluenza 1 Not detected        Parainfluenza 2 Not detected        Parainfluenza 3 Not detected        Parainfluenza virus 4 Not detected        RSV by PCR Not detected        B. parapertussis, PCR Not detected        Bordetella pertussis - PCR Not detected        Chlamydophila pneumoniae DNA, QL, PCR Not detected        Mycoplasma pneumoniae DNA, QL, PCR Not detected       CULTURE, BLOOD [[id number]] Collected: 09/14/21 1700    Order Status: Canceled Specimen: Blood     CULTURE, URINE [[id number]] Collected: 09/13/21 2330    Order Status: Canceled Specimen: Cath Urine     RESPIRATORY VIRUS PANEL W/COVID-19, PCR [[id number]]     Order Status: Canceled Specimen: NASOPHARYNGEAL SWAB     COVID-19 RAPID TEST [[id number]]     Order Status: Canceled     COVID-19 RAPID TEST [[id number]] Collected: 09/13/21 0737    Order Status: Completed Specimen: Nasopharyngeal Updated: 09/13/21 0811     Specimen source Nasopharyngeal        COVID-19 rapid test Not detected        Comment: Rapid Abbott ID Now       Rapid NAAT:  The specimen is NEGATIVE for SARS-CoV-2, the novel coronavirus associated with COVID-19. Negative results should be treated as presumptive and, if inconsistent with clinical signs and symptoms or necessary for patient management, should be tested with an alternative molecular assay. Negative results do not preclude SARS-CoV-2 infection and should not be used as the sole basis for patient management decisions. This test has been authorized by the FDA under an Emergency Use Authorization (EUA) for use by authorized laboratories. Fact sheet for Healthcare Providers: ConventionUpdate.co.nz  Fact sheet for Patients: ConventionUpdate.co.nz       Methodology: Isothermal Nucleic Acid Amplification         COVID-19 RAPID TEST [[id number]]     Order Status: Canceled         Echo 9/17/2021:  Left Ventricle Normal cavity size, wall thickness and systolic function (ejection fraction normal). The estimated EF is 60 - 65%. There is inconclusive left ventricular diastolic function E/E' ratio = 7.08  Heart rate is over 100. Wall Scoring The left ventricular wall motion is normal.            Left Atrium Normal cavity size. Right Ventricle Normal cavity size and global systolic function. Assessment of RV function:   TAPSE = 27 mm. Right Atrium Normal cavity size. Interatrial Septum Interatrial septum not well visualized   Aortic Valve Aortic valve not well visualized. Trileaflet valve structure, no stenosis and no regurgitation. Mitral Valve No stenosis. Mitral valve non-specific thickening. Mild regurgitation. Tricuspid Valve Tricuspid valve not well visualized. No stenosis. Mild regurgitation. Pulmonic Valve Pulmonic valve not well visualized. No stenosis and no regurgitation. Aorta The aorta was not well visualized. Normal aortic root. Pulmonary Artery Pulmonary arteries not well visualized. Pulmonary arterial systolic pressure (PASP) is 29 mmHg. Pulmonary hypertension not suggested by Doppler findings. IVC/Hepatic Veins Mechanically ventilated; cannot use inferior caval vein diameter to estimate central venous pressure. Pericardium Normal pericardium and no evidence of pericardial effusion. CT head 9/15/1021  IMPRESSION   No acute intracranial abnormality. CT chest, abdomen, pelvis 9/15/1021  IMPRESSION   Endotracheal tube tip in the lower thoracic trachea 1.5 cm above the dipak.    Bilateral lower lobar atelectasis, possible endobronchial lesion/mucous plug in  the left lower lobe bronchus.    No acute intra-abdominal abnormality. Imaging:  [x]I have personally reviewed the patients chest radiographs images and report    X-ray 9/30/1021  Results from Hospital Encounter encounter on 09/11/21    XR CHEST PORT    Narrative  A portable AP radiograph of the chest was obtained at 0606 hours:  INDICATION:  Drug overdose. COMPARISON:  Portable chest one day earlier. FINDINGS:    Heart and mediastinum: Tracheostomy tube in place. Lungs and pleura: Increasing hazy changes left base possibly with developing  minimal effusion. Right lung remains clear. Aorta: Unremarkable.   Bones: Within normal limits for age. Other: None. Impression  Increasing opacities in the left lung base could be due to worsening atelectasis  or developing infiltrate with possible developing effusion. Please note: Voice-recognition software may have been used to generate this report, which may have resulted in some phonetic-based errors in grammar and contents. Even though attempts were made to correct all the mistakes, some may have been missed, and remained in the body of the document.       Margo Veras MD  10/3/2021

## 2021-10-03 NOTE — PROGRESS NOTES
1900: Bedside and Verbal shift change report given to Henrik Lara RN (oncoming nurse) by Supa Simpson RN (offgoing nurse). Report included the following information SBAR, Kardex, Intake/Output, MAR, Recent Results, Med Rec Status, Cardiac Rhythm NSR and Alarm Parameters . 2000: Shift assessment completed. Pt is following commands and tolerating trach collar with SPO2 % but restless at this time. 0000: Reassessment completed. 0300: Dr. Francesca Greer on unit and advised of soft SBP. Order received for Albumin 25mg q6h    0400: Reassessment completed. Phlebotomy at bedside collecting blood specimen. 0710: Bedside and Verbal shift change report given to Mala Sanchez RN (oncoming nurse) by Henrik Lara RN (offgoing nurse). Report included the following information SBAR, Kardex, Intake/Output, MAR, Recent Results, Med Rec Status, Cardiac Rhythm NSR and Alarm Parameters .

## 2021-10-03 NOTE — PROGRESS NOTES
0715 Report from Liza Guerrero RN  0800 Assessment, patient remains on trach collar with #8 Shiley, soft bilateral wrist restraints in place, precedex drip infusing  0830 Dr Patricio López at bedside, removed sutures from trach site  1200 Reassessment  1600 Reassessment, PRN meds given for pain/agitation  1830 FMS bag changed, 800 ml liquid stool

## 2021-10-03 NOTE — PROGRESS NOTES
Leelaseten with psychiatrist Dr. Campbell Second  Recommended to start Haldol 5 mg po bid   He will see tomorrow  I am monitoring QTC  JAIDEN Cadena MD

## 2021-10-04 ENCOUNTER — APPOINTMENT (OUTPATIENT)
Dept: GENERAL RADIOLOGY | Age: 41
DRG: 004 | End: 2021-10-04
Attending: INTERNAL MEDICINE
Payer: MEDICAID

## 2021-10-04 LAB
ALBUMIN SERPL-MCNC: 3.6 G/DL (ref 3.4–5)
ALBUMIN/GLOB SERPL: 1.3 {RATIO} (ref 0.8–1.7)
ALP SERPL-CCNC: 162 U/L (ref 45–117)
ALT SERPL-CCNC: 82 U/L (ref 13–56)
AMMONIA PLAS-SCNC: 40 UMOL/L (ref 11–32)
ANION GAP SERPL CALC-SCNC: 7 MMOL/L (ref 3–18)
AST SERPL-CCNC: 38 U/L (ref 10–38)
ATRIAL RATE: 97 BPM
BASOPHILS # BLD: 0 K/UL (ref 0–0.1)
BASOPHILS NFR BLD: 0 % (ref 0–2)
BILIRUB SERPL-MCNC: 0.5 MG/DL (ref 0.2–1)
BUN SERPL-MCNC: 9 MG/DL (ref 7–18)
BUN/CREAT SERPL: 27 (ref 12–20)
CALCIUM SERPL-MCNC: 8.9 MG/DL (ref 8.5–10.1)
CALCULATED P AXIS, ECG09: 42 DEGREES
CALCULATED R AXIS, ECG10: 14 DEGREES
CALCULATED T AXIS, ECG11: 56 DEGREES
CHLORIDE SERPL-SCNC: 106 MMOL/L (ref 100–111)
CO2 SERPL-SCNC: 26 MMOL/L (ref 21–32)
CREAT SERPL-MCNC: 0.33 MG/DL (ref 0.6–1.3)
DIAGNOSIS, 93000: NORMAL
DIFFERENTIAL METHOD BLD: ABNORMAL
EOSINOPHIL # BLD: 0.2 K/UL (ref 0–0.4)
EOSINOPHIL NFR BLD: 1 % (ref 0–5)
ERYTHROCYTE [DISTWIDTH] IN BLOOD BY AUTOMATED COUNT: 12.7 % (ref 11.6–14.5)
GLOBULIN SER CALC-MCNC: 2.8 G/DL (ref 2–4)
GLUCOSE BLD STRIP.AUTO-MCNC: 109 MG/DL (ref 70–110)
GLUCOSE BLD STRIP.AUTO-MCNC: 119 MG/DL (ref 70–110)
GLUCOSE BLD STRIP.AUTO-MCNC: 129 MG/DL (ref 70–110)
GLUCOSE BLD STRIP.AUTO-MCNC: 133 MG/DL (ref 70–110)
GLUCOSE SERPL-MCNC: 109 MG/DL (ref 74–99)
HCT VFR BLD AUTO: 21.5 % (ref 35–45)
HCT VFR BLD AUTO: 22.6 % (ref 35–45)
HGB BLD-MCNC: 7 G/DL (ref 12–16)
HGB BLD-MCNC: 7.4 G/DL (ref 12–16)
LYMPHOCYTES # BLD: 1.8 K/UL (ref 0.9–3.6)
LYMPHOCYTES NFR BLD: 16 % (ref 21–52)
MAGNESIUM SERPL-MCNC: 1.9 MG/DL (ref 1.6–2.6)
MCH RBC QN AUTO: 28.9 PG (ref 24–34)
MCHC RBC AUTO-ENTMCNC: 32.6 G/DL (ref 31–37)
MCV RBC AUTO: 88.8 FL (ref 78–100)
MONOCYTES # BLD: 0.6 K/UL (ref 0.05–1.2)
MONOCYTES NFR BLD: 5 % (ref 3–10)
NEUTS SEG # BLD: 8.4 K/UL (ref 1.8–8)
NEUTS SEG NFR BLD: 76 % (ref 40–73)
P-R INTERVAL, ECG05: 156 MS
PHOSPHATE SERPL-MCNC: 3.7 MG/DL (ref 2.5–4.9)
PLATELET # BLD AUTO: 396 K/UL (ref 135–420)
PMV BLD AUTO: 9.1 FL (ref 9.2–11.8)
POTASSIUM SERPL-SCNC: 3.7 MMOL/L (ref 3.5–5.5)
PROCALCITONIN SERPL-MCNC: 0.05 NG/ML
PROT SERPL-MCNC: 6.4 G/DL (ref 6.4–8.2)
Q-T INTERVAL, ECG07: 342 MS
QRS DURATION, ECG06: 72 MS
QTC CALCULATION (BEZET), ECG08: 434 MS
RBC # BLD AUTO: 2.42 M/UL (ref 4.2–5.3)
SODIUM SERPL-SCNC: 139 MMOL/L (ref 136–145)
VENTRICULAR RATE, ECG03: 97 BPM
WBC # BLD AUTO: 11.1 K/UL (ref 4.6–13.2)

## 2021-10-04 PROCEDURE — 74011250636 HC RX REV CODE- 250/636: Performed by: INTERNAL MEDICINE

## 2021-10-04 PROCEDURE — P9047 ALBUMIN (HUMAN), 25%, 50ML: HCPCS | Performed by: INTERNAL MEDICINE

## 2021-10-04 PROCEDURE — 74011000250 HC RX REV CODE- 250: Performed by: INTERNAL MEDICINE

## 2021-10-04 PROCEDURE — 93005 ELECTROCARDIOGRAM TRACING: CPT

## 2021-10-04 PROCEDURE — 84145 PROCALCITONIN (PCT): CPT

## 2021-10-04 PROCEDURE — 82140 ASSAY OF AMMONIA: CPT

## 2021-10-04 PROCEDURE — 36415 COLL VENOUS BLD VENIPUNCTURE: CPT

## 2021-10-04 PROCEDURE — 85025 COMPLETE CBC W/AUTO DIFF WBC: CPT

## 2021-10-04 PROCEDURE — 82962 GLUCOSE BLOOD TEST: CPT

## 2021-10-04 PROCEDURE — 74011250637 HC RX REV CODE- 250/637: Performed by: INTERNAL MEDICINE

## 2021-10-04 PROCEDURE — 83735 ASSAY OF MAGNESIUM: CPT

## 2021-10-04 PROCEDURE — 74011000258 HC RX REV CODE- 258: Performed by: INTERNAL MEDICINE

## 2021-10-04 PROCEDURE — 80053 COMPREHEN METABOLIC PANEL: CPT

## 2021-10-04 PROCEDURE — 77010033678 HC OXYGEN DAILY

## 2021-10-04 PROCEDURE — 65610000006 HC RM INTENSIVE CARE

## 2021-10-04 PROCEDURE — 94640 AIRWAY INHALATION TREATMENT: CPT

## 2021-10-04 PROCEDURE — 71045 X-RAY EXAM CHEST 1 VIEW: CPT

## 2021-10-04 PROCEDURE — 85018 HEMOGLOBIN: CPT

## 2021-10-04 PROCEDURE — 87040 BLOOD CULTURE FOR BACTERIA: CPT

## 2021-10-04 PROCEDURE — 2709999900 HC NON-CHARGEABLE SUPPLY

## 2021-10-04 PROCEDURE — 84100 ASSAY OF PHOSPHORUS: CPT

## 2021-10-04 RX ADMIN — MEROPENEM 1 G: 1 INJECTION, POWDER, FOR SOLUTION INTRAVENOUS at 12:22

## 2021-10-04 RX ADMIN — ALBUMIN (HUMAN) 25 G: 0.25 INJECTION, SOLUTION INTRAVENOUS at 17:30

## 2021-10-04 RX ADMIN — FAMOTIDINE 20 MG: 20 TABLET ORAL at 10:00

## 2021-10-04 RX ADMIN — HYDROMORPHONE HYDROCHLORIDE 1 MG: 1 INJECTION, SOLUTION INTRAMUSCULAR; INTRAVENOUS; SUBCUTANEOUS at 05:10

## 2021-10-04 RX ADMIN — LACTULOSE 15 ML: 10 SOLUTION ORAL at 10:00

## 2021-10-04 RX ADMIN — ARFORMOTEROL TARTRATE 15 MCG: 15 SOLUTION RESPIRATORY (INHALATION) at 21:27

## 2021-10-04 RX ADMIN — DEXMEDETOMIDINE HYDROCHLORIDE 0.7 MCG/KG/HR: 100 INJECTION, SOLUTION INTRAVENOUS at 16:15

## 2021-10-04 RX ADMIN — FENTANYL CITRATE 50 MCG: 50 INJECTION INTRAMUSCULAR; INTRAVENOUS at 21:19

## 2021-10-04 RX ADMIN — THIAMINE HCL TAB 100 MG 100 MG: 100 TAB at 10:00

## 2021-10-04 RX ADMIN — ARFORMOTEROL TARTRATE 15 MCG: 15 SOLUTION RESPIRATORY (INHALATION) at 07:21

## 2021-10-04 RX ADMIN — 0.12% CHLORHEXIDINE GLUCONATE 10 ML: 1.2 RINSE ORAL at 10:01

## 2021-10-04 RX ADMIN — DEXMEDETOMIDINE HYDROCHLORIDE 0.7 MCG/KG/HR: 100 INJECTION, SOLUTION INTRAVENOUS at 00:16

## 2021-10-04 RX ADMIN — HYDROMORPHONE HYDROCHLORIDE 1 MG: 1 INJECTION, SOLUTION INTRAMUSCULAR; INTRAVENOUS; SUBCUTANEOUS at 15:39

## 2021-10-04 RX ADMIN — LORAZEPAM 2 MG: 2 INJECTION INTRAMUSCULAR at 22:00

## 2021-10-04 RX ADMIN — HYDROMORPHONE HYDROCHLORIDE 1 MG: 1 INJECTION, SOLUTION INTRAMUSCULAR; INTRAVENOUS; SUBCUTANEOUS at 20:12

## 2021-10-04 RX ADMIN — ALBUMIN (HUMAN) 25 G: 0.25 INJECTION, SOLUTION INTRAVENOUS at 12:22

## 2021-10-04 RX ADMIN — BUDESONIDE 500 MCG: 0.5 INHALANT RESPIRATORY (INHALATION) at 07:21

## 2021-10-04 RX ADMIN — MEROPENEM 1 G: 1 INJECTION, POWDER, FOR SOLUTION INTRAVENOUS at 00:11

## 2021-10-04 RX ADMIN — LACTULOSE 15 ML: 10 SOLUTION ORAL at 20:30

## 2021-10-04 RX ADMIN — 0.12% CHLORHEXIDINE GLUCONATE 10 ML: 1.2 RINSE ORAL at 20:30

## 2021-10-04 RX ADMIN — ALBUMIN (HUMAN) 25 G: 0.25 INJECTION, SOLUTION INTRAVENOUS at 00:11

## 2021-10-04 RX ADMIN — ALBUMIN (HUMAN) 25 G: 0.25 INJECTION, SOLUTION INTRAVENOUS at 05:12

## 2021-10-04 RX ADMIN — FENTANYL CITRATE 50 MCG: 50 INJECTION INTRAMUSCULAR; INTRAVENOUS at 23:20

## 2021-10-04 RX ADMIN — BUDESONIDE 500 MCG: 0.5 INHALANT RESPIRATORY (INHALATION) at 21:27

## 2021-10-04 RX ADMIN — Medication 1 TABLET: at 10:00

## 2021-10-04 RX ADMIN — LORAZEPAM 2 MG: 2 INJECTION INTRAMUSCULAR at 14:48

## 2021-10-04 RX ADMIN — CLONAZEPAM 0.5 MG: 0.5 TABLET ORAL at 10:00

## 2021-10-04 RX ADMIN — FOLIC ACID 1 MG: 1 TABLET ORAL at 10:00

## 2021-10-04 RX ADMIN — Medication 5 MG: at 21:19

## 2021-10-04 RX ADMIN — CLONAZEPAM 0.5 MG: 0.5 TABLET ORAL at 20:30

## 2021-10-04 RX ADMIN — FENTANYL CITRATE 50 MCG: 50 INJECTION INTRAMUSCULAR; INTRAVENOUS at 12:54

## 2021-10-04 RX ADMIN — FENTANYL CITRATE 50 MCG: 50 INJECTION INTRAMUSCULAR; INTRAVENOUS at 01:58

## 2021-10-04 RX ADMIN — HALOPERIDOL 5 MG: 5 TABLET ORAL at 20:30

## 2021-10-04 RX ADMIN — FAMOTIDINE 20 MG: 20 TABLET ORAL at 20:30

## 2021-10-04 RX ADMIN — HALOPERIDOL 5 MG: 5 TABLET ORAL at 10:01

## 2021-10-04 RX ADMIN — ALBUMIN (HUMAN) 25 G: 0.25 INJECTION, SOLUTION INTRAVENOUS at 23:25

## 2021-10-04 RX ADMIN — SODIUM CHLORIDE 10 ML: 9 INJECTION, SOLUTION INTRAMUSCULAR; INTRAVENOUS; SUBCUTANEOUS at 15:39

## 2021-10-04 NOTE — PROGRESS NOTES
[facility]  (A Division of [address] [place] [address])                                                                                                                                                                                Heather Vences MD                                                         Office #:     [phone]-KICPJD #[phone]                                                          Office Fax: [phone]     Date of Admission: 9/11/2021    Date of Note: 10/4/2021  Reason for Reconsult: Antibiotic management with ongoing sepsis      Ongoing Antimicrobials:    Prior Antimicrobials:  Zosyn 9/14 to 9/20  Zosyn 9/24-> 9/26 meropenem to 9/27  Vanco 9/25 to 9/27  Levofloxacin 9/27 to 10/1  Meropenem 10/3 to date       Doxycycline 9/14 to 9/15  Vanco 9/14 to 9/17       Assessment- ID related:  --------------------------------------------------------------------------    · Fever with leucocytosis- possible source seems resp infection, has increased secretion,  ABX started emperically and wbc declining already  · S/P respiratory failure, inbuated 9/11, re-intubated 9/18 and Post trach 9/22  · Post PEG 10/1  · History of positive drug screen--opiates/cocaine. Drug screen on admission: negative  · Left wrist fracture with external fixer-- site look mariola  · Agiation, Hyperammonia     covid test rapid neg, RVP neg  HIV test neg-9/15  Acute hepatitis A/B/C: negative  S/P LP- CSF benign- 9/16    Other Medical Issues- Mx per respective team:    Drug overdose( stated neurontin)  Hyperammonia level- etiology?  anemia   Recommendation for ID issues I am following:  ------------------------------------------------------------------------------    -> already on meropenem, wbc decliing, cont course.    Will need panculture( blood/ resp/ urine analysis with reflex to culture ) if recurrent high fever or worsening of leucocytosis ore resp status    -> check blood culture  -> mrsa screen--to assess colonization/ need to add future MRSA coverage if decline    -> diarrhea induced by lactulose          DW ICU RN, ICU MD- Dr Alexia Patiño     Condition: remains in critical care    Subjective:    Taken off Zosyn 9/20- last seen by ID. Events reviweed/ notes reviewed and  med staff. Since last evaluation, she had ongoing sepsis/ leucocytosis and fever issues. Soon after off Zosyn on 9/20, she spiked to 103 from 9/23 with increasing WBC to 30.7 at its peak on 9/24. Had multiple ABX again including Zosyn/meropenem/Vanco/levofloxacin as indicated in ABX section above. She had changes on CXr showing infiltrate/atelectasis. Blood culture from 9/24 and 9/27 were negative. UA was normal on 9/24. She had neg DVT On LE both legs. Her ABX was stopped 10/1/21. She has been undergoing NH3 lowering treatment with lactulose, she had FMS to manage the diarrhea. Spiked again to 102.2 on 10/2, WBC went up to 19. Started on meropenem. No cultures taken yet and WBC is down to 11K this am.  Has profuse secretion on resp cultures- clear to yellowish. Taken off vent after trach placed 2 days ago, she is holding her satuation in mid 90% with 6 L oxygen no change. She is awake, alert and responds to command. Indicates she hurts everywhere. She is being followed by Psych for better management of agiation. 101 on 9/27 and LGF until 102.2 today. Her WBC came down to 11.9 on 9/29. It is now up again to 19.2 today. No hypotension, not on pressor. HPI:  Alexis Pinzon is a 39 y.o. WHITE/NON- with PMH as listed below. Patient is intubated, limited history found on her and  ICU team.MD.    Admitted with drug overdose - Neurontin. Not much history known before that. On admission, afebrile, wbc of 8.1, CMP ok, drug screen for opaites/cocaine/benzo neg. UA was normal.  She was intubated 9/11/21.     Developed fever to 102.9 on 9/13, no leucocytosis but NH3 elevated, Legionella/Pneum urine ag good. NO DVT  On LE 9/15. Currently on VAnco/Zosyn and doxycycline. Further CORNEJO with CT for source work up in progress       C/Remy Smith 1106 Problems    Diagnosis Date Noted    Status post tracheostomy ([facility] 09/28/2021    Hypokalemia 09/21/2021    Increased ammonia level 09/14/2021    Psychosis (HCC)     Hyponatremia     Acute respiratory failure with hypoxia (HCC)     Left wrist fracture, with delayed healing, subsequent encounter 09/12/2021    Drug overdose, intentional self-harm, initial encounter ([facility] 09/12/2021    Hypoxia 09/12/2021    Hypotension after procedure 09/12/2021    Acute metabolic encephalopathy 39/61/2457     Past Medical History:   Diagnosis Date    Asthma     Bilateral ovarian cysts     Cocaine abuse ([facility]     Mental and behavioral problem     Pancreatitis     Pancreatitis     Psychosis ([facility]      Past Surgical History:   Procedure Laterality Date    HX GYN      D&C, Hysterectomy    IR INSERT GASTROSTOMY TUBE PERC  10/1/2021     History reviewed. No pertinent family history.   Social History     Socioeconomic History    Marital status:      Spouse name: Not on file    Number of children: Not on file    Years of education: Not on file    Highest education level: Not on file   Occupational History    Not on file   Tobacco Use    Smoking status: Current Every Day Smoker     Packs/day: 1.00    Smokeless tobacco: Never Used   Substance and Sexual Activity    Alcohol use: Not Currently    Drug use: Not Currently     Types: Cocaine, Marijuana     Comment: used with in past 24 hours    Sexual activity: Not Currently   Other Topics Concern    Not on file   Social History Narrative    Not on file     Social Determinants of Health     Financial Resource Strain:     Difficulty of Paying Living Expenses:    Food Insecurity:     Worried About Running Out of Food in the Last Year:     [address] in the Last Year:    Transportation Needs:     Lack of Transportation (Medical):  Lack of Transportation (Non-Medical):    Physical Activity:     Days of Exercise per Week:     Minutes of Exercise per Session:    Stress:     Feeling of Stress :    Social Connections:     Frequency of Communication with Friends and Family:     Frequency of Social Gatherings with Friends and Family:     Attends Scientologist Services:     Active Member of Clubs or Organizations:     Attends Club or Organization Meetings:     Marital Status:    Intimate Partner Violence:     Fear of Current or Ex-Partner:     Emotionally Abused:     Physically Abused:     Sexually Abused:         Allergies:  Fish containing products and Toradol [ketorolac]     Medications:  Current Facility-Administered Medications   Medication Dose Route Frequency    albumin human 25% (BUMINATE) solution 25 g  25 g IntraVENous Q6H    arformoteroL (BROVANA) neb solution 15 mcg  15 mcg Nebulization BID RT    budesonide (PULMICORT) 500 mcg/2 ml nebulizer suspension  500 mcg Nebulization BID RT    haloperidoL (HALDOL) tablet 5 mg  5 mg Oral BID    meropenem (MERREM) 1 g in sterile water (preservative free) 20 mL IV syringe  1 g IntraVENous Q12H    lactulose (CHRONULAC) 10 gram/15 mL solution 15 mL  15 mL Oral BID    lidocaine (XYLOCAINE) 10 mg/mL (1 %) injection 1-20 mL  1-20 mL SubCUTAneous RAD CONTINUOUS    melatonin (rapid dissolve) tablet 5 mg  5 mg Oral QHS    fentaNYL citrate (PF) injection 50 mcg  50 mcg IntraVENous Q2H PRN    albuterol-ipratropium (DUO-NEB) 2.5 MG-0.5 MG/3 ML  3 mL Nebulization Q4H PRN    dexmedeTOMidine (PRECEDEX) 400 mcg in 0.9% sodium chloride 100 mL infusion  0.1-1.5 mcg/kg/hr IntraVENous TITRATE    famotidine (PEPCID) tablet 20 mg  20 mg Oral BID    thiamine mononitrate (B-1) tablet 100 mg  100 mg Oral DAILY    folic acid (FOLVITE) tablet 1 mg  1 mg Oral DAILY    multivitamin, tx-iron-ca-min (THERA-M w/ IRON) tablet 1 Tablet  1 Tablet Oral DAILY    clonazePAM (KlonoPIN) tablet 0.5 mg  0.5 mg Oral BID    ibuprofen (ADVIL;MOTRIN) 100 mg/5 mL oral suspension 400 mg  400 mg Per NG tube Q6H PRN    insulin lispro (HUMALOG) injection   SubCUTAneous Q6H    glucose chewable tablet 16 g  4 Tablet Oral PRN    glucagon (GLUCAGEN) injection 1 mg  1 mg IntraMUSCular PRN    dextrose (D50W) injection syrg 12.5-25 g  25-50 mL IntraVENous PRN    LORazepam (ATIVAN) injection 2 mg  2 mg IntraVENous Q6H PRN    HYDROmorphone (PF) (DILAUDID) injection 1 mg  1 mg IntraVENous Q4H PRN    enoxaparin (LOVENOX) injection 40 mg  40 mg SubCUTAneous Q24H    sodium chloride (NS) flush 5-40 mL  5-40 mL IntraVENous Q8H    sodium chloride (NS) flush 5-40 mL  5-40 mL IntraVENous PRN    acetaminophen (TYLENOL) tablet 650 mg  650 mg Oral Q6H PRN    Or    acetaminophen (TYLENOL) suppository 650 mg  650 mg Rectal Q6H PRN    polyethylene glycol (MIRALAX) packet 17 g  17 g Oral DAILY PRN    ondansetron (ZOFRAN ODT) tablet 4 mg  4 mg Oral Q8H PRN    Or    ondansetron (ZOFRAN) injection 4 mg  4 mg IntraVENous Q6H PRN    chlorhexidine (PERIDEX) 0.12 % mouthwash 10 mL  10 mL Oral Q12H    ELECTROLYTE REPLACEMENT PROTOCOL - Potassium Standard Dosing   1 Each Other PRN    ELECTROLYTE REPLACEMENT PROTOCOL - Magnesium   1 Each Other PRN    ELECTROLYTE REPLACEMENT PROTOCOL - Phosphorus  Standard Dosing  1 Each Other PRN    ELECTROLYTE REPLACEMENT PROTOCOL - Calcium   1 Each Other PRN     Physical Exam:    Temp (24hrs), Av.1 °F (37.8 °C), Min:98.9 °F (37.2 °C), Max:102.2 °F (39 °C)    Visit Vitals  BP (!) 97/52   Pulse 90   Temp 100.2 °F (37.9 °C)   Resp 24   Ht 5' 2\" (1.575 m)   Wt 71.2 kg (157 lb)   LMP 05/15/2018   SpO2 96%   BMI 28.72 kg/m²      GEN: WD acutely sick looking, on 6L of oxygen via trach    HEENT: Unicteric, EOMI  Neck: trach with secretions - yellowish  CHEST: Non laboured breathing. B/L rhonch  CVS:RRR, no mur/gallop  ABD: Obese/soft. Non tender.  PEG in place with TF. FMS with loose OP noted  ULISES: Barahona in place  EXT: No apparent swelling or redness on UE/LE joints. No induration on PIV site on both arms  Skin: Dry and intact. No rash, no redness. CNS: Restrained, moves all extremities. Awake, follows command but seems restless     Microbiology  All Micro Results     Procedure Component Value Units Date/Time    CULTURE, RESPIRATORY/SPUTUM/BRONCH Tripp Teixeira [[id number]]     Order Status: Sent Specimen: Sputum,ET Suction     CULTURE, BLOOD [[id number]] Collected: 09/27/21 1005    Order Status: Completed Specimen: Blood Updated: 10/03/21 0723     Special Requests: NO SPECIAL REQUESTS        Culture result: NO GROWTH 6 DAYS       CULTURE, BLOOD [[id number]] Collected: 09/27/21 1005    Order Status: Completed Specimen: Blood Updated: 10/03/21 0723     Special Requests: NO SPECIAL REQUESTS        Culture result: NO GROWTH 6 DAYS       CULTURE, BLOOD [[id number]] Collected: 09/24/21 0740    Order Status: Completed Specimen: Blood Updated: 09/30/21 0819     Special Requests: NO SPECIAL REQUESTS        Culture result: NO GROWTH 6 DAYS       CULTURE, RESPIRATORY/SPUTUM/BRONCH W GRAM STAIN [[id number]]  (Abnormal)  (Susceptibility) Collected: 09/24/21 0654    Order Status: Completed Specimen: Sputum from Tracheal Aspirate Updated: 09/27/21 1147     Special Requests: NO SPECIAL REQUESTS        GRAM STAIN RARE WBCS SEEN               RARE EPITHELIAL CELLS SEEN            NO ORGANISMS SEEN        Culture result:       MODERATE STAPHYLOCOCCUS AUREUS                  NO NORMAL RESPIRATORY DINA ISOLATED          COVID-19 RAPID TEST [[id number]] Collected: 09/24/21 1700    Order Status: Completed Specimen: Nasopharyngeal Updated: 09/24/21 1849     Specimen source Nasopharyngeal        COVID-19 rapid test Not detected        Comment: Rapid Abbott ID Now       Rapid NAAT:  The specimen is NEGATIVE for SARS-CoV-2, the novel coronavirus associated with COVID-19.        Negative results should be treated as presumptive and, if inconsistent with clinical signs and symptoms or necessary for patient management, should be tested with an alternative molecular assay. Negative results do not preclude SARS-CoV-2 infection and should not be used as the sole basis for patient management decisions. This test has been authorized by the FDA under an Emergency Use Authorization (EUA) for use by authorized laboratories. Fact sheet for Healthcare Providers: ConventionLaunchGramdate.co.nz  Fact sheet for Patients: 21viaNetdate.co.nz       Methodology: Isothermal Nucleic Acid Amplification         CULTURE, URINE [[id number]]     Order Status: Canceled Specimen: Urine from Clean catch     CULTURE, CSF  TUBE 2 [[id number]] Collected: 09/16/21 1434    Order Status: Completed Specimen: Cerebrospinal Fluid Updated: 09/23/21 1241     Special Requests: TUBE 3, CULTURE AND SENSITIVTY AND GRAM STAIN.      GRAM STAIN RARE WBCS SEEN         NO ORGANISMS SEEN        Culture result: NO GROWTH 7 DAYS       CULTURE, BLOOD [[id number]] Collected: 09/14/21 0515    Order Status: Completed Specimen: Blood Updated: 09/20/21 0832     Special Requests: NO SPECIAL REQUESTS        Culture result: NO GROWTH 6 DAYS       CULTURE, BLOOD [[id number]] Collected: 09/14/21 0500    Order Status: Completed Specimen: Blood Updated: 09/20/21 0832     Special Requests: NO SPECIAL REQUESTS        Culture result: NO GROWTH 6 DAYS       MENINGITIS PATHOGENS PANEL, CSF (BY PCR) [[id number]] Collected: 09/16/21 1434    Order Status: Completed Specimen: Cerebrospinal Fluid Updated: 09/17/21 0050     Escherichia coli K1 Not detected        Haemophilus Influenzae Not detected        Listeria Monocytogenes Not detected        Neisseria Meningitidis Not detected        Streptococcus Agalactiae Not detected        Streptococcus Pneumoniae Not detected        Cytomegalovirus Not detected        Enterovirus Not detected        Herpes Simplex Virus 1 Not detected        Comment: In patients who have negative herpes simplex 1 and 2 PCR results, do not modify treatment, confirm with alternate testing. Herpes Simplex Virus 2 Not detected        Comment: In patients who have negative herpes simplex 1 and 2 PCR results, do not modify treatment, confirm with alternate testing. Human Herpesvirus 6 Not detected        Human Parechovirus Not detected        Varicella Zoster Virus Not detected        Crypto. neoformans/gattii Not detected       MENINGITIS PATHOGENS PANEL, CSF (BY PCR) [[id number]] Collected: 09/16/21 1545    Order Status: Canceled Specimen: Cerebrospinal Fluid     HSV 1 AND 2 BY PCR [[id number]] Collected: 09/16/21 1434    Order Status: Canceled Specimen: Other     RMGJY-33 RAPID TEST [[id number]] Collected: 09/16/21 1000    Order Status: Completed Specimen: Nasopharyngeal Updated: 09/16/21 1032     Specimen source Nasopharyngeal        COVID-19 rapid test Not detected        Comment: Rapid Abbott ID Now       Rapid NAAT:  The specimen is NEGATIVE for SARS-CoV-2, the novel coronavirus associated with COVID-19. Negative results should be treated as presumptive and, if inconsistent with clinical signs and symptoms or necessary for patient management, should be tested with an alternative molecular assay. Negative results do not preclude SARS-CoV-2 infection and should not be used as the sole basis for patient management decisions. This test has been authorized by the FDA under an Emergency Use Authorization (EUA) for use by authorized laboratories.    Fact sheet for Healthcare Providers: ConventionUpdate.co.nz  Fact sheet for Patients: ConventionUpdate.co.nz       Methodology: Isothermal Nucleic Acid Amplification         LEGIONELLA PNEUMOPHILA AG, URINE [[id number]] Collected: 09/14/21 2315    Order Status: Completed Specimen: Urine, random Updated: 09/15/21 1042     Legionella Ag, urine Negative Nate Mcmanus, URINE [[id number]] Collected: 09/14/21 2315    Order Status: Completed Specimen: Urine, random Updated: 09/15/21 1042     Strep pneumo Ag, urine Negative       RESPIRATORY VIRUS PANEL W/COVID-19, PCR [[id number]] Collected: 09/14/21 1832    Order Status: Completed Specimen: Nasopharyngeal Updated: 09/14/21 2234     Adenovirus Not detected        Coronavirus 229E Not detected        Coronavirus HKU1 Not detected        Coronavirus CVNL63 Not detected        Coronavirus OC43 Not detected        SARS-CoV-2, PCR Not detected        Metapneumovirus Not detected        Rhinovirus and Enterovirus Not detected        Influenza A Not detected        Influenza A, subtype H1 Not detected        Influenza A, subtype H3 Not detected        INFLUENZA A H1N1 PCR Not detected        Influenza B Not detected        Parainfluenza 1 Not detected        Parainfluenza 2 Not detected        Parainfluenza 3 Not detected        Parainfluenza virus 4 Not detected        RSV by PCR Not detected        B. parapertussis, PCR Not detected        Bordetella pertussis - PCR Not detected        Chlamydophila pneumoniae DNA, QL, PCR Not detected        Mycoplasma pneumoniae DNA, QL, PCR Not detected       CULTURE, BLOOD [[id number]] Collected: 09/14/21 1700    Order Status: Canceled Specimen: Blood     CULTURE, URINE [[id number]] Collected: 09/13/21 2330    Order Status: Canceled Specimen: Cath Urine     RESPIRATORY VIRUS PANEL W/COVID-19, PCR [[id number]]     Order Status: Canceled Specimen: NASOPHARYNGEAL SWAB     COVID-19 RAPID TEST [[id number]]     Order Status: Canceled     COVID-19 RAPID TEST [[id number]] Collected: 09/13/21 0737    Order Status: Completed Specimen: Nasopharyngeal Updated: 09/13/21 0811     Specimen source Nasopharyngeal        COVID-19 rapid test Not detected        Comment: Rapid Abbott ID Now       Rapid NAAT:  The specimen is NEGATIVE for SARS-CoV-2, the novel coronavirus associated with COVID-19. Negative results should be treated as presumptive and, if inconsistent with clinical signs and symptoms or necessary for patient management, should be tested with an alternative molecular assay. Negative results do not preclude SARS-CoV-2 infection and should not be used as the sole basis for patient management decisions. This test has been authorized by the FDA under an Emergency Use Authorization (EUA) for use by authorized laboratories. Fact sheet for Healthcare Providers: ConventionMedprexdate.co.nz  Fact sheet for Patients: AdoTube.co.nz       Methodology: Isothermal Nucleic Acid Amplification         COVID-19 RAPID TEST [[id number]]     Order Status: Canceled            Lab results:    Chemistry  Recent Labs     10/03/21  0400 10/02/21  1345 10/02/21  0600   *  --  94     --  139   K 4.8 5.3 3.8     --  107   CO2 23  --  24   BUN 9  --  8   CREA 0.36*  --  0.29*   CA 9.1  --  8.5   AGAP 9  --  8   BUCR 25*  --  28*   *  --  222*   TP 6.4  --  6.1*   ALB 2.8*  --  2.6*   GLOB 3.6  --  3.5   AGRAT 0.8  --  0.7*       CBC w/ Diff  Recent Labs     10/03/21  0630 10/02/21  0600   WBC 19.2* 13.9*   RBC 3.12* 3.19*   HGB 9.2* 9.7*   HCT 28.4* 29.0*   * 529*   GRANS 82* 77*   LYMPH 11* 15*   EOS 1 1       Imaging: report posted below as per radiologist   CXR- 9/17    1. Unchanged, adequately positioned endotracheal tube and enteric tube. 2. Unchanged left and right basilar patchy opacities, atelectasis versus  Infiltrate. 10/4 CXR  Ongoing increased left greater than right perihilar and left basilar opacities  may represent combination of worsening asymmetric pulmonary edema and infiltrate  with atelectasis. Possible trace left pleural effusion.

## 2021-10-04 NOTE — PROGRESS NOTES
1900- shift change report given to JAIDEN Lyle (oncoming nurse) by Rose Kohler (offgoing nurse). Report included the following information SBAR, Kardex, ED Summary, Intake/Output, MAR, Recent Results, Med Rec Status and Cardiac Rhythm NSR.     2000- Shift assessment completed. Pt lightly sedated on precedex and trached. [place] current LOC but follows commands and reports pain. Oral, long, trach, and external catheter care performed. Adult nonverbal scale shows 7/10 pain. Fentanyl 50mcg IV push given. Will continue to monitor. 2100- Pt still nodding to pain and is 7/10 on the adult nonverbal scale. Dilaudid 1mg IV push given. Will continue to monitor. 0000- Reassessment completed. No changes from previous assessment. 0158- Pt nods to having pain and appears to be a 7/10 on the adult nonverbal scale. Fentanyl 50mcg IV push given. Will continue to monitor. 0400- Reassessment completed. No changes from previous assessment. 0510- Pt nods to having pain and appears to be a 7/10 on the adult nonverbal scale. Dilaudid 1mg IV push given. Will continue to monitor. 0700- shift change report given to JACQUI Huertas (oncoming nurse) by JAIDEN Lyle (offgoing nurse). Report included the following information SBAR, Kardex, ED Summary, Intake/Output, MAR, Recent Results, Med Rec Status and Cardiac Rhythm NSR.

## 2021-10-04 NOTE — PROGRESS NOTES
Hospitalist Progress Note-critical care note     Patient: Willow Bender MRN: [id number]  [facility]: [id number]    YOB: 1980  Age: 39 y.o. Sex: female    DOA: 9/11/2021 LOS:  LOS: 22 days            Chief complaint: wrist fracture , drug overdose , acute respiratory failure with hypoxia, psychosis     Assessment/Plan         Hospital Problems  Date Reviewed: 9/6/2015        Codes Class Noted POA    Status post tracheostomy ([facility] ICD-10-CM: Z93.0  ICD-9-CM: V44.0  9/28/2021 Unknown        Hypokalemia ICD-10-CM: E87.6  ICD-9-CM: 276.8  9/21/2021 Unknown        Increased ammonia level ICD-10-CM: R79.89  ICD-9-CM: 790.6  9/14/2021 Unknown        Psychosis ([facility] ICD-10-CM: F29  ICD-9-CM: 298.9  Unknown Unknown        Hyponatremia ICD-10-CM: E87.1  ICD-9-CM: 276.1  Unknown Yes        Acute respiratory failure with hypoxia ([facility] ICD-10-CM: J96.01  ICD-9-CM: 518.81  Unknown Yes        Left wrist fracture, with delayed healing, subsequent encounter ICD-10-CM: S62.102G  ICD-9-CM: V54.19  9/12/2021 Unknown        * (Principal) Drug overdose, intentional self-harm, initial encounter ([facility] ICD-10-CM: T50.902A  ICD-9-CM: 977.9, E950.5  9/12/2021 Yes        Hypoxia ICD-10-CM: R09.02  ICD-9-CM: 799.02  9/12/2021 Yes        Hypotension after procedure ICD-10-CM: I95.81  ICD-9-CM: 458.29  9/12/2021 Yes        Acute metabolic encephalopathy CFD-89-SP: G93.41  ICD-9-CM: 348.31  9/12/2021 Yes                  Willow Bender is a 39 y.o. female who is standing history of multidrug use/abuse, previous drug-seeking behavior and mental health issues otherwise unspecified arrives via EMS with acute metabolic encephalopathy and lethargy. Admitted for likely gabapentin overdose. She was intubated in ER, ct head no acute issue, LP done due to fever even on abx, no meningitis noted. Now she is peg and trach          Acute respiratory failure with hypoxia   Intubated on 9/12    Trach on 9/20/21.    So far stable on trach collar for two days   Continue breathing tx . Post tracheostomy   Continue local trach care     Fever and leukocytosis   ID on board  Leukocytosis better today   bcx obtained yesterday   Sputum culture 9/24/2021: MSSA. Colonization per id   On merrem       Anemia  H/h drop  Will continue monitoring, so far no bleeding reported   Occult stool negative    no bleeding reported   Upper PVL negative for dvt           Acute metabolic encephalopathy   pecedex   Open eyes per voice stimuli and follow  commands   Ct head no acute process         Gabapentin overdose with lethargy and AMS    S/p procedural stomach emptying in ED with charcoal and sorbitol   Continue monitoring the side affect   Psych f/u          elevated ammonia level  Continue   Lactulose,   Continue ammonia monitoring       Psychosis , hx of  Ekbom's delusional parasitosis   Need psych evaluation later   On  klonopin abd haldol -per psych recommendation     left wrist fracture: immobilized -poa   Appreciated ortho on board-f/u with Dr. Tonya Tran as out-pt     Speech follow up        rn more calm now. Loss iv access     Disposition :tbd,   Review of systems:  Unable to obtain due to trach   Vital signs/Intake and Output:  Visit Vitals  BP (!) 97/52   Pulse 90   Temp 99.3 °F (37.4 °C)   Resp 24   Ht 5' 2\" (1.575 m)   Wt 71.2 kg (157 lb)   SpO2 96%   BMI 28.72 kg/m²     Current Shift:  10/04 0701 - 10/04 1900  In: -   Out: 650 [Urine:650]  Last three shifts:  10/02 1901 - 10/04 0700  In: 3208.7 [I.V.:1268.7]  Out: 2950 [Urine:2150; Drains:800]    Physical Exam:  General: Open eyes per voice   HEENT: NC, Atraumatic. Trach collar noted   Lungs: CTA Bilaterally. No Wheezing/Rhonchi/Rales. Heart:  rrr ,  No murmur, No Rubs, No Gallops  Abdomen: Soft, Non distended, Non tender. +Bowel sounds, peg tube noted   Extremities: No c/c.immobilzer noted on left wrist   Psych:   Calm   Neurologic:  Open eyes per voice.  Limited due to On sedation           Labs: Results: Chemistry Recent Labs     10/04/21  0920 10/03/21  0400 10/02/21  1345 10/02/21  0600 10/02/21  0600   * 107*  --   --  94    138  --   --  139   K 3.7 4.8 5.3   < > 3.8    106  --   --  107   CO2 26 23  --   --  24   BUN 9 9  --   --  8   CREA 0.33* 0.36*  --   --  0.29*   CA 8.9 9.1  --   --  8.5   AGAP 7 9  --   --  8   BUCR 27* 25*  --   --  28*   * 220*  --   --  222*   TP 6.4 6.4  --   --  6.1*   ALB 3.6 2.8*  --   --  2.6*   GLOB 2.8 3.6  --   --  3.5   AGRAT 1.3 0.8  --   --  0.7*    < > = values in this interval not displayed. CBC w/Diff Recent Labs     10/04/21  0920 10/03/21  0630 10/02/21  0600   WBC 11.1 19.2* 13.9*   RBC 2.42* 3.12* 3.19*   HGB 7.0* 9.2* 9.7*   HCT 21.5* 28.4* 29.0*    450* 529*   GRANS 76* 82* 77*   LYMPH 16* 11* 15*   EOS 1 1 1      Cardiac Enzymes No results for input(s): CPK, CKND1, ZENON in the last 72 hours. No lab exists for component: CKRMB, TROIP   Coagulation No results for input(s): PTP, INR, APTT, INREXT, INREXT in the last 72 hours. Lipid Panel No results found for: CHOL, CHOLPOCT, CHOLX, CHLST, CHOLV, 546000, HDL, HDLP, LDL, LDLC, DLDLP, 022690, VLDLC, VLDL, TGLX, TRIGL, TRIGP, TGLPOCT, CHHD, CHHDX   BNP No results for input(s): BNPP in the last 72 hours.    Liver Enzymes Recent Labs     10/04/21  0920   TP 6.4   ALB 3.6   *      Thyroid Studies No results found for: T4, T3U, TSH, TSHEXT, TSHEXT     Procedures/imaging: see electronic medical records for all procedures/Xrays and details which were not copied into this note but were reviewed prior to creation of Plan    XR WRIST LT AP/LAT/OBL MIN 3V    Result Date: 8/19/2021  EXAM: XR WRIST LT AP/LAT/OBL MIN 3V CLINICAL INDICATION/HISTORY: Fall with LEFT wrist pain and swelling -Additional: None COMPARISON: None TECHNIQUE: 3 views of the left wrist _______________ FINDINGS: BONES: Comminuted, impacted fracture of the distal radius with slight dorsal angulation of the distal fracture fragment. No aggressive appearing osseous lytic or blastic lesion. SOFT TISSUES: Unremarkable. _______________     Comminuted, impacted fracture of the distal radius. XR CHEST PORT    Result Date: 9/13/2021  EXAM: XR CHEST PORT CLINICAL INDICATION/HISTORY: Acute respiratory failure, ET tub position -Additional: None COMPARISON: One day prior TECHNIQUE: Portable frontal view of the chest _______________ FINDINGS: SUPPORT DEVICES: Endotracheal and enteric tubes unchanged. HEART AND MEDIASTINUM: Cardiomediastinal silhouette within normal limits. LUNGS AND PLEURAL SPACES: No dense consolidation, large effusion or pneumothorax. _______________     No acute cardiopulmonary abnormality. XR CHEST PORT    Result Date: 9/11/2021  MEDICAL RECORDS NUMBER: [id number] PROCEDURE:  Single view of the chest DATE: 9/11/2021 9:09 PM HISTORY: 39years old Female. post ett Comparison: 8/4/2021 FINDINGS: The patient has been intubated with appropriate placement of the endotracheal tube. There is no enteric tube passing below the level of the diaphragm. There is no significant effusion. There is no significant pneumothorax. Cardiomediastinal silhouette is within normal limits. There is no evidence of a focal pulmonary infiltrate or mass. 1. There is no significant or acute cardiopulmonary process. The patient has been intubated with appropriate placement of the endotracheal tube. There is no enteric tube passing below the level of the diaphragm. This report has been generated using voice recognition software. XR ABD PORT  1 V    Result Date: 9/12/2021  EXAM: Frontal view of the abdomen CLINICAL INDICATION/HISTORY: NG tube placement COMPARISON: None. _______________ FINDINGS: NG/OG tube in place with the tip in the left upper quadrant in the region of the gastric fundus. No bowel obstruction. Moderate colonic stool burden. _______________     1.  NG/OG tube is in good position with the tip in the left upper quadrant in the region of the gastric fundus. 2. Moderate colonic stool burden.       Mike Saunders MD

## 2021-10-04 NOTE — PROGRESS NOTES
Patient is more alert than when I last saw her. She is frustrated in trying to communicate. Patient's  call in and I got the phone to her ear after she said she wanted to hear from him. He understood that she could not respond. Patient began silently crying while he spoke to her. [address], M.Div.   Board Certified   [phone] - Office

## 2021-10-04 NOTE — PROGRESS NOTES
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    Left wrist fracture, with delayed healing, subsequent encounter S62.102G    Drug overdose, intentional self-harm, initial encounter ([address] T50.902A    Hypoxia R09.02    Hypotension after procedure I95.81    Acute metabolic encephalopathy J87.14    Psychosis (HCC) F29    Hyponatremia E87.1    Acute respiratory failure with hypoxia (HCC) J96.01    Increased ammonia level R79.89    Hypokalemia E87.6    Status post tracheostomy ([facility] [address] Z93.0 ·   Code status: full code     RECOMMENDATIONS:   Respiratory: Patient on ventilator support; intubated 2021 due to encephalopathy and drug overdose. Patient was extubated on 2021, and reintubated within an hour due to respiratory distress and upper airway edema. S/p tracheostomy 2021  X-ray chest from 10/4/2021 reviewed  Tolerating trach collar 30% without any increased work of breathing  Patient off ventilator for > 24 hrs on T-collar - tolerating well moderate trach secretions per staff  Continue bronchodilator with Brovana and Pulmicort twice a day, DuoNeb as needed  Continue ventilator and sedation bundles.   On 10/2/2021 - off propofol on SBT on precedex weaning to off   Sedationprecedex   Prn lorazepam, Dilaudid and due to psych issues given Haldol - tolerated well monitor QTC    ID: WBC elevated with fever; will request ID evaluation  Large to moderate tracheal secretions - will check for sputum cx  CXR LT > RT infiltrates on XR chest  Antibiotics  meropenem on 10/3/2021   Procalcitonin - ordered  In the past was on Vanco and Zosyn? Drug fever? S/p LP with negative CSF cultures. Covid test 9/16/2021 and 9/24/2021: Negative. Sputum culture 9/24/2021: MSSA. Blood culture 9/27/21 and 9/24/2021: NGT final  Barahona catheter changed on 9/24/2021. Before used - Levofloxacin course was complete and was on Vanco and Zosyn     CVS: await EKG from AM  Stable hemodynamics; telemetry  sinus rhythm; blood pressure stable; continue to monitor. Echo 9/17/2021: LVEF 60 to 65%. RVSP 29 mmHg. Ultrasound leg 9/15/21  negative study for DVTs. Renal: Normal renal function, and good urine output; continue to monitor and replace electrolytes as needed. Heme: stable anemiaprolonged ICU stay and frequent blood draws; no active bleeding issues; platelets normal.  Stool occult blood 9/28negative. Endo: Stable blood sugars; continue to monitor; sliding scale insulin as needed. GI: Patient tolerating tube feeding PEG tube placement 10/1/21  hCG negative on admission. Ammonia fluctuates  improving; lactulose therapy daily. Transaminases coming down; hepatitis virus panelnegative study. CT abdomen 9/15/21  Liver is unremarkable. No abnormal biliary dilation. Gallbladder is unremarkable. Nutrition:peg feeding jevity  Patient on thiamine and folic acid. Neurology: Patient confused and delirious agitated; known patient with psych issues and drug overdose on presentation during this admission; continue to avoid continuous sedation with benzodiazepines  Appreciate psych evaluation  CT headnil acute  Psych: On Klonopin, Haldol, IV Precedex  May restart Seroquel if no favorable response to Haldol  Has as needed fentanyl, Dilaudid, Ativan  Toxicology: Gabapentin OD on admission. Skin: ICU nursing care  MS: Left wrist fracture in immobilization external fixator; patient has been seen by orthopedics during this admission. Prophylaxis: DVT prophylaxis: Lovenox 40 sc kamini. GI prophylaxis: Famotidine.   Restraints: Wrist soft restraints as needed for patient interfering with medical therapy/management and patient safety. Consulted PT, OT and SLP teamsappreciate input. Prognosis guarded overall. CODE STATUSfull code. Palliative care on case. Quality Care: PPI, DVT prophylaxis, HOB elevated, Infection control all reviewed and addressed. Lines/Tubes: PIV   S/p ETT: 9/11/219/22/2021. Tracheostomy 9/22/2021. Status post PEG on 10/1/2021   Barahona: 9/11/21; changed 9/24/2021. Discussed with RNunable to discontinue Barahona catheter due to ventilator support, agitated and delirious state; patient will not be able to tolerate external Barahona catheter. ADVANCE DIRECTIVE: Full code. Discussed with RN, RT, ICU staff  High complexity decision making was performed during the evaluation of this patient at high risk for decompensation with multiple organ involvement. Critical care minute excluding any family discussion or procedure: 39 minutes    Steve Section   sister-in-law   [phone]      updated on 10/2/2021 by Dr. Trevor Ga       Subjective/History:   Ms. Lito Mo has been seen and evaluated as Dr. Chandana Vidal requested now for assisting with Acute respiratory failure and ventilator management. Patient is a 39 y.o. female with following PMhx presented to ER with lethargy via EMS and admitted for Gabapentin overdose. Pt required intubation for airways protection. Position control was contacted that recommended supportive care per ER provider. Pt seen at bedside in ER rm#2. The patient can not provide additional history due to Ventilated. 10/4/2021  Patient seen in ICU room #102 at bedside  Remains on trach collar for now 24+ hours and tolerating it well  Remains on Haldol, Precedex; psychiatry saw the patient yesterday  Hemodynamically stable; no evidence of arrhythmia.  QTc on monitor 403 ms  Episodes of fever; elevated WBC  Stable trach secretions  Tolerating Merrem  Urine output good; stable stool output in the FMS  Tolerating tube feeding without major residuals  [facility] was not contacted by staff on anything about patient overnight. Patient has a history of drug use and smoker and alcohol use. Review of Systems:  Review of systems not obtained due to patient factors.         Past Medical History:  Past Medical History:   Diagnosis Date    Asthma     Bilateral ovarian cysts     Cocaine abuse ([facility] [address]     Mental and behavioral problem     Pancreatitis     Pancreatitis     Psychosis ([facility] [address]         Past Surgical History:  Past Surgical History:   Procedure Laterality Date    HX GYN      D&C, Hysterectomy    IR INSERT GASTROSTOMY TUBE PERC  10/1/2021        Medications:    Current Facility-Administered Medications   Medication Dose Route Frequency    albumin human 25% (BUMINATE) solution 25 g  25 g IntraVENous Q6H    arformoteroL (BROVANA) neb solution 15 mcg  15 mcg Nebulization BID RT    budesonide (PULMICORT) 500 mcg/2 ml nebulizer suspension  500 mcg Nebulization BID RT    0.9% sodium chloride infusion  50 mL/hr IntraVENous CONTINUOUS    haloperidoL (HALDOL) tablet 5 mg  5 mg Oral BID    meropenem (MERREM) 1 g in sterile water (preservative free) 20 mL IV syringe  1 g IntraVENous Q12H    lactulose (CHRONULAC) 10 gram/15 mL solution 15 mL  15 mL Oral BID    lidocaine (XYLOCAINE) 10 mg/mL (1 %) injection 1-20 mL  1-20 mL SubCUTAneous RAD CONTINUOUS    melatonin (rapid dissolve) tablet 5 mg  5 mg Oral QHS    dexmedeTOMidine (PRECEDEX) 400 mcg in 0.9% sodium chloride 100 mL infusion  0.1-1.5 mcg/kg/hr IntraVENous TITRATE    famotidine (PEPCID) tablet 20 mg  20 mg Oral BID    thiamine mononitrate (B-1) tablet 100 mg  100 mg Oral DAILY    folic acid (FOLVITE) tablet 1 mg  1 mg Oral DAILY    multivitamin, tx-iron-ca-min (THERA-M w/ IRON) tablet 1 Tablet  1 Tablet Oral DAILY    clonazePAM (KlonoPIN) tablet 0.5 mg  0.5 mg Oral BID    insulin lispro (HUMALOG) injection   SubCUTAneous Q6H    enoxaparin (LOVENOX) injection 40 mg  40 mg SubCUTAneous Q24H    sodium chloride (NS) flush 5-40 mL  5-40 mL IntraVENous Q8H    chlorhexidine (PERIDEX) 0.12 % mouthwash 10 mL  10 mL Oral Q12H       Allergy:  Allergies   Allergen Reactions    Fish Containing Products Itching    Toradol [Ketorolac] Rash     Pt reports she is not allergic to this medication. Social History:  Social History     Tobacco Use    Smoking status: Current Every Day Smoker     Packs/day: 1.00    Smokeless tobacco: Never Used   Substance Use Topics    Alcohol use: Not Currently    Drug use: Not Currently     Types: Cocaine, Marijuana     Comment: used with in past 24 hours          Objective:   Vital Signs:    Blood pressure (!) 97/52, pulse 90, temperature 100.2 °F (37.9 °C), resp. rate 24, height 5' 2\" (1.575 m), weight 71.2 kg (157 lb), last menstrual period 05/15/2018, SpO2 96 %. Body mass index is 28.72 kg/m². O2 Device: Tracheal collar   O2 Flow Rate (L/min): 8 l/min   Temp (24hrs), Av.1 °F (37.8 °C), Min:98.9 °F (37.2 °C), Max:102.2 °F (39 °C)         Intake/Output:   Last shift:      No intake/output data recorded.   Last 3 shifts: 10/02 1901 - 10/04 0700  In: 3208.7 [I.V.:1268.7]  Out: 2950 [Urine:2150; Drains:800]    Intake/Output Summary (Last 24 hours) at 10/4/2021 0832  Last data filed at 10/4/2021 5161  Gross per 24 hour   Intake 1797.98 ml   Output 2250 ml   Net -452.02 ml       ABG:    No results found for: PH, PHI, PCO2, PCO2I, PO2, PO2I, HCO3, HCO3I, FIO2, FIO2I  Ventilator Mode: Spontaneous  Respiratory Rate  Resp Rate Observed: 9  Back-Up Rate: 14  Insp Time (sec): 1.1 sec  I:E Ratio: 1:2.1  Ventilator Volumes  Vt Set (ml): 420 ml  Vt Exhaled (Machine Breath) (ml): 571 ml  Vt Spont (ml): 468 ml  Ve Observed (l/min): 10.8 l/min  Ventilator Pressures  Pressure Support (cm H2O): 7 cm H2O  PIP Observed (cm H2O): 18 cm H2O  Plateau Pressure (cm H2O): 13 cm H2O  MAP (cm H2O): 8.8  PEEP/VENT (cm H2O): 5 cm H20  Auto PEEP Observed (cm H2O): 0 cm H2O      Physical Exam:   General: in no respiratory distress and awake, anxious looking, does not follow commands, appears very older than stated age, on trach collar  HEENT: PERRLA, sclera nonicteric, oral mucosa dry  Neck: No abnormally enlarged lymph nodes or thyroid, supple; trach collar in place  Chest: normal  Lungs: normal air entry, few rhonchi scattered bilaterally, normal percussion anterior chest wall bilaterally, no tenderness/ rash  Heart: Regular rate and rhythm, S1S2 present or without murmur or extra heart sounds  Abdomen: non distended, bowel sounds normoactive, tympanic, abdomen is soft without significant tenderness, masses, organomegaly or guarding, rigidity, rebound  Extremity: negative for edema, cyanosis, clubbing; left wrist with external fixator in place; distal circulation intact with normal capillary refill bilateral  Neuro: Awake, anxious but calm, constantly shaking her legs with anxiety, does not follow commands, moves all extremities spontaneously, no involuntary movements, exam limitation  Skin: Skin color, texture, turgor fair      Data:     Recent Results (from the past 12 hour(s))   GLUCOSE, POC    Collection Time: 10/03/21 11:25 PM   Result Value Ref Range    Glucose (POC) 114 (H) 70 - 110 mg/dL   GLUCOSE, POC    Collection Time: 10/04/21  5:27 AM   Result Value Ref Range    Glucose (POC) 129 (H) 70 - 110 mg/dL   EKG, 12 LEAD, SUBSEQUENT    Collection Time: 10/04/21  5:30 AM   Result Value Ref Range    Ventricular Rate 97 BPM    Atrial Rate 97 BPM    P-R Interval 156 ms    QRS Duration 72 ms    Q-T Interval 342 ms    QTC Calculation (Bezet) 434 ms    Calculated P Axis 42 degrees    Calculated R Axis 14 degrees    Calculated T Axis 56 degrees    Diagnosis       Normal sinus rhythm  Nonspecific T wave abnormality  Abnormal ECG  When compared with ECG of 16-SEP-2021 15:40,  Nonspecific T wave abnormality now evident in Lateral leads               No results for input(s): FIO2I, IFO2, HCO3I, IHCO3, HCOPOC, PCO2I, PCOPOC, IPHI, PHI, PHPOC, PO2I, PO2POC in the last 72 hours. No lab exists for component: IPOC2    All Micro Results     Procedure Component Value Units Date/Time    CULTURE, BLOOD [[id number]] Collected: 09/27/21 1005    Order Status: Completed Specimen: Blood Updated: 10/03/21 0723     Special Requests: NO SPECIAL REQUESTS        Culture result: NO GROWTH 6 DAYS       CULTURE, BLOOD [[id number]] Collected: 09/27/21 1005    Order Status: Completed Specimen: Blood Updated: 10/03/21 0723     Special Requests: NO SPECIAL REQUESTS        Culture result: NO GROWTH 6 DAYS       CULTURE, BLOOD [[id number]] Collected: 09/24/21 0740    Order Status: Completed Specimen: Blood Updated: 09/30/21 0819     Special Requests: NO SPECIAL REQUESTS        Culture result: NO GROWTH 6 DAYS       CULTURE, RESPIRATORY/SPUTUM/BRONCH W GRAM STAIN [[id number]]  (Abnormal)  (Susceptibility) Collected: 09/24/21 0654    Order Status: Completed Specimen: Sputum from Tracheal Aspirate Updated: 09/27/21 1147     Special Requests: NO SPECIAL REQUESTS        GRAM STAIN RARE WBCS SEEN               RARE EPITHELIAL CELLS SEEN            NO ORGANISMS SEEN        Culture result:       MODERATE STAPHYLOCOCCUS AUREUS                  NO NORMAL RESPIRATORY DINA ISOLATED          COVID-19 RAPID TEST [[id number]] Collected: 09/24/21 1700    Order Status: Completed Specimen: Nasopharyngeal Updated: 09/24/21 1849     Specimen source Nasopharyngeal        COVID-19 rapid test Not detected        Comment: Rapid Abbott ID Now       Rapid NAAT:  The specimen is NEGATIVE for SARS-CoV-2, the novel coronavirus associated with COVID-19. Negative results should be treated as presumptive and, if inconsistent with clinical signs and symptoms or necessary for patient management, should be tested with an alternative molecular assay.   Negative results do not preclude SARS-CoV-2 infection and should not be used as the sole basis for patient management decisions. This test has been authorized by the FDA under an Emergency Use Authorization (EUA) for use by authorized laboratories. Fact sheet for Healthcare Providers: ConventionUpdate.co.nz  Fact sheet for Patients: ConventionUpdate.co.nz       Methodology: Isothermal Nucleic Acid Amplification         CULTURE, URINE [[id number]]     Order Status: Canceled Specimen: Urine from Clean catch     CULTURE, CSF  TUBE 2 [[id number]] Collected: 09/16/21 1434    Order Status: Completed Specimen: Cerebrospinal Fluid Updated: 09/23/21 1241     Special Requests: TUBE 3, CULTURE AND SENSITIVTY AND GRAM STAIN. GRAM STAIN RARE WBCS SEEN         NO ORGANISMS SEEN        Culture result: NO GROWTH 7 DAYS       CULTURE, BLOOD [[id number]] Collected: 09/14/21 0515    Order Status: Completed Specimen: Blood Updated: 09/20/21 0832     Special Requests: NO SPECIAL REQUESTS        Culture result: NO GROWTH 6 DAYS       CULTURE, BLOOD [[id number]] Collected: 09/14/21 0500    Order Status: Completed Specimen: Blood Updated: 09/20/21 0832     Special Requests: NO SPECIAL REQUESTS        Culture result: NO GROWTH 6 DAYS       MENINGITIS PATHOGENS PANEL, CSF (BY PCR) [[id number]] Collected: 09/16/21 1434    Order Status: Completed Specimen: Cerebrospinal Fluid Updated: 09/17/21 0050     Escherichia coli K1 Not detected        Haemophilus Influenzae Not detected        Listeria Monocytogenes Not detected        Neisseria Meningitidis Not detected        Streptococcus Agalactiae Not detected        Streptococcus Pneumoniae Not detected        Cytomegalovirus Not detected        Enterovirus Not detected        Herpes Simplex Virus 1 Not detected        Comment: In patients who have negative herpes simplex 1 and 2 PCR results, do not modify treatment, confirm with alternate testing. Herpes Simplex Virus 2 Not detected        Comment:  In patients who have negative herpes simplex 1 and 2 PCR results, do not modify treatment, confirm with alternate testing. Human Herpesvirus 6 Not detected        Human Parechovirus Not detected        Varicella Zoster Virus Not detected        Crypto. neoformans/gattii Not detected       MENINGITIS PATHOGENS PANEL, CSF (BY PCR) [[id number]] Collected: 09/16/21 1545    Order Status: Canceled Specimen: Cerebrospinal Fluid     HSV 1 AND 2 BY PCR [[id number]] Collected: 09/16/21 1434    Order Status: Canceled Specimen: Other     MYTKO-11 RAPID TEST [[id number]] Collected: 09/16/21 1000    Order Status: Completed Specimen: Nasopharyngeal Updated: 09/16/21 1032     Specimen source Nasopharyngeal        COVID-19 rapid test Not detected        Comment: Rapid Abbott ID Now       Rapid NAAT:  The specimen is NEGATIVE for SARS-CoV-2, the novel coronavirus associated with COVID-19. Negative results should be treated as presumptive and, if inconsistent with clinical signs and symptoms or necessary for patient management, should be tested with an alternative molecular assay. Negative results do not preclude SARS-CoV-2 infection and should not be used as the sole basis for patient management decisions. This test has been authorized by the FDA under an Emergency Use Authorization (EUA) for use by authorized laboratories.    Fact sheet for Healthcare Providers: ConventionUpdate.co.nz  Fact sheet for Patients: ConventionUpdate.co.nz       Methodology: Isothermal Nucleic Acid Amplification         LEGIONELLA PNEUMOPHILA AG, URINE [[id number]] Collected: 09/14/21 2315    Order Status: Completed Specimen: Urine, random Updated: 09/15/21 1042     Legionella Ag, urine Negative       STREP PNEUMO AG, URINE [[id number]] Collected: 09/14/21 2315    Order Status: Completed Specimen: Urine, random Updated: 09/15/21 1042     Strep pneumo Ag, urine Negative       RESPIRATORY VIRUS PANEL W/COVID-19, PCR [[id number]] Collected: 09/14/21 1832    Order Status: Completed Specimen: Nasopharyngeal Updated: 09/14/21 2234     Adenovirus Not detected        Coronavirus 229E Not detected        Coronavirus HKU1 Not detected        Coronavirus CVNL63 Not detected        Coronavirus OC43 Not detected        SARS-CoV-2, PCR Not detected        Metapneumovirus Not detected        Rhinovirus and Enterovirus Not detected        Influenza A Not detected        Influenza A, subtype H1 Not detected        Influenza A, subtype H3 Not detected        INFLUENZA A H1N1 PCR Not detected        Influenza B Not detected        Parainfluenza 1 Not detected        Parainfluenza 2 Not detected        Parainfluenza 3 Not detected        Parainfluenza virus 4 Not detected        RSV by PCR Not detected        B. parapertussis, PCR Not detected        Bordetella pertussis - PCR Not detected        Chlamydophila pneumoniae DNA, QL, PCR Not detected        Mycoplasma pneumoniae DNA, QL, PCR Not detected       CULTURE, BLOOD [[id number]] Collected: 09/14/21 1700    Order Status: Canceled Specimen: Blood     CULTURE, URINE [[id number]] Collected: 09/13/21 2330    Order Status: Canceled Specimen: Cath Urine     RESPIRATORY VIRUS PANEL W/COVID-19, PCR [[id number]]     Order Status: Canceled Specimen: NASOPHARYNGEAL SWAB     COVID-19 RAPID TEST [[id number]]     Order Status: Canceled     COVID-19 RAPID TEST [[id number]] Collected: 09/13/21 0737    Order Status: Completed Specimen: Nasopharyngeal Updated: 09/13/21 0811     Specimen source Nasopharyngeal        COVID-19 rapid test Not detected        Comment: Rapid Abbott ID Now       Rapid NAAT:  The specimen is NEGATIVE for SARS-CoV-2, the novel coronavirus associated with COVID-19. Negative results should be treated as presumptive and, if inconsistent with clinical signs and symptoms or necessary for patient management, should be tested with an alternative molecular assay.   Negative results do not preclude SARS-CoV-2 infection and should not be used as the sole basis for patient management decisions. This test has been authorized by the FDA under an Emergency Use Authorization (EUA) for use by authorized laboratories. Fact sheet for Healthcare Providers: ConventionUpdate.co.nz  Fact sheet for Patients: ConventionUpdate.co.nz       Methodology: Isothermal Nucleic Acid Amplification         COVID-19 RAPID TEST [[id number]]     Order Status: Canceled         Echo 9/17/2021:  Left Ventricle Normal cavity size, wall thickness and systolic function (ejection fraction normal). The estimated EF is 60 - 65%. There is inconclusive left ventricular diastolic function E/E' ratio = 7.08  Heart rate is over 100. Wall Scoring The left ventricular wall motion is normal.            Left Atrium Normal cavity size. Right Ventricle Normal cavity size and global systolic function. Assessment of RV function:   TAPSE = 27 mm. Right Atrium Normal cavity size. Interatrial Septum Interatrial septum not well visualized   Aortic Valve Aortic valve not well visualized. Trileaflet valve structure, no stenosis and no regurgitation. Mitral Valve No stenosis. Mitral valve non-specific thickening. Mild regurgitation. Tricuspid Valve Tricuspid valve not well visualized. No stenosis. Mild regurgitation. Pulmonic Valve Pulmonic valve not well visualized. No stenosis and no regurgitation. Aorta The aorta was not well visualized. Normal aortic root. Pulmonary Artery Pulmonary arteries not well visualized. Pulmonary arterial systolic pressure (PASP) is 29 mmHg. Pulmonary hypertension not suggested by Doppler findings. IVC/Hepatic Veins Mechanically ventilated; cannot use inferior caval vein diameter to estimate central venous pressure. Pericardium Normal pericardium and no evidence of pericardial effusion.          CT head 9/15/1021  IMPRESSION   No acute intracranial abnormality. CT chest, abdomen, pelvis 9/15/1021  IMPRESSION   Endotracheal tube tip in the lower thoracic trachea 1.5 cm above the dipak.    Bilateral lower lobar atelectasis, possible endobronchial lesion/mucous plug in  the left lower lobe bronchus.    No acute intra-abdominal abnormality. Imaging:  [x]I have personally reviewed the patients chest radiographs images and report    X-ray 9/30/1021  Results from Hospital Encounter encounter on 09/11/21    XR CHEST PORT    Narrative  EXAM: XR CHEST PORT    CLINICAL INDICATION/HISTORY: hypoxia  > Additional: None. COMPARISON: October 3, 2001    TECHNIQUE: Portable chest    _______________    FINDINGS:    SUPPORT DEVICES: Tracheostomy tube remains unchanged. HEART AND MEDIASTINUM: Heart size accentuated by portable technique. LUNGS AND PLEURAL SPACES: The lungs are underexpanded. Slightly increased hazy  left greater than right perihilar and left basilar opacities. No pneumothorax. BONY THORAX AND SOFT TISSUES: No acute osseous abnormality. _______________    Impression  Ongoing increased left greater than right perihilar and left basilar opacities  may represent combination of worsening asymmetric pulmonary edema and infiltrate  with atelectasis. Possible trace left pleural effusion. Please note: Voice-recognition software may have been used to generate this report, which may have resulted in some phonetic-based errors in grammar and contents. Even though attempts were made to correct all the mistakes, some may have been missed, and remained in the body of the document.       Sonal Guerra MD  10/4/2021

## 2021-10-04 NOTE — PROGRESS NOTES
Problem: Ventilator Management  Goal: *Adequate oxygenation and ventilation  Outcome: Progressing Towards Goal  Goal: *Patient maintains clear airway/free of aspiration  Outcome: Progressing Towards Goal  Goal: *Absence of infection signs and symptoms  Outcome: Progressing Towards Goal  Goal: *Normal spontaneous ventilation  Outcome: Progressing Towards Goal     Problem: Patient Education: Go to Patient Education Activity  Goal: Patient/Family Education  Outcome: Progressing Towards Goal     Problem: Non-Violent Restraints  Goal: Removal from restraints as soon as assessed to be safe  Outcome: Progressing Towards Goal  Goal: No harm/injury to patient while restraints in use  Outcome: Progressing Towards Goal  Goal: Patient's dignity will be maintained  Outcome: Progressing Towards Goal  Goal: Patient Interventions  Outcome: Progressing Towards Goal     Problem: Falls - Risk of  Goal: *Absence of Falls  Description: Document [place] Reason Fall Risk and appropriate interventions in the flowsheet.   Outcome: Progressing Towards Goal  Note: Fall Risk Interventions:       Mentation Interventions: Adequate sleep, hydration, pain control, Bed/chair exit alarm, Door open when patient unattended, More frequent rounding, Reorient patient, Room close to nurse's station, Toileting rounds    Medication Interventions: Bed/chair exit alarm, Evaluate medications/consider consulting pharmacy    Elimination Interventions: Bed/chair exit alarm, Toileting schedule/hourly rounds    History of Falls Interventions: Bed/chair exit alarm, Door open when patient unattended, Evaluate medications/consider consulting pharmacy     Problem: Patient Education: Go to Patient Education Activity  Goal: Patient/Family Education  Outcome: Progressing Towards Goal     Problem: Risk for Spread of Infection  Goal: Prevent transmission of infectious organism to others  Description: Prevent the transmission of infectious organisms to other patients, staff members, and visitors. Outcome: Progressing Towards Goal     Problem: Patient Education:  Go to Education Activity  Goal: Patient/Family Education  Outcome: Progressing Towards Goal     Problem: Pressure Injury - Risk of  Goal: *Prevention of pressure injury  Description: Document Remy Scale and appropriate interventions in the flowsheet. Outcome: Progressing Towards Goal  Note: Pressure Injury Interventions:  Sensory Interventions: Assess changes in LOC, Float heels, Keep linens dry and wrinkle-free, Minimize linen layers, Pressure redistribution bed/mattress (bed type), Turn and reposition approx. every two hours (pillows and wedges if needed)    Moisture Interventions: Absorbent underpads, Internal/External fecal devices, Internal/External urinary devices, Minimize layers    Activity Interventions: Pressure redistribution bed/mattress(bed type)    Mobility Interventions: HOB 30 degrees or less, Pressure redistribution bed/mattress (bed type), Turn and reposition approx.  every two hours(pillow and wedges)    Nutrition Interventions: Document food/fluid/supplement intake    Friction and Shear Interventions: HOB 30 degrees or less, Minimize layers       Problem: Patient Education: Go to Patient Education Activity  Goal: Patient/Family Education  Outcome: Progressing Towards Goal     Yasmeen Dennison RN

## 2021-10-04 NOTE — PROGRESS NOTES
Called the sister-in-law's listed number and spoke with her  Offered option if  prefers to arrange any phone updates to let us know his availability and I would try to be present during his call.  Ms. Usman Li verbalized an understanding  We will continue with regular update follow-ups as time permits    Rosalinda Busby MD

## 2021-10-04 NOTE — PROGRESS NOTES
Has not been seen since 2017. Will need office visit.  Please schedule   Nutrition Assessment     Type and Reason for Visit: Reassess    Nutrition Recommendations/Plan: Continue w/ TF to goal rate to meet nutritional needs. Recc re-weighting patient. Last weight noted was 9/17.     10/04/21 0839   Enteral Nutrition   Feeding Route PEG   EN Formula Standard with fiber   Schedule Continuous   Feeding Regimen Jevity 1.5 @ 45ml/hr   Water Flushes 250ml Q4   Goal EN & Flush Order Provides Jevity 1.5 @ 45ml + water flushes provides 1485kcals, 63g PRO, 214g CHO, and 2252ml free water. Nutrition Assessment:  Pt admit for Gabapentin overdose, pt remains in ICU- s/p trach, s/p PEG. TF restarted- currently Jevity 1.5 @ 40ml/hr (goal rate is 45ml/hr). 0ml of gastric residuals- Seems to be tolerating. Estimated Daily Nutrient Needs:  Energy (kcal):  1524  Protein (g):  71       Fluid (ml/day):  1524    Nutrition Related Findings:  Labs reviewed. Med: thiamine, mvi, melatonin, lactulose, humalog, folic acid, pepcid. +BM 10/3. Last weight noted was 9/17/21 ([id number]). Current Nutrition Therapies:  ADULT TUBE FEEDING Nasogastric; Standard with Fiber; Delivery Method: Continuous; Continuous Initial Rate (mL/hr): 10; Continuous Advance Tube Feeding: Yes; Advancement Volume (mL/hr): 10; Advancement Frequency: Q 6 hours; Continuous Goal Rate (mL. ..     Anthropometric Measures:  · Height:  5' 2\" (157.5 cm)  · Current Body Wt:  71.2 kg (156 lb 15.5 oz) (9/17/21)  · BMI: 28.7    Nutrition Diagnosis:   · Inadequate oral intake related to altered GI function as evidenced by nutrition support-enteral nutrition (PEG)    Nutrition Intervention:  Food and/or Nutrient Delivery: Continue tube feeding  Nutrition Education and Counseling: No recommendations at this time  Coordination of Nutrition Care: Continue to monitor while inpatient, Interdisciplinary rounds    Goals:  Continue to meet >75% of estimated nutritional needs via EN throughout the next 5-7 days       Nutrition Monitoring and Evaluation: Behavioral-Environmental Outcomes: None identified  Food/Nutrient Intake Outcomes: Diet advancement/tolerance, Enteral nutrition intake/tolerance  Physical Signs/Symptoms Outcomes: Biochemical data, Skin, Weight, GI status, Nausea/vomiting    Discharge Planning:     Too soon to determine     Electronically signed by Raissa Uriarte RD on 10/4/2021 at 8:40 AM

## 2021-10-05 ENCOUNTER — APPOINTMENT (OUTPATIENT)
Dept: GENERAL RADIOLOGY | Age: 41
DRG: 004 | End: 2021-10-05
Attending: INTERNAL MEDICINE
Payer: MEDICAID

## 2021-10-05 LAB
ALBUMIN SERPL-MCNC: 4 G/DL (ref 3.4–5)
ALBUMIN/GLOB SERPL: 1.5 {RATIO} (ref 0.8–1.7)
ALP SERPL-CCNC: 155 U/L (ref 45–117)
ALT SERPL-CCNC: 94 U/L (ref 13–56)
AMMONIA PLAS-SCNC: 54 UMOL/L (ref 11–32)
ANION GAP SERPL CALC-SCNC: 9 MMOL/L (ref 3–18)
AST SERPL-CCNC: 34 U/L (ref 10–38)
BASOPHILS # BLD: 0 K/UL (ref 0–0.1)
BASOPHILS NFR BLD: 0 % (ref 0–2)
BILIRUB SERPL-MCNC: 0.5 MG/DL (ref 0.2–1)
BUN SERPL-MCNC: 8 MG/DL (ref 7–18)
BUN/CREAT SERPL: 30 (ref 12–20)
CALCIUM SERPL-MCNC: 9.3 MG/DL (ref 8.5–10.1)
CHLORIDE SERPL-SCNC: 107 MMOL/L (ref 100–111)
CO2 SERPL-SCNC: 25 MMOL/L (ref 21–32)
CREAT SERPL-MCNC: 0.27 MG/DL (ref 0.6–1.3)
DIFFERENTIAL METHOD BLD: ABNORMAL
EOSINOPHIL # BLD: 0.3 K/UL (ref 0–0.4)
EOSINOPHIL NFR BLD: 3 % (ref 0–5)
ERYTHROCYTE [DISTWIDTH] IN BLOOD BY AUTOMATED COUNT: 12.6 % (ref 11.6–14.5)
GLOBULIN SER CALC-MCNC: 2.7 G/DL (ref 2–4)
GLUCOSE BLD STRIP.AUTO-MCNC: 122 MG/DL (ref 70–110)
GLUCOSE BLD STRIP.AUTO-MCNC: 132 MG/DL (ref 70–110)
GLUCOSE BLD STRIP.AUTO-MCNC: 150 MG/DL (ref 70–110)
GLUCOSE BLD STRIP.AUTO-MCNC: 156 MG/DL (ref 70–110)
GLUCOSE SERPL-MCNC: 107 MG/DL (ref 74–99)
HCT VFR BLD AUTO: 23.3 % (ref 35–45)
HGB BLD-MCNC: 7.6 G/DL (ref 12–16)
LYMPHOCYTES # BLD: 1.6 K/UL (ref 0.9–3.6)
LYMPHOCYTES NFR BLD: 18 % (ref 21–52)
MAGNESIUM SERPL-MCNC: 2 MG/DL (ref 1.6–2.6)
MCH RBC QN AUTO: 28.9 PG (ref 24–34)
MCHC RBC AUTO-ENTMCNC: 32.6 G/DL (ref 31–37)
MCV RBC AUTO: 88.6 FL (ref 78–100)
MONOCYTES # BLD: 0.6 K/UL (ref 0.05–1.2)
MONOCYTES NFR BLD: 7 % (ref 3–10)
NEUTS SEG # BLD: 6.3 K/UL (ref 1.8–8)
NEUTS SEG NFR BLD: 72 % (ref 40–73)
PHOSPHATE SERPL-MCNC: 3.7 MG/DL (ref 2.5–4.9)
PLATELET # BLD AUTO: 388 K/UL (ref 135–420)
PMV BLD AUTO: 9.4 FL (ref 9.2–11.8)
POTASSIUM SERPL-SCNC: 3.7 MMOL/L (ref 3.5–5.5)
POTASSIUM SERPL-SCNC: 4.4 MMOL/L (ref 3.5–5.5)
PROT SERPL-MCNC: 6.7 G/DL (ref 6.4–8.2)
RBC # BLD AUTO: 2.63 M/UL (ref 4.2–5.3)
SODIUM SERPL-SCNC: 141 MMOL/L (ref 136–145)
WBC # BLD AUTO: 8.8 K/UL (ref 4.6–13.2)

## 2021-10-05 PROCEDURE — 84132 ASSAY OF SERUM POTASSIUM: CPT

## 2021-10-05 PROCEDURE — 74011000258 HC RX REV CODE- 258: Performed by: INTERNAL MEDICINE

## 2021-10-05 PROCEDURE — 84100 ASSAY OF PHOSPHORUS: CPT

## 2021-10-05 PROCEDURE — P9047 ALBUMIN (HUMAN), 25%, 50ML: HCPCS | Performed by: INTERNAL MEDICINE

## 2021-10-05 PROCEDURE — 74011000250 HC RX REV CODE- 250: Performed by: INTERNAL MEDICINE

## 2021-10-05 PROCEDURE — 82140 ASSAY OF AMMONIA: CPT

## 2021-10-05 PROCEDURE — 82962 GLUCOSE BLOOD TEST: CPT

## 2021-10-05 PROCEDURE — 65610000006 HC RM INTENSIVE CARE

## 2021-10-05 PROCEDURE — 74011636637 HC RX REV CODE- 636/637: Performed by: INTERNAL MEDICINE

## 2021-10-05 PROCEDURE — 77010033678 HC OXYGEN DAILY

## 2021-10-05 PROCEDURE — 2709999900 HC NON-CHARGEABLE SUPPLY

## 2021-10-05 PROCEDURE — 74011250636 HC RX REV CODE- 250/636: Performed by: FAMILY MEDICINE

## 2021-10-05 PROCEDURE — 74011250637 HC RX REV CODE- 250/637: Performed by: INTERNAL MEDICINE

## 2021-10-05 PROCEDURE — 93005 ELECTROCARDIOGRAM TRACING: CPT

## 2021-10-05 PROCEDURE — 83735 ASSAY OF MAGNESIUM: CPT

## 2021-10-05 PROCEDURE — 74011250636 HC RX REV CODE- 250/636: Performed by: INTERNAL MEDICINE

## 2021-10-05 PROCEDURE — 94640 AIRWAY INHALATION TREATMENT: CPT

## 2021-10-05 PROCEDURE — 36415 COLL VENOUS BLD VENIPUNCTURE: CPT

## 2021-10-05 PROCEDURE — 85025 COMPLETE CBC W/AUTO DIFF WBC: CPT

## 2021-10-05 PROCEDURE — 71045 X-RAY EXAM CHEST 1 VIEW: CPT

## 2021-10-05 RX ORDER — HYDROMORPHONE HYDROCHLORIDE 1 MG/ML
1 INJECTION, SOLUTION INTRAMUSCULAR; INTRAVENOUS; SUBCUTANEOUS
Status: DISCONTINUED | OUTPATIENT
Start: 2021-10-05 | End: 2021-11-02

## 2021-10-05 RX ORDER — POTASSIUM CHLORIDE 7.45 MG/ML
10 INJECTION INTRAVENOUS
Status: COMPLETED | OUTPATIENT
Start: 2021-10-05 | End: 2021-10-06

## 2021-10-05 RX ADMIN — FAMOTIDINE 20 MG: 20 TABLET ORAL at 21:03

## 2021-10-05 RX ADMIN — POTASSIUM CHLORIDE 10 MEQ: 7.46 INJECTION, SOLUTION INTRAVENOUS at 09:05

## 2021-10-05 RX ADMIN — ALBUMIN (HUMAN) 25 G: 0.25 INJECTION, SOLUTION INTRAVENOUS at 05:02

## 2021-10-05 RX ADMIN — SODIUM CHLORIDE 10 ML: 9 INJECTION, SOLUTION INTRAMUSCULAR; INTRAVENOUS; SUBCUTANEOUS at 17:21

## 2021-10-05 RX ADMIN — FENTANYL CITRATE 50 MCG: 50 INJECTION INTRAMUSCULAR; INTRAVENOUS at 21:40

## 2021-10-05 RX ADMIN — INSULIN LISPRO 2 UNITS: 100 INJECTION, SOLUTION INTRAVENOUS; SUBCUTANEOUS at 17:19

## 2021-10-05 RX ADMIN — HALOPERIDOL 5 MG: 5 TABLET ORAL at 08:35

## 2021-10-05 RX ADMIN — Medication 5 MG: at 21:02

## 2021-10-05 RX ADMIN — BUDESONIDE 500 MCG: 0.5 INHALANT RESPIRATORY (INHALATION) at 07:39

## 2021-10-05 RX ADMIN — HALOPERIDOL 5 MG: 5 TABLET ORAL at 21:03

## 2021-10-05 RX ADMIN — FOLIC ACID 1 MG: 1 TABLET ORAL at 08:35

## 2021-10-05 RX ADMIN — FAMOTIDINE 20 MG: 20 TABLET ORAL at 08:35

## 2021-10-05 RX ADMIN — LORAZEPAM 2 MG: 2 INJECTION INTRAMUSCULAR at 17:48

## 2021-10-05 RX ADMIN — LORAZEPAM 2 MG: 2 INJECTION INTRAMUSCULAR at 07:02

## 2021-10-05 RX ADMIN — DEXMEDETOMIDINE HYDROCHLORIDE 0.7 MCG/KG/HR: 100 INJECTION, SOLUTION INTRAVENOUS at 00:59

## 2021-10-05 RX ADMIN — 0.12% CHLORHEXIDINE GLUCONATE 10 ML: 1.2 RINSE ORAL at 08:35

## 2021-10-05 RX ADMIN — HYDROMORPHONE HYDROCHLORIDE 1 MG: 1 INJECTION, SOLUTION INTRAMUSCULAR; INTRAVENOUS; SUBCUTANEOUS at 19:40

## 2021-10-05 RX ADMIN — DEXMEDETOMIDINE HYDROCHLORIDE 0.7 MCG/KG/HR: 100 INJECTION, SOLUTION INTRAVENOUS at 17:05

## 2021-10-05 RX ADMIN — MEROPENEM 1 G: 1 INJECTION, POWDER, FOR SOLUTION INTRAVENOUS at 12:22

## 2021-10-05 RX ADMIN — ARFORMOTEROL TARTRATE 15 MCG: 15 SOLUTION RESPIRATORY (INHALATION) at 20:42

## 2021-10-05 RX ADMIN — CLONAZEPAM 0.5 MG: 0.5 TABLET ORAL at 08:35

## 2021-10-05 RX ADMIN — ALBUMIN (HUMAN) 25 G: 0.25 INJECTION, SOLUTION INTRAVENOUS at 17:19

## 2021-10-05 RX ADMIN — FENTANYL CITRATE 50 MCG: 50 INJECTION INTRAMUSCULAR; INTRAVENOUS at 02:49

## 2021-10-05 RX ADMIN — LACTULOSE 15 ML: 10 SOLUTION ORAL at 21:02

## 2021-10-05 RX ADMIN — CLONAZEPAM 0.5 MG: 0.5 TABLET ORAL at 21:03

## 2021-10-05 RX ADMIN — FENTANYL CITRATE 50 MCG: 50 INJECTION INTRAMUSCULAR; INTRAVENOUS at 06:25

## 2021-10-05 RX ADMIN — FENTANYL CITRATE 50 MCG: 50 INJECTION INTRAMUSCULAR; INTRAVENOUS at 23:44

## 2021-10-05 RX ADMIN — FENTANYL CITRATE 50 MCG: 50 INJECTION INTRAMUSCULAR; INTRAVENOUS at 14:18

## 2021-10-05 RX ADMIN — INSULIN LISPRO 2 UNITS: 100 INJECTION, SOLUTION INTRAVENOUS; SUBCUTANEOUS at 12:25

## 2021-10-05 RX ADMIN — HYDROMORPHONE HYDROCHLORIDE 1 MG: 1 INJECTION, SOLUTION INTRAMUSCULAR; INTRAVENOUS; SUBCUTANEOUS at 09:52

## 2021-10-05 RX ADMIN — ARFORMOTEROL TARTRATE 15 MCG: 15 SOLUTION RESPIRATORY (INHALATION) at 07:39

## 2021-10-05 RX ADMIN — ALBUMIN (HUMAN) 25 G: 0.25 INJECTION, SOLUTION INTRAVENOUS at 11:26

## 2021-10-05 RX ADMIN — Medication 1 TABLET: at 08:35

## 2021-10-05 RX ADMIN — 0.12% CHLORHEXIDINE GLUCONATE 10 ML: 1.2 RINSE ORAL at 21:03

## 2021-10-05 RX ADMIN — HYDROMORPHONE HYDROCHLORIDE 1 MG: 1 INJECTION, SOLUTION INTRAMUSCULAR; INTRAVENOUS; SUBCUTANEOUS at 00:32

## 2021-10-05 RX ADMIN — ALBUMIN (HUMAN) 25 G: 0.25 INJECTION, SOLUTION INTRAVENOUS at 23:53

## 2021-10-05 RX ADMIN — LACTULOSE 15 ML: 10 SOLUTION ORAL at 08:35

## 2021-10-05 RX ADMIN — FENTANYL CITRATE 50 MCG: 50 INJECTION INTRAMUSCULAR; INTRAVENOUS at 11:26

## 2021-10-05 RX ADMIN — BUDESONIDE 500 MCG: 0.5 INHALANT RESPIRATORY (INHALATION) at 20:42

## 2021-10-05 RX ADMIN — FENTANYL CITRATE 50 MCG: 50 INJECTION INTRAMUSCULAR; INTRAVENOUS at 19:14

## 2021-10-05 RX ADMIN — THIAMINE HCL TAB 100 MG 100 MG: 100 TAB at 08:35

## 2021-10-05 RX ADMIN — MEROPENEM 1 G: 1 INJECTION, POWDER, FOR SOLUTION INTRAVENOUS at 00:19

## 2021-10-05 RX ADMIN — HYDROMORPHONE HYDROCHLORIDE 1 MG: 1 INJECTION, SOLUTION INTRAMUSCULAR; INTRAVENOUS; SUBCUTANEOUS at 05:02

## 2021-10-05 RX ADMIN — POTASSIUM CHLORIDE 10 MEQ: 7.46 INJECTION, SOLUTION INTRAVENOUS at 10:20

## 2021-10-05 NOTE — PROGRESS NOTES
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    Left wrist fracture, with delayed healing, subsequent encounter S62.102G    Drug overdose, intentional self-harm, initial encounter ([facility] [address] T50.902A    Hypoxia R09.02    Hypotension after procedure I95.81    Acute metabolic encephalopathy F27.16    Psychosis (HCC) F29    Hyponatremia E87.1    Acute respiratory failure with hypoxia (HCC) J96.01    Increased ammonia level R79.89    Hypokalemia E87.6    Status post tracheostomy ([facility] [address] Z93.0 ·   Code status: full code   RECOMMENDATIONS:   Respiratory: ventilator support: intubated 2021 due to encephalopathy and drug overdose. Patient was extubated on 2021, and reintubated within an hour due to respiratory distress and upper airway edema. S/p tracheostomy 2021  X-ray chest from 10/5/2021 reviewed - mild LT side stable changes  Small trach tube secretions unchanged   Tolerating trach collar without any increased work of breathing  Patient off ventilator for > 48 hrs on T-collar  Continue bronchodilator with Brovana and Pulmicort twice a day, DuoNeb as needed  Continue ventilator and sedation bundles.   On 10/2/2021 - off propofol on SBT on precedex weaning to off   Sedationprecedex   Prn lorazepam, Dilaudid and due to psych issues given Haldol - tolerated well monitor QTC    ID: WBC normalized and no fever; appreciate ID evaluation  CXR LT mild infiltrates on XR chest - stable  Antibiotics  meropenem on 10/3/2021 - will finish 10 days of empirical coverage  Procalcitonin - normal range suggesting unlikely new pneumonia  In the past was on Vanco and Zosyn? Drug fever? S/p LP with negative CSF cultures. Covid test 9/16/2021 and 9/24/2021: Negative. Sputum culture 9/24/2021: MSSA. Blood culture 9/27/21 and 9/24/2021: NGT final  Barahona catheter changed on 9/24/2021. Before used - Levofloxacin course was complete and was on Vanco and Zosyn     CVS: EKG from AM - no QTc prolongation while on Haldol   Stable hemodynamics; telemetry  sinus rhythm; blood pressure stable; continue to monitor. Echo 9/17/2021: LVEF 60 to 65%. RVSP 29 mmHg. Ultrasound leg 9/15/21  negative study for DVTs. Renal: stable renal function, good urine output; continue to monitor and replace electrolytes as needed. Heme: anemia stable since drop 10/4/21 no active bleeding issues; monitor daily  Stool occult blood 9/28negative. Platelets normal.  May restart Lovenox tomorrow if Hgb remains stable    Endo: Stable blood sugars; continue to monitor; sliding scale insulin as needed. GI: Patient tolerating tube feeding PEG tube placement 10/1/21  hCG negative on admission. Ammonia fluctuates; lactulose therapy daily. Transaminases coming down; hepatitis virus panelnegative study. CT abdomen 9/15/21  Liver is unremarkable. No abnormal biliary dilation. Gallbladder is unremarkable. Nutrition:peg feeding jevity as tolerated  Patient on thiamine and folic acid. Neurology: Patient confused and delirious agitated off of sedation; known patient with psych issues and drug overdose on presentation during this admission  Awake, alert, off-and-on fidgety, shaking, agitated  Avoid continuous sedation with benzodiazepines  Appreciate psych evaluation  CT headnil acute  Psych: On Klonopin, Haldol, IV Precedex  May restart Seroquel if no favorable response to Haldol  Has as needed fentanyl, Dilaudid, Ativan  Toxicology: Gabapentin OD on admission.   Skin: ICU nursing care  MS: Left wrist fracture in immobilization external fixator; patient has been seen by orthopedics during this admission. Prophylaxis: DVT prophylaxis: Lovenox 40 sc daily on hold. GI prophylaxis: Famotidine. Restraints: Wrist soft restraints as needed for patient interfering with medical therapy/management and patient safety. Consulted PT, OT and SLP teamsappreciate input. Prognosis guarded overall. CODE STATUSfull code. Palliative care on case. Quality Care: PPI, DVT prophylaxis, HOB elevated, Infection control all reviewed and addressed. Lines/Tubes: PIV   S/p ETT: 9/11/219/22/2021. Tracheostomy 9/22/2021. Status post PEG on 10/1/2021   Barahona: 9/11/21; changed 9/24/2021. Discussed with RNunable to discontinue Barahona catheter due to ventilator support, agitated and delirious state; patient will not be able to tolerate external Barahona catheter. ADVANCE DIRECTIVE: Full code. Discussed with RN, RT, ICU staff, hospitalist  High complexity decision making was performed during the evaluation of this patient at high risk for decompensation with multiple organ involvement. Critical care minute excluding any family discussion or procedure: 39 minutes    Mikel Parsons   sister-in-law   [phone]      updated on 10/2/2021 by Dr. Kathy Oliver       Subjective/History:   Ms. Shayna Sofia has been seen and evaluated as Dr. Donny Morales requested now for assisting with Acute respiratory failure and ventilator management. Patient is a 39 y.o. female with following PMhx presented to ER with lethargy via EMS and admitted for Gabapentin overdose. Pt required intubation for airways protection. Position control was contacted that recommended supportive care per ER provider. Pt seen at bedside in ER rm#2. The patient can not provide additional history due to Ventilated.      10/5/2021  Patient seen in ICU room #102 at bedside  Tolerating trach collar for> 48 hours  Minimal trach tube secretions  Awake, alert, off and on anxious looking; avoids eye contact or following of commands  Hemodynamically stable; no arrhythmia. EKG stable  Low-grade fever yesterday evening; WBC normal  Tolerating Merrem without difficulty  Urine output fair, tolerating tube feeding, fair stool output in FMS  I was not contacted by staff on anything about patient overnight. Patient has a history of drug use and smoker and alcohol use. Review of Systems:  Review of systems not obtained due to patient factors.         Past Medical History:  Past Medical History:   Diagnosis Date    Asthma     Bilateral ovarian cysts     Cocaine abuse ([facility] [address]     Mental and behavioral problem     Pancreatitis     Pancreatitis     Psychosis ([facility] [address]         Past Surgical History:  Past Surgical History:   Procedure Laterality Date    HX GYN      D&C, Hysterectomy    IR INSERT GASTROSTOMY TUBE PERC  10/1/2021        Medications:    Current Facility-Administered Medications   Medication Dose Route Frequency    potassium chloride 10 mEq in 100 ml IVPB  10 mEq IntraVENous Q1H    albumin human 25% (BUMINATE) solution 25 g  25 g IntraVENous Q6H    arformoteroL (BROVANA) neb solution 15 mcg  15 mcg Nebulization BID RT    budesonide (PULMICORT) 500 mcg/2 ml nebulizer suspension  500 mcg Nebulization BID RT    haloperidoL (HALDOL) tablet 5 mg  5 mg Oral BID    meropenem (MERREM) 1 g in sterile water (preservative free) 20 mL IV syringe  1 g IntraVENous Q12H    lactulose (CHRONULAC) 10 gram/15 mL solution 15 mL  15 mL Oral BID    lidocaine (XYLOCAINE) 10 mg/mL (1 %) injection 1-20 mL  1-20 mL SubCUTAneous RAD CONTINUOUS    melatonin (rapid dissolve) tablet 5 mg  5 mg Oral QHS    dexmedeTOMidine (PRECEDEX) 400 mcg in 0.9% sodium chloride 100 mL infusion  0.1-1.5 mcg/kg/hr IntraVENous TITRATE    famotidine (PEPCID) tablet 20 mg  20 mg Oral BID    thiamine mononitrate (B-1) tablet 100 mg  100 mg Oral DAILY    folic acid (FOLVITE) tablet 1 mg  1 mg Oral DAILY    multivitamin, tx-iron-ca-min (THERA-M w/ IRON) tablet 1 Tablet  1 Tablet Oral DAILY    clonazePAM (KlonoPIN) tablet 0.5 mg  0.5 mg Oral BID    insulin lispro (HUMALOG) injection   SubCUTAneous Q6H    [Held by provider] enoxaparin (LOVENOX) injection 40 mg  40 mg SubCUTAneous Q24H    sodium chloride (NS) flush 5-40 mL  5-40 mL IntraVENous Q8H    chlorhexidine (PERIDEX) 0.12 % mouthwash 10 mL  10 mL Oral Q12H       Allergy:  Allergies   Allergen Reactions    Fish Containing Products Itching    Toradol [Ketorolac] Rash     Pt reports she is not allergic to this medication. Social History:  Social History     Tobacco Use    Smoking status: Current Every Day Smoker     Packs/day: 1.00    Smokeless tobacco: Never Used   Substance Use Topics    Alcohol use: Not Currently    Drug use: Not Currently     Types: Cocaine, Marijuana     Comment: used with in past 24 hours          Objective:   Vital Signs:    Blood pressure 101/60, pulse (!) 120, temperature 98.8 °F (37.1 °C), resp. rate 29, height 5' 2\" (1.575 m), weight 71.2 kg (157 lb), last menstrual period 05/15/2018, SpO2 97 %. Body mass index is 28.72 kg/m². O2 Device: Tracheal collar   O2 Flow Rate (L/min): 8 l/min   Temp (24hrs), Av.4 °F (37.4 °C), Min:98.8 °F (37.1 °C), Max:100.1 °F (37.8 °C)         Intake/Output:   Last shift:      No intake/output data recorded.   Last 3 shifts: 10/03 1901 - 10/05 0700  In: 1945.1 [I.V.:1265.1]  Out: 3450 [Urine:2850; Drains:600]    Intake/Output Summary (Last 24 hours) at 10/5/2021 0912  Last data filed at 10/5/2021 0443  Gross per 24 hour   Intake 1605.14 ml   Output 2800 ml   Net -1194.86 ml       ABG:    No results found for: PH, PHI, PCO2, PCO2I, PO2, PO2I, HCO3, HCO3I, FIO2, FIO2I  Ventilator Mode: Spontaneous  Respiratory Rate  Resp Rate Observed: 9  Back-Up Rate: 14  Insp Time (sec): 1.1 sec  I:E Ratio: 1:2.1  Ventilator Volumes  Vt Set (ml): 420 ml  Vt Exhaled (Machine Breath) (ml): 571 ml  Vt Spont (ml): 468 ml  Ve Observed (l/min): 10.8 l/min  Ventilator Pressures  Pressure Support (cm H2O): 7 cm H2O  PIP Observed (cm H2O): 18 cm H2O  Plateau Pressure (cm H2O): 13 cm H2O  MAP (cm H2O): 8.8  PEEP/VENT (cm H2O): 5 cm H20  Auto PEEP Observed (cm H2O): 0 cm H2O      Physical Exam:   General: Awake, alert, off-and-on anxious looking, in no respiratory distress, cooperative, appears very older than stated age, on trach collar  HEENT: PERRLA, EOMI, visible oral mucosa moist  Neck: No abnormally enlarged lymph nodes or thyroid, supple  Chest: normal  Lungs: normal air entry and no wheezes bilaterally, no tenderness/ rash  Heart: Regular rate and rhythm, S1S2 present or without murmur or extra heart sounds  Abdomen: non distended, bowel sounds normoactive, tympanic, abdomen is soft without significant tenderness, masses, organomegaly or guarding, rigidity, rebound  Extremity: negative for edema, cyanosis, clubbing  Neuro: alert, off-and-on anxious, avoids eye contact and following of commands, moves all extremities spontaneously, no involuntary movements, exam limitations  Skin: Skin color, texture, turgor unchanged      Data:     Recent Results (from the past 12 hour(s))   GLUCOSE, POC    Collection Time: 10/04/21 11:31 PM   Result Value Ref Range    Glucose (POC) 109 70 - 110 mg/dL   EKG, 12 LEAD, SUBSEQUENT    Collection Time: 10/05/21  3:08 AM   Result Value Ref Range    Ventricular Rate 79 BPM    Atrial Rate 79 BPM    P-R Interval 182 ms    QRS Duration 84 ms    Q-T Interval 364 ms    QTC Calculation (Bezet) 417 ms    Calculated P Axis 49 degrees    Calculated R Axis 17 degrees    Calculated T Axis 15 degrees    Diagnosis       Normal sinus rhythm  Nonspecific T wave abnormality  Abnormal ECG  When compared with ECG of 04-OCT-2021 05:30,  Nonspecific T wave abnormality now evident in Inferior leads  Nonspecific T wave abnormality now evident in Anterior leads     GLUCOSE, POC    Collection Time: 10/05/21  5:40 AM   Result Value Ref Range    Glucose (POC) 122 (H) 70 - 110 mg/dL   CBC WITH AUTOMATED DIFF    Collection Time: 10/05/21  5:52 AM   Result Value Ref Range    WBC 8.8 4.6 - 13.2 K/uL    RBC 2.63 (L) 4.20 - 5.30 M/uL    HGB 7.6 (L) 12.0 - 16.0 g/dL    HCT 23.3 (L) 35.0 - 45.0 %    MCV 88.6 78.0 - 100.0 FL    MCH 28.9 24.0 - 34.0 PG    MCHC 32.6 31.0 - 37.0 g/dL    RDW 12.6 11.6 - 14.5 %    PLATELET 662 849 - 949 K/uL    MPV 9.4 9.2 - 11.8 FL    NEUTROPHILS 72 40 - 73 %    LYMPHOCYTES 18 (L) 21 - 52 %    MONOCYTES 7 3 - 10 %    EOSINOPHILS 3 0 - 5 %    BASOPHILS 0 0 - 2 %    ABS. NEUTROPHILS 6.3 1.8 - 8.0 K/UL    ABS. LYMPHOCYTES 1.6 0.9 - 3.6 K/UL    ABS. MONOCYTES 0.6 0.05 - 1.2 K/UL    ABS. EOSINOPHILS 0.3 0.0 - 0.4 K/UL    ABS. BASOPHILS 0.0 0.0 - 0.1 K/UL    DF AUTOMATED     MAGNESIUM    Collection Time: 10/05/21  5:52 AM   Result Value Ref Range    Magnesium 2.0 1.6 - 2.6 mg/dL   METABOLIC PANEL, COMPREHENSIVE    Collection Time: 10/05/21  5:52 AM   Result Value Ref Range    Sodium 141 136 - 145 mmol/L    Potassium 3.7 3.5 - 5.5 mmol/L    Chloride 107 100 - 111 mmol/L    CO2 25 21 - 32 mmol/L    Anion gap 9 3.0 - 18 mmol/L    Glucose 107 (H) 74 - 99 mg/dL    BUN 8 7.0 - 18 MG/DL    Creatinine 0.27 (L) 0.6 - 1.3 MG/DL    BUN/Creatinine ratio 30 (H) 12 - 20      GFR est AA >60 >60 ml/min/1.73m2    GFR est non-AA >60 >60 ml/min/1.73m2    Calcium 9.3 8.5 - 10.1 MG/DL    Bilirubin, total 0.5 0.2 - 1.0 MG/DL    ALT (SGPT) 94 (H) 13 - 56 U/L    AST (SGOT) 34 10 - 38 U/L    Alk.  phosphatase 155 (H) 45 - 117 U/L    Protein, total 6.7 6.4 - 8.2 g/dL    Albumin 4.0 3.4 - 5.0 g/dL    Globulin 2.7 2.0 - 4.0 g/dL    A-G Ratio 1.5 0.8 - 1.7     PHOSPHORUS    Collection Time: 10/05/21  5:52 AM   Result Value Ref Range    Phosphorus 3.7 2.5 - 4.9 MG/DL   AMMONIA    Collection Time: 10/05/21  5:52 AM   Result Value Ref Range    Ammonia 54 (H) 11 - 32 UMOL/L             No results for input(s): FIO2I, IFO2, HCO3I, IHCO3, HCOPOC, PCO2I, PCOPOC, IPHI, PHI, PHPOC, PO2I, PO2POC in the last 72 hours.     No lab exists for component: IPOC2    All Micro Results     Procedure Component Value Units Date/Time    CULTURE, BLOOD [[id number]] Collected: 10/04/21 1405    Order Status: Completed Specimen: Blood Updated: 10/05/21 0645     Special Requests: NO SPECIAL REQUESTS        Culture result: NO GROWTH AFTER 16 HOURS       CULTURE, BLOOD [[id number]] Collected: 10/04/21 1305    Order Status: Completed Specimen: Blood Updated: 10/05/21 0645     Special Requests: NO SPECIAL REQUESTS        Culture result: NO GROWTH AFTER 16 HOURS       CULTURE, MRSA [[id number]]     Order Status: Sent Specimen: Nasal from Nares     CULTURE, RESPIRATORY/SPUTUM/BRONCH Ardean Sayer STAIN [[id number]]     Order Status: Canceled Specimen: Sputum,ET Suction     CULTURE, BLOOD [[id number]] Collected: 09/27/21 1005    Order Status: Completed Specimen: Blood Updated: 10/03/21 0723     Special Requests: NO SPECIAL REQUESTS        Culture result: NO GROWTH 6 DAYS       CULTURE, BLOOD [[id number]] Collected: 09/27/21 1005    Order Status: Completed Specimen: Blood Updated: 10/03/21 0723     Special Requests: NO SPECIAL REQUESTS        Culture result: NO GROWTH 6 DAYS       CULTURE, BLOOD [[id number]] Collected: 09/24/21 0740    Order Status: Completed Specimen: Blood Updated: 09/30/21 0819     Special Requests: NO SPECIAL REQUESTS        Culture result: NO GROWTH 6 DAYS       CULTURE, RESPIRATORY/SPUTUM/BRONCH W GRAM STAIN [[id number]]  (Abnormal)  (Susceptibility) Collected: 09/24/21 0654    Order Status: Completed Specimen: Sputum from Tracheal Aspirate Updated: 09/27/21 1147     Special Requests: NO SPECIAL REQUESTS        GRAM STAIN RARE WBCS SEEN               RARE EPITHELIAL CELLS SEEN            NO ORGANISMS SEEN        Culture result:       MODERATE STAPHYLOCOCCUS AUREUS                  NO NORMAL RESPIRATORY DINA ISOLATED          COVID-19 RAPID TEST [[id number]] Collected: 09/24/21 1700    Order Status: Completed Specimen: Nasopharyngeal Updated: 09/24/21 1849     Specimen source Nasopharyngeal        COVID-19 rapid test Not detected        Comment: Rapid Abbott ID Now       Rapid NAAT:  The specimen is NEGATIVE for SARS-CoV-2, the novel coronavirus associated with COVID-19. Negative results should be treated as presumptive and, if inconsistent with clinical signs and symptoms or necessary for patient management, should be tested with an alternative molecular assay. Negative results do not preclude SARS-CoV-2 infection and should not be used as the sole basis for patient management decisions. This test has been authorized by the FDA under an Emergency Use Authorization (EUA) for use by authorized laboratories. Fact sheet for Healthcare Providers: ConventionWindcentraledate.co.nz  Fact sheet for Patients: Bomboarddate.co.nz       Methodology: Isothermal Nucleic Acid Amplification         CULTURE, URINE [[id number]]     Order Status: Canceled Specimen: Urine from Clean catch     CULTURE, CSF  TUBE 2 [[id number]] Collected: 09/16/21 1434    Order Status: Completed Specimen: Cerebrospinal Fluid Updated: 09/23/21 1241     Special Requests: TUBE 3, CULTURE AND SENSITIVTY AND GRAM STAIN.      GRAM STAIN RARE WBCS SEEN         NO ORGANISMS SEEN        Culture result: NO GROWTH 7 DAYS       CULTURE, BLOOD [[id number]] Collected: 09/14/21 0515    Order Status: Completed Specimen: Blood Updated: 09/20/21 0832     Special Requests: NO SPECIAL REQUESTS        Culture result: NO GROWTH 6 DAYS       CULTURE, BLOOD [[id number]] Collected: 09/14/21 0500    Order Status: Completed Specimen: Blood Updated: 09/20/21 0832     Special Requests: NO SPECIAL REQUESTS        Culture result: NO GROWTH 6 DAYS       MENINGITIS PATHOGENS PANEL, CSF (BY PCR) [[id number]] Collected: 09/16/21 1434    Order Status: Completed Specimen: Cerebrospinal Fluid Updated: 09/17/21 0050     Escherichia coli K1 Not detected Haemophilus Influenzae Not detected        Listeria Monocytogenes Not detected        Neisseria Meningitidis Not detected        Streptococcus Agalactiae Not detected        Streptococcus Pneumoniae Not detected        Cytomegalovirus Not detected        Enterovirus Not detected        Herpes Simplex Virus 1 Not detected        Comment: In patients who have negative herpes simplex 1 and 2 PCR results, do not modify treatment, confirm with alternate testing. Herpes Simplex Virus 2 Not detected        Comment: In patients who have negative herpes simplex 1 and 2 PCR results, do not modify treatment, confirm with alternate testing. Human Herpesvirus 6 Not detected        Human Parechovirus Not detected        Varicella Zoster Virus Not detected        Crypto. neoformans/gattii Not detected       MENINGITIS PATHOGENS PANEL, CSF (BY PCR) [[id number]] Collected: 09/16/21 1545    Order Status: Canceled Specimen: Cerebrospinal Fluid     HSV 1 AND 2 BY PCR [[id number]] Collected: 09/16/21 1434    Order Status: Canceled Specimen: Other     RSVZS-14 RAPID TEST [[id number]] Collected: 09/16/21 1000    Order Status: Completed Specimen: Nasopharyngeal Updated: 09/16/21 1032     Specimen source Nasopharyngeal        COVID-19 rapid test Not detected        Comment: Rapid Abbott ID Now       Rapid NAAT:  The specimen is NEGATIVE for SARS-CoV-2, the novel coronavirus associated with COVID-19. Negative results should be treated as presumptive and, if inconsistent with clinical signs and symptoms or necessary for patient management, should be tested with an alternative molecular assay. Negative results do not preclude SARS-CoV-2 infection and should not be used as the sole basis for patient management decisions. This test has been authorized by the FDA under an Emergency Use Authorization (EUA) for use by authorized laboratories.    Fact sheet for Healthcare Providers: ConventionUpdate.co.nz  Fact sheet for Patients: ConventionUpdate.co.nz       Methodology: Isothermal Nucleic Acid Amplification         LEGIONELLA PNEUMOPHILA AG, URINE [[id number]] Collected: 09/14/21 2315    Order Status: Completed Specimen: Urine, random Updated: 09/15/21 1042     Legionella Ag, urine Negative       STREP PNEUMO AG, URINE [[id number]] Collected: 09/14/21 2315    Order Status: Completed Specimen: Urine, random Updated: 09/15/21 1042     Strep pneumo Ag, urine Negative       RESPIRATORY VIRUS PANEL W/COVID-19, PCR [[id number]] Collected: 09/14/21 1832    Order Status: Completed Specimen: Nasopharyngeal Updated: 09/14/21 2234     Adenovirus Not detected        Coronavirus 229E Not detected        Coronavirus HKU1 Not detected        Coronavirus CVNL63 Not detected        Coronavirus OC43 Not detected        SARS-CoV-2, PCR Not detected        Metapneumovirus Not detected        Rhinovirus and Enterovirus Not detected        Influenza A Not detected        Influenza A, subtype H1 Not detected        Influenza A, subtype H3 Not detected        INFLUENZA A H1N1 PCR Not detected        Influenza B Not detected        Parainfluenza 1 Not detected        Parainfluenza 2 Not detected        Parainfluenza 3 Not detected        Parainfluenza virus 4 Not detected        RSV by PCR Not detected        B. parapertussis, PCR Not detected        Bordetella pertussis - PCR Not detected        Chlamydophila pneumoniae DNA, QL, PCR Not detected        Mycoplasma pneumoniae DNA, QL, PCR Not detected       CULTURE, BLOOD [[id number]] Collected: 09/14/21 1700    Order Status: Canceled Specimen: Blood     CULTURE, URINE [[id number]] Collected: 09/13/21 2330    Order Status: Canceled Specimen: Cath Urine     RESPIRATORY VIRUS PANEL W/COVID-19, PCR [[id number]]     Order Status: Canceled Specimen: NASOPHARYNGEAL SWAB     COVID-19 RAPID TEST [[id number]]     Order Status: Canceled COVID-19 RAPID TEST [[id number]] Collected: 09/13/21 0737    Order Status: Completed Specimen: Nasopharyngeal Updated: 09/13/21 0811     Specimen source Nasopharyngeal        COVID-19 rapid test Not detected        Comment: Rapid Abbott ID Now       Rapid NAAT:  The specimen is NEGATIVE for SARS-CoV-2, the novel coronavirus associated with COVID-19. Negative results should be treated as presumptive and, if inconsistent with clinical signs and symptoms or necessary for patient management, should be tested with an alternative molecular assay. Negative results do not preclude SARS-CoV-2 infection and should not be used as the sole basis for patient management decisions. This test has been authorized by the FDA under an Emergency Use Authorization (EUA) for use by authorized laboratories. Fact sheet for Healthcare Providers: ConventionUpdate.co.nz  Fact sheet for Patients: ConventionUpdate.co.nz       Methodology: Isothermal Nucleic Acid Amplification         COVID-19 RAPID TEST [[id number]]     Order Status: Canceled         Echo 9/17/2021:  Left Ventricle Normal cavity size, wall thickness and systolic function (ejection fraction normal). The estimated EF is 60 - 65%. There is inconclusive left ventricular diastolic function E/E' ratio = 7.08  Heart rate is over 100. Wall Scoring The left ventricular wall motion is normal.            Left Atrium Normal cavity size. Right Ventricle Normal cavity size and global systolic function. Assessment of RV function:   TAPSE = 27 mm. Right Atrium Normal cavity size. Interatrial Septum Interatrial septum not well visualized   Aortic Valve Aortic valve not well visualized. Trileaflet valve structure, no stenosis and no regurgitation. Mitral Valve No stenosis. Mitral valve non-specific thickening. Mild regurgitation. Tricuspid Valve Tricuspid valve not well visualized. No stenosis. Mild regurgitation.    Pulmonic Valve Pulmonic valve not well visualized. No stenosis and no regurgitation. Aorta The aorta was not well visualized. Normal aortic root. Pulmonary Artery Pulmonary arteries not well visualized. Pulmonary arterial systolic pressure (PASP) is 29 mmHg. Pulmonary hypertension not suggested by Doppler findings. IVC/Hepatic Veins Mechanically ventilated; cannot use inferior caval vein diameter to estimate central venous pressure. Pericardium Normal pericardium and no evidence of pericardial effusion. CT head 9/15/1021  IMPRESSION   No acute intracranial abnormality. CT chest, abdomen, pelvis 9/15/1021  IMPRESSION   Endotracheal tube tip in the lower thoracic trachea 1.5 cm above the dipak.    Bilateral lower lobar atelectasis, possible endobronchial lesion/mucous plug in  the left lower lobe bronchus.    No acute intra-abdominal abnormality. Imaging:  [x]I have personally reviewed the patients chest radiographs images and report    XR CHEST PORT 10/5/21     CLINICAL INDICATION/HISTORY: Acute respiratory failure, ET tub position  -Additional: None     COMPARISON: One day prior     TECHNIQUE: Portable frontal view of the chest     _______________     FINDINGS:     SUPPORT DEVICES: Tracheostomy. Enteric tube unchanged.     HEART AND MEDIASTINUM: Cardiomediastinal silhouette within normal limits.     LUNGS AND PLEURAL SPACES: Mild left lower lower lung zone opacities. No  pneumothorax.      _______________     IMPRESSION     Mild left lower lower lung zone opacities, unchanged          Please note: Voice-recognition software may have been used to generate this report, which may have resulted in some phonetic-based errors in grammar and contents. Even though attempts were made to correct all the mistakes, some may have been missed, and remained in the body of the document.       Eric Osorio MD  10/5/2021

## 2021-10-05 NOTE — PROGRESS NOTES
Bedside shift change report given to Jenna Jones RN (oncoming nurse) by Manuel Kelly RN (offgoing nurse). Report included the following information SBAR, Kardex and MAR.

## 2021-10-05 NOTE — PROGRESS NOTES
[facility]  (A Division of [address] [place] [address])                                                                                                                                                                                Heather Cannon MD                                                         Office #:     [phone]  [phone]                                                          Office Fax: [phone]     Date of Admission: 9/11/2021    Date of Note: 10/5/2021  Reason for FU: Antibiotic management with ongoing sepsis      Ongoing Antimicrobials:    Prior Antimicrobials:  Zosyn 9/14 to 9/20  Zosyn 9/24-> 9/26 meropenem to 9/27  Vanco 9/25 to 9/27  Levofloxacin 9/27 to 10/1  Meropenem 10/3 to date # 3       Doxycycline 9/14 to 9/15  Vanco 9/14 to 9/17       Assessment- ID related:  --------------------------------------------------------------------------    · Fever with leucocytosis: Improving, Possible source seems resp infection, has increased secretion,  ABX started emperically and wbc declining   · S/P respiratory failure, inbuated 9/11, re-intubated 9/18 and Post trach 9/22  · Post PEG 10/1  · History of positive drug screen--opiates/cocaine. Drug screen on admission: negative  · Left wrist fracture with external fixer-- site look mariola  · Agiation, Hyperammonia     COVID test rapid neg, RVP neg  HIV test neg-9/15  Acute hepatitis A/B/C: negative  S/P LP- CSF benign- 9/16    Other Medical Issues- Mx per respective team:    Drug overdose( stated neurontin)  Hyperammonia level- etiology?  anemia   Recommendation for ID issues I am following:  ------------------------------------------------------------------------------    -> cont meropenem, wbc and fever decline. No marked change in resp secretion yet  .    Will need panculture( blood/ resp/ urine analysis with reflex to culture ) if recurrent high fever or worsening of leucocytosis ore resp status    -> FU blood culture: NGSF  -> mrsa screen nares--Not done. Ordered again    -> laxative/lactulose per ICU           [facility] ICU RN, ICU MD- Dr Monico Nageotte, Vinson Rein       Subjective:  Fever curve down to 98.8, wbc at 8.8K. [address] from 10/4- remain negative. CXR in am: Mild left lower lower lung zone opacities, unchanged. Patient is awake, and follows command, but not verbal  Secretion trach-remains significant per RN. Oxygen status ok- at 8L. Subjective:    Taken off Zosyn 9/20- last seen by ID. Events reviweed/ notes reviewed and [facility] med staff. Since last evaluation, she had ongoing sepsis/ leucocytosis and fever issues. Soon after off Zosyn on 9/20, she spiked to 103 from 9/23 with increasing WBC to 30.7 at its peak on 9/24. Had multiple ABX again including Zosyn/meropenem/Vanco/levofloxacin as indicated in ABX section above. She had changes on CXr showing infiltrate/atelectasis. Blood culture from 9/24 and 9/27 were negative. UA was normal on 9/24. She had neg DVT On LE both legs. Her ABX was stopped 10/1/21. She has been undergoing NH3 lowering treatment with lactulose, she had FMS to manage the diarrhea. Spiked again to 102.2 on 10/2, WBC went up to 19. Started on meropenem. No cultures taken yet and WBC is down to 11K this am.  Has profuse secretion on resp cultures- clear to yellowish. Taken off vent after trach placed 2 days ago, she is holding her satuation in mid 90% with 6 L oxygen no change. She is awake, alert and responds to command. Indicates she hurts everywhere. She is being followed by Psych for better management of agiation. 101 on 9/27 and LGF until 102.2 today. Her WBC came down to 11.9 on 9/29. It is now up again to 19.2 today. No hypotension, not on pressor. HPI:  Lyssa Lang is a 39 y.o. WHITE/NON- with PMH as listed below. Patient is intubated, limited history found on her and [facility] ICU team.MD.    Admitted with drug overdose - Neurontin.  Not much history known before that.    On admission, afebrile, wbc of 8.1, CMP ok, drug screen for opaites/cocaine/benzo neg. UA was normal.  She was intubated 9/11/21. Developed fever to 102.9 on 9/13, no leucocytosis but NH3 elevated, Legionella/Pneum urine ag good. NO DVT  On LE 9/15. Currently on VAnco/Zosyn and doxycycline. Further CORNEJO with CT for source work up in progress       C/Remy Smith 1106 Problems    Diagnosis Date Noted    Status post tracheostomy ([facility] 09/28/2021    Hypokalemia 09/21/2021    Increased ammonia level 09/14/2021    Psychosis (HCC)     Hyponatremia     Acute respiratory failure with hypoxia (HCC)     Left wrist fracture, with delayed healing, subsequent encounter 09/12/2021    Drug overdose, intentional self-harm, initial encounter ([facility] 09/12/2021    Hypoxia 09/12/2021    Hypotension after procedure 09/12/2021    Acute metabolic encephalopathy 40/60/4338     Past Medical History:   Diagnosis Date    Asthma     Bilateral ovarian cysts     Cocaine abuse ([facility]     Mental and behavioral problem     Pancreatitis     Pancreatitis     Psychosis ([facility]      Past Surgical History:   Procedure Laterality Date    HX GYN      D&C, Hysterectomy    IR INSERT GASTROSTOMY TUBE PERC  10/1/2021     History reviewed. No pertinent family history.   Social History     Socioeconomic History    Marital status:      Spouse name: Not on file    Number of children: Not on file    Years of education: Not on file    Highest education level: Not on file   Occupational History    Not on file   Tobacco Use    Smoking status: Current Every Day Smoker     Packs/day: 1.00    Smokeless tobacco: Never Used   Substance and Sexual Activity    Alcohol use: Not Currently    Drug use: Not Currently     Types: Cocaine, Marijuana     Comment: used with in past 24 hours    Sexual activity: Not Currently   Other Topics Concern    Not on file   Social History Narrative    Not on file     Social Determinants of Health Financial Resource Strain:     Difficulty of Paying Living Expenses:    Food Insecurity:     Worried About Running Out of Food in the Last Year:     [address] in the Last Year:    Transportation Needs:     Lack of Transportation (Medical):  Lack of Transportation (Non-Medical):    Physical Activity:     Days of Exercise per Week:     Minutes of Exercise per Session:    Stress:     Feeling of Stress :    Social Connections:     Frequency of Communication with Friends and Family:     Frequency of Social Gatherings with Friends and Family:     Attends Anab[employer]ist Services:     Active Member of Clubs or Organizations:     Attends Club or Organization Meetings:     Marital Status:    Intimate Partner Violence:     Fear of Current or Ex-Partner:     Emotionally Abused:     Physically Abused:     Sexually Abused:         Allergies:  Fish containing products and Toradol [ketorolac]     Medications:  Current Facility-Administered Medications   Medication Dose Route Frequency    potassium chloride 10 mEq in 100 ml IVPB  10 mEq IntraVENous Q1H    albumin human 25% (BUMINATE) solution 25 g  25 g IntraVENous Q6H    arformoteroL (BROVANA) neb solution 15 mcg  15 mcg Nebulization BID RT    budesonide (PULMICORT) 500 mcg/2 ml nebulizer suspension  500 mcg Nebulization BID RT    haloperidoL (HALDOL) tablet 5 mg  5 mg Oral BID    meropenem (MERREM) 1 g in sterile water (preservative free) 20 mL IV syringe  1 g IntraVENous Q12H    lactulose (CHRONULAC) 10 gram/15 mL solution 15 mL  15 mL Oral BID    lidocaine (XYLOCAINE) 10 mg/mL (1 %) injection 1-20 mL  1-20 mL SubCUTAneous RAD CONTINUOUS    melatonin (rapid dissolve) tablet 5 mg  5 mg Oral QHS    fentaNYL citrate (PF) injection 50 mcg  50 mcg IntraVENous Q2H PRN    albuterol-ipratropium (DUO-NEB) 2.5 MG-0.5 MG/3 ML  3 mL Nebulization Q4H PRN    dexmedeTOMidine (PRECEDEX) 400 mcg in 0.9% sodium chloride 100 mL infusion  0.1-1.5 mcg/kg/hr IntraVENous TITRATE    famotidine (PEPCID) tablet 20 mg  20 mg Oral BID    thiamine mononitrate (B-1) tablet 100 mg  100 mg Oral DAILY    folic acid (FOLVITE) tablet 1 mg  1 mg Oral DAILY    multivitamin, tx-iron-ca-min (THERA-M w/ IRON) tablet 1 Tablet  1 Tablet Oral DAILY    clonazePAM (KlonoPIN) tablet 0.5 mg  0.5 mg Oral BID    ibuprofen (ADVIL;MOTRIN) 100 mg/5 mL oral suspension 400 mg  400 mg Per NG tube Q6H PRN    insulin lispro (HUMALOG) injection   SubCUTAneous Q6H    glucose chewable tablet 16 g  4 Tablet Oral PRN    glucagon (GLUCAGEN) injection 1 mg  1 mg IntraMUSCular PRN    dextrose (D50W) injection syrg 12.5-25 g  25-50 mL IntraVENous PRN    LORazepam (ATIVAN) injection 2 mg  2 mg IntraVENous Q6H PRN    HYDROmorphone (PF) (DILAUDID) injection 1 mg  1 mg IntraVENous Q4H PRN    [Held by provider] enoxaparin (LOVENOX) injection 40 mg  40 mg SubCUTAneous Q24H    sodium chloride (NS) flush 5-40 mL  5-40 mL IntraVENous Q8H    sodium chloride (NS) flush 5-40 mL  5-40 mL IntraVENous PRN    acetaminophen (TYLENOL) tablet 650 mg  650 mg Oral Q6H PRN    Or    acetaminophen (TYLENOL) suppository 650 mg  650 mg Rectal Q6H PRN    polyethylene glycol (MIRALAX) packet 17 g  17 g Oral DAILY PRN    ondansetron (ZOFRAN ODT) tablet 4 mg  4 mg Oral Q8H PRN    Or    ondansetron (ZOFRAN) injection 4 mg  4 mg IntraVENous Q6H PRN    chlorhexidine (PERIDEX) 0.12 % mouthwash 10 mL  10 mL Oral Q12H    ELECTROLYTE REPLACEMENT PROTOCOL - Potassium Standard Dosing   1 Each Other PRN    ELECTROLYTE REPLACEMENT PROTOCOL - Magnesium   1 Each Other PRN    ELECTROLYTE REPLACEMENT PROTOCOL - Phosphorus  Standard Dosing  1 Each Other PRN    ELECTROLYTE REPLACEMENT PROTOCOL - Calcium   1 Each Other PRN     Physical Exam:    Temp (24hrs), Av.4 °F (37.4 °C), Min:98.8 °F (37.1 °C), Max:100.1 °F (37.8 °C)    Visit Vitals  /60   Pulse (!) 120   Temp 98.8 °F (37.1 °C)   Resp 29   Ht 5' 2\" (1.575 m)   Wt 71.2 kg (157 lb)   LMP 05/15/2018   SpO2 97%   BMI 28.72 kg/m²      GEN: WD acutely sick looking, on 8 L of oxygen via trach    HEENT: Unicteric, EOMI  Neck: trach with secretions - yellowish  CHEST: Non laboured breathing. B/L rhonch  CVS:RRR, no mur/gallop  ABD: Obese/soft. Non tender. PEG in place with TF. FMS with loose OP noted  ULISES: No zamora  EXT: No apparent swelling or redness on UE/LE joints. No induration on PIV site on both arms  Skin: Dry and intact. No rash, no redness. CNS: Restrained, moves all extremities.  Awake, follows command but seems restless     Microbiology  All Micro Results     Procedure Component Value Units Date/Time    CULTURE, BLOOD [[id number]] Collected: 10/04/21 1405    Order Status: Completed Specimen: Blood Updated: 10/05/21 0645     Special Requests: NO SPECIAL REQUESTS        Culture result: NO GROWTH AFTER 16 HOURS       CULTURE, BLOOD [[id number]] Collected: 10/04/21 1305    Order Status: Completed Specimen: Blood Updated: 10/05/21 0645     Special Requests: NO SPECIAL REQUESTS        Culture result: NO GROWTH AFTER 16 HOURS       CULTURE, MRSA [[id number]]     Order Status: Sent Specimen: Nasal from Nares     CULTURE, RESPIRATORY/SPUTUM/BRONCH [place] STAIN [[id number]]     Order Status: Canceled Specimen: Sputum,ET Suction     CULTURE, BLOOD [[id number]] Collected: 09/27/21 1005    Order Status: Completed Specimen: Blood Updated: 10/03/21 0723     Special Requests: NO SPECIAL REQUESTS        Culture result: NO GROWTH 6 DAYS       CULTURE, BLOOD [[id number]] Collected: 09/27/21 1005    Order Status: Completed Specimen: Blood Updated: 10/03/21 0723     Special Requests: NO SPECIAL REQUESTS        Culture result: NO GROWTH 6 DAYS       CULTURE, BLOOD [[id number]] Collected: 09/24/21 0740    Order Status: Completed Specimen: Blood Updated: 09/30/21 0819     Special Requests: NO SPECIAL REQUESTS        Culture result: NO GROWTH 6 DAYS       CULTURE, RESPIRATORY/SPUTUM/BRONCH W GRAM STAIN [[id number]]  (Abnormal)  (Susceptibility) Collected: 09/24/21 0654    Order Status: Completed Specimen: Sputum from Tracheal Aspirate Updated: 09/27/21 1147     Special Requests: NO SPECIAL REQUESTS        GRAM STAIN RARE WBCS SEEN               RARE EPITHELIAL CELLS SEEN            NO ORGANISMS SEEN        Culture result:       MODERATE STAPHYLOCOCCUS AUREUS                  NO NORMAL RESPIRATORY DINA ISOLATED          COVID-19 RAPID TEST [[id number]] Collected: 09/24/21 1700    Order Status: Completed Specimen: Nasopharyngeal Updated: 09/24/21 1849     Specimen source Nasopharyngeal        COVID-19 rapid test Not detected        Comment: Rapid Abbott ID Now       Rapid NAAT:  The specimen is NEGATIVE for SARS-CoV-2, the novel coronavirus associated with COVID-19. Negative results should be treated as presumptive and, if inconsistent with clinical signs and symptoms or necessary for patient management, should be tested with an alternative molecular assay. Negative results do not preclude SARS-CoV-2 infection and should not be used as the sole basis for patient management decisions. This test has been authorized by the FDA under an Emergency Use Authorization (EUA) for use by authorized laboratories. Fact sheet for Healthcare Providers: [employer]Update[employer]co.nz  Fact sheet for Patients: [employer]co.nz       Methodology: Isothermal Nucleic Acid Amplification         CULTURE, URINE [[id number]]     Order Status: Canceled Specimen: Urine from Clean catch     CULTURE, CSF  TUBE 2 [[id number]] Collected: 09/16/21 1434    Order Status: Completed Specimen: Cerebrospinal Fluid Updated: 09/23/21 1241     Special Requests: TUBE 3, CULTURE AND SENSITIVTY AND GRAM STAIN.      GRAM STAIN RARE WBCS SEEN         NO ORGANISMS SEEN        Culture result: NO GROWTH 7 DAYS       CULTURE, BLOOD [[id number]] Collected: 09/14/21 0515    Order Status: Completed Specimen: Blood Updated: 09/20/21 0832     Special Requests: NO SPECIAL REQUESTS        Culture result: NO GROWTH 6 DAYS       CULTURE, BLOOD [[id number]] Collected: 09/14/21 0500    Order Status: Completed Specimen: Blood Updated: 09/20/21 0832     Special Requests: NO SPECIAL REQUESTS        Culture result: NO GROWTH 6 DAYS       MENINGITIS PATHOGENS PANEL, CSF (BY PCR) [[id number]] Collected: 09/16/21 1434    Order Status: Completed Specimen: Cerebrospinal Fluid Updated: 09/17/21 0050     Escherichia coli K1 Not detected        Haemophilus Influenzae Not detected        Listeria Monocytogenes Not detected        Neisseria Meningitidis Not detected        Streptococcus Agalactiae Not detected        Streptococcus Pneumoniae Not detected        Cytomegalovirus Not detected        Enterovirus Not detected        Herpes Simplex Virus 1 Not detected        Comment: In patients who have negative herpes simplex 1 and 2 PCR results, do not modify treatment, confirm with alternate testing. Herpes Simplex Virus 2 Not detected        Comment: In patients who have negative herpes simplex 1 and 2 PCR results, do not modify treatment, confirm with alternate testing. Human Herpesvirus 6 Not detected        Human Parechovirus Not detected        Varicella Zoster Virus Not detected        Crypto. neoformans/gattii Not detected       MENINGITIS PATHOGENS PANEL, CSF (BY PCR) [[id number]] Collected: 09/16/21 1545    Order Status: Canceled Specimen: Cerebrospinal Fluid     HSV 1 AND 2 BY PCR [[id number]] Collected: 09/16/21 1434    Order Status: Canceled Specimen: Other     CBLUD-54 RAPID TEST [[id number]] Collected: 09/16/21 1000    Order Status: Completed Specimen: Nasopharyngeal Updated: 09/16/21 1032     Specimen source Nasopharyngeal        COVID-19 rapid test Not detected        Comment: Rapid Abbott ID Now       Rapid NAAT:  The specimen is NEGATIVE for SARS-CoV-2, the novel coronavirus associated with COVID-19.        Negative results should be treated as presumptive and, if inconsistent with clinical signs and symptoms or necessary for patient management, should be tested with an alternative molecular assay. Negative results do not preclude SARS-CoV-2 infection and should not be used as the sole basis for patient management decisions. This test has been authorized by the FDA under an Emergency Use Authorization (EUA) for use by authorized laboratories.    Fact sheet for Healthcare Providers: [employer]  Fact sheet for Patients: [employer].nz       Methodology: Isothermal Nucleic Acid Amplification         LEGIONELLA PNEUMOPHILA AG, URINE [[id number]] Collected: 09/14/21 2315    Order Status: Completed Specimen: Urine, random Updated: 09/15/21 1042     Legionella Ag, urine Negative       STREP PNEUMO AG, URINE [[id number]] Collected: 09/14/21 2315    Order Status: Completed Specimen: Urine, random Updated: 09/15/21 1042     Strep pneumo Ag, urine Negative       RESPIRATORY VIRUS PANEL W/COVID-19, PCR [[id number]] Collected: 09/14/21 1832    Order Status: Completed Specimen: Nasopharyngeal Updated: 09/14/21 2234     Adenovirus Not detected        Coronavirus 229E Not detected        Coronavirus HKU1 Not detected        Coronavirus CVNL63 Not detected        Coronavirus OC43 Not detected        SARS-CoV-2, PCR Not detected        Metapneumovirus Not detected        Rhinovirus and Enterovirus Not detected        Influenza A Not detected        Influenza A, subtype H1 Not detected        Influenza A, subtype H3 Not detected        INFLUENZA A H1N1 PCR Not detected        Influenza B Not detected        Parainfluenza 1 Not detected        Parainfluenza 2 Not detected        Parainfluenza 3 Not detected        Parainfluenza virus 4 Not detected        RSV by PCR Not detected        B. parapertussis, PCR Not detected        Bordetella pertussis - PCR Not detected        Chlamydophila pneumoniae DNA, QL, PCR Not detected        Mycoplasma pneumoniae DNA, QL, PCR Not detected       CULTURE, BLOOD [[id number]] Collected: 09/14/21 1700    Order Status: Canceled Specimen: Blood     CULTURE, URINE [[id number]] Collected: 09/13/21 2330    Order Status: Canceled Specimen: Cath Urine     RESPIRATORY VIRUS PANEL W/COVID-19, PCR [[id number]]     Order Status: Canceled Specimen: NASOPHARYNGEAL SWAB     COVID-19 RAPID TEST [[id number]]     Order Status: Canceled     COVID-19 RAPID TEST [[id number]] Collected: 09/13/21 0737    Order Status: Completed Specimen: Nasopharyngeal Updated: 09/13/21 0811     Specimen source Nasopharyngeal        COVID-19 rapid test Not detected        Comment: Rapid Abbott ID Now       Rapid NAAT:  The specimen is NEGATIVE for SARS-CoV-2, the novel coronavirus associated with COVID-19. Negative results should be treated as presumptive and, if inconsistent with clinical signs and symptoms or necessary for patient management, should be tested with an alternative molecular assay. Negative results do not preclude SARS-CoV-2 infection and should not be used as the sole basis for patient management decisions. This test has been authorized by the FDA under an Emergency Use Authorization (EUA) for use by authorized laboratories.    Fact sheet for Healthcare Providers: [employer]Update.co.nz  Fact sheet for Patients: [employer].nz       Methodology: Isothermal Nucleic Acid Amplification         COVID-19 RAPID TEST [[id number]]     Order Status: Canceled            Lab results:    Chemistry  Recent Labs     10/05/21  0552 10/04/21  0920 10/03/21  0400   * 109* 107*    139 138   K 3.7 3.7 4.8    106 106   CO2 25 26 23   BUN 8 9 9   CREA 0.27* 0.33* 0.36*   CA 9.3 8.9 9.1   AGAP 9 7 9   BUCR 30* 27* 25*   * 162* 220*   TP 6.7 6.4 6.4   ALB 4.0 3.6 2.8*   GLOB 2.7 2.8 3.6   AGRAT 1.5 1.3 0.8       CBC w/ Diff  Recent Labs     10/05/21  0552 10/04/21  1350 10/04/21  0920 10/03/21  0630 10/03/21  0630   WBC 8.8  --  11.1  --  19.2*   RBC 2.63*  --  2.42*  --  3.12*   HGB 7.6* 7.4* 7.0*   < > 9.2*   HCT 23.3* 22.6* 21.5*   < > 28.4*     --  396  --  450*   GRANS 72  --  76*  --  82*   LYMPH 18*  --  16*  --  11*   EOS 3  --  1  --  1    < > = values in this interval not displayed. Imaging: report posted below as per radiologist   CXR- 9/17    1. Unchanged, adequately positioned endotracheal tube and enteric tube. 2. Unchanged left and right basilar patchy opacities, atelectasis versus  Infiltrate. 10/4 CXR  Ongoing increased left greater than right perihilar and left basilar opacities  may represent combination of worsening asymmetric pulmonary edema and infiltrate  with atelectasis. Possible trace left pleural effusion.   10/5- CXR:    Mild left lower lower lung zone opacities, unchanged.

## 2021-10-05 NOTE — PROGRESS NOTES
Problem: Ventilator Management  Goal: *Adequate oxygenation and ventilation  Outcome: Progressing Towards Goal  Goal: *Patient maintains clear airway/free of aspiration  Outcome: Progressing Towards Goal  Goal: *Absence of infection signs and symptoms  Outcome: Progressing Towards Goal  Goal: *Normal spontaneous ventilation  Outcome: Progressing Towards Goal     Problem: Patient Education: Go to Patient Education Activity  Goal: Patient/Family Education  Outcome: Progressing Towards Goal     Problem: Non-Violent Restraints  Goal: Removal from restraints as soon as assessed to be safe  Outcome: Progressing Towards Goal  Goal: No harm/injury to patient while restraints in use  Outcome: Progressing Towards Goal  Goal: Patient's dignity will be maintained  Outcome: Progressing Towards Goal  Goal: Patient Interventions  Outcome: Progressing Towards Goal     Problem: Falls - Risk of  Goal: *Absence of Falls  Description: Document Seng Jerry Fall Risk and appropriate interventions in the flowsheet.   Outcome: Progressing Towards Goal  Note: Fall Risk Interventions:     Mentation Interventions: Bed/chair exit alarm, Adequate sleep, hydration, pain control, Evaluate medications/consider consulting pharmacy, Door open when patient unattended, More frequent rounding, Reorient patient, Room close to nurse's station, Toileting rounds    Medication Interventions: Bed/chair exit alarm, Evaluate medications/consider consulting pharmacy    Elimination Interventions: Bed/chair exit alarm, Toileting schedule/hourly rounds    History of Falls Interventions: Bed/chair exit alarm, Door open when patient unattended, Evaluate medications/consider consulting pharmacy    Problem: Patient Education: Go to Patient Education Activity  Goal: Patient/Family Education  Outcome: Progressing Towards Goal     Problem: Risk for Spread of Infection  Goal: Prevent transmission of infectious organism to others  Description: Prevent the transmission of infectious organisms to other patients, staff members, and visitors. Outcome: Progressing Towards Goal     Problem: Patient Education:  Go to Education Activity  Goal: Patient/Family Education  Outcome: Progressing Towards Goal     Problem: Pressure Injury - Risk of  Goal: *Prevention of pressure injury  Description: Document Remy Scale and appropriate interventions in the flowsheet.   Outcome: Progressing Towards Goal     Problem: Patient Education: Go to Patient Education Activity  Goal: Patient/Family Education  Outcome: Progressing Towards Goal     Steffen Edwards RN

## 2021-10-05 NOTE — PROGRESS NOTES
Bedside shift change report given to Jorge Serrano RN (oncoming nurse) by Artur Rolon RN (offgoing nurse). Report included the following information SBAR, Kardex and MAR.

## 2021-10-05 NOTE — PROGRESS NOTES
1900- shift change report given to JAIDEN Ahmadi (oncoming nurse) by [employer]. Talia Bolton (offgoing nurse). Report included the following information SBAR, Kardex, ED Summary, Intake/Output, MAR, Recent Results, Med Rec Status and Cardiac Rhythm NSR.     2000- Shift assessment completed. Pt lightly sedated on precedex and trached. Ela Byrne current LOC but follows commands and reports pain. Oral, long, trach, and external catheter care performed. Adult nonverbal scale shows 7/10 pain. Dilaudid 1mg IV push given. Will continue to monitor. 2115- Adult nonverbal scale still shows 7/10 pain. Fentanyl 50mcg IV push given. Will continue to monitor. 2200- Pt still agitated and thrashing legs around Ativan 2mg IV push given. Will continue to monitor. 2320- Pt pain level still hadn't come down from previous 7/10. Fentanyl 50mcg IV push given. Will continue to monitor. 0000- Reassessment completed. No changes from previous assessment. 65- Adult nonverbal scale shows 7/10 pain. Dilaudid 1mg IV push given. Will continue to monitor. 65- Adult nonverbal scale shows 7/10 pain. Fentanyl 50mcg IV push given. Morning EKG also grabbed at this time. Will continue to monitor. 0400- Reassessment completed. No changes from previous assessment. 0500- Adult nonverbal scale shows 7/10 pain. Dilaudid 1mg IV push given. Will continue to monitor. 0700- shift change report given to Noel Roa Aramis (oncoming nurse) by JAIDEN Amhadi (offgoing nurse). Report included the following information SBAR, Kardex, ED Summary, Intake/Output, MAR, Recent Results, Med Rec Status and Cardiac Rhythm NSR.

## 2021-10-05 NOTE — PROGRESS NOTES
Hospitalist Progress Note-critical care note     Patient: Linn Pearce MRN: [id number]  CSN: [id number]    YOB: 1980  Age: 39 y.o. Sex: female    DOA: 9/11/2021 LOS:  LOS: 23 days            Chief complaint: wrist fracture , drug overdose , acute respiratory failure with hypoxia, psychosis     Assessment/Plan         Hospital Problems  Date Reviewed: 9/6/2015        Codes Class Noted POA    Status post tracheostomy ([facility] ICD-10-CM: Z93.0  ICD-9-CM: V44.0  9/28/2021 Unknown        Hypokalemia ICD-10-CM: E87.6  ICD-9-CM: 276.8  9/21/2021 Unknown        Increased ammonia level ICD-10-CM: R79.89  ICD-9-CM: 790.6  9/14/2021 Unknown        Psychosis ([facility] ICD-10-CM: F29  ICD-9-CM: 298.9  Unknown Unknown        Hyponatremia ICD-10-CM: E87.1  ICD-9-CM: 276.1  Unknown Yes        Acute respiratory failure with hypoxia ([facility] ICD-10-CM: J96.01  ICD-9-CM: 518.81  Unknown Yes        Left wrist fracture, with delayed healing, subsequent encounter ICD-10-CM: S62.102G  ICD-9-CM: V54.19  9/12/2021 Unknown        * (Principal) Drug overdose, intentional self-harm, initial encounter ([facility] ICD-10-CM: T50.902A  ICD-9-CM: 977.9, E950.5  9/12/2021 Yes        Hypoxia ICD-10-CM: R09.02  ICD-9-CM: 799.02  9/12/2021 Yes        Hypotension after procedure ICD-10-CM: I95.81  ICD-9-CM: 458.29  9/12/2021 Yes        Acute metabolic encephalopathy YQR-36-GD: G93.41  ICD-9-CM: 348.31  9/12/2021 Yes                  Linn Pearce is a 39 y.o. female who is standing history of multidrug use/abuse, previous drug-seeking behavior and mental health issues otherwise unspecified arrives via EMS with acute metabolic encephalopathy and lethargy. Admitted for likely gabapentin overdose. She was intubated in ER, ct head no acute issue, LP done due to fever even on abx, no meningitis noted. Now she is peg and trach          Acute respiratory failure with hypoxia   Intubated on 9/12    Trach on 9/20/21.    So far stable on trach collar down to  8 L oxygen    Continue breathing tx . Post tracheostomy   Continue local trach care     Fever and leukocytosis   Afebrile overnight   ID on board  Sputum culture 9/24/2021: MSSA. Colonization per id   On merrem       Anemia  H/h drop  Will continue monitoring, so far no bleeding reported   Occult stool negative    no bleeding reported   Upper PVL negative for dvt           Acute metabolic encephalopathy   pecedex   Ct head no acute process         Gabapentin overdose with lethargy and AMS    S/p procedural stomach emptying in ED with charcoal and sorbitol   Continue monitoring the side affect   Psych f/u          elevated ammonia level  Continue   Lactulose,   Continue ammonia monitoring       Psychosis , hx of  Ekbom's delusional parasitosis   Need psych evaluation later   On  Klonopin/haldol -per psych recommendation   Continue cardiac monitoring     left wrist fracture: immobilized -poa   Appreciated ortho on board-f/u with Dr. Alon Almonte as out-pt     Speech follow up        rn stable overnight     Disposition :tbd,   Review of systems:  Unable to obtain due to trach collar   Vital signs/Intake and Output:  Visit Vitals  /60   Pulse (!) 120   Temp 98.8 °F (37.1 °C)   Resp 29   Ht 5' 2\" (1.575 m)   Wt 71.2 kg (157 lb)   SpO2 97%   BMI 28.72 kg/m²     Current Shift:  No intake/output data recorded. Last three shifts:  10/03 1901 - 10/05 0700  In: 1945.1 [I.V.:1265.1]  Out: 0553 [Urine:2850; Drains:600]    Physical Exam:  General: Open eyes per voice   HEENT: NC, Atraumatic. Trach collar noted   Lungs: CTA Bilaterally. No Wheezing/Rhonchi/Rales. Heart:  rrr ,  No murmur, No Rubs, No Gallops  Abdomen: Soft, Non distended, Non tender.   +Bowel sounds, peg tube noted   Extremities: No c/c.immobilzer noted on left wrist   Psych:   Calm   Neurologic:  Open eyes per voice and follow the command           Labs: Results:       Chemistry Recent Labs     10/05/21  0552 10/04/21  0920 10/03/21  0400   * 109* 107*    139 138   K 3.7 3.7 4.8    106 106   CO2 25 26 23   BUN 8 9 9   CREA 0.27* 0.33* 0.36*   CA 9.3 8.9 9.1   AGAP 9 7 9   BUCR 30* 27* 25*   * 162* 220*   TP 6.7 6.4 6.4   ALB 4.0 3.6 2.8*   GLOB 2.7 2.8 3.6   AGRAT 1.5 1.3 0.8      CBC w/Diff Recent Labs     10/05/21  0552 10/04/21  1350 10/04/21  0920 10/03/21  0630 10/03/21  0630   WBC 8.8  --  11.1  --  19.2*   RBC 2.63*  --  2.42*  --  3.12*   HGB 7.6* 7.4* 7.0*   < > 9.2*   HCT 23.3* 22.6* 21.5*   < > 28.4*     --  396  --  450*   GRANS 72  --  76*  --  82*   LYMPH 18*  --  16*  --  11*   EOS 3  --  1  --  1    < > = values in this interval not displayed. Cardiac Enzymes No results for input(s): CPK, CKND1, ZENON in the last 72 hours. No lab exists for component: CKRMB, TROIP   Coagulation No results for input(s): PTP, INR, APTT, INREXT, INREXT in the last 72 hours. Lipid Panel No results found for: CHOL, CHOLPOCT, CHOLX, CHLST, CHOLV, 281562, HDL, HDLP, LDL, LDLC, DLDLP, 874244, VLDLC, VLDL, TGLX, TRIGL, TRIGP, TGLPOCT, CHHD, CHHDX   BNP No results for input(s): BNPP in the last 72 hours. Liver Enzymes Recent Labs     10/05/21  0552   TP 6.7   ALB 4.0   *      Thyroid Studies No results found for: T4, T3U, TSH, TSHEXT, TSHEXT     Procedures/imaging: see electronic medical records for all procedures/Xrays and details which were not copied into this note but were reviewed prior to creation of Plan    XR WRIST LT AP/LAT/OBL MIN 3V    Result Date: 8/19/2021  EXAM: XR WRIST LT AP/LAT/OBL MIN 3V CLINICAL INDICATION/HISTORY: Fall with LEFT wrist pain and swelling -Additional: None COMPARISON: None TECHNIQUE: 3 views of the left wrist _______________ FINDINGS: BONES: Comminuted, impacted fracture of the distal radius with slight dorsal angulation of the distal fracture fragment. No aggressive appearing osseous lytic or blastic lesion.  SOFT TISSUES: Unremarkable. _______________     Comminuted, impacted fracture of the distal radius. XR CHEST PORT    Result Date: 9/13/2021  EXAM: XR CHEST PORT CLINICAL INDICATION/HISTORY: Acute respiratory failure, ET tub position -Additional: None COMPARISON: One day prior TECHNIQUE: Portable frontal view of the chest _______________ FINDINGS: SUPPORT DEVICES: Endotracheal and enteric tubes unchanged. HEART AND MEDIASTINUM: Cardiomediastinal silhouette within normal limits. LUNGS AND PLEURAL SPACES: No dense consolidation, large effusion or pneumothorax. _______________     No acute cardiopulmonary abnormality. XR CHEST PORT    Result Date: 9/11/2021  MEDICAL RECORDS NUMBER: [id number] PROCEDURE:  Single view of the chest DATE: 9/11/2021 9:09 PM HISTORY: 39years old Female. post ett Comparison: 8/4/2021 FINDINGS: The patient has been intubated with appropriate placement of the endotracheal tube. There is no enteric tube passing below the level of the diaphragm. There is no significant effusion. There is no significant pneumothorax. Cardiomediastinal silhouette is within normal limits. There is no evidence of a focal pulmonary infiltrate or mass. 1. There is no significant or acute cardiopulmonary process. The patient has been intubated with appropriate placement of the endotracheal tube. There is no enteric tube passing below the level of the diaphragm. This report has been generated using voice recognition software. XR ABD PORT  1 V    Result Date: 9/12/2021  EXAM: Frontal view of the abdomen CLINICAL INDICATION/HISTORY: NG tube placement COMPARISON: None. _______________ FINDINGS: NG/OG tube in place with the tip in the left upper quadrant in the region of the gastric fundus. No bowel obstruction. Moderate colonic stool burden. _______________     1. NG/OG tube is in good position with the tip in the left upper quadrant in the region of the gastric fundus. 2. Moderate colonic stool burden.       Pily Moya MD

## 2021-10-06 ENCOUNTER — APPOINTMENT (OUTPATIENT)
Dept: GENERAL RADIOLOGY | Age: 41
DRG: 004 | End: 2021-10-06
Attending: INTERNAL MEDICINE
Payer: MEDICAID

## 2021-10-06 PROBLEM — R78.81 BACTEREMIA DUE TO GRAM-POSITIVE BACTERIA: Status: ACTIVE | Noted: 2021-10-06

## 2021-10-06 LAB
ALBUMIN SERPL-MCNC: 4.2 G/DL (ref 3.4–5)
ALBUMIN/GLOB SERPL: 1.5 {RATIO} (ref 0.8–1.7)
ALP SERPL-CCNC: 134 U/L (ref 45–117)
ALT SERPL-CCNC: 92 U/L (ref 13–56)
AMMONIA PLAS-SCNC: 47 UMOL/L (ref 11–32)
ANION GAP SERPL CALC-SCNC: 8 MMOL/L (ref 3–18)
AST SERPL-CCNC: 31 U/L (ref 10–38)
ATRIAL RATE: 79 BPM
BASOPHILS # BLD: 0 K/UL (ref 0–0.1)
BASOPHILS NFR BLD: 0 % (ref 0–2)
BILIRUB SERPL-MCNC: 0.4 MG/DL (ref 0.2–1)
BUN SERPL-MCNC: 10 MG/DL (ref 7–18)
BUN/CREAT SERPL: 29 (ref 12–20)
CALCIUM SERPL-MCNC: 9.8 MG/DL (ref 8.5–10.1)
CALCULATED P AXIS, ECG09: 49 DEGREES
CALCULATED R AXIS, ECG10: 17 DEGREES
CALCULATED T AXIS, ECG11: 15 DEGREES
CHLORIDE SERPL-SCNC: 106 MMOL/L (ref 100–111)
CO2 SERPL-SCNC: 25 MMOL/L (ref 21–32)
CREAT SERPL-MCNC: 0.34 MG/DL (ref 0.6–1.3)
DIAGNOSIS, 93000: NORMAL
DIFFERENTIAL METHOD BLD: ABNORMAL
EOSINOPHIL # BLD: 0.3 K/UL (ref 0–0.4)
EOSINOPHIL NFR BLD: 3 % (ref 0–5)
ERYTHROCYTE [DISTWIDTH] IN BLOOD BY AUTOMATED COUNT: 12.6 % (ref 11.6–14.5)
GLOBULIN SER CALC-MCNC: 2.8 G/DL (ref 2–4)
GLUCOSE BLD STRIP.AUTO-MCNC: 113 MG/DL (ref 70–110)
GLUCOSE BLD STRIP.AUTO-MCNC: 135 MG/DL (ref 70–110)
GLUCOSE BLD STRIP.AUTO-MCNC: 138 MG/DL (ref 70–110)
GLUCOSE BLD STRIP.AUTO-MCNC: 159 MG/DL (ref 70–110)
GLUCOSE SERPL-MCNC: 127 MG/DL (ref 74–99)
HCT VFR BLD AUTO: 23.5 % (ref 35–45)
HGB BLD-MCNC: 7.6 G/DL (ref 12–16)
LYMPHOCYTES # BLD: 2.2 K/UL (ref 0.9–3.6)
LYMPHOCYTES NFR BLD: 21 % (ref 21–52)
MAGNESIUM SERPL-MCNC: 2 MG/DL (ref 1.6–2.6)
MCH RBC QN AUTO: 29.1 PG (ref 24–34)
MCHC RBC AUTO-ENTMCNC: 32.3 G/DL (ref 31–37)
MCV RBC AUTO: 90 FL (ref 78–100)
MONOCYTES # BLD: 0.5 K/UL (ref 0.05–1.2)
MONOCYTES NFR BLD: 5 % (ref 3–10)
NEUTS SEG # BLD: 7.1 K/UL (ref 1.8–8)
NEUTS SEG NFR BLD: 70 % (ref 40–73)
P-R INTERVAL, ECG05: 182 MS
PHOSPHATE SERPL-MCNC: 4.1 MG/DL (ref 2.5–4.9)
PLATELET # BLD AUTO: 409 K/UL (ref 135–420)
PMV BLD AUTO: 9.2 FL (ref 9.2–11.8)
POTASSIUM SERPL-SCNC: 3.8 MMOL/L (ref 3.5–5.5)
POTASSIUM SERPL-SCNC: 4.3 MMOL/L (ref 3.5–5.5)
PROT SERPL-MCNC: 7 G/DL (ref 6.4–8.2)
Q-T INTERVAL, ECG07: 364 MS
QRS DURATION, ECG06: 84 MS
QTC CALCULATION (BEZET), ECG08: 417 MS
RBC # BLD AUTO: 2.61 M/UL (ref 4.2–5.3)
SODIUM SERPL-SCNC: 139 MMOL/L (ref 136–145)
VENTRICULAR RATE, ECG03: 79 BPM
WBC # BLD AUTO: 10.2 K/UL (ref 4.6–13.2)

## 2021-10-06 PROCEDURE — 84132 ASSAY OF SERUM POTASSIUM: CPT

## 2021-10-06 PROCEDURE — 74011000258 HC RX REV CODE- 258: Performed by: INTERNAL MEDICINE

## 2021-10-06 PROCEDURE — 74011250636 HC RX REV CODE- 250/636: Performed by: INTERNAL MEDICINE

## 2021-10-06 PROCEDURE — 74011250637 HC RX REV CODE- 250/637: Performed by: INTERNAL MEDICINE

## 2021-10-06 PROCEDURE — 85025 COMPLETE CBC W/AUTO DIFF WBC: CPT

## 2021-10-06 PROCEDURE — 87077 CULTURE AEROBIC IDENTIFY: CPT

## 2021-10-06 PROCEDURE — 74011000250 HC RX REV CODE- 250: Performed by: INTERNAL MEDICINE

## 2021-10-06 PROCEDURE — P9047 ALBUMIN (HUMAN), 25%, 50ML: HCPCS | Performed by: INTERNAL MEDICINE

## 2021-10-06 PROCEDURE — 87186 SC STD MICRODIL/AGAR DIL: CPT

## 2021-10-06 PROCEDURE — 93005 ELECTROCARDIOGRAM TRACING: CPT

## 2021-10-06 PROCEDURE — 94640 AIRWAY INHALATION TREATMENT: CPT

## 2021-10-06 PROCEDURE — 77010033678 HC OXYGEN DAILY

## 2021-10-06 PROCEDURE — 82962 GLUCOSE BLOOD TEST: CPT

## 2021-10-06 PROCEDURE — 74011250636 HC RX REV CODE- 250/636: Performed by: FAMILY MEDICINE

## 2021-10-06 PROCEDURE — 84100 ASSAY OF PHOSPHORUS: CPT

## 2021-10-06 PROCEDURE — 2709999900 HC NON-CHARGEABLE SUPPLY

## 2021-10-06 PROCEDURE — 87205 SMEAR GRAM STAIN: CPT

## 2021-10-06 PROCEDURE — 82140 ASSAY OF AMMONIA: CPT

## 2021-10-06 PROCEDURE — 65610000006 HC RM INTENSIVE CARE

## 2021-10-06 PROCEDURE — 71045 X-RAY EXAM CHEST 1 VIEW: CPT

## 2021-10-06 PROCEDURE — 83735 ASSAY OF MAGNESIUM: CPT

## 2021-10-06 PROCEDURE — 36415 COLL VENOUS BLD VENIPUNCTURE: CPT

## 2021-10-06 RX ORDER — POTASSIUM CHLORIDE 7.45 MG/ML
10 INJECTION INTRAVENOUS
Status: COMPLETED | OUTPATIENT
Start: 2021-10-06 | End: 2021-10-06

## 2021-10-06 RX ORDER — FENTANYL 50 UG/1
1 PATCH TRANSDERMAL
Status: DISCONTINUED | OUTPATIENT
Start: 2021-10-06 | End: 2021-11-10

## 2021-10-06 RX ORDER — CLONAZEPAM 0.5 MG/1
0.5 TABLET ORAL ONCE
Status: COMPLETED | OUTPATIENT
Start: 2021-10-06 | End: 2021-10-06

## 2021-10-06 RX ORDER — CLONAZEPAM 0.5 MG/1
1 TABLET ORAL 2 TIMES DAILY
Status: DISCONTINUED | OUTPATIENT
Start: 2021-10-06 | End: 2021-10-08

## 2021-10-06 RX ORDER — VANCOMYCIN/0.9 % SOD CHLORIDE 1.5G/250ML
1500 PLASTIC BAG, INJECTION (ML) INTRAVENOUS ONCE
Status: COMPLETED | OUTPATIENT
Start: 2021-10-06 | End: 2021-10-06

## 2021-10-06 RX ORDER — VANCOMYCIN HYDROCHLORIDE
1250 EVERY 12 HOURS
Status: DISCONTINUED | OUTPATIENT
Start: 2021-10-07 | End: 2021-10-10

## 2021-10-06 RX ADMIN — POTASSIUM CHLORIDE 10 MEQ: 10 INJECTION, SOLUTION INTRAVENOUS at 08:00

## 2021-10-06 RX ADMIN — BUDESONIDE 500 MCG: 0.5 INHALANT RESPIRATORY (INHALATION) at 19:47

## 2021-10-06 RX ADMIN — FAMOTIDINE 20 MG: 20 TABLET ORAL at 10:01

## 2021-10-06 RX ADMIN — FAMOTIDINE 20 MG: 20 TABLET ORAL at 21:13

## 2021-10-06 RX ADMIN — FENTANYL CITRATE 50 MCG: 50 INJECTION INTRAMUSCULAR; INTRAVENOUS at 01:56

## 2021-10-06 RX ADMIN — POTASSIUM CHLORIDE 10 MEQ: 10 INJECTION, SOLUTION INTRAVENOUS at 06:42

## 2021-10-06 RX ADMIN — HALOPERIDOL 5 MG: 5 TABLET ORAL at 21:13

## 2021-10-06 RX ADMIN — HYDROMORPHONE HYDROCHLORIDE 1 MG: 1 INJECTION, SOLUTION INTRAMUSCULAR; INTRAVENOUS; SUBCUTANEOUS at 06:24

## 2021-10-06 RX ADMIN — ENOXAPARIN SODIUM 40 MG: 100 INJECTION SUBCUTANEOUS at 18:13

## 2021-10-06 RX ADMIN — FENTANYL CITRATE 50 MCG: 50 INJECTION INTRAMUSCULAR; INTRAVENOUS at 05:30

## 2021-10-06 RX ADMIN — MEROPENEM 1 G: 1 INJECTION, POWDER, FOR SOLUTION INTRAVENOUS at 00:04

## 2021-10-06 RX ADMIN — ARFORMOTEROL TARTRATE 15 MCG: 15 SOLUTION RESPIRATORY (INHALATION) at 19:47

## 2021-10-06 RX ADMIN — Medication 5 MG: at 21:13

## 2021-10-06 RX ADMIN — DEXMEDETOMIDINE HYDROCHLORIDE 0.7 MCG/KG/HR: 100 INJECTION, SOLUTION INTRAVENOUS at 01:39

## 2021-10-06 RX ADMIN — FOLIC ACID 1 MG: 1 TABLET ORAL at 10:01

## 2021-10-06 RX ADMIN — BUDESONIDE 500 MCG: 0.5 INHALANT RESPIRATORY (INHALATION) at 07:38

## 2021-10-06 RX ADMIN — 0.12% CHLORHEXIDINE GLUCONATE 10 ML: 1.2 RINSE ORAL at 10:00

## 2021-10-06 RX ADMIN — THIAMINE HCL TAB 100 MG 100 MG: 100 TAB at 10:01

## 2021-10-06 RX ADMIN — LACTULOSE 15 ML: 10 SOLUTION ORAL at 21:13

## 2021-10-06 RX ADMIN — CLONAZEPAM 0.5 MG: 0.5 TABLET ORAL at 13:12

## 2021-10-06 RX ADMIN — ALBUMIN (HUMAN) 25 G: 0.25 INJECTION, SOLUTION INTRAVENOUS at 18:13

## 2021-10-06 RX ADMIN — SODIUM CHLORIDE 10 ML: 9 INJECTION, SOLUTION INTRAMUSCULAR; INTRAVENOUS; SUBCUTANEOUS at 13:18

## 2021-10-06 RX ADMIN — VANCOMYCIN HYDROCHLORIDE 1500 MG: 10 INJECTION, POWDER, LYOPHILIZED, FOR SOLUTION INTRAVENOUS at 13:16

## 2021-10-06 RX ADMIN — FENTANYL CITRATE 50 MCG: 50 INJECTION INTRAMUSCULAR; INTRAVENOUS at 04:43

## 2021-10-06 RX ADMIN — MEROPENEM 1 G: 1 INJECTION, POWDER, FOR SOLUTION INTRAVENOUS at 13:12

## 2021-10-06 RX ADMIN — Medication 1 TABLET: at 10:01

## 2021-10-06 RX ADMIN — LORAZEPAM 2 MG: 2 INJECTION INTRAMUSCULAR at 04:54

## 2021-10-06 RX ADMIN — 0.12% CHLORHEXIDINE GLUCONATE 10 ML: 1.2 RINSE ORAL at 21:13

## 2021-10-06 RX ADMIN — LACTULOSE 15 ML: 10 SOLUTION ORAL at 10:00

## 2021-10-06 RX ADMIN — HYDROMORPHONE HYDROCHLORIDE 1 MG: 1 INJECTION, SOLUTION INTRAMUSCULAR; INTRAVENOUS; SUBCUTANEOUS at 02:32

## 2021-10-06 RX ADMIN — HALOPERIDOL 5 MG: 5 TABLET ORAL at 10:00

## 2021-10-06 RX ADMIN — SODIUM CHLORIDE 10 ML: 9 INJECTION, SOLUTION INTRAMUSCULAR; INTRAVENOUS; SUBCUTANEOUS at 21:13

## 2021-10-06 RX ADMIN — CLONAZEPAM 0.5 MG: 0.5 TABLET ORAL at 10:01

## 2021-10-06 RX ADMIN — ARFORMOTEROL TARTRATE 15 MCG: 15 SOLUTION RESPIRATORY (INHALATION) at 07:38

## 2021-10-06 RX ADMIN — ALBUMIN (HUMAN) 25 G: 0.25 INJECTION, SOLUTION INTRAVENOUS at 05:02

## 2021-10-06 RX ADMIN — ALBUMIN (HUMAN) 25 G: 0.25 INJECTION, SOLUTION INTRAVENOUS at 13:12

## 2021-10-06 RX ADMIN — CLONAZEPAM 1 MG: 0.5 TABLET ORAL at 21:13

## 2021-10-06 NOTE — PROGRESS NOTES
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    Left wrist fracture, with delayed healing, subsequent encounter S62.102G    Drug overdose, intentional self-harm, initial encounter ([facility] [address] T50.902A    Hypoxia R09.02    Hypotension after procedure I95.81    Acute metabolic encephalopathy X48.11    Psychosis ([address] F29    Hyponatremia E87.1    Acute respiratory failure with hypoxia (HCC) J96.01    Increased ammonia level R79.89    Hypokalemia E87.6    Status post tracheostomy ([facility] [address] Z93.0 ·   Code status: full code   RECOMMENDATIONS:   Respiratory: ventilator support: intubated 2021 due to encephalopathy and drug overdose. Patient was extubated on 2021, and reintubated within an hour due to respiratory distress and upper airway edema. S/p tracheostomy 2021  X-ray chest from 10/6/2021 reviewed - LT side stable changes  Small trach tube secretions unchanged   Tolerating continuous trach collar without any increased work of breathing  Continue bronchodilator with Brovana and Pulmicort twice a day, DuoNeb as needed  Continue ventilator and sedation bundles.   On 10/2/2021 - off propofol on SBT on precedex - actively weaning to off   Sedation  precedex - will taper off today and monitor off of Precedex  Prn lorazepam, Dilaudid and due to psych issues given Haldol - tolerated well monitor QTC    ID: WBC normalized and no fever; ID following  Bl cx 10/4/21 - GPC x  - await ID and sensitivity  LT infiltrates on XR chest - stable  Antibiotics  meropenem on 10/3/2021 - will finish 10 days of empirical coverage  Procalcitonin - normal range suggesting unlikely new pneumonia  In the past was on Vanco and Zosyn? Drug fever? S/p LP with negative CSF cultures. Covid test 9/16/2021 and 9/24/2021: Negative. Sputum culture 9/24/2021: MSSA. Blood culture 9/27/21 and 9/24/2021: NGT final  Barahona catheter changed on 9/24/2021. Before used - Levofloxacin course was complete and was on Vanco and Zosyn     CVS: EKG from AM - no QTc prolongation while on Haldol   Monitor EKG as needed; monitor Qtc on tele monitor while in ICU  Stable hemodynamics; telemetry  sinus rhythm; blood pressure stable; continue to monitor. Echo 9/17/2021: LVEF 60 to 65%. RVSP 29 mmHg. Ultrasound leg 9/15/21  negative study for DVTs. Renal: stable renal function, good urine output; continue to monitor and replace electrolytes as needed. Heme: anemia stable since drop 10/4/21 no active bleeding issues; monitor daily  Stool occult blood 9/28negative. Platelets normal.  Restart Lovenox today since Hgb remains stable - monitor daily    Endo: Stable blood glucose; continue to monitor; sliding scale insulin as needed. GI: Patient tolerating tube feeding PEG tube placement 10/1/21  hCG negative on admission. Ammonia fluctuates; lactulose therapy daily. Transaminases coming down; hepatitis virus panelnegative study. CT abdomen 9/15/21  Liver is unremarkable. No abnormal biliary dilation. Gallbladder is unremarkable. Nutrition:peg feeding jevity as tolerated  Patient on thiamine and folic acid.   Neurology: Patient confused and delirious agitated off of sedation before; known patient with psych issues and drug overdose on presentation during this admission  Awake, alert, off-and-on fidgety, following simple commands  Avoid continuous sedation with benzodiazepines  Appreciate psych evaluation  CT headnil acute  Psych: On Klonopin, Haldol, IV Precedex  May restart Seroquel if no favorable response to Haldol  Has as needed fentanyl, Dilaudid, Ativan  Given frequent need for Dilaudid will start Fentanyl patch 50 mcg    Toxicology: Gabapentin OD on admission. Skin: ICU nursing care  MS: Left wrist fracture in immobilization external fixator; patient has been seen by orthopedics during this admission. Prophylaxis: DVT prophylaxis: Lovenox 40 sc daily. GI prophylaxis: Famotidine. Restraints: Wrist soft restraints as needed for patient interfering with medical therapy/management and patient safety. Consulted PT, OT and SLP teamsappreciate input. May tx to floor when ok with hospitalist  Prognosis guarded overall. CODE STATUSfull code. Palliative care on case. Quality Care: PPI, DVT prophylaxis, HOB elevated, Infection control all reviewed and addressed. Lines/Tubes: PIV   S/p ETT: 9/11/219/22/2021. Tracheostomy 9/22/2021. Status post PEG on 10/1/2021   Barahona: 9/11/21; changed 9/24/2021. Discussed with RNunable to discontinue Barahona catheter due to ventilator support, agitated and delirious state; patient will not be able to tolerate external Barahona catheter. ADVANCE DIRECTIVE: Full code. Discussed with RN, RT, ICU staff, hospitalist  High complexity decision making was performed during the evaluation of this patient at high risk for decompensation with multiple organ involvement. Critical care minute excluding any family discussion or procedure: 39 minutes    Devon Mancera   sister-in-law   [phone]      updated on 10/2/2021 by Dr. Maria Teresa Ward       Subjective/History:   Ms. Shabbir Bailey has been seen and evaluated as Dr. Linda Burrows requested now for assisting with Acute respiratory failure and ventilator management. Patient is a 39 y.o. female with following PMhx presented to ER with lethargy via EMS and admitted for Gabapentin overdose. Pt required intubation for airways protection. Position control was contacted that recommended supportive care per ER provider.  Pt seen at bedside in ER rm#2.  The patient can not provide additional history due to Ventilated. 10/6/2021  Patient seen at bedside in ICU room #1 acute  Tolerated trach collar with minimal trach tube secretions  AA and following simple commands, reportedly thrashing legs in bed without abdomen discomfort or distress  Frequently requiring Dilaudid for pain medication  Hemodynamically stable, no visible. EKG stable  Improved fever severity and frequency, WBC normal  Tolerating tube feeding, fairly urine and stool outputs  Precedex titrated off this morning pain this morning  [facility] was not contacted by staff on anything about patient overnight. Patient has a history of drug use and smoker and alcohol use. Review of Systems:  Review of systems not obtained due to patient factors.         Past Medical History:  Past Medical History:   Diagnosis Date    Asthma     Bilateral ovarian cysts     Cocaine abuse ([facility] [address]     Mental and behavioral problem     Pancreatitis     Pancreatitis     Psychosis ([facility] [address]         Past Surgical History:  Past Surgical History:   Procedure Laterality Date    HX GYN      D&C, Hysterectomy    IR INSERT GASTROSTOMY TUBE PERC  10/1/2021        Medications:    Current Facility-Administered Medications   Medication Dose Route Frequency    fentaNYL (DURAGESIC) 50 mcg/hr patch 1 Patch  1 Patch TransDERmal Q72H    albumin human 25% (BUMINATE) solution 25 g  25 g IntraVENous Q6H    arformoteroL (BROVANA) neb solution 15 mcg  15 mcg Nebulization BID RT    budesonide (PULMICORT) 500 mcg/2 ml nebulizer suspension  500 mcg Nebulization BID RT    haloperidoL (HALDOL) tablet 5 mg  5 mg Oral BID    meropenem (MERREM) 1 g in sterile water (preservative free) 20 mL IV syringe  1 g IntraVENous Q12H    lactulose (CHRONULAC) 10 gram/15 mL solution 15 mL  15 mL Oral BID    melatonin (rapid dissolve) tablet 5 mg  5 mg Oral QHS    [Held by provider] dexmedeTOMidine (PRECEDEX) 400 mcg in 0.9% sodium chloride 100 mL infusion  0.1-1.5 mcg/kg/hr IntraVENous TITRATE    famotidine (PEPCID) tablet 20 mg  20 mg Oral BID    thiamine mononitrate (B-1) tablet 100 mg  100 mg Oral DAILY    folic acid (FOLVITE) tablet 1 mg  1 mg Oral DAILY    multivitamin, tx-iron-ca-min (THERA-M w/ IRON) tablet 1 Tablet  1 Tablet Oral DAILY    clonazePAM (KlonoPIN) tablet 0.5 mg  0.5 mg Oral BID    insulin lispro (HUMALOG) injection   SubCUTAneous Q6H    [Held by provider] enoxaparin (LOVENOX) injection 40 mg  40 mg SubCUTAneous Q24H    sodium chloride (NS) flush 5-40 mL  5-40 mL IntraVENous Q8H    chlorhexidine (PERIDEX) 0.12 % mouthwash 10 mL  10 mL Oral Q12H       Allergy:  Allergies   Allergen Reactions    Fish Containing Products Itching    Toradol [Ketorolac] Rash     Pt reports she is not allergic to this medication. Social History:  Social History     Tobacco Use    Smoking status: Current Every Day Smoker     Packs/day: 1.00    Smokeless tobacco: Never Used   Substance Use Topics    Alcohol use: Not Currently    Drug use: Not Currently     Types: Cocaine, Marijuana     Comment: used with in past 24 hours          Objective:   Vital Signs:    Blood pressure 109/74, pulse (!) 109, temperature 99.1 °F (37.3 °C), resp. rate 30, height 5' 2\" (1.575 m), weight 71.2 kg (157 lb), last menstrual period 05/15/2018, SpO2 99 %. Body mass index is 28.72 kg/m².    O2 Device: Tracheal collar   O2 Flow Rate (L/min): 8 l/min   Temp (24hrs), Av.1 °F (37.3 °C), Min:98.5 °F (36.9 °C), Max:99.8 °F (37.7 °C)         Intake/Output:   Last shift:      10/06 0701 - 10/06 1900  In: -   Out: 250 [Urine:250]  Last 3 shifts: 10/04 1901 - 10/06 0700  In: 2942.4 [I.V.:2012.4]  Out: 0315 [Urine:2775; Drains:600]    Intake/Output Summary (Last 24 hours) at 10/6/2021 0900  Last data filed at 10/6/2021 0813  Gross per 24 hour   Intake 916.25 ml   Output 2125 ml   Net -1208.75 ml       ABG:    No results found for: PH, PHI, PCO2, PCO2I, PO2, PO2I, HCO3, HCO3I, FIO2, FIO2I  Ventilator Mode: Spontaneous  Respiratory Rate  Resp Rate Observed: 9  Back-Up Rate: 14  Insp Time (sec): 1.1 sec  I:E Ratio: 1:2.1  Ventilator Volumes  Vt Set (ml): 420 ml  Vt Exhaled (Machine Breath) (ml): 571 ml  Vt Spont (ml): 468 ml  Ve Observed (l/min): 10.8 l/min  Ventilator Pressures  Pressure Support (cm H2O): 7 cm H2O  PIP Observed (cm H2O): 18 cm H2O  Plateau Pressure (cm H2O): 13 cm H2O  MAP (cm H2O): 8.8  PEEP/VENT (cm H2O): 5 cm H20  Auto PEEP Observed (cm H2O): 0 cm H2O      Physical Exam:   General: in no respiratory distress and awake, alert, following some simple commands, thrashing legs in the bed, uncooperative, appears very older than stated age, on TC  HEENT: PERRLA, sclera nonicteric  Neck: No abnormally enlarged lymph nodes or thyroid, supple  Chest: normal  Lungs: normal air entry, few rhonchi and no wheezes, no tenderness/ rash  Heart: Regular rate and rhythm, S1S2 present or without murmur or extra heart sounds  Abdomen: non distended, bowel sounds normoactive, tympanic, abdomen is soft without significant tenderness, masses, organomegaly or guarding, rigidity, rebound  Extremity: no edema, cyanosis, clubbing  Neuro: Awake, alert, following some simple commands, frequently thrashing legs in the bed, moves all extremities well, no involuntary movements, exam limitation  Skin: Skin color, texture, turgor fair      Data:     Recent Results (from the past 12 hour(s))   GLUCOSE, POC    Collection Time: 10/05/21 11:12 PM   Result Value Ref Range    Glucose (POC) 132 (H) 70 - 110 mg/dL   GLUCOSE, POC    Collection Time: 10/06/21  5:02 AM   Result Value Ref Range    Glucose (POC) 113 (H) 70 - 110 mg/dL   CBC WITH AUTOMATED DIFF    Collection Time: 10/06/21  5:23 AM   Result Value Ref Range    WBC 10.2 4.6 - 13.2 K/uL    RBC 2.61 (L) 4.20 - 5.30 M/uL    HGB 7.6 (L) 12.0 - 16.0 g/dL    HCT 23.5 (L) 35.0 - 45.0 %    MCV 90.0 78.0 - 100.0 FL    MCH 29.1 24.0 - 34.0 PG    MCHC 32.3 31.0 - 37.0 g/dL    RDW 12.6 11.6 - 14.5 %    PLATELET 498 959 - 009 K/uL    MPV 9.2 9.2 - 11.8 FL    NEUTROPHILS 70 40 - 73 %    LYMPHOCYTES 21 21 - 52 %    MONOCYTES 5 3 - 10 %    EOSINOPHILS 3 0 - 5 %    BASOPHILS 0 0 - 2 %    ABS. NEUTROPHILS 7.1 1.8 - 8.0 K/UL    ABS. LYMPHOCYTES 2.2 0.9 - 3.6 K/UL    ABS. MONOCYTES 0.5 0.05 - 1.2 K/UL    ABS. EOSINOPHILS 0.3 0.0 - 0.4 K/UL    ABS. BASOPHILS 0.0 0.0 - 0.1 K/UL    DF AUTOMATED     MAGNESIUM    Collection Time: 10/06/21  5:23 AM   Result Value Ref Range    Magnesium 2.0 1.6 - 2.6 mg/dL   METABOLIC PANEL, COMPREHENSIVE    Collection Time: 10/06/21  5:23 AM   Result Value Ref Range    Sodium 139 136 - 145 mmol/L    Potassium 3.8 3.5 - 5.5 mmol/L    Chloride 106 100 - 111 mmol/L    CO2 25 21 - 32 mmol/L    Anion gap 8 3.0 - 18 mmol/L    Glucose 127 (H) 74 - 99 mg/dL    BUN 10 7.0 - 18 MG/DL    Creatinine 0.34 (L) 0.6 - 1.3 MG/DL    BUN/Creatinine ratio 29 (H) 12 - 20      GFR est AA >60 >60 ml/min/1.73m2    GFR est non-AA >60 >60 ml/min/1.73m2    Calcium 9.8 8.5 - 10.1 MG/DL    Bilirubin, total 0.4 0.2 - 1.0 MG/DL    ALT (SGPT) 92 (H) 13 - 56 U/L    AST (SGOT) 31 10 - 38 U/L    Alk.  phosphatase 134 (H) 45 - 117 U/L    Protein, total 7.0 6.4 - 8.2 g/dL    Albumin 4.2 3.4 - 5.0 g/dL    Globulin 2.8 2.0 - 4.0 g/dL    A-G Ratio 1.5 0.8 - 1.7     PHOSPHORUS    Collection Time: 10/06/21  5:23 AM   Result Value Ref Range    Phosphorus 4.1 2.5 - 4.9 MG/DL   AMMONIA    Collection Time: 10/06/21  5:23 AM   Result Value Ref Range    Ammonia 47 (H) 11 - 32 UMOL/L   EKG, 12 LEAD, SUBSEQUENT    Collection Time: 10/06/21  5:53 AM   Result Value Ref Range    Ventricular Rate 98 BPM    Atrial Rate 98 BPM    P-R Interval 146 ms    QRS Duration 78 ms    Q-T Interval 342 ms    QTC Calculation (Bezet) 436 ms    Calculated P Axis 33 degrees    Calculated R Axis 13 degrees    Calculated T Axis 7 degrees    Diagnosis       Normal sinus rhythm  Possible Inferior infarct , age undetermined  Abnormal ECG  When compared with ECG of 05-OCT-2021 03:08,  Nonspecific T wave abnormality, improved in Lateral leads               No results for input(s): FIO2I, IFO2, HCO3I, IHCO3, HCOPOC, PCO2I, PCOPOC, IPHI, PHI, PHPOC, PO2I, PO2POC in the last 72 hours. No lab exists for component: IPOC2    All Micro Results     Procedure Component Value Units Date/Time    CULTURE, BLOOD [[id number]] Collected: 10/04/21 1405    Order Status: Completed Specimen: Blood Updated: 10/06/21 0536     Special Requests: NO SPECIAL REQUESTS        GRAM STAIN       GRAM POSITIVE COCCI IN CLUSTERS ANAEROBIC BOTTLE                  SMEAR CALLED TO AND CORRECTLY REPEATED BY: Marky Car RN ICU, TO [facility] AT 1936 10/5/2021                  GRAM POSITIVE COCCI IN CLUSTERS AEROBIC BOTTLE                  SMEAR CALLED TO AND CORRECTLY REPEATED BY: Pippa Cedillo RN ICU AT 2521 ON 10/6/21 TO [facility]           Culture result:       CULTURE IN PROGRESS,FURTHER UPDATES TO FOLLOW                  Sent to [facility] for ID/Susceptibility if indicated. CULTURE, BLOOD [[id number]] Collected: 10/04/21 1305    Order Status: Completed Specimen: Blood Updated: 10/06/21 0330     Special Requests: NO SPECIAL REQUESTS        GRAM STAIN       GRAM POSITIVE COCCI IN CLUSTERS ANAEROBIC BOTTLE                  SMEAR CALLED TO AND CORRECTLY REPEATED BY: Ángela Hall RN ICU, TO [facility] AT 2042 10/5/2021                  GRAM POSITIVE COCCI IN CLUSTERS AEROBIC BOTTLE                  SMEAR CALLED TO AND CORRECTLY REPEATED BY: Linnea Buenrostro RN ICU AT 8953 ON 10/6/21 TO [facility]           Culture result:       CULTURE IN PROGRESS,FURTHER UPDATES TO FOLLOW                  Sent to [facility] for ID/Susceptibility if indicated.           CULTURE, MRSA [[id number]]     Order Status: Sent Specimen: Nasal from Nares     CULTURE, MRSA [[id number]]     Order Status: Sent Specimen: Nasal from Nares     CULTURE, RESPIRATORY/SPUTUM/BRONCH Zygmunt Liner [[id number]]     Order Status: Canceled Specimen: Sputum,ET Suction     CULTURE, BLOOD [[id number]] Collected: 09/27/21 1005    Order Status: Completed Specimen: Blood Updated: 10/03/21 0723     Special Requests: NO SPECIAL REQUESTS        Culture result: NO GROWTH 6 DAYS       CULTURE, BLOOD [[id number]] Collected: 09/27/21 1005    Order Status: Completed Specimen: Blood Updated: 10/03/21 0723     Special Requests: NO SPECIAL REQUESTS        Culture result: NO GROWTH 6 DAYS       CULTURE, BLOOD [[id number]] Collected: 09/24/21 0740    Order Status: Completed Specimen: Blood Updated: 09/30/21 0819     Special Requests: NO SPECIAL REQUESTS        Culture result: NO GROWTH 6 DAYS       CULTURE, RESPIRATORY/SPUTUM/BRONCH W GRAM STAIN [[id number]]  (Abnormal)  (Susceptibility) Collected: 09/24/21 0654    Order Status: Completed Specimen: Sputum from Tracheal Aspirate Updated: 09/27/21 1147     Special Requests: NO SPECIAL REQUESTS        GRAM STAIN RARE WBCS SEEN               RARE EPITHELIAL CELLS SEEN            NO ORGANISMS SEEN        Culture result:       MODERATE STAPHYLOCOCCUS AUREUS                  NO NORMAL RESPIRATORY DINA ISOLATED          COVID-19 RAPID TEST [[id number]] Collected: 09/24/21 1700    Order Status: Completed Specimen: Nasopharyngeal Updated: 09/24/21 1849     Specimen source Nasopharyngeal        COVID-19 rapid test Not detected        Comment: Rapid Abbott ID Now       Rapid NAAT:  The specimen is NEGATIVE for SARS-CoV-2, the novel coronavirus associated with COVID-19. Negative results should be treated as presumptive and, if inconsistent with clinical signs and symptoms or necessary for patient management, should be tested with an alternative molecular assay. Negative results do not preclude SARS-CoV-2 infection and should not be used as the sole basis for patient management decisions. This test has been authorized by the FDA under an Emergency Use Authorization (EUA) for use by authorized laboratories.    Fact sheet for Healthcare Providers: ConventionUpdate.co.nz  Fact sheet for Patients: ConventionUpdate.co.nz       Methodology: Isothermal Nucleic Acid Amplification         CULTURE, URINE [[id number]]     Order Status: Canceled Specimen: Urine from Clean catch     CULTURE, CSF  TUBE 2 [[id number]] Collected: 09/16/21 1434    Order Status: Completed Specimen: Cerebrospinal Fluid Updated: 09/23/21 1241     Special Requests: TUBE 3, CULTURE AND SENSITIVTY AND GRAM STAIN. GRAM STAIN RARE WBCS SEEN         NO ORGANISMS SEEN        Culture result: NO GROWTH 7 DAYS       CULTURE, BLOOD [[id number]] Collected: 09/14/21 0515    Order Status: Completed Specimen: Blood Updated: 09/20/21 0832     Special Requests: NO SPECIAL REQUESTS        Culture result: NO GROWTH 6 DAYS       CULTURE, BLOOD [[id number]] Collected: 09/14/21 0500    Order Status: Completed Specimen: Blood Updated: 09/20/21 0832     Special Requests: NO SPECIAL REQUESTS        Culture result: NO GROWTH 6 DAYS       MENINGITIS PATHOGENS PANEL, CSF (BY PCR) [[id number]] Collected: 09/16/21 1434    Order Status: Completed Specimen: Cerebrospinal Fluid Updated: 09/17/21 0050     Escherichia coli K1 Not detected        Haemophilus Influenzae Not detected        Listeria Monocytogenes Not detected        Neisseria Meningitidis Not detected        Streptococcus Agalactiae Not detected        Streptococcus Pneumoniae Not detected        Cytomegalovirus Not detected        Enterovirus Not detected        Herpes Simplex Virus 1 Not detected        Comment: In patients who have negative herpes simplex 1 and 2 PCR results, do not modify treatment, confirm with alternate testing. Herpes Simplex Virus 2 Not detected        Comment: In patients who have negative herpes simplex 1 and 2 PCR results, do not modify treatment, confirm with alternate testing.         Human Herpesvirus 6 Not detected        Human Parechovirus Not detected Varicella Zoster Virus Not detected        Crypto. neoformans/gattii Not detected       MENINGITIS PATHOGENS PANEL, CSF (BY PCR) [[id number]] Collected: 09/16/21 1545    Order Status: Canceled Specimen: Cerebrospinal Fluid     HSV 1 AND 2 BY PCR [[id number]] Collected: 09/16/21 1434    Order Status: Canceled Specimen: Other     ICQBF-74 RAPID TEST [[id number]] Collected: 09/16/21 1000    Order Status: Completed Specimen: Nasopharyngeal Updated: 09/16/21 1032     Specimen source Nasopharyngeal        COVID-19 rapid test Not detected        Comment: Rapid Abbott ID Now       Rapid NAAT:  The specimen is NEGATIVE for SARS-CoV-2, the novel coronavirus associated with COVID-19. Negative results should be treated as presumptive and, if inconsistent with clinical signs and symptoms or necessary for patient management, should be tested with an alternative molecular assay. Negative results do not preclude SARS-CoV-2 infection and should not be used as the sole basis for patient management decisions. This test has been authorized by the FDA under an Emergency Use Authorization (EUA) for use by authorized laboratories.    Fact sheet for Healthcare Providers: ConventionUpdate.co.nz  Fact sheet for Patients: ConventionUpdate.co.nz       Methodology: Isothermal Nucleic Acid Amplification         LEGIONELLA PNEUMOPHILA AG, URINE [[id number]] Collected: 09/14/21 2315    Order Status: Completed Specimen: Urine, random Updated: 09/15/21 1042     Legionella Ag, urine Negative       STREP PNEUMO AG, URINE [[id number]] Collected: 09/14/21 2315    Order Status: Completed Specimen: Urine, random Updated: 09/15/21 1042     Strep pneumo Ag, urine Negative       RESPIRATORY VIRUS PANEL W/COVID-19, PCR [[id number]] Collected: 09/14/21 1832    Order Status: Completed Specimen: Nasopharyngeal Updated: 09/14/21 2234     Adenovirus Not detected        Coronavirus 229E Not detected        Coronavirus HKU1 Not detected        Coronavirus CVNL63 Not detected        Coronavirus OC43 Not detected        SARS-CoV-2, PCR Not detected        Metapneumovirus Not detected        Rhinovirus and Enterovirus Not detected        Influenza A Not detected        Influenza A, subtype H1 Not detected        Influenza A, subtype H3 Not detected        INFLUENZA A H1N1 PCR Not detected        Influenza B Not detected        Parainfluenza 1 Not detected        Parainfluenza 2 Not detected        Parainfluenza 3 Not detected        Parainfluenza virus 4 Not detected        RSV by PCR Not detected        B. parapertussis, PCR Not detected        Bordetella pertussis - PCR Not detected        Chlamydophila pneumoniae DNA, QL, PCR Not detected        Mycoplasma pneumoniae DNA, QL, PCR Not detected       CULTURE, BLOOD [[id number]] Collected: 09/14/21 1700    Order Status: Canceled Specimen: Blood     CULTURE, URINE [[id number]] Collected: 09/13/21 2330    Order Status: Canceled Specimen: Cath Urine     RESPIRATORY VIRUS PANEL W/COVID-19, PCR [[id number]]     Order Status: Canceled Specimen: NASOPHARYNGEAL SWAB     COVID-19 RAPID TEST [[id number]]     Order Status: Canceled     COVID-19 RAPID TEST [[id number]] Collected: 09/13/21 0737    Order Status: Completed Specimen: Nasopharyngeal Updated: 09/13/21 0811     Specimen source Nasopharyngeal        COVID-19 rapid test Not detected        Comment: Rapid Abbott ID Now       Rapid NAAT:  The specimen is NEGATIVE for SARS-CoV-2, the novel coronavirus associated with COVID-19. Negative results should be treated as presumptive and, if inconsistent with clinical signs and symptoms or necessary for patient management, should be tested with an alternative molecular assay. Negative results do not preclude SARS-CoV-2 infection and should not be used as the sole basis for patient management decisions.        This test has been authorized by the FDA under an Emergency Use Authorization (EUA) for use by authorized laboratories. Fact sheet for Healthcare Providers: ConventionUpdate.co.nz  Fact sheet for Patients: ConventionUpdate.co.nz       Methodology: Isothermal Nucleic Acid Amplification         COVID-19 RAPID TEST [[id number]]     Order Status: Canceled         Echo 9/17/2021:  Left Ventricle Normal cavity size, wall thickness and systolic function (ejection fraction normal). The estimated EF is 60 - 65%. There is inconclusive left ventricular diastolic function E/E' ratio = 7.08  Heart rate is over 100. Wall Scoring The left ventricular wall motion is normal.            Left Atrium Normal cavity size. Right Ventricle Normal cavity size and global systolic function. Assessment of RV function:   TAPSE = 27 mm. Right Atrium Normal cavity size. Interatrial Septum Interatrial septum not well visualized   Aortic Valve Aortic valve not well visualized. Trileaflet valve structure, no stenosis and no regurgitation. Mitral Valve No stenosis. Mitral valve non-specific thickening. Mild regurgitation. Tricuspid Valve Tricuspid valve not well visualized. No stenosis. Mild regurgitation. Pulmonic Valve Pulmonic valve not well visualized. No stenosis and no regurgitation. Aorta The aorta was not well visualized. Normal aortic root. Pulmonary Artery Pulmonary arteries not well visualized. Pulmonary arterial systolic pressure (PASP) is 29 mmHg. Pulmonary hypertension not suggested by Doppler findings. IVC/Hepatic Veins Mechanically ventilated; cannot use inferior caval vein diameter to estimate central venous pressure. Pericardium Normal pericardium and no evidence of pericardial effusion. CT head 9/15/1021  IMPRESSION   No acute intracranial abnormality.       CT chest, abdomen, pelvis 9/15/1021  IMPRESSION   Endotracheal tube tip in the lower thoracic trachea 1.5 cm above the dipak.    Bilateral lower lobar atelectasis, possible endobronchial lesion/mucous plug in  the left lower lobe bronchus.    No acute intra-abdominal abnormality. Imaging:  [x]I have personally reviewed the patients chest radiographs images and report    XR CHEST PORT 10/5/21     CLINICAL INDICATION/HISTORY: Acute respiratory failure, ET tub position  -Additional: None     COMPARISON: One day prior     TECHNIQUE: Portable frontal view of the chest     _______________     FINDINGS:     SUPPORT DEVICES: Tracheostomy. Enteric tube unchanged.     HEART AND MEDIASTINUM: Cardiomediastinal silhouette within normal limits.     LUNGS AND PLEURAL SPACES: Mild left lower lower lung zone opacities. No  pneumothorax.      _______________     IMPRESSION     Mild left lower lower lung zone opacities, unchanged          Please note: Voice-recognition software may have been used to generate this report, which may have resulted in some phonetic-based errors in grammar and contents. Even though attempts were made to correct all the mistakes, some may have been missed, and remained in the body of the document.       Marry Parekh MD  10/6/2021

## 2021-10-06 NOTE — PROGRESS NOTES
[facility]  (A Division of [address] [place] [address])                                                                                                                                                                                Heather Wick MD                                                         Office #:     [phone]  [phone]                                                          Office Fax: [phone]     Date of Admission: 9/11/2021    Date of Note: 10/6/2021  Reason for FU: Antibiotic management with ongoing sepsis      Ongoing Antimicrobials:    Prior Antimicrobials:  ZMeropenem 10/3 to date # 4       Doxycycline 9/14 to 9/15  Vanco 9/14 to 9/17  osyn 9/14 to 9/20  Zosyn 9/24-> 9/26 meropenem to 9/27  Vanco 9/25 to 9/27  Levofloxacin 9/27 to 10/1       Assessment- ID related:  --------------------------------------------------------------------------    · GPC bacteremia 10/4- ID pending. Real vs contaminant  · Fever with leucocytosis: Improving, Possible source -- resp infection, has increased secretion,  ABX started emperically and wbc declining   · S/P respiratory failure, inbuated 9/11, re-intubated 9/18 and Post trach 9/22  · Post PEG 10/1  · History of positive drug screen--opiates/cocaine.  Drug screen on admission: negative  · Left wrist fracture with external fixer-- site look mariola  · Agiation, Hyperammonia     COVID test rapid neg, RVP neg  HIV test neg-9/15  Acute hepatitis A/B/C: negative  S/P LP- CSF benign- 9/16    Other Medical Issues- Mx per respective team:    Drug overdose( stated neurontin)  Hyperammonia level- etiology?  anemia   Recommendation for ID issues I am following:  ------------------------------------------------------------------------------    -> resp culture ordered- no improvement on secretion despite meropenem  -> add vanco -dosing per pharmacy for [facility] until ID available  --> FU blood culture in 48 hrs/ after GPC ID is available    -> FU blood culture until final- from 10/4          -> laxative/lactulose per ICU           DW ICU RN, ICU MD- Dr Danny Aragon, Angelica Kimball       Subjective:  She remains restless and agitated. Fever and wbc improved, but profuse resp secretion remain. left lower lung haziness remain. She is awake, but restless. Non verbal  Not on pressors  FMS In place- remains on lactulose         HPI:  Shruthi Mireles is a 39 y.o. WHITE/NON- with PMH as listed below. Patient is intubated, limited history found on her and [facility] ICU team.MD.    Admitted with drug overdose - Neurontin. Not much history known before that. On admission, afebrile, wbc of 8.1, CMP ok, drug screen for opaites/cocaine/benzo neg. UA was normal.  She was intubated 9/11/21. Developed fever to 102.9 on 9/13, no leucocytosis but NH3 elevated, Legionella/Pneum urine ag good. NO DVT  On LE 9/15. Currently on VAnco/Zosyn and doxycycline. Further CORNEJO with CT for source work up in progress. Reconsulted for fever and leuocytosis on 10/4. Active Hospital Problems    Diagnosis Date Noted    Status post tracheostomy ([address] 09/28/2021    Hypokalemia 09/21/2021    Increased ammonia level 09/14/2021    Psychosis (HCC)     Hyponatremia     Acute respiratory failure with hypoxia (HCC)     Left wrist fracture, with delayed healing, subsequent encounter 09/12/2021    Drug overdose, intentional self-harm, initial encounter ([address] 09/12/2021    Hypoxia 09/12/2021    Hypotension after procedure 09/12/2021    Acute metabolic encephalopathy 87/47/0081     Past Medical History:   Diagnosis Date    Asthma     Bilateral ovarian cysts     Cocaine abuse ([address]     Mental and behavioral problem     Pancreatitis     Pancreatitis     Psychosis ([address]      Past Surgical History:   Procedure Laterality Date    HX GYN      D&C, Hysterectomy    IR INSERT GASTROSTOMY TUBE PERC  10/1/2021     History reviewed. No pertinent family history.   Social History Socioeconomic History    Marital status:      Spouse name: Not on file    Number of children: Not on file    Years of education: Not on file    Highest education level: Not on file   Occupational History    Not on file   Tobacco Use    Smoking status: Current Every Day Smoker     Packs/day: 1.00    Smokeless tobacco: Never Used   Substance and Sexual Activity    Alcohol use: Not Currently    Drug use: Not Currently     Types: Cocaine, Marijuana     Comment: used with in past 24 hours    Sexual activity: Not Currently   Other Topics Concern    Not on file   Social History Narrative    Not on file     Social Determinants of Health     Financial Resource Strain:     Difficulty of Paying Living Expenses:    Food Insecurity:     Worried About Running Out of Food in the Last Year:     Ran Out of Food in the Last Year:    Transportation Needs:     Lack of Transportation (Medical):  Lack of Transportation (Non-Medical):    Physical Activity:     Days of Exercise per Week:     Minutes of Exercise per Session:    Stress:     Feeling of Stress :    Social Connections:     Frequency of Communication with Friends and Family:     Frequency of Social Gatherings with Friends and Family:     Attends [employer] Services:     Active Member of Clubs or Organizations:     Attends Club or Organization Meetings:     Marital Status:    Intimate Partner Violence:     Fear of Current or Ex-Partner:     Emotionally Abused:     Physically Abused:     Sexually Abused:         Allergies:  Fish containing products and Toradol [ketorolac]     Medications:  Current Facility-Administered Medications   Medication Dose Route Frequency    fentaNYL (DURAGESIC) 50 mcg/hr patch 1 Patch  1 Patch TransDERmal Q72H    HYDROmorphone (DILAUDID) injection 1 mg  1 mg IntraVENous Q4H PRN    albumin human 25% (BUMINATE) solution 25 g  25 g IntraVENous Q6H    arformoteroL (BROVANA) neb solution 15 mcg  15 mcg Nebulization BID RT    budesonide (PULMICORT) 500 mcg/2 ml nebulizer suspension  500 mcg Nebulization BID RT    haloperidoL (HALDOL) tablet 5 mg  5 mg Oral BID    meropenem (MERREM) 1 g in sterile water (preservative free) 20 mL IV syringe  1 g IntraVENous Q12H    lactulose (CHRONULAC) 10 gram/15 mL solution 15 mL  15 mL Oral BID    melatonin (rapid dissolve) tablet 5 mg  5 mg Oral QHS    fentaNYL citrate (PF) injection 50 mcg  50 mcg IntraVENous Q2H PRN    albuterol-ipratropium (DUO-NEB) 2.5 MG-0.5 MG/3 ML  3 mL Nebulization Q4H PRN    [Held by provider] dexmedeTOMidine (PRECEDEX) 400 mcg in 0.9% sodium chloride 100 mL infusion  0.1-1.5 mcg/kg/hr IntraVENous TITRATE    famotidine (PEPCID) tablet 20 mg  20 mg Oral BID    thiamine mononitrate (B-1) tablet 100 mg  100 mg Oral DAILY    folic acid (FOLVITE) tablet 1 mg  1 mg Oral DAILY    multivitamin, tx-iron-ca-min (THERA-M w/ IRON) tablet 1 Tablet  1 Tablet Oral DAILY    clonazePAM (KlonoPIN) tablet 0.5 mg  0.5 mg Oral BID    ibuprofen (ADVIL;MOTRIN) 100 mg/5 mL oral suspension 400 mg  400 mg Per NG tube Q6H PRN    insulin lispro (HUMALOG) injection   SubCUTAneous Q6H    glucose chewable tablet 16 g  4 Tablet Oral PRN    glucagon (GLUCAGEN) injection 1 mg  1 mg IntraMUSCular PRN    dextrose (D50W) injection syrg 12.5-25 g  25-50 mL IntraVENous PRN    LORazepam (ATIVAN) injection 2 mg  2 mg IntraVENous Q6H PRN    [Held by provider] enoxaparin (LOVENOX) injection 40 mg  40 mg SubCUTAneous Q24H    sodium chloride (NS) flush 5-40 mL  5-40 mL IntraVENous Q8H    sodium chloride (NS) flush 5-40 mL  5-40 mL IntraVENous PRN    acetaminophen (TYLENOL) tablet 650 mg  650 mg Oral Q6H PRN    Or    acetaminophen (TYLENOL) suppository 650 mg  650 mg Rectal Q6H PRN    polyethylene glycol (MIRALAX) packet 17 g  17 g Oral DAILY PRN    ondansetron (ZOFRAN ODT) tablet 4 mg  4 mg Oral Q8H PRN    Or    ondansetron (ZOFRAN) injection 4 mg  4 mg IntraVENous Q6H PRN    chlorhexidine (PERIDEX) 0.12 % mouthwash 10 mL  10 mL Oral Q12H    ELECTROLYTE REPLACEMENT PROTOCOL - Potassium Standard Dosing   1 Each Other PRN    ELECTROLYTE REPLACEMENT PROTOCOL - Magnesium   1 Each Other PRN    ELECTROLYTE REPLACEMENT PROTOCOL - Phosphorus  Standard Dosing  1 Each Other PRN    ELECTROLYTE REPLACEMENT PROTOCOL - Calcium   1 Each Other PRN     Physical Exam:    Temp (24hrs), Av.1 °F (37.3 °C), Min:98.5 °F (36.9 °C), Max:99.8 °F (37.7 °C)    Visit Vitals  /74   Pulse (!) 109   Temp 99.1 °F (37.3 °C)   Resp 30   Ht 5' 2\" (1.575 m)   Wt 71.2 kg (157 lb)   LMP 05/15/2018   SpO2 99%   BMI 28.72 kg/m²      GEN: WD acutely sick looking, on 8 L of oxygen via trach at 8L     HEENT: Unicteric, EOMI  Neck: trach with secretions - yellowish  CHEST: Non laboured breathing. B/L rhonch  CVS:RRR, no mur/gallop  ABD: Obese/soft. Non tender. PEG in place with TF. FMS with loose OP noted  ULISES: No zamora  EXT: No apparent swelling or redness on UE/LE joints. No induration on PIV site on both arms  Skin: Dry and intact. No rash, no redness. CNS: Restrained, moves all extremities.  Awake, follows command but seems restless     Microbiology  All Micro Results     Procedure Component Value Units Date/Time    CULTURE, RESPIRATORY/SPUTUM/BRONCH Sheryl Currie [[id number]]     Order Status: Sent Specimen: Sputum from Tracheal Aspirate     CULTURE, BLOOD [[id number]] Collected: 10/04/21 1405    Order Status: Completed Specimen: Blood Updated: 10/06/21 0536     Special Requests: NO SPECIAL REQUESTS        GRAM STAIN       GRAM POSITIVE COCCI IN CLUSTERS ANAEROBIC BOTTLE                  SMEAR CALLED TO AND CORRECTLY REPEATED BY: Amara Dueñas RN ICU, TO [facility] AT 1936 10/5/2021                  GRAM POSITIVE COCCI IN CLUSTERS AEROBIC BOTTLE                  SMEAR CALLED TO AND CORRECTLY REPEATED BY: Rica Evans RN ICU AT 5202 ON 10/6/21 TO [facility]           Culture result:       CULTURE IN PROGRESS,FURTHER UPDATES TO FOLLOW                  Sent to [facility] for ID/Susceptibility if indicated. CULTURE, BLOOD [[id number]] Collected: 10/04/21 1305    Order Status: Completed Specimen: Blood Updated: 10/06/21 0330     Special Requests: NO SPECIAL REQUESTS        GRAM STAIN       GRAM POSITIVE COCCI IN CLUSTERS ANAEROBIC BOTTLE                  SMEAR CALLED TO AND CORRECTLY REPEATED BY: Dorys Pritchett RN ICU, TO F AT 2042 10/5/2021                  GRAM POSITIVE COCCI IN CLUSTERS AEROBIC BOTTLE                  SMEAR CALLED TO AND CORRECTLY REPEATED BY: Anish Courtney RN ICU AT 4361 ON 10/6/21 TO [facility]           Culture result:       CULTURE IN PROGRESS,FURTHER UPDATES TO FOLLOW                  Sent to [facility] for ID/Susceptibility if indicated.           CULTURE, MRSA [[id number]]     Order Status: Sent Specimen: Nasal from Nares     CULTURE, MRSA [[id number]]     Order Status: Sent Specimen: Nasal from Nares     CULTURE, RESPIRATORY/SPUTUM/BRONCH Elicia Mulch STAIN [[id number]]     Order Status: Canceled Specimen: Sputum,ET Suction     CULTURE, BLOOD [[id number]] Collected: 09/27/21 1005    Order Status: Completed Specimen: Blood Updated: 10/03/21 0723     Special Requests: NO SPECIAL REQUESTS        Culture result: NO GROWTH 6 DAYS       CULTURE, BLOOD [[id number]] Collected: 09/27/21 1005    Order Status: Completed Specimen: Blood Updated: 10/03/21 0723     Special Requests: NO SPECIAL REQUESTS        Culture result: NO GROWTH 6 DAYS       CULTURE, BLOOD [[id number]] Collected: 09/24/21 0740    Order Status: Completed Specimen: Blood Updated: 09/30/21 0819     Special Requests: NO SPECIAL REQUESTS        Culture result: NO GROWTH 6 DAYS       CULTURE, RESPIRATORY/SPUTUM/BRONCH W GRAM STAIN [[id number]]  (Abnormal)  (Susceptibility) Collected: 09/24/21 0654    Order Status: Completed Specimen: Sputum from Tracheal Aspirate Updated: 09/27/21 1147     Special Requests: NO SPECIAL REQUESTS        GRAM STAIN RARE WBCS SEEN               RARE EPITHELIAL CELLS SEEN            NO ORGANISMS SEEN        Culture result:       MODERATE STAPHYLOCOCCUS AUREUS                  NO NORMAL RESPIRATORY DINA ISOLATED          COVID-19 RAPID TEST [[id number]] Collected: 09/24/21 1700    Order Status: Completed Specimen: Nasopharyngeal Updated: 09/24/21 1849     Specimen source Nasopharyngeal        COVID-19 rapid test Not detected        Comment: Rapid Abbott ID Now       Rapid NAAT:  The specimen is NEGATIVE for SARS-CoV-2, the novel coronavirus associated with COVID-19. Negative results should be treated as presumptive and, if inconsistent with clinical signs and symptoms or necessary for patient management, should be tested with an alternative molecular assay. Negative results do not preclude SARS-CoV-2 infection and should not be used as the sole basis for patient management decisions. This test has been authorized by the FDA under an Emergency Use Authorization (EUA) for use by authorized laboratories. Fact sheet for Healthcare Providers: ConventionUpdate.co.nz  Fact sheet for Patients: ConventionUpdate.co.nz       Methodology: Isothermal Nucleic Acid Amplification         CULTURE, URINE [[id number]]     Order Status: Canceled Specimen: Urine from Clean catch     CULTURE, CSF  TUBE 2 [[id number]] Collected: 09/16/21 1434    Order Status: Completed Specimen: Cerebrospinal Fluid Updated: 09/23/21 1241     Special Requests: TUBE 3, CULTURE AND SENSITIVTY AND GRAM STAIN.      GRAM STAIN RARE WBCS SEEN         NO ORGANISMS SEEN        Culture result: NO GROWTH 7 DAYS       CULTURE, BLOOD [[id number]] Collected: 09/14/21 0515    Order Status: Completed Specimen: Blood Updated: 09/20/21 0832     Special Requests: NO SPECIAL REQUESTS        Culture result: NO GROWTH 6 DAYS       CULTURE, BLOOD [[id number]] Collected: 09/14/21 0500    Order Status: Completed Specimen: Blood Updated: 09/20/21 0832     Special Requests: NO SPECIAL REQUESTS        Culture result: NO GROWTH 6 DAYS       MENINGITIS PATHOGENS PANEL, CSF (BY PCR) [[id number]] Collected: 09/16/21 1434    Order Status: Completed Specimen: Cerebrospinal Fluid Updated: 09/17/21 0050     Escherichia coli K1 Not detected        Haemophilus Influenzae Not detected        Listeria Monocytogenes Not detected        Neisseria Meningitidis Not detected        Streptococcus Agalactiae Not detected        Streptococcus Pneumoniae Not detected        Cytomegalovirus Not detected        Enterovirus Not detected        Herpes Simplex Virus 1 Not detected        Comment: In patients who have negative herpes simplex 1 and 2 PCR results, do not modify treatment, confirm with alternate testing. Herpes Simplex Virus 2 Not detected        Comment: In patients who have negative herpes simplex 1 and 2 PCR results, do not modify treatment, confirm with alternate testing. Human Herpesvirus 6 Not detected        Human Parechovirus Not detected        Varicella Zoster Virus Not detected        Crypto. neoformans/gattii Not detected       MENINGITIS PATHOGENS PANEL, CSF (BY PCR) [[id number]] Collected: 09/16/21 1545    Order Status: Canceled Specimen: Cerebrospinal Fluid     HSV 1 AND 2 BY PCR [[id number]] Collected: 09/16/21 1434    Order Status: Canceled Specimen: Other     XZEXN-28 RAPID TEST [[id number]] Collected: 09/16/21 1000    Order Status: Completed Specimen: Nasopharyngeal Updated: 09/16/21 1032     Specimen source Nasopharyngeal        COVID-19 rapid test Not detected        Comment: Rapid Abbott ID Now       Rapid NAAT:  The specimen is NEGATIVE for SARS-CoV-2, the novel coronavirus associated with COVID-19. Negative results should be treated as presumptive and, if inconsistent with clinical signs and symptoms or necessary for patient management, should be tested with an alternative molecular assay.   Negative results do not preclude SARS-CoV-2 infection and should not be used as the sole basis for patient management decisions. This test has been authorized by the FDA under an Emergency Use Authorization (EUA) for use by authorized laboratories.    Fact sheet for Healthcare Providers: ConventionUpdate.co.nz  Fact sheet for Patients: ConventionUpdate.co.nz       Methodology: Isothermal Nucleic Acid Amplification         LEGIONELLA PNEUMOPHILA AG, URINE [[id number]] Collected: 09/14/21 2315    Order Status: Completed Specimen: Urine, random Updated: 09/15/21 1042     Legionella Ag, urine Negative       STREP PNEUMO AG, URINE [[id number]] Collected: 09/14/21 2315    Order Status: Completed Specimen: Urine, random Updated: 09/15/21 1042     Strep pneumo Ag, urine Negative       RESPIRATORY VIRUS PANEL W/COVID-19, PCR [[id number]] Collected: 09/14/21 1832    Order Status: Completed Specimen: Nasopharyngeal Updated: 09/14/21 2234     Adenovirus Not detected        Coronavirus 229E Not detected        Coronavirus HKU1 Not detected        Coronavirus CVNL63 Not detected        Coronavirus OC43 Not detected        SARS-CoV-2, PCR Not detected        Metapneumovirus Not detected        Rhinovirus and Enterovirus Not detected        Influenza A Not detected        Influenza A, subtype H1 Not detected        Influenza A, subtype H3 Not detected        INFLUENZA A H1N1 PCR Not detected        Influenza B Not detected        Parainfluenza 1 Not detected        Parainfluenza 2 Not detected        Parainfluenza 3 Not detected        Parainfluenza virus 4 Not detected        RSV by PCR Not detected        B. parapertussis, PCR Not detected        Bordetella pertussis - PCR Not detected        Chlamydophila pneumoniae DNA, QL, PCR Not detected        Mycoplasma pneumoniae DNA, QL, PCR Not detected       CULTURE, BLOOD [[id number]] Collected: 09/14/21 1700    Order Status: Canceled Specimen: Blood     CULTURE, URINE [[id number]] Collected: 09/13/21 2330    Order Status: Canceled Specimen: Cath Urine     RESPIRATORY VIRUS PANEL W/COVID-19, PCR [[id number]]     Order Status: Canceled Specimen: NASOPHARYNGEAL SWAB     COVID-19 RAPID TEST [[id number]]     Order Status: Canceled     COVID-19 RAPID TEST [[id number]] Collected: 09/13/21 0737    Order Status: Completed Specimen: Nasopharyngeal Updated: 09/13/21 0811     Specimen source Nasopharyngeal        COVID-19 rapid test Not detected        Comment: Rapid Abbott ID Now       Rapid NAAT:  The specimen is NEGATIVE for SARS-CoV-2, the novel coronavirus associated with COVID-19. Negative results should be treated as presumptive and, if inconsistent with clinical signs and symptoms or necessary for patient management, should be tested with an alternative molecular assay. Negative results do not preclude SARS-CoV-2 infection and should not be used as the sole basis for patient management decisions. This test has been authorized by the FDA under an Emergency Use Authorization (EUA) for use by authorized laboratories. Fact sheet for Healthcare Providers: ConventionUpdate.co.nz  Fact sheet for Patients: ConventionUpdate.co.nz       Methodology: Isothermal Nucleic Acid Amplification         COVID-19 RAPID TEST [[id number]]     Order Status: Canceled            Lab results:    Chemistry  Recent Labs     10/06/21  0523 10/05/21  1655 10/05/21  0552 10/04/21  0920 10/04/21  0920   *  --  107*  --  109*     --  141  --  139   K 3.8 4.4 3.7   < > 3.7     --  107  --  106   CO2 25  --  25  --  26   BUN 10  --  8  --  9   CREA 0.34*  --  0.27*  --  0.33*   CA 9.8  --  9.3  --  8.9   AGAP 8  --  9  --  7   BUCR 29*  --  30*  --  27*   *  --  155*  --  162*   TP 7.0  --  6.7  --  6.4   ALB 4.2  --  4.0  --  3.6   GLOB 2.8  --  2.7  --  2.8   AGRAT 1.5  --  1.5  --  1.3    < > = values in this interval not displayed.        CBC w/ Diff  Recent Labs     10/06/21  0523 10/05/21  0552 10/04/21  1350 10/04/21  0920 10/04/21  0920   WBC 10.2 8.8  --   --  11.1   RBC 2.61* 2.63*  --   --  2.42*   HGB 7.6* 7.6* 7.4*   < > 7.0*   HCT 23.5* 23.3* 22.6*   < > 21.5*    388  --   --  396   GRANS 70 72  --   --  76*   LYMPH 21 18*  --   --  16*   EOS 3 3  --   --  1    < > = values in this interval not displayed. Imaging: report posted below as per radiologist   CXR- 9/17    1. Unchanged, adequately positioned endotracheal tube and enteric tube. 2. Unchanged left and right basilar patchy opacities, atelectasis versus  Infiltrate. 10/4 CXR  Ongoing increased left greater than right perihilar and left basilar opacities  may represent combination of worsening asymmetric pulmonary edema and infiltrate  with atelectasis. Possible trace left pleural effusion.   10/5- CXR:    Mild left lower lower lung zone opacities, unchanged.

## 2021-10-06 NOTE — PROGRESS NOTES
10/05/21 2051   Oxygen Therapy   O2 Sat (%) 97 %   Pulse via Oximetry 94 beats per minute   O2 Device Tracheal collar   O2 Flow Rate (L/min) 8 l/min   FIO2 (%) 28 %   remains on trach collar, no acute distress, strong spontaneous productive cough, with slight trach suctioning to assist patient. RN at bedside. Endsocopy RN

## 2021-10-06 NOTE — PROGRESS NOTES
Problem: Ventilator Management  Goal: *Adequate oxygenation and ventilation  Outcome: Progressing Towards Goal  Goal: *Patient maintains clear airway/free of aspiration  Outcome: Progressing Towards Goal  Goal: *Absence of infection signs and symptoms  Outcome: Progressing Towards Goal  Goal: *Normal spontaneous ventilation  Outcome: Progressing Towards Goal     Problem: Patient Education: Go to Patient Education Activity  Goal: Patient/Family Education  Outcome: Progressing Towards Goal     Problem: Non-Violent Restraints  Goal: Removal from restraints as soon as assessed to be safe  Outcome: Progressing Towards Goal  Goal: No harm/injury to patient while restraints in use  Outcome: Progressing Towards Goal  Goal: Patient's dignity will be maintained  Outcome: Progressing Towards Goal  Goal: Patient Interventions  Outcome: Progressing Towards Goal     Problem: Falls - Risk of  Goal: *Absence of Falls  Description: Document Bard Kuo Fall Risk and appropriate interventions in the flowsheet.   Outcome: Progressing Towards Goal  Note: Fall Risk Interventions:       Mentation Interventions: Bed/chair exit alarm, Adequate sleep, hydration, pain control, Door open when patient unattended, Evaluate medications/consider consulting pharmacy, More frequent rounding, Room close to nurse's station, Toileting rounds    Medication Interventions: Bed/chair exit alarm, Evaluate medications/consider consulting pharmacy    Elimination Interventions: Bed/chair exit alarm, Toileting schedule/hourly rounds    History of Falls Interventions: Bed/chair exit alarm, Door open when patient unattended, Evaluate medications/consider consulting pharmacy       Problem: Patient Education: Go to Patient Education Activity  Goal: Patient/Family Education  Outcome: Progressing Towards Goal     Problem: Risk for Spread of Infection  Goal: Prevent transmission of infectious organism to others  Description: Prevent the transmission of infectious organisms to other patients, staff members, and visitors. Outcome: Progressing Towards Goal     Problem: Patient Education:  Go to Education Activity  Goal: Patient/Family Education  Outcome: Progressing Towards Goal     Problem: Pressure Injury - Risk of  Goal: *Prevention of pressure injury  Description: Document Remy Scale and appropriate interventions in the flowsheet.   Outcome: Progressing Towards Goal     Problem: Patient Education: Go to Patient Education Activity  Goal: Patient/Family Education  Outcome: Progressing Towards Goal     Rashaun Chen RN

## 2021-10-06 NOTE — PROGRESS NOTES
[facility] Pharmacokinetic Monitoring Service - Vancomycin     Julia Montana is a 39 y.o. female starting on vancomycin therapy for Bloodstream Infection. Pharmacy consulted by Dr. Parris Alvarado for monitoring and adjustment. Target Concentration: Goal AUC/CRYSTAL 400-600 mg*hr/L    Additional Antimicrobials:  Meropenem    Pertinent Laboratory Values: Wt Readings from Last 1 Encounters:   09/17/21 71.2 kg (157 lb)     Temp Readings from Last 1 Encounters:   10/06/21 99.1 °F (37.3 °C)     No components found for: PROCAL  Estimated Creatinine Clearance: 97.7 mL/min (A) (by C-G formula based on SCr of 0.34 mg/dL (L)). Recent Labs     10/06/21  0523 10/05/21  0552   WBC 10.2 8.8       Pertinent Cultures:  Culture Date Source Results   na na na       Plan:  1. Will dose Vancomycin 1500 mg IV now, then 1250 mg IV q12hrs  2. Est  mg/l.hr  Trough 12.8 mg/l  3. CMP ordered Daily  4.  Pharmacy will continue to follow and adjust.    Hilaria Tristan, [address]  10/6/2021 12:28 PM

## 2021-10-06 NOTE — PROGRESS NOTES
0730-received report from [facility] included SBAR MAR and Kardex. Shift Summary-pt remains agitated and restless. Presidex stopped and fentanyl patch 50mcg applied to L upper chest. Suctioned via trach multiple times with small amounts of thick mucous. [place] and FMS maintained.  called from halfway for updates, states pt is bipolar and will have family member bring meds or med list.  Bedside and Verbal shift change report given to WEST Engel RN (oncoming nurse) by Katerine Sullivan RN (offgoing nurse). Report included the following information SBAR, Kardex and MAR.

## 2021-10-06 NOTE — PROGRESS NOTES
Hospitalist Progress Note-critical care note     Patient: Juliette Gutierrez MRN: [id number]  CSN: [id number]    YOB: 1980  Age: 39 y.o. Sex: female    DOA: 9/11/2021 LOS:  LOS: 24 days            Chief complaint: wrist fracture , drug overdose , acute respiratory failure with hypoxia, psychosis     Assessment/Plan         Hospital Problems  Date Reviewed: 9/6/2015        Codes Class Noted POA    Bacteremia due to Gram-positive bacteria ICD-10-CM: R78.81  ICD-9-CM: 790.7  10/6/2021 Unknown        Status post tracheostomy ([facility] ICD-10-CM: Z93.0  ICD-9-CM: V44.0  9/28/2021 Unknown        Hypokalemia ICD-10-CM: E87.6  ICD-9-CM: 276.8  9/21/2021 Unknown        Increased ammonia level ICD-10-CM: R79.89  ICD-9-CM: 790.6  9/14/2021 Unknown        Psychosis ([facility] ICD-10-CM: F29  ICD-9-CM: 298.9  Unknown Unknown        Hyponatremia ICD-10-CM: E87.1  ICD-9-CM: 276.1  Unknown Yes        Acute respiratory failure with hypoxia (HCC) ICD-10-CM: J96.01  ICD-9-CM: 518.81  Unknown Yes        Left wrist fracture, with delayed healing, subsequent encounter ICD-10-CM: S62.102G  ICD-9-CM: V54.19  9/12/2021 Unknown        * (Principal) Drug overdose, intentional self-harm, initial encounter ([facility] ICD-10-CM: T50.902A  ICD-9-CM: 977.9, E950.5  9/12/2021 Yes        Hypoxia ICD-10-CM: R09.02  ICD-9-CM: 799.02  9/12/2021 Yes        Hypotension after procedure ICD-10-CM: I95.81  ICD-9-CM: 458.29  9/12/2021 Yes        Acute metabolic encephalopathy RDC-32-YX: G93.41  ICD-9-CM: 348.31  9/12/2021 Yes                  Juliette Gutierrez is a 39 y.o. female who is standing history of multidrug use/abuse, previous drug-seeking behavior and mental health issues otherwise unspecified arrives via EMS with acute metabolic encephalopathy and lethargy. Admitted for likely gabapentin overdose. She was intubated in ER, ct head no acute issue, LP done due to fever even on abx, no meningitis noted.  Now she is peg and trach          Acute respiratory failure with hypoxia   Intubated on 9/12    Trach on 9/20/21. stable on trach collar down to  8 L oxygen    Continue breathing tx . Continue local trach care    Post tracheostomy   Continue local trach care   Some secretion-increase suction     Fever and leukocytosis , bacteremia   bcx  Positive for GPC on 10/4   Sputum culture 9/24/2021: MSSA. Colonization per id   On merrem , vanc added       Anemia  Will continue monitoring, so far no bleeding reported , so far stable   Occult stool negative    no bleeding reported   Upper PVL negative for dvt           Acute metabolic encephalopathy   pecedex , agitated last night -received ativan   Ct head no acute process         Gabapentin overdose with lethargy and AMS    S/p procedural stomach emptying in ED with charcoal and sorbitol   Continue monitoring the side affect   Psych f/u          elevated ammonia level  Continue   Lactulose,   Continue ammonia monitoring       Psychosis , hx of  Ekbom's delusional parasitosis   Need psych evaluation later   On  Klonopin/haldol -per psych recommendation   Continue cardiac monitoring     left wrist fracture: immobilized -poa   Appreciated ortho on board-f/u with Dr. Shima Smith as out-pt       rn still agitated     Disposition :tbd,   Review of systems:  Unable to obtain due to trach collar   Vital signs/Intake and Output:  Visit Vitals  /84   Pulse (!) 124   Temp 98.8 °F (37.1 °C)   Resp (!) 64   Ht 5' 2\" (1.575 m)   Wt 71.2 kg (157 lb)   SpO2 99%   BMI 28.72 kg/m²     Current Shift:  10/06 0701 - 10/06 1900  In: 940.2 [I.V.:590.2]  Out: 750 [Urine:750]  Last three shifts:  10/04 1901 - 10/06 0700  In: 2942.4 [I.V.:2012.4]  Out: 7082 [Urine:2775; Drains:600]    Physical Exam:  General: Open eyes per voice   HEENT: NC, Atraumatic. Trach collar   Lungs: CTA Bilaterally. No Wheezing/Rhonchi/Rales. Heart:  rrr ,  No murmur, No Rubs, No Gallops  Abdomen: Soft, Non distended, Non tender.   +Bowel sounds, peg tube noted   Extremities: No c/c.immobilzer noted on left wrist   Psych:   Calm   Neurologic:  Open eyes per voice           Labs: Results:       Chemistry Recent Labs     10/06/21  0523 10/05/21  1655 10/05/21  0552 10/04/21  0920 10/04/21  0920   *  --  107*  --  109*     --  141  --  139   K 3.8 4.4 3.7   < > 3.7     --  107  --  106   CO2 25  --  25  --  26   BUN 10  --  8  --  9   CREA 0.34*  --  0.27*  --  0.33*   CA 9.8  --  9.3  --  8.9   AGAP 8  --  9  --  7   BUCR 29*  --  30*  --  27*   *  --  155*  --  162*   TP 7.0  --  6.7  --  6.4   ALB 4.2  --  4.0  --  3.6   GLOB 2.8  --  2.7  --  2.8   AGRAT 1.5  --  1.5  --  1.3    < > = values in this interval not displayed. CBC w/Diff Recent Labs     10/06/21  0523 10/05/21  0552 10/04/21  1350 10/04/21  0920 10/04/21  0920   WBC 10.2 8.8  --   --  11.1   RBC 2.61* 2.63*  --   --  2.42*   HGB 7.6* 7.6* 7.4*   < > 7.0*   HCT 23.5* 23.3* 22.6*   < > 21.5*    388  --   --  396   GRANS 70 72  --   --  76*   LYMPH 21 18*  --   --  16*   EOS 3 3  --   --  1    < > = values in this interval not displayed. Cardiac Enzymes No results for input(s): CPK, CKND1, ZENON in the last 72 hours. No lab exists for component: CKRMB, TROIP   Coagulation No results for input(s): PTP, INR, APTT, INREXT, INREXT in the last 72 hours. Lipid Panel No results found for: CHOL, CHOLPOCT, CHOLX, CHLST, CHOLV, 367387, HDL, HDLP, LDL, LDLC, DLDLP, 525512, VLDLC, VLDL, TGLX, TRIGL, TRIGP, TGLPOCT, CHHD, CHHDX   BNP No results for input(s): BNPP in the last 72 hours.    Liver Enzymes Recent Labs     10/06/21  0523   TP 7.0   ALB 4.2   *      Thyroid Studies No results found for: T4, T3U, TSH, TSHEXT, TSHEXT     Procedures/imaging: see electronic medical records for all procedures/Xrays and details which were not copied into this note but were reviewed prior to creation of Plan    XR WRIST LT AP/LAT/OBL MIN 3V    Result Date: 8/19/2021  EXAM: XR WRIST LT AP/LAT/OBL MIN 3V CLINICAL INDICATION/HISTORY: Fall with LEFT wrist pain and swelling -Additional: None COMPARISON: None TECHNIQUE: 3 views of the left wrist _______________ FINDINGS: BONES: Comminuted, impacted fracture of the distal radius with slight dorsal angulation of the distal fracture fragment. No aggressive appearing osseous lytic or blastic lesion. SOFT TISSUES: Unremarkable. _______________     Comminuted, impacted fracture of the distal radius. XR CHEST PORT    Result Date: 9/13/2021  EXAM: XR CHEST PORT CLINICAL INDICATION/HISTORY: Acute respiratory failure, ET tub position -Additional: None COMPARISON: One day prior TECHNIQUE: Portable frontal view of the chest _______________ FINDINGS: SUPPORT DEVICES: Endotracheal and enteric tubes unchanged. HEART AND MEDIASTINUM: Cardiomediastinal silhouette within normal limits. LUNGS AND PLEURAL SPACES: No dense consolidation, large effusion or pneumothorax. _______________     No acute cardiopulmonary abnormality. XR CHEST PORT    Result Date: 9/11/2021  MEDICAL RECORDS NUMBER: [id number] PROCEDURE:  Single view of the chest DATE: 9/11/2021 9:09 PM HISTORY: 39years old Female. post ett Comparison: 8/4/2021 FINDINGS: The patient has been intubated with appropriate placement of the endotracheal tube. There is no enteric tube passing below the level of the diaphragm. There is no significant effusion. There is no significant pneumothorax. Cardiomediastinal silhouette is within normal limits. There is no evidence of a focal pulmonary infiltrate or mass. 1. There is no significant or acute cardiopulmonary process. The patient has been intubated with appropriate placement of the endotracheal tube. There is no enteric tube passing below the level of the diaphragm. This report has been generated using voice recognition software.      XR ABD PORT  1 V    Result Date: 9/12/2021  EXAM: Frontal view of the abdomen CLINICAL INDICATION/HISTORY: NG tube placement COMPARISON: None. _______________ FINDINGS: NG/OG tube in place with the tip in the left upper quadrant in the region of the gastric fundus. No bowel obstruction. Moderate colonic stool burden. _______________     1. NG/OG tube is in good position with the tip in the left upper quadrant in the region of the gastric fundus. 2. Moderate colonic stool burden.       Rohan Henry MD

## 2021-10-06 NOTE — PROGRESS NOTES
1900- shift change report given to JAIDEN Nolan (oncoming nurse) by Juan Luis Lyle (offgoing nurse). Report included the following information SBAR, Kardex, ED Summary, Intake/Output, MAR, Recent Results, Med Rec Status and Cardiac Rhythm NSR.     1914- Adult nonverbal pain scale shows 7/10 pain. Fentanyl 50mcg IV push given. Will continue to monitor. 1940- Adult nonverbal pain scale shows 7/10 pain. Dilaudid 1mg IV push given. Will continue to monitor. 2000- Shift assessment completed. Pt lightly sedated on precedex and trached. Herman Balling current LOC but follows commands and reports pain. Oral, long, trach, and external catheter care performed. Full bed bath given w/ linens and gown replaced. Will continue to monitor. 2140- Adult nonverbal pain scale shows 7/10 pain. Fentanyl 50mcg IV push given. Will continue to monitor. 2344- Adult nonverbal pain scale shows 7/10 pain. Fentanyl 50mcg IV push given. Will continue to monitor. 0000- Reassessment completed. No changes from previous assessment. 2320- Adult nonverbal pain scale shows 7/10 pain. Fentanyl 50mcg IV push given. Will continue to monitor. 5665- Adult nonverbal pain scale shows 7/10 pain. Dilaudid 1mg IV push given. Will continue to monitor. 0400- Reassessment completed. No changes from previous assessment. 3938- Adult nonverbal pain scale shows 7/10 pain. Fentanyl 50mcg IV push given. Will continue to monitor. 0454- Pt agitated kicking legs around in bed. Unable to console with voice. Ativan 2mg IV push given. Will continue to monitor. 0530- Adult nonverbal pain scale shows 7/10 pain. Fentanyl 50mcg IV push given. Will continue to monitor. 1833- Adult nonverbal pain scale shows 7/10 pain. Dilaudid 1mg IV push given. Will continue to monitor. 700- shift change report given to EV Pitt (oncoming nurse) by JAIDEN Nolan (offgoing nurse).  Report included the following information SBAR, Kardex, ED Summary, Intake/Output, MAR, Recent Results, Med Rec Status and Cardiac Rhythm NSR-ST.

## 2021-10-07 ENCOUNTER — APPOINTMENT (OUTPATIENT)
Dept: GENERAL RADIOLOGY | Age: 41
DRG: 004 | End: 2021-10-07
Attending: INTERNAL MEDICINE
Payer: MEDICAID

## 2021-10-07 LAB
ALBUMIN SERPL-MCNC: 5.4 G/DL (ref 3.4–5)
ALBUMIN/GLOB SERPL: 1.8 {RATIO} (ref 0.8–1.7)
ALP SERPL-CCNC: 141 U/L (ref 45–117)
ALT SERPL-CCNC: 89 U/L (ref 13–56)
AMMONIA PLAS-SCNC: 45 UMOL/L (ref 11–32)
ANION GAP SERPL CALC-SCNC: 9 MMOL/L (ref 3–18)
AST SERPL-CCNC: 31 U/L (ref 10–38)
BACTERIA SPEC CULT: ABNORMAL
BACTERIA SPEC CULT: ABNORMAL
BASOPHILS # BLD: 0 K/UL (ref 0–0.1)
BASOPHILS NFR BLD: 0 % (ref 0–2)
BILIRUB SERPL-MCNC: 0.6 MG/DL (ref 0.2–1)
BUN SERPL-MCNC: 9 MG/DL (ref 7–18)
BUN/CREAT SERPL: 26 (ref 12–20)
CALCIUM SERPL-MCNC: 10.5 MG/DL (ref 8.5–10.1)
CHLORIDE SERPL-SCNC: 104 MMOL/L (ref 100–111)
CO2 SERPL-SCNC: 25 MMOL/L (ref 21–32)
CREAT SERPL-MCNC: 0.34 MG/DL (ref 0.6–1.3)
DIFFERENTIAL METHOD BLD: ABNORMAL
EOSINOPHIL # BLD: 0.2 K/UL (ref 0–0.4)
EOSINOPHIL NFR BLD: 1 % (ref 0–5)
ERYTHROCYTE [DISTWIDTH] IN BLOOD BY AUTOMATED COUNT: 12.7 % (ref 11.6–14.5)
GLOBULIN SER CALC-MCNC: 3 G/DL (ref 2–4)
GLUCOSE BLD STRIP.AUTO-MCNC: 118 MG/DL (ref 70–110)
GLUCOSE BLD STRIP.AUTO-MCNC: 126 MG/DL (ref 70–110)
GLUCOSE BLD STRIP.AUTO-MCNC: 132 MG/DL (ref 70–110)
GLUCOSE BLD STRIP.AUTO-MCNC: 146 MG/DL (ref 70–110)
GLUCOSE SERPL-MCNC: 120 MG/DL (ref 74–99)
GRAM STN SPEC: ABNORMAL
HCT VFR BLD AUTO: 27.4 % (ref 35–45)
HGB BLD-MCNC: 9.1 G/DL (ref 12–16)
LYMPHOCYTES # BLD: 2.1 K/UL (ref 0.9–3.6)
LYMPHOCYTES NFR BLD: 15 % (ref 21–52)
MAGNESIUM SERPL-MCNC: 2.2 MG/DL (ref 1.6–2.6)
MCH RBC QN AUTO: 28.8 PG (ref 24–34)
MCHC RBC AUTO-ENTMCNC: 33.2 G/DL (ref 31–37)
MCV RBC AUTO: 86.7 FL (ref 78–100)
MONOCYTES # BLD: 0.9 K/UL (ref 0.05–1.2)
MONOCYTES NFR BLD: 7 % (ref 3–10)
NEUTS SEG # BLD: 10.7 K/UL (ref 1.8–8)
NEUTS SEG NFR BLD: 77 % (ref 40–73)
PHOSPHATE SERPL-MCNC: 3.5 MG/DL (ref 2.5–4.9)
PLATELET # BLD AUTO: 467 K/UL (ref 135–420)
PMV BLD AUTO: 9.9 FL (ref 9.2–11.8)
POTASSIUM SERPL-SCNC: 4.1 MMOL/L (ref 3.5–5.5)
PROT SERPL-MCNC: 8.4 G/DL (ref 6.4–8.2)
RBC # BLD AUTO: 3.16 M/UL (ref 4.2–5.3)
SERVICE CMNT-IMP: ABNORMAL
SERVICE CMNT-IMP: ABNORMAL
SODIUM SERPL-SCNC: 138 MMOL/L (ref 136–145)
VANCOMYCIN SERPL-MCNC: 10.6 UG/ML (ref 5–40)
WBC # BLD AUTO: 13.9 K/UL (ref 4.6–13.2)

## 2021-10-07 PROCEDURE — 65610000006 HC RM INTENSIVE CARE

## 2021-10-07 PROCEDURE — 77010033678 HC OXYGEN DAILY

## 2021-10-07 PROCEDURE — 85025 COMPLETE CBC W/AUTO DIFF WBC: CPT

## 2021-10-07 PROCEDURE — 84100 ASSAY OF PHOSPHORUS: CPT

## 2021-10-07 PROCEDURE — 74011250637 HC RX REV CODE- 250/637: Performed by: INTERNAL MEDICINE

## 2021-10-07 PROCEDURE — 80202 ASSAY OF VANCOMYCIN: CPT

## 2021-10-07 PROCEDURE — 74011000250 HC RX REV CODE- 250: Performed by: INTERNAL MEDICINE

## 2021-10-07 PROCEDURE — 74011250636 HC RX REV CODE- 250/636: Performed by: INTERNAL MEDICINE

## 2021-10-07 PROCEDURE — 82962 GLUCOSE BLOOD TEST: CPT

## 2021-10-07 PROCEDURE — 82140 ASSAY OF AMMONIA: CPT

## 2021-10-07 PROCEDURE — 74011250636 HC RX REV CODE- 250/636: Performed by: FAMILY MEDICINE

## 2021-10-07 PROCEDURE — 71045 X-RAY EXAM CHEST 1 VIEW: CPT

## 2021-10-07 PROCEDURE — 80053 COMPREHEN METABOLIC PANEL: CPT

## 2021-10-07 PROCEDURE — 83735 ASSAY OF MAGNESIUM: CPT

## 2021-10-07 PROCEDURE — 36415 COLL VENOUS BLD VENIPUNCTURE: CPT

## 2021-10-07 PROCEDURE — P9047 ALBUMIN (HUMAN), 25%, 50ML: HCPCS | Performed by: INTERNAL MEDICINE

## 2021-10-07 PROCEDURE — 94640 AIRWAY INHALATION TREATMENT: CPT

## 2021-10-07 PROCEDURE — 74011636637 HC RX REV CODE- 636/637: Performed by: INTERNAL MEDICINE

## 2021-10-07 RX ORDER — METOPROLOL TARTRATE 5 MG/5ML
2.5 INJECTION INTRAVENOUS
Status: DISCONTINUED | OUTPATIENT
Start: 2021-10-07 | End: 2021-11-05

## 2021-10-07 RX ADMIN — FAMOTIDINE 20 MG: 20 TABLET ORAL at 21:05

## 2021-10-07 RX ADMIN — ENOXAPARIN SODIUM 40 MG: 100 INJECTION SUBCUTANEOUS at 17:33

## 2021-10-07 RX ADMIN — HYDROMORPHONE HYDROCHLORIDE 1 MG: 1 INJECTION, SOLUTION INTRAMUSCULAR; INTRAVENOUS; SUBCUTANEOUS at 08:17

## 2021-10-07 RX ADMIN — Medication 1 TABLET: at 08:23

## 2021-10-07 RX ADMIN — HYDROMORPHONE HYDROCHLORIDE 1 MG: 1 INJECTION, SOLUTION INTRAMUSCULAR; INTRAVENOUS; SUBCUTANEOUS at 13:16

## 2021-10-07 RX ADMIN — FOLIC ACID 1 MG: 1 TABLET ORAL at 08:23

## 2021-10-07 RX ADMIN — VANCOMYCIN HYDROCHLORIDE 1250 MG: 10 INJECTION, POWDER, LYOPHILIZED, FOR SOLUTION INTRAVENOUS at 00:00

## 2021-10-07 RX ADMIN — MEROPENEM 1 G: 1 INJECTION, POWDER, FOR SOLUTION INTRAVENOUS at 00:00

## 2021-10-07 RX ADMIN — 0.12% CHLORHEXIDINE GLUCONATE 10 ML: 1.2 RINSE ORAL at 08:23

## 2021-10-07 RX ADMIN — LORAZEPAM 2 MG: 2 INJECTION INTRAMUSCULAR at 14:52

## 2021-10-07 RX ADMIN — CLONAZEPAM 1 MG: 0.5 TABLET ORAL at 21:05

## 2021-10-07 RX ADMIN — FENTANYL CITRATE 50 MCG: 50 INJECTION INTRAMUSCULAR; INTRAVENOUS at 10:10

## 2021-10-07 RX ADMIN — HYDROMORPHONE HYDROCHLORIDE 1 MG: 1 INJECTION, SOLUTION INTRAMUSCULAR; INTRAVENOUS; SUBCUTANEOUS at 22:51

## 2021-10-07 RX ADMIN — VANCOMYCIN HYDROCHLORIDE 1250 MG: 10 INJECTION, POWDER, LYOPHILIZED, FOR SOLUTION INTRAVENOUS at 13:47

## 2021-10-07 RX ADMIN — ARFORMOTEROL TARTRATE 15 MCG: 15 SOLUTION RESPIRATORY (INHALATION) at 07:27

## 2021-10-07 RX ADMIN — LACTULOSE 15 ML: 10 SOLUTION ORAL at 21:05

## 2021-10-07 RX ADMIN — FAMOTIDINE 20 MG: 20 TABLET ORAL at 08:22

## 2021-10-07 RX ADMIN — Medication 5 MG: at 21:05

## 2021-10-07 RX ADMIN — LORAZEPAM 2 MG: 2 INJECTION INTRAMUSCULAR at 01:44

## 2021-10-07 RX ADMIN — CLONAZEPAM 1 MG: 0.5 TABLET ORAL at 08:22

## 2021-10-07 RX ADMIN — ARFORMOTEROL TARTRATE 15 MCG: 15 SOLUTION RESPIRATORY (INHALATION) at 20:34

## 2021-10-07 RX ADMIN — BUDESONIDE 500 MCG: 0.5 INHALANT RESPIRATORY (INHALATION) at 20:34

## 2021-10-07 RX ADMIN — THIAMINE HCL TAB 100 MG 100 MG: 100 TAB at 08:23

## 2021-10-07 RX ADMIN — SODIUM CHLORIDE 10 ML: 9 INJECTION, SOLUTION INTRAMUSCULAR; INTRAVENOUS; SUBCUTANEOUS at 21:06

## 2021-10-07 RX ADMIN — MEROPENEM 1 G: 1 INJECTION, POWDER, FOR SOLUTION INTRAVENOUS at 12:11

## 2021-10-07 RX ADMIN — FENTANYL CITRATE 50 MCG: 50 INJECTION INTRAMUSCULAR; INTRAVENOUS at 12:11

## 2021-10-07 RX ADMIN — ALBUMIN (HUMAN) 25 G: 0.25 INJECTION, SOLUTION INTRAVENOUS at 00:00

## 2021-10-07 RX ADMIN — SODIUM CHLORIDE 10 ML: 9 INJECTION, SOLUTION INTRAMUSCULAR; INTRAVENOUS; SUBCUTANEOUS at 14:00

## 2021-10-07 RX ADMIN — SODIUM CHLORIDE 10 ML: 9 INJECTION, SOLUTION INTRAMUSCULAR; INTRAVENOUS; SUBCUTANEOUS at 05:09

## 2021-10-07 RX ADMIN — ALBUMIN (HUMAN) 25 G: 0.25 INJECTION, SOLUTION INTRAVENOUS at 05:10

## 2021-10-07 RX ADMIN — LACTULOSE 15 ML: 10 SOLUTION ORAL at 08:23

## 2021-10-07 RX ADMIN — BUDESONIDE 500 MCG: 0.5 INHALANT RESPIRATORY (INHALATION) at 07:27

## 2021-10-07 RX ADMIN — HALOPERIDOL 5 MG: 5 TABLET ORAL at 08:23

## 2021-10-07 RX ADMIN — LORAZEPAM 2 MG: 2 INJECTION INTRAMUSCULAR at 08:17

## 2021-10-07 RX ADMIN — HALOPERIDOL 5 MG: 5 TABLET ORAL at 21:06

## 2021-10-07 RX ADMIN — 0.12% CHLORHEXIDINE GLUCONATE 10 ML: 1.2 RINSE ORAL at 21:05

## 2021-10-07 RX ADMIN — METOPROLOL TARTRATE 2.5 MG: 5 INJECTION INTRAVENOUS at 15:45

## 2021-10-07 RX ADMIN — INSULIN LISPRO 2 UNITS: 100 INJECTION, SOLUTION INTRAVENOUS; SUBCUTANEOUS at 00:00

## 2021-10-07 NOTE — PROGRESS NOTES
Vancomycin - Pharmacy to Dose ( Bloodstream Infection )  Patient currently on Vancomycin 1250 mg IV q12hrs. Random Level 10.6 at 12:22 today.  ==>   mg/l.hr  Trough 13.4 mg/l at steady state. CrCl 97.7 ml/min  WBC 13.9  Temp 99.3 ( oral )  Recommend no dosing change at this time.   Pharmacy will continue to follow and adjust.

## 2021-10-07 NOTE — PROGRESS NOTES
[facility]  (A Division of [address] [place] [address])                                                                                                                                                                                Heather Mantilla MD                                                         Office #:     [phone]-VPEXVO [phone]                                                          Office Fax: [phone]     Date of Admission: 9/11/2021    Date of Note: 10/7/2021  Reason for FU: Antibiotic management with ongoing sepsis      Ongoing Antimicrobials:    Prior Antimicrobials:  Meropenem 10/3 to date # 5  Vancomycin 10/6 to date       Doxycycline 9/14 to 9/15  Vanco 9/14 to 9/17  osyn 9/14 to 9/20  Zosyn 9/24-> 9/26 meropenem to 9/27  Vanco 9/25 to 9/27  Levofloxacin 9/27 to 10/1       Assessment- ID related:  --------------------------------------------------------------------------    · GPC bacteremia 10/4- ID pending. Real vs contaminant  · Fever with leucocytosis: Improving, Possible source -- resp infection, has increased secretion,  ABX started emperically and wbc declining   · S/P respiratory failure, inbuated 9/11, re-intubated 9/18 and Post trach 9/22  · Post PEG 10/1  · History of positive drug screen--opiates/cocaine.  Drug screen on admission: negative  · Left wrist fracture with external fixer-- site look mariola  · Agiation, Hyperammonia     COVID test rapid neg, RVP neg  HIV test neg-9/15  Acute hepatitis A/B/C: negative  S/P LP- CSF benign- 9/16    Other Medical Issues- Mx per respective team:    Drug overdose( stated neurontin)  Hyperammonia level- etiology?  anemia   Recommendation for ID issues I am following:  ------------------------------------------------------------------------------    -> FU resp culture 10/6  -> cont meropenem and  vanco -dosing per pharmacy   --> FU blood culture until final ID for GPC- from 10/24- no further blood culture to be done until final                      [facility] ICU RN, ICU MD- Dr Santosh Espinosa       Subjective:  She is awake, no fever, wbc bumped up slightly at 13.9K. Non verbal  Not on pressors  FMS In place  Trach secretion significant, but more clear now per rn         HPI:  Venice Brizuela is a 39 y.o. WHITE/NON- with PMH as listed below. Patient is intubated, limited history found on her and  ICU team.MD.    Admitted with drug overdose - Neurontin. Not much history known before that. On admission, afebrile, wbc of 8.1, CMP ok, drug screen for opaites/cocaine/benzo neg. UA was normal.  She was intubated 9/11/21. Developed fever to 102.9 on 9/13, no leucocytosis but NH3 elevated, Legionella/Pneum urine ag good. NO DVT  On LE 9/15. Currently on VAnco/Zosyn and doxycycline. Further CORNEJO with CT for source work up in progress. Reconsulted for fever and leuocytosis on 10/4. Active Hospital Problems    Diagnosis Date Noted    Bacteremia due to Gram-positive bacteria 10/06/2021    Status post tracheostomy ([facility] 09/28/2021    Hypokalemia 09/21/2021    Increased ammonia level 09/14/2021    Psychosis (HCC)     Hyponatremia     Acute respiratory failure with hypoxia (HCC)     Left wrist fracture, with delayed healing, subsequent encounter 09/12/2021    Drug overdose, intentional self-harm, initial encounter ([facility] 09/12/2021    Hypoxia 09/12/2021    Hypotension after procedure 09/12/2021    Acute metabolic encephalopathy 13/92/7413     Past Medical History:   Diagnosis Date    Asthma     Bilateral ovarian cysts     Cocaine abuse ([address]     Mental and behavioral problem     Pancreatitis     Pancreatitis     Psychosis ([address]      Past Surgical History:   Procedure Laterality Date    HX GYN      D&C, Hysterectomy    IR INSERT GASTROSTOMY TUBE PERC  10/1/2021     History reviewed. No pertinent family history.   Social History     Socioeconomic History    Marital status:      Spouse name: Not on file    Number of children: Not on file    Years of education: Not on file    Highest education level: Not on file   Occupational History    Not on file   Tobacco Use    Smoking status: Current Every Day Smoker     Packs/day: 1.00    Smokeless tobacco: Never Used   Substance and Sexual Activity    Alcohol use: Not Currently    Drug use: Not Currently     Types: Cocaine, Marijuana     Comment: used with in past 24 hours    Sexual activity: Not Currently   Other Topics Concern    Not on file   Social History Narrative    Not on file     Social Determinants of Health     Financial Resource Strain:     Difficulty of Paying Living Expenses:    Food Insecurity:     Worried About Running Out of Food in the Last Year:     Ran Out of Food in the Last Year:    Transportation Needs:     Lack of Transportation (Medical):  Lack of Transportation (Non-Medical):    Physical Activity:     Days of Exercise per Week:     Minutes of Exercise per Session:    Stress:     Feeling of Stress :    Social Connections:     Frequency of Communication with Friends and Family:     Frequency of Social Gatherings with Friends and Family:     Attends Shinto Services:     Active Member of Clubs or Organizations:     Attends Club or Organization Meetings:     Marital Status:    Intimate Partner Violence:     Fear of Current or Ex-Partner:     Emotionally Abused:     Physically Abused:     Sexually Abused:         Allergies:  Fish containing products and Toradol [ketorolac]     Medications:  Current Facility-Administered Medications   Medication Dose Route Frequency    fentaNYL (DURAGESIC) 50 mcg/hr patch 1 Patch  1 Patch TransDERmal Q72H    clonazePAM (KlonoPIN) tablet 1 mg  1 mg Oral BID    vancomycin (VANCOCIN) 1250 mg in  ml infusion  1,250 mg IntraVENous Q12H    Vancomycin - Pharmacy to Dose  1 Each Other Rx Dosing/Monitoring    HYDROmorphone (DILAUDID) injection 1 mg  1 mg IntraVENous Q4H PRN    albumin human 25% (BUMINATE) solution 25 g  25 g IntraVENous Q6H    arformoteroL (BROVANA) neb solution 15 mcg  15 mcg Nebulization BID RT    budesonide (PULMICORT) 500 mcg/2 ml nebulizer suspension  500 mcg Nebulization BID RT    haloperidoL (HALDOL) tablet 5 mg  5 mg Oral BID    meropenem (MERREM) 1 g in sterile water (preservative free) 20 mL IV syringe  1 g IntraVENous Q12H    lactulose (CHRONULAC) 10 gram/15 mL solution 15 mL  15 mL Oral BID    melatonin (rapid dissolve) tablet 5 mg  5 mg Oral QHS    fentaNYL citrate (PF) injection 50 mcg  50 mcg IntraVENous Q2H PRN    albuterol-ipratropium (DUO-NEB) 2.5 MG-0.5 MG/3 ML  3 mL Nebulization Q4H PRN    [Held by provider] dexmedeTOMidine (PRECEDEX) 400 mcg in 0.9% sodium chloride 100 mL infusion  0.1-1.5 mcg/kg/hr IntraVENous TITRATE    famotidine (PEPCID) tablet 20 mg  20 mg Oral BID    thiamine mononitrate (B-1) tablet 100 mg  100 mg Oral DAILY    folic acid (FOLVITE) tablet 1 mg  1 mg Oral DAILY    multivitamin, tx-iron-ca-min (THERA-M w/ IRON) tablet 1 Tablet  1 Tablet Oral DAILY    ibuprofen (ADVIL;MOTRIN) 100 mg/5 mL oral suspension 400 mg  400 mg Per NG tube Q6H PRN    insulin lispro (HUMALOG) injection   SubCUTAneous Q6H    glucose chewable tablet 16 g  4 Tablet Oral PRN    glucagon (GLUCAGEN) injection 1 mg  1 mg IntraMUSCular PRN    dextrose (D50W) injection syrg 12.5-25 g  25-50 mL IntraVENous PRN    LORazepam (ATIVAN) injection 2 mg  2 mg IntraVENous Q6H PRN    enoxaparin (LOVENOX) injection 40 mg  40 mg SubCUTAneous Q24H    sodium chloride (NS) flush 5-40 mL  5-40 mL IntraVENous Q8H    sodium chloride (NS) flush 5-40 mL  5-40 mL IntraVENous PRN    acetaminophen (TYLENOL) tablet 650 mg  650 mg Oral Q6H PRN    Or    acetaminophen (TYLENOL) suppository 650 mg  650 mg Rectal Q6H PRN    polyethylene glycol (MIRALAX) packet 17 g  17 g Oral DAILY PRN    ondansetron (ZOFRAN ODT) tablet 4 mg  4 mg Oral Q8H PRN    Or    ondansetron (ZOFRAN) injection 4 mg  4 mg IntraVENous Q6H PRN    chlorhexidine (PERIDEX) 0.12 % mouthwash 10 mL  10 mL Oral Q12H    ELECTROLYTE REPLACEMENT PROTOCOL - Potassium Standard Dosing   1 Each Other PRN    ELECTROLYTE REPLACEMENT PROTOCOL - Magnesium   1 Each Other PRN    ELECTROLYTE REPLACEMENT PROTOCOL - Phosphorus  Standard Dosing  1 Each Other PRN    ELECTROLYTE REPLACEMENT PROTOCOL - Calcium   1 Each Other PRN     Physical Exam:    Temp (24hrs), Av.3 °F (37.4 °C), Min:98.8 °F (37.1 °C), Max:99.7 °F (37.6 °C)    Visit Vitals  /73   Pulse (!) 113   Temp 99.3 °F (37.4 °C)   Resp 29   Ht 5' 2\" (1.575 m)   Wt 71.2 kg (157 lb)   LMP 05/15/2018   SpO2 96%   BMI 28.72 kg/m²      GEN: WD acutely sick looking, on 8 L of oxygen via trach     HEENT: Unicteric, EOMI  Neck: trach with secretions   CHEST: Non laboured breathing. B/L rhonchi  CVS:RRR, no mur/gallop  ABD: Obese/soft. Non tender. PEG in place with TF. FMS with loose OP noted  ULISES: No zamora  EXT: No apparent swelling or redness on UE/LE joints. No induration on PIV site on both arms  Skin: Dry and intact. No rash, no redness. CNS: Restrained, moves all extremities.  Awake, follows command but seems restless     Microbiology  All Micro Results     Procedure Component Value Units Date/Time    CULTURE, RESPIRATORY/SPUTUM/BRONCH Blondell Praneeth [[id number]] Collected: 10/06/21 1430    Order Status: Completed Specimen: Sputum from Tracheal Aspirate Updated: 10/06/21 8956     Special Requests: NO SPECIAL REQUESTS        GRAM STAIN FEW WBCS SEEN               OCCASIONAL EPITHELIAL CELLS SEEN                  RARE GRAM POSITIVE COCCI IN CLUSTERS           Culture result: PENDING    CULTURE, BLOOD [[id number]]  (Abnormal) Collected: 10/04/21 1305    Order Status: Completed Specimen: Blood Updated: 10/06/21 1350     Special Requests: NO SPECIAL REQUESTS        GRAM STAIN       GRAM POSITIVE COCCI IN CLUSTERS ANAEROBIC BOTTLE                  SMEAR CALLED TO AND CORRECTLY REPEATED BY: Akhil Finnegan, RN ICU, TO [facility] AT 2042 10/5/2021                  GRAM POSITIVE COCCI IN CLUSTERS AEROBIC BOTTLE                  SMEAR CALLED TO AND CORRECTLY REPEATED BY: Cash Blankenship RN ICU AT [address] [facility] ON 10/6/21 TO [facility]           Culture result:       GRAM POSITIVE COCCI IN CLUSTERS GROWING IN THE AEROBIC AND ANAEROBIC BOTTLES (NO SITE INDICATED)          CULTURE, BLOOD [[id number]]  (Abnormal) Collected: 10/04/21 1405    Order Status: Completed Specimen: Blood Updated: 10/06/21 1340     Special Requests: NO SPECIAL REQUESTS        GRAM STAIN       GRAM POSITIVE COCCI IN CLUSTERS ANAEROBIC BOTTLE                  SMEAR CALLED TO AND CORRECTLY REPEATED BY: Lacey Loyola RN ICU, TO [facility] AT 1936 10/5/2021                  GRAM POSITIVE COCCI IN CLUSTERS AEROBIC BOTTLE                  SMEAR CALLED TO AND CORRECTLY REPEATED BY: Brian Jasso RN ICU AT 6875 ON 10/6/21 TO [facility]           Culture result:       STAPHYLOCOCCUS SPECIES, COAGULASE NEGATIVE GROWING IN THE ANAEROBIC BOTTLE (SITE NOT INDICATED)                  GRAM POSITIVE COCCI IN CLUSTERS GROWING IN THE AEROBIC BOTTLE          CULTURE, MRSA [[id number]]     Order Status: Sent Specimen: Nasal from [address], MRSA [[id number]] Collected: 10/04/21 1100    Order Status: Canceled Specimen: Nasal from Nares     CULTURE, RESPIRATORY/SPUTUM/BRONCH Carola Englewood STAIN [[id number]]     Order Status: Canceled Specimen: Sputum,ET Suction     CULTURE, BLOOD [[id number]] Collected: 09/27/21 1005    Order Status: Completed Specimen: Blood Updated: 10/03/21 0723     Special Requests: NO SPECIAL REQUESTS        Culture result: NO GROWTH 6 DAYS       CULTURE, BLOOD [[id number]] Collected: 09/27/21 1005    Order Status: Completed Specimen: Blood Updated: 10/03/21 0723     Special Requests: NO SPECIAL REQUESTS        Culture result: NO GROWTH 6 DAYS       CULTURE, BLOOD [[id number]] Collected: 09/24/21 0740    Order Status: Completed Specimen: Blood Updated: 09/30/21 3019 Special Requests: NO SPECIAL REQUESTS        Culture result: NO GROWTH 6 DAYS       CULTURE, RESPIRATORY/SPUTUM/BRONCH Lavada Gambles STAIN [[id number]]  (Abnormal)  (Susceptibility) Collected: 09/24/21 0654    Order Status: Completed Specimen: Sputum from Tracheal Aspirate Updated: 09/27/21 1147     Special Requests: NO SPECIAL REQUESTS        GRAM STAIN RARE WBCS SEEN               RARE EPITHELIAL CELLS SEEN            NO ORGANISMS SEEN        Culture result:       MODERATE STAPHYLOCOCCUS AUREUS                  NO NORMAL RESPIRATORY DINA ISOLATED          COVID-19 RAPID TEST [[id number]] Collected: 09/24/21 1700    Order Status: Completed Specimen: Nasopharyngeal Updated: 09/24/21 1849     Specimen source Nasopharyngeal        COVID-19 rapid test Not detected        Comment: Rapid Abbott ID Now       Rapid NAAT:  The specimen is NEGATIVE for SARS-CoV-2, the novel coronavirus associated with COVID-19. Negative results should be treated as presumptive and, if inconsistent with clinical signs and symptoms or necessary for patient management, should be tested with an alternative molecular assay. Negative results do not preclude SARS-CoV-2 infection and should not be used as the sole basis for patient management decisions. This test has been authorized by the FDA under an Emergency Use Authorization (EUA) for use by authorized laboratories. Fact sheet for Healthcare Providers: [employer]Update.co.nz  Fact sheet for Patients: [employer].co.nz       Methodology: Isothermal Nucleic Acid Amplification         CULTURE, URINE [[id number]]     Order Status: Canceled Specimen: Urine from Clean catch     CULTURE, CSF  TUBE 2 [[id number]] Collected: 09/16/21 1434    Order Status: Completed Specimen: Cerebrospinal Fluid Updated: 09/23/21 1241     Special Requests: TUBE 3, CULTURE AND SENSITIVTY AND GRAM STAIN.      GRAM STAIN RARE WBCS SEEN         NO ORGANISMS SEEN        Culture result: NO GROWTH 7 DAYS       CULTURE, BLOOD [[id number]] Collected: 09/14/21 0515    Order Status: Completed Specimen: Blood Updated: 09/20/21 0832     Special Requests: NO SPECIAL REQUESTS        Culture result: NO GROWTH 6 DAYS       CULTURE, BLOOD [[id number]] Collected: 09/14/21 0500    Order Status: Completed Specimen: Blood Updated: 09/20/21 0832     Special Requests: NO SPECIAL REQUESTS        Culture result: NO GROWTH 6 DAYS       MENINGITIS PATHOGENS PANEL, CSF (BY PCR) [[id number]] Collected: 09/16/21 1434    Order Status: Completed Specimen: Cerebrospinal Fluid Updated: 09/17/21 0050     Escherichia coli K1 Not detected        Haemophilus Influenzae Not detected        Listeria Monocytogenes Not detected        Neisseria Meningitidis Not detected        Streptococcus Agalactiae Not detected        Streptococcus Pneumoniae Not detected        Cytomegalovirus Not detected        Enterovirus Not detected        Herpes Simplex Virus 1 Not detected        Comment: In patients who have negative herpes simplex 1 and 2 PCR results, do not modify treatment, confirm with alternate testing. Herpes Simplex Virus 2 Not detected        Comment: In patients who have negative herpes simplex 1 and 2 PCR results, do not modify treatment, confirm with alternate testing. Human Herpesvirus 6 Not detected        Human Parechovirus Not detected        Varicella Zoster Virus Not detected        Crypto.  neoformans/gattii Not detected       MENINGITIS PATHOGENS PANEL, CSF (BY PCR) [[id number]] Collected: 09/16/21 1545    Order Status: Canceled Specimen: Cerebrospinal Fluid     HSV 1 AND 2 BY PCR [[id number]] Collected: 09/16/21 1434    Order Status: Canceled Specimen: Other     [id number]-[id number] RAPID TEST [[id number]] Collected: 09/16/21 1000    Order Status: Completed Specimen: Nasopharyngeal Updated: 09/16/21 1032     Specimen source Nasopharyngeal        COVID-19 rapid test Not detected        Comment: Rapid Abbott ID Now Rapid NAAT:  The specimen is NEGATIVE for SARS-CoV-2, the novel coronavirus associated with COVID-19. Negative results should be treated as presumptive and, if inconsistent with clinical signs and symptoms or necessary for patient management, should be tested with an alternative molecular assay. Negative results do not preclude SARS-CoV-2 infection and should not be used as the sole basis for patient management decisions. This test has been authorized by the FDA under an Emergency Use Authorization (EUA) for use by authorized laboratories.    Fact sheet for Healthcare Providers: [employer] Services[employer].co.nz  Fact sheet for Patients: [employer].co.nz       Methodology: Isothermal Nucleic Acid Amplification         LEGIONELLA PNEUMOPHILA AG, URINE [[id number]] Collected: 09/14/21 2315    Order Status: Completed Specimen: Urine, random Updated: 09/15/21 1042     Legionella Ag, urine Negative       STREP PNEUMO AG, URINE [[id number]] Collected: 09/14/21 2315    Order Status: Completed Specimen: Urine, random Updated: 09/15/21 1042     Strep pneumo Ag, urine Negative       RESPIRATORY VIRUS PANEL W/COVID-19, PCR [[id number]] Collected: 09/14/21 1832    Order Status: Completed Specimen: Nasopharyngeal Updated: 09/14/21 2234     Adenovirus Not detected        Coronavirus 229E Not detected        Coronavirus HKU1 Not detected        Coronavirus CVNL63 Not detected        Coronavirus OC43 Not detected        SARS-CoV-2, PCR Not detected        Metapneumovirus Not detected        Rhinovirus and Enterovirus Not detected        Influenza A Not detected        Influenza A, subtype H1 Not detected        Influenza A, subtype H3 Not detected        INFLUENZA A H1N1 PCR Not detected        Influenza B Not detected        Parainfluenza 1 Not detected        Parainfluenza 2 Not detected        Parainfluenza 3 Not detected        Parainfluenza virus 4 Not detected        RSV by PCR Not detected        B. parapertussis, PCR Not detected        Bordetella pertussis - PCR Not detected        Chlamydophila pneumoniae DNA, QL, PCR Not detected        Mycoplasma pneumoniae DNA, QL, PCR Not detected       CULTURE, BLOOD [[id number]] Collected: 09/14/21 1700    Order Status: Canceled Specimen: Blood     CULTURE, URINE [[id number]] Collected: 09/13/21 2330    Order Status: Canceled Specimen: Cath Urine     RESPIRATORY VIRUS PANEL W/COVID-19, PCR [[id number]]     Order Status: Canceled Specimen: NASOPHARYNGEAL SWAB     COVID-19 RAPID TEST [[id number]]     Order Status: Canceled     COVID-19 RAPID TEST [[id number]] Collected: 09/13/21 0737    Order Status: Completed Specimen: Nasopharyngeal Updated: 09/13/21 0811     Specimen source Nasopharyngeal        COVID-19 rapid test Not detected        Comment: Rapid Abbott ID Now       Rapid NAAT:  The specimen is NEGATIVE for SARS-CoV-2, the novel coronavirus associated with COVID-19. Negative results should be treated as presumptive and, if inconsistent with clinical signs and symptoms or necessary for patient management, should be tested with an alternative molecular assay. Negative results do not preclude SARS-CoV-2 infection and should not be used as the sole basis for patient management decisions. This test has been authorized by the FDA under an Emergency Use Authorization (EUA) for use by authorized laboratories.    Fact sheet for Healthcare Providers: ConventionUpdate.co.nz  Fact sheet for Patients: [employer]Update.co.nz       Methodology: Isothermal Nucleic Acid Amplification         COVID-19 RAPID TEST [[id number]]     Order Status: Canceled            Lab results:    Chemistry  Recent Labs     10/07/21  0508 10/06/21  1440 10/06/21  0523 10/05/21  1655 10/05/21  0552   *  --  127*  --  107*     --  139  --  141   K 4.1 4.3 3.8   < > 3.7     --  106  --  107   CO2 25  --  25  --  25   BUN 9 --  10  --  8   CREA 0.34*  --  0.34*  --  0.27*   CA 10.5*  --  9.8  --  9.3   AGAP 9  --  8  --  9   BUCR 26*  --  29*  --  30*   *  --  134*  --  155*   TP 8.4*  --  7.0  --  6.7   ALB 5.4*  --  4.2  --  4.0   GLOB 3.0  --  2.8  --  2.7   AGRAT 1.8*  --  1.5  --  1.5    < > = values in this interval not displayed. CBC w/ Diff  Recent Labs     10/07/21  0508 10/06/21  0523 10/05/21  0552   WBC 13.9* 10.2 8.8   RBC 3.16* 2.61* 2.63*   HGB 9.1* 7.6* 7.6*   HCT 27.4* 23.5* 23.3*   * 409 388   GRANS 77* 70 72   LYMPH 15* 21 18*   EOS 1 3 3       Imaging: report posted below as per radiologist   CXR- 9/17    1. Unchanged, adequately positioned endotracheal tube and enteric tube. 2. Unchanged left and right basilar patchy opacities, atelectasis versus  Infiltrate. 10/4 CXR  Ongoing increased left greater than right perihilar and left basilar opacities  may represent combination of worsening asymmetric pulmonary edema and infiltrate  with atelectasis. Possible trace left pleural effusion.   10/5- CXR:    Mild left lower lower lung zone opacities, unchanged.

## 2021-10-07 NOTE — PROGRESS NOTES
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    Left wrist fracture, with delayed healing, subsequent encounter S62.102G    Drug overdose, intentional self-harm, initial encounter ([facility] [address] T50.902A    Hypoxia R09.02    Hypotension after procedure I95.81    Acute metabolic encephalopathy V85.32    Psychosis (HCC) F29    Hyponatremia E87.1    Acute respiratory failure with hypoxia ([facility]) J96.01    Increased ammonia level R79.89    Hypokalemia E87.6    Status post tracheostomy ([facility] [address] Z93.0    Bacteremia due to Gram-positive bacteria R78.81 ·   Code status: full code   RECOMMENDATIONS:   Respiratory: ventilator support: intubated 2021 due to encephalopathy and drug overdose. Patient was extubated on 2021, and reintubated within an hour due to respiratory distress and upper airway edema. S/p tracheostomy 2021  X-ray chest from 10/7/2021 reviewed - LT side stable mild changes  Small trach tube secretions unchanged   Tolerating continuous trach collar without any increased work of breathing  Continue bronchodilator with Brovana and Pulmicort twice a day, DuoNeb as needed  Continue ventilator bundles.   On 10/2/2021 - off propofol on SBT on precedex - actively weaning to off   Sedation  off of Precedex 10/6/21  Prn lorazepam, Dilaudid and due to psych issues given Haldol - tolerated well monitor QTC, Klonopin     ID: WBC mildly elevated and no fever; ID following  Bl cx 10/4/21 -staph Coreg negative x  - await ID and sensitivity  LT infiltrates on XR chest - stable  Sputum culture 10/6/2021in process  Antibiotics  meropenem on 10/3/2021 - will finish 10 days of empirical coverage  Procalcitonin - normal range suggesting unlikely new pneumonia  In the past was on Vanco and Zosyn? Drug fever? S/p LP with negative CSF cultures. [id number] test 9/16/2021 and 9/24/2021: Negative. Sputum culture 9/24/2021: MSSA. Blood culture 9/27/21 and 9/24/2021: NGT final  Barahona catheter changed on 9/24/2021. Before used - Levofloxacin course was complete and was on Vanco and Zosyn     CVS: on Haldol -no arrhythmia  Serial EKG to monitor Qtc - no QTc prolongation  Stable hemodynamics; telemetry  sinus rhythm; blood pressure stable; continue to monitor. Echo 9/17/2021: LVEF 60 to 65%. RVSP 29 mmHg. Ultrasound leg 9/15/21  negative study for DVTs. Renal: stable renal function, good urine output; continue to monitor and replace electrolytes as needed. Heme: anemia stable since drop 10/4/21 no active bleeding issues; monitor daily  Stool occult blood 9/28negative. Platelets normal.  Restarted Lovenox 10/6/21 since Hgb remains stable - monitor daily    Endo: Stable blood glucose; continue to monitor; sliding scale insulin as needed. GI: Patient tolerating tube feeding PEG tube placement 10/1/21  hCG negative on admission. Ammonia fluctuates; lactulose therapy daily. Transaminases coming down; hepatitis virus panelnegative study. CT abdomen 9/15/21  Liver is unremarkable. No abnormal biliary dilation. Gallbladder is unremarkable. Nutrition: peg feeding jevity as tolerated  Patient on thiamine and folic acid. Neurology: Patient confused and delirious agitated off of sedation before; known patient with psych issues and drug overdose on presentation during this admission  Awake, alert, off-and-on fidgety and thrashing in the bed -requiring as needed Ativan  Avoid continuous sedation with benzodiazepines  Appreciate psych evaluation  CT headnil acute  Psych:  On Klonopin, Haldol  Previously used Seroquel  Has as needed fentanyl, Dilaudid, Ativan  Improved need for Dilaudid since started on fentanyl patch 50 mcg    Toxicology: Gabapentin OD on admission. Skin: ICU nursing care  MS: Left wrist fracture in immobilization external fixator; patient has been seen by orthopedics during this admission. Prophylaxis: DVT prophylaxis: Lovenox 40 sc daily. GI prophylaxis: Famotidine. Restraints: Wrist soft restraints as needed for patient interfering with medical therapy/management and patient safety. Consulted PT, OT and SLP teamsappreciate input. May tx to floor when ok with hospitalist  Prognosis guarded overall. CODE STATUSfull code. Palliative care on case. Quality Care: PPI, DVT prophylaxis, HOB elevated, Infection control all reviewed and addressed. Lines/Tubes: PIV   S/p ETT: 9/11/219/22/2021. Tracheostomy 9/22/2021. Status post PEG on 10/1/2021   Barahona: 9/11/21; changed 9/24/2021. Discussed with RNunable to discontinue Barahona catheter due to ventilator support, agitated and delirious state; patient will not be able to tolerate external Barahona catheter. ADVANCE DIRECTIVE: Full code. Discussed with RN, RT, ICU staff, hospitalist  High complexity decision making was performed during the evaluation of this patient at high risk for decompensation with multiple organ involvement. Critical care minute excluding any family discussion or procedure: 39 minutes    Braden Bledsoe   sister-in-law   [phone]      updated on 10/2/2021 by Dr. Kt Uriarte       Subjective/History:   Ms. Sameera Reed has been seen and evaluated as Dr. Samantha Lilly requested now for assisting with Acute respiratory failure and ventilator management. Patient is a 39 y.o. female with following PMhx presented to ER with lethargy via EMS and admitted for Gabapentin overdose. Pt required intubation for airways protection.  Position control was contacted that recommended supportive care per ER provider. Pt seen at bedside in ER rm#2. The patient can not provide additional history due to Ventilated. 10/7/2021  Patient seen at bedside in ICU room #102  Tolerating trach collar with minimal to mild tracheal secretions  Sedated after receiving Ativan for agitation and thrashing in the bed earlier this a.m. Easy to wake on calling name, calm, goes back to sleep  No evidence for respiratory distress  Hemodynamically stable, no arrhythmia  Tolerating Haldol and increased dose of Klonopin  No fever; mild leukocytosis  Tolerating tube feeding; fair urine and stool outputs  [facility] was not contacted by staff on anything about patient overnight. Patient has a history of drug use and smoker and alcohol use. Review of Systems:  Review of systems not obtained due to patient factors.         Past Medical History:  Past Medical History:   Diagnosis Date    Asthma     Bilateral ovarian cysts     Cocaine abuse ([facility] [address]     Mental and behavioral problem     Pancreatitis     Pancreatitis     Psychosis ([facility] [address]         Past Surgical History:  Past Surgical History:   Procedure Laterality Date    HX GYN      D&C, Hysterectomy    IR INSERT GASTROSTOMY TUBE PERC  10/1/2021        Medications:    Current Facility-Administered Medications   Medication Dose Route Frequency    fentaNYL (DURAGESIC) 50 mcg/hr patch 1 Patch  1 Patch TransDERmal Q72H    clonazePAM (KlonoPIN) tablet 1 mg  1 mg Oral BID    vancomycin (VANCOCIN) 1250 mg in  ml infusion  1,250 mg IntraVENous Q12H    Vancomycin - Pharmacy to Dose  1 Each Other Rx Dosing/Monitoring    arformoteroL (BROVANA) neb solution 15 mcg  15 mcg Nebulization BID RT    budesonide (PULMICORT) 500 mcg/2 ml nebulizer suspension  500 mcg Nebulization BID RT    haloperidoL (HALDOL) tablet 5 mg  5 mg Oral BID    meropenem (MERREM) 1 g in sterile water (preservative free) 20 mL IV syringe  1 g IntraVENous Q12H    lactulose (CHRONULAC) 10 gram/15 mL solution 15 mL  15 mL Oral BID    melatonin (rapid dissolve) tablet 5 mg  5 mg Oral QHS    famotidine (PEPCID) tablet 20 mg  20 mg Oral BID    thiamine mononitrate (B-1) tablet 100 mg  100 mg Oral DAILY    folic acid (FOLVITE) tablet 1 mg  1 mg Oral DAILY    multivitamin, tx-iron-ca-min (THERA-M w/ IRON) tablet 1 Tablet  1 Tablet Oral DAILY    insulin lispro (HUMALOG) injection   SubCUTAneous Q6H    enoxaparin (LOVENOX) injection 40 mg  40 mg SubCUTAneous Q24H    sodium chloride (NS) flush 5-40 mL  5-40 mL IntraVENous Q8H    chlorhexidine (PERIDEX) 0.12 % mouthwash 10 mL  10 mL Oral Q12H       Allergy:  Allergies   Allergen Reactions    Fish Containing Products Itching    Toradol [Ketorolac] Rash     Pt reports she is not allergic to this medication. Social History:  Social History     Tobacco Use    Smoking status: Current Every Day Smoker     Packs/day: 1.00    Smokeless tobacco: Never Used   Substance Use Topics    Alcohol use: Not Currently    Drug use: Not Currently     Types: Cocaine, Marijuana     Comment: used with in past 24 hours          Objective:   Vital Signs:    Blood pressure 103/73, pulse (!) 113, temperature 99.3 °F (37.4 °C), resp. rate 29, height 5' 2\" (1.575 m), weight 71.2 kg (157 lb), last menstrual period 05/15/2018, SpO2 96 %. Body mass index is 28.72 kg/m².    O2 Device: Tracheal collar   O2 Flow Rate (L/min): 8 l/min   Temp (24hrs), Av.3 °F (37.4 °C), Min:98.8 °F (37.1 °C), Max:99.7 °F (37.6 °C)         Intake/Output:   Last shift:      10/07 0701 - 10/07 1900  In: 150   Out: -   Last 3 shifts: 10/05 1901 - 10/07 0700  In: 2956.5 [I.V.:1696.5]  Out: 3779 [Urine:3700]    Intake/Output Summary (Last 24 hours) at 10/7/2021 0951  Last data filed at 10/7/2021 0840  Gross per 24 hour   Intake 1946.46 ml   Output 2400 ml   Net -453.54 ml       ABG:    No results found for: PH, PHI, PCO2, PCO2I, PO2, PO2I, HCO3, HCO3I, FIO2, FIO2I  Ventilator Mode: Spontaneous  Respiratory Rate  Resp Rate Observed: 9  Back-Up Rate: 14  Insp Time (sec): 1.1 sec  I:E Ratio: 1:2.1  Ventilator Volumes  Vt Set (ml): 420 ml  Vt Exhaled (Machine Breath) (ml): 571 ml  Vt Spont (ml): 468 ml  Ve Observed (l/min): 10.8 l/min  Ventilator Pressures  Pressure Support (cm H2O): 7 cm H2O  PIP Observed (cm H2O): 18 cm H2O  Plateau Pressure (cm H2O): 13 cm H2O  MAP (cm H2O): 8.8  PEEP/VENT (cm H2O): 5 cm H20  Auto PEEP Observed (cm H2O): 0 cm H2O      Physical Exam:   General: Sedated but arousable easily, calm, appears very older than stated age, on trach collar  HEENT: PERRLA, visible oral mucosa dry  Neck: No abnormally enlarged lymph nodes or thyroid, supple; tracheostomy tube in place without any bleeding or secretions  Chest: normal  Lungs: Moderate air entry, no rhonchi and wheezes bilaterally,normal percussion anterior chest wall bilaterally, no tenderness/ rash  Heart: Regular rate and rhythm, S1S2 present or without murmur or extra heart sounds  Abdomen: non distended, bowel sounds normoactive, tympanic, abdomen is soft without significant tenderness, masses, organomegaly or guarding, rigidity, rebound; PEG tube in place  Extremity: No edema, cyanosis, clubbing.   Capillary refill normal bilateral  Neuro: Sedated but easy to wake up, calm, moves all extremities spontaneously, no involuntary movements, exam limitations  Skin: Skin color, texture, turgor unchanged      Data:     Recent Results (from the past 12 hour(s))   GLUCOSE, POC    Collection Time: 10/06/21 11:34 PM   Result Value Ref Range    Glucose (POC) 159 (H) 70 - 110 mg/dL   GLUCOSE, POC    Collection Time: 10/07/21  5:02 AM   Result Value Ref Range    Glucose (POC) 118 (H) 70 - 110 mg/dL   CBC WITH AUTOMATED DIFF    Collection Time: 10/07/21  5:08 AM   Result Value Ref Range    WBC 13.9 (H) 4.6 - 13.2 K/uL    RBC 3.16 (L) 4.20 - 5.30 M/uL    HGB 9.1 (L) 12.0 - 16.0 g/dL    HCT 27.4 (L) 35.0 - 45.0 %    MCV 86.7 78.0 - 100.0 FL    MCH 28.8 24.0 - 34.0 PG    MCHC 33.2 31.0 - 37.0 g/dL    RDW 12.7 11.6 - 14.5 %    PLATELET 170 (H) 562 - 420 K/uL    MPV 9.9 9.2 - 11.8 FL    NEUTROPHILS 77 (H) 40 - 73 %    LYMPHOCYTES 15 (L) 21 - 52 %    MONOCYTES 7 3 - 10 %    EOSINOPHILS 1 0 - 5 %    BASOPHILS 0 0 - 2 %    ABS. NEUTROPHILS 10.7 (H) 1.8 - 8.0 K/UL    ABS. LYMPHOCYTES 2.1 0.9 - 3.6 K/UL    ABS. MONOCYTES 0.9 0.05 - 1.2 K/UL    ABS. EOSINOPHILS 0.2 0.0 - 0.4 K/UL    ABS. BASOPHILS 0.0 0.0 - 0.1 K/UL    DF AUTOMATED     MAGNESIUM    Collection Time: 10/07/21  5:08 AM   Result Value Ref Range    Magnesium 2.2 1.6 - 2.6 mg/dL   METABOLIC PANEL, COMPREHENSIVE    Collection Time: 10/07/21  5:08 AM   Result Value Ref Range    Sodium 138 136 - 145 mmol/L    Potassium 4.1 3.5 - 5.5 mmol/L    Chloride 104 100 - 111 mmol/L    CO2 25 21 - 32 mmol/L    Anion gap 9 3.0 - 18 mmol/L    Glucose 120 (H) 74 - 99 mg/dL    BUN 9 7.0 - 18 MG/DL    Creatinine 0.34 (L) 0.6 - 1.3 MG/DL    BUN/Creatinine ratio 26 (H) 12 - 20      GFR est AA >60 >60 ml/min/1.73m2    GFR est non-AA >60 >60 ml/min/1.73m2    Calcium 10.5 (H) 8.5 - 10.1 MG/DL    Bilirubin, total 0.6 0.2 - 1.0 MG/DL    ALT (SGPT) 89 (H) 13 - 56 U/L    AST (SGOT) 31 10 - 38 U/L    Alk. phosphatase 141 (H) 45 - 117 U/L    Protein, total 8.4 (H) 6.4 - 8.2 g/dL    Albumin 5.4 (H) 3.4 - 5.0 g/dL    Globulin 3.0 2.0 - 4.0 g/dL    A-G Ratio 1.8 (H) 0.8 - 1.7     PHOSPHORUS    Collection Time: 10/07/21  5:08 AM   Result Value Ref Range    Phosphorus 3.5 2.5 - 4.9 MG/DL   AMMONIA    Collection Time: 10/07/21  5:08 AM   Result Value Ref Range    Ammonia 45 (H) 11 - 32 UMOL/L             No results for input(s): FIO2I, IFO2, HCO3I, IHCO3, HCOPOC, PCO2I, PCOPOC, IPHI, PHI, PHPOC, PO2I, PO2POC in the last 72 hours.     No lab exists for component: IPOC2    All Micro Results     Procedure Component Value Units Date/Time    CULTURE, RESPIRATORY/SPUTUM/BRONCH Clobryan Grideros [[id number]] Collected: 10/06/21 1430    Order Status: Completed Specimen: Sputum from Tracheal Aspirate Updated: 10/06/21 2334     Special Requests: NO SPECIAL REQUESTS        GRAM STAIN FEW WBCS SEEN               OCCASIONAL EPITHELIAL CELLS SEEN                  RARE GRAM POSITIVE COCCI IN CLUSTERS           Culture result: PENDING    CULTURE, BLOOD [[id number]]  (Abnormal) Collected: 10/04/21 1305    Order Status: Completed Specimen: Blood Updated: 10/06/21 1350     Special Requests: NO SPECIAL REQUESTS        GRAM STAIN       GRAM POSITIVE COCCI IN CLUSTERS ANAEROBIC BOTTLE                  SMEAR CALLED TO AND CORRECTLY REPEATED BY: Abel Beard RN ICU, TO [facility] AT 2042 10/5/2021                  GRAM POSITIVE COCCI IN CLUSTERS AEROBIC BOTTLE                  SMEAR CALLED TO AND CORRECTLY REPEATED BY: Cinthia Skaggs RN ICU AT [id number] [place] [address] ON 10/6/21 TO [facility]           Culture result:       GRAM POSITIVE COCCI IN CLUSTERS GROWING IN THE AEROBIC AND ANAEROBIC BOTTLES (NO SITE INDICATED)          CULTURE, BLOOD [[id number]]  (Abnormal) Collected: 10/04/21 1405    Order Status: Completed Specimen: Blood Updated: 10/06/21 1340     Special Requests: NO SPECIAL REQUESTS        GRAM STAIN       GRAM POSITIVE COCCI IN CLUSTERS ANAEROBIC BOTTLE                  SMEAR CALLED TO AND CORRECTLY REPEATED BY: Michael Landers RN ICU, TO [facility] AT 1936 10/5/2021                  GRAM POSITIVE COCCI IN CLUSTERS AEROBIC BOTTLE                  SMEAR CALLED TO AND CORRECTLY REPEATED BY: Marisabel Elizabeth RN ICU AT 1774 ON 10/6/21 TO [facility]           Culture result:       STAPHYLOCOCCUS SPECIES, COAGULASE NEGATIVE GROWING IN THE ANAEROBIC BOTTLE (SITE NOT INDICATED)                  GRAM POSITIVE COCCI IN CLUSTERS GROWING IN THE AEROBIC BOTTLE          CULTURE, MRSA [[id number]]     Order Status: Sent Specimen: Nasal from [address], MRSA [[id number]] Collected: 10/04/21 1100    Order Status: Canceled Specimen: Nasal from Nares     CULTURE, RESPIRATORY/SPUTUM/BRONCH Ariana Alosa STAIN [[id number]]     Order Status: Canceled Specimen: Sputum,ET Suction     CULTURE, BLOOD [[id number]] Collected: 09/27/21 1005    Order Status: Completed Specimen: Blood Updated: 10/03/21 0723     Special Requests: NO SPECIAL REQUESTS        Culture result: NO GROWTH 6 DAYS       CULTURE, BLOOD [[id number]] Collected: 09/27/21 1005    Order Status: Completed Specimen: Blood Updated: 10/03/21 0723     Special Requests: NO SPECIAL REQUESTS        Culture result: NO GROWTH 6 DAYS       CULTURE, BLOOD [[id number]] Collected: 09/24/21 0740    Order Status: Completed Specimen: Blood Updated: 09/30/21 0819     Special Requests: NO SPECIAL REQUESTS        Culture result: NO GROWTH 6 DAYS       CULTURE, RESPIRATORY/SPUTUM/BRONCH W GRAM STAIN [[id number]]  (Abnormal)  (Susceptibility) Collected: 09/24/21 0654    Order Status: Completed Specimen: Sputum from Tracheal Aspirate Updated: 09/27/21 1147     Special Requests: NO SPECIAL REQUESTS        GRAM STAIN RARE WBCS SEEN               RARE EPITHELIAL CELLS SEEN            NO ORGANISMS SEEN        Culture result:       MODERATE STAPHYLOCOCCUS AUREUS                  NO NORMAL RESPIRATORY DINA ISOLATED          COVID-19 RAPID TEST [[id number]] Collected: 09/24/21 1700    Order Status: Completed Specimen: Nasopharyngeal Updated: 09/24/21 1849     Specimen source Nasopharyngeal        COVID-19 rapid test Not detected        Comment: Rapid Abbott ID Now       Rapid NAAT:  The specimen is NEGATIVE for SARS-CoV-2, the novel coronavirus associated with COVID-19. Negative results should be treated as presumptive and, if inconsistent with clinical signs and symptoms or necessary for patient management, should be tested with an alternative molecular assay. Negative results do not preclude SARS-CoV-2 infection and should not be used as the sole basis for patient management decisions. This test has been authorized by the FDA under an Emergency Use Authorization (EUA) for use by authorized laboratories.    Fact sheet for Healthcare Providers: ConventionUpdate.co.nz  Fact sheet for Patients: ConventionUpdate.co.nz       Methodology: Isothermal Nucleic Acid Amplification         CULTURE, URINE [[id number]]     Order Status: Canceled Specimen: Urine from Clean catch     CULTURE, CSF  TUBE 2 [[id number]] Collected: 09/16/21 1434    Order Status: Completed Specimen: Cerebrospinal Fluid Updated: 09/23/21 1241     Special Requests: TUBE 3, CULTURE AND SENSITIVTY AND GRAM STAIN. GRAM STAIN RARE WBCS SEEN         NO ORGANISMS SEEN        Culture result: NO GROWTH 7 DAYS       CULTURE, BLOOD [[id number]] Collected: 09/14/21 0515    Order Status: Completed Specimen: Blood Updated: 09/20/21 0832     Special Requests: NO SPECIAL REQUESTS        Culture result: NO GROWTH 6 DAYS       CULTURE, BLOOD [[id number]] Collected: 09/14/21 0500    Order Status: Completed Specimen: Blood Updated: 09/20/21 0832     Special Requests: NO SPECIAL REQUESTS        Culture result: NO GROWTH 6 DAYS       MENINGITIS PATHOGENS PANEL, CSF (BY PCR) [[id number]] Collected: 09/16/21 1434    Order Status: Completed Specimen: Cerebrospinal Fluid Updated: 09/17/21 0050     Escherichia coli K1 Not detected        Haemophilus Influenzae Not detected        Listeria Monocytogenes Not detected        Neisseria Meningitidis Not detected        Streptococcus Agalactiae Not detected        Streptococcus Pneumoniae Not detected        Cytomegalovirus Not detected        Enterovirus Not detected        Herpes Simplex Virus 1 Not detected        Comment: In patients who have negative herpes simplex 1 and 2 PCR results, do not modify treatment, confirm with alternate testing. Herpes Simplex Virus 2 Not detected        Comment: In patients who have negative herpes simplex 1 and 2 PCR results, do not modify treatment, confirm with alternate testing.         Human Herpesvirus 6 Not detected        Human Parechovirus Not detected        Varicella Zoster Virus Not detected        Crypto. neoformans/gattii Not detected       MENINGITIS PATHOGENS PANEL, CSF (BY PCR) [[id number]] Collected: 09/16/21 1545    Order Status: Canceled Specimen: Cerebrospinal Fluid     HSV 1 AND 2 BY PCR [[id number]] Collected: 09/16/21 1434    Order Status: Canceled Specimen: Other     VFIHO-05 RAPID TEST [[id number]] Collected: 09/16/21 1000    Order Status: Completed Specimen: Nasopharyngeal Updated: 09/16/21 1032     Specimen source Nasopharyngeal        COVID-19 rapid test Not detected        Comment: Rapid Abbott ID Now       Rapid NAAT:  The specimen is NEGATIVE for SARS-CoV-2, the novel coronavirus associated with COVID-19. Negative results should be treated as presumptive and, if inconsistent with clinical signs and symptoms or necessary for patient management, should be tested with an alternative molecular assay. Negative results do not preclude SARS-CoV-2 infection and should not be used as the sole basis for patient management decisions. This test has been authorized by the FDA under an Emergency Use Authorization (EUA) for use by authorized laboratories.    Fact sheet for Healthcare Providers: ConventionUpdate.co.nz  Fact sheet for Patients: ConventionUpdate.co.nz       Methodology: Isothermal Nucleic Acid Amplification         LEGIONELLA PNEUMOPHILA AG, URINE [[id number]] Collected: 09/14/21 2315    Order Status: Completed Specimen: Urine, random Updated: 09/15/21 1042     Legionella Ag, urine Negative       STREP PNEUMO AG, URINE [[id number]] Collected: 09/14/21 2315    Order Status: Completed Specimen: Urine, random Updated: 09/15/21 1042     Strep pneumo Ag, urine Negative       RESPIRATORY VIRUS PANEL W/COVID-19, PCR [[id number]] Collected: 09/14/21 1832    Order Status: Completed Specimen: Nasopharyngeal Updated: 09/14/21 2234     Adenovirus Not detected        Coronavirus 229E Not detected        Coronavirus HKU1 Not detected Coronavirus CVNL63 Not detected        Coronavirus OC43 Not detected        SARS-CoV-2, PCR Not detected        Metapneumovirus Not detected        Rhinovirus and Enterovirus Not detected        Influenza A Not detected        Influenza A, subtype H1 Not detected        Influenza A, subtype H3 Not detected        INFLUENZA A H1N1 PCR Not detected        Influenza B Not detected        Parainfluenza 1 Not detected        Parainfluenza 2 Not detected        Parainfluenza 3 Not detected        Parainfluenza virus 4 Not detected        RSV by PCR Not detected        B. parapertussis, PCR Not detected        Bordetella pertussis - PCR Not detected        Chlamydophila pneumoniae DNA, QL, PCR Not detected        Mycoplasma pneumoniae DNA, QL, PCR Not detected       CULTURE, BLOOD [[id number]] Collected: 09/14/21 1700    Order Status: Canceled Specimen: Blood     CULTURE, URINE [[id number]] Collected: 09/13/21 2330    Order Status: Canceled Specimen: Cath Urine     RESPIRATORY VIRUS PANEL W/COVID-19, PCR [[id number]]     Order Status: Canceled Specimen: NASOPHARYNGEAL SWAB     COVID-19 RAPID TEST [[id number]]     Order Status: Canceled     COVID-19 RAPID TEST [[id number]] Collected: 09/13/21 0737    Order Status: Completed Specimen: Nasopharyngeal Updated: 09/13/21 0811     Specimen source Nasopharyngeal        COVID-19 rapid test Not detected        Comment: Rapid Abbott ID Now       Rapid NAAT:  The specimen is NEGATIVE for SARS-CoV-2, the novel coronavirus associated with COVID-19. Negative results should be treated as presumptive and, if inconsistent with clinical signs and symptoms or necessary for patient management, should be tested with an alternative molecular assay. Negative results do not preclude SARS-CoV-2 infection and should not be used as the sole basis for patient management decisions.        This test has been authorized by the FDA under an Emergency Use Authorization (EUA) for use by authorized laboratories. Fact sheet for Healthcare Providers: ConventionUpdate.co.nz  Fact sheet for Patients: ConventionUpdate.co.nz       Methodology: Isothermal Nucleic Acid Amplification         COVID-19 RAPID TEST [[id number]]     Order Status: Canceled         Echo 9/17/2021:  Left Ventricle Normal cavity size, wall thickness and systolic function (ejection fraction normal). The estimated EF is 60 - 65%. There is inconclusive left ventricular diastolic function E/E' ratio = 7.08  Heart rate is over 100. Wall Scoring The left ventricular wall motion is normal.            Left Atrium Normal cavity size. Right Ventricle Normal cavity size and global systolic function. Assessment of RV function:   TAPSE = 27 mm. Right Atrium Normal cavity size. Interatrial Septum Interatrial septum not well visualized   Aortic Valve Aortic valve not well visualized. Trileaflet valve structure, no stenosis and no regurgitation. Mitral Valve No stenosis. Mitral valve non-specific thickening. Mild regurgitation. Tricuspid Valve Tricuspid valve not well visualized. No stenosis. Mild regurgitation. Pulmonic Valve Pulmonic valve not well visualized. No stenosis and no regurgitation. Aorta The aorta was not well visualized. Normal aortic root. Pulmonary Artery Pulmonary arteries not well visualized. Pulmonary arterial systolic pressure (PASP) is 29 mmHg. Pulmonary hypertension not suggested by Doppler findings. IVC/Hepatic Veins Mechanically ventilated; cannot use inferior caval vein diameter to estimate central venous pressure. Pericardium Normal pericardium and no evidence of pericardial effusion. CT head 9/15/1021  IMPRESSION   No acute intracranial abnormality.       CT chest, abdomen, pelvis 9/15/1021  IMPRESSION   Endotracheal tube tip in the lower thoracic trachea 1.5 cm above the dipak.    Bilateral lower lobar atelectasis, possible endobronchial lesion/mucous plug in  the left lower lobe bronchus.    No acute intra-abdominal abnormality. Imaging:  [x]I have personally reviewed the patients chest radiographs images and report    XR CHEST PORT 10/5/21     CLINICAL INDICATION/HISTORY: Acute respiratory failure, ET tub position  -Additional: None     COMPARISON: One day prior     TECHNIQUE: Portable frontal view of the chest     _______________     FINDINGS:     SUPPORT DEVICES: Tracheostomy. Enteric tube unchanged.     HEART AND MEDIASTINUM: Cardiomediastinal silhouette within normal limits.     LUNGS AND PLEURAL SPACES: Mild left lower lower lung zone opacities. No  pneumothorax.      _______________     IMPRESSION     Mild left lower lower lung zone opacities, unchanged          Please note: Voice-recognition software may have been used to generate this report, which may have resulted in some phonetic-based errors in grammar and contents. Even though attempts were made to correct all the mistakes, some may have been missed, and remained in the body of the document.       Sonal Guerra MD  10/7/2021

## 2021-10-07 NOTE — PROGRESS NOTES
Hospitalist Progress Note-critical care note     Patient: Faith Hsu MRN: [id number]  CSN: [id number]    YOB: 1980  Age: 39 y.o. Sex: female    DOA: 9/11/2021 LOS:  LOS: 25 days            Chief complaint: wrist fracture , drug overdose , acute respiratory failure with hypoxia, psychosis     Assessment/Plan         Hospital Problems  Date Reviewed: 9/6/2015        Codes Class Noted POA    Bacteremia due to Gram-positive bacteria ICD-10-CM: R78.81  ICD-9-CM: 790.7  10/6/2021 Unknown        Status post tracheostomy ([facility] ICD-10-CM: Z93.0  ICD-9-CM: V44.0  9/28/2021 Unknown        Hypokalemia ICD-10-CM: E87.6  ICD-9-CM: 276.8  9/21/2021 Unknown        Increased ammonia level ICD-10-CM: R79.89  ICD-9-CM: 790.6  9/14/2021 Unknown        Psychosis ([facility] ICD-10-CM: F29  ICD-9-CM: 298.9  Unknown Unknown        Hyponatremia ICD-10-CM: E87.1  ICD-9-CM: 276.1  Unknown Yes        Acute respiratory failure with hypoxia (HCC) ICD-10-CM: J96.01  ICD-9-CM: 518.81  Unknown Yes        Left wrist fracture, with delayed healing, subsequent encounter ICD-10-CM: S62.102G  ICD-9-CM: V54.19  9/12/2021 Unknown        * (Principal) Drug overdose, intentional self-harm, initial encounter ([facility] ICD-10-CM: T50.902A  ICD-9-CM: 977.9, E950.5  9/12/2021 Yes        Hypoxia ICD-10-CM: R09.02  ICD-9-CM: 799.02  9/12/2021 Yes        Hypotension after procedure ICD-10-CM: I95.81  ICD-9-CM: 458.29  9/12/2021 Yes        Acute metabolic encephalopathy JESSIKA-13-AK: G93.41  ICD-9-CM: 348.31  9/12/2021 Yes                  Faith Hsu is a 39 y.o. female who is standing history of multidrug use/abuse, previous drug-seeking behavior and mental health issues otherwise unspecified arrives via EMS with acute metabolic encephalopathy and lethargy. Admitted for likely gabapentin overdose. She was intubated in ER, ct head no acute issue, LP done due to fever even on abx, no meningitis noted.  Now she has peg and trach          Acute respiratory failure with hypoxia   Intubated on 9/12    Trach on 9/20/21. stable on trach collar with 8 L/min oxygen-  Still having a lot secretion from trach   Continue breathing tx . Continue local trach care    Post tracheostomy   Continue local trach care       Fever and leukocytosis , bacteremia   bcx  Positive for GPC on 10/4   Sputum culture 9/24/2021: MSSA. Afebrile overnight  Id f/u, continue vanc and merrem     Anemia  H/h stable   Occult stool negative    no bleeding reported   Upper PVL negative for dvt           Acute metabolic encephalopathy   pecedex , agitated last night -received ativan   Ct head no acute process         Gabapentin overdose with lethargy and AMS    S/p procedural stomach emptying in ED with charcoal and sorbitol   Psych saw pt          elevated ammonia level  Continue   Lactulose,   Continue ammonia monitoring       Psychosis , hx of  Ekbom's delusional parasitosis   On  Klonopin/haldol -per psych recommendation   Continue cardiac monitoring     left wrist fracture: immobilized -poa -stable   Appreciated ortho on board-f/u with Dr. Lara Leavitt as out-pt       rn a lot of secretion. Off precedex     Disposition :tbd,   Review of systems:  Unable to obtain due to trach collar   Vital signs/Intake and Output:  Visit Vitals  /73   Pulse (!) 113   Temp 99.3 °F (37.4 °C)   Resp 29   Ht 5' 2\" (1.575 m)   Wt 71.2 kg (157 lb)   SpO2 96%   BMI 28.72 kg/m²     Current Shift:  10/07 0701 - 10/07 1900  In: 150   Out: -   Last three shifts:  10/05 1901 - 10/07 0700  In: 2956.5 [I.V.:1696.5]  Out: 1688 [Urine:3700]    Physical Exam:  General: Open eyes per voice   HEENT: NC, Atraumatic. Trach collar   Lungs: CTA Bilaterally. No Wheezing/Rhonchi/Rales. Heart:  tachy  No murmur, No Rubs, No Gallops  Abdomen: Soft, Non distended, Non tender.   +Bowel sounds, peg tube noted   Extremities: No c/c.immobilzer noted on left wrist   Psych:   Calm   Neurologic:  Open eyes per voice           Labs: Results:       Chemistry Recent Labs     10/07/21  0508 10/06/21  1440 10/06/21  0523 10/05/21  1655 10/05/21  0552   *  --  127*  --  107*     --  139  --  141   K 4.1 4.3 3.8   < > 3.7     --  106  --  107   CO2 25  --  25  --  25   BUN 9  --  10  --  8   CREA 0.34*  --  0.34*  --  0.27*   CA 10.5*  --  9.8  --  9.3   AGAP 9  --  8  --  9   BUCR 26*  --  29*  --  30*   *  --  134*  --  155*   TP 8.4*  --  7.0  --  6.7   ALB 5.4*  --  4.2  --  4.0   GLOB 3.0  --  2.8  --  2.7   AGRAT 1.8*  --  1.5  --  1.5    < > = values in this interval not displayed. CBC w/Diff Recent Labs     10/07/21  0508 10/06/21  0523 10/05/21  0552   WBC 13.9* 10.2 8.8   RBC 3.16* 2.61* 2.63*   HGB 9.1* 7.6* 7.6*   HCT 27.4* 23.5* 23.3*   * 409 388   GRANS 77* 70 72   LYMPH 15* 21 18*   EOS 1 3 3      Cardiac Enzymes No results for input(s): CPK, CKND1, ZENON in the last 72 hours. No lab exists for component: CKRMB, TROIP   Coagulation No results for input(s): PTP, INR, APTT, INREXT, INREXT in the last 72 hours. Lipid Panel No results found for: CHOL, CHOLPOCT, CHOLX, CHLST, CHOLV, 039210, HDL, HDLP, LDL, LDLC, DLDLP, 818044, VLDLC, VLDL, TGLX, TRIGL, TRIGP, TGLPOCT, CHHD, CHHDX   BNP No results for input(s): BNPP in the last 72 hours.    Liver Enzymes Recent Labs     10/07/21  0508   TP 8.4*   ALB 5.4*   *      Thyroid Studies No results found for: T4, T3U, TSH, TSHEXT, TSHEXT     Procedures/imaging: see electronic medical records for all procedures/Xrays and details which were not copied into this note but were reviewed prior to creation of Plan    XR WRIST LT AP/LAT/OBL MIN 3V    Result Date: 8/19/2021  EXAM: XR WRIST LT AP/LAT/OBL MIN 3V CLINICAL INDICATION/HISTORY: Fall with LEFT wrist pain and swelling -Additional: None COMPARISON: None TECHNIQUE: 3 views of the left wrist _______________ FINDINGS: BONES: Comminuted, impacted fracture of the distal radius with slight dorsal angulation of the distal fracture fragment. No aggressive appearing osseous lytic or blastic lesion. SOFT TISSUES: Unremarkable. _______________     Comminuted, impacted fracture of the distal radius. XR CHEST PORT    Result Date: 9/13/2021  EXAM: XR CHEST PORT CLINICAL INDICATION/HISTORY: Acute respiratory failure, ET tub position -Additional: None COMPARISON: One day prior TECHNIQUE: Portable frontal view of the chest _______________ FINDINGS: SUPPORT DEVICES: Endotracheal and enteric tubes unchanged. HEART AND MEDIASTINUM: Cardiomediastinal silhouette within normal limits. LUNGS AND PLEURAL SPACES: No dense consolidation, large effusion or pneumothorax. _______________     No acute cardiopulmonary abnormality. XR CHEST PORT    Result Date: 9/11/2021  MEDICAL RECORDS NUMBER: [id number] PROCEDURE:  Single view of the chest DATE: 9/11/2021 9:09 PM HISTORY: 39years old Female. post ett Comparison: 8/4/2021 FINDINGS: The patient has been intubated with appropriate placement of the endotracheal tube. There is no enteric tube passing below the level of the diaphragm. There is no significant effusion. There is no significant pneumothorax. Cardiomediastinal silhouette is within normal limits. There is no evidence of a focal pulmonary infiltrate or mass. 1. There is no significant or acute cardiopulmonary process. The patient has been intubated with appropriate placement of the endotracheal tube. There is no enteric tube passing below the level of the diaphragm. This report has been generated using voice recognition software. XR ABD PORT  1 V    Result Date: 9/12/2021  EXAM: Frontal view of the abdomen CLINICAL INDICATION/HISTORY: NG tube placement COMPARISON: None. _______________ FINDINGS: NG/OG tube in place with the tip in the left upper quadrant in the region of the gastric fundus. No bowel obstruction. Moderate colonic stool burden. _______________     1.  NG/OG tube is in good position with the tip in the left upper quadrant in the region of the gastric fundus. 2. Moderate colonic stool burden.       Tanya Guerra MD

## 2021-10-07 NOTE — ED NOTES
Per report, pt takes multiple (up to 30 tablets) of mucinex daily. Pt with episode of chest pain earlier today and increased anxiety. Pt states that she takes xanax PRN and last dose was last night. Pt alert and oriented x 4 at this time. Pt resting on stretcher in hallway. Updates given regarding available lab results. Pt denies chest pain at this time. Complains of lower abdominal pain that occurred briefly after urination. Vital signs updated. Dapsone Pregnancy And Lactation Text: This medication is Pregnancy Category C and is not considered safe during pregnancy or breast feeding.

## 2021-10-08 ENCOUNTER — APPOINTMENT (OUTPATIENT)
Dept: GENERAL RADIOLOGY | Age: 41
DRG: 004 | End: 2021-10-08
Attending: INTERNAL MEDICINE
Payer: MEDICAID

## 2021-10-08 LAB
ALBUMIN SERPL-MCNC: 4.6 G/DL (ref 3.4–5)
ALBUMIN/GLOB SERPL: 1.6 {RATIO} (ref 0.8–1.7)
ALP SERPL-CCNC: 127 U/L (ref 45–117)
ALT SERPL-CCNC: 71 U/L (ref 13–56)
AMMONIA PLAS-SCNC: 44 UMOL/L (ref 11–32)
ANION GAP SERPL CALC-SCNC: 9 MMOL/L (ref 3–18)
AST SERPL-CCNC: 20 U/L (ref 10–38)
BASOPHILS # BLD: 0 K/UL (ref 0–0.1)
BASOPHILS NFR BLD: 0 % (ref 0–2)
BILIRUB SERPL-MCNC: 0.5 MG/DL (ref 0.2–1)
BUN SERPL-MCNC: 12 MG/DL (ref 7–18)
BUN/CREAT SERPL: 39 (ref 12–20)
CALCIUM SERPL-MCNC: 10 MG/DL (ref 8.5–10.1)
CHLORIDE SERPL-SCNC: 105 MMOL/L (ref 100–111)
CO2 SERPL-SCNC: 26 MMOL/L (ref 21–32)
CREAT SERPL-MCNC: 0.31 MG/DL (ref 0.6–1.3)
DIFFERENTIAL METHOD BLD: ABNORMAL
EOSINOPHIL # BLD: 0.5 K/UL (ref 0–0.4)
EOSINOPHIL NFR BLD: 3 % (ref 0–5)
ERYTHROCYTE [DISTWIDTH] IN BLOOD BY AUTOMATED COUNT: 13.3 % (ref 11.6–14.5)
GLOBULIN SER CALC-MCNC: 2.9 G/DL (ref 2–4)
GLUCOSE BLD STRIP.AUTO-MCNC: 105 MG/DL (ref 70–110)
GLUCOSE BLD STRIP.AUTO-MCNC: 112 MG/DL (ref 70–110)
GLUCOSE BLD STRIP.AUTO-MCNC: 115 MG/DL (ref 70–110)
GLUCOSE BLD STRIP.AUTO-MCNC: 120 MG/DL (ref 70–110)
GLUCOSE BLD STRIP.AUTO-MCNC: 140 MG/DL (ref 70–110)
GLUCOSE SERPL-MCNC: 105 MG/DL (ref 74–99)
HCT VFR BLD AUTO: 28.3 % (ref 35–45)
HGB BLD-MCNC: 9.3 G/DL (ref 12–16)
LYMPHOCYTES # BLD: 2.6 K/UL (ref 0.9–3.6)
LYMPHOCYTES NFR BLD: 18 % (ref 21–52)
MAGNESIUM SERPL-MCNC: 2.2 MG/DL (ref 1.6–2.6)
MCH RBC QN AUTO: 28.7 PG (ref 24–34)
MCHC RBC AUTO-ENTMCNC: 32.9 G/DL (ref 31–37)
MCV RBC AUTO: 87.3 FL (ref 78–100)
MONOCYTES # BLD: 1.1 K/UL (ref 0.05–1.2)
MONOCYTES NFR BLD: 8 % (ref 3–10)
NEUTS SEG # BLD: 10.2 K/UL (ref 1.8–8)
NEUTS SEG NFR BLD: 71 % (ref 40–73)
PHOSPHATE SERPL-MCNC: 4.7 MG/DL (ref 2.5–4.9)
PLATELET # BLD AUTO: 513 K/UL (ref 135–420)
PMV BLD AUTO: 9.1 FL (ref 9.2–11.8)
POTASSIUM SERPL-SCNC: 4.2 MMOL/L (ref 3.5–5.5)
PROT SERPL-MCNC: 7.5 G/DL (ref 6.4–8.2)
RBC # BLD AUTO: 3.24 M/UL (ref 4.2–5.3)
SODIUM SERPL-SCNC: 140 MMOL/L (ref 136–145)
WBC # BLD AUTO: 14.5 K/UL (ref 4.6–13.2)

## 2021-10-08 PROCEDURE — 74011000250 HC RX REV CODE- 250: Performed by: INTERNAL MEDICINE

## 2021-10-08 PROCEDURE — 77030018798 HC PMP KT ENTRL FED COVD -A

## 2021-10-08 PROCEDURE — 2709999900 HC NON-CHARGEABLE SUPPLY

## 2021-10-08 PROCEDURE — 74011250636 HC RX REV CODE- 250/636: Performed by: INTERNAL MEDICINE

## 2021-10-08 PROCEDURE — 74011250637 HC RX REV CODE- 250/637: Performed by: INTERNAL MEDICINE

## 2021-10-08 PROCEDURE — 82140 ASSAY OF AMMONIA: CPT

## 2021-10-08 PROCEDURE — 77030005513 HC CATH URETH FOL11 MDII -B

## 2021-10-08 PROCEDURE — 80053 COMPREHEN METABOLIC PANEL: CPT

## 2021-10-08 PROCEDURE — 71045 X-RAY EXAM CHEST 1 VIEW: CPT

## 2021-10-08 PROCEDURE — 77030040392 HC DRSG OPTIFOAM MDII -A

## 2021-10-08 PROCEDURE — 85025 COMPLETE CBC W/AUTO DIFF WBC: CPT

## 2021-10-08 PROCEDURE — 83735 ASSAY OF MAGNESIUM: CPT

## 2021-10-08 PROCEDURE — 82962 GLUCOSE BLOOD TEST: CPT

## 2021-10-08 PROCEDURE — 84100 ASSAY OF PHOSPHORUS: CPT

## 2021-10-08 PROCEDURE — 94640 AIRWAY INHALATION TREATMENT: CPT

## 2021-10-08 PROCEDURE — 36415 COLL VENOUS BLD VENIPUNCTURE: CPT

## 2021-10-08 PROCEDURE — 74011250636 HC RX REV CODE- 250/636: Performed by: FAMILY MEDICINE

## 2021-10-08 PROCEDURE — 65660000000 HC RM CCU STEPDOWN

## 2021-10-08 RX ORDER — NYSTATIN 100000 [USP'U]/G
POWDER TOPICAL 3 TIMES DAILY
Status: DISCONTINUED | OUTPATIENT
Start: 2021-10-08 | End: 2021-11-13 | Stop reason: HOSPADM

## 2021-10-08 RX ORDER — CLONAZEPAM 0.5 MG/1
2 TABLET ORAL 2 TIMES DAILY
Status: DISCONTINUED | OUTPATIENT
Start: 2021-10-08 | End: 2021-10-10

## 2021-10-08 RX ORDER — HALOPERIDOL 5 MG/1
5 TABLET ORAL 3 TIMES DAILY
Status: DISCONTINUED | OUTPATIENT
Start: 2021-10-08 | End: 2021-10-10

## 2021-10-08 RX ADMIN — CLONAZEPAM 2 MG: 0.5 TABLET ORAL at 09:42

## 2021-10-08 RX ADMIN — HYDROMORPHONE HYDROCHLORIDE 1 MG: 1 INJECTION, SOLUTION INTRAMUSCULAR; INTRAVENOUS; SUBCUTANEOUS at 02:32

## 2021-10-08 RX ADMIN — 0.12% CHLORHEXIDINE GLUCONATE 10 ML: 1.2 RINSE ORAL at 09:43

## 2021-10-08 RX ADMIN — HALOPERIDOL 5 MG: 5 TABLET ORAL at 22:38

## 2021-10-08 RX ADMIN — BUDESONIDE 500 MCG: 0.5 INHALANT RESPIRATORY (INHALATION) at 07:20

## 2021-10-08 RX ADMIN — MEROPENEM 1 G: 1 INJECTION, POWDER, FOR SOLUTION INTRAVENOUS at 01:15

## 2021-10-08 RX ADMIN — SODIUM CHLORIDE 10 ML: 9 INJECTION, SOLUTION INTRAMUSCULAR; INTRAVENOUS; SUBCUTANEOUS at 23:14

## 2021-10-08 RX ADMIN — Medication 1 TABLET: at 09:42

## 2021-10-08 RX ADMIN — ARFORMOTEROL TARTRATE 15 MCG: 15 SOLUTION RESPIRATORY (INHALATION) at 07:20

## 2021-10-08 RX ADMIN — HALOPERIDOL 5 MG: 5 TABLET ORAL at 15:21

## 2021-10-08 RX ADMIN — HALOPERIDOL 5 MG: 5 TABLET ORAL at 09:42

## 2021-10-08 RX ADMIN — ARFORMOTEROL TARTRATE 15 MCG: 15 SOLUTION RESPIRATORY (INHALATION) at 20:37

## 2021-10-08 RX ADMIN — FOLIC ACID 1 MG: 1 TABLET ORAL at 09:42

## 2021-10-08 RX ADMIN — FAMOTIDINE 20 MG: 20 TABLET ORAL at 22:38

## 2021-10-08 RX ADMIN — CLONAZEPAM 2 MG: 0.5 TABLET ORAL at 22:38

## 2021-10-08 RX ADMIN — BUDESONIDE 500 MCG: 0.5 INHALANT RESPIRATORY (INHALATION) at 20:37

## 2021-10-08 RX ADMIN — LACTULOSE 15 ML: 10 SOLUTION ORAL at 09:43

## 2021-10-08 RX ADMIN — LORAZEPAM 2 MG: 2 INJECTION INTRAMUSCULAR at 01:15

## 2021-10-08 RX ADMIN — MEROPENEM 1 G: 1 INJECTION, POWDER, FOR SOLUTION INTRAVENOUS at 13:21

## 2021-10-08 RX ADMIN — THIAMINE HCL TAB 100 MG 100 MG: 100 TAB at 09:42

## 2021-10-08 RX ADMIN — HYDROMORPHONE HYDROCHLORIDE 1 MG: 1 INJECTION, SOLUTION INTRAMUSCULAR; INTRAVENOUS; SUBCUTANEOUS at 17:41

## 2021-10-08 RX ADMIN — SODIUM CHLORIDE 10 ML: 9 INJECTION, SOLUTION INTRAMUSCULAR; INTRAVENOUS; SUBCUTANEOUS at 05:15

## 2021-10-08 RX ADMIN — VANCOMYCIN HYDROCHLORIDE 1250 MG: 10 INJECTION, POWDER, LYOPHILIZED, FOR SOLUTION INTRAVENOUS at 15:21

## 2021-10-08 RX ADMIN — SODIUM CHLORIDE 10 ML: 9 INJECTION, SOLUTION INTRAMUSCULAR; INTRAVENOUS; SUBCUTANEOUS at 13:22

## 2021-10-08 RX ADMIN — FENTANYL CITRATE 50 MCG: 50 INJECTION INTRAMUSCULAR; INTRAVENOUS at 04:54

## 2021-10-08 RX ADMIN — NYSTATIN: 100000 POWDER TOPICAL at 23:13

## 2021-10-08 RX ADMIN — 0.12% CHLORHEXIDINE GLUCONATE 10 ML: 1.2 RINSE ORAL at 22:38

## 2021-10-08 RX ADMIN — FAMOTIDINE 20 MG: 20 TABLET ORAL at 09:42

## 2021-10-08 RX ADMIN — LACTULOSE 15 ML: 10 SOLUTION ORAL at 22:39

## 2021-10-08 RX ADMIN — Medication 5 MG: at 22:38

## 2021-10-08 RX ADMIN — VANCOMYCIN HYDROCHLORIDE 1250 MG: 10 INJECTION, POWDER, LYOPHILIZED, FOR SOLUTION INTRAVENOUS at 01:15

## 2021-10-08 RX ADMIN — ENOXAPARIN SODIUM 40 MG: 100 INJECTION SUBCUTANEOUS at 17:40

## 2021-10-08 NOTE — PROGRESS NOTES
Hospitalist Progress Note-critical care note     Patient: Sameera Reed MRN: [id number]  [facility]: [id number]    YOB: 1980  Age: 39 y.o. Sex: female    DOA: 9/11/2021 LOS:  LOS: 26 days            Chief complaint: wrist fracture , drug overdose , acute respiratory failure with hypoxia, psychosis     Assessment/Plan         Hospital Problems  Date Reviewed: 9/6/2015        Codes Class Noted POA    Bacteremia due to Gram-positive bacteria ICD-10-CM: R78.81  ICD-9-CM: 790.7  10/6/2021 Unknown        Status post tracheostomy ([email or web address] ICD-10-CM: Z93.0  ICD-9-CM: V44.0  9/28/2021 Unknown        Hypokalemia ICD-10-CM: E87.6  ICD-9-CM: 276.8  9/21/2021 Unknown        Increased ammonia level ICD-10-CM: R79.89  ICD-9-CM: 790.6  9/14/2021 Unknown        Psychosis ([facility] [email or web address] ICD-10-CM: F29  ICD-9-CM: 298.9  Unknown Unknown        Hyponatremia ICD-10-CM: E87.1  ICD-9-CM: 276.1  Unknown Yes        Acute respiratory failure with hypoxia (HCC) ICD-10-CM: J96.01  ICD-9-CM: 518.81  Unknown Yes        Left wrist fracture, with delayed healing, subsequent encounter ICD-10-CM: S62.102G  ICD-9-CM: V54.19  9/12/2021 Unknown        * (Principal) Drug overdose, intentional self-harm, initial encounter ([facility] [email or web address] ICD-10-CM: T50.902A  ICD-9-CM: 977.9, E950.5  9/12/2021 Yes        Hypoxia ICD-10-CM: R09.02  ICD-9-CM: 799.02  9/12/2021 Yes        Hypotension after procedure ICD-10-CM: I95.81  ICD-9-CM: 458.29  9/12/2021 Yes        Acute metabolic encephalopathy ATR-01-DM: G93.41  ICD-9-CM: 348.31  9/12/2021 Yes                  Sameera Reed is a 39 y.o. female who is standing history of multidrug use/abuse, previous drug-seeking behavior and mental health issues otherwise unspecified arrives via EMS with acute metabolic encephalopathy and lethargy. Admitted for likely gabapentin overdose. She was intubated in ER, ct head no acute issue, LP done due to fever even on abx, no meningitis noted.  Now she has peg and trach        Acute respiratory failure with hypoxia   Intubated on 9/12    Trach on 9/20/21. stable on trach collar with 8 L/min oxygen-  Continue breathing tx . Continue local trach care  Case discussed with Dr. Quan Sutherland tracheostomy   Continue local trach care       bacteremia   bcx  Positive for GPC on 10/4   Sputum culture 9/24/2021: MSSA. Afebrile overnight  Wbc 14.5    Id f/u, continue vanc and merrem     Anemia  H/h stable   Occult stool negative    no bleeding reported   Upper PVL negative for dvt           Acute metabolic encephalopathy   Off  precedex . Continue klonopin and dilaudid    Ct head no acute process         Gabapentin overdose with lethargy and AMS    S/p procedural stomach emptying in ED with charcoal and sorbitol   Will have Dr. Marisa Marin reevaluate patient          elevated ammonia level  Continue   Lactulose,   Continue ammonia monitoring       Psychosis , hx of  Ekbom's delusional parasitosis   On  Klonopin/haldol -per psych recommendation   Continue cardiac monitoring     left wrist fracture: immobilized -poa -stable   Appreciated ortho on board-f/u with Dr. Min Pérez as out-pt       rn stable     Will put sitter to prevent pulling off  Trach   Disposition :tbd,   Review of systems:  Unable to obtain due to trach collar   Vital signs/Intake and Output:  Visit Vitals  /61   Pulse 100   Temp 99.9 °F (37.7 °C)   Resp 18   Ht 5' 2\" (1.575 m)   Wt 71.2 kg (157 lb)   SpO2 100%   BMI 28.72 kg/m²     Current Shift:  10/08 0701 - 10/08 1900  In: -   Out: 350 [Urine:350]  Last three shifts:  10/06 1901 - 10/08 0700  In: 1080   Out: 3275 [Urine:2475; Drains:800]    Physical Exam:  General: Open eyes per voice   HEENT: NC, Atraumatic. Trach collar   Lungs: CTA Bilaterally. No Wheezing/Rhonchi/Rales. Heart:  tachy  No murmur, No Rubs, No Gallops  Abdomen: Soft, Non distended, Non tender.   +Bowel sounds, peg tube noted   Extremities: No c/c.immobilzer noted on left wrist   Psych:   Calm   Neurologic: Open eyes per voice           Labs: Results:       Chemistry Recent Labs     10/08/21  0401 10/07/21  0508 10/06/21  1440 10/06/21  0523 10/06/21  0523   * 120*  --   --  127*    138  --   --  139   K 4.2 4.1 4.3   < > 3.8    104  --   --  106   CO2 26 25  --   --  25   BUN 12 9  --   --  10   CREA 0.31* 0.34*  --   --  0.34*   CA 10.0 10.5*  --   --  9.8   AGAP 9 9  --   --  8   BUCR 39* 26*  --   --  29*   * 141*  --   --  134*   TP 7.5 8.4*  --   --  7.0   ALB 4.6 5.4*  --   --  4.2   GLOB 2.9 3.0  --   --  2.8   AGRAT 1.6 1.8*  --   --  1.5    < > = values in this interval not displayed. CBC w/Diff Recent Labs     10/08/21  0401 10/07/21  0508 10/06/21  0523   WBC 14.5* 13.9* 10.2   RBC 3.24* 3.16* 2.61*   HGB 9.3* 9.1* 7.6*   HCT 28.3* 27.4* 23.5*   * 467* 409   GRANS 71 77* 70   LYMPH 18* 15* 21   EOS 3 1 3      Cardiac Enzymes No results for input(s): CPK, CKND1, ZENON in the last 72 hours. No lab exists for component: CKRMB, TROIP   Coagulation No results for input(s): PTP, INR, APTT, INREXT, INREXT in the last 72 hours. Lipid Panel No results found for: CHOL, CHOLPOCT, CHOLX, CHLST, CHOLV, 235117, HDL, HDLP, LDL, LDLC, DLDLP, 824948, VLDLC, VLDL, TGLX, TRIGL, TRIGP, TGLPOCT, CHHD, CHHDX   BNP No results for input(s): BNPP in the last 72 hours.    Liver Enzymes Recent Labs     10/08/21  0401   TP 7.5   ALB 4.6   *      Thyroid Studies No results found for: T4, T3U, TSH, TSHEXT, TSHEXT     Procedures/imaging: see electronic medical records for all procedures/Xrays and details which were not copied into this note but were reviewed prior to creation of Plan    XR WRIST LT AP/LAT/OBL MIN 3V    Result Date: 8/19/2021  EXAM: XR WRIST LT AP/LAT/OBL MIN 3V CLINICAL INDICATION/HISTORY: Fall with LEFT wrist pain and swelling -Additional: None COMPARISON: None TECHNIQUE: 3 views of the left wrist _______________ FINDINGS: BONES: Comminuted, impacted fracture of the distal radius with slight dorsal angulation of the distal fracture fragment. No aggressive appearing osseous lytic or blastic lesion. SOFT TISSUES: Unremarkable. _______________     Comminuted, impacted fracture of the distal radius. XR CHEST PORT    Result Date: 9/13/2021  EXAM: XR CHEST PORT CLINICAL INDICATION/HISTORY: Acute respiratory failure, ET tub position -Additional: None COMPARISON: One day prior TECHNIQUE: Portable frontal view of the chest _______________ FINDINGS: SUPPORT DEVICES: Endotracheal and enteric tubes unchanged. HEART AND MEDIASTINUM: Cardiomediastinal silhouette within normal limits. LUNGS AND PLEURAL SPACES: No dense consolidation, large effusion or pneumothorax. _______________     No acute cardiopulmonary abnormality. XR CHEST PORT    Result Date: 9/11/2021  MEDICAL RECORDS NUMBER: [id number] PROCEDURE:  Single view of the chest DATE: 9/11/2021 9:09 PM HISTORY: 39years old Female. post ett Comparison: 8/4/2021 FINDINGS: The patient has been intubated with appropriate placement of the endotracheal tube. There is no enteric tube passing below the level of the diaphragm. There is no significant effusion. There is no significant pneumothorax. Cardiomediastinal silhouette is within normal limits. There is no evidence of a focal pulmonary infiltrate or mass. 1. There is no significant or acute cardiopulmonary process. The patient has been intubated with appropriate placement of the endotracheal tube. There is no enteric tube passing below the level of the diaphragm. This report has been generated using voice recognition software. XR ABD PORT  1 V    Result Date: 9/12/2021  EXAM: Frontal view of the abdomen CLINICAL INDICATION/HISTORY: NG tube placement COMPARISON: None. _______________ FINDINGS: NG/OG tube in place with the tip in the left upper quadrant in the region of the gastric fundus. No bowel obstruction. Moderate colonic stool burden. _______________     1.  NG/OG tube is in good position with the tip in the left upper quadrant in the region of the gastric fundus. 2. Moderate colonic stool burden.       Asya Norris MD

## 2021-10-08 NOTE — PROGRESS NOTES
0800 Assumed care of patient. .. pt ine bed restless with minimal command following. .. VSS. .. lungs ckear diminished BLE. .. Tracheostomy with yellow secretions. Aditifabian Henry BARKSDALE @ 4. .  3160 TRANSFER - OUT REPORT:    Verbal report given to April RN(name) on Alexis Pinzon  being transferred to [facility](unit) for routine progression of care       Report consisted of patients Situation, Background, Assessment and   Recommendations(SBAR). Information from the following report(s) SBAR and Kardex was reviewed with the receiving nurse. Lines:   Peripheral IV 10/04/21 Right;Distal Cephalic (Active)   Site Assessment Clean, dry, & intact 10/08/21 0400   Phlebitis Assessment 0 10/08/21 0400   Infiltration Assessment 0 10/08/21 0400   Dressing Status Clean, dry, & intact 10/08/21 0400   Dressing Type Transparent 10/08/21 0400   Hub Color/Line Status Green 10/08/21 0400   Action Taken Open ports on tubing capped 10/08/21 0400   Alcohol Cap Used Yes 10/08/21 0400        Opportunity for questions and clarification was provided.       Patient transported with:   Monitor  O2 @ 40 liters

## 2021-10-08 NOTE — PROGRESS NOTES
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    Left wrist fracture, with delayed healing, subsequent encounter S62.102G    Drug overdose, intentional self-harm, initial encounter ([address] T50.902A    Hypoxia R09.02    Hypotension after procedure I95.81    Acute metabolic encephalopathy G42.38    Psychosis (HCC) F29    Hyponatremia E87.1    Acute respiratory failure with hypoxia (HCC) J96.01    Increased ammonia level R79.89    Hypokalemia E87.6    Status post tracheostomy ([address] Z93.0    Bacteremia due to Gram-positive bacteria R78.81 ·   Code status: full code   RECOMMENDATIONS:   Respiratory: ventilator support: intubated 2021 due to encephalopathy and drug overdose. Patient was extubated on 2021, and reintubated within an hour due to respiratory distress and upper airway edema. S/p tracheostomy 2021  X-ray chest from 10/8/2021 reviewed - LT side stable mild changes likely atelectasis - no clear evidence for pneumonia  Small trach tube secretions unchanged and respiratory culture likely related to tracheobronchitis  Tolerating continuous trach collar without any increased work of breathing  Continue bronchodilator with Brovana and Pulmicort twice a day, DuoNeb as needed  Continue ventilator bundles.   On 10/2/2021 - off propofol on SBT on precedex - actively weaning to off   Sedation  off of Precedex 10/6/21  Prn lorazepam, Dilaudid and due to psych issues given Haldol - tolerated well monitored QTC, Klonopin ID: WBC elevated and no fever; ID following  Bl cx 10/4/21 -staph Coreg negative x 4/4 - likely contaminant  LT infiltrates on XR chest - stable - likely atelectasis - no pneumonia  Sputum culture 10/6/2021  staph aureus -awaiting sensitivity; prior has MSSA on respiratory cx  Antibiotics  meropenem on 10/3/2021 - finish 10 days of empirical coverage on 10/9/21 per ID  Procalcitonin - normal range suggesting unlikely new pneumonia  In the past was on Vanco and Zosyn? Drug fever? S/p LP with negative CSF cultures. NUANK33 test 9/16/2021 and 9/24/2021: Negative. Sputum culture 9/24/2021: MSSA. Blood culture 9/27/21 and 9/24/2021: NGT final  [place] catheter changed on 9/24/2021. Before used - Levofloxacin course was complete and was on Vanco and Zosyn     CVS: on Haldol -no arrhythmia  Serial EKG to monitor Qtc - no QTc prolongation  Stable hemodynamics; telemetry  sinus rhythm; blood pressure stable; continue to monitor. Echo 9/17/2021: LVEF 60 to 65%. RVSP 29 mmHg. Ultrasound leg 9/15/21  negative study for DVTs. Renal: stable renal function, good urine output; continue to monitor and replace electrolytes as needed. Heme: anemia stable since drop 10/4/21 no active bleeding issues; monitor daily  Stool occult blood 9/28negative. Platelets normal.  Restarted Lovenox prophylaxis 10/6/21 since Hgb remains stable - monitor daily    Endo: Stable blood glucose; continue to monitor; sliding scale insulin as needed. GI: Patient tolerating tube feeding PEG tube placement 10/1/21  hCG negative on admission. Ammonia fluctuates; lactulose therapy daily. Transaminases coming down - < 3 x ULN; hepatitis virus panelnegative study. CT abdomen 9/15/21  Liver is unremarkable. No abnormal biliary dilation. Gallbladder is unremarkable. Nutrition: peg feeding jevity as tolerated  Patient on thiamine and folic acid.   Neurology: Patient was confused and delirious agitated off of sedation before; known patient with psych issues and drug overdose on presentation during this admission  In past few days all her agitation is behavioral and psychiatric illness [bipolar, schizophrenia] related  Awake, alert, following simple commands when calm and pleasant, answered appropriately by nodding head; when off-and-on fidgety and thrashing in the bed -avoids eye contact and does not follow verbal instructions to redirect patient  At this point no evidence for other causes for her behavior and agitations other than underlying chronic psychiatric illness. I had called at her [facility] behavioral health clinic number on 10/7/21 provided by . Able to leave voice message with their manager's phone - waiting for response back to verify home medications  Given presentation with overdose and present agitation that may put patient at risk of self-harm would arrange sitter. Spoke with Dr. Ivana Calderon regarding psych re-evaluation to address chronic psychiatric medication and assess for any suicidal risk before discontinuing sitter need  CT head 9/15/21  nil acute  Psych: On Klonopin, Haldol - increase doses  Previously used Seroquel  Has as needed fentanyl, Dilaudid, Ativan  Improved stable need for Dilaudid since started on fentanyl patch 50 mcg  Toxicology: Gabapentin OD on admission. Skin: nursing care  MS: Left wrist fracture in immobilization external fixator; patient has been seen by orthopedics during this admission. Prophylaxis: DVT prophylaxis: Lovenox 40 sc daily. GI prophylaxis: Famotidine. Restraints: sitter may assist to avoid restrains to avoid self harm  Consulted PT, OT and SLP teamsappreciate input. No active ICU issues. Spoke with Dr. Ivana Calderon who agrees for pt's tx to floor with sitter. Nursing and nursing supervisor aware. Will sign off, call as needed  Prognosis guarded overall. CODE STATUSfull code. Palliative care followed on case.   Quality Care: PPI, DVT prophylaxis, HOB elevated, Infection control all reviewed and addressed. Lines/Tubes: PIV   S/p ETT: 9/11/219/22/2021. Tracheostomy 9/22/2021. Status post PEG on 10/1/2021   Zamora: 9/11/21; changed 9/24/2021. Discussed with RNunable to discontinue Zamora catheter due to agitated and delirious state; patient will not be able to tolerate external Zamora catheter. Discontinue zamora when possible with improved behavioral control    ADVANCE DIRECTIVE: Full code. Discussed with RN, RT, ICU staff, hospitalist. Will sign off  High complexity decision making was performed during the evaluation of this patient at high risk for decompensation with multiple organ involvement. Critical care minute excluding any family discussion or procedure: 39 minutes    Evie Moraes   sister-in-law   [phone]      updated on 10/2/2021 by Dr. Lanette Dent       Subjective/History:   Ms. Krish Wilks has been seen and evaluated as Dr. Maricarmen Hudson requested now for assisting with Acute respiratory failure and ventilator management. Patient is a 39 y.o. female with following PMhx presented to ER with lethargy via EMS and admitted for Gabapentin overdose. Pt required intubation for airways protection. Position control was contacted that recommended supportive care per ER provider. Pt seen at bedside in ER rm#2. The patient can not provide additional history due to Ventilated.      10/8/2021  Patient seen at bedside in ICU room #102  Has been tolerating trach collar throughout this week without any respiratory distress or increased WOB  Minimal trach secretions; no fever  Behavioral agitations off and on and thrashing in the bed -avoids eye contact and does not respond to verbal instructions to redirect  Staff deemed patient at risk for self-harm from pulling lines, tubes, catheter, monitor cables, other during these agitations warranting sitter  When calm, awake, alert, following simple commands and answering appropriately by nodding  When I asked about if she spoke with her  today, immediately she looked at me and responded \"no\" by nodding. Staff has intermittently been able to bring phone to patient when  calls- to let her listen to 's voice then usually patient calms down completely or nodding head appropriately during the phone or breaks down in tears  Hemodynamically stable, no arrhythmia  Tolerating Haldol and Klonopin  Afebrile, tolerating tube feeding, fair urine and stool output  PCC was not contacted by staff on anything about patient overnight. Patient has a history of drug use and smoker and alcohol use. Review of Systems:  Review of systems not obtained due to patient factors.         Past Medical History:  Past Medical History:   Diagnosis Date    Asthma     Bilateral ovarian cysts     Cocaine abuse [address]     Mental and behavioral problem     Pancreatitis     Pancreatitis     Psychosis ([facility]         Past Surgical History:  Past Surgical History:   Procedure Laterality Date    HX GYN      D&C, Hysterectomy    IR INSERT GASTROSTOMY TUBE PERC  10/1/2021        Medications:    Current Facility-Administered Medications   Medication Dose Route Frequency    clonazePAM (KlonoPIN) tablet 2 mg  2 mg Oral BID    haloperidoL (HALDOL) tablet 5 mg  5 mg Oral TID    fentaNYL (DURAGESIC) 50 mcg/hr patch 1 Patch  1 Patch TransDERmal Q72H    vancomycin (VANCOCIN) 1250 mg in  ml infusion  1,250 mg IntraVENous Q12H    Vancomycin - Pharmacy to Dose  1 Each Other Rx Dosing/Monitoring    arformoteroL (BROVANA) neb solution 15 mcg  15 mcg Nebulization BID RT    budesonide (PULMICORT) 500 mcg/2 ml nebulizer suspension  500 mcg Nebulization BID RT    meropenem (MERREM) 1 g in sterile water (preservative free) 20 mL IV syringe  1 g IntraVENous Q12H    lactulose (CHRONULAC) 10 gram/15 mL solution 15 mL  15 mL Oral BID    melatonin (rapid dissolve) tablet 5 mg  5 mg Oral QHS    famotidine (PEPCID) tablet 20 mg  20 mg Oral BID    thiamine mononitrate (B-1) tablet 100 mg  100 mg Oral DAILY    folic acid (FOLVITE) tablet 1 mg  1 mg Oral DAILY    multivitamin, tx-iron-ca-min (THERA-M w/ IRON) tablet 1 Tablet  1 Tablet Oral DAILY    insulin lispro (HUMALOG) injection   SubCUTAneous Q6H    enoxaparin (LOVENOX) injection 40 mg  40 mg SubCUTAneous Q24H    sodium chloride (NS) flush 5-40 mL  5-40 mL IntraVENous Q8H    chlorhexidine (PERIDEX) 0.12 % mouthwash 10 mL  10 mL Oral Q12H       Allergy:  Allergies   Allergen Reactions    Fish Containing Products Itching    Toradol [Ketorolac] Rash     Pt reports she is not allergic to this medication. Social History:  Social History     Tobacco Use    Smoking status: Current Every Day Smoker     Packs/day: 1.00    Smokeless tobacco: Never Used   Substance Use Topics    Alcohol use: Not Currently    Drug use: Not Currently     Types: Cocaine, Marijuana     Comment: used with in past 24 hours          Objective:   Vital Signs:    Blood pressure 123/75, pulse (!) 124, temperature 98.8 °F (37.1 °C), resp. rate 23, height 5' 2\" (1.575 m), weight 71.2 kg (157 lb), last menstrual period 05/15/2018, SpO2 97 %. Body mass index is 28.72 kg/m².    O2 Device: Tracheal collar   O2 Flow Rate (L/min): 8 l/min   Temp (24hrs), Av °F (37.2 °C), Min:98.2 °F (36.8 °C), Max:99.5 °F (37.5 °C)         Intake/Output:   Last shift:      10/08 0701 - 10/08 1900  In: -   Out: 350 [Urine:350]  Last 3 shifts: 10/06 1901 - 10/08 0700  In: 1080   Out: 9323 [Urine:2475; Drains:800]    Intake/Output Summary (Last 24 hours) at 10/8/2021 1035  Last data filed at 10/8/2021 0730  Gross per 24 hour   Intake 930 ml   Output 2425 ml   Net -1495 ml       ABG:    No results found for: PH, PHI, PCO2, PCO2I, PO2, PO2I, HCO3, HCO3I, FIO2, FIO2I  Ventilator Mode: Spontaneous  Respiratory Rate  Resp Rate Observed: 9  Back-Up Rate: 14  Insp Time (sec): 1.1 sec  I:E Ratio: 1:2.1  Ventilator Volumes  Vt Set (ml): 420 ml  Vt Exhaled (Machine Breath) (ml): 571 ml  Vt Spont (ml): 468 ml  Ve Observed (l/min): 10.8 l/min  Ventilator Pressures  Pressure Support (cm H2O): 7 cm H2O  PIP Observed (cm H2O): 18 cm H2O  Plateau Pressure (cm H2O): 13 cm H2O  MAP (cm H2O): 8.8  PEEP/VENT (cm H2O): 5 cm H20  Auto PEEP Observed (cm H2O): 0 cm H2O      Physical Exam:   General: Awake, alert, calm, on sitting appropriately by nodding head, in no respiratory distress, cooperative, appears very older than stated age, on trach collar  HEENT: PERRLA, sclera nonicteric  Neck: No abnormally enlarged lymph nodes or thyroid, supple; tracheostomy tube in place without any bleeding or secretion  Chest: normal  Lungs: normal air entry, no rhonchi and wheezes bilaterally,normal percussion anterior chest wall bilaterally, no tenderness/ rash  Heart: Regular rate and rhythm, S1S2 present or without murmur or extra heart sounds  Abdomen: non distended, bowel sounds normoactive, tympanic, abdomen is soft without significant tenderness, masses, organomegaly or guarding, rigidity, rebound  Extremity: no edema, cyanosis, clubbing.   Capillary refill normal bilaterally  Neuro: Awake, alert, moves all extremities appropriately to command, no involuntary movements, following simple commands and answer appropriately by nodding head, exam limitation  Skin: Skin color, texture, turgor fair       Data:     Recent Results (from the past 12 hour(s))   GLUCOSE, POC    Collection Time: 10/07/21 11:22 PM   Result Value Ref Range    Glucose (POC) 146 (H) 70 - 110 mg/dL   CBC WITH AUTOMATED DIFF    Collection Time: 10/08/21  4:01 AM   Result Value Ref Range    WBC 14.5 (H) 4.6 - 13.2 K/uL    RBC 3.24 (L) 4.20 - 5.30 M/uL    HGB 9.3 (L) 12.0 - 16.0 g/dL    HCT 28.3 (L) 35.0 - 45.0 %    MCV 87.3 78.0 - 100.0 FL    MCH 28.7 24.0 - 34.0 PG    MCHC 32.9 31.0 - 37.0 g/dL    RDW 13.3 11.6 - 14.5 %    PLATELET 917 (H) 829 - 420 K/uL    MPV 9.1 (L) 9.2 - 11.8 FL    NEUTROPHILS 71 40 - 73 %    LYMPHOCYTES 18 (L) 21 - 52 %    MONOCYTES 8 3 - 10 %    EOSINOPHILS 3 0 - 5 %    BASOPHILS 0 0 - 2 %    ABS. NEUTROPHILS 10.2 (H) 1.8 - 8.0 K/UL    ABS. LYMPHOCYTES 2.6 0.9 - 3.6 K/UL    ABS. MONOCYTES 1.1 0.05 - 1.2 K/UL    ABS. EOSINOPHILS 0.5 (H) 0.0 - 0.4 K/UL    ABS. BASOPHILS 0.0 0.0 - 0.1 K/UL    DF AUTOMATED     MAGNESIUM    Collection Time: 10/08/21  4:01 AM   Result Value Ref Range    Magnesium 2.2 1.6 - 2.6 mg/dL   METABOLIC PANEL, COMPREHENSIVE    Collection Time: 10/08/21  4:01 AM   Result Value Ref Range    Sodium 140 136 - 145 mmol/L    Potassium 4.2 3.5 - 5.5 mmol/L    Chloride 105 100 - 111 mmol/L    CO2 26 21 - 32 mmol/L    Anion gap 9 3.0 - 18 mmol/L    Glucose 105 (H) 74 - 99 mg/dL    BUN 12 7.0 - 18 MG/DL    Creatinine 0.31 (L) 0.6 - 1.3 MG/DL    BUN/Creatinine ratio 39 (H) 12 - 20      GFR est AA >60 >60 ml/min/1.73m2    GFR est non-AA >60 >60 ml/min/1.73m2    Calcium 10.0 8.5 - 10.1 MG/DL    Bilirubin, total 0.5 0.2 - 1.0 MG/DL    ALT (SGPT) 71 (H) 13 - 56 U/L    AST (SGOT) 20 10 - 38 U/L    Alk. phosphatase 127 (H) 45 - 117 U/L    Protein, total 7.5 6.4 - 8.2 g/dL    Albumin 4.6 3.4 - 5.0 g/dL    Globulin 2.9 2.0 - 4.0 g/dL    A-G Ratio 1.6 0.8 - 1.7     PHOSPHORUS    Collection Time: 10/08/21  4:01 AM   Result Value Ref Range    Phosphorus 4.7 2.5 - 4.9 MG/DL   AMMONIA    Collection Time: 10/08/21  4:01 AM   Result Value Ref Range    Ammonia 44 (H) 11 - 32 UMOL/L   GLUCOSE, POC    Collection Time: 10/08/21  5:09 AM   Result Value Ref Range    Glucose (POC) 140 (H) 70 - 110 mg/dL             No results for input(s): FIO2I, IFO2, HCO3I, IHCO3, HCOPOC, PCO2I, PCOPOC, IPHI, PHI, PHPOC, PO2I, PO2POC in the last 72 hours.     No lab exists for component: IPOC2    All Micro Results     Procedure Component Value Units Date/Time    CULTURE, RESPIRATORY/SPUTUM/BRONCH Roosvelt Gary STAIN [[id number]]  (Abnormal) Collected: 10/06/21 1430    Order Status: Completed Specimen: Sputum from Tracheal Aspirate Updated: 10/07/21 1337 Special Requests: NO SPECIAL REQUESTS        GRAM STAIN FEW WBCS SEEN               OCCASIONAL EPITHELIAL CELLS SEEN                  RARE GRAM POSITIVE COCCI IN CLUSTERS           Culture result:       LIGHT PROBABLE STAPHYLOCOCCUS AUREUS                  NO NORMAL RESPIRATORY DINA ISOLATED SO FAR          CULTURE, BLOOD [[id number]]  (Abnormal) Collected: 10/04/21 1305    Order Status: Completed Specimen: Blood Updated: 10/07/21 1007     Special Requests: NO SPECIAL REQUESTS        GRAM STAIN       GRAM POSITIVE COCCI IN CLUSTERS ANAEROBIC BOTTLE                  SMEAR CALLED TO AND CORRECTLY REPEATED BY: Jarrod Murphy RN ICU, TO [facility] AT 2042 10/5/2021                  GRAM POSITIVE COCCI IN CLUSTERS AEROBIC BOTTLE                  SMEAR CALLED TO AND CORRECTLY REPEATED BY: Jennifer Perkins RN ICU AT [address] [facility] ON 10/6/21 TO [facility]           Culture result:       STAPHYLOCOCCUS SPECIES, COAGULASE NEGATIVE (MULTIPLE COLONY TYPES) GROWING IN BOTH BOTTLES DRAWN (SITE NOT INDICATED)          CULTURE, BLOOD [[id number]]  (Abnormal) Collected: 10/04/21 1405    Order Status: Completed Specimen: Blood Updated: 10/07/21 1006     Special Requests: NO SPECIAL REQUESTS        GRAM STAIN       GRAM POSITIVE COCCI IN CLUSTERS ANAEROBIC BOTTLE                  SMEAR CALLED TO AND CORRECTLY REPEATED BY: Monster Perez RN ICU, TO [facility] AT 1936 10/5/2021                  GRAM POSITIVE COCCI IN CLUSTERS AEROBIC BOTTLE                  SMEAR CALLED TO AND CORRECTLY REPEATED BY: Yassine Parnell RN ICU AT 6379 ON 10/6/21 TO [facility]           Culture result:       STAPHYLOCOCCUS SPECIES, COAGULASE NEGATIVE (MULTIPLE COLONY TYPES) GROWING IN BOTH BOTTLES DRAWN (SITE NOT INDICATED)          CULTURE, MRSA [[id number]] Collected: 10/05/21 0945    Order Status: Canceled Specimen: Nasal from [address], MRSA [[id number]] Collected: 10/04/21 1100    Order Status: Canceled Specimen: Nasal from Nares     CULTURE, RESPIRATORY/SPUTUM/BRONCH Carolyn Anglican STAIN [[id number]]     Order Status: Canceled Specimen: Sputum,ET Suction     CULTURE, BLOOD [[id number]] Collected: 09/27/21 1005    Order Status: Completed Specimen: Blood Updated: 10/03/21 0723     Special Requests: NO SPECIAL REQUESTS        Culture result: NO GROWTH 6 DAYS       CULTURE, BLOOD [[id number]] Collected: 09/27/21 1005    Order Status: Completed Specimen: Blood Updated: 10/03/21 0723     Special Requests: NO SPECIAL REQUESTS        Culture result: NO GROWTH 6 DAYS       CULTURE, BLOOD [[id number]] Collected: 09/24/21 0740    Order Status: Completed Specimen: Blood Updated: 09/30/21 0819     Special Requests: NO SPECIAL REQUESTS        Culture result: NO GROWTH 6 DAYS       CULTURE, RESPIRATORY/SPUTUM/BRONCH W GRAM STAIN [[id number]]  (Abnormal)  (Susceptibility) Collected: 09/24/21 0654    Order Status: Completed Specimen: Sputum from Tracheal Aspirate Updated: 09/27/21 1147     Special Requests: NO SPECIAL REQUESTS        GRAM STAIN RARE WBCS SEEN               RARE EPITHELIAL CELLS SEEN            NO ORGANISMS SEEN        Culture result:       MODERATE STAPHYLOCOCCUS AUREUS                  NO NORMAL RESPIRATORY DINA ISOLATED          COVID-19 RAPID TEST [[id number]] Collected: 09/24/21 1700    Order Status: Completed Specimen: Nasopharyngeal Updated: 09/24/21 1849     Specimen source Nasopharyngeal        COVID-19 rapid test Not detected        Comment: Rapid Abbott ID Now       Rapid NAAT:  The specimen is NEGATIVE for SARS-CoV-2, the novel coronavirus associated with COVID-19. Negative results should be treated as presumptive and, if inconsistent with clinical signs and symptoms or necessary for patient management, should be tested with an alternative molecular assay. Negative results do not preclude SARS-CoV-2 infection and should not be used as the sole basis for patient management decisions. This test has been authorized by the FDA under an Emergency Use Authorization (EUA) for use by authorized laboratories. Fact sheet for Healthcare Providers: ConventionUpdate.co.nz  Fact sheet for Patients: ConventionUpdate.co.nz       Methodology: Isothermal Nucleic Acid Amplification         CULTURE, URINE [[id number]]     Order Status: Canceled Specimen: Urine from Clean catch     CULTURE, CSF  TUBE 2 [[id number]] Collected: 09/16/21 1434    Order Status: Completed Specimen: Cerebrospinal Fluid Updated: 09/23/21 1241     Special Requests: TUBE 3, CULTURE AND SENSITIVTY AND GRAM STAIN. GRAM STAIN RARE WBCS SEEN         NO ORGANISMS SEEN        Culture result: NO GROWTH 7 DAYS       CULTURE, BLOOD [[id number]] Collected: 09/14/21 0515    Order Status: Completed Specimen: Blood Updated: 09/20/21 0832     Special Requests: NO SPECIAL REQUESTS        Culture result: NO GROWTH 6 DAYS       CULTURE, BLOOD [[id number]] Collected: 09/14/21 0500    Order Status: Completed Specimen: Blood Updated: 09/20/21 0832     Special Requests: NO SPECIAL REQUESTS        Culture result: NO GROWTH 6 DAYS       MENINGITIS PATHOGENS PANEL, CSF (BY PCR) [[id number]] Collected: 09/16/21 1434    Order Status: Completed Specimen: Cerebrospinal Fluid Updated: 09/17/21 0050     Escherichia coli K1 Not detected        Haemophilus Influenzae Not detected        Listeria Monocytogenes Not detected        Neisseria Meningitidis Not detected        Streptococcus Agalactiae Not detected        Streptococcus Pneumoniae Not detected        Cytomegalovirus Not detected        Enterovirus Not detected        Herpes Simplex Virus 1 Not detected        Comment: In patients who have negative herpes simplex 1 and 2 PCR results, do not modify treatment, confirm with alternate testing. Herpes Simplex Virus 2 Not detected        Comment: In patients who have negative herpes simplex 1 and 2 PCR results, do not modify treatment, confirm with alternate testing.         Human Herpesvirus 6 Not detected        Human Parechovirus Not detected        Varicella Zoster Virus Not detected        Crypto. neoformans/gattii Not detected       MENINGITIS PATHOGENS PANEL, CSF (BY PCR) [[id number]] Collected: 09/16/21 1545    Order Status: Canceled Specimen: Cerebrospinal Fluid     HSV 1 AND 2 BY PCR [[id number]] Collected: 09/16/21 1434    Order Status: Canceled Specimen: Other     XCLQB-03 RAPID TEST [[id number]] Collected: 09/16/21 1000    Order Status: Completed Specimen: Nasopharyngeal Updated: 09/16/21 1032     Specimen source Nasopharyngeal        COVID-19 rapid test Not detected        Comment: Rapid Abbott ID Now       Rapid NAAT:  The specimen is NEGATIVE for SARS-CoV-2, the novel coronavirus associated with COVID-19. Negative results should be treated as presumptive and, if inconsistent with clinical signs and symptoms or necessary for patient management, should be tested with an alternative molecular assay. Negative results do not preclude SARS-CoV-2 infection and should not be used as the sole basis for patient management decisions. This test has been authorized by the FDA under an Emergency Use Authorization (EUA) for use by authorized laboratories.    Fact sheet for Healthcare Providers: kstattoo.com  Fact sheet for Patients: kstattoo.com       Methodology: Isothermal Nucleic Acid Amplification         LEGIONELLA PNEUMOPHILA AG, URINE [[id number]] Collected: 09/14/21 2315    Order Status: Completed Specimen: Urine, random Updated: 09/15/21 1042     Legionella Ag, urine Negative       STREP PNEUMO AG, URINE [[id number]] Collected: 09/14/21 2315    Order Status: Completed Specimen: Urine, random Updated: 09/15/21 1042     Strep pneumo Ag, urine Negative       RESPIRATORY VIRUS PANEL W/COVID-19, PCR [[id number]] Collected: 09/14/21 1832    Order Status: Completed Specimen: Nasopharyngeal Updated: 09/14/21 2234     Adenovirus Not detected        Coronavirus 229E Not detected Coronavirus HKU1 Not detected        Coronavirus CVNL63 Not detected        Coronavirus OC43 Not detected        SARS-CoV-2, PCR Not detected        Metapneumovirus Not detected        Rhinovirus and Enterovirus Not detected        Influenza A Not detected        Influenza A, subtype H1 Not detected        Influenza A, subtype H3 Not detected        INFLUENZA A H1N1 PCR Not detected        Influenza B Not detected        Parainfluenza 1 Not detected        Parainfluenza 2 Not detected        Parainfluenza 3 Not detected        Parainfluenza virus 4 Not detected        RSV by PCR Not detected        B. parapertussis, PCR Not detected        Bordetella pertussis - PCR Not detected        Chlamydophila pneumoniae DNA, QL, PCR Not detected        Mycoplasma pneumoniae DNA, QL, PCR Not detected       CULTURE, BLOOD [[id number]] Collected: 09/14/21 1700    Order Status: Canceled Specimen: Blood     CULTURE, URINE [[id number]] Collected: 09/13/21 2330    Order Status: Canceled Specimen: Cath Urine     RESPIRATORY VIRUS PANEL W/COVID-19, PCR [[id number]]     Order Status: Canceled Specimen: NASOPHARYNGEAL SWAB     COVID-19 RAPID TEST [[id number]]     Order Status: Canceled     COVID-19 RAPID TEST [[id number]] Collected: 09/13/21 0737    Order Status: Completed Specimen: Nasopharyngeal Updated: 09/13/21 0811     Specimen source Nasopharyngeal        COVID-19 rapid test Not detected        Comment: Rapid Abbott ID Now       Rapid NAAT:  The specimen is NEGATIVE for SARS-CoV-2, the novel coronavirus associated with COVID-19. Negative results should be treated as presumptive and, if inconsistent with clinical signs and symptoms or necessary for patient management, should be tested with an alternative molecular assay. Negative results do not preclude SARS-CoV-2 infection and should not be used as the sole basis for patient management decisions.        This test has been authorized by the FDA under an Emergency Use Authorization (EUA) for use by authorized laboratories. Fact sheet for Healthcare Providers: ConventionUpdate[employer]co.nz  Fact sheet for Patients: ConventionUpdate[employer]       Methodology: Isothermal Nucleic Acid Amplification         COVID-19 RAPID TEST [[id number]]     Order Status: Canceled         Echo 9/17/2021:  Left Ventricle Normal cavity size, wall thickness and systolic function (ejection fraction normal). The estimated EF is 60 - 65%. There is inconclusive left ventricular diastolic function E/E' ratio = 7.08  Heart rate is over 100. Wall Scoring The left ventricular wall motion is normal.            Left Atrium Normal cavity size. Right Ventricle Normal cavity size and global systolic function. Assessment of RV function:   TAPSE = 27 mm. Right Atrium Normal cavity size. Interatrial Septum Interatrial septum not well visualized   Aortic Valve Aortic valve not well visualized. Trileaflet valve structure, no stenosis and no regurgitation. Mitral Valve No stenosis. Mitral valve non-specific thickening. Mild regurgitation. Tricuspid Valve Tricuspid valve not well visualized. No stenosis. Mild regurgitation. Pulmonic Valve Pulmonic valve not well visualized. No stenosis and no regurgitation. Aorta The aorta was not well visualized. Normal aortic root. Pulmonary Artery Pulmonary arteries not well visualized. Pulmonary arterial systolic pressure (PASP) is 29 mmHg. Pulmonary hypertension not suggested by Doppler findings. IVC/Hepatic Veins Mechanically ventilated; cannot use inferior caval vein diameter to estimate central venous pressure. Pericardium Normal pericardium and no evidence of pericardial effusion. CT head 9/15/1021  IMPRESSION   No acute intracranial abnormality.       CT chest, abdomen, pelvis 9/15/1021  IMPRESSION   Endotracheal tube tip in the lower thoracic trachea 1.5 cm above the dipak.    Bilateral lower lobar atelectasis, possible endobronchial lesion/mucous plug in  the left lower lobe bronchus.    No acute intra-abdominal abnormality. Imaging:  [x]I have personally reviewed the patients chest radiographs images and report  One-view chest 10/8/21     CLINICAL HISTORY: Acute respiratory failure, ET tube position ,     COMPARISON: None     FINDINGS:     Frontal view of the chest demonstrate tracheostomy tube in stable position. Minimal streaky opacities in the left lung base. Otherwise clear. . Cardiac  silhouette is normal in size and contour. No acute bony or soft tissue  abnormality.     IMPRESSION     Minimal streakiness in the left lung base likely atelectasis. Otherwise clear            Please note: Voice-recognition software may have been used to generate this report, which may have resulted in some phonetic-based errors in grammar and contents. Even though attempts were made to correct all the mistakes, some may have been missed, and remained in the body of the document.       Diana Dial MD  10/8/2021

## 2021-10-08 NOTE — PROGRESS NOTES
[facility]  (A Division of [address] [place] [address])                                                                                                                                                                                Heather Wick MD                                                         Office #:     [phone]  [phone]                                                          Office Fax: [phone]     Date of Admission: 9/11/2021    Date of Note: 10/8/2021  Reason for FU: Antibiotic management with ongoing sepsis  I will be back for rounding on Monday, Oct 11 2021. Please call for covering MD via  or [employer] for questions/consults. Thanks. Ongoing Antimicrobials:    Prior Antimicrobials:  Meropenem 10/3 to date # 6  Vancomycin 10/6 to date # 3       Doxycycline 9/14 to 9/15  Vanco 9/14 to 9/17  osyn 9/14 to 9/20  Zosyn 9/24-> 9/26 meropenem to 9/27  Vanco 9/25 to 9/27  Levofloxacin 9/27 to 10/1       Assessment- ID related:  --------------------------------------------------------------------------    · Fever with leucocytosis: Improving, Possible source -- resp infection 2/2 S.aurues- Cx + , has increased secretion,  ABX started emperically and wbc declining   · CoNS bacteremia with d/t spp 10/4- Likely contaminant  · S/P respiratory failure, inbuated 9/11, re-intubated 9/18 and Post trach 9/22  · Post PEG 10/1  · History of positive drug screen--opiates/cocaine.  Drug screen on admission: negative  · Left wrist fracture with external fixer-- site look mariola  · Agiation, Hyperammonia   · S/P MSSA infection- RCX 9/24/21-Post 8 days ABX    COVID test rapid neg, RVP neg  HIV test neg-9/15  Acute hepatitis A/B/C: negative  S/P LP- CSF benign- 9/16    Other Medical Issues- Mx per respective team:    Drug overdose( stated neurontin)  Hyperammonia level- etiology?  anemia   Recommendation for ID issues I am following:  ------------------------------------------------------------------------------    -> FU resp culture 10/6- Melody- Follow up final ID  -> cont meropenem X8 days- until Oct 9- DC over the weekend  -> monitor wbc                    DW ICU RN, ICU MD- Dr Mely Rhodes, Malva Abdirashid       Subjective:  She is awake, no fever, wbc bumped up slightly at 14.5K today  Non verbal, not on pressors  FMS In place, Barahona in place  Trach secretion is clear and frothy-- moderate amount per RT. Oxygen demand remains at 8L- with good sats. CXR with Minimal streakiness in the left lung base likely atelectasis. Otherwise clear. -- left mid lung opacity in prior cxr seems to clear. HPI:  Faith Hsu is a 39 y.o. WHITE/NON- with PMH as listed below. Patient is intubated, limited history found on her and  ICU team.MD.    Admitted with drug overdose - Neurontin. Not much history known before that. On admission, afebrile, wbc of 8.1, CMP ok, drug screen for opaites/cocaine/benzo neg. UA was normal.  She was intubated 9/11/21. Developed fever to 102.9 on 9/13, no leucocytosis but NH3 elevated, Legionella/Pneum urine ag good. NO DVT  On LE 9/15. Currently on VAnco/Zosyn and doxycycline. Further CORNEJO with CT for source work up in progress. Reconsulted for fever and leuocytosis on 10/4.        Active Hospital Problems    Diagnosis Date Noted    Bacteremia due to Gram-positive bacteria 10/06/2021    Status post tracheostomy ([facility] [address] 09/28/2021    Hypokalemia 09/21/2021    Increased ammonia level 09/14/2021    Psychosis ([facility] [address]     Hyponatremia     Acute respiratory failure with hypoxia ([facility] [address]     Left wrist fracture, with delayed healing, subsequent encounter 09/12/2021    Drug overdose, intentional self-harm, initial encounter ([facility] [address] 09/12/2021    Hypoxia 09/12/2021    Hypotension after procedure 09/12/2021    Acute metabolic encephalopathy 30/42/3232     Past Medical History:   Diagnosis Date    Asthma     Bilateral ovarian cysts     Cocaine abuse (HCC)     Mental and behavioral problem     Pancreatitis     Pancreatitis     Psychosis ([facility] [address]      Past Surgical History:   Procedure Laterality Date    HX GYN      D&C, Hysterectomy    IR INSERT GASTROSTOMY TUBE PERC  10/1/2021     History reviewed. No pertinent family history. Social History     Socioeconomic History    Marital status:      Spouse name: Not on file    Number of children: Not on file    Years of education: Not on file    Highest education level: Not on file   Occupational History    Not on file   Tobacco Use    Smoking status: Current Every Day Smoker     Packs/day: 1.00    Smokeless tobacco: Never Used   Substance and Sexual Activity    Alcohol use: Not Currently    Drug use: Not Currently     Types: Cocaine, Marijuana     Comment: used with in past 24 hours    Sexual activity: Not Currently   Other Topics Concern    Not on file   Social History Narrative    Not on file     Social Determinants of Health     Financial Resource Strain:     Difficulty of Paying Living Expenses:    Food Insecurity:     Worried About Running Out of Food in the Last Year:     Ran Out of Food in the Last Year:    Transportation Needs:     Lack of Transportation (Medical):  Lack of Transportation (Non-Medical):    Physical Activity:     Days of Exercise per Week:     Minutes of Exercise per Session:    Stress:     Feeling of Stress :    Social Connections:     Frequency of Communication with Friends and Family:     Frequency of Social Gatherings with Friends and Family:     Attends Cheondoism Services:     Active Member of Clubs or Organizations:     Attends Club or Organization Meetings:     Marital Status:    Intimate Partner Violence:     Fear of Current or Ex-Partner:     Emotionally Abused:     Physically Abused:     Sexually Abused:         Allergies:  Fish containing products and Toradol [ketorolac]     Medications:  Current Facility-Administered Medications   Medication Dose Route Frequency    clonazePAM (KlonoPIN) tablet 2 mg  2 mg Oral BID    haloperidoL (HALDOL) tablet 5 mg  5 mg Oral TID    metoprolol (LOPRESSOR) injection 2.5 mg  2.5 mg IntraVENous Q6H PRN    fentaNYL (DURAGESIC) 50 mcg/hr patch 1 Patch  1 Patch TransDERmal Q72H    vancomycin (VANCOCIN) 1250 mg in  ml infusion  1,250 mg IntraVENous Q12H    Vancomycin - Pharmacy to Dose  1 Each Other Rx Dosing/Monitoring    HYDROmorphone (DILAUDID) injection 1 mg  1 mg IntraVENous Q4H PRN    arformoteroL (BROVANA) neb solution 15 mcg  15 mcg Nebulization BID RT    budesonide (PULMICORT) 500 mcg/2 ml nebulizer suspension  500 mcg Nebulization BID RT    meropenem (MERREM) 1 g in sterile water (preservative free) 20 mL IV syringe  1 g IntraVENous Q12H    lactulose (CHRONULAC) 10 gram/15 mL solution 15 mL  15 mL Oral BID    melatonin (rapid dissolve) tablet 5 mg  5 mg Oral QHS    fentaNYL citrate (PF) injection 50 mcg  50 mcg IntraVENous Q2H PRN    albuterol-ipratropium (DUO-NEB) 2.5 MG-0.5 MG/3 ML  3 mL Nebulization Q4H PRN    famotidine (PEPCID) tablet 20 mg  20 mg Oral BID    thiamine mononitrate (B-1) tablet 100 mg  100 mg Oral DAILY    folic acid (FOLVITE) tablet 1 mg  1 mg Oral DAILY    multivitamin, tx-iron-ca-min (THERA-M w/ IRON) tablet 1 Tablet  1 Tablet Oral DAILY    ibuprofen (ADVIL;MOTRIN) 100 mg/5 mL oral suspension 400 mg  400 mg Per NG tube Q6H PRN    insulin lispro (HUMALOG) injection   SubCUTAneous Q6H    glucose chewable tablet 16 g  4 Tablet Oral PRN    glucagon (GLUCAGEN) injection 1 mg  1 mg IntraMUSCular PRN    dextrose (D50W) injection syrg 12.5-25 g  25-50 mL IntraVENous PRN    LORazepam (ATIVAN) injection 2 mg  2 mg IntraVENous Q6H PRN    enoxaparin (LOVENOX) injection 40 mg  40 mg SubCUTAneous Q24H    sodium chloride (NS) flush 5-40 mL  5-40 mL IntraVENous Q8H    sodium chloride (NS) flush 5-40 mL  5-40 mL IntraVENous PRN    acetaminophen (TYLENOL) tablet 650 mg  650 mg Oral Q6H PRN    Or    acetaminophen (TYLENOL) suppository 650 mg  650 mg Rectal Q6H PRN    polyethylene glycol (MIRALAX) packet 17 g  17 g Oral DAILY PRN    ondansetron (ZOFRAN ODT) tablet 4 mg  4 mg Oral Q8H PRN    Or    ondansetron (ZOFRAN) injection 4 mg  4 mg IntraVENous Q6H PRN    chlorhexidine (PERIDEX) 0.12 % mouthwash 10 mL  10 mL Oral Q12H    ELECTROLYTE REPLACEMENT PROTOCOL - Potassium Standard Dosing   1 Each Other PRN    ELECTROLYTE REPLACEMENT PROTOCOL - Magnesium   1 Each Other PRN    ELECTROLYTE REPLACEMENT PROTOCOL - Phosphorus  Standard Dosing  1 Each Other PRN    ELECTROLYTE REPLACEMENT PROTOCOL - Calcium   1 Each Other PRN     Physical Exam:    Temp (24hrs), Av °F (37.2 °C), Min:98.2 °F (36.8 °C), Max:99.5 °F (37.5 °C)    Visit Vitals  BP (!) 117/55   Pulse 98   Temp 98.8 °F (37.1 °C)   Resp 19   Ht 5' 2\" (1.575 m)   Wt 71.2 kg (157 lb)   LMP 05/15/2018   SpO2 98%   BMI 28.72 kg/m²      GEN: WD acutely sick looking, on 8 L of oxygen via trach     HEENT: Unicteric, EOMI  Neck: trach with secretions - clearer  CHEST: Non laboured breathing. B/L rhonchi  CVS:RRR, no mur/gallop  ABD: Obese/soft. Non tender. PEG in place with TF. FMS with loose OP noted  ULISES: Barahona inp lace  EXT: No apparent swelling or redness on UE/LE joints. No induration on PIV site on both arms  Ex-fix for fracture on Left wrist in place  Skin: Dry and intact. No rash, no redness. CNS: Restrained, moves all extremities.  Awake, follows command but seems restless     Microbiology  All Micro Results     Procedure Component Value Units Date/Time    CULTURE, RESPIRATORY/SPUTUM/BRONCH Barrett Matteo STAIN [[id number]]  (Abnormal) Collected: 10/06/21 1430    Order Status: Completed Specimen: Sputum from Tracheal Aspirate Updated: 10/07/21 6248     Special Requests: NO SPECIAL REQUESTS        GRAM STAIN FEW WBCS SEEN               OCCASIONAL EPITHELIAL CELLS SEEN                  RARE GRAM POSITIVE COCCI IN CLUSTERS           Culture result:       LIGHT PROBABLE STAPHYLOCOCCUS AUREUS                  NO NORMAL RESPIRATORY DINA ISOLATED SO FAR          CULTURE, BLOOD [[id number]]  (Abnormal) Collected: 10/04/21 1305    Order Status: Completed Specimen: Blood Updated: 10/07/21 1007     Special Requests: NO SPECIAL REQUESTS        GRAM STAIN       GRAM POSITIVE COCCI IN CLUSTERS ANAEROBIC BOTTLE                  SMEAR CALLED TO AND CORRECTLY REPEATED BY: Sheila Hernández RN ICU, TO [facility] AT 2042 10/5/2021                  GRAM POSITIVE COCCI IN CLUSTERS AEROBIC BOTTLE                  SMEAR CALLED TO AND CORRECTLY REPEATED BY: Sandi Lynch RN ICU AT 4120 ON 10/6/21 TO [facility]           Culture result:       STAPHYLOCOCCUS SPECIES, COAGULASE NEGATIVE (MULTIPLE COLONY TYPES) GROWING IN BOTH BOTTLES DRAWN (SITE NOT INDICATED)          CULTURE, BLOOD [[id number]]  (Abnormal) Collected: 10/04/21 1405    Order Status: Completed Specimen: Blood Updated: 10/07/21 1006     Special Requests: NO SPECIAL REQUESTS        GRAM STAIN       GRAM POSITIVE COCCI IN CLUSTERS ANAEROBIC BOTTLE                  SMEAR CALLED TO AND CORRECTLY REPEATED BY: Katherin London RN ICU, TO [facility] AT 1936 10/5/2021                  GRAM POSITIVE COCCI IN CLUSTERS AEROBIC BOTTLE                  SMEAR CALLED TO AND CORRECTLY REPEATED BY: Prema Valdes RN ICU AT 6666 ON 10/6/21 TO [facility]           Culture result:       STAPHYLOCOCCUS SPECIES, COAGULASE NEGATIVE (MULTIPLE COLONY TYPES) GROWING IN BOTH BOTTLES DRAWN (SITE NOT INDICATED)          CULTURE, MRSA [[id number]] Collected: 10/05/21 0945    Order Status: Canceled Specimen: Nasal from [address], MRSA [[id number]] Collected: 10/04/21 1100    Order Status: Canceled Specimen: Nasal from Nares     CULTURE, RESPIRATORY/SPUTUM/BRONCH Lavada Gambles STAIN [[id number]]     Order Status: Canceled Specimen: Sputum,ET Suction     CULTURE, BLOOD [[id number]] Collected: 09/27/21 1005    Order Status: Completed Specimen: Blood Updated: 10/03/21 0723     Special Requests: NO SPECIAL REQUESTS        Culture result: NO GROWTH 6 DAYS       CULTURE, BLOOD [[id number]] Collected: 09/27/21 1005    Order Status: Completed Specimen: Blood Updated: 10/03/21 0723     Special Requests: NO SPECIAL REQUESTS        Culture result: NO GROWTH 6 DAYS       CULTURE, BLOOD [[id number]] Collected: 09/24/21 0740    Order Status: Completed Specimen: Blood Updated: 09/30/21 0819     Special Requests: NO SPECIAL REQUESTS        Culture result: NO GROWTH 6 DAYS       CULTURE, RESPIRATORY/SPUTUM/BRONCH W GRAM STAIN [[id number]]  (Abnormal)  (Susceptibility) Collected: 09/24/21 0654    Order Status: Completed Specimen: Sputum from Tracheal Aspirate Updated: 09/27/21 1147     Special Requests: NO SPECIAL REQUESTS        GRAM STAIN RARE WBCS SEEN               RARE EPITHELIAL CELLS SEEN            NO ORGANISMS SEEN        Culture result:       MODERATE STAPHYLOCOCCUS AUREUS                  NO NORMAL RESPIRATORY DINA ISOLATED          COVID-19 RAPID TEST [[id number]] Collected: 09/24/21 1700    Order Status: Completed Specimen: Nasopharyngeal Updated: 09/24/21 1849     Specimen source Nasopharyngeal        COVID-19 rapid test Not detected        Comment: Rapid Abbott ID Now       Rapid NAAT:  The specimen is NEGATIVE for SARS-CoV-2, the novel coronavirus associated with COVID-19. Negative results should be treated as presumptive and, if inconsistent with clinical signs and symptoms or necessary for patient management, should be tested with an alternative molecular assay. Negative results do not preclude SARS-CoV-2 infection and should not be used as the sole basis for patient management decisions. This test has been authorized by the FDA under an Emergency Use Authorization (EUA) for use by authorized laboratories.    Fact sheet for Healthcare Providers: ConventionUpdate.co.nz  Fact sheet for Patients: [facility]       Methodology: Isothermal Nucleic Acid Amplification         CULTURE, URINE [[id number]]     Order Status: Canceled Specimen: Urine from Clean catch     CULTURE, CSF  TUBE 2 [[id number]] Collected: 09/16/21 1434    Order Status: Completed Specimen: Cerebrospinal Fluid Updated: 09/23/21 1241     Special Requests: TUBE 3, CULTURE AND SENSITIVTY AND GRAM STAIN. GRAM STAIN RARE WBCS SEEN         NO ORGANISMS SEEN        Culture result: NO GROWTH 7 DAYS       CULTURE, BLOOD [[id number]] Collected: 09/14/21 0515    Order Status: Completed Specimen: Blood Updated: 09/20/21 0832     Special Requests: NO SPECIAL REQUESTS        Culture result: NO GROWTH 6 DAYS       CULTURE, BLOOD [[id number]] Collected: 09/14/21 0500    Order Status: Completed Specimen: Blood Updated: 09/20/21 0832     Special Requests: NO SPECIAL REQUESTS        Culture result: NO GROWTH 6 DAYS       MENINGITIS PATHOGENS PANEL, CSF (BY PCR) [[id number]] Collected: 09/16/21 1434    Order Status: Completed Specimen: Cerebrospinal Fluid Updated: 09/17/21 0050     Escherichia coli K1 Not detected        Haemophilus Influenzae Not detected        Listeria Monocytogenes Not detected        Neisseria Meningitidis Not detected        Streptococcus Agalactiae Not detected        Streptococcus Pneumoniae Not detected        Cytomegalovirus Not detected        Enterovirus Not detected        Herpes Simplex Virus 1 Not detected        Comment: In patients who have negative herpes simplex 1 and 2 PCR results, do not modify treatment, confirm with alternate testing. Herpes Simplex Virus 2 Not detected        Comment: In patients who have negative herpes simplex 1 and 2 PCR results, do not modify treatment, confirm with alternate testing. Human Herpesvirus 6 Not detected        Human Parechovirus Not detected        Varicella Zoster Virus Not detected        Crypto.  neoformans/gattii Not detected       MENINGITIS PATHOGENS PANEL, CSF (BY PCR) [[id number]] Collected: 09/16/21 1545    Order Status: Canceled Specimen: Cerebrospinal Fluid     HSV 1 AND 2 BY PCR [[id number]] Collected: 09/16/21 1434    Order Status: Canceled Specimen: Other     [id number] RAPID TEST [[id number]] Collected: 09/16/21 1000    Order Status: Completed Specimen: Nasopharyngeal Updated: 09/16/21 1032     Specimen source Nasopharyngeal        COVID-19 rapid test Not detected        Comment: Rapid Abbott ID Now       Rapid NAAT:  The specimen is NEGATIVE for SARS-CoV-2, the novel coronavirus associated with COVID-19. Negative results should be treated as presumptive and, if inconsistent with clinical signs and symptoms or necessary for patient management, should be tested with an alternative molecular assay. Negative results do not preclude SARS-CoV-2 infection and should not be used as the sole basis for patient management decisions. This test has been authorized by the FDA under an Emergency Use Authorization (EUA) for use by authorized laboratories.    Fact sheet for Healthcare Providers: ConventionUpdate.co.nz  Fact sheet for Patients: ConventionUpdate.co.nz       Methodology: Isothermal Nucleic Acid Amplification         LEGIONELLA PNEUMOPHILA AG, URINE [[id number]] Collected: 09/14/21 2315    Order Status: Completed Specimen: Urine, random Updated: 09/15/21 1042     Legionella Ag, urine Negative       STREP PNEUMO AG, URINE [[id number]] Collected: 09/14/21 2315    Order Status: Completed Specimen: Urine, random Updated: 09/15/21 1042     Strep pneumo Ag, urine Negative       RESPIRATORY VIRUS PANEL W/COVID-19, PCR [[id number]] Collected: 09/14/21 1832    Order Status: Completed Specimen: Nasopharyngeal Updated: 09/14/21 2234     Adenovirus Not detected        Coronavirus 229E Not detected        Coronavirus HKU1 Not detected        Coronavirus CVNL63 Not detected        Coronavirus OC43 Not detected SARS-CoV-2, PCR Not detected        Metapneumovirus Not detected        Rhinovirus and Enterovirus Not detected        Influenza A Not detected        Influenza A, subtype H1 Not detected        Influenza A, subtype H3 Not detected        INFLUENZA A H1N1 PCR Not detected        Influenza B Not detected        Parainfluenza 1 Not detected        Parainfluenza 2 Not detected        Parainfluenza 3 Not detected        Parainfluenza virus 4 Not detected        RSV by PCR Not detected        B. parapertussis, PCR Not detected        Bordetella pertussis - PCR Not detected        Chlamydophila pneumoniae DNA, QL, PCR Not detected        Mycoplasma pneumoniae DNA, QL, PCR Not detected       CULTURE, BLOOD [[id number]] Collected: 09/14/21 1700    Order Status: Canceled Specimen: Blood     CULTURE, URINE [[id number]] Collected: 09/13/21 2330    Order Status: Canceled Specimen: Cath Urine     RESPIRATORY VIRUS PANEL W/COVID-19, PCR [[id number]]     Order Status: Canceled Specimen: NASOPHARYNGEAL SWAB     COVID-19 RAPID TEST [[id number]]     Order Status: Canceled     COVID-19 RAPID TEST [[id number]] Collected: 09/13/21 0737    Order Status: Completed Specimen: Nasopharyngeal Updated: 09/13/21 0811     Specimen source Nasopharyngeal        COVID-19 rapid test Not detected        Comment: Rapid Abbott ID Now       Rapid NAAT:  The specimen is NEGATIVE for SARS-CoV-2, the novel coronavirus associated with COVID-19. Negative results should be treated as presumptive and, if inconsistent with clinical signs and symptoms or necessary for patient management, should be tested with an alternative molecular assay. Negative results do not preclude SARS-CoV-2 infection and should not be used as the sole basis for patient management decisions. This test has been authorized by the FDA under an Emergency Use Authorization (EUA) for use by authorized laboratories.    Fact sheet for Healthcare Providers: ConventionUpdate.co.nz  Fact sheet for Patients: ConventionUpdate.co.nz       Methodology: Isothermal Nucleic Acid Amplification         COVID-19 RAPID TEST [[id number]]     Order Status: Canceled            Lab results:    Chemistry  Recent Labs     10/08/21  0401 10/07/21  0508 10/06/21  1440 10/06/21  0523 10/06/21  0523   * 120*  --   --  127*    138  --   --  139   K 4.2 4.1 4.3   < > 3.8    104  --   --  106   CO2 26 25  --   --  25   BUN 12 9  --   --  10   CREA 0.31* 0.34*  --   --  0.34*   CA 10.0 10.5*  --   --  9.8   AGAP 9 9  --   --  8   BUCR 39* 26*  --   --  29*   * 141*  --   --  134*   TP 7.5 8.4*  --   --  7.0   ALB 4.6 5.4*  --   --  4.2   GLOB 2.9 3.0  --   --  2.8   AGRAT 1.6 1.8*  --   --  1.5    < > = values in this interval not displayed. CBC w/ Diff  Recent Labs     10/08/21  0401 10/07/21  0508 10/06/21  0523   WBC 14.5* 13.9* 10.2   RBC 3.24* 3.16* 2.61*   HGB 9.3* 9.1* 7.6*   HCT 28.3* 27.4* 23.5*   * 467* 409   GRANS 71 77* 70   LYMPH 18* 15* 21   EOS 3 1 3       Imaging: report posted below as per radiologist   CXR- 9/17    1. Unchanged, adequately positioned endotracheal tube and enteric tube. 2. Unchanged left and right basilar patchy opacities, atelectasis versus  Infiltrate. 10/4 CXR  Ongoing increased left greater than right perihilar and left basilar opacities  may represent combination of worsening asymmetric pulmonary edema and infiltrate  with atelectasis. Possible trace left pleural effusion.   10/5- CXR:    Mild left lower lower lung zone opacities, unchanged.

## 2021-10-09 ENCOUNTER — APPOINTMENT (OUTPATIENT)
Dept: GENERAL RADIOLOGY | Age: 41
DRG: 004 | End: 2021-10-09
Attending: INTERNAL MEDICINE
Payer: MEDICAID

## 2021-10-09 LAB
ALBUMIN SERPL-MCNC: 4.4 G/DL (ref 3.4–5)
ALBUMIN/GLOB SERPL: 1.8 {RATIO} (ref 0.8–1.7)
ALP SERPL-CCNC: 119 U/L (ref 45–117)
ALT SERPL-CCNC: 64 U/L (ref 13–56)
AMMONIA PLAS-SCNC: 58 UMOL/L (ref 11–32)
ANION GAP SERPL CALC-SCNC: 9 MMOL/L (ref 3–18)
AST SERPL-CCNC: 21 U/L (ref 10–38)
BACTERIA SPEC CULT: ABNORMAL
BACTERIA SPEC CULT: ABNORMAL
BASOPHILS # BLD: 0 K/UL (ref 0–0.1)
BASOPHILS NFR BLD: 0 % (ref 0–2)
BILIRUB SERPL-MCNC: 0.4 MG/DL (ref 0.2–1)
BUN SERPL-MCNC: 12 MG/DL (ref 7–18)
BUN/CREAT SERPL: 38 (ref 12–20)
CALCIUM SERPL-MCNC: 10.1 MG/DL (ref 8.5–10.1)
CHLORIDE SERPL-SCNC: 106 MMOL/L (ref 100–111)
CO2 SERPL-SCNC: 25 MMOL/L (ref 21–32)
CREAT SERPL-MCNC: 0.32 MG/DL (ref 0.6–1.3)
DIFFERENTIAL METHOD BLD: ABNORMAL
EOSINOPHIL # BLD: 0.2 K/UL (ref 0–0.4)
EOSINOPHIL NFR BLD: 1 % (ref 0–5)
ERYTHROCYTE [DISTWIDTH] IN BLOOD BY AUTOMATED COUNT: 13.4 % (ref 11.6–14.5)
GLOBULIN SER CALC-MCNC: 2.5 G/DL (ref 2–4)
GLUCOSE BLD STRIP.AUTO-MCNC: 102 MG/DL (ref 70–110)
GLUCOSE BLD STRIP.AUTO-MCNC: 105 MG/DL (ref 70–110)
GLUCOSE BLD STRIP.AUTO-MCNC: 108 MG/DL (ref 70–110)
GLUCOSE BLD STRIP.AUTO-MCNC: 109 MG/DL (ref 70–110)
GLUCOSE SERPL-MCNC: 96 MG/DL (ref 74–99)
GRAM STN SPEC: ABNORMAL
HCT VFR BLD AUTO: 27.3 % (ref 35–45)
HGB BLD-MCNC: 8.6 G/DL (ref 12–16)
LYMPHOCYTES # BLD: 5 K/UL (ref 0.9–3.6)
LYMPHOCYTES NFR BLD: 30 % (ref 21–52)
MAGNESIUM SERPL-MCNC: 2.1 MG/DL (ref 1.6–2.6)
MCH RBC QN AUTO: 29.6 PG (ref 24–34)
MCHC RBC AUTO-ENTMCNC: 31.5 G/DL (ref 31–37)
MCV RBC AUTO: 93.8 FL (ref 78–100)
MONOCYTES # BLD: 1 K/UL (ref 0.05–1.2)
MONOCYTES NFR BLD: 6 % (ref 3–10)
NEUTS SEG # BLD: 10.4 K/UL (ref 1.8–8)
NEUTS SEG NFR BLD: 63 % (ref 40–73)
PHOSPHATE SERPL-MCNC: 4.6 MG/DL (ref 2.5–4.9)
PLATELET # BLD AUTO: 438 K/UL (ref 135–420)
PLATELET COMMENTS,PCOM: ABNORMAL
PMV BLD AUTO: 10.2 FL (ref 9.2–11.8)
POTASSIUM SERPL-SCNC: 4.1 MMOL/L (ref 3.5–5.5)
PROT SERPL-MCNC: 6.9 G/DL (ref 6.4–8.2)
RBC # BLD AUTO: 2.91 M/UL (ref 4.2–5.3)
RBC MORPH BLD: ABNORMAL
RBC MORPH BLD: ABNORMAL
SERVICE CMNT-IMP: ABNORMAL
SODIUM SERPL-SCNC: 140 MMOL/L (ref 136–145)
WBC # BLD AUTO: 16.6 K/UL (ref 4.6–13.2)

## 2021-10-09 PROCEDURE — 85025 COMPLETE CBC W/AUTO DIFF WBC: CPT

## 2021-10-09 PROCEDURE — 74011000250 HC RX REV CODE- 250: Performed by: INTERNAL MEDICINE

## 2021-10-09 PROCEDURE — 82140 ASSAY OF AMMONIA: CPT

## 2021-10-09 PROCEDURE — 65660000000 HC RM CCU STEPDOWN

## 2021-10-09 PROCEDURE — 36415 COLL VENOUS BLD VENIPUNCTURE: CPT

## 2021-10-09 PROCEDURE — 82962 GLUCOSE BLOOD TEST: CPT

## 2021-10-09 PROCEDURE — 84100 ASSAY OF PHOSPHORUS: CPT

## 2021-10-09 PROCEDURE — 94760 N-INVAS EAR/PLS OXIMETRY 1: CPT

## 2021-10-09 PROCEDURE — 77030010538 HC BG FLEXSEAL FMS BMS -A

## 2021-10-09 PROCEDURE — 74011250637 HC RX REV CODE- 250/637: Performed by: INTERNAL MEDICINE

## 2021-10-09 PROCEDURE — 77030020291 HC FLEXSEAL FMS BMS -C

## 2021-10-09 PROCEDURE — 94640 AIRWAY INHALATION TREATMENT: CPT

## 2021-10-09 PROCEDURE — 74011250636 HC RX REV CODE- 250/636: Performed by: INTERNAL MEDICINE

## 2021-10-09 PROCEDURE — 74011250636 HC RX REV CODE- 250/636: Performed by: FAMILY MEDICINE

## 2021-10-09 PROCEDURE — 83735 ASSAY OF MAGNESIUM: CPT

## 2021-10-09 PROCEDURE — 77010033678 HC OXYGEN DAILY

## 2021-10-09 PROCEDURE — 80053 COMPREHEN METABOLIC PANEL: CPT

## 2021-10-09 RX ADMIN — FAMOTIDINE 20 MG: 20 TABLET ORAL at 09:15

## 2021-10-09 RX ADMIN — HALOPERIDOL 5 MG: 5 TABLET ORAL at 22:40

## 2021-10-09 RX ADMIN — NYSTATIN: 100000 POWDER TOPICAL at 16:12

## 2021-10-09 RX ADMIN — HALOPERIDOL 5 MG: 5 TABLET ORAL at 09:15

## 2021-10-09 RX ADMIN — LACTULOSE 15 ML: 10 SOLUTION ORAL at 22:40

## 2021-10-09 RX ADMIN — Medication 1 TABLET: at 09:14

## 2021-10-09 RX ADMIN — CLONAZEPAM 2 MG: 0.5 TABLET ORAL at 09:14

## 2021-10-09 RX ADMIN — HALOPERIDOL 5 MG: 5 TABLET ORAL at 16:09

## 2021-10-09 RX ADMIN — 0.12% CHLORHEXIDINE GLUCONATE 10 ML: 1.2 RINSE ORAL at 09:13

## 2021-10-09 RX ADMIN — LORAZEPAM 2 MG: 2 INJECTION INTRAMUSCULAR at 14:47

## 2021-10-09 RX ADMIN — VANCOMYCIN HYDROCHLORIDE 1250 MG: 10 INJECTION, POWDER, LYOPHILIZED, FOR SOLUTION INTRAVENOUS at 23:02

## 2021-10-09 RX ADMIN — SODIUM CHLORIDE 10 ML: 9 INJECTION, SOLUTION INTRAMUSCULAR; INTRAVENOUS; SUBCUTANEOUS at 07:31

## 2021-10-09 RX ADMIN — SODIUM CHLORIDE 10 ML: 9 INJECTION, SOLUTION INTRAMUSCULAR; INTRAVENOUS; SUBCUTANEOUS at 13:47

## 2021-10-09 RX ADMIN — FAMOTIDINE 20 MG: 20 TABLET ORAL at 22:40

## 2021-10-09 RX ADMIN — NYSTATIN: 100000 POWDER TOPICAL at 09:33

## 2021-10-09 RX ADMIN — MEROPENEM 1 G: 1 INJECTION, POWDER, FOR SOLUTION INTRAVENOUS at 04:18

## 2021-10-09 RX ADMIN — SODIUM CHLORIDE 10 ML: 9 INJECTION, SOLUTION INTRAMUSCULAR; INTRAVENOUS; SUBCUTANEOUS at 22:41

## 2021-10-09 RX ADMIN — ARFORMOTEROL TARTRATE 15 MCG: 15 SOLUTION RESPIRATORY (INHALATION) at 20:45

## 2021-10-09 RX ADMIN — CLONAZEPAM 2 MG: 0.5 TABLET ORAL at 22:39

## 2021-10-09 RX ADMIN — NYSTATIN: 100000 POWDER TOPICAL at 22:40

## 2021-10-09 RX ADMIN — ARFORMOTEROL TARTRATE 15 MCG: 15 SOLUTION RESPIRATORY (INHALATION) at 07:36

## 2021-10-09 RX ADMIN — BUDESONIDE 500 MCG: 0.5 INHALANT RESPIRATORY (INHALATION) at 07:36

## 2021-10-09 RX ADMIN — THIAMINE HCL TAB 100 MG 100 MG: 100 TAB at 09:14

## 2021-10-09 RX ADMIN — 0.12% CHLORHEXIDINE GLUCONATE 10 ML: 1.2 RINSE ORAL at 22:39

## 2021-10-09 RX ADMIN — ENOXAPARIN SODIUM 40 MG: 100 INJECTION SUBCUTANEOUS at 16:09

## 2021-10-09 RX ADMIN — FOLIC ACID 1 MG: 1 TABLET ORAL at 09:14

## 2021-10-09 RX ADMIN — BUDESONIDE 500 MCG: 0.5 INHALANT RESPIRATORY (INHALATION) at 20:45

## 2021-10-09 RX ADMIN — VANCOMYCIN HYDROCHLORIDE 1250 MG: 10 INJECTION, POWDER, LYOPHILIZED, FOR SOLUTION INTRAVENOUS at 10:30

## 2021-10-09 RX ADMIN — MEROPENEM 1 G: 1 INJECTION, POWDER, FOR SOLUTION INTRAVENOUS at 13:46

## 2021-10-09 RX ADMIN — Medication 5 MG: at 22:40

## 2021-10-09 RX ADMIN — LORAZEPAM 2 MG: 2 INJECTION INTRAMUSCULAR at 07:52

## 2021-10-09 RX ADMIN — LACTULOSE 15 ML: 10 SOLUTION ORAL at 09:13

## 2021-10-09 NOTE — PROGRESS NOTES
Bedside and Verbal shift change report given to Larayne Meckel, RN (oncoming nurse) by Janyn Montemayor RN (offgoing nurse). Report included the following information SBAR, Kardex, Intake/Output, MAR and Recent Results.

## 2021-10-09 NOTE — PROGRESS NOTES
Assumed care of patient. SBAR report received from off going nurse. Patient alert and oriented to self. Patient unable verbalized needs. No restless in bed. . Breathing appears even and unlabored. Trach collar in place. Assessment to follow. Bed in lowest locked position. White board updated. Sitter at bedside. Call bell within reach. Will continue to monitor.

## 2021-10-09 NOTE — PROGRESS NOTES
COMPLETE TRACH CARE GIVEN. CLEANED WITH NORMAL SALINE. INNER CANNULA REPLACED AND FRESH GAUZE APPLIED TO SITE. TRACH TIES ALSO REPLACED AS WELL AS TRACH COLLAR.

## 2021-10-09 NOTE — PROGRESS NOTES
Problem: Falls - Risk of  Goal: *Absence of Falls  Description: Document Gregory Casanova Fall Risk and appropriate interventions in the flowsheet. Outcome: Progressing Towards Goal  Note: Fall Risk Interventions:  Mobility Interventions: Communicate number of staff needed for ambulation/transfer    Mentation Interventions: Family/sitter at bedside    Medication Interventions: Bed/chair exit alarm    Elimination Interventions: Call light in reach    History of Falls Interventions: Evaluate medications/consider consulting pharmacy         Problem: Pressure Injury - Risk of  Goal: *Prevention of pressure injury  Description: Document Remy Scale and appropriate interventions in the flowsheet.   Outcome: Progressing Towards Goal  Note: Pressure Injury Interventions:  Sensory Interventions: Assess need for specialty bed, Pressure redistribution bed/mattress (bed type), Keep linens dry and wrinkle-free    Moisture Interventions: Absorbent underpads, Internal/External fecal devices, Internal/External urinary devices    Activity Interventions: Pressure redistribution bed/mattress(bed type)    Mobility Interventions: Pressure redistribution bed/mattress (bed type)    Nutrition Interventions: Document food/fluid/supplement intake    Friction and Shear Interventions: Minimize layers

## 2021-10-09 NOTE — PROGRESS NOTES
Per laboratory, patient has MRSA in sputum from [facility], Dr. Marylen Coyer on unit and notified. Patient placed on contact precautions. Sitter remains at bedside. Will continue to monitor. Call bell within reach.

## 2021-10-10 LAB
ALBUMIN SERPL-MCNC: 4.5 G/DL (ref 3.4–5)
ALBUMIN/GLOB SERPL: 1.7 {RATIO} (ref 0.8–1.7)
ALP SERPL-CCNC: 125 U/L (ref 45–117)
ALT SERPL-CCNC: 61 U/L (ref 13–56)
AMMONIA PLAS-SCNC: 54 UMOL/L (ref 11–32)
ANION GAP SERPL CALC-SCNC: 8 MMOL/L (ref 3–18)
AST SERPL-CCNC: 20 U/L (ref 10–38)
BASOPHILS # BLD: 0 K/UL (ref 0–0.1)
BASOPHILS NFR BLD: 0 % (ref 0–2)
BILIRUB SERPL-MCNC: 0.4 MG/DL (ref 0.2–1)
BUN SERPL-MCNC: 10 MG/DL (ref 7–18)
BUN/CREAT SERPL: 37 (ref 12–20)
CALCIUM SERPL-MCNC: 10.4 MG/DL (ref 8.5–10.1)
CHLORIDE SERPL-SCNC: 105 MMOL/L (ref 100–111)
CO2 SERPL-SCNC: 26 MMOL/L (ref 21–32)
CREAT SERPL-MCNC: 0.27 MG/DL (ref 0.6–1.3)
DIFFERENTIAL METHOD BLD: ABNORMAL
EOSINOPHIL # BLD: 0.5 K/UL (ref 0–0.4)
EOSINOPHIL NFR BLD: 6 % (ref 0–5)
ERYTHROCYTE [DISTWIDTH] IN BLOOD BY AUTOMATED COUNT: 13.2 % (ref 11.6–14.5)
GLOBULIN SER CALC-MCNC: 2.6 G/DL (ref 2–4)
GLUCOSE BLD STRIP.AUTO-MCNC: 105 MG/DL (ref 70–110)
GLUCOSE BLD STRIP.AUTO-MCNC: 105 MG/DL (ref 70–110)
GLUCOSE BLD STRIP.AUTO-MCNC: 118 MG/DL (ref 70–110)
GLUCOSE BLD STRIP.AUTO-MCNC: 120 MG/DL (ref 70–110)
GLUCOSE SERPL-MCNC: 104 MG/DL (ref 74–99)
HCT VFR BLD AUTO: 26 % (ref 35–45)
HGB BLD-MCNC: 8.3 G/DL (ref 12–16)
LYMPHOCYTES # BLD: 2.6 K/UL (ref 0.9–3.6)
LYMPHOCYTES NFR BLD: 28 % (ref 21–52)
MAGNESIUM SERPL-MCNC: 2.1 MG/DL (ref 1.6–2.6)
MCH RBC QN AUTO: 27.9 PG (ref 24–34)
MCHC RBC AUTO-ENTMCNC: 31.9 G/DL (ref 31–37)
MCV RBC AUTO: 87.5 FL (ref 78–100)
MONOCYTES # BLD: 0.8 K/UL (ref 0.05–1.2)
MONOCYTES NFR BLD: 9 % (ref 3–10)
NEUTS SEG # BLD: 5.1 K/UL (ref 1.8–8)
NEUTS SEG NFR BLD: 57 % (ref 40–73)
PHOSPHATE SERPL-MCNC: 5 MG/DL (ref 2.5–4.9)
PLATELET # BLD AUTO: 495 K/UL (ref 135–420)
PMV BLD AUTO: 9.4 FL (ref 9.2–11.8)
POTASSIUM SERPL-SCNC: 4.3 MMOL/L (ref 3.5–5.5)
PROT SERPL-MCNC: 7.1 G/DL (ref 6.4–8.2)
RBC # BLD AUTO: 2.97 M/UL (ref 4.2–5.3)
SODIUM SERPL-SCNC: 139 MMOL/L (ref 136–145)
WBC # BLD AUTO: 9.1 K/UL (ref 4.6–13.2)

## 2021-10-10 PROCEDURE — 2709999900 HC NON-CHARGEABLE SUPPLY

## 2021-10-10 PROCEDURE — 84100 ASSAY OF PHOSPHORUS: CPT

## 2021-10-10 PROCEDURE — 65660000000 HC RM CCU STEPDOWN

## 2021-10-10 PROCEDURE — 74011250636 HC RX REV CODE- 250/636: Performed by: INTERNAL MEDICINE

## 2021-10-10 PROCEDURE — 82140 ASSAY OF AMMONIA: CPT

## 2021-10-10 PROCEDURE — 82962 GLUCOSE BLOOD TEST: CPT

## 2021-10-10 PROCEDURE — 74011250636 HC RX REV CODE- 250/636: Performed by: FAMILY MEDICINE

## 2021-10-10 PROCEDURE — 74011250637 HC RX REV CODE- 250/637: Performed by: INTERNAL MEDICINE

## 2021-10-10 PROCEDURE — 94640 AIRWAY INHALATION TREATMENT: CPT

## 2021-10-10 PROCEDURE — 83735 ASSAY OF MAGNESIUM: CPT

## 2021-10-10 PROCEDURE — 74011000250 HC RX REV CODE- 250: Performed by: INTERNAL MEDICINE

## 2021-10-10 PROCEDURE — 74011250637 HC RX REV CODE- 250/637: Performed by: PSYCHIATRY & NEUROLOGY

## 2021-10-10 PROCEDURE — 36415 COLL VENOUS BLD VENIPUNCTURE: CPT

## 2021-10-10 PROCEDURE — 85025 COMPLETE CBC W/AUTO DIFF WBC: CPT

## 2021-10-10 PROCEDURE — 77010033678 HC OXYGEN DAILY

## 2021-10-10 PROCEDURE — 94760 N-INVAS EAR/PLS OXIMETRY 1: CPT

## 2021-10-10 PROCEDURE — 80053 COMPREHEN METABOLIC PANEL: CPT

## 2021-10-10 RX ORDER — CLONAZEPAM 0.5 MG/1
1 TABLET ORAL 3 TIMES DAILY
Status: DISCONTINUED | OUTPATIENT
Start: 2021-10-10 | End: 2021-10-15

## 2021-10-10 RX ORDER — VANCOMYCIN HYDROCHLORIDE
1250 EVERY 12 HOURS
Status: DISCONTINUED | OUTPATIENT
Start: 2021-10-10 | End: 2021-10-11

## 2021-10-10 RX ORDER — QUETIAPINE FUMARATE 100 MG/1
100 TABLET, FILM COATED ORAL DAILY
Status: DISCONTINUED | OUTPATIENT
Start: 2021-10-11 | End: 2021-10-20

## 2021-10-10 RX ADMIN — SODIUM CHLORIDE 10 ML: 9 INJECTION, SOLUTION INTRAMUSCULAR; INTRAVENOUS; SUBCUTANEOUS at 06:30

## 2021-10-10 RX ADMIN — 0.12% CHLORHEXIDINE GLUCONATE 10 ML: 1.2 RINSE ORAL at 08:40

## 2021-10-10 RX ADMIN — ENOXAPARIN SODIUM 40 MG: 100 INJECTION SUBCUTANEOUS at 16:56

## 2021-10-10 RX ADMIN — THIAMINE HCL TAB 100 MG 100 MG: 100 TAB at 08:40

## 2021-10-10 RX ADMIN — CLONAZEPAM 1 MG: 0.5 TABLET ORAL at 22:13

## 2021-10-10 RX ADMIN — QUETIAPINE FUMARATE 300 MG: 200 TABLET ORAL at 22:12

## 2021-10-10 RX ADMIN — HYDROMORPHONE HYDROCHLORIDE 1 MG: 1 INJECTION, SOLUTION INTRAMUSCULAR; INTRAVENOUS; SUBCUTANEOUS at 19:43

## 2021-10-10 RX ADMIN — ARFORMOTEROL TARTRATE 15 MCG: 15 SOLUTION RESPIRATORY (INHALATION) at 08:14

## 2021-10-10 RX ADMIN — CLONAZEPAM 2 MG: 0.5 TABLET ORAL at 08:39

## 2021-10-10 RX ADMIN — CLONAZEPAM 1 MG: 0.5 TABLET ORAL at 15:20

## 2021-10-10 RX ADMIN — Medication 5 MG: at 22:13

## 2021-10-10 RX ADMIN — LACTULOSE 15 ML: 10 SOLUTION ORAL at 08:40

## 2021-10-10 RX ADMIN — LORAZEPAM 2 MG: 2 INJECTION INTRAMUSCULAR at 14:13

## 2021-10-10 RX ADMIN — FAMOTIDINE 20 MG: 20 TABLET ORAL at 22:13

## 2021-10-10 RX ADMIN — BUDESONIDE 500 MCG: 0.5 INHALANT RESPIRATORY (INHALATION) at 08:14

## 2021-10-10 RX ADMIN — 0.12% CHLORHEXIDINE GLUCONATE 10 ML: 1.2 RINSE ORAL at 23:28

## 2021-10-10 RX ADMIN — SODIUM CHLORIDE 10 ML: 9 INJECTION, SOLUTION INTRAMUSCULAR; INTRAVENOUS; SUBCUTANEOUS at 22:15

## 2021-10-10 RX ADMIN — SODIUM CHLORIDE 10 ML: 9 INJECTION, SOLUTION INTRAMUSCULAR; INTRAVENOUS; SUBCUTANEOUS at 13:03

## 2021-10-10 RX ADMIN — MEROPENEM 1 G: 1 INJECTION, POWDER, FOR SOLUTION INTRAVENOUS at 02:25

## 2021-10-10 RX ADMIN — FAMOTIDINE 20 MG: 20 TABLET ORAL at 08:39

## 2021-10-10 RX ADMIN — BUDESONIDE 500 MCG: 0.5 INHALANT RESPIRATORY (INHALATION) at 20:45

## 2021-10-10 RX ADMIN — ARFORMOTEROL TARTRATE 15 MCG: 15 SOLUTION RESPIRATORY (INHALATION) at 20:45

## 2021-10-10 RX ADMIN — VANCOMYCIN HYDROCHLORIDE 1250 MG: 10 INJECTION, POWDER, LYOPHILIZED, FOR SOLUTION INTRAVENOUS at 22:52

## 2021-10-10 RX ADMIN — LACTULOSE 15 ML: 10 SOLUTION ORAL at 22:13

## 2021-10-10 RX ADMIN — NYSTATIN: 100000 POWDER TOPICAL at 08:40

## 2021-10-10 RX ADMIN — VANCOMYCIN HYDROCHLORIDE 1250 MG: 10 INJECTION, POWDER, LYOPHILIZED, FOR SOLUTION INTRAVENOUS at 10:41

## 2021-10-10 RX ADMIN — NYSTATIN: 100000 POWDER TOPICAL at 18:35

## 2021-10-10 RX ADMIN — HALOPERIDOL 5 MG: 5 TABLET ORAL at 08:39

## 2021-10-10 RX ADMIN — Medication 1 TABLET: at 08:39

## 2021-10-10 RX ADMIN — FOLIC ACID 1 MG: 1 TABLET ORAL at 08:40

## 2021-10-10 RX ADMIN — NYSTATIN: 100000 POWDER TOPICAL at 22:53

## 2021-10-10 NOTE — CONSULTS
Psychiatry Re-consult:    I have consulted on this patient previously while she was in the ICU last week. Continued agitation and excessive animation. Has trach collar in. Non-verbal but seemingly able to understand requests. Able to nod to say \"Yes\". Her current psychiatric medication regimen was described to her. She has been on Haldol 15 mg daily in the past but more recently was on Seroquel 300 mg HS. We will therefore switch her over from Haldol to Seroquel. Dosing will be 100 mg in the am and 300 mg around bedtime. This should be able to provide a reasonable degree of tranquilization. Will switch klonopin to 1 mg TID for now. However, this should be weaned off once patient's mental status and physical condition improves. Utilize IM Haldol 5 mg every 4 to 6 hours PRN for breakthrough agitation. Would recommend weaning off the opiates as appropriate since combined use with benzodiazepines is likely to prolong and complicate her hypoxia. Thank  You. Please call anytime for any questions or concerns.

## 2021-10-10 NOTE — ROUTINE PROCESS
Bedside and Verbal shift change report given to Karely Grace RN (oncoming nurse) by Felicity Laws RN (offgoing nurse). Report included the following information SBAR and Kardex.

## 2021-10-10 NOTE — PROGRESS NOTES
Hospitalist Progress Note    Patient: Willow Bender MRN: [id number]  CSN: [id number]    YOB: 1980  Age: 39 y.o. Sex: female    DOA: 9/11/2021 LOS:  LOS: 28 days                Assessment and Plan:    Principal Problem:    Drug overdose, intentional self-harm, initial encounter ([facility] (9/12/2021)    Active Problems:    Left wrist fracture, with delayed healing, subsequent encounter (9/12/2021)      Hypoxia (9/12/2021)      Hypotension after procedure (9/12/2021)      Acute metabolic encephalopathy (9/52/8672)      Psychosis ([facility] ()      Hyponatremia ()      Acute respiratory failure with hypoxia (HCC) ()      Increased ammonia level (9/14/2021)      Hypokalemia (9/21/2021)      Status post tracheostomy ([facility] (9/28/2021)      Bacteremia due to Gram-positive bacteria (10/6/2021)        Acute respiratory failure with hypoxia   Intubated on 9/12    Trach on 9/20/21. stable on trach collar with 8 L/min oxygen-  Continue breathing tx . Continue local trach care: has MRSA in secreetions       Post tracheostomy   Continue local trach care . Will continue vanco for now because of secretions          bacteremia   bcx  Positive for GPC on 10/4   Sputum culture 9/24/2021: MSSA. Afebrile overnight  WBC back down will stop antibiotics as planned     But keep vanco bc of MRSA in trach secretions.       Anemia  H/h stable at 8.3  Occult stool negative    no bleeding reported   Upper PVL negative for dvt            Acute metabolic encephalopathy   Off  precedex .  Continue klonopin and dilaudid    Ct head no acute process            Gabapentin overdose with lethargy and AMS    S/p procedural stomach emptying in ED with charcoal and sorbitol             elevated ammonia level of unclear wtiology    Continue   Lactulose,   Continue ammonia monitoring     which is slightyl up today       Psychosis , hx of  delusional parasitosis   On  Klonopin/haldol -per psych recommendation   Continue cardiac monitoring    left wrist fracture: immobilized -poa -stable   Appreciated ortho on board-f/u with Dr. Saint Clair as out-pt         Dispo pending      Chief complaint:  Vivien Perez a 39 y.o. female who is standing history of multidrug use/abuse, previous drug-seeking behavior and mental health issues otherwise unspecified arrives via EMS with acute metabolic encephalopathy and lethargy. Admitted for likely gabapentin overdose. She was intubated in ER,        Subjective:    Eyes open. Pulls and fusses at trach collar. Trouble communicating but does follow ocmmands       Review of systems:    General: No fevers or chills. Cardiovascular: No chest pain or pressure. No palpitations. Pulmonary: No shortness of breath. Gastrointestinal: No nausea, vomiting. Objective:    Vital signs/Intake and Output:  Visit Vitals  BP (!) 142/78   Pulse 72   Temp 98 °F (36.7 °C)   Resp 18   Ht 5' 2\" (1.575 m)   Wt 71.2 kg (156 lb 15.5 oz)   SpO2 99%   BMI 28.71 kg/m²     Current Shift:  No intake/output data recorded. Last three shifts:  10/08 1901 - 10/10 0700  In: 1800 [I.V.:1290]  Out: 3225 [Urine:3200]    Physical Exam:  General: NAD, AAOx3. Non-toxic. HEENT: NC/AT. PERRLA, EOMI.  MMM. Lungs: Nml inspection. CTA B/L. No wheezing, rales or rhonchi. Heart:  S1S2 RRR,  PMI mid 5th IC space. No M/RG. Abdomen: Soft, NT/ND.  BS+. No peritoneal signs. Extremities: No C/C/E. Psych:   Nml affect. Neurologic:  2-12 intact. Strength 5/5 throughout.   Sensation symmetrical.          Labs: Results:       Chemistry Recent Labs     10/10/21  0155 10/09/21  0252 10/08/21  0401   * 96 105*    140 140   K 4.3 4.1 4.2    106 105   CO2 26 25 26   BUN 10 12 12   CREA 0.27* 0.32* 0.31*   CA 10.4* 10.1 10.0   AGAP 8 9 9   BUCR 37* 38* 39*   * 119* 127*   TP 7.1 6.9 7.5   ALB 4.5 4.4 4.6   GLOB 2.6 2.5 2.9   AGRAT 1.7 1.8* 1.6      CBC w/Diff Recent Labs     10/10/21  0155 10/09/21  0252 10/08/21  0401   WBC 9.1 16.6* 14.5*   RBC 2.97* 2.91* 3.24*   HGB 8.3* 8.6* 9.3*   HCT 26.0* 27.3* 28.3*   * 438* 513*   GRANS 57 63 71   LYMPH 28 30 18*   EOS 6* 1 3      Cardiac Enzymes No results for input(s): CPK, CKND1, ZENON in the last 72 hours. No lab exists for component: CKRMB, TROIP   Coagulation No results for input(s): PTP, INR, APTT, INREXT in the last 72 hours. Lipid Panel No results found for: CHOL, CHOLPOCT, CHOLX, CHLST, CHOLV, 701851, HDL, HDLP, LDL, LDLC, DLDLP, 111189, VLDLC, VLDL, TGLX, TRIGL, TRIGP, TGLPOCT, CHHD, CHHDX   BNP No results for input(s): BNPP in the last 72 hours.    Liver Enzymes Recent Labs     10/10/21  0155   TP 7.1   ALB 4.5   *      Thyroid Studies No results found for: T4, T3U, TSH, TSHEXT     Procedures/imaging: see electronic medical records for all procedures/Xrays and details which were not copied into this note but were reviewed prior to creation of Plan

## 2021-10-10 NOTE — PROGRESS NOTES
TRACH CARE GIVEN. CLEANED SITE WITH NORMAL SALINE, REPLACED INNER CANNULA, AND APPLIED FRESH GAUZE TO AREA. TRACH TIES WERE CLEAN AND INTACT AND DID NOT REQUIRE CHANGING.  NEW TRACH COLLAR WAS PROVIDED.

## 2021-10-10 NOTE — PROGRESS NOTES
1915: Assumed patient care from Karely Chopra RN. Patient is alert, but disoriented to person, place, time and situation. Respiratory status is stable on 4L via trach collar. Vital signs are stable. MEWS score is a two. Patient is unable to deny any pain, discomfort, nausea vomiting dizziness or anxiety. White board and fall card is updated. Bed is locked and in lowest position. Call bell, water and personal belongings are within reach. Patient has no questions, comments or concerns after bedside shift report. 0700: Patient had an uneventful shift. Respiratory status, vital signs and MEWS score remained stable. Patient was resting quietly with no signs of distress noted. Bed locked and in lowest position. Call bell water and personal belongings were within reach. Patient was unable to verbalize any questions, comments or concerns after bedside shift report.  Bedside report given to Deejay Valerio R.N.

## 2021-10-11 LAB
ALBUMIN SERPL-MCNC: 4.4 G/DL (ref 3.4–5)
ALBUMIN/GLOB SERPL: 1.6 {RATIO} (ref 0.8–1.7)
ALP SERPL-CCNC: 131 U/L (ref 45–117)
ALT SERPL-CCNC: 51 U/L (ref 13–56)
AMMONIA PLAS-SCNC: 38 UMOL/L (ref 11–32)
ANION GAP SERPL CALC-SCNC: 10 MMOL/L (ref 3–18)
AST SERPL-CCNC: 27 U/L (ref 10–38)
ATRIAL RATE: 98 BPM
BASOPHILS # BLD: 0 K/UL (ref 0–0.1)
BASOPHILS NFR BLD: 0 % (ref 0–2)
BILIRUB SERPL-MCNC: 0.4 MG/DL (ref 0.2–1)
BUN SERPL-MCNC: 16 MG/DL (ref 7–18)
BUN/CREAT SERPL: 19 (ref 12–20)
CALCIUM SERPL-MCNC: 10.7 MG/DL (ref 8.5–10.1)
CALCULATED P AXIS, ECG09: 33 DEGREES
CALCULATED R AXIS, ECG10: 13 DEGREES
CALCULATED T AXIS, ECG11: 7 DEGREES
CHLORIDE SERPL-SCNC: 103 MMOL/L (ref 100–111)
CK MB CFR SERPL CALC: NORMAL % (ref 0–4)
CK MB CFR SERPL CALC: NORMAL % (ref 0–4)
CK MB SERPL-MCNC: <1 NG/ML (ref 5–25)
CK MB SERPL-MCNC: <1 NG/ML (ref 5–25)
CK SERPL-CCNC: 26 U/L (ref 26–192)
CK SERPL-CCNC: 41 U/L (ref 26–192)
CO2 SERPL-SCNC: 24 MMOL/L (ref 21–32)
CREAT SERPL-MCNC: 0.83 MG/DL (ref 0.6–1.3)
DIAGNOSIS, 93000: NORMAL
DIFFERENTIAL METHOD BLD: ABNORMAL
EOSINOPHIL # BLD: 0.4 K/UL (ref 0–0.4)
EOSINOPHIL NFR BLD: 4 % (ref 0–5)
ERYTHROCYTE [DISTWIDTH] IN BLOOD BY AUTOMATED COUNT: 13.4 % (ref 11.6–14.5)
GLOBULIN SER CALC-MCNC: 2.8 G/DL (ref 2–4)
GLUCOSE BLD STRIP.AUTO-MCNC: 113 MG/DL (ref 70–110)
GLUCOSE BLD STRIP.AUTO-MCNC: 124 MG/DL (ref 70–110)
GLUCOSE BLD STRIP.AUTO-MCNC: 87 MG/DL (ref 70–110)
GLUCOSE BLD STRIP.AUTO-MCNC: 90 MG/DL (ref 70–110)
GLUCOSE BLD STRIP.AUTO-MCNC: 92 MG/DL (ref 70–110)
GLUCOSE SERPL-MCNC: 93 MG/DL (ref 74–99)
HCT VFR BLD AUTO: 27.9 % (ref 35–45)
HGB BLD-MCNC: 9 G/DL (ref 12–16)
LYMPHOCYTES # BLD: 2.2 K/UL (ref 0.9–3.6)
LYMPHOCYTES NFR BLD: 22 % (ref 21–52)
MAGNESIUM SERPL-MCNC: 2.3 MG/DL (ref 1.6–2.6)
MCH RBC QN AUTO: 28.4 PG (ref 24–34)
MCHC RBC AUTO-ENTMCNC: 32.3 G/DL (ref 31–37)
MCV RBC AUTO: 88 FL (ref 78–100)
MONOCYTES # BLD: 1 K/UL (ref 0.05–1.2)
MONOCYTES NFR BLD: 10 % (ref 3–10)
NEUTS SEG # BLD: 6.4 K/UL (ref 1.8–8)
NEUTS SEG NFR BLD: 63 % (ref 40–73)
P-R INTERVAL, ECG05: 146 MS
PHOSPHATE SERPL-MCNC: 6 MG/DL (ref 2.5–4.9)
PLATELET # BLD AUTO: 381 K/UL (ref 135–420)
PMV BLD AUTO: 9.8 FL (ref 9.2–11.8)
POTASSIUM SERPL-SCNC: 4.2 MMOL/L (ref 3.5–5.5)
PROT SERPL-MCNC: 7.2 G/DL (ref 6.4–8.2)
Q-T INTERVAL, ECG07: 342 MS
QRS DURATION, ECG06: 78 MS
QTC CALCULATION (BEZET), ECG08: 436 MS
RBC # BLD AUTO: 3.17 M/UL (ref 4.2–5.3)
SODIUM SERPL-SCNC: 137 MMOL/L (ref 136–145)
TROPONIN I SERPL-MCNC: 0.03 NG/ML (ref 0–0.04)
TROPONIN I SERPL-MCNC: <0.02 NG/ML (ref 0–0.04)
VANCOMYCIN SERPL-MCNC: 27.9 UG/ML (ref 5–40)
VENTRICULAR RATE, ECG03: 98 BPM
WBC # BLD AUTO: 10.1 K/UL (ref 4.6–13.2)

## 2021-10-11 PROCEDURE — 74011250636 HC RX REV CODE- 250/636: Performed by: INTERNAL MEDICINE

## 2021-10-11 PROCEDURE — 74011250637 HC RX REV CODE- 250/637: Performed by: INTERNAL MEDICINE

## 2021-10-11 PROCEDURE — 74011250636 HC RX REV CODE- 250/636: Performed by: FAMILY MEDICINE

## 2021-10-11 PROCEDURE — 83735 ASSAY OF MAGNESIUM: CPT

## 2021-10-11 PROCEDURE — 77010033678 HC OXYGEN DAILY

## 2021-10-11 PROCEDURE — 84100 ASSAY OF PHOSPHORUS: CPT

## 2021-10-11 PROCEDURE — 74011250636 HC RX REV CODE- 250/636: Performed by: HOSPITALIST

## 2021-10-11 PROCEDURE — 80202 ASSAY OF VANCOMYCIN: CPT

## 2021-10-11 PROCEDURE — 65660000000 HC RM CCU STEPDOWN

## 2021-10-11 PROCEDURE — 85025 COMPLETE CBC W/AUTO DIFF WBC: CPT

## 2021-10-11 PROCEDURE — 36415 COLL VENOUS BLD VENIPUNCTURE: CPT

## 2021-10-11 PROCEDURE — 82140 ASSAY OF AMMONIA: CPT

## 2021-10-11 PROCEDURE — 74011250637 HC RX REV CODE- 250/637: Performed by: PSYCHIATRY & NEUROLOGY

## 2021-10-11 PROCEDURE — 80053 COMPREHEN METABOLIC PANEL: CPT

## 2021-10-11 PROCEDURE — 93005 ELECTROCARDIOGRAM TRACING: CPT

## 2021-10-11 PROCEDURE — 94640 AIRWAY INHALATION TREATMENT: CPT

## 2021-10-11 PROCEDURE — 82962 GLUCOSE BLOOD TEST: CPT

## 2021-10-11 PROCEDURE — 82553 CREATINE MB FRACTION: CPT

## 2021-10-11 PROCEDURE — 74011000250 HC RX REV CODE- 250: Performed by: INTERNAL MEDICINE

## 2021-10-11 RX ADMIN — LORAZEPAM 2 MG: 2 INJECTION INTRAMUSCULAR at 13:43

## 2021-10-11 RX ADMIN — CLONAZEPAM 1 MG: 0.5 TABLET ORAL at 09:01

## 2021-10-11 RX ADMIN — HYDROMORPHONE HYDROCHLORIDE 1 MG: 1 INJECTION, SOLUTION INTRAMUSCULAR; INTRAVENOUS; SUBCUTANEOUS at 12:41

## 2021-10-11 RX ADMIN — BUDESONIDE 500 MCG: 0.5 INHALANT RESPIRATORY (INHALATION) at 21:21

## 2021-10-11 RX ADMIN — QUETIAPINE FUMARATE 100 MG: 100 TABLET ORAL at 09:01

## 2021-10-11 RX ADMIN — ARFORMOTEROL TARTRATE 15 MCG: 15 SOLUTION RESPIRATORY (INHALATION) at 21:21

## 2021-10-11 RX ADMIN — Medication 1 TABLET: at 09:01

## 2021-10-11 RX ADMIN — VANCOMYCIN HYDROCHLORIDE 1000 MG: 1 INJECTION, POWDER, LYOPHILIZED, FOR SOLUTION INTRAVENOUS at 11:30

## 2021-10-11 RX ADMIN — BUDESONIDE 500 MCG: 0.5 INHALANT RESPIRATORY (INHALATION) at 07:22

## 2021-10-11 RX ADMIN — LORAZEPAM 2 MG: 2 INJECTION INTRAMUSCULAR at 04:39

## 2021-10-11 RX ADMIN — NYSTATIN: 100000 POWDER TOPICAL at 23:12

## 2021-10-11 RX ADMIN — NYSTATIN: 100000 POWDER TOPICAL at 09:01

## 2021-10-11 RX ADMIN — QUETIAPINE FUMARATE 300 MG: 200 TABLET ORAL at 23:11

## 2021-10-11 RX ADMIN — HYDROMORPHONE HYDROCHLORIDE 1 MG: 1 INJECTION, SOLUTION INTRAMUSCULAR; INTRAVENOUS; SUBCUTANEOUS at 16:44

## 2021-10-11 RX ADMIN — ENOXAPARIN SODIUM 40 MG: 100 INJECTION SUBCUTANEOUS at 16:43

## 2021-10-11 RX ADMIN — Medication 5 MG: at 23:11

## 2021-10-11 RX ADMIN — NYSTATIN: 100000 POWDER TOPICAL at 16:44

## 2021-10-11 RX ADMIN — LACTULOSE 15 ML: 10 SOLUTION ORAL at 09:01

## 2021-10-11 RX ADMIN — LACTULOSE 15 ML: 10 SOLUTION ORAL at 23:10

## 2021-10-11 RX ADMIN — FOLIC ACID 1 MG: 1 TABLET ORAL at 09:01

## 2021-10-11 RX ADMIN — SODIUM CHLORIDE 10 ML: 9 INJECTION, SOLUTION INTRAMUSCULAR; INTRAVENOUS; SUBCUTANEOUS at 23:13

## 2021-10-11 RX ADMIN — ARFORMOTEROL TARTRATE 15 MCG: 15 SOLUTION RESPIRATORY (INHALATION) at 07:22

## 2021-10-11 RX ADMIN — SODIUM CHLORIDE 10 ML: 9 INJECTION, SOLUTION INTRAMUSCULAR; INTRAVENOUS; SUBCUTANEOUS at 13:43

## 2021-10-11 RX ADMIN — THIAMINE HCL TAB 100 MG 100 MG: 100 TAB at 09:01

## 2021-10-11 RX ADMIN — SODIUM CHLORIDE 500 ML: 900 INJECTION, SOLUTION INTRAVENOUS at 14:15

## 2021-10-11 RX ADMIN — HYDROMORPHONE HYDROCHLORIDE 1 MG: 1 INJECTION, SOLUTION INTRAMUSCULAR; INTRAVENOUS; SUBCUTANEOUS at 21:31

## 2021-10-11 RX ADMIN — CLONAZEPAM 1 MG: 0.5 TABLET ORAL at 16:43

## 2021-10-11 RX ADMIN — FAMOTIDINE 20 MG: 20 TABLET ORAL at 09:01

## 2021-10-11 RX ADMIN — SODIUM CHLORIDE 10 ML: 9 INJECTION, SOLUTION INTRAMUSCULAR; INTRAVENOUS; SUBCUTANEOUS at 06:17

## 2021-10-11 RX ADMIN — CLONAZEPAM 1 MG: 0.5 TABLET ORAL at 23:10

## 2021-10-11 RX ADMIN — FAMOTIDINE 20 MG: 20 TABLET ORAL at 23:12

## 2021-10-11 NOTE — PROGRESS NOTES
Hospitalist Progress Note-critical care note     Patient: Sameera Reed MRN: [id number]  [facility]: [id number]    YOB: 1980  Age: 39 y.o. Sex: female    DOA: 9/11/2021 LOS:  LOS: 29 days            Chief complaint: wrist fracture , drug overdose , acute respiratory failure with hypoxia, psychosis     Assessment/Plan         Hospital Problems  Date Reviewed: 9/6/2015        Codes Class Noted POA    Bacteremia due to Gram-positive bacteria ICD-10-CM: R78.81  ICD-9-CM: 790.7  10/6/2021 Unknown        Status post tracheostomy ([facility] ICD-10-CM: Z93.0  ICD-9-CM: V44.0  9/28/2021 Unknown        Hypokalemia ICD-10-CM: E87.6  ICD-9-CM: 276.8  9/21/2021 Unknown        Increased ammonia level ICD-10-CM: R79.89  ICD-9-CM: 790.6  9/14/2021 Unknown        Psychosis ([facility] ICD-10-CM: F29  ICD-9-CM: 298.9  Unknown Unknown        Hyponatremia ICD-10-CM: E87.1  ICD-9-CM: 276.1  Unknown Yes        Acute respiratory failure with hypoxia (HCC) ICD-10-CM: J96.01  ICD-9-CM: 518.81  Unknown Yes        Left wrist fracture, with delayed healing, subsequent encounter ICD-10-CM: S62.102G  ICD-9-CM: V54.19  9/12/2021 Unknown        * (Principal) Drug overdose, intentional self-harm, initial encounter ([facility] ICD-10-CM: T50.902A  ICD-9-CM: 977.9, E950.5  9/12/2021 Yes        Hypoxia ICD-10-CM: R09.02  ICD-9-CM: 799.02  9/12/2021 Yes        Hypotension after procedure ICD-10-CM: I95.81  ICD-9-CM: 458.29  9/12/2021 Yes        Acute metabolic encephalopathy PLL-78-QS: G93.41  ICD-9-CM: 348.31  9/12/2021 Yes                  Sameera Reed is a 39 y.o. female who is standing history of multidrug use/abuse, previous drug-seeking behavior and mental health issues otherwise unspecified arrives via EMS with acute metabolic encephalopathy and lethargy. Admitted for likely gabapentin overdose. She was intubated in ER, ct head no acute issue, LP done due to fever even on abx, no meningitis noted.  Now she has peg and trach        Acute respiratory failure with hypoxia   Intubated on 9/12    Trach on 9/20/21. Continue to wean off nc oxygen   Continue breathing tx . Continue local trach care      Post tracheostomy   Continue local trach care       bacteremia   Leukocytosis resolved   bcx  Positive for GPC on 10/4   Sputum culture 9/24/2021: MSSA. Id f/u, continue vanc and merrem     Anemia  H/h stable   Occult stool negative    no bleeding reported   Upper PVL negative for dvt           Acute metabolic encephalopathy   Ct head no acute process   Alert and follow the command          Gabapentin overdose with lethargy and AMS    S/p procedural stomach emptying in ED with charcoal and sorbitol    Dr. Curtis Fish reevaluate patient, on zoloft        elevated ammonia level  Continue   Lactulose,   Continue ammonia monitoring       Psychosis , hx of  Ekbom's delusional parasitosis   On  Klonopin,  Seroquel, halodol prn per psych recommendation   Continue cardiac monitoring     left wrist fracture: immobilized -poa -stable   Appreciated ortho on board-f/u with Dr. Tonya Tran as out-pt     ?chest pain   Will do ekg and ce     Boarder line bp   Received ativan and dillaudid -on decrease dilaudid dose, already for fentanyl patch     rn possible chest pain     Recommend RN to check BM manually, will give ns 500 ml bolus     Disposition :tbd,   Review of systems:  Unable to obtain due to trach collar   Vital signs/Intake and Output:  Visit Vitals  BP (!) 98/54   Pulse 64   Temp 98.4 °F (36.9 °C)   Resp 18   Ht 5' 2\" (1.575 m)   Wt 71.2 kg (156 lb 15.5 oz)   SpO2 98%   BMI 28.71 kg/m²     Current Shift:  No intake/output data recorded. Last three shifts:  10/09 1901 - 10/11 0700  In: 6076 [I.V.:250]  Out: 4275 [Urine:4250]    Physical Exam:  General: Alert   HEENT: NC, Atraumatic. Trach collar   Lungs: CTA Bilaterally. No Wheezing/Rhonchi/Rales. Heart:  tachy  No murmur, No Rubs, No Gallops  Abdomen: Soft, Non distended, Non tender.   +Bowel sounds, peg tube noted   Extremities: No c/c.immobilzer noted on left wrist   Psych:   Calm   Neurologic:  Alert and follow some command           Labs: Results:       Chemistry Recent Labs     10/11/21  0530 10/10/21  0155 10/09/21  0252   GLU 93 104* 96    139 140   K 4.2 4.3 4.1    105 106   CO2 24 26 25   BUN 16 10 12   CREA 0.83 0.27* 0.32*   CA 10.7* 10.4* 10.1   AGAP 10 8 9   BUCR 19 37* 38*   * 125* 119*   TP 7.2 7.1 6.9   ALB 4.4 4.5 4.4   GLOB 2.8 2.6 2.5   AGRAT 1.6 1.7 1.8*      CBC w/Diff Recent Labs     10/11/21  0530 10/10/21  0155 10/09/21  0252   WBC 10.1 9.1 16.6*   RBC 3.17* 2.97* 2.91*   HGB 9.0* 8.3* 8.6*   HCT 27.9* 26.0* 27.3*    495* 438*   GRANS 63 57 63   LYMPH 22 28 30   EOS 4 6* 1      Cardiac Enzymes No results for input(s): CPK, CKND1, ZENON in the last 72 hours. No lab exists for component: CKRMB, TROIP   Coagulation No results for input(s): PTP, INR, APTT, INREXT, INREXT in the last 72 hours. Lipid Panel No results found for: CHOL, CHOLPOCT, CHOLX, CHLST, CHOLV, 444611, HDL, HDLP, LDL, LDLC, DLDLP, 058101, VLDLC, VLDL, TGLX, TRIGL, TRIGP, TGLPOCT, CHHD, CHHDX   BNP No results for input(s): BNPP in the last 72 hours. Liver Enzymes Recent Labs     10/11/21  0530   TP 7.2   ALB 4.4   *      Thyroid Studies No results found for: T4, T3U, TSH, TSHEXT, TSHEXT     Procedures/imaging: see electronic medical records for all procedures/Xrays and details which were not copied into this note but were reviewed prior to creation of Plan    XR WRIST LT AP/LAT/OBL MIN 3V    Result Date: 8/19/2021  EXAM: XR WRIST LT AP/LAT/OBL MIN 3V CLINICAL INDICATION/HISTORY: Fall with LEFT wrist pain and swelling -Additional: None COMPARISON: None TECHNIQUE: 3 views of the left wrist _______________ FINDINGS: BONES: Comminuted, impacted fracture of the distal radius with slight dorsal angulation of the distal fracture fragment. No aggressive appearing osseous lytic or blastic lesion.  SOFT TISSUES: Unremarkable. _______________     Comminuted, impacted fracture of the distal radius. XR CHEST PORT    Result Date: 9/13/2021  EXAM: XR CHEST PORT CLINICAL INDICATION/HISTORY: Acute respiratory failure, ET tub position -Additional: None COMPARISON: One day prior TECHNIQUE: Portable frontal view of the chest _______________ FINDINGS: SUPPORT DEVICES: Endotracheal and enteric tubes unchanged. HEART AND MEDIASTINUM: Cardiomediastinal silhouette within normal limits. LUNGS AND PLEURAL SPACES: No dense consolidation, large effusion or pneumothorax. _______________     No acute cardiopulmonary abnormality. XR CHEST PORT    Result Date: 9/11/2021  MEDICAL RECORDS NUMBER: [id number] PROCEDURE:  Single view of the chest DATE: 9/11/2021 9:09 PM HISTORY: 39years old Female. post ett Comparison: 8/4/2021 FINDINGS: The patient has been intubated with appropriate placement of the endotracheal tube. There is no enteric tube passing below the level of the diaphragm. There is no significant effusion. There is no significant pneumothorax. Cardiomediastinal silhouette is within normal limits. There is no evidence of a focal pulmonary infiltrate or mass. 1. There is no significant or acute cardiopulmonary process. The patient has been intubated with appropriate placement of the endotracheal tube. There is no enteric tube passing below the level of the diaphragm. This report has been generated using voice recognition software. XR ABD PORT  1 V    Result Date: 9/12/2021  EXAM: Frontal view of the abdomen CLINICAL INDICATION/HISTORY: NG tube placement COMPARISON: None. _______________ FINDINGS: NG/OG tube in place with the tip in the left upper quadrant in the region of the gastric fundus. No bowel obstruction. Moderate colonic stool burden. _______________     1. NG/OG tube is in good position with the tip in the left upper quadrant in the region of the gastric fundus.  2. Moderate colonic stool akua.       Queen Sandrita MD

## 2021-10-11 NOTE — PROGRESS NOTES
0430- Pt restless, moving all around in bed, pulling at trach collar, zamora and peg. Dr. Edith Castaneda notified bp 99/57, , instructed to give Ativan as ordered  0500- barrier applied to sacrum, bed pad changed. Pt repositioned.  Tube feeding changed and new mepilex applied on left elbow

## 2021-10-11 NOTE — PROGRESS NOTES
Spoke with on-call physician patient pointing to pain in her chest.  Orders for EKG and troponin blood work to be completed. This RN will complete a manual blood pressure for this patient at this time. Will continue to monitor patient.

## 2021-10-11 NOTE — PROGRESS NOTES
[facility]  (A Division of [address] [place] [address])                                                                                                                                                                                Heather Alvarado MD                                                         Office #:     [phone]-CZSALY [phone]                                                          Office Fax: [phone]     Date of Admission: 9/11/2021    Date of Note: 10/11/2021  Reason for FU: Antibiotic management with ongoing sepsis      Ongoing Antimicrobials:    Prior Antimicrobials:    Vancomycin 10/6 to date # 6       Doxycycline 9/14 to 9/15  Vanco 9/14 to 9/17  osyn 9/14 to 9/20  Zosyn 9/24-> 9/26 meropenem to 9/27  Vanco 9/25 to 9/27  Levofloxacin 9/27 to 10/1  Meropenem 10/3 to 10/10       Assessment- ID related:  --------------------------------------------------------------------------    · Fever with leucocytosis: Improving, Possible source -- resp infection 2/2 MrSA , has increased secretion,  ABX started emperically and wbc declining   · CoNS bacteremia with d/t spp 10/4- Likely contaminant  · S/P respiratory failure, inbuated 9/11, re-intubated 9/18 and Post trach 9/22  · Post PEG 10/1  · History of positive drug screen--opiates/cocaine. Drug screen on admission: negative  · Left wrist fracture with external fixer-- site look mariola  · Agiation, Hyperammonia   · S/P MSSA infection- RCX 9/24/21-Post 8 days ABX    COVID test rapid neg, RVP neg  HIV test neg-9/15  Acute hepatitis A/B/C: negative  S/P LP- CSF benign- 9/16    Other Medical Issues- Mx per respective team:    Drug overdose( stated neurontin)  Hyperammonia level- etiology?  anemia   Recommendation for ID issues I am following:  ------------------------------------------------------------------------------    -> meropenem dc'ed 10/10    Cont Vanco- until 10/16- pharmacy to dose.  Aim for -600, closely monitor cr while on Vanco      DW RN                         Subjective:  She is awake, no fever  Wbc is normal- 10.1 K  resp culture + MRSA    Some secretion from trach tube still  FMS In place, Barahona in place    Oxygen demand remains at 6L- with good sats. Transferred out from ICU to floor         HPI:  Faith Hsu is a 39 y.o. WHITE/NON- with PMH as listed below. Patient is intubated, limited history found on her and  ICU team.MD.    Admitted with drug overdose - Neurontin. Not much history known before that. On admission, afebrile, wbc of 8.1, CMP ok, drug screen for opaites/cocaine/benzo neg. UA was normal.  She was intubated 9/11/21. Developed fever to 102.9 on 9/13, no leucocytosis but NH3 elevated, Legionella/Pneum urine ag good. NO DVT  On LE 9/15. Currently on VAnco/Zosyn and doxycycline. Further CORNEJO with CT for source work up in progress. Reconsulted for fever and leuocytosis on 10/4. Active Hospital Problems    Diagnosis Date Noted    Bacteremia due to Gram-positive bacteria 10/06/2021    Status post tracheostomy ([address] 09/28/2021    Hypokalemia 09/21/2021    Increased ammonia level 09/14/2021    Psychosis (HCC)     Hyponatremia     Acute respiratory failure with hypoxia (HCC)     Left wrist fracture, with delayed healing, subsequent encounter 09/12/2021    Drug overdose, intentional self-harm, initial encounter ([facility] 09/12/2021    Hypoxia 09/12/2021    Hypotension after procedure 09/12/2021    Acute metabolic encephalopathy 90/17/9700     Past Medical History:   Diagnosis Date    Asthma     Bilateral ovarian cysts     Cocaine abuse ([address]     Mental and behavioral problem     Pancreatitis     Pancreatitis     Psychosis ([address]      Past Surgical History:   Procedure Laterality Date    HX GYN      D&C, Hysterectomy    IR INSERT GASTROSTOMY TUBE PERC  10/1/2021     History reviewed. No pertinent family history.   Social History     Socioeconomic History  Marital status:      Spouse name: Not on file    Number of children: Not on file    Years of education: Not on file    Highest education level: Not on file   Occupational History    Not on file   Tobacco Use    Smoking status: Current Every Day Smoker     Packs/day: 1.00    Smokeless tobacco: Never Used   Substance and Sexual Activity    Alcohol use: Not Currently    Drug use: Not Currently     Types: Cocaine, Marijuana     Comment: used with in past 24 hours    Sexual activity: Not Currently   Other Topics Concern    Not on file   Social History Narrative    Not on file     Social Determinants of Health     Financial Resource Strain:     Difficulty of Paying Living Expenses:    Food Insecurity:     Worried About Running Out of Food in the Last Year:     Ran Out of Food in the Last Year:    Transportation Needs:     Lack of Transportation (Medical):  Lack of Transportation (Non-Medical):    Physical Activity:     Days of Exercise per Week:     Minutes of Exercise per Session:    Stress:     Feeling of Stress :    Social Connections:     Frequency of Communication with Friends and Family:     Frequency of Social Gatherings with Friends and Family:     Attends Synagogue Services:     Active Member of Clubs or Organizations:     Attends Club or Organization Meetings:     Marital Status:    Intimate Partner Violence:     Fear of Current or Ex-Partner:     Emotionally Abused:     Physically Abused:     Sexually Abused:         Allergies:  Fish containing products and Toradol [ketorolac]     Medications:  Current Facility-Administered Medications   Medication Dose Route Frequency    [START ON 10/12/2021] vancomycin (VANCOCIN) 1,000 mg in 0.9% sodium chloride 250 mL (VIAL-MATE)  1,000 mg IntraVENous Q18H    QUEtiapine (SEROquel) tablet 100 mg  100 mg Oral DAILY    QUEtiapine (SEROquel) tablet 300 mg  300 mg Oral QHS    clonazePAM (KlonoPIN) tablet 1 mg  1 mg Oral TID    nystatin (MYCOSTATIN) 100,000 unit/gram powder   Topical TID    metoprolol (LOPRESSOR) injection 2.5 mg  2.5 mg IntraVENous Q6H PRN    fentaNYL (DURAGESIC) 50 mcg/hr patch 1 Patch  1 Patch TransDERmal Q72H    Vancomycin - Pharmacy to Dose  1 Each Other Rx Dosing/Monitoring    HYDROmorphone (DILAUDID) injection 1 mg  1 mg IntraVENous Q4H PRN    arformoteroL (BROVANA) neb solution 15 mcg  15 mcg Nebulization BID RT    budesonide (PULMICORT) 500 mcg/2 ml nebulizer suspension  500 mcg Nebulization BID RT    lactulose (CHRONULAC) 10 gram/15 mL solution 15 mL  15 mL Oral BID    melatonin (rapid dissolve) tablet 5 mg  5 mg Oral QHS    fentaNYL citrate (PF) injection 50 mcg  50 mcg IntraVENous Q2H PRN    albuterol-ipratropium (DUO-NEB) 2.5 MG-0.5 MG/3 ML  3 mL Nebulization Q4H PRN    famotidine (PEPCID) tablet 20 mg  20 mg Oral BID    thiamine mononitrate (B-1) tablet 100 mg  100 mg Oral DAILY    folic acid (FOLVITE) tablet 1 mg  1 mg Oral DAILY    multivitamin, tx-iron-ca-min (THERA-M w/ IRON) tablet 1 Tablet  1 Tablet Oral DAILY    ibuprofen (ADVIL;MOTRIN) 100 mg/5 mL oral suspension 400 mg  400 mg Per NG tube Q6H PRN    insulin lispro (HUMALOG) injection   SubCUTAneous Q6H    glucose chewable tablet 16 g  4 Tablet Oral PRN    glucagon (GLUCAGEN) injection 1 mg  1 mg IntraMUSCular PRN    dextrose (D50W) injection syrg 12.5-25 g  25-50 mL IntraVENous PRN    LORazepam (ATIVAN) injection 2 mg  2 mg IntraVENous Q6H PRN    enoxaparin (LOVENOX) injection 40 mg  40 mg SubCUTAneous Q24H    sodium chloride (NS) flush 5-40 mL  5-40 mL IntraVENous Q8H    sodium chloride (NS) flush 5-40 mL  5-40 mL IntraVENous PRN    acetaminophen (TYLENOL) tablet 650 mg  650 mg Oral Q6H PRN    Or    acetaminophen (TYLENOL) suppository 650 mg  650 mg Rectal Q6H PRN    polyethylene glycol (MIRALAX) packet 17 g  17 g Oral DAILY PRN    ondansetron (ZOFRAN ODT) tablet 4 mg  4 mg Oral Q8H PRN    Or    ondansetron (ZOFRAN) injection 4 mg  4 mg IntraVENous Q6H PRN     Physical Exam:    Temp (24hrs), Av.3 °F (36.8 °C), Min:97.1 °F (36.2 °C), Max:99 °F (37.2 °C)    Visit Vitals  BP (!) 81/54   Pulse (!) 105   Temp 99 °F (37.2 °C)   Resp 18   Ht 5' 2\" (1.575 m)   Wt 71.2 kg (156 lb 15.5 oz)   LMP 05/15/2018   SpO2 100%   BMI 28.71 kg/m²      GEN: WD sick looking, on 6 L of oxygen via trach   Under going nursing care at time of exam    HEENT: Unicteric, EOMI  CHEST: Non laboured breathing. ABD: Obese/soft. Non tender. PEG in place with TF. FMS with loose OP noted  ULISES: Barahona inp lace  EXT: No apparent swelling or redness on UE/LE joints. No induration on PIV site on both arms  Ex-fix for fracture on Left wrist in place  Skin: Dry and intact. No rash, no redness. CNS: Restrained, moves all extremities.  Awake, follows command but seems restless     Microbiology  All Micro Results     Procedure Component Value Units Date/Time    CULTURE, RESPIRATORY/SPUTUM/BRONCH Weyman Prima STAIN [[id number]]  (Abnormal)  (Susceptibility) Collected: 10/06/21 1430    Order Status: Completed Specimen: Sputum from Tracheal Aspirate Updated: 10/09/21 0810     Special Requests: NO SPECIAL REQUESTS        GRAM STAIN FEW WBCS SEEN               OCCASIONAL EPITHELIAL CELLS SEEN                  RARE GRAM POSITIVE COCCI IN CLUSTERS           Culture result:       LIGHT * METHICILLIN RESISTANT STAPHYLOCOCCUS AUREUS *                  NO NORMAL RESPIRATORY DINA ISOLATED          CULTURE, BLOOD [[id number]]  (Abnormal) Collected: 10/04/21 1305    Order Status: Completed Specimen: Blood Updated: 10/07/21 1007     Special Requests: NO SPECIAL REQUESTS        GRAM STAIN       GRAM POSITIVE COCCI IN CLUSTERS ANAEROBIC BOTTLE                  SMEAR CALLED TO AND CORRECTLY REPEATED BY: Isabel Escobar RN ICU, TO [facility] AT 2042 10/5/2021                  GRAM POSITIVE COCCI IN CLUSTERS AEROBIC BOTTLE                  SMEAR CALLED TO AND CORRECTLY REPEATED BY: Jalen Simon RN ICU AT 0329 ON 10/6/21 TO [facility]           Culture result:       STAPHYLOCOCCUS SPECIES, COAGULASE NEGATIVE (MULTIPLE COLONY TYPES) GROWING IN BOTH BOTTLES DRAWN (SITE NOT INDICATED)          CULTURE, BLOOD [[id number]]  (Abnormal) Collected: 10/04/21 1405    Order Status: Completed Specimen: Blood Updated: 10/07/21 1006     Special Requests: NO SPECIAL REQUESTS        GRAM STAIN       GRAM POSITIVE COCCI IN CLUSTERS ANAEROBIC BOTTLE                  SMEAR CALLED TO AND CORRECTLY REPEATED BY: Kwabena Hernandez RN ICU, TO F AT 1936 10/5/2021                  GRAM POSITIVE COCCI IN CLUSTERS AEROBIC BOTTLE                  SMEAR CALLED TO AND CORRECTLY REPEATED BY: Rosa Bowens RN ICU AT 4313 ON 10/6/21 TO [facility]           Culture result:       STAPHYLOCOCCUS SPECIES, COAGULASE NEGATIVE (MULTIPLE COLONY TYPES) GROWING IN BOTH BOTTLES DRAWN (SITE NOT INDICATED)          CULTURE, MRSA [[id number]] Collected: 10/05/21 0945    Order Status: Canceled Specimen: Nasal from [address], MRSA [[id number]] Collected: 10/04/21 1100    Order Status: Canceled Specimen: Nasal from Nares     CULTURE, RESPIRATORY/SPUTUM/BRONCH Valladares Nitin STAIN [[id number]]     Order Status: Canceled Specimen: Sputum,ET Suction     CULTURE, BLOOD [[id number]] Collected: 09/27/21 1005    Order Status: Completed Specimen: Blood Updated: 10/03/21 0723     Special Requests: NO SPECIAL REQUESTS        Culture result: NO GROWTH 6 DAYS       CULTURE, BLOOD [[id number]] Collected: 09/27/21 1005    Order Status: Completed Specimen: Blood Updated: 10/03/21 0723     Special Requests: NO SPECIAL REQUESTS        Culture result: NO GROWTH 6 DAYS       CULTURE, BLOOD [[id number]] Collected: 09/24/21 0740    Order Status: Completed Specimen: Blood Updated: 09/30/21 0819     Special Requests: NO SPECIAL REQUESTS        Culture result: NO GROWTH 6 DAYS       CULTURE, RESPIRATORY/SPUTUM/BRONCH W GRAM STAIN [[id number]]  (Abnormal)  (Susceptibility) Collected: 09/24/21 0654    Order Status: Completed Specimen: Sputum from Tracheal Aspirate Updated: 09/27/21 1147     Special Requests: NO SPECIAL REQUESTS        GRAM STAIN RARE WBCS SEEN               RARE EPITHELIAL CELLS SEEN            NO ORGANISMS SEEN        Culture result:       MODERATE STAPHYLOCOCCUS AUREUS                  NO NORMAL RESPIRATORY DINA ISOLATED          COVID-19 RAPID TEST [[id number]] Collected: 09/24/21 1700    Order Status: Completed Specimen: Nasopharyngeal Updated: 09/24/21 1849     Specimen source Nasopharyngeal        COVID-19 rapid test Not detected        Comment: Rapid Abbott ID Now       Rapid NAAT:  The specimen is NEGATIVE for SARS-CoV-2, the novel coronavirus associated with COVID-19. Negative results should be treated as presumptive and, if inconsistent with clinical signs and symptoms or necessary for patient management, should be tested with an alternative molecular assay. Negative results do not preclude SARS-CoV-2 infection and should not be used as the sole basis for patient management decisions. This test has been authorized by the FDA under an Emergency Use Authorization (EUA) for use by authorized laboratories. Fact sheet for Healthcare Providers: ConventionUpdate.co.nz  Fact sheet for Patients: ConventionUpdate.co.nz       Methodology: Isothermal Nucleic Acid Amplification         CULTURE, URINE [[id number]]     Order Status: Canceled Specimen: Urine from Clean catch     CULTURE, CSF  TUBE 2 [[id number]] Collected: 09/16/21 1434    Order Status: Completed Specimen: Cerebrospinal Fluid Updated: 09/23/21 1241     Special Requests: TUBE 3, CULTURE AND SENSITIVTY AND GRAM STAIN.      GRAM STAIN RARE WBCS SEEN         NO ORGANISMS SEEN        Culture result: NO GROWTH 7 DAYS       CULTURE, BLOOD [[id number]] Collected: 09/14/21 0515    Order Status: Completed Specimen: Blood Updated: 09/20/21 0832     Special Requests: NO SPECIAL REQUESTS        Culture result: NO GROWTH 6 DAYS       CULTURE, BLOOD [[id number]] Collected: 09/14/21 0500    Order Status: Completed Specimen: Blood Updated: 09/20/21 0832     Special Requests: NO SPECIAL REQUESTS        Culture result: NO GROWTH 6 DAYS       MENINGITIS PATHOGENS PANEL, CSF (BY PCR) [[id number]] Collected: 09/16/21 1434    Order Status: Completed Specimen: Cerebrospinal Fluid Updated: 09/17/21 0050     Escherichia coli K1 Not detected        Haemophilus Influenzae Not detected        Listeria Monocytogenes Not detected        Neisseria Meningitidis Not detected        Streptococcus Agalactiae Not detected        Streptococcus Pneumoniae Not detected        Cytomegalovirus Not detected        Enterovirus Not detected        Herpes Simplex Virus 1 Not detected        Comment: In patients who have negative herpes simplex 1 and 2 PCR results, do not modify treatment, confirm with alternate testing. Herpes Simplex Virus 2 Not detected        Comment: In patients who have negative herpes simplex 1 and 2 PCR results, do not modify treatment, confirm with alternate testing. Human Herpesvirus 6 Not detected        Human Parechovirus Not detected        Varicella Zoster Virus Not detected        Crypto. neoformans/gattii Not detected       MENINGITIS PATHOGENS PANEL, CSF (BY PCR) [[id number]] Collected: 09/16/21 1545    Order Status: Canceled Specimen: Cerebrospinal Fluid     HSV 1 AND 2 BY PCR [[id number]] Collected: 09/16/21 1434    Order Status: Canceled Specimen: Other     VMPJM-40 RAPID TEST [[id number]] Collected: 09/16/21 1000    Order Status: Completed Specimen: Nasopharyngeal Updated: 09/16/21 1032     Specimen source Nasopharyngeal        COVID-19 rapid test Not detected        Comment: Rapid Abbott ID Now       Rapid NAAT:  The specimen is NEGATIVE for SARS-CoV-2, the novel coronavirus associated with COVID-19.        Negative results should be treated as presumptive and, if inconsistent with clinical signs and symptoms or necessary for patient management, should be tested with an alternative molecular assay. Negative results do not preclude SARS-CoV-2 infection and should not be used as the sole basis for patient management decisions. This test has been authorized by the FDA under an Emergency Use Authorization (EUA) for use by authorized laboratories.    Fact sheet for Healthcare Providers: Outside.indate.co.nz  Fact sheet for Patients: [employer]       Methodology: Isothermal Nucleic Acid Amplification         LEGIONELLA PNEUMOPHILA AG, URINE [[id number]] Collected: 09/14/21 2315    Order Status: Completed Specimen: Urine, random Updated: 09/15/21 1042     Legionella Ag, urine Negative       STREP PNEUMO AG, URINE [[id number]] Collected: 09/14/21 2315    Order Status: Completed Specimen: Urine, random Updated: 09/15/21 1042     Strep pneumo Ag, urine Negative       RESPIRATORY VIRUS PANEL W/COVID-19, PCR [[id number]] Collected: 09/14/21 1832    Order Status: Completed Specimen: Nasopharyngeal Updated: 09/14/21 2234     Adenovirus Not detected        Coronavirus 229E Not detected        Coronavirus HKU1 Not detected        Coronavirus CVNL63 Not detected        Coronavirus OC43 Not detected        SARS-CoV-2, PCR Not detected        Metapneumovirus Not detected        Rhinovirus and Enterovirus Not detected        Influenza A Not detected        Influenza A, subtype H1 Not detected        Influenza A, subtype H3 Not detected        INFLUENZA A H1N1 PCR Not detected        Influenza B Not detected        Parainfluenza 1 Not detected        Parainfluenza 2 Not detected        Parainfluenza 3 Not detected        Parainfluenza virus 4 Not detected        RSV by PCR Not detected        B. parapertussis, PCR Not detected        Bordetella pertussis - PCR Not detected        Chlamydophila pneumoniae DNA, QL, PCR Not detected        Mycoplasma pneumoniae DNA, QL, PCR Not detected CULTURE, BLOOD [[id number]] Collected: 09/14/21 1700    Order Status: Canceled Specimen: Blood     CULTURE, URINE [[id number]] Collected: 09/13/21 2330    Order Status: Canceled Specimen: Cath Urine     RESPIRATORY VIRUS PANEL W/COVID-19, PCR [[id number]]     Order Status: Canceled Specimen: NASOPHARYNGEAL SWAB     COVID-19 RAPID TEST [[id number]]     Order Status: Canceled     COVID-19 RAPID TEST [[id number]] Collected: 09/13/21 0737    Order Status: Completed Specimen: Nasopharyngeal Updated: 09/13/21 0811     Specimen source Nasopharyngeal        COVID-19 rapid test Not detected        Comment: Rapid Abbott ID Now       Rapid NAAT:  The specimen is NEGATIVE for SARS-CoV-2, the novel coronavirus associated with COVID-19. Negative results should be treated as presumptive and, if inconsistent with clinical signs and symptoms or necessary for patient management, should be tested with an alternative molecular assay. Negative results do not preclude SARS-CoV-2 infection and should not be used as the sole basis for patient management decisions. This test has been authorized by the FDA under an Emergency Use Authorization (EUA) for use by authorized laboratories.    Fact sheet for Healthcare Providers: ConventionUpdate.co.nz  Fact sheet for Patients: ConventionUpdate.co.nz       Methodology: Isothermal Nucleic Acid Amplification         COVID-19 RAPID TEST [[id number]]     Order Status: Canceled            Lab results:    Chemistry  Recent Labs     10/11/21  0530 10/10/21  0155 10/09/21  0252   GLU 93 104* 96    139 140   K 4.2 4.3 4.1    105 106   CO2 24 26 25   BUN 16 10 12   CREA 0.83 0.27* 0.32*   CA 10.7* 10.4* 10.1   AGAP 10 8 9   BUCR 19 37* 38*   * 125* 119*   TP 7.2 7.1 6.9   ALB 4.4 4.5 4.4   GLOB 2.8 2.6 2.5   AGRAT 1.6 1.7 1.8*       CBC w/ Diff  Recent Labs     10/11/21  0530 10/10/21  0155 10/09/21  0252   WBC 10.1 9.1 16.6*   RBC 3.17* 2.97* 2.91*   HGB 9.0* 8.3* 8.6*   HCT 27.9* 26.0* 27.3*    495* 438*   GRANS 63 57 63   LYMPH 22 28 30   EOS 4 6* 1       Imaging: report posted below as per radiologist   CXR- 9/17    1. Unchanged, adequately positioned endotracheal tube and enteric tube. 2. Unchanged left and right basilar patchy opacities, atelectasis versus  Infiltrate. 10/4 CXR  Ongoing increased left greater than right perihilar and left basilar opacities  may represent combination of worsening asymmetric pulmonary edema and infiltrate  with atelectasis. Possible trace left pleural effusion.   10/5- CXR:    Mild left lower lower lung zone opacities, unchanged.

## 2021-10-11 NOTE — PROGRESS NOTES
Bedside shift change report given to Karely Varela RN (oncoming nurse) by Karely York Rn (offgoing nurse). Report included the following information SBAR, Kardex and Recent Results.

## 2021-10-11 NOTE — PROGRESS NOTES
[facility] Pharmacokinetic Monitoring Service - Vancomycin    Consulting Provider: Dr. Michaelle Ronquillo  Indication: VAP  Target Concentration: Goal AUC/CRYSTAL 400-600 mg*hr/L  Day of Therapy: 6  Additional Antimicrobials: none    Pertinent Laboratory Values: Wt Readings from Last 1 Encounters:   10/08/21 71.2 kg (156 lb 15.5 oz)     Temp Readings from Last 1 Encounters:   10/11/21 98.4 °F (36.9 °C)     No components found for: PROCAL  Estimated Creatinine Clearance: 82.4 mL/min (based on SCr of 0.83 mg/dL). Recent Labs     10/11/21  0530 10/10/21  0155   WBC 10.1 9.1       Pertinent Cultures:  Culture Date Source Results   10/06/2021 Sputum MRSA (+)   MRSA Nasal Swab: showed MRSA positive result on 10/06/2021.         Assessment:  Date/Time Current Dose Concentration Timing of Concentration (h) AUC   10/11/2021 1250 mg 20.6 mg/L 0844 652 mg/L.hr   Note: Serum concentrations collected for AUC dosing may appear elevated if collected in close proximity to the dose administered, this is not necessarily an indication of toxicity    Plan:  Current dosing regimen is supra-therapeutic  Decrease dose to 1000 mg every 12 hours  Pharmacy will continue to monitor patient and adjust therapy as indicated    Thank you for the consult,  OSIRIS Lomeli  10/11/2021 8:41 AM

## 2021-10-12 LAB
ALBUMIN SERPL-MCNC: 3.8 G/DL (ref 3.4–5)
ALBUMIN/GLOB SERPL: 1.5 {RATIO} (ref 0.8–1.7)
ALP SERPL-CCNC: 119 U/L (ref 45–117)
ALT SERPL-CCNC: 40 U/L (ref 13–56)
AMMONIA PLAS-SCNC: 38 UMOL/L (ref 11–32)
ANION GAP SERPL CALC-SCNC: 5 MMOL/L (ref 3–18)
AST SERPL-CCNC: 15 U/L (ref 10–38)
ATRIAL RATE: 107 BPM
BASOPHILS # BLD: 0 K/UL (ref 0–0.1)
BASOPHILS NFR BLD: 0 % (ref 0–2)
BILIRUB SERPL-MCNC: 0.4 MG/DL (ref 0.2–1)
BUN SERPL-MCNC: 21 MG/DL (ref 7–18)
BUN/CREAT SERPL: 25 (ref 12–20)
CALCIUM SERPL-MCNC: 10.2 MG/DL (ref 8.5–10.1)
CALCULATED P AXIS, ECG09: 64 DEGREES
CALCULATED R AXIS, ECG10: 61 DEGREES
CALCULATED T AXIS, ECG11: 29 DEGREES
CHLORIDE SERPL-SCNC: 105 MMOL/L (ref 100–111)
CK MB CFR SERPL CALC: ABNORMAL % (ref 0–4)
CK MB SERPL-MCNC: <1 NG/ML (ref 5–25)
CK SERPL-CCNC: 25 U/L (ref 26–192)
CO2 SERPL-SCNC: 30 MMOL/L (ref 21–32)
CREAT SERPL-MCNC: 0.83 MG/DL (ref 0.6–1.3)
DIAGNOSIS, 93000: NORMAL
DIFFERENTIAL METHOD BLD: ABNORMAL
EOSINOPHIL # BLD: 0.4 K/UL (ref 0–0.4)
EOSINOPHIL NFR BLD: 6 % (ref 0–5)
ERYTHROCYTE [DISTWIDTH] IN BLOOD BY AUTOMATED COUNT: 13.3 % (ref 11.6–14.5)
GLOBULIN SER CALC-MCNC: 2.5 G/DL (ref 2–4)
GLUCOSE BLD STRIP.AUTO-MCNC: 118 MG/DL (ref 70–110)
GLUCOSE BLD STRIP.AUTO-MCNC: 134 MG/DL (ref 70–110)
GLUCOSE BLD STRIP.AUTO-MCNC: 93 MG/DL (ref 70–110)
GLUCOSE SERPL-MCNC: 111 MG/DL (ref 74–99)
HCT VFR BLD AUTO: 26.9 % (ref 35–45)
HGB BLD-MCNC: 8.5 G/DL (ref 12–16)
LYMPHOCYTES # BLD: 1.6 K/UL (ref 0.9–3.6)
LYMPHOCYTES NFR BLD: 23 % (ref 21–52)
MAGNESIUM SERPL-MCNC: 2.1 MG/DL (ref 1.6–2.6)
MCH RBC QN AUTO: 29 PG (ref 24–34)
MCHC RBC AUTO-ENTMCNC: 31.6 G/DL (ref 31–37)
MCV RBC AUTO: 91.8 FL (ref 78–100)
MONOCYTES # BLD: 0.8 K/UL (ref 0.05–1.2)
MONOCYTES NFR BLD: 11 % (ref 3–10)
NEUTS SEG # BLD: 4.1 K/UL (ref 1.8–8)
NEUTS SEG NFR BLD: 59 % (ref 40–73)
P-R INTERVAL, ECG05: 164 MS
PHOSPHATE SERPL-MCNC: 7.1 MG/DL (ref 2.5–4.9)
PLATELET # BLD AUTO: 376 K/UL (ref 135–420)
PMV BLD AUTO: 9.4 FL (ref 9.2–11.8)
POTASSIUM SERPL-SCNC: 4.2 MMOL/L (ref 3.5–5.5)
PROT SERPL-MCNC: 6.3 G/DL (ref 6.4–8.2)
Q-T INTERVAL, ECG07: 342 MS
QRS DURATION, ECG06: 66 MS
QTC CALCULATION (BEZET), ECG08: 456 MS
RBC # BLD AUTO: 2.93 M/UL (ref 4.2–5.3)
SODIUM SERPL-SCNC: 140 MMOL/L (ref 136–145)
TROPONIN I SERPL-MCNC: 0.02 NG/ML (ref 0–0.04)
VANCOMYCIN SERPL-MCNC: 17.7 UG/ML (ref 5–40)
VENTRICULAR RATE, ECG03: 107 BPM
WBC # BLD AUTO: 6.9 K/UL (ref 4.6–13.2)

## 2021-10-12 PROCEDURE — 82553 CREATINE MB FRACTION: CPT

## 2021-10-12 PROCEDURE — 82140 ASSAY OF AMMONIA: CPT

## 2021-10-12 PROCEDURE — 36415 COLL VENOUS BLD VENIPUNCTURE: CPT

## 2021-10-12 PROCEDURE — 84100 ASSAY OF PHOSPHORUS: CPT

## 2021-10-12 PROCEDURE — 74011000250 HC RX REV CODE- 250: Performed by: INTERNAL MEDICINE

## 2021-10-12 PROCEDURE — 74011250636 HC RX REV CODE- 250/636: Performed by: INTERNAL MEDICINE

## 2021-10-12 PROCEDURE — 74011250637 HC RX REV CODE- 250/637: Performed by: INTERNAL MEDICINE

## 2021-10-12 PROCEDURE — 94640 AIRWAY INHALATION TREATMENT: CPT

## 2021-10-12 PROCEDURE — 77010033678 HC OXYGEN DAILY

## 2021-10-12 PROCEDURE — 65660000000 HC RM CCU STEPDOWN

## 2021-10-12 PROCEDURE — 82962 GLUCOSE BLOOD TEST: CPT

## 2021-10-12 PROCEDURE — 85025 COMPLETE CBC W/AUTO DIFF WBC: CPT

## 2021-10-12 PROCEDURE — 80053 COMPREHEN METABOLIC PANEL: CPT

## 2021-10-12 PROCEDURE — 80202 ASSAY OF VANCOMYCIN: CPT

## 2021-10-12 PROCEDURE — 74011250637 HC RX REV CODE- 250/637: Performed by: PSYCHIATRY & NEUROLOGY

## 2021-10-12 PROCEDURE — 83735 ASSAY OF MAGNESIUM: CPT

## 2021-10-12 PROCEDURE — 74011250636 HC RX REV CODE- 250/636: Performed by: FAMILY MEDICINE

## 2021-10-12 RX ADMIN — LACTULOSE 15 ML: 10 SOLUTION ORAL at 09:45

## 2021-10-12 RX ADMIN — CLONAZEPAM 1 MG: 0.5 TABLET ORAL at 09:45

## 2021-10-12 RX ADMIN — Medication 5 MG: at 23:11

## 2021-10-12 RX ADMIN — CLONAZEPAM 1 MG: 0.5 TABLET ORAL at 16:36

## 2021-10-12 RX ADMIN — NYSTATIN: 100000 POWDER TOPICAL at 16:37

## 2021-10-12 RX ADMIN — NYSTATIN: 100000 POWDER TOPICAL at 09:46

## 2021-10-12 RX ADMIN — VANCOMYCIN HYDROCHLORIDE 1000 MG: 1 INJECTION, POWDER, LYOPHILIZED, FOR SOLUTION INTRAVENOUS at 05:38

## 2021-10-12 RX ADMIN — FAMOTIDINE 20 MG: 20 TABLET ORAL at 09:46

## 2021-10-12 RX ADMIN — QUETIAPINE FUMARATE 100 MG: 100 TABLET ORAL at 09:45

## 2021-10-12 RX ADMIN — SODIUM CHLORIDE 10 ML: 9 INJECTION, SOLUTION INTRAMUSCULAR; INTRAVENOUS; SUBCUTANEOUS at 05:36

## 2021-10-12 RX ADMIN — ENOXAPARIN SODIUM 40 MG: 100 INJECTION SUBCUTANEOUS at 16:36

## 2021-10-12 RX ADMIN — HYDROMORPHONE HYDROCHLORIDE 1 MG: 1 INJECTION, SOLUTION INTRAMUSCULAR; INTRAVENOUS; SUBCUTANEOUS at 12:33

## 2021-10-12 RX ADMIN — Medication 1 TABLET: at 09:45

## 2021-10-12 RX ADMIN — HYDROMORPHONE HYDROCHLORIDE 1 MG: 1 INJECTION, SOLUTION INTRAMUSCULAR; INTRAVENOUS; SUBCUTANEOUS at 18:00

## 2021-10-12 RX ADMIN — QUETIAPINE FUMARATE 300 MG: 200 TABLET ORAL at 23:09

## 2021-10-12 RX ADMIN — HYDROMORPHONE HYDROCHLORIDE 1 MG: 1 INJECTION, SOLUTION INTRAMUSCULAR; INTRAVENOUS; SUBCUTANEOUS at 21:59

## 2021-10-12 RX ADMIN — FAMOTIDINE 20 MG: 20 TABLET ORAL at 23:09

## 2021-10-12 RX ADMIN — BUDESONIDE 500 MCG: 0.5 INHALANT RESPIRATORY (INHALATION) at 07:15

## 2021-10-12 RX ADMIN — LORAZEPAM 2 MG: 2 INJECTION INTRAMUSCULAR at 16:36

## 2021-10-12 RX ADMIN — BUDESONIDE 500 MCG: 0.5 INHALANT RESPIRATORY (INHALATION) at 20:02

## 2021-10-12 RX ADMIN — CLONAZEPAM 1 MG: 0.5 TABLET ORAL at 23:09

## 2021-10-12 RX ADMIN — FOLIC ACID 1 MG: 1 TABLET ORAL at 09:45

## 2021-10-12 RX ADMIN — ARFORMOTEROL TARTRATE 15 MCG: 15 SOLUTION RESPIRATORY (INHALATION) at 20:02

## 2021-10-12 RX ADMIN — SODIUM CHLORIDE 10 ML: 9 INJECTION, SOLUTION INTRAMUSCULAR; INTRAVENOUS; SUBCUTANEOUS at 23:11

## 2021-10-12 RX ADMIN — ARFORMOTEROL TARTRATE 15 MCG: 15 SOLUTION RESPIRATORY (INHALATION) at 07:15

## 2021-10-12 RX ADMIN — LORAZEPAM 2 MG: 2 INJECTION INTRAMUSCULAR at 06:11

## 2021-10-12 RX ADMIN — NYSTATIN: 100000 POWDER TOPICAL at 23:09

## 2021-10-12 RX ADMIN — THIAMINE HCL TAB 100 MG 100 MG: 100 TAB at 09:45

## 2021-10-12 RX ADMIN — SODIUM CHLORIDE 10 ML: 9 INJECTION, SOLUTION INTRAMUSCULAR; INTRAVENOUS; SUBCUTANEOUS at 16:37

## 2021-10-12 RX ADMIN — LACTULOSE 15 ML: 10 SOLUTION ORAL at 23:09

## 2021-10-12 NOTE — PROGRESS NOTES
Shift uneventful    Bedside and Verbal shift change report given to Karely Grace RN (oncoming nurse) by Sherry Hollis RN (offgoing nurse). Report included the following information SBAR, Kardex, MAR, Recent Results and Cardiac Rhythm .

## 2021-10-12 NOTE — PROGRESS NOTES
Problem: Falls - Risk of  Goal: *Absence of Falls  Description: Document Mitchell Lambert Fall Risk and appropriate interventions in the flowsheet. Outcome: Progressing Towards Goal  Note: Fall Risk Interventions:  Mobility Interventions: Communicate number of staff needed for ambulation/transfer    Mentation Interventions: Adequate sleep, hydration, pain control    Medication Interventions: Evaluate medications/consider consulting pharmacy    Elimination Interventions: Call light in reach    History of Falls Interventions: Vital signs minimum Q4HRs X 24 hrs (comment for end date)         Problem: Risk for Spread of Infection  Goal: Prevent transmission of infectious organism to others  Description: Prevent the transmission of infectious organisms to other patients, staff members, and visitors. Outcome: Progressing Towards Goal     Problem: Pressure Injury - Risk of  Goal: *Prevention of pressure injury  Description: Document Remy Scale and appropriate interventions in the flowsheet.   Outcome: Progressing Towards Goal  Note: Pressure Injury Interventions:  Sensory Interventions: Assess changes in LOC    Moisture Interventions: Absorbent underpads    Activity Interventions: Pressure redistribution bed/mattress(bed type)    Mobility Interventions: HOB 30 degrees or less    Nutrition Interventions: Document food/fluid/supplement intake, Offer support with meals,snacks and hydration    Friction and Shear Interventions: HOB 30 degrees or less                Problem: Patient Education: Go to Patient Education Activity  Goal: Patient/Family Education  Outcome: Progressing Towards Goal

## 2021-10-12 NOTE — PROGRESS NOTES
Hospitalist Progress Note-critical care note     Patient: Shabbir Bailey MRN: [id number]  CSN: [id number]    YOB: 1980  Age: 39 y.o. Sex: female    DOA: 9/11/2021 LOS:  LOS: 30 days            Chief complaint: wrist fracture , drug overdose , acute respiratory failure with hypoxia, psychosis     Assessment/Plan         Hospital Problems  Date Reviewed: 9/6/2015        Codes Class Noted POA    Bacteremia due to Gram-positive bacteria ICD-10-CM: R78.81  ICD-9-CM: 790.7  10/6/2021 Unknown        Status post tracheostomy ([facility] ICD-10-CM: Z93.0  ICD-9-CM: V44.0  9/28/2021 Unknown        Hypokalemia ICD-10-CM: E87.6  ICD-9-CM: 276.8  9/21/2021 Unknown        Increased ammonia level ICD-10-CM: R79.89  ICD-9-CM: 790.6  9/14/2021 Unknown        Psychosis ([facility].) ICD-10-CM: F29  ICD-9-CM: 298.9  Unknown Unknown        Hyponatremia ICD-10-CM: E87.1  ICD-9-CM: 276.1  Unknown Yes        Acute respiratory failure with hypoxia (HCC) ICD-10-CM: J96.01  ICD-9-CM: 518.81  Unknown Yes        Left wrist fracture, with delayed healing, subsequent encounter ICD-10-CM: S62.102G  ICD-9-CM: V54.19  9/12/2021 Unknown        * (Principal) Drug overdose, intentional self-harm, initial encounter ([facility].) ICD-10-CM: T50.902A  ICD-9-CM: 977.9, E950.5  9/12/2021 Yes        Hypoxia ICD-10-CM: R09.02  ICD-9-CM: 799.02  9/12/2021 Yes        Hypotension after procedure ICD-10-CM: I95.81  ICD-9-CM: 458.29  9/12/2021 Yes        Acute metabolic encephalopathy MDL-03-VL: G93.41  ICD-9-CM: 348.31  9/12/2021 Yes                  Shabbir Bailey is a 39 y.o. female who is standing history of multidrug use/abuse, previous drug-seeking behavior and mental health issues otherwise unspecified arrives via EMS with acute metabolic encephalopathy and lethargy. Admitted for likely gabapentin overdose. She was intubated in ER, ct head no acute issue, LP done due to fever even on abx, no meningitis noted.  Now she has peg and trach        Acute respiratory failure with hypoxia   Intubated on 9/12    Trach on 9/20/21. Continue to wean off nc oxygen as tolerated   Continue breathing tx . Continue local trach care      Post tracheostomy   Continue local trach care       bacteremia   Leukocytosis resolved   bcx  Positive for GPC on 10/4   Sputum culture 9/24/2021: MRSA. Id f/u, continue vanc till 10/16     Anemia  H/h stable   Occult stool negative    no bleeding reported   Upper PVL negative for dvt           Acute metabolic encephalopathy   Ct head no acute process   Alert and follow the command          Gabapentin overdose with lethargy and AMS    S/p procedural stomach emptying in ED with charcoal and sorbitol    Dr. Olivia Solano reevaluate patient, on Seroquel        elevated ammonia level  Continue   Lactulose,   Continue ammonia monitoring       Psychosis , hx of  Ekbom's delusional parasitosis   On  Klonopin,  Seroquel, halodol prn per psych recommendation   Continue cardiac monitoring     left wrist fracture: immobilized -poa -stable   Appreciated ortho on board-f/u with Dr. Eric Rapp as out-pt     Sister in law at the bedside. She- Theora Binder  is updated    All questions have been answered. 35 total min's spent on patient care including >50% on counseling/coordinating care. Disposition :tbd,   Review of systems:  Unable to obtain due to trach collar   Vital signs/Intake and Output:  Visit Vitals  /62   Pulse (!) 103   Temp 98.2 °F (36.8 °C)   Resp 16   Ht 5' 2\" (1.575 m)   Wt 71.2 kg (156 lb 15.5 oz)   SpO2 98%   BMI 28.71 kg/m²     Current Shift:  No intake/output data recorded. Last three shifts:  10/10 1901 - 10/12 0700  In: 8255 [I.V.:250]  Out: 9766 [Urine:2575]    Physical Exam:  General: Sleeping   HEENT: NC, Atraumatic. Trach collar   Lungs: CTA Bilaterally. No Wheezing/Rhonchi/Rales. Heart:  tachy  No murmur, No Rubs, No Gallops  Abdomen: Soft, Non distended, Non tender.   +Bowel sounds, peg tube noted   Extremities: No c/c.immobilzer noted on left wrist   Psych:   Calm   Neurologic:  Sleeping           Labs: Results:       Chemistry Recent Labs     10/12/21  0520 10/11/21  0530 10/10/21  0155   * 93 104*    137 139   K 4.2 4.2 4.3    103 105   CO2 30 24 26   BUN 21* 16 10   CREA 0.83 0.83 0.27*   CA 10.2* 10.7* 10.4*   AGAP 5 10 8   BUCR 25* 19 37*   * 131* 125*   TP 6.3* 7.2 7.1   ALB 3.8 4.4 4.5   GLOB 2.5 2.8 2.6   AGRAT 1.5 1.6 1.7      CBC w/Diff Recent Labs     10/12/21  0520 10/11/21  0530 10/10/21  0155   WBC 6.9 10.1 9.1   RBC 2.93* 3.17* 2.97*   HGB 8.5* 9.0* 8.3*   HCT 26.9* 27.9* 26.0*    381 495*   GRANS 59 63 57   LYMPH 23 22 28   EOS 6* 4 6*      Cardiac Enzymes Recent Labs     10/12/21  0520 10/11/21  2030   CPK 25* 41   CKND1 CALCULATION NOT PERFORMED WHEN RESULT IS BELOW LINEAR LIMIT CALCULATION NOT PERFORMED WHEN RESULT IS BELOW LINEAR LIMIT      Coagulation No results for input(s): PTP, INR, APTT, INREXT, INREXT in the last 72 hours. Lipid Panel No results found for: CHOL, CHOLPOCT, CHOLX, CHLST, CHOLV, 123201, HDL, HDLP, LDL, LDLC, DLDLP, 866403, VLDLC, VLDL, TGLX, TRIGL, TRIGP, TGLPOCT, CHHD, CHHDX   BNP No results for input(s): BNPP in the last 72 hours. Liver Enzymes Recent Labs     10/12/21  0520   TP 6.3*   ALB 3.8   *      Thyroid Studies No results found for: T4, T3U, TSH, TSHEXT, TSHEXT     Procedures/imaging: see electronic medical records for all procedures/Xrays and details which were not copied into this note but were reviewed prior to creation of Plan    XR WRIST LT AP/LAT/OBL MIN 3V    Result Date: 8/19/2021  EXAM: XR WRIST LT AP/LAT/OBL MIN 3V CLINICAL INDICATION/HISTORY: Fall with LEFT wrist pain and swelling -Additional: None COMPARISON: None TECHNIQUE: 3 views of the left wrist _______________ FINDINGS: BONES: Comminuted, impacted fracture of the distal radius with slight dorsal angulation of the distal fracture fragment.  No aggressive appearing osseous lytic or blastic lesion. SOFT TISSUES: Unremarkable. _______________     Comminuted, impacted fracture of the distal radius. XR CHEST PORT    Result Date: 9/13/2021  EXAM: XR CHEST PORT CLINICAL INDICATION/HISTORY: Acute respiratory failure, ET tub position -Additional: None COMPARISON: One day prior TECHNIQUE: Portable frontal view of the chest _______________ FINDINGS: SUPPORT DEVICES: Endotracheal and enteric tubes unchanged. HEART AND MEDIASTINUM: Cardiomediastinal silhouette within normal limits. LUNGS AND PLEURAL SPACES: No dense consolidation, large effusion or pneumothorax. _______________     No acute cardiopulmonary abnormality. XR CHEST PORT    Result Date: 9/11/2021  MEDICAL RECORDS NUMBER: [id number] PROCEDURE:  Single view of the chest DATE: 9/11/2021 9:09 PM HISTORY: 39years old Female. post ett Comparison: 8/4/2021 FINDINGS: The patient has been intubated with appropriate placement of the endotracheal tube. There is no enteric tube passing below the level of the diaphragm. There is no significant effusion. There is no significant pneumothorax. Cardiomediastinal silhouette is within normal limits. There is no evidence of a focal pulmonary infiltrate or mass. 1. There is no significant or acute cardiopulmonary process. The patient has been intubated with appropriate placement of the endotracheal tube. There is no enteric tube passing below the level of the diaphragm. This report has been generated using voice recognition software. XR ABD PORT  1 V    Result Date: 9/12/2021  EXAM: Frontal view of the abdomen CLINICAL INDICATION/HISTORY: NG tube placement COMPARISON: None. _______________ FINDINGS: NG/OG tube in place with the tip in the left upper quadrant in the region of the gastric fundus. No bowel obstruction. Moderate colonic stool burden. _______________     1. NG/OG tube is in good position with the tip in the left upper quadrant in the region of the gastric fundus.  2. Moderate colonic stool burden.       Mukul Roberts MD

## 2021-10-12 NOTE — PROGRESS NOTES
[address] per Dr. Carson Mcdonnell to put IV in left upper arm above hardware  0430- Pt sleeping soundly , Sitter at bedside.  Per Dr. Carson Mcdonnell let pt sleep and do vitals when she awakens

## 2021-10-12 NOTE — PROGRESS NOTES
[facility]  (A Division of [address] [place] [address])                                                                                                                                                                                Heather Vences MD                                                         Office #:     [phone]  [phone] [phone]                                                          Office Fax: [phone]     Date of Admission: 9/11/2021    Date of Note: 10/12/2021  Reason for FU: Antibiotic management with ongoing sepsis      Ongoing Antimicrobials:    Prior Antimicrobials:    Vancomycin 10/6 to date       Doxycycline 9/14 to 9/15  Vanco 9/14 to 9/17  osyn 9/14 to 9/20  Zosyn 9/24-> 9/26 meropenem to 9/27  Vanco 9/25 to 9/27  Levofloxacin 9/27 to 10/1  Meropenem 10/3 to 10/10       Assessment- ID related:  --------------------------------------------------------------------------    · MRSA tracheitis/ HAP: Fever with leucocytosis: Improved  · CoNS bacteremia with d/t spp 10/4- Likely contaminant  · S/P respiratory failure, inbuated 9/11, re-intubated 9/18 and Post trach 9/22  · Post PEG 10/1  · History of positive drug screen--opiates/cocaine. Drug screen on admission: negative  · Left wrist fracture with external fixer-- site look mariola  · Agiation, Hyperammonia   · S/P MSSA infection- RCX 9/24/21-Post 8 days ABX    COVID test rapid neg, RVP neg  HIV test neg-9/15  Acute hepatitis A/B/C: negative  S/P LP- CSF benign- 9/16    Other Medical Issues- Mx per respective team:    Drug overdose( stated neurontin)  Hyperammonia level- etiology?  anemia   Recommendation for ID issues I am following:  ------------------------------------------------------------------------------    -Cont Vanco- until 10/16- pharmacy to dose. Aim for -600, closely monitor cr while on Vanco    Cr stable so far. DONNY RN                         Subjective:  She is awake, alert.  No fever  WBC down to 6.9  No marked trach secretion so far today per RN    FMS In place, Barahona in place  Oxygen demand remains at 6L- with good sats. Notes/labs reviewed         HPI:  Sameera Reed is a 39 y.o. WHITE/NON- with PMH as listed below. Patient is intubated, limited history found on her and  ICU team.MD.  Admitted with drug overdose - Neurontin. Not much history known before that. On admission, afebrile, wbc of 8.1, CMP ok, drug screen for opaites/cocaine/benzo neg. UA was normal.  She was intubated 9/11/21. Developed fever to 102.9 on 9/13, no leucocytosis but NH3 elevated, Legionella/Pneum urine ag good. NO DVT  On LE 9/15. Currently on VAnco/Zosyn and doxycycline. Further CORNEJO with CT for source work up in progress. Underwent LP to evaluate AMS- unremarkable CSF finding. Reconsulted for fever and leuocytosis on 10/4. Active Hospital Problems    Diagnosis Date Noted    Bacteremia due to Gram-positive bacteria 10/06/2021    Status post tracheostomy ([address] 09/28/2021    Hypokalemia 09/21/2021    Increased ammonia level 09/14/2021    Psychosis (HCC)     Hyponatremia     Acute respiratory failure with hypoxia (HCC)     Left wrist fracture, with delayed healing, subsequent encounter 09/12/2021    Drug overdose, intentional self-harm, initial encounter ([address] 09/12/2021    Hypoxia 09/12/2021    Hypotension after procedure 09/12/2021    Acute metabolic encephalopathy 51/62/7203     Past Medical History:   Diagnosis Date    Asthma     Bilateral ovarian cysts     Cocaine abuse ([address]     Mental and behavioral problem     Pancreatitis     Pancreatitis     Psychosis ([address]      Past Surgical History:   Procedure Laterality Date    HX GYN      D&C, Hysterectomy    IR INSERT GASTROSTOMY TUBE PERC  10/1/2021     History reviewed. No pertinent family history.   Social History     Socioeconomic History    Marital status:      Spouse name: Not on file    Number of children: Not on file    Years of education: Not on file    Highest education level: Not on file   Occupational History    Not on file   Tobacco Use    Smoking status: Current Every Day Smoker     Packs/day: 1.00    Smokeless tobacco: Never Used   Substance and Sexual Activity    Alcohol use: Not Currently    Drug use: Not Currently     Types: Cocaine, Marijuana     Comment: used with in past 24 hours    Sexual activity: Not Currently   Other Topics Concern    Not on file   Social History Narrative    Not on file     Social Determinants of Health     Financial Resource Strain:     Difficulty of Paying Living Expenses:    Food Insecurity:     Worried About Running Out of Food in the Last Year:     Ran Out of Food in the Last Year:    Transportation Needs:     Lack of Transportation (Medical):  Lack of Transportation (Non-Medical):    Physical Activity:     Days of Exercise per Week:     Minutes of Exercise per Session:    Stress:     Feeling of Stress :    Social Connections:     Frequency of Communication with Friends and Family:     Frequency of Social Gatherings with Friends and Family:     Attends [employer] Services:     Active Member of Clubs or Organizations:     Attends Club or Organization Meetings:     Marital Status:    Intimate Partner Violence:     Fear of Current or Ex-Partner:     Emotionally Abused:     Physically Abused:     Sexually Abused:         Allergies:  Fish containing products and Toradol [ketorolac]     Medications:  Current Facility-Administered Medications   Medication Dose Route Frequency    vancomycin (VANCOCIN) 1,000 mg in 0.9% sodium chloride 250 mL (VIAL-MATE)  1,000 mg IntraVENous Q18H    QUEtiapine (SEROquel) tablet 100 mg  100 mg Oral DAILY    QUEtiapine (SEROquel) tablet 300 mg  300 mg Oral QHS    clonazePAM (KlonoPIN) tablet 1 mg  1 mg Oral TID    nystatin (MYCOSTATIN) 100,000 unit/gram powder   Topical TID    metoprolol (LOPRESSOR) injection 2.5 mg  2.5 mg IntraVENous Q6H PRN    fentaNYL (DURAGESIC) 50 mcg/hr patch 1 Patch  1 Patch TransDERmal Q72H    [facility] to Dose  1 Each Other Rx Dosing/Monitoring    HYDROmorphone (DILAUDID) injection 1 mg  1 mg IntraVENous Q4H PRN    arformoteroL (BROVANA) neb solution 15 mcg  15 mcg Nebulization BID RT    budesonide (PULMICORT) 500 mcg/2 ml nebulizer suspension  500 mcg Nebulization BID RT    lactulose (CHRONULAC) 10 gram/15 mL solution 15 mL  15 mL Oral BID    melatonin (rapid dissolve) tablet 5 mg  5 mg Oral QHS    fentaNYL citrate (PF) injection 50 mcg  50 mcg IntraVENous Q2H PRN    albuterol-ipratropium (DUO-NEB) 2.5 MG-0.5 MG/3 ML  3 mL Nebulization Q4H PRN    famotidine (PEPCID) tablet 20 mg  20 mg Oral BID    thiamine mononitrate (B-1) tablet 100 mg  100 mg Oral DAILY    folic acid (FOLVITE) tablet 1 mg  1 mg Oral DAILY    multivitamin, tx-iron-ca-min (THERA-M w/ IRON) tablet 1 Tablet  1 Tablet Oral DAILY    ibuprofen (ADVIL;MOTRIN) 100 mg/5 mL oral suspension 400 mg  400 mg Per NG tube Q6H PRN    insulin lispro (HUMALOG) injection   SubCUTAneous Q6H    glucose chewable tablet 16 g  4 Tablet Oral PRN    glucagon (GLUCAGEN) injection 1 mg  1 mg IntraMUSCular PRN    dextrose (D50W) injection syrg 12.5-25 g  25-50 mL IntraVENous PRN    LORazepam (ATIVAN) injection 2 mg  2 mg IntraVENous Q6H PRN    enoxaparin (LOVENOX) injection 40 mg  40 mg SubCUTAneous Q24H    sodium chloride (NS) flush 5-40 mL  5-40 mL IntraVENous Q8H    sodium chloride (NS) flush 5-40 mL  5-40 mL IntraVENous PRN    acetaminophen (TYLENOL) tablet 650 mg  650 mg Oral Q6H PRN    Or    acetaminophen (TYLENOL) suppository 650 mg  650 mg Rectal Q6H PRN    polyethylene glycol (MIRALAX) packet 17 g  17 g Oral DAILY PRN    ondansetron (ZOFRAN ODT) tablet 4 mg  4 mg Oral Q8H PRN    Or    ondansetron (ZOFRAN) injection 4 mg  4 mg IntraVENous Q6H PRN     Physical Exam:    Temp (24hrs), Av.4 °F (36.9 °C), Min:97.9 °F (36.6 °C), Max:99.1 °F (37.3 °C)    Visit Vitals  /62   Pulse (!) 103   Temp 98.2 °F (36.8 °C)   Resp 16   Ht 5' 2\" (1.575 m)   Wt 71.2 kg (156 lb 15.5 oz)   LMP 05/15/2018   SpO2 98%   BMI 28.71 kg/m²      GEN: WD sick looking, on 6 L of oxygen via trach   Under going nursing care at time of exam    HEENT: Unicteric, EOMI  Trach in place  CHEST: Non laboured breathing. ABD: Obese/soft. Non tender. PEG in place with TF. FMS with loose OP noted  ULISES: Barahona inp lace  EXT: No apparent swelling or redness on UE/LE joints. No induration on PIV site on both arms  Ex-fix for fracture on Left wrist in place  Skin: Dry and intact. No rash, no redness.   CNS: Awake, moves all extremities     Microbiology  All Micro Results     Procedure Component Value Units Date/Time    CULTURE, RESPIRATORY/SPUTUM/BRONCH Dannial Hane STAIN [[id number]]  (Abnormal)  (Susceptibility) Collected: 10/06/21 1430    Order Status: Completed Specimen: Sputum from Tracheal Aspirate Updated: 10/09/21 0810     Special Requests: NO SPECIAL REQUESTS        GRAM STAIN FEW WBCS SEEN               OCCASIONAL EPITHELIAL CELLS SEEN                  RARE GRAM POSITIVE COCCI IN CLUSTERS           Culture result:       LIGHT * METHICILLIN RESISTANT STAPHYLOCOCCUS AUREUS *                  NO NORMAL RESPIRATORY DINA ISOLATED          CULTURE, BLOOD [[id number]]  (Abnormal) Collected: 10/04/21 1305    Order Status: Completed Specimen: Blood Updated: 10/07/21 1007     Special Requests: NO SPECIAL REQUESTS        GRAM STAIN       GRAM POSITIVE COCCI IN CLUSTERS ANAEROBIC BOTTLE                  SMEAR CALLED TO AND CORRECTLY REPEATED BY: Eduardo Carson, RN ICU, TO [facility] AT 2042 10/5/2021                  GRAM POSITIVE COCCI IN CLUSTERS AEROBIC BOTTLE                  SMEAR CALLED TO AND CORRECTLY REPEATED BY: Juanita Edward RN ICU AT 0329 ON 10/6/21 TO [facility]           Culture result:       STAPHYLOCOCCUS SPECIES, COAGULASE NEGATIVE (MULTIPLE COLONY TYPES) GROWING IN BOTH BOTTLES DRAWN (SITE NOT INDICATED)          CULTURE, BLOOD [[id number]]  (Abnormal) Collected: 10/04/21 1405    Order Status: Completed Specimen: Blood Updated: 10/07/21 1006     Special Requests: NO SPECIAL REQUESTS        GRAM STAIN       GRAM POSITIVE COCCI IN CLUSTERS ANAEROBIC BOTTLE                  SMEAR CALLED TO AND CORRECTLY REPEATED BY: Dewayne Otto RN ICU, TO [facility] AT 1936 10/5/2021                  GRAM POSITIVE COCCI IN CLUSTERS AEROBIC BOTTLE                  SMEAR CALLED TO AND CORRECTLY REPEATED BY: Elke Zavala RN ICU AT 3626 ON 10/6/21 TO [facility]           Culture result:       STAPHYLOCOCCUS SPECIES, COAGULASE NEGATIVE (MULTIPLE COLONY TYPES) GROWING IN BOTH BOTTLES DRAWN (SITE NOT INDICATED)          CULTURE, MRSA [[id number]] Collected: 10/05/21 0945    Order Status: Canceled Specimen: Nasal from [address], MRSA [[id number]] Collected: 10/04/21 1100    Order Status: Canceled Specimen: Nasal from Nares     CULTURE, RESPIRATORY/SPUTUM/BRONCH Barrett Matteo STAIN [[id number]]     Order Status: Canceled Specimen: Sputum,ET Suction     CULTURE, BLOOD [[id number]] Collected: 09/27/21 1005    Order Status: Completed Specimen: Blood Updated: 10/03/21 0723     Special Requests: NO SPECIAL REQUESTS        Culture result: NO GROWTH 6 DAYS       CULTURE, BLOOD [[id number]] Collected: 09/27/21 1005    Order Status: Completed Specimen: Blood Updated: 10/03/21 0723     Special Requests: NO SPECIAL REQUESTS        Culture result: NO GROWTH 6 DAYS       CULTURE, BLOOD [[id number]] Collected: 09/24/21 0740    Order Status: Completed Specimen: Blood Updated: 09/30/21 0819     Special Requests: NO SPECIAL REQUESTS        Culture result: NO GROWTH 6 DAYS       CULTURE, RESPIRATORY/SPUTUM/BRONCH W GRAM STAIN [[id number]]  (Abnormal)  (Susceptibility) Collected: 09/24/21 0654    Order Status: Completed Specimen: Sputum from Tracheal Aspirate Updated: 09/27/21 1147     Special Requests: NO SPECIAL REQUESTS        GRAM STAIN RARE WBCS SEEN               RARE EPITHELIAL CELLS SEEN            NO ORGANISMS SEEN        Culture result:       MODERATE STAPHYLOCOCCUS AUREUS                  NO NORMAL RESPIRATORY DINA ISOLATED          COVID-19 RAPID TEST [[id number]] Collected: 09/24/21 1700    Order Status: Completed Specimen: Nasopharyngeal Updated: 09/24/21 1849     Specimen source Nasopharyngeal        COVID-19 rapid test Not detected        Comment: Rapid Abbott ID Now       Rapid NAAT:  The specimen is NEGATIVE for SARS-CoV-2, the novel coronavirus associated with COVID-19. Negative results should be treated as presumptive and, if inconsistent with clinical signs and symptoms or necessary for patient management, should be tested with an alternative molecular assay. Negative results do not preclude SARS-CoV-2 infection and should not be used as the sole basis for patient management decisions. This test has been authorized by the FDA under an Emergency Use Authorization (EUA) for use by authorized laboratories. Fact sheet for Healthcare Providers: ConventionUpdate.co.nz  Fact sheet for Patients: ConventionUpdate.co.nz       Methodology: Isothermal Nucleic Acid Amplification         CULTURE, URINE [[id number]]     Order Status: Canceled Specimen: Urine from Clean catch     CULTURE, CSF  TUBE 2 [[id number]] Collected: 09/16/21 1434    Order Status: Completed Specimen: Cerebrospinal Fluid Updated: 09/23/21 1241     Special Requests: TUBE 3, CULTURE AND SENSITIVTY AND GRAM STAIN.      GRAM STAIN RARE WBCS SEEN         NO ORGANISMS SEEN        Culture result: NO GROWTH 7 DAYS       CULTURE, BLOOD [[id number]] Collected: 09/14/21 0515    Order Status: Completed Specimen: Blood Updated: 09/20/21 0832     Special Requests: NO SPECIAL REQUESTS        Culture result: NO GROWTH 6 DAYS       CULTURE, BLOOD [[id number]] Collected: 09/14/21 0500    Order Status: Completed Specimen: Blood Updated: 09/20/21 7651 Special Requests: NO SPECIAL REQUESTS        Culture result: NO GROWTH 6 DAYS       MENINGITIS PATHOGENS PANEL, CSF (BY PCR) [[id number]] Collected: 09/16/21 1434    Order Status: Completed Specimen: Cerebrospinal Fluid Updated: 09/17/21 0050     Escherichia coli K1 Not detected        Haemophilus Influenzae Not detected        Listeria Monocytogenes Not detected        Neisseria Meningitidis Not detected        Streptococcus Agalactiae Not detected        Streptococcus Pneumoniae Not detected        Cytomegalovirus Not detected        Enterovirus Not detected        Herpes Simplex Virus 1 Not detected        Comment: In patients who have negative herpes simplex 1 and 2 PCR results, do not modify treatment, confirm with alternate testing. Herpes Simplex Virus 2 Not detected        Comment: In patients who have negative herpes simplex 1 and 2 PCR results, do not modify treatment, confirm with alternate testing. Human Herpesvirus 6 Not detected        Human Parechovirus Not detected        Varicella Zoster Virus Not detected        Crypto. neoformans/gattii Not detected       MENINGITIS PATHOGENS PANEL, CSF (BY PCR) [[id number]] Collected: 09/16/21 1545    Order Status: Canceled Specimen: Cerebrospinal Fluid     HSV 1 AND 2 BY PCR [[id number]] Collected: 09/16/21 1434    Order Status: Canceled Specimen: Other     ASUUD-33 RAPID TEST [[id number]] Collected: 09/16/21 1000    Order Status: Completed Specimen: Nasopharyngeal Updated: 09/16/21 1032     Specimen source Nasopharyngeal        COVID-19 rapid test Not detected        Comment: Rapid Abbott ID Now       Rapid NAAT:  The specimen is NEGATIVE for SARS-CoV-2, the novel coronavirus associated with COVID-19. Negative results should be treated as presumptive and, if inconsistent with clinical signs and symptoms or necessary for patient management, should be tested with an alternative molecular assay.   Negative results do not preclude SARS-CoV-2 infection and should not be used as the sole basis for patient management decisions. This test has been authorized by the FDA under an Emergency Use Authorization (EUA) for use by authorized laboratories.    Fact sheet for Healthcare Providers: ConventionUpdate.co.nz  Fact sheet for Patients: ConventionUpdate.co.nz       Methodology: Isothermal Nucleic Acid Amplification         LEGIONELLA PNEUMOPHILA AG, URINE [[id number]] Collected: 09/14/21 2315    Order Status: Completed Specimen: Urine, random Updated: 09/15/21 1042     Legionella Ag, urine Negative       STREP PNEUMO AG, URINE [[id number]] Collected: 09/14/21 2315    Order Status: Completed Specimen: Urine, random Updated: 09/15/21 1042     Strep pneumo Ag, urine Negative       RESPIRATORY VIRUS PANEL W/COVID-19, PCR [[id number]] Collected: 09/14/21 1832    Order Status: Completed Specimen: Nasopharyngeal Updated: 09/14/21 2234     Adenovirus Not detected        Coronavirus 229E Not detected        Coronavirus HKU1 Not detected        Coronavirus CVNL63 Not detected        Coronavirus OC43 Not detected        SARS-CoV-2, PCR Not detected        Metapneumovirus Not detected        Rhinovirus and Enterovirus Not detected        Influenza A Not detected        Influenza A, subtype H1 Not detected        Influenza A, subtype H3 Not detected        INFLUENZA A H1N1 PCR Not detected        Influenza B Not detected        Parainfluenza 1 Not detected        Parainfluenza 2 Not detected        Parainfluenza 3 Not detected        Parainfluenza virus 4 Not detected        RSV by PCR Not detected        B. parapertussis, PCR Not detected        Bordetella pertussis - PCR Not detected        Chlamydophila pneumoniae DNA, QL, PCR Not detected        Mycoplasma pneumoniae DNA, QL, PCR Not detected       CULTURE, BLOOD [[id number]] Collected: 09/14/21 1700    Order Status: Canceled Specimen: Blood     CULTURE, URINE [[id number]] Collected: 09/13/21 2330    Order Status: Canceled Specimen: Cath Urine     RESPIRATORY VIRUS PANEL W/COVID-19, PCR [[id number]]     Order Status: Canceled Specimen: NASOPHARYNGEAL SWAB     COVID-19 RAPID TEST [[id number]]     Order Status: Canceled     COVID-19 RAPID TEST [[id number]] Collected: 09/13/21 0752    Order Status: Completed Specimen: Nasopharyngeal Updated: 09/13/21 0811     Specimen source Nasopharyngeal        COVID-19 rapid test Not detected        Comment: Rapid Abbott ID Now       Rapid NAAT:  The specimen is NEGATIVE for SARS-CoV-2, the novel coronavirus associated with COVID-19. Negative results should be treated as presumptive and, if inconsistent with clinical signs and symptoms or necessary for patient management, should be tested with an alternative molecular assay. Negative results do not preclude SARS-CoV-2 infection and should not be used as the sole basis for patient management decisions. This test has been authorized by the FDA under an Emergency Use Authorization (EUA) for use by authorized laboratories.    Fact sheet for Healthcare Providers: ConventionUpdate.co.nz  Fact sheet for Patients: ConventionUpdate.co.nz       Methodology: Isothermal Nucleic Acid Amplification         COVID-19 RAPID TEST [[id number]]     Order Status: Canceled            Lab results:    Chemistry  Recent Labs     10/12/21  0520 10/11/21  0530 10/10/21  0155   * 93 104*    137 139   K 4.2 4.2 4.3    103 105   CO2 30 24 26   BUN 21* 16 10   CREA 0.83 0.83 0.27*   CA 10.2* 10.7* 10.4*   AGAP 5 10 8   BUCR 25* 19 37*   * 131* 125*   TP 6.3* 7.2 7.1   ALB 3.8 4.4 4.5   GLOB 2.5 2.8 2.6   AGRAT 1.5 1.6 1.7       CBC w/ Diff  Recent Labs     10/12/21  0520 10/11/21  0530 10/10/21  0155   WBC 6.9 10.1 9.1   RBC 2.93* 3.17* 2.97*   HGB 8.5* 9.0* 8.3*   HCT 26.9* 27.9* 26.0*    381 495*   GRANS 59 63 57   LYMPH 23 22 28   EOS 6* 4 6* Imaging: report posted below as per radiologist   CXR- 9/17    1. Unchanged, adequately positioned endotracheal tube and enteric tube. 2. Unchanged left and right basilar patchy opacities, atelectasis versus  Infiltrate. 10/4 CXR  Ongoing increased left greater than right perihilar and left basilar opacities  may represent combination of worsening asymmetric pulmonary edema and infiltrate  with atelectasis. Possible trace left pleural effusion.   10/5- CXR:    Mild left lower lower lung zone opacities, unchanged.

## 2021-10-12 NOTE — PROGRESS NOTES
Bedside shift change report given to Alysha Rush (oncoming nurse) by Karely York Rn (offgoing nurse). Report included the following information SBAR, Kardex and Recent Results.

## 2021-10-12 NOTE — PROGRESS NOTES
DC Plan: LTC    Care manager rounded on patient; sitter remains at bedside. Patient during assessment was laying on left side, trach collar in place; fecal management system; appears to be asleep and per sitter had just been medicated for restlessness. Will continue to monitor and completing [facility] for placement; will have to be sitter free for facilities to consider.     Care Management Interventions  Transition of Care Consult (CM Consult): Discharge Planning  The Plan for Transition of Care is Related to the Following Treatment Goals : intentional drug overdose  Discharge Location  Discharge Placement:  (TBD)

## 2021-10-13 LAB
ALBUMIN SERPL-MCNC: 3.9 G/DL (ref 3.4–5)
ALBUMIN/GLOB SERPL: 1.6 {RATIO} (ref 0.8–1.7)
ALP SERPL-CCNC: 112 U/L (ref 45–117)
ALT SERPL-CCNC: 35 U/L (ref 13–56)
AMMONIA PLAS-SCNC: 34 UMOL/L (ref 11–32)
ANION GAP SERPL CALC-SCNC: 7 MMOL/L (ref 3–18)
AST SERPL-CCNC: 13 U/L (ref 10–38)
BASOPHILS # BLD: 0 K/UL (ref 0–0.1)
BASOPHILS NFR BLD: 0 % (ref 0–2)
BILIRUB SERPL-MCNC: 0.4 MG/DL (ref 0.2–1)
BUN SERPL-MCNC: 22 MG/DL (ref 7–18)
BUN/CREAT SERPL: 26 (ref 12–20)
CALCIUM SERPL-MCNC: 10.1 MG/DL (ref 8.5–10.1)
CHLORIDE SERPL-SCNC: 103 MMOL/L (ref 100–111)
CO2 SERPL-SCNC: 31 MMOL/L (ref 21–32)
CREAT SERPL-MCNC: 0.86 MG/DL (ref 0.6–1.3)
DIFFERENTIAL METHOD BLD: ABNORMAL
EOSINOPHIL # BLD: 0.4 K/UL (ref 0–0.4)
EOSINOPHIL NFR BLD: 5 % (ref 0–5)
ERYTHROCYTE [DISTWIDTH] IN BLOOD BY AUTOMATED COUNT: 13.4 % (ref 11.6–14.5)
GLOBULIN SER CALC-MCNC: 2.5 G/DL (ref 2–4)
GLUCOSE BLD STRIP.AUTO-MCNC: 107 MG/DL (ref 70–110)
GLUCOSE BLD STRIP.AUTO-MCNC: 118 MG/DL (ref 70–110)
GLUCOSE BLD STRIP.AUTO-MCNC: 126 MG/DL (ref 70–110)
GLUCOSE BLD STRIP.AUTO-MCNC: 138 MG/DL (ref 70–110)
GLUCOSE BLD STRIP.AUTO-MCNC: 91 MG/DL (ref 70–110)
GLUCOSE SERPL-MCNC: 94 MG/DL (ref 74–99)
HCT VFR BLD AUTO: 23 % (ref 35–45)
HCT VFR BLD AUTO: 25.6 % (ref 35–45)
HGB BLD-MCNC: 7.1 G/DL (ref 12–16)
HGB BLD-MCNC: 8.1 G/DL (ref 12–16)
LYMPHOCYTES # BLD: 2.2 K/UL (ref 0.9–3.6)
LYMPHOCYTES NFR BLD: 27 % (ref 21–52)
MAGNESIUM SERPL-MCNC: 1.9 MG/DL (ref 1.6–2.6)
MCH RBC QN AUTO: 28.3 PG (ref 24–34)
MCHC RBC AUTO-ENTMCNC: 30.9 G/DL (ref 31–37)
MCV RBC AUTO: 91.6 FL (ref 78–100)
MONOCYTES # BLD: 0.9 K/UL (ref 0.05–1.2)
MONOCYTES NFR BLD: 11 % (ref 3–10)
NEUTS SEG # BLD: 4.7 K/UL (ref 1.8–8)
NEUTS SEG NFR BLD: 57 % (ref 40–73)
PHOSPHATE SERPL-MCNC: 6.1 MG/DL (ref 2.5–4.9)
PLATELET # BLD AUTO: 364 K/UL (ref 135–420)
PMV BLD AUTO: 9.7 FL (ref 9.2–11.8)
POTASSIUM SERPL-SCNC: 4.1 MMOL/L (ref 3.5–5.5)
PROT SERPL-MCNC: 6.4 G/DL (ref 6.4–8.2)
RBC # BLD AUTO: 2.51 M/UL (ref 4.2–5.3)
SODIUM SERPL-SCNC: 141 MMOL/L (ref 136–145)
WBC # BLD AUTO: 8.3 K/UL (ref 4.6–13.2)

## 2021-10-13 PROCEDURE — 94640 AIRWAY INHALATION TREATMENT: CPT

## 2021-10-13 PROCEDURE — 82962 GLUCOSE BLOOD TEST: CPT

## 2021-10-13 PROCEDURE — 77010033678 HC OXYGEN DAILY

## 2021-10-13 PROCEDURE — 36415 COLL VENOUS BLD VENIPUNCTURE: CPT

## 2021-10-13 PROCEDURE — 85018 HEMOGLOBIN: CPT

## 2021-10-13 PROCEDURE — 74011250637 HC RX REV CODE- 250/637: Performed by: INTERNAL MEDICINE

## 2021-10-13 PROCEDURE — 84100 ASSAY OF PHOSPHORUS: CPT

## 2021-10-13 PROCEDURE — 74011250636 HC RX REV CODE- 250/636: Performed by: FAMILY MEDICINE

## 2021-10-13 PROCEDURE — 74011000250 HC RX REV CODE- 250: Performed by: INTERNAL MEDICINE

## 2021-10-13 PROCEDURE — 74011250636 HC RX REV CODE- 250/636: Performed by: INTERNAL MEDICINE

## 2021-10-13 PROCEDURE — 74011250636 HC RX REV CODE- 250/636: Performed by: HOSPITALIST

## 2021-10-13 PROCEDURE — 82140 ASSAY OF AMMONIA: CPT

## 2021-10-13 PROCEDURE — 74011250637 HC RX REV CODE- 250/637: Performed by: PSYCHIATRY & NEUROLOGY

## 2021-10-13 PROCEDURE — 74011250637 HC RX REV CODE- 250/637: Performed by: FAMILY MEDICINE

## 2021-10-13 PROCEDURE — 80053 COMPREHEN METABOLIC PANEL: CPT

## 2021-10-13 PROCEDURE — 65660000000 HC RM CCU STEPDOWN

## 2021-10-13 PROCEDURE — 83735 ASSAY OF MAGNESIUM: CPT

## 2021-10-13 PROCEDURE — 85025 COMPLETE CBC W/AUTO DIFF WBC: CPT

## 2021-10-13 RX ORDER — MAGNESIUM SULFATE 1 G/100ML
1 INJECTION INTRAVENOUS ONCE
Status: COMPLETED | OUTPATIENT
Start: 2021-10-13 | End: 2021-10-13

## 2021-10-13 RX ADMIN — ENOXAPARIN SODIUM 40 MG: 100 INJECTION SUBCUTANEOUS at 17:03

## 2021-10-13 RX ADMIN — ACETAMINOPHEN 650 MG: 325 TABLET ORAL at 22:06

## 2021-10-13 RX ADMIN — CLONAZEPAM 1 MG: 0.5 TABLET ORAL at 08:59

## 2021-10-13 RX ADMIN — Medication 1 TABLET: at 08:59

## 2021-10-13 RX ADMIN — LACTULOSE 15 ML: 10 SOLUTION ORAL at 21:45

## 2021-10-13 RX ADMIN — LORAZEPAM 2 MG: 2 INJECTION INTRAMUSCULAR at 09:19

## 2021-10-13 RX ADMIN — CLONAZEPAM 1 MG: 0.5 TABLET ORAL at 21:44

## 2021-10-13 RX ADMIN — ARFORMOTEROL TARTRATE 15 MCG: 15 SOLUTION RESPIRATORY (INHALATION) at 07:51

## 2021-10-13 RX ADMIN — LACTULOSE 15 ML: 10 SOLUTION ORAL at 08:59

## 2021-10-13 RX ADMIN — Medication 5 MG: at 21:45

## 2021-10-13 RX ADMIN — HYDROMORPHONE HYDROCHLORIDE 1 MG: 1 INJECTION, SOLUTION INTRAMUSCULAR; INTRAVENOUS; SUBCUTANEOUS at 17:03

## 2021-10-13 RX ADMIN — NYSTATIN: 100000 POWDER TOPICAL at 17:03

## 2021-10-13 RX ADMIN — QUETIAPINE FUMARATE 300 MG: 200 TABLET ORAL at 21:44

## 2021-10-13 RX ADMIN — CLONAZEPAM 1 MG: 0.5 TABLET ORAL at 17:04

## 2021-10-13 RX ADMIN — MAGNESIUM SULFATE HEPTAHYDRATE 1 G: 1 INJECTION, SOLUTION INTRAVENOUS at 12:10

## 2021-10-13 RX ADMIN — FOLIC ACID 1 MG: 1 TABLET ORAL at 09:00

## 2021-10-13 RX ADMIN — VANCOMYCIN HYDROCHLORIDE 1000 MG: 1 INJECTION, POWDER, LYOPHILIZED, FOR SOLUTION INTRAVENOUS at 00:43

## 2021-10-13 RX ADMIN — SODIUM CHLORIDE 10 ML: 9 INJECTION, SOLUTION INTRAMUSCULAR; INTRAVENOUS; SUBCUTANEOUS at 21:44

## 2021-10-13 RX ADMIN — SODIUM CHLORIDE 10 ML: 9 INJECTION, SOLUTION INTRAMUSCULAR; INTRAVENOUS; SUBCUTANEOUS at 17:04

## 2021-10-13 RX ADMIN — FAMOTIDINE 20 MG: 20 TABLET ORAL at 21:45

## 2021-10-13 RX ADMIN — QUETIAPINE FUMARATE 100 MG: 100 TABLET ORAL at 08:59

## 2021-10-13 RX ADMIN — ARFORMOTEROL TARTRATE 15 MCG: 15 SOLUTION RESPIRATORY (INHALATION) at 21:10

## 2021-10-13 RX ADMIN — NYSTATIN: 100000 POWDER TOPICAL at 09:00

## 2021-10-13 RX ADMIN — VANCOMYCIN HYDROCHLORIDE 1000 MG: 1 INJECTION, POWDER, LYOPHILIZED, FOR SOLUTION INTRAVENOUS at 17:03

## 2021-10-13 RX ADMIN — THIAMINE HCL TAB 100 MG 100 MG: 100 TAB at 09:00

## 2021-10-13 RX ADMIN — FAMOTIDINE 20 MG: 20 TABLET ORAL at 09:00

## 2021-10-13 RX ADMIN — SODIUM CHLORIDE 10 ML: 9 INJECTION, SOLUTION INTRAMUSCULAR; INTRAVENOUS; SUBCUTANEOUS at 05:39

## 2021-10-13 RX ADMIN — LORAZEPAM 2 MG: 2 INJECTION INTRAMUSCULAR at 23:12

## 2021-10-13 RX ADMIN — BUDESONIDE 500 MCG: 0.5 INHALANT RESPIRATORY (INHALATION) at 07:50

## 2021-10-13 RX ADMIN — NYSTATIN: 100000 POWDER TOPICAL at 21:44

## 2021-10-13 RX ADMIN — BUDESONIDE 500 MCG: 0.5 INHALANT RESPIRATORY (INHALATION) at 21:10

## 2021-10-13 RX ADMIN — HYDROMORPHONE HYDROCHLORIDE 1 MG: 1 INJECTION, SOLUTION INTRAMUSCULAR; INTRAVENOUS; SUBCUTANEOUS at 09:19

## 2021-10-13 RX ADMIN — HYDROMORPHONE HYDROCHLORIDE 1 MG: 1 INJECTION, SOLUTION INTRAMUSCULAR; INTRAVENOUS; SUBCUTANEOUS at 20:27

## 2021-10-13 NOTE — PROGRESS NOTES
5052- Pt sleeping, attends checks. Pt clean at this time. Lip lubricant applied to lips. Pt appears to be flushed in face. Temp 98.6 axillary.

## 2021-10-13 NOTE — PROGRESS NOTES
Problem: Falls - Risk of  Goal: *Absence of Falls  Description: Document Angela Damon Fall Risk and appropriate interventions in the flowsheet. Outcome: Progressing Towards Goal  Note: Fall Risk Interventions:  Mobility Interventions: Bed/chair exit alarm    Mentation Interventions: Bed/chair exit alarm    Medication Interventions: Evaluate medications/consider consulting pharmacy    Elimination Interventions: Call light in reach    History of Falls Interventions: Consult care management for discharge planning         Problem: Patient Education: Go to Patient Education Activity  Goal: Patient/Family Education  Outcome: Progressing Towards Goal     Problem: Risk for Spread of Infection  Goal: Prevent transmission of infectious organism to others  Description: Prevent the transmission of infectious organisms to other patients, staff members, and visitors. Outcome: Progressing Towards Goal     Problem: Patient Education:  Go to Education Activity  Goal: Patient/Family Education  Outcome: Progressing Towards Goal     Problem: Pressure Injury - Risk of  Goal: *Prevention of pressure injury  Description: Document Remy Scale and appropriate interventions in the flowsheet.   Outcome: Progressing Towards Goal  Note: Pressure Injury Interventions:  Sensory Interventions: Assess changes in LOC    Moisture Interventions: Absorbent underpads    Activity Interventions: Pressure redistribution bed/mattress(bed type)    Mobility Interventions: HOB 30 degrees or less    Nutrition Interventions: Document food/fluid/supplement intake, Offer support with meals,snacks and hydration    Friction and Shear Interventions: HOB 30 degrees or less

## 2021-10-13 NOTE — PROGRESS NOTES
Bedside and Verbal shift change report given to Ramona Jacobs RN (oncoming nurse) by Karely Grace RN (offgoing nurse). Report included the following information SBAR, Kardex, MAR, Recent Results and Cardiac Rhythm . Mews score 5. Pt resting quietly in bed. Notified MD Avni Roque. No new orders received. 1920 Bedside and Verbal shift change report given to Avel Anders RN (oncoming nurse) by Ramona Jacobs RN (offgoing nurse). Report included the following information SBAR, Kardex, MAR, Recent Results and Cardiac Rhythm .

## 2021-10-13 NOTE — PROGRESS NOTES
[facility] Pharmacokinetic Monitoring Service - Vancomycin    Consulting Provider: Dr. Devan Monreal /  Dr. Margaret Merino  Indication: Pneumonia (VAP) / Bloodstream Infection  Target Concentration: Goal AUC/CRYSTAL 400-600 mg*hr/L  Day of Therapy: 8   (with last day of therapy on 10/16/21, per ID)   Additional Antimicrobials: none    Pertinent Laboratory Values: Wt Readings from Last 1 Encounters:   10/08/21 71.2 kg (156 lb 15.5 oz)     Temp Readings from Last 1 Encounters:   10/13/21 98.9 °F (37.2 °C)     No components found for: PROCAL  Estimated Creatinine Clearance: 79.5 mL/min (based on SCr of 0.86 mg/dL).   Recent Labs     10/13/21  0123 10/12/21  0520   WBC 8.3 6.9       Pertinent Cultures:  Culture Date Source Results   10/6/2021 Respiratory culture MRSA         Assessment:  Date/Time Current Dose Concentration Timing of Concentration (h) Est AUC (ss)   10/13/2021 at 09:40 1000 mg IV q18h 17.7 10/12/21 at 21:33 505 mg/L.hr   Note: Serum concentrations collected for AUC dosing may appear elevated if collected in close proximity to the dose administered, this is not necessarily an indication of toxicity    Plan:  Current dosing regimen is therapeutic  Continue current dose:  Vancomycin 1000 mg IV q18h         Estimated ACU (ss):  505 mg/L.hr      Est trough: 16 mg/L        Goal AUC (ss):  400 - 600 mg/L.hr  Pharmacy will continue to monitor patient and adjust therapy as indicated    Thank you for the consult,  SOIRIS Lopez  10/13/2021 9:33 AM

## 2021-10-13 NOTE — PROGRESS NOTES
Hospitalist Progress Note-critical care note     Patient: Nicki Alba MRN: [id number]  CSN: [id number]    YOB: 1980  Age: 39 y.o. Sex: female    DOA: 9/11/2021 LOS:  LOS: 31 days            Chief complaint: wrist fracture , drug overdose , acute respiratory failure with hypoxia, psychosis     Assessment/Plan         Hospital Problems  Date Reviewed: 9/6/2015        Codes Class Noted POA    Bacteremia due to Gram-positive bacteria ICD-10-CM: R78.81  ICD-9-CM: 790.7  10/6/2021 Unknown        Status post tracheostomy ([facility] ICD-10-CM: Z93.0  ICD-9-CM: V44.0  9/28/2021 Unknown        Hypokalemia ICD-10-CM: E87.6  ICD-9-CM: 276.8  9/21/2021 Unknown        Increased ammonia level ICD-10-CM: R79.89  ICD-9-CM: 790.6  9/14/2021 Unknown        Psychosis ([facility] ICD-10-CM: F29  ICD-9-CM: 298.9  Unknown Unknown        Hyponatremia ICD-10-CM: E87.1  ICD-9-CM: 276.1  Unknown Yes        Acute respiratory failure with hypoxia (HCC) ICD-10-CM: J96.01  ICD-9-CM: 518.81  Unknown Yes        Left wrist fracture, with delayed healing, subsequent encounter ICD-10-CM: S62.102G  ICD-9-CM: V54.19  9/12/2021 Unknown        * (Principal) Drug overdose, intentional self-harm, initial encounter ([facility] ICD-10-CM: T50.902A  ICD-9-CM: 977.9, E950.5  9/12/2021 Yes        Hypoxia ICD-10-CM: R09.02  ICD-9-CM: 799.02  9/12/2021 Yes        Hypotension after procedure ICD-10-CM: I95.81  ICD-9-CM: 458.29  9/12/2021 Yes        Acute metabolic encephalopathy AIQ-12-MN: G93.41  ICD-9-CM: 348.31  9/12/2021 Yes                  Nicik Alba is a 39 y.o. female who is standing history of multidrug use/abuse, previous drug-seeking behavior and mental health issues otherwise unspecified arrives via EMS with acute metabolic encephalopathy and lethargy. Admitted for likely gabapentin overdose. She was intubated in ER, ct head no acute issue, LP done due to fever even on abx, no meningitis noted.  Now she has peg and trach        Acute respiratory failure with hypoxia   Intubated on 9/12    Trach on 9/20/21. Continue to wean off   Continue breathing tx . Continue local trach care  Will have speech evaluation   Will consult pulm for further instruction     Post tracheostomy   Continue local trach care       bacteremia   Leukocytosis resolved   bcx  Positive for GPC on 10/4   Sputum culture 9/24/2021: MRSA. Id f/u, continue vanc till 10/16     Anemia  H/h stable   Occult stool negative    no bleeding reported   Upper PVL negative for dvt           Acute metabolic encephalopathy   Ct head no acute process   Alert and follow the command          Gabapentin overdose with lethargy and AMS    S/p procedural stomach emptying in ED with charcoal and sorbitol    Dr. Martine Munoz reevaluate patient, on Seroquel        elevated ammonia level  Continue   Lactulose,   Continue ammonia monitoring       Psychosis , hx of  Ekbom's delusional parasitosis   On  Klonopin,  Seroquel, halodol prn per psych recommendation   Continue cardiac monitoring     left wrist fracture: immobilized -poa -stable   Appreciated ortho on board-f/u with Dr. Pollard List as out-pt     Sister in law at the bedside. Fall precaution. Will have speech evaluation     Disposition :tbd,   Review of systems:  Unable to obtain due to trach collar   Vital signs/Intake and Output:  Visit Vitals  BP (!) 112/59   Pulse (!) 110   Temp 98.9 °F (37.2 °C)   Resp 18   Ht 5' 2\" (1.575 m)   Wt 71.2 kg (156 lb 15.5 oz)   SpO2 97%   BMI 28.71 kg/m²     Current Shift:  No intake/output data recorded. Last three shifts:  10/11 1901 - 10/13 0700  In: 5327 [I.V.:250]  Out: 2075 [Urine:1975; Drains:100]    Physical Exam:  General: Sleeping   HEENT: NC, Atraumatic. Trach collar   Lungs: CTA Bilaterally. No Wheezing/Rhonchi/Rales. Heart:  tachy  No murmur, No Rubs, No Gallops  Abdomen: Soft, Non distended, Non tender.   +Bowel sounds, peg tube noted   Extremities: No c/c.immobilzer noted on left wrist   Psych: Calm   Neurologic:  Sleeping           Labs: Results:       Chemistry Recent Labs     10/13/21  0123 10/12/21  0520 10/11/21  0530   GLU 94 111* 93    140 137   K 4.1 4.2 4.2    105 103   CO2 31 30 24   BUN 22* 21* 16   CREA 0.86 0.83 0.83   CA 10.1 10.2* 10.7*   AGAP 7 5 10   BUCR 26* 25* 19    119* 131*   TP 6.4 6.3* 7.2   ALB 3.9 3.8 4.4   GLOB 2.5 2.5 2.8   AGRAT 1.6 1.5 1.6      CBC w/Diff Recent Labs     10/13/21  0123 10/12/21  0520 10/11/21  0530   WBC 8.3 6.9 10.1   RBC 2.51* 2.93* 3.17*   HGB 7.1* 8.5* 9.0*   HCT 23.0* 26.9* 27.9*    376 381   GRANS 57 59 63   LYMPH 27 23 22   EOS 5 6* 4      Cardiac Enzymes Recent Labs     10/12/21  0520 10/11/21  2030   CPK 25* 41   CKND1 CALCULATION NOT PERFORMED WHEN RESULT IS BELOW LINEAR LIMIT CALCULATION NOT PERFORMED WHEN RESULT IS BELOW LINEAR LIMIT      Coagulation No results for input(s): PTP, INR, APTT, INREXT, INREXT in the last 72 hours. Lipid Panel No results found for: CHOL, CHOLPOCT, CHOLX, CHLST, CHOLV, 252552, HDL, HDLP, LDL, LDLC, DLDLP, 812206, VLDLC, VLDL, TGLX, TRIGL, TRIGP, TGLPOCT, CHHD, CHHDX   BNP No results for input(s): BNPP in the last 72 hours. Liver Enzymes Recent Labs     10/13/21  0123   TP 6.4   ALB 3.9         Thyroid Studies No results found for: T4, T3U, TSH, TSHEXT, TSHEXT     Procedures/imaging: see electronic medical records for all procedures/Xrays and details which were not copied into this note but were reviewed prior to creation of Plan    XR WRIST LT AP/LAT/OBL MIN 3V    Result Date: 8/19/2021  EXAM: XR WRIST LT AP/LAT/OBL MIN 3V CLINICAL INDICATION/HISTORY: Fall with LEFT wrist pain and swelling -Additional: None COMPARISON: None TECHNIQUE: 3 views of the left wrist _______________ FINDINGS: BONES: Comminuted, impacted fracture of the distal radius with slight dorsal angulation of the distal fracture fragment. No aggressive appearing osseous lytic or blastic lesion.  SOFT TISSUES: Unremarkable. _______________     Comminuted, impacted fracture of the distal radius. XR CHEST PORT    Result Date: 9/13/2021  EXAM: XR CHEST PORT CLINICAL INDICATION/HISTORY: Acute respiratory failure, ET tub position -Additional: None COMPARISON: One day prior TECHNIQUE: Portable frontal view of the chest _______________ FINDINGS: SUPPORT DEVICES: Endotracheal and enteric tubes unchanged. HEART AND MEDIASTINUM: Cardiomediastinal silhouette within normal limits. LUNGS AND PLEURAL SPACES: No dense consolidation, large effusion or pneumothorax. _______________     No acute cardiopulmonary abnormality. XR CHEST PORT    Result Date: 9/11/2021  MEDICAL RECORDS NUMBER: [id number] PROCEDURE:  Single view of the chest DATE: 9/11/2021 9:09 PM HISTORY: 39years old Female. post ett Comparison: 8/4/2021 FINDINGS: The patient has been intubated with appropriate placement of the endotracheal tube. There is no enteric tube passing below the level of the diaphragm. There is no significant effusion. There is no significant pneumothorax. Cardiomediastinal silhouette is within normal limits. There is no evidence of a focal pulmonary infiltrate or mass. 1. There is no significant or acute cardiopulmonary process. The patient has been intubated with appropriate placement of the endotracheal tube. There is no enteric tube passing below the level of the diaphragm. This report has been generated using voice recognition software. XR ABD PORT  1 V    Result Date: 9/12/2021  EXAM: Frontal view of the abdomen CLINICAL INDICATION/HISTORY: NG tube placement COMPARISON: None. _______________ FINDINGS: NG/OG tube in place with the tip in the left upper quadrant in the region of the gastric fundus. No bowel obstruction. Moderate colonic stool burden. _______________     1. NG/OG tube is in good position with the tip in the left upper quadrant in the region of the gastric fundus. 2. Moderate colonic stool burden.       PASCUAL ARAMBULA MD

## 2021-10-13 NOTE — PROGRESS NOTES
[facility]  (A Division of [address] [place] [address])                                                                                                                                                                                Heather Vences MD                                                         Office #:     [phone]  [phone] [phone]                                                          Office Fax: [phone]     Date of Admission: 9/11/2021    Date of Note: 10/13/2021  Reason for FU: Antibiotic management with ongoing sepsis      Ongoing Antimicrobials:    Prior Antimicrobials:    Vancomycin 10/6 to date       Doxycycline 9/14 to 9/15  Vanco 9/14 to 9/17  osyn 9/14 to 9/20  Zosyn 9/24-> 9/26 meropenem to 9/27  Vanco 9/25 to 9/27  Levofloxacin 9/27 to 10/1  Meropenem 10/3 to 10/10       Assessment- ID related:  --------------------------------------------------------------------------    · MRSA tracheitis/ HAP: Fever with leucocytosis: Improved  · CoNS bacteremia with d/t spp 10/4- Likely contaminant  · S/P respiratory failure, inbuated 9/11, re-intubated 9/18 and Post trach 9/22  · Post PEG 10/1  · History of positive drug screen--opiates/cocaine. Drug screen on admission: negative  · Left wrist fracture with external fixer-- site look mariola  · Agiation, Hyperammonia   · S/P MSSA infection- RCX 9/24/21-Post 8 days ABX    COVID test rapid neg, RVP neg  HIV test neg-9/15  Acute hepatitis A/B/C: negative  S/P LP- CSF benign- 9/16    Other Medical Issues- Mx per respective team:    Drug overdose( stated neurontin)  Hyperammonia level- etiology?  anemia   Recommendation for ID issues I am following:  ------------------------------------------------------------------------------    -Cont Vanco- until 10/16- pharmacy to dose. Aim for -600, closely monitor cr while on Vanco    Cr stable so far. Monitor daily      Will sign off.  Please re-consult as needed. Subjective:  Sleeping- was agitated earlier per her siter  WBC normal- 8.3K  NH3 remains elevated  Same moderate amount trach secretion. Remains on 6L  Afebrile           HPI:  Jose Kunz is a 39 y.o. WHITE/NON- with PMH as listed below. Patient is intubated, limited history found on her and DW ICU team.MD.  Admitted with drug overdose - Neurontin. Not much history known before that. On admission, afebrile, wbc of 8.1, CMP ok, drug screen for opaites/cocaine/benzo neg. UA was normal.  She was intubated 9/11/21. Developed fever to 102.9 on 9/13, no leucocytosis but NH3 elevated, Legionella/Pneum urine ag good. NO DVT  On LE 9/15. Currently on VAnco/Zosyn and doxycycline. Further CORNEJO with CT for source work up in progress. Underwent LP to evaluate AMS- unremarkable CSF finding. Reconsulted for fever and leuocytosis on 10/4. Active Hospital Problems    Diagnosis Date Noted    Bacteremia due to Gram-positive bacteria 10/06/2021    Status post tracheostomy ([address] 09/28/2021    Hypokalemia 09/21/2021    Increased ammonia level 09/14/2021    Psychosis (HCC)     Hyponatremia     Acute respiratory failure with hypoxia (HCC)     Left wrist fracture, with delayed healing, subsequent encounter 09/12/2021    Drug overdose, intentional self-harm, initial encounter ([address] 09/12/2021    Hypoxia 09/12/2021    Hypotension after procedure 09/12/2021    Acute metabolic encephalopathy 97/19/1071     Past Medical History:   Diagnosis Date    Asthma     Bilateral ovarian cysts     Cocaine abuse ([address]     Mental and behavioral problem     Pancreatitis     Pancreatitis     Psychosis ([address]      Past Surgical History:   Procedure Laterality Date    HX GYN      D&C, Hysterectomy    IR INSERT GASTROSTOMY TUBE PERC  10/1/2021     History reviewed. No pertinent family history.   Social History     Socioeconomic History    Marital status:      Spouse name: Not on file    Number of children: Not on file    Years of education: Not on file    Highest education level: Not on file   Occupational History    Not on file   Tobacco Use    Smoking status: Current Every Day Smoker     Packs/day: 1.00    Smokeless tobacco: Never Used   Substance and Sexual Activity    Alcohol use: Not Currently    Drug use: Not Currently     Types: Cocaine, Marijuana     Comment: used with in past 24 hours    Sexual activity: Not Currently   Other Topics Concern    Not on file   Social History Narrative    Not on file     Social Determinants of Health     Financial Resource Strain:     Difficulty of Paying Living Expenses:    Food Insecurity:     Worried About Running Out of Food in the Last Year:     Ran Out of Food in the Last Year:    Transportation Needs:     Lack of Transportation (Medical):  Lack of Transportation (Non-Medical):    Physical Activity:     Days of Exercise per Week:     Minutes of Exercise per Session:    Stress:     Feeling of Stress :    Social Connections:     Frequency of Communication with Friends and Family:     Frequency of Social Gatherings with Friends and Family:     Attends Restorationist Services:     Active Member of Clubs or Organizations:     Attends Club or Organization Meetings:     Marital Status:    Intimate Partner Violence:     Fear of Current or Ex-Partner:     Emotionally Abused:     Physically Abused:     Sexually Abused:         Allergies:  Fish containing products and Toradol [ketorolac]     Medications:  Current Facility-Administered Medications   Medication Dose Route Frequency    vancomycin (VANCOCIN) 1,000 mg in 0.9% sodium chloride 250 mL (VIAL-MATE)  1,000 mg IntraVENous Q18H    QUEtiapine (SEROquel) tablet 100 mg  100 mg Oral DAILY    QUEtiapine (SEROquel) tablet 300 mg  300 mg Oral QHS    clonazePAM (KlonoPIN) tablet 1 mg  1 mg Oral TID    nystatin (MYCOSTATIN) 100,000 unit/gram powder   Topical TID    metoprolol (LOPRESSOR) injection 2.5 mg  2.5 mg IntraVENous Q6H PRN    fentaNYL (DURAGESIC) 50 mcg/hr patch 1 Patch  1 Patch TransDERmal Q72H    [facility] to Dose  1 Each Other Rx Dosing/Monitoring    HYDROmorphone (DILAUDID) injection 1 mg  1 mg IntraVENous Q4H PRN    arformoteroL (BROVANA) neb solution 15 mcg  15 mcg Nebulization BID RT    budesonide (PULMICORT) 500 mcg/2 ml nebulizer suspension  500 mcg Nebulization BID RT    lactulose (CHRONULAC) 10 gram/15 mL solution 15 mL  15 mL Oral BID    melatonin (rapid dissolve) tablet 5 mg  5 mg Oral QHS    fentaNYL citrate (PF) injection 50 mcg  50 mcg IntraVENous Q2H PRN    albuterol-ipratropium (DUO-NEB) 2.5 MG-0.5 MG/3 ML  3 mL Nebulization Q4H PRN    famotidine (PEPCID) tablet 20 mg  20 mg Oral BID    thiamine mononitrate (B-1) tablet 100 mg  100 mg Oral DAILY    folic acid (FOLVITE) tablet 1 mg  1 mg Oral DAILY    multivitamin, tx-iron-ca-min (THERA-M w/ IRON) tablet 1 Tablet  1 Tablet Oral DAILY    ibuprofen (ADVIL;MOTRIN) 100 mg/5 mL oral suspension 400 mg  400 mg Per NG tube Q6H PRN    insulin lispro (HUMALOG) injection   SubCUTAneous Q6H    glucose chewable tablet 16 g  4 Tablet Oral PRN    glucagon (GLUCAGEN) injection 1 mg  1 mg IntraMUSCular PRN    dextrose (D50W) injection syrg 12.5-25 g  25-50 mL IntraVENous PRN    LORazepam (ATIVAN) injection 2 mg  2 mg IntraVENous Q6H PRN    enoxaparin (LOVENOX) injection 40 mg  40 mg SubCUTAneous Q24H    sodium chloride (NS) flush 5-40 mL  5-40 mL IntraVENous Q8H    sodium chloride (NS) flush 5-40 mL  5-40 mL IntraVENous PRN    acetaminophen (TYLENOL) tablet 650 mg  650 mg Oral Q6H PRN    Or    acetaminophen (TYLENOL) suppository 650 mg  650 mg Rectal Q6H PRN    polyethylene glycol (MIRALAX) packet 17 g  17 g Oral DAILY PRN    ondansetron (ZOFRAN ODT) tablet 4 mg  4 mg Oral Q8H PRN    Or    ondansetron (ZOFRAN) injection 4 mg  4 mg IntraVENous Q6H PRN     Physical Exam:    Temp (24hrs), Av.5 °F (36.9 °C), Min:97.8 °F (36.6 °C), Max:99.2 °F (37.3 °C)    Visit Vitals  BP (!) 106/52   Pulse (!) 114   Temp 98.3 °F (36.8 °C)   Resp 18   Ht 5' 2\" (1.575 m)   Wt 71.2 kg (156 lb 15.5 oz)   LMP 05/15/2018   SpO2 100%   BMI 28.71 kg/m²      GEN: WD  on 6 L of oxygen via trach       HEENT: Unicteric  Trach in place  CHEST: Non laboured breathing. EXT: No apparent swelling or redness on UE/LE joints. No induration on PIV site on both arms  Ex-fix for fracture on Left wrist in place  Skin: Dry and intact. No rash, no redness.        Microbiology  All Micro Results     Procedure Component Value Units Date/Time    CULTURE, RESPIRATORY/SPUTUM/BRONCH Lorrayne Reveal STAIN [[id number]]  (Abnormal)  (Susceptibility) Collected: 10/06/21 1430    Order Status: Completed Specimen: Sputum from Tracheal Aspirate Updated: 10/09/21 0810     Special Requests: NO SPECIAL REQUESTS        GRAM STAIN FEW WBCS SEEN               OCCASIONAL EPITHELIAL CELLS SEEN                  RARE GRAM POSITIVE COCCI IN CLUSTERS           Culture result:       LIGHT * METHICILLIN RESISTANT STAPHYLOCOCCUS AUREUS *                  NO NORMAL RESPIRATORY DINA ISOLATED          CULTURE, BLOOD [[id number]]  (Abnormal) Collected: 10/04/21 1305    Order Status: Completed Specimen: Blood Updated: 10/07/21 1007     Special Requests: NO SPECIAL REQUESTS        GRAM STAIN       GRAM POSITIVE COCCI IN CLUSTERS ANAEROBIC BOTTLE                  SMEAR CALLED TO AND CORRECTLY REPEATED BY: Tatiana Alfonso RN ICU, TO [facility] AT 2042 10/5/2021                  GRAM POSITIVE COCCI IN CLUSTERS AEROBIC BOTTLE                  SMEAR CALLED TO AND CORRECTLY REPEATED BY: Eva Rodriguez RN ICU AT 2900 ON 10/6/21 TO [facility]           Culture result:       STAPHYLOCOCCUS SPECIES, COAGULASE NEGATIVE (MULTIPLE COLONY TYPES) GROWING IN BOTH BOTTLES DRAWN (SITE NOT INDICATED)          CULTURE, BLOOD [[id number]]  (Abnormal) Collected: 10/04/21 1405    Order Status: Completed Specimen: Blood Updated: 10/07/21 1006     Special Requests: NO SPECIAL REQUESTS        GRAM STAIN       GRAM POSITIVE COCCI IN CLUSTERS ANAEROBIC BOTTLE                  SMEAR CALLED TO AND CORRECTLY REPEATED BY: Abner Segura, RN ICU, TO F AT 1936 10/5/2021                  GRAM POSITIVE COCCI IN CLUSTERS AEROBIC BOTTLE                  SMEAR CALLED TO AND CORRECTLY REPEATED BY: Sarah Stringer RN ICU AT 0513 ON 10/6/21 TO [facility]           Culture result:       STAPHYLOCOCCUS SPECIES, COAGULASE NEGATIVE (MULTIPLE COLONY TYPES) GROWING IN BOTH BOTTLES DRAWN (SITE NOT INDICATED)          CULTURE, MRSA [[id number]] Collected: 10/05/21 0945    Order Status: Canceled Specimen: Nasal from [address], MRSA [[id number]] Collected: 10/04/21 1100    Order Status: Canceled Specimen: Nasal from Nares     CULTURE, RESPIRATORY/SPUTUM/BRONCH Favian Loupe STAIN [[id number]]     Order Status: Canceled Specimen: Sputum,ET Suction     CULTURE, BLOOD [[id number]] Collected: 09/27/21 1005    Order Status: Completed Specimen: Blood Updated: 10/03/21 0723     Special Requests: NO SPECIAL REQUESTS        Culture result: NO GROWTH 6 DAYS       CULTURE, BLOOD [[id number]] Collected: 09/27/21 1005    Order Status: Completed Specimen: Blood Updated: 10/03/21 0723     Special Requests: NO SPECIAL REQUESTS        Culture result: NO GROWTH 6 DAYS       CULTURE, BLOOD [[id number]] Collected: 09/24/21 0740    Order Status: Completed Specimen: Blood Updated: 09/30/21 0819     Special Requests: NO SPECIAL REQUESTS        Culture result: NO GROWTH 6 DAYS       CULTURE, RESPIRATORY/SPUTUM/BRONCH W GRAM STAIN [[id number]]  (Abnormal)  (Susceptibility) Collected: 09/24/21 0654    Order Status: Completed Specimen: Sputum from Tracheal Aspirate Updated: 09/27/21 1147     Special Requests: NO SPECIAL REQUESTS        GRAM STAIN RARE WBCS SEEN               RARE EPITHELIAL CELLS SEEN            NO ORGANISMS SEEN        Culture result:       MODERATE STAPHYLOCOCCUS AUREUS                  NO NORMAL RESPIRATORY DINA ISOLATED          COVID-19 RAPID TEST [[id number]] Collected: 09/24/21 1700    Order Status: Completed Specimen: Nasopharyngeal Updated: 09/24/21 1849     Specimen source Nasopharyngeal        COVID-19 rapid test Not detected        Comment: Rapid Abbott ID Now       Rapid NAAT:  The specimen is NEGATIVE for SARS-CoV-2, the novel coronavirus associated with COVID-19. Negative results should be treated as presumptive and, if inconsistent with clinical signs and symptoms or necessary for patient management, should be tested with an alternative molecular assay. Negative results do not preclude SARS-CoV-2 infection and should not be used as the sole basis for patient management decisions. This test has been authorized by the FDA under an Emergency Use Authorization (EUA) for use by authorized laboratories. Fact sheet for Healthcare Providers: iTendency.uy  Fact sheet for Patients: iTendency.uy       Methodology: Isothermal Nucleic Acid Amplification         CULTURE, URINE [[id number]]     Order Status: Canceled Specimen: Urine from Clean catch     CULTURE, CSF  TUBE 2 [[id number]] Collected: 09/16/21 1434    Order Status: Completed Specimen: Cerebrospinal Fluid Updated: 09/23/21 1241     Special Requests: TUBE 3, CULTURE AND SENSITIVTY AND GRAM STAIN.      GRAM STAIN RARE WBCS SEEN         NO ORGANISMS SEEN        Culture result: NO GROWTH 7 DAYS       CULTURE, BLOOD [[id number]] Collected: 09/14/21 0515    Order Status: Completed Specimen: Blood Updated: 09/20/21 0832     Special Requests: NO SPECIAL REQUESTS        Culture result: NO GROWTH 6 DAYS       CULTURE, BLOOD [[id number]] Collected: 09/14/21 0500    Order Status: Completed Specimen: Blood Updated: 09/20/21 0832     Special Requests: NO SPECIAL REQUESTS        Culture result: NO GROWTH 6 DAYS       MENINGITIS PATHOGENS PANEL, CSF (BY PCR) [[id number]] Collected: 09/16/21 1434    Order Status: Completed Specimen: Cerebrospinal Fluid Updated: 09/17/21 0050     Escherichia coli K1 Not detected        Haemophilus Influenzae Not detected        Listeria Monocytogenes Not detected        Neisseria Meningitidis Not detected        Streptococcus Agalactiae Not detected        Streptococcus Pneumoniae Not detected        Cytomegalovirus Not detected        Enterovirus Not detected        Herpes Simplex Virus 1 Not detected        Comment: In patients who have negative herpes simplex 1 and 2 PCR results, do not modify treatment, confirm with alternate testing. Herpes Simplex Virus 2 Not detected        Comment: In patients who have negative herpes simplex 1 and 2 PCR results, do not modify treatment, confirm with alternate testing. Human Herpesvirus 6 Not detected        Human Parechovirus Not detected        Varicella Zoster Virus Not detected        Crypto. neoformans/gattii Not detected       MENINGITIS PATHOGENS PANEL, CSF (BY PCR) [[id number]] Collected: 09/16/21 1545    Order Status: Canceled Specimen: Cerebrospinal Fluid     HSV 1 AND 2 BY PCR [[id number]] Collected: 09/16/21 1434    Order Status: Canceled Specimen: Other     YRLPO-81 RAPID TEST [[id number]] Collected: 09/16/21 1000    Order Status: Completed Specimen: Nasopharyngeal Updated: 09/16/21 1032     Specimen source Nasopharyngeal        COVID-19 rapid test Not detected        Comment: Rapid Abbott ID Now       Rapid NAAT:  The specimen is NEGATIVE for SARS-CoV-2, the novel coronavirus associated with COVID-19. Negative results should be treated as presumptive and, if inconsistent with clinical signs and symptoms or necessary for patient management, should be tested with an alternative molecular assay. Negative results do not preclude SARS-CoV-2 infection and should not be used as the sole basis for patient management decisions.        This test has been authorized by the FDA under an Emergency Use Authorization (EUA) for use by authorized laboratories.    Fact sheet for Healthcare Providers: ConventionUpdate.co.nz  Fact sheet for Patients: ConventionUpdate.co.nz       Methodology: Isothermal Nucleic Acid Amplification         LEGIONELLA PNEUMOPHILA AG, URINE [[id number]] Collected: 09/14/21 2315    Order Status: Completed Specimen: Urine, random Updated: 09/15/21 1042     Legionella Ag, urine Negative       STREP PNEUMO AG, URINE [[id number]] Collected: 09/14/21 2315    Order Status: Completed Specimen: Urine, random Updated: 09/15/21 1042     Strep pneumo Ag, urine Negative       RESPIRATORY VIRUS PANEL W/COVID-19, PCR [[id number]] Collected: 09/14/21 1832    Order Status: Completed Specimen: Nasopharyngeal Updated: 09/14/21 2234     Adenovirus Not detected        Coronavirus 229E Not detected        Coronavirus HKU1 Not detected        Coronavirus CVNL63 Not detected        Coronavirus OC43 Not detected        SARS-CoV-2, PCR Not detected        Metapneumovirus Not detected        Rhinovirus and Enterovirus Not detected        Influenza A Not detected        Influenza A, subtype H1 Not detected        Influenza A, subtype H3 Not detected        INFLUENZA A H1N1 PCR Not detected        Influenza B Not detected        Parainfluenza 1 Not detected        Parainfluenza 2 Not detected        Parainfluenza 3 Not detected        Parainfluenza virus 4 Not detected        RSV by PCR Not detected        B. parapertussis, PCR Not detected        Bordetella pertussis - PCR Not detected        Chlamydophila pneumoniae DNA, QL, PCR Not detected        Mycoplasma pneumoniae DNA, QL, PCR Not detected       CULTURE, BLOOD [[id number]] Collected: 09/14/21 1700    Order Status: Canceled Specimen: Blood     CULTURE, URINE [[id number]] Collected: 09/13/21 2330    Order Status: Canceled Specimen: Cath Urine     RESPIRATORY VIRUS PANEL W/COVID-19, PCR [[id number]]     Order Status: Canceled Specimen: NASOPHARYNGEAL SWAB     COVID-19 RAPID TEST [[id number]]     Order Status: Canceled     COVID-19 RAPID TEST [[id number]] Collected: 09/13/21 0737    Order Status: Completed Specimen: Nasopharyngeal Updated: 09/13/21 0811     Specimen source Nasopharyngeal        COVID-19 rapid test Not detected        Comment: Rapid Abbott ID Now       Rapid NAAT:  The specimen is NEGATIVE for SARS-CoV-2, the novel coronavirus associated with COVID-19. Negative results should be treated as presumptive and, if inconsistent with clinical signs and symptoms or necessary for patient management, should be tested with an alternative molecular assay. Negative results do not preclude SARS-CoV-2 infection and should not be used as the sole basis for patient management decisions. This test has been authorized by the FDA under an Emergency Use Authorization (EUA) for use by authorized laboratories. Fact sheet for Healthcare Providers: ConventionConnectivity Data Systemsdate.co.nz  Fact sheet for Patients: Dropletdate.co.nz       Methodology: Isothermal Nucleic Acid Amplification         COVID-19 RAPID TEST [[id number]]     Order Status: Canceled            Lab results:    Chemistry  Recent Labs     10/13/21  0123 10/12/21  0520 10/11/21  0530   GLU 94 111* 93    140 137   K 4.1 4.2 4.2    105 103   CO2 31 30 24   BUN 22* 21* 16   CREA 0.86 0.83 0.83   CA 10.1 10.2* 10.7*   AGAP 7 5 10   BUCR 26* 25* 19    119* 131*   TP 6.4 6.3* 7.2   ALB 3.9 3.8 4.4   GLOB 2.5 2.5 2.8   AGRAT 1.6 1.5 1.6       CBC w/ Diff  Recent Labs     10/13/21  1130 10/13/21  0123 10/12/21  0520 10/11/21  0530 10/11/21  0530   WBC  --  8.3 6.9  --  10.1   RBC  --  2.51* 2.93*  --  3.17*   HGB 8.1* 7.1* 8.5*   < > 9.0*   HCT 25.6* 23.0* 26.9*   < > 27.9*   PLT  --  364 376  --  381   GRANS  --  57 59  --  63   LYMPH  --  27 23  --  22   EOS  --  5 6*  --  4    < > = values in this interval not displayed. Imaging: report posted below as per radiologist   CXR- 9/17    1. Unchanged, adequately positioned endotracheal tube and enteric tube. 2. Unchanged left and right basilar patchy opacities, atelectasis versus  Infiltrate. 10/4 CXR  Ongoing increased left greater than right perihilar and left basilar opacities  may represent combination of worsening asymmetric pulmonary edema and infiltrate  with atelectasis. Possible trace left pleural effusion.   10/5- CXR:    Mild left lower lower lung zone opacities, unchanged.

## 2021-10-13 NOTE — PROGRESS NOTES
DC Plan: placement continues to be researched    Care manager rounded on patient - sitter in room; patient sleeping on left side with trach collar in place. Per sitter, patient removed fecal management system per self last night.     Care Management Interventions  Transition of Care Consult (CM Consult): Discharge Planning  The Plan for Transition of Care is Related to the Following Treatment Goals : intentional drug overdose  Discharge Location  Discharge Placement:  (TBD)

## 2021-10-14 LAB
ALBUMIN SERPL-MCNC: 3.7 G/DL (ref 3.4–5)
ALBUMIN/GLOB SERPL: 1.5 {RATIO} (ref 0.8–1.7)
ALP SERPL-CCNC: 103 U/L (ref 45–117)
ALT SERPL-CCNC: 26 U/L (ref 13–56)
AMMONIA PLAS-SCNC: 30 UMOL/L (ref 11–32)
ANION GAP SERPL CALC-SCNC: 5 MMOL/L (ref 3–18)
AST SERPL-CCNC: 15 U/L (ref 10–38)
BASOPHILS # BLD: 0 K/UL (ref 0–0.1)
BASOPHILS NFR BLD: 0 % (ref 0–2)
BILIRUB SERPL-MCNC: 0.5 MG/DL (ref 0.2–1)
BUN SERPL-MCNC: 24 MG/DL (ref 7–18)
BUN/CREAT SERPL: 18 (ref 12–20)
CALCIUM SERPL-MCNC: 9.8 MG/DL (ref 8.5–10.1)
CHLORIDE SERPL-SCNC: 102 MMOL/L (ref 100–111)
CO2 SERPL-SCNC: 31 MMOL/L (ref 21–32)
CREAT SERPL-MCNC: 1.34 MG/DL (ref 0.6–1.3)
DIFFERENTIAL METHOD BLD: ABNORMAL
EOSINOPHIL # BLD: 0.6 K/UL (ref 0–0.4)
EOSINOPHIL NFR BLD: 6 % (ref 0–5)
ERYTHROCYTE [DISTWIDTH] IN BLOOD BY AUTOMATED COUNT: 13.7 % (ref 11.6–14.5)
GLOBULIN SER CALC-MCNC: 2.5 G/DL (ref 2–4)
GLUCOSE BLD STRIP.AUTO-MCNC: 110 MG/DL (ref 70–110)
GLUCOSE BLD STRIP.AUTO-MCNC: 124 MG/DL (ref 70–110)
GLUCOSE BLD STRIP.AUTO-MCNC: 130 MG/DL (ref 70–110)
GLUCOSE BLD STRIP.AUTO-MCNC: 158 MG/DL (ref 70–110)
GLUCOSE SERPL-MCNC: 123 MG/DL (ref 74–99)
HCT VFR BLD AUTO: 22.4 % (ref 35–45)
HGB BLD-MCNC: 7.2 G/DL (ref 12–16)
LYMPHOCYTES # BLD: 1.3 K/UL (ref 0.9–3.6)
LYMPHOCYTES NFR BLD: 14 % (ref 21–52)
MAGNESIUM SERPL-MCNC: 1.9 MG/DL (ref 1.6–2.6)
MCH RBC QN AUTO: 28.2 PG (ref 24–34)
MCHC RBC AUTO-ENTMCNC: 32.1 G/DL (ref 31–37)
MCV RBC AUTO: 87.8 FL (ref 78–100)
MONOCYTES # BLD: 0.8 K/UL (ref 0.05–1.2)
MONOCYTES NFR BLD: 8 % (ref 3–10)
NEUTS SEG # BLD: 6.7 K/UL (ref 1.8–8)
NEUTS SEG NFR BLD: 72 % (ref 40–73)
PHOSPHATE SERPL-MCNC: 5.6 MG/DL (ref 2.5–4.9)
PLATELET # BLD AUTO: 335 K/UL (ref 135–420)
PMV BLD AUTO: 9.8 FL (ref 9.2–11.8)
POTASSIUM SERPL-SCNC: 4.6 MMOL/L (ref 3.5–5.5)
PROT SERPL-MCNC: 6.2 G/DL (ref 6.4–8.2)
RBC # BLD AUTO: 2.55 M/UL (ref 4.2–5.3)
RBC MORPH BLD: ABNORMAL
SODIUM SERPL-SCNC: 138 MMOL/L (ref 136–145)
WBC # BLD AUTO: 9.4 K/UL (ref 4.6–13.2)

## 2021-10-14 PROCEDURE — 74011250636 HC RX REV CODE- 250/636: Performed by: INTERNAL MEDICINE

## 2021-10-14 PROCEDURE — 74011250637 HC RX REV CODE- 250/637: Performed by: PSYCHIATRY & NEUROLOGY

## 2021-10-14 PROCEDURE — 74011250636 HC RX REV CODE- 250/636: Performed by: FAMILY MEDICINE

## 2021-10-14 PROCEDURE — 74011000250 HC RX REV CODE- 250: Performed by: INTERNAL MEDICINE

## 2021-10-14 PROCEDURE — 77010033678 HC OXYGEN DAILY

## 2021-10-14 PROCEDURE — 2709999900 HC NON-CHARGEABLE SUPPLY

## 2021-10-14 PROCEDURE — 94640 AIRWAY INHALATION TREATMENT: CPT

## 2021-10-14 PROCEDURE — 74011250637 HC RX REV CODE- 250/637: Performed by: INTERNAL MEDICINE

## 2021-10-14 PROCEDURE — 36415 COLL VENOUS BLD VENIPUNCTURE: CPT

## 2021-10-14 PROCEDURE — 80053 COMPREHEN METABOLIC PANEL: CPT

## 2021-10-14 PROCEDURE — 83735 ASSAY OF MAGNESIUM: CPT

## 2021-10-14 PROCEDURE — 82962 GLUCOSE BLOOD TEST: CPT

## 2021-10-14 PROCEDURE — 82140 ASSAY OF AMMONIA: CPT

## 2021-10-14 PROCEDURE — 74011250636 HC RX REV CODE- 250/636: Performed by: HOSPITALIST

## 2021-10-14 PROCEDURE — 85025 COMPLETE CBC W/AUTO DIFF WBC: CPT

## 2021-10-14 PROCEDURE — 84100 ASSAY OF PHOSPHORUS: CPT

## 2021-10-14 PROCEDURE — 94760 N-INVAS EAR/PLS OXIMETRY 1: CPT

## 2021-10-14 PROCEDURE — 65660000000 HC RM CCU STEPDOWN

## 2021-10-14 RX ORDER — HALOPERIDOL 5 MG/ML
5 INJECTION INTRAMUSCULAR
Status: DISCONTINUED | OUTPATIENT
Start: 2021-10-14 | End: 2021-11-10

## 2021-10-14 RX ORDER — MAGNESIUM SULFATE 1 G/100ML
1 INJECTION INTRAVENOUS ONCE
Status: COMPLETED | OUTPATIENT
Start: 2021-10-14 | End: 2021-10-14

## 2021-10-14 RX ORDER — SODIUM CHLORIDE 9 MG/ML
50 INJECTION, SOLUTION INTRAVENOUS CONTINUOUS
Status: DISPENSED | OUTPATIENT
Start: 2021-10-14 | End: 2021-10-15

## 2021-10-14 RX ADMIN — MAGNESIUM SULFATE HEPTAHYDRATE 1 G: 1 INJECTION, SOLUTION INTRAVENOUS at 17:13

## 2021-10-14 RX ADMIN — ARFORMOTEROL TARTRATE 15 MCG: 15 SOLUTION RESPIRATORY (INHALATION) at 09:09

## 2021-10-14 RX ADMIN — Medication 5 MG: at 21:11

## 2021-10-14 RX ADMIN — HALOPERIDOL LACTATE 5 MG: 5 INJECTION, SOLUTION INTRAMUSCULAR at 23:32

## 2021-10-14 RX ADMIN — BUDESONIDE 500 MCG: 0.5 INHALANT RESPIRATORY (INHALATION) at 09:09

## 2021-10-14 RX ADMIN — LACTULOSE 15 ML: 10 SOLUTION ORAL at 21:12

## 2021-10-14 RX ADMIN — SODIUM CHLORIDE 10 ML: 9 INJECTION, SOLUTION INTRAMUSCULAR; INTRAVENOUS; SUBCUTANEOUS at 15:06

## 2021-10-14 RX ADMIN — CLONAZEPAM 1 MG: 0.5 TABLET ORAL at 16:55

## 2021-10-14 RX ADMIN — ARFORMOTEROL TARTRATE 15 MCG: 15 SOLUTION RESPIRATORY (INHALATION) at 19:51

## 2021-10-14 RX ADMIN — QUETIAPINE FUMARATE 300 MG: 200 TABLET ORAL at 21:11

## 2021-10-14 RX ADMIN — VANCOMYCIN HYDROCHLORIDE 1000 MG: 1 INJECTION, POWDER, LYOPHILIZED, FOR SOLUTION INTRAVENOUS at 18:29

## 2021-10-14 RX ADMIN — SODIUM CHLORIDE 50 ML/HR: 900 INJECTION, SOLUTION INTRAVENOUS at 16:30

## 2021-10-14 RX ADMIN — HYDROMORPHONE HYDROCHLORIDE 1 MG: 1 INJECTION, SOLUTION INTRAMUSCULAR; INTRAVENOUS; SUBCUTANEOUS at 10:28

## 2021-10-14 RX ADMIN — NYSTATIN: 100000 POWDER TOPICAL at 21:12

## 2021-10-14 RX ADMIN — FAMOTIDINE 20 MG: 20 TABLET ORAL at 21:12

## 2021-10-14 RX ADMIN — THIAMINE HCL TAB 100 MG 100 MG: 100 TAB at 11:10

## 2021-10-14 RX ADMIN — LACTULOSE 15 ML: 10 SOLUTION ORAL at 11:01

## 2021-10-14 RX ADMIN — FOLIC ACID 1 MG: 1 TABLET ORAL at 11:10

## 2021-10-14 RX ADMIN — HYDROMORPHONE HYDROCHLORIDE 1 MG: 1 INJECTION, SOLUTION INTRAMUSCULAR; INTRAVENOUS; SUBCUTANEOUS at 21:38

## 2021-10-14 RX ADMIN — QUETIAPINE FUMARATE 100 MG: 100 TABLET ORAL at 11:10

## 2021-10-14 RX ADMIN — ENOXAPARIN SODIUM 40 MG: 100 INJECTION SUBCUTANEOUS at 16:55

## 2021-10-14 RX ADMIN — NYSTATIN: 100000 POWDER TOPICAL at 17:10

## 2021-10-14 RX ADMIN — CLONAZEPAM 1 MG: 0.5 TABLET ORAL at 21:12

## 2021-10-14 RX ADMIN — Medication 1 TABLET: at 11:10

## 2021-10-14 RX ADMIN — BUDESONIDE 500 MCG: 0.5 INHALANT RESPIRATORY (INHALATION) at 19:50

## 2021-10-14 RX ADMIN — CLONAZEPAM 1 MG: 0.5 TABLET ORAL at 11:09

## 2021-10-14 RX ADMIN — FAMOTIDINE 20 MG: 20 TABLET ORAL at 11:09

## 2021-10-14 RX ADMIN — NYSTATIN: 100000 POWDER TOPICAL at 11:39

## 2021-10-14 RX ADMIN — HYDROMORPHONE HYDROCHLORIDE 1 MG: 1 INJECTION, SOLUTION INTRAMUSCULAR; INTRAVENOUS; SUBCUTANEOUS at 15:05

## 2021-10-14 RX ADMIN — HYDROMORPHONE HYDROCHLORIDE 1 MG: 1 INJECTION, SOLUTION INTRAMUSCULAR; INTRAVENOUS; SUBCUTANEOUS at 01:52

## 2021-10-14 NOTE — ROUTINE PROCESS
1945  Bedside and Verbal shift change report given to ZEE Amada. RN (oncoming nurse) by Camryn Soria RN (offgoing nurse). Report included the following information SBAR, Kardex and MAR.

## 2021-10-14 NOTE — PROGRESS NOTES
Assisted primary nurse to give med via peg tube. Pt constantly pulling at oxygen collar. Assessed pt O2 sats with collar off. Pt falls to 85% with collar off. Placed collar back on. Sitter continues to be at bedside.

## 2021-10-14 NOTE — PROGRESS NOTES
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    Left wrist fracture, with delayed healing, subsequent encounter S62.102G    Drug overdose, intentional self-harm, initial encounter ([facility] T50.902A    Hypoxia R09.02    Hypotension after procedure I95.81    Acute metabolic encephalopathy J39.72    Psychosis (HCC) F29    Hyponatremia E87.1    Acute respiratory failure with hypoxia ([facility]) J96.01    Increased ammonia level R79.89    Hypokalemia E87.6    Status post tracheostomy ([facility] Z93.0    Bacteremia due to Gram-positive bacteria R78.81 ·     · Code status: Full code     RECOMMENDATIONS:   Patient with chronic respiratory failure; she was intubated 2021 due to encephalopathy and drug overdose; extubated on 2021, and reintubated within an hour due to respiratory distress and upper airway edema; patient subsequently underwent tracheostomy 2021. Patient is confused and agitated; she is at high risk of aspirations; patient currently on trach collar with FiO2 6 L; #8 Shiley cuffed trach tube cuff is currently deflated. Last chest x-ray 10/8/1021 reviewedlungs clear with no focal infiltrates or consolidations; no pleural effusions. Continue trach care with suctioning as needed by RT. Continue bronchodilatorsBrovana and budesonide nebulizers; prn DuoNeb. Patient on IV vancomycin; continue antibiotic therapy per ID. Patient on Seroquel for delirium. Patient is on clonazepam for anxiety.   DVT prophylaxisenoxaparin. GI prophylaxisfamotidine. Patient has poor prognosis overall. She is not a candidate for tracheostomy tube weaning trials or decannulation during this hospitalization; she is extremely confused and agitated, and seems to suffer from chronic delirium; she is at high risk of aspirations; as mentioned above, she is naked in the room; based on discussion with sitter, patient prefers to be naked; this is not a patient that can be decannulated safely during this hospitalization. Discussed with case management. Lines/Tubes: PIV   S/p ETT: 9/11/219/22/2021. Tracheostomy 9/22/2021. Status post PEG on 10/1/2021   [place]: 9/11/21; changed 9/28/2021. ADVANCE DIRECTIVE: Full code. Subjective/History:   Ms. Lani Turk has been seen and evaluated as Dr. Gloria Engel requested now for assisting with Acute respiratory failure and ventilator management. Patient is a 39 y.o. female with following PMhx presented to ER with lethargy via EMS and admitted for Gabapentin overdose. Pt required intubation for airways protection. Position control was contacted that recommended supportive care per ER provider. Pt seen at bedside in ER rm#2. The patient can not provide additional history due to Ventilated. 10/14/2021  Dr. Jazmine Serrano requested for pulmonary evaluation for tracheostomy decannulation. Patient seen in telemetry [id number]. Patient well-known to our service due to prolonged ICU stay. S/p trach and PEG; prolonged respiratory failure with ICU stay. Patient confused at baseline; sitter at bedside reports that patient likes to be naked all the time in the room. Patient appears to be confused and mildly agitated; not able to respond purposefully. Patient on trach collar. Patient having PEG tube feeding. Afebrile overnight; blood pressure stable. Patient has a history of drug useprior history of cocaine use; also smoker and alcohol use.     Review of Systems:  Limited due to patient condition.         Past Medical History:  Past Medical History:   Diagnosis Date    Asthma     Bilateral ovarian cysts     Cocaine abuse ([facility] [address]     Mental and behavioral problem     Pancreatitis     Pancreatitis     Psychosis ([facility]         Past Surgical History:  Past Surgical History:   Procedure Laterality Date    HX GYN      D&C, Hysterectomy    IR INSERT GASTROSTOMY TUBE PERC  10/1/2021        Medications:    Current Facility-Administered Medications   Medication Dose Route Frequency    vancomycin (VANCOCIN) 1,000 mg in 0.9% sodium chloride 250 mL (VIAL-MATE)  1,000 mg IntraVENous Q24H    [START ON 10/15/2021] Vancomycin Random Level 10/15/21 with AM labs  1 Each Other ONCE    QUEtiapine (SEROquel) tablet 100 mg  100 mg Oral DAILY    QUEtiapine (SEROquel) tablet 300 mg  300 mg Oral QHS    clonazePAM (KlonoPIN) tablet 1 mg  1 mg Oral TID    nystatin (MYCOSTATIN) 100,000 unit/gram powder   Topical TID    fentaNYL (DURAGESIC) 50 mcg/hr patch 1 Patch  1 Patch TransDERmal Q72H    Vancomycin - Pharmacy to Dose  1 Each Other Rx Dosing/Monitoring    arformoteroL (BROVANA) neb solution 15 mcg  15 mcg Nebulization BID RT    budesonide (PULMICORT) 500 mcg/2 ml nebulizer suspension  500 mcg Nebulization BID RT    lactulose (CHRONULAC) 10 gram/15 mL solution 15 mL  15 mL Oral BID    melatonin (rapid dissolve) tablet 5 mg  5 mg Oral QHS    famotidine (PEPCID) tablet 20 mg  20 mg Oral BID    thiamine mononitrate (B-1) tablet 100 mg  100 mg Oral DAILY    folic acid (FOLVITE) tablet 1 mg  1 mg Oral DAILY    multivitamin, tx-iron-ca-min (THERA-M w/ IRON) tablet 1 Tablet  1 Tablet Oral DAILY    insulin lispro (HUMALOG) injection   SubCUTAneous Q6H    enoxaparin (LOVENOX) injection 40 mg  40 mg SubCUTAneous Q24H    sodium chloride (NS) flush 5-40 mL  5-40 mL IntraVENous Q8H       Allergy:  Allergies   Allergen Reactions    Fish Containing Products Itching    Toradol [Ketorolac] Rash     Pt reports she is not allergic to this medication. Social History:  Social History     Tobacco Use    Smoking status: Current Every Day Smoker     Packs/day: 1.00    Smokeless tobacco: Never Used   Substance Use Topics    Alcohol use: Not Currently    Drug use: Not Currently     Types: Cocaine, Marijuana     Comment: used with in past 24 hours          Objective:   Vital Signs:    Blood pressure 112/68, pulse (!) 119, temperature 98.7 °F (37.1 °C), resp. rate 18, height 5' 2\" (1.575 m), weight 71.2 kg (156 lb 15.5 oz), last menstrual period 05/15/2018, SpO2 97 %. Body mass index is 28.71 kg/m².    O2 Device: Tracheal collar   O2 Flow Rate (L/min): 6 l/min   Temp (24hrs), Av °F (37.2 °C), Min:98.3 °F (36.8 °C), Max:100.1 °F (37.8 °C)         Intake/Output:   Last shift:      10/14 0701 - 10/14 1900  In: 660   Out: -   Last 3 shifts: 10/12 1901 - 10/14 0700  In: 0761 [I.V.:250]  Out: 5715 [Urine:1550]    Intake/Output Summary (Last 24 hours) at 10/14/2021 1352  Last data filed at 10/14/2021 1123  Gross per 24 hour   Intake 1010 ml   Output 700 ml   Net 310 ml       Physical Exam:   Patient appears frail and chronically ill; she is naked; on oxygen via trach collar; acyanotic  HEENT: pupils not dilated, no scleral jaundice, moist oral mucosa  Neck: No adenopathy or thyroid swelling; tracheostomy tubeno bleeding or secretions noted  CVS: S1S2 no murmurs; JVD not elevated  RS: Mod air entry bilaterally, decreased BS at bases, no wheezes or crackles; not tachypneic or in distress  Abd: soft, non tender, no hepatosplenomegaly, no abd distension, no guarding or rigidity, bowel sounds heard; PEG tube present  Neuro: Confused and mildly agitated; moving all extremities   Extrm: no leg edema or swelling or clubbing  Skin: no rash  Lymphatic: no cervical or supraclavicular adenopathy  Psych: Confused and agitated       Data:     Recent Results (from the past 12 hour(s))   GLUCOSE, POC    Collection Time: 10/14/21 6:28 AM   Result Value Ref Range    Glucose (POC) 110 70 - 110 mg/dL   CBC WITH AUTOMATED DIFF    Collection Time: 10/14/21 10:45 AM   Result Value Ref Range    WBC 9.4 4.6 - 13.2 K/uL    RBC 2.55 (L) 4.20 - 5.30 M/uL    HGB 7.2 (L) 12.0 - 16.0 g/dL    HCT 22.4 (L) 35.0 - 45.0 %    MCV 87.8 78.0 - 100.0 FL    MCH 28.2 24.0 - 34.0 PG    MCHC 32.1 31.0 - 37.0 g/dL    RDW 13.7 11.6 - 14.5 %    PLATELET 049 760 - 298 K/uL    MPV 9.8 9.2 - 11.8 FL    NEUTROPHILS 72 40 - 73 %    LYMPHOCYTES 14 (L) 21 - 52 %    MONOCYTES 8 3 - 10 %    EOSINOPHILS 6 (H) 0 - 5 %    BASOPHILS 0 0 - 2 %    ABS. NEUTROPHILS 6.7 1.8 - 8.0 K/UL    ABS. LYMPHOCYTES 1.3 0.9 - 3.6 K/UL    ABS. MONOCYTES 0.8 0.05 - 1.2 K/UL    ABS. EOSINOPHILS 0.6 (H) 0.0 - 0.4 K/UL    ABS. BASOPHILS 0.0 0.0 - 0.1 K/UL    DF AUTOMATED      RBC COMMENTS MICROCYTOSIS  1+        RBC COMMENTS HYPOCHROMIA  2+        RBC COMMENTS ANISOCYTOSIS  1+       MAGNESIUM    Collection Time: 10/14/21 10:45 AM   Result Value Ref Range    Magnesium 1.9 1.6 - 2.6 mg/dL   METABOLIC PANEL, COMPREHENSIVE    Collection Time: 10/14/21 10:45 AM   Result Value Ref Range    Sodium 138 136 - 145 mmol/L    Potassium 4.6 3.5 - 5.5 mmol/L    Chloride 102 100 - 111 mmol/L    CO2 31 21 - 32 mmol/L    Anion gap 5 3.0 - 18 mmol/L    Glucose 123 (H) 74 - 99 mg/dL    BUN 24 (H) 7.0 - 18 MG/DL    Creatinine 1.34 (H) 0.6 - 1.3 MG/DL    BUN/Creatinine ratio 18 12 - 20      GFR est AA 53 (L) >60 ml/min/1.73m2    GFR est non-AA 44 (L) >60 ml/min/1.73m2    Calcium 9.8 8.5 - 10.1 MG/DL    Bilirubin, total 0.5 0.2 - 1.0 MG/DL    ALT (SGPT) 26 13 - 56 U/L    AST (SGOT) 15 10 - 38 U/L    Alk.  phosphatase 103 45 - 117 U/L    Protein, total 6.2 (L) 6.4 - 8.2 g/dL    Albumin 3.7 3.4 - 5.0 g/dL    Globulin 2.5 2.0 - 4.0 g/dL    A-G Ratio 1.5 0.8 - 1.7     AMMONIA    Collection Time: 10/14/21 10:45 AM   Result Value Ref Range    Ammonia 30 11 - 32 UMOL/L   PHOSPHORUS    Collection Time: 10/14/21 10:45 AM   Result Value Ref Range    Phosphorus 5.6 (H) 2.5 - 4.9 MG/DL   GLUCOSE, POC    Collection Time: 10/14/21 11:53 AM   Result Value Ref Range    Glucose (POC) 130 (H) 70 - 110 mg/dL             No results for input(s): FIO2I, IFO2, HCO3I, IHCO3, HCOPOC, PCO2I, PCOPOC, IPHI, PHI, PHPOC, PO2I, PO2POC in the last 72 hours.     No lab exists for component: IPOC2    All Micro Results     Procedure Component Value Units Date/Time    CULTURE, RESPIRATORY/SPUTUM/BRONCH Carlos Manuel Sprout STAIN [[id number]]  (Abnormal)  (Susceptibility) Collected: 10/06/21 1430    Order Status: Completed Specimen: Sputum from Tracheal Aspirate Updated: 10/09/21 0810     Special Requests: NO SPECIAL REQUESTS        GRAM STAIN FEW WBCS SEEN               OCCASIONAL EPITHELIAL CELLS SEEN                  RARE GRAM POSITIVE COCCI IN CLUSTERS           Culture result:       LIGHT * METHICILLIN RESISTANT STAPHYLOCOCCUS AUREUS *                  NO NORMAL RESPIRATORY DINA ISOLATED          CULTURE, BLOOD [[id number]]  (Abnormal) Collected: 10/04/21 1305    Order Status: Completed Specimen: Blood Updated: 10/07/21 1007     Special Requests: NO SPECIAL REQUESTS        GRAM STAIN       GRAM POSITIVE COCCI IN CLUSTERS ANAEROBIC BOTTLE                  SMEAR CALLED TO AND CORRECTLY REPEATED BY: Aniya Solorzano RN ICU, TO [facility] AT 2042 10/5/2021                  GRAM POSITIVE COCCI IN CLUSTERS AEROBIC BOTTLE                  SMEAR CALLED TO AND CORRECTLY REPEATED BY: Rosette Oden RN ICU AT 0329 ON 10/6/21 TO [facility]           Culture result:       STAPHYLOCOCCUS SPECIES, COAGULASE NEGATIVE (MULTIPLE COLONY TYPES) GROWING IN BOTH BOTTLES DRAWN (SITE NOT INDICATED)          CULTURE, BLOOD [[id number]]  (Abnormal) Collected: 10/04/21 1405    Order Status: Completed Specimen: Blood Updated: 10/07/21 1006     Special Requests: NO SPECIAL REQUESTS        GRAM STAIN       GRAM POSITIVE COCCI IN CLUSTERS ANAEROBIC BOTTLE                  SMEAR CALLED TO AND CORRECTLY REPEATED BY: Lenka Veras RN ICU, TO [facility] AT 1936 10/5/2021                  GRAM POSITIVE COCCI IN CLUSTERS AEROBIC BOTTLE                  SMEAR CALLED TO AND CORRECTLY REPEATED BY: Ash Castellano RN ICU AT 3353 ON 10/6/21 TO [facility]           Culture result:       STAPHYLOCOCCUS SPECIES, COAGULASE NEGATIVE (MULTIPLE COLONY TYPES) GROWING IN BOTH BOTTLES DRAWN (SITE NOT INDICATED)          CULTURE, MRSA [[id number]] Collected: 10/05/21 0945    Order Status: Canceled Specimen: Nasal from [address], MRSA [[id number]] Collected: 10/04/21 1100    Order Status: Canceled Specimen: Nasal from Nares     CULTURE, RESPIRATORY/SPUTUM/BRONCH Coit Lute STAIN [[id number]]     Order Status: Canceled Specimen: Sputum,ET Suction     CULTURE, BLOOD [[id number]] Collected: 09/27/21 1005    Order Status: Completed Specimen: Blood Updated: 10/03/21 0723     Special Requests: NO SPECIAL REQUESTS        Culture result: NO GROWTH 6 DAYS       CULTURE, BLOOD [[id number]] Collected: 09/27/21 1005    Order Status: Completed Specimen: Blood Updated: 10/03/21 0723     Special Requests: NO SPECIAL REQUESTS        Culture result: NO GROWTH 6 DAYS       CULTURE, BLOOD [[id number]] Collected: 09/24/21 0740    Order Status: Completed Specimen: Blood Updated: 09/30/21 0819     Special Requests: NO SPECIAL REQUESTS        Culture result: NO GROWTH 6 DAYS       CULTURE, RESPIRATORY/SPUTUM/BRONCH W GRAM STAIN [[id number]]  (Abnormal)  (Susceptibility) Collected: 09/24/21 0654    Order Status: Completed Specimen: Sputum from Tracheal Aspirate Updated: 09/27/21 1147     Special Requests: NO SPECIAL REQUESTS        GRAM STAIN RARE WBCS SEEN               RARE EPITHELIAL CELLS SEEN            NO ORGANISMS SEEN        Culture result:       MODERATE STAPHYLOCOCCUS AUREUS                  NO NORMAL RESPIRATORY DINA ISOLATED          COVID-19 RAPID TEST [[id number]] Collected: 09/24/21 1700    Order Status: Completed Specimen: Nasopharyngeal Updated: 09/24/21 1849     Specimen source Nasopharyngeal COVID-19 rapid test Not detected        Comment: Rapid Abbott ID Now       Rapid NAAT:  The specimen is NEGATIVE for SARS-CoV-2, the novel coronavirus associated with COVID-19. Negative results should be treated as presumptive and, if inconsistent with clinical signs and symptoms or necessary for patient management, should be tested with an alternative molecular assay. Negative results do not preclude SARS-CoV-2 infection and should not be used as the sole basis for patient management decisions. This test has been authorized by the FDA under an Emergency Use Authorization (EUA) for use by authorized laboratories. Fact sheet for Healthcare Providers: hdl therapeuticsdaPalamida.co.nz  Fact sheet for Patients: hdl therapeuticsdaPalamida.co.nz       Methodology: Isothermal Nucleic Acid Amplification         CULTURE, URINE [[id number]]     Order Status: Canceled Specimen: Urine from Clean catch     CULTURE, CSF  TUBE 2 [[id number]] Collected: 09/16/21 1434    Order Status: Completed Specimen: Cerebrospinal Fluid Updated: 09/23/21 1241     Special Requests: TUBE 3, CULTURE AND SENSITIVTY AND GRAM STAIN.      GRAM STAIN RARE WBCS SEEN         NO ORGANISMS SEEN        Culture result: NO GROWTH 7 DAYS       CULTURE, BLOOD [[id number]] Collected: 09/14/21 0515    Order Status: Completed Specimen: Blood Updated: 09/20/21 0832     Special Requests: NO SPECIAL REQUESTS        Culture result: NO GROWTH 6 DAYS       CULTURE, BLOOD [[id number]] Collected: 09/14/21 0500    Order Status: Completed Specimen: Blood Updated: 09/20/21 0832     Special Requests: NO SPECIAL REQUESTS        Culture result: NO GROWTH 6 DAYS       MENINGITIS PATHOGENS PANEL, CSF (BY PCR) [[id number]] Collected: 09/16/21 1434    Order Status: Completed Specimen: Cerebrospinal Fluid Updated: 09/17/21 0050     Escherichia coli K1 Not detected        Haemophilus Influenzae Not detected        Listeria Monocytogenes Not detected Neisseria Meningitidis Not detected        Streptococcus Agalactiae Not detected        Streptococcus Pneumoniae Not detected        Cytomegalovirus Not detected        Enterovirus Not detected        Herpes Simplex Virus 1 Not detected        Comment: In patients who have negative herpes simplex 1 and 2 PCR results, do not modify treatment, confirm with alternate testing. Herpes Simplex Virus 2 Not detected        Comment: In patients who have negative herpes simplex 1 and 2 PCR results, do not modify treatment, confirm with alternate testing. Human Herpesvirus 6 Not detected        Human Parechovirus Not detected        Varicella Zoster Virus Not detected        Crypto. neoformans/gattii Not detected       MENINGITIS PATHOGENS PANEL, CSF (BY PCR) [[id number]] Collected: 09/16/21 1545    Order Status: Canceled Specimen: Cerebrospinal Fluid     HSV 1 AND 2 BY PCR [[id number]] Collected: 09/16/21 1434    Order Status: Canceled Specimen: Other     ROREK-14 RAPID TEST [[id number]] Collected: 09/16/21 1000    Order Status: Completed Specimen: Nasopharyngeal Updated: 09/16/21 1032     Specimen source Nasopharyngeal        COVID-19 rapid test Not detected        Comment: Rapid Abbott ID Now       Rapid NAAT:  The specimen is NEGATIVE for SARS-CoV-2, the novel coronavirus associated with COVID-19. Negative results should be treated as presumptive and, if inconsistent with clinical signs and symptoms or necessary for patient management, should be tested with an alternative molecular assay. Negative results do not preclude SARS-CoV-2 infection and should not be used as the sole basis for patient management decisions. This test has been authorized by the FDA under an Emergency Use Authorization (EUA) for use by authorized laboratories.    Fact sheet for Healthcare Providers: ConventionUpdate.co.nz  Fact sheet for Patients: ConventionUpdate.co.nz Methodology: Isothermal Nucleic Acid Amplification         LEGIONELLA PNEUMOPHILA AG, URINE [[id number]] Collected: 09/14/21 2315    Order Status: Completed Specimen: Urine, random Updated: 09/15/21 1042     Legionella Ag, urine Negative       STREP PNEUMO AG, URINE [[id number]] Collected: 09/14/21 2315    Order Status: Completed Specimen: Urine, random Updated: 09/15/21 1042     Strep pneumo Ag, urine Negative       RESPIRATORY VIRUS PANEL W/COVID-19, PCR [[id number]] Collected: 09/14/21 1832    Order Status: Completed Specimen: Nasopharyngeal Updated: 09/14/21 2234     Adenovirus Not detected        Coronavirus 229E Not detected        Coronavirus HKU1 Not detected        Coronavirus CVNL63 Not detected        Coronavirus OC43 Not detected        SARS-CoV-2, PCR Not detected        Metapneumovirus Not detected        Rhinovirus and Enterovirus Not detected        Influenza A Not detected        Influenza A, subtype H1 Not detected        Influenza A, subtype H3 Not detected        INFLUENZA A H1N1 PCR Not detected        Influenza B Not detected        Parainfluenza 1 Not detected        Parainfluenza 2 Not detected        Parainfluenza 3 Not detected        Parainfluenza virus 4 Not detected        RSV by PCR Not detected        B. parapertussis, PCR Not detected        Bordetella pertussis - PCR Not detected        Chlamydophila pneumoniae DNA, QL, PCR Not detected        Mycoplasma pneumoniae DNA, QL, PCR Not detected       CULTURE, BLOOD [[id number]] Collected: 09/14/21 1700    Order Status: Canceled Specimen: Blood     CULTURE, URINE [[id number]] Collected: 09/13/21 2330    Order Status: Canceled Specimen: Cath Urine     RESPIRATORY VIRUS PANEL W/COVID-19, PCR [[id number]]     Order Status: Canceled Specimen: NASOPHARYNGEAL SWAB     COVID-19 RAPID TEST [[id number]]     Order Status: Canceled     COVID-19 RAPID TEST [[id number]] Collected: 09/13/21 0737    Order Status: Completed Specimen: Nasopharyngeal Updated: 09/13/21 0811     Specimen source Nasopharyngeal        COVID-19 rapid test Not detected        Comment: Rapid Abbott ID Now       Rapid NAAT:  The specimen is NEGATIVE for SARS-CoV-2, the novel coronavirus associated with COVID-19. Negative results should be treated as presumptive and, if inconsistent with clinical signs and symptoms or necessary for patient management, should be tested with an alternative molecular assay. Negative results do not preclude SARS-CoV-2 infection and should not be used as the sole basis for patient management decisions. This test has been authorized by the FDA under an Emergency Use Authorization (EUA) for use by authorized laboratories. Fact sheet for Healthcare Providers: ConventionBaydindate.co.nz  Fact sheet for Patients: SensingStripdate.co.nz       Methodology: Isothermal Nucleic Acid Amplification         COVID-19 RAPID TEST [[id number]]     Order Status: Canceled         Echo 9/17/2021:  Left Ventricle Normal cavity size, wall thickness and systolic function (ejection fraction normal). The estimated EF is 60 - 65%. There is inconclusive left ventricular diastolic function E/E' ratio = 7.08  Heart rate is over 100. Wall Scoring The left ventricular wall motion is normal.            Left Atrium Normal cavity size. Right Ventricle Normal cavity size and global systolic function. Assessment of RV function:   TAPSE = 27 mm. Right Atrium Normal cavity size. Interatrial Septum Interatrial septum not well visualized   Aortic Valve Aortic valve not well visualized. Trileaflet valve structure, no stenosis and no regurgitation. Mitral Valve No stenosis. Mitral valve non-specific thickening. Mild regurgitation. Tricuspid Valve Tricuspid valve not well visualized. No stenosis. Mild regurgitation. Pulmonic Valve Pulmonic valve not well visualized. No stenosis and no regurgitation. Aorta The aorta was not well visualized. Normal aortic root. Pulmonary Artery Pulmonary arteries not well visualized. Pulmonary arterial systolic pressure (PASP) is 29 mmHg. Pulmonary hypertension not suggested by Doppler findings. IVC/Hepatic Veins Mechanically ventilated; cannot use inferior caval vein diameter to estimate central venous pressure. Pericardium Normal pericardium and no evidence of pericardial effusion. CT head 9/15/1021  IMPRESSION   No acute intracranial abnormality. CT chest, abdomen, pelvis 9/15/1021  IMPRESSION   Endotracheal tube tip in the lower thoracic trachea 1.5 cm above the dipak.    Bilateral lower lobar atelectasis, possible endobronchial lesion/mucous plug in  the left lower lobe bronchus.    No acute intra-abdominal abnormality. Imaging:  [x]I have personally reviewed the patients chest radiographs images and report    Chest x-ray 10/8- 1 view  CLINICAL HISTORY: Acute respiratory failure, ET tube position ,  COMPARISON: None  FINDINGS:  Frontal view of the chest demonstrate tracheostomy tube in stable position. Minimal streaky opacities in the left lung base. Otherwise clear. . Cardiac  silhouette is normal in size and contour. No acute bony or soft tissue  abnormality. IMPRESSION  Minimal streakiness in the left lung base likely atelectasis. Otherwise clear. Please note: Voice-recognition software may have been used to generate this report, which may have resulted in some phonetic-based errors in grammar and contents. Even though attempts were made to correct all the mistakes, some may have been missed, and remained in the body of the document.       Brayden Alvarado MD  10/14/2021

## 2021-10-14 NOTE — PROGRESS NOTES
Speech-Language Pathology Evaluation/Treatment Attempt x2     Chart reviewed. Attempted Speech-Language Pathology Evaluation/Treatment, however, patient unable to be seen due to:  []  Nausea/vomiting  []  Eating  []  Pain  [x]  Patient too lethargic  []  Off Unit for testing/procedure  []  Dialysis treatment in progress   []  Telemetry Results  [x]  Other: ST attempt eval at 1130 and 1530 this day. Per nursing, John Son report, patient medicated due to agitation and is sleeping. Not appropriate for PO trials/evaluation at this time.      Will f/u later as patient's schedule allows. Thank you for this referral.  MARIANNA [email or web address], [id number] [facility]

## 2021-10-14 NOTE — PROGRESS NOTES
1139  Pt sleeping at this time. Pt has trache in place to o2 20% FIO2. Pt has PEG tube. On  Jevity 1.5 at 45 ml/hr. Pt has zamora cath intact clear yellow urine. Sitter at bedside. 1050   Lungs are clear. Requires occasional suctioning. Pt is awake and restless at times. Pt has a plastic immobilizer to left wrist.   1109   Pt was medicated with Dilaudid 1 mg IV . Also given Clonopin. Pt becomes restless intermittently. 1509   Pt  was medicated with Dilaudid 1 mg Iv for pain. Pt resting in bed. Pt more quiet and sleeping at this time. 1700   Pt slept for about 2 hrs. Incontinent of loose brownish BM. Kept clean and dry. Pt is awake, and restless. Clonopin 1 mg sched dose given. Pt kept rubbing her Left  elbow with old  abrasion and redness. Small square mepilex applied.

## 2021-10-14 NOTE — PROGRESS NOTES
Bedside and Verbal shift change report given to Eric Dominguez RN (oncoming nurse) by Brooke Bailey RN (offgoing nurse). Report included the following information SBAR, Kardex, ED Summary, Intake/Output, MAR, Recent Results, Med Rec Status and Cardiac Rhythm NSR. Shift assessment completed. Patient is resting quietly with eyes wide open and chest rising and falling evenly. No signs of pain or complaints of discomfort. Sitter is with the patient. Patient complained of pain and medication was given.

## 2021-10-14 NOTE — PROGRESS NOTES
Hospitalist Progress Note-critical care note     Patient: Nicki Alba MRN: [id number]  [facility]: [id number]    YOB: 1980  Age: 39 y.o. Sex: female    DOA: 9/11/2021 LOS:  LOS: 32 days            Chief complaint: wrist fracture , drug overdose , acute respiratory failure with hypoxia, psychosis     Assessment/Plan         Hospital Problems  Date Reviewed: 9/6/2015        Codes Class Noted POA    Bacteremia due to Gram-positive bacteria ICD-10-CM: R78.81  ICD-9-CM: 790.7  10/6/2021 Unknown        Status post tracheostomy ([facility] ICD-10-CM: Z93.0  ICD-9-CM: V44.0  9/28/2021 Unknown        Hypokalemia ICD-10-CM: E87.6  ICD-9-CM: 276.8  9/21/2021 Unknown        Increased ammonia level ICD-10-CM: R79.89  ICD-9-CM: 790.6  9/14/2021 Unknown        Psychosis ([facility] ICD-10-CM: F29  ICD-9-CM: 298.9  Unknown Unknown        Hyponatremia ICD-10-CM: E87.1  ICD-9-CM: 276.1  Unknown Yes        Acute respiratory failure with hypoxia (HCC) ICD-10-CM: J96.01  ICD-9-CM: 518.81  Unknown Yes        Left wrist fracture, with delayed healing, subsequent encounter ICD-10-CM: S62.102G  ICD-9-CM: V54.19  9/12/2021 Unknown        * (Principal) Drug overdose, intentional self-harm, initial encounter ([facility] ICD-10-CM: T50.902A  ICD-9-CM: 977.9, E950.5  9/12/2021 Yes        Hypoxia ICD-10-CM: R09.02  ICD-9-CM: 799.02  9/12/2021 Yes        Hypotension after procedure ICD-10-CM: I95.81  ICD-9-CM: 458.29  9/12/2021 Yes        Acute metabolic encephalopathy THM-02-TH: G93.41  ICD-9-CM: 348.31  9/12/2021 Yes                  Nicki Alba is a 39 y.o. female who is standing history of multidrug use/abuse, previous drug-seeking behavior and mental health issues otherwise unspecified arrives via EMS with acute metabolic encephalopathy and lethargy. Admitted for likely gabapentin overdose. She was intubated in ER, ct head no acute issue, LP done due to fever even on abx, no meningitis noted.  Now she has peg and trach        Acute respiratory failure with hypoxia   Intubated on 9/12    Trach on 9/20/21. Continue breathing tx . Continue local trach care  Will have speech evaluation   pulm consult     Post tracheostomy   Continue local trach care       bacteremia   So far stable   bcx  Positive for GPC on 10/4   Sputum culture 9/24/2021: MRSA. Id f/u, continue vanc till 10/16     Anemia  H/h stable   Occult stool negative    no bleeding reported   Upper PVL negative for dvt           Acute metabolic encephalopathy   Ct head no acute process   Alert and follow the command          Gabapentin overdose with lethargy and AMS    S/p procedural stomach emptying in ED with charcoal and sorbitol    on Seroquel        elevated ammonia level  Continue   Lactulose,   Continue ammonia monitoring       Psychosis , hx of  Ekbom's delusional parasitosis   Still agitated   On  Klonopin,  Seroquel, halodol prn per psych recommendation   Continue cardiac monitoring     left wrist fracture: immobilized -poa -stable   Appreciated ortho on board-f/u with Dr. Domínguez Gip as out-pt   Cr mild elevated-will give some iv hydration, give mg      Sitter was at the bedside   Disposition :tbd,   Review of systems:  Unable to obtain due to trach collar   Vital signs/Intake and Output:  Visit Vitals  /68   Pulse (!) 119   Temp 98.7 °F (37.1 °C)   Resp 18   Ht 5' 2\" (1.575 m)   Wt 71.2 kg (156 lb 15.5 oz)   SpO2 97%   BMI 28.71 kg/m²     Current Shift:  10/14 0701 - 10/14 1900  In: 660   Out: -   Last three shifts:  10/12 1901 - 10/14 0700  In: 2998 [I.V.:250]  Out: 1550 [Urine:1550]    Physical Exam:  General: Sleeping   HEENT: NC, Atraumatic. Trach collar   Lungs: CTA Bilaterally. No Wheezing/Rhonchi/Rales. Heart:  tachy  No murmur, No Rubs, No Gallops  Abdomen: Soft, Non distended, Non tender.   +Bowel sounds, peg tube noted   Extremities: No c/c.immobilzer noted on left wrist   Psych:   Calm   Neurologic:  Sleeping           Labs: Results:       Chemistry Recent Labs     10/14/21  1045 10/13/21  0123 10/12/21  0520   * 94 111*    141 140   K 4.6 4.1 4.2    103 105   CO2 31 31 30   BUN 24* 22* 21*   CREA 1.34* 0.86 0.83   CA 9.8 10.1 10.2*   AGAP 5 7 5   BUCR 18 26* 25*    112 119*   TP 6.2* 6.4 6.3*   ALB 3.7 3.9 3.8   GLOB 2.5 2.5 2.5   AGRAT 1.5 1.6 1.5      CBC w/Diff Recent Labs     10/14/21  1045 10/13/21  1130 10/13/21  0123 10/12/21  0520 10/12/21  0520   WBC 9.4  --  8.3  --  6.9   RBC 2.55*  --  2.51*  --  2.93*   HGB 7.2* 8.1* 7.1*   < > 8.5*   HCT 22.4* 25.6* 23.0*   < > 26.9*     --  364  --  376   GRANS 72  --  57  --  59   LYMPH 14*  --  27  --  23   EOS 6*  --  5  --  6*    < > = values in this interval not displayed. Cardiac Enzymes Recent Labs     10/12/21  0520 10/11/21  2030   CPK 25* 41   CKND1 CALCULATION NOT PERFORMED WHEN RESULT IS BELOW LINEAR LIMIT CALCULATION NOT PERFORMED WHEN RESULT IS BELOW LINEAR LIMIT      Coagulation No results for input(s): PTP, INR, APTT, INREXT, INREXT in the last 72 hours. Lipid Panel No results found for: CHOL, CHOLPOCT, CHOLX, CHLST, CHOLV, 478367, HDL, HDLP, LDL, LDLC, DLDLP, 532702, VLDLC, VLDL, TGLX, TRIGL, TRIGP, TGLPOCT, CHHD, CHHDX   BNP No results for input(s): BNPP in the last 72 hours.    Liver Enzymes Recent Labs     10/14/21  1045   TP 6.2*   ALB 3.7         Thyroid Studies No results found for: T4, T3U, TSH, TSHEXT, TSHEXT     Procedures/imaging: see electronic medical records for all procedures/Xrays and details which were not copied into this note but were reviewed prior to creation of Plan    XR WRIST LT AP/LAT/OBL MIN 3V    Result Date: 8/19/2021  EXAM: XR WRIST LT AP/LAT/OBL MIN 3V CLINICAL INDICATION/HISTORY: Fall with LEFT wrist pain and swelling -Additional: None COMPARISON: None TECHNIQUE: 3 views of the left wrist _______________ FINDINGS: BONES: Comminuted, impacted fracture of the distal radius with slight dorsal angulation of the distal fracture fragment. No aggressive appearing osseous lytic or blastic lesion. SOFT TISSUES: Unremarkable. _______________     Comminuted, impacted fracture of the distal radius. XR CHEST PORT    Result Date: 9/13/2021  EXAM: XR CHEST PORT CLINICAL INDICATION/HISTORY: Acute respiratory failure, ET tub position -Additional: None COMPARISON: One day prior TECHNIQUE: Portable frontal view of the chest _______________ FINDINGS: SUPPORT DEVICES: Endotracheal and enteric tubes unchanged. HEART AND MEDIASTINUM: Cardiomediastinal silhouette within normal limits. LUNGS AND PLEURAL SPACES: No dense consolidation, large effusion or pneumothorax. _______________     No acute cardiopulmonary abnormality. XR CHEST PORT    Result Date: 9/11/2021  MEDICAL RECORDS NUMBER: [id number] PROCEDURE:  Single view of the chest DATE: 9/11/2021 9:09 PM HISTORY: 39years old Female. post ett Comparison: 8/4/2021 FINDINGS: The patient has been intubated with appropriate placement of the endotracheal tube. There is no enteric tube passing below the level of the diaphragm. There is no significant effusion. There is no significant pneumothorax. Cardiomediastinal silhouette is within normal limits. There is no evidence of a focal pulmonary infiltrate or mass. 1. There is no significant or acute cardiopulmonary process. The patient has been intubated with appropriate placement of the endotracheal tube. There is no enteric tube passing below the level of the diaphragm. This report has been generated using voice recognition software. XR ABD PORT  1 V    Result Date: 9/12/2021  EXAM: Frontal view of the abdomen CLINICAL INDICATION/HISTORY: NG tube placement COMPARISON: None. _______________ FINDINGS: NG/OG tube in place with the tip in the left upper quadrant in the region of the gastric fundus. No bowel obstruction. Moderate colonic stool burden. _______________     1.  NG/OG tube is in good position with the tip in the left upper quadrant in the region of the gastric fundus. 2. Moderate colonic stool burden.       Arnulfo Ordoñez MD

## 2021-10-14 NOTE — PROGRESS NOTES
Problem: Ventilator Management  Goal: *Adequate oxygenation and ventilation  Outcome: Progressing Towards Goal  Goal: *Patient maintains clear airway/free of aspiration  Outcome: Progressing Towards Goal  Goal: *Absence of infection signs and symptoms  Outcome: Progressing Towards Goal  Goal: *Normal spontaneous ventilation  Outcome: Progressing Towards Goal     Problem: Patient Education: Go to Patient Education Activity  Goal: Patient/Family Education  Outcome: Progressing Towards Goal     Problem: Non-Violent Restraints  Goal: Removal from restraints as soon as assessed to be safe  Outcome: Progressing Towards Goal  Goal: No harm/injury to patient while restraints in use  Outcome: Progressing Towards Goal  Goal: Patient's dignity will be maintained  Outcome: Progressing Towards Goal  Goal: Patient Interventions  Outcome: Progressing Towards Goal     Problem: Falls - Risk of  Goal: *Absence of Falls  Description: Document Christy Friend Fall Risk and appropriate interventions in the flowsheet.   Outcome: Progressing Towards Goal  Note: Fall Risk Interventions:  Mobility Interventions: Bed/chair exit alarm, Patient to call before getting OOB    Mentation Interventions: Adequate sleep, hydration, pain control, Door open when patient unattended, Reorient patient, Room close to nurse's station    Medication Interventions: Assess postural VS orthostatic hypotension, Evaluate medications/consider consulting pharmacy, Teach patient to arise slowly    Elimination Interventions: Call light in reach, Patient to call for help with toileting needs    History of Falls Interventions: Bed/chair exit alarm, Room close to nurse's station         Problem: Patient Education: Go to Patient Education Activity  Goal: Patient/Family Education  Outcome: Progressing Towards Goal     Problem: Risk for Spread of Infection  Goal: Prevent transmission of infectious organism to others  Description: Prevent the transmission of infectious organisms to other patients, staff members, and visitors. Outcome: Progressing Towards Goal     Problem: Patient Education:  Go to Education Activity  Goal: Patient/Family Education  Outcome: Progressing Towards Goal     Problem: Pressure Injury - Risk of  Goal: *Prevention of pressure injury  Description: Document Remy Scale and appropriate interventions in the flowsheet.   Outcome: Progressing Towards Goal  Note: Pressure Injury Interventions:  Sensory Interventions: Assess changes in LOC, Keep linens dry and wrinkle-free, Minimize linen layers    Moisture Interventions: Absorbent underpads, Limit adult briefs, Minimize layers, Moisture barrier    Activity Interventions: Pressure redistribution bed/mattress(bed type)    Mobility Interventions: HOB 30 degrees or less    Nutrition Interventions: Document food/fluid/supplement intake, Offer support with meals,snacks and hydration    Friction and Shear Interventions: HOB 30 degrees or less                Problem: Patient Education: Go to Patient Education Activity  Goal: Patient/Family Education  Outcome: Progressing Towards Goal

## 2021-10-15 ENCOUNTER — APPOINTMENT (OUTPATIENT)
Dept: GENERAL RADIOLOGY | Age: 41
DRG: 004 | End: 2021-10-15
Attending: HOSPITALIST
Payer: MEDICAID

## 2021-10-15 LAB
ALBUMIN SERPL-MCNC: 3.4 G/DL (ref 3.4–5)
ALBUMIN/GLOB SERPL: 1.4 {RATIO} (ref 0.8–1.7)
ALP SERPL-CCNC: 93 U/L (ref 45–117)
ALT SERPL-CCNC: 24 U/L (ref 13–56)
AMMONIA PLAS-SCNC: 36 UMOL/L (ref 11–32)
ANION GAP SERPL CALC-SCNC: 6 MMOL/L (ref 3–18)
AST SERPL-CCNC: 14 U/L (ref 10–38)
BASOPHILS # BLD: 0 K/UL (ref 0–0.1)
BASOPHILS NFR BLD: 0 % (ref 0–2)
BILIRUB SERPL-MCNC: 0.4 MG/DL (ref 0.2–1)
BUN SERPL-MCNC: 21 MG/DL (ref 7–18)
BUN/CREAT SERPL: 15 (ref 12–20)
CALCIUM SERPL-MCNC: 9.3 MG/DL (ref 8.5–10.1)
CHLORIDE SERPL-SCNC: 104 MMOL/L (ref 100–111)
CO2 SERPL-SCNC: 30 MMOL/L (ref 21–32)
CREAT SERPL-MCNC: 1.41 MG/DL (ref 0.6–1.3)
DIFFERENTIAL METHOD BLD: ABNORMAL
EOSINOPHIL # BLD: 0.6 K/UL (ref 0–0.4)
EOSINOPHIL NFR BLD: 8 % (ref 0–5)
ERYTHROCYTE [DISTWIDTH] IN BLOOD BY AUTOMATED COUNT: 13.5 % (ref 11.6–14.5)
GLOBULIN SER CALC-MCNC: 2.5 G/DL (ref 2–4)
GLUCOSE BLD STRIP.AUTO-MCNC: 126 MG/DL (ref 70–110)
GLUCOSE BLD STRIP.AUTO-MCNC: 131 MG/DL (ref 70–110)
GLUCOSE BLD STRIP.AUTO-MCNC: 134 MG/DL (ref 70–110)
GLUCOSE SERPL-MCNC: 117 MG/DL (ref 74–99)
HCT VFR BLD AUTO: 21.7 % (ref 35–45)
HCT VFR BLD AUTO: 22.3 % (ref 35–45)
HGB BLD-MCNC: 6.9 G/DL (ref 12–16)
HGB BLD-MCNC: 7.1 G/DL (ref 12–16)
LYMPHOCYTES # BLD: 1.6 K/UL (ref 0.9–3.6)
LYMPHOCYTES NFR BLD: 22 % (ref 21–52)
MAGNESIUM SERPL-MCNC: 2.3 MG/DL (ref 1.6–2.6)
MCH RBC QN AUTO: 28.9 PG (ref 24–34)
MCHC RBC AUTO-ENTMCNC: 31.8 G/DL (ref 31–37)
MCV RBC AUTO: 90.8 FL (ref 78–100)
MONOCYTES # BLD: 0.6 K/UL (ref 0.05–1.2)
MONOCYTES NFR BLD: 9 % (ref 3–10)
NEUTS SEG # BLD: 4.3 K/UL (ref 1.8–8)
NEUTS SEG NFR BLD: 60 % (ref 40–73)
PHOSPHATE SERPL-MCNC: 6.7 MG/DL (ref 2.5–4.9)
PLATELET # BLD AUTO: 267 K/UL (ref 135–420)
PMV BLD AUTO: 10.1 FL (ref 9.2–11.8)
POTASSIUM SERPL-SCNC: 3.8 MMOL/L (ref 3.5–5.5)
PROT SERPL-MCNC: 5.9 G/DL (ref 6.4–8.2)
RBC # BLD AUTO: 2.39 M/UL (ref 4.2–5.3)
SODIUM SERPL-SCNC: 140 MMOL/L (ref 136–145)
VANCOMYCIN SERPL-MCNC: 28.3 UG/ML (ref 5–40)
WBC # BLD AUTO: 7.1 K/UL (ref 4.6–13.2)

## 2021-10-15 PROCEDURE — 80202 ASSAY OF VANCOMYCIN: CPT

## 2021-10-15 PROCEDURE — 83735 ASSAY OF MAGNESIUM: CPT

## 2021-10-15 PROCEDURE — 74011250637 HC RX REV CODE- 250/637: Performed by: INTERNAL MEDICINE

## 2021-10-15 PROCEDURE — 84100 ASSAY OF PHOSPHORUS: CPT

## 2021-10-15 PROCEDURE — 82962 GLUCOSE BLOOD TEST: CPT

## 2021-10-15 PROCEDURE — 71045 X-RAY EXAM CHEST 1 VIEW: CPT

## 2021-10-15 PROCEDURE — 82140 ASSAY OF AMMONIA: CPT

## 2021-10-15 PROCEDURE — 92523 SPEECH SOUND LANG COMPREHEN: CPT

## 2021-10-15 PROCEDURE — 74011250636 HC RX REV CODE- 250/636: Performed by: INTERNAL MEDICINE

## 2021-10-15 PROCEDURE — 74011000250 HC RX REV CODE- 250: Performed by: INTERNAL MEDICINE

## 2021-10-15 PROCEDURE — 74011250637 HC RX REV CODE- 250/637: Performed by: FAMILY MEDICINE

## 2021-10-15 PROCEDURE — 94640 AIRWAY INHALATION TREATMENT: CPT

## 2021-10-15 PROCEDURE — 74011250636 HC RX REV CODE- 250/636: Performed by: FAMILY MEDICINE

## 2021-10-15 PROCEDURE — 2709999900 HC NON-CHARGEABLE SUPPLY

## 2021-10-15 PROCEDURE — 74011250637 HC RX REV CODE- 250/637: Performed by: HOSPITALIST

## 2021-10-15 PROCEDURE — 85018 HEMOGLOBIN: CPT

## 2021-10-15 PROCEDURE — 65660000000 HC RM CCU STEPDOWN

## 2021-10-15 PROCEDURE — 80053 COMPREHEN METABOLIC PANEL: CPT

## 2021-10-15 PROCEDURE — 74011250637 HC RX REV CODE- 250/637: Performed by: PSYCHIATRY & NEUROLOGY

## 2021-10-15 PROCEDURE — 36415 COLL VENOUS BLD VENIPUNCTURE: CPT

## 2021-10-15 PROCEDURE — 85025 COMPLETE CBC W/AUTO DIFF WBC: CPT

## 2021-10-15 PROCEDURE — 77030018798 HC PMP KT ENTRL FED COVD -A

## 2021-10-15 PROCEDURE — 74011636637 HC RX REV CODE- 636/637: Performed by: INTERNAL MEDICINE

## 2021-10-15 RX ORDER — LORAZEPAM 2 MG/ML
1 INJECTION INTRAMUSCULAR ONCE
Status: COMPLETED | OUTPATIENT
Start: 2021-10-15 | End: 2021-10-15

## 2021-10-15 RX ORDER — DIAZEPAM 5 MG/1
2.5 TABLET ORAL 2 TIMES DAILY
Status: DISCONTINUED | OUTPATIENT
Start: 2021-10-15 | End: 2021-10-23

## 2021-10-15 RX ORDER — CLONAZEPAM 0.5 MG/1
2 TABLET ORAL 3 TIMES DAILY
Status: DISCONTINUED | OUTPATIENT
Start: 2021-10-15 | End: 2021-10-23

## 2021-10-15 RX ADMIN — CLONAZEPAM 1 MG: 0.5 TABLET ORAL at 09:19

## 2021-10-15 RX ADMIN — NYSTATIN: 100000 POWDER TOPICAL at 09:00

## 2021-10-15 RX ADMIN — FAMOTIDINE 20 MG: 20 TABLET ORAL at 20:53

## 2021-10-15 RX ADMIN — QUETIAPINE FUMARATE 100 MG: 100 TABLET ORAL at 09:20

## 2021-10-15 RX ADMIN — LORAZEPAM 1 MG: 2 INJECTION INTRAMUSCULAR; INTRAVENOUS at 02:08

## 2021-10-15 RX ADMIN — ARFORMOTEROL TARTRATE 15 MCG: 15 SOLUTION RESPIRATORY (INHALATION) at 19:42

## 2021-10-15 RX ADMIN — LORAZEPAM 2 MG: 2 INJECTION INTRAMUSCULAR at 22:55

## 2021-10-15 RX ADMIN — ONDANSETRON 4 MG: 2 INJECTION INTRAMUSCULAR; INTRAVENOUS at 01:34

## 2021-10-15 RX ADMIN — Medication 1 TABLET: at 09:20

## 2021-10-15 RX ADMIN — HYDROMORPHONE HYDROCHLORIDE 1 MG: 1 INJECTION, SOLUTION INTRAMUSCULAR; INTRAVENOUS; SUBCUTANEOUS at 01:34

## 2021-10-15 RX ADMIN — VANCOMYCIN HYDROCHLORIDE 750 MG: 750 INJECTION, POWDER, LYOPHILIZED, FOR SOLUTION INTRAVENOUS at 20:52

## 2021-10-15 RX ADMIN — DIAZEPAM 2.5 MG: 5 TABLET ORAL at 20:53

## 2021-10-15 RX ADMIN — CLONAZEPAM 2 MG: 0.5 TABLET ORAL at 15:34

## 2021-10-15 RX ADMIN — NYSTATIN: 100000 POWDER TOPICAL at 16:00

## 2021-10-15 RX ADMIN — QUETIAPINE FUMARATE 300 MG: 200 TABLET ORAL at 20:53

## 2021-10-15 RX ADMIN — ACETAMINOPHEN 650 MG: 325 TABLET ORAL at 21:57

## 2021-10-15 RX ADMIN — ENOXAPARIN SODIUM 40 MG: 100 INJECTION SUBCUTANEOUS at 16:50

## 2021-10-15 RX ADMIN — Medication 5 MG: at 21:57

## 2021-10-15 RX ADMIN — INSULIN LISPRO 2 UNITS: 100 INJECTION, SOLUTION INTRAVENOUS; SUBCUTANEOUS at 00:24

## 2021-10-15 RX ADMIN — BUDESONIDE 500 MCG: 0.5 INHALANT RESPIRATORY (INHALATION) at 09:01

## 2021-10-15 RX ADMIN — ARFORMOTEROL TARTRATE 15 MCG: 15 SOLUTION RESPIRATORY (INHALATION) at 09:01

## 2021-10-15 RX ADMIN — LACTULOSE 15 ML: 10 SOLUTION ORAL at 09:00

## 2021-10-15 RX ADMIN — CLONAZEPAM 2 MG: 0.5 TABLET ORAL at 21:57

## 2021-10-15 RX ADMIN — HYDROMORPHONE HYDROCHLORIDE 1 MG: 1 INJECTION, SOLUTION INTRAMUSCULAR; INTRAVENOUS; SUBCUTANEOUS at 09:19

## 2021-10-15 RX ADMIN — BUDESONIDE 500 MCG: 0.5 INHALANT RESPIRATORY (INHALATION) at 19:43

## 2021-10-15 RX ADMIN — LACTULOSE 15 ML: 10 SOLUTION ORAL at 21:02

## 2021-10-15 RX ADMIN — HYDROMORPHONE HYDROCHLORIDE 1 MG: 1 INJECTION, SOLUTION INTRAMUSCULAR; INTRAVENOUS; SUBCUTANEOUS at 15:34

## 2021-10-15 RX ADMIN — THIAMINE HCL TAB 100 MG 100 MG: 100 TAB at 09:20

## 2021-10-15 RX ADMIN — FOLIC ACID 1 MG: 1 TABLET ORAL at 09:20

## 2021-10-15 RX ADMIN — NYSTATIN: 100000 POWDER TOPICAL at 22:05

## 2021-10-15 RX ADMIN — FAMOTIDINE 20 MG: 20 TABLET ORAL at 09:20

## 2021-10-15 NOTE — PROGRESS NOTES
Problem: Ventilator Management  Goal: *Adequate oxygenation and ventilation  Outcome: Progressing Towards Goal  Goal: *Patient maintains clear airway/free of aspiration  Outcome: Progressing Towards Goal  Goal: *Absence of infection signs and symptoms  Outcome: Progressing Towards Goal  Goal: *Normal spontaneous ventilation  Outcome: Progressing Towards Goal     Problem: Patient Education: Go to Patient Education Activity  Goal: Patient/Family Education  Outcome: Progressing Towards Goal     Problem: Non-Violent Restraints  Goal: Removal from restraints as soon as assessed to be safe  Outcome: Progressing Towards Goal  Goal: No harm/injury to patient while restraints in use  Outcome: Progressing Towards Goal  Goal: Patient's dignity will be maintained  Outcome: Progressing Towards Goal  Goal: Patient Interventions  Outcome: Progressing Towards Goal     Problem: Falls - Risk of  Goal: *Absence of Falls  Description: Document Mateusz Puga Fall Risk and appropriate interventions in the flowsheet. Outcome: Progressing Towards Goal  Note: Fall Risk Interventions:  Mobility Interventions: Bed/chair exit alarm    Mentation Interventions: Adequate sleep, hydration, pain control    Medication Interventions: Patient to call before getting OOB    Elimination Interventions: Bed/chair exit alarm    History of Falls Interventions: Vital signs minimum Q4HRs X 24 hrs (comment for end date)         Problem: Patient Education: Go to Patient Education Activity  Goal: Patient/Family Education  Outcome: Progressing Towards Goal     Problem: Risk for Spread of Infection  Goal: Prevent transmission of infectious organism to others  Description: Prevent the transmission of infectious organisms to other patients, staff members, and visitors.   Outcome: Progressing Towards Goal     Problem: Patient Education:  Go to Education Activity  Goal: Patient/Family Education  Outcome: Progressing Towards Goal     Problem: Pressure Injury - Risk of  Goal: *Prevention of pressure injury  Description: Document Remy Scale and appropriate interventions in the flowsheet.   Outcome: Progressing Towards Goal  Variance Patient Condition  Note: Pressure Injury Interventions:  Sensory Interventions: Check visual cues for pain    Moisture Interventions: Absorbent underpads, Minimize layers    Activity Interventions: Pressure redistribution bed/mattress(bed type)    Mobility Interventions: HOB 30 degrees or less    Nutrition Interventions: Document food/fluid/supplement intake    Friction and Shear Interventions: HOB 30 degrees or less, Minimize layers                Problem: Patient Education: Go to Patient Education Activity  Goal: Patient/Family Education  Outcome: Progressing Towards Goal

## 2021-10-15 NOTE — PROGRESS NOTES
[address] [facility] Pharmacokinetic Monitoring Service - Vancomycin    Consulting Provider: Vasu Booth   Indication: Bloodstream infection, VAP  Target Concentration: Goal AUC/CRYSTAL 400-600 mg*hr/L  Day of Therapy: 3  Additional Antimicrobials: None    Pertinent Laboratory Values: Wt Readings from Last 1 Encounters:   10/08/21 71.2 kg (156 lb 15.5 oz)     Temp Readings from Last 1 Encounters:   10/15/21 97.9 °F (36.6 °C)     No components found for: PROCAL  Estimated Creatinine Clearance: 48.5 mL/min (A) (based on SCr of 1.41 mg/dL (H)). Recent Labs     10/15/21  0510 10/14/21  1045   WBC 7.1 9.4       Pertinent Cultures:  Culture Date Source Results   10/4/21    10/6/21 Blood    Sputum STAPHYLOCOCCUS SPECIES, COAGULASE NEGATIVE  LIGHT * METHICILLIN RESISTANT STAPHYLOCOCCUS AUREUS *Abnormal     MRSA Nasal Swab: N/A.     [unfilled]    Assessment:  Date/Time Current Dose Concentration Timing of Concentration (h) AUC   10/15/21 0510 Vancomycin 1g IV q24h 28.3 mcg/ml 10 h 31 min since last dose 535 mg*h/L   Note: Serum concentrations collected for AUC dosing may appear elevated if collected in close proximity to the dose administered, this is not necessarily an indication of toxicity    Plan:  Current dosing regimen is supra-therapeutic  Decrease dose to Vancomycin 750 mg IV q24h  Pharmacy will continue to monitor patient and adjust therapy as indicated    Thank you for the consult,  OSIRIS Fonseca  10/15/2021 11:09 AM

## 2021-10-15 NOTE — PROGRESS NOTES
Problem: Voice Impaired (Adult)  Goal: *Acute Goals and Plan of Care (Insert Text)  Description: Pt will:  1. Produce voice to finger occlusion in 4/5 trials without change in vital.  2. Initiate PMV placement by SLP when appropriate. 3. Tolerate PMV placement for 30 minute increments without compromise to vitals. 4. Utilize proper breath support/techniques for increasing tolerance of PMV. 5. Phonate 4-6 word utterances with adequate breath support for adequate intensity of speech/increased intelligibilty with PMV. Outcome: Progressing Towards Goal     SPEECH LANGUAGE PATHOLOGY   SPEECH-LANGUAGE EVALUATION    Patient: Milton Sharp (29 y.o. female)  Date: 10/15/2021  Primary Diagnosis: Drug overdose, intentional self-harm, initial encounter ([facility] Tamea Haste  Left wrist fracture, with delayed healing, subsequent encounter [S62.102G]  Procedure(s) (LRB):  TRACHEOSTOMY, PT IS IN ICU BED 4 (N/A) 23 Days Post-Op   Precautions:      PLOF: Per H&P    ASSESSMENT :  Pt seen for initial voice eval s/p trach. Pt awake, comfused, sitter at bedside reporting she continuously attempts to pull at trach/tubes/lines. Trach suctioned x1 for scant mildly thick, white-yellowish secretions. Digital occlusion applied x1, pt able to verbalize \"ah\" to command with clear vocal quality, appropriate volume. Following 10 sec, pt stated \"I can't breath\" with increase in resp rate and increased WOB. Digital occlusion removed. Attempted x1 more trial with same results. SLP will continue to follow for ongoing dxtx and possible PMV trials as appropriate. Patient will benefit from skilled intervention to address the above impairments.   Patient's rehabilitation potential is considered to be Fair  Factors which may influence rehabilitation potential include:   []              None noted  [x]              Mental ability/status  []              Medical condition  []              Home/family situation and support systems  [x] Safety awareness  []              Pain tolerance/management  []              Other:      PLAN :  Recommendations and Planned Interventions:  See above. Frequency/Duration: Patient will be followed by speech-language pathology 1-2 times per day/3-5 days per week to address goals. Discharge Recommendations: To Be Determined     SUBJECTIVE:   Patient stated I can't breathe with digital occlusion in place. OBJECTIVE:     Past Medical History:   Diagnosis Date    Asthma     Bilateral ovarian cysts     Cocaine abuse ([facility] [address]     Mental and behavioral problem     Pancreatitis     Pancreatitis     Psychosis ([facility]      Past Surgical History:   Procedure Laterality Date    HX GYN      D&C, Hysterectomy    IR INSERT GASTROSTOMY TUBE PERC  10/1/2021     Home Situation:      Mental Status:  Neurologic State: Alert, Confused, Eyes open spontaneously  Orientation Level: Oriented to person  Cognition: Decreased attention/concentration, Decreased command following, Impaired decision making, Impulsive, Poor safety awareness  Perception: Appears intact  Perseveration: No perseveration noted  Safety/Judgement: Decreased awareness of need for assistance, Decreased awareness of need for safety, Decreased insight into deficits, Fall prevention, Insight into deficits      PAIN:  Pain level pre-treatment: 0/10   Pain level post-treatment: 0/10     After treatment:   []              Patient left in no apparent distress sitting up in chair  [x]              Patient left in no apparent distress in bed  [x]              Call bell left within reach  [x]              Nursing notified  []              Caregiver present  []              Bed alarm activated    COMMUNICATION/EDUCATION:   [x] Patient/family have participated as able in goal setting and plan of care. []  Patient/family agree to work toward stated goals and plan of care. []  Patient understands intent and goals of therapy, but is neutral about his/her participation.   [x]  Patient is unable to participate in goal setting and plan of care; education ongoing with interdisciplinary staff    Thank you for this referral.    Gray Anderson M.S., CCC-SLP  Speech-Language Pathologist    Time Calculation: 10 mins

## 2021-10-15 NOTE — PROGRESS NOTES
10/15/21 0115   Airway Clearance   Suction Trach   Suction Device Suction catheter   Suction Catheter Size 14 Fr   Sputum Method Obtained Tracheal   Sputum Amount Large   Sputum Color/Odor Pink tinged;Yellow   Sputum Consistency Thick       Sitter at bedside,pt. very agitated, pulling at trach collar and trach. Blood tinged sputum sxn'd. Emergency trachs at bedside. Trach dressing removed, soiled and occluding trach. Did not replace. Trach collar changed, full of sputum. Left with sitter.

## 2021-10-15 NOTE — PROGRESS NOTES
0710  Received bedside report from Pierce Melendez RN. Report included KARDEX, SBAR, med rec and labs.

## 2021-10-15 NOTE — ROUTINE PROCESS
Bedside and Verbal shift change report given to Chela Reid RN and Dakota Segovia RN  (oncoming nurses) by Slim Cordova RN  (offgoing nurse). Report given with SBAR, Kardex, Intake/Output and Recent Results.

## 2021-10-15 NOTE — PROGRESS NOTES
Vancomycin random level = 28.3 @ 0510 on 10/15/2021      Regimen: 750 mg IV every 24 hours.   Start time: 06:53 on 10/15/2021  Exposure target: AUC24 (range)400-600 mg/L.hr   AUC24,ss: 503 mg/L.hr  Probability of AUC24 > 400: 99 %  Ctrough,ss: 18.4 mg/L  Probability of Ctrough,ss > 20: 23 %  Probability of nephrotoxicity (Lodise SAPNA 2009): 15 %

## 2021-10-15 NOTE — PROGRESS NOTES
2112 Peg tube residual 80cc of tan fluid. Medications administered as ordered. 2138 The patient has s/s of pain and when asked directly she nodded her head yes. Administered 1mg IV dilaudid as ordered prn for pain. 2250 The patient's agitation has been increasing. She is consistently attempting to get OOB, pull at her peg tube, trach and IV tubing. A sitter is at the bedside but the patient is unable to be calmed or controled. Will contact Dr Estefany Higginbotham. 2332 Administered 5mg IV haldol as ordered prn for agitation. The sitter remains at the bedside. 0134 The sitter reported that the patient vomited x1 and the patient is motioning that she has been in her chest. Administered 1mg IV dilaudid plus 4mg IV zofran as ordered for pain and nausea. She remains highly agitated and restless. Residual is currently 60cc. An order for restraints was received but she has a broken left wrist.     0154 Spoke to Dr Estefany Higginbotham. Concerns about the restraint order expressed due to the patient's broken left wrist. Ativan was previously held due to reports that it increased her agitation. Dr Estefany Higginbotham ordered 1mg ativan x1 now which is a lower dose than previously ordered. Notified the MD of the vomiting and tube feeding residual.     0208 Administered 1mg IV ativan per orders. The sitter remains at the bedside at all times. 0230 The patient appears to be sleeping. Breathing is even and unlabored with no s/s of distress of any kind. The sitter remains within arms reach. 9570 The patient appeared to be sleeping the remainder of the shift. Breathing was even and unlabored without s/s of distress. A sitter remains at the bedside.

## 2021-10-15 NOTE — PROGRESS NOTES
Hospitalist Progress Note-critical care note     Patient: Te Gustafson MRN: [id number]  [facility]: [id number]    YOB: 1980  Age: 39 y.o. Sex: female    DOA: 9/11/2021 LOS:  LOS: 33 days            Chief complaint: wrist fracture , drug overdose , acute respiratory failure with hypoxia, psychosis     Assessment/Plan         Hospital Problems  Date Reviewed: 9/6/2015        Codes Class Noted POA    Bacteremia due to Gram-positive bacteria ICD-10-CM: R78.81  ICD-9-CM: 790.7  10/6/2021 Unknown        Status post tracheostomy ([facility] ICD-10-CM: Z93.0  ICD-9-CM: V44.0  9/28/2021 Unknown        Hypokalemia ICD-10-CM: E87.6  ICD-9-CM: 276.8  9/21/2021 Unknown        Increased ammonia level ICD-10-CM: R79.89  ICD-9-CM: 790.6  9/14/2021 Unknown        Psychosis ([facility].) ICD-10-CM: F29  ICD-9-CM: 298.9  Unknown Unknown        Hyponatremia ICD-10-CM: E87.1  ICD-9-CM: 276.1  Unknown Yes        Acute respiratory failure with hypoxia (HCC) ICD-10-CM: J96.01  ICD-9-CM: 518.81  Unknown Yes        Left wrist fracture, with delayed healing, subsequent encounter ICD-10-CM: S62.102G  ICD-9-CM: V54.19  9/12/2021 Unknown        * (Principal) Drug overdose, intentional self-harm, initial encounter ([facility].) ICD-10-CM: T50.902A  ICD-9-CM: 977.9, E950.5  9/12/2021 Yes        Hypoxia ICD-10-CM: R09.02  ICD-9-CM: 799.02  9/12/2021 Yes        Hypotension after procedure ICD-10-CM: I95.81  ICD-9-CM: 458.29  9/12/2021 Yes        Acute metabolic encephalopathy [facility]-DW: G93.41  ICD-9-CM: 348.31  9/12/2021 Yes                  Te Gustafson is a 39 y.o. female who is standing history of multidrug use/abuse, previous drug-seeking behavior and mental health issues otherwise unspecified arrives via EMS with acute metabolic encephalopathy and lethargy. Admitted for likely gabapentin overdose. She was intubated in ER, ct head no acute issue, LP done due to fever even on abx, no meningitis noted.  Now she has peg and trach        Acute respiratory failure with hypoxia   Intubated on 9/12    Trach on 9/20/21. Continue breathing tx . Continue local trach care  Appreciated pulmonologist in put   Repeat cxr today     Post tracheostomy   Continue local trach care       bacteremia   So far stable   bcx  Positive for GPC on 10/4   Sputum culture 9/24/2021: MRSA. Id f/u, continue vanc till 10/16     Anemia  Will repeat h/h before transfusion   Occult stool negative    no bleeding reported   Upper PVL negative for dvt           Acute metabolic encephalopathy   Ct head no acute process         Gabapentin overdose with lethargy and AMS    S/p procedural stomach emptying in ED with charcoal and sorbitol    on Seroquel        elevated ammonia level  Continue   Lactulose,   Continue ammonia monitoring       Psychosis , hx of  Ekbom's delusional parasitosis   Still agitated   Increase  Klonopin,  Seroquel, halodol prn per psych recommendation   Continue cardiac monitoring     left wrist fracture: immobilized -poa -stable   Appreciated ortho on board-f/u with Dr. Lara Leavitt as out-pt   Cr mild elevated-will give some iv hydration, give mg      Mild elevated cr   Continue low rate iv hydration     Sitter was at the bedside     Try to pulling the trach , agitation     Poor prognosis , dc barrier   Disposition :tbd,   Review of systems:  Unable to obtain due to trach collar   Vital signs/Intake and Output:  Visit Vitals  /66 (BP 1 Location: Right leg, BP Patient Position: Lying left side; At rest)   Pulse (!) 117   Temp 97.3 °F (36.3 °C)   Resp 18   Ht 5' 2\" (1.575 m)   Wt 71.2 kg (156 lb 15.5 oz)   SpO2 95%   BMI 28.71 kg/m²     Current Shift:  No intake/output data recorded. Last three shifts:  10/13 1901 - 10/15 0700  In: 1530   Out: 1390 [Urine:1250]    Physical Exam:  General: Sleeping   HEENT: NC, Atraumatic. Trach collar   Lungs: Coarse bs   Heart:  tachy  No murmur, No Rubs, No Gallops  Abdomen: Soft, Non distended, Non tender.   +Bowel sounds, peg tube noted   Extremities: No c/c.immobilzer noted on left wrist   Psych:   Calm   Neurologic:  Sleeping           Labs: Results:       Chemistry Recent Labs     10/15/21  0510 10/14/21  1045 10/13/21  0123   * 123* 94    138 141   K 3.8 4.6 4.1    102 103   CO2 30 31 31   BUN 21* 24* 22*   CREA 1.41* 1.34* 0.86   CA 9.3 9.8 10.1   AGAP 6 5 7   BUCR 15 18 26*   AP 93 103 112   TP 5.9* 6.2* 6.4   ALB 3.4 3.7 3.9   GLOB 2.5 2.5 2.5   AGRAT 1.4 1.5 1.6      CBC w/Diff Recent Labs     10/15/21  0510 10/14/21  1045 10/13/21  1130 10/13/21  0123 10/13/21  0123   WBC 7.1 9.4  --   --  8.3   RBC 2.39* 2.55*  --   --  2.51*   HGB 6.9* 7.2* 8.1*   < > 7.1*   HCT 21.7* 22.4* 25.6*   < > 23.0*    335  --   --  364   GRANS 60 72  --   --  57   LYMPH 22 14*  --   --  27   EOS 8* 6*  --   --  5    < > = values in this interval not displayed. Cardiac Enzymes No results for input(s): CPK, CKND1, ZENON in the last 72 hours. No lab exists for component: CKRMB, TROIP   Coagulation No results for input(s): PTP, INR, APTT, INREXT, INREXT in the last 72 hours. Lipid Panel No results found for: CHOL, CHOLPOCT, CHOLX, CHLST, CHOLV, 855165, HDL, HDLP, LDL, LDLC, DLDLP, 995827, VLDLC, VLDL, TGLX, TRIGL, TRIGP, TGLPOCT, CHHD, CHHDX   BNP No results for input(s): BNPP in the last 72 hours.    Liver Enzymes Recent Labs     10/15/21  0510   TP 5.9*   ALB 3.4   AP 93      Thyroid Studies No results found for: T4, T3U, TSH, TSHEXT, TSHEXT     Procedures/imaging: see electronic medical records for all procedures/Xrays and details which were not copied into this note but were reviewed prior to creation of Plan    XR WRIST LT AP/LAT/OBL MIN 3V    Result Date: 8/19/2021  EXAM: XR WRIST LT AP/LAT/OBL MIN 3V CLINICAL INDICATION/HISTORY: Fall with LEFT wrist pain and swelling -Additional: None COMPARISON: None TECHNIQUE: 3 views of the left wrist _______________ FINDINGS: BONES: Comminuted, impacted fracture of the distal radius with slight dorsal angulation of the distal fracture fragment. No aggressive appearing osseous lytic or blastic lesion. SOFT TISSUES: Unremarkable. _______________     Comminuted, impacted fracture of the distal radius. XR CHEST PORT    Result Date: 9/13/2021  EXAM: XR CHEST PORT CLINICAL INDICATION/HISTORY: Acute respiratory failure, ET tub position -Additional: None COMPARISON: One day prior TECHNIQUE: Portable frontal view of the chest _______________ FINDINGS: SUPPORT DEVICES: Endotracheal and enteric tubes unchanged. HEART AND MEDIASTINUM: Cardiomediastinal silhouette within normal limits. LUNGS AND PLEURAL SPACES: No dense consolidation, large effusion or pneumothorax. _______________     No acute cardiopulmonary abnormality. XR CHEST PORT    Result Date: 9/11/2021  MEDICAL RECORDS NUMBER: [id number] PROCEDURE:  Single view of the chest DATE: 9/11/2021 9:09 PM HISTORY: 39years old Female. post ett Comparison: 8/4/2021 FINDINGS: The patient has been intubated with appropriate placement of the endotracheal tube. There is no enteric tube passing below the level of the diaphragm. There is no significant effusion. There is no significant pneumothorax. Cardiomediastinal silhouette is within normal limits. There is no evidence of a focal pulmonary infiltrate or mass. 1. There is no significant or acute cardiopulmonary process. The patient has been intubated with appropriate placement of the endotracheal tube. There is no enteric tube passing below the level of the diaphragm. This report has been generated using voice recognition software. XR ABD PORT  1 V    Result Date: 9/12/2021  EXAM: Frontal view of the abdomen CLINICAL INDICATION/HISTORY: NG tube placement COMPARISON: None. _______________ FINDINGS: NG/OG tube in place with the tip in the left upper quadrant in the region of the gastric fundus. No bowel obstruction. Moderate colonic stool burden. _______________     1.  NG/OG tube is in good position with the tip in the left upper quadrant in the region of the gastric fundus. 2. Moderate colonic stool burden.       Queen Sandrita MD

## 2021-10-15 NOTE — PROGRESS NOTES
RESPIRATORY NOTE:       Trach care done, changed inner cannula, good strong cough of minimal blood tinged secretions. Very agitated, constant movement, trying to constantly get out of bed or remove tubes, etc... no respiratory distress, remains on 28% trach collar when wearing.

## 2021-10-15 NOTE — PROGRESS NOTES
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    Left wrist fracture, with delayed healing, subsequent encounter S62.102G    Drug overdose, intentional self-harm, initial encounter ([address] T50.902A    Hypoxia R09.02    Hypotension after procedure I95.81    Acute metabolic encephalopathy N40.99    Psychosis (HCC) F29    Hyponatremia E87.1    Acute respiratory failure with hypoxia (HCC) J96.01    Increased ammonia level R79.89    Hypokalemia E87.6    Status post tracheostomy ([facility] [address] Z93.0    Bacteremia due to Gram-positive bacteria R78.81     · Code status: Full code     RECOMMENDATIONS:   Patient with chronic respiratory failure; she was intubated 2021 due to encephalopathy and drug overdose; extubated on 2021, and reintubated within an hour due to respiratory distress and upper airway edema; patient subsequently underwent tracheostomy 2021. Patient remains confused and agitated frequently. I would not recommend decannulation since patient is at high risk of aspirations. Continue trach collar management with oxygen support. Patient has #8 Shiley cuffed trach tube cuff is currently deflated. Chest x-ray from today reviewedstable inflation of both lungs; tracheostomy tube in good position; no focal consolidations or pleural effusions; mild right basal atelectasis. Continue trach care with suctioning as needed by RT. Continue bronchodilatorsBrovana and budesonide nebulizers; prn DuoNeb.   Patient on IV vancomycin; continue antibiotic therapy per ID. Patient on Seroquel for delirium. Patient is on clonazepam for anxiety; frequent agitation; would add diazepam 2.5 mg twice daily for now. DVT prophylaxisenoxaparin. GI prophylaxisfamotidine. Patient has poor prognosis overall. She is not a candidate for tracheostomy tube weaning trials or decannulation during this hospitalization; she is extremely confused and agitated, and seems to suffer from chronic delirium; she is at high risk of aspirations; this is not a patient that can be decannulated safely during this hospitalization. Lines/Tubes: PIV   S/p ETT: 9/11/219/22/2021. Tracheostomy 9/22/2021. Status post PEG on 10/1/2021   [place]: 9/11/21; changed 9/28/2021. ADVANCE DIRECTIVE: Full code. Subjective/History:   Ms. Joaquina Rivas has been seen and evaluated as Dr. Robin Rubi requested now for assisting with Acute respiratory failure and ventilator management. Patient is a 39 y.o. female with following PMhx presented to ER with lethargy via EMS and admitted for Gabapentin overdose. Pt required intubation for airways protection. Position control was contacted that recommended supportive care per ER provider. Pt seen at bedside in ER rm#2. The patient can not provide additional history due to Ventilated. 10/15/2021  Dr. Shirley Aragon requested for pulmonary evaluation for tracheostomy decannulation. Patient seen in telemetry [id number]. Patient well-known to our service due to prolonged ICU stay. S/p trach and PEG; prolonged respiratory failure with ICU stay. Patient remains confused and periodically agitated at baseline. She has sitter at bedside. Stable respirations. No vomiting or hemoptysis reported. Patient on trach collar. Patient having PEG tube feeding. Stable hemodynamics. Patient has a history of drug useprior history of cocaine use; also smoker and alcohol use.     Review of Systems:  Limited due to patient condition. Past Medical History:  Past Medical History:   Diagnosis Date    Asthma     Bilateral ovarian cysts     Cocaine abuse [address] [address]     Mental and behavioral problem     Pancreatitis     Pancreatitis     Psychosis ([facility] [address]         Past Surgical History:  Past Surgical History:   Procedure Laterality Date    HX GYN      D&C, Hysterectomy    IR INSERT GASTROSTOMY TUBE PERC  10/1/2021        Medications:    Current Facility-Administered Medications   Medication Dose Route Frequency    vancomycin (VANCOCIN) 750 mg in 0.9% sodium chloride 250 mL (VIAL-MATE)  750 mg IntraVENous Q24H    clonazePAM (KlonoPIN) tablet 2 mg  2 mg Oral TID    QUEtiapine (SEROquel) tablet 100 mg  100 mg Oral DAILY    QUEtiapine (SEROquel) tablet 300 mg  300 mg Oral QHS    nystatin (MYCOSTATIN) 100,000 unit/gram powder   Topical TID    fentaNYL (DURAGESIC) 50 mcg/hr patch 1 Patch  1 Patch TransDERmal Q72H    Vancomycin - Pharmacy to Dose  1 Each Other Rx Dosing/Monitoring    arformoteroL (BROVANA) neb solution 15 mcg  15 mcg Nebulization BID RT    budesonide (PULMICORT) 500 mcg/2 ml nebulizer suspension  500 mcg Nebulization BID RT    lactulose (CHRONULAC) 10 gram/15 mL solution 15 mL  15 mL Oral BID    melatonin (rapid dissolve) tablet 5 mg  5 mg Oral QHS    famotidine (PEPCID) tablet 20 mg  20 mg Oral BID    thiamine mononitrate (B-1) tablet 100 mg  100 mg Oral DAILY    folic acid (FOLVITE) tablet 1 mg  1 mg Oral DAILY    multivitamin, tx-iron-ca-min (THERA-M w/ IRON) tablet 1 Tablet  1 Tablet Oral DAILY    insulin lispro (HUMALOG) injection   SubCUTAneous Q6H    enoxaparin (LOVENOX) injection 40 mg  40 mg SubCUTAneous Q24H       Allergy:  Allergies   Allergen Reactions    Fish Containing Products Itching    Toradol [Ketorolac] Rash     Pt reports she is not allergic to this medication.          Social History:  Social History     Tobacco Use    Smoking status: Current Every Day Smoker     Packs/day: 1.00    Smokeless tobacco: Never Used   Substance Use Topics    Alcohol use: Not Currently    Drug use: Not Currently     Types: Cocaine, Marijuana     Comment: used with in past 24 hours          Objective:   Vital Signs:    Blood pressure 120/62, pulse (!) 115, temperature 99.3 °F (37.4 °C), resp. rate 18, height 5' 2\" (1.575 m), weight 71.2 kg (156 lb 15.5 oz), last menstrual period 05/15/2018, SpO2 96 %. Body mass index is 28.71 kg/m².    O2 Device: Tracheostomy, Tracheal collar   O2 Flow Rate (L/min): 6 l/min   Temp (24hrs), Av.7 °F (37.1 °C), Min:97.3 °F (36.3 °C), Max:100.6 °F (38.1 °C)         Intake/Output:   Last shift:      10/15 07 - 10/15 1900  In: 200 [I.V.:200]  Out: 1600 [Urine:1600]  Last 3 shifts: 10/13 190 - 10/15 0700  In: 3842   Out: 1349 [Urine:1250]    Intake/Output Summary (Last 24 hours) at 10/15/2021 1731  Last data filed at 10/15/2021 1340  Gross per 24 hour   Intake 1070 ml   Output 2990 ml   Net -1920 ml       Physical Exam:   Patient appears frail and chronically ill; on oxygen via trach collar; acyanotic  HEENT: pupils not dilated, no scleral jaundice, moist oral mucosa  Neck: No adenopathy or thyroid swelling; tracheostomy tubeno bleeding or secretions noted  CVS: Normal heart sounds; S1 and S2 with no murmurs; JVD not seen   RS: Moderate air entry bilateral; symmetrical breath sounds; no wheezing; few bibasal crackles; not tachypneic   Abd: Soft, no tenderness, no distention; patient has PEG tube; bowel sounds heard   Neuro: Patient remains confused and mildly agitated; moving all extremities; limited exam    Extrm: no leg edema or swelling or clubbing  Skin: no rash  Lymphatic: no cervical or supraclavicular adenopathy  Psych: Confused and agitated       Data:     Recent Results (from the past 24 hour(s))   GLUCOSE, POC    Collection Time: 10/14/21  6:23 PM   Result Value Ref Range    Glucose (POC) 124 (H) 70 - 110 mg/dL   GLUCOSE, POC    Collection Time: 10/14/21 11:58 PM   Result Value Ref Range    Glucose (POC) 158 (H) 70 - 110 mg/dL   MAGNESIUM    Collection Time: 10/15/21  5:10 AM   Result Value Ref Range    Magnesium 2.3 1.6 - 2.6 mg/dL   PHOSPHORUS    Collection Time: 10/15/21  5:10 AM   Result Value Ref Range    Phosphorus 6.7 (H) 2.5 - 4.9 MG/DL   AMMONIA    Collection Time: 10/15/21  5:10 AM   Result Value Ref Range    Ammonia 36 (H) 11 - 32 UMOL/L   CBC WITH AUTOMATED DIFF    Collection Time: 10/15/21  5:10 AM   Result Value Ref Range    WBC 7.1 4.6 - 13.2 K/uL    RBC 2.39 (L) 4.20 - 5.30 M/uL    HGB 6.9 (L) 12.0 - 16.0 g/dL    HCT 21.7 (L) 35.0 - 45.0 %    MCV 90.8 78.0 - 100.0 FL    MCH 28.9 24.0 - 34.0 PG    MCHC 31.8 31.0 - 37.0 g/dL    RDW 13.5 11.6 - 14.5 %    PLATELET 199 575 - 153 K/uL    MPV 10.1 9.2 - 11.8 FL    NEUTROPHILS 60 40 - 73 %    LYMPHOCYTES 22 21 - 52 %    MONOCYTES 9 3 - 10 %    EOSINOPHILS 8 (H) 0 - 5 %    BASOPHILS 0 0 - 2 %    ABS. NEUTROPHILS 4.3 1.8 - 8.0 K/UL    ABS. LYMPHOCYTES 1.6 0.9 - 3.6 K/UL    ABS. MONOCYTES 0.6 0.05 - 1.2 K/UL    ABS. EOSINOPHILS 0.6 (H) 0.0 - 0.4 K/UL    ABS. BASOPHILS 0.0 0.0 - 0.1 K/UL    DF AUTOMATED     METABOLIC PANEL, COMPREHENSIVE    Collection Time: 10/15/21  5:10 AM   Result Value Ref Range    Sodium 140 136 - 145 mmol/L    Potassium 3.8 3.5 - 5.5 mmol/L    Chloride 104 100 - 111 mmol/L    CO2 30 21 - 32 mmol/L    Anion gap 6 3.0 - 18 mmol/L    Glucose 117 (H) 74 - 99 mg/dL    BUN 21 (H) 7.0 - 18 MG/DL    Creatinine 1.41 (H) 0.6 - 1.3 MG/DL    BUN/Creatinine ratio 15 12 - 20      GFR est AA 50 (L) >60 ml/min/1.73m2    GFR est non-AA 41 (L) >60 ml/min/1.73m2    Calcium 9.3 8.5 - 10.1 MG/DL    Bilirubin, total 0.4 0.2 - 1.0 MG/DL    ALT (SGPT) 24 13 - 56 U/L    AST (SGOT) 14 10 - 38 U/L    Alk.  phosphatase 93 45 - 117 U/L    Protein, total 5.9 (L) 6.4 - 8.2 g/dL    Albumin 3.4 3.4 - 5.0 g/dL    Globulin 2.5 2.0 - 4.0 g/dL    A-G Ratio 1.4 0.8 - 1.7     VANCOMYCIN, RANDOM    Collection Time: 10/15/21  5:10 AM   Result Value Ref Range    Vancomycin, random 28.3 5.0 - 40.0 UG/ML   GLUCOSE, POC    Collection Time: 10/15/21  6:06 AM   Result Value Ref Range    Glucose (POC) 134 (H) 70 - 110 mg/dL   HGB & HCT    Collection Time: 10/15/21 11:20 AM   Result Value Ref Range    HGB 7.1 (L) 12.0 - 16.0 g/dL    HCT 22.3 (L) 35.0 - 45.0 %   GLUCOSE, POC    Collection Time: 10/15/21 11:59 AM   Result Value Ref Range    Glucose (POC) 131 (H) 70 - 110 mg/dL               No results for input(s): FIO2I, IFO2, HCO3I, IHCO3, HCOPOC, PCO2I, PCOPOC, IPHI, PHI, PHPOC, PO2I, PO2POC in the last 72 hours.     No lab exists for component: IPOC2    All Micro Results     Procedure Component Value Units Date/Time    CULTURE, RESPIRATORY/SPUTUM/BRONCH Azra Cordial STAIN [[id number]]  (Abnormal)  (Susceptibility) Collected: 10/06/21 1430    Order Status: Completed Specimen: Sputum from Tracheal Aspirate Updated: 10/09/21 0810     Special Requests: NO SPECIAL REQUESTS        GRAM STAIN FEW WBCS SEEN               OCCASIONAL EPITHELIAL CELLS SEEN                  RARE GRAM POSITIVE COCCI IN CLUSTERS           Culture result:       LIGHT * METHICILLIN RESISTANT STAPHYLOCOCCUS AUREUS *                  NO NORMAL RESPIRATORY DINA ISOLATED          CULTURE, BLOOD [[id number]]  (Abnormal) Collected: 10/04/21 1305    Order Status: Completed Specimen: Blood Updated: 10/07/21 1007     Special Requests: NO SPECIAL REQUESTS        GRAM STAIN       GRAM POSITIVE COCCI IN CLUSTERS ANAEROBIC BOTTLE                  SMEAR CALLED TO AND CORRECTLY REPEATED BY: Palomo Cm RN ICU, TO [facility] AT 2042 10/5/2021                  GRAM POSITIVE COCCI IN CLUSTERS AEROBIC BOTTLE                  SMEAR CALLED TO AND CORRECTLY REPEATED BY: Samule Goodell RN ICU AT 0329 ON 10/6/21 TO [facility]           Culture result:       STAPHYLOCOCCUS SPECIES, COAGULASE NEGATIVE (MULTIPLE COLONY TYPES) GROWING IN BOTH BOTTLES DRAWN (SITE NOT INDICATED)          CULTURE, BLOOD [[id number]]  (Abnormal) Collected: 10/04/21 1405 Order Status: Completed Specimen: Blood Updated: 10/07/21 1006     Special Requests: NO SPECIAL REQUESTS        GRAM STAIN       GRAM POSITIVE COCCI IN CLUSTERS ANAEROBIC BOTTLE                  SMEAR CALLED TO AND CORRECTLY REPEATED BY: Shaheed Marin RN ICU, TO F AT 1936 10/5/2021                  GRAM POSITIVE COCCI IN CLUSTERS AEROBIC BOTTLE                  SMEAR CALLED TO AND CORRECTLY REPEATED BY: Gabriel Roman RN ICU AT 7199 ON 10/6/21 TO [facility]           Culture result:       STAPHYLOCOCCUS SPECIES, COAGULASE NEGATIVE (MULTIPLE COLONY TYPES) GROWING IN BOTH BOTTLES DRAWN (SITE NOT INDICATED)          CULTURE, MRSA [[id number]] Collected: 10/05/21 0945    Order Status: Canceled Specimen: Nasal from [address], MRSA [[id number]] Collected: 10/04/21 1100    Order Status: Canceled Specimen: Nasal from Nares     CULTURE, RESPIRATORY/SPUTUM/BRONCH Shanta Shields STAIN [[id number]]     Order Status: Canceled Specimen: Sputum,ET Suction     CULTURE, BLOOD [[id number]] Collected: 09/27/21 1005    Order Status: Completed Specimen: Blood Updated: 10/03/21 0723     Special Requests: NO SPECIAL REQUESTS        Culture result: NO GROWTH 6 DAYS       CULTURE, BLOOD [[id number]] Collected: 09/27/21 1005    Order Status: Completed Specimen: Blood Updated: 10/03/21 0723     Special Requests: NO SPECIAL REQUESTS        Culture result: NO GROWTH 6 DAYS       CULTURE, BLOOD [[id number]] Collected: 09/24/21 0740    Order Status: Completed Specimen: Blood Updated: 09/30/21 0819     Special Requests: NO SPECIAL REQUESTS        Culture result: NO GROWTH 6 DAYS       CULTURE, RESPIRATORY/SPUTUM/BRONCH W GRAM STAIN [[id number]]  (Abnormal)  (Susceptibility) Collected: 09/24/21 0654    Order Status: Completed Specimen: Sputum from Tracheal Aspirate Updated: 09/27/21 1147     Special Requests: NO SPECIAL REQUESTS        GRAM STAIN RARE WBCS SEEN               RARE EPITHELIAL CELLS SEEN            NO ORGANISMS SEEN        Culture result: MODERATE STAPHYLOCOCCUS AUREUS                  NO NORMAL RESPIRATORY DINA ISOLATED          COVID-19 RAPID TEST [[id number]] Collected: 09/24/21 1700    Order Status: Completed Specimen: Nasopharyngeal Updated: 09/24/21 1849     Specimen source Nasopharyngeal        COVID-19 rapid test Not detected        Comment: Rapid Abbott ID Now       Rapid NAAT:  The specimen is NEGATIVE for SARS-CoV-2, the novel coronavirus associated with COVID-19. Negative results should be treated as presumptive and, if inconsistent with clinical signs and symptoms or necessary for patient management, should be tested with an alternative molecular assay. Negative results do not preclude SARS-CoV-2 infection and should not be used as the sole basis for patient management decisions. This test has been authorized by the FDA under an Emergency Use Authorization (EUA) for use by authorized laboratories. Fact sheet for Healthcare Providers: ConventionUpdate.co.nz  Fact sheet for Patients: ConventionUpdate.co.nz       Methodology: Isothermal Nucleic Acid Amplification         CULTURE, URINE [[id number]]     Order Status: Canceled Specimen: Urine from Clean catch     CULTURE, CSF  TUBE 2 [[id number]] Collected: 09/16/21 1434    Order Status: Completed Specimen: Cerebrospinal Fluid Updated: 09/23/21 1241     Special Requests: TUBE 3, CULTURE AND SENSITIVTY AND GRAM STAIN.      GRAM STAIN RARE WBCS SEEN         NO ORGANISMS SEEN        Culture result: NO GROWTH 7 DAYS       CULTURE, BLOOD [[id number]] Collected: 09/14/21 0515    Order Status: Completed Specimen: Blood Updated: 09/20/21 0832     Special Requests: NO SPECIAL REQUESTS        Culture result: NO GROWTH 6 DAYS       CULTURE, BLOOD [[id number]] Collected: 09/14/21 0500    Order Status: Completed Specimen: Blood Updated: 09/20/21 0832     Special Requests: NO SPECIAL REQUESTS        Culture result: NO GROWTH 6 DAYS       MENINGITIS PATHOGENS PANEL, CSF (BY PCR) [[id number]] Collected: 09/16/21 1434    Order Status: Completed Specimen: Cerebrospinal Fluid Updated: 09/17/21 0050     Escherichia coli K1 Not detected        Haemophilus Influenzae Not detected        Listeria Monocytogenes Not detected        Neisseria Meningitidis Not detected        Streptococcus Agalactiae Not detected        Streptococcus Pneumoniae Not detected        Cytomegalovirus Not detected        Enterovirus Not detected        Herpes Simplex Virus 1 Not detected        Comment: In patients who have negative herpes simplex 1 and 2 PCR results, do not modify treatment, confirm with alternate testing. Herpes Simplex Virus 2 Not detected        Comment: In patients who have negative herpes simplex 1 and 2 PCR results, do not modify treatment, confirm with alternate testing. Human Herpesvirus 6 Not detected        Human Parechovirus Not detected        Varicella Zoster Virus Not detected        Crypto. neoformans/gattii Not detected       MENINGITIS PATHOGENS PANEL, CSF (BY PCR) [[id number]] Collected: 09/16/21 1545    Order Status: Canceled Specimen: Cerebrospinal Fluid     HSV 1 AND 2 BY PCR [[id number]] Collected: 09/16/21 1434    Order Status: Canceled Specimen: Other     NRJMB-09 RAPID TEST [[id number]] Collected: 09/16/21 1000    Order Status: Completed Specimen: Nasopharyngeal Updated: 09/16/21 1032     Specimen source Nasopharyngeal        COVID-19 rapid test Not detected        Comment: Rapid Abbott ID Now       Rapid NAAT:  The specimen is NEGATIVE for SARS-CoV-2, the novel coronavirus associated with COVID-19. Negative results should be treated as presumptive and, if inconsistent with clinical signs and symptoms or necessary for patient management, should be tested with an alternative molecular assay. Negative results do not preclude SARS-CoV-2 infection and should not be used as the sole basis for patient management decisions.        This test has been authorized by the FDA under an Emergency Use Authorization (EUA) for use by authorized laboratories.    Fact sheet for Healthcare Providers: kstattoo.com  Fact sheet for Patients: kstattoo.com       Methodology: Isothermal Nucleic Acid Amplification         LEGIONELLA PNEUMOPHILA AG, URINE [[id number]] Collected: 09/14/21 2315    Order Status: Completed Specimen: Urine, random Updated: 09/15/21 1042     Legionella Ag, urine Negative       STREP PNEUMO AG, URINE [[id number]] Collected: 09/14/21 2315    Order Status: Completed Specimen: Urine, random Updated: 09/15/21 1042     Strep pneumo Ag, urine Negative       RESPIRATORY VIRUS PANEL W/COVID-19, PCR [[id number]] Collected: 09/14/21 1832    Order Status: Completed Specimen: Nasopharyngeal Updated: 09/14/21 2234     Adenovirus Not detected        Coronavirus 229E Not detected        Coronavirus HKU1 Not detected        Coronavirus CVNL63 Not detected        Coronavirus OC43 Not detected        SARS-CoV-2, PCR Not detected        Metapneumovirus Not detected        Rhinovirus and Enterovirus Not detected        Influenza A Not detected        Influenza A, subtype H1 Not detected        Influenza A, subtype H3 Not detected        INFLUENZA A H1N1 PCR Not detected        Influenza B Not detected        Parainfluenza 1 Not detected        Parainfluenza 2 Not detected        Parainfluenza 3 Not detected        Parainfluenza virus 4 Not detected        RSV by PCR Not detected        B. parapertussis, PCR Not detected        Bordetella pertussis - PCR Not detected        Chlamydophila pneumoniae DNA, QL, PCR Not detected        Mycoplasma pneumoniae DNA, QL, PCR Not detected       CULTURE, BLOOD [[id number]] Collected: 09/14/21 1700    Order Status: Canceled Specimen: Blood     CULTURE, URINE [[id number]] Collected: 09/13/21 2330    Order Status: Canceled Specimen: Cath Urine     RESPIRATORY VIRUS PANEL W/COVID-19, PCR [[id number]]     Order Status: Canceled Specimen: NASOPHARYNGEAL SWAB     COVID-19 RAPID TEST [[id number]]     Order Status: Canceled     COVID-19 RAPID TEST [[id number]] Collected: 09/13/21 0737    Order Status: Completed Specimen: Nasopharyngeal Updated: 09/13/21 0811     Specimen source Nasopharyngeal        COVID-19 rapid test Not detected        Comment: Rapid Abbott ID Now       Rapid NAAT:  The specimen is NEGATIVE for SARS-CoV-2, the novel coronavirus associated with COVID-19. Negative results should be treated as presumptive and, if inconsistent with clinical signs and symptoms or necessary for patient management, should be tested with an alternative molecular assay. Negative results do not preclude SARS-CoV-2 infection and should not be used as the sole basis for patient management decisions. This test has been authorized by the FDA under an Emergency Use Authorization (EUA) for use by authorized laboratories. Fact sheet for Healthcare Providers: ConventionLittle Borrowed Dressdate.co.nz  Fact sheet for Patients: Nutzvieh24date.co.nz       Methodology: Isothermal Nucleic Acid Amplification         COVID-19 RAPID TEST [[id number]]     Order Status: Canceled         Echo 9/17/2021:  Left Ventricle Normal cavity size, wall thickness and systolic function (ejection fraction normal). The estimated EF is 60 - 65%. There is inconclusive left ventricular diastolic function E/E' ratio = 7.08  Heart rate is over 100. Wall Scoring The left ventricular wall motion is normal.            Left Atrium Normal cavity size. Right Ventricle Normal cavity size and global systolic function. Assessment of RV function:   TAPSE = 27 mm. Right Atrium Normal cavity size. Interatrial Septum Interatrial septum not well visualized   Aortic Valve Aortic valve not well visualized. Trileaflet valve structure, no stenosis and no regurgitation. Mitral Valve No stenosis.  Mitral valve non-specific thickening. Mild regurgitation. Tricuspid Valve Tricuspid valve not well visualized. No stenosis. Mild regurgitation. Pulmonic Valve Pulmonic valve not well visualized. No stenosis and no regurgitation. Aorta The aorta was not well visualized. Normal aortic root. Pulmonary Artery Pulmonary arteries not well visualized. Pulmonary arterial systolic pressure (PASP) is 29 mmHg. Pulmonary hypertension not suggested by Doppler findings. IVC/Hepatic Veins Mechanically ventilated; cannot use inferior caval vein diameter to estimate central venous pressure. Pericardium Normal pericardium and no evidence of pericardial effusion. CT head 9/15/1021  IMPRESSION   No acute intracranial abnormality. CT chest, abdomen, pelvis 9/15/1021  IMPRESSION   Endotracheal tube tip in the lower thoracic trachea 1.5 cm above the dipak.    Bilateral lower lobar atelectasis, possible endobronchial lesion/mucous plug in  the left lower lobe bronchus.    No acute intra-abdominal abnormality. Imaging:  [x]I have personally reviewed the patients chest radiographs images and report    Chest x-ray 10/15- 1 view  COMPARISON: 10/8/2021  TECHNIQUE: Portable frontal view of the chest  FINDINGS:   SUPPORT DEVICES: Tracheostomy catheter projecting in expected position.   HEART AND MEDIASTINUM: Stable cardiac size and mediastinal contours, within  normal limits.   LUNGS AND PLEURAL SPACES: Lungs are mildly underexpanded, but appear improved in  aeration from prior examination. Minor residual linear opacities are present at  each lung base. No pneumothorax or pleural effusion. No alveolar consolidation.   BONY THORAX AND SOFT TISSUES: Unremarkable. IMPRESSION   1. Tracheostomy catheter in stable position. 2. Improved pulmonary expansion/aeration with minor residual atelectasis at the  lung bases.          Please note: Voice-recognition software may have been used to generate this report, which may have resulted in some phonetic-based errors in grammar and contents. Even though attempts were made to correct all the mistakes, some may have been missed, and remained in the body of the document.       Arjun Soliz MD  10/15/2021

## 2021-10-16 PROBLEM — N17.9 AKI (ACUTE KIDNEY INJURY) (HCC): Status: ACTIVE | Noted: 2021-10-16

## 2021-10-16 LAB
ALBUMIN SERPL-MCNC: 3.5 G/DL (ref 3.4–5)
ALBUMIN/GLOB SERPL: 1.3 {RATIO} (ref 0.8–1.7)
ALP SERPL-CCNC: 99 U/L (ref 45–117)
ALT SERPL-CCNC: 25 U/L (ref 13–56)
AMMONIA PLAS-SCNC: 36 UMOL/L (ref 11–32)
ANION GAP SERPL CALC-SCNC: 6 MMOL/L (ref 3–18)
APPEARANCE UR: ABNORMAL
AST SERPL-CCNC: 12 U/L (ref 10–38)
BACTERIA URNS QL MICRO: ABNORMAL /HPF
BASOPHILS # BLD: 0 K/UL (ref 0–0.1)
BASOPHILS NFR BLD: 0 % (ref 0–2)
BILIRUB SERPL-MCNC: 0.4 MG/DL (ref 0.2–1)
BILIRUB UR QL: NEGATIVE
BUN SERPL-MCNC: 20 MG/DL (ref 7–18)
BUN/CREAT SERPL: 13 (ref 12–20)
CALCIUM SERPL-MCNC: 9.7 MG/DL (ref 8.5–10.1)
CHLORIDE SERPL-SCNC: 107 MMOL/L (ref 100–111)
CHLORIDE UR-SCNC: 59 MMOL/L (ref 55–125)
CO2 SERPL-SCNC: 29 MMOL/L (ref 21–32)
COLOR UR: YELLOW
CREAT SERPL-MCNC: 1.52 MG/DL (ref 0.6–1.3)
CREAT UR-MCNC: 50 MG/DL (ref 30–125)
DIFFERENTIAL METHOD BLD: ABNORMAL
EOSINOPHIL # BLD: 0.4 K/UL (ref 0–0.4)
EOSINOPHIL #/AREA URNS HPF: NORMAL /[HPF]
EOSINOPHIL NFR BLD: 6 % (ref 0–5)
EPITH CASTS URNS QL MICRO: ABNORMAL /LPF (ref 0–5)
ERYTHROCYTE [DISTWIDTH] IN BLOOD BY AUTOMATED COUNT: 13.7 % (ref 11.6–14.5)
GLOBULIN SER CALC-MCNC: 2.7 G/DL (ref 2–4)
GLUCOSE BLD STRIP.AUTO-MCNC: 104 MG/DL (ref 70–110)
GLUCOSE BLD STRIP.AUTO-MCNC: 125 MG/DL (ref 70–110)
GLUCOSE BLD STRIP.AUTO-MCNC: 166 MG/DL (ref 70–110)
GLUCOSE BLD STRIP.AUTO-MCNC: 96 MG/DL (ref 70–110)
GLUCOSE SERPL-MCNC: 81 MG/DL (ref 74–99)
GLUCOSE UR STRIP.AUTO-MCNC: NEGATIVE MG/DL
HCT VFR BLD AUTO: 22 % (ref 35–45)
HCT VFR BLD AUTO: 22.1 % (ref 35–45)
HGB BLD-MCNC: 6.9 G/DL (ref 12–16)
HGB BLD-MCNC: 7 G/DL (ref 12–16)
HGB UR QL STRIP: ABNORMAL
HISTORY CHECKED?,CKHIST: NORMAL
KETONES UR QL STRIP.AUTO: NEGATIVE MG/DL
LEUKOCYTE ESTERASE UR QL STRIP.AUTO: ABNORMAL
LYMPHOCYTES # BLD: 1.9 K/UL (ref 0.9–3.6)
LYMPHOCYTES NFR BLD: 26 % (ref 21–52)
MAGNESIUM SERPL-MCNC: 2.2 MG/DL (ref 1.6–2.6)
MCH RBC QN AUTO: 27.5 PG (ref 24–34)
MCHC RBC AUTO-ENTMCNC: 31.4 G/DL (ref 31–37)
MCV RBC AUTO: 87.6 FL (ref 78–100)
MONOCYTES # BLD: 0.6 K/UL (ref 0.05–1.2)
MONOCYTES NFR BLD: 8 % (ref 3–10)
NEUTS SEG # BLD: 4.6 K/UL (ref 1.8–8)
NEUTS SEG NFR BLD: 61 % (ref 40–73)
NITRITE UR QL STRIP.AUTO: NEGATIVE
PH UR STRIP: 7 [PH] (ref 5–8)
PHOSPHATE SERPL-MCNC: 7.3 MG/DL (ref 2.5–4.9)
PLATELET # BLD AUTO: 272 K/UL (ref 135–420)
PMV BLD AUTO: 10.5 FL (ref 9.2–11.8)
POTASSIUM SERPL-SCNC: 3.9 MMOL/L (ref 3.5–5.5)
PROT SERPL-MCNC: 6.2 G/DL (ref 6.4–8.2)
PROT UR STRIP-MCNC: ABNORMAL MG/DL
RBC # BLD AUTO: 2.51 M/UL (ref 4.2–5.3)
RBC #/AREA URNS HPF: ABNORMAL /HPF (ref 0–5)
SODIUM SERPL-SCNC: 142 MMOL/L (ref 136–145)
SODIUM UR-SCNC: 68 MMOL/L (ref 20–110)
SP GR UR REFRACTOMETRY: 1.01 (ref 1–1.03)
UROBILINOGEN UR QL STRIP.AUTO: 0.2 EU/DL (ref 0.2–1)
WBC # BLD AUTO: 7.6 K/UL (ref 4.6–13.2)
WBC URNS QL MICRO: ABNORMAL /HPF (ref 0–5)
YEAST BUDDING URNS QL: POSITIVE

## 2021-10-16 PROCEDURE — 84300 ASSAY OF URINE SODIUM: CPT

## 2021-10-16 PROCEDURE — P9016 RBC LEUKOCYTES REDUCED: HCPCS

## 2021-10-16 PROCEDURE — 74011250637 HC RX REV CODE- 250/637: Performed by: HOSPITALIST

## 2021-10-16 PROCEDURE — 65660000000 HC RM CCU STEPDOWN

## 2021-10-16 PROCEDURE — 87040 BLOOD CULTURE FOR BACTERIA: CPT

## 2021-10-16 PROCEDURE — 82140 ASSAY OF AMMONIA: CPT

## 2021-10-16 PROCEDURE — 82570 ASSAY OF URINE CREATININE: CPT

## 2021-10-16 PROCEDURE — 82962 GLUCOSE BLOOD TEST: CPT

## 2021-10-16 PROCEDURE — 83735 ASSAY OF MAGNESIUM: CPT

## 2021-10-16 PROCEDURE — 74011250636 HC RX REV CODE- 250/636: Performed by: FAMILY MEDICINE

## 2021-10-16 PROCEDURE — 82436 ASSAY OF URINE CHLORIDE: CPT

## 2021-10-16 PROCEDURE — 36430 TRANSFUSION BLD/BLD COMPNT: CPT

## 2021-10-16 PROCEDURE — 74011000250 HC RX REV CODE- 250: Performed by: INTERNAL MEDICINE

## 2021-10-16 PROCEDURE — 77010033678 HC OXYGEN DAILY

## 2021-10-16 PROCEDURE — 94640 AIRWAY INHALATION TREATMENT: CPT

## 2021-10-16 PROCEDURE — 81001 URINALYSIS AUTO W/SCOPE: CPT

## 2021-10-16 PROCEDURE — 74011250637 HC RX REV CODE- 250/637: Performed by: INTERNAL MEDICINE

## 2021-10-16 PROCEDURE — 30233N1 TRANSFUSION OF NONAUTOLOGOUS RED BLOOD CELLS INTO PERIPHERAL VEIN, PERCUTANEOUS APPROACH: ICD-10-PCS | Performed by: FAMILY MEDICINE

## 2021-10-16 PROCEDURE — 74011250637 HC RX REV CODE- 250/637: Performed by: PSYCHIATRY & NEUROLOGY

## 2021-10-16 PROCEDURE — 85018 HEMOGLOBIN: CPT

## 2021-10-16 PROCEDURE — 36415 COLL VENOUS BLD VENIPUNCTURE: CPT

## 2021-10-16 PROCEDURE — 87086 URINE CULTURE/COLONY COUNT: CPT

## 2021-10-16 PROCEDURE — 74011250636 HC RX REV CODE- 250/636: Performed by: INTERNAL MEDICINE

## 2021-10-16 PROCEDURE — 94760 N-INVAS EAR/PLS OXIMETRY 1: CPT

## 2021-10-16 PROCEDURE — 80053 COMPREHEN METABOLIC PANEL: CPT

## 2021-10-16 PROCEDURE — 86923 COMPATIBILITY TEST ELECTRIC: CPT

## 2021-10-16 PROCEDURE — 84100 ASSAY OF PHOSPHORUS: CPT

## 2021-10-16 PROCEDURE — 85025 COMPLETE CBC W/AUTO DIFF WBC: CPT

## 2021-10-16 PROCEDURE — 74011636637 HC RX REV CODE- 636/637: Performed by: INTERNAL MEDICINE

## 2021-10-16 PROCEDURE — 87205 SMEAR GRAM STAIN: CPT

## 2021-10-16 PROCEDURE — 86901 BLOOD TYPING SEROLOGIC RH(D): CPT

## 2021-10-16 RX ORDER — SODIUM CHLORIDE 9 MG/ML
250 INJECTION, SOLUTION INTRAVENOUS AS NEEDED
Status: DISCONTINUED | OUTPATIENT
Start: 2021-10-16 | End: 2021-11-13 | Stop reason: HOSPADM

## 2021-10-16 RX ORDER — SODIUM CHLORIDE 450 MG/100ML
50 INJECTION, SOLUTION INTRAVENOUS CONTINUOUS
Status: DISCONTINUED | OUTPATIENT
Start: 2021-10-16 | End: 2021-10-19

## 2021-10-16 RX ADMIN — FAMOTIDINE 20 MG: 20 TABLET ORAL at 22:07

## 2021-10-16 RX ADMIN — Medication 5 MG: at 22:07

## 2021-10-16 RX ADMIN — LACTULOSE 15 ML: 10 SOLUTION ORAL at 22:05

## 2021-10-16 RX ADMIN — HYDROMORPHONE HYDROCHLORIDE 1 MG: 1 INJECTION, SOLUTION INTRAMUSCULAR; INTRAVENOUS; SUBCUTANEOUS at 10:34

## 2021-10-16 RX ADMIN — DIAZEPAM 2.5 MG: 5 TABLET ORAL at 22:06

## 2021-10-16 RX ADMIN — HYDROMORPHONE HYDROCHLORIDE 1 MG: 1 INJECTION, SOLUTION INTRAMUSCULAR; INTRAVENOUS; SUBCUTANEOUS at 00:48

## 2021-10-16 RX ADMIN — FAMOTIDINE 20 MG: 20 TABLET ORAL at 09:38

## 2021-10-16 RX ADMIN — NYSTATIN: 100000 POWDER TOPICAL at 09:40

## 2021-10-16 RX ADMIN — FOLIC ACID 1 MG: 1 TABLET ORAL at 09:39

## 2021-10-16 RX ADMIN — QUETIAPINE FUMARATE 300 MG: 200 TABLET ORAL at 22:06

## 2021-10-16 RX ADMIN — QUETIAPINE FUMARATE 100 MG: 100 TABLET ORAL at 09:38

## 2021-10-16 RX ADMIN — ARFORMOTEROL TARTRATE 15 MCG: 15 SOLUTION RESPIRATORY (INHALATION) at 09:23

## 2021-10-16 RX ADMIN — BUDESONIDE 500 MCG: 0.5 INHALANT RESPIRATORY (INHALATION) at 09:23

## 2021-10-16 RX ADMIN — LORAZEPAM 2 MG: 2 INJECTION INTRAMUSCULAR at 05:42

## 2021-10-16 RX ADMIN — VANCOMYCIN HYDROCHLORIDE 750 MG: 750 INJECTION, POWDER, LYOPHILIZED, FOR SOLUTION INTRAVENOUS at 22:05

## 2021-10-16 RX ADMIN — LACTULOSE 15 ML: 10 SOLUTION ORAL at 09:37

## 2021-10-16 RX ADMIN — BUDESONIDE 500 MCG: 0.5 INHALANT RESPIRATORY (INHALATION) at 20:25

## 2021-10-16 RX ADMIN — NYSTATIN: 100000 POWDER TOPICAL at 16:25

## 2021-10-16 RX ADMIN — Medication 1 TABLET: at 09:38

## 2021-10-16 RX ADMIN — SODIUM CHLORIDE 75 ML/HR: 450 INJECTION, SOLUTION INTRAVENOUS at 12:28

## 2021-10-16 RX ADMIN — CLONAZEPAM 2 MG: 0.5 TABLET ORAL at 22:05

## 2021-10-16 RX ADMIN — INSULIN LISPRO 2 UNITS: 100 INJECTION, SOLUTION INTRAVENOUS; SUBCUTANEOUS at 00:00

## 2021-10-16 RX ADMIN — ENOXAPARIN SODIUM 40 MG: 100 INJECTION SUBCUTANEOUS at 16:25

## 2021-10-16 RX ADMIN — DIAZEPAM 2.5 MG: 5 TABLET ORAL at 09:37

## 2021-10-16 RX ADMIN — THIAMINE HCL TAB 100 MG 100 MG: 100 TAB at 09:38

## 2021-10-16 RX ADMIN — HYDROMORPHONE HYDROCHLORIDE 1 MG: 1 INJECTION, SOLUTION INTRAMUSCULAR; INTRAVENOUS; SUBCUTANEOUS at 18:20

## 2021-10-16 RX ADMIN — ARFORMOTEROL TARTRATE 15 MCG: 15 SOLUTION RESPIRATORY (INHALATION) at 20:25

## 2021-10-16 RX ADMIN — CLONAZEPAM 2 MG: 0.5 TABLET ORAL at 09:38

## 2021-10-16 RX ADMIN — SODIUM CHLORIDE 250 ML: 900 INJECTION, SOLUTION INTRAVENOUS at 16:39

## 2021-10-16 RX ADMIN — CLONAZEPAM 2 MG: 0.5 TABLET ORAL at 16:25

## 2021-10-16 NOTE — PROGRESS NOTES
Chart reviewed and placement continues to be researched, pt still requiring a bedside sitter for safety, pt still have episodes of agitation and constant movements.

## 2021-10-16 NOTE — PROGRESS NOTES
[address] [facility] Pharmacokinetic Monitoring Service - Vancomycin    Consulting Provider: Henna Strong   Indication: Bloodstream infection, VAP  Target Concentration: Goal AUC/CRYSTAL 400-600 mg*hr/L  Day of Therapy: 4  Additional Antimicrobials: None    Pertinent Laboratory Values: Wt Readings from Last 1 Encounters:   10/08/21 71.2 kg (156 lb 15.5 oz)     Temp Readings from Last 1 Encounters:   10/15/21 97.9 °F (36.6 °C)     No components found for: PROCAL  Estimated Creatinine Clearance: 48.5 mL/min (A) (based on SCr of 1.41 mg/dL (H)). Recent Labs     10/15/21  0510 10/14/21  1045   WBC 7.1 9.4       Pertinent Cultures:  Culture Date Source Results   10/4/21    10/6/21      10/16/21 Blood    Sputum      Blood STAPHYLOCOCCUS SPECIES, COAGULASE NEGATIVE  LIGHT * METHICILLIN RESISTANT STAPHYLOCOCCUS AUREUS     NGTD   MRSA Nasal Swab: N/A.     [unfilled]    Assessment:  Date/Time Current Dose Concentration Timing of Concentration (h) AUC   10/15/21 0510 Vancomycin 1g IV q24h 28.3 mcg/ml 10 h 31 min since last dose 535 mg*h/L   Note: Serum concentrations collected for AUC dosing may appear elevated if collected in close proximity to the dose administered, this is not necessarily an indication of toxicity    Plan:  Current dosing regimen is therapeutic  Continue current dose of Vancomycin 750 mg IV q24h  Pharmacy will continue to monitor patient and adjust therapy as indicated    Thank you for the consult,  Andrade Bernard

## 2021-10-16 NOTE — PROGRESS NOTES
Hospitalist Progress Note-critical care note     Patient: Linn Pearce MRN: [id number]  [facility]: [id number]    YOB: 1980  Age: 39 y.o. Sex: female    DOA: 9/11/2021 LOS:  LOS: 34 days            Chief complaint: wrist fracture , drug overdose , acute respiratory failure with hypoxia, psychosis     Assessment/Plan         Hospital Problems  Date Reviewed: 9/6/2015        Codes Class Noted POA    FELICITY (acute kidney injury) ([facility] ICD-10-CM: N17.9  ICD-9-CM: 584.9  10/16/2021 Unknown        Bacteremia due to Gram-positive bacteria ICD-10-CM: R78.81  ICD-9-CM: 790.7  10/6/2021 Unknown        Status post tracheostomy ([facility] ICD-10-CM: Z93.0  ICD-9-CM: V44.0  9/28/2021 Unknown        Hypokalemia ICD-10-CM: E87.6  ICD-9-CM: 276.8  9/21/2021 Unknown        Increased ammonia level ICD-10-CM: R79.89  ICD-9-CM: 790.6  9/14/2021 Unknown        Psychosis ([facility] ICD-10-CM: F29  ICD-9-CM: 298.9  Unknown Unknown        Hyponatremia ICD-10-CM: E87.1  ICD-9-CM: 276.1  Unknown Yes        Acute respiratory failure with hypoxia (HCC) ICD-10-CM: J96.01  ICD-9-CM: 518.81  Unknown Yes        Left wrist fracture, with delayed healing, subsequent encounter ICD-10-CM: S62.102G  ICD-9-CM: V54.19  9/12/2021 Unknown        * (Principal) Drug overdose, intentional self-harm, initial encounter ([facility] ICD-10-CM: T50.902A  ICD-9-CM: 977.9, E950.5  9/12/2021 Yes        Hypoxia ICD-10-CM: R09.02  ICD-9-CM: 799.02  9/12/2021 Yes        Hypotension after procedure ICD-10-CM: I95.81  ICD-9-CM: 458.29  9/12/2021 Yes        Acute metabolic encephalopathy NJY-14-HA: G93.41  ICD-9-CM: 348.31  9/12/2021 Yes                  Linn Pearce is a 39 y.o. female who is standing history of multidrug use/abuse, previous drug-seeking behavior and mental health issues otherwise unspecified arrives via EMS with acute metabolic encephalopathy and lethargy. Admitted for likely gabapentin overdose.   She was intubated in ER, ct head no acute issue, LP done due to fever even on abx, no meningitis noted. Now she has peg and trach        Acute respiratory failure with hypoxia   Intubated on 9/12    Trach on 9/20/21. Continue breathing tx . Continue local trach care  Appreciated pulmonologist on board-no planning decannulation     Post tracheostomy   Continue local trach care       bacteremia -fever -repeat cx    So far stable   bcx  Positive for GPC on 10/4   Sputum culture 9/24/2021: MRSA.    Id f/u, continue vanc till 10/16     Anemia  Will transfuse one unit prbc   Occult stool negative    no bleeding reported   Upper PVL negative for dvt           Acute metabolic encephalopathy   Ct head no acute process         Gabapentin overdose with lethargy and AMS    S/p procedural stomach emptying in ED with charcoal and sorbitol    on Seroquel        elevated ammonia level  Continue   Lactulose,   Continue ammonia monitoring       Psychosis , hx of  Ekbom's delusional parasitosis   agitated   On  Klonopin,  Seroquel, halodol prn per psych recommendation   Continue cardiac monitoring     left wrist fracture: immobilized -poa -stable   Appreciated ortho on board-f/u with Dr. Lam Dent as out-pt   Cr mild elevated-will give some iv hydration, give mg      yarely    Renal consult   Will hold ibuprofen for pain   Continue low rate iv hydration     Sitter was at the bedside     rn : agitated,  H/h low     Poor prognosis , dc barrier   Disposition :tbd,   Review of systems:  Unable to obtain due to trach collar   Vital signs/Intake and Output:  Visit Vitals  BP (!) 95/50 (BP 1 Location: Left lower arm, BP Patient Position: Lying)   Pulse 93   Temp 97.2 °F (36.2 °C)   Resp 16   Ht 5' 2\" (1.575 m)   Wt 71.2 kg (156 lb 15.5 oz)   SpO2 100%   BMI 28.71 kg/m²     Current Shift:  10/16 0701 - 10/16 1900  In: 450   Out: -   Last three shifts:  10/14 1901 - 10/16 0700  In: 1070 [I.V.:200]  Out: 4390 [Urine:4250]    Physical Exam:  General: Sleeping   HEENT:  Trach collar   Lungs: Coarse bs Heart:  tachy  No murmur, No Rubs, No Gallops  Abdomen: Soft, Non distended, Non tender. +Bowel sounds, peg tube noted   Extremities: No c/c.immobilzer noted on left wrist   Psych: On and off agitated  Neurologic:  Sleeping           Labs: Results:       Chemistry Recent Labs     10/16/21  0307 10/15/21  0510 10/14/21  1045   GLU 81 117* 123*    140 138   K 3.9 3.8 4.6    104 102   CO2 29 30 31   BUN 20* 21* 24*   CREA 1.52* 1.41* 1.34*   CA 9.7 9.3 9.8   AGAP 6 6 5   BUCR 13 15 18   AP 99 93 103   TP 6.2* 5.9* 6.2*   ALB 3.5 3.4 3.7   GLOB 2.7 2.5 2.5   AGRAT 1.3 1.4 1.5      CBC w/Diff Recent Labs     10/16/21  0307 10/15/21  1120 10/15/21  0510 10/14/21  1045 10/14/21  1045   WBC 7.6  --  7.1  --  9.4   RBC 2.51*  --  2.39*  --  2.55*   HGB 6.9* 7.1* 6.9*   < > 7.2*   HCT 22.0* 22.3* 21.7*   < > 22.4*     --  267  --  335   GRANS 61  --  60  --  72   LYMPH 26  --  22  --  14*   EOS 6*  --  8*  --  6*    < > = values in this interval not displayed. Cardiac Enzymes No results for input(s): CPK, CKND1, ZENON in the last 72 hours. No lab exists for component: CKRMB, TROIP   Coagulation No results for input(s): PTP, INR, APTT, INREXT, INREXT in the last 72 hours. Lipid Panel No results found for: CHOL, CHOLPOCT, CHOLX, CHLST, CHOLV, 300426, HDL, HDLP, LDL, LDLC, DLDLP, 156214, VLDLC, VLDL, TGLX, TRIGL, TRIGP, TGLPOCT, CHHD, CHHDX   BNP No results for input(s): BNPP in the last 72 hours.    Liver Enzymes Recent Labs     10/16/21  0307   TP 6.2*   ALB 3.5   AP 99      Thyroid Studies No results found for: T4, T3U, TSH, TSHEXT, TSHEXT     Procedures/imaging: see electronic medical records for all procedures/Xrays and details which were not copied into this note but were reviewed prior to creation of Plan    XR WRIST LT AP/LAT/OBL MIN 3V    Result Date: 8/19/2021  EXAM: XR WRIST LT AP/LAT/OBL MIN 3V CLINICAL INDICATION/HISTORY: Fall with LEFT wrist pain and swelling -Additional: None COMPARISON: None TECHNIQUE: 3 views of the left wrist _______________ FINDINGS: BONES: Comminuted, impacted fracture of the distal radius with slight dorsal angulation of the distal fracture fragment. No aggressive appearing osseous lytic or blastic lesion. SOFT TISSUES: Unremarkable. _______________     Comminuted, impacted fracture of the distal radius. XR CHEST PORT    Result Date: 9/13/2021  EXAM: XR CHEST PORT CLINICAL INDICATION/HISTORY: Acute respiratory failure, ET tub position -Additional: None COMPARISON: One day prior TECHNIQUE: Portable frontal view of the chest _______________ FINDINGS: SUPPORT DEVICES: Endotracheal and enteric tubes unchanged. HEART AND MEDIASTINUM: Cardiomediastinal silhouette within normal limits. LUNGS AND PLEURAL SPACES: No dense consolidation, large effusion or pneumothorax. _______________     No acute cardiopulmonary abnormality. XR CHEST PORT    Result Date: 9/11/2021  MEDICAL RECORDS NUMBER: [id number] PROCEDURE:  Single view of the chest DATE: 9/11/2021 9:09 PM HISTORY: 39years old Female. post ett Comparison: 8/4/2021 FINDINGS: The patient has been intubated with appropriate placement of the endotracheal tube. There is no enteric tube passing below the level of the diaphragm. There is no significant effusion. There is no significant pneumothorax. Cardiomediastinal silhouette is within normal limits. There is no evidence of a focal pulmonary infiltrate or mass. 1. There is no significant or acute cardiopulmonary process. The patient has been intubated with appropriate placement of the endotracheal tube. There is no enteric tube passing below the level of the diaphragm. This report has been generated using voice recognition software.      XR ABD PORT  1 V    Result Date: 9/12/2021  EXAM: Frontal view of the abdomen CLINICAL INDICATION/HISTORY: NG tube placement COMPARISON: None. _______________ FINDINGS: NG/OG tube in place with the tip in the left upper quadrant in the region of the gastric fundus. No bowel obstruction. Moderate colonic stool burden. _______________     1. NG/OG tube is in good position with the tip in the left upper quadrant in the region of the gastric fundus. 2. Moderate colonic stool burden.       Sudhakar Melendrez MD

## 2021-10-16 NOTE — PROGRESS NOTES
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    Left wrist fracture, with delayed healing, subsequent encounter S62.102G    Drug overdose, intentional self-harm, initial encounter ([facility] [address] T50.902A    Hypoxia R09.02    Hypotension after procedure I95.81    Acute metabolic encephalopathy E32.78    Psychosis ([facility]) F29    Hyponatremia E87.1    Acute respiratory failure with hypoxia ([facility]) J96.01    Increased ammonia level R79.89    Hypokalemia E87.6    Status post tracheostomy ([facility] [address] Z93.0    Bacteremia due to Gram-positive bacteria R78.81    FELICITY (acute kidney injury) ([facility] [address] N17.9     · Code status: Full code     RECOMMENDATIONS:   Patient with chronic respiratory failure; she was intubated 2021 due to encephalopathy and drug overdose; extubated on 2021, and reintubated within an hour due to respiratory distress and upper airway edema; patient subsequently underwent tracheostomy 2021. Patient mildly hypotensive today; also in acute renal failure  Hemoglobin ranges between 6.9 to 7.0; transfuse 1 unit PRBC to improve anemia and improve blood pressure. Bolus 250 cc normal saline; patient's albumin is pretty good3.5 today. Patient remains confused and agitated; she also prefers to be naked in her state of delirium; she is not an ideal candidate for tracheostomy tube weaning or decannulation due to risk of aspirations and respiratory failure.   Continue oxygen support via trach collar; trach collar suctioning by RT as needed; #8 Shiley cuffed trach tube cuff is currently deflated. Chest x-ray 10/15stable inflation of both lungs; tracheostomy tube in good position; no focal consolidations or pleural effusions; mild right basal atelectasis. BronchodilatorsBrovana and budesonide nebulizer twice daily; DuoNeb as needed. AntibioticsIV vancomycin per ID; monitor levels due to renal failure; pharmacy on case. Patient has been seen by nephrology today; IV fluids1/2 NS 75 mL per hour. Neuropsychiatric medicationsSeroquel, clonazepam, diazepam, fentanyl patch. Ammonia 36 today; continue lactulose twice daily. Patient on Seroquel for delirium. Patient is on clonazepam for anxiety; frequent agitation; would add diazepam 2.5 mg twice daily for now. DVT prophylaxisenoxaparin. GI prophylaxisfamotidine. Patient has poor prognosis overall. She is not a candidate for tracheostomy tube weaning trials or decannulation during this hospitalization; she is extremely confused and agitated, and seems to suffer from chronic delirium; she is at high risk of aspirations; this is not a patient that can be decannulated safely during this hospitalization. Lines/Tubes: PIV   S/p ETT: 9/11/219/22/2021. Tracheostomy 9/22/2021. Status post PEG on 10/1/2021   [place]: 9/11/21; changed 9/28/2021. ADVANCE DIRECTIVE: Full code. Subjective/History:   Ms. Nicki Alba has been seen and evaluated as Dr. Tom Lozano requested now for assisting with Acute respiratory failure and ventilator management. Patient is a 39 y.o. female with following PMhx presented to ER with lethargy via EMS and admitted for Gabapentin overdose. Pt required intubation for airways protection. Position control was contacted that recommended supportive care per ER provider. Pt seen at bedside in ER rm#2. The patient can not provide additional history due to Ventilated.      10/16/2021  Dr. Dandre Craig requested for pulmonary evaluation for tracheostomy decannulation. Patient seen in telemetry [id number]. Patient well-known to our service due to prolonged ICU stay. S/p trach and PEG; prolonged respiratory failure with ICU stay. Patient has sitter at bedside; remains confused, and naked. Patient pulls away her clothes and bedsheet per sitter. No worsening shortness of breath. No vomiting or diarrhea. Patient on trach collar with oxygen mask. Patient having PEG tube feeding. Afebrile this morning. Blood pressure seems to be low this morning. Patient has a history of drug useprior history of cocaine use; also smoker and alcohol use. Review of Systems:  Limited due to patient condition.         Past Medical History:  Past Medical History:   Diagnosis Date    Asthma     Bilateral ovarian cysts     Cocaine abuse ([facility] [address]     Mental and behavioral problem     Pancreatitis     Pancreatitis     Psychosis ([facility] [address]         Past Surgical History:  Past Surgical History:   Procedure Laterality Date    HX GYN      D&C, Hysterectomy    IR INSERT GASTROSTOMY TUBE PERC  10/1/2021        Medications:    Current Facility-Administered Medications   Medication Dose Route Frequency    0.45% sodium chloride infusion  75 mL/hr IntraVENous CONTINUOUS    vancomycin (VANCOCIN) 750 mg in 0.9% sodium chloride 250 mL (VIAL-MATE)  750 mg IntraVENous Q24H    clonazePAM (KlonoPIN) tablet 2 mg  2 mg Oral TID    diazePAM (VALIUM) tablet 2.5 mg  2.5 mg Oral BID    QUEtiapine (SEROquel) tablet 100 mg  100 mg Oral DAILY    QUEtiapine (SEROquel) tablet 300 mg  300 mg Oral QHS    nystatin (MYCOSTATIN) 100,000 unit/gram powder   Topical TID    fentaNYL (DURAGESIC) 50 mcg/hr patch 1 Patch  1 Patch TransDERmal Q72H    Vancomycin - Pharmacy to Dose  1 Each Other Rx Dosing/Monitoring    arformoteroL (BROVANA) neb solution 15 mcg  15 mcg Nebulization BID RT    budesonide (PULMICORT) 500 mcg/2 ml nebulizer suspension  500 mcg Nebulization BID RT    lactulose (CHRONULAC) 10 gram/15 mL solution 15 mL  15 mL Oral BID    melatonin (rapid dissolve) tablet 5 mg  5 mg Oral QHS    famotidine (PEPCID) tablet 20 mg  20 mg Oral BID    thiamine mononitrate (B-1) tablet 100 mg  100 mg Oral DAILY    folic acid (FOLVITE) tablet 1 mg  1 mg Oral DAILY    multivitamin, tx-iron-ca-min (THERA-M w/ IRON) tablet 1 Tablet  1 Tablet Oral DAILY    insulin lispro (HUMALOG) injection   SubCUTAneous Q6H    enoxaparin (LOVENOX) injection 40 mg  40 mg SubCUTAneous Q24H       Allergy:  Allergies   Allergen Reactions    Fish Containing Products Itching    Toradol [Ketorolac] Rash     Pt reports she is not allergic to this medication. Social History:  Social History     Tobacco Use    Smoking status: Current Every Day Smoker     Packs/day: 1.00    Smokeless tobacco: Never Used   Substance Use Topics    Alcohol use: Not Currently    Drug use: Not Currently     Types: Cocaine, Marijuana     Comment: used with in past 24 hours          Objective:   Vital Signs:    Blood pressure (!) 88/53, pulse 97, temperature 97.8 °F (36.6 °C), resp. rate 16, height 5' 2\" (1.575 m), weight 71.2 kg (156 lb 15.5 oz), last menstrual period 05/15/2018, SpO2 99 %. Body mass index is 28.71 kg/m².    O2 Device: Tracheal collar   O2 Flow Rate (L/min): 6 l/min   Temp (24hrs), Av.5 °F (36.9 °C), Min:97.2 °F (36.2 °C), Max:100.7 °F (38.2 °C)         Intake/Output:   Last shift:      10/16 0701 - 10/16 1900  In: 700   Out: -   Last 3 shifts: 10/14 1901 - 10/16 0700  In: 4344 [I.V.:200]  Out: 4390 [Urine:4250]    Intake/Output Summary (Last 24 hours) at 10/16/2021 1623  Last data filed at 10/16/2021 1232  Gross per 24 hour   Intake 700 ml   Output 1400 ml   Net -700 ml       Physical Exam:   Patient appears frail and chronically ill; on oxygen via trach collar; acyanotic  HEENT: pupils not dilated, no scleral jaundice, moist oral mucosa  Neck: No adenopathy or thyroid swelling  Tracheostomy tubeno bleeding or secretions seen from the airways  CVS: S1-S2; no murmur; JVD not elevated    RS: Symmetrical breath sounds; air entry is moderate bilateral; few bibasal crackles; no wheezing or rhonchi; not tachypneic or in distress    Abd: Soft, nontender, not distended; bowel sounds heard; PEG tube present; no bleeding or hematoma    Neuro: Confused and mildly agitated; moving all extremities; limited exam    Extrm: no leg edema or swelling or clubbing  Skin: no rash  Lymphatic: no cervical or supraclavicular adenopathy  Psych: Confused and agitated       Data:     Recent Results (from the past 24 hour(s))   GLUCOSE, POC    Collection Time: 10/15/21  6:12 PM   Result Value Ref Range    Glucose (POC) 126 (H) 70 - 110 mg/dL   GLUCOSE, POC    Collection Time: 10/16/21 12:02 AM   Result Value Ref Range    Glucose (POC) 166 (H) 70 - 110 mg/dL   MAGNESIUM    Collection Time: 10/16/21  3:07 AM   Result Value Ref Range    Magnesium 2.2 1.6 - 2.6 mg/dL   PHOSPHORUS    Collection Time: 10/16/21  3:07 AM   Result Value Ref Range    Phosphorus 7.3 (H) 2.5 - 4.9 MG/DL   AMMONIA    Collection Time: 10/16/21  3:07 AM   Result Value Ref Range    Ammonia 36 (H) 11 - 32 UMOL/L   CBC WITH AUTOMATED DIFF    Collection Time: 10/16/21  3:07 AM   Result Value Ref Range    WBC 7.6 4.6 - 13.2 K/uL    RBC 2.51 (L) 4.20 - 5.30 M/uL    HGB 6.9 (L) 12.0 - 16.0 g/dL    HCT 22.0 (L) 35.0 - 45.0 %    MCV 87.6 78.0 - 100.0 FL    MCH 27.5 24.0 - 34.0 PG    MCHC 31.4 31.0 - 37.0 g/dL    RDW 13.7 11.6 - 14.5 %    PLATELET 979 095 - 556 K/uL    MPV 10.5 9.2 - 11.8 FL    NEUTROPHILS 61 40 - 73 %    LYMPHOCYTES 26 21 - 52 %    MONOCYTES 8 3 - 10 %    EOSINOPHILS 6 (H) 0 - 5 %    BASOPHILS 0 0 - 2 %    ABS. NEUTROPHILS 4.6 1.8 - 8.0 K/UL    ABS. LYMPHOCYTES 1.9 0.9 - 3.6 K/UL    ABS. MONOCYTES 0.6 0.05 - 1.2 K/UL    ABS. EOSINOPHILS 0.4 0.0 - 0.4 K/UL    ABS.  BASOPHILS 0.0 0.0 - 0.1 K/UL    DF AUTOMATED     METABOLIC PANEL, COMPREHENSIVE    Collection Time: 10/16/21  3:07 AM   Result Value Ref Range    Sodium 142 136 - 145 mmol/L    Potassium 3.9 3.5 - 5.5 mmol/L    Chloride 107 100 - 111 mmol/L    CO2 29 21 - 32 mmol/L    Anion gap 6 3.0 - 18 mmol/L    Glucose 81 74 - 99 mg/dL    BUN 20 (H) 7.0 - 18 MG/DL    Creatinine 1.52 (H) 0.6 - 1.3 MG/DL    BUN/Creatinine ratio 13 12 - 20      GFR est AA 46 (L) >60 ml/min/1.73m2    GFR est non-AA 38 (L) >60 ml/min/1.73m2    Calcium 9.7 8.5 - 10.1 MG/DL    Bilirubin, total 0.4 0.2 - 1.0 MG/DL    ALT (SGPT) 25 13 - 56 U/L    AST (SGOT) 12 10 - 38 U/L    Alk.  phosphatase 99 45 - 117 U/L    Protein, total 6.2 (L) 6.4 - 8.2 g/dL    Albumin 3.5 3.4 - 5.0 g/dL    Globulin 2.7 2.0 - 4.0 g/dL    A-G Ratio 1.3 0.8 - 1.7     GLUCOSE, POC    Collection Time: 10/16/21  6:03 AM   Result Value Ref Range    Glucose (POC) 104 70 - 110 mg/dL   CULTURE, BLOOD    Collection Time: 10/16/21 10:00 AM    Specimen: Blood   Result Value Ref Range    Special Requests: NO SPECIAL REQUESTS      Culture result: NO GROWTH AFTER 1 HOUR     CULTURE, BLOOD    Collection Time: 10/16/21 10:00 AM    Specimen: Blood   Result Value Ref Range    Special Requests: NO SPECIAL REQUESTS      Culture result: NO GROWTH AFTER 1 HOUR     EOSINOPHILS, URINE    Collection Time: 10/16/21 10:00 AM   Result Value Ref Range    Eosinophils,urine FEW     CHLORIDE, URINE RANDOM    Collection Time: 10/16/21 10:00 AM   Result Value Ref Range    Chloride,urine random 59 55 - 125 MMOL/L   SODIUM, UR, RANDOM    Collection Time: 10/16/21 10:00 AM   Result Value Ref Range    Sodium,urine random 68 20 - 110 MMOL/L   CREATININE, UR, RANDOM    Collection Time: 10/16/21 10:00 AM   Result Value Ref Range    Creatinine, urine 50.00 30 - 125 mg/dL   URINALYSIS W/MICROSCOPIC    Collection Time: 10/16/21 10:00 AM   Result Value Ref Range    Color YELLOW      Appearance CLOUDY      Specific gravity 1.010 1.005 - 1.030      pH (UA) 7.0 5.0 - 8.0      Protein TRACE (A) NEG mg/dL    Glucose Negative NEG mg/dL    Ketone Negative NEG mg/dL    Bilirubin Negative NEG      Blood TRACE (A) NEG      Urobilinogen 0.2 0.2 - 1.0 EU/dL    Nitrites Negative NEG      Leukocyte Esterase LARGE (A) NEG      WBC TOO NUMEROUS TO COUNT 0 - 5 /hpf    RBC 3 to 5 0 - 5 /hpf    Epithelial cells 2+ 0 - 5 /lpf    Bacteria 2+ (A) NEG /hpf    Budding yeast Positive (A) NEG     GLUCOSE, POC    Collection Time: 10/16/21 12:43 PM   Result Value Ref Range    Glucose (POC) 125 (H) 70 - 110 mg/dL   HGB & HCT    Collection Time: 10/16/21  2:02 PM   Result Value Ref Range    HGB 7.0 (L) 12.0 - 16.0 g/dL    HCT 22.1 (L) 35.0 - 45.0 %               No results for input(s): FIO2I, IFO2, HCO3I, IHCO3, HCOPOC, PCO2I, PCOPOC, IPHI, PHI, PHPOC, PO2I, PO2POC in the last 72 hours.     No lab exists for component: IPOC2    All Micro Results     Procedure Component Value Units Date/Time    CULTURE, BLOOD [[id number]] Collected: 10/16/21 1000    Order Status: Completed Specimen: Blood Updated: 10/16/21 1200     Special Requests: NO SPECIAL REQUESTS        Culture result: NO GROWTH AFTER 1 HOUR       CULTURE, BLOOD [[id number]] Collected: 10/16/21 1000    Order Status: Completed Specimen: Blood Updated: 10/16/21 1200     Special Requests: NO SPECIAL REQUESTS        Culture result: NO GROWTH AFTER 1 HOUR       CULTURE, URINE [[id number]] Collected: 10/16/21 0900    Order Status: Completed Specimen: Urine from Clean catch Updated: 10/16/21 1006    CULTURE, RESPIRATORY/SPUTUM/BRONCH W GRAM STAIN [[id number]]  (Abnormal)  (Susceptibility) Collected: 10/06/21 1430    Order Status: Completed Specimen: Sputum from Tracheal Aspirate Updated: 10/09/21 0810     Special Requests: NO SPECIAL REQUESTS        GRAM STAIN FEW WBCS SEEN               OCCASIONAL EPITHELIAL CELLS SEEN                  RARE GRAM POSITIVE COCCI IN CLUSTERS           Culture result:       LIGHT * METHICILLIN RESISTANT STAPHYLOCOCCUS AUREUS *                  NO NORMAL RESPIRATORY DINA ISOLATED          CULTURE, BLOOD [[id number]] (Abnormal) Collected: 10/04/21 1305    Order Status: Completed Specimen: Blood Updated: 10/07/21 1007     Special Requests: NO SPECIAL REQUESTS        GRAM STAIN       GRAM POSITIVE COCCI IN CLUSTERS ANAEROBIC BOTTLE                  SMEAR CALLED TO AND CORRECTLY REPEATED BY: Jarrod Murphy RN ICU, TO [facility] AT 2042 10/5/2021                  GRAM POSITIVE COCCI IN CLUSTERS AEROBIC BOTTLE                  SMEAR CALLED TO AND CORRECTLY REPEATED BY: Jennifer Perkins RN ICU AT [address] [facility] ON 10/6/21 TO [facility]           Culture result:       STAPHYLOCOCCUS SPECIES, COAGULASE NEGATIVE (MULTIPLE COLONY TYPES) GROWING IN BOTH BOTTLES DRAWN (SITE NOT INDICATED)          CULTURE, BLOOD [[id number]]  (Abnormal) Collected: 10/04/21 1405    Order Status: Completed Specimen: Blood Updated: 10/07/21 1006     Special Requests: NO SPECIAL REQUESTS        GRAM STAIN       GRAM POSITIVE COCCI IN CLUSTERS ANAEROBIC BOTTLE                  SMEAR CALLED TO AND CORRECTLY REPEATED BY: Monster Perez RN ICU, TO [facility] AT 1936 10/5/2021                  GRAM POSITIVE COCCI IN CLUSTERS AEROBIC BOTTLE                  SMEAR CALLED TO AND CORRECTLY REPEATED BY: Yassine Parnell RN ICU AT 2693 ON 10/6/21 TO [facility]           Culture result:       STAPHYLOCOCCUS SPECIES, COAGULASE NEGATIVE (MULTIPLE COLONY TYPES) GROWING IN BOTH BOTTLES DRAWN (SITE NOT INDICATED)          CULTURE, MRSA [[id number]] Collected: 10/05/21 0945    Order Status: Canceled Specimen: Nasal from [address], MRSA [[id number]] Collected: 10/04/21 1100    Order Status: Canceled Specimen: Nasal from Nares     CULTURE, RESPIRATORY/SPUTUM/BRONCH Carolyn [facility]orationist STAIN [[id number]]     Order Status: Canceled Specimen: Sputum,ET Suction     CULTURE, BLOOD [[id number]] Collected: 09/27/21 1005    Order Status: Completed Specimen: Blood Updated: 10/03/21 0723     Special Requests: NO SPECIAL REQUESTS        Culture result: NO GROWTH 6 DAYS       CULTURE, BLOOD [[id number]] Collected: 09/27/21 1005    Order Status: Completed Specimen: Blood Updated: 10/03/21 0723     Special Requests: NO SPECIAL REQUESTS        Culture result: NO GROWTH 6 DAYS       CULTURE, BLOOD [[id number]] Collected: 09/24/21 0740    Order Status: Completed Specimen: Blood Updated: 09/30/21 0819     Special Requests: NO SPECIAL REQUESTS        Culture result: NO GROWTH 6 DAYS       CULTURE, RESPIRATORY/SPUTUM/BRONCH W GRAM STAIN [[id number]]  (Abnormal)  (Susceptibility) Collected: 09/24/21 0654    Order Status: Completed Specimen: Sputum from Tracheal Aspirate Updated: 09/27/21 1147     Special Requests: NO SPECIAL REQUESTS        GRAM STAIN RARE WBCS SEEN               RARE EPITHELIAL CELLS SEEN            NO ORGANISMS SEEN        Culture result:       MODERATE STAPHYLOCOCCUS AUREUS                  NO NORMAL RESPIRATORY DINA ISOLATED          COVID-19 RAPID TEST [[id number]] Collected: 09/24/21 1700    Order Status: Completed Specimen: Nasopharyngeal Updated: 09/24/21 1849     Specimen source Nasopharyngeal        COVID-19 rapid test Not detected        Comment: Rapid Abbott ID Now       Rapid NAAT:  The specimen is NEGATIVE for SARS-CoV-2, the novel coronavirus associated with COVID-19. Negative results should be treated as presumptive and, if inconsistent with clinical signs and symptoms or necessary for patient management, should be tested with an alternative molecular assay. Negative results do not preclude SARS-CoV-2 infection and should not be used as the sole basis for patient management decisions. This test has been authorized by the FDA under an Emergency Use Authorization (EUA) for use by authorized laboratories.    Fact sheet for Healthcare Providers: ConventionUpdate.co.nz  Fact sheet for Patients: ConventionUpdate.co.nz       Methodology: Isothermal Nucleic Acid Amplification         CULTURE, URINE [[id number]]     Order Status: Canceled Specimen: Urine from Clean catch     CULTURE, CSF  TUBE 2 [[id number]] Collected: 09/16/21 1434    Order Status: Completed Specimen: Cerebrospinal Fluid Updated: 09/23/21 1241     Special Requests: TUBE 3, CULTURE AND SENSITIVTY AND GRAM STAIN. GRAM STAIN RARE WBCS SEEN         NO ORGANISMS SEEN        Culture result: NO GROWTH 7 DAYS       CULTURE, BLOOD [[id number]] Collected: 09/14/21 0515    Order Status: Completed Specimen: Blood Updated: 09/20/21 0832     Special Requests: NO SPECIAL REQUESTS        Culture result: NO GROWTH 6 DAYS       CULTURE, BLOOD [[id number]] Collected: 09/14/21 0500    Order Status: Completed Specimen: Blood Updated: 09/20/21 0832     Special Requests: NO SPECIAL REQUESTS        Culture result: NO GROWTH 6 DAYS       MENINGITIS PATHOGENS PANEL, CSF (BY PCR) [[id number]] Collected: 09/16/21 1434    Order Status: Completed Specimen: Cerebrospinal Fluid Updated: 09/17/21 0050     Escherichia coli K1 Not detected        Haemophilus Influenzae Not detected        Listeria Monocytogenes Not detected        Neisseria Meningitidis Not detected        Streptococcus Agalactiae Not detected        Streptococcus Pneumoniae Not detected        Cytomegalovirus Not detected        Enterovirus Not detected        Herpes Simplex Virus 1 Not detected        Comment: In patients who have negative herpes simplex 1 and 2 PCR results, do not modify treatment, confirm with alternate testing. Herpes Simplex Virus 2 Not detected        Comment: In patients who have negative herpes simplex 1 and 2 PCR results, do not modify treatment, confirm with alternate testing. Human Herpesvirus 6 Not detected        Human Parechovirus Not detected        Varicella Zoster Virus Not detected        Crypto.  neoformans/gattii Not detected       MENINGITIS PATHOGENS PANEL, CSF (BY PCR) [[id number]] Collected: 09/16/21 1545    Order Status: Canceled Specimen: Cerebrospinal Fluid     HSV 1 AND 2 BY PCR [[id number]] Collected: 09/16/21 1434    Order Status: Canceled Specimen: Other     COVID-19 RAPID TEST [[id number]] Collected: 09/16/21 1000    Order Status: Completed Specimen: Nasopharyngeal Updated: 09/16/21 1032     Specimen source Nasopharyngeal        COVID-19 rapid test Not detected        Comment: Rapid Abbott ID Now       Rapid NAAT:  The specimen is NEGATIVE for SARS-CoV-2, the novel coronavirus associated with COVID-19. Negative results should be treated as presumptive and, if inconsistent with clinical signs and symptoms or necessary for patient management, should be tested with an alternative molecular assay. Negative results do not preclude SARS-CoV-2 infection and should not be used as the sole basis for patient management decisions. This test has been authorized by the FDA under an Emergency Use Authorization (EUA) for use by authorized laboratories.    Fact sheet for Healthcare Providers: ConventionUpdate.co.nz  Fact sheet for Patients: ConventionUpdate.co.nz       Methodology: Isothermal Nucleic Acid Amplification         LEGIONELLA PNEUMOPHILA AG, URINE [[id number]] Collected: 09/14/21 2315    Order Status: Completed Specimen: Urine, random Updated: 09/15/21 1042     Legionella Ag, urine Negative       STREP PNEUMO AG, URINE [[id number]] Collected: 09/14/21 2315    Order Status: Completed Specimen: Urine, random Updated: 09/15/21 1042     Strep pneumo Ag, urine Negative       RESPIRATORY VIRUS PANEL W/COVID-19, PCR [[id number]] Collected: 09/14/21 1832    Order Status: Completed Specimen: Nasopharyngeal Updated: 09/14/21 2234     Adenovirus Not detected        Coronavirus 229E Not detected        Coronavirus HKU1 Not detected        Coronavirus CVNL63 Not detected        Coronavirus OC43 Not detected        SARS-CoV-2, PCR Not detected        Metapneumovirus Not detected        Rhinovirus and Enterovirus Not detected        Influenza A Not detected        Influenza A, subtype H1 Not detected        Influenza A, subtype H3 Not detected        INFLUENZA A H1N1 PCR Not detected        Influenza B Not detected        Parainfluenza 1 Not detected        Parainfluenza 2 Not detected        Parainfluenza 3 Not detected        Parainfluenza virus 4 Not detected        RSV by PCR Not detected        B. parapertussis, PCR Not detected        Bordetella pertussis - PCR Not detected        Chlamydophila pneumoniae DNA, QL, PCR Not detected        Mycoplasma pneumoniae DNA, QL, PCR Not detected       CULTURE, BLOOD [[id number]] Collected: 09/14/21 1700    Order Status: Canceled Specimen: Blood     CULTURE, URINE [[id number]] Collected: 09/13/21 2330    Order Status: Canceled Specimen: Cath Urine     RESPIRATORY VIRUS PANEL W/COVID-19, PCR [[id number]]     Order Status: Canceled Specimen: NASOPHARYNGEAL SWAB     COVID-19 RAPID TEST [[id number]]     Order Status: Canceled     COVID-19 RAPID TEST [[id number]] Collected: 09/13/21 0737    Order Status: Completed Specimen: Nasopharyngeal Updated: 09/13/21 0811     Specimen source Nasopharyngeal        COVID-19 rapid test Not detected        Comment: Rapid Abbott ID Now       Rapid NAAT:  The specimen is NEGATIVE for SARS-CoV-2, the novel coronavirus associated with COVID-19. Negative results should be treated as presumptive and, if inconsistent with clinical signs and symptoms or necessary for patient management, should be tested with an alternative molecular assay. Negative results do not preclude SARS-CoV-2 infection and should not be used as the sole basis for patient management decisions. This test has been authorized by the FDA under an Emergency Use Authorization (EUA) for use by authorized laboratories.    Fact sheet for Healthcare Providers: ConventionUpdate.co.nz  Fact sheet for Patients: ConventionUpdate.co.nz       Methodology: Isothermal Nucleic Acid Amplification         COVID-19 RAPID TEST [[id number]]     Order Status: Canceled         Echo 9/17/2021:  Left Ventricle Normal cavity size, wall thickness and systolic function (ejection fraction normal). The estimated EF is 60 - 65%. There is inconclusive left ventricular diastolic function E/E' ratio = 7.08  Heart rate is over 100. Wall Scoring The left ventricular wall motion is normal.            Left Atrium Normal cavity size. Right Ventricle Normal cavity size and global systolic function. Assessment of RV function:   TAPSE = 27 mm. Right Atrium Normal cavity size. Interatrial Septum Interatrial septum not well visualized   Aortic Valve Aortic valve not well visualized. Trileaflet valve structure, no stenosis and no regurgitation. Mitral Valve No stenosis. Mitral valve non-specific thickening. Mild regurgitation. Tricuspid Valve Tricuspid valve not well visualized. No stenosis. Mild regurgitation. Pulmonic Valve Pulmonic valve not well visualized. No stenosis and no regurgitation. Aorta The aorta was not well visualized. Normal aortic root. Pulmonary Artery Pulmonary arteries not well visualized. Pulmonary arterial systolic pressure (PASP) is 29 mmHg. Pulmonary hypertension not suggested by Doppler findings. IVC/Hepatic Veins Mechanically ventilated; cannot use inferior caval vein diameter to estimate central venous pressure. Pericardium Normal pericardium and no evidence of pericardial effusion. CT head 9/15/1021  IMPRESSION   No acute intracranial abnormality. CT chest, abdomen, pelvis 9/15/1021  IMPRESSION   Endotracheal tube tip in the lower thoracic trachea 1.5 cm above the dipak.    Bilateral lower lobar atelectasis, possible endobronchial lesion/mucous plug in  the left lower lobe bronchus.    No acute intra-abdominal abnormality.       Imaging:  [x]I have personally reviewed the patients chest radiographs images and report    Chest x-ray 10/15- 1 view  COMPARISON: 10/8/2021  TECHNIQUE: Portable frontal view of the chest  FINDINGS:   SUPPORT DEVICES: Tracheostomy catheter projecting in expected position.   HEART AND MEDIASTINUM: Stable cardiac size and mediastinal contours, within  normal limits.   LUNGS AND PLEURAL SPACES: Lungs are mildly underexpanded, but appear improved in  aeration from prior examination. Minor residual linear opacities are present at  each lung base. No pneumothorax or pleural effusion. No alveolar consolidation.   BONY THORAX AND SOFT TISSUES: Unremarkable. IMPRESSION   1. Tracheostomy catheter in stable position. 2. Improved pulmonary expansion/aeration with minor residual atelectasis at the  lung bases. Please note: Voice-recognition software may have been used to generate this report, which may have resulted in some phonetic-based errors in grammar and contents. Even though attempts were made to correct all the mistakes, some may have been missed, and remained in the body of the document.       David Connolly MD  10/16/2021

## 2021-10-16 NOTE — PROGRESS NOTES
1900 Report received    Assessment completed and noted. Pt is confused with 1:1 sitter at bedside. Plan of care discussed with pt and sitter. Pt is agitated and restless, pulling at trach, IV lines and zamora. Dr. Jordan Dougherty paged, new order to renew restraints noted. Pt not responding to any PRN medications given to agitation. Constant redirection given by nursing staff.  VSS    0700 Report given to Lana Monsalve

## 2021-10-16 NOTE — PROGRESS NOTES
1000 MD zapata made aware of Blood tinge secretion from trache. No new orders received. 1824 Blood Transfusion started at this time. Tolerating well. 1915 Bedside and Verbal shift change report given to Wilmar Pratt RN (oncoming nurse) by Canelo Comer RN (offgoing nurse). Report included the following information SBAR, Kardex, MAR, Recent Results and Cardiac Rhythm .

## 2021-10-16 NOTE — PROGRESS NOTES
Problem: Non-Violent Restraints  Goal: Removal from restraints as soon as assessed to be safe  Outcome: Progressing Towards Goal  Goal: No harm/injury to patient while restraints in use  Outcome: Progressing Towards Goal  Goal: Patient's dignity will be maintained  Outcome: Progressing Towards Goal  Goal: Patient Interventions  Outcome: Progressing Towards Goal     Problem: Falls - Risk of  Goal: *Absence of Falls  Description: Document Leafy Simpson Fall Risk and appropriate interventions in the flowsheet. Outcome: Progressing Towards Goal  Note: Fall Risk Interventions:  Mobility Interventions: Bed/chair exit alarm    Mentation Interventions: Adequate sleep, hydration, pain control    Medication Interventions: Bed/chair exit alarm    Elimination Interventions: Call light in reach, Stay With Me (per policy)    History of Falls Interventions: Vital signs minimum Q4HRs X 24 hrs (comment for end date)         Problem: Patient Education: Go to Patient Education Activity  Goal: Patient/Family Education  Outcome: Progressing Towards Goal     Problem: Risk for Spread of Infection  Goal: Prevent transmission of infectious organism to others  Description: Prevent the transmission of infectious organisms to other patients, staff members, and visitors. Outcome: Progressing Towards Goal     Problem: Patient Education:  Go to Education Activity  Goal: Patient/Family Education  Outcome: Progressing Towards Goal     Problem: Pressure Injury - Risk of  Goal: *Prevention of pressure injury  Description: Document Remy Scale and appropriate interventions in the flowsheet.   Outcome: Progressing Towards Goal  Note: Pressure Injury Interventions:  Sensory Interventions: Assess changes in LOC    Moisture Interventions: Absorbent underpads    Activity Interventions: Pressure redistribution bed/mattress(bed type)    Mobility Interventions: HOB 30 degrees or less    Nutrition Interventions: Document food/fluid/supplement intake, Offer support with meals,snacks and hydration    Friction and Shear Interventions: HOB 30 degrees or less

## 2021-10-16 NOTE — CONSULTS
Nephrology Consult Note    Consult requesting Physican:   Dr Eileen Munoz    Reason for Consult:   FELICITY    HPI:   Luz Goodman is a 39 y.o. wf w/ PMHx of Psychosis and multidrug use/abuse who initially presented with acute metabolic encephalopathy and lethargy likely due to gabapentin overdose was admitted to ICU intubated (9/11) and reintubated (9/18). Currently on the floor with trach and peg tube. Nephrology is consulted fro FELICITY with Cr today up to 1.5, from 0.8 on 10/13/2021. Pt is currently sedated due to agitation and has one to one sitter. She is on Vanc for GPC bacteremia. Random Vanc level yesterday was 28. She is spiking fever and BP is running low. No recent CT dye exposure. Impression:   -Acute Kidney Injury: prerenal/ATN from sepsis +/- vanc induced injury. Cr 0.8=>1.6 today  -GPC bacteremia  -Anemia  -Recent acute respiratory failure: extubated.  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     Plan:   -Gentle IV fluid hydration 0.45%NS  -UA, urine electrolytes and eos   -Monitor UOP  -Monitor Vanc level   -Avoid NSAIDs and dye  -No indication for RRT    Medications:     Current Facility-Administered Medications:     vancomycin (VANCOCIN) 750 mg in 0.9% sodium chloride 250 mL (VIAL-MATE), 750 mg, IntraVENous, Q24H, Heather Chopra MD, Last Rate: 250 mL/hr at 10/15/21 2052, 750 mg at 10/15/21 2052    clonazePAM (KlonoPIN) tablet 2 mg, 2 mg, Oral, TID, Lam Sanchez MD, 2 mg at 10/16/21 9825    diazePAM (VALIUM) tablet 2.5 mg, 2.5 mg, Oral, BID, Kiya Castaneda MD, 2.5 mg at 10/16/21 7184    haloperidol lactate (HALDOL) injection 5 mg, 5 mg, IntraVENous, Q8H PRN, Herve Luciano MD, 5 mg at 10/14/21 2332    QUEtiapine (SEROquel) tablet 100 mg, 100 mg, Oral, DAILY, Mohsin, Faisal, MD, 100 mg at 10/16/21 1352    QUEtiapine (SEROquel) tablet 300 mg, 300 mg, Oral, QHS, Mohsin, Faisal, MD, 300 mg at 10/15/21 2053    nystatin (MYCOSTATIN) 100,000 unit/gram powder, , Topical, TID, Fidel Rodriguez MD, Given at 10/16/21 0940    metoprolol (LOPRESSOR) injection 2.5 mg, 2.5 mg, IntraVENous, Q6H PRN, Carmen IGNACIO MD, 2.5 mg at 10/07/21 1545    fentaNYL (DURAGESIC) 50 mcg/hr patch 1 Patch, 1 Patch, TransDERmal, Q72H, Carmen IGNACIO MD, 1 Patch at 10/12/21 1233    Vancomycin - Pharmacy to Dose, 1 Each, Other, Rx Dosing/Monitoring, Phu Pang MD    HYDROmorphone (DILAUDID) injection 1 mg, 1 mg, IntraVENous, Q4H PRN, Edwar Blood MD, 1 mg at 10/16/21 1034    arformoteroL (BROVANA) neb solution 15 mcg, 15 mcg, Nebulization, BID RT, Edwar Blood MD, 15 mcg at 10/16/21 0923    budesonide (PULMICORT) 500 mcg/2 ml nebulizer suspension, 500 mcg, Nebulization, BID RT, Edwar Blood MD, 500 mcg at 10/16/21 0923    lactulose (CHRONULAC) 10 gram/15 mL solution 15 mL, 15 mL, Oral, BID, Edwar Blood MD, 15 mL at 10/16/21 4348    melatonin (rapid dissolve) tablet 5 mg, 5 mg, Oral, QHS, Rika Dozier MD, 5 mg at 10/15/21 2157    fentaNYL citrate (PF) injection 50 mcg, 50 mcg, IntraVENous, Q2H PRN, Fidel Rodriguez MD, 50 mcg at 10/08/21 0454    albuterol-ipratropium (DUO-NEB) 2.5 MG-0.5 MG/3 ML, 3 mL, Nebulization, Q4H PRN, Marco Will MD    famotidine (PEPCID) tablet 20 mg, 20 mg, Oral, BID, Carmen IGNACIO MD, 20 mg at 10/16/21 8813    thiamine mononitrate (B-1) tablet 100 mg, 100 mg, Oral, DAILY, Rika Dozier MD, 100 mg at 56/91/67 9349    folic acid (FOLVITE) tablet 1 mg, 1 mg, Oral, DAILY, Rika Dozier MD, 1 mg at 10/16/21 4389    multivitamin, tx-iron-ca-min (THERA-M w/ IRON) tablet 1 Tablet, 1 Tablet, Oral, DAILY, Rika Dozier MD, 1 Tablet at 10/16/21 2158    ibuprofen (ADVIL;MOTRIN) 100 mg/5 mL oral suspension 400 mg, 400 mg, Per NG tube, Q6H PRN, Johny KOCH MD, 400 mg at 09/27/21 1749    insulin lispro (HUMALOG) injection, , SubCUTAneous, Q6H, Edwar Blood MD, 2 Units at 10/16/21 0000    glucose chewable tablet 16 g, 4 Tablet, Oral, PRN, Kandice Katz MD    glucagon [place] [facility]) injection 1 mg, 1 mg, IntraMUSCular, PRN, Kandice Katz MD    dextrose (D50W) injection syrg 12.5-25 g, 25-50 mL, IntraVENous, PRN, Kandice Katz MD    LORazepam (ATIVAN) injection 2 mg, 2 mg, IntraVENous, Q6H PRN, Kandice Katz MD, 2 mg at 10/16/21 0542    enoxaparin (LOVENOX) injection 40 mg, 40 mg, SubCUTAneous, Q24H, Milly Mirza MD, 40 mg at 10/15/21 1650    acetaminophen (TYLENOL) tablet 650 mg, 650 mg, Oral, Q6H PRN, 650 mg at 10/15/21 2157 **OR** acetaminophen (TYLENOL) suppository 650 mg, 650 mg, Rectal, Q6H PRN, Chrystal Mirza MD    polyethylene glycol (MIRALAX) packet 17 g, 17 g, Oral, DAILY PRN, Milly Mirza MD    ondansetron (ZOFRAN ODT) tablet 4 mg, 4 mg, Oral, Q8H PRN **OR** ondansetron (ZOFRAN) injection 4 mg, 4 mg, IntraVENous, Q6H PRN, Chrystal Mirza MD, 4 mg at 10/15/21 0134    PM/SHx:     Active Ambulatory Problems     Diagnosis Date Noted    Dextromethorphan use disorder, moderate ([facility] [address] 12/07/2019    Ekbom's delusional parasitosis ([facility] [address] 09/06/2020     Resolved Ambulatory Problems     Diagnosis Date Noted    Left ear pain 09/06/2020     Past Medical History:   Diagnosis Date    Asthma     Bilateral ovarian cysts     Cocaine abuse ([facility] [address]     Mental and behavioral problem     Pancreatitis     Pancreatitis     Psychosis ([facility] [address]        SHx:     Social History     Socioeconomic History    Marital status:      Spouse name: Not on file    Number of children: Not on file    Years of education: Not on file    Highest education level: Not on file   Occupational History    Not on file   Tobacco Use    Smoking status: Current Every Day Smoker     Packs/day: 1.00    Smokeless tobacco: Never Used   Substance and Sexual Activity    Alcohol use: Not Currently    Drug use: Not Currently     Types: Cocaine, Marijuana     Comment: used with in past 24 hours    Sexual activity: Not Currently   Other Topics Concern    Not on file   Social History Narrative    Not on file     Social Determinants of Health     Financial Resource Strain:     Difficulty of Paying Living Expenses:    Food Insecurity:     Worried About Running Out of Food in the Last Year:     [address] in the Last Year:    Transportation Needs:     Lack of Transportation (Medical):  Lack of Transportation (Non-Medical):    Physical Activity:     Days of Exercise per Week:     Minutes of Exercise per Session:    Stress:     Feeling of Stress :    Social Connections:     Frequency of Communication with Friends and Family:     Frequency of Social Gatherings with Friends and Family:     Attends Confucianism Services:     Active Member of Clubs or Organizations:     Attends Club or Organization Meetings:     Marital Status:    Intimate Partner Violence:     Fear of Current or Ex-Partner:     Emotionally Abused:     Physically Abused:     Sexually Abused: Allergies: Allergies   Allergen Reactions    Fish Containing Products Itching    Toradol [Ketorolac] Rash     Pt reports she is not allergic to this medication.         Review of Systems:   Review of System is negative except per HPI    Physical Assessment:     Visit Vitals  BP (!) 93/45 (BP 1 Location: Left upper arm, BP Patient Position: Lying)   Pulse 96   Temp 97.4 °F (36.3 °C)   Resp 16   Ht 5' 2\" (1.575 m)   Wt 71.2 kg (156 lb 15.5 oz)   SpO2 100%   BMI 28.71 kg/m²       Intake/Output Summary (Last 24 hours) at 10/16/2021 1120  Last data filed at 10/16/2021 5361  Gross per 24 hour   Intake 100 ml   Output 1400 ml   Net -1300 ml       General Appearance: currently sedated  Head: atraumatic  HEENT: no jaundice, moist oral mucosa, +Trach   Neck: no JVD noted  Chest: LS clear to auscultation bilaterally  Heart: S1/S2, no murmur, gallop or rub, RRR  Adbomen: soft, nontender, BS active, +peg  : no flank tenderness, no zamora catheter  Skin: intact, no rash  Extremity: no edema, cyanosis or clubbing  MS: No joint deformity or tenderness  Neuro: sedated    Omar Le MD    Work Number: [phone]

## 2021-10-16 NOTE — PROGRESS NOTES
Problem: Ventilator Management  Goal: *Adequate oxygenation and ventilation  Outcome: Not Progressing Towards Goal  Goal: *Patient maintains clear airway/free of aspiration  Outcome: Not Progressing Towards Goal  Goal: *Absence of infection signs and symptoms  Outcome: Not Progressing Towards Goal  Goal: *Normal spontaneous ventilation  Outcome: Not Progressing Towards Goal     Problem: Patient Education: Go to Patient Education Activity  Goal: Patient/Family Education  Outcome: Not Progressing Towards Goal     Problem: Non-Violent Restraints  Goal: Removal from restraints as soon as assessed to be safe  Outcome: Not Progressing Towards Goal  Goal: No harm/injury to patient while restraints in use  Outcome: Not Progressing Towards Goal  Goal: Patient's dignity will be maintained  Outcome: Not Progressing Towards Goal  Goal: Patient Interventions  Outcome: Not Progressing Towards Goal     Problem: Falls - Risk of  Goal: *Absence of Falls  Description: Document Vipul Wilson Fall Risk and appropriate interventions in the flowsheet. Outcome: Not Progressing Towards Goal  Note: Fall Risk Interventions:  Mobility Interventions: Bed/chair exit alarm    Mentation Interventions: Adequate sleep, hydration, pain control    Medication Interventions: Patient to call before getting OOB    Elimination Interventions: Stay With Me (per policy)    History of Falls Interventions: Vital signs minimum Q4HRs X 24 hrs (comment for end date)         Problem: Patient Education: Go to Patient Education Activity  Goal: Patient/Family Education  Outcome: Not Progressing Towards Goal     Problem: Risk for Spread of Infection  Goal: Prevent transmission of infectious organism to others  Description: Prevent the transmission of infectious organisms to other patients, staff members, and visitors.   Outcome: Not Progressing Towards Goal     Problem: Patient Education: Go to Patient Education Activity  Goal: Patient/Family Education  Outcome: Not Progressing Towards Goal     Problem: Patient Education: Go to Patient Education Activity  Goal: Patient/Family Education  Outcome: Not Progressing Towards Goal     Problem: Patient Education: Go to Patient Education Activity  Goal: Patient/Family Education  Outcome: Not Progressing Towards Goal

## 2021-10-17 LAB
ABO + RH BLD: NORMAL
ALBUMIN SERPL-MCNC: 3.2 G/DL (ref 3.4–5)
ALBUMIN/GLOB SERPL: 1.3 {RATIO} (ref 0.8–1.7)
ALP SERPL-CCNC: 84 U/L (ref 45–117)
ALT SERPL-CCNC: 19 U/L (ref 13–56)
AMMONIA PLAS-SCNC: 47 UMOL/L (ref 11–32)
ANION GAP SERPL CALC-SCNC: 7 MMOL/L (ref 3–18)
AST SERPL-CCNC: 10 U/L (ref 10–38)
BACTERIA SPEC CULT: NORMAL
BASOPHILS # BLD: 0 K/UL (ref 0–0.1)
BASOPHILS NFR BLD: 0 % (ref 0–2)
BILIRUB SERPL-MCNC: 0.5 MG/DL (ref 0.2–1)
BLD PROD TYP BPU: NORMAL
BLOOD GROUP ANTIBODIES SERPL: NORMAL
BPU ID: NORMAL
BUN SERPL-MCNC: 18 MG/DL (ref 7–18)
BUN/CREAT SERPL: 14 (ref 12–20)
CALCIUM SERPL-MCNC: 9.1 MG/DL (ref 8.5–10.1)
CHLORIDE SERPL-SCNC: 107 MMOL/L (ref 100–111)
CO2 SERPL-SCNC: 29 MMOL/L (ref 21–32)
CREAT SERPL-MCNC: 1.27 MG/DL (ref 0.6–1.3)
CROSSMATCH RESULT,%XM: NORMAL
DIFFERENTIAL METHOD BLD: ABNORMAL
EOSINOPHIL # BLD: 0.6 K/UL (ref 0–0.4)
EOSINOPHIL NFR BLD: 9 % (ref 0–5)
ERYTHROCYTE [DISTWIDTH] IN BLOOD BY AUTOMATED COUNT: 13.4 % (ref 11.6–14.5)
GLOBULIN SER CALC-MCNC: 2.5 G/DL (ref 2–4)
GLUCOSE BLD STRIP.AUTO-MCNC: 106 MG/DL (ref 70–110)
GLUCOSE BLD STRIP.AUTO-MCNC: 113 MG/DL (ref 70–110)
GLUCOSE BLD STRIP.AUTO-MCNC: 94 MG/DL (ref 70–110)
GLUCOSE BLD STRIP.AUTO-MCNC: 96 MG/DL (ref 70–110)
GLUCOSE SERPL-MCNC: 113 MG/DL (ref 74–99)
HCT VFR BLD AUTO: 24.1 % (ref 35–45)
HGB BLD-MCNC: 7.5 G/DL (ref 12–16)
LYMPHOCYTES # BLD: 1.8 K/UL (ref 0.9–3.6)
LYMPHOCYTES NFR BLD: 25 % (ref 21–52)
MAGNESIUM SERPL-MCNC: 1.9 MG/DL (ref 1.6–2.6)
MCH RBC QN AUTO: 28.6 PG (ref 24–34)
MCHC RBC AUTO-ENTMCNC: 31.1 G/DL (ref 31–37)
MCV RBC AUTO: 92 FL (ref 78–100)
MONOCYTES # BLD: 0.6 K/UL (ref 0.05–1.2)
MONOCYTES NFR BLD: 9 % (ref 3–10)
NEUTS SEG # BLD: 3.9 K/UL (ref 1.8–8)
NEUTS SEG NFR BLD: 56 % (ref 40–73)
PHOSPHATE SERPL-MCNC: 6.1 MG/DL (ref 2.5–4.9)
PLATELET # BLD AUTO: 300 K/UL (ref 135–420)
PMV BLD AUTO: 10.3 FL (ref 9.2–11.8)
POTASSIUM SERPL-SCNC: 4.1 MMOL/L (ref 3.5–5.5)
PROT SERPL-MCNC: 5.7 G/DL (ref 6.4–8.2)
RBC # BLD AUTO: 2.62 M/UL (ref 4.2–5.3)
SERVICE CMNT-IMP: NORMAL
SODIUM SERPL-SCNC: 143 MMOL/L (ref 136–145)
SPECIMEN EXP DATE BLD: NORMAL
STATUS OF UNIT,%ST: NORMAL
UNIT DIVISION, %UDIV: 0
VANCOMYCIN TROUGH SERPL-MCNC: 17.6 UG/ML (ref 10–20)
WBC # BLD AUTO: 6.9 K/UL (ref 4.6–13.2)

## 2021-10-17 PROCEDURE — 82962 GLUCOSE BLOOD TEST: CPT

## 2021-10-17 PROCEDURE — 74011250637 HC RX REV CODE- 250/637: Performed by: INTERNAL MEDICINE

## 2021-10-17 PROCEDURE — 94640 AIRWAY INHALATION TREATMENT: CPT

## 2021-10-17 PROCEDURE — 83735 ASSAY OF MAGNESIUM: CPT

## 2021-10-17 PROCEDURE — 74011250637 HC RX REV CODE- 250/637: Performed by: PSYCHIATRY & NEUROLOGY

## 2021-10-17 PROCEDURE — 84100 ASSAY OF PHOSPHORUS: CPT

## 2021-10-17 PROCEDURE — 77010033678 HC OXYGEN DAILY

## 2021-10-17 PROCEDURE — 74011000250 HC RX REV CODE- 250: Performed by: INTERNAL MEDICINE

## 2021-10-17 PROCEDURE — 74011250636 HC RX REV CODE- 250/636: Performed by: INTERNAL MEDICINE

## 2021-10-17 PROCEDURE — 74011250637 HC RX REV CODE- 250/637: Performed by: HOSPITALIST

## 2021-10-17 PROCEDURE — 82140 ASSAY OF AMMONIA: CPT

## 2021-10-17 PROCEDURE — 36415 COLL VENOUS BLD VENIPUNCTURE: CPT

## 2021-10-17 PROCEDURE — 80053 COMPREHEN METABOLIC PANEL: CPT

## 2021-10-17 PROCEDURE — 94760 N-INVAS EAR/PLS OXIMETRY 1: CPT

## 2021-10-17 PROCEDURE — 65660000000 HC RM CCU STEPDOWN

## 2021-10-17 PROCEDURE — 85025 COMPLETE CBC W/AUTO DIFF WBC: CPT

## 2021-10-17 PROCEDURE — 80202 ASSAY OF VANCOMYCIN: CPT

## 2021-10-17 PROCEDURE — 74011250636 HC RX REV CODE- 250/636: Performed by: FAMILY MEDICINE

## 2021-10-17 RX ORDER — DIPHENHYDRAMINE HCL 12.5MG/5ML
12.5 ELIXIR ORAL
Status: DISCONTINUED | OUTPATIENT
Start: 2021-10-17 | End: 2021-10-17

## 2021-10-17 RX ORDER — DIPHENHYDRAMINE HCL 12.5MG/5ML
12.5 ELIXIR ORAL
Status: DISCONTINUED | OUTPATIENT
Start: 2021-10-17 | End: 2021-11-10

## 2021-10-17 RX ADMIN — LORAZEPAM 2 MG: 2 INJECTION INTRAMUSCULAR at 06:06

## 2021-10-17 RX ADMIN — HALOPERIDOL LACTATE 5 MG: 5 INJECTION, SOLUTION INTRAMUSCULAR at 12:18

## 2021-10-17 RX ADMIN — ARFORMOTEROL TARTRATE 15 MCG: 15 SOLUTION RESPIRATORY (INHALATION) at 20:59

## 2021-10-17 RX ADMIN — CLONAZEPAM 2 MG: 0.5 TABLET ORAL at 21:29

## 2021-10-17 RX ADMIN — HYDROMORPHONE HYDROCHLORIDE 1 MG: 1 INJECTION, SOLUTION INTRAMUSCULAR; INTRAVENOUS; SUBCUTANEOUS at 09:29

## 2021-10-17 RX ADMIN — BUDESONIDE 500 MCG: 0.5 INHALANT RESPIRATORY (INHALATION) at 09:51

## 2021-10-17 RX ADMIN — THIAMINE HCL TAB 100 MG 100 MG: 100 TAB at 09:29

## 2021-10-17 RX ADMIN — QUETIAPINE FUMARATE 300 MG: 200 TABLET ORAL at 21:30

## 2021-10-17 RX ADMIN — QUETIAPINE FUMARATE 100 MG: 100 TABLET ORAL at 09:29

## 2021-10-17 RX ADMIN — FAMOTIDINE 20 MG: 20 TABLET ORAL at 09:28

## 2021-10-17 RX ADMIN — LORAZEPAM 2 MG: 2 INJECTION INTRAMUSCULAR at 18:26

## 2021-10-17 RX ADMIN — VANCOMYCIN HYDROCHLORIDE 750 MG: 750 INJECTION, POWDER, LYOPHILIZED, FOR SOLUTION INTRAVENOUS at 22:54

## 2021-10-17 RX ADMIN — CLONAZEPAM 2 MG: 0.5 TABLET ORAL at 09:29

## 2021-10-17 RX ADMIN — NYSTATIN: 100000 POWDER TOPICAL at 09:00

## 2021-10-17 RX ADMIN — Medication 1 TABLET: at 09:29

## 2021-10-17 RX ADMIN — LACTULOSE 15 ML: 10 SOLUTION ORAL at 09:29

## 2021-10-17 RX ADMIN — NYSTATIN: 100000 POWDER TOPICAL at 17:42

## 2021-10-17 RX ADMIN — LACTULOSE 15 ML: 10 SOLUTION ORAL at 21:30

## 2021-10-17 RX ADMIN — BUDESONIDE 500 MCG: 0.5 INHALANT RESPIRATORY (INHALATION) at 20:59

## 2021-10-17 RX ADMIN — CLONAZEPAM 2 MG: 0.5 TABLET ORAL at 16:36

## 2021-10-17 RX ADMIN — DIAZEPAM 2.5 MG: 5 TABLET ORAL at 09:28

## 2021-10-17 RX ADMIN — ARFORMOTEROL TARTRATE 15 MCG: 15 SOLUTION RESPIRATORY (INHALATION) at 09:50

## 2021-10-17 RX ADMIN — FAMOTIDINE 20 MG: 20 TABLET ORAL at 21:30

## 2021-10-17 RX ADMIN — DIAZEPAM 2.5 MG: 5 TABLET ORAL at 21:29

## 2021-10-17 RX ADMIN — HYDROMORPHONE HYDROCHLORIDE 1 MG: 1 INJECTION, SOLUTION INTRAMUSCULAR; INTRAVENOUS; SUBCUTANEOUS at 17:42

## 2021-10-17 RX ADMIN — FOLIC ACID 1 MG: 1 TABLET ORAL at 09:28

## 2021-10-17 RX ADMIN — NYSTATIN: 100000 POWDER TOPICAL at 21:33

## 2021-10-17 RX ADMIN — Medication 5 MG: at 21:30

## 2021-10-17 NOTE — PROGRESS NOTES
Hospitalist Progress Note-critical care note     Patient: Lyssa Lang MRN: [id number]  CSN: [id number]    YOB: 1980  Age: 39 y.o. Sex: female    DOA: 9/11/2021 LOS:  LOS: 35 days            Chief complaint: wrist fracture , drug overdose , acute respiratory failure with hypoxia, psychosis     Assessment/Plan         Hospital Problems  Date Reviewed: 9/6/2015        Codes Class Noted POA    FELICITY (acute kidney injury) ([facility] ICD-10-CM: N17.9  ICD-9-CM: 584.9  10/16/2021 Unknown        Bacteremia due to Gram-positive bacteria ICD-10-CM: R78.81  ICD-9-CM: 790.7  10/6/2021 Unknown        Status post tracheostomy ([facility] ICD-10-CM: Z93.0  ICD-9-CM: V44.0  9/28/2021 Unknown        Hypokalemia ICD-10-CM: E87.6  ICD-9-CM: 276.8  9/21/2021 Unknown        Increased ammonia level ICD-10-CM: R79.89  ICD-9-CM: 790.6  9/14/2021 Unknown        Psychosis ([facility] ICD-10-CM: F29  ICD-9-CM: 298.9  Unknown Unknown        Hyponatremia ICD-10-CM: E87.1  ICD-9-CM: 276.1  Unknown Yes        Acute respiratory failure with hypoxia (HCC) ICD-10-CM: J96.01  ICD-9-CM: 518.81  Unknown Yes        Left wrist fracture, with delayed healing, subsequent encounter ICD-10-CM: S62.102G  ICD-9-CM: V54.19  9/12/2021 Unknown        * (Principal) Drug overdose, intentional self-harm, initial encounter ([facility] ICD-10-CM: T50.902A  ICD-9-CM: 977.9, E950.5  9/12/2021 Yes        Hypoxia ICD-10-CM: R09.02  ICD-9-CM: 799.02  9/12/2021 Yes        Hypotension after procedure ICD-10-CM: I95.81  ICD-9-CM: 458.29  9/12/2021 Yes        Acute metabolic encephalopathy PYV-83-GE: G93.41  ICD-9-CM: 348.31  9/12/2021 Yes                  Lyssa Lang is a 39 y.o. female who is standing history of multidrug use/abuse, previous drug-seeking behavior and mental health issues otherwise unspecified arrives via EMS with acute metabolic encephalopathy and lethargy. Admitted for likely gabapentin overdose.   She was intubated in ER, ct head no acute issue, LP done due to fever even on abx, no meningitis noted. Now she has peg and trach        Acute respiratory failure with hypoxia   Intubated on 9/12    Trach on 9/20/21. Continue breathing tx . Continue local trach care, with 6 L oxygen   Pulmonologist f/u     Post tracheostomy   Continue local trach care       bacteremia -fever -repeat cx    So far stable   bcx  Positive for GPC on 10/4   Sputum culture 9/24/2021: MRSA. Complete abx  tx     Anemia  Transfused  one unit prbc on 10/16   Occult stool negative    no bleeding reported   Upper PVL negative for dvt           Acute metabolic encephalopathy   Ct head no acute process         Gabapentin overdose with lethargy and AMS    S/p procedural stomach emptying in ED with charcoal and sorbitol    on Seroquel        elevated ammonia level  Continue   Lactulose,   Ammonia level 47    Psychosis , hx of  Ekbom's delusional parasitosis   agitated , received halodol am   On  Klonopin,  Seroquel, halodol prn per psych recommendation   Continue cardiac monitoring     left wrist fracture: immobilized -poa -stable   Appreciated ortho on board-f/u with Dr. Joy Santo as out-pt   Cr mild elevated-will give some iv hydration, give mg      yarely    Resolved Continue low rate iv hydration   Renal f/u     Sitter was at the bedside     Publictivity -she does not write back   rn : agitated,      Poor prognosis , dc barrier   Disposition :tbd,   Review of systems:  Unable to obtain due to trach collar   Vital signs/Intake and Output:  Visit Vitals  /76   Pulse 88   Temp 97.6 °F (36.4 °C)   Resp 16   Ht 5' 2\" (1.575 m)   Wt 71.2 kg (156 lb 15.5 oz)   SpO2 95%   BMI 28.71 kg/m²     Current Shift:  No intake/output data recorded.   Last three shifts:  10/15 1901 - 10/17 0700  In: 1556.8   Out: 2500 [Urine:2500]    Physical Exam:  General: Sleeping ,then agitated   HEENT:  Trach collar   Lungs: Coarse bs   Heart:  rrr   No murmur, No Rubs, No Gallops  Abdomen: Soft, Non distended, Non tender. +Bowel sounds, peg tube noted   Extremities: No c/c.immobilzer noted on left wrist   Psych: On and off agitated  Neurologic:  Sleeping           Labs: Results:       Chemistry Recent Labs     10/17/21  0238 10/16/21  0307 10/15/21  0510   * 81 117*    142 140   K 4.1 3.9 3.8    107 104   CO2 29 29 30   BUN 18 20* 21*   CREA 1.27 1.52* 1.41*   CA 9.1 9.7 9.3   AGAP 7 6 6   BUCR 14 13 15   AP 84 99 93   TP 5.7* 6.2* 5.9*   ALB 3.2* 3.5 3.4   GLOB 2.5 2.7 2.5   AGRAT 1.3 1.3 1.4      CBC w/Diff Recent Labs     10/17/21  0238 10/16/21  1402 10/16/21  0307 10/15/21  1120 10/15/21  0510   WBC 6.9  --  7.6  --  7.1   RBC 2.62*  --  2.51*  --  2.39*   HGB 7.5* 7.0* 6.9*   < > 6.9*   HCT 24.1* 22.1* 22.0*   < > 21.7*     --  272  --  267   GRANS 56  --  61  --  60   LYMPH 25  --  26  --  22   EOS 9*  --  6*  --  8*    < > = values in this interval not displayed. Cardiac Enzymes No results for input(s): CPK, CKND1, ZENON in the last 72 hours. No lab exists for component: CKRMB, TROIP   Coagulation No results for input(s): PTP, INR, APTT, INREXT, INREXT in the last 72 hours. Lipid Panel No results found for: CHOL, CHOLPOCT, CHOLX, CHLST, CHOLV, 267469, HDL, HDLP, LDL, LDLC, DLDLP, 390097, VLDLC, VLDL, TGLX, TRIGL, TRIGP, TGLPOCT, CHHD, CHHDX   BNP No results for input(s): BNPP in the last 72 hours.    Liver Enzymes Recent Labs     10/17/21  0238   TP 5.7*   ALB 3.2*   AP 84      Thyroid Studies No results found for: T4, T3U, TSH, TSHEXT, TSHEXT     Procedures/imaging: see electronic medical records for all procedures/Xrays and details which were not copied into this note but were reviewed prior to creation of Plan    XR WRIST LT AP/LAT/OBL MIN 3V    Result Date: 8/19/2021  EXAM: XR WRIST LT AP/LAT/OBL MIN 3V CLINICAL INDICATION/HISTORY: Fall with LEFT wrist pain and swelling -Additional: None COMPARISON: None TECHNIQUE: 3 views of the left wrist _______________ FINDINGS: BONES: Comminuted, impacted fracture of the distal radius with slight dorsal angulation of the distal fracture fragment. No aggressive appearing osseous lytic or blastic lesion. SOFT TISSUES: Unremarkable. _______________     Comminuted, impacted fracture of the distal radius. XR CHEST PORT    Result Date: 9/13/2021  EXAM: XR CHEST PORT CLINICAL INDICATION/HISTORY: Acute respiratory failure, ET tub position -Additional: None COMPARISON: One day prior TECHNIQUE: Portable frontal view of the chest _______________ FINDINGS: SUPPORT DEVICES: Endotracheal and enteric tubes unchanged. HEART AND MEDIASTINUM: Cardiomediastinal silhouette within normal limits. LUNGS AND PLEURAL SPACES: No dense consolidation, large effusion or pneumothorax. _______________     No acute cardiopulmonary abnormality. XR CHEST PORT    Result Date: 9/11/2021  MEDICAL RECORDS NUMBER: [id number] PROCEDURE:  Single view of the chest DATE: 9/11/2021 9:09 PM HISTORY: 39years old Female. post ett Comparison: 8/4/2021 FINDINGS: The patient has been intubated with appropriate placement of the endotracheal tube. There is no enteric tube passing below the level of the diaphragm. There is no significant effusion. There is no significant pneumothorax. Cardiomediastinal silhouette is within normal limits. There is no evidence of a focal pulmonary infiltrate or mass. 1. There is no significant or acute cardiopulmonary process. The patient has been intubated with appropriate placement of the endotracheal tube. There is no enteric tube passing below the level of the diaphragm. This report has been generated using voice recognition software. XR ABD PORT  1 V    Result Date: 9/12/2021  EXAM: Frontal view of the abdomen CLINICAL INDICATION/HISTORY: NG tube placement COMPARISON: None. _______________ FINDINGS: NG/OG tube in place with the tip in the left upper quadrant in the region of the gastric fundus. No bowel obstruction.  Moderate colonic stool burden. _______________     1. NG/OG tube is in good position with the tip in the left upper quadrant in the region of the gastric fundus. 2. Moderate colonic stool burden.       Nataly Griggs MD

## 2021-10-17 NOTE — PROGRESS NOTES
Assumed care of patient at 1900. Assessment completed and noted. Pt is confused and agitated when awake but slept most of the shift. Sitter at bedside.  Scheduled medications administered as ordered via peg tube.     0700 Report given to [facility], [facility]

## 2021-10-17 NOTE — PROGRESS NOTES
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    Left wrist fracture, with delayed healing, subsequent encounter S62.102G    Drug overdose, intentional self-harm, initial encounter ([facility] T50.902A    Hypoxia R09.02    Hypotension after procedure I95.81    Acute metabolic encephalopathy G74.95    Psychosis ([facility]) F29    Hyponatremia E87.1    Acute respiratory failure with hypoxia ([facility]) J96.01    Increased ammonia level R79.89    Hypokalemia E87.6    Status post tracheostomy ([facility] Z93.0    Bacteremia due to Gram-positive bacteria R78.81    FELICITY (acute kidney injury) ([facility] N17.9     · Code status: Full code     RECOMMENDATIONS:   Patient with chronic respiratory failure; she was intubated 2021 due to encephalopathy and drug overdose; extubated on 2021, and reintubated within an hour due to respiratory distress and upper airway edema; patient subsequently underwent tracheostomy 2021. Stable respirations; continue trach collar care and suctioning as needed by RT. Continue weaning FiO2 to maintain O2 sats greater than 91%. S/p 1 unit PRBC yesterday; this is helped improve blood pressure and renal function. Hemoglobin improved to 7.5 this morning. Creatinine improved to 1.27 this morning. No active bleeding issues. Antibioticspatient remains on IV vancomycin; ID on case. Continue nebulizer therapyBrovana and budesonide nebulizer twice a day.   Multiple psych and pain medicationsclonazepam, diazepam, Seroquel, fentanyl patch. Ammonia 47continue lactulose twice daily. Patient remains confused and agitated; she also prefers to be naked in her state of delirium; she is not an ideal candidate for tracheostomy tube weaning or decannulation due to risk of aspirations and respiratory failure. Chest x-ray 10/15stable inflation of both lungs; tracheostomy tube in good position; no focal consolidations or pleural effusions; mild right basal atelectasis. DVT prophylaxisenoxaparin. GI prophylaxisfamotidine. Patient has poor prognosis overall. She is not a candidate for tracheostomy tube weaning trials or decannulation during this hospitalization; she is extremely confused and agitated, and seems to suffer from chronic delirium; she is at high risk of aspirations; this is not a patient that can be decannulated safely during this hospitalization. Lines/Tubes: PIV   S/p ETT: 9/11/219/22/2021. Tracheostomy 9/22/2021. Status post PEG on 10/1/2021   [place]: 9/11/21; changed 9/28/2021. ADVANCE DIRECTIVE: Full code. Subjective/History:   Ms. Mike Warner has been seen and evaluated as Dr. France Jerome requested now for assisting with Acute respiratory failure and ventilator management. Patient is a 39 y.o. female with following PMhx presented to ER with lethargy via EMS and admitted for Gabapentin overdose. Pt required intubation for airways protection. Position control was contacted that recommended supportive care per ER provider. Pt seen at bedside in ER rm#2. The patient can not provide additional history due to Ventilated. 10/17/2021  Dr. Sathya Lilly requested for pulmonary evaluation for tracheostomy decannulation. Patient seen in telemetry [id number]. Patient well-known to our service due to prolonged ICU stay. S/p trach and PEG; prolonged respiratory failure with ICU stay. Patient sleeping this afternoon. Sitter at bedside; reports that patient appears to be calm today.   No trach issues reported; no significant respiratory secretions. No vomiting or diarrhea reported. No hemoptysis. Patient having PEG tube feeding. Afebrile; blood pressure is good. Urine output1.9 L yesterday. Patient has a history of drug useprior history of cocaine use; also smoker and alcohol use. Review of Systems:  Limited due to patient condition.         Past Medical History:  Past Medical History:   Diagnosis Date    Asthma     Bilateral ovarian cysts     Cocaine abuse ([facility]     Mental and behavioral problem     Pancreatitis     Pancreatitis     Psychosis ([facility]         Past Surgical History:  Past Surgical History:   Procedure Laterality Date    HX GYN      D&C, Hysterectomy    IR INSERT GASTROSTOMY TUBE PERC  10/1/2021        Medications:    Current Facility-Administered Medications   Medication Dose Route Frequency    Vancomycin trough level due @ 2100 10/17/21  1 Each Other ONCE    0.45% sodium chloride infusion  50 mL/hr IntraVENous CONTINUOUS    vancomycin (VANCOCIN) 750 mg in 0.9% sodium chloride 250 mL (VIAL-MATE)  750 mg IntraVENous Q24H    clonazePAM (KlonoPIN) tablet 2 mg  2 mg Oral TID    diazePAM (VALIUM) tablet 2.5 mg  2.5 mg Oral BID    QUEtiapine (SEROquel) tablet 100 mg  100 mg Oral DAILY    QUEtiapine (SEROquel) tablet 300 mg  300 mg Oral QHS    nystatin (MYCOSTATIN) 100,000 unit/gram powder   Topical TID    fentaNYL (DURAGESIC) 50 mcg/hr patch 1 Patch  1 Patch TransDERmal Q72H    Vancomycin - Pharmacy to Dose  1 Each Other Rx Dosing/Monitoring    arformoteroL (BROVANA) neb solution 15 mcg  15 mcg Nebulization BID RT    budesonide (PULMICORT) 500 mcg/2 ml nebulizer suspension  500 mcg Nebulization BID RT    lactulose (CHRONULAC) 10 gram/15 mL solution 15 mL  15 mL Oral BID    melatonin (rapid dissolve) tablet 5 mg  5 mg Oral QHS    famotidine (PEPCID) tablet 20 mg  20 mg Oral BID    thiamine mononitrate (B-1) tablet 100 mg  100 mg Oral DAILY    folic acid (FOLVITE) tablet 1 mg  1 mg Oral DAILY    multivitamin, tx-iron-ca-min (THERA-M w/ IRON) tablet 1 Tablet  1 Tablet Oral DAILY    insulin lispro (HUMALOG) injection   SubCUTAneous Q6H    enoxaparin (LOVENOX) injection 40 mg  40 mg SubCUTAneous Q24H       Allergy:  Allergies   Allergen Reactions    Fish Containing Products Itching    Toradol [Ketorolac] Rash     Pt reports she is not allergic to this medication. Social History:  Social History     Tobacco Use    Smoking status: Current Every Day Smoker     Packs/day: 1.00    Smokeless tobacco: Never Used   Substance Use Topics    Alcohol use: Not Currently    Drug use: Not Currently     Types: Cocaine, Marijuana     Comment: used with in past 24 hours          Objective:   Vital Signs:    Blood pressure 132/76, pulse 88, temperature 97.6 °F (36.4 °C), resp. rate 16, height 5' 2\" (1.575 m), weight 71.2 kg (156 lb 15.5 oz), last menstrual period 05/15/2018, SpO2 95 %. Body mass index is 28.71 kg/m².    O2 Device: Tracheal collar   O2 Flow Rate (L/min): 6 l/min   Temp (24hrs), Av.1 °F (36.7 °C), Min:97.6 °F (36.4 °C), Max:98.8 °F (37.1 °C)         Intake/Output:   Last shift:      10/17 0701 - 10/17 1900  In: -   Out: 1100 [Urine:1100]  Last 3 shifts: 10/15 1901 - 10/17 0700  In: 1556.8   Out: 2500 [Urine:2500]    Intake/Output Summary (Last 24 hours) at 10/17/2021 1615  Last data filed at 10/17/2021 1221  Gross per 24 hour   Intake 856.8 ml   Output 3000 ml   Net -2143.2 ml       Physical Exam:   Patient appears frail and chronically ill; on oxygen via trach collar; acyanotic  HEENT: pupils not dilated, no scleral jaundice, moist oral mucosa  Neck: No adenopathy or thyroid swelling  Tracheostomy tubeno bleeding or purulent secretions noted from the airways   CVS: Normal heart sounds; S1 and S2 with no murmur; JVD not elevated     RS: Moderate air entry bilateral; no wheezing; few bibasal crackles; not tachypneic; normal respirations at rest     Abd: Soft, no tenderness, no distention, no guarding or rigidity; bowel sounds heard  PEG tube present; no bleeding or hematoma noted    Neuro: Currently sleeping; confused and frequently agitated at baseline; moving all extremities; limited exam    Extrm: no leg edema or swelling or clubbing  Skin: no rash  Lymphatic: no cervical or supraclavicular adenopathy  Psych: Confused and agitated at baseline      Data:     Recent Results (from the past 24 hour(s))   GLUCOSE, POC    Collection Time: 10/16/21  6:00 PM   Result Value Ref Range    Glucose (POC) 96 70 - 110 mg/dL   GLUCOSE, POC    Collection Time: 10/17/21 12:06 AM   Result Value Ref Range    Glucose (POC) 96 70 - 110 mg/dL   MAGNESIUM    Collection Time: 10/17/21  2:38 AM   Result Value Ref Range    Magnesium 1.9 1.6 - 2.6 mg/dL   PHOSPHORUS    Collection Time: 10/17/21  2:38 AM   Result Value Ref Range    Phosphorus 6.1 (H) 2.5 - 4.9 MG/DL   AMMONIA    Collection Time: 10/17/21  2:38 AM   Result Value Ref Range    Ammonia 47 (H) 11 - 32 UMOL/L   CBC WITH AUTOMATED DIFF    Collection Time: 10/17/21  2:38 AM   Result Value Ref Range    WBC 6.9 4.6 - 13.2 K/uL    RBC 2.62 (L) 4.20 - 5.30 M/uL    HGB 7.5 (L) 12.0 - 16.0 g/dL    HCT 24.1 (L) 35.0 - 45.0 %    MCV 92.0 78.0 - 100.0 FL    MCH 28.6 24.0 - 34.0 PG    MCHC 31.1 31.0 - 37.0 g/dL    RDW 13.4 11.6 - 14.5 %    PLATELET 267 307 - 640 K/uL    MPV 10.3 9.2 - 11.8 FL    NEUTROPHILS 56 40 - 73 %    LYMPHOCYTES 25 21 - 52 %    MONOCYTES 9 3 - 10 %    EOSINOPHILS 9 (H) 0 - 5 %    BASOPHILS 0 0 - 2 %    ABS. NEUTROPHILS 3.9 1.8 - 8.0 K/UL    ABS. LYMPHOCYTES 1.8 0.9 - 3.6 K/UL    ABS. MONOCYTES 0.6 0.05 - 1.2 K/UL    ABS. EOSINOPHILS 0.6 (H) 0.0 - 0.4 K/UL    ABS.  BASOPHILS 0.0 0.0 - 0.1 K/UL    DF AUTOMATED     METABOLIC PANEL, COMPREHENSIVE    Collection Time: 10/17/21  2:38 AM   Result Value Ref Range    Sodium 143 136 - 145 mmol/L    Potassium 4.1 3.5 - 5.5 mmol/L    Chloride 107 100 - 111 mmol/L    CO2 29 21 - 32 mmol/L Anion gap 7 3.0 - 18 mmol/L    Glucose 113 (H) 74 - 99 mg/dL    BUN 18 7.0 - 18 MG/DL    Creatinine 1.27 0.6 - 1.3 MG/DL    BUN/Creatinine ratio 14 12 - 20      GFR est AA 56 (L) >60 ml/min/1.73m2    GFR est non-AA 46 (L) >60 ml/min/1.73m2    Calcium 9.1 8.5 - 10.1 MG/DL    Bilirubin, total 0.5 0.2 - 1.0 MG/DL    ALT (SGPT) 19 13 - 56 U/L    AST (SGOT) 10 10 - 38 U/L    Alk. phosphatase 84 45 - 117 U/L    Protein, total 5.7 (L) 6.4 - 8.2 g/dL    Albumin 3.2 (L) 3.4 - 5.0 g/dL    Globulin 2.5 2.0 - 4.0 g/dL    A-G Ratio 1.3 0.8 - 1.7     GLUCOSE, POC    Collection Time: 10/17/21  6:01 AM   Result Value Ref Range    Glucose (POC) 113 (H) 70 - 110 mg/dL   GLUCOSE, POC    Collection Time: 10/17/21 12:18 PM   Result Value Ref Range    Glucose (POC) 106 70 - 110 mg/dL             Echo 9/17/2021:  Left Ventricle Normal cavity size, wall thickness and systolic function (ejection fraction normal). The estimated EF is 60 - 65%. There is inconclusive left ventricular diastolic function E/E' ratio = 7.08  Heart rate is over 100. Wall Scoring The left ventricular wall motion is normal.            Left Atrium Normal cavity size. Right Ventricle Normal cavity size and global systolic function. Assessment of RV function:   TAPSE = 27 mm. Right Atrium Normal cavity size. Interatrial Septum Interatrial septum not well visualized   Aortic Valve Aortic valve not well visualized. Trileaflet valve structure, no stenosis and no regurgitation. Mitral Valve No stenosis. Mitral valve non-specific thickening. Mild regurgitation. Tricuspid Valve Tricuspid valve not well visualized. No stenosis. Mild regurgitation. Pulmonic Valve Pulmonic valve not well visualized. No stenosis and no regurgitation. Aorta The aorta was not well visualized. Normal aortic root. Pulmonary Artery Pulmonary arteries not well visualized. Pulmonary arterial systolic pressure (PASP) is 29 mmHg.  Pulmonary hypertension not suggested by Doppler findings. IVC/Hepatic Veins Mechanically ventilated; cannot use inferior caval vein diameter to estimate central venous pressure. Pericardium Normal pericardium and no evidence of pericardial effusion. CT head 9/15/1021  IMPRESSION   No acute intracranial abnormality. CT chest, abdomen, pelvis 9/15/1021  IMPRESSION   Endotracheal tube tip in the lower thoracic trachea 1.5 cm above the dipak.    Bilateral lower lobar atelectasis, possible endobronchial lesion/mucous plug in  the left lower lobe bronchus.    No acute intra-abdominal abnormality. Imaging:  [x]I have personally reviewed the patients chest radiographs images and report    Chest x-ray 10/15- 1 view  COMPARISON: 10/8/2021  TECHNIQUE: Portable frontal view of the chest  FINDINGS:   SUPPORT DEVICES: Tracheostomy catheter projecting in expected position.   HEART AND MEDIASTINUM: Stable cardiac size and mediastinal contours, within  normal limits.   LUNGS AND PLEURAL SPACES: Lungs are mildly underexpanded, but appear improved in  aeration from prior examination. Minor residual linear opacities are present at  each lung base. No pneumothorax or pleural effusion. No alveolar consolidation.   BONY THORAX AND SOFT TISSUES: Unremarkable. IMPRESSION   1. Tracheostomy catheter in stable position. 2. Improved pulmonary expansion/aeration with minor residual atelectasis at the  lung bases. Please note: Voice-recognition software may have been used to generate this report, which may have resulted in some phonetic-based errors in grammar and contents. Even though attempts were made to correct all the mistakes, some may have been missed, and remained in the body of the document.       Odilon Moreira MD  10/17/2021

## 2021-10-17 NOTE — PROGRESS NOTES
[address] [facility] Pharmacokinetic Monitoring Service - Vancomycin    Consulting Provider: Simeon Alamo   Indication: Bloodstream infection  Target Concentration: Goal AUC/CRYSTAL 400-600 mg*hr/L  Day of Therapy: 5  Additional Antimicrobials: None    Pertinent Laboratory Values: Wt Readings from Last 1 Encounters:   10/08/21 71.2 kg (156 lb 15.5 oz)     Temp Readings from Last 1 Encounters:   10/17/21 98.4 °F (36.9 °C)     No components found for: PROCAL  Estimated Creatinine Clearance: 53.8 mL/min (based on SCr of 1.27 mg/dL). Recent Labs     10/17/21  0238 10/16/21  0307   WBC 6.9 7.6       Pertinent Cultures:  Culture Date Source Results   10/4/21       10/6/21            10/16/21 Blood       Sputum            Blood STAPHYLOCOCCUS SPECIES, COAGULASE NEGATIVE  LIGHT * METHICILLIN RESISTANT STAPHYLOCOCCUS AUREUS      NGTD   MRSA Nasal Swab: N/A. Non-respiratory infection. Jacklyn Rudolph     [unfilled]    Assessment:  Date/Time Current Dose Concentration Timing of Concentration (h) AUC   NA NA NA NA NA   Note: Serum concentrations collected for AUC dosing may appear elevated if collected in close proximity to the dose administered, this is not necessarily an indication of toxicity    Plan:  Current dosing regimen is therapeutic  Continue current dose  Repeat vancomycin concentration ordered for 10/17/21 @ 2100   Pharmacy will continue to monitor patient and adjust therapy as indicated    Thank you for the consult,  OSIRIS Rodriguez  10/17/2021 10:16 AM

## 2021-10-17 NOTE — PROGRESS NOTES
Problem: Ventilator Management  Goal: *Adequate oxygenation and ventilation  Outcome: Progressing Towards Goal  Goal: *Patient maintains clear airway/free of aspiration  Outcome: Progressing Towards Goal  Goal: *Absence of infection signs and symptoms  Outcome: Progressing Towards Goal  Goal: *Normal spontaneous ventilation  Outcome: Progressing Towards Goal     Problem: Patient Education: Go to Patient Education Activity  Goal: Patient/Family Education  Outcome: Progressing Towards Goal     Problem: Non-Violent Restraints  Goal: Removal from restraints as soon as assessed to be safe  Outcome: Progressing Towards Goal  Goal: No harm/injury to patient while restraints in use  Outcome: Progressing Towards Goal  Goal: Patient's dignity will be maintained  Outcome: Progressing Towards Goal  Goal: Patient Interventions  Outcome: Progressing Towards Goal

## 2021-10-17 NOTE — PROGRESS NOTES
Nephrology Progress Note      HPI:   Palomo Ren is a 39 y.o. wf w/ PMHx of Psychosis and multidrug use/abuse who initially presented with acute metabolic encephalopathy and lethargy likely due to gabapentin overdose was admitted to ICU intubated (9/11) and reintubated (9/18). Currently on the floor with trach and peg tube. Nephrology is consulted fro FELICITY with Cr today up to 1.5, from 0.8 on 10/13/2021. Pt is currently sedated due to agitation and has one to one sitter. She is on Vanc for GPC bacteremia. Random Vanc level yesterday was 28. She is spiking fever and BP is running low. No recent CT dye exposure.     -Pt w/o new complaints. No SOB. Impression:   -Acute Kidney Injury: prerenal/ATN from sepsis +/- vanc induced injury. Cr improving 1.6=>1.2 today. UOP 1.9L. -GPC bacteremia  -Anemia  -Recent acute respiratory failure: extubated.  S/p trach  -AMS w/ off/on agitation  -Psychosis w/ hx of  Ekbom's delusional parasitosis     Plan:   -Continue gentle IV fluid hydration w/ 0.45%NS, decrease rate to 50cc/hr  -Monitor UOP  -Monitor Vanc level   -Avoid NSAIDs and dye  -No indication for RRT  -Iron stores in am     Medications:     Current Facility-Administered Medications:     0.45% sodium chloride infusion, 75 mL/hr, IntraVENous, CONTINUOUS, Emanuel Santiago MD, Last Rate: 75 mL/hr at 10/16/21 1228, 75 mL/hr at 10/16/21 1228    0.9% sodium chloride infusion 250 mL, 250 mL, IntraVENous, PRN, Darius Xiao MD    vancomycin (VANCOCIN) 750 mg in 0.9% sodium chloride 250 mL (VIAL-MATE), 750 mg, IntraVENous, Q24H, Heather Chopra MD, Last Rate: 250 mL/hr at 10/16/21 2205, 750 mg at 10/16/21 2205    clonazePAM (KlonoPIN) tablet 2 mg, 2 mg, Oral, TID, Darius Xiao MD, 2 mg at 10/16/21 2205    diazePAM (VALIUM) tablet 2.5 mg, 2.5 mg, Oral, BID, Chico Marie MD, 2.5 mg at 10/16/21 2206    haloperidol lactate (HALDOL) injection 5 mg, 5 mg, IntraVENous, Q8H PRN, Mono Galicia MD, 5 mg at 10/14/21 4294   QUEtiapine (SEROquel) tablet 100 mg, 100 mg, Oral, DAILY, Mohsin, Faisal, MD, 100 mg at 10/16/21 4352    QUEtiapine (SEROquel) tablet 300 mg, 300 mg, Oral, QHS, Mohsin, Faisal, MD, 300 mg at 10/16/21 2206    nystatin (MYCOSTATIN) 100,000 unit/gram powder, , Topical, TID, Angela Rodriguez MD, Given at 10/16/21 1625    metoprolol (LOPRESSOR) injection 2.5 mg, 2.5 mg, IntraVENous, Q6H PRN, Jose Ramon IGNACIO MD, 2.5 mg at 10/07/21 1545    fentaNYL (DURAGESIC) 50 mcg/hr patch 1 Patch, 1 Patch, TransDERmal, Q72H, Jose Ramon IGNACIO MD, 1 Patch at 10/12/21 1233    Vancomycin - Pharmacy to Dose, 1 Each, Other, Rx Dosing/Monitoring, Kimber Garcia MD    HYDROmorphone (DILAUDID) injection 1 mg, 1 mg, IntraVENous, Q4H PRN, Katalina Thomas MD, 1 mg at 10/16/21 1820    arformoteroL (BROVANA) neb solution 15 mcg, 15 mcg, Nebulization, BID RT, Katalina Thomas MD, 15 mcg at 10/16/21 2025    budesonide (PULMICORT) 500 mcg/2 ml nebulizer suspension, 500 mcg, Nebulization, BID RT, Katalina Thomas MD, 500 mcg at 10/16/21 2025    lactulose (CHRONULAC) 10 gram/15 mL solution 15 mL, 15 mL, Oral, BID, Katalina Thomas MD, 15 mL at 10/16/21 2205    melatonin (rapid dissolve) tablet 5 mg, 5 mg, Oral, QHS, Antione Martini MD, 5 mg at 10/16/21 2207    fentaNYL citrate (PF) injection 50 mcg, 50 mcg, IntraVENous, Q2H PRN, Angela Rodriguez MD, 50 mcg at 10/08/21 0454    albuterol-ipratropium (DUO-NEB) 2.5 MG-0.5 MG/3 ML, 3 mL, Nebulization, Q4H PRN, Fabby Meek MD    famotidine (PEPCID) tablet 20 mg, 20 mg, Oral, BID, Derek Gambino MD, 20 mg at 10/16/21 2207    thiamine mononitrate (B-1) tablet 100 mg, 100 mg, Oral, DAILY, Antione Martini MD, 100 mg at 53/15/89 6625    folic acid (FOLVITE) tablet 1 mg, 1 mg, Oral, DAILY, Antione Martini MD, 1 mg at 10/16/21 4175    multivitamin, tx-iron-ca-min (THERA-M w/ IRON) tablet 1 Tablet, 1 Tablet, Oral, DAILY, Antione Martini MD, 1 Tablet at 10/16/21 6136    insulin lispro (HUMALOG) injection, , SubCUTAneous, Q6H, Qi Carrizales MD, 2 Units at 10/16/21 0000    glucose chewable tablet 16 g, 4 Tablet, Oral, PRN, Qi Carrizales MD    glucagon [facility]) injection 1 mg, 1 mg, IntraMUSCular, PRN, Qi Carrizales MD    dextrose (D50W) injection syrg 12.5-25 g, 25-50 mL, IntraVENous, PRN, Qi Carrizales MD    LORazepam (ATIVAN) injection 2 mg, 2 mg, IntraVENous, Q6H PRN, Qi Carrizales MD, 2 mg at 10/17/21 0606    enoxaparin (LOVENOX) injection 40 mg, 40 mg, SubCUTAneous, Q24H, Milly Mirza MD, 40 mg at 10/16/21 1625    acetaminophen (TYLENOL) tablet 650 mg, 650 mg, Oral, Q6H PRN, 650 mg at 10/15/21 2157 **OR** acetaminophen (TYLENOL) suppository 650 mg, 650 mg, Rectal, Q6H PRN, Edmar Mirza MD    polyethylene glycol (MIRALAX) packet 17 g, 17 g, Oral, DAILY PRN, Milly Mirza MD    ondansetron (ZOFRAN ODT) tablet 4 mg, 4 mg, Oral, Q8H PRN **OR** ondansetron (ZOFRAN) injection 4 mg, 4 mg, IntraVENous, Q6H PRN, Edmar Mirza MD, 4 mg at 10/15/21 0134        Physical Assessment:     Visit Vitals  /64   Pulse 89   Temp 98.4 °F (36.9 °C)   Resp 17   Ht 5' 2\" (1.575 m)   Wt 71.2 kg (156 lb 15.5 oz)   SpO2 100%   BMI 28.71 kg/m²       Intake/Output Summary (Last 24 hours) at 10/17/2021 0831  Last data filed at 10/17/2021 0354  Gross per 24 hour   Intake 1556.8 ml   Output 1900 ml   Net -343.2 ml       General Appearance: currently sedated  Head: atraumatic  HEENT: no jaundice, moist oral mucosa, +Trach   Neck: no JVD noted  Chest: LS clear to auscultation bilaterally  Heart: S1/S2, no murmur, gallop or rub, RRR  Adbomen: soft, nontender, BS active, +peg  : no flank tenderness, no zamora catheter  Skin: intact, no rash  Extremity: no edema, cyanosis or clubbing  MS: No joint deformity or tenderness  Neuro: sedated    Sabina Lan MD    Work Number: [id number]

## 2021-10-17 NOTE — PROGRESS NOTES
0715 : assumed care of patient from [address]  8244 : Patient asking for pain medication, see MAR for administration. Morning medications and shift assessment completed. 1200 : patient becoming increasily agitated and attempting to get out of bed and pull at lines. Pt had BM, bedsheets and brief changed. PRN medication given for agitiation, see MAR for details   1701 : patient getting very anxious and agitated, stated \"I need all my pain meds, I am just gonna leave if I don't get them. \" patient educated on reasoning why RN cannot give all of the medications together for patient safety. Patient attempting to get out of bed and pull out lines. Patient educated on safety and restraints, pt stated she didn't need restraints. RN informed patient of reasoning for restraints if patient still attempts to pull out lines. 1742 : PRN dilaudid given, patient in bed, still agitated but not like previous assessment. 1830 : patient agitated, attempting to get out of bed and pull out lines, Given PRN ativan. See MAR for administration  1910 : Bedside shift change report given to April B RN (oncoming nurse) by Dexter Yo RN (offgoing nurse). Report included the following information SBAR, Kardex, Intake/Output and MAR.

## 2021-10-17 NOTE — PROGRESS NOTES
RESPIRATORY NOTE:       Pt is still bleeding at trach site, coughing up small to moderate amt of thick yellow and/ or bloody secretions occasionally requiring saline to lesson clotting. Trach care complete with new inner cannula. Pt grimaces and flails during these procedures even when medicated. Tries to speak or write but indecipherable. Will continue to monitor.

## 2021-10-18 ENCOUNTER — APPOINTMENT (OUTPATIENT)
Dept: VASCULAR SURGERY | Age: 41
DRG: 004 | End: 2021-10-18
Attending: FAMILY MEDICINE
Payer: MEDICAID

## 2021-10-18 LAB
ALBUMIN SERPL-MCNC: 3.3 G/DL (ref 3.4–5)
ALBUMIN/GLOB SERPL: 1.3 {RATIO} (ref 0.8–1.7)
ALP SERPL-CCNC: 85 U/L (ref 45–117)
ALT SERPL-CCNC: 18 U/L (ref 13–56)
AMMONIA PLAS-SCNC: 37 UMOL/L (ref 11–32)
ANION GAP SERPL CALC-SCNC: 8 MMOL/L (ref 3–18)
APPEARANCE UR: CLEAR
AST SERPL-CCNC: 11 U/L (ref 10–38)
BASOPHILS # BLD: 0 K/UL (ref 0–0.1)
BASOPHILS NFR BLD: 0 % (ref 0–2)
BILIRUB SERPL-MCNC: 0.3 MG/DL (ref 0.2–1)
BILIRUB UR QL: NEGATIVE
BUN SERPL-MCNC: 17 MG/DL (ref 7–18)
BUN/CREAT SERPL: 15 (ref 12–20)
CALCIUM SERPL-MCNC: 9.4 MG/DL (ref 8.5–10.1)
CHLORIDE SERPL-SCNC: 107 MMOL/L (ref 100–111)
CO2 SERPL-SCNC: 29 MMOL/L (ref 21–32)
COLOR UR: YELLOW
CREAT SERPL-MCNC: 1.13 MG/DL (ref 0.6–1.3)
DIFFERENTIAL METHOD BLD: ABNORMAL
EOSINOPHIL # BLD: 0.4 K/UL (ref 0–0.4)
EOSINOPHIL NFR BLD: 8 % (ref 0–5)
ERYTHROCYTE [DISTWIDTH] IN BLOOD BY AUTOMATED COUNT: 13.3 % (ref 11.6–14.5)
FERRITIN SERPL-MCNC: 213 NG/ML (ref 8–388)
GLOBULIN SER CALC-MCNC: 2.5 G/DL (ref 2–4)
GLUCOSE BLD STRIP.AUTO-MCNC: 111 MG/DL (ref 70–110)
GLUCOSE BLD STRIP.AUTO-MCNC: 123 MG/DL (ref 70–110)
GLUCOSE SERPL-MCNC: 97 MG/DL (ref 74–99)
GLUCOSE UR STRIP.AUTO-MCNC: NEGATIVE MG/DL
HCT VFR BLD AUTO: 22 % (ref 35–45)
HGB BLD-MCNC: 7.2 G/DL (ref 12–16)
HGB UR QL STRIP: NEGATIVE
IRON SATN MFR SERPL: 24 % (ref 20–50)
IRON SERPL-MCNC: 45 UG/DL (ref 50–175)
KETONES UR QL STRIP.AUTO: NEGATIVE MG/DL
LEUKOCYTE ESTERASE UR QL STRIP.AUTO: NEGATIVE
LYMPHOCYTES # BLD: 1.8 K/UL (ref 0.9–3.6)
LYMPHOCYTES NFR BLD: 32 % (ref 21–52)
MAGNESIUM SERPL-MCNC: 1.8 MG/DL (ref 1.6–2.6)
MCH RBC QN AUTO: 28.7 PG (ref 24–34)
MCHC RBC AUTO-ENTMCNC: 32.7 G/DL (ref 31–37)
MCV RBC AUTO: 87.6 FL (ref 78–100)
MONOCYTES # BLD: 0.5 K/UL (ref 0.05–1.2)
MONOCYTES NFR BLD: 9 % (ref 3–10)
NEUTS SEG # BLD: 3 K/UL (ref 1.8–8)
NEUTS SEG NFR BLD: 52 % (ref 40–73)
NITRITE UR QL STRIP.AUTO: NEGATIVE
PH UR STRIP: 8 [PH] (ref 5–8)
PHOSPHATE SERPL-MCNC: 6.4 MG/DL (ref 2.5–4.9)
PLATELET # BLD AUTO: 296 K/UL (ref 135–420)
PMV BLD AUTO: 10.1 FL (ref 9.2–11.8)
POTASSIUM SERPL-SCNC: 4 MMOL/L (ref 3.5–5.5)
PROT SERPL-MCNC: 5.8 G/DL (ref 6.4–8.2)
PROT UR STRIP-MCNC: NEGATIVE MG/DL
RBC # BLD AUTO: 2.51 M/UL (ref 4.2–5.3)
SODIUM SERPL-SCNC: 144 MMOL/L (ref 136–145)
SP GR UR REFRACTOMETRY: 1.01 (ref 1–1.03)
TIBC SERPL-MCNC: 184 UG/DL (ref 250–450)
UROBILINOGEN UR QL STRIP.AUTO: 0.2 EU/DL (ref 0.2–1)
WBC # BLD AUTO: 5.8 K/UL (ref 4.6–13.2)

## 2021-10-18 PROCEDURE — 74011250637 HC RX REV CODE- 250/637: Performed by: HOSPITALIST

## 2021-10-18 PROCEDURE — 74011250637 HC RX REV CODE- 250/637: Performed by: INTERNAL MEDICINE

## 2021-10-18 PROCEDURE — 74011000250 HC RX REV CODE- 250: Performed by: INTERNAL MEDICINE

## 2021-10-18 PROCEDURE — 74011250636 HC RX REV CODE- 250/636: Performed by: FAMILY MEDICINE

## 2021-10-18 PROCEDURE — 85025 COMPLETE CBC W/AUTO DIFF WBC: CPT

## 2021-10-18 PROCEDURE — 74011250637 HC RX REV CODE- 250/637: Performed by: PSYCHIATRY & NEUROLOGY

## 2021-10-18 PROCEDURE — 82962 GLUCOSE BLOOD TEST: CPT

## 2021-10-18 PROCEDURE — 83540 ASSAY OF IRON: CPT

## 2021-10-18 PROCEDURE — 77010033678 HC OXYGEN DAILY

## 2021-10-18 PROCEDURE — 82728 ASSAY OF FERRITIN: CPT

## 2021-10-18 PROCEDURE — 84100 ASSAY OF PHOSPHORUS: CPT

## 2021-10-18 PROCEDURE — 94760 N-INVAS EAR/PLS OXIMETRY 1: CPT

## 2021-10-18 PROCEDURE — 74011250636 HC RX REV CODE- 250/636: Performed by: INTERNAL MEDICINE

## 2021-10-18 PROCEDURE — 65660000000 HC RM CCU STEPDOWN

## 2021-10-18 PROCEDURE — 87086 URINE CULTURE/COLONY COUNT: CPT

## 2021-10-18 PROCEDURE — 93971 EXTREMITY STUDY: CPT

## 2021-10-18 PROCEDURE — 94640 AIRWAY INHALATION TREATMENT: CPT

## 2021-10-18 PROCEDURE — 80053 COMPREHEN METABOLIC PANEL: CPT

## 2021-10-18 PROCEDURE — 81003 URINALYSIS AUTO W/O SCOPE: CPT

## 2021-10-18 PROCEDURE — 83735 ASSAY OF MAGNESIUM: CPT

## 2021-10-18 PROCEDURE — 82140 ASSAY OF AMMONIA: CPT

## 2021-10-18 PROCEDURE — 36415 COLL VENOUS BLD VENIPUNCTURE: CPT

## 2021-10-18 RX ADMIN — BUDESONIDE 500 MCG: 0.5 INHALANT RESPIRATORY (INHALATION) at 20:27

## 2021-10-18 RX ADMIN — CLONAZEPAM 2 MG: 0.5 TABLET ORAL at 23:47

## 2021-10-18 RX ADMIN — QUETIAPINE FUMARATE 300 MG: 200 TABLET ORAL at 20:41

## 2021-10-18 RX ADMIN — NYSTATIN: 100000 POWDER TOPICAL at 18:30

## 2021-10-18 RX ADMIN — ENOXAPARIN SODIUM 40 MG: 100 INJECTION SUBCUTANEOUS at 18:28

## 2021-10-18 RX ADMIN — LORAZEPAM 2 MG: 2 INJECTION INTRAMUSCULAR at 18:28

## 2021-10-18 RX ADMIN — LACTULOSE 15 ML: 10 SOLUTION ORAL at 09:27

## 2021-10-18 RX ADMIN — IPRATROPIUM BROMIDE AND ALBUTEROL SULFATE 3 ML: .5; 3 SOLUTION RESPIRATORY (INHALATION) at 18:00

## 2021-10-18 RX ADMIN — FAMOTIDINE 20 MG: 20 TABLET ORAL at 20:40

## 2021-10-18 RX ADMIN — SODIUM CHLORIDE 50 ML/HR: 450 INJECTION, SOLUTION INTRAVENOUS at 06:07

## 2021-10-18 RX ADMIN — HYDROMORPHONE HYDROCHLORIDE 1 MG: 1 INJECTION, SOLUTION INTRAMUSCULAR; INTRAVENOUS; SUBCUTANEOUS at 19:43

## 2021-10-18 RX ADMIN — Medication 5 MG: at 23:47

## 2021-10-18 RX ADMIN — BUDESONIDE 500 MCG: 0.5 INHALANT RESPIRATORY (INHALATION) at 07:43

## 2021-10-18 RX ADMIN — FAMOTIDINE 20 MG: 20 TABLET ORAL at 09:29

## 2021-10-18 RX ADMIN — NYSTATIN: 100000 POWDER TOPICAL at 21:15

## 2021-10-18 RX ADMIN — ONDANSETRON 4 MG: 2 INJECTION INTRAMUSCULAR; INTRAVENOUS at 12:23

## 2021-10-18 RX ADMIN — LACTULOSE 15 ML: 10 SOLUTION ORAL at 20:41

## 2021-10-18 RX ADMIN — CLONAZEPAM 2 MG: 0.5 TABLET ORAL at 18:28

## 2021-10-18 RX ADMIN — ARFORMOTEROL TARTRATE 15 MCG: 15 SOLUTION RESPIRATORY (INHALATION) at 20:27

## 2021-10-18 RX ADMIN — NYSTATIN: 100000 POWDER TOPICAL at 09:30

## 2021-10-18 RX ADMIN — THIAMINE HCL TAB 100 MG 100 MG: 100 TAB at 09:29

## 2021-10-18 RX ADMIN — CLONAZEPAM 2 MG: 0.5 TABLET ORAL at 09:29

## 2021-10-18 RX ADMIN — FENTANYL CITRATE 50 MCG: 50 INJECTION INTRAMUSCULAR; INTRAVENOUS at 18:29

## 2021-10-18 RX ADMIN — DIAZEPAM 2.5 MG: 5 TABLET ORAL at 09:29

## 2021-10-18 RX ADMIN — LORAZEPAM 2 MG: 2 INJECTION INTRAMUSCULAR at 12:22

## 2021-10-18 RX ADMIN — HYDROMORPHONE HYDROCHLORIDE 1 MG: 1 INJECTION, SOLUTION INTRAMUSCULAR; INTRAVENOUS; SUBCUTANEOUS at 07:08

## 2021-10-18 RX ADMIN — QUETIAPINE FUMARATE 100 MG: 100 TABLET ORAL at 09:29

## 2021-10-18 RX ADMIN — FOLIC ACID 1 MG: 1 TABLET ORAL at 09:29

## 2021-10-18 RX ADMIN — ARFORMOTEROL TARTRATE 15 MCG: 15 SOLUTION RESPIRATORY (INHALATION) at 07:43

## 2021-10-18 RX ADMIN — Medication 1 TABLET: at 09:29

## 2021-10-18 RX ADMIN — HALOPERIDOL LACTATE 5 MG: 5 INJECTION, SOLUTION INTRAMUSCULAR at 12:22

## 2021-10-18 RX ADMIN — DIAZEPAM 2.5 MG: 5 TABLET ORAL at 20:40

## 2021-10-18 RX ADMIN — HYDROMORPHONE HYDROCHLORIDE 1 MG: 1 INJECTION, SOLUTION INTRAMUSCULAR; INTRAVENOUS; SUBCUTANEOUS at 14:20

## 2021-10-18 NOTE — PROGRESS NOTES
Nephrology Progress note    Subjective:     Sudhakar Rao is a 39 y.o. female with PMHx of Psychosis and multidrug use/abuse who initially presented with acute metabolic encephalopathy and lethargy likely due to gabapentin overdose was admitted to ICU intubated (9/11) and reintubated (9/18). Currently on the floor with trach and peg tube. Nephrology is consulted fro FELICITY with Cr today up to 1.5, from 0.8 on 10/13/2021. Pt is currently sedated due to agitation and has one to one sitter. She is on Vanc for GPC bacteremia. Random Vanc level  was 28. She was spiking fever and BP was running low. No recent CT dye exposure. Awake. Restless.        Admit Date: 9/11/2021  Principal Problem:    Drug overdose, intentional self-harm, initial encounter ([facility] (9/12/2021)    Active Problems:    Left wrist fracture, with delayed healing, subsequent encounter (9/12/2021)      Hypoxia (9/12/2021)      Hypotension after procedure (9/12/2021)      Acute metabolic encephalopathy (5/54/1849)      Psychosis (HCC) ()      Hyponatremia ()      Acute respiratory failure with hypoxia (HCC) ()      Increased ammonia level (9/14/2021)      Hypokalemia (9/21/2021)      Status post tracheostomy ([facility] (9/28/2021)      Bacteremia due to Gram-positive bacteria (10/6/2021)      [facility] (10/16/2021)      Current Facility-Administered Medications   Medication Dose Route Frequency    diphenhydrAMINE-zinc acetate 2%-0.1% (BENADRYL) cream   Topical TID PRN    diphenhydrAMINE (BENADRYL) 12.5 mg/5 mL oral elixir 12.5 mg  12.5 mg Per G Tube Q6H PRN    0.45% sodium chloride infusion  50 mL/hr IntraVENous CONTINUOUS    0.9% sodium chloride infusion 250 mL  250 mL IntraVENous PRN    clonazePAM (KlonoPIN) tablet 2 mg  2 mg Oral TID    diazePAM (VALIUM) tablet 2.5 mg  2.5 mg Oral BID    haloperidol lactate (HALDOL) injection 5 mg  5 mg IntraVENous Q8H PRN    QUEtiapine (SEROquel) tablet 100 mg  100 mg Oral DAILY    QUEtiapine (SEROquel) tablet 300 mg  300 mg Oral QHS    nystatin (MYCOSTATIN) 100,000 unit/gram powder   Topical TID    metoprolol (LOPRESSOR) injection 2.5 mg  2.5 mg IntraVENous Q6H PRN    fentaNYL (DURAGESIC) 50 mcg/hr patch 1 Patch  1 Patch TransDERmal Q72H    HYDROmorphone (DILAUDID) injection 1 mg  1 mg IntraVENous Q4H PRN    arformoteroL (BROVANA) neb solution 15 mcg  15 mcg Nebulization BID RT    budesonide (PULMICORT) 500 mcg/2 ml nebulizer suspension  500 mcg Nebulization BID RT    lactulose (CHRONULAC) 10 gram/15 mL solution 15 mL  15 mL Oral BID    melatonin (rapid dissolve) tablet 5 mg  5 mg Oral QHS    fentaNYL citrate (PF) injection 50 mcg  50 mcg IntraVENous Q2H PRN    albuterol-ipratropium (DUO-NEB) 2.5 MG-0.5 MG/3 ML  3 mL Nebulization Q4H PRN    famotidine (PEPCID) tablet 20 mg  20 mg Oral BID    thiamine mononitrate (B-1) tablet 100 mg  100 mg Oral DAILY    folic acid (FOLVITE) tablet 1 mg  1 mg Oral DAILY    multivitamin, tx-iron-ca-min (THERA-M w/ IRON) tablet 1 Tablet  1 Tablet Oral DAILY    insulin lispro (HUMALOG) injection   SubCUTAneous Q6H    glucose chewable tablet 16 g  4 Tablet Oral PRN    glucagon (GLUCAGEN) injection 1 mg  1 mg IntraMUSCular PRN    dextrose (D50W) injection syrg 12.5-25 g  25-50 mL IntraVENous PRN    LORazepam (ATIVAN) injection 2 mg  2 mg IntraVENous Q6H PRN    enoxaparin (LOVENOX) injection 40 mg  40 mg SubCUTAneous Q24H    acetaminophen (TYLENOL) tablet 650 mg  650 mg Oral Q6H PRN    Or    acetaminophen (TYLENOL) suppository 650 mg  650 mg Rectal Q6H PRN    polyethylene glycol (MIRALAX) packet 17 g  17 g Oral DAILY PRN    ondansetron (ZOFRAN ODT) tablet 4 mg  4 mg Oral Q8H PRN    Or    ondansetron (ZOFRAN) injection 4 mg  4 mg IntraVENous Q6H PRN         Allergy:   Allergies   Allergen Reactions    Fish Containing Products Itching    Toradol [Ketorolac] Rash     Pt reports she is not allergic to this medication.          Objective:     Visit Vitals  /87 (BP 1 Location: Right arm, BP Patient Position: At rest)   Pulse 93   Temp 98 °F (36.7 °C)   Resp 18   Ht 5' 2\" (1.575 m)   Wt 71.2 kg (156 lb 15.5 oz)   LMP 05/15/2018   SpO2 100%   BMI 28.71 kg/m²         Intake/Output Summary (Last 24 hours) at 10/18/2021 1558  Last data filed at 10/18/2021 0552  Gross per 24 hour   Intake 8762.13 ml   Output 2300 ml   Net 6462.13 ml       Physical Exam:       General: Awake. HENT: Atraumatic and normocephalic   Eyes: Normal conjunctiva   Neck: No JVD, + Trach collar   Cardiovascular: Normal S1 & S2, no m/r/g   Pulmonary/Chest Wall: Clear to auscultation bilaterally   Abdominal: Soft and non-tender,  PEG   Musculoskeletal: No edema   Neurological: Awake. Restless. Data Review:  Lab Results   Component Value Date/Time    Sodium 144 10/18/2021 01:15 AM    Potassium 4.0 10/18/2021 01:15 AM    Chloride 107 10/18/2021 01:15 AM    CO2 29 10/18/2021 01:15 AM    Anion gap 8 10/18/2021 01:15 AM    Glucose 97 10/18/2021 01:15 AM    BUN 17 10/18/2021 01:15 AM    Creatinine 1.13 10/18/2021 01:15 AM    BUN/Creatinine ratio 15 10/18/2021 01:15 AM    GFR est AA >60 10/18/2021 01:15 AM    GFR est non-AA 53 (L) 10/18/2021 01:15 AM    Calcium 9.4 10/18/2021 01:15 AM     Lab Results   Component Value Date/Time    WBC 5.8 10/18/2021 01:15 AM    HGB 7.2 (L) 10/18/2021 01:15 AM    HCT 22.0 (L) 10/18/2021 01:15 AM    PLATELET 381 93/56/6792 01:15 AM    MCV 87.6 10/18/2021 01:15 AM     Lab Results   Component Value Date/Time    Calcium 9.4 10/18/2021 01:15 AM    Phosphorus 6.4 (H) 10/18/2021 01:15 AM     Lab Results   Component Value Date/Time    Iron 45 (L) 10/18/2021 01:16 AM    TIBC 184 (L) 10/18/2021 01:16 AM    Iron % saturation 24 10/18/2021 01:16 AM    Ferritin 213 10/18/2021 01:16 AM     Lab Results   Component Value Date/Time    Ferritin 213 10/18/2021 01:16 AM         Impression:     -Acute Kidney Injury resolving: prerenal/ATN from sepsis +/- vanc induced injury.  Cr improving 1.6=>1.2=>1.13 today. Good UOP 2.3L. -GPC bacteremia  -Anemia  -Recent acute respiratory failure: extubated.  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

## 2021-10-18 NOTE — PROGRESS NOTES
Bedside shift change report given to Mike Simmons (oncoming nurse) by Karely York RN (offgoing nurse). Report included the following information SBAR, Kardex and Recent Results.

## 2021-10-18 NOTE — PROGRESS NOTES
Vancomycin level = 17.8 @ 22:35 on 10/17/2021    calculation and resultant level within therapeutic range AUC 390mg/l  no changes pharmacy shall continue to monitor

## 2021-10-18 NOTE — PROGRESS NOTES
Speech-Language Pathology Treatment Attempt     Chart reviewed. Attempted Speech-Language Pathology Treatment, however, patient unable to be seen due to:     []?  Nausea/vomiting  []?  Eating  []?  Pain  []?  Patient too lethargic  []?  Off Unit for testing/procedure  []?  Dialysis treatment in progress   []?  Telemetry Results  [x]?   Other: Per RT note and ST assessment, note large amounts of thick bloody secretions and clots x1 day. Will hold digital occlusion and ST this day and follow up as patient able.       Thank you,  Malissa ATKINSON  [address] [place] [address] [place], [address] [facility]

## 2021-10-18 NOTE — PROGRESS NOTES
0715: Received report from KRISTINE Cali. Reviewed chart, MAR, and plan for patient. 0800: Assessment complete - morning medications crushed and administered through PEG tube. 1000: Helped transfer patient to Vascular lab to have right arm assessed for a clot after removal of ML.     1030: Received orders from Dr. Marianne Kaminski - [place] removal and PT/OT. 1200: Assessment complete. Agitated throughout the day w/medications. 1600: Unable to redirect patient. She keeps fighting and thrashing in the bed. Placed back in restraints/bilaterally.

## 2021-10-18 NOTE — PROGRESS NOTES
Patient given neb treatment and trach care given. Patient suctioned, trach inner cannula changed and trach collar changed. Patient on 28% ambu bag and additional trachs at the bedside.

## 2021-10-18 NOTE — PROGRESS NOTES
Comprehensive Nutrition Assessment    Type and Reason for Visit: Reassess    Nutrition Recommendations/Plan: Continue current tube feeds and POC  Enteral Nutrition   Feeding Route PEG   EN Formula Standard with fiber   Schedule Continuous   Feeding Regimen Jevity 1.5 @ 45ml/hr   Water Flushes 250ml Q4   Goal EN & Flush Order Provides Jevity 1.5 @ 45ml + water flushes provides 1485kcals, 63g PRO, 214g CHO, and 2252ml free water. Nutrition Assessment:  Pt admit for Gabapentin overdose, pt remains in ICU- s/p trach, s/p PEG. Last bm 10/17. Labs: phosphorus (6.4) H, GFR (53) L. meds- Pepcid, Folvite, Humalog, Lactulose, Melatonin, MVI, Zofran, miralax daily PRN, thiamine. Estimated Daily Nutrient Needs:  Energy (kcal): 1524; Weight Used for Energy Requirements: Current  Protein (g): 71; Weight Used for Protein Requirements: Current (1g/kg)  Fluid (ml/day): 1524; Method Used for Fluid Requirements: 1 ml/kcal    Nutrition Related Findings:  TF currently Jevity 1.5 @ 45 ml/hr goal rate with 60ml of gastric residuals- Seems to be tolerating, monitor residuals and recc motility agent if residuals >250 ml. Wounds:    None       Current Nutrition Therapies:  ADULT TUBE FEEDING Nasogastric; Standard with Fiber; Delivery Method: Continuous; Continuous Initial Rate (mL/hr): 10; Continuous Advance Tube Feeding: Yes; Advancement Volume (mL/hr): 10; Advancement Frequency: Q 6 hours; Continuous Goal Rate (mL. .. Anthropometric Measures:  Height:  5' 2\" (157.5 cm)  Current Body Wt:  71.2 kg (156 lb 15.5 oz)   Admission Body Wt:  160 lb 0.9 oz    Usual Body Wt:  72 kg (158 lb 11.7 oz)     Ideal Body Wt:  110 lbs:  142.7 %    BMI Category: Overweight (BMI 25.0-29. 9)       Nutrition Diagnosis:   Inadequate oral intake related to impaired respiratory function as evidenced by nutrition support-enteral nutrition, intubation    Nutrition Interventions:   Food and/or Nutrient Delivery: Continue tube feeding  Nutrition Education and Counseling: No recommendations at this time  Coordination of Nutrition Care: Continue to monitor while inpatient    Goals:  Continue EN at goal rate of 45 ml/hr to meet nutrition needs throughout the next 5-7 days. Nutrition Monitoring and Evaluation:   Behavioral-Environmental Outcomes: None identified  Food/Nutrient Intake Outcomes: Diet advancement/tolerance, Enteral nutrition intake/tolerance  Physical Signs/Symptoms Outcomes: Biochemical data, GI status, Weight, Skin    Discharge Planning:     Too soon to determine     Electronically signed by Rosalynd Gowers, RD on 10/18/2021 at 10:43 AM

## 2021-10-18 NOTE — PROGRESS NOTES
Sitter at bedside, No restraints on pt, not needed at this time  Upon assessment, right arm swollen, red and warm to touch. When pt asked if it was hurting she said yes. IV in [place] leaking. IVF stopped at this time.  notified and will attempt for another IV site before removing midline in right arm. 2100- Medic up to floor to attempt midline. Medic informed to to get IV above external fixator in left arm  2130- Midline was obtained by medic in left arm. Domenick Bumpers notified that midline was  Close to external fixator ,and will remove midline in right arm when she is less restless.  No new orders received  0130- right arm midline removed, sitter remains at bedside

## 2021-10-18 NOTE — PROGRESS NOTES
Physician Progress Note      PATIENTDufm Gather  CSN #:                  [id number]  :                       1980  ADMIT DATE:       2021 7:44 PM  [address] DATE:  RESPONDING  PROVIDER #:        Payal ARAMBULA MD          QUERY TEXT:    Pt admitted with drug overdose, underwent trach placement and has anemia documented. If possible, please document in progress notes and discharge summary further specificity regarding the acuity and type of anemia:    The medical record reflects the following:  Risk Factors: Trach placed on   Clinical Indicators: Anemia documented on progress notes  10/13 H/H 8.1/25.6  10/15 H/H 6.9/21.7 repeat H/H 7.1/22.3  10/16 H/H 6.9/22.0  10/17 H/H 7.5/24.1  10/18 H/H 7.2/22.0  10/18 Hospitalist note: Anemia Transfused  one unit prbc on 10/16 so far stable, continue monitoring  Occult stool negative  10/18 Resp therapist note: NOTED LARGE AMT THICK BLOODY SECRETIONS AND WELL AS CLOTS. INFORMED BY STAFF THAT SHE HAD THE SAME DURING THE NIGHT      Treatment: Monitor labs, Transfused PRBC's    Duc Hutchins RN, BSN, [address] [place] [address]  [phone]  Options provided:  -- Anemia due to acute blood loss  -- Anemia due to chronic blood loss  -- Anemia due to acute on chronic blood loss  -- Anemia due to iron deficiency  -- Anemia due to postoperative blood loss  -- Dilutional anemia  -- Other - I will add my own diagnosis  -- Disagree - Not applicable / Not valid  -- Disagree - Clinically unable to determine / Unknown  -- Refer to Clinical Documentation Reviewer    PROVIDER RESPONSE TEXT:    This patient has anemia due to postoperative blood loss.     Query created by: Princess Rouse on 10/18/2021 2:16 PM      Electronically signed by:  Payal Vo MD 10/18/2021 2:31 PM

## 2021-10-18 NOTE — PROGRESS NOTES
Hospitalist Progress Note-critical care note     Patient: Anjelica Bush MRN: [id number]  CSN: [id number]    YOB: 1980  Age: 39 y.o. Sex: female    DOA: 9/11/2021 LOS:  LOS: 36 days            Chief complaint: wrist fracture , drug overdose , acute respiratory failure with hypoxia, psychosis     Assessment/Plan         Hospital Problems  Date Reviewed: 9/6/2015        Codes Class Noted POA    FELICITY (acute kidney injury) ([facility] ICD-10-CM: N17.9  ICD-9-CM: 584.9  10/16/2021 Unknown        Bacteremia due to Gram-positive bacteria ICD-10-CM: R78.81  ICD-9-CM: 790.7  10/6/2021 Unknown        Status post tracheostomy ([facility] ICD-10-CM: Z93.0  ICD-9-CM: V44.0  9/28/2021 Unknown        Hypokalemia ICD-10-CM: E87.6  ICD-9-CM: 276.8  9/21/2021 Unknown        Increased ammonia level ICD-10-CM: R79.89  ICD-9-CM: 790.6  9/14/2021 Unknown        Psychosis ([facility] ICD-10-CM: F29  ICD-9-CM: 298.9  Unknown Unknown        Hyponatremia ICD-10-CM: E87.1  ICD-9-CM: 276.1  Unknown Yes        Acute respiratory failure with hypoxia (HCC) ICD-10-CM: J96.01  ICD-9-CM: 518.81  Unknown Yes        Left wrist fracture, with delayed healing, subsequent encounter ICD-10-CM: S62.102G  ICD-9-CM: V54.19  9/12/2021 Unknown        * (Principal) Drug overdose, intentional self-harm, initial encounter ([facility] ICD-10-CM: T50.902A  ICD-9-CM: 977.9, E950.5  9/12/2021 Yes        Hypoxia ICD-10-CM: R09.02  ICD-9-CM: 799.02  9/12/2021 Yes        Hypotension after procedure ICD-10-CM: I95.81  ICD-9-CM: 458.29  9/12/2021 Yes        Acute metabolic encephalopathy NBK-41-XE: G93.41  ICD-9-CM: 348.31  9/12/2021 Yes                  Anjelica Bush is a 39 y.o. female who is standing history of multidrug use/abuse, previous drug-seeking behavior and mental health issues otherwise unspecified arrives via EMS with acute metabolic encephalopathy and lethargy. Admitted for likely gabapentin overdose.   She was intubated in ER, ct head no acute issue, LP done due to fever even on abx, no meningitis noted. Now she has peg and trach        Acute respiratory failure with hypoxia   Intubated on 9/12    Trach on 9/20/21. Continue breathing tx . Local trach care     Post tracheostomy   Continue local trach care   Respiratory therapist f/u       bacteremia -fever -repeat cx    So far stable   bcx  Positive for GPC on 10/4   Sputum culture 9/24/2021: MRSA. Complete abx  tx     Anemia  Transfused  one unit prbc on 10/16 so far stable, continue monitoring   Occult stool negative    no bleeding reported   Upper PVL negative for dvt           Acute metabolic encephalopathy   Ct head no acute process         Gabapentin overdose with lethargy and AMS    S/p procedural stomach emptying in ED with charcoal and sorbitol    on Seroquel        elevated ammonia level  Continue   Lactulose,   Ammonia remained stable     Psychosis , hx of  Ekbom's delusional parasitosis    On  Klonopin,  Seroquel, halodol prn per psych recommendation   Continue cardiac monitoring     left wrist fracture: immobilized -poa -stable   Appreciated ortho on board-f/u with Dr. Alberto Pérez as out-pt     yarely    Resolved       Sitter was at the bedside     rn Jun d/ildefonos,    Will have pt/ot   Poor prognosis , dc barrier   Disposition :tbd,   Review of systems:  Unable to obtain due to trach collar   Vital signs/Intake and Output:  Visit Vitals  BP (!) 104/57   Pulse 87   Temp 98 °F (36.7 °C)   Resp 18   Ht 5' 2\" (1.575 m)   Wt 71.2 kg (156 lb 15.5 oz)   SpO2 98%   BMI 28.71 kg/m²     Current Shift:  No intake/output data recorded. Last three shifts:  10/16 1901 - 10/18 0700  In: 9168.9 [I.V.:2817.1]  Out: 5300 [Urine:5300]    Physical Exam:  General: Alert , eyes closed   HEENT:  Trach collar   Lungs: Coarse bs   Heart:  rrr   No murmur, No Rubs, No Gallops  Abdomen: Soft, Non distended, Non tender.   +Bowel sounds, peg tube noted   Extremities: No c/c.immobilzer noted on left wrist   Psych:   Restless   Neurologic: Alert and follow some command. Agitated           Labs: Results:       Chemistry Recent Labs     10/18/21  0115 10/17/21  0238 10/16/21  0307   GLU 97 113* 81    143 142   K 4.0 4.1 3.9    107 107   CO2 29 29 29   BUN 17 18 20*   CREA 1.13 1.27 1.52*   CA 9.4 9.1 9.7   AGAP 8 7 6   BUCR 15 14 13   AP 85 84 99   TP 5.8* 5.7* 6.2*   ALB 3.3* 3.2* 3.5   GLOB 2.5 2.5 2.7   AGRAT 1.3 1.3 1.3      CBC w/Diff Recent Labs     10/18/21  0115 10/17/21  0238 10/16/21  1402 10/16/21  0307 10/16/21  0307   WBC 5.8 6.9  --   --  7.6   RBC 2.51* 2.62*  --   --  2.51*   HGB 7.2* 7.5* 7.0*   < > 6.9*   HCT 22.0* 24.1* 22.1*   < > 22.0*    300  --   --  272   GRANS 52 56  --   --  61   LYMPH 32 25  --   --  26   EOS 8* 9*  --   --  6*    < > = values in this interval not displayed. Cardiac Enzymes No results for input(s): CPK, CKND1, ZENON in the last 72 hours. No lab exists for component: CKRMB, TROIP   Coagulation No results for input(s): PTP, INR, APTT, INREXT, INREXT in the last 72 hours. Lipid Panel No results found for: CHOL, CHOLPOCT, CHOLX, CHLST, CHOLV, 758588, HDL, HDLP, LDL, LDLC, DLDLP, 424574, VLDLC, VLDL, TGLX, TRIGL, TRIGP, TGLPOCT, CHHD, CHHDX   BNP No results for input(s): BNPP in the last 72 hours.    Liver Enzymes Recent Labs     10/18/21  0115   TP 5.8*   ALB 3.3*   AP 85      Thyroid Studies No results found for: T4, T3U, TSH, TSHEXT, TSHEXT     Procedures/imaging: see electronic medical records for all procedures/Xrays and details which were not copied into this note but were reviewed prior to creation of Plan    XR WRIST LT AP/LAT/OBL MIN 3V    Result Date: 8/19/2021  EXAM: XR WRIST LT AP/LAT/OBL MIN 3V CLINICAL INDICATION/HISTORY: Fall with LEFT wrist pain and swelling -Additional: None COMPARISON: None TECHNIQUE: 3 views of the left wrist _______________ FINDINGS: BONES: Comminuted, impacted fracture of the distal radius with slight dorsal angulation of the distal fracture fragment. No aggressive appearing osseous lytic or blastic lesion. SOFT TISSUES: Unremarkable. _______________     Comminuted, impacted fracture of the distal radius. XR CHEST PORT    Result Date: 9/13/2021  EXAM: XR CHEST PORT CLINICAL INDICATION/HISTORY: Acute respiratory failure, ET tub position -Additional: None COMPARISON: One day prior TECHNIQUE: Portable frontal view of the chest _______________ FINDINGS: SUPPORT DEVICES: Endotracheal and enteric tubes unchanged. HEART AND MEDIASTINUM: Cardiomediastinal silhouette within normal limits. LUNGS AND PLEURAL SPACES: No dense consolidation, large effusion or pneumothorax. _______________     No acute cardiopulmonary abnormality. XR CHEST PORT    Result Date: 9/11/2021  MEDICAL RECORDS NUMBER: [id number] PROCEDURE:  Single view of the chest DATE: 9/11/2021 9:09 PM HISTORY: 39years old Female. post ett Comparison: 8/4/2021 FINDINGS: The patient has been intubated with appropriate placement of the endotracheal tube. There is no enteric tube passing below the level of the diaphragm. There is no significant effusion. There is no significant pneumothorax. Cardiomediastinal silhouette is within normal limits. There is no evidence of a focal pulmonary infiltrate or mass. 1. There is no significant or acute cardiopulmonary process. The patient has been intubated with appropriate placement of the endotracheal tube. There is no enteric tube passing below the level of the diaphragm. This report has been generated using voice recognition software. XR ABD PORT  1 V    Result Date: 9/12/2021  EXAM: Frontal view of the abdomen CLINICAL INDICATION/HISTORY: NG tube placement COMPARISON: None. _______________ FINDINGS: NG/OG tube in place with the tip in the left upper quadrant in the region of the gastric fundus. No bowel obstruction. Moderate colonic stool burden. _______________     1.  NG/OG tube is in good position with the tip in the left upper quadrant in the region of the gastric fundus. 2. Moderate colonic stool burden.       Silvia Narayan MD

## 2021-10-18 NOTE — PROGRESS NOTES
PATIENT C/O SOB/CAN'T BREATHE.  SPO2 100% ON 28% TC. BS DIMINISHED BILATERALLY. PRN DUONEB GIVEN. TRACH COLLAR AND AEROSOL CHANGED AS OUTPUT OF HUMIDIFICATION WAS NOTED TO BE LOW BY [employer].  OUTPUT IMPROVED WITH CHANGE OF TC APPARATUS AND FLOWMETER.

## 2021-10-18 NOTE — PROGRESS NOTES
Pt with swollen arm around R midline. RN will attempt another line before this one is pulled out. I ordered a duplex ultrasound to r/o DVT.

## 2021-10-18 NOTE — PROGRESS NOTES
TRACH CARE GIVEN TO PATIENT. NOTED LARGE AMT THICK BLOODY SECRETIONS AND WELL AS CLOTS. INFORMED BY STAFF THAT SHE HAD THE SAME DURING THE NIGHT. INNER CANNULA AND TRACH TIES WERE REPLACED AND FRESH GAUZE WAS APPLIED TO SITE. CONCERN WAS VOICED BY THIS WRITER TO Deven Hernandez.

## 2021-10-19 LAB
ALBUMIN SERPL-MCNC: 3.5 G/DL (ref 3.4–5)
ALBUMIN/GLOB SERPL: 1.3 {RATIO} (ref 0.8–1.7)
ALP SERPL-CCNC: 98 U/L (ref 45–117)
ALT SERPL-CCNC: 22 U/L (ref 13–56)
AMMONIA PLAS-SCNC: 23 UMOL/L (ref 11–32)
ANION GAP SERPL CALC-SCNC: 6 MMOL/L (ref 3–18)
APPEARANCE UR: CLEAR
AST SERPL-CCNC: 14 U/L (ref 10–38)
BACTERIA URNS QL MICRO: ABNORMAL /HPF
BASOPHILS # BLD: 0 K/UL (ref 0–0.1)
BASOPHILS NFR BLD: 0 % (ref 0–2)
BILIRUB SERPL-MCNC: 0.3 MG/DL (ref 0.2–1)
BILIRUB UR QL: NEGATIVE
BUN SERPL-MCNC: 16 MG/DL (ref 7–18)
BUN/CREAT SERPL: 16 (ref 12–20)
CALCIUM SERPL-MCNC: 9.3 MG/DL (ref 8.5–10.1)
CHLORIDE SERPL-SCNC: 105 MMOL/L (ref 100–111)
CO2 SERPL-SCNC: 29 MMOL/L (ref 21–32)
COLOR UR: YELLOW
CREAT SERPL-MCNC: 1.02 MG/DL (ref 0.6–1.3)
DIFFERENTIAL METHOD BLD: ABNORMAL
EOSINOPHIL # BLD: 0.3 K/UL (ref 0–0.4)
EOSINOPHIL NFR BLD: 6 % (ref 0–5)
EPITH CASTS URNS QL MICRO: ABNORMAL /LPF (ref 0–5)
ERYTHROCYTE [DISTWIDTH] IN BLOOD BY AUTOMATED COUNT: 13.5 % (ref 11.6–14.5)
GLOBULIN SER CALC-MCNC: 2.8 G/DL (ref 2–4)
GLUCOSE BLD STRIP.AUTO-MCNC: 111 MG/DL (ref 70–110)
GLUCOSE BLD STRIP.AUTO-MCNC: 129 MG/DL (ref 70–110)
GLUCOSE BLD STRIP.AUTO-MCNC: 174 MG/DL (ref 70–110)
GLUCOSE BLD STRIP.AUTO-MCNC: 97 MG/DL (ref 70–110)
GLUCOSE SERPL-MCNC: 101 MG/DL (ref 74–99)
GLUCOSE UR STRIP.AUTO-MCNC: NEGATIVE MG/DL
HCT VFR BLD AUTO: 29 % (ref 35–45)
HGB BLD-MCNC: 9.2 G/DL (ref 12–16)
HGB UR QL STRIP: NEGATIVE
KETONES UR QL STRIP.AUTO: NEGATIVE MG/DL
LEUKOCYTE ESTERASE UR QL STRIP.AUTO: ABNORMAL
LYMPHOCYTES # BLD: 1.9 K/UL (ref 0.9–3.6)
LYMPHOCYTES NFR BLD: 33 % (ref 21–52)
MAGNESIUM SERPL-MCNC: 1.7 MG/DL (ref 1.6–2.6)
MCH RBC QN AUTO: 28.4 PG (ref 24–34)
MCHC RBC AUTO-ENTMCNC: 31.7 G/DL (ref 31–37)
MCV RBC AUTO: 89.5 FL (ref 78–100)
MONOCYTES # BLD: 0.4 K/UL (ref 0.05–1.2)
MONOCYTES NFR BLD: 7 % (ref 3–10)
NEUTS SEG # BLD: 3.1 K/UL (ref 1.8–8)
NEUTS SEG NFR BLD: 55 % (ref 40–73)
NITRITE UR QL STRIP.AUTO: NEGATIVE
PH UR STRIP: 7.5 [PH] (ref 5–8)
PHOSPHATE SERPL-MCNC: 6.7 MG/DL (ref 2.5–4.9)
PLATELET # BLD AUTO: 322 K/UL (ref 135–420)
PMV BLD AUTO: 10 FL (ref 9.2–11.8)
POTASSIUM SERPL-SCNC: 4.1 MMOL/L (ref 3.5–5.5)
PROT SERPL-MCNC: 6.3 G/DL (ref 6.4–8.2)
PROT UR STRIP-MCNC: NEGATIVE MG/DL
RBC # BLD AUTO: 3.24 M/UL (ref 4.2–5.3)
RBC #/AREA URNS HPF: ABNORMAL /HPF (ref 0–5)
SODIUM SERPL-SCNC: 140 MMOL/L (ref 136–145)
SP GR UR REFRACTOMETRY: <1.005 (ref 1–1.03)
UROBILINOGEN UR QL STRIP.AUTO: 0.2 EU/DL (ref 0.2–1)
WBC # BLD AUTO: 5.7 K/UL (ref 4.6–13.2)
WBC URNS QL MICRO: ABNORMAL /HPF (ref 0–5)

## 2021-10-19 PROCEDURE — 74011250636 HC RX REV CODE- 250/636: Performed by: HOSPITALIST

## 2021-10-19 PROCEDURE — 74011250636 HC RX REV CODE- 250/636: Performed by: FAMILY MEDICINE

## 2021-10-19 PROCEDURE — 51798 US URINE CAPACITY MEASURE: CPT

## 2021-10-19 PROCEDURE — 74011000250 HC RX REV CODE- 250: Performed by: INTERNAL MEDICINE

## 2021-10-19 PROCEDURE — 97530 THERAPEUTIC ACTIVITIES: CPT

## 2021-10-19 PROCEDURE — 80053 COMPREHEN METABOLIC PANEL: CPT

## 2021-10-19 PROCEDURE — 36415 COLL VENOUS BLD VENIPUNCTURE: CPT

## 2021-10-19 PROCEDURE — 74011250636 HC RX REV CODE- 250/636: Performed by: INTERNAL MEDICINE

## 2021-10-19 PROCEDURE — 97162 PT EVAL MOD COMPLEX 30 MIN: CPT

## 2021-10-19 PROCEDURE — 74011250637 HC RX REV CODE- 250/637: Performed by: INTERNAL MEDICINE

## 2021-10-19 PROCEDURE — 74011250637 HC RX REV CODE- 250/637: Performed by: FAMILY MEDICINE

## 2021-10-19 PROCEDURE — 97167 OT EVAL HIGH COMPLEX 60 MIN: CPT

## 2021-10-19 PROCEDURE — 82962 GLUCOSE BLOOD TEST: CPT

## 2021-10-19 PROCEDURE — 82140 ASSAY OF AMMONIA: CPT

## 2021-10-19 PROCEDURE — 85025 COMPLETE CBC W/AUTO DIFF WBC: CPT

## 2021-10-19 PROCEDURE — 74011250637 HC RX REV CODE- 250/637: Performed by: HOSPITALIST

## 2021-10-19 PROCEDURE — 81001 URINALYSIS AUTO W/SCOPE: CPT

## 2021-10-19 PROCEDURE — 65660000000 HC RM CCU STEPDOWN

## 2021-10-19 PROCEDURE — 74011250637 HC RX REV CODE- 250/637: Performed by: PSYCHIATRY & NEUROLOGY

## 2021-10-19 PROCEDURE — 92507 TX SP LANG VOICE COMM INDIV: CPT

## 2021-10-19 PROCEDURE — 94640 AIRWAY INHALATION TREATMENT: CPT

## 2021-10-19 PROCEDURE — 83735 ASSAY OF MAGNESIUM: CPT

## 2021-10-19 PROCEDURE — 74011636637 HC RX REV CODE- 636/637: Performed by: INTERNAL MEDICINE

## 2021-10-19 PROCEDURE — 84100 ASSAY OF PHOSPHORUS: CPT

## 2021-10-19 PROCEDURE — 94760 N-INVAS EAR/PLS OXIMETRY 1: CPT

## 2021-10-19 RX ORDER — MAGNESIUM SULFATE HEPTAHYDRATE 40 MG/ML
2 INJECTION, SOLUTION INTRAVENOUS ONCE
Status: COMPLETED | OUTPATIENT
Start: 2021-10-19 | End: 2021-10-19

## 2021-10-19 RX ADMIN — HYDROMORPHONE HYDROCHLORIDE 1 MG: 1 INJECTION, SOLUTION INTRAMUSCULAR; INTRAVENOUS; SUBCUTANEOUS at 04:42

## 2021-10-19 RX ADMIN — QUETIAPINE FUMARATE 300 MG: 200 TABLET ORAL at 21:43

## 2021-10-19 RX ADMIN — SODIUM CHLORIDE 50 ML/HR: 450 INJECTION, SOLUTION INTRAVENOUS at 00:50

## 2021-10-19 RX ADMIN — HYDROMORPHONE HYDROCHLORIDE 1 MG: 1 INJECTION, SOLUTION INTRAMUSCULAR; INTRAVENOUS; SUBCUTANEOUS at 13:38

## 2021-10-19 RX ADMIN — Medication 5 MG: at 21:44

## 2021-10-19 RX ADMIN — MAGNESIUM SULFATE HEPTAHYDRATE 2 G: 40 INJECTION, SOLUTION INTRAVENOUS at 10:24

## 2021-10-19 RX ADMIN — CLONAZEPAM 2 MG: 0.5 TABLET ORAL at 08:55

## 2021-10-19 RX ADMIN — LORAZEPAM 2 MG: 2 INJECTION INTRAMUSCULAR at 12:39

## 2021-10-19 RX ADMIN — FOLIC ACID 1 MG: 1 TABLET ORAL at 08:55

## 2021-10-19 RX ADMIN — HALOPERIDOL LACTATE 5 MG: 5 INJECTION, SOLUTION INTRAMUSCULAR at 17:24

## 2021-10-19 RX ADMIN — DIAZEPAM 2.5 MG: 5 TABLET ORAL at 22:14

## 2021-10-19 RX ADMIN — IPRATROPIUM BROMIDE AND ALBUTEROL SULFATE 3 ML: .5; 3 SOLUTION RESPIRATORY (INHALATION) at 20:07

## 2021-10-19 RX ADMIN — NYSTATIN: 100000 POWDER TOPICAL at 16:15

## 2021-10-19 RX ADMIN — DIPHENHYDRAMINE HYDROCHLORIDE, ZINC ACETATE: 2; .1 CREAM TOPICAL at 16:34

## 2021-10-19 RX ADMIN — INSULIN LISPRO 2 UNITS: 100 INJECTION, SOLUTION INTRAVENOUS; SUBCUTANEOUS at 00:58

## 2021-10-19 RX ADMIN — DIPHENHYDRAMINE HYDROCHLORIDE 12.5 MG: 25 SOLUTION ORAL at 10:24

## 2021-10-19 RX ADMIN — FAMOTIDINE 20 MG: 20 TABLET ORAL at 21:44

## 2021-10-19 RX ADMIN — LACTULOSE 15 ML: 10 SOLUTION ORAL at 21:43

## 2021-10-19 RX ADMIN — QUETIAPINE FUMARATE 100 MG: 100 TABLET ORAL at 08:56

## 2021-10-19 RX ADMIN — LORAZEPAM 2 MG: 2 INJECTION INTRAMUSCULAR at 06:01

## 2021-10-19 RX ADMIN — BUDESONIDE 500 MCG: 0.5 INHALANT RESPIRATORY (INHALATION) at 20:07

## 2021-10-19 RX ADMIN — ARFORMOTEROL TARTRATE 15 MCG: 15 SOLUTION RESPIRATORY (INHALATION) at 20:07

## 2021-10-19 RX ADMIN — BUDESONIDE 500 MCG: 0.5 INHALANT RESPIRATORY (INHALATION) at 07:06

## 2021-10-19 RX ADMIN — CLONAZEPAM 2 MG: 0.5 TABLET ORAL at 15:22

## 2021-10-19 RX ADMIN — CLONAZEPAM 2 MG: 0.5 TABLET ORAL at 21:44

## 2021-10-19 RX ADMIN — ONDANSETRON 4 MG: 2 INJECTION INTRAMUSCULAR; INTRAVENOUS at 17:24

## 2021-10-19 RX ADMIN — ARFORMOTEROL TARTRATE 15 MCG: 15 SOLUTION RESPIRATORY (INHALATION) at 07:06

## 2021-10-19 RX ADMIN — Medication 1 TABLET: at 08:55

## 2021-10-19 RX ADMIN — DIAZEPAM 2.5 MG: 5 TABLET ORAL at 08:56

## 2021-10-19 RX ADMIN — IPRATROPIUM BROMIDE AND ALBUTEROL SULFATE 3 ML: .5; 3 SOLUTION RESPIRATORY (INHALATION) at 12:56

## 2021-10-19 RX ADMIN — ENOXAPARIN SODIUM 40 MG: 100 INJECTION SUBCUTANEOUS at 16:36

## 2021-10-19 RX ADMIN — NYSTATIN: 100000 POWDER TOPICAL at 08:54

## 2021-10-19 RX ADMIN — FAMOTIDINE 20 MG: 20 TABLET ORAL at 08:56

## 2021-10-19 RX ADMIN — THIAMINE HCL TAB 100 MG 100 MG: 100 TAB at 08:55

## 2021-10-19 RX ADMIN — LACTULOSE 15 ML: 10 SOLUTION ORAL at 08:54

## 2021-10-19 NOTE — PROGRESS NOTES
Problem: Ventilator Management  Goal: *Adequate oxygenation and ventilation  Outcome: Progressing Towards Goal  Goal: *Patient maintains clear airway/free of aspiration  Outcome: Progressing Towards Goal  Goal: *Absence of infection signs and symptoms  Outcome: Progressing Towards Goal  Goal: *Normal spontaneous ventilation  Outcome: Progressing Towards Goal     Problem: Patient Education: Go to Patient Education Activity  Goal: Patient/Family Education  Outcome: Progressing Towards Goal     Problem: Non-Violent Restraints  Goal: Removal from restraints as soon as assessed to be safe  Outcome: Progressing Towards Goal  Goal: No harm/injury to patient while restraints in use  Outcome: Progressing Towards Goal  Goal: Patient's dignity will be maintained  Outcome: Progressing Towards Goal  Goal: Patient Interventions  Outcome: Progressing Towards Goal     Problem: Falls - Risk of  Goal: *Absence of Falls  Description: Document Danne Lobe Fall Risk and appropriate interventions in the flowsheet. Outcome: Progressing Towards Goal  Note: Fall Risk Interventions:  Mobility Interventions: Bed/chair exit alarm    Mentation Interventions: Adequate sleep, hydration, pain control    Medication Interventions: Bed/chair exit alarm    Elimination Interventions: Bed/chair exit alarm    History of Falls Interventions: Bed/chair exit alarm, Room close to nurse's station         Problem: Patient Education: Go to Patient Education Activity  Goal: Patient/Family Education  Outcome: Progressing Towards Goal     Problem: Risk for Spread of Infection  Goal: Prevent transmission of infectious organism to others  Description: Prevent the transmission of infectious organisms to other patients, staff members, and visitors.   Outcome: Progressing Towards Goal     Problem: Patient Education:  Go to Education Activity  Goal: Patient/Family Education  Outcome: Progressing Towards Goal     Problem: Pressure Injury - Risk of  Goal: *Prevention of pressure injury  Description: Document Remy Scale and appropriate interventions in the flowsheet.   Outcome: Progressing Towards Goal  Note: Pressure Injury Interventions:  Sensory Interventions: Assess changes in LOC, Float heels, Keep linens dry and wrinkle-free, Minimize linen layers, Pressure redistribution bed/mattress (bed type)    Moisture Interventions: Absorbent underpads, Check for incontinence Q2 hours and as needed    Activity Interventions: Pressure redistribution bed/mattress(bed type)    Mobility Interventions: Pressure redistribution bed/mattress (bed type)    Nutrition Interventions: Document food/fluid/supplement intake    Friction and Shear Interventions: Minimize layers                Problem: Patient Education: Go to Patient Education Activity  Goal: Patient/Family Education  Outcome: Progressing Towards Goal     Problem: Patient Education: Go to Patient Education Activity  Goal: Patient/Family Education  Outcome: Progressing Towards Goal

## 2021-10-19 NOTE — PROGRESS NOTES
Hospitalist Progress Note-critical care note     Patient: Freedom Hallman MRN: [id number]  [facility]: [id number]    YOB: 1980  Age: 39 y.o. Sex: female    DOA: 9/11/2021 LOS:  LOS: 37 days            Chief complaint: wrist fracture , drug overdose , acute respiratory failure with hypoxia, psychosis     Assessment/Plan         Hospital Problems  Date Reviewed: 9/6/2015        Codes Class Noted POA    FELICITY (acute kidney injury) ([facility] [place] [facility] ICD-10-CM: N17.9  ICD-9-CM: 584.9  10/16/2021 Unknown        Bacteremia due to Gram-positive bacteria ICD-10-CM: R78.81  ICD-9-CM: 790.7  10/6/2021 Unknown        Status post tracheostomy ([facility] [place] [facility] ICD-10-CM: Z93.0  ICD-9-CM: V44.0  9/28/2021 Unknown        Hypokalemia ICD-10-CM: E87.6  ICD-9-CM: 276.8  9/21/2021 Unknown        Increased ammonia level ICD-10-CM: R79.89  ICD-9-CM: 790.6  9/14/2021 Unknown        Psychosis ([facility] [place] [facility] ICD-10-CM: F29  ICD-9-CM: 298.9  Unknown Unknown        Hyponatremia ICD-10-CM: E87.1  ICD-9-CM: 276.1  Unknown Yes        Acute respiratory failure with hypoxia (HCC) ICD-10-CM: J96.01  ICD-9-CM: 518.81  Unknown Yes        Left wrist fracture, with delayed healing, subsequent encounter ICD-10-CM: S62.102G  ICD-9-CM: V54.19  9/12/2021 Unknown        * (Principal) Drug overdose, intentional self-harm, initial encounter ([facility] [place] [facility] ICD-10-CM: T50.902A  ICD-9-CM: 977.9, E950.5  9/12/2021 Yes        Hypoxia ICD-10-CM: R09.02  ICD-9-CM: 799.02  9/12/2021 Yes        Hypotension after procedure ICD-10-CM: I95.81  ICD-9-CM: 458.29  9/12/2021 Yes        Acute metabolic encephalopathy WJG-97-OE: G93.41  ICD-9-CM: 348.31  9/12/2021 Yes                  Freedom Hallman is a 39 y.o. female who is standing history of multidrug use/abuse, previous drug-seeking behavior and mental health issues otherwise unspecified arrives via EMS with acute metabolic encephalopathy and lethargy. Admitted for likely gabapentin overdose.   She was intubated in ER, ct head no acute issue, LP done due to fever even on abx, no meningitis noted. Now she has peg and trach        Acute respiratory failure with hypoxia   Intubated on 9/12    Trach on 9/20/21. Continue breathing tx . Local trach care   Speech evaluation    not a candidate for decannulation per pulm     Post tracheostomy   Continue local trach care   Respiratory therapist f/u       bacteremia -  Complete the treatment   bcx  Positive for GPC on 10/4   Sputum culture 9/24/2021: MRSA. Anemia  Transfused  one unit prbc on 10/16 so far stable, continue monitoring   Occult stool negative    no bleeding reported   Upper PVL negative for dvt           Acute metabolic encephalopathy   Ct head no acute process       Urinary retention   Reinsert zamora   ua is clear today     Gabapentin overdose with lethargy and AMS    S/p procedural stomach emptying in ED with charcoal and sorbitol    on Seroquel        elevated ammonia level  Continue   Lactulose,   Ammonia remained stable     Psychosis , hx of  Ekbom's delusional parasitosis    On  Klonopin,  Seroquel, halodol prn per psych recommendation   Continue cardiac monitoring     left wrist fracture: immobilized -poa -stable   Appreciated ortho on board-f/u with Dr. Pollard List as out-pt     yarely    Resolved       Sitter was at the bedside     rn need cath twice a day     Poor prognosis , dc barrier   Disposition :tbd,   Review of systems:  Unable to obtain due to trach collar   Vital signs/Intake and Output:  Visit Vitals  /60   Pulse 91   Temp 98 °F (36.7 °C)   Resp 16   Ht 5' 2\" (1.575 m)   Wt 71.2 kg (156 lb 15.5 oz)   SpO2 96%   BMI 28.71 kg/m²     Current Shift:  No intake/output data recorded. Last three shifts:  10/17 1901 - 10/19 0700  In: 64509.5 [I.V.:3770.5]  Out: 2498 [Urine:2720]    Physical Exam:  General: Alert , eyes closed   HEENT:  Trach collar   Lungs: Coarse bs   Heart:  rrr   No murmur, No Rubs, No Gallops  Abdomen: Soft, Non distended, Non tender.   +Bowel sounds, peg tube noted Extremities: No c/c.immobilzer noted on left wrist   Psych:   Restless   Neurologic:  Alert and follow some command. Labs: Results:       Chemistry Recent Labs     10/19/21  0700 10/18/21  0115 10/17/21  0238   * 97 113*    144 143   K 4.1 4.0 4.1    107 107   CO2 29 29 29   BUN 16 17 18   CREA 1.02 1.13 1.27   CA 9.3 9.4 9.1   AGAP 6 8 7   BUCR 16 15 14   AP 98 85 84   TP 6.3* 5.8* 5.7*   ALB 3.5 3.3* 3.2*   GLOB 2.8 2.5 2.5   AGRAT 1.3 1.3 1.3      CBC w/Diff Recent Labs     10/19/21  0700 10/18/21  0115 10/17/21  0238   WBC 5.7 5.8 6.9   RBC 3.24* 2.51* 2.62*   HGB 9.2* 7.2* 7.5*   HCT 29.0* 22.0* 24.1*    296 300   GRANS 55 52 56   LYMPH 33 32 25   EOS 6* 8* 9*      Cardiac Enzymes No results for input(s): CPK, CKND1, ZENON in the last 72 hours. No lab exists for component: CKRMB, TROIP   Coagulation No results for input(s): PTP, INR, APTT, INREXT, INREXT in the last 72 hours. Lipid Panel No results found for: CHOL, CHOLPOCT, CHOLX, CHLST, CHOLV, 244929, HDL, HDLP, LDL, LDLC, DLDLP, 388373, VLDLC, VLDL, TGLX, TRIGL, TRIGP, TGLPOCT, CHHD, CHHDX   BNP No results for input(s): BNPP in the last 72 hours. Liver Enzymes Recent Labs     10/19/21  0700   TP 6.3*   ALB 3.5   AP 98      Thyroid Studies No results found for: T4, T3U, TSH, TSHEXT, TSHEXT     Procedures/imaging: see electronic medical records for all procedures/Xrays and details which were not copied into this note but were reviewed prior to creation of Plan    XR WRIST LT AP/LAT/OBL MIN 3V    Result Date: 8/19/2021  EXAM: XR WRIST LT AP/LAT/OBL MIN 3V CLINICAL INDICATION/HISTORY: Fall with LEFT wrist pain and swelling -Additional: None COMPARISON: None TECHNIQUE: 3 views of the left wrist _______________ FINDINGS: BONES: Comminuted, impacted fracture of the distal radius with slight dorsal angulation of the distal fracture fragment. No aggressive appearing osseous lytic or blastic lesion.  SOFT TISSUES: Unremarkable. _______________     Comminuted, impacted fracture of the distal radius. XR CHEST PORT    Result Date: 9/13/2021  EXAM: XR CHEST PORT CLINICAL INDICATION/HISTORY: Acute respiratory failure, ET tub position -Additional: None COMPARISON: One day prior TECHNIQUE: Portable frontal view of the chest _______________ FINDINGS: SUPPORT DEVICES: Endotracheal and enteric tubes unchanged. HEART AND MEDIASTINUM: Cardiomediastinal silhouette within normal limits. LUNGS AND PLEURAL SPACES: No dense consolidation, large effusion or pneumothorax. _______________     No acute cardiopulmonary abnormality. XR CHEST PORT    Result Date: 9/11/2021  MEDICAL RECORDS NUMBER: [id number] PROCEDURE:  Single view of the chest DATE: 9/11/2021 9:09 PM HISTORY: 39years old Female. post ett Comparison: 8/4/2021 FINDINGS: The patient has been intubated with appropriate placement of the endotracheal tube. There is no enteric tube passing below the level of the diaphragm. There is no significant effusion. There is no significant pneumothorax. Cardiomediastinal silhouette is within normal limits. There is no evidence of a focal pulmonary infiltrate or mass. 1. There is no significant or acute cardiopulmonary process. The patient has been intubated with appropriate placement of the endotracheal tube. There is no enteric tube passing below the level of the diaphragm. This report has been generated using voice recognition software. XR ABD PORT  1 V    Result Date: 9/12/2021  EXAM: Frontal view of the abdomen CLINICAL INDICATION/HISTORY: NG tube placement COMPARISON: None. _______________ FINDINGS: NG/OG tube in place with the tip in the left upper quadrant in the region of the gastric fundus. No bowel obstruction. Moderate colonic stool burden. _______________     1. NG/OG tube is in good position with the tip in the left upper quadrant in the region of the gastric fundus. 2. Moderate colonic stool burden.       PASCUAL ARAMBULA MD

## 2021-10-19 NOTE — PROGRESS NOTES
Problem: Ventilator Management  Goal: *Adequate oxygenation and ventilation  Outcome: Progressing Towards Goal  Goal: *Patient maintains clear airway/free of aspiration  Outcome: Progressing Towards Goal  Goal: *Absence of infection signs and symptoms  Outcome: Progressing Towards Goal  Goal: *Normal spontaneous ventilation  Outcome: Progressing Towards Goal     Problem: Patient Education: Go to Patient Education Activity  Goal: Patient/Family Education  Outcome: Progressing Towards Goal     Problem: Non-Violent Restraints  Goal: Removal from restraints as soon as assessed to be safe  Outcome: Progressing Towards Goal  Goal: No harm/injury to patient while restraints in use  Outcome: Progressing Towards Goal  Goal: Patient's dignity will be maintained  Outcome: Progressing Towards Goal  Goal: Patient Interventions  Outcome: Progressing Towards Goal     Problem: Falls - Risk of  Goal: *Absence of Falls  Description: Document Yasmeen Kenyon Fall Risk and appropriate interventions in the flowsheet. Outcome: Progressing Towards Goal  Note: Fall Risk Interventions:  Mobility Interventions: Bed/chair exit alarm    Mentation Interventions: Adequate sleep, hydration, pain control    Medication Interventions: Bed/chair exit alarm    Elimination Interventions: Bed/chair exit alarm    History of Falls Interventions: Bed/chair exit alarm, Room close to nurse's station         Problem: Patient Education: Go to Patient Education Activity  Goal: Patient/Family Education  Outcome: Progressing Towards Goal     Problem: Risk for Spread of Infection  Goal: Prevent transmission of infectious organism to others  Description: Prevent the transmission of infectious organisms to other patients, staff members, and visitors.   Outcome: Progressing Towards Goal     Problem: Patient Education:  Go to Education Activity  Goal: Patient/Family Education  Outcome: Progressing Towards Goal     Problem: Pressure Injury - Risk of  Goal: *Prevention of pressure injury  Description: Document Remy Scale and appropriate interventions in the flowsheet.   Outcome: Progressing Towards Goal  Note: Pressure Injury Interventions:  Sensory Interventions: Assess changes in LOC    Moisture Interventions: Absorbent underpads, Check for incontinence Q2 hours and as needed    Activity Interventions: Assess need for specialty bed    Mobility Interventions: HOB 30 degrees or less    Nutrition Interventions: Document food/fluid/supplement intake    Friction and Shear Interventions: Apply protective barrier, creams and emollients                Problem: Patient Education: Go to Patient Education Activity  Goal: Patient/Family Education  Outcome: Progressing Towards Goal     Problem: Patient Education: Go to Patient Education Activity  Goal: Patient/Family Education  Outcome: Progressing Towards Goal     Problem: Patient Education: Go to Patient Education Activity  Goal: Patient/Family Education  Outcome: Progressing Towards Goal

## 2021-10-19 NOTE — PROGRESS NOTES
Patient's trach care performed. Patient coughed a moderate amount of thick bloody secretions. Ambu bag and additional trachs at the bedside.

## 2021-10-19 NOTE — PROGRESS NOTES
Problem: Mobility Impaired (Adult and Pediatric)  Goal: *Acute Goals and Plan of Care (Insert Text)  Description: Physical Therapy Goals  Initiated 10/19/2021 and to be accomplished within 7 day(s)  1. Patient will move from supine to sit and sit to supine  in bed with supervision/set-up. 2.  Patient will transfer from bed to chair and chair to bed with minimal assistance/contact guard assist using the least restrictive device. 3.  Patient will perform sit to stand with minimal assistance/contact guard assist.  4.  Patient will ambulate with minimal assistance/contact guard assist for 20 feet with the least restrictive device. PLOF: pt was independent with mobility without an assistive device     Outcome: Progressing Towards Goal   PHYSICAL THERAPY EVALUATION    Patient: Sameera Reed (43 y.o. female)  Date: 10/19/2021  Primary Diagnosis: Drug overdose, intentional self-harm, initial encounter ([facility] [address] [place]  Left wrist fracture, with delayed healing, subsequent encounter [S62.102G]  Procedure(s) (LRB):  TRACHEOSTOMY, PT IS IN ICU BED 4 (N/A) 27 Days Post-Op   Precautions: fall, impulsive, decreased safety awareness, external fixator left wrist       PLOF: see above    ASSESSMENT :  Based on the objective data described below, the patient presents with decreased strength, balance and activity tolerance resulting in decreased independence with functional mobility. Pt was anxious and frustrated with not being able to communicate however redirectable in did follow commands for mobility. Pt was mod I with rolling in bed and min A for supine to sit transfer. Pt stood with min/mod A and marched in place 10 times with min A to maintain standing balance. Pt then verbalized being fatigued and sat with min A to control descent. Pt returned to supine with min A and was positioned herself in left sidelying. Pt was left in room with needs in reach, sitter present and nursing notified.     Patient will benefit from skilled intervention to address the above impairments. Patient's rehabilitation potential is considered to be Good  Factors which may influence rehabilitation potential include:   []         None noted  [x]         Mental ability/status  [x]         Medical condition  []         Home/family situation and support systems  [x]         Safety awareness  [x]         Pain tolerance/management  []         Other:      PLAN :  Recommendations and Planned Interventions:   [x]           Bed Mobility Training             [x]    Neuromuscular Re-Education  [x]           Transfer Training                   []    Orthotic/Prosthetic Training  [x]           Gait Training                          []    Modalities  [x]           Therapeutic Exercises           []    Edema Management/Control  [x]           Therapeutic Activities            []    Family Training/Education  [x]           Patient Education  []           Other (comment):    Frequency/Duration: Patient will be followed by physical therapy 1-2 times per day/4-7 days per week to address goals.   Discharge Recommendations: LTAC versus rehab  Further Equipment Recommendations for Discharge: N/A     SUBJECTIVE:   Patient nodded yes when asked if she would like to try sitting up    OBJECTIVE DATA SUMMARY:     Past Medical History:   Diagnosis Date    Asthma     Bilateral ovarian cysts     Cocaine abuse ([facility] [address]     Mental and behavioral problem     Pancreatitis     Pancreatitis     [facility]      Past Surgical History:   Procedure Laterality Date    HX GYN      D&C, Hysterectomy    IR INSERT GASTROSTOMY TUBE PERC  10/1/2021     Barriers to Learning/Limitations: yes;  altered mental status (i.e.Sedation, Confusion)  Compensate with: Verbal Cues and Tactile Cues  Home Situation:   Pt unable to state  Critical Behavior:  Neurologic State: Alert;Agitated follows commands for mobility  Orientation Level: Unable to verbalize  Cognition: Poor safety awareness        Strength: Strength: Generally decreased, functional      Tone & Sensation:   Tone: Normal   Sensation: Intact       Range Of Motion:  AROM: Within functional limits   Functional Mobility:  Bed Mobility:  Rolling: Modified independent  Supine to Sit: Minimum assistance  Sit to Supine: Minimum assistance     Transfers:  Sit to Stand: Moderate assistance;Minimum assistance  Stand to Sit: Minimum assistance     Balance:    Sitting: static good  Standing: without support       Static-  fair-       Dynamic-  poor+    Therapeutic Exercises: Ankle pumps, heel slides, standing marching  Pain:  Pain level pre-treatment: /10   pt is stating pain in legs however unable to give numerical value, appears it may be 5/10  Pain level post-treatment: /10   Pain Intervention(s) : Medication (see MAR); Rest, Repositioning  Response to intervention: Nurse notified, See doc flow    Activity Tolerance:   Poor+  Please refer to the flowsheet for vital signs taken during this treatment. After treatment:   []         Patient left in no apparent distress sitting up in chair  [x]         Patient left in no apparent distress in bed  [x]         Call bell left within reach  [x]         Nursing notified  [x]         Caregiver present  []         Bed alarm activated  []         SCDs applied    COMMUNICATION/EDUCATION:   [x]         Role of Physical Therapy in the acute care setting. [x]         Fall prevention education was provided and the patient/caregiver indicated understanding. [x]         Patient/family have participated as able in goal setting and plan of care. [x]         Patient/family agree to work toward stated goals and plan of care. []         Patient understands intent and goals of therapy, but is neutral about his/her participation. []         Patient is unable to participate in goal setting/plan of care: ongoing with therapy staff.  []         Other:     Thank you for this referral.  Kvng Chiang, PT   Time Calculation: 25 mins      Eval Complexity: History: HIGH Complexity :3+ comorbidities / personal factors will impact the outcome/ POC Exam:MEDIUM Complexity : 3 Standardized tests and measures addressing body structure, function, activity limitation and / or participation in recreation  Presentation: MEDIUM Complexity : Evolving with changing characteristics  Clinical Decision Making:Medium Complexity    Overall Complexity:MEDIUM

## 2021-10-19 NOTE — PROGRESS NOTES
1235- requested respiratory to provide PRN treatment per pt request    [phone] S/O called for update, given care mgmt, Charla's, number to begin DC planning. States he will call her following this phone call.

## 2021-10-19 NOTE — PROGRESS NOTES
Problem: Self Care Deficits Care Plan (Adult)  Goal: *Acute Goals and Plan of Care (Insert Text)  Description: Occupational Therapy Goals  Initiated 10/19/2021 within 3 day(s). 1.  Patient will perform grooming with minimal assistance/contact guard assist seated EOB. 2.  Patient will perform upper body dressing with supervision/set-up. 3.  Patient will participate in upper extremity therapeutic exercise/activities with minimal assistance/contact guard assist for 10 minutes. 4.  Patient will utilize energy conservation techniques during functional activities with verbal, visual, and tactile cues. OCCUPATIONAL THERAPY EVALUATION    Patient: Ginger Severs (86 y.o. female)  Date: 10/19/2021  Primary Diagnosis: Drug overdose, intentional self-harm, initial encounter ([facility] [address] Jose Peralta  Left wrist fracture, with delayed healing, subsequent encounter [S62.102G]  Procedure(s) (LRB):  TRACHEOSTOMY, PT IS IN ICU BED 4 (N/A) 27 Days Post-Op   Precautions:   Fall, Contact  PLOF: unable to obtain information; pt confused     ASSESSMENT :  Based on the objective data described below, the patient presents with decreased strength, endurance and balance for carryover of ADLs and transfers following above mentioned medical condition. Pt presented supine in bed, very agitated and anxious, pulling on trach collar and taking off the gown. Pt requires frequent verbal and tactile cues for safety and to remain in the bed. Pt participate with UB and LB dressing and attempt for grooming at bed level. Pt falling asleep during the session and was left long sitting in bed with sitter present at the end of session. Patient will benefit from skilled intervention to address the above impairments.   Patient's rehabilitation potential is considered to be Fair  Factors which may influence rehabilitation potential include:   []             None noted  [x]             Mental ability/status  [x]             Medical condition  [] Home/family situation and support systems  [x]             Safety awareness  []             Pain tolerance/management  []             Other:      PLAN :  Recommendations and Planned Interventions:   [x]               Self Care Training                  [x]      Therapeutic Activities  []               Functional Mobility Training   []      Cognitive Retraining  [x]               Therapeutic Exercises           [x]      Endurance Activities  [x]               Balance Training                    []      Neuromuscular Re-Education  []               Visual/Perceptual Training     [x]      Home Safety Training  [x]               Patient Education                   [x]      Family Training/Education  []               Other (comment):    Frequency/Duration: Patient will be followed by occupational therapy 3 days trial to address goals. Discharge Recommendations: Artur Hawkins  Further Equipment Recommendations for Discharge: N/A     SUBJECTIVE:   Patient stated  .    OBJECTIVE DATA SUMMARY:     Past Medical History:   Diagnosis Date    Asthma     Bilateral ovarian cysts     Cocaine abuse ([facility] [address]     Mental and behavioral problem     Pancreatitis     Pancreatitis     Psychosis ([facility] [address]      Past Surgical History:   Procedure Laterality Date    HX GYN      D&C, Hysterectomy    IR INSERT GASTROSTOMY TUBE PERC  10/1/2021     Barriers to Learning/Limitations: yes;  altered mental status (i.e.Sedation, Confusion)  Compensate with: visual, verbal, tactile, kinesthetic cues/model    Home Situation:      []  Right hand dominant   []  Left hand dominant    Cognitive/Behavioral Status:  Neurologic State: Alert  Orientation Level: Oriented to person  Cognition: Impaired decision making;Poor safety awareness  Safety/Judgement: Decreased insight into deficits; Decreased awareness of need for safety;Decreased awareness of environment;Decreased awareness of need for assistance; Fall prevention    Skin: intact  Edema: none    Vision/Perceptual:    Tracking: Able to track stimulus in all quadrants w/o difficulty    Coordination: BUE  Coordination: Generally decreased, functional  Fine Motor Skills-Upper: Left Intact; Right Intact    Gross Motor Skills-Upper: Left Intact; Right Intact  Balance:  Sitting: Impaired  Sitting - Static: Fair (occasional)  Sitting - Dynamic: Fair (occasional)  Standing:  (NOT TESTED)  Strength: BUE  Strength: Generally decreased, functional  Tone & Sensation: BUE  Tone: Normal  Sensation: Intact  Range of Motion: BUE  AROM: Generally decreased, functional  Functional Mobility and Transfers for ADLs:  Bed Mobility:  Rolling: Stand-by assistance  Supine to Sit: Stand-by assistance  Sit to Supine: Stand-by assistance  Transfers:  Sit to Stand: Moderate assistance;Minimum assistance  Stand to Sit: Minimum assistance  ADL Assessment:   Feeding:  (n/a)    Oral Facial Hygiene/Grooming: Minimum assistance; Additional time    Upper Body Dressing: Minimum assistance    Lower Body Dressing: Minimum assistance    Toileting: Total assistance  ADL Intervention:  Upper Body Dressing Assistance  Dressing Assistance: Minimum assistance  Hospital Gown: Minimum  assistance    Lower Body Dressing Assistance  Dressing Assistance: Minimum assistance  Socks: Minimum assistance  Leg Crossed Method Used: Yes  Position Performed: Long sitting on bed  Cues: Don    Cognitive Retraining  Safety/Judgement: Decreased insight into deficits; Decreased awareness of need for safety;Decreased awareness of environment;Decreased awareness of need for assistance; Fall prevention  Pain:  Pain level pre-treatment: 0/10   Pain level post-treatment: 0/10   Pain Intervention(s): Medication (see MAR); Rest, Ice, Repositioning   Response to intervention: Nurse notified, See doc flow    Activity Tolerance:   Poor     Please refer to the flowsheet for vital signs taken during this treatment.   After treatment:   [] Patient left in no apparent distress sitting up in chair  [x] Patient left in no apparent distress in bed  [x] Call bell left within reach  [] Nursing notified  [x] Sitter present  [x] Bed alarm activated    COMMUNICATION/EDUCATION:   [] Role of Occupational Therapy in the acute care setting  [] Home safety education was provided and the patient/caregiver indicated understanding. [] Patient/family have participated as able in goal setting and plan of care. [] Patient/family agree to work toward stated goals and plan of care. [] Patient understands intent and goals of therapy, but is neutral about his/her participation. [x] Patient is unable to participate in goal setting and plan of care. Thank you for this referral.  Hortencia Katz, OTR/L  Time Calculation: 20 mins    Eval Complexity: History: HIGH Complexity : Extensive review of history including physical, cognitive and psychosocial history ; Examination: HIGH Complexity : 5 or more performance deficits relating to physical, cognitive , or psychosocial skils that result in activity limitations and / or participation restrictions; Decision Making:HIGH Complexity : Patient presents with comorbidities that affect occupational performance.  Signifigant modification of tasks or assistance (eg, physical or verbal) with assessment (s) is necessary to enable patient to complete evaluation

## 2021-10-19 NOTE — ROUTINE PROCESS
Bedside and Verbal shift change report given to Libby Rahman RN (oncoming nurse) by Marybel Sotelo RN (offgoing nurse). Report included the following information SBAR, Kardex, Intake/Output, MAR, Recent Results.

## 2021-10-19 NOTE — PROGRESS NOTES
2045 The patient is repeatedly motioning, grabbing her gown and moving her legs, appearing agitated and distressed. When asked if she needed to urinate, she nodded. Urine has been noted in her brief when changed. Bladder scan showed 900mL of urine. The MD was notified. 2100 Performed straight cath as ordered with assistance by KRISTINE Cali; collected 970mL of urine. A sample was sent to the lab.    0330 bladder scan shows 580mL. Straight cath: 550mL; brief is dry. Shift summary: aside from the above, the patient rested with no new clinical concerns.

## 2021-10-19 NOTE — PROGRESS NOTES
Nephrology Progress note    Subjective:     Earnestine Vance is a 39 y.o. female with PMHx of Psychosis and multidrug use/abuse who initially presented with acute metabolic encephalopathy and lethargy likely due to gabapentin overdose was admitted to ICU intubated (9/11) and reintubated (9/18). Currently on the floor with trach and peg tube. Nephrology is consulted fro FELICITY with Cr today up to 1.5, from 0.8 on 10/13/2021. Pt is currently sedated due to agitation and has one to one sitter. She is on Vanc for GPC bacteremia. Random Vanc level  was 28. She was spiking fever and BP was running low.  No recent CT dye exposure.    -Sleepy today       Admit Date: 9/11/2021  Principal Problem:    Drug overdose, intentional self-harm, [facility] (9/12/2021)    Active Problems:    Left wrist fracture, with delayed healing, subsequent encounter (9/12/2021)      Hypoxia (9/12/2021)      Hypotension after procedure (9/12/2021)      Acute metabolic encephalopathy (3/19/3192)      Psychosis (HCC) ()      Hyponatremia ()      Acute respiratory failure with hypoxia (HCC) ()      Increased ammonia level (9/14/2021)      Hypokalemia (9/21/2021)      Status post tracheostomy ([facility] [address] (9/28/2021)      Bacteremia due to Gram-positive bacteria (10/6/2021)      FELICITY (acute kidney injury) ([facility] (10/16/2021)      Current Facility-Administered Medications   Medication Dose Route Frequency    magnesium sulfate 2 g/50 ml IVPB (premix or compounded)  2 g IntraVENous ONCE    diphenhydrAMINE-zinc acetate 2%-0.1% (BENADRYL) cream   Topical TID PRN    diphenhydrAMINE (BENADRYL) 12.5 mg/5 mL oral elixir 12.5 mg  12.5 mg Per G Tube Q6H PRN    0.45% sodium chloride infusion  50 mL/hr IntraVENous CONTINUOUS    0.9% sodium chloride infusion 250 mL  250 mL IntraVENous PRN    clonazePAM (KlonoPIN) tablet 2 mg  2 mg Oral TID    diazePAM (VALIUM) tablet 2.5 mg  2.5 mg Oral BID    haloperidol lactate (HALDOL) injection 5 mg  5 mg IntraVENous Q8H PRN    QUEtiapine (SEROquel) tablet 100 mg  100 mg Oral DAILY    QUEtiapine (SEROquel) tablet 300 mg  300 mg Oral QHS    nystatin (MYCOSTATIN) 100,000 unit/gram powder   Topical TID    metoprolol (LOPRESSOR) injection 2.5 mg  2.5 mg IntraVENous Q6H PRN    fentaNYL (DURAGESIC) 50 mcg/hr patch 1 Patch  1 Patch TransDERmal Q72H    HYDROmorphone (DILAUDID) injection 1 mg  1 mg IntraVENous Q4H PRN    arformoteroL (BROVANA) neb solution 15 mcg  15 mcg Nebulization BID RT    budesonide (PULMICORT) 500 mcg/2 ml nebulizer suspension  500 mcg Nebulization BID RT    lactulose (CHRONULAC) 10 gram/15 mL solution 15 mL  15 mL Oral BID    melatonin (rapid dissolve) tablet 5 mg  5 mg Oral QHS    fentaNYL citrate (PF) injection 50 mcg  50 mcg IntraVENous Q2H PRN    albuterol-ipratropium (DUO-NEB) 2.5 MG-0.5 MG/3 ML  3 mL Nebulization Q4H PRN    famotidine (PEPCID) tablet 20 mg  20 mg Oral BID    thiamine mononitrate (B-1) tablet 100 mg  100 mg Oral DAILY    folic acid (FOLVITE) tablet 1 mg  1 mg Oral DAILY    multivitamin, tx-iron-ca-min (THERA-M w/ IRON) tablet 1 Tablet  1 Tablet Oral DAILY    insulin lispro (HUMALOG) injection   SubCUTAneous Q6H    glucose chewable tablet 16 g  4 Tablet Oral PRN    glucagon (GLUCAGEN) injection 1 mg  1 mg IntraMUSCular PRN    dextrose (D50W) injection syrg 12.5-25 g  25-50 mL IntraVENous PRN    LORazepam (ATIVAN) injection 2 mg  2 mg IntraVENous Q6H PRN    enoxaparin (LOVENOX) injection 40 mg  40 mg SubCUTAneous Q24H    acetaminophen (TYLENOL) tablet 650 mg  650 mg Oral Q6H PRN    Or    acetaminophen (TYLENOL) suppository 650 mg  650 mg Rectal Q6H PRN    polyethylene glycol (MIRALAX) packet 17 g  17 g Oral DAILY PRN    ondansetron (ZOFRAN ODT) tablet 4 mg  4 mg Oral Q8H PRN    Or    ondansetron (ZOFRAN) injection 4 mg  4 mg IntraVENous Q6H PRN         Allergy:   Allergies   Allergen Reactions    Fish Containing Products Itching    Toradol [Ketorolac] Rash     Pt reports she is not allergic to this medication. Objective:     Visit Vitals  /70   Pulse 97   Temp 98.4 °F (36.9 °C)   Resp 16   Ht 5' 2\" (1.575 m)   Wt 71.2 kg (156 lb 15.5 oz)   LMP 05/15/2018   SpO2 99%   BMI 28.71 kg/m²         Intake/Output Summary (Last 24 hours) at 10/19/2021 1054  Last data filed at 10/19/2021 0934  Gross per 24 hour   Intake 1953.33 ml   Output 2020 ml   Net -66.67 ml       Physical Exam:       General: Awake. HENT: Atraumatic and normocephalic   Eyes: Normal conjunctiva   Neck: No JVD, + Trach collar   Cardiovascular: Normal S1 & S2, no m/r/g   Pulmonary/Chest Wall: Clear to auscultation bilaterally   Abdominal: Soft and non-tender,  PEG   Musculoskeletal: No edema   Neurological: Awake. Restless.       Data Review:  Lab Results   Component Value Date/Time    Sodium 140 10/19/2021 07:00 AM    Potassium 4.1 10/19/2021 07:00 AM    Chloride 105 10/19/2021 07:00 AM    CO2 29 10/19/2021 07:00 AM    Anion gap 6 10/19/2021 07:00 AM    Glucose 101 (H) 10/19/2021 07:00 AM    BUN 16 10/19/2021 07:00 AM    Creatinine 1.02 10/19/2021 07:00 AM    BUN/Creatinine ratio 16 10/19/2021 07:00 AM    GFR est AA >60 10/19/2021 07:00 AM    GFR est non-AA 60 (L) 10/19/2021 07:00 AM    Calcium 9.3 10/19/2021 07:00 AM     Lab Results   Component Value Date/Time    WBC 5.7 10/19/2021 07:00 AM    HGB 9.2 (L) 10/19/2021 07:00 AM    HCT 29.0 (L) 10/19/2021 07:00 AM    PLATELET 752 69/12/0191 07:00 AM    MCV 89.5 10/19/2021 07:00 AM     Lab Results   Component Value Date/Time    Calcium 9.3 10/19/2021 07:00 AM    Phosphorus 6.7 (H) 10/19/2021 07:00 AM     Lab Results   Component Value Date/Time    Iron 45 (L) 10/18/2021 01:16 AM    TIBC 184 (L) 10/18/2021 01:16 AM    Iron % saturation 24 10/18/2021 01:16 AM    Ferritin 213 10/18/2021 01:16 AM     Lab Results   Component Value Date/Time    Ferritin 213 10/18/2021 01:16 AM         Impression:     -Acute Kidney Injury: prerenal/ATN from sepsis +/- vanc induced injury. Resolved with Cr down to 1.0.  -GPC bacteremia: s/p abx   -Anemia  -Recent acute respiratory failure: extubated.  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

## 2021-10-19 NOTE — PROGRESS NOTES
DC Plan: LTC    Care manager rounded on patient who was alert and attempting to communicate, slightly aggitated - wanted to communicate via pen and paper but does not have the hand strength to be able to manipulate a pen - CM gave patient wider marker which she had better success with but still unable to make meaningful communication; patient might benefit from OT assistance with larger writing implementation and word board to assist in communication. [phone] - care manager spoke with patient's spouse, Jaonne Freeman who is still incarcerated; updated him on plan for LTC placement and he appeared to understand this. He stated patient has been being visited by her sister and sister in law; will verify that they are communicating with him patient's status.     Care Management Interventions  Transition of Care Consult (CM Consult): Discharge Planning  The Plan for Transition of Care is Related to the Following Treatment Goals : intentional drug overdose  Discharge Location  Discharge Placement:  (TBD)

## 2021-10-20 LAB
ALBUMIN SERPL-MCNC: 3.8 G/DL (ref 3.4–5)
ALBUMIN/GLOB SERPL: 1.2 {RATIO} (ref 0.8–1.7)
ALP SERPL-CCNC: 108 U/L (ref 45–117)
ALT SERPL-CCNC: 28 U/L (ref 13–56)
AMMONIA PLAS-SCNC: 21 UMOL/L (ref 11–32)
ANION GAP SERPL CALC-SCNC: 6 MMOL/L (ref 3–18)
AST SERPL-CCNC: 16 U/L (ref 10–38)
BACTERIA SPEC CULT: NORMAL
BASOPHILS # BLD: 0 K/UL (ref 0–0.1)
BASOPHILS NFR BLD: 0 % (ref 0–2)
BILIRUB SERPL-MCNC: 0.4 MG/DL (ref 0.2–1)
BUN SERPL-MCNC: 15 MG/DL (ref 7–18)
BUN/CREAT SERPL: 14 (ref 12–20)
CALCIUM SERPL-MCNC: 10.1 MG/DL (ref 8.5–10.1)
CHLORIDE SERPL-SCNC: 106 MMOL/L (ref 100–111)
CO2 SERPL-SCNC: 28 MMOL/L (ref 21–32)
CREAT SERPL-MCNC: 1.08 MG/DL (ref 0.6–1.3)
DIFFERENTIAL METHOD BLD: ABNORMAL
EOSINOPHIL # BLD: 0.1 K/UL (ref 0–0.4)
EOSINOPHIL NFR BLD: 2 % (ref 0–5)
ERYTHROCYTE [DISTWIDTH] IN BLOOD BY AUTOMATED COUNT: 13.5 % (ref 11.6–14.5)
GLOBULIN SER CALC-MCNC: 3.1 G/DL (ref 2–4)
GLUCOSE BLD STRIP.AUTO-MCNC: 102 MG/DL (ref 70–110)
GLUCOSE BLD STRIP.AUTO-MCNC: 107 MG/DL (ref 70–110)
GLUCOSE BLD STRIP.AUTO-MCNC: 129 MG/DL (ref 70–110)
GLUCOSE BLD STRIP.AUTO-MCNC: 94 MG/DL (ref 70–110)
GLUCOSE SERPL-MCNC: 92 MG/DL (ref 74–99)
HCT VFR BLD AUTO: 30.2 % (ref 35–45)
HGB BLD-MCNC: 9.5 G/DL (ref 12–16)
LYMPHOCYTES # BLD: 1.7 K/UL (ref 0.9–3.6)
LYMPHOCYTES NFR BLD: 20 % (ref 21–52)
MAGNESIUM SERPL-MCNC: 2.1 MG/DL (ref 1.6–2.6)
MCH RBC QN AUTO: 27.9 PG (ref 24–34)
MCHC RBC AUTO-ENTMCNC: 31.5 G/DL (ref 31–37)
MCV RBC AUTO: 88.8 FL (ref 78–100)
MONOCYTES # BLD: 0.4 K/UL (ref 0.05–1.2)
MONOCYTES NFR BLD: 5 % (ref 3–10)
NEUTS SEG # BLD: 6.1 K/UL (ref 1.8–8)
NEUTS SEG NFR BLD: 73 % (ref 40–73)
PHOSPHATE SERPL-MCNC: 6.1 MG/DL (ref 2.5–4.9)
PLATELET # BLD AUTO: 352 K/UL (ref 135–420)
PMV BLD AUTO: 10.1 FL (ref 9.2–11.8)
POTASSIUM SERPL-SCNC: 4.6 MMOL/L (ref 3.5–5.5)
PROT SERPL-MCNC: 6.9 G/DL (ref 6.4–8.2)
RBC # BLD AUTO: 3.4 M/UL (ref 4.2–5.3)
SERVICE CMNT-IMP: NORMAL
SODIUM SERPL-SCNC: 140 MMOL/L (ref 136–145)
WBC # BLD AUTO: 8.4 K/UL (ref 4.6–13.2)

## 2021-10-20 PROCEDURE — 74011250637 HC RX REV CODE- 250/637: Performed by: INTERNAL MEDICINE

## 2021-10-20 PROCEDURE — 84100 ASSAY OF PHOSPHORUS: CPT

## 2021-10-20 PROCEDURE — 74011250636 HC RX REV CODE- 250/636: Performed by: INTERNAL MEDICINE

## 2021-10-20 PROCEDURE — 77010033678 HC OXYGEN DAILY

## 2021-10-20 PROCEDURE — 65660000000 HC RM CCU STEPDOWN

## 2021-10-20 PROCEDURE — 82962 GLUCOSE BLOOD TEST: CPT

## 2021-10-20 PROCEDURE — 83735 ASSAY OF MAGNESIUM: CPT

## 2021-10-20 PROCEDURE — 97110 THERAPEUTIC EXERCISES: CPT

## 2021-10-20 PROCEDURE — 97530 THERAPEUTIC ACTIVITIES: CPT

## 2021-10-20 PROCEDURE — 74011250637 HC RX REV CODE- 250/637: Performed by: HOSPITALIST

## 2021-10-20 PROCEDURE — 74011000250 HC RX REV CODE- 250: Performed by: INTERNAL MEDICINE

## 2021-10-20 PROCEDURE — 36415 COLL VENOUS BLD VENIPUNCTURE: CPT

## 2021-10-20 PROCEDURE — 94640 AIRWAY INHALATION TREATMENT: CPT

## 2021-10-20 PROCEDURE — 82140 ASSAY OF AMMONIA: CPT

## 2021-10-20 PROCEDURE — 85025 COMPLETE CBC W/AUTO DIFF WBC: CPT

## 2021-10-20 PROCEDURE — 80053 COMPREHEN METABOLIC PANEL: CPT

## 2021-10-20 PROCEDURE — 74011250636 HC RX REV CODE- 250/636: Performed by: FAMILY MEDICINE

## 2021-10-20 PROCEDURE — 74011250637 HC RX REV CODE- 250/637: Performed by: PSYCHIATRY & NEUROLOGY

## 2021-10-20 RX ORDER — QUETIAPINE FUMARATE 200 MG/1
200 TABLET, FILM COATED ORAL DAILY
Status: DISCONTINUED | OUTPATIENT
Start: 2021-10-21 | End: 2021-10-31

## 2021-10-20 RX ADMIN — CLONAZEPAM 2 MG: 0.5 TABLET ORAL at 23:00

## 2021-10-20 RX ADMIN — ENOXAPARIN SODIUM 40 MG: 100 INJECTION SUBCUTANEOUS at 16:18

## 2021-10-20 RX ADMIN — BUDESONIDE 500 MCG: 0.5 INHALANT RESPIRATORY (INHALATION) at 20:26

## 2021-10-20 RX ADMIN — FAMOTIDINE 20 MG: 20 TABLET ORAL at 08:28

## 2021-10-20 RX ADMIN — NYSTATIN: 100000 POWDER TOPICAL at 00:06

## 2021-10-20 RX ADMIN — ARFORMOTEROL TARTRATE 15 MCG: 15 SOLUTION RESPIRATORY (INHALATION) at 07:37

## 2021-10-20 RX ADMIN — LACTULOSE 15 ML: 10 SOLUTION ORAL at 23:00

## 2021-10-20 RX ADMIN — NYSTATIN: 100000 POWDER TOPICAL at 08:29

## 2021-10-20 RX ADMIN — QUETIAPINE FUMARATE 100 MG: 100 TABLET ORAL at 08:28

## 2021-10-20 RX ADMIN — LACTULOSE 15 ML: 10 SOLUTION ORAL at 08:28

## 2021-10-20 RX ADMIN — NYSTATIN: 100000 POWDER TOPICAL at 22:00

## 2021-10-20 RX ADMIN — BUDESONIDE 500 MCG: 0.5 INHALANT RESPIRATORY (INHALATION) at 07:37

## 2021-10-20 RX ADMIN — NYSTATIN: 100000 POWDER TOPICAL at 16:00

## 2021-10-20 RX ADMIN — Medication 1 TABLET: at 08:28

## 2021-10-20 RX ADMIN — CLONAZEPAM 2 MG: 0.5 TABLET ORAL at 08:39

## 2021-10-20 RX ADMIN — HYDROMORPHONE HYDROCHLORIDE 1 MG: 1 INJECTION, SOLUTION INTRAMUSCULAR; INTRAVENOUS; SUBCUTANEOUS at 14:53

## 2021-10-20 RX ADMIN — FOLIC ACID 1 MG: 1 TABLET ORAL at 08:28

## 2021-10-20 RX ADMIN — ONDANSETRON 4 MG: 2 INJECTION INTRAMUSCULAR; INTRAVENOUS at 08:28

## 2021-10-20 RX ADMIN — ARFORMOTEROL TARTRATE 15 MCG: 15 SOLUTION RESPIRATORY (INHALATION) at 20:26

## 2021-10-20 RX ADMIN — CLONAZEPAM 2 MG: 0.5 TABLET ORAL at 16:18

## 2021-10-20 RX ADMIN — LORAZEPAM 2 MG: 2 INJECTION INTRAMUSCULAR at 14:53

## 2021-10-20 RX ADMIN — IPRATROPIUM BROMIDE AND ALBUTEROL SULFATE 3 ML: .5; 3 SOLUTION RESPIRATORY (INHALATION) at 07:37

## 2021-10-20 RX ADMIN — HYDROMORPHONE HYDROCHLORIDE 1 MG: 1 INJECTION, SOLUTION INTRAMUSCULAR; INTRAVENOUS; SUBCUTANEOUS at 08:28

## 2021-10-20 RX ADMIN — DIAZEPAM 2.5 MG: 5 TABLET ORAL at 08:28

## 2021-10-20 RX ADMIN — THIAMINE HCL TAB 100 MG 100 MG: 100 TAB at 08:28

## 2021-10-20 NOTE — ANCILLARY DISCHARGE INSTRUCTIONS
Speech-Language Pathology Treatment Attempt     Chart reviewed. Attempted Speech-Language Pathology Evaluation/Treatment, however, patient unable to be seen due to:    []  Nausea/vomiting  []  Eating  []  Pain  [x]  Patient too lethargic: per nursing report, and family member at bedside  []  Off Unit for testing/procedure  []  Dialysis treatment in progress   []  Telemetry Results  [x]  Other: Nursing at bedside with patient providing bathing/bed-linen change and trach care.      Will f/u later as patient's schedule allows. Thank you for this referral.  MARIANNA Yang.Ed, [id number] [facility]

## 2021-10-20 NOTE — PROGRESS NOTES
Problem: Ventilator Management  Goal: *Adequate oxygenation and ventilation  Outcome: Progressing Towards Goal  Goal: *Patient maintains clear airway/free of aspiration  Outcome: Progressing Towards Goal  Goal: *Absence of infection signs and symptoms  Outcome: Progressing Towards Goal  Goal: *Normal spontaneous ventilation  Outcome: Progressing Towards Goal     Problem: Patient Education: Go to Patient Education Activity  Goal: Patient/Family Education  Outcome: Progressing Towards Goal     Problem: Non-Violent Restraints  Goal: Removal from restraints as soon as assessed to be safe  Outcome: Progressing Towards Goal  Goal: No harm/injury to patient while restraints in use  Outcome: Progressing Towards Goal  Goal: Patient's dignity will be maintained  Outcome: Progressing Towards Goal  Goal: Patient Interventions  Outcome: Progressing Towards Goal     Problem: Falls - Risk of  Goal: *Absence of Falls  Description: Document Loraine Rosemarie Fall Risk and appropriate interventions in the flowsheet. Outcome: Progressing Towards Goal  Note: Fall Risk Interventions:  Mobility Interventions: Utilize gait belt for transfers/ambulation    Mentation Interventions: Family/sitter at bedside    Medication Interventions: Evaluate medications/consider consulting pharmacy    Elimination Interventions: Call light in reach    History of Falls Interventions: Utilize gait belt for transfer/ambulation         Problem: Patient Education: Go to Patient Education Activity  Goal: Patient/Family Education  Outcome: Progressing Towards Goal     Problem: Risk for Spread of Infection  Goal: Prevent transmission of infectious organism to others  Description: Prevent the transmission of infectious organisms to other patients, staff members, and visitors.   Outcome: Progressing Towards Goal     Problem: Patient Education:  Go to Education Activity  Goal: Patient/Family Education  Outcome: Progressing Towards Goal     Problem: Pressure Injury - Risk of  Goal: *Prevention of pressure injury  Description: Document Remy Scale and appropriate interventions in the flowsheet.   Outcome: Progressing Towards Goal  Note: Pressure Injury Interventions:  Sensory Interventions: Check visual cues for pain    Moisture Interventions: Internal/External urinary devices    Activity Interventions: Pressure redistribution bed/mattress(bed type)    Mobility Interventions: HOB 30 degrees or less    Nutrition Interventions: Document food/fluid/supplement intake    Friction and Shear Interventions: Apply protective barrier, creams and emollients

## 2021-10-20 NOTE — PROGRESS NOTES
Hospitalist Progress Note-critical care note     Patient: Justina Flowers MRN: [id number]  CSN: [id number]    YOB: 1980  Age: 39 y.o. Sex: female    DOA: 9/11/2021 LOS:  LOS: 38 days            Chief complaint: wrist fracture , drug overdose , acute respiratory failure with hypoxia, psychosis     Assessment/Plan         Hospital Problems  Date Reviewed: 9/6/2015        Codes Class Noted POA    FELICITY (acute kidney injury) ([facility] ICD-10-CM: N17.9  ICD-9-CM: 584.9  10/16/2021 Unknown        Bacteremia due to Gram-positive bacteria ICD-10-CM: R78.81  ICD-9-CM: 790.7  10/6/2021 Unknown        Status post tracheostomy ([facility] ICD-10-CM: Z93.0  ICD-9-CM: V44.0  9/28/2021 Unknown        Hypokalemia ICD-10-CM: E87.6  ICD-9-CM: 276.8  9/21/2021 Unknown        Increased ammonia level ICD-10-CM: R79.89  ICD-9-CM: 790.6  9/14/2021 Unknown        Psychosis ([facility] ICD-10-CM: F29  ICD-9-CM: 298.9  Unknown Unknown        Hyponatremia ICD-10-CM: E87.1  ICD-9-CM: 276.1  Unknown Yes        Acute respiratory failure with hypoxia (HCC) ICD-10-CM: J96.01  ICD-9-CM: 518.81  Unknown Yes        Left wrist fracture, with delayed healing, subsequent encounter ICD-10-CM: S62.102G  ICD-9-CM: V54.19  9/12/2021 Unknown        * (Principal) Drug overdose, intentional self-harm, initial encounter ([facility] ICD-10-CM: T50.902A  ICD-9-CM: 977.9, E950.5  9/12/2021 Yes        Hypoxia ICD-10-CM: R09.02  ICD-9-CM: 799.02  9/12/2021 Yes        Hypotension after procedure ICD-10-CM: I95.81  ICD-9-CM: 458.29  9/12/2021 Yes        Acute metabolic encephalopathy BJK-07-AQ: G93.41  ICD-9-CM: 348.31  9/12/2021 Yes                  Justina Flowers is a 39 y.o. female who is standing history of multidrug use/abuse, previous drug-seeking behavior and mental health issues otherwise unspecified arrives via EMS with acute metabolic encephalopathy and lethargy. Admitted for likely gabapentin overdose.   She was intubated in ER, ct head no acute issue, LP done due to fever even on abx, no meningitis noted. Now she has peg and trach        Acute respiratory failure with hypoxia   Intubated on 9/12    Trach on 9/20/21. Continue breathing tx . Local trach care   Speech evaluation    not a candidate for decannulation per pulm   Some blood cloths from trach s/p pt aggressive movement -will increase seroqual am dose     Post tracheostomy   Continue local trach care   Respiratory therapist f/u       bacteremia -  Complete the treatment   bcx  Positive for GPC on 10/4   Sputum culture 9/24/2021: MRSA.       Anemia  Transfused  one unit prbc on 10/16 so far stable, continue monitoring   Occult stool negative    no bleeding reported   Upper PVL negative for dvt           Acute metabolic encephalopathy   Ct head no acute process       Urinary retention   Reinsert zamora   ua is clear today     Gabapentin overdose with lethargy and AMS    S/p procedural stomach emptying in ED with charcoal and sorbitol    on Seroquel -will increase am dose        elevated ammonia level  Continue   Lactulose,   Ammonia remained stable     Psychosis , hx of  Ekbom's delusional parasitosis    On  Klonopin,  Seroquel, halodol prn per psych recommendation   Continue cardiac monitoring     left wrist fracture: immobilized -poa -stable   Appreciated ortho on board-f/u with Dr. Kathrine Gilbert as out-pt     yarely    Resolved       Sitter was at the bedside     Pt broke urinary catheter due to agitation   Poor prognosis , dc barrier   Disposition :tbd,   Review of systems:  Unable to obtain due to trach collar   Vital signs/Intake and Output:  Visit Vitals  /75   Pulse 99   Temp 98 °F (36.7 °C)   Resp 18   Ht 5' 2\" (1.575 m)   Wt 72.6 kg (160 lb 0.9 oz)   SpO2 100%   BMI 29.27 kg/m²     Current Shift:  10/20 0701 - 10/20 1900  In: -   Out: 225 [Urine:225]  Last three shifts:  10/18 1901 - 10/20 0700  In: 2953.3 [I.V.:953.3]  Out: 4760 [Urine:4760]    Physical Exam:  General: Sleeping   HEENT:  Trach collar Lungs: Coarse bs   Heart:  rrr   No murmur, No Rubs, No Gallops  Abdomen: Soft, Non distended, Non tender. +Bowel sounds, peg tube noted   Extremities: No c/c.immobilzer noted on left wrist   Psych:   Calm    Neurologic:  Sleeping           Labs: Results:       Chemistry Recent Labs     10/20/21  0531 10/19/21  0700 10/18/21  0115   GLU 92 101* 97    140 144   K 4.6 4.1 4.0    105 107   CO2 28 29 29   BUN 15 16 17   CREA 1.08 1.02 1.13   CA 10.1 9.3 9.4   AGAP 6 6 8   BUCR 14 16 15    98 85   TP 6.9 6.3* 5.8*   ALB 3.8 3.5 3.3*   GLOB 3.1 2.8 2.5   AGRAT 1.2 1.3 1.3      CBC w/Diff Recent Labs     10/20/21  0531 10/19/21  0700 10/18/21  0115   WBC 8.4 5.7 5.8   RBC 3.40* 3.24* 2.51*   HGB 9.5* 9.2* 7.2*   HCT 30.2* 29.0* 22.0*    322 296   GRANS 73 55 52   LYMPH 20* 33 32   EOS 2 6* 8*      Cardiac Enzymes No results for input(s): CPK, CKND1, ZENON in the last 72 hours. No lab exists for component: CKRMB, TROIP   Coagulation No results for input(s): PTP, INR, APTT, INREXT, INREXT in the last 72 hours. Lipid Panel No results found for: CHOL, CHOLPOCT, CHOLX, CHLST, CHOLV, 334208, HDL, HDLP, LDL, LDLC, DLDLP, 473935, VLDLC, VLDL, TGLX, TRIGL, TRIGP, TGLPOCT, CHHD, CHHDX   BNP No results for input(s): BNPP in the last 72 hours.    Liver Enzymes Recent Labs     10/20/21  0531   TP 6.9   ALB 3.8         Thyroid Studies No results found for: T4, T3U, TSH, TSHEXT, TSHEXT     Procedures/imaging: see electronic medical records for all procedures/Xrays and details which were not copied into this note but were reviewed prior to creation of Plan    XR WRIST LT AP/LAT/OBL MIN 3V    Result Date: 8/19/2021  EXAM: XR WRIST LT AP/LAT/OBL MIN 3V CLINICAL INDICATION/HISTORY: Fall with LEFT wrist pain and swelling -Additional: None COMPARISON: None TECHNIQUE: 3 views of the left wrist _______________ FINDINGS: BONES: Comminuted, impacted fracture of the distal radius with slight dorsal angulation of the distal fracture fragment. No aggressive appearing osseous lytic or blastic lesion. SOFT TISSUES: Unremarkable. _______________     Comminuted, impacted fracture of the distal radius. XR CHEST PORT    Result Date: 9/13/2021  EXAM: XR CHEST PORT CLINICAL INDICATION/HISTORY: Acute respiratory failure, ET tub position -Additional: None COMPARISON: One day prior TECHNIQUE: Portable frontal view of the chest _______________ FINDINGS: SUPPORT DEVICES: Endotracheal and enteric tubes unchanged. HEART AND MEDIASTINUM: Cardiomediastinal silhouette within normal limits. LUNGS AND PLEURAL SPACES: No dense consolidation, large effusion or pneumothorax. _______________     No acute cardiopulmonary abnormality. XR CHEST PORT    Result Date: 9/11/2021  MEDICAL RECORDS NUMBER: [id number] PROCEDURE:  Single view of the chest DATE: 9/11/2021 9:09 PM HISTORY: 39years old Female. post ett Comparison: 8/4/2021 FINDINGS: The patient has been intubated with appropriate placement of the endotracheal tube. There is no enteric tube passing below the level of the diaphragm. There is no significant effusion. There is no significant pneumothorax. Cardiomediastinal silhouette is within normal limits. There is no evidence of a focal pulmonary infiltrate or mass. 1. There is no significant or acute cardiopulmonary process. The patient has been intubated with appropriate placement of the endotracheal tube. There is no enteric tube passing below the level of the diaphragm. This report has been generated using voice recognition software. XR ABD PORT  1 V    Result Date: 9/12/2021  EXAM: Frontal view of the abdomen CLINICAL INDICATION/HISTORY: NG tube placement COMPARISON: None. _______________ FINDINGS: NG/OG tube in place with the tip in the left upper quadrant in the region of the gastric fundus. No bowel obstruction. Moderate colonic stool burden. _______________     1.  NG/OG tube is in good position with the tip in the left upper quadrant in the region of the gastric fundus. 2. Moderate colonic stool burden.       Naty Salgado MD

## 2021-10-20 NOTE — PROGRESS NOTES
0830- Aide informed nurse that patient had broken zamora in half. Remaining zamora removed, 10mL saline extracted prior to removal. New zamora catheter inserted. Pt educated and tolerated procedure well.

## 2021-10-20 NOTE — PROGRESS NOTES
Patient has tracheostomy; on trach collar. No acute ongoing pulmonary issues. PCCM will sign off. Please call us with any questions.      Arlette Raza MD 10/20/2021 8:27 AM

## 2021-10-21 LAB
ALBUMIN SERPL-MCNC: 3.8 G/DL (ref 3.4–5)
ALBUMIN/GLOB SERPL: 1.2 {RATIO} (ref 0.8–1.7)
ALP SERPL-CCNC: 102 U/L (ref 45–117)
ALT SERPL-CCNC: 27 U/L (ref 13–56)
AMMONIA PLAS-SCNC: 23 UMOL/L (ref 11–32)
ANION GAP SERPL CALC-SCNC: 4 MMOL/L (ref 3–18)
AST SERPL-CCNC: 13 U/L (ref 10–38)
BASOPHILS # BLD: 0 K/UL (ref 0–0.1)
BASOPHILS NFR BLD: 0 % (ref 0–2)
BILIRUB SERPL-MCNC: 0.3 MG/DL (ref 0.2–1)
BUN SERPL-MCNC: 16 MG/DL (ref 7–18)
BUN/CREAT SERPL: 16 (ref 12–20)
CALCIUM SERPL-MCNC: 9.8 MG/DL (ref 8.5–10.1)
CHLORIDE SERPL-SCNC: 108 MMOL/L (ref 100–111)
CO2 SERPL-SCNC: 28 MMOL/L (ref 21–32)
CREAT SERPL-MCNC: 0.99 MG/DL (ref 0.6–1.3)
DIFFERENTIAL METHOD BLD: ABNORMAL
EOSINOPHIL # BLD: 0.2 K/UL (ref 0–0.4)
EOSINOPHIL NFR BLD: 3 % (ref 0–5)
ERYTHROCYTE [DISTWIDTH] IN BLOOD BY AUTOMATED COUNT: 13.6 % (ref 11.6–14.5)
GLOBULIN SER CALC-MCNC: 3.1 G/DL (ref 2–4)
GLUCOSE BLD STRIP.AUTO-MCNC: 105 MG/DL (ref 70–110)
GLUCOSE BLD STRIP.AUTO-MCNC: 117 MG/DL (ref 70–110)
GLUCOSE BLD STRIP.AUTO-MCNC: 120 MG/DL (ref 70–110)
GLUCOSE BLD STRIP.AUTO-MCNC: 125 MG/DL (ref 70–110)
GLUCOSE BLD STRIP.AUTO-MCNC: 145 MG/DL (ref 70–110)
GLUCOSE SERPL-MCNC: 109 MG/DL (ref 74–99)
HCT VFR BLD AUTO: 30.4 % (ref 35–45)
HGB BLD-MCNC: 9.7 G/DL (ref 12–16)
LYMPHOCYTES # BLD: 2.1 K/UL (ref 0.9–3.6)
LYMPHOCYTES NFR BLD: 28 % (ref 21–52)
MAGNESIUM SERPL-MCNC: 2.2 MG/DL (ref 1.6–2.6)
MCH RBC QN AUTO: 28.7 PG (ref 24–34)
MCHC RBC AUTO-ENTMCNC: 31.9 G/DL (ref 31–37)
MCV RBC AUTO: 89.9 FL (ref 78–100)
MONOCYTES # BLD: 0.5 K/UL (ref 0.05–1.2)
MONOCYTES NFR BLD: 7 % (ref 3–10)
NEUTS SEG # BLD: 4.4 K/UL (ref 1.8–8)
NEUTS SEG NFR BLD: 61 % (ref 40–73)
PHOSPHATE SERPL-MCNC: 6.6 MG/DL (ref 2.5–4.9)
PLATELET # BLD AUTO: 337 K/UL (ref 135–420)
PMV BLD AUTO: 9.9 FL (ref 9.2–11.8)
POTASSIUM SERPL-SCNC: 4.3 MMOL/L (ref 3.5–5.5)
PROT SERPL-MCNC: 6.9 G/DL (ref 6.4–8.2)
RBC # BLD AUTO: 3.38 M/UL (ref 4.2–5.3)
SODIUM SERPL-SCNC: 140 MMOL/L (ref 136–145)
WBC # BLD AUTO: 7.3 K/UL (ref 4.6–13.2)

## 2021-10-21 PROCEDURE — 80053 COMPREHEN METABOLIC PANEL: CPT

## 2021-10-21 PROCEDURE — 2709999900 HC NON-CHARGEABLE SUPPLY

## 2021-10-21 PROCEDURE — 97530 THERAPEUTIC ACTIVITIES: CPT

## 2021-10-21 PROCEDURE — 94640 AIRWAY INHALATION TREATMENT: CPT

## 2021-10-21 PROCEDURE — 82140 ASSAY OF AMMONIA: CPT

## 2021-10-21 PROCEDURE — 83735 ASSAY OF MAGNESIUM: CPT

## 2021-10-21 PROCEDURE — 85025 COMPLETE CBC W/AUTO DIFF WBC: CPT

## 2021-10-21 PROCEDURE — 84100 ASSAY OF PHOSPHORUS: CPT

## 2021-10-21 PROCEDURE — 74011250637 HC RX REV CODE- 250/637: Performed by: INTERNAL MEDICINE

## 2021-10-21 PROCEDURE — 74011000250 HC RX REV CODE- 250: Performed by: INTERNAL MEDICINE

## 2021-10-21 PROCEDURE — 77010033678 HC OXYGEN DAILY

## 2021-10-21 PROCEDURE — 82962 GLUCOSE BLOOD TEST: CPT

## 2021-10-21 PROCEDURE — 74011250637 HC RX REV CODE- 250/637: Performed by: PSYCHIATRY & NEUROLOGY

## 2021-10-21 PROCEDURE — 74011250636 HC RX REV CODE- 250/636: Performed by: INTERNAL MEDICINE

## 2021-10-21 PROCEDURE — 74011250637 HC RX REV CODE- 250/637: Performed by: HOSPITALIST

## 2021-10-21 PROCEDURE — 36415 COLL VENOUS BLD VENIPUNCTURE: CPT

## 2021-10-21 PROCEDURE — 74011250636 HC RX REV CODE- 250/636: Performed by: FAMILY MEDICINE

## 2021-10-21 PROCEDURE — 97116 GAIT TRAINING THERAPY: CPT

## 2021-10-21 PROCEDURE — 65660000000 HC RM CCU STEPDOWN

## 2021-10-21 RX ADMIN — BUDESONIDE 500 MCG: 0.5 INHALANT RESPIRATORY (INHALATION) at 20:50

## 2021-10-21 RX ADMIN — LORAZEPAM 2 MG: 2 INJECTION INTRAMUSCULAR at 11:26

## 2021-10-21 RX ADMIN — CLONAZEPAM 2 MG: 0.5 TABLET ORAL at 10:05

## 2021-10-21 RX ADMIN — DIAZEPAM 2.5 MG: 5 TABLET ORAL at 01:04

## 2021-10-21 RX ADMIN — LACTULOSE 15 ML: 10 SOLUTION ORAL at 09:57

## 2021-10-21 RX ADMIN — Medication 5 MG: at 22:05

## 2021-10-21 RX ADMIN — QUETIAPINE FUMARATE 300 MG: 200 TABLET ORAL at 20:25

## 2021-10-21 RX ADMIN — CLONAZEPAM 2 MG: 0.5 TABLET ORAL at 22:05

## 2021-10-21 RX ADMIN — FAMOTIDINE 20 MG: 20 TABLET ORAL at 09:58

## 2021-10-21 RX ADMIN — DIAZEPAM 2.5 MG: 5 TABLET ORAL at 09:57

## 2021-10-21 RX ADMIN — HALOPERIDOL LACTATE 5 MG: 5 INJECTION, SOLUTION INTRAMUSCULAR at 09:58

## 2021-10-21 RX ADMIN — FAMOTIDINE 20 MG: 20 TABLET ORAL at 20:25

## 2021-10-21 RX ADMIN — BUDESONIDE 500 MCG: 0.5 INHALANT RESPIRATORY (INHALATION) at 07:52

## 2021-10-21 RX ADMIN — NYSTATIN: 100000 POWDER TOPICAL at 16:00

## 2021-10-21 RX ADMIN — QUETIAPINE FUMARATE 300 MG: 200 TABLET ORAL at 01:03

## 2021-10-21 RX ADMIN — IPRATROPIUM BROMIDE AND ALBUTEROL SULFATE 3 ML: .5; 3 SOLUTION RESPIRATORY (INHALATION) at 11:00

## 2021-10-21 RX ADMIN — Medication 5 MG: at 01:04

## 2021-10-21 RX ADMIN — HYDROMORPHONE HYDROCHLORIDE 1 MG: 1 INJECTION, SOLUTION INTRAMUSCULAR; INTRAVENOUS; SUBCUTANEOUS at 13:57

## 2021-10-21 RX ADMIN — ARFORMOTEROL TARTRATE 15 MCG: 15 SOLUTION RESPIRATORY (INHALATION) at 20:50

## 2021-10-21 RX ADMIN — FAMOTIDINE 20 MG: 20 TABLET ORAL at 01:03

## 2021-10-21 RX ADMIN — FOLIC ACID 1 MG: 1 TABLET ORAL at 09:58

## 2021-10-21 RX ADMIN — DIAZEPAM 2.5 MG: 5 TABLET ORAL at 20:25

## 2021-10-21 RX ADMIN — IPRATROPIUM BROMIDE AND ALBUTEROL SULFATE 3 ML: .5; 3 SOLUTION RESPIRATORY (INHALATION) at 07:52

## 2021-10-21 RX ADMIN — ARFORMOTEROL TARTRATE 15 MCG: 15 SOLUTION RESPIRATORY (INHALATION) at 07:52

## 2021-10-21 RX ADMIN — THIAMINE HCL TAB 100 MG 100 MG: 100 TAB at 09:58

## 2021-10-21 RX ADMIN — LACTULOSE 15 ML: 10 SOLUTION ORAL at 20:25

## 2021-10-21 RX ADMIN — NYSTATIN: 100000 POWDER TOPICAL at 09:00

## 2021-10-21 RX ADMIN — Medication 1 TABLET: at 09:57

## 2021-10-21 RX ADMIN — HYDROMORPHONE HYDROCHLORIDE 1 MG: 1 INJECTION, SOLUTION INTRAMUSCULAR; INTRAVENOUS; SUBCUTANEOUS at 20:29

## 2021-10-21 RX ADMIN — QUETIAPINE FUMARATE 200 MG: 200 TABLET ORAL at 09:57

## 2021-10-21 RX ADMIN — NYSTATIN: 100000 POWDER TOPICAL at 22:05

## 2021-10-21 NOTE — PROGRESS NOTES
0715 : assumed care of patient from [address] : morning meds and shift assessment complete, patient complaining of SOB, respiratory paged, will come up to do treatment. PRN Haldol given for increased agitation. 1126 : Patient very agitated, attempting to get out of bed and pull out zamora, restraints in place and sitter at bedside. PRN ativan given. 1405 : sister at bedside, stated pt wants to switch POA to her sister. Justice aware and will come up and see patient and sister tomorrow while sister is still here. PRN pain medication given, see MAR for administration. One of patient restraints undone when RN went into room. Education given to family member and patient regarding need for restraints, restraint retied. 1905 : Bedside shift change report given to nAeta IGNACIO RN (oncoming nurse) by Rafal flowers RN (offgoing nurse). Report included the following information SBAR, Kardex, Intake/Output and MAR.

## 2021-10-21 NOTE — PROGRESS NOTES
Problem: Mobility Impaired (Adult and Pediatric)  Goal: *Acute Goals and Plan of Care (Insert Text)  Description: Physical Therapy Goals  Initiated 10/19/2021 and to be accomplished within 7 day(s)  1. Patient will move from supine to sit and sit to supine  in bed with supervision/set-up. 2.  Patient will transfer from bed to chair and chair to bed with minimal assistance/contact guard assist using the least restrictive device. 3.  Patient will perform sit to stand with minimal assistance/contact guard assist.  4.  Patient will ambulate with minimal assistance/contact guard assist for 20 feet with the least restrictive device. PLOF: pt was independent with mobility without an assistive device     Outcome: Progressing Towards Goal   PHYSICAL THERAPY TREATMENT    Patient: Te Gustafson (26 y.o. female)  Date: 10/21/2021  Diagnosis: Drug overdose, intentional self-harm, initial encounter ([facility] Corazon Velasco  Left wrist fracture, with delayed healing, subsequent encounter [S62.102G] Drug overdose, intentional self-harm, initial encounter ([facility]  Procedure(s) (LRB):  TRACHEOSTOMY, PT IS IN ICU BED 4 (N/A) 29 Days Post-Op  Precautions: Fall, Contact  PLOF:     ASSESSMENT:  Pt in restraints and with a sitter. Pt L UE appears read, warm, and with pus at the proximal end of external fixator. C/o trach and L UE hurting. Jake to sit up EOB. Pt impulsively stood up attempted to ambulate. Performed side steps along EOB with HHA 2x. Pt willingly returned to bed in L side lying. Reattached wrist restraints loosely due to pt laying on on her side. Sitter present, updated nurse. Progression toward goals:   []      Improving appropriately and progressing toward goals  [x]      Improving slowly and progressing toward goals  []      Not making progress toward goals and plan of care will be adjusted     PLAN:  Patient continues to benefit from skilled intervention to address the above impairments.   Continue treatment per established plan of care. Discharge Recommendations:  Rehab  Further Equipment Recommendations for Discharge:  TBD     SUBJECTIVE:   Patient stated .    OBJECTIVE DATA SUMMARY:   Critical Behavior:  Neurologic State: Alert  Orientation Level: Unable to verbalize  Cognition: Impulsive, Decreased command following, Poor safety awareness  Safety/Judgement: Decreased insight into deficits, Decreased awareness of need for safety, Decreased awareness of environment, Decreased awareness of need for assistance, Fall prevention  Functional Mobility Training:  Bed Mobility:    Supine to Sit: Contact guard assistance  Sit to Supine: Stand-by assistance  Transfers:  Sit to Stand: Contact guard assistance;Stand-by assistance  Stand to Sit: Minimum assistance  Balance:  Sitting: Intact  Standing: Impaired   Ambulation/Gait Training:  Distance (ft): 4 Feet (ft)  Assistive Device:  (HHA)  Ambulation - Level of Assistance: Contact guard assistance;Minimal assistance  Gait Abnormalities: Ataxic;Decreased step clearance  Step Length: Right shortened;Left shortened  Therapeutic Exercises:   (B)LEs      EXERCISE   Sets   Reps   Active Active Assist   Passive Self ROM   Comments   Ankle Pumps    [] [] [] []    Quad Sets/Glut Sets    [] [] [] [] Hold for 5 secs   Hamstring Sets   [] [] [] []    Short Arc Quads   [] [] [] []    Heel Slides   [] [] [] []    Straight Leg Raises   [] [] [] []    Hip Add   [] [] [] [] Hold for 5 secs, w/ pillow squeeze   Long Arc Quads   [x] [] [] []    Seated Marching   [] [] [] []    Standing Marching   [] [] [] []       [] [] [] []        Pain:  Pain level pre-treatment: 0/10  Pain level post-treatment: 6/10 (non verbal scale)  Pain Intervention(s): Medication (see MAR); Rest, Ice, Repositioning   Response to intervention: Nurse notified, See doc flow    Activity Tolerance:     Please refer to the flowsheet for vital signs taken during this treatment.   After treatment:   [] Patient left in no apparent distress sitting up in chair  [x] Patient left in no apparent distress in bed  [] Call bell left within reach  [x] Nursing notified  [] Caregiver present  [] Bed alarm activated  [] SCDs applied      COMMUNICATION/EDUCATION:   [x]         Role of Physical Therapy in the acute care setting. []         Fall prevention education was provided and the patient/caregiver indicated understanding. []         Patient/family have participated as able in working toward goals and plan of care. []         Patient/family agree to work toward stated goals and plan of care. []         Patient understands intent and goals of therapy, but is neutral about his/her participation.   []         Patient is unable to participate in stated goals/plan of care: ongoing with therapy staff.  []         Other:        Jorge Pearl, TRINITY   Time Calculation: 33 mins

## 2021-10-21 NOTE — PROGRESS NOTES
Problem: Mobility Impaired (Adult and Pediatric)  Goal: *Acute Goals and Plan of Care (Insert Text)  Description: Physical Therapy Goals  Initiated 10/19/2021 and to be accomplished within 7 day(s)  1. Patient will move from supine to sit and sit to supine  in bed with supervision/set-up. 2.  Patient will transfer from bed to chair and chair to bed with minimal assistance/contact guard assist using the least restrictive device. 3.  Patient will perform sit to stand with minimal assistance/contact guard assist.  4.  Patient will ambulate with minimal assistance/contact guard assist for 20 feet with the least restrictive device. PLOF: pt was independent with mobility without an assistive device     Outcome: Progressing Towards Goal   .physical Therapy TREATMENT    Patient: Sudhakar Rao (71 y.o. female)  Date: 10/20/2021  Diagnosis: Drug overdose, intentional self-harm, initial encounter ([facility] Charlann Heritage  Left wrist fracture, with delayed healing, subsequent encounter [S62.102G] Drug overdose, intentional self-harm, initial encounter ([facility]  Procedure(s) (LRB):  TRACHEOSTOMY, PT IS IN ICU BED 4 (N/A) 28 Days Post-Op  Precautions: Fall, Contact   Chart, physical therapy assessment, plan of care and goals were reviewed. ASSESSMENT:  Pt is found in bed with restraints in place and sitter at bedside. Pt agrees to follow commands, for EOB sitting. R restraint  removed. Pt transitions to EOB with CGA. Pt sat for 5 min, before L restraint was removed for standing trials. 5 standing trials copleted with longest being 1 min. Increase of stability seen on each trial.carlyn to advance to weight shifting and standing march, but with poor balance. Pt is extremely focused on meds and RN to medicate fir anxiety.    Progression toward goals:  []      Improving appropriately and progressing toward goals  []      Improving slowly and progressing toward goals  []      Not making progress toward goals and plan of care will be adjusted     PLAN:  Patient continues to benefit from skilled intervention to address the above impairments. Continue treatment per established plan of care. Discharge Recommendations:  Artur Hawkins  Further Equipment Recommendations for Discharge:  portable oxygen and rolling walker     SUBJECTIVE:   Patient stated Medicine please.     OBJECTIVE DATA SUMMARY:   Critical Behavior:  Neurologic State: Alert  Orientation Level: Unable to verbalize  Cognition: Impulsive, Decreased command following, Poor safety awareness  Safety/Judgement: Decreased insight into deficits, Decreased awareness of need for safety, Decreased awareness of environment, Decreased awareness of need for assistance, Fall prevention  Functional Mobility Training:  Bed Mobility:  Rolling: Stand-by assistance  Supine to Sit: Contact guard assistance  Sit to Supine: Contact guard assistance    Transfers:  Sit to Stand: Moderate assistance  Stand to Sit: Minimum assistance   Balance:  Sitting: Impaired  Sitting - Static: Good (unsupported)  Sitting - Dynamic: Fair (occasional)  Standing: Impaired  Standing - Static: Poor  Standing - Dynamic : Poor  Ambulation/Gait Training:  Unsteady at this time  Therapeutic Exercises:       EXERCISE   Sets   Reps   Active Active Assist   Passive Self ROM   Comments   Ankle Pumps 1 30  [x] [] [] []    Quad Sets/Glut Sets   [] [] [] []    Hamstring Sets   [] [] [] []    Short Arc Quads   [] [] [] []    Heel Slides   [] [] [] []    Straight Leg Raises   [] [] [] []    Hip Abd/Add 1 10 [x] [] [] []    Long Arc Quads 1 15 [x] [] [] []    Seated Marching 1 15 [x] [] [] []    Standing Marching 1 10 [x] [] [] []       [] [] [] []        Pain:  Pain in: 10  Pain out: 10   RN is aware     Activity Tolerance:   Fair  Please refer to the flowsheet for vital signs taken during this treatment.   After treatment:   [] Patient left in no apparent distress sitting up in chair  [] Patient left in no apparent distress in bed  [] Call bell left within reach  [] Nursing notified  [] Caregiver present  [] Bed alarm activated      Jennifer Fraction, PTA   Time Calculation: 44 mins

## 2021-10-21 NOTE — PROGRESS NOTES
Hospitalist Progress Note-critical care note     Patient: Juliette Gutierrez MRN: [id number]  CSN: [id number]    YOB: 1980  Age: 39 y.o. Sex: female    DOA: 9/11/2021 LOS:  LOS: 39 days            Chief complaint: wrist fracture , drug overdose , acute respiratory failure with hypoxia, psychosis     Assessment/Plan         Hospital Problems  Date Reviewed: 9/6/2015        Codes Class Noted POA    FELICITY (acute kidney injury) ([facility] ICD-10-CM: N17.9  ICD-9-CM: 584.9  10/16/2021 Unknown        Bacteremia due to Gram-positive bacteria ICD-10-CM: R78.81  ICD-9-CM: 790.7  10/6/2021 Unknown        Status post tracheostomy ([facility] ICD-10-CM: Z93.0  ICD-9-CM: V44.0  9/28/2021 Unknown        Hypokalemia ICD-10-CM: E87.6  ICD-9-CM: 276.8  9/21/2021 Unknown        Increased ammonia level ICD-10-CM: R79.89  ICD-9-CM: 790.6  9/14/2021 Unknown        Psychosis ([facility] ICD-10-CM: F29  ICD-9-CM: 298.9  Unknown Unknown        Hyponatremia ICD-10-CM: E87.1  ICD-9-CM: 276.1  Unknown Yes        Acute respiratory failure with hypoxia (HCC) ICD-10-CM: J96.01  ICD-9-CM: 518.81  Unknown Yes        Left wrist fracture, with delayed healing, subsequent encounter ICD-10-CM: S62.102G  ICD-9-CM: V54.19  9/12/2021 Unknown        * (Principal) Drug overdose, intentional self-harm, initial encounter ([facility] ICD-10-CM: T50.902A  ICD-9-CM: 977.9, E950.5  9/12/2021 Yes        Hypoxia ICD-10-CM: R09.02  ICD-9-CM: 799.02  9/12/2021 Yes        Hypotension after procedure ICD-10-CM: I95.81  ICD-9-CM: 458.29  9/12/2021 Yes        Acute metabolic encephalopathy [facility]-09-PP: G93.41  ICD-9-CM: 348.31  9/12/2021 Yes                  Juliette Gutierrez is a 39 y.o. female who is standing history of multidrug use/abuse, previous drug-seeking behavior and mental health issues otherwise unspecified arrives via EMS with acute metabolic encephalopathy and lethargy. Admitted for likely gabapentin overdose.   She was intubated in ER, ct head no acute issue, LP done due to fever even on abx, no meningitis noted. Now she has peg and trach        Acute respiratory failure with hypoxia   Intubated on 9/12    Trach on 9/20/21. Continue breathing tx . Local trach care and Speech evaluation    not a candidate for decannulation per pulm     Post tracheostomy   Continue local trach care   Respiratory therapist f/u       bacteremia -Complete the treatment   bcx  Positive for GPC on 10/4   Sputum culture 9/24/2021: MRSA. Anemia  Transfused  one unit prbc on 10/16 so far stable, continue monitoring   Occult stool negative    no bleeding reported   Upper PVL negative for dvt         Acute metabolic encephalopathy   Ct head no acute process       Urinary retention   On zamora     Gabapentin overdose with lethargy and AMS  -resolved   S/p procedural stomach emptying in ED with charcoal and sorbitol          elevated ammonia level  Continue   Lactulose,   Ammonia remained stable     Psychosis , hx of  Ekbom's delusional parasitosis    On  Klonopin,  Seroquel, halodol prn per psych recommendation   Still need ativan and sister     left wrist fracture: immobilized -poa -stable   Appreciated ortho on board-f/u with Dr. Joy Santo as out-pt     yarely    Resolved       Sitter was at the bedside     Ativan administrated am   Poor prognosis , dc barrier   Disposition :tbd,   Review of systems:  Unable to obtain due to trach collar   Vital signs/Intake and Output:  Visit Vitals  /83   Pulse 91   Temp 98.7 °F (37.1 °C)   Resp 18   Ht 5' 2\" (1.575 m)   Wt 72.6 kg (160 lb 0.9 oz)   SpO2 97%   BMI 29.27 kg/m²     Current Shift:  No intake/output data recorded. Last three shifts:  10/19 1901 - 10/21 0700  In: 750   Out: 2565 [Urine:2565]    Physical Exam:  General: Sleeping   HEENT:  Trach collar   Lungs: Coarse bs   Heart:  rrr   No murmur, No Rubs, No Gallops  Abdomen: Soft, Non distended, Non tender.   +Bowel sounds, peg tube noted   Extremities: No c/c.immobilzer noted on left wrist   Psych: Calm    Neurologic:  Sleeping           Labs: Results:       Chemistry Recent Labs     10/21/21  0534 10/20/21  0531 10/19/21  0700   * 92 101*    140 140   K 4.3 4.6 4.1    106 105   CO2 28 28 29   BUN 16 15 16   CREA 0.99 1.08 1.02   CA 9.8 10.1 9.3   AGAP 4 6 6   BUCR 16 14 16    108 98   TP 6.9 6.9 6.3*   ALB 3.8 3.8 3.5   GLOB 3.1 3.1 2.8   AGRAT 1.2 1.2 1.3      CBC w/Diff Recent Labs     10/21/21  0534 10/20/21  0531 10/19/21  0700   WBC 7.3 8.4 5.7   RBC 3.38* 3.40* 3.24*   HGB 9.7* 9.5* 9.2*   HCT 30.4* 30.2* 29.0*    352 322   GRANS 61 73 55   LYMPH 28 20* 33   EOS 3 2 6*      Cardiac Enzymes No results for input(s): CPK, CKND1, ZENON in the last 72 hours. No lab exists for component: CKRMB, TROIP   Coagulation No results for input(s): PTP, INR, APTT, INREXT, INREXT in the last 72 hours. Lipid Panel No results found for: CHOL, CHOLPOCT, CHOLX, CHLST, CHOLV, 036243, HDL, HDLP, LDL, LDLC, DLDLP, 171805, VLDLC, VLDL, TGLX, TRIGL, TRIGP, TGLPOCT, CHHD, CHHDX   BNP No results for input(s): BNPP in the last 72 hours. Liver Enzymes Recent Labs     10/21/21  0534   TP 6.9   ALB 3.8         Thyroid Studies No results found for: T4, T3U, TSH, TSHEXT, TSHEXT     Procedures/imaging: see electronic medical records for all procedures/Xrays and details which were not copied into this note but were reviewed prior to creation of Plan    XR WRIST LT AP/LAT/OBL MIN 3V    Result Date: 8/19/2021  EXAM: XR WRIST LT AP/LAT/OBL MIN 3V CLINICAL INDICATION/HISTORY: Fall with LEFT wrist pain and swelling -Additional: None COMPARISON: None TECHNIQUE: 3 views of the left wrist _______________ FINDINGS: BONES: Comminuted, impacted fracture of the distal radius with slight dorsal angulation of the distal fracture fragment. No aggressive appearing osseous lytic or blastic lesion. SOFT TISSUES: Unremarkable. _______________     Comminuted, impacted fracture of the distal radius.     XR CHEST PORT    Result Date: 9/13/2021  EXAM: XR CHEST PORT CLINICAL INDICATION/HISTORY: Acute respiratory failure, ET tub position -Additional: None COMPARISON: One day prior TECHNIQUE: Portable frontal view of the chest _______________ FINDINGS: SUPPORT DEVICES: Endotracheal and enteric tubes unchanged. HEART AND MEDIASTINUM: Cardiomediastinal silhouette within normal limits. LUNGS AND PLEURAL SPACES: No dense consolidation, large effusion or pneumothorax. _______________     No acute cardiopulmonary abnormality. XR CHEST PORT    Result Date: 9/11/2021  MEDICAL RECORDS NUMBER: [id number] PROCEDURE:  Single view of the chest DATE: 9/11/2021 9:09 PM HISTORY: 39years old Female. post ett Comparison: 8/4/2021 FINDINGS: The patient has been intubated with appropriate placement of the endotracheal tube. There is no enteric tube passing below the level of the diaphragm. There is no significant effusion. There is no significant pneumothorax. Cardiomediastinal silhouette is within normal limits. There is no evidence of a focal pulmonary infiltrate or mass. 1. There is no significant or acute cardiopulmonary process. The patient has been intubated with appropriate placement of the endotracheal tube. There is no enteric tube passing below the level of the diaphragm. This report has been generated using voice recognition software. XR ABD PORT  1 V    Result Date: 9/12/2021  EXAM: Frontal view of the abdomen CLINICAL INDICATION/HISTORY: NG tube placement COMPARISON: None. _______________ FINDINGS: NG/OG tube in place with the tip in the left upper quadrant in the region of the gastric fundus. No bowel obstruction. Moderate colonic stool burden. _______________     1. NG/OG tube is in good position with the tip in the left upper quadrant in the region of the gastric fundus. 2. Moderate colonic stool burden.       Pily Moya MD

## 2021-10-21 NOTE — PROGRESS NOTES
Occupational Therapy Treatment Attempt     Chart reviewed. Attempted Occupational Therapy Treatment, however, patient unable to be seen due to:  []  Nausea/vomiting  []  Eating  []  Pain  [x]  Patient too lethargic  []  Off Unit for testing/procedure  []  Dialysis treatment in progress  []  Telemetry Results  []  Other:      Will f/u later as patient's schedule allows.   Lexii Weaver, OTR/L

## 2021-10-21 NOTE — PROGRESS NOTES
1925: Assumed patient care from [facility] RN. Patient is alert and oriented to person,but disoriented to  place, time and situation. Respiratory status is stable on 7 L via trach collar. Vital signs are stable. MEWS score is a one. Patient is unable to deny any pain, discomfort, nausea vomiting dizziness or anxiety. White board and fall card is updated. Bed is locked and in lowest position. Call bell, water and personal belongings are within reach. Patient is unable to verbalize any questions, comments or concerns after bedside shift report. 0700: Patient had an uneventful shift. Respiratory status, vital signs and MEWS score remained stable. Patient was resting quietly with no signs of distress noted. Bed locked and in lowest position. Call bell water and personal belongings were within reach. Patient was unable to verbalize any questions, comments or concerns after bedside shift report.  Bedside report given to [facility]

## 2021-10-22 LAB
ALBUMIN SERPL-MCNC: 3.5 G/DL (ref 3.4–5)
ALBUMIN/GLOB SERPL: 1.2 {RATIO} (ref 0.8–1.7)
ALP SERPL-CCNC: 98 U/L (ref 45–117)
ALT SERPL-CCNC: 25 U/L (ref 13–56)
AMMONIA PLAS-SCNC: 28 UMOL/L (ref 11–32)
ANION GAP SERPL CALC-SCNC: 7 MMOL/L (ref 3–18)
AST SERPL-CCNC: 15 U/L (ref 10–38)
BACTERIA SPEC CULT: NORMAL
BACTERIA SPEC CULT: NORMAL
BASOPHILS # BLD: 0 K/UL (ref 0–0.1)
BASOPHILS NFR BLD: 0 % (ref 0–2)
BILIRUB SERPL-MCNC: 0.3 MG/DL (ref 0.2–1)
BUN SERPL-MCNC: 21 MG/DL (ref 7–18)
BUN/CREAT SERPL: 25 (ref 12–20)
CALCIUM SERPL-MCNC: 9.3 MG/DL (ref 8.5–10.1)
CHLORIDE SERPL-SCNC: 105 MMOL/L (ref 100–111)
CO2 SERPL-SCNC: 28 MMOL/L (ref 21–32)
CREAT SERPL-MCNC: 0.84 MG/DL (ref 0.6–1.3)
DIFFERENTIAL METHOD BLD: ABNORMAL
EOSINOPHIL # BLD: 0.3 K/UL (ref 0–0.4)
EOSINOPHIL NFR BLD: 4 % (ref 0–5)
ERYTHROCYTE [DISTWIDTH] IN BLOOD BY AUTOMATED COUNT: 14.2 % (ref 11.6–14.5)
GLOBULIN SER CALC-MCNC: 3 G/DL (ref 2–4)
GLUCOSE BLD STRIP.AUTO-MCNC: 101 MG/DL (ref 70–110)
GLUCOSE BLD STRIP.AUTO-MCNC: 109 MG/DL (ref 70–110)
GLUCOSE BLD STRIP.AUTO-MCNC: 109 MG/DL (ref 70–110)
GLUCOSE BLD STRIP.AUTO-MCNC: 112 MG/DL (ref 70–110)
GLUCOSE SERPL-MCNC: 118 MG/DL (ref 74–99)
HCT VFR BLD AUTO: 31.3 % (ref 35–45)
HGB BLD-MCNC: 9.8 G/DL (ref 12–16)
LYMPHOCYTES # BLD: 1.9 K/UL (ref 0.9–3.6)
LYMPHOCYTES NFR BLD: 29 % (ref 21–52)
MAGNESIUM SERPL-MCNC: 2.1 MG/DL (ref 1.6–2.6)
MCH RBC QN AUTO: 29 PG (ref 24–34)
MCHC RBC AUTO-ENTMCNC: 31.3 G/DL (ref 31–37)
MCV RBC AUTO: 92.6 FL (ref 78–100)
MONOCYTES # BLD: 0.5 K/UL (ref 0.05–1.2)
MONOCYTES NFR BLD: 8 % (ref 3–10)
NEUTS SEG # BLD: 3.9 K/UL (ref 1.8–8)
NEUTS SEG NFR BLD: 59 % (ref 40–73)
PHOSPHATE SERPL-MCNC: 6.5 MG/DL (ref 2.5–4.9)
PLATELET # BLD AUTO: 339 K/UL (ref 135–420)
PMV BLD AUTO: 10.1 FL (ref 9.2–11.8)
POTASSIUM SERPL-SCNC: 4.3 MMOL/L (ref 3.5–5.5)
PROT SERPL-MCNC: 6.5 G/DL (ref 6.4–8.2)
RBC # BLD AUTO: 3.38 M/UL (ref 4.2–5.3)
SERVICE CMNT-IMP: NORMAL
SERVICE CMNT-IMP: NORMAL
SODIUM SERPL-SCNC: 140 MMOL/L (ref 136–145)
WBC # BLD AUTO: 6.7 K/UL (ref 4.6–13.2)

## 2021-10-22 PROCEDURE — 65660000000 HC RM CCU STEPDOWN

## 2021-10-22 PROCEDURE — 74011250637 HC RX REV CODE- 250/637: Performed by: INTERNAL MEDICINE

## 2021-10-22 PROCEDURE — 74011250637 HC RX REV CODE- 250/637: Performed by: PSYCHIATRY & NEUROLOGY

## 2021-10-22 PROCEDURE — 84100 ASSAY OF PHOSPHORUS: CPT

## 2021-10-22 PROCEDURE — 83735 ASSAY OF MAGNESIUM: CPT

## 2021-10-22 PROCEDURE — 82140 ASSAY OF AMMONIA: CPT

## 2021-10-22 PROCEDURE — 97530 THERAPEUTIC ACTIVITIES: CPT

## 2021-10-22 PROCEDURE — 74011250637 HC RX REV CODE- 250/637: Performed by: HOSPITALIST

## 2021-10-22 PROCEDURE — 74011000250 HC RX REV CODE- 250: Performed by: INTERNAL MEDICINE

## 2021-10-22 PROCEDURE — 97535 SELF CARE MNGMENT TRAINING: CPT

## 2021-10-22 PROCEDURE — 74011250636 HC RX REV CODE- 250/636: Performed by: INTERNAL MEDICINE

## 2021-10-22 PROCEDURE — 97116 GAIT TRAINING THERAPY: CPT

## 2021-10-22 PROCEDURE — 94640 AIRWAY INHALATION TREATMENT: CPT

## 2021-10-22 PROCEDURE — 82962 GLUCOSE BLOOD TEST: CPT

## 2021-10-22 PROCEDURE — 92507 TX SP LANG VOICE COMM INDIV: CPT

## 2021-10-22 PROCEDURE — 80053 COMPREHEN METABOLIC PANEL: CPT

## 2021-10-22 PROCEDURE — 77010033678 HC OXYGEN DAILY

## 2021-10-22 PROCEDURE — 85025 COMPLETE CBC W/AUTO DIFF WBC: CPT

## 2021-10-22 PROCEDURE — 36415 COLL VENOUS BLD VENIPUNCTURE: CPT

## 2021-10-22 RX ADMIN — CLONAZEPAM 2 MG: 0.5 TABLET ORAL at 15:40

## 2021-10-22 RX ADMIN — Medication 5 MG: at 21:50

## 2021-10-22 RX ADMIN — HYDROMORPHONE HYDROCHLORIDE 1 MG: 1 INJECTION, SOLUTION INTRAMUSCULAR; INTRAVENOUS; SUBCUTANEOUS at 21:51

## 2021-10-22 RX ADMIN — BUDESONIDE 500 MCG: 0.5 INHALANT RESPIRATORY (INHALATION) at 07:22

## 2021-10-22 RX ADMIN — HYDROMORPHONE HYDROCHLORIDE 1 MG: 1 INJECTION, SOLUTION INTRAMUSCULAR; INTRAVENOUS; SUBCUTANEOUS at 10:12

## 2021-10-22 RX ADMIN — CLONAZEPAM 2 MG: 0.5 TABLET ORAL at 21:51

## 2021-10-22 RX ADMIN — LACTULOSE 15 ML: 10 SOLUTION ORAL at 10:13

## 2021-10-22 RX ADMIN — LORAZEPAM 2 MG: 2 INJECTION INTRAMUSCULAR at 15:41

## 2021-10-22 RX ADMIN — DIAZEPAM 2.5 MG: 5 TABLET ORAL at 10:13

## 2021-10-22 RX ADMIN — HYDROMORPHONE HYDROCHLORIDE 1 MG: 1 INJECTION, SOLUTION INTRAMUSCULAR; INTRAVENOUS; SUBCUTANEOUS at 06:14

## 2021-10-22 RX ADMIN — ARFORMOTEROL TARTRATE 15 MCG: 15 SOLUTION RESPIRATORY (INHALATION) at 07:22

## 2021-10-22 RX ADMIN — DIAZEPAM 2.5 MG: 5 TABLET ORAL at 21:50

## 2021-10-22 RX ADMIN — ARFORMOTEROL TARTRATE 15 MCG: 15 SOLUTION RESPIRATORY (INHALATION) at 21:26

## 2021-10-22 RX ADMIN — QUETIAPINE FUMARATE 300 MG: 200 TABLET ORAL at 21:50

## 2021-10-22 RX ADMIN — FAMOTIDINE 20 MG: 20 TABLET ORAL at 21:50

## 2021-10-22 RX ADMIN — CLONAZEPAM 2 MG: 0.5 TABLET ORAL at 10:13

## 2021-10-22 RX ADMIN — LORAZEPAM 2 MG: 2 INJECTION INTRAMUSCULAR at 07:03

## 2021-10-22 RX ADMIN — Medication 1 TABLET: at 10:13

## 2021-10-22 RX ADMIN — QUETIAPINE FUMARATE 200 MG: 200 TABLET ORAL at 10:14

## 2021-10-22 RX ADMIN — THIAMINE HCL TAB 100 MG 100 MG: 100 TAB at 10:14

## 2021-10-22 RX ADMIN — LACTULOSE 15 ML: 10 SOLUTION ORAL at 21:50

## 2021-10-22 RX ADMIN — NYSTATIN: 100000 POWDER TOPICAL at 22:00

## 2021-10-22 RX ADMIN — BUDESONIDE 500 MCG: 0.5 INHALANT RESPIRATORY (INHALATION) at 21:26

## 2021-10-22 RX ADMIN — HYDROMORPHONE HYDROCHLORIDE 1 MG: 1 INJECTION, SOLUTION INTRAMUSCULAR; INTRAVENOUS; SUBCUTANEOUS at 15:41

## 2021-10-22 RX ADMIN — FAMOTIDINE 20 MG: 20 TABLET ORAL at 10:13

## 2021-10-22 RX ADMIN — NYSTATIN: 100000 POWDER TOPICAL at 15:41

## 2021-10-22 RX ADMIN — NYSTATIN: 100000 POWDER TOPICAL at 10:12

## 2021-10-22 RX ADMIN — FOLIC ACID 1 MG: 1 TABLET ORAL at 10:13

## 2021-10-22 NOTE — PROGRESS NOTES
Problem: Voice Impaired (Adult)  Goal: *Acute Goals and Plan of Care (Insert Text)  Description: Pt will:  1. Produce voice to finger occlusion in 4/5 trials without change in vital.  2. Initiate PMV placement by SLP when appropriate. 3. Tolerate PMV placement for 30 minute increments without compromise to vitals. 4. Utilize proper breath support/techniques for increasing tolerance of PMV. 5. Phonate 4-6 word utterances with adequate breath support for adequate intensity of speech/increased intelligibilty with PMV. Outcome: Progressing Towards Goal     SPEECH LANGUAGE PATHOLOGY   SPEECH-LANGUAGE TREATMENT    Patient: Harshal Severe (31 y.o. female)  Date: 10/22/2021  Primary Diagnosis: Drug overdose, intentional self-harm, initial encounter ([facility] [place]uffus Flow  Left wrist fracture, with delayed healing, subsequent encounter [S62.102G]  Procedure(s) (LRB):  TRACHEOSTOMY, PT IS IN ICU BED 4 (N/A) 30 Days Post-Op   Precautions:   Fall, Contact  PLOF: Per H&P    ASSESSMENT :  Pt seen for followup voice treatment s/p trach. Pt asleep upon SLP arrival, but awakens with verbal/tactile stim, confused. Trach suctioned x1 for scant mildly thick, white-yellowish secretions. Digital occlusion applied x1, pt able to verbalize \"ah\" to command with clear vocal quality, appropriate volume. Following 10 sec, pt stated \"I can't breathe\" with increase in resp rate and increased WOB. Digital occlusion removed. Attempted x1 more trial with same results, then patient refusing further trial.   SLP will continue to follow for ongoing dxtx and possible PMV trials as appropriate. Patient will benefit from skilled intervention to address the above impairments.   Patient's rehabilitation potential is considered to be Fair  Factors which may influence rehabilitation potential include:   []              None noted  [x]              Mental ability/status  []              Medical condition  []              Home/family situation and support systems  [x]              Safety awareness  []              Pain tolerance/management  []              Other:      PLAN :  Recommendations and Planned Interventions:  See above. Frequency/Duration: Patient will be followed by speech-language pathology 1-2 times per day/3-5 days per week to address goals. Discharge Recommendations: To Be Determined     SUBJECTIVE:   Patient stated No more with digital occlusion in place. OBJECTIVE:     Past Medical History:   Diagnosis Date    Asthma     Bilateral ovarian cysts     Cocaine abuse ([facility]     Mental and behavioral problem     Pancreatitis     Pancreatitis     Psychosis ([facility]      Past Surgical History:   Procedure Laterality Date    HX GYN      D&C, Hysterectomy    IR INSERT GASTROSTOMY TUBE PERC  10/1/2021     Home Situation:      Mental Status:  Neurologic State: Confused, Eyes open spontaneously  Orientation Level: Oriented to person  Cognition: Decreased attention/concentration, Decreased command following, Impaired decision making, Poor safety awareness  Perception: Appears intact  Perseveration: Perseverates during conversation  Safety/Judgement: Decreased insight into deficits, Decreased awareness of need for safety, Decreased awareness of environment, Decreased awareness of need for assistance, Fall prevention      PAIN:  Pain level pre-treatment: 0/10   Pain level post-treatment: 0/10     After treatment:   []              Patient left in no apparent distress sitting up in chair  [x]              Patient left in no apparent distress in bed  [x]              Call bell left within reach  [x]              Nursing notified  []              Caregiver present  []              Bed alarm activated    COMMUNICATION/EDUCATION:   [x] Patient/family have participated as able in goal setting and plan of care. []  Patient/family agree to work toward stated goals and plan of care.   []  Patient understands intent and goals of therapy, but is neutral about his/her participation. [x]  Patient is unable to participate in goal setting and plan of care; education ongoing with interdisciplinary staff    Thank you for this referral.    JORGE BorjaEd, [id number] [facility], [place], operated by [facility]  Speech-Language Pathologist    Time Calculation: 10 mins

## 2021-10-22 NOTE — PROGRESS NOTES
0045 This RN paged Dr. Sanjeev Hutchinson regarding the renewal of pt's restraints. Awaiting callback. 0100 Dr. Sanjeev Hutchinson made aware. New orders pending.

## 2021-10-22 NOTE — PROGRESS NOTES
1915: Assumed patient care from [facility] RN. Patient is alert and oriented to person,but disoriented to place, time and situation. Respiratory status is stable on 7L via nasal cannula. Vital signs are stable. MEWS score is a one. Patient is unable to deny any pain, discomfort, nausea vomiting dizziness or anxiety. White board and fall card is updated. Bed is locked and in lowest position. Call bell, water and personal belongings are within reach. Patient is unable to verbalize any questions, comments or concerns after bedside shift report. 0700: Patient had an uneventful shift. Respiratory status, vital signs and MEWS score remained stable. Patient was resting quietly with no signs of distress noted. Bed locked and in lowest position. Call bell water and personal belongings were within reach. Patient was unable to verbalize any questions, comments or concerns after bedside shift report.  Bedside report given to [facility].

## 2021-10-22 NOTE — PROGRESS NOTES
Problem: Self Care Deficits Care Plan (Adult)  Goal: *Acute Goals and Plan of Care (Insert Text)  Description: Occupational Therapy Goals  Initiated 10/19/2021 within 3 day(s). 1.  Patient will perform grooming with minimal assistance/contact guard assist seated EOB. 2.  Patient will perform upper body dressing with supervision/set-up. 3.  Patient will participate in upper extremity therapeutic exercise/activities with minimal assistance/contact guard assist for 10 minutes. 4.  Patient will utilize energy conservation techniques during functional activities with verbal, visual, and tactile cues. Outcome: Progressing Towards Goal    OCCUPATIONAL THERAPY TREATMENT    Patient: Faith Hsu (8 y.o. female)  Date: 10/22/2021  Diagnosis: Drug overdose, intentional self-harm, initial encounter ([facility] Gustabo Tompkins  Left wrist fracture, with delayed healing, subsequent encounter [S62.102G] Drug overdose, intentional self-harm, initial encounter ([facility]  Procedure(s) (LRB):  TRACHEOSTOMY, PT IS IN ICU BED 4 (N/A) 30 Days Post-Op  Precautions: Fall, Contact  PLOF:     Chart, occupational therapy assessment, plan of care, and goals were reviewed. ASSESSMENT:  Pt presented supine in bed, asleep but aroused easily. Pt gets anxious easily and increased agitation causing pt to bear weight on L hand with sit<>stand transfers. Pt participate with bed mobility, sit<>stand transfers, oral care seated EOB during this session. Pt was left supine in bed at the end of session, wrist restraints reapplied and Redia Plan, PTA in the room for further therapy session. Progression toward goals:  []          Improving appropriately and progressing toward goals  [x]          Improving slowly and progressing toward goals  []          Not making progress toward goals and plan of care will be adjusted     PLAN:  Patient continues to benefit from skilled intervention to address the above impairments.   Continue treatment per established plan of care. Discharge Recommendations:  Rehab  Further Equipment Recommendations for Discharge:  N/A     SUBJECTIVE:   Patient stated .    OBJECTIVE DATA SUMMARY:   Cognitive/Behavioral Status:  Neurologic State: Confused  Orientation Level: Oriented to person  Cognition: Decreased attention/concentration, Decreased command following  Safety/Judgement: Decreased awareness of environment, Decreased awareness of need for assistance, Decreased awareness of need for safety, Decreased insight into deficits    Functional Mobility and Transfers for ADLs:   Bed Mobility:  Supine to Sit: Contact guard assistance  Scooting: Contact guard assistance   Transfers:  Sit to Stand: Contact guard assistance  Stand to Sit: Contact guard assistance  Balance:  Sitting: Impaired  Sitting - Static: Good (unsupported)  Sitting - Dynamic: Fair (occasional)  Standing: Impaired; With support  Standing - Static: Fair  Standing - Dynamic : Fair  ADL Intervention:  Grooming  Grooming Assistance: Minimum assistance  Position Performed: Seated edge of bed  Washing Face: Minimum assistance  Brushing Teeth: Minimum assistance    Cognitive Retraining  Safety/Judgement: Decreased awareness of environment;Decreased awareness of need for assistance;Decreased awareness of need for safety;Decreased insight into deficits    Pain:  Pain level pre-treatment: 0/10   Pain level post-treatment: 0/10  Pain Intervention(s): Medication (see MAR); Rest, Ice, Repositioning   Response to intervention: Nurse notified, See doc flow    Activity Tolerance:    Fair     Please refer to the flowsheet for vital signs taken during this treatment.   After treatment:   []  Patient left in no apparent distress sitting up in chair  [x]  Patient left in no apparent distress in bed  [x]  Call bell left within reach  [x]  Nursing notified  []  Caregiver present  []  Bed alarm activated    COMMUNICATION/EDUCATION:   [x] Role of Occupational Therapy in the acute care setting  [] Home safety education was provided and the patient/caregiver indicated understanding. [] Patient/family have participated as able in working towards goals and plan of care. [] Patient/family agree to work toward stated goals and plan of care. [] Patient understands intent and goals of therapy, but is neutral about his/her participation. [x] Patient is unable to participate in goal setting and plan of care.       Thank you for this referral.  Tee Mayorga, OTR/L  Time Calculation: 20 mins

## 2021-10-22 NOTE — PROGRESS NOTES
Hospitalist Progress Note    Patient: Te Gustafson MRN: [id number]  CSN: [id number]    YOB: 1980  Age: 39 y.o. Sex: female    DOA: 9/11/2021 LOS:  LOS: 40 days          Chief Complaint:    Acute respiratory failure with hypoxia  Assessment/Plan   49-year-old female with a longstanding history of multidrug use/abuse, previous drug-seeking behavior and mental health issues otherwise unspecified initially admitted for acute metabolic encephalopathy and lethargy and nightly gabapentin overdose.      Acute respiratory failure with hypoxia -status post intubation 9/12, status post trach placement 9/20  Status post tracheostomy -continue post trach care with speech therapy and respiratory therapy  GPC bacteremia -continue antibiotic regimen  Anemia -transfusion completed on 1016, hemoglobin stable thus far today 9.8  Acute metabolic encephalopathy -due to gabapentin overdose with hypoxia, mental health issues also plan will, still confused and agitated intermittently, requiring sitter and intermittent restraints  Urine retention -continue current management  Gabapentin overdose -status post procedure stomach emptying with charcoal and sorbitol  Elevated ammonia level -stable  Mental health issues not otherwise specified, history delusional past psychosis -stable no new issues    Sitter at bedside    Disposition :  Patient Active Problem List   Diagnosis Code    Dextromethorphan use disorder, moderate ([address] F19.20    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    Left wrist fracture, with delayed healing, subsequent encounter S62.102G    Drug overdose, intentional self-harm, initial encounter ([address] T50.902A    Hypoxia R09.02    Hypotension after procedure I95.81    Acute metabolic encephalopathy Y99.30    Psychosis ([address] F29    Hyponatremia E87.1    Acute respiratory failure with hypoxia ([address] J96.01    Increased ammonia level R79.89    Hypokalemia E87.6    Status post tracheostomy ([address] Z93.0    Bacteremia due to Gram-positive bacteria R78.81    FELICITY (acute kidney injury) ([facility] N17.9       Subjective:    Wrist restraints in place  Sitter at bedside  Patient confused and intermittently agitated but easily redirected      Review of systems:    Constitutional: denies fevers, chills, myalgias  Respiratory: denies SOB, cough  Cardiovascular: denies chest pain, palpitations  Gastrointestinal: denies nausea, vomiting, diarrhea      Vital signs/Intake and Output:  Visit Vitals  /66 (BP 1 Location: Right upper arm)   Pulse (!) 104   Temp 97.7 °F (36.5 °C)   Resp 18   Ht 5' 2\" (1.575 m)   Wt 72.6 kg (160 lb 0.9 oz)   SpO2 98%   BMI 29.27 kg/m²     Current Shift:  No intake/output data recorded.   Last three shifts:  10/20 1901 - 10/22 0700  In: 250   Out: 2650 [Urine:2650]    Exam:    General: Debilitated, confused, restraints, alertbutnotoriented  Head/Neck: NCAT, supple, No masses, No lymphadenopathy  CVS:Regular rate and rhythm, no M/R/G, S1/S2 heard, no thrill  Lungs:Clear to auscultation bilaterally, no wheezes, rhonchi, or rales  Abdomen: Soft, Nontender, No distention, Normal Bowel sounds, No hepatomegaly  Extremities: No C/C/E, pulses palpable 2+  Skin:normal texture and turgor, no rashes, no lesions  Neuro:grossly normal , follows commands                Labs: Results:       Chemistry Recent Labs     10/22/21  0526 10/21/21  0534 10/20/21  0531   * 109* 92    140 140   K 4.3 4.3 4.6    108 106   CO2 28 28 28   BUN 21* 16 15   CREA 0.84 0.99 1.08   CA 9.3 9.8 10.1   AGAP 7 4 6   BUCR 25* 16 14   AP 98 102 108   TP 6.5 6.9 6.9   ALB 3.5 3.8 3.8   GLOB 3.0 3.1 3.1   AGRAT 1.2 1.2 1.2      CBC w/Diff Recent Labs     10/22/21  0526 10/21/21  0534 10/20/21  0531   WBC 6.7 7.3 8.4   RBC 3.38* 3.38* 3.40*   HGB 9.8* 9.7* 9.5*   HCT 31.3* 30.4* 30.2*    337 352   GRANS 59 61 73   LYMPH 29 28 20*   EOS 4 3 2      Cardiac Enzymes No results for input(s): CPK, CKND1, ZENON in the last 72 hours. No lab exists for component: CKRMB, TROIP   Coagulation No results for input(s): PTP, INR, APTT, INREXT in the last 72 hours. Lipid Panel No results found for: CHOL, CHOLPOCT, CHOLX, CHLST, CHOLV, 490335, HDL, HDLP, LDL, LDLC, DLDLP, 308452, VLDLC, VLDL, TGLX, TRIGL, TRIGP, TGLPOCT, CHHD, CHHDX   BNP No results for input(s): BNPP in the last 72 hours.    Liver Enzymes Recent Labs     10/22/21  0526   TP 6.5   ALB 3.5   AP 98      Thyroid Studies No results found for: T4, T3U, TSH, TSHEXT     Procedures/imaging: see electronic medical records for all procedures/Xrays and details which were not copied into this note but were reviewed prior to creation of Sade Aguero MD

## 2021-10-22 NOTE — PROGRESS NOTES
Problem: Non-Violent Restraints  Goal: Removal from restraints as soon as assessed to be safe  Outcome: Progressing Towards Goal  Goal: No harm/injury to patient while restraints in use  Outcome: Progressing Towards Goal  Goal: Patient's dignity will be maintained  Outcome: Progressing Towards Goal  Goal: Patient Interventions  Outcome: Progressing Towards Goal     Problem: Falls - Risk of  Goal: *Absence of Falls  Description: Document Christy Friend Fall Risk and appropriate interventions in the flowsheet.   Outcome: Progressing Towards Goal  Note: Fall Risk Interventions:  Mobility Interventions: Bed/chair exit alarm    Mentation Interventions: Adequate sleep, hydration, pain control    Medication Interventions: Bed/chair exit alarm    Elimination Interventions: Bed/chair exit alarm    History of Falls Interventions: Bed/chair exit alarm

## 2021-10-22 NOTE — PROGRESS NOTES
Otolaryngology head neck surgery  Patient seen and examined  Impression  Occluded trach-inner cannula removed and noted to have significant amount of old caked debris completely occluding the inner cannula.   #8 Shiley uncuffed trach placed replacing #8 Shiley cuffed trach    Plan  Would aggressively maintain humidity collar  Push p.o. fluids   optimize trach care    Adriana Record

## 2021-10-22 NOTE — PROGRESS NOTES
RT spoke to this RN concerning bloody secretions from trache and occluding the trache. Recommending ENT evaluate pt trache    Spoke with DR. Yoel Fernandez. Was told he will be here tonight to evaluate pt.    1908 Bedside and Verbal shift change report given to [facility] (oncoming nurse) by Allison Starks RN (offgoing nurse). Report included the following information SBAR, Kardex, MAR, Recent Results and Cardiac Rhythm .

## 2021-10-23 ENCOUNTER — APPOINTMENT (OUTPATIENT)
Dept: GENERAL RADIOLOGY | Age: 41
DRG: 004 | End: 2021-10-23
Attending: HOSPITALIST
Payer: MEDICAID

## 2021-10-23 LAB
ALBUMIN SERPL-MCNC: 3.7 G/DL (ref 3.4–5)
ALBUMIN/GLOB SERPL: 1.3 {RATIO} (ref 0.8–1.7)
ALP SERPL-CCNC: 104 U/L (ref 45–117)
ALT SERPL-CCNC: 25 U/L (ref 13–56)
AMMONIA PLAS-SCNC: 33 UMOL/L (ref 11–32)
ANION GAP SERPL CALC-SCNC: 4 MMOL/L (ref 3–18)
AST SERPL-CCNC: 14 U/L (ref 10–38)
BASOPHILS # BLD: 0 K/UL (ref 0–0.1)
BASOPHILS NFR BLD: 1 % (ref 0–2)
BILIRUB SERPL-MCNC: 0.3 MG/DL (ref 0.2–1)
BUN SERPL-MCNC: 22 MG/DL (ref 7–18)
BUN/CREAT SERPL: 26 (ref 12–20)
CALCIUM SERPL-MCNC: 9.5 MG/DL (ref 8.5–10.1)
CHLORIDE SERPL-SCNC: 105 MMOL/L (ref 100–111)
CO2 SERPL-SCNC: 30 MMOL/L (ref 21–32)
CREAT SERPL-MCNC: 0.85 MG/DL (ref 0.6–1.3)
DIFFERENTIAL METHOD BLD: ABNORMAL
EOSINOPHIL # BLD: 0.3 K/UL (ref 0–0.4)
EOSINOPHIL NFR BLD: 5 % (ref 0–5)
ERYTHROCYTE [DISTWIDTH] IN BLOOD BY AUTOMATED COUNT: 14.1 % (ref 11.6–14.5)
GLOBULIN SER CALC-MCNC: 2.8 G/DL (ref 2–4)
GLUCOSE BLD STRIP.AUTO-MCNC: 106 MG/DL (ref 70–110)
GLUCOSE BLD STRIP.AUTO-MCNC: 148 MG/DL (ref 70–110)
GLUCOSE BLD STRIP.AUTO-MCNC: 94 MG/DL (ref 70–110)
GLUCOSE BLD STRIP.AUTO-MCNC: 99 MG/DL (ref 70–110)
GLUCOSE SERPL-MCNC: 88 MG/DL (ref 74–99)
HCT VFR BLD AUTO: 31.5 % (ref 35–45)
HGB BLD-MCNC: 9.7 G/DL (ref 12–16)
LYMPHOCYTES # BLD: 2.3 K/UL (ref 0.9–3.6)
LYMPHOCYTES NFR BLD: 38 % (ref 21–52)
MAGNESIUM SERPL-MCNC: 2 MG/DL (ref 1.6–2.6)
MCH RBC QN AUTO: 28.4 PG (ref 24–34)
MCHC RBC AUTO-ENTMCNC: 30.8 G/DL (ref 31–37)
MCV RBC AUTO: 92.1 FL (ref 78–100)
MONOCYTES # BLD: 0.6 K/UL (ref 0.05–1.2)
MONOCYTES NFR BLD: 10 % (ref 3–10)
NEUTS SEG # BLD: 2.8 K/UL (ref 1.8–8)
NEUTS SEG NFR BLD: 47 % (ref 40–73)
PHOSPHATE SERPL-MCNC: 6.2 MG/DL (ref 2.5–4.9)
PLATELET # BLD AUTO: 338 K/UL (ref 135–420)
PMV BLD AUTO: 9.9 FL (ref 9.2–11.8)
POTASSIUM SERPL-SCNC: 4.5 MMOL/L (ref 3.5–5.5)
PROT SERPL-MCNC: 6.5 G/DL (ref 6.4–8.2)
RBC # BLD AUTO: 3.42 M/UL (ref 4.2–5.3)
SODIUM SERPL-SCNC: 139 MMOL/L (ref 136–145)
WBC # BLD AUTO: 6.1 K/UL (ref 4.6–13.2)

## 2021-10-23 PROCEDURE — 74011250637 HC RX REV CODE- 250/637: Performed by: HOSPITALIST

## 2021-10-23 PROCEDURE — 82962 GLUCOSE BLOOD TEST: CPT

## 2021-10-23 PROCEDURE — 94640 AIRWAY INHALATION TREATMENT: CPT

## 2021-10-23 PROCEDURE — 74011000250 HC RX REV CODE- 250: Performed by: INTERNAL MEDICINE

## 2021-10-23 PROCEDURE — 82140 ASSAY OF AMMONIA: CPT

## 2021-10-23 PROCEDURE — 97116 GAIT TRAINING THERAPY: CPT

## 2021-10-23 PROCEDURE — 65660000000 HC RM CCU STEPDOWN

## 2021-10-23 PROCEDURE — 94760 N-INVAS EAR/PLS OXIMETRY 1: CPT

## 2021-10-23 PROCEDURE — 83735 ASSAY OF MAGNESIUM: CPT

## 2021-10-23 PROCEDURE — 74011250637 HC RX REV CODE- 250/637: Performed by: INTERNAL MEDICINE

## 2021-10-23 PROCEDURE — 77010033678 HC OXYGEN DAILY

## 2021-10-23 PROCEDURE — 97535 SELF CARE MNGMENT TRAINING: CPT

## 2021-10-23 PROCEDURE — 2709999900 HC NON-CHARGEABLE SUPPLY

## 2021-10-23 PROCEDURE — 74011250637 HC RX REV CODE- 250/637: Performed by: PSYCHIATRY & NEUROLOGY

## 2021-10-23 PROCEDURE — 74011250636 HC RX REV CODE- 250/636: Performed by: INTERNAL MEDICINE

## 2021-10-23 PROCEDURE — 71045 X-RAY EXAM CHEST 1 VIEW: CPT

## 2021-10-23 PROCEDURE — 74011250636 HC RX REV CODE- 250/636: Performed by: FAMILY MEDICINE

## 2021-10-23 PROCEDURE — 80053 COMPREHEN METABOLIC PANEL: CPT

## 2021-10-23 PROCEDURE — 36415 COLL VENOUS BLD VENIPUNCTURE: CPT

## 2021-10-23 PROCEDURE — 84100 ASSAY OF PHOSPHORUS: CPT

## 2021-10-23 PROCEDURE — 85025 COMPLETE CBC W/AUTO DIFF WBC: CPT

## 2021-10-23 PROCEDURE — 74011250637 HC RX REV CODE- 250/637: Performed by: FAMILY MEDICINE

## 2021-10-23 RX ORDER — DIAZEPAM 5 MG/1
2.5 TABLET ORAL 2 TIMES DAILY
Status: DISCONTINUED | OUTPATIENT
Start: 2021-10-23 | End: 2021-10-26

## 2021-10-23 RX ORDER — AMOXICILLIN AND CLAVULANATE POTASSIUM 400; 57 MG/5ML; MG/5ML
400 POWDER, FOR SUSPENSION ORAL EVERY 12 HOURS
Status: DISCONTINUED | OUTPATIENT
Start: 2021-10-23 | End: 2021-10-27

## 2021-10-23 RX ORDER — DIAZEPAM 5 MG/1
5 TABLET ORAL 2 TIMES DAILY
Status: DISCONTINUED | OUTPATIENT
Start: 2021-10-23 | End: 2021-10-23

## 2021-10-23 RX ORDER — CLONAZEPAM 0.5 MG/1
2 TABLET ORAL 2 TIMES DAILY
Status: DISCONTINUED | OUTPATIENT
Start: 2021-10-23 | End: 2021-11-03

## 2021-10-23 RX ADMIN — QUETIAPINE FUMARATE 200 MG: 200 TABLET ORAL at 09:35

## 2021-10-23 RX ADMIN — FAMOTIDINE 20 MG: 20 TABLET ORAL at 20:44

## 2021-10-23 RX ADMIN — DIPHENHYDRAMINE HYDROCHLORIDE 12.5 MG: 25 SOLUTION ORAL at 05:04

## 2021-10-23 RX ADMIN — BUDESONIDE 500 MCG: 0.5 INHALANT RESPIRATORY (INHALATION) at 07:59

## 2021-10-23 RX ADMIN — FOLIC ACID 1 MG: 1 TABLET ORAL at 09:34

## 2021-10-23 RX ADMIN — ARFORMOTEROL TARTRATE 15 MCG: 15 SOLUTION RESPIRATORY (INHALATION) at 19:33

## 2021-10-23 RX ADMIN — HYDROMORPHONE HYDROCHLORIDE 1 MG: 1 INJECTION, SOLUTION INTRAMUSCULAR; INTRAVENOUS; SUBCUTANEOUS at 13:20

## 2021-10-23 RX ADMIN — Medication 1 TABLET: at 09:35

## 2021-10-23 RX ADMIN — HYDROMORPHONE HYDROCHLORIDE 1 MG: 1 INJECTION, SOLUTION INTRAMUSCULAR; INTRAVENOUS; SUBCUTANEOUS at 17:37

## 2021-10-23 RX ADMIN — QUETIAPINE FUMARATE 300 MG: 200 TABLET ORAL at 20:44

## 2021-10-23 RX ADMIN — DIAZEPAM 2.5 MG: 5 TABLET ORAL at 09:34

## 2021-10-23 RX ADMIN — CLONAZEPAM 2 MG: 0.5 TABLET ORAL at 20:44

## 2021-10-23 RX ADMIN — ARFORMOTEROL TARTRATE 15 MCG: 15 SOLUTION RESPIRATORY (INHALATION) at 07:59

## 2021-10-23 RX ADMIN — DIAZEPAM 2.5 MG: 5 TABLET ORAL at 20:44

## 2021-10-23 RX ADMIN — HYDROMORPHONE HYDROCHLORIDE 1 MG: 1 INJECTION, SOLUTION INTRAMUSCULAR; INTRAVENOUS; SUBCUTANEOUS at 04:12

## 2021-10-23 RX ADMIN — LORAZEPAM 2 MG: 2 INJECTION INTRAMUSCULAR at 13:20

## 2021-10-23 RX ADMIN — BUDESONIDE 500 MCG: 0.5 INHALANT RESPIRATORY (INHALATION) at 19:33

## 2021-10-23 RX ADMIN — ENOXAPARIN SODIUM 40 MG: 100 INJECTION SUBCUTANEOUS at 17:38

## 2021-10-23 RX ADMIN — NYSTATIN: 100000 POWDER TOPICAL at 09:36

## 2021-10-23 RX ADMIN — NYSTATIN: 100000 POWDER TOPICAL at 17:39

## 2021-10-23 RX ADMIN — FAMOTIDINE 20 MG: 20 TABLET ORAL at 09:35

## 2021-10-23 RX ADMIN — DIPHENHYDRAMINE HYDROCHLORIDE 12.5 MG: 25 SOLUTION ORAL at 20:45

## 2021-10-23 RX ADMIN — AMOXICILLIN AND CLAVULANATE POTASSIUM 400 MG: 400; 57 POWDER, FOR SUSPENSION ORAL at 20:44

## 2021-10-23 RX ADMIN — HYDROMORPHONE HYDROCHLORIDE 1 MG: 1 INJECTION, SOLUTION INTRAMUSCULAR; INTRAVENOUS; SUBCUTANEOUS at 09:34

## 2021-10-23 RX ADMIN — CLONAZEPAM 2 MG: 0.5 TABLET ORAL at 09:35

## 2021-10-23 RX ADMIN — Medication 5 MG: at 20:45

## 2021-10-23 RX ADMIN — NYSTATIN: 100000 POWDER TOPICAL at 20:46

## 2021-10-23 RX ADMIN — LACTULOSE 15 ML: 10 SOLUTION ORAL at 09:36

## 2021-10-23 RX ADMIN — THIAMINE HCL TAB 100 MG 100 MG: 100 TAB at 09:36

## 2021-10-23 NOTE — PROGRESS NOTES
Problem: Mobility Impaired (Adult and Pediatric)  Goal: *Acute Goals and Plan of Care (Insert Text)  Description: Physical Therapy Goals  Initiated 10/19/2021 and to be accomplished within 7 day(s)  1. Patient will move from supine to sit and sit to supine  in bed with supervision/set-up. 2.  Patient will transfer from bed to chair and chair to bed with minimal assistance/contact guard assist using the least restrictive device. 3.  Patient will perform sit to stand with minimal assistance/contact guard assist.  4.  Patient will ambulate with minimal assistance/contact guard assist for 20 feet with the least restrictive device. PLOF: pt was independent with mobility without an assistive device     Outcome: Progressing Towards Goal   physical Therapy TREATMENT    Patient: Faith Hsu (89 y.o. female)  Date: 10/22/2021  Diagnosis: Drug overdose, intentional self-harm, initial encounter ([facility] [address] Gustabo Tompkins  Left wrist fracture, with delayed healing, subsequent encounter [S62.102G] Drug overdose, intentional self-harm, initial encounter ([facility]  Procedure(s) (LRB):  TRACHEOSTOMY, PT IS IN ICU BED 4 (N/A) 30 Days Post-Op  Precautions: Fall, Contact   Chart, physical therapy assessment, plan of care and goals were reviewed. ASSESSMENT:  Pt found with sitter at bedside, fluctuating between restful and extreme anxiety. After transition to EOB, pt attempts to talk and beings making sounds (goosel-corwin), without digital occlusion. Upon visual assessment large amount of dried secretions were observed in trach canula. RN, RN manager and RT. Made aware of situation and concerns. Pt is very focused on wanting meds for anxiety, but respond fairly to verbal cues to slow respiration. (92-98% on Trach collar 28%). Session uses to perform 4 sit to stand transfers, Chair transfers and light ambulation (HHA for all) HR range of  BPM. RN medicates pt for anxiety, after bed swapped and pt was returned to bed.  Will advance activity as tolerated/appropriate. Progression toward goals:  []      Improving appropriately and progressing toward goals  [x]      Improving slowly and progressing toward goals  []      Not making progress toward goals and plan of care will be adjusted     PLAN:  Patient continues to benefit from skilled intervention to address the above impairments. Continue treatment per established plan of care. Discharge Recommendations:  Rehab  Further Equipment Recommendations for Discharge:  rolling walker     SUBJECTIVE:   Patient stated \"Seroquel!'    OBJECTIVE DATA SUMMARY:   Critical Behavior:  Neurologic State: Confused  Orientation Level: Oriented to person  Cognition: Decreased attention/concentration, Decreased command following  Safety/Judgement: Decreased awareness of environment, Decreased awareness of need for assistance, Decreased awareness of need for safety, Decreased insight into deficits  Functional Mobility Training:  Bed Mobility:  Rolling: Contact guard assistance  Supine to Sit: Contact guard assistance  Sit to Supine: Contact guard assistance  Scooting: Contact guard assistance  Transfers:  Sit to Stand: Contact guard assistance  Stand to Sit: Contact guard assistance  Bed to Chair: Contact guard assistance;Minimum assistance  Balance:  Sitting: Impaired  Sitting - Static: Good (unsupported)  Sitting - Dynamic: Fair (occasional)  Standing: Impaired; With support  Standing - Static: Fair  Standing - Dynamic : Poor  Ambulation/Gait Training:  Distance (ft): 10 Feet (ft) (5+5)  Assistive Device: Gait belt (HHA)  Ambulation - Level of Assistance: Contact guard assistance (HHA)  Gait Abnormalities: Ataxic;Decreased step clearance  Step Length: Left shortened;Right shortened  Pain:  Pain Scale 1: Numeric (0 - 10)  Pain Intensity 1: 10  Pain out: 10   Activity Tolerance:   Fair  Please refer to the flowsheet for vital signs taken during this treatment.   After treatment:   [] Patient left in no apparent distress sitting up in chair  [x] Patient left in no apparent distress in bed  [x] Call bell left within reach  [x] Nursing notified  [x] Sitter present  [x] Bed alarm activated (UE sestraints on)      Jacoby Blackman PTA   Time Calculation: 57 mins

## 2021-10-23 NOTE — PROGRESS NOTES
Advance Care Planning   Advance Care Planning Inpatient Note  [facility] Department    Today's Date: 10/23/2021  Unit: THE [address] CARDIAC/MEDICAL    Received request from family. Upon review of chart and communication with care team, Spiritual Care will defer Advance Care planning with patient at this time. Patient was/were present in the room during visit. Goals of ACP Conversation:  Discuss Advance Care planning documents    Health Care Decision Makers:      Primary Decision Maker: Jose G Buenrostro - [phone]      Summary:  Documented Next of Kin, per patient report    Advance Care Planning Documents (Patient Wishes) on file:  None     Assessment:    Pt is unable to give consent at this time. Request came from her sister who is not the next of kin and was seeking to become POA. Interventions:  Clarified with staff pt's ability to make decisions at this time.      Care Preferences Communicated:  No    Outcomes/Plan:  ACP Discussion Refused    Lynn Lee on 10/23/2021 at 3:51 PM

## 2021-10-23 NOTE — PROGRESS NOTES
Problem: Self Care Deficits Care Plan (Adult)  Goal: *Acute Goals and Plan of Care (Insert Text)  Description: Occupational Therapy Goals  Initiated 10/19/2021 within 3 day(s). 1.  Patient will perform grooming with minimal assistance/contact guard assist seated EOB. 2.  Patient will perform upper body dressing with supervision/set-up. 3.  Patient will participate in upper extremity therapeutic exercise/activities with minimal assistance/contact guard assist for 10 minutes. 4.  Patient will utilize energy conservation techniques during functional activities with verbal, visual, and tactile cues. Outcome: Progressing Towards Goal    OCCUPATIONAL THERAPY TREATMENT    Patient: Mike Warner (57 y.o. female)  Date: 10/23/2021  Diagnosis: Drug overdose, intentional self-harm, initial encounter ([facility] [address] Arthurine Deem  Left wrist fracture, with delayed healing, subsequent encounter [S62.102G] Drug overdose, intentional self-harm, initial encounter ([facility] [address]  Procedure(s) (LRB):  TRACHEOSTOMY, PT IS IN ICU BED 4 (N/A) 31 Days Post-Op  Precautions: Fall, Contact  PLOF:     Chart, occupational therapy assessment, plan of care, and goals were reviewed. ASSESSMENT:  Pt presented long sitting in bed with eyes closed, easily aroused with verbal cues. Pt participate with grooming activities and AROM exercises at bed level. Pt anxious throughout the session, requires frequent verbal and tactile cues to redirect and participate with activity. Pt was left long sitting in bed at the end of session in NAD. Progression toward goals:  []          Improving appropriately and progressing toward goals  [x]          Improving slowly and progressing toward goals  []          Not making progress toward goals and plan of care will be adjusted     PLAN:  Patient continues to benefit from skilled intervention to address the above impairments. Continue treatment per established plan of care.   Discharge Recommendations:  Skilled Nursing Facility  Further Equipment Recommendations for Discharge:  N/A     SUBJECTIVE:   Patient stated .    OBJECTIVE DATA SUMMARY:   Cognitive/Behavioral Status:  Neurologic State: Confused  Orientation Level: Oriented to person  Cognition: Decreased command following, Decreased attention/concentration, Impulsive, Poor safety awareness  Safety/Judgement: Decreased awareness of environment, Decreased awareness of need for assistance, Decreased awareness of need for safety, Decreased insight into deficits, Fall prevention, Lack of insight into deficits    Functional Mobility and Transfers for ADLs:   Bed Mobility:  Supine to Sit: Independent  Sit to Supine: Independent   Transfers:  Sit to Stand: Independent  Stand to Sit: Contact guard assistance  Balance:  Sitting: Intact  Standing: Intact  Standing - Static: Fair  Standing - Dynamic : Fair (-)  ADL Intervention:  Grooming  Grooming Assistance: Minimum assistance  Position Performed: Long sitting on bed  Brushing Teeth: Minimum assistance  Applying Makeup: Minimum assistance  Brushing/Combing Hair: Moderate assistance  Cues: Tactile cues provided;Verbal cues provided;Physical assistance    Cognitive Retraining  Safety/Judgement: Decreased awareness of environment;Decreased awareness of need for assistance;Decreased awareness of need for safety;Decreased insight into deficits; Fall prevention;Lack of insight into deficits    UE Therapeutic Exercises:   AROM exercises for BUEs in all planes x5. Minimum 1-2 rest breaks required with each activity. Pain:  Pain level pre-treatment: 00/10   Pain level post-treatment: 0/10  Pain Intervention(s): Medication (see MAR); Rest, Ice, Repositioning   Response to intervention: Nurse notified, See doc flow    Activity Tolerance:    Fair     Please refer to the flowsheet for vital signs taken during this treatment.   After treatment:   []  Patient left in no apparent distress sitting up in chair  [x]  Patient left in no apparent distress in bed  [x]  Call bell left within reach  [x]  Nursing notified  [x]  Sitter present  []  Bed alarm activated    COMMUNICATION/EDUCATION:   [x] Role of Occupational Therapy in the acute care setting  [] Home safety education was provided and the patient/caregiver indicated understanding. [] Patient/family have participated as able in working towards goals and plan of care. [] Patient/family agree to work toward stated goals and plan of care. [] Patient understands intent and goals of therapy, but is neutral about his/her participation. [x] Patient is unable to participate in goal setting and plan of care.       Thank you for this referral.  Rajeev Bourgeois, OTR/L  Time Calculation: 16 mins

## 2021-10-23 NOTE — PROGRESS NOTES
COMPLETE TRACH CARE GIVEN. CLEANED WITH NORMAL SALINE. INNER CANNULA AND TRACH TIES REPLACED. GAUZE APPLIED TO SITE.

## 2021-10-23 NOTE — ROUTINE PROCESS
Bedside and Verbal shift change report given to Hardy Taylor (oncoming nurse) by Brooke Bailey RN (offgoing nurse). Report included the following information SBAR, Kardex, MAR, Recent Results and Cardiac Rhythm .

## 2021-10-23 NOTE — PROGRESS NOTES
Problem: Non-Violent Restraints  Goal: Removal from restraints as soon as assessed to be safe  Outcome: Progressing Towards Goal  Goal: No harm/injury to patient while restraints in use  Outcome: Progressing Towards Goal  Goal: Patient's dignity will be maintained  Outcome: Progressing Towards Goal  Goal: Patient Interventions  Outcome: Progressing Towards Goal     Problem: Falls - Risk of  Goal: *Absence of Falls  Description: Document Green Salvia Fall Risk and appropriate interventions in the flowsheet.   Outcome: Progressing Towards Goal  Note: Fall Risk Interventions:  Mobility Interventions: Bed/chair exit alarm    Mentation Interventions: Adequate sleep, hydration, pain control, Door open when patient unattended    Medication Interventions: Bed/chair exit alarm    Elimination Interventions: Bed/chair exit alarm    History of Falls Interventions: Bed/chair exit alarm

## 2021-10-23 NOTE — PROGRESS NOTES
Hospitalist Progress Note    Patient: Alpha Severe MRN: [id number]  CSN: [id number]    YOB: 1980  Age: 39 y.o. Sex: female    DOA: 9/11/2021 LOS:  LOS: 41 days          Chief Complaint: Ac resp failure      Assessment/Plan     Silvia Goldstein is a 39 y.o. female who is standing history of multidrug use/abuse, previous drug-seeking behavior and mental health issues otherwise unspecified arrives via EMS with acute metabolic encephalopathy and lethargy. Admitted for likely gabapentin overdose. She was intubated in ER, ct head no acute issue, LP done due to fever even on abx, no meningitis noted. Now she has peg and trach         Acute respiratory failure with hypoxia   Intubated on 9/12    Trach on 9/20/21. Continue breathing tx . Local trach care, culture sputum, increase trach care as recommended by ENT  Start empiric augmentin     bacteremia -completed treatment     Anemia  stable     Acute metabolic encephalopathy   Ct head no acute process   Continued delerium, confusion with underlying psych disorder, is there anoxia?   She requires sedatives, benzos, seroquel otherwise she is agitated and even more delerious     Urinary retention   zamora      elevated ammonia level  Resolved, stop lactulose     Psychosis , hx of  Ekbom's delusional parasitosis    On  Klonopin, Seroquel, halodol prn per psych recommendation   Still need ativan and sister      left wrist fracture: immobilized -poa -stable   Appreciated ortho on board-f/u with Dr. Alon Amlonte as out-pt      Sitter    Peg feedings, trach care    Dispo:TBD barriers to discharge as per care management             Disposition :  Patient Active Problem List   Diagnosis Code    Dextromethorphan use disorder, moderate ([address] F19.20    Ekbom's delusional parasitosis ([facility] [address] F22    Left wrist fracture, with delayed healing, subsequent encounter S62.102G    Drug overdose, intentional self-harm, initial encounter ([facility] [address] T50.902A    Hypoxia R09.02  Hypotension after procedure I95.81    Acute metabolic encephalopathy M04.87    Psychosis (HCC) F29    Hyponatremia E87.1    Acute respiratory failure with hypoxia (HCC) J96.01    Increased ammonia level R79.89    Hypokalemia E87.6    Status post tracheostomy ([facility] [address] Z93.0    Bacteremia due to Gram-positive bacteria R78.81    FELICITY (acute kidney injury) ([facility] [address] N17.9       Subjective:  She is delerious  She has a sitter in room  She tries to sit up she is not cognizant to follow commands      Review of systems:    UTO      Vital signs/Intake and Output:  Visit Vitals  /62   Pulse 100   Temp 98 °F (36.7 °C)   Resp 16   Ht 5' 2\" (1.575 m)   Wt 72.6 kg (160 lb 0.9 oz)   SpO2 100%   BMI 29.27 kg/m²     Current Shift:  10/23 0701 - 10/23 1900  In: 700   Out: 500 [Urine:500]  Last three shifts:  10/21 1901 - 10/23 0700  In: 1000   Out: 2500 [Urine:2500]    Exam:    General: ill appearing debiliatted Wf anxious, delerious, NAD  trach  Head/Neck: NCAT, supple, No masses, No lymphadenopathy  CVS:Regular rate and rhythm, no M/R/G, S1/S2 heard, no thrill  Lungs:Coarse BS  Abdomen: Soft, Nontender, No distention, peg tube  Extremities: No C/C/E, pulses palpable 2+  Neuro:grossly normal , does not follow comamnds                Labs: Results:       Chemistry Recent Labs     10/23/21  0615 10/22/21  0526 10/21/21  0534   GLU 88 118* 109*    140 140   K 4.5 4.3 4.3    105 108   CO2 30 28 28   BUN 22* 21* 16   CREA 0.85 0.84 0.99   CA 9.5 9.3 9.8   AGAP 4 7 4   BUCR 26* 25* 16    98 102   TP 6.5 6.5 6.9   ALB 3.7 3.5 3.8   GLOB 2.8 3.0 3.1   AGRAT 1.3 1.2 1.2      CBC w/Diff Recent Labs     10/23/21  0615 10/22/21  0526 10/21/21  0534   WBC 6.1 6.7 7.3   RBC 3.42* 3.38* 3.38*   HGB 9.7* 9.8* 9.7*   HCT 31.5* 31.3* 30.4*    339 337   GRANS 47 59 61   LYMPH 38 29 28   EOS 5 4 3      Cardiac Enzymes No results for input(s): CPK, CKND1, ZENON in the last 72 hours.     No lab exists for component: CKRMB, TROIP   Coagulation No results for input(s): PTP, INR, APTT, INREXT in the last 72 hours. Lipid Panel No results found for: CHOL, CHOLPOCT, CHOLX, CHLST, CHOLV, 387673, HDL, HDLP, LDL, LDLC, DLDLP, 985172, VLDLC, VLDL, TGLX, TRIGL, TRIGP, TGLPOCT, CHHD, CHHDX   BNP No results for input(s): BNPP in the last 72 hours.    Liver Enzymes Recent Labs     10/23/21  0615   TP 6.5   ALB 3.7         Thyroid Studies No results found for: T4, T3U, TSH, TSHEXT     Procedures/imaging: see electronic medical records for all procedures/Xrays and details which were not copied into this note but were reviewed prior to creation of Ples MD Miller

## 2021-10-23 NOTE — CONSULTS
[address] [facility]    Name:  Lori Strange  MR#:   [id number]  :  1980  ACCOUNT #:  [de-identified]  DATE OF SERVICE:  10/22/2021      REASON FOR CONSULTATION:  Stridor. HISTORY OF PRESENT ILLNESS:  The patient is a 80-year-old female, who is well-known to me. The patient had undergone on a tracheotomy, placed on 2021. The patient has had a past medical history significant for multi-drug abuse with  mental health issues, who arrived to [facility] via EMS with metabolic encephalopathy and lethargy. The patient was intubated in the emergency room, which remained on vent for approximately 10 days where she underwent tracheotomy, performed by me on 2021. The patient had some significant improvement in her symptoms, was extubated and taken off vent, however, maintained on tracheotomy. The patient has had some difficulties with significant bacteremia, anemia, as well as urinary retention, psychosis, and the left wrist fracture. In any event, the patient was in usual state of health, on the floor today when she developed some significant stridor. I was contacted in my office. Apparently, the patient was found to have potential abnormality of the trach; however, this is uncertain since inner cannula had been reportedly removed in the past and there was inability to pass suction catheter, some question whether the patient may have an occlusion in her trachea itself causing significant stridor. In the event, ENT consultation requested for evaluation regarding the above. ALLERGIES:  TORADOL. MEDICATION USE:  Includes,  1. Francoise Pepper. 2.  Pulmicort. 3.  Klonopin. 4.  Lovenox. 5.  Pepcid. 6.  Duragesic. 7.  Dilaudid. 8.  Ativan. 9.  Zofran. 10.  Seroquel. PAST MEDICAL HISTORY:  Includes cocaine abuse, mental and behavioral problems, pancreatitis, psychosis, bilateral ovarian cysts, and asthma.     PAST SURGICAL HISTORY:  The patient has had a D and C and hysterectomy, history of gastrostomy tube as well as tracheotomy. SOCIAL HISTORY:  The patient admits to smoking one pack of cigarettes per day. No history of alcohol use is noted. PHYSICAL EXAMINATION:  GENERAL:  Reveals a well-developed, well-nourished 44-year-old female, who is presently in restraints for the upper extremities. She does appear to be alert; however, non-oriented. HEENT:  The intraoral exam is within normal limits. NECK:  The neck exam reveals the #8 Shiley trach tube in place. The inner cannula was removed, was noted to be completely occluded with old debris noted in the inner cannula. The patient's breathing improved significantly just with this alone. The patient was known to have some significant mucopurulent drainage in her trachea, which is expectorate. Suction was also performed at this point. Significant pulmonary toilet noted. The #8 cuffed Shiley trach tube was removed and an #8 uncuffed Shiley trach tube was then replaced with good effect. Good air exchange was seen. Flexible fiberoptic tracheostomy was performed. No evidence of any abnormality was noted in the trachea itself. No evidence of any other significant findings are noted. Of note, the patient does have some granulation tissue of the tracheal inlet most likely secondary to irritation from the trach tube itself. IMPRESSION:  Stridor secondary to significant occlusion of the tracheotomy tube by secretions. PLAN:  Trach change; however, I strongly recommend increased trach care as well as maintenance of a humidified trach collar. The patient may also need culture and sensitivity of significant secretions expectorated. The patient is noted to have significant secretions continuing. May need more frequent changes of her inner cannula. Thank you for consultation.       MD PAYAL Small/[email or web address]/[email or web address]  D:  10/22/2021 19:46  T:  10/22/2021 23:14  JOB #:  [id number]

## 2021-10-23 NOTE — ROUTINE PROCESS
Bedside and Verbal shift change report given to Karthikeyan Walker RN (oncoming nurse) by Surendra Frances RN (offgoing nurse). Report included the following information SBAR and Kardex.

## 2021-10-23 NOTE — PROGRESS NOTES
200 Pt Sister at bedside. Stated pt wants to switch POA to her sister. Paged  and made aware and told he will be here by 3 pm. Sister made aware. [address] at bedside.

## 2021-10-23 NOTE — PROGRESS NOTES
Problem: Mobility Impaired (Adult and Pediatric)  Goal: *Acute Goals and Plan of Care (Insert Text)  Description: Physical Therapy Goals  Initiated 10/19/2021 and to be accomplished within 7 day(s)  1. Patient will move from supine to sit and sit to supine  in bed with supervision/set-up. 2.  Patient will transfer from bed to chair and chair to bed with minimal assistance/contact guard assist using the least restrictive device. 3.  Patient will perform sit to stand with minimal assistance/contact guard assist.  4.  Patient will ambulate with minimal assistance/contact guard assist for 20 feet with the least restrictive device. PLOF: pt was independent with mobility without an assistive device     Outcome: Progressing Towards Goal   PHYSICAL THERAPY TREATMENT    Patient: Sudhakar Rao (77 y.o. female)  Date: 10/23/2021  Diagnosis: Drug overdose, intentional self-harm, initial encounter ([facility] Charlann Heritage  Left wrist fracture, with delayed healing, subsequent encounter [S62.102G] Drug overdose, intentional self-harm, initial encounter ([facility]  Procedure(s) (LRB):  TRACHEOSTOMY, PT IS IN ICU BED 4 (N/A) 31 Days Post-Op  Precautions: Fall, Contact  PLOF: Independent, ambulatory without AD    ASSESSMENT:  Pt eager to get up. No assistance needed to sit up EOB or sit to stand. Pt is impulsive requiring constant VC and guarding. Ambulated 20ft with HHA, LOB 1x requiring Tanvi to correct. RT arrived, assisted pt back to bed and restraints secured. Progression toward goals:   []      Improving appropriately and progressing toward goals  [x]      Improving slowly and progressing toward goals  []      Not making progress toward goals and plan of care will be adjusted     PLAN:  Patient continues to benefit from skilled intervention to address the above impairments. Continue treatment per established plan of care. Discharge Recommendations:   To Be Determined  Further Equipment Recommendations for Discharge:  JODY SUBJECTIVE:   Patient stated I need my medicine.     OBJECTIVE DATA SUMMARY:   Critical Behavior:  Neurologic State: Confused  Orientation Level: Oriented to person  Cognition: Impaired decision making, Poor safety awareness, Decreased attention/concentration  Safety/Judgement: Decreased awareness of environment, Decreased awareness of need for assistance, Decreased awareness of need for safety, Decreased insight into deficits  Functional Mobility Training:  Bed Mobility:    Supine to Sit: Independent  Sit to Supine: Independent  Transfers:  Sit to Stand: Independent  Stand to Sit: Contact guard assistance  Balance:  Sitting: Intact  Standing: Intact  Standing - Static: Fair  Standing - Dynamic : Fair (-)   Ambulation/Gait Training:  Distance (ft): 20 Feet (ft)  Assistive Device:  (HHA)  Ambulation - Level of Assistance: Contact guard assistance;Minimal assistance  Gait Abnormalities: Decreased step clearance  Step Length: Right shortened;Left shortened        Pain:  Pain level pre-treatment: 0/10  Pain level post-treatment: 0/10   Pain Intervention(s): Medication (see MAR); Rest, Ice, Repositioning   Response to intervention: Nurse notified, See doc flow    Activity Tolerance:   Fair  Please refer to the flowsheet for vital signs taken during this treatment. After treatment:   [] Patient left in no apparent distress sitting up in chair  [x] Patient left in no apparent distress in bed  [] Call bell left within reach  [x] Nursing notified  [x] Sitter present  [] Bed alarm activated  [] SCDs applied      COMMUNICATION/EDUCATION:   [x]         Role of Physical Therapy in the acute care setting. [x]         Fall prevention education was provided and the patient/caregiver indicated understanding. [x]         Patient/family have participated as able in working toward goals and plan of care. [x]         Patient/family agree to work toward stated goals and plan of care.   []         Patient understands intent and goals of therapy, but is neutral about his/her participation.   []         Patient is unable to participate in stated goals/plan of care: ongoing with therapy staff.  []         Other:        Shante Iqbal, PTA   Time Calculation: 11 mins

## 2021-10-23 NOTE — PROGRESS NOTES
Problem: Non-Violent Restraints  Goal: Removal from restraints as soon as assessed to be safe  Outcome: Progressing Towards Goal     Problem: Falls - Risk of  Goal: *Absence of Falls  Description: Document David Winston Fall Risk and appropriate interventions in the flowsheet. Outcome: Progressing Towards Goal  Note: Fall Risk Interventions:  Mobility Interventions: Bed/chair exit alarm    Mentation Interventions: Adequate sleep, hydration, pain control    Medication Interventions: Bed/chair exit alarm    Elimination Interventions: Bed/chair exit alarm, Toileting schedule/hourly rounds    History of Falls Interventions: Bed/chair exit alarm         Problem: Pressure Injury - Risk of  Goal: *Prevention of pressure injury  Description: Document Remy Scale and appropriate interventions in the flowsheet.   Outcome: Progressing Towards Goal  Note: Pressure Injury Interventions:  Sensory Interventions: Assess changes in LOC, Avoid rigorous massage over bony prominences    Moisture Interventions: Absorbent underpads    Activity Interventions: Pressure redistribution bed/mattress(bed type)    Mobility Interventions: Pressure redistribution bed/mattress (bed type)    Nutrition Interventions: Document food/fluid/supplement intake    Friction and Shear Interventions: HOB 30 degrees or less                Problem: Patient Education: Go to Patient Education Activity  Goal: Patient/Family Education  Outcome: Progressing Towards Goal     Problem: Patient Education: Go to Patient Education Activity  Goal: Patient/Family Education  Outcome: Progressing Towards Goal

## 2021-10-24 LAB
GLUCOSE BLD STRIP.AUTO-MCNC: 104 MG/DL (ref 70–110)
GLUCOSE BLD STRIP.AUTO-MCNC: 121 MG/DL (ref 70–110)
GLUCOSE BLD STRIP.AUTO-MCNC: 94 MG/DL (ref 70–110)

## 2021-10-24 PROCEDURE — 97116 GAIT TRAINING THERAPY: CPT

## 2021-10-24 PROCEDURE — 74011250637 HC RX REV CODE- 250/637: Performed by: INTERNAL MEDICINE

## 2021-10-24 PROCEDURE — 74011250636 HC RX REV CODE- 250/636: Performed by: FAMILY MEDICINE

## 2021-10-24 PROCEDURE — 94640 AIRWAY INHALATION TREATMENT: CPT

## 2021-10-24 PROCEDURE — 74011250637 HC RX REV CODE- 250/637: Performed by: HOSPITALIST

## 2021-10-24 PROCEDURE — 65660000000 HC RM CCU STEPDOWN

## 2021-10-24 PROCEDURE — 97530 THERAPEUTIC ACTIVITIES: CPT

## 2021-10-24 PROCEDURE — 82962 GLUCOSE BLOOD TEST: CPT

## 2021-10-24 PROCEDURE — 74011250637 HC RX REV CODE- 250/637: Performed by: PSYCHIATRY & NEUROLOGY

## 2021-10-24 PROCEDURE — 77010033678 HC OXYGEN DAILY

## 2021-10-24 PROCEDURE — 74011250637 HC RX REV CODE- 250/637: Performed by: FAMILY MEDICINE

## 2021-10-24 PROCEDURE — 74011000250 HC RX REV CODE- 250: Performed by: INTERNAL MEDICINE

## 2021-10-24 PROCEDURE — 2709999900 HC NON-CHARGEABLE SUPPLY

## 2021-10-24 PROCEDURE — 74011250636 HC RX REV CODE- 250/636: Performed by: INTERNAL MEDICINE

## 2021-10-24 RX ORDER — HYDROXYZINE 25 MG/1
25 TABLET, FILM COATED ORAL
Status: DISCONTINUED | OUTPATIENT
Start: 2021-10-24 | End: 2021-11-13 | Stop reason: HOSPADM

## 2021-10-24 RX ADMIN — DIAZEPAM 2.5 MG: 5 TABLET ORAL at 20:17

## 2021-10-24 RX ADMIN — BUDESONIDE 500 MCG: 0.5 INHALANT RESPIRATORY (INHALATION) at 08:00

## 2021-10-24 RX ADMIN — ONDANSETRON 4 MG: 2 INJECTION INTRAMUSCULAR; INTRAVENOUS at 11:17

## 2021-10-24 RX ADMIN — ARFORMOTEROL TARTRATE 15 MCG: 15 SOLUTION RESPIRATORY (INHALATION) at 20:42

## 2021-10-24 RX ADMIN — ARFORMOTEROL TARTRATE 15 MCG: 15 SOLUTION RESPIRATORY (INHALATION) at 08:00

## 2021-10-24 RX ADMIN — DIPHENHYDRAMINE HYDROCHLORIDE, ZINC ACETATE: 2; .1 CREAM TOPICAL at 14:51

## 2021-10-24 RX ADMIN — QUETIAPINE FUMARATE 200 MG: 200 TABLET ORAL at 10:49

## 2021-10-24 RX ADMIN — FAMOTIDINE 20 MG: 20 TABLET ORAL at 10:49

## 2021-10-24 RX ADMIN — DIAZEPAM 2.5 MG: 5 TABLET ORAL at 10:49

## 2021-10-24 RX ADMIN — QUETIAPINE FUMARATE 300 MG: 200 TABLET ORAL at 20:17

## 2021-10-24 RX ADMIN — HYDROXYZINE HYDROCHLORIDE 25 MG: 25 TABLET, FILM COATED ORAL at 17:42

## 2021-10-24 RX ADMIN — NYSTATIN: 100000 POWDER TOPICAL at 20:19

## 2021-10-24 RX ADMIN — Medication 5 MG: at 20:18

## 2021-10-24 RX ADMIN — NYSTATIN: 100000 POWDER TOPICAL at 16:48

## 2021-10-24 RX ADMIN — ENOXAPARIN SODIUM 40 MG: 100 INJECTION SUBCUTANEOUS at 16:48

## 2021-10-24 RX ADMIN — AMOXICILLIN AND CLAVULANATE POTASSIUM 400 MG: 400; 57 POWDER, FOR SUSPENSION ORAL at 10:49

## 2021-10-24 RX ADMIN — CLONAZEPAM 2 MG: 0.5 TABLET ORAL at 20:18

## 2021-10-24 RX ADMIN — HALOPERIDOL LACTATE 5 MG: 5 INJECTION, SOLUTION INTRAMUSCULAR at 07:08

## 2021-10-24 RX ADMIN — DIPHENHYDRAMINE HYDROCHLORIDE 12.5 MG: 25 SOLUTION ORAL at 14:58

## 2021-10-24 RX ADMIN — Medication 1 TABLET: at 10:49

## 2021-10-24 RX ADMIN — AMOXICILLIN AND CLAVULANATE POTASSIUM 400 MG: 400; 57 POWDER, FOR SUSPENSION ORAL at 20:18

## 2021-10-24 RX ADMIN — BUDESONIDE 500 MCG: 0.5 INHALANT RESPIRATORY (INHALATION) at 20:42

## 2021-10-24 RX ADMIN — NYSTATIN: 100000 POWDER TOPICAL at 10:49

## 2021-10-24 RX ADMIN — FAMOTIDINE 20 MG: 20 TABLET ORAL at 20:17

## 2021-10-24 RX ADMIN — THIAMINE HCL TAB 100 MG 100 MG: 100 TAB at 10:49

## 2021-10-24 RX ADMIN — HYDROMORPHONE HYDROCHLORIDE 1 MG: 1 INJECTION, SOLUTION INTRAMUSCULAR; INTRAVENOUS; SUBCUTANEOUS at 20:18

## 2021-10-24 RX ADMIN — CLONAZEPAM 2 MG: 0.5 TABLET ORAL at 10:56

## 2021-10-24 NOTE — PROGRESS NOTES
Assumed pt care approx 0730. Assessments performed, see flowsheet. Pt resting in bed w/sitter at bedside. Bilat wrist restraints remain in place. Pt able to mouth words. Pt reassessed. Pt resting in bed, no distress noted. Sitter remains at bedside. Pt vigorously scratching at abdomen, prn benadryl given. Pt reassessed, no changes noted. Pt continues to scratch, mouthing still itchy. Will notify md.    Order obtained for atarax. Atarax given. No other complaints at this time. Sitter remains at bedside.

## 2021-10-24 NOTE — PROGRESS NOTES
8760-1497 Shift Summary: Pt rested well overnight with no complaints. A sitter remained at the bedside at all times. No clinical concerns noted.      Nightshift Chart Audit Completed

## 2021-10-24 NOTE — ROUTINE PROCESS
Bedside and Verbal shift change report given to Josselyn Johnson RN  (oncoming nurse) by Sammi Go RN  (offgoing nurse). Report given with SBAR, Kardex, Intake/Output and Recent Results.

## 2021-10-24 NOTE — PROGRESS NOTES
Hospitalist Progress Note    Patient: Lyssa Lang MRN: [id number]  CSN: [id number]    YOB: 1980  Age: 39 y.o. Sex: female    DOA: 9/11/2021 LOS:  LOS: 42 days          Chief Complaint:      delerium    Assessment/Plan     Dane Cruz a 39 y.o. female who is standing history of multidrug use/abuse, previous drug-seeking behavior and mental health issues otherwise unspecified arrives via EMS with acute metabolic encephalopathy and lethargy. Admitted for likely gabapentin overdose. She was intubated in ER, ct head no acute issue, LP done due to fever even on abx, no meningitis noted. Now she has peg and trach        Day 43 inpatient     Acute respiratory failure with hypoxia   Intubated on 9/12    Trach on 9/20/21. Local trach care, culture sputum, increase trach care as recommended by ENT  Started empiric augmentin 10/23     bacteremia -completed treatment     Anemia  stable     Acute metabolic encephalopathy   Ct head no acute process   Continued delerium, confusion with underlying psych disorder, is there anoxia?   She requires sedatives, benzos, seroquel otherwise she is agitated and even more delerious     Urinary retention   zamora      elevated ammonia level  Resolved, stop lactulose     Psychosis , hx of  Ekbom's delusional parasitosis    On  Klonopin, Seroquel, halodol prn per psych recommendation   Still needs ativan and sitter in room, restraints as needed to protect lines, trach      left wrist fracture: immobilized -POA -stable   Will have outpatient follow up      Peg feedings, trach care    CXR negative    Dispo: facility, no acceptance yet     Disposition :  Patient Active Problem List   Diagnosis Code    Dextromethorphan use disorder, moderate ([address] F19.20    Ekbom's delusional parasitosis ([facility] F22    Left wrist fracture, with delayed healing, subsequent encounter S62.102G    Drug overdose, intentional self-harm, initial encounter ([address] T50.902A    Hypoxia R09.02  Hypotension after procedure I95.81    Acute metabolic encephalopathy X87.41    Psychosis (HCC) F29    Hyponatremia E87.1    Acute respiratory failure with hypoxia (HCC) J96.01    Increased ammonia level R79.89    Hypokalemia E87.6    Status post tracheostomy ([facility] Z93.0    Bacteremia due to Gram-positive bacteria R78.81    FELICITY (acute kidney injury) ([facility] N17.9       Subjective:    No issues per nurse this am  She is resting quietly  No new events reported    Review of systems:    UTO as patient sleeping      Vital signs/Intake and Output:  Visit Vitals  /67   Pulse 88   Temp 98.3 °F (36.8 °C)   Resp 15   Ht 5' 2\" (1.575 m)   Wt 72.6 kg (160 lb 0.9 oz)   SpO2 100%   BMI 29.27 kg/m²     Current Shift:  No intake/output data recorded. Last three shifts:  10/22 1901 - 10/24 0700  In: 2970 [P.O.:700]  Out: 3000 [Urine:3000]    Exam:    General: resting WF with trach, no signs of distress  Head/Neck:trach collar  CVS:Regular rate and rhythm, no M/R/G, S1/S2 heard, no thrill  Lungs:Clear to auscultation bilaterally, no wheezes, rhonchi, or rales  Abdomen: Soft, Nontender, No distention  Extremities: No C/C/E, pulses palpable 2+  Neuro:grossly normal                Labs: Results:       Chemistry Recent Labs     10/23/21  0615 10/22/21  0526   GLU 88 118*    140   K 4.5 4.3    105   CO2 30 28   BUN 22* 21*   CREA 0.85 0.84   CA 9.5 9.3   AGAP 4 7   BUCR 26* 25*    98   TP 6.5 6.5   ALB 3.7 3.5   GLOB 2.8 3.0   AGRAT 1.3 1.2      CBC w/Diff Recent Labs     10/23/21  0615 10/22/21  0526   WBC 6.1 6.7   RBC 3.42* 3.38*   HGB 9.7* 9.8*   HCT 31.5* 31.3*    339   GRANS 47 59   LYMPH 38 29   EOS 5 4      Cardiac Enzymes No results for input(s): CPK, CKND1, ZENON in the last 72 hours. No lab exists for component: CKRMB, TROIP   Coagulation No results for input(s): PTP, INR, APTT, INREXT in the last 72 hours.     Lipid Panel No results found for: CHOL, CHOLPOCT, CHOLX, [address], CHOLV, 237586, HDL, HDLP, LDL, LDLC, DLDLP, 498945, VLDLC, VLDL, TGLX, TRIGL, TRIGP, TGLPOCT, CHHD, CHHDX   BNP No results for input(s): BNPP in the last 72 hours.    Liver Enzymes Recent Labs     10/23/21  0615   TP 6.5   ALB 3.7         Thyroid Studies No results found for: T4, T3U, TSH, TSHEXT     Procedures/imaging: see electronic medical records for all procedures/Xrays and details which were not copied into this note but were reviewed prior to creation of Serene Beck MD

## 2021-10-24 NOTE — PROGRESS NOTES
10/23/21 2000   Airway - Tracheostomy Tube 09/22/21 Marileeley; Cuffed   Placement Date/Time: 09/22/21 1910   Number of Attempts: 1  Inserted By: Dr. Laith Newell  Present on Admission/Arrival: No  Placement Verified: Auscultation;BBS;EtCO2;Placement not verified (comment)  Airway Types: Endotracheal, cuffed  Trach Tube Type: Sh... Site Assessment Clean, dry, & intact   Trach Cleaned With Normal saline   Trach Tie Changed Yes   Trach Cannula Changed Yes   Inner Cannula #8 Michaela; Uncuffed   Side Secured Centered   Spare Trach at Bedside Yes   Spare Trach Tube One Size Smaller at Bedside Yes   Ambu Bag With Mask at Bedside Yes

## 2021-10-25 LAB
GLUCOSE BLD STRIP.AUTO-MCNC: 101 MG/DL (ref 70–110)
GLUCOSE BLD STRIP.AUTO-MCNC: 125 MG/DL (ref 70–110)

## 2021-10-25 PROCEDURE — 74011000250 HC RX REV CODE- 250: Performed by: INTERNAL MEDICINE

## 2021-10-25 PROCEDURE — 97116 GAIT TRAINING THERAPY: CPT

## 2021-10-25 PROCEDURE — 74011250637 HC RX REV CODE- 250/637: Performed by: HOSPITALIST

## 2021-10-25 PROCEDURE — 74011250636 HC RX REV CODE- 250/636: Performed by: FAMILY MEDICINE

## 2021-10-25 PROCEDURE — 94640 AIRWAY INHALATION TREATMENT: CPT

## 2021-10-25 PROCEDURE — 74011250637 HC RX REV CODE- 250/637: Performed by: FAMILY MEDICINE

## 2021-10-25 PROCEDURE — 77030018798 HC PMP KT ENTRL FED COVD -A

## 2021-10-25 PROCEDURE — 65270000029 HC RM PRIVATE

## 2021-10-25 PROCEDURE — 74011250637 HC RX REV CODE- 250/637: Performed by: INTERNAL MEDICINE

## 2021-10-25 PROCEDURE — 74011250637 HC RX REV CODE- 250/637: Performed by: PSYCHIATRY & NEUROLOGY

## 2021-10-25 PROCEDURE — 87070 CULTURE OTHR SPECIMN AEROBIC: CPT

## 2021-10-25 PROCEDURE — 87186 SC STD MICRODIL/AGAR DIL: CPT

## 2021-10-25 PROCEDURE — 74011250636 HC RX REV CODE- 250/636: Performed by: INTERNAL MEDICINE

## 2021-10-25 PROCEDURE — 97530 THERAPEUTIC ACTIVITIES: CPT

## 2021-10-25 PROCEDURE — 82962 GLUCOSE BLOOD TEST: CPT

## 2021-10-25 PROCEDURE — 87077 CULTURE AEROBIC IDENTIFY: CPT

## 2021-10-25 PROCEDURE — 89220 SPUTUM SPECIMEN COLLECTION: CPT

## 2021-10-25 PROCEDURE — 92507 TX SP LANG VOICE COMM INDIV: CPT

## 2021-10-25 PROCEDURE — 2709999900 HC NON-CHARGEABLE SUPPLY

## 2021-10-25 PROCEDURE — 74011000250 HC RX REV CODE- 250: Performed by: HOSPITALIST

## 2021-10-25 RX ORDER — CHLORHEXIDINE GLUCONATE 1.2 MG/ML
10 RINSE ORAL EVERY 12 HOURS
Status: DISCONTINUED | OUTPATIENT
Start: 2021-10-25 | End: 2021-11-05

## 2021-10-25 RX ORDER — NYSTATIN 100000 [USP'U]/ML
500000 SUSPENSION ORAL 4 TIMES DAILY
Status: DISCONTINUED | OUTPATIENT
Start: 2021-10-25 | End: 2021-11-04

## 2021-10-25 RX ADMIN — Medication 5 MG: at 21:00

## 2021-10-25 RX ADMIN — ENOXAPARIN SODIUM 40 MG: 100 INJECTION SUBCUTANEOUS at 17:20

## 2021-10-25 RX ADMIN — QUETIAPINE FUMARATE 300 MG: 200 TABLET ORAL at 20:11

## 2021-10-25 RX ADMIN — NYSTATIN 500000 UNITS: 100000 SUSPENSION ORAL at 09:56

## 2021-10-25 RX ADMIN — NYSTATIN 500000 UNITS: 100000 SUSPENSION ORAL at 17:20

## 2021-10-25 RX ADMIN — 0.12% CHLORHEXIDINE GLUCONATE 10 ML: 1.2 RINSE ORAL at 20:12

## 2021-10-25 RX ADMIN — LORAZEPAM 2 MG: 2 INJECTION INTRAMUSCULAR at 15:32

## 2021-10-25 RX ADMIN — NYSTATIN 500000 UNITS: 100000 SUSPENSION ORAL at 21:00

## 2021-10-25 RX ADMIN — ARFORMOTEROL TARTRATE 15 MCG: 15 SOLUTION RESPIRATORY (INHALATION) at 07:23

## 2021-10-25 RX ADMIN — NYSTATIN: 100000 POWDER TOPICAL at 21:00

## 2021-10-25 RX ADMIN — NYSTATIN 500000 UNITS: 100000 SUSPENSION ORAL at 14:27

## 2021-10-25 RX ADMIN — DIAZEPAM 2.5 MG: 5 TABLET ORAL at 10:04

## 2021-10-25 RX ADMIN — QUETIAPINE FUMARATE 200 MG: 200 TABLET ORAL at 10:05

## 2021-10-25 RX ADMIN — NYSTATIN: 100000 POWDER TOPICAL at 15:32

## 2021-10-25 RX ADMIN — 0.12% CHLORHEXIDINE GLUCONATE 10 ML: 1.2 RINSE ORAL at 09:55

## 2021-10-25 RX ADMIN — HYDROMORPHONE HYDROCHLORIDE 1 MG: 1 INJECTION, SOLUTION INTRAMUSCULAR; INTRAVENOUS; SUBCUTANEOUS at 10:05

## 2021-10-25 RX ADMIN — DIAZEPAM 2.5 MG: 5 TABLET ORAL at 20:12

## 2021-10-25 RX ADMIN — HYDROMORPHONE HYDROCHLORIDE 1 MG: 1 INJECTION, SOLUTION INTRAMUSCULAR; INTRAVENOUS; SUBCUTANEOUS at 14:27

## 2021-10-25 RX ADMIN — ARFORMOTEROL TARTRATE 15 MCG: 15 SOLUTION RESPIRATORY (INHALATION) at 19:48

## 2021-10-25 RX ADMIN — DIPHENHYDRAMINE HYDROCHLORIDE 12.5 MG: 25 SOLUTION ORAL at 04:17

## 2021-10-25 RX ADMIN — FAMOTIDINE 20 MG: 20 TABLET ORAL at 10:05

## 2021-10-25 RX ADMIN — BUDESONIDE 500 MCG: 0.5 INHALANT RESPIRATORY (INHALATION) at 07:23

## 2021-10-25 RX ADMIN — HYDROMORPHONE HYDROCHLORIDE 1 MG: 1 INJECTION, SOLUTION INTRAMUSCULAR; INTRAVENOUS; SUBCUTANEOUS at 18:48

## 2021-10-25 RX ADMIN — BUDESONIDE 500 MCG: 0.5 INHALANT RESPIRATORY (INHALATION) at 19:49

## 2021-10-25 RX ADMIN — DIPHENHYDRAMINE HYDROCHLORIDE, ZINC ACETATE: 2; .1 CREAM TOPICAL at 05:54

## 2021-10-25 RX ADMIN — THIAMINE HCL TAB 100 MG 100 MG: 100 TAB at 10:05

## 2021-10-25 RX ADMIN — HYDROMORPHONE HYDROCHLORIDE 1 MG: 1 INJECTION, SOLUTION INTRAMUSCULAR; INTRAVENOUS; SUBCUTANEOUS at 04:09

## 2021-10-25 RX ADMIN — HYDROMORPHONE HYDROCHLORIDE 1 MG: 1 INJECTION, SOLUTION INTRAMUSCULAR; INTRAVENOUS; SUBCUTANEOUS at 22:58

## 2021-10-25 RX ADMIN — HALOPERIDOL LACTATE 5 MG: 5 INJECTION, SOLUTION INTRAMUSCULAR at 17:20

## 2021-10-25 RX ADMIN — FAMOTIDINE 20 MG: 20 TABLET ORAL at 20:12

## 2021-10-25 RX ADMIN — AMOXICILLIN AND CLAVULANATE POTASSIUM 400 MG: 400; 57 POWDER, FOR SUSPENSION ORAL at 10:13

## 2021-10-25 RX ADMIN — AMOXICILLIN AND CLAVULANATE POTASSIUM 400 MG: 400; 57 POWDER, FOR SUSPENSION ORAL at 20:11

## 2021-10-25 RX ADMIN — CLONAZEPAM 2 MG: 0.5 TABLET ORAL at 20:11

## 2021-10-25 RX ADMIN — Medication 1 TABLET: at 10:04

## 2021-10-25 NOTE — PROGRESS NOTES
1230 TRANSFER - OUT REPORT:  Verbal report given to Jamie Sears RN (name) on Lyssa Lang  being transferred to [address] [place] [address] (unit) for routine progression of care     Report consisted of patients Situation, Background, Assessment and   Recommendations(SBAR). Information from the following report(s) SBAR, Kardex, Intake/Output, MAR and Recent Results was reviewed with the receiving nurse. Lines:   Peripheral IV 10/17/21 Left Arm (Active)   Site Assessment Clean, dry, & intact 10/25/21 0331   Phlebitis Assessment 0 10/25/21 0331   Infiltration Assessment 0 10/25/21 0331   Dressing Status Clean, dry, & intact 10/25/21 0331   Dressing Type Transparent 10/25/21 0331   Hub Color/Line Status Pink 10/25/21 0331   Action Taken Open ports on tubing capped 10/24/21 1646   Alcohol Cap Used Yes 10/25/21 0331    Opportunity for questions and clarification was provided.     Patient transported with:   Registered Nurse  Tech

## 2021-10-25 NOTE — PROGRESS NOTES
6112-2963 Shift Summary: The patient rested on and off overnight. Administered benadryl via peg and later topically as ordered prn for itching. She has a rash on her buttocks and frequently was scratching. Administered dilaudid once for pain to her broken left wrist. See MAR. A sitter remained at the bedside at all times and the patient remained in bilateral wrist restraints per orders. The left restraint is loose and above the fracture. No new clinical concerns noted.      Nightshift Chart Audit Completed

## 2021-10-25 NOTE — PROGRESS NOTES
Bedside and Verbal shift change report given to Suma (oncoming nurse) by DIA Torres (offgoing nurse). Report included the following information SBAR, Kardex, Intake/Output, MAR and Recent Results. 0444- Pt bit through restraint and exposed velcro strap    Shift Summary-   Patient Vitals for the past 12 hrs:   Temp Pulse Resp BP SpO2   10/26/21 0306 98.1 °F (36.7 °C) 84 17 105/61 100 %   10/25/21 2303 98.1 °F (36.7 °C) 100 16 106/62 100 %   10/25/21 1943 98.5 °F (36.9 °C) 87 18 111/75 98 %   Pt continues to attempt to pull out trach and get out of bed. Pt given PRN dilaudid x2 overnight for pain to throat. Pt given PRN ativan x1 overnight for restlessness. Bedside and Verbal shift change report given to CHINA Bernardo (oncoming nurse) by Suma (offgoing nurse). Report included the following information SBAR, Kardex, Intake/Output, MAR and Recent Results.

## 2021-10-25 NOTE — PROGRESS NOTES
Problem: Voice Impaired (Adult)  Goal: *Acute Goals and Plan of Care (Insert Text)  Description: Pt will:  1. Produce voice to finger occlusion in 4/5 trials without change in vital.  2. Initiate PMV placement by SLP when appropriate. 3. Tolerate PMV placement for 30 minute increments without compromise to vitals. 4. Utilize proper breath support/techniques for increasing tolerance of PMV. 5. Phonate 4-6 word utterances with adequate breath support for adequate intensity of speech/increased intelligibilty with PMV. Outcome: Progressing Towards Goal     SPEECH LANGUAGE PATHOLOGY   SPEECH-LANGUAGE TREATMENT    Patient: Juliette Gutierrez (13 y.o. female)  Date: 10/25/2021  Primary Diagnosis: Drug overdose, intentional self-harm, initial encounter ([facility] Dorna Phi  Left wrist fracture, with delayed healing, subsequent encounter [S62.102G]  Procedure(s) (LRB):  TRACHEOSTOMY, PT IS IN ICU BED 4 (N/A) 33 Days Post-Op   Precautions:   Fall, Contact  PLOF: Per H&P    ASSESSMENT :  Pt seen for followup voice treatment s/p trach. Pt with sitter in room, awake upon SLP arrival, begins to request dilaudid. Trach suctioned x1 for scant mildly thick, white-yellowish secretions. Digital occlusion applied x1, pt able to verbalize \"ah\" to command with clear vocal quality, appropriate volume. Patient vocalizing this day around trach, without digital occlusion. Patient with thick secretions, unable to place PMV this day; and patient perseverating on dilaudid administration. SLP will continue to follow for ongoing dxtx and possible PMV trials as appropriate. Patient may benefit from assessment for removal of trach, as well as completion of MBS to assess swallow function under fluro. Patient will benefit from skilled intervention to address the above impairments.   Patient's rehabilitation potential is considered to be Fair  Factors which may influence rehabilitation potential include:   []              None noted  [x] Mental ability/status  []              Medical condition  []              Home/family situation and support systems  [x]              Safety awareness  []              Pain tolerance/management  []              Other:      PLAN :  Recommendations and Planned Interventions:  See above. Frequency/Duration: Patient will be followed by speech-language pathology 1-2 times per day/3-5 days per week to address goals. Discharge Recommendations: To Be Determined     SUBJECTIVE:   Patient stated No more with digital occlusion in place. OBJECTIVE:     Past Medical History:   Diagnosis Date    Asthma     Bilateral ovarian cysts     [facility]     Mental and behavioral problem     Pancreatitis     Pancreatitis     Psychosis ([facility]      Past Surgical History:   Procedure Laterality Date    HX GYN      D&C, Hysterectomy    IR INSERT GASTROSTOMY TUBE PERC  10/1/2021     Home Situation:      Mental Status:  Neurologic State: Alert  Orientation Level: Unable to verbalize  Cognition: Decreased attention/concentration, Follows commands, Impaired decision making, Impulsive, Memory loss  Perception: Appears intact  Perseveration: Tactile cues provided, Verbal cues provided, Visual cues provided  Safety/Judgement: Decreased awareness of environment, Decreased awareness of need for assistance, Decreased awareness of need for safety, Decreased insight into deficits, Fall prevention, Lack of insight into deficits      PAIN:  Pain level pre-treatment: 0/10   Pain level post-treatment: 0/10     After treatment:   []              Patient left in no apparent distress sitting up in chair  [x]              Patient left in no apparent distress in bed  [x]              Call bell left within reach  [x]              Nursing notified  []              Caregiver present  []              Bed alarm activated    COMMUNICATION/EDUCATION:   [x] Patient/family have participated as able in goal setting and plan of care.   [] Patient/family agree to work toward stated goals and plan of care. []  Patient understands intent and goals of therapy, but is neutral about his/her participation. [x]  Patient is unable to participate in goal setting and plan of care; education ongoing with interdisciplinary staff    Thank you for this referral.    Sheryl C. [place], M.[place], Iqra Knowles  Speech-Language Pathologist    Time Calculation: 10 mins

## 2021-10-25 NOTE — PROGRESS NOTES
1300-Received pt from [facility] via bed with sitter, in restraints, no sign of distress, confused, easily agitated, will continue to monitor  1445-Left wrist fixator dressing is loose exposing scabbing, wound care consult placed for dressing recommendations  1800-Pt stable throughout shift with intermittent agitation  1920-Bedside and Verbal shift change report given to Catarina CARMONA (oncoming nurse) by Traci Fabian RN   (offgoing nurse). Report included the following information SBAR, Kardex, Intake/Output, MAR and Recent Results.

## 2021-10-25 NOTE — ROUTINE PROCESS
Bedside and Verbal shift change report given to Laveda Brunner RN  (oncoming nurse) by Esther Ma RN  (offgoing nurse). Report given with SBAR, Kardex, Intake/Output and Recent Results.

## 2021-10-25 NOTE — PROGRESS NOTES
Hospitalist Progress Note    Patient: Mike Warner MRN: [id number]  CSN: [id number]    YOB: 1980  Age: 39 y.o. Sex: female    DOA: 9/11/2021 LOS:  LOS: 43 days          Chief Complaint:    Drug overdose      Assessment/Plan     mIelda dowling 39 y.o. female who is standing history of multidrug use/abuse, previous drug-seeking behavior and mental health issues otherwise unspecified arrives via EMS with acute metabolic encephalopathy and lethargy. Admitted for likely gabapentin overdose. She was intubated in ER, ct head no acute issue, LP done due to fever even on abx, no meningitis noted. Now she has peg and trach         Day 44 inpatient    Todays issue: mouth care and pain  Start nystatin liquid by swabs for oral thrush and d/w nurse she needs aggressive oral care as dry cracked debris on lips causing her pain, she also has continue trach mucus, started augmentin this weekend, will folllow cx result (not sent 10/23)     Acute respiratory failure with hypoxia   Intubated on 9/12    Trach on 9/20/21.    Local trach care, culture sputum, increase trach care as recommended by ENT  Started empiric augmentin 10/23     bacteremia -completed treatment     Anemia  stable     Acute metabolic encephalopathy -this appears improved  Ct head no acute process   She requires sedatives, benzos, seroquel otherwise she can become very agitated     Urinary retention   zamora      elevated ammonia level  Resolved, stop lactulose     Psychosis , hx of  Ekbom's delusional parasitosis    On  Klonopin, Seroquel, halodol prn per psych recommendation   Still needs ativan and sitter in room, restraints as needed to protect lines, trach      left wrist fracture: immobilized -POA -stable   Will have outpatient follow up      Peg feedings, trach care     CXR negative  Disposition :  Patient Active Problem List   Diagnosis Code    Dextromethorphan use disorder, moderate ([facility] F19.20    Ekbom's delusional parasitosis [facility]    Left wrist fracture, with delayed healing, subsequent encounter S62.102G    Drug overdose, intentional self-harm, initial encounter ([facility] T50.902A    Hypoxia R09.02    Hypotension after procedure I95.81    Acute metabolic encephalopathy C72.31    Psychosis (HCC) F29    Hyponatremia E87.1    Acute respiratory failure with hypoxia (HCC) J96.01    Increased ammonia level R79.89    Hypokalemia E87.6    Status post tracheostomy ([facility] Z93.0    Bacteremia due to Gram-positive bacteria R78.81    FELICITY (acute kidney injury) ([facility] N17.9       Subjective:    C/o mouth pain  Mouth very dry, raw red tongue  Much debris on lips    D/w nurse on plans this am    Review of systems:    Constitutional: denies fevers  Respiratory: denies SOB  Cardiovascular: denies chest pain        Vital signs/Intake and Output:  Visit Vitals  /67   Pulse 84   Temp 98.8 °F (37.1 °C)   Resp 17   Ht 5' 2\" (1.575 m)   Wt 72.6 kg (160 lb 0.9 oz)   SpO2 100%   BMI 29.27 kg/m²     Current Shift:  No intake/output data recorded.   Last three shifts:  10/23 1901 - 10/25 0700  In: 2690 [P.O.:700]  Out: 2250 [Urine:2250]    Exam:    General: she is communicative, we wrote on clipboard and she could say yes or no to questions  Pointing to mouth this am, NAD  Head/Neck: trach with sputum secretions  CVS:Regular rate and rhythm, no M/R/G, S1/S2 heard, no thrill  Lungs:Clear to auscultation bilaterally, no wheezes, rhonchi, or rales  Abdomen: Soft, Nontender, No distention, Normal Bowel sounds, peg  Extremities: No C/C/E, pulses palpable 2+  Neuro:grossly normal                 Labs: Results:       Chemistry Recent Labs     10/23/21  0615   GLU 88      K 4.5      CO2 30   BUN 22*   CREA 0.85   CA 9.5   AGAP 4   BUCR 26*      TP 6.5   ALB 3.7   GLOB 2.8   AGRAT 1.3      CBC w/Diff Recent Labs     10/23/21  0615   WBC 6.1   RBC 3.42*   HGB 9.7*   HCT 31.5*      GRANS 47   LYMPH 38   EOS 5      Cardiac Enzymes No results for input(s): CPK, CKND1, ZENON in the last 72 hours. No lab exists for component: CKRMB, TROIP   Coagulation No results for input(s): PTP, INR, APTT, INREXT in the last 72 hours. Lipid Panel No results found for: CHOL, CHOLPOCT, CHOLX, CHLST, CHOLV, 532026, HDL, HDLP, LDL, LDLC, DLDLP, 364144, VLDLC, VLDL, TGLX, TRIGL, TRIGP, TGLPOCT, CHHD, CHHDX   BNP No results for input(s): BNPP in the last 72 hours.    Liver Enzymes Recent Labs     10/23/21  0615   TP 6.5   ALB 3.7         Thyroid Studies No results found for: T4, T3U, TSH, TSHEXT     Procedures/imaging: see electronic medical records for all procedures/Xrays and details which were not copied into this note but were reviewed prior to creation of Alejandra Saunders MD

## 2021-10-25 NOTE — PROGRESS NOTES
Nutrition Assessment     Type and Reason for Visit: Reassess    Nutrition Recommendations/Plan: Continue w/ tube feeding of Jevity 1.5 @ 45ml/hr (goal rate). 10/25/21 3754   Enteral Nutrition   Feeding Route PEG   EN Formula Standard with fiber  (Jevity 1.5)   Schedule Continuous   Feeding Regimen Continue with jevity 1.5 @ 45ml/hr   Water Flushes 250ml Q4   Current EN & Flush Order Provides Currently at goal:     Jevity 1.5 @ 45ml + water flushes provides:     1485kcals, 63g PRO, 214g CHO, and 2192ml free water. Nutrition Assessment:  Pt admit for Gabapentin overdose, s/p trach, s/p PEG. patient remains on tube feeding via PEG. Estimated Daily Nutrient Needs:  Energy (kcal):  4826-4833  Protein (g):  73       Fluid (ml/day):  3192-6148    Nutrition Related Findings:  Lab: Phos 6.2. Med: thiamine, MVI, melatonin, lactulose, humalog, pepcid. Last BM 10/21. Tube feeding currently Jevity 1.5 @ 45ml/hr with minimal residuals. No weight loss noted since last RD note. Will continue to monitor weight and TF. Current Nutrition Therapies:  ADULT TUBE FEEDING Nasogastric; Standard with Fiber; Delivery Method: Continuous; Continuous Initial Rate (mL/hr): 10; Continuous Advance Tube Feeding: Yes; Advancement Volume (mL/hr): 10; Advancement Frequency: Q 6 hours; Continuous Goal Rate (mL. .. Anthropometric Measures:  · Height:  5' 2\" (157.5 cm)  · Current Body Wt:  72.6 kg (160 lb 0.9 oz) (10/20/21)  · BMI: 29.3    Nutrition Diagnosis:   · Inadequate oral intake related to altered GI function as evidenced by nutrition support-enteral nutrition    Nutrition Intervention:  Food and/or Nutrient Delivery: Continue tube feeding  Nutrition Education and Counseling: No recommendations at this time  Coordination of Nutrition Care: Continue to monitor while inpatient    Goals:  Continue with tube feeding at goal to meet nutritional needs throughout the next 5-7 days.        Nutrition Monitoring and Evaluation: Behavioral-Environmental Outcomes: None identified  Food/Nutrient Intake Outcomes: Enteral nutrition intake/tolerance, Diet advancement/tolerance  Physical Signs/Symptoms Outcomes: Biochemical data, Skin, Weight, GI status    Discharge Planning:    Enteral nutrition     Electronically signed by Uriel Urena RD on 10/25/2021 at 8:43 AM

## 2021-10-25 NOTE — PROGRESS NOTES
Problem: Mobility Impaired (Adult and Pediatric)  Goal: *Acute Goals and Plan of Care (Insert Text)  Description: Physical Therapy Goals  Initiated 10/19/2021 and to be accomplished within 7 day(s)  1. Patient will move from supine to sit and sit to supine  in bed with supervision/set-up. 2.  Patient will transfer from bed to chair and chair to bed with minimal assistance/contact guard assist using the least restrictive device. 3.  Patient will perform sit to stand with minimal assistance/contact guard assist.  4.  Patient will ambulate with minimal assistance/contact guard assist for 20 feet with the least restrictive device. PLOF: pt was independent with mobility without an assistive device     Outcome: Progressing Towards Goal   physical Therapy TREATMENT    Patient: Faith Hsu (33 y.o. female)  Date: 10/24/2021  Diagnosis: Drug overdose, intentional self-harm, initial encounter [facility] Gustabo Tompkins  Left wrist fracture, with delayed healing, subsequent encounter [S62.102G] Drug overdose, intentional self-harm, initial encounter ([facility]  Procedure(s) (LRB):  TRACHEOSTOMY, PT IS IN ICU BED 4 (N/A) 32 Days Post-Op  Precautions: Fall, Contact   Chart, physical therapy assessment, plan of care and goals were reviewed. ASSESSMENT:  Pt found sitting in bed up (Restraint on), with sitter at bedside. Pt is focused on itching sensations and wanting meds for anxiety. Pt agrees to PT session, throwing her legs over railings. Easily redirected and appropriately transitions to sitting. Pt previded with pillow case as scarf and HHA. Ambulate 30', before demanding to return to room. No signs or C/o fatigue. SPO2 range of 94--96% on RA. Pt returned to a chair, for 5 min, while linens were changed. Afterwards, Pt was still frustrated regarding wantin more meds. Pt returns herself to bed (L SL position). Pt is increasing with independent mobility, but lacks motivation to address safety deficits. Placement still needed. Progression toward goals:  []      Improving appropriately and progressing toward goals  [x]      Improving slowly and progressing toward goals  []      Not making progress toward goals and plan of care will be adjusted     PLAN:  Patient continues to benefit from skilled intervention to address the above impairments. Continue treatment per established plan of care. Discharge Recommendations:  Rehab vs Skilled Nursing Facility  Further Equipment Recommendations for Discharge:  quad cane     SUBJECTIVE:   Patient stated I want my medicine! Robert Saab  (making vocal sounds without digital occlusion)    OBJECTIVE DATA SUMMARY:   Critical Behavior:  Neurologic State: Eyes open to voice, Sleeping  Orientation Level: Unable to verbalize  Cognition: Poor safety awareness, Follows commands, Impulsive, Impaired decision making  Safety/Judgement: Decreased awareness of environment, Decreased awareness of need for assistance, Decreased awareness of need for safety, Decreased insight into deficits, Fall prevention, Lack of insight into deficits  Functional Mobility Training:  Bed Mobility:  Rolling: Independent  Supine to Sit: Independent  Sit to Supine: Independent  Scooting: Supervision  Transfers:  Sit to Stand: Stand-by assistance  Stand to Sit: Stand-by assistance;Contact guard assistance  Balance:  Sitting: Intact  Standing: Impaired; With support  Standing - Static: Fair  Standing - Dynamic : Fair  Ambulation/Gait Training:  Distance (ft): 60 Feet (ft)  Assistive Device: Gait belt (HHA)  Ambulation - Level of Assistance: Contact guard assistance;Minimal assistance  Gait Abnormalities: Decreased step clearance  Base of Support: Shift to right  Step Length: Right shortened;Left shortened  Pain:  Pain Scale 1: Numeric (0 - 10)  Pain Intensity 1: 7 (Itching)  Pain out: 7  Activity Tolerance:   Fair  Please refer to the flowsheet for vital signs taken during this treatment.   After treatment:   [] Patient left in no apparent distress sitting up in chair  [x] Patient left in no apparent distress in bed  [x] Call bell left within reach  [x] Nursing notified  [x] Sitter present  [x] Restraints on      Rogelio Snowball, PTA   Time Calculation: 29 mins

## 2021-10-25 NOTE — PROGRESS NOTES
Problem: Mobility Impaired (Adult and Pediatric)  Goal: *Acute Goals and Plan of Care (Insert Text)  Description: Physical Therapy Goals  Initiated 10/19/2021 and to be accomplished within 7 day(s)  1. Patient will move from supine to sit and sit to supine  in bed with supervision/set-up. 2.  Patient will transfer from bed to chair and chair to bed with minimal assistance/contact guard assist using the least restrictive device. 3.  Patient will perform sit to stand with minimal assistance/contact guard assist.  4.  Patient will ambulate with minimal assistance/contact guard assist for 20 feet with the least restrictive device. PLOF: pt was independent with mobility without an assistive device     Outcome: Progressing Towards Goal   physical Therapy TREATMENT    Patient: Shruthi Mireles (27 y.o. female)  Date: 10/25/2021  Diagnosis: Drug overdose, intentional self-harm, initial encounter ([facility] Favian Labor  Left wrist fracture, with delayed healing, subsequent encounter [S62.102G] Drug overdose, intentional self-harm, initial encounter ([facility]  Procedure(s) (LRB):  TRACHEOSTOMY, PT IS IN ICU BED 4 (N/A) 33 Days Post-Op  Precautions: Fall, Contact   Chart, physical therapy assessment, plan of care and goals were reviewed. ASSESSMENT:  Pt is initially argumentative and requesting med before PT, but able to focus thoughts toward ambulation. Restraints removed and pt pt transitioned to EOB. Pt immediately attempts to stand. Balance is deviated to Right side. Ambulation distance increase with HHA. 6 LOB episodes with same right sided shuffle observed. Improved tolerance to digit occlusion, speech and appropriateness f feedback. Pt will benefit from placement to maximize independence after hospitalization.    Progression toward goals:  []      Improving appropriately and progressing toward goals  [x]      Improving slowly and progressing toward goals  []      Not making progress toward goals and plan of care will be adjusted     PLAN:  Patient continues to benefit from skilled intervention to address the above impairments. Continue treatment per established plan of care. Discharge Recommendations:  Rehab vs Skilled Nursing Facility  Further Equipment Recommendations for Discharge:  bedside commode     SUBJECTIVE:   Patient stated Did I get my Klonopin?     OBJECTIVE DATA SUMMARY:   Critical Behavior:  Neurologic State: Alert  Orientation Level: Disoriented to time, Disoriented to situation, Oriented to person, Oriented to place  Cognition: Decreased attention/concentration, Impulsive, Poor safety awareness  Safety/Judgement: Decreased awareness of environment, Decreased awareness of need for assistance, Decreased awareness of need for safety, Decreased insight into deficits, Fall prevention, Lack of insight into deficits  Functional Mobility Training:  Bed Mobility:  Rolling: Independent  Supine to Sit: Independent  Sit to Supine: Independent  Scooting: Supervision  Transfers:  Sit to Stand: Stand-by assistance;Contact guard assistance (HHA)  Stand to Sit: Stand-by assistance;Contact guard assistance  Balance:  Sitting: Intact  Standing: Impaired; With support  Standing - Static: Fair  Standing - Dynamic : Fair;Poor  Ambulation/Gait Training:  Distance (ft): 120 Feet (ft)  Assistive Device: Gait belt (HHA)  Ambulation - Level of Assistance: Contact guard assistance; Moderate assistance  Gait Abnormalities: Decreased step clearance  Base of Support: Shift to right  Step Length: Right shortened;Left shortened  Pain:  Pain Scale 1: Numeric (0 - 10)  Pain Intensity 1: 0  Pain out: 0  Pain Location 1: Throat  Pain Intervention(s) 1: Medication (see MAR)  Activity Tolerance:   Fair+  Please refer to the flowsheet for vital signs taken during this treatment.   After treatment:   [] Patient left in no apparent distress sitting up in chair  [x] Patient left in no apparent distress in bed  [x] Call bell left within reach  [x] Nursing notified  [x] Sitter present  [x] Restraints on      Yovany Lemons PTA   Time Calculation: 31 mins

## 2021-10-26 PROCEDURE — 74011250636 HC RX REV CODE- 250/636: Performed by: FAMILY MEDICINE

## 2021-10-26 PROCEDURE — 74011250637 HC RX REV CODE- 250/637: Performed by: HOSPITALIST

## 2021-10-26 PROCEDURE — 65270000029 HC RM PRIVATE

## 2021-10-26 PROCEDURE — 94640 AIRWAY INHALATION TREATMENT: CPT

## 2021-10-26 PROCEDURE — 74011250637 HC RX REV CODE- 250/637: Performed by: PSYCHIATRY & NEUROLOGY

## 2021-10-26 PROCEDURE — 74011250637 HC RX REV CODE- 250/637: Performed by: INTERNAL MEDICINE

## 2021-10-26 PROCEDURE — 74011000250 HC RX REV CODE- 250: Performed by: INTERNAL MEDICINE

## 2021-10-26 PROCEDURE — 2709999900 HC NON-CHARGEABLE SUPPLY

## 2021-10-26 PROCEDURE — 74011000250 HC RX REV CODE- 250: Performed by: HOSPITALIST

## 2021-10-26 PROCEDURE — 74011250637 HC RX REV CODE- 250/637: Performed by: FAMILY MEDICINE

## 2021-10-26 PROCEDURE — 77010033678 HC OXYGEN DAILY

## 2021-10-26 PROCEDURE — 74011250636 HC RX REV CODE- 250/636: Performed by: INTERNAL MEDICINE

## 2021-10-26 RX ORDER — DIAZEPAM 5 MG/1
5 TABLET ORAL 2 TIMES DAILY
Status: DISCONTINUED | OUTPATIENT
Start: 2021-10-26 | End: 2021-11-03

## 2021-10-26 RX ADMIN — AMOXICILLIN AND CLAVULANATE POTASSIUM 400 MG: 400; 57 POWDER, FOR SUSPENSION ORAL at 22:16

## 2021-10-26 RX ADMIN — ARFORMOTEROL TARTRATE 15 MCG: 15 SOLUTION RESPIRATORY (INHALATION) at 22:22

## 2021-10-26 RX ADMIN — CLONAZEPAM 2 MG: 0.5 TABLET ORAL at 21:49

## 2021-10-26 RX ADMIN — FAMOTIDINE 20 MG: 20 TABLET ORAL at 09:11

## 2021-10-26 RX ADMIN — FAMOTIDINE 20 MG: 20 TABLET ORAL at 21:49

## 2021-10-26 RX ADMIN — Medication 5 MG: at 21:49

## 2021-10-26 RX ADMIN — LORAZEPAM 2 MG: 2 INJECTION INTRAMUSCULAR at 10:09

## 2021-10-26 RX ADMIN — HYDROMORPHONE HYDROCHLORIDE 1 MG: 1 INJECTION, SOLUTION INTRAMUSCULAR; INTRAVENOUS; SUBCUTANEOUS at 02:47

## 2021-10-26 RX ADMIN — HYDROMORPHONE HYDROCHLORIDE 1 MG: 1 INJECTION, SOLUTION INTRAMUSCULAR; INTRAVENOUS; SUBCUTANEOUS at 18:06

## 2021-10-26 RX ADMIN — 0.12% CHLORHEXIDINE GLUCONATE 10 ML: 1.2 RINSE ORAL at 09:08

## 2021-10-26 RX ADMIN — ARFORMOTEROL TARTRATE 15 MCG: 15 SOLUTION RESPIRATORY (INHALATION) at 07:59

## 2021-10-26 RX ADMIN — HYDROMORPHONE HYDROCHLORIDE 1 MG: 1 INJECTION, SOLUTION INTRAMUSCULAR; INTRAVENOUS; SUBCUTANEOUS at 09:10

## 2021-10-26 RX ADMIN — BUDESONIDE 500 MCG: 0.5 INHALANT RESPIRATORY (INHALATION) at 22:22

## 2021-10-26 RX ADMIN — CLONAZEPAM 2 MG: 0.5 TABLET ORAL at 09:08

## 2021-10-26 RX ADMIN — IPRATROPIUM BROMIDE AND ALBUTEROL SULFATE 3 ML: .5; 3 SOLUTION RESPIRATORY (INHALATION) at 07:59

## 2021-10-26 RX ADMIN — NYSTATIN 500000 UNITS: 100000 SUSPENSION ORAL at 09:08

## 2021-10-26 RX ADMIN — NYSTATIN: 100000 POWDER TOPICAL at 09:22

## 2021-10-26 RX ADMIN — LORAZEPAM 2 MG: 2 INJECTION INTRAMUSCULAR at 01:30

## 2021-10-26 RX ADMIN — BUDESONIDE 500 MCG: 0.5 INHALANT RESPIRATORY (INHALATION) at 07:59

## 2021-10-26 RX ADMIN — DIAZEPAM 5 MG: 5 TABLET ORAL at 21:49

## 2021-10-26 RX ADMIN — HYDROMORPHONE HYDROCHLORIDE 1 MG: 1 INJECTION, SOLUTION INTRAMUSCULAR; INTRAVENOUS; SUBCUTANEOUS at 13:48

## 2021-10-26 RX ADMIN — LORAZEPAM 2 MG: 2 INJECTION INTRAMUSCULAR at 16:19

## 2021-10-26 RX ADMIN — DIAZEPAM 2.5 MG: 5 TABLET ORAL at 09:08

## 2021-10-26 RX ADMIN — DIPHENHYDRAMINE HYDROCHLORIDE 12.5 MG: 25 SOLUTION ORAL at 13:48

## 2021-10-26 RX ADMIN — NYSTATIN 500000 UNITS: 100000 SUSPENSION ORAL at 21:48

## 2021-10-26 RX ADMIN — Medication 1 TABLET: at 09:10

## 2021-10-26 RX ADMIN — QUETIAPINE FUMARATE 200 MG: 200 TABLET ORAL at 09:11

## 2021-10-26 RX ADMIN — NYSTATIN: 100000 POWDER TOPICAL at 16:24

## 2021-10-26 RX ADMIN — NYSTATIN 500000 UNITS: 100000 SUSPENSION ORAL at 18:00

## 2021-10-26 RX ADMIN — THIAMINE HCL TAB 100 MG 100 MG: 100 TAB at 09:10

## 2021-10-26 RX ADMIN — IPRATROPIUM BROMIDE AND ALBUTEROL SULFATE 3 ML: .5; 3 SOLUTION RESPIRATORY (INHALATION) at 22:22

## 2021-10-26 RX ADMIN — ENOXAPARIN SODIUM 40 MG: 100 INJECTION SUBCUTANEOUS at 16:18

## 2021-10-26 RX ADMIN — NYSTATIN 500000 UNITS: 100000 SUSPENSION ORAL at 13:00

## 2021-10-26 RX ADMIN — NYSTATIN: 100000 POWDER TOPICAL at 23:00

## 2021-10-26 RX ADMIN — QUETIAPINE FUMARATE 300 MG: 200 TABLET ORAL at 21:49

## 2021-10-26 RX ADMIN — HALOPERIDOL LACTATE 5 MG: 5 INJECTION, SOLUTION INTRAMUSCULAR at 11:42

## 2021-10-26 RX ADMIN — AMOXICILLIN AND CLAVULANATE POTASSIUM 400 MG: 400; 57 POWDER, FOR SUSPENSION ORAL at 09:22

## 2021-10-26 RX ADMIN — HALOPERIDOL LACTATE 5 MG: 5 INJECTION, SOLUTION INTRAMUSCULAR at 20:05

## 2021-10-26 NOTE — PROGRESS NOTES
Noted pt transfer to [address] [place] [address]. Pt continues with a sitter. CM to continue to follow and assist with transition of care needs.     Care Management Interventions  Transition of Care Consult (CM Consult): Discharge Planning  The Plan for Transition of Care is Related to the Following Treatment Goals : intentional drug overdose  Discharge Location  Discharge Placement:  (TBD)

## 2021-10-26 NOTE — PROGRESS NOTES
Attempted to see patient for PT weekly re-assessment however patient is extremely agitated, banging bed rails, pulling at wrist restraints and flinging legs over the railing. Unsafe to remove restraints for ambulation at this time and patient not agreeable to participating in LE exercises. Will continue to follow.   Linsey Blanton, PT

## 2021-10-26 NOTE — PROGRESS NOTES
Hospitalist Progress Note-critical care note     Patient: Sudhakar Rao MRN: [id number]  [facility]: [id number]    YOB: 1980  Age: 39 y.o. Sex: female    DOA: 9/11/2021 LOS:  LOS: 44 days            Chief complaint: wrist fracture , drug overdose , acute respiratory failure with hypoxia, psychosis     Assessment/Plan         Hospital Problems  Date Reviewed: 9/6/2015        Codes Class Noted POA    FELICITY (acute kidney injury) ([facility] ICD-10-CM: N17.9  ICD-9-CM: 584.9  10/16/2021 Unknown        Bacteremia due to Gram-positive bacteria ICD-10-CM: R78.81  ICD-9-CM: 790.7  10/6/2021 Unknown        Status post tracheostomy ([facility] ICD-10-CM: Z93.0  ICD-9-CM: V44.0  9/28/2021 Unknown        Hypokalemia ICD-10-CM: E87.6  ICD-9-CM: 276.8  9/21/2021 Unknown        Increased ammonia level ICD-10-CM: R79.89  ICD-9-CM: 790.6  9/14/2021 Unknown        Psychosis ([facility] ICD-10-CM: F29  ICD-9-CM: 298.9  Unknown Unknown        Hyponatremia ICD-10-CM: E87.1  ICD-9-CM: 276.1  Unknown Yes        Acute respiratory failure with hypoxia (HCC) ICD-10-CM: J96.01  ICD-9-CM: 518.81  Unknown Yes        Left wrist fracture, with delayed healing, subsequent encounter ICD-10-CM: S62.102G  ICD-9-CM: V54.19  9/12/2021 Unknown        * (Principal) Drug overdose, intentional self-harm, initial encounter ([facility] ICD-10-CM: T50.902A  ICD-9-CM: 977.9, E950.5  9/12/2021 Yes        Hypoxia ICD-10-CM: R09.02  ICD-9-CM: 799.02  9/12/2021 Yes        Hypotension after procedure ICD-10-CM: I95.81  ICD-9-CM: 458.29  9/12/2021 Yes        Acute metabolic encephalopathy XUF-69-CL: G93.41  ICD-9-CM: 348.31  9/12/2021 Yes                  Sudhakar Rao is a 39 y.o. female who is standing history of multidrug use/abuse, previous drug-seeking behavior and mental health issues otherwise unspecified arrives via EMS with acute metabolic encephalopathy and lethargy. Admitted for likely gabapentin overdose.   She was intubated in ER, ct head no acute issue, LP done due to fever even on abx, no meningitis noted. Now she has peg and trach        Acute respiratory failure with hypoxia   Intubated on 9/12    Trach on 9/20/21. Continue breathing tx and Local trach care and Speech evaluation    not a candidate for decannulation per pulm     Post tracheostomy   Continue local trach care   Respiratory therapist f/u       bacteremia -Complete the treatment   bcx  Positive for GPC on 10/4   Sputum culture 9/24/2021: MRSA. Anemia  Transfused  one unit prbc on 10/16 so far stable, continue monitoring   Occult stool negative    no bleeding reported   Upper PVL negative for dvt         Acute metabolic encephalopathy   Ct head no acute process       Urinary retention   On zamora     Gabapentin overdose with lethargy and AMS  -resolved   S/p procedural stomach emptying in ED with charcoal and sorbitol          elevated ammonia level  Continue   Lactulose,   Ammonia remained stable     Psychosis , hx of  Ekbom's delusional parasitosis    On  Klonopin,  Seroquel, increase valium   Continue  halodol prn per psych recommendation     left wrist fracture: immobilized -poa -stable   Appreciated ortho on board-f/u with Dr. Ana Luisa Harden as out-pt     yarely    Resolved       Sitter was at the bedside       Poor prognosis , dc barrier     Rn: agitation -try to pull the trach     Continue trach care and looking for placement, increase valium     Disposition :tbd,   Review of systems:  Unable to obtain due to trach collar   Vital signs/Intake and Output:  Visit Vitals  /82 (BP 1 Location: Left upper arm, BP Patient Position: At rest)   Pulse 87   Temp 98 °F (36.7 °C)   Resp 18   Ht 5' 2\" (1.575 m)   Wt 72.6 kg (160 lb 0.9 oz)   SpO2 94%   BMI 29.27 kg/m²     Current Shift:  No intake/output data recorded.   Last three shifts:  10/24 1901 - 10/26 0700  In: 970   Out: 2050 [Urine:2050]    Physical Exam:  General: Alert and no acute distress   HEENT:  Trach collar   Lungs: Coarse bs   Heart:  rrr   No murmur, No Rubs, No Gallops  Abdomen: Soft, Non distended, Non tender. +Bowel sounds, peg tube noted   Extremities: No c/c.immobilzer noted on left wrist   Psych:   Agitated   Neurologic:  Alert and follow some command. Labs: Results:       Chemistry No results for input(s): GLU, NA, K, CL, CO2, BUN, CREA, CA, AGAP, BUCR, TBIL, AP, TP, ALB, GLOB, AGRAT in the last 72 hours. No lab exists for component: GPT   CBC w/Diff No results for input(s): WBC, RBC, HGB, HCT, PLT, GRANS, LYMPH, EOS, HGBEXT, HCTEXT, PLTEXT, HGBEXT, HCTEXT, PLTEXT in the last 72 hours. Cardiac Enzymes No results for input(s): CPK, CKND1, ZENON in the last 72 hours. No lab exists for component: CKRMB, TROIP   Coagulation No results for input(s): PTP, INR, APTT, INREXT, INREXT in the last 72 hours. Lipid Panel No results found for: CHOL, CHOLPOCT, CHOLX, CHLST, CHOLV, 390511, HDL, HDLP, LDL, LDLC, DLDLP, 220165, VLDLC, VLDL, TGLX, TRIGL, TRIGP, TGLPOCT, CHHD, CHHDX   BNP No results for input(s): BNPP in the last 72 hours. Liver Enzymes No results for input(s): TP, ALB, TBIL, AP in the last 72 hours. No lab exists for component: SGOT, GPT, DBIL   Thyroid Studies No results found for: T4, T3U, TSH, TSHEXT, TSHEXT     Procedures/imaging: see electronic medical records for all procedures/Xrays and details which were not copied into this note but were reviewed prior to creation of Plan    XR WRIST LT AP/LAT/OBL MIN 3V    Result Date: 8/19/2021  EXAM: XR WRIST LT AP/LAT/OBL MIN 3V CLINICAL INDICATION/HISTORY: Fall with LEFT wrist pain and swelling -Additional: None COMPARISON: None TECHNIQUE: 3 views of the left wrist _______________ FINDINGS: BONES: Comminuted, impacted fracture of the distal radius with slight dorsal angulation of the distal fracture fragment. No aggressive appearing osseous lytic or blastic lesion. SOFT TISSUES: Unremarkable. _______________     Comminuted, impacted fracture of the distal radius.     XR CHEST PORT    Result Date: 9/13/2021  EXAM: XR CHEST PORT CLINICAL INDICATION/HISTORY: Acute respiratory failure, ET tub position -Additional: None COMPARISON: One day prior TECHNIQUE: Portable frontal view of the chest _______________ FINDINGS: SUPPORT DEVICES: Endotracheal and enteric tubes unchanged. HEART AND MEDIASTINUM: Cardiomediastinal silhouette within normal limits. LUNGS AND PLEURAL SPACES: No dense consolidation, large effusion or pneumothorax. _______________     No acute cardiopulmonary abnormality. XR CHEST PORT    Result Date: 9/11/2021  MEDICAL RECORDS NUMBER: [id number] PROCEDURE:  Single view of the chest DATE: 9/11/2021 9:09 PM HISTORY: 39years old Female. post ett Comparison: 8/4/2021 FINDINGS: The patient has been intubated with appropriate placement of the endotracheal tube. There is no enteric tube passing below the level of the diaphragm. There is no significant effusion. There is no significant pneumothorax. Cardiomediastinal silhouette is within normal limits. There is no evidence of a focal pulmonary infiltrate or mass. 1. There is no significant or acute cardiopulmonary process. The patient has been intubated with appropriate placement of the endotracheal tube. There is no enteric tube passing below the level of the diaphragm. This report has been generated using voice recognition software. XR ABD PORT  1 V    Result Date: 9/12/2021  EXAM: Frontal view of the abdomen CLINICAL INDICATION/HISTORY: NG tube placement COMPARISON: None. _______________ FINDINGS: NG/OG tube in place with the tip in the left upper quadrant in the region of the gastric fundus. No bowel obstruction. Moderate colonic stool burden. _______________     1. NG/OG tube is in good position with the tip in the left upper quadrant in the region of the gastric fundus. 2. Moderate colonic stool burden.       Comfort Ford MD

## 2021-10-26 NOTE — PROGRESS NOTES
1009- Pt given PRN Ativan    1142- Pt given PRN haldol    1335- Pt remains very agitated and continues to try and pull at things despite being restrained.

## 2021-10-27 PROCEDURE — 74011250637 HC RX REV CODE- 250/637: Performed by: PSYCHIATRY & NEUROLOGY

## 2021-10-27 PROCEDURE — 2709999900 HC NON-CHARGEABLE SUPPLY

## 2021-10-27 PROCEDURE — 74011250637 HC RX REV CODE- 250/637: Performed by: HOSPITALIST

## 2021-10-27 PROCEDURE — 94640 AIRWAY INHALATION TREATMENT: CPT

## 2021-10-27 PROCEDURE — 74011250636 HC RX REV CODE- 250/636: Performed by: FAMILY MEDICINE

## 2021-10-27 PROCEDURE — 77010033678 HC OXYGEN DAILY

## 2021-10-27 PROCEDURE — 74011000250 HC RX REV CODE- 250: Performed by: HOSPITALIST

## 2021-10-27 PROCEDURE — 74011250637 HC RX REV CODE- 250/637: Performed by: INTERNAL MEDICINE

## 2021-10-27 PROCEDURE — 65270000029 HC RM PRIVATE

## 2021-10-27 PROCEDURE — 74011000250 HC RX REV CODE- 250: Performed by: INTERNAL MEDICINE

## 2021-10-27 PROCEDURE — 94760 N-INVAS EAR/PLS OXIMETRY 1: CPT

## 2021-10-27 PROCEDURE — 74011250636 HC RX REV CODE- 250/636: Performed by: INTERNAL MEDICINE

## 2021-10-27 PROCEDURE — 74011250636 HC RX REV CODE- 250/636: Performed by: HOSPITALIST

## 2021-10-27 RX ORDER — AMOXICILLIN AND CLAVULANATE POTASSIUM 400; 57 MG/5ML; MG/5ML
400 POWDER, FOR SUSPENSION ORAL EVERY 12 HOURS
Status: DISCONTINUED | OUTPATIENT
Start: 2021-10-27 | End: 2021-10-28

## 2021-10-27 RX ORDER — LORAZEPAM 2 MG/ML
1 INJECTION INTRAMUSCULAR ONCE
Status: COMPLETED | OUTPATIENT
Start: 2021-10-27 | End: 2021-10-27

## 2021-10-27 RX ORDER — HALOPERIDOL 5 MG/ML
5 INJECTION INTRAMUSCULAR ONCE
Status: COMPLETED | OUTPATIENT
Start: 2021-10-27 | End: 2021-10-27

## 2021-10-27 RX ADMIN — FAMOTIDINE 20 MG: 20 TABLET ORAL at 20:42

## 2021-10-27 RX ADMIN — NYSTATIN: 100000 POWDER TOPICAL at 10:06

## 2021-10-27 RX ADMIN — DIAZEPAM 5 MG: 5 TABLET ORAL at 10:05

## 2021-10-27 RX ADMIN — QUETIAPINE FUMARATE 300 MG: 200 TABLET ORAL at 20:55

## 2021-10-27 RX ADMIN — LORAZEPAM 2 MG: 2 INJECTION INTRAMUSCULAR at 03:32

## 2021-10-27 RX ADMIN — NYSTATIN: 100000 POWDER TOPICAL at 22:00

## 2021-10-27 RX ADMIN — AMOXICILLIN AND CLAVULANATE POTASSIUM 400 MG: 400; 57 POWDER, FOR SUSPENSION ORAL at 12:46

## 2021-10-27 RX ADMIN — CLONAZEPAM 2 MG: 0.5 TABLET ORAL at 20:43

## 2021-10-27 RX ADMIN — LORAZEPAM 2 MG: 2 INJECTION INTRAMUSCULAR at 18:54

## 2021-10-27 RX ADMIN — NYSTATIN 500000 UNITS: 100000 SUSPENSION ORAL at 10:05

## 2021-10-27 RX ADMIN — QUETIAPINE FUMARATE 200 MG: 200 TABLET ORAL at 10:06

## 2021-10-27 RX ADMIN — Medication 1 TABLET: at 10:05

## 2021-10-27 RX ADMIN — ARFORMOTEROL TARTRATE 15 MCG: 15 SOLUTION RESPIRATORY (INHALATION) at 20:28

## 2021-10-27 RX ADMIN — BUDESONIDE 500 MCG: 0.5 INHALANT RESPIRATORY (INHALATION) at 08:49

## 2021-10-27 RX ADMIN — 0.12% CHLORHEXIDINE GLUCONATE 10 ML: 1.2 RINSE ORAL at 20:58

## 2021-10-27 RX ADMIN — LORAZEPAM 2 MG: 2 INJECTION INTRAMUSCULAR at 12:07

## 2021-10-27 RX ADMIN — HALOPERIDOL LACTATE 5 MG: 5 INJECTION, SOLUTION INTRAMUSCULAR at 14:41

## 2021-10-27 RX ADMIN — HYDROMORPHONE HYDROCHLORIDE 1 MG: 1 INJECTION, SOLUTION INTRAMUSCULAR; INTRAVENOUS; SUBCUTANEOUS at 09:13

## 2021-10-27 RX ADMIN — NYSTATIN 500000 UNITS: 100000 SUSPENSION ORAL at 12:46

## 2021-10-27 RX ADMIN — THIAMINE HCL TAB 100 MG 100 MG: 100 TAB at 10:05

## 2021-10-27 RX ADMIN — ARFORMOTEROL TARTRATE 15 MCG: 15 SOLUTION RESPIRATORY (INHALATION) at 08:49

## 2021-10-27 RX ADMIN — DIAZEPAM 5 MG: 5 TABLET ORAL at 21:00

## 2021-10-27 RX ADMIN — 0.12% CHLORHEXIDINE GLUCONATE 10 ML: 1.2 RINSE ORAL at 10:06

## 2021-10-27 RX ADMIN — NYSTATIN 500000 UNITS: 100000 SUSPENSION ORAL at 17:17

## 2021-10-27 RX ADMIN — BUDESONIDE 500 MCG: 0.5 INHALANT RESPIRATORY (INHALATION) at 20:28

## 2021-10-27 RX ADMIN — NYSTATIN 500000 UNITS: 100000 SUSPENSION ORAL at 22:56

## 2021-10-27 RX ADMIN — FAMOTIDINE 20 MG: 20 TABLET ORAL at 10:05

## 2021-10-27 RX ADMIN — HYDROMORPHONE HYDROCHLORIDE 1 MG: 1 INJECTION, SOLUTION INTRAMUSCULAR; INTRAVENOUS; SUBCUTANEOUS at 13:43

## 2021-10-27 RX ADMIN — CLONAZEPAM 2 MG: 0.5 TABLET ORAL at 10:05

## 2021-10-27 RX ADMIN — AMOXICILLIN AND CLAVULANATE POTASSIUM 400 MG: 400; 57 POWDER, FOR SUSPENSION ORAL at 22:57

## 2021-10-27 RX ADMIN — NYSTATIN: 100000 POWDER TOPICAL at 17:22

## 2021-10-27 RX ADMIN — HYDROMORPHONE HYDROCHLORIDE 1 MG: 1 INJECTION, SOLUTION INTRAMUSCULAR; INTRAVENOUS; SUBCUTANEOUS at 17:50

## 2021-10-27 RX ADMIN — LORAZEPAM 1 MG: 2 INJECTION INTRAMUSCULAR; INTRAVENOUS at 20:36

## 2021-10-27 RX ADMIN — Medication 5 MG: at 22:56

## 2021-10-27 RX ADMIN — ENOXAPARIN SODIUM 40 MG: 100 INJECTION SUBCUTANEOUS at 17:17

## 2021-10-27 NOTE — PROGRESS NOTES
Assess patient chart to assist nurse with medication pass. 1157- Patient new fentanyl patch applied to back with teraderm in place. No prior fentanyl patch in place at time of adminstering new patch.  Verified with Wilma Pérez RN

## 2021-10-27 NOTE — PROGRESS NOTES
Hospitalist Progress Note    Patient: Lani Turk MRN: [id number]  CSN: [id number]    YOB: 1980  Age: 39 y.o. Sex: female    DOA: 9/11/2021 LOS:  LOS: 45 days          Chief Complaint: Ac resp failure      Assessment/Plan     Geronimo Goldstein is a 39 y.o. female who is standing history of multidrug use/abuse, previous drug-seeking behavior and mental health issues otherwise unspecified arrives via EMS with acute metabolic encephalopathy and lethargy. Admitted for likely gabapentin overdose. She was intubated in ER, ct head no acute issue, LP done due to fever even on abx, no meningitis noted. Now she has peg and trach         Day 46 inpatient     Oral thrush and pain-better     Acute respiratory failure with hypoxia   Intubated on 9/12    Trach on 9/20/21.    Local trach care, culture sputum, increase trach care as recommended by ENT  Started empiric augmentin 10/23, complete 7 days     bacteremia -completed treatment     Anemia stable     Acute metabolic encephalopathy -this appears improved  Ct head no acute process   She requires sedatives, benzos, seroquel otherwise she can become very agitated     Urinary retention -zamora      elevated ammonia level  Resolved-     Psychosis , hx of  Ekbom's delusional parasitosis    On  Klonopin, Seroquel, haldol prn per psych recommendation   Try to either d/c sitter or restraints     left wrist fracture: immobilized -POA -stable   Will have outpatient follow up      Peg feedings, trach care    D/c plan is unknown, will need facility, but barriers to placement with resources and options  Disposition :  Patient Active Problem List   Diagnosis Code    Dextromethorphan use disorder, moderate ([address] F19.20    Ekbom's delusional parasitosis ([facility] F22    Left wrist fracture, with delayed healing, subsequent encounter S62.102G    Drug overdose, intentional self-harm, initial encounter ([address] T50.902A    Hypoxia R09.02    Hypotension after procedure I95.81  Acute metabolic encephalopathy K68.70    Psychosis (HCC) F29    Hyponatremia E87.1    Acute respiratory failure with hypoxia ([facility]) J96.01    Increased ammonia level R79.89    Hypokalemia E87.6    Status post tracheostomy ([facility] Z93.0    Bacteremia due to Gram-positive bacteria R78.81    FELICITY (acute kidney injury) ([facility] N17.9       Subjective:    Her wrist hurts, she is asking for pain meds  No new issue reported by nurse    Review of systems:    Limited by not being able to verbalize with trach      Vital signs/Intake and Output:  Visit Vitals  /73 (BP 1 Location: Right leg, BP Patient Position: At rest)   Pulse 81   Temp 97.7 °F (36.5 °C)   Resp 18   Ht 5' 2\" (1.575 m)   Wt 72.6 kg (160 lb 0.9 oz)   SpO2 94%   BMI 29.27 kg/m²     Current Shift:  No intake/output data recorded. Last three shifts:  10/25 1901 - 10/27 0700  In: 350   Out: 3403 [Urine:3400]    Exam:    General: alert, NAD, lucid WF, trach collar  CVS:Regular rate and rhythm, no M/R/G, S1/S2 heard, no thrill  Lungs:Clear to auscultation bilaterally, no wheezes, rhonchi, or rales  Abdomen: Soft, Nontender, No distention, Normal Bowel sounds, peg  Extremities: No C/C/E, pulses palpable 2+  Immobilizer, small, left wrist  Neuro:grossly normal , follows commands  Psych:appropriate                Labs: Results:       Chemistry No results for input(s): GLU, NA, K, CL, CO2, BUN, CREA, CA, AGAP, BUCR, TBIL, AP, TP, ALB, GLOB, AGRAT in the last 72 hours. No lab exists for component: GPT   CBC w/Diff No results for input(s): WBC, RBC, HGB, HCT, PLT, GRANS, LYMPH, EOS, HGBEXT, HCTEXT, PLTEXT in the last 72 hours. Cardiac Enzymes No results for input(s): CPK, CKND1, ZENON in the last 72 hours. No lab exists for component: CKRMB, TROIP   Coagulation No results for input(s): PTP, INR, APTT, INREXT in the last 72 hours.     Lipid Panel No results found for: CHOL, CHOLPOCT, CHOLX, CHLST, CHOLV, 731256, HDL, HDLP, LDL, LDLC, DLDLP, 404288, VLDLC, VLDL, TGLX, TRIGL, TRIGP, TGLPOCT, CHHD, CHHDX   BNP No results for input(s): BNPP in the last 72 hours. Liver Enzymes No results for input(s): TP, ALB, TBIL, AP in the last 72 hours.     No lab exists for component: SGOT, GPT, DBIL   Thyroid Studies No results found for: T4, T3U, TSH, TSHEXT     Procedures/imaging: see electronic medical records for all procedures/Xrays and details which were not copied into this note but were reviewed prior to creation of Danisha Wilburn MD

## 2021-10-27 NOTE — PROGRESS NOTES
1935 - Bedside report received from [place], [address] [place] [address]. Patient in bed. Pain 3/10.     2010 - Patient in bed at this time. Agitated, Haldol give. Restraints on. Sitter at bed side. IV to R FA  intact and patent. Trach  + CMS. Pt A & O x 4. LS clear, on Trach. Abdomen soft, NT and ND. + BS to all 4 quadrants. Denies nausea. Pain 3/10. [place] with cl. Yellow urine. Call light within reach. 2347-Bedside and Verbal shift change report given to Zenia Dougherty. RN by Samantha Nicholas. Report included the following information SBAR, Kardex, OR Summary, Intake/Output and MAR.

## 2021-10-27 NOTE — WOUND CARE
10/27/21 1609 Wound Elbow Left;Posterior abrasion left elbow No Date First Assessed or Time First Assessed found. Location: Elbow  Wound Location Orientation: Left;Posterior  Wound Description: abrasion left elbow Wound Image Wound Etiology Traumatic 
(sheering and scooting) Dressing Status Clean;Dry; Intact; New dressing applied Cleansed Wound cleanser Dressing/Treatment Alginate with Ag;Silicone border Dressing Change Due  
(PRN) Wound Length (cm) 1.4 cm Wound Width (cm) 1.5 cm Wound Depth (cm) 0.1 cm Wound Surface Area (cm^2) 2.1 cm^2 Wound Volume (cm^3) 0.21 cm^3 Wound Assessment Fibrinous;Pink/red Drainage Amount Scant Drainage Description Serous Wound Odor None Cary-Wound/Incision Assessment Blanchable erythema Edges Attached edges; Defined edges Wound Thickness Description Partial thickness

## 2021-10-27 NOTE — PROGRESS NOTES
Speech-Language Pathology Treatment Attempt x2     Chart reviewed. Attempted Speech-Language Pathology Evaluation/Treatment, however, patient unable to be seen due to:  []  Nausea/vomiting  []  Eating  []  Pain  []  Patient too lethargic  []  Off Unit for testing/procedure  []  Dialysis treatment in progress   []  Telemetry Results  [x]  Other: Patient restrained, has received ativan and haldol, agitated, pulling at restraints and attempting to get out of bed. Not appropriate for ST at this time.       Will f/u later as patient's schedule allows. Thank you for this referral.  Malissa C.  [facility], M.Ed, [id number] [facility]

## 2021-10-27 NOTE — PROGRESS NOTES
Nutrition Note    Will modify current TF formula from Jevity to Osmolite 1.2 with new goal rate of 55ml/hr, due to supply. Recc starting at previous rate of 45ml/hr and increase to by 10ml/hr to reach goal, Q6. Last BM 10/26.     10/27/21 0919   Enteral Nutrition   Feeding Route PEG   EN Formula Standard without fiber  (Osmolite 1.2)   Schedule Continuous   Feeding Regimen Goal rate of 55ml/hr. Start @ 45ml/hr as pt was tolerating previous rate. Increase to 55ml/hr Q6. Water Flushes 250ml Q4 or defer to MD   Current EN & Flush Order Provides Currently: Jevity 1.5 @ 45ml + water flushes provides:     1485kcals, 63g PRO, 214g CHO, and 2252ml free water. Goal EN & Flush Order Provides New goal: Osmotite 1.2 @ 55ml/hr + water flushes provides:     1452kcals, 67g PRO, 191g CHO, and 2492ml free water.      Electronically signed by Tamar Ruiz RD on 10/27/2021 at 9:25 AM

## 2021-10-27 NOTE — PROGRESS NOTES
TRACH CARE GIVEN. INNER CANNULA REPLACED AND GAUZE WAS APPLIED TO SITE AFTER BEING CLEANED WITH NORMAL SALINE.

## 2021-10-27 NOTE — PROGRESS NOTES
1000  Entered chart for task RN purposes. Pt's Fentanyl patch is not present on the left arm from 10/24/2021. Karlyn Cowden and [employer], Pharmacist notified. Pharmacy states to have a second RN cosign the absence of Fentanyl patch and to apply the new patch to the pt's back. Primary RN and BIANCA Green, Nurse Manager notified.

## 2021-10-27 NOTE — PROGRESS NOTES
8193  Bedside shift change report given to [id number] [facility] [address] (oncoming nurse) by Nate Jara (offgoing nurse). Report included the following information SBAR, Kardex, Intake/Output and MAR.     0915  Assumed care at this time. Patient alert and oriented x3. Denies SOB, chest pain. Shows no signs of distress. Patient lungs clear diminished bilaterally. Cap refill < 3 sec to all extremities. Patient has 20 G IV to R forearm CDI. Stated she is in [place] lot of pain to L wrist\" but unable to give number. Patient grimacing and agitated. L elbow is scabbed and red--pressure injury. R elbow is red but skin intact. Elbows elevated. No pressure wound on sacral area or heels noted. Bowel sounds present. Trach present--respiratory providing care. External fixation device to L wrist has scabbing with small yellow drainage. Wound care to come assess. Patient call light and possessions within reach. Bed locked and in lowest position. Nurse will continue to monitor. Sitter at bedside. Soft restraints in place. 1155  Fentanyl patch applied to back with tegadarm. Previous fentanyl patch not found--verified with Abebe Peterson and Lata CARMONA. 1207  Patient given 2 mg ativan for increasing agitation. 1230  Feeding rate increased to 55 ml and osmolite bag placed. Mepilex placed to bilat elbows. 1343  Dilaudid 1 mg given for severe pain per pt request.     1780  Patient is increasingly agitated and anxious, shaking bed rails. She states her pain medications were not given but they were at 1343. Patient requesting more anxiety meds--unable to give until 1807. Patient also had fentanyl patch applied this am.   Verbal order with readback: Dr. Nadya Cobb 5 mg IM once. 32 61 16  Patient resting quietly in bed.     1800  Patient refusing to be wiped down on her body but allowed cleaning of periarea/catheter and brief change. Refuses to turn on her side for position change.      1920  Bedside and verbal shift change report given to [employer] by Carmen Brito RN. Report included SBAR, kardex, MAR, and recent results. 1955  Paged on call. Patient got dilaudid at [place] [address] and ativan at 46. Patient still very agitated, fidgity, pulling at lines and clothes, and trying to verbalize anger/yell. 2005  Dr. Shanelle Cervantes verbal order with readback: 1 mg IV ativan once, renew non violent soft restraints.

## 2021-10-27 NOTE — WOUND CARE
IP WOUND CONSULT Zoe Mondragon MEDICAL RECORD NUMBER:  [id number] AGE: 39 y.o. GENDER: female  : 1980 TODAY'S DATE:  10/27/2021 GENERAL  
 
[] Follow-up [x] New Consult Zoe Mondragon is a 39 y.o. female referred by:   
[] Physician 
[x] Nursing 
[] Other: PAST MEDICAL HISTORY Past Medical History:  
Diagnosis Date  Asthma  Bilateral ovarian cysts  Cocaine abuse ([facility]  Mental and behavioral problem  Pancreatitis  Pancreatitis  Psychosis ([facility] PAST SURGICAL HISTORY Past Surgical History:  
Procedure Laterality Date  HX GYN    
 D&C, Hysterectomy  IR INSERT GASTROSTOMY TUBE [facility]  10/1/2021 FAMILY HISTORY History reviewed. No pertinent family history. SOCIAL HISTORY Social History Tobacco Use  Smoking status: Current Every Day Smoker Packs/day: 1.00  Smokeless tobacco: Never Used Substance Use Topics  Alcohol use: Not Currently  Drug use: Not Currently Types: Cocaine, Marijuana Comment: used with in past 24 hours ALLERGIES Allergies Allergen Reactions  Fish Containing Products Itching  Toradol [Ketorolac] Rash Pt reports she is not allergic to this medication. MEDICATIONS No current facility-administered medications on file prior to encounter. Current Outpatient Medications on File Prior to Encounter Medication Sig Dispense Refill  albuterol (PROVENTIL HFA, VENTOLIN HFA, PROAIR HFA) 90 mcg/actuation inhaler Take 2 Puffs by inhalation every four (4) hours as needed for Wheezing or Shortness of Breath. 1 Inhaler 1  
 ibuprofen (MOTRIN) 600 mg tablet Take 1 Tablet by mouth every six (6) hours as needed for Pain. Take with food.  20 Tablet 0  
 ALPRAZolam (XANAX) 0.5 mg tablet TAKE 1 TABLET BY MOUTH ONCE DAILY AS NEEDED FOR ANXIETY  1  
 metFORMIN (GLUCOPHAGE) 500 mg tablet TAKE 1 TABLET BY MOUTH ONCE DAILY  3  
 nicotine (NICODERM CQ) 21 mg/24 hr APPLY 1 PATCH TOPICALLY TO THE SKIN DAILY FOR CRAVINGS AS DIRECTED  0  
 traZODone (DESYREL) 50 mg tablet TAKE 1 TABLET BY MOUTH AT BEDTIME AS NEEDED FOR INSOMNIA  0  
 escitalopram oxalate (LEXAPRO) 10 mg tablet Take 10 mg by mouth daily.  lurasidone (LATUDA) 80 mg tab tablet Take 80 mg by mouth daily (with dinner). Wt Readings from Last 3 Encounters:  
10/20/21 72.6 kg (160 lb 0.9 oz) 08/26/21 72.6 kg (160 lb) 08/20/21 74.8 kg (165 lb) [unfilled] Visit Vitals /82 (BP 1 Location: Left leg, BP Patient Position: At rest) Pulse 92 Temp 97.7 °F (36.5 °C) Resp 18 Ht 5' 2\" (1.575 m) Wt 72.6 kg (160 lb 0.9 oz) LMP 05/15/2018 SpO2 98% BMI 29.27 kg/m² ASSESSMENT Skin impairment Identification: 
Type: pressure and sheering Contributing Factors: chronic pressure and shear force Wound Elbow Left;Posterior abrasion left elbow (Active) Dressing Status Clean;Dry; Intact 10/25/21 0006 Dressing/Treatment Foam 10/23/21 1600 Drainage Amount Small 10/19/21 0330 Drainage Description Yellow 10/19/21 0330 Wound Odor None 10/19/21 0330 Number of days:   
   
Incision 09/22/21 Neck (Active) Dressing Status Clean;Dry; Intact 10/24/21 1907 Cleansed Cleansed with saline 10/05/21 2000 Dressing/Treatment Gauze dressing/dressing sponge 10/05/21 2000 Drainage Amount Moderate 10/05/21 2000 Drainage Description Thick 10/05/21 2000 Wound Odor None 10/05/21 2000 Cary-Wound/Incision Assessment Blanchable erythema 10/05/21 2000 Number of days: 35 PLAN Skin Care & Pressure Relief Recommendations Minimize layers of linen Pads under patient to optimize support surface Turn/reposition approximately every 2 hours Physician/Provider notified: Yes Recommendations: Keep a silicone border on area to left elbow for protection and padding, patient currently has an ex-fix to her left wrist area and could benefit from ortho consult for pin care Teaching completed with:  
[x] Patient          
[] Family member      
[] Caregiver [x] Nursing 
[] Other Patient/Caregiver Teaching: 
Level of patient/caregiver understanding able to:  
[x] Indicates understanding       [] Needs reinforcement 
[] Unsuccessful      [] Verbal Understanding 
[] Demonstrated understanding       [] No evidence of learning 
[] Refused teaching         [] N/A Electronically signed by Bala Armenta RN on 10/27/2021 at 4:05 PM

## 2021-10-28 LAB
BACTERIA SPEC CULT: ABNORMAL
BACTERIA SPEC CULT: ABNORMAL
GLUCOSE BLD STRIP.AUTO-MCNC: 118 MG/DL (ref 70–110)
GRAM STN SPEC: ABNORMAL
SERVICE CMNT-IMP: ABNORMAL

## 2021-10-28 PROCEDURE — 74011250637 HC RX REV CODE- 250/637: Performed by: HOSPITALIST

## 2021-10-28 PROCEDURE — 74011250636 HC RX REV CODE- 250/636: Performed by: INTERNAL MEDICINE

## 2021-10-28 PROCEDURE — 74011000250 HC RX REV CODE- 250: Performed by: INTERNAL MEDICINE

## 2021-10-28 PROCEDURE — 74011250637 HC RX REV CODE- 250/637: Performed by: PSYCHIATRY & NEUROLOGY

## 2021-10-28 PROCEDURE — 82962 GLUCOSE BLOOD TEST: CPT

## 2021-10-28 PROCEDURE — 2709999900 HC NON-CHARGEABLE SUPPLY

## 2021-10-28 PROCEDURE — 74011250637 HC RX REV CODE- 250/637: Performed by: INTERNAL MEDICINE

## 2021-10-28 PROCEDURE — 74011250636 HC RX REV CODE- 250/636: Performed by: FAMILY MEDICINE

## 2021-10-28 PROCEDURE — 94640 AIRWAY INHALATION TREATMENT: CPT

## 2021-10-28 PROCEDURE — 77010033678 HC OXYGEN DAILY

## 2021-10-28 PROCEDURE — 94760 N-INVAS EAR/PLS OXIMETRY 1: CPT

## 2021-10-28 PROCEDURE — 74011000250 HC RX REV CODE- 250: Performed by: HOSPITALIST

## 2021-10-28 PROCEDURE — 65270000029 HC RM PRIVATE

## 2021-10-28 PROCEDURE — 97116 GAIT TRAINING THERAPY: CPT

## 2021-10-28 RX ORDER — SULFAMETHOXAZOLE AND TRIMETHOPRIM 800; 160 MG/1; MG/1
1 TABLET ORAL EVERY 12 HOURS
Status: DISPENSED | OUTPATIENT
Start: 2021-10-28 | End: 2021-11-02

## 2021-10-28 RX ADMIN — LORAZEPAM 2 MG: 2 INJECTION INTRAMUSCULAR at 15:08

## 2021-10-28 RX ADMIN — BUDESONIDE 500 MCG: 0.5 INHALANT RESPIRATORY (INHALATION) at 20:31

## 2021-10-28 RX ADMIN — NYSTATIN 500000 UNITS: 100000 SUSPENSION ORAL at 12:38

## 2021-10-28 RX ADMIN — HYDROMORPHONE HYDROCHLORIDE 1 MG: 1 INJECTION, SOLUTION INTRAMUSCULAR; INTRAVENOUS; SUBCUTANEOUS at 22:17

## 2021-10-28 RX ADMIN — NYSTATIN 500000 UNITS: 100000 SUSPENSION ORAL at 18:00

## 2021-10-28 RX ADMIN — QUETIAPINE FUMARATE 300 MG: 200 TABLET ORAL at 20:47

## 2021-10-28 RX ADMIN — HYDROMORPHONE HYDROCHLORIDE 1 MG: 1 INJECTION, SOLUTION INTRAMUSCULAR; INTRAVENOUS; SUBCUTANEOUS at 16:35

## 2021-10-28 RX ADMIN — Medication 1 TABLET: at 09:27

## 2021-10-28 RX ADMIN — QUETIAPINE FUMARATE 200 MG: 200 TABLET ORAL at 09:27

## 2021-10-28 RX ADMIN — 0.12% CHLORHEXIDINE GLUCONATE 10 ML: 1.2 RINSE ORAL at 09:27

## 2021-10-28 RX ADMIN — HYDROMORPHONE HYDROCHLORIDE 1 MG: 1 INJECTION, SOLUTION INTRAMUSCULAR; INTRAVENOUS; SUBCUTANEOUS at 07:19

## 2021-10-28 RX ADMIN — LORAZEPAM 2 MG: 2 INJECTION INTRAMUSCULAR at 09:28

## 2021-10-28 RX ADMIN — SULFAMETHOXAZOLE AND TRIMETHOPRIM 1 TABLET: 800; 160 TABLET ORAL at 20:47

## 2021-10-28 RX ADMIN — SULFAMETHOXAZOLE AND TRIMETHOPRIM 1 TABLET: 800; 160 TABLET ORAL at 15:08

## 2021-10-28 RX ADMIN — CLONAZEPAM 2 MG: 0.5 TABLET ORAL at 20:47

## 2021-10-28 RX ADMIN — 0.12% CHLORHEXIDINE GLUCONATE 10 ML: 1.2 RINSE ORAL at 20:47

## 2021-10-28 RX ADMIN — DIAZEPAM 5 MG: 5 TABLET ORAL at 20:47

## 2021-10-28 RX ADMIN — HALOPERIDOL LACTATE 5 MG: 5 INJECTION, SOLUTION INTRAMUSCULAR at 16:35

## 2021-10-28 RX ADMIN — FAMOTIDINE 20 MG: 20 TABLET ORAL at 20:47

## 2021-10-28 RX ADMIN — NYSTATIN: 100000 POWDER TOPICAL at 22:20

## 2021-10-28 RX ADMIN — ARFORMOTEROL TARTRATE 15 MCG: 15 SOLUTION RESPIRATORY (INHALATION) at 07:33

## 2021-10-28 RX ADMIN — BUDESONIDE 500 MCG: 0.5 INHALANT RESPIRATORY (INHALATION) at 07:33

## 2021-10-28 RX ADMIN — NYSTATIN: 100000 POWDER TOPICAL at 16:00

## 2021-10-28 RX ADMIN — HYDROMORPHONE HYDROCHLORIDE 1 MG: 1 INJECTION, SOLUTION INTRAMUSCULAR; INTRAVENOUS; SUBCUTANEOUS at 12:37

## 2021-10-28 RX ADMIN — DIAZEPAM 5 MG: 5 TABLET ORAL at 09:27

## 2021-10-28 RX ADMIN — Medication 5 MG: at 22:17

## 2021-10-28 RX ADMIN — ENOXAPARIN SODIUM 40 MG: 100 INJECTION SUBCUTANEOUS at 16:36

## 2021-10-28 RX ADMIN — CLONAZEPAM 2 MG: 0.5 TABLET ORAL at 09:28

## 2021-10-28 RX ADMIN — NYSTATIN 500000 UNITS: 100000 SUSPENSION ORAL at 09:28

## 2021-10-28 RX ADMIN — FAMOTIDINE 20 MG: 20 TABLET ORAL at 09:27

## 2021-10-28 RX ADMIN — THIAMINE HCL TAB 100 MG 100 MG: 100 TAB at 09:27

## 2021-10-28 RX ADMIN — NYSTATIN 500000 UNITS: 100000 SUSPENSION ORAL at 22:17

## 2021-10-28 RX ADMIN — ARFORMOTEROL TARTRATE 15 MCG: 15 SOLUTION RESPIRATORY (INHALATION) at 20:31

## 2021-10-28 RX ADMIN — NYSTATIN 500000 UNITS: 100000 SUSPENSION ORAL at 16:35

## 2021-10-28 NOTE — PROGRESS NOTES
1349 Received report from off going nurse Kallie Morse, KRISTINE. Report included the following information SBAR, Kardex, Intake/Output, MAR and Recent Results. 0900 Soft wrist restraints removed. Sitter with patient. [address] P[address] Dr[address] Micheal Norris to inform critical value. [address] [facility], RN. Report included the following information SBAR, Kardex, Intake/Output, MAR and Recent Results.

## 2021-10-28 NOTE — PROGRESS NOTES
1931 - Assumed care at this time. 2044 - Assessment completed at this time see flowsheets. Sitter at bedside. Pt encouraged to call for assistance. Will continue to monitor. 2215 - Patient rated pain 7/10, pain medication administered per MAR. No other concerns voiced at this time. Call bell within reach, bed in lowest position. Pt encouraged to use call bell for any needs.

## 2021-10-28 NOTE — PROGRESS NOTES
Assumed care of patient at [facility]. Assessment completed and noted. Pt is alert and oriented x4 with periods of confusion. Pt is very agitated, scheduled medications and x1 Ativan administered as ordered (see MAR). Sitter at bedside, will cont to shira. 0719 Pt agitated and anxious. C/o bilat wrist pain. PRN dilaudid administered as ordered (see MAR).     9574 Report given to Ever Mcgrath

## 2021-10-28 NOTE — PROGRESS NOTES
Hospitalist Progress Note    Patient: Sudhakar Rao MRN: [id number]  CSN: [id number]    YOB: 1980  Age: 39 y.o. Sex: female    DOA: 9/11/2021 LOS:  LOS: 46 days          Chief Complaint: Ac resp failure      Assessment/Plan     Anika Goldstein is a 39 y.o. female who is standing history of multidrug use/abuse, previous drug-seeking behavior and mental health issues otherwise unspecified arrives via EMS with acute metabolic encephalopathy and lethargy. Admitted for likely gabapentin overdose. She was intubated in ER, ct head no acute issue, LP done due to fever even on abx, no meningitis noted. Now she has peg and trach       Oral thrush and pain-better    MRSA on sputum cx now-stop augmentin , start bactrim out of caution, 5 days     Acute respiratory failure with hypoxia   Intubated on 9/12    Trach on 9/20/21.    Local trach care, culture sputum, increase trach care as recommended by ENT     bacteremia -completed treatment     Anemia stable     Acute metabolic encephalopathy -this appears improved  Ct head no acute process   She requires sedatives, benzos, seroquel otherwise she can become very agitated due to psychiatric illness     Urinary retention -zamora      elevated ammonia level  Resolved-     Psychosis , hx of  Ekbom's delusional parasitosis    On  Klonopin, Seroquel, haldol prn per psych recommendation   Try to  d/c restraints, monitor closely     left wrist fracture: immobilized -POA -stable   Will have outpatient follow up      Peg feedings, trach care     D/c plan is unknown, will need facility, but barriers to placement with resources and options  Disposition :  Patient Active Problem List   Diagnosis Code    Dextromethorphan use disorder, moderate ([address] F19.20    Ekbom's delusional parasitosis ([facility] F22    Left wrist fracture, with delayed healing, subsequent encounter S62.102G    Drug overdose, intentional self-harm, initial encounter ([address] T50.902A    Hypoxia R09.02    Hypotension after procedure I95.81    Acute metabolic encephalopathy M64.59    Psychosis (HCC) F29    Hyponatremia E87.1    Acute respiratory failure with hypoxia (HCC) J96.01    Increased ammonia level R79.89    Hypokalemia E87.6    Status post tracheostomy ([facility] Z93.0    Bacteremia due to Gram-positive bacteria R78.81    FELICITY (acute kidney injury) ([facility] N17.9       Subjective:    MRSA on culture  No new issues  She asks for ativan of nurse this am  Follows comands, appears lucid    Review of systems:    Constitutional: denies fevers  Respiratory: denies SOB  Cardiovascular: denies chest pain  Gastrointestinal: denies nausea, vomiting, diarrhea      Vital signs/Intake and Output:  Visit Vitals  /61 (BP 1 Location: Left upper arm, BP Patient Position: At rest)   Pulse 81   Temp 98.4 °F (36.9 °C)   Resp 17   Ht 5' 2\" (1.575 m)   Wt 72.6 kg (160 lb 0.9 oz)   SpO2 97%   BMI 29.27 kg/m²     Current Shift:  No intake/output data recorded. Last three shifts:  10/26 1901 - 10/28 0700  In: 2620 [I.V.:40]  Out: 2300 [Urine:2300]    Exam:    General: anxious alert WF with trach, can understand a number of her forced words now  Head/Neck: trach. Less secretions  CVS:Regular rate and rhythm, no M/R/G, S1/S2 heard, no thrill  Lungs:Clear to auscultation bilaterally, no wheezes, rhonchi, or rales  Abdomen: Soft, Nontender, No distention, Normal Bowel sounds, peg tube  Extremities: No C/C/E, pulses palpable 2+  Immobilizer in place left forearm  Neuro:grossly normal , follows commands  Psych:anxious                Labs: Results:       Chemistry No results for input(s): GLU, NA, K, CL, CO2, BUN, CREA, CA, AGAP, BUCR, TBIL, AP, TP, ALB, GLOB, AGRAT in the last 72 hours. No lab exists for component: GPT   CBC w/Diff No results for input(s): WBC, RBC, HGB, HCT, PLT, GRANS, LYMPH, EOS, HGBEXT, HCTEXT, PLTEXT in the last 72 hours. Cardiac Enzymes No results for input(s): CPK, CKND1, ZENON in the last 72 hours.     No lab exists for component: CKRMB, TROIP   Coagulation No results for input(s): PTP, INR, APTT, INREXT in the last 72 hours. Lipid Panel No results found for: CHOL, CHOLPOCT, CHOLX, CHLST, CHOLV, 529476, HDL, HDLP, LDL, LDLC, DLDLP, 936844, VLDLC, VLDL, TGLX, TRIGL, TRIGP, TGLPOCT, CHHD, CHHDX   BNP No results for input(s): BNPP in the last 72 hours. Liver Enzymes No results for input(s): TP, ALB, TBIL, AP in the last 72 hours.     No lab exists for component: SGOT, GPT, DBIL   Thyroid Studies No results found for: T4, T3U, TSH, TSHEXT     Procedures/imaging: see electronic medical records for all procedures/Xrays and details which were not copied into this note but were reviewed prior to creation of Jeannette Martinez MD

## 2021-10-28 NOTE — PROGRESS NOTES
Problem: Mobility Impaired (Adult and Pediatric)  Goal: *Acute Goals and Plan of Care (Insert Text)  Description: Physical Therapy Goals  Initiated 10/19/2021 and to be accomplished within 7 day(s)  1. Patient will move from supine to sit and sit to supine  in bed with supervision/set-up. 2.  Patient will transfer from bed to chair and chair to bed with minimal assistance/contact guard assist using the least restrictive device. 3.  Patient will perform sit to stand with minimal assistance/contact guard assist.  4.  Patient will ambulate with minimal assistance/contact guard assist for 20 feet with the least restrictive device. PLOF: pt was independent with mobility without an assistive device     Outcome: Progressing Towards Goal   PHYSICAL THERAPY TREATMENT    Patient: Justina Flowers (68 y.o. female)  Date: 10/28/2021  Diagnosis: Drug overdose, intentional self-harm, initial encounter ([facility] Sandhya Zafar  Left wrist fracture, with delayed healing, subsequent encounter [S62.102G] Drug overdose, intentional self-harm, initial encounter ([facility]  Procedure(s) (LRB):  TRACHEOSTOMY, PT IS IN ICU BED 4 (N/A) 36 Days Post-Op  Precautions: Fall, Contact  PLOF:     ASSESSMENT:  Wrist restraints ar off, sitter present. Pt continuously asks for either pain or anxiety medication upon entry, throughout session and at the end of session. Nurse notified. No assistance needed to sit up EOB. VC needed to wait jeni to impulsiveness. No difficulty performing sit to stand. Ambulated 60ft in room with CGA, no LOB. Pt left sitting EOB. Updated nurse. Progression toward goals:   []      Improving appropriately and progressing toward goals  [x]      Improving slowly and progressing toward goals  []      Not making progress toward goals and plan of care will be adjusted     PLAN:  Patient continues to benefit from skilled intervention to address the above impairments. Continue treatment per established plan of care.   Discharge Recommendations:  Rehab, TBD  Further Equipment Recommendations for Discharge:  N/A     SUBJECTIVE:   Patient stated I need medicine.     OBJECTIVE DATA SUMMARY:   Critical Behavior:  Neurologic State: Alert  Orientation Level: Oriented X4  Cognition: Poor safety awareness, Impulsive, Follows commands, Impaired decision making  Safety/Judgement: Decreased awareness of environment, Decreased awareness of need for assistance, Decreased awareness of need for safety, Decreased insight into deficits, Fall prevention, Lack of insight into deficits  Functional Mobility Training:  Bed Mobility:    Supine to Sit: Independent  Transfers:  Sit to Stand: Stand-by assistance  Stand to Sit: Supervision  Balance:  Sitting: Intact  Standing: Intact; With support  Standing - Static: Fair  Standing - Dynamic : Fair   Ambulation/Gait Training:  Distance (ft): 60 Feet (ft)  Assistive Device: Gait belt  Ambulation - Level of Assistance: Contact guard assistance        Pain:  Pain level pre-treatment: 0/10  Pain level post-treatment: 0/10   Pain Intervention(s): Medication (see MAR); Rest, Ice, Repositioning   Response to intervention: Nurse notified, See doc flow    Activity Tolerance:   Fair+  Please refer to the flowsheet for vital signs taken during this treatment. After treatment:   [] Patient left in no apparent distress sitting up in chair  [x] Patient left in no apparent distress in bed  [x] Call bell left within reach  [x] Nursing notified  [x] Sitter present  [] Bed alarm activated  [] SCDs applied      COMMUNICATION/EDUCATION:   [x]         Role of Physical Therapy in the acute care setting. []         Fall prevention education was provided and the patient/caregiver indicated understanding. []         Patient/family have participated as able in working toward goals and plan of care. []         Patient/family agree to work toward stated goals and plan of care.   []         Patient understands intent and goals of therapy, but is neutral about his/her participation.   []         Patient is unable to participate in stated goals/plan of care: ongoing with therapy staff.  []         Other:        John Christianson PTA   Time Calculation: 15 mins

## 2021-10-28 NOTE — PROGRESS NOTES
Problem: Ventilator Management  Goal: *Adequate oxygenation and ventilation  Outcome: Progressing Towards Goal  Goal: *Patient maintains clear airway/free of aspiration  Outcome: Progressing Towards Goal  Goal: *Absence of infection signs and symptoms  Outcome: Progressing Towards Goal  Goal: *Normal spontaneous ventilation  Outcome: Progressing Towards Goal     Problem: Patient Education: Go to Patient Education Activity  Goal: Patient/Family Education  Outcome: Progressing Towards Goal     Problem: Non-Violent Restraints  Goal: Removal from restraints as soon as assessed to be safe  Outcome: Progressing Towards Goal  Goal: No harm/injury to patient while restraints in use  Outcome: Progressing Towards Goal  Goal: Patient's dignity will be maintained  Outcome: Progressing Towards Goal  Goal: Patient Interventions  Outcome: Progressing Towards Goal     Problem: Falls - Risk of  Goal: *Absence of Falls  Description: Document Arabella Iniguez Fall Risk and appropriate interventions in the flowsheet. Outcome: Progressing Towards Goal  Note: Fall Risk Interventions:  Mobility Interventions: Communicate number of staff needed for ambulation/transfer    Mentation Interventions: Door open when patient unattended, More frequent rounding, Family/sitter at bedside    Medication Interventions: Evaluate medications/consider consulting pharmacy    Elimination Interventions: Toileting schedule/hourly rounds    History of Falls Interventions: Consult care management for discharge planning         Problem: Patient Education: Go to Patient Education Activity  Goal: Patient/Family Education  Outcome: Progressing Towards Goal     Problem: Risk for Spread of Infection  Goal: Prevent transmission of infectious organism to others  Description: Prevent the transmission of infectious organisms to other patients, staff members, and visitors.   Outcome: Progressing Towards Goal     Problem: Patient Education:  Go to Education Activity  Goal: Patient/Family Education  Outcome: Progressing Towards Goal     Problem: Pressure Injury - Risk of  Goal: *Prevention of pressure injury  Description: Document Remy Scale and appropriate interventions in the flowsheet. Outcome: Progressing Towards Goal  Note: Pressure Injury Interventions:  Sensory Interventions: Assess changes in LOC, Assess need for specialty bed    Moisture Interventions: Check for incontinence Q2 hours and as needed, Internal/External urinary devices    Activity Interventions: Pressure redistribution bed/mattress(bed type), PT/OT evaluation    Mobility Interventions: Pressure redistribution bed/mattress (bed type), PT/OT evaluation, Turn and reposition approx.  every two hours(pillow and wedges)    Nutrition Interventions: Document food/fluid/supplement intake    Friction and Shear Interventions: Apply protective barrier, creams and emollients, Transferring/repositioning devices                Problem: Patient Education: Go to Patient Education Activity  Goal: Patient/Family Education  Outcome: Progressing Towards Goal     Problem: Patient Education: Go to Patient Education Activity  Goal: Patient/Family Education  Outcome: Progressing Towards Goal     Problem: Patient Education: Go to Patient Education Activity  Goal: Patient/Family Education  Outcome: Progressing Towards Goal     Problem: Patient Education: Go to Patient Education Activity  Goal: Patient/Family Education  Outcome: Progressing Towards Goal     Problem: Pain  Goal: *Control of Pain  Outcome: Progressing Towards Goal  Goal: *PALLIATIVE CARE:  Alleviation of Pain  Outcome: Progressing Towards Goal     Problem: Patient Education: Go to Patient Education Activity  Goal: Patient/Family Education  Outcome: Progressing Towards Goal

## 2021-10-28 NOTE — PROGRESS NOTES
Speech-Language Pathology Treatment Attempt     Chart reviewed. Attempted Speech-Language Pathology Evaluation/Treatment, however, patient unable to be seen due to:  []  Nausea/vomiting  []  Eating  []  Pain  [x]  Patient too lethargic  []  Off Unit for testing/procedure  []  Dialysis treatment in progress   []  Telemetry Results  [x]  Other:  Patient sleeping quietly after being medicated.        Will f/u later as patient's schedule allows. Thank you for this referral.  Malissa ATKINSON  [address] [place] [address] [place], [address] [facility]

## 2021-10-29 LAB
ANION GAP SERPL CALC-SCNC: 6 MMOL/L (ref 3–18)
BUN SERPL-MCNC: 17 MG/DL (ref 7–18)
BUN/CREAT SERPL: 22 (ref 12–20)
CALCIUM SERPL-MCNC: 9.3 MG/DL (ref 8.5–10.1)
CHLORIDE SERPL-SCNC: 106 MMOL/L (ref 100–111)
CO2 SERPL-SCNC: 26 MMOL/L (ref 21–32)
CREAT SERPL-MCNC: 0.77 MG/DL (ref 0.6–1.3)
ERYTHROCYTE [DISTWIDTH] IN BLOOD BY AUTOMATED COUNT: 14.6 % (ref 11.6–14.5)
GLUCOSE SERPL-MCNC: 89 MG/DL (ref 74–99)
HCT VFR BLD AUTO: 26.9 % (ref 35–45)
HGB BLD-MCNC: 8.3 G/DL (ref 12–16)
MCH RBC QN AUTO: 27.4 PG (ref 24–34)
MCHC RBC AUTO-ENTMCNC: 30.9 G/DL (ref 31–37)
MCV RBC AUTO: 88.8 FL (ref 78–100)
PLATELET # BLD AUTO: 290 K/UL (ref 135–420)
PMV BLD AUTO: 10.7 FL (ref 9.2–11.8)
POTASSIUM SERPL-SCNC: 4.4 MMOL/L (ref 3.5–5.5)
RBC # BLD AUTO: 3.03 M/UL (ref 4.2–5.3)
SODIUM SERPL-SCNC: 138 MMOL/L (ref 136–145)
WBC # BLD AUTO: 5.4 K/UL (ref 4.6–13.2)

## 2021-10-29 PROCEDURE — 74011250637 HC RX REV CODE- 250/637: Performed by: INTERNAL MEDICINE

## 2021-10-29 PROCEDURE — 74011250637 HC RX REV CODE- 250/637: Performed by: HOSPITALIST

## 2021-10-29 PROCEDURE — 74011000250 HC RX REV CODE- 250: Performed by: HOSPITALIST

## 2021-10-29 PROCEDURE — 74011250636 HC RX REV CODE- 250/636: Performed by: FAMILY MEDICINE

## 2021-10-29 PROCEDURE — 74011000250 HC RX REV CODE- 250: Performed by: INTERNAL MEDICINE

## 2021-10-29 PROCEDURE — 2709999900 HC NON-CHARGEABLE SUPPLY

## 2021-10-29 PROCEDURE — 77010033678 HC OXYGEN DAILY

## 2021-10-29 PROCEDURE — 74011250636 HC RX REV CODE- 250/636: Performed by: INTERNAL MEDICINE

## 2021-10-29 PROCEDURE — 65660000000 HC RM CCU STEPDOWN

## 2021-10-29 PROCEDURE — 36415 COLL VENOUS BLD VENIPUNCTURE: CPT

## 2021-10-29 PROCEDURE — 80048 BASIC METABOLIC PNL TOTAL CA: CPT

## 2021-10-29 PROCEDURE — 94640 AIRWAY INHALATION TREATMENT: CPT

## 2021-10-29 PROCEDURE — 85027 COMPLETE CBC AUTOMATED: CPT

## 2021-10-29 RX ADMIN — NYSTATIN 500000 UNITS: 100000 SUSPENSION ORAL at 15:02

## 2021-10-29 RX ADMIN — BUDESONIDE 500 MCG: 0.5 INHALANT RESPIRATORY (INHALATION) at 20:07

## 2021-10-29 RX ADMIN — IPRATROPIUM BROMIDE AND ALBUTEROL SULFATE 3 ML: .5; 3 SOLUTION RESPIRATORY (INHALATION) at 20:07

## 2021-10-29 RX ADMIN — FAMOTIDINE 20 MG: 20 TABLET ORAL at 11:07

## 2021-10-29 RX ADMIN — QUETIAPINE FUMARATE 200 MG: 200 TABLET ORAL at 11:07

## 2021-10-29 RX ADMIN — THIAMINE HCL TAB 100 MG 100 MG: 100 TAB at 11:08

## 2021-10-29 RX ADMIN — LORAZEPAM 2 MG: 2 INJECTION INTRAMUSCULAR at 11:31

## 2021-10-29 RX ADMIN — BUDESONIDE 500 MCG: 0.5 INHALANT RESPIRATORY (INHALATION) at 08:40

## 2021-10-29 RX ADMIN — SULFAMETHOXAZOLE AND TRIMETHOPRIM 1 TABLET: 800; 160 TABLET ORAL at 11:06

## 2021-10-29 RX ADMIN — 0.12% CHLORHEXIDINE GLUCONATE 10 ML: 1.2 RINSE ORAL at 11:07

## 2021-10-29 RX ADMIN — ENOXAPARIN SODIUM 40 MG: 100 INJECTION SUBCUTANEOUS at 18:08

## 2021-10-29 RX ADMIN — NYSTATIN 500000 UNITS: 100000 SUSPENSION ORAL at 11:08

## 2021-10-29 RX ADMIN — LORAZEPAM 2 MG: 2 INJECTION INTRAMUSCULAR at 04:37

## 2021-10-29 RX ADMIN — HALOPERIDOL LACTATE 5 MG: 5 INJECTION, SOLUTION INTRAMUSCULAR at 05:22

## 2021-10-29 RX ADMIN — Medication 1 TABLET: at 11:06

## 2021-10-29 RX ADMIN — NYSTATIN: 100000 POWDER TOPICAL at 15:02

## 2021-10-29 RX ADMIN — HALOPERIDOL LACTATE 5 MG: 5 INJECTION, SOLUTION INTRAMUSCULAR at 15:59

## 2021-10-29 RX ADMIN — HYDROMORPHONE HYDROCHLORIDE 1 MG: 1 INJECTION, SOLUTION INTRAMUSCULAR; INTRAVENOUS; SUBCUTANEOUS at 08:33

## 2021-10-29 RX ADMIN — HYDROMORPHONE HYDROCHLORIDE 1 MG: 1 INJECTION, SOLUTION INTRAMUSCULAR; INTRAVENOUS; SUBCUTANEOUS at 14:45

## 2021-10-29 RX ADMIN — ARFORMOTEROL TARTRATE 15 MCG: 15 SOLUTION RESPIRATORY (INHALATION) at 20:07

## 2021-10-29 RX ADMIN — CLONAZEPAM 2 MG: 0.5 TABLET ORAL at 11:07

## 2021-10-29 RX ADMIN — DIAZEPAM 5 MG: 5 TABLET ORAL at 11:06

## 2021-10-29 RX ADMIN — NYSTATIN 500000 UNITS: 100000 SUSPENSION ORAL at 18:09

## 2021-10-29 RX ADMIN — NYSTATIN: 100000 POWDER TOPICAL at 11:08

## 2021-10-29 RX ADMIN — DIPHENHYDRAMINE HYDROCHLORIDE, ZINC ACETATE: 2; .1 CREAM TOPICAL at 04:31

## 2021-10-29 RX ADMIN — ARFORMOTEROL TARTRATE 15 MCG: 15 SOLUTION RESPIRATORY (INHALATION) at 08:40

## 2021-10-29 RX ADMIN — HYDROMORPHONE HYDROCHLORIDE 1 MG: 1 INJECTION, SOLUTION INTRAMUSCULAR; INTRAVENOUS; SUBCUTANEOUS at 04:04

## 2021-10-29 RX ADMIN — LORAZEPAM 2 MG: 2 INJECTION INTRAMUSCULAR at 18:08

## 2021-10-29 NOTE — PROGRESS NOTES
Hospitalist Progress Note    Patient: Sameera Reed MRN: [id number]  CSN: [id number]    YOB: 1980  Age: 39 y.o. Sex: female    DOA: 9/11/2021 LOS:  LOS: 52 days          Chief Complaint: Ac resp failure      Assessment/Plan     Ema Goldstein is a 39 y.o. female who is standing history of multidrug use/abuse, previous drug-seeking behavior and mental health issues otherwise unspecified arrives via EMS with acute metabolic encephalopathy and lethargy. Admitted for likely gabapentin overdose. She was intubated in ER, ct head no acute issue, LP done due to fever even on abx, no meningitis noted. Now she has peg and trach       Oral thrush and pain-better     MRSA on sputum cx now-stop augmentin , started bactrim out of caution, 5 days     Acute respiratory failure with hypoxia   Intubated on 9/12    Trach on 9/20/21.    Local trach care, culture sputum, increase trach care as recommended by ENT     bacteremia -completed treatment     Anemia stable     Acute metabolic encephalopathy -resolved  Ct head no acute process   She requires sedatives, benzos, seroquel otherwise she can become very agitated due to psychiatric illness, high anxiety     Urinary retention -zamora      elevated ammonia level  Resolved     Psychosis, hx of  Ekbom's delusional parasitosis    On  Klonopin, Seroquel, haldol prn per psych recommendation      left wrist fracture: immobilized -POA -stable   Will have outpatient follow up      Peg feedings, trach care     D/c plan is unknown, will need facility, but barriers to placement with resources and options      Disposition :  Patient Active Problem List   Diagnosis Code    Dextromethorphan use disorder, moderate ([address] F19.20    Ekbom's delusional parasitosis ([address] F22    Left wrist fracture, with delayed healing, subsequent encounter S62.102G    Drug overdose, intentional self-harm, initial encounter ([address] T50.902A    Hypoxia R09.02    Hypotension after procedure I95.81  Acute metabolic encephalopathy X01.55    Psychosis (HCC) F29    Hyponatremia E87.1    Acute respiratory failure with hypoxia ([facility]) J96.01    Increased ammonia level R79.89    Hypokalemia E87.6    Status post tracheostomy ([facility] Z93.0    Bacteremia due to Gram-positive bacteria R78.81    FELICITY (acute kidney injury) ([facility] N17.9       Subjective:    C/o can breathe thru trach  Was resting quietly when I entered then became very anxious and saying I cant breathe  I calmed her down and told her to breathe thru nose, lungs are clear, trach w/o congestion, she needs regular reassurance and calming    Review of systems:    Constitutional: denies fevers, chills, myalgias  Respiratory: denies SOB, cough  Cardiovascular: denies chest pain, palpitations  Gastrointestinal: denies nausea, vomiting, diarrhea      Vital signs/Intake and Output:  Visit Vitals  BP (!) 103/54 (BP 1 Location: Left upper arm, BP Patient Position: At rest)   Pulse 84   Temp 97.8 °F (36.6 °C)   Resp 16   Ht 5' 2\" (1.575 m)   Wt 72.6 kg (160 lb 0.9 oz)   SpO2 98%   BMI 29.27 kg/m²     Current Shift:  No intake/output data recorded.   Last three shifts:  10/27 1901 - 10/29 0700  In: 1390 [I.V.:40]  Out: 2700 [Urine:2700]    Exam:    General: well developed Wf anxious, NAD  trach  CVS:Regular rate and rhythm, no M/R/G, S1/S2 heard, no thrill  Lungs:Clear to auscultation bilaterally, no wheezes, rhonchi, or rales  Abdomen: Soft, Nontender, No distention, Normal Bowel sounds, peg tube  Extremities: No C/C/E, pulses palpable 2+, left wrist immobilizer  Neuro:grossly normal , follows commands  Psych:appropriate but very anxious                Labs: Results:       Chemistry Recent Labs     10/29/21  0359   GLU 89      K 4.4      CO2 26   BUN 17   CREA 0.77   CA 9.3   AGAP 6   BUCR 22*      CBC w/Diff Recent Labs     10/29/21  0359   WBC 5.4   RBC 3.03*   HGB 8.3*   HCT 26.9*         Cardiac Enzymes No results for input(s): CPK, CKND1, ZENON in the last 72 hours. No lab exists for component: CKRMB, TROIP   Coagulation No results for input(s): PTP, INR, APTT, INREXT in the last 72 hours. Lipid Panel No results found for: CHOL, CHOLPOCT, CHOLX, CHLST, CHOLV, 408679, HDL, HDLP, LDL, LDLC, DLDLP, 704298, VLDLC, VLDL, TGLX, TRIGL, TRIGP, TGLPOCT, CHHD, CHHDX   BNP No results for input(s): BNPP in the last 72 hours. Liver Enzymes No results for input(s): TP, ALB, TBIL, AP in the last 72 hours.     No lab exists for component: SGOT, GPT, DBIL   Thyroid Studies No results found for: T4, T3U, TSH, TSHEXT     Procedures/imaging: see electronic medical records for all procedures/Xrays and details which were not copied into this note but were reviewed prior to creation of Kraig Causey MD

## 2021-10-29 NOTE — PROGRESS NOTES
Pt with multiple declines and due to payor not being accepted or concerns with pt's psych history. CM to continue to follow and assist with placement. No accepting facility at this time.

## 2021-10-29 NOTE — ROUTINE PROCESS
RESPIRATORY NOTE: 
     Nebulizer tx given, good strong cough with small amt of thick purulent secretions, BS with good air movement, no wheezing or coarseness at this time, following commands but says hurting and appears restless off and on, sitter at bedside. Trach care given and new inner cannula inserted, tolerated well, will continue to monitor.

## 2021-10-29 NOTE — PROGRESS NOTES
Pt refused PT due to:    [x]  Deeply sleeping (1st attempt)  [x]  Demanding meds before PT (Ativan)(RN notes only Haldol available)  []  Pain  []  Pt lethargic  []  Off Unit  Will f/u later, as pt's schedule allows. Thank you.   Yovany Lemons, PTA

## 2021-10-29 NOTE — ROUTINE PROCESS
Bedside and Verbal shift change report given to KULWANT Spence RN by Azeb Ortiz RN. Report included the following information SBAR, Kardex, Intake/Output and MAR.

## 2021-10-29 NOTE — PROGRESS NOTES
1950 - Patient pulled trach out. RRT called. Camillo Holstein - Dr. Kaitlin Cheung at bedside. Placed on 2L via NC.     1959 - Gauze and tape applied to trach site. Dr. Kaitlin Cheung ordered patient to transfer to tele. Temp - 98.4 F, O2 sats 98% /67, HR 88    2000 - Notified supervisor that patient will be transferring to tele for continous pulse ox. 2002 - O2 sats 100% on 2L.

## 2021-10-30 LAB
GLUCOSE BLD STRIP.AUTO-MCNC: 121 MG/DL (ref 70–110)
GLUCOSE BLD STRIP.AUTO-MCNC: 87 MG/DL (ref 70–110)

## 2021-10-30 PROCEDURE — 74011250637 HC RX REV CODE- 250/637: Performed by: INTERNAL MEDICINE

## 2021-10-30 PROCEDURE — 74011250637 HC RX REV CODE- 250/637: Performed by: HOSPITALIST

## 2021-10-30 PROCEDURE — 82962 GLUCOSE BLOOD TEST: CPT

## 2021-10-30 PROCEDURE — 94640 AIRWAY INHALATION TREATMENT: CPT

## 2021-10-30 PROCEDURE — 94760 N-INVAS EAR/PLS OXIMETRY 1: CPT

## 2021-10-30 PROCEDURE — 74011000250 HC RX REV CODE- 250: Performed by: INTERNAL MEDICINE

## 2021-10-30 PROCEDURE — 77010033678 HC OXYGEN DAILY

## 2021-10-30 PROCEDURE — 74011250636 HC RX REV CODE- 250/636: Performed by: FAMILY MEDICINE

## 2021-10-30 PROCEDURE — 74011250636 HC RX REV CODE- 250/636: Performed by: INTERNAL MEDICINE

## 2021-10-30 PROCEDURE — 65660000000 HC RM CCU STEPDOWN

## 2021-10-30 PROCEDURE — 74011250637 HC RX REV CODE- 250/637: Performed by: PSYCHIATRY & NEUROLOGY

## 2021-10-30 PROCEDURE — 74011000250 HC RX REV CODE- 250: Performed by: HOSPITALIST

## 2021-10-30 RX ADMIN — QUETIAPINE FUMARATE 200 MG: 200 TABLET ORAL at 09:56

## 2021-10-30 RX ADMIN — Medication 5 MG: at 22:15

## 2021-10-30 RX ADMIN — ENOXAPARIN SODIUM 40 MG: 100 INJECTION SUBCUTANEOUS at 16:29

## 2021-10-30 RX ADMIN — 0.12% CHLORHEXIDINE GLUCONATE 10 ML: 1.2 RINSE ORAL at 20:55

## 2021-10-30 RX ADMIN — CLONAZEPAM 2 MG: 0.5 TABLET ORAL at 20:54

## 2021-10-30 RX ADMIN — DIAZEPAM 5 MG: 5 TABLET ORAL at 20:55

## 2021-10-30 RX ADMIN — DIAZEPAM 5 MG: 5 TABLET ORAL at 09:57

## 2021-10-30 RX ADMIN — QUETIAPINE FUMARATE 300 MG: 200 TABLET ORAL at 20:54

## 2021-10-30 RX ADMIN — SULFAMETHOXAZOLE AND TRIMETHOPRIM 1 TABLET: 800; 160 TABLET ORAL at 09:57

## 2021-10-30 RX ADMIN — 0.12% CHLORHEXIDINE GLUCONATE 10 ML: 1.2 RINSE ORAL at 09:57

## 2021-10-30 RX ADMIN — HYDROMORPHONE HYDROCHLORIDE 1 MG: 1 INJECTION, SOLUTION INTRAMUSCULAR; INTRAVENOUS; SUBCUTANEOUS at 20:47

## 2021-10-30 RX ADMIN — ARFORMOTEROL TARTRATE 15 MCG: 15 SOLUTION RESPIRATORY (INHALATION) at 20:31

## 2021-10-30 RX ADMIN — Medication 1 TABLET: at 09:57

## 2021-10-30 RX ADMIN — NYSTATIN: 100000 POWDER TOPICAL at 10:09

## 2021-10-30 RX ADMIN — NYSTATIN: 100000 POWDER TOPICAL at 21:09

## 2021-10-30 RX ADMIN — THIAMINE HCL TAB 100 MG 100 MG: 100 TAB at 09:56

## 2021-10-30 RX ADMIN — BUDESONIDE 500 MCG: 0.5 INHALANT RESPIRATORY (INHALATION) at 20:31

## 2021-10-30 RX ADMIN — NYSTATIN: 100000 POWDER TOPICAL at 16:29

## 2021-10-30 RX ADMIN — SULFAMETHOXAZOLE AND TRIMETHOPRIM 1 TABLET: 800; 160 TABLET ORAL at 20:54

## 2021-10-30 RX ADMIN — FAMOTIDINE 20 MG: 20 TABLET ORAL at 09:57

## 2021-10-30 RX ADMIN — HYDROMORPHONE HYDROCHLORIDE 1 MG: 1 INJECTION, SOLUTION INTRAMUSCULAR; INTRAVENOUS; SUBCUTANEOUS at 16:35

## 2021-10-30 RX ADMIN — ARFORMOTEROL TARTRATE 15 MCG: 15 SOLUTION RESPIRATORY (INHALATION) at 07:39

## 2021-10-30 RX ADMIN — FAMOTIDINE 20 MG: 20 TABLET ORAL at 20:55

## 2021-10-30 RX ADMIN — BUDESONIDE 500 MCG: 0.5 INHALANT RESPIRATORY (INHALATION) at 07:39

## 2021-10-30 RX ADMIN — CLONAZEPAM 2 MG: 0.5 TABLET ORAL at 09:56

## 2021-10-30 RX ADMIN — NYSTATIN 500000 UNITS: 100000 SUSPENSION ORAL at 21:06

## 2021-10-30 NOTE — PROGRESS NOTES
TRANSFER - IN REPORT:    Verbal report received from Jyoti Walker RN(name) on [facility] Severe  being received from 3S(unit) for change in patient condition(Pulled trach out)      Report consisted of patients Situation, Background, Assessment and   Recommendations(SBAR). Information from the following report(s) SBAR, Kardex, ED Summary, Intake/Output, MAR, Recent Results, Med Rec Status and Cardiac Rhythm NSR was reviewed with the receiving nurse. Opportunity for questions and clarification was provided. Assessment completed upon patients arrival to unit and care assumed. 2100  Shift assessment complete  Pt resting quietly with eyes closed and chest rising and falling evenly   Bed locked and in lowest position   Call light within reach   Sitter at bedside due to pt pulling at lines     Pt on continuous pulse ox - saturation 98 - 100%      2300  Received orders from Dr. Abbie Gilmore that if pt is sleeping, do not wake up to give medications or take VS since pt is on monitor     0400  Pt resting quietly with eyes closed and chest rising and falling evenly     [facility] [place] Cardiac/Medical Night Shift Chart Audit    Chart Audit completed? YES      Bedside and Verbal shift change report given to Stu Peña RN (oncoming nurse) by Brayan Zuniga RN (offgoing nurse). Report included the following information SBAR, Kardex, ED Summary, Intake/Output, MAR, Recent Results, Med Rec Status and Cardiac Rhythm NSR.

## 2021-10-30 NOTE — PROGRESS NOTES
Occupational Therapy Treatment Attempt     Chart reviewed. Attempted Occupational Therapy Treatment, however, patient unable to be seen due to:  []  Nausea/vomiting  []  Eating  []  Pain  [x]  Patient too lethargic  []  Off Unit for testing/procedure  []  Dialysis treatment in progress  []  Telemetry Results  []  Other:      Will f/u later as patient's schedule allows.   Edwardo Griffith, OTR/L

## 2021-10-30 NOTE — PROGRESS NOTES
Rapid response as pt pulled out her trach. She is satting 100% on room air. Will cover stoma and not replace trach. Transfer to tele for continuous pulse oximetry.

## 2021-10-30 NOTE — PROGRESS NOTES
0786 - Bedside shift report received from Hilario Barker RN. Assumed care of patient. Patient noted resting in bed with eyes closed at this time. Call light in reach. Sitter at bedside. 0017 - PRN Dilaudid administered for 10/10 pain to left arm.     1107 - Assessment completed as per flowsheet. . Patient alert and oriented x 3. Patient with periods of confusion. Respirations even and unlabored. Trach intact with humidified trach collar. Fixator to left FA. IV to right upper arm and flushes with some difficulty. Related to positioning of arm. Patient NPO. Peg tube intact and placement verified. Medications administered via peg as per orders. [place] catheter intact and draining. Reports pain to left arm. Patient resting in bed with sitter at bedside. 1131 - PRN Ativan administered for noted anxiety/agitation. 1445 - PRN Dilaudid administered for 10/10 pain to left arm.     1559 - PRN Haldol administered for noted agitation. 1808 - PRN Ativan administered for continued anxiety/agitation. Patient in bed. Sitter at bedside.

## 2021-10-30 NOTE — ROUTINE PROCESS
Bedside and Verbal shift change report given to Janelle Hart RN (oncoming nurse) by KULWANT Frank RN (offgoing nurse). Report included the following information SBAR, Kardex, ED Summary, Procedure Summary, MAR and Recent Results.

## 2021-10-30 NOTE — PROGRESS NOTES
Bedside and verbal shift change report recieved from [address] (offgoing nurse) given to Aida Gallo RN (oncoming nurse). Report included the following information SBAR, Kardex, Intake/Output, MAR and Recent Results     Bedside and verbal shift change report given to [employer] (oncoming nurse) by Naheed Pardo RN (offgoing nurse).  Report included the following information SBAR, Kardex, Intake/Output, MAR and Recent Results

## 2021-10-30 NOTE — PROGRESS NOTES
2020-patient transferred to [address] [place] [address] for continuous pulse ox.  Report given to Jose Lane RN.

## 2021-10-30 NOTE — PROGRESS NOTES
1955- Arrived at RRT called by primary RN d/t patient pulling tracheostomy out. Sitter, primary RN, RT, and  all at bedside. Alert, NAD, respirations regular and unlabored, SPO2= %.  ordered to observe and see how she does with a nasal cannula, 2LNC applied. 2000- Tracheostomy covered with 4x4s and paper tape per orders, NAD, continues to breath without issue, nebulizer in progress. Attempting to talk. Verbally said \"Dilaudid\". 2015- Transferred to [facility] per bed, telemetry box with continous pulse oximeter applied. Primary RN giving report to receiving RN. Sitter at bedside. Receiving RN and telemetry tech notified of arrival. RRT completed.

## 2021-10-30 NOTE — PROGRESS NOTES
Hospitalist Progress Note-critical care note     Patient: Faith Hsu MRN: [id number]  [facility]: [id number]    YOB: 1980  Age: 39 y.o. Sex: female    DOA: 9/11/2021 LOS:  LOS: 48 days            Chief complaint: status post trach, psychosis , wrist fracture drug overdose     Assessment/Plan         Hospital Problems  Date Reviewed: 9/6/2015        Codes Class Noted POA    FELICITY (acute kidney injury) ([facility] ICD-10-CM: N17.9  ICD-9-CM: 584.9  10/16/2021 Unknown        Bacteremia due to Gram-positive bacteria ICD-10-CM: R78.81  ICD-9-CM: 790.7  10/6/2021 Unknown        Status post tracheostomy ([facility] ICD-10-CM: Z93.0  ICD-9-CM: V44.0  9/28/2021 Unknown        Hypokalemia ICD-10-CM: E87.6  ICD-9-CM: 276.8  9/21/2021 Unknown        Increased ammonia level ICD-10-CM: R79.89  ICD-9-CM: 790.6  9/14/2021 Unknown        Psychosis ([facility] ICD-10-CM: F29  ICD-9-CM: 298.9  Unknown Unknown        Hyponatremia ICD-10-CM: E87.1  ICD-9-CM: 276.1  Unknown Yes        Acute respiratory failure with hypoxia (HCC) ICD-10-CM: J96.01  ICD-9-CM: 518.81  Unknown Yes        Left wrist fracture, with delayed healing, subsequent encounter ICD-10-CM: S62.102G  ICD-9-CM: V54.19  9/12/2021 Unknown        * (Principal) Drug overdose, intentional self-harm, initial encounter ([facility] ICD-10-CM: T50.902A  ICD-9-CM: 977.9, E950.5  9/12/2021 Yes        Hypoxia ICD-10-CM: R09.02  ICD-9-CM: 799.02  9/12/2021 Yes        Hypotension after procedure ICD-10-CM: I95.81  ICD-9-CM: 458.29  9/12/2021 Yes        Acute metabolic encephalopathy ESY-89-NE: G93.41  ICD-9-CM: 348.31  9/12/2021 Yes               Alma Goldstein is a 39 y.o. female who is standing history of multidrug use/abuse, previous drug-seeking behavior and mental health issues otherwise unspecified arrives via EMS with acute metabolic encephalopathy and lethargy. Admitted for likely gabapentin overdose.   She was intubated in ER, ct head no acute issue, LP done due to fever even on abx, no meningitis noted. she has peg and trach. rrt called last night and she pulled out of her trach, remained oxygen level good             Acute respiratory failure with hypoxia -resolving   Intubated on 9/12    Trach on 9/20/21. -pulled out on 10/29      MRSA on sputum cx now-on bactrim for total 5 days per peg tube     bacteremia -completed treatment     Anemia stable     Acute metabolic encephalopathy -resolved  Ct head no acute process   Continue Seroquel     Urinary retention -zamora      elevated ammonia level  Resolved     Psychosis, hx of  Ekbom's delusional parasitosis    On  Klonopin, Seroquel, haldol prn per psych recommendation   Now start talking, will have psych on board Monday      left wrist fracture: immobilized -POA -stable   Will have outpatient follow up      Peg feedings,    Subjective: I want my dilaudid , pain in my wrist and my throat     Sitter was at the bedside   She calmed down after redirection       Disposition :tbd,   Review of systems:    General: No fevers or chills. Cardiovascular: No chest pain or pressure. No palpitations. Pulmonary: No shortness of breath. Gastrointestinal: No nausea, vomiting.   msk : pain in wrist,     Vital signs/Intake and Output:  Visit Vitals  BP (!) 106/54 (BP 1 Location: Right lower arm, BP Patient Position: At rest;Lying)   Pulse 83   Temp 98.5 °F (36.9 °C)   Resp 16   Ht 5' 2\" (1.575 m)   Wt 72.6 kg (160 lb 0.9 oz)   SpO2 100%   BMI 29.27 kg/m²     Current Shift:  No intake/output data recorded. Last three shifts:  10/28 1901 - 10/30 0700  In: 250   Out: 3875 [Urine:3875]    Physical Exam:  General: WD, WN. Alert, cooperative, no acute distress    HEENT: NC, Atraumatic. PERRLA, anicteric sclerae. Trach covered with gauze   Lungs: CTA Bilaterally. No Wheezing/Rhonchi/Rales. Heart:  Regular  rhythm,  No murmur, No Rubs, No Gallops  Abdomen: Soft, Non distended, Non tender.   +Bowel sounds,   Extremities: No c/c/e  Psych:   Not anxious, + agitated. Neurologic:  No acute neurological deficit. Labs: Results:       Chemistry Recent Labs     10/29/21  0359   GLU 89      K 4.4      CO2 26   BUN 17   CREA 0.77   CA 9.3   AGAP 6   BUCR 22*      CBC w/Diff Recent Labs     10/29/21  0359   WBC 5.4   RBC 3.03*   HGB 8.3*   HCT 26.9*         Cardiac Enzymes No results for input(s): CPK, CKND1, ZENON in the last 72 hours. No lab exists for component: CKRMB, TROIP   Coagulation No results for input(s): PTP, INR, APTT, INREXT in the last 72 hours. Lipid Panel No results found for: CHOL, CHOLPOCT, CHOLX, CHLST, CHOLV, 079086, HDL, HDLP, LDL, LDLC, DLDLP, 503349, VLDLC, VLDL, TGLX, TRIGL, TRIGP, TGLPOCT, CHHD, CHHDX   BNP No results for input(s): BNPP in the last 72 hours. Liver Enzymes No results for input(s): TP, ALB, TBIL, AP in the last 72 hours. No lab exists for component: SGOT, GPT, DBIL   Thyroid Studies No results found for: T4, T3U, TSH, TSHEXT     Procedures/imaging: see electronic medical records for all procedures/Xrays and details which were not copied into this note but were reviewed prior to creation of Plan    IR INSERT GASTROSTOMY TUBE PERC    Result Date: 10/1/2021  PROCEDURE: PERCUTANEOUS GASTROSTOMY TUBE PLACEMENT ATTENDING: Reyna Valverde M.D. COMPLICATIONS: NONE MEDICATIONS: Local lidocaine. DOSE: 20 mGy (air kerma) INDICATION: Enteral nutrition PROCEDURE: Informed consent was obtained where the risks, benefits and alternatives to the procedure were explained. All elements of maximal sterile barrier technique followed including: cap and mask and sterile gown and sterile gloves and a large sterile sheet and hand hygiene and 2% chlorhexidine for cutaneous antisepsis (or acceptable alternative antiseptics, per current guidelines). The stomach was insufflated via indwelling nasogastric tube. Following gastric insufflation and subcutaneous lidocaine administration, gastropexy was performed using T-fasteners. Then, access to the stomach was gained via 18-gauge needle. Then over wire, following tract dilation, an 12 [place] balloon retention type gastrostomy tube was placed. Contrast injection was performed confirming intragastric tip position. The patient tolerated the procedure well and was transferred from the IR suite in stable condition. Percutaneous gastrostomy tube placement as above. XR CHEST PORT    Result Date: 10/24/2021  EXAM: XR CHEST PORT CLINICAL INDICATION/HISTORY: trach, cough, congestion -Additional: None COMPARISON: Radiographs 10/15/2021 TECHNIQUE: Portable frontal view of the chest _______________ FINDINGS: SUPPORT DEVICES: Tracheostomy catheter projects in stable position. HEART AND MEDIASTINUM: Stable cardiac size and mediastinal contours. LUNGS AND PLEURAL SPACES: No focal pneumonic opacity. No evidence of pneumothorax or pleural effusion. BONY THORAX AND SOFT TISSUES: No acute osseous abnormality appear _______________     No acute radiographic cardiopulmonary abnormality. XR CHEST PORT    Result Date: 10/15/2021  EXAM: XR CHEST PORT CLINICAL INDICATION/HISTORY: interval change -Additional: Drug overdose, respiratory failure COMPARISON: 10/8/2021 TECHNIQUE: Portable frontal view of the chest _______________ FINDINGS: SUPPORT DEVICES: Tracheostomy catheter projecting in expected position. HEART AND MEDIASTINUM: Stable cardiac size and mediastinal contours, within normal limits. LUNGS AND PLEURAL SPACES: Lungs are mildly underexpanded, but appear improved in aeration from prior examination. Minor residual linear opacities are present at each lung base. No pneumothorax or pleural effusion. No alveolar consolidation. BONY THORAX AND SOFT TISSUES: Unremarkable. _______________     1. Tracheostomy catheter in stable position. 2. Improved pulmonary expansion/aeration with minor residual atelectasis at the lung bases.      XR CHEST PORT    Result Date: 10/8/2021  EXAM: One-view chest CLINICAL HISTORY: Acute respiratory failure, ET tube position , COMPARISON: None FINDINGS: Frontal view of the chest demonstrate tracheostomy tube in stable position. Minimal streaky opacities in the left lung base. Otherwise clear. . Cardiac silhouette is normal in size and contour. No acute bony or soft tissue abnormality. Minimal streakiness in the left lung base likely atelectasis. Otherwise clear. XR CHEST PORT    Result Date: 10/7/2021  EXAM: XR CHEST PORT CLINICAL INDICATION/HISTORY: Acute respiratory failure, ET tub position -Additional: None COMPARISON: 10/6/2021 TECHNIQUE: Portable frontal view of the chest _______________ FINDINGS: SUPPORT DEVICES: Tracheostomy. HEART AND MEDIASTINUM: Cardiomediastinal silhouette within normal limits. LUNGS AND PLEURAL SPACES: Hypoinflated lungs. Mild left perihilar haziness. No dense focal consolidation, large effusion or pneumothorax. _______________     Hypoinflated lungs. Mild left perihilar haziness. XR CHEST PORT    Result Date: 10/6/2021  EXAM: XR CHEST PORT CLINICAL INDICATION/HISTORY: Acute respiratory failure, ET tub position -Additional: None COMPARISON: 10/5/2021 TECHNIQUE: Portable frontal view of the chest _______________ FINDINGS: SUPPORT DEVICES: Tracheostomy unchanged. HEART AND MEDIASTINUM: Cardiomediastinal silhouette within normal limits. LUNGS AND PLEURAL SPACES: Left mid to lower lung zone parenchymal opacity. No large effusion or pneumothorax. _______________     Left mid to lower lung zone parenchymal opacity. XR CHEST PORT    Result Date: 10/5/2021  EXAM: XR CHEST PORT CLINICAL INDICATION/HISTORY: Acute respiratory failure, ET tub position -Additional: None COMPARISON: One day prior TECHNIQUE: Portable frontal view of the chest _______________ FINDINGS: SUPPORT DEVICES: Tracheostomy. Enteric tube unchanged. HEART AND MEDIASTINUM: Cardiomediastinal silhouette within normal limits.  LUNGS AND PLEURAL SPACES: Mild left lower lower lung zone opacities. No pneumothorax. _______________     Mild left lower lower lung zone opacities, unchanged. XR CHEST PORT    Result Date: 10/4/2021  EXAM: XR CHEST PORT CLINICAL INDICATION/HISTORY: hypoxia   > Additional: None. COMPARISON: October 3, 2001 TECHNIQUE: Portable chest _______________ FINDINGS: SUPPORT DEVICES: Tracheostomy tube remains unchanged. HEART AND MEDIASTINUM: Heart size accentuated by portable technique. LUNGS AND PLEURAL SPACES: The lungs are underexpanded. Slightly increased hazy left greater than right perihilar and left basilar opacities. No pneumothorax. BONY THORAX AND SOFT TISSUES: No acute osseous abnormality. _______________     Ongoing increased left greater than right perihilar and left basilar opacities may represent combination of worsening asymmetric pulmonary edema and infiltrate with atelectasis. Possible trace left pleural effusion. XR CHEST PORT    Result Date: 10/3/2021  A portable AP radiograph of the chest was obtained at 0606 hours: INDICATION:  Drug overdose. COMPARISON:  Portable chest one day earlier. FINDINGS:  Heart and mediastinum: Tracheostomy tube in place. Lungs and pleura: Increasing hazy changes left base possibly with developing minimal effusion. Right lung remains clear. Aorta: Unremarkable. Bones: Within normal limits for age. Other: None. Increasing opacities in the left lung base could be due to worsening atelectasis or developing infiltrate with possible developing effusion. XR CHEST PORT    Result Date: 10/2/2021  EXAM: Chest radiograph  CLINICAL INDICATION/HISTORY: Acute respiratory failure, mechanical ventilation    --Additional history: None. COMPARISON: 10/01/2021 TECHNIQUE: Frontal view of the chest _______________ FINDINGS: SUPPORT DEVICES: Tracheostomy in stable position. Interval removal of the esophagogastric tube and placement of a percutaneous gastrostomy tube.  Numerous telemetry leads project over the chest. HEART AND MEDIASTINUM: Cardiac silhouette is normal in size. Normal mediastinal contours . Normal pulmonary vasculature. LUNGS AND PLEURAL SPACES: Minimal left base opacity. The right lung is clear. Sharp costophrenic sulci. No pneumothoraces. BONY THORAX AND SOFT TISSUES: Unremarkable. _______________     1. Support lines and tubes as detailed above. 2. Minimal left base subsegmental atelectasis/airspace disease. XR CHEST PORT    Result Date: 10/1/2021  EXAM: XR CHEST PORT CLINICAL INDICATION/HISTORY: Acute respiratory failure, ET tub position -Additional: None COMPARISON: One day prior TECHNIQUE: Portable frontal view of the chest _______________ FINDINGS: SUPPORT DEVICES: Tracheostomy. Enteric tube unchanged. HEART AND MEDIASTINUM: Cardiomediastinal silhouette within normal limits. LUNGS AND PLEURAL SPACES: Mild left lower lower lung zone opacities. No pneumothorax. _______________     Mild left lower lower lung zone opacities. DUPLEX UPPER EXT VENOUS RIGHT    Result Date: 10/18/2021  · No evidence of acute DVT and chronic DVT in the right upper extremity. · Thrombus noted within the right basilic upper arm vein(s). DUPLEX LOWER EXT VENOUS BILAT    Result Date: 10/4/2021  · No evidence of deep vein thrombosis in the right lower extremity. · No evidence of deep vein thrombosis in the left lower extremity.         Cynthia Goldman MD

## 2021-10-31 LAB
AMMONIA PLAS-SCNC: 28 UMOL/L (ref 11–32)
ARTERIAL PATENCY WRIST A: POSITIVE
BASE EXCESS BLD CALC-SCNC: 0.9 MMOL/L
BDY SITE: ABNORMAL
GAS FLOW.O2 O2 DELIVERY SYS: ABNORMAL L/MIN
GLUCOSE BLD STRIP.AUTO-MCNC: 115 MG/DL (ref 70–110)
GLUCOSE BLD STRIP.AUTO-MCNC: 118 MG/DL (ref 70–110)
GLUCOSE BLD STRIP.AUTO-MCNC: 90 MG/DL (ref 70–110)
HCO3 BLD-SCNC: 25 MMOL/L (ref 22–26)
PCO2 BLD: 36.8 MMHG (ref 35–45)
PH BLD: 7.44 [PH] (ref 7.35–7.45)
PO2 BLD: 79 MMHG (ref 80–100)
SAO2 % BLD: 96 % (ref 92–97)
SERVICE CMNT-IMP: ABNORMAL
SPECIMEN TYPE: ABNORMAL

## 2021-10-31 PROCEDURE — 82803 BLOOD GASES ANY COMBINATION: CPT

## 2021-10-31 PROCEDURE — 36600 WITHDRAWAL OF ARTERIAL BLOOD: CPT

## 2021-10-31 PROCEDURE — 82962 GLUCOSE BLOOD TEST: CPT

## 2021-10-31 PROCEDURE — 74011250637 HC RX REV CODE- 250/637: Performed by: FAMILY MEDICINE

## 2021-10-31 PROCEDURE — 74011250637 HC RX REV CODE- 250/637: Performed by: INTERNAL MEDICINE

## 2021-10-31 PROCEDURE — 82140 ASSAY OF AMMONIA: CPT

## 2021-10-31 PROCEDURE — 94640 AIRWAY INHALATION TREATMENT: CPT

## 2021-10-31 PROCEDURE — 74011000250 HC RX REV CODE- 250: Performed by: INTERNAL MEDICINE

## 2021-10-31 PROCEDURE — 74011250637 HC RX REV CODE- 250/637: Performed by: HOSPITALIST

## 2021-10-31 PROCEDURE — 65660000000 HC RM CCU STEPDOWN

## 2021-10-31 PROCEDURE — 74011250637 HC RX REV CODE- 250/637: Performed by: PSYCHIATRY & NEUROLOGY

## 2021-10-31 PROCEDURE — 74011250636 HC RX REV CODE- 250/636: Performed by: INTERNAL MEDICINE

## 2021-10-31 PROCEDURE — 74011000250 HC RX REV CODE- 250: Performed by: HOSPITALIST

## 2021-10-31 PROCEDURE — 74011250636 HC RX REV CODE- 250/636: Performed by: FAMILY MEDICINE

## 2021-10-31 PROCEDURE — 36415 COLL VENOUS BLD VENIPUNCTURE: CPT

## 2021-10-31 RX ADMIN — HYDROMORPHONE HYDROCHLORIDE 1 MG: 1 INJECTION, SOLUTION INTRAMUSCULAR; INTRAVENOUS; SUBCUTANEOUS at 09:16

## 2021-10-31 RX ADMIN — NYSTATIN 500000 UNITS: 100000 SUSPENSION ORAL at 22:06

## 2021-10-31 RX ADMIN — DIAZEPAM 5 MG: 5 TABLET ORAL at 22:06

## 2021-10-31 RX ADMIN — HYDROMORPHONE HYDROCHLORIDE 1 MG: 1 INJECTION, SOLUTION INTRAMUSCULAR; INTRAVENOUS; SUBCUTANEOUS at 18:15

## 2021-10-31 RX ADMIN — Medication 1 TABLET: at 09:26

## 2021-10-31 RX ADMIN — HYDROXYZINE HYDROCHLORIDE 25 MG: 25 TABLET, FILM COATED ORAL at 14:04

## 2021-10-31 RX ADMIN — HYDROMORPHONE HYDROCHLORIDE 1 MG: 1 INJECTION, SOLUTION INTRAMUSCULAR; INTRAVENOUS; SUBCUTANEOUS at 22:22

## 2021-10-31 RX ADMIN — FAMOTIDINE 20 MG: 20 TABLET ORAL at 22:07

## 2021-10-31 RX ADMIN — SULFAMETHOXAZOLE AND TRIMETHOPRIM 1 TABLET: 800; 160 TABLET ORAL at 22:06

## 2021-10-31 RX ADMIN — FAMOTIDINE 20 MG: 20 TABLET ORAL at 09:26

## 2021-10-31 RX ADMIN — HYDROMORPHONE HYDROCHLORIDE 1 MG: 1 INJECTION, SOLUTION INTRAMUSCULAR; INTRAVENOUS; SUBCUTANEOUS at 13:10

## 2021-10-31 RX ADMIN — ENOXAPARIN SODIUM 40 MG: 100 INJECTION SUBCUTANEOUS at 17:02

## 2021-10-31 RX ADMIN — CLONAZEPAM 2 MG: 0.5 TABLET ORAL at 22:06

## 2021-10-31 RX ADMIN — CLONAZEPAM 2 MG: 0.5 TABLET ORAL at 09:25

## 2021-10-31 RX ADMIN — NYSTATIN 500000 UNITS: 100000 SUSPENSION ORAL at 09:25

## 2021-10-31 RX ADMIN — ARFORMOTEROL TARTRATE 15 MCG: 15 SOLUTION RESPIRATORY (INHALATION) at 20:36

## 2021-10-31 RX ADMIN — SULFAMETHOXAZOLE AND TRIMETHOPRIM 1 TABLET: 800; 160 TABLET ORAL at 09:26

## 2021-10-31 RX ADMIN — QUETIAPINE FUMARATE 200 MG: 200 TABLET ORAL at 09:26

## 2021-10-31 RX ADMIN — 0.12% CHLORHEXIDINE GLUCONATE 10 ML: 1.2 RINSE ORAL at 09:26

## 2021-10-31 RX ADMIN — NYSTATIN: 100000 POWDER TOPICAL at 09:00

## 2021-10-31 RX ADMIN — HYDROMORPHONE HYDROCHLORIDE 1 MG: 1 INJECTION, SOLUTION INTRAMUSCULAR; INTRAVENOUS; SUBCUTANEOUS at 01:26

## 2021-10-31 RX ADMIN — QUETIAPINE FUMARATE 300 MG: 200 TABLET ORAL at 22:06

## 2021-10-31 RX ADMIN — NYSTATIN 500000 UNITS: 100000 SUSPENSION ORAL at 13:12

## 2021-10-31 RX ADMIN — ARFORMOTEROL TARTRATE 15 MCG: 15 SOLUTION RESPIRATORY (INHALATION) at 07:43

## 2021-10-31 RX ADMIN — DIAZEPAM 5 MG: 5 TABLET ORAL at 09:25

## 2021-10-31 RX ADMIN — THIAMINE HCL TAB 100 MG 100 MG: 100 TAB at 09:25

## 2021-10-31 RX ADMIN — 0.12% CHLORHEXIDINE GLUCONATE 10 ML: 1.2 RINSE ORAL at 22:06

## 2021-10-31 RX ADMIN — NYSTATIN 500000 UNITS: 100000 SUSPENSION ORAL at 18:00

## 2021-10-31 RX ADMIN — LORAZEPAM 2 MG: 2 INJECTION INTRAMUSCULAR at 13:59

## 2021-10-31 RX ADMIN — BUDESONIDE 500 MCG: 0.5 INHALANT RESPIRATORY (INHALATION) at 07:43

## 2021-10-31 RX ADMIN — NYSTATIN: 100000 POWDER TOPICAL at 22:19

## 2021-10-31 RX ADMIN — BUDESONIDE 500 MCG: 0.5 INHALANT RESPIRATORY (INHALATION) at 20:36

## 2021-10-31 RX ADMIN — Medication 5 MG: at 22:06

## 2021-10-31 NOTE — PROGRESS NOTES
Hospitalist Progress Note-critical care note     Patient: Ginger Severs MRN: [id number]  [facility]: [id number]    YOB: 1980  Age: 39 y.o. Sex: female    DOA: 9/11/2021 LOS:  LOS: 52 days            Chief complaint: status post trach, psychosis , wrist fracture drug overdose     Assessment/Plan         Hospital Problems  Date Reviewed: 9/6/2015        Codes Class Noted POA    FELICITY (acute kidney injury) ([facility] ICD-10-CM: N17.9  ICD-9-CM: 584.9  10/16/2021 Unknown        Bacteremia due to Gram-positive bacteria ICD-10-CM: R78.81  ICD-9-CM: 790.7  10/6/2021 Unknown        Status post tracheostomy ([facility] ICD-10-CM: Z93.0  ICD-9-CM: V44.0  9/28/2021 Unknown        Hypokalemia ICD-10-CM: E87.6  ICD-9-CM: 276.8  9/21/2021 Unknown        Increased ammonia level ICD-10-CM: R79.89  ICD-9-CM: 790.6  9/14/2021 Unknown        Psychosis ([facility] ICD-10-CM: F29  ICD-9-CM: 298.9  Unknown Unknown        Hyponatremia ICD-10-CM: E87.1  ICD-9-CM: 276.1  Unknown Yes        Acute respiratory failure with hypoxia (HCC) ICD-10-CM: J96.01  ICD-9-CM: 518.81  Unknown Yes        Left wrist fracture, with delayed healing, subsequent encounter ICD-10-CM: S62.102G  ICD-9-CM: V54.19  9/12/2021 Unknown        * (Principal) Drug overdose, intentional self-harm, initial encounter ([facility] ICD-10-CM: T50.902A  ICD-9-CM: 977.9, E950.5  9/12/2021 Yes        Hypoxia ICD-10-CM: R09.02  ICD-9-CM: 799.02  9/12/2021 Yes        Hypotension after procedure ICD-10-CM: I95.81  ICD-9-CM: 458.29  9/12/2021 Yes        Acute metabolic encephalopathy POG-18-YN: G93.41  ICD-9-CM: 348.31  9/12/2021 Yes               Beatris Goldstein is a 39 y.o. female who is standing history of multidrug use/abuse, previous drug-seeking behavior and mental health issues otherwise unspecified arrives via EMS with acute metabolic encephalopathy and lethargy. Admitted for likely gabapentin overdose.   She was intubated in ER, ct head no acute issue, LP done due to fever even on abx, no meningitis noted. she has peg and trach. she pulled out of her trach on oct 29, remained oxygen level good             Acute respiratory failure with hypoxia -resolving   Intubated on 9/12    Trach on 9/20/21. -pulled out on 10/29   Continue monitoring      MRSA on sputum cx now-on bactrim for total 5 days per peg tube     bacteremia -completed treatment     Anemia stable     Acute metabolic encephalopathy -resolved  Ct head no acute process   Continue Seroquel     Urinary retention -zamora      elevated ammonia level  Resolved     Psychosis, hx of  Ekbom's delusional parasitosis    On  Klonopin, Seroquel, haldol prn per psych recommendation   will have psych on board tomorrow      left wrist fracture: immobilized -POA -stable   Will have outpatient follow up      Peg feedings,    Subjective: I am ok   Sitter was at the bedside       Will decrease Seroquel am dose pt looks more calm than usual , will check abg and ammonia level    Disposition :tbd,   Review of systems:    General: No fevers or chills. Cardiovascular: No chest pain or pressure. No palpitations. Pulmonary: No shortness of breath. Gastrointestinal: No nausea, vomiting. Vital signs/Intake and Output:  Visit Vitals  /66   Pulse 83   Temp 98.3 °F (36.8 °C)   Resp 16   Ht 5' 2\" (1.575 m)   Wt 72.6 kg (160 lb 0.9 oz)   SpO2 96%   BMI 29.27 kg/m²     Current Shift:  No intake/output data recorded. Last three shifts:  10/29 1901 - 10/31 0700  In: -   Out: 3150 [Urine:3150]    Physical Exam:  General: WD, WN. Alert, cooperative, no acute distress    HEENT: NC, Atraumatic. PERRLA, anicteric sclerae. Trach covered with gauze   Lungs: CTA Bilaterally. No Wheezing/Rhonchi/Rales. Heart:  Regular  rhythm,  No murmur, No Rubs, No Gallops  Abdomen: Soft, Non distended, Non tender. +Bowel sounds,   Extremities: No c/c/e  Psych:   Not anxious,no  agitated. Neurologic:  No acute neurological deficit.  Sleepy           Labs: Results:       Chemistry Recent Labs     10/29/21  0359   GLU 89      K 4.4      CO2 26   BUN 17   CREA 0.77   CA 9.3   AGAP 6   BUCR 22*      CBC w/Diff Recent Labs     10/29/21  0359   WBC 5.4   RBC 3.03*   HGB 8.3*   HCT 26.9*         Cardiac Enzymes No results for input(s): CPK, CKND1, ZENON in the last 72 hours. No lab exists for component: CKRMB, TROIP   Coagulation No results for input(s): PTP, INR, APTT, INREXT, INREXT in the last 72 hours. Lipid Panel No results found for: CHOL, CHOLPOCT, CHOLX, CHLST, CHOLV, 473127, HDL, HDLP, LDL, LDLC, DLDLP, 226675, VLDLC, VLDL, TGLX, TRIGL, TRIGP, TGLPOCT, CHHD, CHHDX   BNP No results for input(s): BNPP in the last 72 hours. Liver Enzymes No results for input(s): TP, ALB, TBIL, AP in the last 72 hours. No lab exists for component: SGOT, GPT, DBIL   Thyroid Studies No results found for: T4, T3U, TSH, TSHEXT, TSHEXT     Procedures/imaging: see electronic medical records for all procedures/Xrays and details which were not copied into this note but were reviewed prior to creation of Plan    IR INSERT GASTROSTOMY TUBE PERC    Result Date: 10/1/2021  PROCEDURE: PERCUTANEOUS GASTROSTOMY TUBE PLACEMENT ATTENDING: Marc Jennings M.D. COMPLICATIONS: NONE MEDICATIONS: Local lidocaine. DOSE: 20 mGy (air kerma) INDICATION: Enteral nutrition PROCEDURE: Informed consent was obtained where the risks, benefits and alternatives to the procedure were explained. All elements of maximal sterile barrier technique followed including: cap and mask and sterile gown and sterile gloves and a large sterile sheet and hand hygiene and 2% chlorhexidine for cutaneous antisepsis (or acceptable alternative antiseptics, per current guidelines). The stomach was insufflated via indwelling nasogastric tube. Following gastric insufflation and subcutaneous lidocaine administration, gastropexy was performed using T-fasteners. Then, access to the stomach was gained via 18-gauge needle.  Then over wire, following tract dilation, an 12 Fijian balloon retention type gastrostomy tube was placed. Contrast injection was performed confirming intragastric tip position. The patient tolerated the procedure well and was transferred from the IR suite in stable condition. Percutaneous gastrostomy tube placement as above. XR CHEST PORT    Result Date: 10/24/2021  EXAM: XR CHEST PORT CLINICAL INDICATION/HISTORY: trach, cough, congestion -Additional: None COMPARISON: Radiographs 10/15/2021 TECHNIQUE: Portable frontal view of the chest _______________ FINDINGS: SUPPORT DEVICES: Tracheostomy catheter projects in stable position. HEART AND MEDIASTINUM: Stable cardiac size and mediastinal contours. LUNGS AND PLEURAL SPACES: No focal pneumonic opacity. No evidence of pneumothorax or pleural effusion. BONY THORAX AND SOFT TISSUES: No acute osseous abnormality appear _______________     No acute radiographic cardiopulmonary abnormality. XR CHEST PORT    Result Date: 10/15/2021  EXAM: XR CHEST PORT CLINICAL INDICATION/HISTORY: interval change -Additional: Drug overdose, respiratory failure COMPARISON: 10/8/2021 TECHNIQUE: Portable frontal view of the chest _______________ FINDINGS: SUPPORT DEVICES: Tracheostomy catheter projecting in expected position. HEART AND MEDIASTINUM: Stable cardiac size and mediastinal contours, within normal limits. LUNGS AND PLEURAL SPACES: Lungs are mildly underexpanded, but appear improved in aeration from prior examination. Minor residual linear opacities are present at each lung base. No pneumothorax or pleural effusion. No alveolar consolidation. BONY THORAX AND SOFT TISSUES: Unremarkable. _______________     1. Tracheostomy catheter in stable position. 2. Improved pulmonary expansion/aeration with minor residual atelectasis at the lung bases.      XR CHEST PORT    Result Date: 10/8/2021  EXAM: One-view chest CLINICAL HISTORY: Acute respiratory failure, ET tube position , COMPARISON: None FINDINGS: Frontal view of the chest demonstrate tracheostomy tube in stable position. Minimal streaky opacities in the left lung base. Otherwise clear. . Cardiac silhouette is normal in size and contour. No acute bony or soft tissue abnormality. Minimal streakiness in the left lung base likely atelectasis. Otherwise clear. XR CHEST PORT    Result Date: 10/7/2021  EXAM: XR CHEST PORT CLINICAL INDICATION/HISTORY: Acute respiratory failure, ET tub position -Additional: None COMPARISON: 10/6/2021 TECHNIQUE: Portable frontal view of the chest _______________ FINDINGS: SUPPORT DEVICES: Tracheostomy. HEART AND MEDIASTINUM: Cardiomediastinal silhouette within normal limits. LUNGS AND PLEURAL SPACES: Hypoinflated lungs. Mild left perihilar haziness. No dense focal consolidation, large effusion or pneumothorax. _______________     Hypoinflated lungs. Mild left perihilar haziness. XR CHEST PORT    Result Date: 10/6/2021  EXAM: XR CHEST PORT CLINICAL INDICATION/HISTORY: Acute respiratory failure, ET tub position -Additional: None COMPARISON: 10/5/2021 TECHNIQUE: Portable frontal view of the chest _______________ FINDINGS: SUPPORT DEVICES: Tracheostomy unchanged. HEART AND MEDIASTINUM: Cardiomediastinal silhouette within normal limits. LUNGS AND PLEURAL SPACES: Left mid to lower lung zone parenchymal opacity. No large effusion or pneumothorax. _______________     Left mid to lower lung zone parenchymal opacity. XR CHEST PORT    Result Date: 10/5/2021  EXAM: XR CHEST PORT CLINICAL INDICATION/HISTORY: Acute respiratory failure, ET tub position -Additional: None COMPARISON: One day prior TECHNIQUE: Portable frontal view of the chest _______________ FINDINGS: SUPPORT DEVICES: Tracheostomy. Enteric tube unchanged. HEART AND MEDIASTINUM: Cardiomediastinal silhouette within normal limits. LUNGS AND PLEURAL SPACES: Mild left lower lower lung zone opacities.  No pneumothorax. _______________     Mild left lower lower lung zone opacities, unchanged. XR CHEST PORT    Result Date: 10/4/2021  EXAM: XR CHEST PORT CLINICAL INDICATION/HISTORY: hypoxia   > Additional: None. COMPARISON: October 3, 2001 TECHNIQUE: Portable chest _______________ FINDINGS: SUPPORT DEVICES: Tracheostomy tube remains unchanged. HEART AND MEDIASTINUM: Heart size accentuated by portable technique. LUNGS AND PLEURAL SPACES: The lungs are underexpanded. Slightly increased hazy left greater than right perihilar and left basilar opacities. No pneumothorax. BONY THORAX AND SOFT TISSUES: No acute osseous abnormality. _______________     Ongoing increased left greater than right perihilar and left basilar opacities may represent combination of worsening asymmetric pulmonary edema and infiltrate with atelectasis. Possible trace left pleural effusion. XR CHEST PORT    Result Date: 10/3/2021  A portable AP radiograph of the chest was obtained at 0606 hours: INDICATION:  Drug overdose. COMPARISON:  Portable chest one day earlier. FINDINGS:  Heart and mediastinum: Tracheostomy tube in place. Lungs and pleura: Increasing hazy changes left base possibly with developing minimal effusion. Right lung remains clear. Aorta: Unremarkable. Bones: Within normal limits for age. Other: None. Increasing opacities in the left lung base could be due to worsening atelectasis or developing infiltrate with possible developing effusion. XR CHEST PORT    Result Date: 10/2/2021  EXAM: Chest radiograph  CLINICAL INDICATION/HISTORY: Acute respiratory failure, mechanical ventilation    --Additional history: None. COMPARISON: 10/01/2021 TECHNIQUE: Frontal view of the chest _______________ FINDINGS: SUPPORT DEVICES: Tracheostomy in stable position. Interval removal of the esophagogastric tube and placement of a percutaneous gastrostomy tube. Numerous telemetry leads project over the chest. HEART AND MEDIASTINUM: Cardiac silhouette is normal in size. Normal mediastinal contours .  Normal pulmonary vasculature. LUNGS AND PLEURAL SPACES: Minimal left base opacity. The right lung is clear. Sharp costophrenic sulci. No pneumothoraces. BONY THORAX AND SOFT TISSUES: Unremarkable. _______________     1. Support lines and tubes as detailed above. 2. Minimal left base subsegmental atelectasis/airspace disease. XR CHEST PORT    Result Date: 10/1/2021  EXAM: XR CHEST PORT CLINICAL INDICATION/HISTORY: Acute respiratory failure, ET tub position -Additional: None COMPARISON: One day prior TECHNIQUE: Portable frontal view of the chest _______________ FINDINGS: SUPPORT DEVICES: Tracheostomy. Enteric tube unchanged. HEART AND MEDIASTINUM: Cardiomediastinal silhouette within normal limits. LUNGS AND PLEURAL SPACES: Mild left lower lower lung zone opacities. No pneumothorax. _______________     Mild left lower lower lung zone opacities. DUPLEX UPPER EXT VENOUS RIGHT    Result Date: 10/18/2021  · No evidence of acute DVT and chronic DVT in the right upper extremity. · Thrombus noted within the right basilic upper arm vein(s). DUPLEX LOWER EXT VENOUS BILAT    Result Date: 10/4/2021  · No evidence of deep vein thrombosis in the right lower extremity. · No evidence of deep vein thrombosis in the left lower extremity.         Pily Moya MD

## 2021-10-31 NOTE — PROGRESS NOTES
Bedside and Verbal shift change report given to BIANCA Leyva RN (oncoming nurse) by Barron Abbasi RN (offgoing nurse). Report included the following information SBAR, Kardex, Intake/Output, MAR and Recent Results.

## 2021-10-31 NOTE — PROGRESS NOTES
Assumed care of patient at 1900. Assessment completed and noted. Plan of care reviewed, pt verbalized understanding. Pain medication administered as ordered throughout shift for 9/10 left arm pain. NAD, sitter at bedside, will cont to monitor.     0700 Report given to [employer], RN

## 2021-10-31 NOTE — PROGRESS NOTES
SLP NOTE - SLP evaluation orders received. However, upon completion of chart review and discussion with RN, pt not appropriate for SLP evaluation this day. Currently, patient is:    []  Tolerating current po diet (per RN report)  []  Tolerating current po diet; however, poor po intake (per Rn report)   []  Receiving nutrition via tube feeding  [x]  Lethargic, solomnent, or difficult to arouse for safe po trials     []  Unable to manage secretions  []  Receiving intervention for respiratory distress  []  <6 hours s/p extubation   []  Other:     SLP attempted follow-up dx/tx s/p pulling out trach. However, upon attempt, pt sleeping soundly, unable to arouse adequately. Pt currently on SLP caseload - will acknowledge new eval orders accordingly and follow up for swallow eval as able/appropriate next day. Please contact SLP with questions/concerns.     Thank you for this referral.    Shirley Hale M.S., [id number] [facility]  Speech-Language Pathologist

## 2021-10-31 NOTE — PROGRESS NOTES
Problem: Ventilator Management  Goal: *Adequate oxygenation and ventilation  Outcome: Progressing Towards Goal  Goal: *Patient maintains clear airway/free of aspiration  Outcome: Progressing Towards Goal  Goal: *Absence of infection signs and symptoms  Outcome: Progressing Towards Goal  Goal: *Normal spontaneous ventilation  Outcome: Progressing Towards Goal     Problem: Patient Education: Go to Patient Education Activity  Goal: Patient/Family Education  Outcome: Progressing Towards Goal     Problem: Non-Violent Restraints  Goal: Removal from restraints as soon as assessed to be safe  Outcome: Progressing Towards Goal  Goal: No harm/injury to patient while restraints in use  Outcome: Progressing Towards Goal  Goal: Patient's dignity will be maintained  Outcome: Progressing Towards Goal  Goal: Patient Interventions  Outcome: Progressing Towards Goal     Problem: Falls - Risk of  Goal: *Absence of Falls  Description: Document [facility] Fall Risk and appropriate interventions in the flowsheet. Outcome: Progressing Towards Goal  Note: Fall Risk Interventions:  Mobility Interventions: Communicate number of staff needed for ambulation/transfer, Patient to call before getting OOB    Mentation Interventions: Adequate sleep, hydration, pain control, Door open when patient unattended    Medication Interventions: Evaluate medications/consider consulting pharmacy    Elimination Interventions: Call light in reach, Stay With Me (per policy)    History of Falls Interventions: Evaluate medications/consider consulting pharmacy         Problem: Patient Education: Go to Patient Education Activity  Goal: Patient/Family Education  Outcome: Progressing Towards Goal     Problem: Risk for Spread of Infection  Goal: Prevent transmission of infectious organism to others  Description: Prevent the transmission of infectious organisms to other patients, staff members, and visitors.   Outcome: Progressing Towards Goal     Problem: Patient Education:  Go to Education Activity  Goal: Patient/Family Education  Outcome: Progressing Towards Goal     Problem: Pressure Injury - Risk of  Goal: *Prevention of pressure injury  Description: Document Remy Scale and appropriate interventions in the flowsheet.   Outcome: Progressing Towards Goal  Note: Pressure Injury Interventions:  Sensory Interventions: Assess changes in LOC, Assess need for specialty bed, Minimize linen layers, Pressure redistribution bed/mattress (bed type)    Moisture Interventions: Absorbent underpads, Internal/External urinary devices    Activity Interventions: Pressure redistribution bed/mattress(bed type)    Mobility Interventions: HOB 30 degrees or less, Pressure redistribution bed/mattress (bed type)    Nutrition Interventions: Document food/fluid/supplement intake    Friction and Shear Interventions: HOB 30 degrees or less, Minimize layers                Problem: Patient Education: Go to Patient Education Activity  Goal: Patient/Family Education  Outcome: Progressing Towards Goal     Problem: Patient Education: Go to Patient Education Activity  Goal: Patient/Family Education  Outcome: Progressing Towards Goal     Problem: Patient Education: Go to Patient Education Activity  Goal: Patient/Family Education  Outcome: Progressing Towards Goal     Problem: Patient Education: Go to Patient Education Activity  Goal: Patient/Family Education  Outcome: Progressing Towards Goal     Problem: Pain  Goal: *Control of Pain  Outcome: Progressing Towards Goal  Goal: *PALLIATIVE CARE:  Alleviation of Pain  Outcome: Progressing Towards Goal     Problem: Patient Education: Go to Patient Education Activity  Goal: Patient/Family Education  Outcome: Progressing Towards Goal

## 2021-10-31 NOTE — PROGRESS NOTES
Chart reviewed pt remains on room air remaining stable with trach out, speech was unable to assess today due to pt being to lethargic this morning, per  plan is to reduce AM seroquel dose, hopefully as pt becomes more alert and behavior appropriate for psych consult which is still needed for placement.

## 2021-11-01 LAB
GLUCOSE BLD STRIP.AUTO-MCNC: 105 MG/DL (ref 70–110)
GLUCOSE BLD STRIP.AUTO-MCNC: 115 MG/DL (ref 70–110)
GLUCOSE BLD STRIP.AUTO-MCNC: 119 MG/DL (ref 70–110)
GLUCOSE BLD STRIP.AUTO-MCNC: 95 MG/DL (ref 70–110)

## 2021-11-01 PROCEDURE — 74011000250 HC RX REV CODE- 250: Performed by: INTERNAL MEDICINE

## 2021-11-01 PROCEDURE — 74011000250 HC RX REV CODE- 250: Performed by: HOSPITALIST

## 2021-11-01 PROCEDURE — 74011250637 HC RX REV CODE- 250/637: Performed by: FAMILY MEDICINE

## 2021-11-01 PROCEDURE — 74011250637 HC RX REV CODE- 250/637: Performed by: HOSPITALIST

## 2021-11-01 PROCEDURE — 74011250637 HC RX REV CODE- 250/637: Performed by: INTERNAL MEDICINE

## 2021-11-01 PROCEDURE — 94640 AIRWAY INHALATION TREATMENT: CPT

## 2021-11-01 PROCEDURE — 74011250636 HC RX REV CODE- 250/636: Performed by: FAMILY MEDICINE

## 2021-11-01 PROCEDURE — 65660000000 HC RM CCU STEPDOWN

## 2021-11-01 PROCEDURE — 74011250636 HC RX REV CODE- 250/636: Performed by: INTERNAL MEDICINE

## 2021-11-01 PROCEDURE — 94760 N-INVAS EAR/PLS OXIMETRY 1: CPT

## 2021-11-01 PROCEDURE — 82962 GLUCOSE BLOOD TEST: CPT

## 2021-11-01 PROCEDURE — 74011250637 HC RX REV CODE- 250/637: Performed by: PSYCHIATRY & NEUROLOGY

## 2021-11-01 RX ADMIN — BUDESONIDE 500 MCG: 0.5 INHALANT RESPIRATORY (INHALATION) at 20:31

## 2021-11-01 RX ADMIN — FAMOTIDINE 20 MG: 20 TABLET ORAL at 20:21

## 2021-11-01 RX ADMIN — NYSTATIN 500000 UNITS: 100000 SUSPENSION ORAL at 21:17

## 2021-11-01 RX ADMIN — HYDROMORPHONE HYDROCHLORIDE 1 MG: 1 INJECTION, SOLUTION INTRAMUSCULAR; INTRAVENOUS; SUBCUTANEOUS at 14:49

## 2021-11-01 RX ADMIN — BUDESONIDE 500 MCG: 0.5 INHALANT RESPIRATORY (INHALATION) at 07:46

## 2021-11-01 RX ADMIN — NYSTATIN: 100000 POWDER TOPICAL at 22:00

## 2021-11-01 RX ADMIN — CLONAZEPAM 2 MG: 0.5 TABLET ORAL at 09:32

## 2021-11-01 RX ADMIN — HYDROMORPHONE HYDROCHLORIDE 1 MG: 1 INJECTION, SOLUTION INTRAMUSCULAR; INTRAVENOUS; SUBCUTANEOUS at 20:22

## 2021-11-01 RX ADMIN — QUETIAPINE FUMARATE 300 MG: 200 TABLET ORAL at 20:21

## 2021-11-01 RX ADMIN — DIAZEPAM 5 MG: 5 TABLET ORAL at 20:21

## 2021-11-01 RX ADMIN — SULFAMETHOXAZOLE AND TRIMETHOPRIM 1 TABLET: 800; 160 TABLET ORAL at 20:21

## 2021-11-01 RX ADMIN — FAMOTIDINE 20 MG: 20 TABLET ORAL at 09:32

## 2021-11-01 RX ADMIN — ARFORMOTEROL TARTRATE 15 MCG: 15 SOLUTION RESPIRATORY (INHALATION) at 20:31

## 2021-11-01 RX ADMIN — Medication 5 MG: at 21:17

## 2021-11-01 RX ADMIN — ENOXAPARIN SODIUM 40 MG: 100 INJECTION SUBCUTANEOUS at 16:29

## 2021-11-01 RX ADMIN — THIAMINE HCL TAB 100 MG 100 MG: 100 TAB at 09:32

## 2021-11-01 RX ADMIN — CLONAZEPAM 2 MG: 0.5 TABLET ORAL at 20:21

## 2021-11-01 RX ADMIN — Medication 1 TABLET: at 09:32

## 2021-11-01 RX ADMIN — NYSTATIN: 100000 POWDER TOPICAL at 16:18

## 2021-11-01 RX ADMIN — HYDROMORPHONE HYDROCHLORIDE 1 MG: 1 INJECTION, SOLUTION INTRAMUSCULAR; INTRAVENOUS; SUBCUTANEOUS at 10:20

## 2021-11-01 RX ADMIN — 0.12% CHLORHEXIDINE GLUCONATE 10 ML: 1.2 RINSE ORAL at 20:21

## 2021-11-01 RX ADMIN — SULFAMETHOXAZOLE AND TRIMETHOPRIM 1 TABLET: 800; 160 TABLET ORAL at 09:32

## 2021-11-01 RX ADMIN — ARFORMOTEROL TARTRATE 15 MCG: 15 SOLUTION RESPIRATORY (INHALATION) at 07:46

## 2021-11-01 RX ADMIN — QUETIAPINE FUMARATE 150 MG: 100 TABLET ORAL at 09:32

## 2021-11-01 RX ADMIN — DIAZEPAM 5 MG: 5 TABLET ORAL at 09:32

## 2021-11-01 RX ADMIN — NYSTATIN: 100000 POWDER TOPICAL at 09:33

## 2021-11-01 RX ADMIN — 0.12% CHLORHEXIDINE GLUCONATE 10 ML: 1.2 RINSE ORAL at 09:31

## 2021-11-01 RX ADMIN — ACETAMINOPHEN 650 MG: 325 TABLET ORAL at 16:28

## 2021-11-01 NOTE — PROGRESS NOTES
Problem: Ventilator Management  Goal: *Adequate oxygenation and ventilation  Outcome: Progressing Towards Goal  Goal: *Patient maintains clear airway/free of aspiration  Outcome: Progressing Towards Goal  Goal: *Absence of infection signs and symptoms  Outcome: Progressing Towards Goal  Goal: *Normal spontaneous ventilation  Outcome: Progressing Towards Goal     Problem: Patient Education: Go to Patient Education Activity  Goal: Patient/Family Education  Outcome: Progressing Towards Goal     Problem: Non-Violent Restraints  Goal: Removal from restraints as soon as assessed to be safe  Outcome: Progressing Towards Goal  Goal: No harm/injury to patient while restraints in use  Outcome: Progressing Towards Goal  Goal: Patient's dignity will be maintained  Outcome: Progressing Towards Goal  Goal: Patient Interventions  Outcome: Progressing Towards Goal     Problem: Falls - Risk of  Goal: *Absence of Falls  Description: Document [facility] Fall Risk and appropriate interventions in the flowsheet. Outcome: Progressing Towards Goal  Note: Fall Risk Interventions:  Mobility Interventions: Communicate number of staff needed for ambulation/transfer, Patient to call before getting OOB    Mentation Interventions: Adequate sleep, hydration, pain control    Medication Interventions: Bed/chair exit alarm, Patient to call before getting OOB, Evaluate medications/consider consulting pharmacy    Elimination Interventions: Call light in reach    History of Falls Interventions: Evaluate medications/consider consulting pharmacy         Problem: Patient Education: Go to Patient Education Activity  Goal: Patient/Family Education  Outcome: Progressing Towards Goal     Problem: Risk for Spread of Infection  Goal: Prevent transmission of infectious organism to others  Description: Prevent the transmission of infectious organisms to other patients, staff members, and visitors.   Outcome: Progressing Towards Goal     Problem: Patient Education: Go to Education Activity  Goal: Patient/Family Education  Outcome: Progressing Towards Goal     Problem: Pressure Injury - Risk of  Goal: *Prevention of pressure injury  Description: Document Remy Scale and appropriate interventions in the flowsheet.   Outcome: Progressing Towards Goal  Note: Pressure Injury Interventions:  Sensory Interventions: Assess changes in LOC    Moisture Interventions: Absorbent underpads    Activity Interventions: Increase time out of bed, Pressure redistribution bed/mattress(bed type), PT/OT evaluation    Mobility Interventions: HOB 30 degrees or less, Pressure redistribution bed/mattress (bed type), PT/OT evaluation    Nutrition Interventions: Document food/fluid/supplement intake    Friction and Shear Interventions: HOB 30 degrees or less                Problem: Patient Education: Go to Patient Education Activity  Goal: Patient/Family Education  Outcome: Progressing Towards Goal     Problem: Patient Education: Go to Patient Education Activity  Goal: Patient/Family Education  Outcome: Progressing Towards Goal     Problem: Patient Education: Go to Patient Education Activity  Goal: Patient/Family Education  Outcome: Progressing Towards Goal     Problem: Patient Education: Go to Patient Education Activity  Goal: Patient/Family Education  Outcome: Progressing Towards Goal     Problem: Pain  Goal: *Control of Pain  Outcome: Progressing Towards Goal  Goal: *PALLIATIVE CARE:  Alleviation of Pain  Outcome: Progressing Towards Goal     Problem: Patient Education: Go to Patient Education Activity  Goal: Patient/Family Education  Outcome: Progressing Towards Goal

## 2021-11-01 NOTE — PROGRESS NOTES
Attempted to see patient for PT treatment however she is adamantly refusing stating that she needs pain medication, pt is not due for pain medication per nursing. Will continue to follow.   Makayla Estes, PT

## 2021-11-01 NOTE — PROGRESS NOTES
Hospitalist Progress Note-critical care note     Patient: Milton Sharp MRN: [id number]  CSN: [id number]    YOB: 1980  Age: 39 y.o. Sex: female    DOA: 9/11/2021 LOS:  LOS: 50 days            Chief complaint: status post trach, psychosis , wrist fracture drug overdose     Assessment/Plan         Hospital Problems  Date Reviewed: 9/6/2015        Codes Class Noted POA    FELICITY (acute kidney injury) ([facility] ICD-10-CM: N17.9  ICD-9-CM: 584.9  10/16/2021 Unknown        Bacteremia due to Gram-positive bacteria ICD-10-CM: R78.81  ICD-9-CM: 790.7  10/6/2021 Unknown        Status post tracheostomy ([facility] ICD-10-CM: Z93.0  ICD-9-CM: V44.0  9/28/2021 Unknown        Hypokalemia ICD-10-CM: E87.6  ICD-9-CM: 276.8  9/21/2021 Unknown        Increased ammonia level ICD-10-CM: R79.89  ICD-9-CM: 790.6  9/14/2021 Unknown        Psychosis ([facility] ICD-10-CM: F29  ICD-9-CM: 298.9  Unknown Unknown        Hyponatremia ICD-10-CM: E87.1  ICD-9-CM: 276.1  Unknown Yes        Acute respiratory failure with hypoxia (HCC) ICD-10-CM: J96.01  ICD-9-CM: 518.81  Unknown Yes        Left wrist fracture, with delayed healing, subsequent encounter ICD-10-CM: S62.102G  ICD-9-CM: V54.19  9/12/2021 Unknown        * (Principal) Drug overdose, intentional self-harm, initial encounter ([facility] ICD-10-CM: T50.902A  ICD-9-CM: 977.9, E950.5  9/12/2021 Yes        Hypoxia ICD-10-CM: R09.02  ICD-9-CM: 799.02  9/12/2021 Yes        Hypotension after procedure ICD-10-CM: I95.81  ICD-9-CM: 458.29  9/12/2021 Yes        Acute metabolic encephalopathy SZS-56-BL: G93.41  ICD-9-CM: 348.31  9/12/2021 Yes               Chilo Goldstein is a 39 y.o. female who is standing history of multidrug use/abuse, previous drug-seeking behavior and mental health issues otherwise unspecified arrives via EMS with acute metabolic encephalopathy and lethargy. Admitted for likely gabapentin overdose.   She was intubated in ER, ct head no acute issue, LP done due to fever even on abx, no meningitis noted. she has peg and trach. she pulled out of her trach on oct 29, remained oxygen level good             Acute respiratory failure with hypoxia -resolving   Intubated on 9/12    Trach on 9/20/21. -pulled out on 10/29   Repeated abg is good      MRSA on sputum cx now-completed  bactrim for total 5 days per peg tube     bacteremia -completed treatment     Anemia stable-will continue monitoring      Acute metabolic encephalopathy -resolved  Ct head no acute process   Continue Seroquel     Urinary retention -zamora      elevated ammonia level  Resolved, recheck ammonia level is good      Psychosis, hx of  Nathanielborichard's delusional parasitosis    On  Klonopin, Seroquel, haldol prn per psych recommendation   Will have psych on board today      left wrist fracture: immobilized -POA -stable   Ortho was consulted and recommend to f/u her ortho at [place] outpatient follow up      Peg feedings,    Subjective: pain in my wrist, I do not want this. I want to go home   Sitter was at the bedside     She is calm after redirection     Disposition :tbd,   Review of systems:    General: No fevers or chills. Cardiovascular: No chest pain or pressure. No palpitations. Pulmonary: No shortness of breath. Gastrointestinal: No nausea, vomiting. Vital signs/Intake and Output:  Visit Vitals  /66   Pulse 81   Temp 98.1 °F (36.7 °C)   Resp 16   Ht 5' 2\" (1.575 m)   Wt 72.6 kg (160 lb 0.9 oz)   SpO2 95%   BMI 29.27 kg/m²     Current Shift:  No intake/output data recorded. Last three shifts:  10/30 1901 - 11/01 0700  In: 1513   Out: 2425 [Urine:2425]    Physical Exam:  General: WD, WN. Alert, cooperative, no acute distress    HEENT: NC, Atraumatic. PERRLA, anicteric sclerae. Trach covered with gauze   Lungs: CTA Bilaterally. No Wheezing/Rhonchi/Rales. Heart:  Regular  rhythm,  No murmur, No Rubs, No Gallops  Abdomen: Soft, Non distended, Non tender.   +Bowel sounds,   Extremities: No c/c/e  Psych:   Not anxious,no agitated. Neurologic:  No acute neurological deficit. Labs: Results:       Chemistry No results for input(s): GLU, NA, K, CL, CO2, BUN, CREA, CA, AGAP, BUCR, TBIL, AP, TP, ALB, GLOB, AGRAT in the last 72 hours. No lab exists for component: GPT   CBC w/Diff No results for input(s): WBC, RBC, HGB, HCT, PLT, GRANS, LYMPH, EOS, HGBEXT, HCTEXT, PLTEXT, HGBEXT, HCTEXT, PLTEXT in the last 72 hours. Cardiac Enzymes No results for input(s): CPK, CKND1, ZENON in the last 72 hours. No lab exists for component: CKRMB, TROIP   Coagulation No results for input(s): PTP, INR, APTT, INREXT, INREXT in the last 72 hours. Lipid Panel No results found for: CHOL, CHOLPOCT, CHOLX, CHLST, CHOLV, 265336, HDL, HDLP, LDL, LDLC, DLDLP, 875369, VLDLC, VLDL, TGLX, TRIGL, TRIGP, TGLPOCT, CHHD, CHHDX   BNP No results for input(s): BNPP in the last 72 hours. Liver Enzymes No results for input(s): TP, ALB, TBIL, AP in the last 72 hours. No lab exists for component: SGOT, GPT, DBIL   Thyroid Studies No results found for: T4, T3U, TSH, TSHEXT, TSHEXT     Procedures/imaging: see electronic medical records for all procedures/Xrays and details which were not copied into this note but were reviewed prior to creation of Plan    IR INSERT GASTROSTOMY TUBE PERC    Result Date: 10/1/2021  PROCEDURE: PERCUTANEOUS GASTROSTOMY TUBE PLACEMENT ATTENDING: Emilie Dias M.D. COMPLICATIONS: NONE MEDICATIONS: Local lidocaine. DOSE: 20 mGy (air kerma) INDICATION: Enteral nutrition PROCEDURE: Informed consent was obtained where the risks, benefits and alternatives to the procedure were explained. All elements of maximal sterile barrier technique followed including: cap and mask and sterile gown and sterile gloves and a large sterile sheet and hand hygiene and 2% chlorhexidine for cutaneous antisepsis (or acceptable alternative antiseptics, per current guidelines). The stomach was insufflated via indwelling nasogastric tube.  Following gastric insufflation and subcutaneous lidocaine administration, gastropexy was performed using T-fasteners. Then, access to the stomach was gained via 18-gauge needle. Then over wire, following tract dilation, an 12 Urdu balloon retention type gastrostomy tube was placed. Contrast injection was performed confirming intragastric tip position. The patient tolerated the procedure well and was transferred from the IR suite in stable condition. Percutaneous gastrostomy tube placement as above. XR CHEST PORT    Result Date: 10/24/2021  EXAM: XR CHEST PORT CLINICAL INDICATION/HISTORY: trach, cough, congestion -Additional: None COMPARISON: Radiographs 10/15/2021 TECHNIQUE: Portable frontal view of the chest _______________ FINDINGS: SUPPORT DEVICES: Tracheostomy catheter projects in stable position. HEART AND MEDIASTINUM: Stable cardiac size and mediastinal contours. LUNGS AND PLEURAL SPACES: No focal pneumonic opacity. No evidence of pneumothorax or pleural effusion. BONY THORAX AND SOFT TISSUES: No acute osseous abnormality appear _______________     No acute radiographic cardiopulmonary abnormality. XR CHEST PORT    Result Date: 10/15/2021  EXAM: XR CHEST PORT CLINICAL INDICATION/HISTORY: interval change -Additional: Drug overdose, respiratory failure COMPARISON: 10/8/2021 TECHNIQUE: Portable frontal view of the chest _______________ FINDINGS: SUPPORT DEVICES: Tracheostomy catheter projecting in expected position. HEART AND MEDIASTINUM: Stable cardiac size and mediastinal contours, within normal limits. LUNGS AND PLEURAL SPACES: Lungs are mildly underexpanded, but appear improved in aeration from prior examination. Minor residual linear opacities are present at each lung base. No pneumothorax or pleural effusion. No alveolar consolidation. BONY THORAX AND SOFT TISSUES: Unremarkable. _______________     1. Tracheostomy catheter in stable position.  2. Improved pulmonary expansion/aeration with minor residual atelectasis at the lung bases. XR CHEST PORT    Result Date: 10/8/2021  EXAM: One-view chest CLINICAL HISTORY: Acute respiratory failure, ET tube position , COMPARISON: None FINDINGS: Frontal view of the chest demonstrate tracheostomy tube in stable position. Minimal streaky opacities in the left lung base. Otherwise clear. . Cardiac silhouette is normal in size and contour. No acute bony or soft tissue abnormality. Minimal streakiness in the left lung base likely atelectasis. Otherwise clear. XR CHEST PORT    Result Date: 10/7/2021  EXAM: XR CHEST PORT CLINICAL INDICATION/HISTORY: Acute respiratory failure, ET tub position -Additional: None COMPARISON: 10/6/2021 TECHNIQUE: Portable frontal view of the chest _______________ FINDINGS: SUPPORT DEVICES: Tracheostomy. HEART AND MEDIASTINUM: Cardiomediastinal silhouette within normal limits. LUNGS AND PLEURAL SPACES: Hypoinflated lungs. Mild left perihilar haziness. No dense focal consolidation, large effusion or pneumothorax. _______________     Hypoinflated lungs. Mild left perihilar haziness. XR CHEST PORT    Result Date: 10/6/2021  EXAM: XR CHEST PORT CLINICAL INDICATION/HISTORY: Acute respiratory failure, ET tub position -Additional: None COMPARISON: 10/5/2021 TECHNIQUE: Portable frontal view of the chest _______________ FINDINGS: SUPPORT DEVICES: Tracheostomy unchanged. HEART AND MEDIASTINUM: Cardiomediastinal silhouette within normal limits. LUNGS AND PLEURAL SPACES: Left mid to lower lung zone parenchymal opacity. No large effusion or pneumothorax. _______________     Left mid to lower lung zone parenchymal opacity. XR CHEST PORT    Result Date: 10/5/2021  EXAM: XR CHEST PORT CLINICAL INDICATION/HISTORY: Acute respiratory failure, ET tub position -Additional: None COMPARISON: One day prior TECHNIQUE: Portable frontal view of the chest _______________ FINDINGS: SUPPORT DEVICES: Tracheostomy. Enteric tube unchanged.  HEART AND MEDIASTINUM: Cardiomediastinal silhouette within normal limits. LUNGS AND PLEURAL SPACES: Mild left lower lower lung zone opacities. No pneumothorax. _______________     Mild left lower lower lung zone opacities, unchanged. XR CHEST PORT    Result Date: 10/4/2021  EXAM: XR CHEST PORT CLINICAL INDICATION/HISTORY: hypoxia   > Additional: None. COMPARISON: October 3, 2001 TECHNIQUE: Portable chest _______________ FINDINGS: SUPPORT DEVICES: Tracheostomy tube remains unchanged. HEART AND MEDIASTINUM: Heart size accentuated by portable technique. LUNGS AND PLEURAL SPACES: The lungs are underexpanded. Slightly increased hazy left greater than right perihilar and left basilar opacities. No pneumothorax. BONY THORAX AND SOFT TISSUES: No acute osseous abnormality. _______________     Ongoing increased left greater than right perihilar and left basilar opacities may represent combination of worsening asymmetric pulmonary edema and infiltrate with atelectasis. Possible trace left pleural effusion. XR CHEST PORT    Result Date: 10/3/2021  A portable AP radiograph of the chest was obtained at 0606 hours: INDICATION:  Drug overdose. COMPARISON:  Portable chest one day earlier. FINDINGS:  Heart and mediastinum: Tracheostomy tube in place. Lungs and pleura: Increasing hazy changes left base possibly with developing minimal effusion. Right lung remains clear. Aorta: Unremarkable. Bones: Within normal limits for age. Other: None. Increasing opacities in the left lung base could be due to worsening atelectasis or developing infiltrate with possible developing effusion. XR CHEST PORT    Result Date: 10/2/2021  EXAM: Chest radiograph  CLINICAL INDICATION/HISTORY: Acute respiratory failure, mechanical ventilation    --Additional history: None. COMPARISON: 10/01/2021 TECHNIQUE: Frontal view of the chest _______________ FINDINGS: SUPPORT DEVICES: Tracheostomy in stable position.  Interval removal of the esophagogastric tube and placement of a percutaneous gastrostomy tube. Numerous telemetry leads project over the chest. HEART AND MEDIASTINUM: Cardiac silhouette is normal in size. Normal mediastinal contours . Normal pulmonary vasculature. LUNGS AND PLEURAL SPACES: Minimal left base opacity. The right lung is clear. Sharp costophrenic sulci. No pneumothoraces. BONY THORAX AND SOFT TISSUES: Unremarkable. _______________     1. Support lines and tubes as detailed above. 2. Minimal left base subsegmental atelectasis/airspace disease. XR CHEST PORT    Result Date: 10/1/2021  EXAM: XR CHEST PORT CLINICAL INDICATION/HISTORY: Acute respiratory failure, ET tub position -Additional: None COMPARISON: One day prior TECHNIQUE: Portable frontal view of the chest _______________ FINDINGS: SUPPORT DEVICES: Tracheostomy. Enteric tube unchanged. HEART AND MEDIASTINUM: Cardiomediastinal silhouette within normal limits. LUNGS AND PLEURAL SPACES: Mild left lower lower lung zone opacities. No pneumothorax. _______________     Mild left lower lower lung zone opacities. DUPLEX UPPER EXT VENOUS RIGHT    Result Date: 10/18/2021  · No evidence of acute DVT and chronic DVT in the right upper extremity. · Thrombus noted within the right basilic upper arm vein(s). DUPLEX LOWER EXT VENOUS BILAT    Result Date: 10/4/2021  · No evidence of deep vein thrombosis in the right lower extremity. · No evidence of deep vein thrombosis in the left lower extremity.         Gracie Espinosa MD

## 2021-11-01 NOTE — PROGRESS NOTES
SLP NOTE - Upon completion of chart review and discussion with RN, pt not appropriate for SLP evaluation this day. Currently, patient is:    []  Tolerating current po diet (per RN report)  []  Tolerating current po diet; however, poor po intake (per Rn report)   []  Receiving nutrition via tube feeding  [x]  Lethargic, solomnent, or difficult to arouse for safe po trials     []  Unable to manage secretions  []  Receiving intervention for respiratory distress  []  <6 hours s/p extubation   []  Other:     Will follow up as patient able. Malissa Woodall, [id number] [facility]  Speech-Language Pathologist

## 2021-11-01 NOTE — PROGRESS NOTES
Bedside and verbal shift change report recieved from [address] [place] [address] (offgoing nurse) given to Paula Gallo RN (oncoming nurse). Report included the following information SBAR, Kardex, Intake/Output, MAR and Recent Results       Bedside and verbal shift change report given to [address] [place] [address] (oncoming nurse) by Jamila Green RN (offgoing nurse).  Report included the following information SBAR, Kardex, Intake/Output, MAR and Recent Results

## 2021-11-01 NOTE — PROGRESS NOTES
Comprehensive Nutrition Assessment    Type and Reason for Visit: Reassess    Nutrition Recommendations/Plan: Continue tube feeds via PEG @ goal rate throughout the next 2-4 days as tolerated by pt. Advance PO diet past NPO and clear liquids if appropriate following SLP evaluation. Enteral Nutrition   Feeding Route PEG   EN Formula Standard without fiber  (Osmolite 1.2)   Schedule Continuous   Feeding Regimen Goal rate of 55ml/hr. Start @ 45ml/hr as pt was tolerating previous rate. Increase to 55ml/hr Q6. Water Flushes 250ml Q4 or defer to MD   Current EN & Flush Order Provides Osmotite 1.2 @ 55ml/hr + water flushes provides:      1452kcals, 67g PRO, 191g CHO, and 2492ml free water. Goal EN & Flush Order Provides Osmotite 1.2 @ 55ml/hr + water flushes provides:      1452kcals, 67g PRO, 191g CHO, and 2492ml free water. Nutrition Assessment:  Pt admitted for gabapentin overdoes. s/p trach, though pt pulled ou 10/29 and was not replaced b/c O2 saturation was adequate on nc. s/p PEG. last bm 10/31. labs- phosphorus elevated (6.2) 10/23. meds- lactulose, mvi, zofran, miralax daily prn, thiamine. Estimated Daily Nutrient Needs:  Energy (kcal): 9736-0501; Weight Used for Energy Requirements: Ideal  Protein (g): 73; Weight Used for Protein Requirements: Current (1g)  Fluid (ml/day): 1863-7510; Method Used for Fluid Requirements: 1 ml/kcal    Nutrition Related Findings:  Tube feedings Osmolite 1.2 @ 55 ml/hr w/ 35 ml gastric residuals. Pt seems to be tolerating. Pt awaiting SLP evaluation after extubation. Wounds:    Surgical incision, None (rt arm fixator, abrasion to right foot, redness to sacrum)       Current Nutrition Therapies:  ADULT TUBE FEEDING PEG; Standard without Fiber; Delivery Method: Continuous; Continuous Initial Rate (mL/hr): 45; Continuous Advance Tube Feeding: Yes; Advancement Volume (mL/hr): 10; Advancement Frequency: Q 6 hours; Continuous Goal Rate (mL/hr):. ..     Anthropometric Measures:  Height:  5' 2\" (157.5 cm)  Current Body Wt:  72.6 kg (160 lb 0.9 oz) (10/20)   Admission Body Wt:  160 lb    Usual Body Wt:  72 kg (158 lb 11.7 oz)     Ideal Body Wt:  110 lbs:  145.5 %   BMI Category: Overweight (BMI 25.0-29. 9)       Nutrition Diagnosis:   (P) Inadequate oral intake related to (P) altered GI function as evidenced by (P) nutrition support-enteral nutrition    Nutrition Interventions:   Food and/or Nutrient Delivery: (P) Start oral diet, Continue tube feeding (Start PO diet if appropriate following SLP evaluation)  Nutrition Education and Counseling: (P) No recommendations at this time  Coordination of Nutrition Care: (P) Continue to monitor while inpatient    Goals:  (P) Continue with tube feeding @ goal rate to meet nutrition needs throughout the next 2-4 days. Advance PO diet past NPO and clear liquids if clinically appropriate following SLP evaluation throughout the next 2-4 days.        Nutrition Monitoring and Evaluation:   Behavioral-Environmental Outcomes: (P) None identified  Food/Nutrient Intake Outcomes: (P) Diet advancement/tolerance, Enteral nutrition intake/tolerance, Food and nutrient intake  Physical Signs/Symptoms Outcomes: (P) Biochemical data, GI status, Weight, Skin, Chewing or swallowing, Meal time behavior    Discharge Planning:    (P) Enteral nutrition     Electronically signed by Chester Parker RD on 11/1/2021 at 1:10 PM

## 2021-11-01 NOTE — BH NOTES
[facility]    Name:  Phil Mcmanus  MR#:   [id number]  :  1980  ACCOUNT #:  [de-identified]  DATE OF SERVICE:  2021    PSYCHIATRY CONSULTATION    REFERRING PHYSICIAN:   Richar Kamara MD    REASON FOR CONSULTATION:  Psychosis, overdose. HISTORY OF PRESENT ILLNESS:  The patient is a 35-year-old  female with a chronic history of multiple drug use disorder, drug-seeking behavior, multiple prior psychiatric diagnoses including anxiety disorder, bipolar disorder, psychotic disorders, who was admitted to [facility] on 2021 after a questionable overdose on medications including gabapentin. The patient had a prolonged course in the hospital and was intubated on 2021, and later, she had a tracheostomy on 2021, which she pulled out on 10/29/2021. The patient has been treated for MRSA bacteremia, metabolic encephalopathy, elevated ammonia level. 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 The patient presents awake and alert but very disorganized in presentation, and she was reminded several times to cover herself with a blanket as she is not able to keep the clothes on. She repeatedly asked for \"I need my pain medicine, they are not giving me my Dilaudid. \"  She tells this writer to go ask for the Dilaudid to give it to her. The patient claims that she needs that for her pain. She is aware of circumstances of her admission into the hospital, but at the same time, she denies any overdose and said that she fell and hurt herself. She presents a poor and unreliable historian. She feels that she is improving, and she needs to be returning home, that she has family at home that can help her. The patient denies feeling depressed, but reports being upset and irritable that her needs are not being met in the hospital including not getting the pain medication that she wants.   She also claims that she had a lot of anxiety that she needs to take medications, and none other medication seemed to helped her. When asked about schizophrenia, she said she was diagnosed with it, but she did not know the great details or any symptoms relating to it. When asked about experiencing any hallucinations, she clearly denies. She also denies feeling hopeless or worthless and denies having any suicidal thoughts or homicidal thoughts. She reports her sleep is fine if she takes medications, otherwise she does not sleep well. She reports good appetite and good energy level. She shows poor insight into her current circumstances and hospitalization, but also appears to understand the risks of not getting treatment. She was in an inpatient psych unit several times. When asked about that, she did not reply much. PAST PSYCHIATRIC HISTORY:   As described above, the patient had multiple prior diagnoses including bipolar disorder, psychiatric disorder, anxiety disorder among others. She has more than 12 hospitalizations for various symptoms including overdoses most of the times and impulsive. Her last admission was at [facility] from 07/20/2021 to 08/02/2021. At that time, she was diagnosed with major depressive disorder, recurrent, and anxiety disorder. Medications included Zyprexa  and Seroquel 600 mg at bedtime and 300 mg daily. She reports that she follows with Dr. Joyce Vuong at [facility]. She was also seen by Dr. Zenaida Loyola but she was not adherent to the followups. At that time, Dr. Zenaida Loyola felt that the patient has been abusing Detromethprphanin the form of taking Mucinex up to 1000 mg a day. The patient denies any self-harm episodes, but chart review noted multiple attempts and relating to psychiatric hospitalizations. SUBSTANCE ABUSE HISTORY:  The patient has extensive substance use history including opioids, benzodiazepine, crack cocaine, Ecstasy, PCP among others.   She was also at [facility] for [facility] abuse treatment. The patient does not give a good history regarding her substance abuse at this time and says that she does not abuse any. PAST MEDICAL HISTORY:  The patient has a history of kidney stones. She was recently treated for encephalopathy secondary to overdose. Acute respiratory failure with hypoxia, intubation followed by tracheostomy. Hypertension. ALLERGIES:  TO TORADOL AND FISH. CURRENT MEDICATIONS:  Reviewed in the chart.     Current Facility-Administered Medications:     HYDROmorphone (DILAUDID) injection 0.5 mL, 1 mg, IntraVENous, Q4H PRN, Skip Villatoro MD, 0.5 mL at 11/02/21 0807    QUEtiapine (SEROquel) tablet 150 mg, 150 mg, Oral, DAILY, Adelso Pina MD, 150 mg at 11/02/21 0817    diazePAM (VALIUM) tablet 5 mg, 5 mg, Oral, BID, Adelso Pina MD, 5 mg at 11/02/21 0817    nystatin (MYCOSTATIN) 100,000 unit/mL oral suspension 500,000 Units, 500,000 Units, Oral, QID, Donald Valdes MD, 500,000 Units at 11/02/21 0818    chlorhexidine (PERIDEX) 0.12 % mouthwash 10 mL, 10 mL, Oral, Q12H, Donald Valdes MD, 10 mL at 11/02/21 0817    hydrOXYzine HCL (ATARAX) tablet 25 mg, 25 mg, Oral, QID PRN, Solange Villasenor MD, 25 mg at 10/31/21 1404    lactulose (CHRONULAC) 10 gram/15 mL solution 15 mL, 15 mL, Oral, DAILY PRN, Donald Valdes MD    clonazePAM (KlonoPIN) tablet 2 mg, 2 mg, Oral, BID, Adelso Pina MD, 2 mg at 11/02/21 0817    diphenhydrAMINE-zinc acetate 2%-0.1% (BENADRYL) cream, , Topical, TID PRN, Elina Lopez MD, Given at 10/29/21 0431    diphenhydrAMINE (BENADRYL) 12.5 mg/5 mL oral elixir 12.5 mg, 12.5 mg, Per G Tube, Q6H PRN, Torie DODSON MD, 12.5 mg at 10/26/21 1348    0.9% sodium chloride infusion 250 mL, 250 mL, IntraVENous, PRN, Adelso Pina MD    haloperidol lactate (HALDOL) injection 5 mg, 5 mg, IntraVENous, Q8H PRN, Torie DODSON MD, 5 mg at 10/29/21 1559    QUEtiapine (SEROquel) tablet 300 mg, 300 mg, Oral, QHS, Omayra Crespo MD, 300 mg at 11/01/21 2021    nystatin (MYCOSTATIN) 100,000 unit/gram powder, , Topical, TID, Harsha Rodriguez MD, Given at 11/01/21 2200    metoprolol (LOPRESSOR) injection 2.5 mg, 2.5 mg, IntraVENous, Q6H PRN, Julia IGNACIO MD, 2.5 mg at 10/07/21 1545    fentaNYL (DURAGESIC) 50 mcg/hr patch 1 Patch, 1 Patch, TransDERmal, Q72H, Julia IGNACIO MD, 1 Patch at 10/30/21 1150    arformoteroL (BROVANA) neb solution 15 mcg, 15 mcg, Nebulization, BID RT, Magdalena Milligan MD, 15 mcg at 11/02/21 0721    budesonide (PULMICORT) 500 mcg/2 ml nebulizer suspension, 500 mcg, Nebulization, BID RT, Magdalena Milligan MD, 500 mcg at 11/02/21 2089    melatonin (rapid dissolve) tablet 5 mg, 5 mg, Oral, QHS, Shauna Haider MD, 5 mg at 11/01/21 2117    albuterol-ipratropium (DUO-NEB) 2.5 MG-0.5 MG/3 ML, 3 mL, Nebulization, Q4H PRN, Bree Medrano MD, 3 mL at 10/29/21 2007    famotidine (PEPCID) tablet 20 mg, 20 mg, Oral, BID, Julia IGNACIO MD, 20 mg at 11/02/21 0818    thiamine mononitrate (B-1) tablet 100 mg, 100 mg, Oral, DAILY, Shauna Haider MD, 100 mg at 11/02/21 0818    multivitamin, tx-iron-ca-min (THERA-M w/ IRON) tablet 1 Tablet, 1 Tablet, Oral, DAILY, Shauna Haider MD, 1 Tablet at 11/01/21 0932    glucose chewable tablet 16 g, 4 Tablet, Oral, PRN, Magdalena Milligan MD    glucagon [facility]) injection 1 mg, 1 mg, IntraMUSCular, PRN, Magdalena Milligan MD    dextrose (D50W) injection syrg 12.5-25 g, 25-50 mL, IntraVENous, PRN, Magdalena Milligan MD    LORazepam (ATIVAN) injection 2 mg, 2 mg, IntraVENous, Q6H PRN, Magdalena Milligan MD, 2 mg at 11/02/21 0036    enoxaparin (LOVENOX) injection 40 mg, 40 mg, SubCUTAneous, Q24H, Aminata Mirza MD, 40 mg at 11/01/21 1629    acetaminophen (TYLENOL) tablet 650 mg, 650 mg, Oral, Q6H PRN, 650 mg at 11/01/21 1628 **OR** acetaminophen (TYLENOL) suppository 650 mg, 650 mg, Rectal, Q6H PRN, Aminata Mirza MD [place] polyethylene glycol (MIRALAX) packet 17 g, 17 g, Oral, DAILY PRN, Milly Mirza MD    ondansetron (ZOFRAN ODT) tablet 4 mg, 4 mg, Oral, Q8H PRN **OR** ondansetron (ZOFRAN) injection 4 mg, 4 mg, IntraVENous, Q6H PRN, Reyna Mirza MD, 4 mg at 10/24/21 1117  SOCIAL HISTORY:  The patient is on disability. She reports she is , and it appears her  is incarcerated at this time. She reports several family members are at home to help her. Developmental History:  The patient has a GED education. She has some legal issues, but these were not discussed today. History reviewed. No pertinent family history. FAMILY HISTORY OF PSYCHIATRIC ILLNESS:  The patient reports several members in the home with bipolar disorder, but she does not know the details. MENTAL STATUS EXAMINATION:     Appearance: The patient is a moderately built middle-aged female who appears much older than stated age. She is dressed in hospital clothes, but unkempt appearance with poor hygiene. She is unable to keep clothes on her and needed remainders to use blanket to cover herself. She presents somewhat irritable, seeking pain medications. Speech:  Her speech is spontaneous, but difficult due to recent tracheostomy and hissing sound heard. Mood:  Her mood is expressed \"I am anxious, and I am in pain. \"  Affect:  Her affect is irritable and mood congruent. Thought process appears to be disorganized. Thought content with no suicidal or homicidal ideations. Paranoia present. Perceptual Disturbances:  No hallucinations or delusions. Insight is poor. Judgment is poor. Impulse control is poor. Memory is poor to fair. VITAL SIGNS:  Temperature 98, pulse rate 86, respiratory rate 18, blood pressure 91/87. LABORATORY RESULTS:  Relevant labs reviewed.     ASSESSMENT:  Ms. Elkin Mcdermott is a 41-year-old female with multiple substance use including h/o cocaine use d/o, h/o opoid substance use d/o,  several mental health diagnoses in the past of  mood disorder and psychotic disorders,  Major depressive d/o, anxiety d/o, panic d/o,  who is admitted after reported gabapentin overdose which the patient denies taking any overdose. At this time, she denies any suicidal or homicidal ideations. She had a complicated hospital course and may need placement at a facility, but she desires to go home. She has a history of mood disorder for which she has been taking Seroquel, currently  does not appear to have any overt psychotic symptoms or manic symptoms, at this time and does not meet criteria for inpatient psychiatric hospitalization. She will benefit from follow up as  outpatient and she reports she see  psychiatrist Dr. Ruby Bernabe at the [facility]. Her medications can be continued as below. 1.  For anxiety: noted atient is on  on both clonazepam and diazepam and two benzodiazepines may not be an appropriate choice. Recommended taper off of the diazepam and continue on clonazepam 1 to 2 mg three times a day for now and recommend gradual taper off  with outpatient followup. 2.  For anxiety: Recommend adding  add hydroxyzine  mg three times a day, may also help with itching,  and the patient does not appear to have delusional parasitosis. 3.  For psychosis and mood: continue quetiapine and can be increased to 600 mg at bedtime and continue 200 mg in the morning. 4.  Patient is strongly recommended to not use any illicit substances and encouraged to take treatment for substance abuse as an outpatient or intensive inpatient program for rehab. Please contact on-call psychiatrist for any additional questions if needed. Will sign off now. Thanks for the consult.       Francesca Cano MD      [email or web address]  D:  11/01/2021 14:04  T:  11/01/2021 16:36  JOB #:  [id number]

## 2021-11-02 ENCOUNTER — APPOINTMENT (OUTPATIENT)
Dept: GENERAL RADIOLOGY | Age: 41
DRG: 004 | End: 2021-11-02
Attending: PHYSICIAN ASSISTANT
Payer: MEDICAID

## 2021-11-02 ENCOUNTER — ANESTHESIA EVENT (OUTPATIENT)
Dept: SURGERY | Age: 41
DRG: 004 | End: 2021-11-02
Payer: MEDICAID

## 2021-11-02 LAB
GLUCOSE BLD STRIP.AUTO-MCNC: 116 MG/DL (ref 70–110)
GLUCOSE BLD STRIP.AUTO-MCNC: 116 MG/DL (ref 70–110)
GLUCOSE BLD STRIP.AUTO-MCNC: 125 MG/DL (ref 70–110)

## 2021-11-02 PROCEDURE — 74011250637 HC RX REV CODE- 250/637: Performed by: HOSPITALIST

## 2021-11-02 PROCEDURE — 74011000250 HC RX REV CODE- 250: Performed by: HOSPITALIST

## 2021-11-02 PROCEDURE — 74011250636 HC RX REV CODE- 250/636: Performed by: INTERNAL MEDICINE

## 2021-11-02 PROCEDURE — 92610 EVALUATE SWALLOWING FUNCTION: CPT

## 2021-11-02 PROCEDURE — 74011250637 HC RX REV CODE- 250/637: Performed by: INTERNAL MEDICINE

## 2021-11-02 PROCEDURE — 73110 X-RAY EXAM OF WRIST: CPT

## 2021-11-02 PROCEDURE — 74011250636 HC RX REV CODE- 250/636: Performed by: ORTHOPAEDIC SURGERY

## 2021-11-02 PROCEDURE — 82962 GLUCOSE BLOOD TEST: CPT

## 2021-11-02 PROCEDURE — 74011250636 HC RX REV CODE- 250/636: Performed by: FAMILY MEDICINE

## 2021-11-02 PROCEDURE — 94640 AIRWAY INHALATION TREATMENT: CPT

## 2021-11-02 PROCEDURE — 74011000250 HC RX REV CODE- 250: Performed by: INTERNAL MEDICINE

## 2021-11-02 PROCEDURE — 74011250637 HC RX REV CODE- 250/637: Performed by: FAMILY MEDICINE

## 2021-11-02 PROCEDURE — 74011250637 HC RX REV CODE- 250/637: Performed by: PSYCHIATRY & NEUROLOGY

## 2021-11-02 PROCEDURE — 65660000000 HC RM CCU STEPDOWN

## 2021-11-02 PROCEDURE — 97530 THERAPEUTIC ACTIVITIES: CPT

## 2021-11-02 PROCEDURE — 97116 GAIT TRAINING THERAPY: CPT

## 2021-11-02 RX ORDER — HYDROMORPHONE HYDROCHLORIDE 2 MG/ML
2 INJECTION, SOLUTION INTRAMUSCULAR; INTRAVENOUS; SUBCUTANEOUS
Status: DISCONTINUED | OUTPATIENT
Start: 2021-11-02 | End: 2021-11-02

## 2021-11-02 RX ORDER — HYDROMORPHONE HYDROCHLORIDE 1 MG/ML
1 INJECTION, SOLUTION INTRAMUSCULAR; INTRAVENOUS; SUBCUTANEOUS
Status: DISCONTINUED | OUTPATIENT
Start: 2021-11-02 | End: 2021-11-02

## 2021-11-02 RX ORDER — HYDROMORPHONE HYDROCHLORIDE 2 MG/ML
1 INJECTION, SOLUTION INTRAMUSCULAR; INTRAVENOUS; SUBCUTANEOUS
Status: DISCONTINUED | OUTPATIENT
Start: 2021-11-02 | End: 2021-11-05

## 2021-11-02 RX ADMIN — BUDESONIDE 500 MCG: 0.5 INHALANT RESPIRATORY (INHALATION) at 07:21

## 2021-11-02 RX ADMIN — NYSTATIN 500000 UNITS: 100000 SUSPENSION ORAL at 12:08

## 2021-11-02 RX ADMIN — HYDROMORPHONE HYDROCHLORIDE 0.5 ML: 2 INJECTION, SOLUTION INTRAMUSCULAR; INTRAVENOUS; SUBCUTANEOUS at 15:09

## 2021-11-02 RX ADMIN — THIAMINE HCL TAB 100 MG 100 MG: 100 TAB at 08:18

## 2021-11-02 RX ADMIN — NYSTATIN 500000 UNITS: 100000 SUSPENSION ORAL at 08:18

## 2021-11-02 RX ADMIN — LORAZEPAM 2 MG: 2 INJECTION INTRAMUSCULAR at 15:24

## 2021-11-02 RX ADMIN — HYDROMORPHONE HYDROCHLORIDE 0.5 ML: 2 INJECTION, SOLUTION INTRAMUSCULAR; INTRAVENOUS; SUBCUTANEOUS at 12:07

## 2021-11-02 RX ADMIN — HYDROMORPHONE HYDROCHLORIDE 0.5 ML: 2 INJECTION, SOLUTION INTRAMUSCULAR; INTRAVENOUS; SUBCUTANEOUS at 20:40

## 2021-11-02 RX ADMIN — ACETAMINOPHEN 650 MG: 325 TABLET ORAL at 16:22

## 2021-11-02 RX ADMIN — HYDROMORPHONE HYDROCHLORIDE 0.5 ML: 2 INJECTION, SOLUTION INTRAMUSCULAR; INTRAVENOUS; SUBCUTANEOUS at 00:48

## 2021-11-02 RX ADMIN — CLONAZEPAM 2 MG: 0.5 TABLET ORAL at 08:17

## 2021-11-02 RX ADMIN — FAMOTIDINE 20 MG: 20 TABLET ORAL at 08:18

## 2021-11-02 RX ADMIN — Medication 1 TABLET: at 12:08

## 2021-11-02 RX ADMIN — 0.12% CHLORHEXIDINE GLUCONATE 10 ML: 1.2 RINSE ORAL at 08:17

## 2021-11-02 RX ADMIN — CLONAZEPAM 2 MG: 0.5 TABLET ORAL at 20:43

## 2021-11-02 RX ADMIN — NYSTATIN: 100000 POWDER TOPICAL at 22:00

## 2021-11-02 RX ADMIN — QUETIAPINE FUMARATE 150 MG: 100 TABLET ORAL at 08:17

## 2021-11-02 RX ADMIN — DIAZEPAM 5 MG: 5 TABLET ORAL at 08:17

## 2021-11-02 RX ADMIN — ARFORMOTEROL TARTRATE 15 MCG: 15 SOLUTION RESPIRATORY (INHALATION) at 20:12

## 2021-11-02 RX ADMIN — NYSTATIN 500000 UNITS: 100000 SUSPENSION ORAL at 21:48

## 2021-11-02 RX ADMIN — LORAZEPAM 2 MG: 2 INJECTION INTRAMUSCULAR at 00:36

## 2021-11-02 RX ADMIN — QUETIAPINE FUMARATE 300 MG: 200 TABLET ORAL at 20:42

## 2021-11-02 RX ADMIN — ENOXAPARIN SODIUM 40 MG: 100 INJECTION SUBCUTANEOUS at 16:25

## 2021-11-02 RX ADMIN — ARFORMOTEROL TARTRATE 15 MCG: 15 SOLUTION RESPIRATORY (INHALATION) at 07:21

## 2021-11-02 RX ADMIN — FAMOTIDINE 20 MG: 20 TABLET ORAL at 20:43

## 2021-11-02 RX ADMIN — HYDROMORPHONE HYDROCHLORIDE 0.5 ML: 2 INJECTION, SOLUTION INTRAMUSCULAR; INTRAVENOUS; SUBCUTANEOUS at 08:07

## 2021-11-02 RX ADMIN — DIAZEPAM 5 MG: 5 TABLET ORAL at 20:43

## 2021-11-02 RX ADMIN — Medication 5 MG: at 21:48

## 2021-11-02 RX ADMIN — BUDESONIDE 500 MCG: 0.5 INHALANT RESPIRATORY (INHALATION) at 20:12

## 2021-11-02 RX ADMIN — 0.12% CHLORHEXIDINE GLUCONATE 10 ML: 1.2 RINSE ORAL at 20:42

## 2021-11-02 NOTE — ROUTINE PROCESS
Bedside shift change report given to Girma Lloyd RN (oncoming nurse) by Amari Mahmood RN (offgoing nurse). Report included the following information SBAR, Kardex, Intake/Output and MAR.

## 2021-11-02 NOTE — PROGRESS NOTES
Problem: Mobility Impaired (Adult and Pediatric)  Goal: *Acute Goals and Plan of Care (Insert Text)  Description: Physical Therapy Goals  Initiated 10/19/2021 and to be accomplished within 7 day(s)  1. Patient will move from supine to sit and sit to supine  in bed with supervision/set-up. 2.  Patient will transfer from bed to chair and chair to bed with minimal assistance/contact guard assist using the least restrictive device. 3.  Patient will perform sit to stand with minimal assistance/contact guard assist.  4.  Patient will ambulate with minimal assistance/contact guard assist for 20 feet with the least restrictive device. PLOF: pt was independent with mobility without an assistive device     Outcome: Progressing Towards Goal   PHYSICAL THERAPY TREATMENT    Patient: Venice Brizuela (21 y.o. female)  Date: 11/2/2021  Diagnosis: Drug overdose, intentional self-harm, [facility] Yojana Olsen  Left wrist fracture, with delayed healing, subsequent encounter [S62.102G] Drug overdose, intentional self-harm, initial encounter ([facility]  Procedure(s) (LRB):  TRACHEOSTOMY, PT IS IN ICU BED 4 (N/A) 41 Days Post-Op  Precautions: Fall, Contact  PLOF: see above    ASSESSMENT:  Pt was agreeable to PT treatment however perseverating on needing pain medication for her left wrist, pt has medication approximately an hour ago. Pt performed bed mobility with supervision and sit to stand transfers with CGA. Pt ambulated 60 feet with HHA with wide base of support and decreased step length and foot clearance. After a seated rest break patient performed 5 sit to stand transfer with CGA. Pt sat edge of bed for 15 minutes without back support to increase activity tolerance and to prepare for more out of bed activity. Pt was left with sitter in the room.   Progression toward goals:   []      Improving appropriately and progressing toward goals  [x]      Improving slowly and progressing toward goals  []      Not making progress toward goals and plan of care will be adjusted     PLAN:  Patient continues to benefit from skilled intervention to address the above impairments. Continue treatment per established plan of care. Discharge Recommendations:  Inpatient Rehab  Further Equipment Recommendations for Discharge:  N/A     SUBJECTIVE:   Patient stated I just want to get out of here.     OBJECTIVE DATA SUMMARY:   Critical Behavior:  Neurologic State: Alert, Appropriate for age  Orientation Level: Oriented X4  Cognition: Follows commands  Safety/Judgement: Decreased awareness of need for safety, Fall prevention  Functional Mobility Training:  Bed Mobility:  Supine to Sit: Supervision  Sit to Supine: Supervision  Transfers:  Sit to Stand: Contact guard assistance  Stand to Sit: Contact guard assistance  Balance:  Sitting - Static: Good (unsupported)  Standing: Without support  Standing - Static:  (fair-)  Standing - Dynamic :  (fair-)   Ambulation/Gait Training:  Distance (ft): 60 Feet (ft)  Assistive Device:  (HHA)  Ambulation - Level of Assistance: Minimal assistance  Gait Abnormalities: Decreased step clearance  Base of Support: Widened  Step Length: Right shortened;Left shortened  Therapeutic Exercises:         EXERCISE   Sets   Reps   Active Active Assist   Passive Self ROM   Comments   Ankle Pumps 1 10  [x] [] [] []    Quad Sets/Glut Sets    [] [] [] [] Hold for 5 secs   Hamstring Sets   [] [] [] []    Short Arc Quads   [] [] [] []    Heel Slides   [] [] [] []    Straight Leg Raises   [] [] [] []    Hip Add   [] [] [] [] Hold for 5 secs, w/ pillow squeeze   Long Arc Quads 1 10 [x] [] [] []    Seated Marching   [] [] [] []    Standing Marching   [] [] [] []       [] [] [] []        Pain:  Pain level pre-treatment: 8/10  Pain level post-treatment: 8/10   Pain Intervention(s): Rest, Repositioning   Response to intervention: Nurse notified, See doc flow    Activity Tolerance:   Fair-  Please refer to the flowsheet for vital signs taken during this treatment. After treatment:   [] Patient left in no apparent distress sitting up in chair  [x] Patient left in no apparent distress in bed  [x] Call bell left within reach  [x] Nursing notified  [x] Caregiver present  [] Bed alarm activated  [] SCDs applied      COMMUNICATION/EDUCATION:   [x]         Role of Physical Therapy in the acute care setting. [x]         Fall prevention education was provided and the patient/caregiver indicated understanding. [x]         Patient/family have participated as able in working toward goals and plan of care. [x]         Patient/family agree to work toward stated goals and plan of care. []         Patient understands intent and goals of therapy, but is neutral about his/her participation.   []         Patient is unable to participate in stated goals/plan of care: ongoing with therapy staff.  []         Other:        Marylee Hering, PT   Time Calculation: 25 mins

## 2021-11-02 NOTE — PROGRESS NOTES
Hospitalist Progress Note    Patient: Linn Pearce MRN: [id number]  CSN: [id number]    YOB: 1980  Age: 39 y.o. Sex: female    DOA: 9/11/2021 LOS:  LOS: 51 days          Chief Complaint:      acute metabolic encephalopathy and lethargy. Admitted for likely gabapentin overdose. Assessment/Plan   Margot Goldstein is a 39 y.o. female with long standing history of multidrug use/abuse, previous drug-seeking behavior and mental health issues otherwise unspecified arrives via EMS with acute metabolic encephalopathy and lethargy. Admitted for likely gabapentin overdose. She was intubated in ER, ct head no acute issue, LP done due to fever even on abx, no meningitis noted. she has peg and trach. she pulled out of her trach on oct 29, remained oxygen level good       Acute respiratory failure with hypoxia -resolving   Intubated on 9/12    Trach on 9/20/21.  -pulled out on 10/29     MRSA on sputum cx now-completed  bactrim for total 5 days per peg tube      bacteremia -completed treatment     Anemia stable-will continue monitoring      Acute metabolic encephalopathy -resolved  Ct head no acute process   Continue Seroquel     Urinary retention -zamora      elevated ammonia level  Resolved, recheck ammonia level is good      Psychosis, hx of  Ekbom's delusional parasitosis    Appreciate Psychiatry input, Dr. Parviz Wilks   Recommendations:  Taper off diazepam, continue clonazepam 1 to 2 mg 3 times a day for now with gradual taper off with outpatient follow-up  Add hydroxyzine 50 to 100 mg 3 times a day  Continue quetiapine and increase to 600 at bedtime and continue 200 in the morning    On  Klonopin, Seroquel, haldol prn per psych recommendation   Will have psych on board today      left wrist fracture: immobilized -POA -stable   Ortho re-consulted   Will order new x-rays of left arm  Will make n.p.o. after midnight for possible removal of exfix tomorrow pending Dr. 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    Left wrist fracture, with delayed healing, subsequent encounter S62.102G    Drug overdose, intentional self-harm, initial encounter ([facility] T50.902A    Hypoxia R09.02    Hypotension after procedure I95.81    Acute metabolic encephalopathy P21.02    Psychosis ([facility] F29    Hyponatremia E87.1    Acute respiratory failure with hypoxia ([facility] J96.01    Increased ammonia level R79.89    Hypokalemia E87.6    Status post tracheostomy ([facility] Z93.0    Bacteremia due to Gram-positive bacteria R78.81    FELICITY (acute kidney injury) ([facility] N17.9       Subjective:    Patient very upset about the fixator in her left arm. Asked me to remove it nd pleaded with me to do so. Advised patient that I would speak with the orthopedic surgeon. Sitter at bedside.   Patient requesting pain medicine      Review of systems:    Constitutional: denies fevers, chills, myalgias  Respiratory: denies SOB, cough  Cardiovascular: denies chest pain, palpitations  Gastrointestinal: denies nausea, vomiting, diarrhea      Vital signs/Intake and Output:  Visit Vitals  BP (!) 90/51 (BP 1 Location: Right upper arm)   Pulse 86   Temp 98.1 °F (36.7 °C)   Resp 12   Ht 5' 2\" (1.575 m)   Wt 72.6 kg (160 lb 0.9 oz)   SpO2 90%   BMI 29.27 kg/m²     Current Shift:  11/02 0701 - 11/02 1900  In: 250   Out: -   Last three shifts:  10/31 1901 - 11/02 0700  In: 620   Out: 3700 [Urine:3700]    Exam:    General: Well developed, alert, NAD, OX3  Head/Neck: NCAT, supple, No masses, No lymphadenopathy  CVS:Regular rate and rhythm, no M/R/G, S1/S2 heard, no thrill  Lungs:Clear to auscultation bilaterally, no wheezes, rhonchi, or rales  Abdomen: Soft, Nontender, No distention, Normal Bowel sounds, No hepatomegaly  Extremities: Left arm, external fixator in place, increasing erythema and crusting drainage around device  Skin:normal texture and turgor, no rashes, no lesions  Neuro:grossly normal , follows commands  Psych: Intermittently confused                Labs: Results:       Chemistry No results for input(s): GLU, NA, K, CL, CO2, BUN, CREA, CA, AGAP, BUCR, TBIL, AP, TP, ALB, GLOB, AGRAT in the last 72 hours. No lab exists for component: GPT   CBC w/Diff No results for input(s): WBC, RBC, HGB, HCT, PLT, GRANS, LYMPH, EOS, HGBEXT, HCTEXT, PLTEXT in the last 72 hours. Cardiac Enzymes No results for input(s): CPK, CKND1, ZENON in the last 72 hours. No lab exists for component: CKRMB, TROIP   Coagulation No results for input(s): PTP, INR, APTT, INREXT in the last 72 hours. Lipid Panel No results found for: CHOL, CHOLPOCT, CHOLX, CHLST, CHOLV, [id number], HDL, HDLP, LDL, LDLC, DLDLP, [id number], VLDLC, VLDL, TGLX, TRIGL, TRIGP, TGLPOCT, CHHD, CHHDX   BNP No results for input(s): BNPP in the last 72 hours. Liver Enzymes No results for input(s): TP, ALB, TBIL, AP in the last 72 hours.     No lab exists for component: SGOT, GPT, DBIL   Thyroid Studies No results found for: T4, T3U, TSH, TSHEXT     Procedures/imaging: see electronic medical records for all procedures/Xrays and details which were not copied into this note but were reviewed prior to creation of Milton Monroe MD

## 2021-11-02 NOTE — PROGRESS NOTES
Problem: Ventilator Management  Goal: *Adequate oxygenation and ventilation  Outcome: Progressing Towards Goal  Goal: *Patient maintains clear airway/free of aspiration  Outcome: Progressing Towards Goal  Goal: *Absence of infection signs and symptoms  Outcome: Progressing Towards Goal  Goal: *Normal spontaneous ventilation  Outcome: Progressing Towards Goal     Problem: Patient Education: Go to Patient Education Activity  Goal: Patient/Family Education  Outcome: Progressing Towards Goal     Problem: Non-Violent Restraints  Goal: Removal from restraints as soon as assessed to be safe  Outcome: Progressing Towards Goal  Goal: No harm/injury to patient while restraints in use  Outcome: Progressing Towards Goal  Goal: Patient's dignity will be maintained  Outcome: Progressing Towards Goal  Goal: Patient Interventions  Outcome: Progressing Towards Goal     Problem: Falls - Risk of  Goal: *Absence of Falls  Description: Document Filiberto Infante Fall Risk and appropriate interventions in the flowsheet.   Outcome: Progressing Towards Goal  Note: Fall Risk Interventions:  Mobility Interventions: Bed/chair exit alarm, Patient to call before getting OOB    Mentation Interventions: Adequate sleep, hydration, pain control, Door open when patient unattended, Evaluate medications/consider consulting pharmacy, Family/sitter at bedside, Reorient patient    Medication Interventions: Bed/chair exit alarm, Evaluate medications/consider consulting pharmacy    Elimination Interventions: Bed/chair exit alarm, Call light in reach, Stay With Me (per policy)    History of Falls Interventions: Evaluate medications/consider consulting pharmacy         Problem: Patient Education: Go to Patient Education Activity  Goal: Patient/Family Education  Outcome: Progressing Towards Goal     Problem: Risk for Spread of Infection  Goal: Prevent transmission of infectious organism to others  Description: Prevent the transmission of infectious organisms to other patients, staff members, and visitors. Outcome: Progressing Towards Goal     Problem: Patient Education:  Go to Education Activity  Goal: Patient/Family Education  Outcome: Progressing Towards Goal     Problem: Pressure Injury - Risk of  Goal: *Prevention of pressure injury  Description: Document Remy Scale and appropriate interventions in the flowsheet.   Outcome: Progressing Towards Goal  Note: Pressure Injury Interventions:  Sensory Interventions: Assess changes in LOC, Keep linens dry and wrinkle-free, Minimize linen layers, Pressure redistribution bed/mattress (bed type)    Moisture Interventions: Internal/External urinary devices, Minimize layers    Activity Interventions: Pressure redistribution bed/mattress(bed type)    Mobility Interventions: HOB 30 degrees or less, Pressure redistribution bed/mattress (bed type)    Nutrition Interventions: Document food/fluid/supplement intake    Friction and Shear Interventions: HOB 30 degrees or less, Minimize layers                Problem: Patient Education: Go to Patient Education Activity  Goal: Patient/Family Education  Outcome: Progressing Towards Goal     Problem: Patient Education: Go to Patient Education Activity  Goal: Patient/Family Education  Outcome: Progressing Towards Goal     Problem: Patient Education: Go to Patient Education Activity  Goal: Patient/Family Education  Outcome: Progressing Towards Goal     Problem: Patient Education: Go to Patient Education Activity  Goal: Patient/Family Education  Outcome: Progressing Towards Goal     Problem: Pain  Goal: *Control of Pain  Outcome: Progressing Towards Goal  Goal: *PALLIATIVE CARE:  Alleviation of Pain  Outcome: Progressing Towards Goal     Problem: Patient Education: Go to Patient Education Activity  Goal: Patient/Family Education  Outcome: Progressing Towards Goal

## 2021-11-02 NOTE — PROGRESS NOTES
Assumed care of pt from [facility].  7714: Pt needs ortho consult for fixator to left arm. Pt has had fixator on since admission and site of screws are looking red and irritated with drainage. Will ask hospitalist for consult today. [id number]ill with [facility] regarding fixator. Doctor will consult ortho for patient. 1500:Ortho was consulted and pt will have Fixator removed tomorrow in OR. Make pt NPO after midnight.

## 2021-11-02 NOTE — PROGRESS NOTES
Problem: Dysphagia (Adult)  Goal: *Acute Goals and Plan of Care (Insert Text)  Description: Patient will:  1. Tolerate soft & bite-sized diet with thin liquids without overt s/sx of aspiration under SLP supervision. 2. Tolerate diet upgrade without overt s/sx of aspiration under SLP supervision. 3. Utilize compensatory swallow strategies of small bite/sip, alternate liquid/solid with min cues in 4/5 trials. Rec:   Soft & bite-sized diet, thin liquids   Aspiration precautions  HOB >45 during po intake, remain >30 for 30-45 minutes after po   Small bites/sips; alternate liquid/solid, slow feeding rate   Oral care TID  Meds crushed in applesauce    Outcome: Progressing Towards Goal     SPEECH LANGUAGE PATHOLOGY BEDSIDE SWALLOW EVALUATION    Patient: Shabbir Bailey (24 y.o. female)  Date: 11/2/2021  Primary Diagnosis: Drug overdose, intentional self-harm, initial encounter ([facility] [address] [place] Kins  Left wrist fracture, with delayed healing, subsequent encounter [S62.102G]  Procedure(s) (LRB):  TRACHEOSTOMY, PT IS IN ICU BED 4 (N/A) 41 Days Post-Op   Precautions: aspiration  Fall, Contact    PLOF: Per H&P    ASSESSMENT :  Based on the objective data described below, the patient presents with mild oral dysphagia. Pt seen for swallow eval. Pt AOx4, accepting of eval. Pt denies difficulty swallowing. Oral motor structures, strength, and ROM WFL; grossly intact for mastication and deglutition. Functional communication. Intelligibility >90%. Pt self-feeding PO trials of thin liquid via straw and tandem drinking, puree, mixed, and regular textures. Mildly prolonged mastication and AP transit with regular texture, though cleared adequately given time. Pt did initially state \"I don't feel like chewing\" but agreeable to regular trials following education and encouragement on diet advancement. No s/sx of aspiration appreciated across consistencies.  Swallow appears timely, adequate laryngeal elevation noted via palpation, no change in vocal/resp quality appreciated. Recommend soft & bite-sized texture diet with thin liquids, meds crushed in applesauce. Would recommend continue PEG tube feeds until pt consistently consuming adequate nutrition PO. Pt educated on and verbalized understanding of findings, recs, and POC; d/w RN. Will continue to follow for diet tolerance/advancement as appropriate. Patient will benefit from skilled intervention to address the above impairments. Patient's rehabilitation potential is considered to be Good  Factors which may influence rehabilitation potential include:   []            None noted  []            Mental ability/status  []            Medical condition  []            Home/family situation and support systems  [x]            Safety awareness  []            Pain tolerance/management  []            Other:      PLAN :  Recommendations and Planned Interventions:  See above. Frequency/Duration: Patient will be followed by speech-language pathology 1-2 times per day/2-3 days per week to address goals. Discharge Recommendations: To Be Determined     SUBJECTIVE:   Patient stated I don't feel like chewing.     OBJECTIVE:     Past Medical History:   Diagnosis Date    Asthma     Bilateral ovarian cysts     Cocaine abuse ([facility] [address]     Mental and behavioral problem     Pancreatitis     Pancreatitis     Psychosis ([facility] [address]      Past Surgical History:   Procedure Laterality Date    HX GYN      D&C, Hysterectomy    IR INSERT GASTROSTOMY TUBE PERC  10/1/2021     Home Situation:        Diet prior to admission: unknown  Current Diet:  soft & bite-sized, thin     Cognitive and Communication Status:  Neurologic State: Alert, Eyes open spontaneously  Orientation Level: Oriented X4  Cognition: Follows commands, Impulsive, Poor safety awareness  Perception: Appears intact  Perseveration: No perseveration noted  Safety/Judgement: Decreased awareness of need for safety, Fall prevention  Oral Assessment:  Oral Assessment  Labial: No impairment  Dentition: Natural  Oral Hygiene: Fair  Lingual: No impairment  Velum: No impairment  Mandible: No impairment  P.O. Trials:  Patient Position: HOB 55*  Vocal quality prior to P.O.: No impairment  Consistency Presented: Thin liquid;Puree; Solid  How Presented: Self-fed/presented;Straw;Successive swallows;Spoon     Bolus Acceptance: No impairment  Bolus Formation/Control: Impaired  Type of Impairment: Delayed;Mastication  Propulsion: Delayed (# of seconds)  Oral Residue: None  Initiation of Swallow: No impairment  Laryngeal Elevation: Functional  Aspiration Signs/Symptoms: None  Pharyngeal Phase Characteristics: No impairment, issues, or problems   Effective Modifications: Alternate liquids/solids;Small sips and bites  Cues for Modifications: Minimal       Oral Phase Severity: Mild  Pharyngeal Phase Severity : No impairment    PAIN:  Pain level pre-treatment: 0/10   Pain level post-treatment: 0/10     After treatment:   []            Patient left in no apparent distress sitting up in chair  [x]            Patient left in no apparent distress in bed  [x]            Call bell left within reach  [x]            Nursing notified  []            Family present  []            Caregiver present  []            Bed alarm activated    COMMUNICATION/EDUCATION:   [x]            Aspiration precautions; swallow safety; compensatory techniques. [x]            Patient/family have participated as able in goal setting and plan of care. [x]            Patient/family agree to work toward stated goals and plan of care. []            Patient understands intent and goals of therapy; neutral about participation. []            Patient unable to participate in goal setting/plan of care; educ ongoing with interdisciplinary staff  []         Posted safety precautions in patient's room.     Thank you for this referral.    Shanda Machuca M.S., CCC-SLP  Speech-Language Pathologist    Time Calculation: 03 mins

## 2021-11-03 ENCOUNTER — APPOINTMENT (OUTPATIENT)
Dept: GENERAL RADIOLOGY | Age: 41
DRG: 004 | End: 2021-11-03
Attending: ORTHOPAEDIC SURGERY
Payer: MEDICAID

## 2021-11-03 ENCOUNTER — ANESTHESIA (OUTPATIENT)
Dept: SURGERY | Age: 41
DRG: 004 | End: 2021-11-03
Payer: MEDICAID

## 2021-11-03 LAB
ANION GAP SERPL CALC-SCNC: 5 MMOL/L (ref 3–18)
BUN SERPL-MCNC: 18 MG/DL (ref 7–18)
BUN/CREAT SERPL: 25 (ref 12–20)
CALCIUM SERPL-MCNC: 9 MG/DL (ref 8.5–10.1)
CHLORIDE SERPL-SCNC: 106 MMOL/L (ref 100–111)
CO2 SERPL-SCNC: 27 MMOL/L (ref 21–32)
CREAT SERPL-MCNC: 0.71 MG/DL (ref 0.6–1.3)
ERYTHROCYTE [DISTWIDTH] IN BLOOD BY AUTOMATED COUNT: 15 % (ref 11.6–14.5)
GLUCOSE BLD STRIP.AUTO-MCNC: 104 MG/DL (ref 70–110)
GLUCOSE BLD STRIP.AUTO-MCNC: 122 MG/DL (ref 70–110)
GLUCOSE BLD STRIP.AUTO-MCNC: 90 MG/DL (ref 70–110)
GLUCOSE BLD STRIP.AUTO-MCNC: 92 MG/DL (ref 70–110)
GLUCOSE SERPL-MCNC: 134 MG/DL (ref 74–99)
HCG UR QL: NEGATIVE
HCT VFR BLD AUTO: 26.8 % (ref 35–45)
HGB BLD-MCNC: 8.7 G/DL (ref 12–16)
INR PPP: 1 (ref 0.8–1.2)
MCH RBC QN AUTO: 29.1 PG (ref 24–34)
MCHC RBC AUTO-ENTMCNC: 32.5 G/DL (ref 31–37)
MCV RBC AUTO: 89.6 FL (ref 78–100)
PLATELET # BLD AUTO: 210 K/UL (ref 135–420)
PMV BLD AUTO: 11.6 FL (ref 9.2–11.8)
POTASSIUM SERPL-SCNC: 4.2 MMOL/L (ref 3.5–5.5)
PROTHROMBIN TIME: 13.1 SEC (ref 11.5–15.2)
RBC # BLD AUTO: 2.99 M/UL (ref 4.2–5.3)
SODIUM SERPL-SCNC: 138 MMOL/L (ref 136–145)
WBC # BLD AUTO: 5.8 K/UL (ref 4.6–13.2)

## 2021-11-03 PROCEDURE — 74011250636 HC RX REV CODE- 250/636: Performed by: PHYSICIAN ASSISTANT

## 2021-11-03 PROCEDURE — 74011250636 HC RX REV CODE- 250/636: Performed by: ANESTHESIOLOGY

## 2021-11-03 PROCEDURE — 74011250637 HC RX REV CODE- 250/637: Performed by: HOSPITALIST

## 2021-11-03 PROCEDURE — 0RPPX5Z REMOVAL OF EXTERNAL FIXATION DEVICE FROM LEFT WRIST JOINT, EXTERNAL APPROACH: ICD-10-PCS | Performed by: ORTHOPAEDIC SURGERY

## 2021-11-03 PROCEDURE — 2709999900 HC NON-CHARGEABLE SUPPLY: Performed by: ORTHOPAEDIC SURGERY

## 2021-11-03 PROCEDURE — A4565 SLINGS: HCPCS | Performed by: ORTHOPAEDIC SURGERY

## 2021-11-03 PROCEDURE — 77030040361 HC SLV COMPR DVT MDII -B: Performed by: ORTHOPAEDIC SURGERY

## 2021-11-03 PROCEDURE — 76210000006 HC OR PH I REC 0.5 TO 1 HR: Performed by: ORTHOPAEDIC SURGERY

## 2021-11-03 PROCEDURE — 94640 AIRWAY INHALATION TREATMENT: CPT

## 2021-11-03 PROCEDURE — 74011250636 HC RX REV CODE- 250/636: Performed by: FAMILY MEDICINE

## 2021-11-03 PROCEDURE — 77030010507 HC ADH SKN DERMBND J&J -B: Performed by: ORTHOPAEDIC SURGERY

## 2021-11-03 PROCEDURE — 77030000032 HC CUF TRNQT ZIMM -B: Performed by: ORTHOPAEDIC SURGERY

## 2021-11-03 PROCEDURE — 74011000250 HC RX REV CODE- 250: Performed by: HOSPITALIST

## 2021-11-03 PROCEDURE — 36415 COLL VENOUS BLD VENIPUNCTURE: CPT

## 2021-11-03 PROCEDURE — 74011250636 HC RX REV CODE- 250/636: Performed by: ORTHOPAEDIC SURGERY

## 2021-11-03 PROCEDURE — 76060000032 HC ANESTHESIA 0.5 TO 1 HR: Performed by: ORTHOPAEDIC SURGERY

## 2021-11-03 PROCEDURE — 82962 GLUCOSE BLOOD TEST: CPT

## 2021-11-03 PROCEDURE — 0PDJ0ZZ EXTRACTION OF LEFT RADIUS, OPEN APPROACH: ICD-10-PCS | Performed by: ORTHOPAEDIC SURGERY

## 2021-11-03 PROCEDURE — 81025 URINE PREGNANCY TEST: CPT

## 2021-11-03 PROCEDURE — 74011250636 HC RX REV CODE- 250/636: Performed by: NURSE ANESTHETIST, CERTIFIED REGISTERED

## 2021-11-03 PROCEDURE — 85027 COMPLETE CBC AUTOMATED: CPT

## 2021-11-03 PROCEDURE — 80048 BASIC METABOLIC PNL TOTAL CA: CPT

## 2021-11-03 PROCEDURE — 85610 PROTHROMBIN TIME: CPT

## 2021-11-03 PROCEDURE — 77030020782 HC GWN BAIR PAWS FLX 3M -B: Performed by: ORTHOPAEDIC SURGERY

## 2021-11-03 PROCEDURE — 74011250637 HC RX REV CODE- 250/637: Performed by: FAMILY MEDICINE

## 2021-11-03 PROCEDURE — 74011250636 HC RX REV CODE- 250/636: Performed by: INTERNAL MEDICINE

## 2021-11-03 PROCEDURE — 77030002933 HC SUT MCRYL J&J -A: Performed by: ORTHOPAEDIC SURGERY

## 2021-11-03 PROCEDURE — 97530 THERAPEUTIC ACTIVITIES: CPT

## 2021-11-03 PROCEDURE — 77030031139 HC SUT VCRL2 J&J -A: Performed by: ORTHOPAEDIC SURGERY

## 2021-11-03 PROCEDURE — 74011250637 HC RX REV CODE- 250/637: Performed by: INTERNAL MEDICINE

## 2021-11-03 PROCEDURE — 77030013079 HC BLNKT BAIR HGGR 3M -A: Performed by: ANESTHESIOLOGY

## 2021-11-03 PROCEDURE — 65660000000 HC RM CCU STEPDOWN

## 2021-11-03 PROCEDURE — 76010000138 HC OR TIME 0.5 TO 1 HR: Performed by: ORTHOPAEDIC SURGERY

## 2021-11-03 PROCEDURE — 74011000250 HC RX REV CODE- 250: Performed by: NURSE ANESTHETIST, CERTIFIED REGISTERED

## 2021-11-03 PROCEDURE — 97168 OT RE-EVAL EST PLAN CARE: CPT

## 2021-11-03 PROCEDURE — 74011000250 HC RX REV CODE- 250: Performed by: INTERNAL MEDICINE

## 2021-11-03 RX ORDER — SODIUM CHLORIDE, SODIUM LACTATE, POTASSIUM CHLORIDE, CALCIUM CHLORIDE 600; 310; 30; 20 MG/100ML; MG/100ML; MG/100ML; MG/100ML
125 INJECTION, SOLUTION INTRAVENOUS CONTINUOUS
Status: DISCONTINUED | OUTPATIENT
Start: 2021-11-03 | End: 2021-11-03 | Stop reason: HOSPADM

## 2021-11-03 RX ORDER — SODIUM CHLORIDE 0.9 % (FLUSH) 0.9 %
5-40 SYRINGE (ML) INJECTION AS NEEDED
Status: DISCONTINUED | OUTPATIENT
Start: 2021-11-03 | End: 2021-11-03 | Stop reason: HOSPADM

## 2021-11-03 RX ORDER — ONDANSETRON 2 MG/ML
INJECTION INTRAMUSCULAR; INTRAVENOUS AS NEEDED
Status: DISCONTINUED | OUTPATIENT
Start: 2021-11-03 | End: 2021-11-03 | Stop reason: HOSPADM

## 2021-11-03 RX ORDER — MIDAZOLAM HYDROCHLORIDE 1 MG/ML
INJECTION, SOLUTION INTRAMUSCULAR; INTRAVENOUS AS NEEDED
Status: DISCONTINUED | OUTPATIENT
Start: 2021-11-03 | End: 2021-11-03 | Stop reason: HOSPADM

## 2021-11-03 RX ORDER — DIAZEPAM 5 MG/1
2.5 TABLET ORAL 2 TIMES DAILY
Status: DISCONTINUED | OUTPATIENT
Start: 2021-11-03 | End: 2021-11-04

## 2021-11-03 RX ORDER — SODIUM CHLORIDE 0.9 % (FLUSH) 0.9 %
5-40 SYRINGE (ML) INJECTION EVERY 8 HOURS
Status: DISCONTINUED | OUTPATIENT
Start: 2021-11-03 | End: 2021-11-03 | Stop reason: HOSPADM

## 2021-11-03 RX ORDER — PROPOFOL 10 MG/ML
INJECTION, EMULSION INTRAVENOUS AS NEEDED
Status: DISCONTINUED | OUTPATIENT
Start: 2021-11-03 | End: 2021-11-03 | Stop reason: HOSPADM

## 2021-11-03 RX ORDER — QUETIAPINE FUMARATE 200 MG/1
600 TABLET, FILM COATED ORAL
Status: DISCONTINUED | OUTPATIENT
Start: 2021-11-03 | End: 2021-11-13 | Stop reason: HOSPADM

## 2021-11-03 RX ORDER — HYDROMORPHONE HYDROCHLORIDE 1 MG/ML
INJECTION, SOLUTION INTRAMUSCULAR; INTRAVENOUS; SUBCUTANEOUS
Status: DISCONTINUED
Start: 2021-11-03 | End: 2021-11-03 | Stop reason: WASHOUT

## 2021-11-03 RX ORDER — FLUMAZENIL 0.1 MG/ML
0.2 INJECTION INTRAVENOUS
Status: DISCONTINUED | OUTPATIENT
Start: 2021-11-03 | End: 2021-11-03 | Stop reason: HOSPADM

## 2021-11-03 RX ORDER — DEXMEDETOMIDINE HYDROCHLORIDE 100 UG/ML
INJECTION, SOLUTION INTRAVENOUS AS NEEDED
Status: DISCONTINUED | OUTPATIENT
Start: 2021-11-03 | End: 2021-11-03 | Stop reason: HOSPADM

## 2021-11-03 RX ORDER — CLONAZEPAM 0.5 MG/1
1 TABLET ORAL 3 TIMES DAILY
Status: DISCONTINUED | OUTPATIENT
Start: 2021-11-03 | End: 2021-11-08

## 2021-11-03 RX ORDER — FENTANYL CITRATE 50 UG/ML
50 INJECTION, SOLUTION INTRAMUSCULAR; INTRAVENOUS ONCE
Status: COMPLETED | OUTPATIENT
Start: 2021-11-03 | End: 2021-11-03

## 2021-11-03 RX ORDER — NALOXONE HYDROCHLORIDE 0.4 MG/ML
0.4 INJECTION, SOLUTION INTRAMUSCULAR; INTRAVENOUS; SUBCUTANEOUS AS NEEDED
Status: DISCONTINUED | OUTPATIENT
Start: 2021-11-03 | End: 2021-11-03 | Stop reason: HOSPADM

## 2021-11-03 RX ORDER — CEFAZOLIN SODIUM/WATER 2 G/20 ML
2 SYRINGE (ML) INTRAVENOUS ONCE
Status: COMPLETED | OUTPATIENT
Start: 2021-11-03 | End: 2021-11-03

## 2021-11-03 RX ORDER — HYDROXYZINE 25 MG/1
50 TABLET, FILM COATED ORAL 3 TIMES DAILY
Status: DISCONTINUED | OUTPATIENT
Start: 2021-11-03 | End: 2021-11-13 | Stop reason: HOSPADM

## 2021-11-03 RX ORDER — QUETIAPINE FUMARATE 200 MG/1
200 TABLET, FILM COATED ORAL EVERY MORNING
Status: DISCONTINUED | OUTPATIENT
Start: 2021-11-04 | End: 2021-11-08

## 2021-11-03 RX ORDER — FENTANYL CITRATE 50 UG/ML
50 INJECTION, SOLUTION INTRAMUSCULAR; INTRAVENOUS AS NEEDED
Status: DISCONTINUED | OUTPATIENT
Start: 2021-11-03 | End: 2021-11-03 | Stop reason: HOSPADM

## 2021-11-03 RX ORDER — CEFAZOLIN SODIUM/WATER 2 G/20 ML
2 SYRINGE (ML) INTRAVENOUS EVERY 8 HOURS
Status: COMPLETED | OUTPATIENT
Start: 2021-11-03 | End: 2021-11-04

## 2021-11-03 RX ADMIN — HYDROMORPHONE HYDROCHLORIDE 0.5 ML: 2 INJECTION, SOLUTION INTRAMUSCULAR; INTRAVENOUS; SUBCUTANEOUS at 08:06

## 2021-11-03 RX ADMIN — ONDANSETRON HYDROCHLORIDE 4 MG: 2 INJECTION INTRAMUSCULAR; INTRAVENOUS at 15:01

## 2021-11-03 RX ADMIN — BUDESONIDE 500 MCG: 0.5 INHALANT RESPIRATORY (INHALATION) at 20:12

## 2021-11-03 RX ADMIN — PROPOFOL 50 MG: 10 INJECTION, EMULSION INTRAVENOUS at 14:49

## 2021-11-03 RX ADMIN — PROPOFOL 30 MG: 10 INJECTION, EMULSION INTRAVENOUS at 14:45

## 2021-11-03 RX ADMIN — MIDAZOLAM 1 MG: 1 INJECTION INTRAMUSCULAR; INTRAVENOUS at 14:23

## 2021-11-03 RX ADMIN — Medication 5 MG: at 21:37

## 2021-11-03 RX ADMIN — PROPOFOL 50 MG: 10 INJECTION, EMULSION INTRAVENOUS at 14:39

## 2021-11-03 RX ADMIN — DEXMEDETOMIDINE HYDROCHLORIDE 2 MCG: 100 INJECTION, SOLUTION INTRAVENOUS at 14:29

## 2021-11-03 RX ADMIN — DIAZEPAM 2.5 MG: 5 TABLET ORAL at 21:37

## 2021-11-03 RX ADMIN — PROPOFOL 30 MG: 10 INJECTION, EMULSION INTRAVENOUS at 14:56

## 2021-11-03 RX ADMIN — QUETIAPINE FUMARATE 600 MG: 200 TABLET ORAL at 21:37

## 2021-11-03 RX ADMIN — NYSTATIN 500000 UNITS: 100000 SUSPENSION ORAL at 21:37

## 2021-11-03 RX ADMIN — FENTANYL CITRATE 50 MCG: 50 INJECTION, SOLUTION INTRAMUSCULAR; INTRAVENOUS at 13:44

## 2021-11-03 RX ADMIN — 0.12% CHLORHEXIDINE GLUCONATE 10 ML: 1.2 RINSE ORAL at 21:36

## 2021-11-03 RX ADMIN — HYDROMORPHONE HYDROCHLORIDE 1 MG: 2 INJECTION, SOLUTION INTRAMUSCULAR; INTRAVENOUS; SUBCUTANEOUS at 17:15

## 2021-11-03 RX ADMIN — ARFORMOTEROL TARTRATE 15 MCG: 15 SOLUTION RESPIRATORY (INHALATION) at 20:12

## 2021-11-03 RX ADMIN — LORAZEPAM 2 MG: 2 INJECTION INTRAMUSCULAR at 17:31

## 2021-11-03 RX ADMIN — NYSTATIN: 100000 POWDER TOPICAL at 08:12

## 2021-11-03 RX ADMIN — PROPOFOL 25 MG: 10 INJECTION, EMULSION INTRAVENOUS at 14:35

## 2021-11-03 RX ADMIN — BUDESONIDE 500 MCG: 0.5 INHALANT RESPIRATORY (INHALATION) at 08:01

## 2021-11-03 RX ADMIN — MIDAZOLAM 1 MG: 1 INJECTION INTRAMUSCULAR; INTRAVENOUS at 14:19

## 2021-11-03 RX ADMIN — HYDROMORPHONE HYDROCHLORIDE 0.5 ML: 2 INJECTION, SOLUTION INTRAMUSCULAR; INTRAVENOUS; SUBCUTANEOUS at 11:50

## 2021-11-03 RX ADMIN — DEXMEDETOMIDINE HYDROCHLORIDE 2 MCG: 100 INJECTION, SOLUTION INTRAVENOUS at 14:26

## 2021-11-03 RX ADMIN — PROPOFOL 30 MG: 10 INJECTION, EMULSION INTRAVENOUS at 14:42

## 2021-11-03 RX ADMIN — PROPOFOL 30 MG: 10 INJECTION, EMULSION INTRAVENOUS at 14:53

## 2021-11-03 RX ADMIN — FENTANYL CITRATE 50 MCG: 50 INJECTION, SOLUTION INTRAMUSCULAR; INTRAVENOUS at 15:46

## 2021-11-03 RX ADMIN — FENTANYL CITRATE 50 MCG: 50 INJECTION, SOLUTION INTRAMUSCULAR; INTRAVENOUS at 13:25

## 2021-11-03 RX ADMIN — ENOXAPARIN SODIUM 40 MG: 100 INJECTION SUBCUTANEOUS at 17:31

## 2021-11-03 RX ADMIN — CLONAZEPAM 1 MG: 0.5 TABLET ORAL at 21:36

## 2021-11-03 RX ADMIN — HYDROXYZINE HYDROCHLORIDE 50 MG: 25 TABLET, FILM COATED ORAL at 21:37

## 2021-11-03 RX ADMIN — SODIUM CHLORIDE, SODIUM LACTATE, POTASSIUM CHLORIDE, AND CALCIUM CHLORIDE 125 ML/HR: 600; 310; 30; 20 INJECTION, SOLUTION INTRAVENOUS at 11:53

## 2021-11-03 RX ADMIN — NYSTATIN: 100000 POWDER TOPICAL at 21:45

## 2021-11-03 RX ADMIN — FENTANYL CITRATE 50 MCG: 50 INJECTION, SOLUTION INTRAMUSCULAR; INTRAVENOUS at 15:56

## 2021-11-03 RX ADMIN — Medication 10 ML: at 14:00

## 2021-11-03 RX ADMIN — ACETAMINOPHEN 650 MG: 325 TABLET ORAL at 18:36

## 2021-11-03 RX ADMIN — NYSTATIN 500000 UNITS: 100000 SUSPENSION ORAL at 17:31

## 2021-11-03 RX ADMIN — FENTANYL CITRATE 50 MCG: 50 INJECTION, SOLUTION INTRAMUSCULAR; INTRAVENOUS at 15:36

## 2021-11-03 RX ADMIN — 0.12% CHLORHEXIDINE GLUCONATE 10 ML: 1.2 RINSE ORAL at 09:00

## 2021-11-03 RX ADMIN — FAMOTIDINE 20 MG: 20 TABLET ORAL at 21:37

## 2021-11-03 RX ADMIN — ARFORMOTEROL TARTRATE 15 MCG: 15 SOLUTION RESPIRATORY (INHALATION) at 08:01

## 2021-11-03 RX ADMIN — CEFAZOLIN 2 G: 1 INJECTION, POWDER, FOR SOLUTION INTRAVENOUS at 14:20

## 2021-11-03 NOTE — PROGRESS NOTES
PT session held due to:  [x]  Prepped for Ortho procedure  []  RN Communication/ suggestion  []  Extreme Pain  []  Dialysis treatment in progress. Will f/u tomorrow. Thank you.   Kelle Bonner, PTA

## 2021-11-03 NOTE — ANESTHESIA POSTPROCEDURE EVALUATION
Procedure(s):  LEFT WRIST OPEN REDUCTION INTERNAL FIXATION. REMOVAL OF X-FIX WITH C-ARM.  [address] . MAC    Anesthesia Post Evaluation      Multimodal analgesia: multimodal analgesia not used between 6 hours prior to anesthesia start to PACU discharge  Patient location during evaluation: bedside  Patient participation: complete - patient participated  Level of consciousness: awake  Pain management: adequate  Airway patency: patent  Anesthetic complications: no  Cardiovascular status: acceptable  Respiratory status: acceptable  Hydration status: acceptable  Post anesthesia nausea and vomiting:  none  Final Post Anesthesia Temperature Assessment:  Normothermia (36.0-37.5 degrees C)      INITIAL Post-op Vital signs:   Vitals Value Taken Time   /62 11/03/21 1555   Temp     Pulse 84 11/03/21 1600   Resp 27 11/03/21 1600   SpO2 100 % 11/03/21 1558   Vitals shown include unvalidated device data.

## 2021-11-03 NOTE — ANESTHESIA PREPROCEDURE EVALUATION
Relevant Problems   NEUROLOGY   (+) Ekbom's delusional parasitosis (HCC)   (+) Psychosis ([facility] [address]      RENAL FAILURE   (+) FELICITY (acute kidney injury) ([facility] [address]       Anesthetic History               Review of Systems / Medical History  Patient summary reviewed, nursing notes reviewed and pertinent labs reviewed    Pulmonary          Smoker (1 PPD tobacco, marijuana)  Asthma        Neuro/Psych         Psychiatric history    Comments: Psychosis, hx of  Ekbom's delusional parasitosis Cardiovascular                       GI/Hepatic/Renal               Comments: H/o pancreatitis Endo/Other        Obesity     Other Findings   Comments: H/o cocaine abuse, recent use  Recent admission for gabapentin overdose, metabolic encephalopathy requiring intubation    Recent supraclavicular block for pain management of wrist fracture at [facility]           Physical Exam    Airway  Mallampati: III  TM Distance: < 4 cm  Neck ROM: decreased range of motion   Mouth opening: Diminished (comment)     Cardiovascular  Regular rate and rhythm,  S1 and S2 normal,  no murmur, click, rub, or gallop  Rhythm: regular  Rate: normal         Dental    Dentition: Poor dentition     Pulmonary  Breath sounds clear to auscultation               Abdominal  GI exam deferred       Other Findings            Anesthetic Plan    ASA: 3  Anesthesia type: MAC            Anesthetic plan and risks discussed with: Patient

## 2021-11-03 NOTE — PERIOP NOTES
TRANSFER - OUT REPORT:    Verbal report given to [employer] on Linn Esters  being transferred to [employer] for routine post - op       Report consisted of patients Situation, Background, Assessment and   Recommendations(SBAR). Information from the following report(s) SBAR, OR Summary, Procedure Summary, Intake/Output and MAR was reviewed with the receiving nurse. Lines:   Peripheral IV 11/01/21 Posterior;Right Hand (Active)   Site Assessment Clean, dry, & intact 11/03/21 1513   Phlebitis Assessment 0 11/03/21 1513   Infiltration Assessment 0 11/03/21 1513   Dressing Status Clean, dry, & intact 11/03/21 1513   Dressing Type Transparent; Tape 11/03/21 1513   Hub Color/Line Status Infusing 11/03/21 1513   Action Taken Open ports on tubing capped 11/03/21 1114   Alcohol Cap Used Yes 11/03/21 1114        Opportunity for questions and clarification was provided.       Patient transported with:   O2 @ 3 liters  Registered Nurse  [facility]

## 2021-11-03 NOTE — PROGRESS NOTES
Problem: Self Care Deficits Care Plan (Adult)  Goal: *Acute Goals and Plan of Care (Insert Text)  Description: Occupational Therapy Goals  Initiated 10/19/2021 within 3 day(s). 1.  Patient will perform grooming with minimal assistance/contact guard assist seated EOB. 2.  Patient will perform upper body dressing with supervision/set-up. 3.  Patient will participate in upper extremity therapeutic exercise/activities with minimal assistance/contact guard assist for 10 minutes. 4.  Patient will utilize energy conservation techniques during functional activities with verbal, visual, and tactile cues. Occupational Therapy Goals  Reviewed 11/3/2021 within 7 day(s). 1.  Patient will perform grooming with supervision/set-up standing at sink for 5 minutes or more. 2.  Patient will perform upper body dressing with supervision/set-up. 3.  Patient will perform lower body dressing with supervision/set-up. 4.  Patient will perform toilet transfers with supervision/set-up. 5.  Patient will perform all aspects of toileting with supervision/set-up. 6.  Patient will participate in upper extremity therapeutic exercise/activities with supervision/set-up for 10 minutes. 7.  Patient will utilize energy conservation techniques during functional activities with verbal, visual, and tactile cues. Outcome: Progressing Towards Goal    OCCUPATIONAL THERAPY RE-EVALUATION    Patient: Lida Rae (89 y.o. female)  Date: 11/3/2021  Primary Diagnosis: Drug overdose, intentional self-harm, initial encounter ([facility] [address] Anita Cruz  Left wrist fracture, with delayed healing, subsequent encounter [S62.102G]  Procedure(s) (LRB):  LEFT WRIST OPEN REDUCTION INTERNAL FIXATION. REMOVAL OF X-FIX WITH C-ARM.  RE[facility]  (Left) Day of Surgery   Precautions:   Fall, Contact  PLOF:     ASSESSMENT :  Based on the objective data described below, the patient presents with decreased strength, endurance and balance for carryover of ADLs and transfers following above mentioned medical diagnosis. Pt presented seated EOB, anxious to get off the unit for removal of external fixator at L wrist. Pt getting agitated and requires frequent verbal cues to redirect and participate with therapy. Pt participate with sit<>stand transfers, and grooming activity sitting EOB. Pt was left supine in bed at the end of session in NAD and sitter present in room . Patient will benefit from skilled intervention to address the above impairments. Patient's rehabilitation potential is considered to be Fair  Factors which may influence rehabilitation potential include:   []             None noted  [x]             Mental ability/status  [x]             Medical condition  []             Home/family situation and support systems  [x]             Safety awareness  []             Pain tolerance/management  []             Other:      PLAN :  Recommendations and Planned Interventions:   [x]               Self Care Training                  [x]      Therapeutic Activities  [x]               Functional Mobility Training   []      Cognitive Retraining  [x]               Therapeutic Exercises           [x]      Endurance Activities  [x]               Balance Training                    []      Neuromuscular Re-Education  []               Visual/Perceptual Training     [x]      Home Safety Training  [x]               Patient Education                   [x]      Family Training/Education  []               Other (comment):    Frequency/Duration: Patient will be followed by occupational therapy 3 times a week to address goals. Discharge Recommendations: Skilled Nursing Facility  Further Equipment Recommendations for Discharge: N/A     SUBJECTIVE:   Patient stated  When are they coming to get me? Verlee Peabody    OBJECTIVE DATA SUMMARY:   Hospital course since last seen and reason for reevaluation: review POC and update goals.    Past Medical History:   Diagnosis Date    Asthma  Bilateral ovarian cysts     Cocaine abuse (HCC)     Mental and behavioral problem     Pancreatitis     Pancreatitis     Psychosis ([facility] [address]      Past Surgical History:   Procedure Laterality Date    HX GYN      D&C, Hysterectomy    IR INSERT GASTROSTOMY TUBE PERC  10/1/2021     Barriers to Learning/Limitations: yes;  altered mental status (i.e.Sedation, Confusion)  Compensate with: visual, verbal, tactile, kinesthetic cues/model    Home Situation:      []  Right hand dominant   []  Left hand dominant    Cognitive/Behavioral Status:  Neurologic State: Alert  Orientation Level: Oriented X4  Cognition: Appropriate for age attention/concentration; Follows commands  Safety/Judgement: Decreased awareness of environment; Decreased awareness of need for assistance; Decreased awareness of need for safety; Decreased insight into deficits; Fall prevention    Skin: intact  Edema: none    Vision/Perceptual:    Tracking: Able to track stimulus in all quadrants w/o difficulty    Coordination: BUE  Coordination: Generally decreased, functional  Fine Motor Skills-Upper: Right Intact; Left Intact    Gross Motor Skills-Upper: Left Intact;  Right Intact  Balance:  Sitting: Intact  Sitting - Static: Good (unsupported)  Sitting - Dynamic: Good (unsupported)  Standing: Without support  Standing - Static: Good  Standing - Dynamic : Fair (-)  Strength: BUE  Strength: Generally decreased, functional  Tone & Sensation: BUE  Tone: Normal  Sensation: Intact  Range of Motion: BUE  AROM: Generally decreased, functional  Functional Mobility and Transfers for ADLs:  Bed Mobility:  Sit to Supine: Independent  Scooting: Independent  Transfers:  Sit to Stand: Stand-by assistance  Stand to Sit: Stand-by assistance  ADL Assessment:   Feeding: Minimum assistance    Oral Facial Hygiene/Grooming: Minimum assistance    Upper Body Dressing: Minimum assistance    Lower Body Dressing: Minimum assistance    Toileting: Minimum assistance  ADL Intervention:  Grooming  Grooming Assistance: Minimum assistance  Position Performed: Seated edge of bed  Washing Face: Minimum assistance (vc for thoroughness)    Cognitive Retraining  Safety/Judgement: Decreased awareness of environment; Decreased awareness of need for assistance; Decreased awareness of need for safety; Decreased insight into deficits; Fall prevention  Pain:  Pain level pre-treatment: 0/10   Pain level post-treatment: 0/10  Pain Intervention(s): Medication (see MAR); Rest, Ice, Repositioning   Response to intervention: Nurse notified, See doc flow    Activity Tolerance: fair     Please refer to the flowsheet for vital signs taken during this treatment. After treatment:   [] Patient left in no apparent distress sitting up in chair  [x] Patient left in no apparent distress in bed  [x] Call bell left within reach  [x] Nursing notified  [] Caregiver present  [] Bed alarm activated    COMMUNICATION/EDUCATION:   [x] Role of Occupational Therapy in the acute care setting  [] Home safety education was provided and the patient/caregiver indicated understanding. [] Patient/family have participated as able in goal setting and plan of care. [] Patient/family agree to work toward stated goals and plan of care. [] Patient understands intent and goals of therapy, but is neutral about his/her participation. [x] Patient is unable to participate in goal setting and plan of care.     Thank you for this referral.  Son Dodge, OTR/L  Time Calculation: 17 mins

## 2021-11-03 NOTE — INTERVAL H&P NOTE
Update History & Physical    The Patient's History and Physical of November 3,   The surgery was reviewed with the patient and I examined the patient. There was no change. The surgical site was confirmed by the patient and me. Plan:  The risk, benefits, expected outcome, and alternative to the recommended procedure have been discussed with the patient. Patient understands and wants to proceed with the procedure.     Electronically signed by Gustabo Lindsey MD on 11/3/2021 at 1:48 PM

## 2021-11-03 NOTE — ROUTINE PROCESS
Bedside and Verbal shift change report given to BIANCA Wong RN (oncoming nurse) by Marcos Hidalgo RN (offgoing nurse). Report included the following information SBAR, Kardex, Intake/Output, MAR and Recent Results.

## 2021-11-03 NOTE — OP NOTES
[facility]    Name:  Lauren Phelps  MR#:   [id number]  :  1980  ACCOUNT #:  [de-identified]  DATE OF SERVICE:  2021    PREOPERATIVE DIAGNOSIS:  Status post left wrist external fixation/distal radius wrist fracture. POSTOPERATIVE DIAGNOSIS:  Status post left wrist external fixation/distal radius wrist fracture. PROCEDURE PERFORMED:  Removal of left wrist external fixator with wound debridement. SURGEON:  Shahla Schroeder MD    ASSISTANT:  Rosario Guzmán PA-C    ANESTHESIA:  General.    COMPLICATIONS:  None. SPECIMENS REMOVED:  External fixator. IMPLANTS:  none. ESTIMATED BLOOD LOSS:  Minimal.    INDICATIONS:  This is a 54-year-old female status post a left distal radius external fixator by Dr. Sarita Patino from 2021. The patient was seen in consultation here by myself the late September with followup at that time to be scheduled with Dr. Guillermina Rivas. However, the patient has not been able to leave the hospital over the last month and a half and therefore was brought to the operating room today for removal of the external fixator due to some pin tract infection and the patient is at the appropriate time for removal.    PROCEDURE:  The patient was brought into the operating theater, and after adequate anesthesia, the left wrist was prepped and draped in the typical sterile fashion. The NDX external fixator system wrench was then used to loosen all nuts on the external fixation device. The entire device was then lifted off of the pins that have been placed previously. With the external fixation device, the remaining pins were left in position. There were 5 smooth pins and two threaded pins proximally. These 5 smooth pins could be removed by hand with really no resistance at this time. The 2 threaded pins that were proximal on the radius had to be removed with a drill, but there were some quite loose as well.   The most proximal one had obvious pin tract infection that tracked down the pin. The other pin sites looks good. I used a rongeur and debrided the soft tissue all the way down to the radius. I then put a curette into the radius bone and debrided both proximal and distal cortex on the dorsal side. The wound was then irrigated out with copious irrigations. The other pin sites were so closed that no intervention was necessary. The patient had about 45-degrees of palmar flexion and the same in dorsal flexion. Pronation and supination appeared pretty normal and radial and ulnar deviations were appropriate. She had some stiffness in her digits. This was relatively mild. The proximal wound was packed with a small amount of Iodoform gauze and the other one were addressed with just 4 x 4. The patient had cast padding applied with an Ace wrap and then the patient was returned to the recovery room awake and in stable condition. All instrument, sponge, and needle counts were correct.       Taylor Yee MD      [id number]  D:  11/03/2021 15:04  T:  11/03/2021 18:20  JOB #:  [id number]  CC:  Monica Cote MD

## 2021-11-03 NOTE — PERIOP NOTES
TRANSFER - IN REPORT:    Verbal report received from [employer] on Lyssa Virkam  being received from 3N for ordered procedure      Report consisted of patients Situation, Background, Assessment and   Recommendations(SBAR). Information from the following report(s) SBAR was reviewed with the receiving nurse. Opportunity for questions and clarification was provided. Assessment completed upon patients arrival to unit and care assumed.

## 2021-11-03 NOTE — H&P
History and Physical        Patient: Freedom Hallman               Sex: female          DOA: 9/11/2021         YOB: 1980      Age:  39 y.o.        LOS:  LOS: 52 days        HPI:     Freedom Hallman is a 39 y.o. female who has been seen for orthopedic evaluation status post external fixation of left distal radius fracture done by Dr. Lara Leavitt on 8/26/2021. We were initially consulted on 9/29/2021 and it was recommended that the patient follow-up outpatient with Dr. Lara Leavitt for removal of external fixator. Review of the chart patient initially sustained a fracture on 8/19/2021 was discharged from the ED and external fixation was performed by Dr. Lara Leavitt on 8/26/2021 as per the chart. Orthopedics was reconsulted yesterday as the patient has had a prolonged stay in the hospital.  Hospitalist noted some skin changes because of the external fixator. She does report pain at the wrist.  But she does report she has light touch and sensation throughout as she is able to nod her head. She is admitted for drug overdose and respiratory failure. Past Medical History:   Diagnosis Date    Asthma     Bilateral ovarian cysts     Cocaine abuse ([address]     Mental and behavioral problem     Pancreatitis     Pancreatitis     Psychosis ([address]        Past Surgical History:   Procedure Laterality Date    HX GYN      D&C, Hysterectomy    IR INSERT GASTROSTOMY TUBE PERC  10/1/2021       History reviewed. No pertinent family history. Social History     Socioeconomic History    Marital status:    Tobacco Use    Smoking status: Current Every Day Smoker     Packs/day: 1.00    Smokeless tobacco: Never Used   Substance and Sexual Activity    Alcohol use: Not Currently    Drug use: Not Currently     Types: Cocaine, Marijuana     Comment: used with in past 24 hours    Sexual activity: Not Currently       Prior to Admission medications    Medication Sig Start Date End Date Taking? Authorizing Provider   albuterol (PROVENTIL HFA, VENTOLIN HFA, PROAIR HFA) 90 mcg/actuation inhaler Take 2 Puffs by inhalation every four (4) hours as needed for Wheezing or Shortness of Breath. 8/4/21   Alexandre Duarte PA-C   ibuprofen (MOTRIN) 600 mg tablet Take 1 Tablet by mouth every six (6) hours as needed for Pain. Take with food. 8/4/21   Alexandre Duarte PA-C   ALPRAZolam (XANAX) 0.5 mg tablet TAKE 1 TABLET BY MOUTH ONCE DAILY AS NEEDED FOR ANXIETY 12/4/19   Provider, Historical   metFORMIN (GLUCOPHAGE) 500 mg tablet TAKE 1 TABLET BY MOUTH ONCE DAILY 11/12/19   Provider, Historical   nicotine (NICODERM CQ) 21 mg/24 hr APPLY 1 PATCH TOPICALLY TO THE SKIN DAILY FOR CRAVINGS AS DIRECTED 12/4/19   Provider, Historical   traZODone (DESYREL) 50 mg tablet TAKE 1 TABLET BY MOUTH AT BEDTIME AS NEEDED FOR INSOMNIA 12/4/19   Provider, Historical   escitalopram oxalate (LEXAPRO) 10 mg tablet Take 10 mg by mouth daily. Other, MD Luisa   lurasidone (LATUDA) 80 mg tab tablet Take 80 mg by mouth daily (with dinner). Other, MD Luisa       Allergies   Allergen Reactions    Fish Containing Products Itching    Toradol [Ketorolac] Rash     Pt reports she is not allergic to this medication. Review of Systems  Pertinent items are noted in the History of Present Illness. Physical Exam:      Visit Vitals  BP (!) 147/97   Pulse 100   Temp 98.9 °F (37.2 °C)   Resp 16   Ht 5' 2\" (1.575 m)   Wt 72.6 kg (160 lb 0.9 oz)   SpO2 100%   BMI 29.27 kg/m²       Physical Exam:  Left wrist with external fixator in place. Most proximal pin site has some slight exudate and erythema surrounding it. There is some skin breakdown at this pin site. Patient does report pain with motion. She is able to move all of her digits fully. She denies any numbness or tingling throughout the entirety of the hand. She has brisk capillary refill and strong radial pulses.       Assessment/Plan     Principal Problem:    Drug overdose, intentional self-harm, initial encounter ([facility] (9/12/2021)    Active Problems:    Left wrist fracture, with delayed healing, subsequent encounter (9/12/2021)      Hypoxia (9/12/2021)      Hypotension after procedure (9/12/2021)      Acute metabolic encephalopathy (1/21/3969)      Psychosis ([facility] ()      Hyponatremia ()      Acute respiratory failure with hypoxia (HCC) ()      Increased ammonia level (9/14/2021)      Hypokalemia (9/21/2021)      Status post tracheostomy ([facility] (9/28/2021)      Bacteremia due to Gram-positive bacteria (10/6/2021)      FELICITY (acute kidney injury) ([facility] (10/16/2021)        Plan is for removal of external fixator today. Patient is n.p.o. The risks associated with the procedure will be reviewed by Dr. Zac Locke preoperatively. Risks including pain, bleeding, infection, need for future surgery, DVT, PE, worsening arthritis amongst others were reviewed and questions were answered. Patient will be stable from Ortho perspective for discharge home once the procedure is done. Will defer to medical team for medical management. I spoke with Bonny Owusu PA-C with Dr. Saint Clair. They want and volar splint placed on the wrist post operatively and they would like to see her in follow up when she is discharged to manage the remainder of her care.

## 2021-11-03 NOTE — PERIOP NOTES
2 Aspen Keith made aware that SBAR is ready for review. Patient assigned room #358.  Adalgisa will be the nurse

## 2021-11-03 NOTE — PROGRESS NOTES
Assumed care of pt from [facility].  4130: Pt may come off continuous pulse ox since O2 saturation has been in the 96-98% range pt has no signs of distress since pulling trach out verbal order received from [facility],  that pt may travel without continuous O2 monitor.

## 2021-11-03 NOTE — PROGRESS NOTES
Hospitalist Progress Note    Patient: Alpha Severe MRN: [id number]  CSN: [id number]    YOB: 1980  Age: 39 y.o. Sex: female    DOA: 9/11/2021 LOS:  LOS: 52 days          Chief Complaint:    acute metabolic encephalopathy and lethargy. Admitted for likely gabapentin overdose. Assessment/Plan   Silvia Godlstein is a 39 y.o. female with long standing history of multidrug use/abuse, previous drug-seeking behavior and mental health issues otherwise unspecified arrives via EMS with acute metabolic encephalopathy and lethargy. Admitted for likely gabapentin overdose. She was intubated in ER, ct head no acute issue, LP done due to fever even on abx, no meningitis noted. she has peg and trach. she pulled out of her trach on oct 29, remained oxygen level good     Status post left wrist external fixation/distal radius wrist fracture -  Done at another facility with Ortho Dr. Yolanda Gonzalez yesterday for removal of exfix  S/p Removal of left wrist external fixator with wound debridement today  Wound care consult placed by ortho team   Maintain splint at all times  F/u with Dr. Saint Clair OP for further care       Acute respiratory failure with hypoxia -resolving   Intubated on 9/12    Trach on 9/20/21.  -pulled out on 10/29     MRSA on sputum cx now-completed  bactrim for total 5 days per peg tube      bacteremia -completed treatment     Anemia stable-will continue monitoring      Acute metabolic encephalopathy -resolved  Ct head no acute process   Continue Seroquel     Urinary retention -zamora      elevated ammonia level  Resolved, recheck ammonia level is good      Psychosis, hx of  Ekbom's delusional parasitosis    Appreciate Psychiatry input, Dr. Jarad Saxena   Recommendations:  Taper off diazepam, continue clonazepam 1 to 2 mg 3 times a day for now with gradual taper off with outpatient follow-up  Add hydroxyzine 50 to 100 mg 3 times a day  Continue quetiapine and increase to 600 at bedtime and continue 200 in the morning    On  Klonopin, Seroquel, haldol prn per psych recommendation       Peg feedings     Disposition : Remaining inpatient for coordination of safe DC plan. CM will discuss with family. Patient Active Problem List   Diagnosis Code    Dextromethorphan use disorder, moderate ([facility] F19.20    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    Left wrist fracture, with delayed healing, subsequent encounter S62.102G    Drug overdose, intentional self-harm, initial encounter ([address] T50.902A    Hypoxia R09.02    Hypotension after procedure I95.81    Acute metabolic encephalopathy P06.22    Psychosis ([address] F29    Hyponatremia E87.1    Acute respiratory failure with hypoxia ([facility] J96.01    Increased ammonia level R79.89    Hypokalemia E87.6    Status post tracheostomy ([address] Z93.0    Bacteremia due to Gram-positive bacteria R78.81    FELICITY (acute kidney injury) ([facility] N17.9       Subjective:    Pt getting ready to leave the floor for her surgery. Review of systems:    Constitutional: denies fevers, chills, myalgias  Respiratory: denies SOB, cough  Cardiovascular: denies chest pain, palpitations  Gastrointestinal: denies nausea, vomiting, diarrhea      Vital signs/Intake and Output:  Visit Vitals  BP 94/75   Pulse 81   Temp 98.3 °F (36.8 °C)   Resp 16   Ht 5' 2\" (1.575 m)   Wt 72.6 kg (160 lb 0.9 oz)   SpO2 98%   BMI 29.27 kg/m²     Current Shift:  No intake/output data recorded.   Last three shifts:  11/01 1901 - 11/03 0700  In: 2533   Out: 2650 [Urine:2650]    Exam:    General: Well developed, alert, NAD, OX3  Head/Neck: NCAT, supple, No masses, No lymphadenopathy  CVS:Regular rate and rhythm, no M/R/G, S1/S2 heard, no thrill  Lungs:Clear to auscultation bilaterally, no wheezes, rhonchi, or rales  Abdomen: Soft, Nontender, No distention, Normal Bowel sounds, No hepatomegaly  Extremities: Left arm, external fixator in place, increasing erythema and crusting drainage around device  Skin:normal texture and turgor, no rashes, no lesions  Neuro:grossly normal , follows commands  Psych: Intermittently confused                Labs: Results:       Chemistry Recent Labs     11/03/21 0300   *      K 4.2      CO2 27   BUN 18   CREA 0.71   CA 9.0   AGAP 5   BUCR 25*      CBC w/Diff Recent Labs     11/03/21 0300   WBC 5.8   RBC 2.99*   HGB 8.7*   HCT 26.8*         Cardiac Enzymes No results for input(s): CPK, CKND1, ZENON in the last 72 hours. No lab exists for component: CKRMB, TROIP   Coagulation Recent Labs     11/03/21 0300   PTP 13.1   INR 1.0       Lipid Panel No results found for: CHOL, CHOLPOCT, CHOLX, CHLST, CHOLV, 114388, HDL, HDLP, LDL, LDLC, DLDLP, 692380, VLDLC, VLDL, TGLX, TRIGL, TRIGP, TGLPOCT, CHHD, CHHDX   BNP No results for input(s): BNPP in the last 72 hours. Liver Enzymes No results for input(s): TP, ALB, TBIL, AP in the last 72 hours.     No lab exists for component: SGOT, GPT, DBIL   Thyroid Studies No results found for: T4, T3U, TSH, TSHEXT, TSHEXT     Procedures/imaging: see electronic medical records for all procedures/Xrays and details which were not copied into this note but were reviewed prior to creation of Mounika Sun MD

## 2021-11-03 NOTE — ROUTINE PROCESS
TRANSFER - OUT REPORT: 
 
Verbal report given to Ren Moncada RN(name) on Nicki Apo  being transferred to OR(unit) for ordered procedure Report consisted of patients Situation, Background, Assessment and  
Recommendations(SBAR). Information from the following report(s) SBAR, Kardex, Intake/Output and MAR was reviewed with the receiving nurse. Lines:  
Peripheral IV 11/01/21 Posterior;Right Hand (Active) Site Assessment Clean, dry, & intact 11/03/21 1114 Phlebitis Assessment 0 11/03/21 1114 Infiltration Assessment 0 11/03/21 1114 Dressing Status Clean, dry, & intact 11/03/21 1114 Dressing Type Transparent; Tape 11/03/21 1114 Hub Color/Line Status Blue; Patent; Flushed; Capped 11/03/21 1114 Action Taken Open ports on tubing capped 11/03/21 1114 Alcohol Cap Used Yes 11/03/21 1114 Opportunity for questions and clarification was provided. Patient transported with: 
 [employer]

## 2021-11-03 NOTE — PROGRESS NOTES
External fixation removed. Wound care consult placed. Patient should maintain the splint at all times as per Dr. Peña  team who we spoke with today. Patient is to follow up with Dr. Sana Hoang outpatient for further care. Patient is stable from ortho perspective for discharge today.

## 2021-11-03 NOTE — PROGRESS NOTES
Problem: Ventilator Management  Goal: *Adequate oxygenation and ventilation  Outcome: Progressing Towards Goal  Goal: *Patient maintains clear airway/free of aspiration  Outcome: Progressing Towards Goal  Goal: *Absence of infection signs and symptoms  Outcome: Progressing Towards Goal  Goal: *Normal spontaneous ventilation  Outcome: Progressing Towards Goal     Problem: Patient Education: Go to Patient Education Activity  Goal: Patient/Family Education  Outcome: Progressing Towards Goal     Problem: Non-Violent Restraints  Goal: Removal from restraints as soon as assessed to be safe  Outcome: Progressing Towards Goal  Goal: No harm/injury to patient while restraints in use  Outcome: Progressing Towards Goal  Goal: Patient's dignity will be maintained  Outcome: Progressing Towards Goal  Goal: Patient Interventions  Outcome: Progressing Towards Goal     Problem: Falls - Risk of  Goal: *Absence of Falls  Description: Document Earma Yasmany Fall Risk and appropriate interventions in the flowsheet. Outcome: Progressing Towards Goal  Note: Fall Risk Interventions:  Mobility Interventions: Bed/chair exit alarm, Patient to call before getting OOB    Mentation Interventions: Adequate sleep, hydration, pain control    Medication Interventions: Bed/chair exit alarm, Patient to call before getting OOB    Elimination Interventions: Call light in reach, Bed/chair exit alarm    History of Falls Interventions: Bed/chair exit alarm         Problem: Patient Education: Go to Patient Education Activity  Goal: Patient/Family Education  Outcome: Progressing Towards Goal     Problem: Risk for Spread of Infection  Goal: Prevent transmission of infectious organism to others  Description: Prevent the transmission of infectious organisms to other patients, staff members, and visitors.   Outcome: Progressing Towards Goal     Problem: Patient Education:  Go to Education Activity  Goal: Patient/Family Education  Outcome: Progressing Towards Goal Problem: Pressure Injury - Risk of  Goal: *Prevention of pressure injury  Description: Document Remy Scale and appropriate interventions in the flowsheet.   Outcome: Progressing Towards Goal  Note: Pressure Injury Interventions:  Sensory Interventions: Assess changes in LOC    Moisture Interventions: Internal/External urinary devices    Activity Interventions: Increase time out of bed, Pressure redistribution bed/mattress(bed type), PT/OT evaluation    Mobility Interventions: HOB 30 degrees or less, Pressure redistribution bed/mattress (bed type), PT/OT evaluation    Nutrition Interventions: Document food/fluid/supplement intake    Friction and Shear Interventions: HOB 30 degrees or less                Problem: Patient Education: Go to Patient Education Activity  Goal: Patient/Family Education  Outcome: Progressing Towards Goal     Problem: Patient Education: Go to Patient Education Activity  Goal: Patient/Family Education  Outcome: Progressing Towards Goal     Problem: Patient Education: Go to Patient Education Activity  Goal: Patient/Family Education  Outcome: Progressing Towards Goal     Problem: Patient Education: Go to Patient Education Activity  Goal: Patient/Family Education  Outcome: Progressing Towards Goal     Problem: Pain  Goal: *Control of Pain  Outcome: Progressing Towards Goal  Goal: *PALLIATIVE CARE:  Alleviation of Pain  Outcome: Progressing Towards Goal     Problem: Patient Education: Go to Patient Education Activity  Goal: Patient/Family Education  Outcome: Progressing Towards Goal     Problem: Patient Education: Go to Patient Education Activity  Goal: Patient/Family Education  Outcome: Progressing Towards Goal

## 2021-11-04 LAB
ANION GAP SERPL CALC-SCNC: 5 MMOL/L (ref 3–18)
BUN SERPL-MCNC: 16 MG/DL (ref 7–18)
BUN/CREAT SERPL: 24 (ref 12–20)
CALCIUM SERPL-MCNC: 9 MG/DL (ref 8.5–10.1)
CHLORIDE SERPL-SCNC: 109 MMOL/L (ref 100–111)
CO2 SERPL-SCNC: 27 MMOL/L (ref 21–32)
CREAT SERPL-MCNC: 0.66 MG/DL (ref 0.6–1.3)
GLUCOSE BLD STRIP.AUTO-MCNC: 101 MG/DL (ref 70–110)
GLUCOSE SERPL-MCNC: 118 MG/DL (ref 74–99)
POTASSIUM SERPL-SCNC: 4.2 MMOL/L (ref 3.5–5.5)
SODIUM SERPL-SCNC: 141 MMOL/L (ref 136–145)

## 2021-11-04 PROCEDURE — 94640 AIRWAY INHALATION TREATMENT: CPT

## 2021-11-04 PROCEDURE — 74011000250 HC RX REV CODE- 250: Performed by: HOSPITALIST

## 2021-11-04 PROCEDURE — 74011250637 HC RX REV CODE- 250/637: Performed by: INTERNAL MEDICINE

## 2021-11-04 PROCEDURE — 65660000000 HC RM CCU STEPDOWN

## 2021-11-04 PROCEDURE — 74011250637 HC RX REV CODE- 250/637: Performed by: FAMILY MEDICINE

## 2021-11-04 PROCEDURE — 74011250637 HC RX REV CODE- 250/637: Performed by: HOSPITALIST

## 2021-11-04 PROCEDURE — 36415 COLL VENOUS BLD VENIPUNCTURE: CPT

## 2021-11-04 PROCEDURE — 82962 GLUCOSE BLOOD TEST: CPT

## 2021-11-04 PROCEDURE — 92526 ORAL FUNCTION THERAPY: CPT

## 2021-11-04 PROCEDURE — 80048 BASIC METABOLIC PNL TOTAL CA: CPT

## 2021-11-04 PROCEDURE — 74011250636 HC RX REV CODE- 250/636: Performed by: PHYSICIAN ASSISTANT

## 2021-11-04 PROCEDURE — 74011000250 HC RX REV CODE- 250: Performed by: PHYSICIAN ASSISTANT

## 2021-11-04 PROCEDURE — 74011000250 HC RX REV CODE- 250: Performed by: INTERNAL MEDICINE

## 2021-11-04 PROCEDURE — 74011250636 HC RX REV CODE- 250/636: Performed by: FAMILY MEDICINE

## 2021-11-04 PROCEDURE — 74011250636 HC RX REV CODE- 250/636: Performed by: ORTHOPAEDIC SURGERY

## 2021-11-04 RX ORDER — DIAZEPAM 2 MG/1
2 TABLET ORAL
Status: DISCONTINUED | OUTPATIENT
Start: 2021-11-04 | End: 2021-11-10

## 2021-11-04 RX ORDER — DIAZEPAM 2 MG/1
2 TABLET ORAL 2 TIMES DAILY
Status: DISCONTINUED | OUTPATIENT
Start: 2021-11-04 | End: 2021-11-04

## 2021-11-04 RX ADMIN — FAMOTIDINE 20 MG: 20 TABLET ORAL at 22:33

## 2021-11-04 RX ADMIN — DIAZEPAM 2 MG: 2 TABLET ORAL at 09:45

## 2021-11-04 RX ADMIN — NYSTATIN: 100000 POWDER TOPICAL at 17:42

## 2021-11-04 RX ADMIN — CLONAZEPAM 1 MG: 0.5 TABLET ORAL at 17:41

## 2021-11-04 RX ADMIN — BUDESONIDE 500 MCG: 0.5 INHALANT RESPIRATORY (INHALATION) at 07:33

## 2021-11-04 RX ADMIN — HYDROMORPHONE HYDROCHLORIDE 1 MG: 2 INJECTION, SOLUTION INTRAMUSCULAR; INTRAVENOUS; SUBCUTANEOUS at 12:50

## 2021-11-04 RX ADMIN — ENOXAPARIN SODIUM 40 MG: 100 INJECTION SUBCUTANEOUS at 17:41

## 2021-11-04 RX ADMIN — Medication 5 MG: at 22:33

## 2021-11-04 RX ADMIN — QUETIAPINE FUMARATE 200 MG: 200 TABLET ORAL at 09:45

## 2021-11-04 RX ADMIN — HYDROMORPHONE HYDROCHLORIDE 1 MG: 2 INJECTION, SOLUTION INTRAMUSCULAR; INTRAVENOUS; SUBCUTANEOUS at 08:43

## 2021-11-04 RX ADMIN — CLONAZEPAM 1 MG: 0.5 TABLET ORAL at 09:45

## 2021-11-04 RX ADMIN — CLONAZEPAM 1 MG: 0.5 TABLET ORAL at 22:33

## 2021-11-04 RX ADMIN — CEFAZOLIN 2 G: 10 INJECTION, POWDER, FOR SOLUTION INTRAVENOUS at 09:44

## 2021-11-04 RX ADMIN — THIAMINE HCL TAB 100 MG 100 MG: 100 TAB at 09:45

## 2021-11-04 RX ADMIN — HYDROMORPHONE HYDROCHLORIDE 1 MG: 2 INJECTION, SOLUTION INTRAMUSCULAR; INTRAVENOUS; SUBCUTANEOUS at 22:33

## 2021-11-04 RX ADMIN — NYSTATIN 500000 UNITS: 100000 SUSPENSION ORAL at 09:46

## 2021-11-04 RX ADMIN — FAMOTIDINE 20 MG: 20 TABLET ORAL at 09:45

## 2021-11-04 RX ADMIN — QUETIAPINE FUMARATE 600 MG: 200 TABLET ORAL at 22:33

## 2021-11-04 RX ADMIN — NYSTATIN: 100000 POWDER TOPICAL at 09:46

## 2021-11-04 RX ADMIN — DIPHENHYDRAMINE HYDROCHLORIDE 12.5 MG: 25 SOLUTION ORAL at 09:53

## 2021-11-04 RX ADMIN — HYDROXYZINE HYDROCHLORIDE 50 MG: 25 TABLET, FILM COATED ORAL at 09:45

## 2021-11-04 RX ADMIN — 0.12% CHLORHEXIDINE GLUCONATE 10 ML: 1.2 RINSE ORAL at 09:46

## 2021-11-04 RX ADMIN — HYDROXYZINE HYDROCHLORIDE 50 MG: 25 TABLET, FILM COATED ORAL at 22:33

## 2021-11-04 RX ADMIN — Medication 1 TABLET: at 09:45

## 2021-11-04 RX ADMIN — HYDROMORPHONE HYDROCHLORIDE 1 MG: 2 INJECTION, SOLUTION INTRAMUSCULAR; INTRAVENOUS; SUBCUTANEOUS at 00:50

## 2021-11-04 RX ADMIN — HYDROXYZINE HYDROCHLORIDE 50 MG: 25 TABLET, FILM COATED ORAL at 17:41

## 2021-11-04 RX ADMIN — CEFAZOLIN 2 G: 10 INJECTION, POWDER, FOR SOLUTION INTRAVENOUS at 00:51

## 2021-11-04 RX ADMIN — ARFORMOTEROL TARTRATE 15 MCG: 15 SOLUTION RESPIRATORY (INHALATION) at 07:33

## 2021-11-04 RX ADMIN — HYDROMORPHONE HYDROCHLORIDE 1 MG: 2 INJECTION, SOLUTION INTRAMUSCULAR; INTRAVENOUS; SUBCUTANEOUS at 17:41

## 2021-11-04 RX ADMIN — DIAZEPAM 2 MG: 2 TABLET ORAL at 22:33

## 2021-11-04 NOTE — WOUND CARE
Wound care consulted. Daily dressing change orders are written for staff nurses. Wound care can be used as an resource if needed.

## 2021-11-04 NOTE — PROGRESS NOTES
Comprehensive Nutrition Assessment    Type and Reason for Visit: Reassess    Nutrition Recommendations/Plan: Continue current diet. Appropriate to discontinue tube feeds, as PO intake appears adequate. Will cancer order. Nutrition Assessment:  PMHx: drug use/abuse. Pt admitted for gabapentin overdose. Removed trach 10/29- left out b/c O2 saturation was adequate. S/P PEG 10/1. S/P removal of left wrist external fixator and wound debridement 11/3. Labs-glucose (118) H but reduced to 101 nect POC tests 3 hrs later. Last BM 11/3. Meds- pepcid, lactulose, MVI, Zofran, miralax daily prn, thiamine. Estimated Daily Nutrient Needs:  Energy (kcal): 1250 - 1500; Weight Used for Energy Requirements: Ideal  Protein (g): 73; Weight Used for Protein Requirements: Current (1g)  Fluid (ml/day): 8016-8439; Method Used for Fluid Requirements: 1 ml/kcal      Nutrition Related Findings:  (P) Tube feedings of Osmolite via PEG @ 55 ml/hr with 15 ml gastric residuals, Pt appears to be tolerating. Per SLP evaluation 11/02, pt was recommended \"Soft & bite-sized diet, thin liquids. \" Diet upgraded to Soft & bite-sized as of 11/4 @ [id number] (was briefly Soft and bite-sized 11/2 until pt began NPO for surgery). Per physician's note, \"Peg feedings to be discontinued as she is able to eat more, dysphagia diet pr speech. \" Spoke to pt- was stating she was still hungry. When offered Ensure, said \"I dont want to drink, I want to chew. \" pt and nurse in room with pt was calling in second lunch order when left. Appropriate to stop tube feedings, as PO intake appears adequate. Wounds:    Surgical incision (Left arm)       Current Nutrition Therapies:  ADULT TUBE FEEDING PEG; Standard without Fiber; Delivery Method: Continuous; Continuous Initial Rate (mL/hr): 45; Continuous Advance Tube Feeding: Yes; Advancement Volume (mL/hr): 10; Advancement Frequency: Q 6 hours; Continuous Goal Rate (mL/hr):. ..   ADULT DIET Dysphagia - Soft & Bite Sized    Anthropometric Measures:  Height:  5' 2\" (157.5 cm)  Current Body Wt:  72.6 kg (160 lb 0.9 oz) (10/20)   Admission Body Wt:  160 lb    Usual Body Wt:  72 kg (158 lb 11.7 oz)     Ideal Body Wt:  110 lbs:  145.5 %   BMI Category: Overweight (BMI 25.0-29. 9)       Nutrition Diagnosis:   (P) Overweight/obesity related to (P) excessive energy intake as evidenced by (P) BMI    Nutrition Interventions:   Food and/or Nutrient Delivery: (P) Continue current diet, Discontinue tube feeding (discontinue tube feeds, PO intake appears adequate)  Nutrition Education and Counseling: No recommendations at this time  Coordination of Nutrition Care: No recommendation at this time    Goals:  (P) Pt will achieve >50% PO intake throughout the next 2-4 days to meet nutrition needs.        Nutrition Monitoring and Evaluation:   Behavioral-Environmental Outcomes: None identified  Food/Nutrient Intake Outcomes: (P) Food and nutrient intake, Diet advancement/tolerance  Physical Signs/Symptoms Outcomes: Biochemical data, Meal time behavior, GI status, Weight, Skin    Discharge Planning:    Continue current diet     Electronically signed by Speedy Skelton RD on 11/4/2021 at 1:55 PM

## 2021-11-04 NOTE — PROGRESS NOTES
0710 Bedside and Verbal shift change report given to JORGE Valdes RN (oncoming nurse) by BIANCA Donnelly, KRISTINE (offgoing nurse). Report included the following information SBAR, Kardex, Intake/Output, and MAR. Pt in bed, call light in reach. 3532 Pt assessed. No signs of acute distress. Pt in bed. Pt in pain, given dilaudid    0945 Pt having itching under ace wrap on Left arm, benadryl given. 1131 Pt assessed. No signs of acute distress. Pt in bed. 1532 Pt assessed. No signs of acute distress. Pt in bed. 1910 Bedside and Verbal shift change report given to BIANCA Donnelly, KRISTINE (oncoming nurse) by Peter Valdes RN (offgoing nurse). Report included the following information SBAR, Kardex, Intake/Output and MAR. Pt in bed, call light in reach.

## 2021-11-04 NOTE — PROGRESS NOTES
1119: Pt eating lunch upon arrival and asking for her \"patch\"  Notified nurse, will follow up again after lunch for PT.    1211: 2nd attempt. Pt immediately asking for medicine and becoming hysterical.  Notified nurse.

## 2021-11-04 NOTE — PROGRESS NOTES
DC Plan: Home with [place]? Care manager noted that patient had external fixator removed yesterday; discussed with hospitalist that home may be a possible option given patient's baseline and progress - have placed call to patient's sister Walter Jacob [phone] to begin home discussion. 1630: Care manager met with patient and opened option of her going home - she called Walter Jacob and care manager discussed this as a possibility - per Walter Jacob, requesting to have meeting on Saturday afternoon with care manager to discuss further as she works tomorrow.   Care Management Interventions  Transition of Care Consult (CM Consult): Discharge Planning  The Plan for Transition of Care is Related to the Following Treatment Goals : intentional drug overdose  Discharge Location  Discharge Placement:  (TBD)

## 2021-11-04 NOTE — PROGRESS NOTES
Bedside and Verbal shift change report given to JORGE Zhang RN (oncoming nurse) by BIANCA Donnelly RN (offgoing nurse). Report included the following information SBAR, Kardex, Intake/Output, MAR and Recent Results.

## 2021-11-04 NOTE — PROGRESS NOTES
Hospitalist Progress Note    Patient: Alpha Severe MRN: [id number]  CSN: [id number]    YOB: 1980  Age: 39 y.o. Sex: female    DOA: 9/11/2021 LOS:  LOS: 48 days          Chief Complaint:          Assessment/Plan     Daryl dowling 39 y.o. female with long standing history of multidrug use/abuse, previous drug-seeking behavior and mental health issues otherwise unspecified arrives via EMS with acute metabolic encephalopathy and lethargy. Admitted for likely gabapentin overdose. She was intubated in ER, ct head no acute issue, LP done due to fever even on abx, no meningitis noted. she had peg and trach.   she pulled out her trachostomy on 10/29     Status post left wrist external fixation/distal radius wrist fracture -  Present on admission  S/p Removal of left wrist external fixator with wound debridement here 11/3  Wound care consult placed by ortho team   Maintain splint at all times  F/u with Dr. Alon Almonte OP for further care       Acute respiratory failure with hypoxia -resolved  Intubated on 9/12    Trach on 9/20/21.  -pulled out on 10/29  Doing well w/o trach     MRSA on sputum cx now-completed  bactrim for total 5 days per peg tube      bacteremia -completed treatment     Anemia stable-will continue monitoring      Acute metabolic encephalopathy -resolved  Ct head no acute process   Continue Seroquel     Urinary retention -zamora       Psychosis, hx of  Ekbom's delusional parasitosis    Appreciate Psychiatry input, Dr. 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, moderate [facility] F19.20    Ekbom's delusional parasitosis ([facility] F22    Left wrist fracture, with delayed healing, subsequent encounter S62.102G    Drug overdose, intentional self-harm, initial encounter ([facility] T50.902A    Hypoxia R09.02    Hypotension after procedure I95.81    Acute metabolic encephalopathy W79.61    Psychosis (HCC) F29    Hyponatremia E87.1    Acute respiratory failure with hypoxia (HCC) J96.01    Increased ammonia level R79.89    Hypokalemia E87.6    Status post tracheostomy ([facility] Z93.0    Bacteremia due to Gram-positive bacteria R78.81    FELICITY (acute kidney injury) ([facility] N17.9       Subjective:  Do you think I can go home soon? ?  Denies any new issue  Wants to eat  Denies pain except in wrist from yesterdays surgery      Review of systems:    Constitutional: denies fevers, chill  Respiratory: denies SOB, cough  Cardiovascular: denies chest pain, palpitations  Gastrointestinal: denies nausea, vomiting, diarrhea      Vital signs/Intake and Output:  Visit Vitals  /61   Pulse 89   Temp 97.8 °F (36.6 °C)   Resp 17   Ht 5' 2\" (1.575 m)   Wt 72.6 kg (160 lb 0.9 oz)   SpO2 91%   BMI 29.27 kg/m²     Current Shift:  No intake/output data recorded.   Last three shifts:  11/02 1901 - 11/04 0700  In: 1690 [I.V.:600]  Out: 2125 [Urine:2125]    Exam:    General: 38 yo WF, alert, NAD, OX3  Head/Neck: trach site covered  CVS:Regular rate and rhythm, no M/R/G, S1/S2 heard, no thrill  Lungs:Clear to auscultation bilaterally, no wheezes, rhonchi, or rales  Abdomen: Soft, Nontender, No distention, Normal Bowel sounds  Extremities: No C/C/E, pulses palpable 2+  Left wrist dressed in splint  Neuro:grossly normal , follows commands  Psych:appropriate                Labs: Results:       Chemistry Recent Labs     11/04/21  0335 11/03/21  0300   * 134*    138   K 4.2 4.2    106   CO2 27 27   BUN 16 18   CREA 0.66 0.71   CA 9.0 9.0   AGAP 5 5   BUCR 24* 25*      CBC w/Diff Recent Labs     11/03/21  0300   WBC 5.8   RBC 2.99*   HGB 8.7*   HCT 26.8*         Cardiac Enzymes No results for input(s): CPK, CKND1, ZENON in the last 72 hours. No lab exists for component: CKRMB, TROIP   Coagulation Recent Labs     11/03/21  0300   PTP 13.1   INR 1.0       Lipid Panel No results found for: CHOL, CHOLPOCT, CHOLX, CHLST, CHOLV, 368198, HDL, HDLP, LDL, LDLC, DLDLP, 804281, VLDLC, VLDL, TGLX, TRIGL, TRIGP, TGLPOCT, CHHD, CHHDX   BNP No results for input(s): BNPP in the last 72 hours. Liver Enzymes No results for input(s): TP, ALB, TBIL, AP in the last 72 hours.     No lab exists for component: SGOT, GPT, DBIL   Thyroid Studies No results found for: T4, T3U, TSH, TSHEXT     Procedures/imaging: see electronic medical records for all procedures/Xrays and details which were not copied into this note but were reviewed prior to creation of Francine Glover MD

## 2021-11-05 LAB
ANION GAP SERPL CALC-SCNC: 6 MMOL/L (ref 3–18)
BUN SERPL-MCNC: 13 MG/DL (ref 7–18)
BUN/CREAT SERPL: 22 (ref 12–20)
CALCIUM SERPL-MCNC: 9.5 MG/DL (ref 8.5–10.1)
CHLORIDE SERPL-SCNC: 107 MMOL/L (ref 100–111)
CO2 SERPL-SCNC: 27 MMOL/L (ref 21–32)
CREAT SERPL-MCNC: 0.58 MG/DL (ref 0.6–1.3)
GLUCOSE SERPL-MCNC: 94 MG/DL (ref 74–99)
POTASSIUM SERPL-SCNC: 4.2 MMOL/L (ref 3.5–5.5)
SODIUM SERPL-SCNC: 140 MMOL/L (ref 136–145)

## 2021-11-05 PROCEDURE — 74011250637 HC RX REV CODE- 250/637: Performed by: HOSPITALIST

## 2021-11-05 PROCEDURE — 74011250637 HC RX REV CODE- 250/637: Performed by: FAMILY MEDICINE

## 2021-11-05 PROCEDURE — 80048 BASIC METABOLIC PNL TOTAL CA: CPT

## 2021-11-05 PROCEDURE — 74011250637 HC RX REV CODE- 250/637: Performed by: INTERNAL MEDICINE

## 2021-11-05 PROCEDURE — 92526 ORAL FUNCTION THERAPY: CPT

## 2021-11-05 PROCEDURE — 74011250636 HC RX REV CODE- 250/636: Performed by: ORTHOPAEDIC SURGERY

## 2021-11-05 PROCEDURE — 74011000250 HC RX REV CODE- 250: Performed by: INTERNAL MEDICINE

## 2021-11-05 PROCEDURE — 97530 THERAPEUTIC ACTIVITIES: CPT

## 2021-11-05 PROCEDURE — 97116 GAIT TRAINING THERAPY: CPT

## 2021-11-05 PROCEDURE — 94640 AIRWAY INHALATION TREATMENT: CPT

## 2021-11-05 PROCEDURE — 65660000000 HC RM CCU STEPDOWN

## 2021-11-05 PROCEDURE — 36415 COLL VENOUS BLD VENIPUNCTURE: CPT

## 2021-11-05 PROCEDURE — 74011250636 HC RX REV CODE- 250/636: Performed by: INTERNAL MEDICINE

## 2021-11-05 PROCEDURE — 74011250636 HC RX REV CODE- 250/636: Performed by: FAMILY MEDICINE

## 2021-11-05 RX ORDER — OXYCODONE AND ACETAMINOPHEN 5; 325 MG/1; MG/1
1 TABLET ORAL
Status: DISCONTINUED | OUTPATIENT
Start: 2021-11-05 | End: 2021-11-10

## 2021-11-05 RX ORDER — FAMOTIDINE 20 MG/1
20 TABLET, FILM COATED ORAL DAILY
Status: DISCONTINUED | OUTPATIENT
Start: 2021-11-06 | End: 2021-11-13 | Stop reason: HOSPADM

## 2021-11-05 RX ADMIN — QUETIAPINE FUMARATE 600 MG: 200 TABLET ORAL at 20:22

## 2021-11-05 RX ADMIN — DIPHENHYDRAMINE HYDROCHLORIDE 12.5 MG: 25 SOLUTION ORAL at 15:22

## 2021-11-05 RX ADMIN — ENOXAPARIN SODIUM 40 MG: 100 INJECTION SUBCUTANEOUS at 18:55

## 2021-11-05 RX ADMIN — HYDROMORPHONE HYDROCHLORIDE 1 MG: 2 INJECTION, SOLUTION INTRAMUSCULAR; INTRAVENOUS; SUBCUTANEOUS at 05:59

## 2021-11-05 RX ADMIN — HYDROXYZINE HYDROCHLORIDE 50 MG: 25 TABLET, FILM COATED ORAL at 15:22

## 2021-11-05 RX ADMIN — CLONAZEPAM 1 MG: 0.5 TABLET ORAL at 09:30

## 2021-11-05 RX ADMIN — NYSTATIN: 100000 POWDER TOPICAL at 09:31

## 2021-11-05 RX ADMIN — HYDROXYZINE HYDROCHLORIDE 50 MG: 25 TABLET, FILM COATED ORAL at 09:30

## 2021-11-05 RX ADMIN — HYDROXYZINE HYDROCHLORIDE 50 MG: 25 TABLET, FILM COATED ORAL at 21:22

## 2021-11-05 RX ADMIN — NYSTATIN: 100000 POWDER TOPICAL at 18:56

## 2021-11-05 RX ADMIN — OXYCODONE AND ACETAMINOPHEN 1 TABLET: 5; 325 TABLET ORAL at 20:22

## 2021-11-05 RX ADMIN — DIAZEPAM 2 MG: 2 TABLET ORAL at 11:29

## 2021-11-05 RX ADMIN — HYDROMORPHONE HYDROCHLORIDE 1 MG: 2 INJECTION, SOLUTION INTRAMUSCULAR; INTRAVENOUS; SUBCUTANEOUS at 11:29

## 2021-11-05 RX ADMIN — ARFORMOTEROL TARTRATE 15 MCG: 15 SOLUTION RESPIRATORY (INHALATION) at 08:45

## 2021-11-05 RX ADMIN — Medication 5 MG: at 21:22

## 2021-11-05 RX ADMIN — OXYCODONE AND ACETAMINOPHEN 1 TABLET: 5; 325 TABLET ORAL at 15:23

## 2021-11-05 RX ADMIN — CLONAZEPAM 1 MG: 0.5 TABLET ORAL at 15:22

## 2021-11-05 RX ADMIN — BUDESONIDE 500 MCG: 0.5 INHALANT RESPIRATORY (INHALATION) at 08:45

## 2021-11-05 RX ADMIN — CLONAZEPAM 1 MG: 0.5 TABLET ORAL at 21:22

## 2021-11-05 RX ADMIN — QUETIAPINE FUMARATE 200 MG: 200 TABLET ORAL at 09:30

## 2021-11-05 RX ADMIN — LORAZEPAM 2 MG: 2 INJECTION INTRAMUSCULAR at 17:39

## 2021-11-05 RX ADMIN — Medication 1 TABLET: at 09:30

## 2021-11-05 NOTE — PROGRESS NOTES
Occupational Therapy Treatment Attempt     Chart reviewed. Attempted Occupational Therapy Treatment, however, patient unable to be seen due to:  []  Nausea/vomiting  []  Eating  []  Pain  []  Patient too lethargic  []  Off Unit for testing/procedure  []  Dialysis treatment in progress  []  Telemetry Results  [x]  Other:  Pt getting anxious and asking for medications as soon as this therapist walks into the room     Will f/u later as patient's schedule allows.   Tere Liu, OTR/L

## 2021-11-05 NOTE — PROGRESS NOTES
Problem: Dysphagia (Adult)  Goal: *Acute Goals and Plan of Care (Insert Text)  Description: Patient will:  1. Tolerate soft & bite-sized diet with thin liquids without overt s/sx of aspiration under SLP supervision. 2. Tolerate diet upgrade without overt s/sx of aspiration under SLP supervision. 3. Utilize compensatory swallow strategies of small bite/sip, alternate liquid/solid with min cues in 4/5 trials. Rec:   Soft & bite-sized diet, thin liquids   Aspiration precautions  HOB >45 during po intake, remain >30 for 30-45 minutes after po   Small bites/sips; alternate liquid/solid, slow feeding rate   Oral care TID  Meds crushed in applesauce    Outcome: Progressing Towards Goal     SPEECH [address] THERAPY    Patient: Mike Warner (56 y.o. female)  Date: 11/5/2021  Primary Diagnosis: Drug overdose, intentional self-harm, initial encounter ([facility] Arthurine Deem  Left wrist fracture, with delayed healing, subsequent encounter [S62.102G]  Procedure(s) (LRB):  LEFT WRIST OPEN REDUCTION INTERNAL FIXATION. REMOVAL OF X-FIX WITH C-ARM. REP [facility]  (Left) 2 Days Post-Op   Precautions: aspiration  Fall, Contact    PLOF: Per H&P    ASSESSMENT :  Follow up for diagnostic dysphagia therapy this am, patient agreeable, and stating she is glad to eat and chew. Patient presents with mild oral dysphagia. Oral motor structures, strength, and ROM WFL; grossly intact for mastication and deglutition. Functional communication. Intelligibility >90%. Pt self-feeding PO trials of thin liquid via straw and tandem drinking, puree, mixed, and regular textures. Mildly prolonged mastication and AP transit with regular texture, though cleared adequately given time. No s/sx of aspiration appreciated across consistencies. Swallow appears timely, adequate laryngeal elevation noted via palpation, no change in vocal/resp quality appreciated.   Recommend soft & bite-sized texture diet with thin liquids, meds crushed in applesauce. Pt consistently consuming adequate nutrition PO, and RD recommends d/c tube feeds. Pt educated on and verbalized understanding of findings, recs, and POC; d/w RN. Will continue to follow for diet tolerance/advancement as appropriate. Patient will benefit from skilled intervention to address the above impairments. Patient's rehabilitation potential is considered to be Good  Factors which may influence rehabilitation potential include:   []            None noted  []            Mental ability/status  []            Medical condition  []            Home/family situation and support systems  [x]            Safety awareness  []            Pain tolerance/management  []            Other:      PLAN :  Recommendations and Planned Interventions:  See above. Frequency/Duration: Patient will be followed by speech-language pathology 1-2 times per day/2-3 days per week to address goals. Discharge Recommendations: To Be Determined     SUBJECTIVE:   Patient stated I'm ready to eat now. \"    OBJECTIVE:     Past Medical History:   Diagnosis Date    Asthma     Bilateral ovarian cysts     Cocaine abuse ([facility]     Mental and behavioral problem     Pancreatitis     Pancreatitis     Psychosis ([facility]      Past Surgical History:   Procedure Laterality Date    HX GYN      D&C, Hysterectomy    IR INSERT GASTROSTOMY TUBE PERC  10/1/2021     Diet prior to admission: unknown  Current Diet:  soft & bite-sized, thin     Cognitive and Communication Status:  Neurologic State: Alert  Orientation Level: Oriented X4  Cognition: Impulsive, Follows commands, Poor safety awareness, Decreased attention/concentration  Perception: Appears intact  Perseveration: No perseveration noted  Safety/Judgement: Decreased awareness of environment, Decreased awareness of need for assistance, Decreased awareness of need for safety, Decreased insight into deficits, Fall prevention    PAIN:  Pain level pre-treatment: 0/10   Pain level post-treatment: 0/10     After treatment:   []            Patient left in no apparent distress sitting up in chair  [x]            Patient left in no apparent distress in bed  [x]            Call bell left within reach  [x]            Nursing notified  []            Family present  []            Caregiver present  []            Bed alarm activated    COMMUNICATION/EDUCATION:   [x]            Aspiration precautions; swallow safety; compensatory techniques. [x]            Patient/family have participated as able in goal setting and plan of care. [x]            Patient/family agree to work toward stated goals and plan of care. []            Patient understands intent and goals of therapy; neutral about participation. []            Patient unable to participate in goal setting/plan of care; educ ongoing with interdisciplinary staff  []         Posted safety precautions in patient's room. Thank you for this referral.    JORGE ShelbyEd, [id number] [facility]  Speech-Language Pathologist    Time Calculation: 10 mins

## 2021-11-05 NOTE — PROGRESS NOTES
Problem: Mobility Impaired (Adult and Pediatric)  Goal: *Acute Goals and Plan of Care (Insert Text)  Description: Physical Therapy Goals  Initiated 10/19/2021 and to be accomplished within 7 day(s)  1. Patient will move from supine to sit and sit to supine  in bed with supervision/set-up. 2.  Patient will transfer from bed to chair and chair to bed with minimal assistance/contact guard assist using the least restrictive device. 3.  Patient will perform sit to stand with minimal assistance/contact guard assist.  4.  Patient will ambulate with minimal assistance/contact guard assist for 20 feet with the least restrictive device. PLOF: pt was independent with mobility without an assistive device     Outcome: Progressing Towards Goal     physical Therapy TREATMENT    Patient: Jose Kunz (17 y.o. female)  Date: 11/5/2021  Diagnosis: Drug overdose, intentional self-harm, initial encounter [facility] [place]   Left wrist fracture, with delayed healing, subsequent encounter [S62.102G]   Drug overdose, intentional self-harm, initial encounter ([facility]  Procedure(s) (LRB):  LEFT WRIST OPEN REDUCTION INTERNAL FIXATION. REMOVAL OF X-FIX WITH C-ARM. REP [facility]  (Left) 2 Days Post-Op  Precautions: Fall, Contact   Chart, physical therapy assessment, plan of care and goals were reviewed. ASSESSMENT:  Pt is now very mobile, but has occasional LOB and path deviations. Pt now expressing dysfunction of R hand movement and strength. AAROM provided. Safety is mostly limited by mentation and perseveration on excessive pharmaceutical use. Current PT goals have been exceeded. Re-evaluation will occur to assess need adjustment, or D/c of PT services.       Progression toward goals:  [x]      Improving appropriately and progressing toward goals  []      Improving slowly and progressing toward goals  []      Not making progress toward goals and plan of care will be adjusted     PLAN:  Patient continues to benefit from skilled intervention to address the above impairments. Continue treatment per established plan of care. Discharge Recommendations:  [place] and 24 hr supervision   Further Equipment Recommendations for Discharge:  N/A     SUBJECTIVE:   Patient stated I need the Dilaudid back! Tell her I just want a little. .. Just a little. ..    OBJECTIVE DATA SUMMARY:   Critical Behavior:  Neurologic State: Alert  Orientation Level: Oriented X4  Cognition: Impulsive, Follows commands  Safety/Judgement: Decreased awareness of environment, Decreased awareness of need for assistance, Decreased awareness of need for safety, Decreased insight into deficits, Fall prevention  Functional Mobility Training:  Bed Mobility:  Rolling: Independent  Supine to Sit: Independent  Sit to Supine: Independent  Scooting: Independent  Transfers:  Sit to Stand: Stand-by assistance  Stand to Sit: Stand-by assistance  Balance:  Sitting: Intact  Sitting - Static: Good (unsupported)  Sitting - Dynamic: Good (unsupported)  Standing: With support  Standing - Static: Good  Standing - Dynamic : Fair  Ambulation/Gait Training:  Distance (ft): 350 Feet (ft)  Assistive Device: Gait belt (HHA)  Ambulation - Level of Assistance: Minimal assistance  Gait Abnormalities: Decreased step clearance  Base of Support: Widened  Step Length: Right shortened; Left shortened  Therapeutic Exercises:   R digit, wrist, forearm, elbow and    Pain:  Pain Scale 1: Numeric (0 - 10)  Pain Intensity 1: 8  Pain out: 8  Pain Location 1: Arm  Pain Orientation 1: Left  Activity Tolerance:   Fair+  Please refer to the flowsheet for vital signs taken during this treatment.   After treatment:   [] Patient left in no apparent distress sitting up in chair  [x] Patient left in no apparent distress in bed  [x] Call bell left within reach  [x] Nursing notified  [] Caregiver present  [] Bed alarm activated      Anitra Yamilex, PTA   Time Calculation: 27 mins

## 2021-11-05 NOTE — PROGRESS NOTES
0715 Bedside and Verbal shift change report given to JORGE Chin RN (oncoming nurse) by BIANCA Donnelly RN (offgoing nurse). Report included the following information SBAR, Kardex, Intake/Output, and MAR. Pt in bed, call light in reach. 0745 Pt assessed. No signs of acute distress. Pt in bed. 1130 Pt assessed. No signs of acute distress. Pt in bed. Valium given for anxiety    1522 Pt assessed. No signs of acute distress. Pt in bed. Benadryl given for itching. Percocet given for pain. 1739 ativan given for anxiety    1930 Bedside and Verbal shift change report given to CHINA Garcia RN (oncoming nurse) by Florecita Gutiérrez. Rashaad Chin RN (offgoing nurse). Report included the following information SBAR, Kardex, Intake/Output, MAR, and Recent Results. Pt in bed, call light in reach.

## 2021-11-05 NOTE — PROGRESS NOTES
Hospitalist Progress Note    Patient: Linn Pearce MRN: [id number]  CSN: [id number]    YOB: 1980  Age: 39 y.o. Sex: female    DOA: 9/11/2021 LOS:  LOS: 47 days          Chief Complaint:    resp failure      Assessment/Plan     Margot Goldstein is a 39 y.o. female with long standing history of multidrug use/abuse, previous drug-seeking behavior and mental health issues admitted for acute metabolic encephalopathy and lethargy, likely gabapentin overdose. She was intubated in ER, ct head no acute issue, LP done due to fever even on abx, no meningitis noted. she had peg and trach placed while in ICU.  She pulled out her tracheostomy on 10/29     Status post left wrist external fixation/distal radius wrist fracture -  Present on admission  S/p Removal of left wrist external fixator with wound debridement here 11/3  Wound care consult placed by ortho team   Maintain splint at all times  F/u with Dr. Joy Santo OP for further care       Acute respiratory failure with hypoxia -resolved  Intubated on 9/12    Trach on 9/20/21.  -pulled out on 10/29  Doing well w/o trach     MRSA on sputum cx now-completed  bactrim for total 5 days per peg tube      bacteremia -completed treatment     Anemia stable     Acute metabolic encephalopathy -resolved  Ct head no acute process   Continue Seroquel     Urinary retention -zamora trial out today    Dysphagia, remove peg soon likely as she improves      Psychosis, hx of  Ekbom's delusional parasitosis    Appreciate Psychiatry input, Dr. Parviz Wilks   continue clonazepam 1 to 2 mg 3 times a day for now with gradual taper off with outpatient follow-up  hydroxyzine 50 to 100 mg 3 times a day  Continue quetiapine and increase to 600 at bedtime and continue 200 in the morning     On  Klonopin, Seroquel  per psych recommendation       Peg feedings to be discontinued as she is able to eat more, dysphagia diet per speech     Move towards discharge shortly, patient motivated to go home  Stop IV narcotics, use PO as needed for severe pain only      Disposition :  Patient Active Problem List   Diagnosis Code    Dextromethorphan use disorder, moderate ([facility] F19.20    Ekbom's delusional parasitosis ([facility] F22    Left wrist fracture, with delayed healing, subsequent encounter S62.102G    Drug overdose, intentional self-harm, initial encounter [facility] T50.902A    Hypoxia R09.02    Hypotension after procedure I95.81    Acute metabolic encephalopathy K34.73    Psychosis ([facility]) F29    Hyponatremia E87.1    Acute respiratory failure with hypoxia ([facility]) J96.01    Increased ammonia level R79.89    Hypokalemia E87.6    Status post tracheostomy ([facility] Z93.0    Bacteremia due to Gram-positive bacteria R78.81    FELICITY (acute kidney injury) ([facility] N17.9       Subjective:    I need pain medicine! My hand hurts! No new issues reported  Sitter in room    Review of systems:    Constitutional: denies fevers  Respiratory: denies SOB  Cardiovascular: denies chest pain  Gastrointestinal: denies nausea, vomiting, diarrhea      Vital signs/Intake and Output:  Visit Vitals  /62   Pulse 82   Temp 97.8 °F (36.6 °C)   Resp 18   Ht 5' 2\" (1.575 m)   Wt 72.6 kg (160 lb 0.9 oz)   SpO2 92%   BMI 29.27 kg/m²     Current Shift:  No intake/output data recorded.   Last three shifts:  11/03 1901 - 11/05 0700  In: 780   Out: 2875 [Urine:2875]    Exam:    General: Well developed, alert, NAD, OX3  CVS:Regular rate and rhythm, no M/R/G, S1/S2 heard, no thrill  Lungs:Clear to auscultation bilaterally, no wheezes, rhonchi, or rales  Abdomen: Soft, Nontender, No distention, Normal Bowel sounds  Extremities: No C/C/E, pulses palpable 2+  Neuro:grossly normal , follows commands  Psych:appropriate                Labs: Results:       Chemistry Recent Labs     11/05/21  0200 11/04/21  0335 11/03/21  0300   GLU 94 118* 134*    141 138   K 4.2 4.2 4.2    109 106   CO2 27 27 27   BUN 13 16 18   CREA 0.58* 0.66 0.71   CA 9.5 9.0 9.0   AGAP 6 5 5   BUCR 22* 24* 25*      CBC w/Diff Recent Labs     11/03/21  0300   WBC 5.8   RBC 2.99*   HGB 8.7*   HCT 26.8*         Cardiac Enzymes No results for input(s): CPK, CKND1, ZENON in the last 72 hours. No lab exists for component: CKRMB, TROIP   Coagulation Recent Labs     11/03/21  0300   PTP 13.1   INR 1.0       Lipid Panel No results found for: CHOL, CHOLPOCT, CHOLX, CHLST, CHOLV, 916785, HDL, HDLP, LDL, LDLC, DLDLP, 723563, VLDLC, VLDL, TGLX, TRIGL, TRIGP, TGLPOCT, CHHD, CHHDX   BNP No results for input(s): BNPP in the last 72 hours. Liver Enzymes No results for input(s): TP, ALB, TBIL, AP in the last 72 hours.     No lab exists for component: SGOT, GPT, DBIL   Thyroid Studies No results found for: T4, T3U, TSH, TSHEXT     Procedures/imaging: see electronic medical records for all procedures/Xrays and details which were not copied into this note but were reviewed prior to creation of Kam Ma MD

## 2021-11-05 NOTE — PROGRESS NOTES
Bedside and Verbal shift change report given to JORGE Yun RN (oncoming nurse) by BIANCA Donnelly RN (offgoing nurse). Report included the following information SBAR, Kardex, Intake/Output, MAR and Recent Results.

## 2021-11-06 ENCOUNTER — APPOINTMENT (OUTPATIENT)
Dept: GENERAL RADIOLOGY | Age: 41
DRG: 004 | End: 2021-11-06
Attending: INTERNAL MEDICINE
Payer: MEDICAID

## 2021-11-06 ENCOUNTER — APPOINTMENT (OUTPATIENT)
Dept: CT IMAGING | Age: 41
DRG: 004 | End: 2021-11-06
Attending: INTERNAL MEDICINE
Payer: MEDICAID

## 2021-11-06 PROCEDURE — 73090 X-RAY EXAM OF FOREARM: CPT

## 2021-11-06 PROCEDURE — 74011250636 HC RX REV CODE- 250/636: Performed by: FAMILY MEDICINE

## 2021-11-06 PROCEDURE — 97116 GAIT TRAINING THERAPY: CPT

## 2021-11-06 PROCEDURE — 74011250637 HC RX REV CODE- 250/637: Performed by: INTERNAL MEDICINE

## 2021-11-06 PROCEDURE — 74011250637 HC RX REV CODE- 250/637: Performed by: HOSPITALIST

## 2021-11-06 PROCEDURE — 51798 US URINE CAPACITY MEASURE: CPT

## 2021-11-06 PROCEDURE — 70450 CT HEAD/BRAIN W/O DYE: CPT

## 2021-11-06 PROCEDURE — 73060 X-RAY EXAM OF HUMERUS: CPT

## 2021-11-06 PROCEDURE — 65660000000 HC RM CCU STEPDOWN

## 2021-11-06 PROCEDURE — 77030018836 HC SOL IRR NACL ICUM -A

## 2021-11-06 PROCEDURE — 74011250637 HC RX REV CODE- 250/637: Performed by: FAMILY MEDICINE

## 2021-11-06 RX ADMIN — CLONAZEPAM 1 MG: 0.5 TABLET ORAL at 09:35

## 2021-11-06 RX ADMIN — OXYCODONE AND ACETAMINOPHEN 1 TABLET: 5; 325 TABLET ORAL at 13:40

## 2021-11-06 RX ADMIN — FAMOTIDINE 20 MG: 20 TABLET ORAL at 09:36

## 2021-11-06 RX ADMIN — ACETAMINOPHEN 650 MG: 325 TABLET ORAL at 20:28

## 2021-11-06 RX ADMIN — DIAZEPAM 2 MG: 2 TABLET ORAL at 14:28

## 2021-11-06 RX ADMIN — HYDROXYZINE HYDROCHLORIDE 50 MG: 25 TABLET, FILM COATED ORAL at 09:35

## 2021-11-06 RX ADMIN — Medication 1 TABLET: at 09:36

## 2021-11-06 RX ADMIN — OXYCODONE AND ACETAMINOPHEN 1 TABLET: 5; 325 TABLET ORAL at 17:44

## 2021-11-06 RX ADMIN — DIAZEPAM 2 MG: 2 TABLET ORAL at 18:57

## 2021-11-06 RX ADMIN — HYDROXYZINE HYDROCHLORIDE 50 MG: 25 TABLET, FILM COATED ORAL at 17:06

## 2021-11-06 RX ADMIN — QUETIAPINE FUMARATE 600 MG: 200 TABLET ORAL at 20:28

## 2021-11-06 RX ADMIN — QUETIAPINE FUMARATE 200 MG: 200 TABLET ORAL at 09:34

## 2021-11-06 RX ADMIN — OXYCODONE AND ACETAMINOPHEN 1 TABLET: 5; 325 TABLET ORAL at 09:57

## 2021-11-06 RX ADMIN — NYSTATIN: 100000 POWDER TOPICAL at 22:00

## 2021-11-06 RX ADMIN — CLONAZEPAM 1 MG: 0.5 TABLET ORAL at 17:00

## 2021-11-06 RX ADMIN — ENOXAPARIN SODIUM 40 MG: 100 INJECTION SUBCUTANEOUS at 17:01

## 2021-11-06 RX ADMIN — NYSTATIN: 100000 POWDER TOPICAL at 09:42

## 2021-11-06 RX ADMIN — NYSTATIN: 100000 POWDER TOPICAL at 17:07

## 2021-11-06 NOTE — PROGRESS NOTES
2000 Assumed patient care at this time. Report received from Sidra Garcia RN.   2022 PRN Percocet administered for pain. Administered crushed in apple sauce. Patient initially refused crushed pills, stating they make her nauseous. Tolerated fine. 2025 Assessment completed. 2029 PRN Ativan pulled too soon, will waste in [facility]. 2100 Patient ate apple sauce and pudding. Tolerated well. 25% of turkey TV dinner. 2524 Bedside and verbal shift change report given to [address] [place] [address] (oncoming nurse) by Shyam Bush RN (offgoing nurse). Report included the following information: SBAR, Kardex, MAR, and recent results.

## 2021-11-06 NOTE — PROGRESS NOTES
Problem: Mobility Impaired (Adult and Pediatric)  Goal: *Acute Goals and Plan of Care (Insert Text)  Description: Physical Therapy Goals  Initiated 10/19/2021 and to be accomplished within 7 day(s)  1. Patient will move from supine to sit and sit to supine  in bed with supervision/set-up. 2.  Patient will transfer from bed to chair and chair to bed with minimal assistance/contact guard assist using the least restrictive device. 3.  Patient will perform sit to stand with minimal assistance/contact guard assist.  4.  Patient will ambulate with minimal assistance/contact guard assist for 20 feet with the least restrictive device. PLOF: pt was independent with mobility without an assistive device     Outcome: Progressing Towards Goal   PHYSICAL THERAPY TREATMENT    Patient: Hayley Tovar (14 y.o. female)  Date: 11/6/2021  Diagnosis: Drug overdose, intentional self-harm, [facility] Jones  Left wrist fracture, with delayed healing, subsequent encounter [S62.102G]   Drug overdose, intentional self-harm, initial encounter ([facility]  Procedure(s) (LRB):  LEFT WRIST OPEN REDUCTION INTERNAL FIXATION. REMOVAL OF X-FIX WITH C-ARM. REP [facility] hospital  (Left) 3 Days Post-Op  Precautions: Fall, Contact  PLOF: independent, ambulatory without AD, drug abuse, spouse incarcerated    ASSESSMENT:  Pt asking for anxiety medication incessantly. Immediately got out of bed when asked to participate in therapy. Mild LOB 3x upon the 1st 10ft of gait. Scissoring gait pattern. Decreased pace. Pt returned to room and back to bed. Sitter present. Progression toward goals:   []      Improving appropriately and progressing toward goals  [x]      Improving slowly and progressing toward goals  []      Not making progress toward goals and plan of care will be adjusted     PLAN:  Patient continues to benefit from skilled intervention to address the above impairments.   Continue treatment per established plan of care.  Discharge Recommendations:  home with family 24 hour supervision and medication control or psych hospital  Further Equipment Recommendations for Discharge:  N/A     SUBJECTIVE:   Patient stated I need my medicine!     OBJECTIVE DATA SUMMARY:   Critical Behavior:  Neurologic State: Alert  Orientation Level: Oriented X4  Cognition: Impulsive  Safety/Judgement: Decreased awareness of environment, Decreased awareness of need for assistance, Decreased awareness of need for safety, Decreased insight into deficits, Fall prevention  Functional Mobility Training:  Bed Mobility:    Supine to Sit: Independent  Sit to Supine: Independent  Transfers:  Sit to Stand: Independent  Stand to Sit: Independent  Balance:  Sitting: Intact  Standing: Without support  Standing - Static: Good  Standing - Dynamic : Fair   Ambulation/Gait Training:  Distance (ft): 350 Feet (ft)  Assistive Device: Gait belt  Ambulation - Level of Assistance: Contact guard assistance  Gait Abnormalities: Decreased step clearance; Scissoring  Base of Support: Narrowed  Step Length: Right shortened; Left shortened        Pain:  Pain level pre-treatment: 0/10  Pain level post-treatment: 0/10   Pain Intervention(s): Medication (see MAR); Rest, Ice, Repositioning   Response to intervention: Nurse notified, See doc flow    Activity Tolerance:   fair  Please refer to the flowsheet for vital signs taken during this treatment. After treatment:   [] Patient left in no apparent distress sitting up in chair  [x] Patient left in no apparent distress in bed  [x] Call bell left within reach  [x] Nursing notified  [x] Sitter present  [] Bed alarm activated  [] SCDs applied      COMMUNICATION/EDUCATION:   [x]         Role of Physical Therapy in the acute care setting. [x]         Fall prevention education was provided and the patient/caregiver indicated understanding. [x]         Patient/family have participated as able in working toward goals and plan of care.   [x] Patient/family agree to work toward stated goals and plan of care. []         Patient understands intent and goals of therapy, but is neutral about his/her participation.   []         Patient is unable to participate in stated goals/plan of care: ongoing with therapy staff.  []         Other:        Marcellus Beyer PTA   Time Calculation: 18 mins

## 2021-11-06 NOTE — PROGRESS NOTES
DC Plan: TBD    Care manager had contacted patient's sister Patty Moran [phone] on Thursday to discuss if safe discharge plan would be for her to return to Ms. Christie Burgess' home. Per Ms. Christie Burgess she was going to visit patient this afternoon and meet with CM; CM has been on unit for 1.5 hrs, and has called and left message for Ms. Christie Burgess to call and discuss discharge plan. Patient continues to have sitter in room; per sitter, patient has been able to ambulate to bathroom.   Care manager observed patient ambulating in hallway without difficulty with PT.

## 2021-11-06 NOTE — PROGRESS NOTES
Hospitalist Progress Note    Patient: Shanell Hinton MRN: [id number]  CSN: [id number]    YOB: 1980  Age: 39 y.o. Sex: female    DOA: 9/11/2021 LOS:  LOS: 55 days            Assessment/Plan     Principal Problem:    Drug overdose, intentional self-harm, [facility] encounter ([facility] (9/12/2021)    Active Problems:    Left wrist fracture, with delayed healing, subsequent encounter (9/12/2021)      Hypoxia (9/12/2021)      Hypotension after procedure (9/12/2021)      Acute metabolic encephalopathy (8/58/3852)      Psychosis ([facility] ()      Hyponatremia ()      Acute respiratory failure with hypoxia (HCC) ()      Increased ammonia level (9/14/2021)      Hypokalemia (9/21/2021)      Status post tracheostomy ([facility] (9/28/2021)      Bacteremia due to Gram-positive bacteria (10/6/2021)      FELICITY (acute kidney injury) ([facility] (10/16/2021)      Devika Goldstein is a 39 y.o. female with long standing history of multidrug use/abuse, previous drug-seeking behavior and mental health issues admitted for acute metabolic encephalopathy and lethargy, likely gabapentin overdose. She was intubated in ER, ct head no acute issue, LP done due to fever even on abx, no meningitis noted. she had peg and trach placed while in ICU.  She pulled out her tracheostomy on 10/29     Status post left wrist external fixation/distal radius wrist fracture -  Present on admission  S/p Removal of left wrist external fixator with wound debridement here 11/3  Wound care consult placed by ortho team   Maintain splint at all times  F/u with Dr. Tonja Gay OP for further care       Acute respiratory failure with hypoxia -resolved  Intubated on 9/12    Trach on 9/20/21.  -pulled out on 10/29  Doing well w/o trach     MRSA on sputum cx now-completed  bactrim for total 5 days per peg tube      bacteremia -completed treatment     Anemia stable     Acute metabolic encephalopathy -resolved  Ct head no acute process   Continue Seroquel     Urinary retention -zamora trial out today     Dysphagia, remove peg soon likely as she improves      Psychosis, hx of  Ekbom's delusional parasitosis    Appreciate Psychiatry input, Dr. Angelita Anaya   continue clonazepam 1 to 2 mg 3 times a day for now with gradual taper off with outpatient follow-up  hydroxyzine 50 to 100 mg 3 times a day  Continue quetiapine and increase to 600 at bedtime and continue 200 in the morning     On  Klonopin, Seroquel  per psych recommendation       Peg feedings to be discontinued as she is able to eat more, dysphagia diet per speech     Move towards discharge shortly, patient motivated to go home  Stop IV narcotics, use PO as needed for severe pain only. 24 hr sitter.        CC: Dawood Goldstein is a 39 y.o. female with long standing history of multidrug use/abuse, previous drug-seeking behavior and mental health issues admitted for acute metabolic encephalopathy and lethargy, likely gabapentin overdose. She was intubated in ER, ct head no acute issue, LP done due to fever even on abx, no meningitis noted. she had peg and trach placed while in ICU.  She pulled out her tracheostomy on 10/29           Subjective:     Pt was seen and examined with the nurse in the morning round. \" I think I need my pain medicine\"    Pt woke up when this writer walked to the pt's room, then she fell asleep after the sentence above. 24 hr sitter is at the room    Review of systems  Limited 2nd to mental status    Objective:      Visit Vitals  BP 93/67   Pulse (!) 105   Temp 98.3 °F (36.8 °C)   Resp 16   Ht 5' 2\" (1.575 m)   Wt 72.6 kg (160 lb 0.9 oz)   SpO2 97%   BMI 29.27 kg/m²       Physical Exam:    Gen: NAD, non-toxic. Heent:  MMM, NC, AT. Cor: s1s2 RRR. No MRG. PMI mid 5th intercostal space. Resp:  CTA b/l. No w/r/r. Nml effort and diaphragmatic excursion. Abd:  NT ND.  BS positive. No rebound or guarding. No masses. Ext: ACE wrap to left forearm.        Intake and Output:  Current Shift:  No intake/output data recorded. Last three shifts:  11/04 1901 - 11/06 0700  In: 920 [P.O.:920]  Out: 1000 [Urine:1000]    Labs: Results:       Chemistry Recent Labs     11/05/21  0200 11/04/21  0335   GLU 94 118*    141   K 4.2 4.2    109   CO2 27 27   BUN 13 16   CREA 0.58* 0.66   CA 9.5 9.0   AGAP 6 5   BUCR 22* 24*      CBC w/Diff No results for input(s): WBC, RBC, HGB, HCT, PLT, GRANS, LYMPH, EOS, HGBEXT, HCTEXT, PLTEXT, HGBEXT, HCTEXT, PLTEXT in the last 72 hours. Cardiac Enzymes No results for input(s): CPK, CKND1, ZENON in the last 72 hours. No lab exists for component: CKRMB, TROIP   Coagulation No results for input(s): PTP, INR, APTT, INREXT, INREXT in the last 72 hours. Lipid Panel No results found for: CHOL, CHOLPOCT, CHOLX, CHLST, CHOLV, 989593, HDL, HDLP, LDL, LDLC, DLDLP, [id number], VLDLC, VLDL, TGLX, TRIGL, TRIGP, TGLPOCT, CHHD, CHHDX   BNP No results for input(s): BNPP in the last 72 hours. Liver Enzymes No results for input(s): TP, ALB, TBIL, AP in the last 72 hours.     No lab exists for component: SGOT, GPT, DBIL   Thyroid Studies No results found for: T4, T3U, TSH, TSHEXT, TSHEXT     Procedures/imaging: see electronic medical records for all procedures/Xrays and details which were not copied into this note but were reviewed prior to creation of Plan      Medications Reviewed  Pavan Acuña MD

## 2021-11-07 PROCEDURE — 74011250637 HC RX REV CODE- 250/637: Performed by: FAMILY MEDICINE

## 2021-11-07 PROCEDURE — 51798 US URINE CAPACITY MEASURE: CPT

## 2021-11-07 PROCEDURE — 65660000000 HC RM CCU STEPDOWN

## 2021-11-07 PROCEDURE — 74011250636 HC RX REV CODE- 250/636: Performed by: INTERNAL MEDICINE

## 2021-11-07 PROCEDURE — 74011250637 HC RX REV CODE- 250/637: Performed by: HOSPITALIST

## 2021-11-07 PROCEDURE — 97116 GAIT TRAINING THERAPY: CPT

## 2021-11-07 PROCEDURE — 74011250636 HC RX REV CODE- 250/636: Performed by: FAMILY MEDICINE

## 2021-11-07 PROCEDURE — 74011250637 HC RX REV CODE- 250/637: Performed by: INTERNAL MEDICINE

## 2021-11-07 RX ADMIN — QUETIAPINE FUMARATE 200 MG: 200 TABLET ORAL at 10:01

## 2021-11-07 RX ADMIN — Medication 1 TABLET: at 10:01

## 2021-11-07 RX ADMIN — DIAZEPAM 2 MG: 2 TABLET ORAL at 17:09

## 2021-11-07 RX ADMIN — QUETIAPINE FUMARATE 600 MG: 200 TABLET ORAL at 20:28

## 2021-11-07 RX ADMIN — HYDROXYZINE HYDROCHLORIDE 50 MG: 25 TABLET, FILM COATED ORAL at 10:00

## 2021-11-07 RX ADMIN — LORAZEPAM 2 MG: 2 INJECTION INTRAMUSCULAR at 16:03

## 2021-11-07 RX ADMIN — OXYCODONE AND ACETAMINOPHEN 1 TABLET: 5; 325 TABLET ORAL at 10:06

## 2021-11-07 RX ADMIN — DIAZEPAM 2 MG: 2 TABLET ORAL at 11:03

## 2021-11-07 RX ADMIN — HYDROXYZINE HYDROCHLORIDE 50 MG: 25 TABLET, FILM COATED ORAL at 20:27

## 2021-11-07 RX ADMIN — OXYCODONE AND ACETAMINOPHEN 1 TABLET: 5; 325 TABLET ORAL at 18:29

## 2021-11-07 RX ADMIN — FAMOTIDINE 20 MG: 20 TABLET ORAL at 10:01

## 2021-11-07 RX ADMIN — ENOXAPARIN SODIUM 40 MG: 100 INJECTION SUBCUTANEOUS at 17:09

## 2021-11-07 RX ADMIN — NYSTATIN: 100000 POWDER TOPICAL at 16:05

## 2021-11-07 RX ADMIN — CLONAZEPAM 1 MG: 0.5 TABLET ORAL at 16:03

## 2021-11-07 RX ADMIN — NYSTATIN: 100000 POWDER TOPICAL at 10:01

## 2021-11-07 RX ADMIN — CLONAZEPAM 1 MG: 0.5 TABLET ORAL at 10:01

## 2021-11-07 RX ADMIN — CLONAZEPAM 1 MG: 0.5 TABLET ORAL at 20:28

## 2021-11-07 RX ADMIN — HYDROXYZINE HYDROCHLORIDE 50 MG: 25 TABLET, FILM COATED ORAL at 16:02

## 2021-11-07 RX ADMIN — Medication 5 MG: at 20:27

## 2021-11-07 RX ADMIN — OXYCODONE AND ACETAMINOPHEN 1 TABLET: 5; 325 TABLET ORAL at 14:24

## 2021-11-07 NOTE — PROGRESS NOTES
Hospitalist Progress Note    Patient: Jayla Hare MRN: [id number]  CSN: [id number]    YOB: 1980  Age: 39 y.o. Sex: female    DOA: 9/11/2021 LOS:  LOS: 56 days            Assessment/Plan     Principal Problem:    Drug overdose, intentional self-harm, initial encounter ([facility] (9/12/2021)    Active Problems:    Left wrist fracture, with delayed healing, subsequent encounter (9/12/2021)      Hypoxia (9/12/2021)      Hypotension after procedure (9/12/2021)      Acute metabolic encephalopathy (0/83/1491)      Psychosis ([address] ()      Hyponatremia ()      Acute respiratory failure with hypoxia (HCC) ()      Increased ammonia level (9/14/2021)      Hypokalemia (9/21/2021)      Status post tracheostomy ([facility] (9/28/2021)      Bacteremia due to Gram-positive bacteria (10/6/2021)      FELICITY (acute kidney injury) ([facility] (10/16/2021)      Leonid Goldstein is a 39 y.o. female with long standing history of multidrug use/abuse, previous drug-seeking behavior and mental health issues admitted for acute metabolic encephalopathy and lethargy, likely gabapentin overdose. She was intubated in ER, ct head no acute issue, LP done due to fever even on abx, no meningitis noted. she had peg and trach placed while in ICU.  She pulled out her tracheostomy on 10/29     Status post left wrist external fixation/distal radius wrist fracture -  Present on admission  S/p Removal of left wrist external fixator with wound debridement here 11/3  Wound care consult placed by ortho team   Maintain splint at all times  F/u with Dr. Martinez Garcia OP for further care       Acute respiratory failure with hypoxia -resolved  Intubated on 9/12    Trach on 9/20/21.  -pulled out on 10/29  Doing well w/o trach     MRSA on sputum cx now-completed  bactrim for total 5 days per peg tube      bacteremia -completed treatment     Anemia stable     Acute metabolic encephalopathy -resolved  Ct head no acute process   Continue Seroquel     Urinary retention -zamora trial out today     Dysphagia, remove peg soon likely as she improves      Psychosis, hx of  Ekbom's delusional parasitosis    Appreciate Psychiatry input, Dr. Dajuan Kahn   continue clonazepam 1 to 2 mg 3 times a day for now with gradual taper off with outpatient follow-up  hydroxyzine 50 to 100 mg 3 times a day  Continue quetiapine and increase to 600 at bedtime and continue 200 in the morning     On  Klonopin, Seroquel  per psych recommendation       Peg feedings to be discontinued as she is able to eat more, dysphagia diet per speech     Move towards discharge shortly, patient motivated to go home  Stop IV narcotics, use PO as needed for severe pain only.     24 hr sitter. Very challenge case and high risks for hospital readmission    Total time of care: 40 min        CC: Jose Manuel Goldstein is a 39 y.o. female with long standing history of multidrug use/abuse, previous drug-seeking behavior and mental health issues admitted for acute metabolic encephalopathy and lethargy, likely gabapentin overdose. She was intubated in ER, ct head no acute issue, LP done due to fever even on abx, no meningitis noted. she had peg and trach placed while in ICU.  She pulled out her tracheostomy on 10/29 . Ask for pain medication frequently          Subjective:      Pt was seen and examined with the nurse in the morning round.      \" I need my pain medicine and give me something for sleep! Otherwise, I will leave here\"     24 hr sitter is at the room     Review of systems  Limited 2nd to mental status      Objective:      Visit Vitals  /75   Pulse 86   Temp 97.9 °F (36.6 °C)   Resp 16   Ht 5' 2\" (1.575 m)   Wt 72.6 kg (160 lb 0.9 oz)   SpO2 100%   BMI 29.27 kg/m²       Physical Exam:    Gen: NAD, non-toxic. Heent:  MMM, NC, AT. Cor: s1s2 RRR. No MRG. PMI mid 5th intercostal space. Resp:  CTA b/l. No w/r/r. Nml effort and diaphragmatic excursion. Abd:  NT ND.  BS positive. No rebound or guarding.   No masses. Ext: No edema or cyanosis. Intake and Output:  Current Shift:  No intake/output data recorded. Last three shifts:  11/05 1901 - 11/07 0700  In: 440 [P.O.:440]  Out: 1000 [Urine:1000]    Labs: Results:       Chemistry Recent Labs     11/05/21  0200   GLU 94      K 4.2      CO2 27   BUN 13   CREA 0.58*   CA 9.5   AGAP 6   BUCR 22*      CBC w/Diff No results for input(s): WBC, RBC, HGB, HCT, PLT, GRANS, LYMPH, EOS, HGBEXT, HCTEXT, PLTEXT in the last 72 hours. Cardiac Enzymes No results for input(s): CPK, CKND1, ZENON in the last 72 hours. No lab exists for component: CKRMB, TROIP   Coagulation No results for input(s): PTP, INR, APTT, INREXT in the last 72 hours. Lipid Panel No results found for: CHOL, CHOLPOCT, CHOLX, CHLST, CHOLV, [id number], HDL, HDLP, LDL, LDLC, DLDLP, [id number], VLDLC, VLDL, TGLX, TRIGL, TRIGP, TGLPOCT, CHHD, CHHDX   BNP No results for input(s): BNPP in the last 72 hours. Liver Enzymes No results for input(s): TP, ALB, TBIL, AP in the last 72 hours.     No lab exists for component: SGOT, GPT, DBIL   Thyroid Studies No results found for: T4, T3U, TSH, TSHEXT     Procedures/imaging: see electronic medical records for all procedures/Xrays and details which were not copied into this note but were reviewed prior to creation of Plan      Medications Reviewed  Eliza Booker MD

## 2021-11-07 NOTE — ROUTINE PROCESS
Bedside and Verbal shift change report given to Horacio Aguayo (oncoming nurse) by Delma Moser nurse). Report included the following information SBAR, Kardex and MAR.

## 2021-11-07 NOTE — PROGRESS NOTES
1001 Abdirashid Coyne Jeane notified regarding pt's c/o pain to R arm and not being able to hold/ objects with right hand. Observed pt not able to hold/ cup or hold straw. Per sitter, she's not able to feed herself. Per pt, she started noticing this today. Per OT notes, RUE motor skills had been intact. Noted weak R  but able to pull. NIH stroke scale 0. Received order for CT head, xray shoulder,elbow, wrist.     2300 - Noted pt had not voided since assumed care of pt at 1500. None documented from previous nurse. Per pt she voided twice today, bladder scanned pt since she has hx urinary retention. Bladder scan showed 270ml. Per pt, she's able to void without difficulty and just haven't gone again because \"I've just been tired\" . Drowsy at this time after having received scheduled seroquel 600mg po. Will cont to monitor for urine output.

## 2021-11-07 NOTE — PROGRESS NOTES
Problem: Mobility Impaired (Adult and Pediatric)  Goal: *Acute Goals and Plan of Care (Insert Text)  Description: Physical Therapy Goals  Initiated 10/19/2021 and to be accomplished within 7 day(s)  1. Patient will move from supine to sit and sit to supine  in bed with supervision/set-up. 2.  Patient will transfer from bed to chair and chair to bed with minimal assistance/contact guard assist using the least restrictive device. 3.  Patient will perform sit to stand with minimal assistance/contact guard assist.  4.  Patient will ambulate with minimal assistance/contact guard assist for 20 feet with the least restrictive device. PLOF: pt was independent with mobility without an assistive device     Outcome: Progressing Towards Goal   physical Therapy TREATMENT    Patient: Gloria Lundberg (47 y.o. female)  Date: 11/7/2021  Diagnosis: Drug overdose, intentional self-harm, [facility]  Left wrist fracture, with delayed healing, subsequent encounter [S62.102G]   Drug overdose, intentional self-harm, initial encounter ([facility]  Procedure(s) (LRB):  LEFT WRIST OPEN REDUCTION INTERNAL FIXATION. REMOVAL OF X-FIX WITH C-ARM. REP [facility]  (Left) 4 Days Post-Op  Precautions: Fall, Contact   Chart, physical therapy assessment, plan of care and goals were reviewed. ASSESSMENT:  Pt is improving with safety and only has 2 LOB episodes of this session. Step/stride lengths are normalizing and no contact made for any of today's OOB. Pt is still appropriate 24 hr supervision. Pt continues to request medication continuously. Imaging may be appropriate for demonstration and C/o limited R hand control.      Progression toward goals:  [x]      Improving appropriately and progressing toward goals  []      Improving slowly and progressing toward goals  []      Not making progress toward goals and plan of care will be adjusted     PLAN:  Patient continues to benefit from skilled intervention to address the above impairments. Continue treatment per established plan of care. Discharge Recommendations:  Home Health 24 hr supervision   Further Equipment Recommendations for Discharge:  none      SUBJECTIVE:   Patient stated Nolon Drop if you tell the nurse to bring me my medicine. I was supposed to get it at 1230.     OBJECTIVE DATA SUMMARY:   Critical Behavior:  Neurologic State: Alert  Orientation Level: Oriented X4  Cognition: Poor safety awareness  Safety/Judgement: Decreased awareness of environment, Decreased awareness of need for assistance, Decreased awareness of need for safety, Decreased insight into deficits, Fall prevention  Functional Mobility Training:  Bed Mobility:  Rolling: Independent  Supine to Sit: Independent  Sit to Supine: Independent  Transfers:  Sit to Stand: Independent  Stand to Sit: Independent   Balance:  Sitting: Intact  Standing: Impaired  Standing - Static: Good  Standing - Dynamic : Fair  Ambulation/Gait Training:  Distance (ft): 500 Feet (ft)  Assistive Device: Gait belt  Ambulation - Level of Assistance: Contact guard assistance  Gait Abnormalities: Decreased step clearance; Scissoring  Base of Support: Narrowed  Pain:  Pain Scale 1: Visual  Pain Intensity 1: 10  Pain out: 10  Pain Location 1: Arm  Pain Orientation 1: Left  Pain Description 1: Aching  Pain Intervention(s) 1: Medication (see MAR)  Activity Tolerance:   Fair+  Please refer to the flowsheet for vital signs taken during this treatment.   After treatment:   [x] Patient left in no apparent distress sitting EOB  [] Patient left in no apparent distress in bed  [x] Call bell left within reach  [x] Nursing notified  [x] Sitter present  [] Bed alarm activated      Alberto Dalton PTA   Time Calculation: 18 mins

## 2021-11-07 NOTE — PROGRESS NOTES
Problem: Falls - Risk of  Goal: *Absence of Falls  Description: Document Seng Jerry Fall Risk and appropriate interventions in the flowsheet. Outcome: Progressing Towards Goal  Note: Fall Risk Interventions:  Mobility Interventions: Communicate number of staff needed for ambulation/transfer    Mentation Interventions: Adequate sleep, hydration, pain control    Medication Interventions: Teach patient to arise slowly    Elimination Interventions: Call light in reach    History of Falls Interventions:  Investigate reason for fall         Problem: Patient Education: Go to Patient Education Activity  Goal: Patient/Family Education  Outcome: Progressing Towards Goal     Problem: Patient Education: Go to Patient Education Activity  Goal: Patient/Family Education  Outcome: Progressing Towards Goal     Problem: Pain  Goal: *Control of Pain  Outcome: Progressing Towards Goal  Goal: *PALLIATIVE CARE:  Alleviation of Pain  Outcome: Progressing Towards Goal     Problem: Patient Education: Go to Patient Education Activity  Goal: Patient/Family Education  Outcome: Progressing Towards Goal

## 2021-11-07 NOTE — PROGRESS NOTES
0002 - Assumed care at this time. 4635 -  Assessment completed. Pt alert and oriented, RA. Lung sounds clear. Dressing to left arm C/D/I. No c/o pain. Sitter at bedside. Will continue to monitor. 0240 - Pt bladder scan revealed 485ml. Pt assisted to [facility] and voided 700ml.

## 2021-11-07 NOTE — ROUTINE PROCESS
Bedside and Verbal shift change report given to Sander De Dios RN by Nader Self RN. Report included the following information SBAR, Kardex, Intake/Output and MAR.

## 2021-11-08 PROCEDURE — 74011250636 HC RX REV CODE- 250/636: Performed by: FAMILY MEDICINE

## 2021-11-08 PROCEDURE — 74011250637 HC RX REV CODE- 250/637: Performed by: HOSPITALIST

## 2021-11-08 PROCEDURE — 74011250637 HC RX REV CODE- 250/637: Performed by: INTERNAL MEDICINE

## 2021-11-08 PROCEDURE — 74011250637 HC RX REV CODE- 250/637: Performed by: FAMILY MEDICINE

## 2021-11-08 PROCEDURE — 97164 PT RE-EVAL EST PLAN CARE: CPT

## 2021-11-08 PROCEDURE — 97110 THERAPEUTIC EXERCISES: CPT

## 2021-11-08 PROCEDURE — 97530 THERAPEUTIC ACTIVITIES: CPT

## 2021-11-08 PROCEDURE — 97116 GAIT TRAINING THERAPY: CPT

## 2021-11-08 PROCEDURE — 65660000000 HC RM CCU STEPDOWN

## 2021-11-08 RX ORDER — CLONAZEPAM 0.5 MG/1
1 TABLET ORAL
Status: DISCONTINUED | OUTPATIENT
Start: 2021-11-08 | End: 2021-11-13 | Stop reason: HOSPADM

## 2021-11-08 RX ORDER — CLONAZEPAM 0.5 MG/1
0.5 TABLET ORAL 3 TIMES DAILY
Status: DISCONTINUED | OUTPATIENT
Start: 2021-11-08 | End: 2021-11-08

## 2021-11-08 RX ADMIN — CLONAZEPAM 1 MG: 0.5 TABLET ORAL at 15:09

## 2021-11-08 RX ADMIN — NYSTATIN: 100000 POWDER TOPICAL at 10:04

## 2021-11-08 RX ADMIN — ENOXAPARIN SODIUM 40 MG: 100 INJECTION SUBCUTANEOUS at 17:02

## 2021-11-08 RX ADMIN — ONDANSETRON 4 MG: 4 TABLET, ORALLY DISINTEGRATING ORAL at 15:09

## 2021-11-08 RX ADMIN — OXYCODONE AND ACETAMINOPHEN 1 TABLET: 5; 325 TABLET ORAL at 22:09

## 2021-11-08 RX ADMIN — FAMOTIDINE 20 MG: 20 TABLET ORAL at 09:31

## 2021-11-08 RX ADMIN — CLONAZEPAM 1 MG: 0.5 TABLET ORAL at 22:08

## 2021-11-08 RX ADMIN — QUETIAPINE FUMARATE 300 MG: 200 TABLET ORAL at 09:10

## 2021-11-08 RX ADMIN — HYDROXYZINE HYDROCHLORIDE 50 MG: 25 TABLET, FILM COATED ORAL at 22:09

## 2021-11-08 RX ADMIN — HYDROXYZINE HYDROCHLORIDE 50 MG: 25 TABLET, FILM COATED ORAL at 15:09

## 2021-11-08 RX ADMIN — DIAZEPAM 2 MG: 2 TABLET ORAL at 22:09

## 2021-11-08 RX ADMIN — NYSTATIN: 100000 POWDER TOPICAL at 22:10

## 2021-11-08 RX ADMIN — QUETIAPINE FUMARATE 600 MG: 200 TABLET ORAL at 22:08

## 2021-11-08 RX ADMIN — NYSTATIN: 100000 POWDER TOPICAL at 17:01

## 2021-11-08 RX ADMIN — ACETAMINOPHEN 650 MG: 325 TABLET ORAL at 15:09

## 2021-11-08 RX ADMIN — Medication 5 MG: at 22:09

## 2021-11-08 RX ADMIN — Medication 1 TABLET: at 09:31

## 2021-11-08 RX ADMIN — HYDROXYZINE HYDROCHLORIDE 50 MG: 25 TABLET, FILM COATED ORAL at 09:10

## 2021-11-08 RX ADMIN — DIAZEPAM 2 MG: 2 TABLET ORAL at 18:05

## 2021-11-08 NOTE — PROGRESS NOTES
Hospitalist Progress Note    Patient: Garrison Houston MRN: [id number]  CSN: [id number]    YOB: 1980  Age: 39 y.o. Sex: female    DOA: 9/11/2021 LOS:  LOS: 62 days          Chief Complaint:    overdose      Assessment/Plan        Gladys Goldstein is a 39 y.o. female with long standing history of multidrug use/abuse, previous drug-seeking behavior and mental health issues admitted for acute metabolic encephalopathy and lethargy, likely gabapentin overdose. She was intubated in ER, ct head no acute issue, LP done due to fever even on abx, no meningitis noted. she had peg and trach placed while in ICU.  She pulled out her tracheostomy on 10/29     Status post left wrist external fixation/distal radius wrist fracture -  Present on admission  S/p Removal of left wrist external fixator with wound debridement here 11/3  Wound care consult placed by ortho team   Maintain splint at all times  F/u with Dr. Ana Luisa Harden OP for further care       Acute respiratory failure with hypoxia -resolved  Intubated on 9/12    Trach on 9/20/21.  -pulled out on 10/29  Doing well w/o trach     Anemia stable     Acute metabolic encephalopathy -resolved  Ct head no acute process   Continue Seroquel, but adjust up AM dose for agitation that can be present still     Urinary retention resolved     Dysphagia, improved      Psychosis, hx of  Ekbom's delusional parasitosis    Appreciate Psychiatry input, Dr. Mora  off klonopin, use PRN  hydroxyzine 50 to 100 mg 3 times a day       dysphagia diet per speech     Move towards discharge shortly, patient motivated to go home       Disposition :  Patient Active Problem List   Diagnosis Code    Dextromethorphan use disorder, moderate ([address] F19.20    Ekbom's delusional parasitosis ([address] [facility]    Left wrist fracture, with delayed healing, subsequent encounter S62.102G    Drug overdose, intentional self-harm, initial encounter ([address] T50.902A    Hypoxia R09.02    Hypotension after procedure I95.81    Acute metabolic encephalopathy Q83.75    Psychosis (HCC) F29    Hyponatremia E87.1    Acute respiratory failure with hypoxia (HCC) J96.01    Increased ammonia level R79.89    Hypokalemia E87.6    Status post tracheostomy ([facility] Z93.0    Bacteremia due to Gram-positive bacteria R78.81    FELICITY (acute kidney injury) ([facility] [address] N17.9       Subjective:    Resting  No new issues except nurse states she has had trouble with right arm, patient asleep and agitates easily, will come by when awake to make sure she is not having new issue    Review of systems:    As above      Vital signs/Intake and Output:  Visit Vitals  /67   Pulse 87   Temp 97.7 °F (36.5 °C)   Resp 16   Ht 5' 2\" (1.575 m)   Wt 72.6 kg (160 lb 0.9 oz)   SpO2 97%   BMI 29.27 kg/m²     Current Shift:  No intake/output data recorded. Last three shifts:  11/06 1901 - 11/08 0700  In: 480 [P.O.:480]  Out: 1300 [Urine:1300]    Exam:    General:asleep WF NAD  CVS:Regular rate and rhythm, no M/R/G, S1/S2 heard, no thrill  Lungs:Clear to auscultation bilaterally, no wheezes, rhonchi, or rales  Abdomen: Soft, Nontender, No distention, Normal Bowel sounds  Extremities: No C/C/E, pulses palpable 2+  Neuro:grossly normal   Left wrist splint, dressing                Labs: Results:       Chemistry No results for input(s): GLU, NA, K, CL, CO2, BUN, CREA, CA, AGAP, BUCR, TBIL, AP, TP, ALB, GLOB, AGRAT in the last 72 hours. No lab exists for component: GPT   CBC w/Diff No results for input(s): WBC, RBC, HGB, HCT, PLT, GRANS, LYMPH, EOS, HGBEXT, HCTEXT, PLTEXT in the last 72 hours. Cardiac Enzymes No results for input(s): CPK, CKND1, ZENON in the last 72 hours. No lab exists for component: CKRMB, TROIP   Coagulation No results for input(s): PTP, INR, APTT, INREXT in the last 72 hours.     Lipid Panel No results found for: CHOL, CHOLPOCT, CHOLX, CHLST, CHOLV, 390195, HDL, HDLP, LDL, LDLC, DLDLP, 012960, VLDLC, VLDL, TGLX, TRIGL, TRIGP, TGLPOCT, CHHD, CHHDX   BNP No results for input(s): BNPP in the last 72 hours. Liver Enzymes No results for input(s): TP, ALB, TBIL, AP in the last 72 hours.     No lab exists for component: SGOT, GPT, DBIL   Thyroid Studies No results found for: T4, T3U, TSH, TSHEXT     Procedures/imaging: see electronic medical records for all procedures/Xrays and details which were not copied into this note but were reviewed prior to creation of Trenton Lancaster MD

## 2021-11-08 NOTE — PROGRESS NOTES
Problem: Mobility Impaired (Adult and Pediatric)  Goal: *Acute Goals and Plan of Care (Insert Text)  Description: Physical Therapy Goals  Initiated 10/19/2021, reviewed and updated 11/8/2021 and to be accomplished within 7 day(s)  1. Patient will transfer from bed to chair and chair to bed with supervision. 2.  Patient will ambulate with supervision/set-up for 500 feet with the least restrictive device. 3.  Patient will ascend/descend 4 stairs with 1 handrail(s) with minimal assistance/contact guard assist.    PLOF: independent with mobility without an assistive device     Outcome: Progressing Towards Goal   PHYSICAL THERAPY RE-EVALUATION    Patient: Joaquina Rivas (25 y.o. female)  Date: 11/8/2021  Primary Diagnosis: Drug overdose, intentional self-harm, initial encounter ([facility] [address] [place]  Left wrist fracture, with delayed healing, subsequent encounter [S62.102G]  Procedure(s) (LRB):  LEFT WRIST OPEN REDUCTION INTERNAL FIXATION. REMOVAL OF X-FIX WITH C-ARM. REP [facility]  (Left) 5 Days Post-Op   Precautions:   Fall, Contact  PLOF: see above    ASSESSMENT :  Based on the objective data described below, the patient presents with decreased strength, dynamic standing balance and activity tolerance resulting in decreased independence with functional mobility. Pt is progressing well with functional mobility however is limited by her cognitive status/safety awareness, pt does show the potential to progress to a supervision level with mobility. Pt is independent with bed mobility and sit to stand transfers. When ambulating patient requires CGA for balance due to NBOS, decreased step clearance and path deviations. Pt was educated extensively on the need to increase her activity with assistance and  to sit at the edge of the bed more. Pt verbalized understanding however will require continued re-education. Patient will benefit from skilled intervention to address the above impairments.   Patient's rehabilitation potential is considered to be Good  Factors which may influence rehabilitation potential include:   []         None noted  [x]         Mental ability/status  [x]         Medical condition  [x]         Home/family situation and support systems  [x]         Safety awareness  []         Pain tolerance/management  []         Other:      PLAN :  Recommendations and Planned Interventions:   []           Bed Mobility Training             []    Neuromuscular Re-Education  [x]           Transfer Training                   []    Orthotic/Prosthetic Training  [x]           Gait Training                          []    Modalities  [x]           Therapeutic Exercises           []    Edema Management/Control  [x]           Therapeutic Activities            []    Family Training/Education  [x]           Patient Education  []           Other (comment):    Frequency/Duration: Patient will be followed by physical therapy 1-2 times per day/4-7 days per week to address goals. Discharge Recommendations: Home Health with 24 hour supervision  Further Equipment Recommendations for Discharge: N/A     SUBJECTIVE:   Patient stated I just want to get out of here.     OBJECTIVE DATA SUMMARY:   Hospital course since last seen and reason for re-evaluation: pt has had external fixator and trach removed  Past Medical History:   Diagnosis Date    Asthma     Bilateral ovarian cysts     Cocaine abuse ([facility] [address]     Mental and behavioral problem     Pancreatitis     Pancreatitis     Psychosis ([facility] [address]      Past Surgical History:   Procedure Laterality Date    HX GYN      D&C, Hysterectomy    IR INSERT GASTROSTOMY TUBE PERC  10/1/2021     Barriers to Learning/Limitations: yes;  altered mental status (i.e.Sedation, Confusion)  Compensate with: Verbal Cues and Tactile Cues  Home Situation:    Unsure of where patient will be staying at discharge    Critical Behavior:   Cooperative but anxious      Strength:     Generally decreased functional Range Of Motion:   WFL B LEs      Functional Mobility:  Bed Mobility:  Rolling: Independent  Supine to Sit: Independent  Sit to Supine: Independent     Transfers:  Sit to Stand: Independent  Stand to Sit: Independent     Balance:   Sitting: Intact  Standing - Static:  (fair+)  Standing - Dynamic :  (fair/fair-)  Ambulation/Gait Training:  Distance (ft): 350 Feet (ft)  Ambulation - Level of Assistance: Contact guard assistance  Gait Abnormalities: Trunk sway increased; Path deviations; Decreased step clearance  Base of Support: Narrowed  Therapeutic Exercises:   LAQ, standing marching with CGA x10  Pain:  Pain level pre-treatment: 0/10   Pain level post-treatment: 0/10   Pain Intervention(s) : n/a    Activity Tolerance:   Fair-  Please refer to the flowsheet for vital signs taken during this treatment. After treatment:   []         Patient left in no apparent distress sitting up in chair  [x]         Patient left in no apparent distress in bed  [x]         Call bell left within reach  [x]         Nursing notified  [x]         Caregiver present  []         Bed alarm activated  []         SCDs applied    COMMUNICATION/EDUCATION:   [x]         Role of Physical Therapy in the acute care setting. [x]         Fall prevention education was provided and the patient/caregiver indicated understanding. [x]         Patient/family have participated as able in goal setting and plan of care. [x]         Patient/family agree to work toward stated goals and plan of care. []         Patient understands intent and goals of therapy, but is neutral about his/her participation. []         Patient is unable to participate in goal setting/plan of care: ongoing with therapy staff.  []         Other:     Thank you for this referral.  Shira Davison, PT   Time Calculation: 39 mins

## 2021-11-08 NOTE — PROGRESS NOTES
Problem: Falls - Risk of  Goal: *Absence of Falls  Description: Document Leafy Simpson Fall Risk and appropriate interventions in the flowsheet. Outcome: Progressing Towards Goal  Note: Fall Risk Interventions:  Mobility Interventions: Communicate number of staff needed for ambulation/transfer    Mentation Interventions: Adequate sleep, hydration, pain control    Medication Interventions: Teach patient to arise slowly    Elimination Interventions: Call light in reach    History of Falls Interventions:  Investigate reason for fall

## 2021-11-08 NOTE — ROUTINE PROCESS
Bedside and Verbal shift change report given to Kianna Benítez RN  (oncoming nurse) by Sammi Go RN  (offgoing nurse). Report given with SBAR, Kardex, Intake/Output and Recent Results.

## 2021-11-08 NOTE — PROGRESS NOTES
1950 The patient is very demanding of all her medications. States she wants her percocet to be given in her IV. She was instructed that it does not come in that form. She then asked for dilaudid several times and was told several times that it was not ordered and this nurse would not call the doctor to ask for an order. No changes will be made to her medication orders tonight. Reviewed with her what was ordered this evening and when the prn medications were available. 2028 Scheduled evening medications administered without any problems. 2100 The patient has gone to sleep. Will hold on all the prn medications that were requested by the patient. A sitter is at the bedside and will remain at all times. 0700 Shift Summary: Pt rested well overnight with no complaints after receiving the routine scheduled medications. No prn medications were given overnight. No new clinical concerns noted.      Nightshift Chart Audit Completed

## 2021-11-08 NOTE — PROGRESS NOTES
Comprehensive Nutrition Assessment    Type and Reason for Visit: Reassess    Nutrition Recommendations/Plan: continue current diet and POC. 11/08/21 1509   Malnutrition Assessment   Context of Malnutrition Acute illness   Acute Illness - Energy Intake 1 - 75% or less of est energy req for 7 or more days  (26-50% avg PO intake)   Acute Illness - Weight Loss 7.0 - Greater than 5% over 1 month  (-11% in past 3 wks)   Acute Illness - Body Fat Loss Unable to assess   Acute Illness - Muscle Mass Loss Unable to assess   Acute Illness - Fluid Accumulation Unable to assess   Acute Illness -  Strength Not performed   Acute Illness - Malnutrition Score 8   Malnutrition Status Moderate malnutrition     Nutrition Assessment:  PMHx: drug use/abuse. Pt admitted for gabapentin overdose. Removed trach 10/29- left out b/c O2 saturation was adequate. S/P PEG 10/1. S/P removal of left wrist external fixator and wound debridement 11/3. Labs and meds reviewed- pepcid, lactulose, MVI, zofran, miralax    Estimated Daily Nutrient Needs:  Energy (kcal): 1500; Weight Used for Energy Requirements: Ideal  Protein (g): 65; Weight Used for Protein Requirements: Current (1g)  Fluid (ml/day): 1500; Method Used for Fluid Requirements: 1 ml/kcal    Nutrition Related Findings:  Current diet soft& bite-sized. PO intake recorded ranges from 0-100, most recorded value 26-50%. Per last note, pt was not agreeable to trying Ensure, stating she prefers to chew her food. Encourage PO intake, continue current diet. Wounds:    Surgical incision       Current Nutrition Therapies:  ADULT DIET Dysphagia - Soft & Bite Sized    Anthropometric Measures:  Height:  5' 2\" (157.5 cm)  Current Body Wt:  72.6 kg (160 lb 0.9 oz) (10/20)   Admission Body Wt:  160 lb    Usual Body Wt:  72 kg (158 lb 11.7 oz)     Ideal Body Wt:  110 lbs:  145.5 %   BMI Category: Overweight (BMI 25.0-29. 9)       Nutrition Diagnosis:   Predicted inadequate energy intake related to acute injury/trauma as evidenced by weight loss greater than or equal to 5% in 1 month, intake 26-50% (-11% wt loss since 10/20.)    In context of acute illness or injury, Moderate malnutrition related to acute injury/trauma as evidenced by weight loss greater than or equal to 5% in 1 month, intake 26-50% (-11% wt loss in past 3 weeks)    Nutrition Interventions:   Food and/or Nutrient Delivery: Continue current diet  Nutrition Education and Counseling: No recommendations at this time  Coordination of Nutrition Care: Continue to monitor while inpatient    Goals:  Pt will achieve >50% PO intake throughout the next 2-3 days to meet nutrition needs.        Nutrition Monitoring and Evaluation:   Behavioral-Environmental Outcomes: None identified  Food/Nutrient Intake Outcomes: Food and nutrient intake  Physical Signs/Symptoms Outcomes: Biochemical data, Meal time behavior, Weight, Skin    Discharge Planning:    Continue current diet     Electronically signed by Briana Holt RD on 11/8/2021 at 3:11 PM

## 2021-11-08 NOTE — PROGRESS NOTES
Cm met with pt at bedside to discuss discharge planning, pt in bed appears frustrated about being here she is ready to go, pt has called all family members she know of but all are refusing to take her in, pt did provide cm with her   from [employer] [phone] [facility], pt told cm that she can afford a hotel from her monthly income and have her  check on her daily, cm did verify by speaking with [facility] but was informed that pt was no longer active with the program due to no one was able to get in touch with her, cm was provided with patients  from [facility] [phone] who may can assist with placement or get pt back reinstated, after conversation cm contacted Mrs. Strickland she was very familiar with pt and her history, informed cm that she will look into some assistant living facilities and group homes, she will also be contacting patients room to speak with her, cm informed  that pt is medically cleared and ready for discharge today, this cm contact number provided along with main CM  contact number for follow up.

## 2021-11-08 NOTE — ROUTINE PROCESS
Bedside and Verbal shift change report given to Víctor Lucia (oncoming nurse) by Tatiana Stubbs RN (offgoing nurse). Report included the following information SBAR, Kardex, Intake/Output, Accordion, Recent Results and Cardiac Rhythm  .

## 2021-11-09 PROCEDURE — 74011250636 HC RX REV CODE- 250/636: Performed by: INTERNAL MEDICINE

## 2021-11-09 PROCEDURE — 92526 ORAL FUNCTION THERAPY: CPT

## 2021-11-09 PROCEDURE — 74011250637 HC RX REV CODE- 250/637: Performed by: FAMILY MEDICINE

## 2021-11-09 PROCEDURE — 74011250637 HC RX REV CODE- 250/637: Performed by: HOSPITALIST

## 2021-11-09 PROCEDURE — 74011250636 HC RX REV CODE- 250/636: Performed by: FAMILY MEDICINE

## 2021-11-09 PROCEDURE — 97116 GAIT TRAINING THERAPY: CPT

## 2021-11-09 PROCEDURE — 74011250637 HC RX REV CODE- 250/637: Performed by: INTERNAL MEDICINE

## 2021-11-09 PROCEDURE — 97110 THERAPEUTIC EXERCISES: CPT

## 2021-11-09 PROCEDURE — 65660000000 HC RM CCU STEPDOWN

## 2021-11-09 PROCEDURE — 3E0G76Z INTRODUCTION OF NUTRITIONAL SUBSTANCE INTO UPPER GI, VIA NATURAL OR ARTIFICIAL OPENING: ICD-10-PCS | Performed by: FAMILY MEDICINE

## 2021-11-09 RX ADMIN — QUETIAPINE FUMARATE 600 MG: 200 TABLET ORAL at 22:21

## 2021-11-09 RX ADMIN — CLONAZEPAM 1 MG: 0.5 TABLET ORAL at 15:48

## 2021-11-09 RX ADMIN — Medication 1 TABLET: at 08:33

## 2021-11-09 RX ADMIN — DIAZEPAM 2 MG: 2 TABLET ORAL at 18:20

## 2021-11-09 RX ADMIN — LORAZEPAM 2 MG: 2 INJECTION INTRAMUSCULAR at 20:14

## 2021-11-09 RX ADMIN — DIAZEPAM 2 MG: 2 TABLET ORAL at 14:06

## 2021-11-09 RX ADMIN — HYDROXYZINE HYDROCHLORIDE 50 MG: 25 TABLET, FILM COATED ORAL at 15:48

## 2021-11-09 RX ADMIN — NYSTATIN: 100000 POWDER TOPICAL at 16:00

## 2021-11-09 RX ADMIN — HYDROXYZINE HYDROCHLORIDE 50 MG: 25 TABLET, FILM COATED ORAL at 08:33

## 2021-11-09 RX ADMIN — CLONAZEPAM 1 MG: 0.5 TABLET ORAL at 08:42

## 2021-11-09 RX ADMIN — DIAZEPAM 2 MG: 2 TABLET ORAL at 22:22

## 2021-11-09 RX ADMIN — OXYCODONE AND ACETAMINOPHEN 1 TABLET: 5; 325 TABLET ORAL at 22:22

## 2021-11-09 RX ADMIN — OXYCODONE AND ACETAMINOPHEN 1 TABLET: 5; 325 TABLET ORAL at 14:06

## 2021-11-09 RX ADMIN — DIAZEPAM 2 MG: 2 TABLET ORAL at 08:42

## 2021-11-09 RX ADMIN — OXYCODONE AND ACETAMINOPHEN 1 TABLET: 5; 325 TABLET ORAL at 18:20

## 2021-11-09 RX ADMIN — OXYCODONE AND ACETAMINOPHEN 1 TABLET: 5; 325 TABLET ORAL at 08:42

## 2021-11-09 RX ADMIN — Medication 5 MG: at 22:22

## 2021-11-09 RX ADMIN — HYDROXYZINE HYDROCHLORIDE 50 MG: 25 TABLET, FILM COATED ORAL at 22:22

## 2021-11-09 RX ADMIN — CLONAZEPAM 1 MG: 0.5 TABLET ORAL at 22:22

## 2021-11-09 RX ADMIN — FAMOTIDINE 20 MG: 20 TABLET ORAL at 08:33

## 2021-11-09 RX ADMIN — QUETIAPINE FUMARATE 300 MG: 200 TABLET ORAL at 08:33

## 2021-11-09 RX ADMIN — ENOXAPARIN SODIUM 40 MG: 100 INJECTION SUBCUTANEOUS at 17:03

## 2021-11-09 RX ADMIN — NYSTATIN: 100000 POWDER TOPICAL at 09:00

## 2021-11-09 NOTE — PROGRESS NOTES
Hospitalist Progress Note-critical care note     Patient: Shanell Hinton MRN: [id number]  [facility]: [id number]    YOB: 1980  Age: 39 y.o. Sex: female    DOA: 9/11/2021 LOS:  LOS: 62 days            Chief complaint: status post trach, psychosis , wrist fracture drug overdose     Assessment/Plan         Hospital Problems  Date Reviewed: 11/2/2021          Codes Class Noted POA    FELICITY (acute kidney injury) ([facility] ICD-10-CM: N17.9  ICD-9-CM: 584.9  10/16/2021 Unknown        Bacteremia due to Gram-positive bacteria ICD-10-CM: R78.81  ICD-9-CM: 790.7  10/6/2021 Unknown        Status post tracheostomy ([facility] ICD-10-CM: Z93.0  ICD-9-CM: V44.0  9/28/2021 Unknown        Hypokalemia ICD-10-CM: E87.6  ICD-9-CM: 276.8  9/21/2021 Unknown        Increased ammonia level ICD-10-CM: R79.89  ICD-9-CM: 790.6  9/14/2021 Unknown        Psychosis ([facility] ICD-10-CM: F29  ICD-9-CM: 298.9  Unknown Unknown        Hyponatremia ICD-10-CM: E87.1  ICD-9-CM: 276.1  Unknown Yes        Acute respiratory failure with hypoxia (HCC) ICD-10-CM: J96.01  ICD-9-CM: 518.81  Unknown Yes        Left wrist fracture, with delayed healing, subsequent encounter ICD-10-CM: S62.102G  ICD-9-CM: V54.19  9/12/2021 Unknown        * (Principal) Drug overdose, intentional self-harm, initial encounter ([facility] ICD-10-CM: T50.902A  ICD-9-CM: 977.9, E950.5  9/12/2021 Yes        Hypoxia ICD-10-CM: R09.02  ICD-9-CM: 799.02  9/12/2021 Yes        Hypotension after procedure ICD-10-CM: I95.81  ICD-9-CM: 458.29  9/12/2021 Yes        Acute metabolic encephalopathy EPK-80-XG: G93.41  ICD-9-CM: 348.31  9/12/2021 Yes               Devika Goldstein is a 39 y.o. female who is standing history of multidrug use/abuse, previous drug-seeking behavior and mental health issues otherwise unspecified arrives via EMS with acute metabolic encephalopathy and lethargy. Admitted for likely gabapentin overdose.   She was intubated in ER, ct head no acute issue, LP done due to fever even on abx, no meningitis noted. she has peg and trach. she pulled out of her trach on oct 29, remained oxygen level good , now off nc oxygen     Psych reevaluate her on nov 1,2021. Looking for placement             Acute respiratory failure with hypoxia -resolving   Intubated on 9/12    Trach on 9/20/21. -pulled out on 10/29   Repeated abg is good      MRSA on sputum cx now-completed  bactrim for total 5 days per peg tube     bacteremia -completed treatment     Anemia stable-will continue monitoring      Acute metabolic encephalopathy -resolved  Ct head no acute process   Continue Seroquel     Urinary retention -zamora      elevated ammonia level  Resolved, recheck ammonia level is good      Psychosis, hx of  Ekbom's delusional parasitosis    Continue current meds      left wrist fracture: immobilized -POA -stable   Ortho   Removal of left wrist external fixator with wound debridement. Peg feedings,    Subjective: I want to go home with my sister in law     Sitter was at the bedside     Cm is working on placement   Disposition :tbd,   Review of systems:    General: No fevers or chills. Cardiovascular: No chest pain or pressure. No palpitations. Pulmonary: No shortness of breath. Gastrointestinal: No nausea, vomiting. Vital signs/Intake and Output:  Visit Vitals  /67 (BP 1 Location: Left leg, BP Patient Position: At rest)   Pulse 70   Temp 98.1 °F (36.7 °C)   Resp 18   Ht 5' 2\" (1.575 m)   Wt 64.5 kg (142 lb 4.8 oz)   SpO2 93%   BMI 26.03 kg/m²     Current Shift:  11/09 0701 - 11/09 1900  In: 340 [P.O.:340]  Out: 1300 [Urine:1300]  Last three shifts:  11/07 1901 - 11/09 0700  In: 0   Out: 300 [Urine:300]    Physical Exam:  General: WD, WN. Alert, cooperative, no acute distress    HEENT: NC, Atraumatic. PERRLA, anicteric sclerae. Trach covered with gauze   Lungs: CTA Bilaterally. No Wheezing/Rhonchi/Rales. Heart:  Regular  rhythm,  No murmur, No Rubs, No Gallops  Abdomen: Soft, Non distended, Non tender. +Bowel sounds,   Extremities: No c/c/e. Left wrist wrapped with brace   Psych:   Not anxious,no  agitated. Neurologic:  No acute neurological deficit. Labs: Results:       Chemistry No results for input(s): GLU, NA, K, CL, CO2, BUN, CREA, CA, AGAP, BUCR, TBIL, AP, TP, ALB, GLOB, AGRAT in the last 72 hours. No lab exists for component: GPT   CBC w/Diff No results for input(s): WBC, RBC, HGB, HCT, PLT, GRANS, LYMPH, EOS, HGBEXT, HCTEXT, PLTEXT, HGBEXT, HCTEXT, PLTEXT in the last 72 hours. Cardiac Enzymes No results for input(s): CPK, CKND1, ZENON in the last 72 hours. No lab exists for component: CKRMB, TROIP   Coagulation No results for input(s): PTP, INR, APTT, INREXT, INREXT in the last 72 hours. Lipid Panel No results found for: CHOL, CHOLPOCT, CHOLX, CHLST, CHOLV, 345395, HDL, HDLP, LDL, LDLC, DLDLP, 084115, VLDLC, VLDL, TGLX, TRIGL, TRIGP, TGLPOCT, CHHD, CHHDX   BNP No results for input(s): BNPP in the last 72 hours. Liver Enzymes No results for input(s): TP, ALB, TBIL, AP in the last 72 hours. No lab exists for component: SGOT, GPT, DBIL   Thyroid Studies No results found for: T4, T3U, TSH, TSHEXT, TSHEXT     Procedures/imaging: see electronic medical records for all procedures/Xrays and details which were not copied into this note but were reviewed prior to creation of Plan    IR INSERT GASTROSTOMY TUBE PERC    Result Date: 10/1/2021  PROCEDURE: PERCUTANEOUS GASTROSTOMY TUBE PLACEMENT ATTENDING: Rosalba Abbasi M.D. COMPLICATIONS: NONE MEDICATIONS: Local lidocaine. DOSE: 20 mGy (air kerma) INDICATION: Enteral nutrition PROCEDURE: Informed consent was obtained where the risks, benefits and alternatives to the procedure were explained.  All elements of maximal sterile barrier technique followed including: cap and mask and sterile gown and sterile gloves and a large sterile sheet and hand hygiene and 2% chlorhexidine for cutaneous antisepsis (or acceptable alternative antiseptics, per current guidelines). The stomach was insufflated via indwelling nasogastric tube. Following gastric insufflation and subcutaneous lidocaine administration, gastropexy was performed using T-fasteners. Then, access to the stomach was gained via 18-gauge needle. Then over wire, following tract dilation, an 12 Welsh balloon retention type gastrostomy tube was placed. Contrast injection was performed confirming intragastric tip position. The patient tolerated the procedure well and was transferred from the IR suite in stable condition. Percutaneous gastrostomy tube placement as above. XR CHEST PORT    Result Date: 10/24/2021  EXAM: XR CHEST PORT CLINICAL INDICATION/HISTORY: trach, cough, congestion -Additional: None COMPARISON: Radiographs 10/15/2021 TECHNIQUE: Portable frontal view of the chest _______________ FINDINGS: SUPPORT DEVICES: Tracheostomy catheter projects in stable position. HEART AND MEDIASTINUM: Stable cardiac size and mediastinal contours. LUNGS AND PLEURAL SPACES: No focal pneumonic opacity. No evidence of pneumothorax or pleural effusion. BONY THORAX AND SOFT TISSUES: No acute osseous abnormality appear _______________     No acute radiographic cardiopulmonary abnormality. XR CHEST PORT    Result Date: 10/15/2021  EXAM: XR CHEST PORT CLINICAL INDICATION/HISTORY: interval change -Additional: Drug overdose, respiratory failure COMPARISON: 10/8/2021 TECHNIQUE: Portable frontal view of the chest _______________ FINDINGS: SUPPORT DEVICES: Tracheostomy catheter projecting in expected position. HEART AND MEDIASTINUM: Stable cardiac size and mediastinal contours, within normal limits. LUNGS AND PLEURAL SPACES: Lungs are mildly underexpanded, but appear improved in aeration from prior examination. Minor residual linear opacities are present at each lung base. No pneumothorax or pleural effusion. No alveolar consolidation. BONY THORAX AND SOFT TISSUES: Unremarkable. _______________     1. Tracheostomy catheter in stable position. 2. Improved pulmonary expansion/aeration with minor residual atelectasis at the lung bases. XR CHEST PORT    Result Date: 10/8/2021  EXAM: One-view chest CLINICAL HISTORY: Acute respiratory failure, ET tube position , COMPARISON: None FINDINGS: Frontal view of the chest demonstrate tracheostomy tube in stable position. Minimal streaky opacities in the left lung base. Otherwise clear. . Cardiac silhouette is normal in size and contour. No acute bony or soft tissue abnormality. Minimal streakiness in the left lung base likely atelectasis. Otherwise clear. XR CHEST PORT    Result Date: 10/7/2021  EXAM: XR CHEST PORT CLINICAL INDICATION/HISTORY: Acute respiratory failure, ET tub position -Additional: None COMPARISON: 10/6/2021 TECHNIQUE: Portable frontal view of the chest _______________ FINDINGS: SUPPORT DEVICES: Tracheostomy. HEART AND MEDIASTINUM: Cardiomediastinal silhouette within normal limits. LUNGS AND PLEURAL SPACES: Hypoinflated lungs. Mild left perihilar haziness. No dense focal consolidation, large effusion or pneumothorax. _______________     Hypoinflated lungs. Mild left perihilar haziness. XR CHEST PORT    Result Date: 10/6/2021  EXAM: XR CHEST PORT CLINICAL INDICATION/HISTORY: Acute respiratory failure, ET tub position -Additional: None COMPARISON: 10/5/2021 TECHNIQUE: Portable frontal view of the chest _______________ FINDINGS: SUPPORT DEVICES: Tracheostomy unchanged. HEART AND MEDIASTINUM: Cardiomediastinal silhouette within normal limits. LUNGS AND PLEURAL SPACES: Left mid to lower lung zone parenchymal opacity. No large effusion or pneumothorax. _______________     Left mid to lower lung zone parenchymal opacity.      XR CHEST PORT    Result Date: 10/5/2021  EXAM: XR CHEST PORT CLINICAL INDICATION/HISTORY: Acute respiratory failure, ET tub position -Additional: None COMPARISON: One day prior TECHNIQUE: Portable frontal view of the chest _______________ FINDINGS: SUPPORT DEVICES: Tracheostomy. Enteric tube unchanged. HEART AND MEDIASTINUM: Cardiomediastinal silhouette within normal limits. LUNGS AND PLEURAL SPACES: Mild left lower lower lung zone opacities. No pneumothorax. _______________     Mild left lower lower lung zone opacities, unchanged. XR CHEST PORT    Result Date: 10/4/2021  EXAM: XR CHEST PORT CLINICAL INDICATION/HISTORY: hypoxia   > Additional: None. COMPARISON: October 3, 2001 TECHNIQUE: Portable chest _______________ FINDINGS: SUPPORT DEVICES: Tracheostomy tube remains unchanged. HEART AND MEDIASTINUM: Heart size accentuated by portable technique. LUNGS AND PLEURAL SPACES: The lungs are underexpanded. Slightly increased hazy left greater than right perihilar and left basilar opacities. No pneumothorax. BONY THORAX AND SOFT TISSUES: No acute osseous abnormality. _______________     Ongoing increased left greater than right perihilar and left basilar opacities may represent combination of worsening asymmetric pulmonary edema and infiltrate with atelectasis. Possible trace left pleural effusion. XR CHEST PORT    Result Date: 10/3/2021  A portable AP radiograph of the chest was obtained at 0606 hours: INDICATION:  Drug overdose. COMPARISON:  Portable chest one day earlier. FINDINGS:  Heart and mediastinum: Tracheostomy tube in place. Lungs and pleura: Increasing hazy changes left base possibly with developing minimal effusion. Right lung remains clear. Aorta: Unremarkable. Bones: Within normal limits for age. Other: None. Increasing opacities in the left lung base could be due to worsening atelectasis or developing infiltrate with possible developing effusion. XR CHEST PORT    Result Date: 10/2/2021  EXAM: Chest radiograph  CLINICAL INDICATION/HISTORY: Acute respiratory failure, mechanical ventilation    --Additional history: None.   COMPARISON: 10/01/2021 TECHNIQUE: Frontal view of the chest _______________ FINDINGS: SUPPORT DEVICES: Tracheostomy in stable position. Interval removal of the esophagogastric tube and placement of a percutaneous gastrostomy tube. Numerous telemetry leads project over the chest. HEART AND MEDIASTINUM: Cardiac silhouette is normal in size. Normal mediastinal contours . Normal pulmonary vasculature. LUNGS AND PLEURAL SPACES: Minimal left base opacity. The right lung is clear. Sharp costophrenic sulci. No pneumothoraces. BONY THORAX AND SOFT TISSUES: Unremarkable. _______________     1. Support lines and tubes as detailed above. 2. Minimal left base subsegmental atelectasis/airspace disease. XR CHEST PORT    Result Date: 10/1/2021  EXAM: XR CHEST PORT CLINICAL INDICATION/HISTORY: Acute respiratory failure, ET tub position -Additional: None COMPARISON: One day prior TECHNIQUE: Portable frontal view of the chest _______________ FINDINGS: SUPPORT DEVICES: Tracheostomy. Enteric tube unchanged. HEART AND MEDIASTINUM: Cardiomediastinal silhouette within normal limits. LUNGS AND PLEURAL SPACES: Mild left lower lower lung zone opacities. No pneumothorax. _______________     Mild left lower lower lung zone opacities. DUPLEX UPPER EXT VENOUS RIGHT    Result Date: 10/18/2021  · No evidence of acute DVT and chronic DVT in the right upper extremity. · Thrombus noted within the right basilic upper arm vein(s). DUPLEX LOWER EXT VENOUS BILAT    Result Date: 10/4/2021  · No evidence of deep vein thrombosis in the right lower extremity. · No evidence of deep vein thrombosis in the left lower extremity.         Cynthia Goldman MD

## 2021-11-09 NOTE — PROGRESS NOTES
Problem: Mobility Impaired (Adult and Pediatric)  Goal: *Acute Goals and Plan of Care (Insert Text)  Description: Physical Therapy Goals  Initiated 10/19/2021, reviewed and updated 11/8/2021 and to be accomplished within 7 day(s)  1. Patient will transfer from bed to chair and chair to bed with supervision. 2.  Patient will ambulate with supervision/set-up for 500 feet with the least restrictive device. 3.  Patient will ascend/descend 4 stairs with 1 handrail(s) with minimal assistance/contact guard assist.    PLOF: independent with mobility without an assistive device     Outcome: Progressing Towards Goal   PHYSICAL THERAPY TREATMENT    Patient: Zoe Mondragon (36 y.o. female)  Date: 11/9/2021  Diagnosis: Drug overdose, intentional self-harm, initial encounter ([facility] [place]  Left wrist fracture, with delayed healing, subsequent encounter [S62.102G]   Drug overdose, intentional self-harm, initial encounter ([facility]  Procedure(s) (LRB):  LEFT WRIST OPEN REDUCTION INTERNAL FIXATION. REMOVAL OF X-FIX WITH C-ARM. REP VALERIA MARCELO  (Left) 6 Days Post-Op  Precautions: Fall, Contact  PLOF: see above    ASSESSMENT:  Pt sleeping on arrival and difficult to arouse, with much encouragement she agreed to participate. Pt performed bed mobility independently and sit to stand transfer with supervision. Pt ambulated 10 feet with CGA to the bathroom. Pt then ambulated 200 feet with CGA and fluctuations of wide base of support and scissoring. After ambulated patient performed exercises as noted below prior to returning to sidelying in bed. Pt was left with needs in reach and sitter present. Progression toward goals:   []      Improving appropriately and progressing toward goals  [x]      Improving slowly and progressing toward goals  []      Not making progress toward goals and plan of care will be adjusted     PLAN:  Patient continues to benefit from skilled intervention to address the above impairments. Continue treatment per established plan of care. Discharge Recommendations:  Home Health with 24 hour supervision  Further Equipment Recommendations for Discharge:  N/A     SUBJECTIVE:   Patient stated I just want to sleep all day.     OBJECTIVE DATA SUMMARY:   Critical Behavior:  Neurologic State: Alert, Appropriate for age  Orientation Level: Oriented X4  Cognition: Follows commands, Poor safety awareness, Impulsive, Impaired decision making  Safety/Judgement: Decreased awareness of environment, Decreased awareness of need for assistance, Decreased awareness of need for safety, Decreased insight into deficits, Fall prevention  Functional Mobility Training:  Bed Mobility:  Supine to Sit: Independent  Sit to Supine: Independent  Transfers:  Sit to Stand: Supervision  Stand to Sit: Supervision  Balance:  Sitting: Intact  Standing: Without support  Standing - Static: Fair  Standing - Dynamic :  (fair/fair-)   Ambulation/Gait Training:  Distance (ft): 200 Feet (ft)  Assistive Device:  (none)  Ambulation - Level of Assistance: Contact guard assistance  Gait Abnormalities: Decreased step clearance;  Path deviations; Scissoring  Base of Support: Widened  Therapeutic Exercises:         EXERCISE   Sets   Reps   Active Active Assist   Passive Self ROM   Comments   Ankle Pumps    [] [] [] []    Quad Sets/Glut Sets    [] [] [] [] Hold for 5 secs   Hamstring Sets   [] [] [] []    Short Arc Quads   [] [] [] []    Heel Slides   [] [] [] []    Straight Leg Raises   [] [] [] []    Hip Add   [] [] [] [] Hold for 5 secs, w/ pillow squeeze   Long Arc Quads 1 10 [x] [] [] []    Seated Marching 1 10 [] [] [] []    Standing Marching 1 10 [x] [] [] [] With assistance for balance   Sit to stand without UE support 1 5 [x] [] [] []        Pain:  Pain level pre-treatment: 3/10  Pain level post-treatment: 3/10   Pain Intervention(s):  Rest  Response to intervention: pt sleeping as soon as she lay down    Activity Tolerance: Poor+/fair-  Please refer to the flowsheet for vital signs taken during this treatment. After treatment:   [] Patient left in no apparent distress sitting up in chair  [x] Patient left in no apparent distress in bed  [x] Call bell left within reach  [x] Nursing notified  [x] Caregiver present  [] Bed alarm activated  [] SCDs applied      COMMUNICATION/EDUCATION:   [x]         Role of Physical Therapy in the acute care setting. [x]         Fall prevention education was provided and the patient/caregiver indicated understanding. [x]         Patient/family have participated as able in working toward goals and plan of care. []         Patient/family agree to work toward stated goals and plan of care. [x]         Patient understands intent and goals of therapy, but is neutral about his/her participation.   []         Patient is unable to participate in stated goals/plan of care: ongoing with therapy staff.  []         Other:        Toyin Stark, PT   Time Calculation: 27 mins

## 2021-11-09 NOTE — PROGRESS NOTES
DC Plan: home w/ family vs placement; TBD    Care manager received message that a person identifying herself as patient's sister in law, Timothy Bates ([phone]) was willing to take patient home with her. Care manager has called phone number to verify this information  - no answer, no voice mail. Care manager left message on phone of sister Ethelda Sandifer [phone] to verify phone number for Timothy Bates and have asked for call back.

## 2021-11-09 NOTE — ROUTINE PROCESS
1922  Bedside and Verbal shift change report given to Gabby Rose RN (oncoming nurse) by Jones Holly RN (offgoing nurse). Report included the following information SBAR, Kardex and MAR.
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
## 2021-11-09 NOTE — PROGRESS NOTES
Bedside and Verbal shift change report given to Eduar Street RN (oncoming nurse) by Michelle Fields RN (offgoing nurse). Report included the following information SBAR, Kardex, ED Summary, Intake/Output, MAR, Recent Results and Med Rec Status. 1935  Shift assessment complete  Pt resting quietly with eyes open and chest rising and falling evenly   Bed locked and in lowest position   Call light within reach     [address] [place] [address] Cardiac/Medical Night Shift Chart Audit    Chart Audit completed? YES          Shift uneventful     Bedside and Verbal shift change report given to Rashaun Tijerina RN (oncoming nurse) by Eduar Street RN (offgoing nurse).

## 2021-11-09 NOTE — PROGRESS NOTES
OT note: Attempt for OT session. Pt sleeping soundly and unable to rouse with verbal cues. Sitter present at bedside and requests to let pt rest at this time. To be followed later as pt schedule permits.

## 2021-11-09 NOTE — PROGRESS NOTES
Problem: Ventilator Management  Goal: *Adequate oxygenation and ventilation  Outcome: Progressing Towards Goal  Goal: *Patient maintains clear airway/free of aspiration  Outcome: Progressing Towards Goal  Goal: *Absence of infection signs and symptoms  Outcome: Progressing Towards Goal  Goal: *Normal spontaneous ventilation  Outcome: Progressing Towards Goal     Problem: Patient Education: Go to Patient Education Activity  Goal: Patient/Family Education  Outcome: Progressing Towards Goal     Problem: Non-Violent Restraints  Goal: Removal from restraints as soon as assessed to be safe  Outcome: Progressing Towards Goal  Goal: No harm/injury to patient while restraints in use  Outcome: Progressing Towards Goal  Goal: Patient's dignity will be maintained  Outcome: Progressing Towards Goal  Goal: Patient Interventions  Outcome: Progressing Towards Goal     Problem: Falls - Risk of  Goal: *Absence of Falls  Description: Document Kenneth Flow Fall Risk and appropriate interventions in the flowsheet.   Outcome: Progressing Towards Goal  Note: Fall Risk Interventions:  Mobility Interventions: Assess mobility with egress test, Communicate number of staff needed for ambulation/transfer, Patient to call before getting OOB    Mentation Interventions: Adequate sleep, hydration, pain control, Door open when patient unattended, Update white board    Medication Interventions: Teach patient to arise slowly, Patient to call before getting OOB    Elimination Interventions: Call light in reach, Patient to call for help with toileting needs    History of Falls Interventions: Evaluate medications/consider consulting pharmacy         Problem: Patient Education: Go to Patient Education Activity  Goal: Patient/Family Education  Outcome: Progressing Towards Goal     Problem: Risk for Spread of Infection  Goal: Prevent transmission of infectious organism to others  Description: Prevent the transmission of infectious organisms to other patients, staff members, and visitors. Outcome: Progressing Towards Goal     Problem: Patient Education:  Go to Education Activity  Goal: Patient/Family Education  Outcome: Progressing Towards Goal     Problem: Pressure Injury - Risk of  Goal: *Prevention of pressure injury  Description: Document Remy Scale and appropriate interventions in the flowsheet.   Outcome: Progressing Towards Goal  Note: Pressure Injury Interventions:  Sensory Interventions: Assess changes in LOC, Pressure redistribution bed/mattress (bed type)    Moisture Interventions: Absorbent underpads    Activity Interventions: Pressure redistribution bed/mattress(bed type)    Mobility Interventions: Pressure redistribution bed/mattress (bed type)    Nutrition Interventions: Document food/fluid/supplement intake    Friction and Shear Interventions: HOB 30 degrees or less                Problem: Patient Education: Go to Patient Education Activity  Goal: Patient/Family Education  Outcome: Progressing Towards Goal     Problem: Patient Education: Go to Patient Education Activity  Goal: Patient/Family Education  Outcome: Progressing Towards Goal     Problem: Patient Education: Go to Patient Education Activity  Goal: Patient/Family Education  Outcome: Progressing Towards Goal     Problem: Pain  Goal: *Control of Pain  Outcome: Progressing Towards Goal  Goal: *PALLIATIVE CARE:  Alleviation of Pain  Outcome: Progressing Towards Goal     Problem: Patient Education: Go to Patient Education Activity  Goal: Patient/Family Education  Outcome: Progressing Towards Goal     Problem: Patient Education: Go to Patient Education Activity  Goal: Patient/Family Education  Outcome: Progressing Towards Goal

## 2021-11-09 NOTE — PROGRESS NOTES
Cm followed up with patient's  Mrs. Strickland [phone] from [facility], she informed cm that she has spoken with Mrs. Otto Morrell over the phone and informed her that she has found a place for her to go, its called [facility] in [facility] [place] via [email or web address] has been scheduled for tomorrow at 1030 AM with pt and Mrs Reuben Ken from [facility] accepting facility,this cm will work on requested [facility] , unit PERFETCO Arndt has been updated.

## 2021-11-10 PROCEDURE — 74011250637 HC RX REV CODE- 250/637: Performed by: INTERNAL MEDICINE

## 2021-11-10 PROCEDURE — 97168 OT RE-EVAL EST PLAN CARE: CPT

## 2021-11-10 PROCEDURE — 74011250637 HC RX REV CODE- 250/637: Performed by: HOSPITALIST

## 2021-11-10 PROCEDURE — 74011250636 HC RX REV CODE- 250/636: Performed by: FAMILY MEDICINE

## 2021-11-10 PROCEDURE — 86580 TB INTRADERMAL TEST: CPT | Performed by: HOSPITALIST

## 2021-11-10 PROCEDURE — 74011250637 HC RX REV CODE- 250/637: Performed by: FAMILY MEDICINE

## 2021-11-10 PROCEDURE — 74011000250 HC RX REV CODE- 250: Performed by: HOSPITALIST

## 2021-11-10 PROCEDURE — 65660000000 HC RM CCU STEPDOWN

## 2021-11-10 PROCEDURE — 97110 THERAPEUTIC EXERCISES: CPT

## 2021-11-10 RX ORDER — ACETAMINOPHEN 500 MG
1000 TABLET ORAL
Status: DISCONTINUED | OUTPATIENT
Start: 2021-11-10 | End: 2021-11-13 | Stop reason: HOSPADM

## 2021-11-10 RX ORDER — OLANZAPINE 2.5 MG/1
5 TABLET ORAL EVERY EVENING
Status: DISCONTINUED | OUTPATIENT
Start: 2021-11-10 | End: 2021-11-13 | Stop reason: HOSPADM

## 2021-11-10 RX ORDER — FENTANYL 25 UG/1
1 PATCH TRANSDERMAL
Status: DISCONTINUED | OUTPATIENT
Start: 2021-11-10 | End: 2021-11-11

## 2021-11-10 RX ORDER — DIAZEPAM 2 MG/1
2 TABLET ORAL ONCE
Status: COMPLETED | OUTPATIENT
Start: 2021-11-10 | End: 2021-11-10

## 2021-11-10 RX ADMIN — TUBERCULIN PURIFIED PROTEIN DERIVATIVE 5 UNITS: 5 INJECTION, SOLUTION INTRADERMAL at 17:02

## 2021-11-10 RX ADMIN — DIAZEPAM 2 MG: 2 TABLET ORAL at 10:34

## 2021-11-10 RX ADMIN — Medication 1 TABLET: at 09:52

## 2021-11-10 RX ADMIN — ACETAMINOPHEN 500 MG: 500 TABLET ORAL at 14:16

## 2021-11-10 RX ADMIN — FAMOTIDINE 20 MG: 20 TABLET ORAL at 09:53

## 2021-11-10 RX ADMIN — NYSTATIN: 100000 POWDER TOPICAL at 10:00

## 2021-11-10 RX ADMIN — QUETIAPINE FUMARATE 600 MG: 200 TABLET ORAL at 20:11

## 2021-11-10 RX ADMIN — QUETIAPINE FUMARATE 300 MG: 200 TABLET ORAL at 09:52

## 2021-11-10 RX ADMIN — ENOXAPARIN SODIUM 40 MG: 100 INJECTION SUBCUTANEOUS at 17:00

## 2021-11-10 RX ADMIN — OLANZAPINE 5 MG: 2.5 TABLET, FILM COATED ORAL at 18:00

## 2021-11-10 RX ADMIN — HYDROXYZINE HYDROCHLORIDE 50 MG: 25 TABLET, FILM COATED ORAL at 09:53

## 2021-11-10 RX ADMIN — Medication 5 MG: at 20:10

## 2021-11-10 RX ADMIN — HYDROXYZINE HYDROCHLORIDE 50 MG: 25 TABLET, FILM COATED ORAL at 20:11

## 2021-11-10 RX ADMIN — CLONAZEPAM 1 MG: 0.5 TABLET ORAL at 16:22

## 2021-11-10 RX ADMIN — NYSTATIN: 100000 POWDER TOPICAL at 18:05

## 2021-11-10 RX ADMIN — HYDROXYZINE HYDROCHLORIDE 50 MG: 25 TABLET, FILM COATED ORAL at 16:22

## 2021-11-10 NOTE — PROGRESS NOTES
Problem: Self Care Deficits Care Plan (Adult)  Goal: *Acute Goals and Plan of Care (Insert Text)  Description: Occupational Therapy Goals  Initiated 10/19/2021 within 3 day(s). 1.  Patient will perform grooming with minimal assistance/contact guard assist seated EOB. 2.  Patient will perform upper body dressing with supervision/set-up. 3.  Patient will participate in upper extremity therapeutic exercise/activities with minimal assistance/contact guard assist for 10 minutes. 4.  Patient will utilize energy conservation techniques during functional activities with verbal, visual, and tactile cues. Occupational Therapy Goals  Reviewed 11/3/2021 within 7 day(s). 1.  Patient will perform grooming with supervision/set-up standing at sink for 5 minutes or more. GM  2. Patient will perform upper body dressing with supervision/set-up. GM  3. Patient will perform lower body dressing with supervision/set-up. progressing  4. Patient will perform toilet transfers with supervision/set-up. progressing  5. Patient will perform all aspects of toileting with supervision/set-up. progressing  6. Patient will participate in upper extremity therapeutic exercise/activities with supervision/set-up for 10 minutes. progressing  7. Patient will utilize energy conservation techniques during functional activities with verbal, visual, and tactile cues. Progressing    Occupational Therapy Goals  Reviewed 11/10/2021, within 7 days    1. Patient will perform lower body dressing with supervision/set-up. 2.  Patient will perform toilet transfers with supervision/set-up. 3.  Patient will perform all aspects of toileting with supervision/set-up. 4.  Patient will participate in upper extremity therapeutic exercise/activities with supervision/set-up for 10 minutes. 5.  Patient will utilize energy conservation techniques during functional activities with verbal, visual, and tactile cues.      Outcome: Progressing Towards Goal    OCCUPATIONAL THERAPY RE-EVALUATION    Patient: Zoe Mondragon (34 y.o. female)  Date: 11/10/2021  Primary Diagnosis: Drug overdose, intentional self-harm, initial encounter [facility] [place]  Left wrist fracture, with delayed healing, subsequent encounter [S62.102G]  Procedure(s) (LRB):  LEFT WRIST OPEN REDUCTION INTERNAL FIXATION. REMOVAL OF X-FIX WITH [facility]ARM. [facility]  (Left) 7 Days Post-Op   Precautions:   Fall  PLOF:     ASSESSMENT :  Based on the objective data described below, the patient presents with decreased AROM and FM function at R hand. Pt presented supine in bed and agrees to participate with therapy. Pt performed AROM exercises for UEs, bed mobility, supine<>sit, sit<>stand and self feeding during this session. Pt reports difficulty with FM function at R but able to perform self feeding independently. Pt was left long sitting in bed at the end of session in [facility]. Patient will benefit from skilled intervention to address the above impairments.   Patient's rehabilitation potential is considered to be Good  Factors which may influence rehabilitation potential include:   []             None noted  [x]             Mental ability/status  []             Medical condition  []             Home/family situation and support systems  [x]             Safety awareness  []             Pain tolerance/management  []             Other:      PLAN :  Recommendations and Planned Interventions:   [x]               Self Care Training                  [x]      Therapeutic Activities  [x]               Functional Mobility Training   []      Cognitive Retraining  [x]               Therapeutic Exercises           [x]      Endurance Activities  [x]               Balance Training                    []      Neuromuscular Re-Education  []               Visual/Perceptual Training     [x]      Home Safety Training  [x]               Patient Education                   [x]      Family Training/Education  [] Other (comment):    Frequency/Duration: Patient will be followed by occupational therapy 1-2 times per day/4-7 days per week to address goals. Discharge Recommendations: Home Health vs [facility]  Further Equipment Recommendations for Discharge: N/A     SUBJECTIVE:   Patient stated  I can get up.     OBJECTIVE DATA SUMMARY:   Hospital course since last seen and reason for reevaluation: update POC  Past Medical History:   Diagnosis Date    Asthma     Bilateral ovarian cysts     Cocaine abuse ([facility] [address]     Mental and behavioral problem     Pancreatitis     Pancreatitis     Psychosis ([facility] [address]      Past Surgical History:   Procedure Laterality Date    HX GYN      D&C, Hysterectomy    IR INSERT GASTROSTOMY TUBE PERC  10/1/2021     Barriers to Learning/Limitations: yes;  cognitive and altered mental status (i.e.Sedation, Confusion)  Compensate with: visual, verbal, tactile, kinesthetic cues/model    Home Situation:   Home Situation  Support Systems: Other Family Member(s)  []  Right hand dominant   []  Left hand dominant    Cognitive/Behavioral Status:  Neurologic State: Alert  Orientation Level: Oriented X4  Cognition: Decreased command following; Decreased attention/concentration  Safety/Judgement: Decreased awareness of environment; Decreased awareness of need for assistance; Decreased awareness of need for safety; Decreased insight into deficits; Fall prevention    Skin: intact  Edema: none    Vision/Perceptual:    Tracking: Able to track stimulus in all quadrants w/o difficulty    Coordination: BUE  Coordination: Generally decreased, functional  Fine Motor Skills-Upper: Left Intact; Right Intact    Gross Motor Skills-Upper: Left Intact; Right Intact  Balance:  Sitting: Intact  Sitting - Static: Good (unsupported)  Sitting - Dynamic: Good (unsupported)  Standing: Intact;  Without support  Standing - Static: Good  Standing - Dynamic : Fair (+)  Strength: BUE  Strength: Generally decreased, functional  Tone & Sensation: BUE  Tone: Normal  Sensation: Intact  Range of Motion: BUE  AROM: Generally decreased, functional    RUE AROM  R Digital Flexion: 10  R Digital Extension: 5  R Digital Opposition: 20  Right Hand Grasp: Yes  Functional Mobility and Transfers for ADLs:  Bed Mobility:  Supine to Sit: Supervision  Sit to Supine: Supervision  Scooting: Supervision  Transfers:  Sit to Stand: Supervision  Stand to Sit: Supervision   Toilet Transfer : Supervision; Stand-by assistance  ADL Assessment:   Feeding: Setup; Supervision    Oral Facial Hygiene/Grooming: Setup; Supervision    Upper Body Dressing: Supervision    Lower Body Dressing: Supervision    Toileting: Supervision  ADL Intervention:  Feeding  Feeding Assistance: Supervision  Container Management: Supervision  Utensil Management: Supervision  Food to Mouth: Supervision    Upper Body Dressing Assistance  Dressing Assistance: Supervision  Hospital Gown: Supervision    Cognitive Retraining  Safety/Judgement: Decreased awareness of environment; Decreased awareness of need for assistance; Decreased awareness of need for safety; Decreased insight into deficits; Fall prevention  Pain:  Pain level pre-treatment: 0/10   Pain level post-treatment: 0/10  Pain Intervention(s): Medication (see MAR); Rest, Ice, Repositioning   Response to intervention: Nurse notified, See doc flow    Activity Tolerance:   Fair     Please refer to the flowsheet for vital signs taken during this treatment. After treatment:   [] Patient left in no apparent distress sitting up in chair  [x] Patient left in no apparent distress in bed  [x] Call bell left within reach  [x] Nursing notified  [x] Sitter present  [] Bed alarm activated    COMMUNICATION/EDUCATION:   [x] Role of Occupational Therapy in the acute care setting  [x] Home safety education was provided and the patient/caregiver indicated understanding.   [] Patient/family have participated as able in goal setting and plan of care.  [] Patient/family agree to work toward stated goals and plan of care. [] Patient understands intent and goals of therapy, but is neutral about his/her participation. [x] Patient is unable to participate in goal setting and plan of care.     Thank you for this referral.  Ernesto Arce, OTR/L  Time Calculation: 20 mins

## 2021-11-10 NOTE — PROGRESS NOTES
Problem: Ventilator Management  Goal: *Adequate oxygenation and ventilation  Outcome: Progressing Towards Goal  Goal: *Patient maintains clear airway/free of aspiration  Outcome: Progressing Towards Goal  Goal: *Absence of infection signs and symptoms  Outcome: Progressing Towards Goal  Goal: *Normal spontaneous ventilation  Outcome: Progressing Towards Goal     Problem: Patient Education: Go to Patient Education Activity  Goal: Patient/Family Education  Outcome: Progressing Towards Goal     Problem: Non-Violent Restraints  Goal: Removal from restraints as soon as assessed to be safe  Outcome: Progressing Towards Goal  Goal: No harm/injury to patient while restraints in use  Outcome: Progressing Towards Goal  Goal: Patient's dignity will be maintained  Outcome: Progressing Towards Goal  Goal: Patient Interventions  Outcome: Progressing Towards Goal     Problem: Falls - Risk of  Goal: *Absence of Falls  Description: Document Vipul Wilson Fall Risk and appropriate interventions in the flowsheet. Outcome: Progressing Towards Goal  Variance Patient Condition  Note: Fall Risk Interventions:  Mobility Interventions: Assess mobility with egress test    Mentation Interventions: Adequate sleep, hydration, pain control    Medication Interventions: Assess postural VS orthostatic hypotension    Elimination Interventions: Patient to call for help with toileting needs    History of Falls Interventions: Vital signs minimum Q4HRs X 24 hrs (comment for end date)         Problem: Patient Education: Go to Patient Education Activity  Goal: Patient/Family Education  Outcome: Progressing Towards Goal     Problem: Risk for Spread of Infection  Goal: Prevent transmission of infectious organism to others  Description: Prevent the transmission of infectious organisms to other patients, staff members, and visitors.   Outcome: Progressing Towards Goal     Problem: Patient Education:  Go to Education Activity  Goal: Patient/Family Education  Outcome: Progressing Towards Goal     Problem: Pressure Injury - Risk of  Goal: *Prevention of pressure injury  Description: Document Remy Scale and appropriate interventions in the flowsheet.   Outcome: Progressing Towards Goal  Variance Patient Condition  Note: Pressure Injury Interventions:  Sensory Interventions: Minimize linen layers    Moisture Interventions: Absorbent underpads, Minimize layers    Activity Interventions: Increase time out of bed    Mobility Interventions: HOB 30 degrees or less    Nutrition Interventions: Document food/fluid/supplement intake    Friction and Shear Interventions: HOB 30 degrees or less                Problem: Patient Education: Go to Patient Education Activity  Goal: Patient/Family Education  Outcome: Progressing Towards Goal     Problem: Patient Education: Go to Patient Education Activity  Goal: Patient/Family Education  Outcome: Progressing Towards Goal     Problem: Patient Education: Go to Patient Education Activity  Goal: Patient/Family Education  Outcome: Progressing Towards Goal     Problem: Patient Education: Go to Patient Education Activity  Goal: Patient/Family Education  Outcome: Progressing Towards Goal     Problem: Pain  Goal: *Control of Pain  Outcome: Progressing Towards Goal  Goal: *PALLIATIVE CARE:  Alleviation of Pain  Outcome: Progressing Towards Goal     Problem: Patient Education: Go to Patient Education Activity  Goal: Patient/Family Education  Outcome: Progressing Towards Goal     Problem: Patient Education: Go to Patient Education Activity  Goal: Patient/Family Education  Outcome: Progressing Towards Goal

## 2021-11-10 NOTE — PROGRESS NOTES
Bedside and Verbal shift change report given to Calvin Campbell RN (oncoming nurse) by Remy Agarwal RN (offgoing nurse). Report included the following information SBAR, Kardex, ED Summary, Intake/Output, MAR, Recent Results and Med Rec Status. 1943  Shift assessment complete  Pt resting quietly with eyes open and chest rising and falling evenly   No c/o pain or signs of distress  Bed locked and in lowest position   Call light within reach   Pt c/o anxiety and puling at lines - prn ativan given     Sitter at bedside       [facility] Cardiac/Medical Night Shift Chart Audit    Chart Audit completed? YES    Bedside and Verbal shift change report given to Raul Ramirez RN (oncoming nurse) by Calvin Campbell RN (offgoing nurse). Report included the following information SBAR, Kardex, ED Summary, Intake/Output, MAR, Recent Results and Med Rec Status.

## 2021-11-10 NOTE — PROGRESS NOTES
Occupational Therapy Treatment Attempt     Chart reviewed. Attempted Occupational Therapy Treatment, however, patient unable to be seen due to:  []  Nausea/vomiting  []  Eating  []  Pain  []  Patient too lethargic  []  Off Unit for testing/procedure  []  Dialysis treatment in progress  []  Telemetry Results  [x]  Other:  Pt reports recently got a lidocaine patch and wants to rest a while. Requests to return later. Will f/u later as patient's schedule allows.   Funmilayo Rosa, OTR/L

## 2021-11-10 NOTE — PROGRESS NOTES
Problem: Ventilator Management  Goal: *Adequate oxygenation and ventilation  Outcome: Progressing Towards Goal  Goal: *Patient maintains clear airway/free of aspiration  Outcome: Progressing Towards Goal  Goal: *Absence of infection signs and symptoms  Outcome: Progressing Towards Goal  Goal: *Normal spontaneous ventilation  Outcome: Progressing Towards Goal     Problem: Non-Violent Restraints  Goal: Removal from restraints as soon as assessed to be safe  Outcome: Progressing Towards Goal  Goal: No harm/injury to patient while restraints in use  Outcome: Progressing Towards Goal  Goal: Patient's dignity will be maintained  Outcome: Progressing Towards Goal  Goal: Patient Interventions  Outcome: Progressing Towards Goal     Problem: Falls - Risk of  Goal: *Absence of Falls  Description: Document Shahram Luther Fall Risk and appropriate interventions in the flowsheet. Outcome: Progressing Towards Goal  Note: Fall Risk Interventions:  Mobility Interventions: Assess mobility with egress test, Communicate number of staff needed for ambulation/transfer, Patient to call before getting OOB    Mentation Interventions: Adequate sleep, hydration, pain control, Door open when patient unattended    Medication Interventions: Teach patient to arise slowly, Patient to call before getting OOB    Elimination Interventions: Call light in reach, Patient to call for help with toileting needs    History of Falls Interventions: Door open when patient unattended         Problem: Patient Education: Go to Patient Education Activity  Goal: Patient/Family Education  Outcome: Progressing Towards Goal     Problem: Risk for Spread of Infection  Goal: Prevent transmission of infectious organism to others  Description: Prevent the transmission of infectious organisms to other patients, staff members, and visitors.   Outcome: Progressing Towards Goal     Problem: Pressure Injury - Risk of  Goal: *Prevention of pressure injury  Description: Document Remy Scale and appropriate interventions in the flowsheet.   Outcome: Progressing Towards Goal     Problem: Patient Education: Go to Patient Education Activity  Goal: Patient/Family Education  Outcome: Progressing Towards Goal     Problem: Patient Education: Go to Patient Education Activity  Goal: Patient/Family Education  Outcome: Progressing Towards Goal     Problem: Patient Education: Go to Patient Education Activity  Goal: Patient/Family Education  Outcome: Progressing Towards Goal     Problem: Patient Education: Go to Patient Education Activity  Goal: Patient/Family Education  Outcome: Progressing Towards Goal     Problem: Pain  Goal: *Control of Pain  Outcome: Progressing Towards Goal  Goal: *PALLIATIVE CARE:  Alleviation of Pain  Outcome: Progressing Towards Goal     Problem: Patient Education: Go to Patient Education Activity  Goal: Patient/Family Education  Outcome: Progressing Towards Goal     Problem: Patient Education: Go to Patient Education Activity  Goal: Patient/Family Education  Outcome: Progressing Towards Goal

## 2021-11-10 NOTE — PROGRESS NOTES
Comprehensive Nutrition Assessment    Type and Reason for Visit: Reassess    Nutrition Recommendations/Plan: Continue current diet and POC    Nutrition Assessment:  PMHx: drug use/abuse. Pt admitted for gabapentin overdose. Removed trach 10/29- left out b/c O2 saturation was adequate. S/P removal of left wrist external fixator and wound debridement 11/3. Labs and meds reviewed- pepcid, lactulose, MVI, zofran, miralax    Malnutrition Assessment:  Malnutrition Status: Moderate malnutrition      Estimated Daily Nutrient Needs:  Energy (kcal): 1500; Weight Used for Energy Requirements: Current  Protein (g): 65; Weight Used for Protein Requirements: Current (1g)  Fluid (ml/day): 1500; Method Used for Fluid Requirements: 1 ml/kcal      Nutrition Related Findings:  Curent diet easy to chew. PO intake for past 2 days shows 26-50 x2 and  x 1. Spoke to pt- was not happy about having an easy to chew diet. Stated she has been eating most/all of her meals but is not getting large enough portions and was not happy with the texture modification. Stated appetite has been good. Continue current diet per last SLP note. Advance diet as appropriate. Wounds:    Surgical incision       Current Nutrition Therapies:  ADULT DIET Easy to Chew    Anthropometric Measures:  Height:  5' 2\" (157.5 cm)  Current Body Wt:  64.5 kg (142 lb 4.8 oz)   Admission Body Wt:  160 lb    Usual Body Wt:  72 kg (158 lb 11.7 oz)     Ideal Body Wt:  110 lbs:  129.4 %   BMI Category: Overweight (BMI 25.0-29. 9)       Nutrition Diagnosis:   Predicted inadequate energy intake related to acute injury/trauma as evidenced by weight loss greater than or equal to 5% in 1 month (11% wt loss in pst 3 weeks since 10/20.)    Nutrition Interventions:   Food and/or Nutrient Delivery: Continue current diet  Nutrition Education and Counseling: No recommendations at this time  Coordination of Nutrition Care: Continue to monitor while inpatient    Goals:  Pt will continue with PO intake >50% for most meals throughout the next 4-5 days.        Nutrition Monitoring and Evaluation:   Behavioral-Environmental Outcomes: None identified  Food/Nutrient Intake Outcomes: Food and nutrient intake  Physical Signs/Symptoms Outcomes: Biochemical data, Meal time behavior, GI status, Weight, Skin    Discharge Planning:    Continue current diet     Electronically signed by Anselmo Murphy RD on 11/10/2021 at 2:10 PM

## 2021-11-10 NOTE — PROGRESS NOTES
PT session held due to:  [x]  CM preparing video conference for D/c planning   []  RN Communication/ suggestion  []  Extreme Pain  []  Dialysis treatment in progress. Will f/u later as pt's schedule allows. Thank you. Cierra Olsen PTA    Pt refused PT due to:    [x]  \"I'm not doing it today. \"   []  Eating  []  Pain  []  Pt lethargic  []  Off Unit  Will f/u tomorrow. Thank you.   Cierra Olsen PTA

## 2021-11-10 NOTE — PROGRESS NOTES
Hospitalist Progress Note    Patient: Zoe Mondragon MRN: [id number]  CSN: [id number]    YOB: 1980  Age: 39 y.o.   Sex: female    DOA: 9/11/2021 LOS:  LOS: 61 days          Chief Complaint:    overdose      Assessment/Plan     38 yo female with extended hospital stay due to severe illness after medicine overdose and resp failure    Required temporary tracheostomy and feeding tube    Now she has no trach, pulled it out, but has done fine w/o it, and she is eating soft diet and thus feeding tube can be removed also    Chronic psychiatric disorders-including parasitosis, seen by psych here and recommended to continue klonopin, wean to as needed only, and continue BID seroquel  Poor social support situation at home, ( incarcerated)    Wean her pain patch to 25 mcg today and to off for d/c, as her wrist fracture is stabilized and she is outside of the acute post-op period  Stop percocet and use tylenol PRN pain now  Klonopin TID PRN only      One time dose valium before feeding tube removed due to her severe anxiety about peocedures    Encephalopathy resolved    weakness improved    Move towards safe d/c to group home, continue weaning narcotic meds for her safe discharge      Disposition :  Patient Active Problem List   Diagnosis Code    Dextromethorphan use disorder, moderate ([address] F19.20    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    Left wrist fracture, with delayed healing, subsequent encounter S62.102G    Drug overdose, intentional self-harm, initial encounter ([address] T50.902A    Hypoxia R09.02    Hypotension after procedure I95.81    Acute metabolic encephalopathy P31.34    Psychosis ([address] F29    Hyponatremia E87.1    Acute respiratory failure with hypoxia ([address] J96.01    Increased ammonia level R79.89    Hypokalemia E87.6    Status post tracheostomy ([address] Z93.0    Bacteremia due to Gram-positive bacteria R78.81    FELICITY (acute kidney injury) ([address] N17.9       Subjective:  I am fine  I am nervous about my tube being removed, will it hurt? ?? Review of systems:    Constitutional: denies fevers  Respiratory: denies SOB  Gastrointestinal: denies nausea, vomiting, diarrhea      Vital signs/Intake and Output:  Visit Vitals  BP (!) 94/54 (BP 1 Location: Right upper arm, BP Patient Position: At rest;Lying)   Pulse 70   Temp 98.2 °F (36.8 °C)   Resp 18   Ht 5' 2\" (1.575 m)   Wt 64.5 kg (142 lb 4.8 oz)   SpO2 100%   BMI 26.03 kg/m²     Current Shift:  No intake/output data recorded. Last three shifts:  11/08 1901 - 11/10 0700  In: 340 [P.O.:340]  Out: 1700 [Urine:1700]    Exam:    General: Well developed, alert, NAD, OX3  CVS:Regular rate and rhythm, no M/R/G, S1/S2 heard, no thrill  Lungs:Clear to auscultation bilaterally, no wheezes, rhonchi, or rales  Abdomen: Soft, Nontender, No distention, Normal Bowel sounds, feeding tube  Extremities: No C/C/E, pulses palpable 2+  Left wrist in splint, dressing  Neuro:grossly normal , follows commands  Psych:appropriate                Labs: Results:       Chemistry No results for input(s): GLU, NA, K, CL, CO2, BUN, CREA, CA, AGAP, BUCR, TBIL, AP, TP, ALB, GLOB, AGRAT in the last 72 hours. No lab exists for component: GPT   CBC w/Diff No results for input(s): WBC, RBC, HGB, HCT, PLT, GRANS, LYMPH, EOS, HGBEXT, HCTEXT, PLTEXT in the last 72 hours. Cardiac Enzymes No results for input(s): CPK, CKND1, ZENON in the last 72 hours. No lab exists for component: CKRMB, TROIP   Coagulation No results for input(s): PTP, INR, APTT, INREXT in the last 72 hours. Lipid Panel No results found for: CHOL, CHOLPOCT, CHOLX, CHLST, CHOLV, 583983, HDL, HDLP, LDL, LDLC, DLDLP, 288435, VLDLC, VLDL, TGLX, TRIGL, TRIGP, TGLPOCT, CHHD, CHHDX   BNP No results for input(s): BNPP in the last 72 hours. Liver Enzymes No results for input(s): TP, ALB, TBIL, AP in the last 72 hours.     No lab exists for component: SGOT, GPT, DBIL   Thyroid Studies No results found for: T4, T3U, TSH, TSHEXT     Procedures/imaging: see electronic medical records for all procedures/Xrays and details which were not copied into this note but were reviewed prior to creation of Melia Hayward MD

## 2021-11-10 NOTE — ROUTINE PROCESS
Bedside shift change report given to Rojas Morales (oncoming nurse) by Lynn Villagran RN (offgoing nurse). Report included the following information SBAR, Kardex, Intake/Output, MAR and Recent Results.

## 2021-11-10 NOTE — PROGRESS NOTES
MI Plan: [address]    9700:  Care manager received call from [facility] Josiane [facility] [phone]. In order for Level II assessment to be initiated, patient must have psychiatrist consult which specifically states:   Patient's mental illness diagnosis  - statement that patient is psychiatrically stable for discharge  Previous psychiatric notes have been progress notes and do not contain documentation required for a full Level II assessment to be performed and in order for patient to be accepted to a group home, a Level II must be fully completed. Once required psych consult has been documented it will be faxed to [facility]; they have 5-7 business days to send a separate  directly to patient patient bedside to complete. Care manager facilitated zoom call between patient and [facility] admissions coordinator Vanna Ervin [phone]; patient asked questions of  and appeared excited to go to group home. Care manager instructed patient that she will still need a psychiatrist to document that she is stable for discharge and that another assessment will be performed before she can go to group home. Patient appeared hopeful and enthusiastic about potential discharge in near future. 1545:  Care manager faxed psych consult from 11/1 to [facility] [phone] to see if this will satisfy info needed to procede with Level II assessment. Care Management Interventions  PCP Verified by CM:  Yes  Mode of Transport at Discharge: Self  Transition of Care Consult (CM Consult): Group Home  Support Systems: Other Family Member(s)  Confirm Follow Up Transport: Other (see comment) (TBD)  The Plan for Transition of Care is Related to the Following Treatment Goals : drug overdose, intential, left wrist fracture with delayed healing  The Patient and/or Patient Representative was Provided with a Choice of Provider and Agrees with the Discharge Plan?: Yes  Name of the Patient Representative Who was Provided with a Choice of Provider and Agrees with the Discharge Plan: Fer Meredith, patient  [place] of Choice List was Provided with Basic Dialogue that Supports the Patient's Individualized Plan of Care/Goals, Treatment Preferences and Shares the Quality Data Associated with the Providers?: Yes  Discharge Location  Discharge Placement: Group home

## 2021-11-11 PROCEDURE — 65660000000 HC RM CCU STEPDOWN

## 2021-11-11 PROCEDURE — 97116 GAIT TRAINING THERAPY: CPT

## 2021-11-11 PROCEDURE — 92526 ORAL FUNCTION THERAPY: CPT

## 2021-11-11 PROCEDURE — 74011250636 HC RX REV CODE- 250/636: Performed by: FAMILY MEDICINE

## 2021-11-11 PROCEDURE — 74011250637 HC RX REV CODE- 250/637: Performed by: HOSPITALIST

## 2021-11-11 PROCEDURE — 74011250637 HC RX REV CODE- 250/637: Performed by: INTERNAL MEDICINE

## 2021-11-11 PROCEDURE — 74011250637 HC RX REV CODE- 250/637: Performed by: FAMILY MEDICINE

## 2021-11-11 RX ORDER — FENTANYL 25 UG/1
1 PATCH TRANSDERMAL
Status: DISCONTINUED | OUTPATIENT
Start: 2021-11-11 | End: 2021-11-12

## 2021-11-11 RX ORDER — FENTANYL 12.5 UG/1
1 PATCH TRANSDERMAL
Status: DISCONTINUED | OUTPATIENT
Start: 2021-11-11 | End: 2021-11-11

## 2021-11-11 RX ADMIN — HYDROXYZINE HYDROCHLORIDE 50 MG: 25 TABLET, FILM COATED ORAL at 21:43

## 2021-11-11 RX ADMIN — HYDROXYZINE HYDROCHLORIDE 50 MG: 25 TABLET, FILM COATED ORAL at 16:49

## 2021-11-11 RX ADMIN — NYSTATIN: 100000 POWDER TOPICAL at 16:50

## 2021-11-11 RX ADMIN — CLONAZEPAM 1 MG: 0.5 TABLET ORAL at 09:56

## 2021-11-11 RX ADMIN — QUETIAPINE FUMARATE 600 MG: 200 TABLET ORAL at 21:43

## 2021-11-11 RX ADMIN — NYSTATIN: 100000 POWDER TOPICAL at 13:05

## 2021-11-11 RX ADMIN — HYDROXYZINE HYDROCHLORIDE 50 MG: 25 TABLET, FILM COATED ORAL at 09:43

## 2021-11-11 RX ADMIN — Medication 5 MG: at 21:44

## 2021-11-11 RX ADMIN — OLANZAPINE 5 MG: 2.5 TABLET, FILM COATED ORAL at 17:27

## 2021-11-11 RX ADMIN — FAMOTIDINE 20 MG: 20 TABLET ORAL at 09:43

## 2021-11-11 RX ADMIN — Medication 1 TABLET: at 09:43

## 2021-11-11 RX ADMIN — ENOXAPARIN SODIUM 40 MG: 100 INJECTION SUBCUTANEOUS at 16:49

## 2021-11-11 RX ADMIN — QUETIAPINE FUMARATE 300 MG: 200 TABLET ORAL at 09:43

## 2021-11-11 NOTE — PROGRESS NOTES
Problem: Mobility Impaired (Adult and Pediatric)  Goal: *Acute Goals and Plan of Care (Insert Text)  Description: Physical Therapy Goals  Initiated 10/19/2021, reviewed and updated 11/8/2021 and to be accomplished within 7 day(s)  1. Patient will transfer from bed to chair and chair to bed with supervision. 2.  Patient will ambulate with supervision/set-up for 500 feet with the least restrictive device. 3.  Patient will ascend/descend 4 stairs with 1 handrail(s) with minimal assistance/contact guard assist.    PLOF: independent with mobility without an assistive device   Outcome: Progressing Towards Goal    physical Therapy TREATMENT    Patient: Bro Taveras (89 y.o. female)  Date: 11/11/2021  Diagnosis: Drug overdose, intentional self-harm, initial encounter ([facility] Christina Phlegm  Left wrist fracture, with delayed healing, subsequent encounter [S62.102G]   Drug overdose, intentional self-harm, initial encounter ([facility]  Procedure(s) (LRB):  LEFT WRIST OPEN REDUCTION INTERNAL FIXATION. REMOVAL OF X-FIX WITH C-ARM. REP [facility]  (Left) 8 Days Post-Op  Precautions: Fall   Chart, physical therapy assessment, plan of care and goals were reviewed. ASSESSMENT:  Pt found supine in bed and willing to participate with PT session for GT but declines therapeutic ex at this time. Pt demonstrates bed mobility and transfers with supervision. Gait with decr'd foot clearance and decr'd vern, but no scissoring noted today. Requires CGA95% of the time; HHA at end for max balance due to pt fatigue. Pt returned to bed, vc for controlled Stand to Sit and demonstrates same accurately. Pt left with CNA's in room. Will continue to address functional mobility independence, balance and safety.   Progression toward goals:  [x]      Improving appropriately and progressing toward goals  []      Improving slowly and progressing toward goals  []      Not making progress toward goals and plan of care will be adjusted PLAN:  Patient continues to benefit from skilled intervention to address the above impairments. Continue treatment per established plan of care. Discharge Recommendations:  Home Physical Therapy with 24 hr supervision  Further Equipment Recommendations for Discharge:  N/A     SUBJECTIVE:   Patient stated I'm fine.     OBJECTIVE DATA SUMMARY:   Critical Behavior:  Neurologic State: Irritable  Orientation Level: Oriented X4  Cognition: Follows commands  Safety/Judgement: Decreased awareness of environment, Decreased awareness of need for assistance, Decreased awareness of need for safety, Decreased insight into deficits, Fall prevention  Functional Mobility Training:  Bed Mobility:  Supine to Sit: Supervision  Sit to Supine: Supervision  Scooting: Supervision  Transfers:  Sit to Stand: Supervision  Stand to Sit: Supervision  Balance:  Sitting: Intact  Sitting - Static: Good (unsupported)  Sitting - Dynamic: Good (unsupported)  Standing: Intact; Without support  Standing - Static: Good  Standing - Dynamic : Fair (fair+)  Ambulation/Gait Training:  Distance (ft): 230 Feet (ft)  Assistive Device: Gait belt  Ambulation - Level of Assistance: Contact guard assistance (HHA toward end due to fatigue)  Gait Abnormalities: Decreased step clearance  Speed/Ronel: Pace decreased (<100 feet/min)  Pain:  Pain Scale 1: Numeric (0 - 10)  Pain Intensity 1: 0  Activity Tolerance:   Fair/fair+; limited by pt fatigue  Please refer to the flowsheet for vital signs taken during this treatment.   After treatment:   [] Patient left in no apparent distress sitting up in chair  [x] Patient left in no apparent distress in bed  [x] Call bell left within reach  [] Nursing notified  [x] CNA  present  [] Bed alarm activated      Shana Martinez PT   Time Calculation: 10 mins

## 2021-11-11 NOTE — PROGRESS NOTES
Hospitalist Progress Note    Patient: Linn Pearce MRN: [id number]  CSN: [id number]    YOB: 1980  Age: 39 y.o.   Sex: female    DOA: 9/11/2021 LOS:  LOS: 60 days          Chief Complaint:    overdose      Assessment/Plan     40 yo female with extended hospital stay due to severe illness after medicine overdose and resp failure     Required temporary tracheostomy and feeding tube     Now she has no trach, pulled it out, but has done fine w/o it, and she is eating soft diet and thus feeding tube removed as well     Chronic psychiatric disorders-including parasitosis, seen by psych here and recommended to continue klonopin, wean to as needed only, and continue BID seroquel  Added zyprexa, she requested as has been on before  Poor social support situation at home, ( incarcerated)     Narcotics weaning plan: fentanyl patch reduced to 25 mcg from 50 11/10, plan to reduce to 12 mcg Saturday 11/13 and then off on Monday 11/15      feeding tube removed      Encephalopathy resolved     weakness improved     Move towards safe d/c to group home, continue weaning narcotic meds for her safe discharge  Plan is in place for this transition with   She need psychotherapy in addition to meds  to help her manage her anxiety     Disposition :  Patient Active Problem List   Diagnosis Code    Dextromethorphan use disorder, moderate ([address] F19.20    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    Left wrist fracture, with delayed healing, subsequent encounter S62.102G    Drug overdose, intentional self-harm, initial encounter ([address] T50.902A    Hypoxia R09.02    Hypotension after procedure I95.81    Acute metabolic encephalopathy P39.92    Psychosis ([address] F29    Hyponatremia E87.1    Acute respiratory failure with hypoxia ([address] J96.01    Increased ammonia level R79.89    Hypokalemia E87.6    Status post tracheostomy ([address] Z93.0    Bacteremia due to Gram-positive bacteria R78.81    FELICITY (acute kidney injury) ([facility] N17.9       Subjective:    I am having an anxiety attack! Review of systems:    Constitutional: denies fevers  Respiratory: denies SOB  Cardiovascular: denies chest pain  Gastrointestinal: denies nausea, vomiting, diarrhea      Vital signs/Intake and Output:  Visit Vitals  /68   Pulse 98   Temp 97.8 °F (36.6 °C)   Resp 18   Ht 5' 2\" (1.575 m)   Wt 64.5 kg (142 lb 4.8 oz)   SpO2 96%   BMI 26.03 kg/m²     Current Shift:  No intake/output data recorded. Last three shifts:  11/09 1901 - 11/11 0700  In: 12 [P.O.:960]  Out: 400 [Urine:400]    Exam:    AAOX3 NAD WF  CVS:Regular rate and rhythm, no M/R/G, S1/S2 heard, no thrill  Lungs:Clear to auscultation bilaterally, no wheezes, rhonchi, or rales  Abdomen: Soft, Nontender, No distention, Normal Bowel sounds  Extremities: No C/C/E, pulses palpable 2+  Neuro:grossly normal , follows commands  Psych:anxious                Labs: Results:       Chemistry No results for input(s): GLU, NA, K, CL, CO2, BUN, CREA, CA, AGAP, BUCR, TBIL, AP, TP, ALB, GLOB, AGRAT in the last 72 hours. No lab exists for component: GPT   CBC w/Diff No results for input(s): WBC, RBC, HGB, HCT, PLT, GRANS, LYMPH, EOS, HGBEXT, HCTEXT, PLTEXT in the last 72 hours. Cardiac Enzymes No results for input(s): CPK, CKND1, ZENON in the last 72 hours. No lab exists for component: CKRMB, TROIP   Coagulation No results for input(s): PTP, INR, APTT, INREXT in the last 72 hours. Lipid Panel No results found for: CHOL, CHOLPOCT, CHOLX, CHLST, CHOLV, 878170, HDL, HDLP, LDL, LDLC, DLDLP, 556906, VLDLC, VLDL, TGLX, TRIGL, TRIGP, TGLPOCT, CHHD, CHHDX   BNP No results for input(s): BNPP in the last 72 hours. Liver Enzymes No results for input(s): TP, ALB, TBIL, AP in the last 72 hours.     No lab exists for component: SGOT, GPT, DBIL   Thyroid Studies No results found for: T4, T3U, TSH, TSHEXT     Procedures/imaging: see electronic medical records for all procedures/Xrays and details which were not copied into this note but were reviewed prior to creation of Silvia Call MD

## 2021-11-11 NOTE — ROUTINE PROCESS
Bedside and Verbal shift change report given to Talat Loew RN  (oncoming nurse) by Sherry Woodson RN  (offgoing nurse). Report given with SBAR, Kardex, Intake/Output and Recent Results.

## 2021-11-11 NOTE — PROGRESS NOTES
Psychiatry CONSULT Progress Note  [facility]    Patient's Name: Shayna Sofia  Patient's /Sex:  1980 / female  Medical Record #: [id number]  Date of Admission: 2021  Date of Evaluation: 21  Code Status: Full    Chief Complaint: \"Anxiety\"     Interval History:   Shayna Sofia  Is a 38 y/o female with h/o mood d/o anxiety/do, substance use d/o admitted to [facility] several weeks ago after a suspicious OD on gabapentin which she strongly denies, had a complicated hospital course. She was seen by the undersigned for initial Psych consult on 21, seen for follow up today as  and hospitalist asked to see for discharge planning. patient presented at baseline, alert and oriented, continues to c/o anxiety but said her mood is fine, not depressed, no SI or HI. She continues to say she did not take any over dose but thinks she has a seizure. She reports eating and sleeping fine, taking meds given. She denies any ongoing hallucination, does not have any over t mood swings. She reports interested in going to the [facility] that she talked to some people and likes to go there after discharge.      Individualized Psychotherapy:     Past Medical and Surgical History:   Past Medical History:   Diagnosis Date    Asthma     Bilateral ovarian cysts     [facility]     Mental and behavioral problem     Pancreatitis     Pancreatitis     Psychosis ([facility]       Past Surgical History:   Procedure Laterality Date    HX GYN      D&C, Hysterectomy    IR INSERT GASTROSTOMY TUBE PERC  10/1/2021         Medications:    Current Facility-Administered Medications:     fentaNYL (DURAGESIC) 25 mcg/hr patch 1 Patch, 1 Patch, TransDERmal, Q72H, Shelia Moseley MD, 1 Patch at 21 0948    acetaminophen (TYLENOL) tablet 1,000 mg, 1,000 mg, Oral, Q8H PRN, Shelia Moseley MD, 500 mg at 11/10/21 1416    tuberculin injection 5 Units, 5 Units, IntraDERMal, ONCE, Shelia Moseley MD, 5 Units at 11/10/21 1702    OLANZapine (ZyPREXA) tablet 5 mg, 5 mg, Oral, QPM, Hannah Wing MD, 5 mg at 11/10/21 1800    QUEtiapine (SEROquel) tablet 300 mg, 300 mg, Oral, QAM, Hannah Wing MD, 300 mg at 11/11/21 6971    clonazePAM (KlonoPIN) tablet 1 mg, 1 mg, Oral, TID PRN, Hannah Wing MD, 1 mg at 11/11/21 0956    famotidine (PEPCID) tablet 20 mg, 20 mg, Oral, DAILY, Hannah Wing MD, 20 mg at 11/11/21 7784    hydrOXYzine HCL (ATARAX) tablet 50 mg, 50 mg, Oral, TID, Milly Mirza MD, 50 mg at 11/11/21 0943    QUEtiapine (SEROquel) tablet 600 mg, 600 mg, Oral, QHS, Milly Mirza MD, 600 mg at 11/10/21 2011    hydrOXYzine HCL (ATARAX) tablet 25 mg, 25 mg, Oral, QID PRN, Miguel Brito MD, 25 mg at 10/31/21 1404    lactulose (CHRONULAC) 10 gram/15 mL solution 15 mL, 15 mL, Oral, DAILY PRN, Hannah Wing MD    diphenhydrAMINE-zinc acetate 2%-0.1% (BENADRYL) cream, , Topical, TID PRN, Martha Harris MD, Given at 10/29/21 0431    0.9% sodium chloride infusion 250 mL, 250 mL, IntraVENous, PRN, Marybeth Infante MD    nystatin (MYCOSTATIN) 100,000 unit/gram powder, , Topical, TID, Holden Rodriguez MD, Given at 11/11/21 1305    melatonin (rapid dissolve) tablet 5 mg, 5 mg, Oral, QHS, Walter Diamond MD, 5 mg at 11/10/21 2010    albuterol-ipratropium (DUO-NEB) 2.5 MG-0.5 MG/3 ML, 3 mL, Nebulization, Q4H PRN, Lindsay Simpson MD, 3 mL at 10/29/21 2007    multivitamin, tx-iron-ca-min (THERA-M w/ IRON) tablet 1 Tablet, 1 Tablet, Oral, DAILY, Walter Diamond MD, 1 Tablet at 11/11/21 0943    glucose chewable tablet 16 g, 4 Tablet, Oral, PRN, Rosa Jacobs MD    glucagon [facility]) injection 1 mg, 1 mg, IntraMUSCular, PRN, Rosa Jacobs MD    dextrose (D50W) injection syrg 12.5-25 g, 25-50 mL, IntraVENous, PRN, Rosa Jacobs MD    enoxaparin (LOVENOX) injection 40 mg, 40 mg, SubCUTAneous, Q24H, Milly Mirza MD, 40 mg at 11/10/21 1700   acetaminophen (TYLENOL) tablet 650 mg, 650 mg, Oral, Q6H PRN, 650 mg at 11/08/21 1509 **OR** acetaminophen (TYLENOL) suppository 650 mg, 650 mg, Rectal, Q6H PRN, Milly Mirza MD    polyethylene glycol (MIRALAX) packet 17 g, 17 g, Oral, DAILY PRN, Milly Mirza MD    ondansetron (ZOFRAN ODT) tablet 4 mg, 4 mg, Oral, Q8H PRN, 4 mg at 11/08/21 1509 **OR** [DISCONTINUED] ondansetron (ZOFRAN) injection 4 mg, 4 mg, IntraVENous, Q6H PRN, Milly Mirza MD, 4 mg at 10/24/21 1117    Allergies: Allergies   Allergen Reactions    Fish Containing Products Itching    Toradol [Ketorolac] Rash and Itching         Physical Exam:  Blood pressure 105/67, pulse 88, temperature 98.5 °F (36.9 °C), resp. rate 18, height 5' 2\" (1.575 m), weight 64.5 kg (142 lb 4.8 oz), last menstrual period 05/15/2018, SpO2 95 %. Gait and Station: Can ambulate independently. Mental Status Exam:  APPEARANCE:   is a moderatelybuilt, young female  appears  older than  stated age. dressed in hospital clothes, somewhat unkempt with okay hygiene cooperative and maintained poor eye contact. Showing no  psychomotor retardation/agitation   Speech:  Speech is spontaneous, Normal tone an Volume   MOOD: Described \" doing okay \"  THOUGHT PROCESS:  Appears to be linear and goal directed   THOUGHT CONTENT:  With no suicidal or homicidal ideations voiced. Paranoia present. PERCEPTUAL DISTURBANCES:  The patient denies any hallucinations or delusions. INSIGHT:  Fair. JUDGMENT:  Fair . IMPULSE CONTROL:  Fair. MEMORY:  Poor/ fair. Data Review:  1-1 [unfilled]  2-1. [unfilled]  3-1. [unfilled]        Psychiatric Assessment/Treatment Plan:  Ms. Quiroz Snare seen for Psychiatry follow up. She does not present as imminent risk of self or others and does not need inpatient psych admission at  This time .  She  is psychiatrically stable for discharge as per plan by primary team. She will benefit from NOT using any illicit substances and NOT TAKING any unprescribed meds and take her current psychotropic meds, follow up with her outpatient Psychiatrist Dr. Whitney Rainey. Diagnoses:  1. Mood disorder,  unpsecified  2.  Anxiety d/o  F 41.1      Kimberly Aguilar MD  Psychiatrist     Note: this chart I was prepared using voice-recognition software and may  contain unintended word substitution errors

## 2021-11-11 NOTE — PROGRESS NOTES
DC Plan: Group home; Level II assessment decision pending    1000:  Care manager contacted state  [facility] for Level II; they still need clarity as to what are patient's active psychiatric diagnoses to confirm if a Level II assessment needs to be performed; also need to have psychiatrist statement that patient is psychologically stable for discharge in order for Group Home to accept. CM has contacted on call psychiatrist who will do a face to face assessment and document today. 1350: [phone] - will await notification of assessment if Level II is required. Care manager also updated patient's  Mila Laureano [phone]. Care Management Interventions  PCP Verified by CM:  Yes  Mode of Transport at Discharge: Self  Transition of Care Consult (CM Consult): Davidson Miles: Other Family Member(s)  Confirm Follow Up Transport: Other (see comment) (TBD)  The Plan for Transition of Care is Related to the Following Treatment Goals : drug overdose, intential, left wrist fracture with delayed healing  The Patient and/or Patient Representative was Provided with a Choice of Provider and Agrees with the Discharge Plan?: Yes  Name of the Patient Representative Who was Provided with a Choice of Provider and Agrees with the Discharge Plan: Fer Meredith, patient  [place] of Choice List was Provided with Basic Dialogue that Supports the Patient's Individualized Plan of Care/Goals, Treatment Preferences and Shares the Quality Data Associated with the Providers?: Yes  Discharge Location  Discharge Placement: Group home

## 2021-11-11 NOTE — PROGRESS NOTES
Problem: Dysphagia (Adult)  Goal: *Acute Goals and Plan of Care (Insert Text)  Description: Patient will:  1. Tolerate easy-to-chew diet per patient's preference with thin liquids without overt s/sx of aspiration under SLP supervision (goal met). 2. Tolerate diet upgrade without overt s/sx of aspiration under SLP supervision (goal met). 3. Utilize compensatory swallow strategies of small bite/sip, alternate liquid/solid with min cues in 4/5 trials (goal met). Rec:   Easy-to-chew diet, thin liquids  Aspiration precautions  HOB >45 during po intake, remain >30 for 30-45 minutes after po   Small bites/sips; alternate liquid/solid, slow feeding rate   Oral care TID  Meds crushed in applesauce    Outcome: Progressing Towards Goal     SPEECH [address] THERAPY    Patient: Willow Bender (16 y.o. female)  Date: 11/11/2021  Primary Diagnosis: Drug overdose, intentional self-harm, [facility] Yaw Vega  Left wrist fracture, with delayed healing, subsequent encounter [S62.102G]  Procedure(s) (LRB):  LEFT WRIST OPEN REDUCTION INTERNAL FIXATION. REMOVAL OF X-FIX WITH C-ARM. REP [facility]  (Left) 8 Days Post-Op   Precautions: aspiration  Fall    PLOF: Per H&P    ASSESSMENT :  Follow up for diagnostic dysphagia therapy this am, patient agreeable, and stating she is glad to be able to have different food. Patient presents with mild oral dysphagia. Oral motor structures, strength, and ROM WFL; grossly intact for mastication and deglutition. Functional communication. Intelligibility >90%. Pt self-feeding PO trials of thin liquid via straw and tandem drinking, puree, mixed, and regular textures. Mildly prolonged mastication and AP transit with regular texture, though cleared adequately given time. No s/sx of aspiration appreciated across consistencies.  Swallow appears timely, adequate laryngeal elevation noted via palpation, no change in vocal/resp quality appreciated. Recommend continue current easy-to-chew texture diet with thin liquids, per patient's preference, meds crushed in applesauce. Pt educated on and verbalized understanding of findings, recs, and POC; d/w RN. This is likely patient' least restrictive diet, and ST will sign off as goals for therapy have been met. Patient will benefit from skilled intervention to address the above impairments. Patient's rehabilitation potential is considered to be Good  Factors which may influence rehabilitation potential include:   []            None noted  []            Mental ability/status  []            Medical condition  []            Home/family situation and support systems  [x]            Safety awareness  []            Pain tolerance/management  []            Other:      PLAN :   Recommendations and Planned Interventions:  See above. Frequency/Duration: Patient will not be followed by speech-language pathology as goals for therapy have been met. Discharge Recommendations: To Be Determined     SUBJECTIVE:   Patient stated I can eat whatever I want. I like it soft. \"    OBJECTIVE:     Past Medical History:   Diagnosis Date    Asthma     Bilateral ovarian cysts     [facility]aine abuse ([facility] [address]     Mental and behavioral problem     Pancreatitis     Pancreatitis     Psychosis ([facility]      Past Surgical History:   Procedure Laterality Date    HX GYN      D&C, Hysterectomy    IR INSERT GASTROSTOMY TUBE PERC  10/1/2021     Diet prior to admission: unknown  Current Diet:  soft & bite-sized, thin    Cognitive and Communication Status:  Neurologic State: Irritable  Orientation Level: Oriented X4  Cognition: Follows commands  Perception: Appears intact  Perseveration: No perseveration noted  Safety/Judgement: Decreased awareness of environment, Decreased awareness of need for assistance, Decreased awareness of need for safety, Decreased insight into deficits, Fall prevention    PAIN:  Pain level pre-treatment: 0/10 Pain level post-treatment: 0/10     After treatment:   []            Patient left in no apparent distress sitting up in chair  [x]            Patient left in no apparent distress in bed  [x]            Call bell left within reach  [x]            Nursing notified  []            Family present  []            Caregiver present  []            Bed alarm activated    COMMUNICATION/EDUCATION:   [x]            Aspiration precautions; swallow safety; compensatory techniques. [x]            Patient/family have participated as able in goal setting and plan of care. [x]            Patient/family agree to work toward stated goals and plan of care. []            Patient understands intent and goals of therapy; neutral about participation. []            Patient unable to participate in goal setting/plan of care; educ ongoing with interdisciplinary staff  []         Posted safety precautions in patient's room. Thank you for this referral.    JORGE BorjaEd, [address] [facility], [place]  Speech-Language Pathologist    Time Calculation: 10 mins

## 2021-11-11 NOTE — PROGRESS NOTES
Problem: Falls - Risk of  Goal: *Absence of Falls  Description: Document [place] Fall Risk and appropriate interventions in the flowsheet. Outcome: Progressing Towards Goal  Note: Fall Risk Interventions:  Mobility Interventions: Assess mobility with egress test    Mentation Interventions: Door open when patient unattended    Medication Interventions: Patient to call before getting OOB    Elimination Interventions: Call light in reach    History of Falls Interventions: Door open when patient unattended         Problem: Risk for Spread of Infection  Goal: Prevent transmission of infectious organism to others  Description: Prevent the transmission of infectious organisms to other patients, staff members, and visitors. Outcome: Progressing Towards Goal     Problem: Pressure Injury - Risk of  Goal: *Prevention of pressure injury  Description: Document Remy Scale and appropriate interventions in the flowsheet.   Outcome: Progressing Towards Goal  Note: Pressure Injury Interventions:  Sensory Interventions: Assess changes in LOC    Moisture Interventions: Absorbent underpads    Activity Interventions: Pressure redistribution bed/mattress(bed type)    Mobility Interventions: HOB 30 degrees or less    Nutrition Interventions: Document food/fluid/supplement intake    Friction and Shear Interventions: HOB 30 degrees or less                Problem: Pain  Goal: *Control of Pain  Outcome: Progressing Towards Goal     Problem: Patient Education: Go to Patient Education Activity  Goal: Patient/Family Education  Outcome: Progressing Towards Goal

## 2021-11-11 NOTE — PROGRESS NOTES
[id number] Shift Summary: Pt rested well overnight with minimal complaints compared to usual. No new clinical concerns noted. A sitter remained at the bedside at all times for safety.      Nightshift Chart Audit Completed

## 2021-11-12 PROCEDURE — 74011250637 HC RX REV CODE- 250/637: Performed by: INTERNAL MEDICINE

## 2021-11-12 PROCEDURE — 74011250637 HC RX REV CODE- 250/637: Performed by: HOSPITALIST

## 2021-11-12 PROCEDURE — 65660000000 HC RM CCU STEPDOWN

## 2021-11-12 PROCEDURE — 97116 GAIT TRAINING THERAPY: CPT

## 2021-11-12 PROCEDURE — 74011250637 HC RX REV CODE- 250/637: Performed by: FAMILY MEDICINE

## 2021-11-12 PROCEDURE — 74011250636 HC RX REV CODE- 250/636: Performed by: FAMILY MEDICINE

## 2021-11-12 RX ORDER — FENTANYL 12.5 UG/1
1 PATCH TRANSDERMAL
Status: DISCONTINUED | OUTPATIENT
Start: 2021-11-12 | End: 2021-11-13 | Stop reason: HOSPADM

## 2021-11-12 RX ADMIN — FAMOTIDINE 20 MG: 20 TABLET ORAL at 10:11

## 2021-11-12 RX ADMIN — CLONAZEPAM 1 MG: 0.5 TABLET ORAL at 12:15

## 2021-11-12 RX ADMIN — ACETAMINOPHEN 650 MG: 325 TABLET ORAL at 21:43

## 2021-11-12 RX ADMIN — Medication 1 TABLET: at 10:12

## 2021-11-12 RX ADMIN — QUETIAPINE FUMARATE 600 MG: 200 TABLET ORAL at 21:43

## 2021-11-12 RX ADMIN — HYDROXYZINE HYDROCHLORIDE 50 MG: 25 TABLET, FILM COATED ORAL at 21:42

## 2021-11-12 RX ADMIN — Medication 5 MG: at 21:42

## 2021-11-12 RX ADMIN — ENOXAPARIN SODIUM 40 MG: 100 INJECTION SUBCUTANEOUS at 17:30

## 2021-11-12 RX ADMIN — ACETAMINOPHEN 650 MG: 325 TABLET ORAL at 15:17

## 2021-11-12 RX ADMIN — OLANZAPINE 5 MG: 2.5 TABLET, FILM COATED ORAL at 17:27

## 2021-11-12 RX ADMIN — HYDROXYZINE HYDROCHLORIDE 50 MG: 25 TABLET, FILM COATED ORAL at 15:15

## 2021-11-12 RX ADMIN — NYSTATIN: 100000 POWDER TOPICAL at 21:43

## 2021-11-12 RX ADMIN — HYDROXYZINE HYDROCHLORIDE 50 MG: 25 TABLET, FILM COATED ORAL at 10:12

## 2021-11-12 RX ADMIN — QUETIAPINE FUMARATE 300 MG: 200 TABLET ORAL at 10:11

## 2021-11-12 NOTE — PROGRESS NOTES
Hospitalist Progress Note    Patient: Shruthi Mireles MRN: [id number]  CSN: [id number]    YOB: 1980  Age: 39 y.o.   Sex: female    DOA: 9/11/2021 LOS:  LOS: 61 days          PATIENT ADVISED ME THAT HER SISTER SAID SHE CAN COME TO HER HOME TO STAY    APPARENTLY SISTER TOLD  THAT IS NOT THE CASE    I HAVE LEFT MESSAGE WITH SISTER TO CALL US AND CLARIFY IF THIS IS THE CASE OR NOT    PATIENT WANTS TO LEAVE BUT WE NEED TO VERIFY A SAFE PLAN, AS SHE DOES HAVE A MEETING TOMORROW ALSO PLANNED FOR POSSIBLE GROUP HOME    SHE IS PSYCHOLOGICALLY STABLE TO [address] [place] [address] Nehemias Akhtar BUT WE WANT TO ENSURE IT IS A GOOD PLAN EITHER WITH HER SISTER OR WITH THE GROUP HOME

## 2021-11-12 NOTE — ROUTINE PROCESS
Bedside and Verbal shift change report given to [address] [place] [address] Way  (oncoming nurse) by Roberto Head RN  (offgoing nurse). Report given with SBAR, Kardex, Intake/Output and Recent Results.

## 2021-11-12 NOTE — PROGRESS NOTES
Problem: Mobility Impaired (Adult and Pediatric)  Goal: *Acute Goals and Plan of Care (Insert Text)  Description: Physical Therapy Goals  Initiated 10/19/2021, reviewed and updated 11/8/2021 and to be accomplished within 7 day(s)  1. Patient will transfer from bed to chair and chair to bed with supervision. 2.  Patient will ambulate with supervision/set-up for 500 feet with the least restrictive device. 3.  Patient will ascend/descend 4 stairs with 1 handrail(s) with minimal assistance/contact guard assist.    PLOF: independent with mobility without an assistive device     Outcome: Progressing Towards Goal   physical Therapy TREATMENT    Patient: Justina Flowers (54 y.o. female)  Date: 11/12/2021  Diagnosis: Drug overdose, intentional self-harm, initial encounter ([facility] Sandhya Zafar  Left wrist fracture, with delayed healing, subsequent encounter [S62.102G]   Drug overdose, intentional self-harm, initial encounter ([facility]  Procedure(s) (LRB):  LEFT WRIST OPEN REDUCTION INTERNAL FIXATION. REMOVAL OF X-FIX WITH C[facility]ARM. [facility]  (Left) 9 Days Post-Op  Precautions: Fall   Chart, physical therapy assessment, plan of care and goals were reviewed. ASSESSMENT:  RN clears pt for OOB with PT. Pt found in agitated state, complaining that she'd rather return to home, than enter group home setting. Able to focus pt's thoughts to mobility, where she demonstrates room/turner ambulation, without need for assistance. Pt refuses stair amb, noting that she will attempt to find an exit to the hospital [facility] for safety. pt is safe for a mobility standpoint, but close supervision is needed for flight of ideas and obsession with medication. Will F/u for mobility and assessment of overall safety. Pt also continues to report and demonstrate limited R hand function. OP ortho or neuro consult made be needed for weakness distal to R elbow.     Progression toward goals:  [x]      Improving appropriately and progressing toward goals  []      Improving slowly and progressing toward goals  []      Not making progress toward goals and plan of care will be adjusted     PLAN:  Patient continues to benefit from skilled intervention to address the above impairments. Continue treatment per established plan of care. Discharge Recommendations:  Outpatient PT (home with supervision or group home)  Further Equipment Recommendations for Discharge:  None     SUBJECTIVE:   Patient stated It I go home, I'll be fine.     OBJECTIVE DATA SUMMARY:   Critical Behavior:  Neurologic State: Irritable  Orientation Level: Oriented X4  Cognition: Follows commands, Impaired decision making, Impulsive, Memory loss  Safety/Judgement: Decreased awareness of environment, Decreased awareness of need for assistance, Decreased awareness of need for safety, Decreased insight into deficits, Fall prevention  Functional Mobility Training:  Bed Mobility:  Rolling: Modified independent  Supine to Sit: Modified independent  Sit to Supine: Modified independent  Scooting: Modified independent  Transfers:  Sit to Stand: Supervision  Stand to Sit: Supervision  Balance:  Sitting: Intact  Sitting - Static: Good (unsupported)  Sitting - Dynamic: Good (unsupported)  Standing: Intact  Standing - Static: Good  Standing - Dynamic : Fair  Ambulation/Gait Training:  Distance (ft): 600 Feet (ft)  Assistive Device: Gait belt  Ambulation - Level of Assistance: Supervision  Gait Abnormalities: Decreased step clearance  Base of Support: Widened  Speed/Ronel: Pace decreased (<100 feet/min)  Stairs:  Refused  Pain:  Pain in: High (Reported)  Pain out: High (Reported)  Activity Tolerance:   Fair+  Please refer to the flowsheet for vital signs taken during this treatment.   After treatment:   [] Patient left in no apparent distress sitting up in chair  [] Patient left in no apparent distress in bed  [] Call bell left within reach  [] Nursing notified  [] Caregiver present  [] Bed alarm activated      Jasson Granados, PTA   Time Calculation: 26 mins

## 2021-11-12 NOTE — PROGRESS NOTES
[id number] Shift Summary: Pt rested well overnight with no complaints. No new clinical concerns noted. A sitter remained at the bedside for safety.      Nightshift Chart Audit Completed

## 2021-11-12 NOTE — PROGRESS NOTES
Hospitalist Progress Note    Patient: Zoe Mondragon MRN: [id number]  CSN: [id number]    YOB: 1980  Age: 39 y.o.   Sex: female    DOA: 9/11/2021 LOS:  LOS: 64 days          Chief Complaint:    overdose      Assessment/Plan     38 yo female with extended hospital stay due to severe illness after medicine overdose and resp failure     Required temporary tracheostomy and feeding tube     Now she has no trach, pulled it out, but has done fine w/o it, and she is eating soft diet and thus feeding tube removed as well     Chronic psychiatric disorders-including parasitosis, seen by psych here and recommended to continue klonopin, wean to as needed only, and continue BID seroquel  zyprexa  Poor social support situation at home, ( incarcerated)     Narcotics weaning plan: fentanyl patch reduced to 12 mcg  Plan to d/c after next 48 hrs     feeding tube removed     Left wrist fracture, ext fixator removed-follow up with Dr Abebe Loomis after discharge     Encephalopathy resolved     weakness improved     Move towards safe d/c to group home, continue weaning narcotic meds for her safe discharge  Plan is in place for this transition with   She will need psychotherapy in addition to meds  to help her manage her anxiety    Appreciate psychiatry help      Disposition :  Patient Active Problem List   Diagnosis Code    Dextromethorphan use disorder, moderate ([address] F19.20    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    Left wrist fracture, with delayed healing, subsequent encounter S62.102G    Drug overdose, intentional self-harm, initial encounter ([address] T50.902A    Hypoxia R09.02    Hypotension after procedure I95.81    Acute metabolic encephalopathy G28.90    Psychosis ([address] F29    Hyponatremia E87.1    Acute respiratory failure with hypoxia ([address] J96.01    Increased ammonia level R79.89    Hypokalemia E87.6    Status post tracheostomy ([address] Z93.0    Bacteremia due to Gram-positive bacteria R78.81    FELICITY (acute kidney injury) ([facility] N17.9       Subjective:    Talking on phone this am  No new issues reported   working on her dispo    Review of systems: In phone conversation  No issues reported      Vital signs/Intake and Output:  Visit Vitals  BP (!) 97/50 (BP 1 Location: Left upper arm, BP Patient Position: Lying)   Pulse 83   Temp 98 °F (36.7 °C)   Resp 16   Ht 5' 2\" (1.575 m)   Wt 64.5 kg (142 lb 4.8 oz)   SpO2 98%   BMI 26.03 kg/m²     Current Shift:  No intake/output data recorded. Last three shifts:  11/10 1901 - 11/12 0700  In: 61 [P.O.:60]  Out: -     Exam:    General: WF NAD resting comfortably in bed  Neck with dressing  Abdomen: Soft, Nontender, No distention  Extremities: No C/C/E, pulses palpable 2+  Left wrist dressed  Neuro:grossly normal , follows commands  Psych:appropriate                Labs: Results:       Chemistry No results for input(s): GLU, NA, K, CL, CO2, BUN, CREA, CA, AGAP, BUCR, TBIL, AP, TP, ALB, GLOB, AGRAT in the last 72 hours. No lab exists for component: GPT   CBC w/Diff No results for input(s): WBC, RBC, HGB, HCT, PLT, GRANS, LYMPH, EOS, HGBEXT, HCTEXT, PLTEXT in the last 72 hours. Cardiac Enzymes No results for input(s): CPK, CKND1, ZENON in the last 72 hours. No lab exists for component: CKRMB, TROIP   Coagulation No results for input(s): PTP, INR, APTT, INREXT in the last 72 hours. Lipid Panel No results found for: CHOL, CHOLPOCT, CHOLX, CHLST, CHOLV, 036141, HDL, HDLP, LDL, LDLC, DLDLP, 514120, VLDLC, VLDL, TGLX, TRIGL, TRIGP, TGLPOCT, CHHD, CHHDX   BNP No results for input(s): BNPP in the last 72 hours. Liver Enzymes No results for input(s): TP, ALB, TBIL, AP in the last 72 hours.     No lab exists for component: SGOT, GPT, DBIL   Thyroid Studies No results found for: T4, T3U, TSH, TSHEXT     Procedures/imaging: see electronic medical records for all procedures/Xrays and details which were not copied into this note but were reviewed prior to creation of Itzel Ochoa MD

## 2021-11-12 NOTE — PROGRESS NOTES
DC Plan: Group Home when Level II assessment performed    1003:  Care manager verified with [facility] that Level II is required based on documentation faxed to them yesterday; Level II screener Tawana Marie ([phone])  from [facility] will be on site at [facility] tomorrow between 3989-5455 to do required in person assessment; she will send the completed 15 page assessment to [facility] access hospital Department of [address] [place] [address] for review. Once [facility] has final determination of services, care manager will be notified and patient will be able to be discharged to [address] [place] [address]. Care manager will facilitate obtaining release of medical information from patient so that [facility]  Tawana Marie can speak with CSB worker Rob norman more comprehensive information about previous services utilized in community for coordination of care. Care manager has updated CSB Ms. Birder Fothergill as well as Group home coordinator Eduin Vickers on process currently under review. 1200:  Care manager went into patient room to get consent for [facility] coordinator to be able to speak with CSB worker; patient on phone with her sister Larisa Francis, agitated and pleading with her to allow her to go to her home; care manager heard patient repeatedly pleading \"you can put me out if you have to, I will be good, please let me come home. \"  When she hung up phone, care manager tried to discuss plans for group home as safe discharge plan; that additional resources will be available once Level II  completes assessment tomorrow; patient very agitated, coming out to nurses station followed by sitter; was able to be talked back to room; states she has ride but then has to go back to call him. MD notified.     [address] received direct call from patient's sister Prachi Parsons ([phone], caller ID is Amandarichard blood Devlin); per Larisa Francis, she does NOT want patient to come to her home; states she does not feel that this is a safe discharge plan as she is concerned patient lacks capacity to not inadvertently overdose on medications again. Recommended to sister that she not accept patient phone calls at this time and care manager will provide her updates on patient's status.

## 2021-11-13 VITALS
SYSTOLIC BLOOD PRESSURE: 124 MMHG | WEIGHT: 142.3 LBS | HEIGHT: 62 IN | OXYGEN SATURATION: 99 % | HEART RATE: 88 BPM | RESPIRATION RATE: 18 BRPM | TEMPERATURE: 98 F | DIASTOLIC BLOOD PRESSURE: 84 MMHG | BODY MASS INDEX: 26.19 KG/M2

## 2021-11-13 PROCEDURE — 74011250637 HC RX REV CODE- 250/637: Performed by: INTERNAL MEDICINE

## 2021-11-13 PROCEDURE — 74011250637 HC RX REV CODE- 250/637: Performed by: HOSPITALIST

## 2021-11-13 PROCEDURE — 74011250637 HC RX REV CODE- 250/637: Performed by: FAMILY MEDICINE

## 2021-11-13 RX ORDER — OXYCODONE AND ACETAMINOPHEN 5; 325 MG/1; MG/1
0.5 TABLET ORAL
Status: DISCONTINUED | OUTPATIENT
Start: 2021-11-13 | End: 2021-11-13 | Stop reason: HOSPADM

## 2021-11-13 RX ADMIN — Medication 1 TABLET: at 09:35

## 2021-11-13 RX ADMIN — FAMOTIDINE 20 MG: 20 TABLET ORAL at 09:36

## 2021-11-13 RX ADMIN — QUETIAPINE FUMARATE 300 MG: 200 TABLET ORAL at 09:36

## 2021-11-13 RX ADMIN — HYDROXYZINE HYDROCHLORIDE 50 MG: 25 TABLET, FILM COATED ORAL at 09:35

## 2021-11-13 RX ADMIN — CLONAZEPAM 1 MG: 0.5 TABLET ORAL at 00:35

## 2021-11-13 NOTE — PROGRESS NOTES
0700 Assumed care of patient; report received from Anu Oquendo RN. Patient is sleeping. 5242 Patient is agitated and restless; needs meds now because she is [place] a lot of anxiety attacks. \"     0930 after taking meds, patient left room, stating she is going home. She was redirected to her room but insists she is going home today. She is upset because someone from a group home was supposed to come get her and has not arrived at 0930 as stated. After said person arrived, the patient refused to talk to her unless she was given some dilauded. The patient was reminded that she does not have that ordered any longer. She agreed to speak with the rep from the group home. 1000 Patient again attempted to leave. A code ATLAS was called; she returned to her room. Three RNs were in the room with security and a CNA standing in the turner; the patient went into the bathroom to put on clothes, insisting she was going to, [employer] myself out. \" No sound was heard before cries for help. The patient was found on the bathroom floor in fetal position with her head by the toilet, crying that she hit her head and it hurts. The patient was able to stand and ambulate back to the bed. The doctor was notified immediately. She was assessed and asked the doctor for dilauded or percocet. She says tylenol makes her sick, she cannot take that. An ice pack was provided, which she laid aside. 1100 Upon seeing that the doctor had ordered half a tablet of percocet the patient refused all medication and again insisted on leaving AMA. She has called a cab, changed into clothes and requests the IV be removed. Every attempt has been made to inform the patient of the consequences of this action. The doctor has been informed. The  has had several conversations with her this morning encouraging her to stay and wait for the group home.     The patient has signed required documentation, had her IV removed, and has gathered the belongings she wants and refused to take the rest. She has been escorted out of the building to get a cab.

## 2021-11-13 NOTE — ROUTINE PROCESS
Bedside and Verbal shift change report given to Raz Bates RN (oncoming nurse) by Brady Miramontes RN (offgoing nurse). Report included the following information SBAR and Kardex.

## 2021-11-13 NOTE — PROGRESS NOTES
2210 Assumed patient care at this time. Report received from Karolyn Cheatham RN.  5331 Assessment completed. 6400 Bedside and verbal shift change report given to [address] (oncoming nurse) by Stuart Flores RN (offgoing nurse). Report included the following information: SBAR, Kardex, MAR, and recent results.

## 2021-11-13 NOTE — ROUTINE PROCESS
Bedside and Verbal shift change report given to José Glover RN (oncoming nurse) by Jasmin Garcia RN (offgoing nurse). Report included the following information SBAR and Kardex.

## 2021-11-13 NOTE — PROGRESS NOTES
2328-Patient restless and agitated; sitter at bedside. 0035-Restless, verbally aggressive. Asked Zulma Luis RN for assistance with patient to address patient needs. Patient more responsive to his presence than this writer. This writer was  able to administer medication to help relax patient with his assistance. 0223-Resting in bed, eyes closed, respirations 18. Sitter at bedside. 0404-Resting in bed, eyes closed, respirations 14-16, sitter at bedside. 0630-Stable, resting in bed.

## 2021-11-13 NOTE — PROGRESS NOTES
DC Plan: [address] [facility] manager facilitated interview with Th[facility] counselor Heathrobert Pepito; at start of interview; patent became agitated as she expected to go to group home today; patient has poor concept of time and process to get to group home; at start of interview, appeared to want to go out AMA; was coaxed back to room and then stated she wanted to get dressed to leave - took clothes into bathroom and was in bathroom approximately < 5 minutes when she screamed loudly in bathroom; this care manager had been in room with nursing staff and when door opened, patient was naked, laying on right side and c/o loudly of pain in her right occipital region of head and right elbow. No sign of redness or trauma to either head or right elbow but patient perseverating that \"I need Percocet, I need IV dilaudid. \"  No sound of fall preceded patient scream.  Hospitalist at bedside to evaluate patient immediately upon return to room. [facility] counselor was able to complete her assessment. 1050:  Care manager notified that patient is intent of leaving AMA; Hospitalist notified; care manager has placed call and left message with patient's sister [facility] [phone] requesting call back and that patient wants to leave AMA. Per psych assessment 11/11, patient did not meet criteria for psych admission and was psychologically stable for discharge; if patient is insistent upon leaving her IV will be removed and she will leave the facility under her own care.

## 2021-11-13 NOTE — PROGRESS NOTES
Discharging pt from PT caseload. Pt is independent with bed mobility and ambulation. Pt is drug seeking and needs psych and placement in adult home or psych hospital. No need for PT services.

## 2021-11-13 NOTE — PROGRESS NOTES
Hospitalist Progress Note    Patient: Shasha Salinas MRN: [id number]  [facility]: [id number]    YOB: 1980  Age: 39 y.o.   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    Left wrist fracture, with delayed healing, subsequent encounter S62.102G    Drug overdose, intentional self-harm, initial encounter ([address] T50.902A    Hypoxia R09.02    Hypotension after procedure I95.81    Acute metabolic encephalopathy W34.17    Psychosis ([address] F29    Hyponatremia E87.1    Acute respiratory failure with hypoxia ([address] J96.01    Increased ammonia level R79.89    Hypokalemia E87.6    Status post tracheostomy ([address] Z93.0    Bacteremia due to Gram-positive bacteria R78.81    FELICITY (acute kidney injury) ([address] N17.9        IMPRESSION and Plan:    Shasha Salinas is a 39 y.o. female with   Patient Active Problem List    Diagnosis Date Noted    FELICITY (acute kidney injury) ([address] 10/16/2021    Bacteremia due to Gram-positive bacteria 10/06/2021    Status post tracheostomy ([address] 09/28/2021    Hypokalemia 09/21/2021    Increased ammonia level 09/14/2021    Psychosis ([address]     Hyponatremia     Acute respiratory failure with hypoxia ([address]     Left wrist fracture, with delayed healing, subsequent encounter 09/12/2021    Drug overdose, intentional self-harm, initial encounter ([address] 09/12/2021    Hypoxia 09/12/2021    Hypotension after procedure 09/12/2021    Acute metabolic encephalopathy 15/81/2124    Ekbom's delusional parasitosis ([address] 09/06/2020    Dextromethorphan use disorder, moderate ([address] 12/07/2019     Principal Problem:    Drug overdose, intentional self-harm, initial encounter ([address] (9/12/2021)    Active Problems:    Left wrist fracture, with delayed healing, subsequent encounter (9/12/2021)      Hypoxia (9/12/2021)      Hypotension after procedure (9/12/2021)      Acute metabolic encephalopathy (0/18/7326)      Psychosis ([facility] ()      Hyponatremia ()      Acute respiratory failure with hypoxia (H[facility]) ()      Increased ammonia level (9/14/2021)      Hypokalemia (9/21/2021)      Status post tracheostomy ([facility] (9/28/2021)      Bacteremia due to Gram-positive bacteria (10/6/2021)      FELICITY (acute kidney injury) ([facility] (10/16/2021)          40 yo female with extended hospital stay due to severe illness after medicine overdose and resp failure     Required temporary tracheostomy and feeding tube -- tolerting po well . Off O2        Chronic psychiatric disorders-including parasitosis, seen by psych here and recommended to continue klonopin, wean to as needed only, and continue BID seroquel  zyprexa  Poor social support situation at home, ( incarcerated)     Narcotics weaning plan: fentanyl patch reduced to 12 mcg . She is narcotics seeking.        Left wrist fracture, ext fixator removed-follow up with Dr Abebe Loomis after discharge     Encephalopathy resolved     weakness improved      She is very agitated and threathens to leave AMA \"unless Dilaudid or percocet is given\"  Psych notes noted. She appears to be competent. D/w care coordination> she doesn't meed criteria for TDO. If she chooese to go home , it will by [facility]. We ill not facilate the discharge in anyway. She understands. Case manger to talk to sister to warn her. Currently the person from group home is here to evaluate but the pt doesnot appear to be cooprating   Patient's condition is fair        Recommend to continue hospitalization. Discussed with patient. Chief Complaints:   Chief Complaint   Patient presents with    Drug Overdose     SUBJECTIVE:  Pt is seen and examined. Chart reviewed  She is very anxious and constantly seeking for \"dilaudid or percocet\"  Co \"hurting everywhere\"    Review of systems:    Review of Systems   Constitutional: Positive for malaise/fatigue. HENT: Negative. Eyes: Negative. Respiratory: Positive for shortness of breath. Cardiovascular: Positive for leg swelling. Negative for chest pain, palpitations and orthopnea. Gastrointestinal: Negative. Negative for abdominal pain, diarrhea and heartburn. Genitourinary: Negative for dysuria and hematuria. Skin: Negative. Neurological: Positive for weakness. Psychiatric/Behavioral: Positive for substance abuse. Negative for suicidal ideas. The patient is nervous/anxious. PE:  Patient Vitals for the past 24 hrs:   BP Temp Pulse Resp SpO2   11/13/21 0730 124/84 98 °F (36.7 °C) 88 18 99 %   11/12/21 2319 126/81 97.6 °F (36.4 °C) 98 17 99 %   11/12/21 1447 112/77 98 °F (36.7 °C) 81 18 97 %   11/12/21 1117 (!) 95/53 98.2 °F (36.8 °C) 76 17 99 %       Intake/Output Summary (Last 24 hours) at 11/13/2021 1052  Last data filed at 11/12/2021 2212  Gross per 24 hour   Intake 0 ml   Output    Net 0 ml     Patient Vitals for the past 120 hrs:   Weight   11/08/21 1218 64.5 kg (142 lb 4.8 oz)         Physical Exam  HENT:      Mouth/Throat:      Mouth: Mucous membranes are dry. Eyes:      Extraocular Movements: Extraocular movements intact. Pupils: Pupils are equal, round, and reactive to light. Cardiovascular:      Rate and Rhythm: Normal rate. Pulmonary:      Effort: Pulmonary effort is normal.      Breath sounds: Normal breath sounds. Abdominal:      General: Abdomen is flat. Bowel sounds are normal.      Palpations: Abdomen is soft. Psychiatric:         Mood and Affect: Mood is anxious. Affect is angry. Speech: Speech normal.         Behavior: Behavior is agitated, aggressive and combative. Thought Content: Thought content normal.             Intake and Output:  Current Shift:  No intake/output data recorded. Last three shifts:  No intake/output data recorded.     Lab/Data Reviewed:  No results found for this or any previous visit (from the past 8 hour(s)). Medications:  Current Facility-Administered Medications   Medication Dose Route Frequency    oxyCODONE-acetaminophen (PERCOCET) 5-325 mg per tablet 0.5 Tablet  0.5 Tablet Oral Q8H PRN    fentaNYL (DURAGESIC) 12 mcg/hr patch 1 Patch  1 Patch TransDERmal Q72H    acetaminophen (TYLENOL) tablet 1,000 mg  1,000 mg Oral Q8H PRN    OLANZapine (ZyPREXA) tablet 5 mg  5 mg Oral QPM    QUEtiapine (SEROquel) tablet 300 mg  300 mg Oral QAM    clonazePAM (KlonoPIN) tablet 1 mg  1 mg Oral TID PRN    famotidine (PEPCID) tablet 20 mg  20 mg Oral DAILY    hydrOXYzine HCL (ATARAX) tablet 50 mg  50 mg Oral TID    QUEtiapine (SEROquel) tablet 600 mg  600 mg Oral QHS    hydrOXYzine HCL (ATARAX) tablet 25 mg  25 mg Oral QID PRN    lactulose (CHRONULAC) 10 gram/15 mL solution 15 mL  15 mL Oral DAILY PRN    diphenhydrAMINE-zinc acetate 2%-0.1% (BENADRYL) cream   Topical TID PRN    0.9% sodium chloride infusion 250 mL  250 mL IntraVENous PRN    nystatin (MYCOSTATIN) 100,000 unit/gram powder   Topical TID    melatonin (rapid dissolve) tablet 5 mg  5 mg Oral QHS    albuterol-ipratropium (DUO-NEB) 2.5 MG-0.5 MG/3 ML  3 mL Nebulization Q4H PRN    multivitamin, tx-iron-ca-min (THERA-M w/ IRON) tablet 1 Tablet  1 Tablet Oral DAILY    glucose chewable tablet 16 g  4 Tablet Oral PRN    glucagon (GLUCAGEN) injection 1 mg  1 mg IntraMUSCular PRN    dextrose (D50W) injection syrg 12.5-25 g  25-50 mL IntraVENous PRN    enoxaparin (LOVENOX) injection 40 mg  40 mg SubCUTAneous Q24H    acetaminophen (TYLENOL) tablet 650 mg  650 mg Oral Q6H PRN    Or    acetaminophen (TYLENOL) suppository 650 mg  650 mg Rectal Q6H PRN    polyethylene glycol (MIRALAX) packet 17 g  17 g Oral DAILY PRN    ondansetron (ZOFRAN ODT) tablet 4 mg  4 mg Oral Q8H PRN       No results found for this or any previous visit (from the past 24 hour(s)).     Procedures/imaging: see electronic medical records for all procedures/Xrays and details which were not copied into this note but were reviewed prior to creation of Plan    Georgiana Mayorga MD   11/13/2021, 10:52 AM

## 2021-11-14 NOTE — DISCHARGE SUMMARY
Discharge Summary  ---------- PT is signed out AMA eventhough multiple attemets were made  AMA form signed. She understands the risks including death        Patient: Lida Rae MRN: [id number]  CSN: [id number]    YOB: 1980  Age: 39 y.o.   Sex: female    DOA: 9/11/2021 LOS:  LOS: 62 days   Discharge Date: 11/13/2021     Primary Care Provider:  Other, MD Luisa    Admission Diagnoses: Drug overdose, intentional self-harm, initial encounter [facility]) Anita Cruz  Left wrist fracture, with delayed healing, subsequent encounter [S62.102G]    Discharge Diagnoses:    Problem List as of 11/13/2021 Date Reviewed: 11/2/2021          Codes Class Noted - Resolved    FELICITY (acute kidney injury) ([facility] ICD-10-CM: N17.9  ICD-9-CM: 584.9  10/16/2021 - Present        Bacteremia due to Gram-positive bacteria ICD-10-CM: R78.81  ICD-9-CM: 790.7  10/6/2021 - Present        Status post tracheostomy ([facility] ICD-10-CM: Z93.0  ICD-9-CM: V44.0  9/28/2021 - Present        Hypokalemia ICD-10-CM: E87.6  ICD-9-CM: 276.8  9/21/2021 - Present        Increased ammonia level ICD-10-CM: R79.89  ICD-9-CM: 790.6  9/14/2021 - Present        Psychosis ([facility] ICD-10-CM: F29  ICD-9-CM: 298.9  Unknown - Present        Hyponatremia ICD-10-CM: E87.1  ICD-9-CM: 276.1  Unknown - Present        Acute respiratory failure with hypoxia ([facility] ICD-10-CM: J96.01  ICD-9-CM: 518.81  Unknown - Present        Left wrist fracture, with delayed healing, subsequent encounter ICD-10-CM: S62.102G  ICD-9-CM: V54.19  9/12/2021 - Present        * (Principal) Drug overdose, intentional self-harm, initial encounter ([address] ICD-10-CM: T50.902A  ICD-9-CM: 977.9, E950.5  9/12/2021 - Present        Hypoxia ICD-10-CM: R09.02  ICD-9-CM: 799.02  9/12/2021 - Present        Hypotension after procedure ICD-10-CM: I95.81  ICD-9-CM: 458.29  9/12/2021 - Present        Acute metabolic encephalopathy ICB-40-YP: G93.41  ICD-9-CM: 348.31  9/12/2021 - Present        Ekbom's delusional parasitosis ([facility] ICD-10-CM: F22  ICD-9-CM: 300.29  9/6/2020 - Present        Dextromethorphan use disorder, moderate ([facility] ICD-10-CM: F19.20  ICD-9-CM: 305.50  12/7/2019 - Present        RESOLVED: Left ear pain ICD-10-CM: H92.02  ICD-9-CM: 388.70  9/6/2020 - 9/12/2021              Discharge Medications:     Discharge Medication List as of 11/13/2021 11:23 AM             PHYSICAL EXAM   Visit Vitals  /84   Pulse 88   Temp 98 °F (36.7 °C)   Resp 18   Ht 5' 2\" (1.575 m)   Wt 64.5 kg (142 lb 4.8 oz)   SpO2 99%   BMI 26.03 kg/m²     General: Awake, cooperative, no acute distress    HEENT: NC, Atraumatic. PERRLA, EOMI. Anicteric sclerae. Lungs:  CTA Bilaterally. No Wheezing/Rhonchi/Rales. Heart:  Regular  rhythm,  No murmur, No Rubs, No Gallops  Abdomen: Soft, Non distended, Non tender. +Bowel sounds,   Extremities: No c/c/e  Psych:   Not anxious or agitated. Neurologic:  No acute neurological deficits. Hospital Course     Principal Problem:    Drug overdose, intentional self-harm, initial encounter ([facility] (9/12/2021)     Active Problems:    Left wrist fracture, with delayed healing, subsequent encounter (9/12/2021)       Hypoxia (9/12/2021)       Hypotension after procedure (9/12/2021)       Acute metabolic encephalopathy (7/78/0994)       Psychosis (HCC) ()       Hyponatremia ()       Acute respiratory failure with hypoxia (HCC) ()       Increased ammonia level (9/14/2021)       Hypokalemia (9/21/2021)       Status post tracheostomy ([facility] (9/28/2021)       Bacteremia due to Gram-positive bacteria (10/6/2021)       FELICITY (acute kidney injury) ([facility] (10/16/2021)              38 yo female with extended hospital stay due to severe illness after medicine overdose and resp failure     Required temporary tracheostomy and feeding tube -- tolerting po well .  Off O2        Chronic psychiatric disorders-including parasitosis, seen by psych here and recommended to continue klonopin, wean to as needed only, and continue BID seroquel  zyprexa  Poor social support situation at home, ( incarcerated)     Narcotics weaning plan: fentanyl patch reduced to 12 mcg . She is narcotics seeking.        Left wrist fracture, ext fixator removed-follow up with Dr Nino Mitchell after discharge     Encephalopathy resolved     weakness improved      She is very agitated and threathens to leave AMA \"unless Dilaudid or percocet is given\"  Psych notes noted. She appears to be competent. D/w care coordination> she doesn't meed criteria for TDO. If she chooese to go home , it will by [facility]. We ill not facilate the discharge in anyway. She understands. Case manger to talk to sister to warn her. Currently the person from group home is here to evaluate but the pt doesnot appear to be cooprating   Activity: Activity as tolerated    Diet: Regular Diet           Labs: Results:       Chemistry No results for input(s): GLU, NA, K, CL, CO2, BUN, CREA, CA, AGAP, BUCR, TBIL, AP, TP, ALB, GLOB, AGRAT in the last 72 hours. No lab exists for component: GPT   CBC w/Diff No results for input(s): WBC, RBC, HGB, HCT, PLT, GRANS, LYMPH, EOS, HGBEXT, HCTEXT, PLTEXT in the last 72 hours. Cardiac Enzymes No results for input(s): CPK, CKND1, ZENON in the last 72 hours. No lab exists for component: CKRMB, TROIP   Coagulation No results for input(s): PTP, INR, APTT, INREXT in the last 72 hours. Lipid Panel No results found for: CHOL, CHOLPOCT, CHOLX, CHLST, CHOLV, 215370, HDL, HDLP, LDL, LDLC, DLDLP, 996200, VLDLC, VLDL, TGLX, TRIGL, TRIGP, TGLPOCT, CHHD, CHHDX   BNP No results for input(s): BNPP in the last 72 hours. Liver Enzymes No results for input(s): TP, ALB, TBIL, AP in the last 72 hours. No lab exists for component: SGOT, GPT, DBIL   Thyroid Studies No results found for: T4, T3U, TSH, TSHEXT         Significant Diagnostic Studies: XR HUMERUS RT    Result Date: 11/6/2021  EXAM: RIGHT HUMERUS HISTORY: Right arm pain. COMPARISON: None. TECHNIQUE: 2 views right humerus _______________ FINDINGS: No evidence of fracture or focal bony abnormality. Soft tissues unremarkable. _______________     Normal.    XR FOREARM RT AP/LAT    Result Date: 11/6/2021  EXAM: RIGHT FOREARM HISTORY: Right arm pain. COMPARISON: None. TECHNIQUE: 2 views right forearm _______________ FINDINGS: No evidence of fracture or focal bony abnormality. Regional soft tissues unremarkable. _______________     Normal.    XR WRIST LT AP/LAT/OBL MIN 3V    Result Date: 11/2/2021  HISTORY: -From Provider: fracture -Additional: None Technique: Three views of the left wrist are obtained. Comparison study: 09/28/21. Findings: External fixation hardware remains in place, status post fixation of comminuted intra-articular distal radial fracture. No gross interval change in appearance or alignment. Interval decrease in distal soft tissue swelling about the wrist and hand. 1. Status post fixation of intra-articular distal radial fracture, without definitive change in plain film appearance compared to prior exam. No acute fracture seen. 2. Interval decrease soft tissue swelling about the wrist and hand. CT HEAD WO CONT    Result Date: 11/6/2021  EXAM: CRANIAL CT WITHOUT CONTRAST INDICATION: Impaired motor skills. Right arm pain. COMPARISON: CT 9/15/2021 TECHNIQUE: Axial CT imaging of the head was performed without intravenous contrast. One or more dose reduction techniques were used on this CT: automated exposure control, adjustment of the mAs and/or kVp according to patient size, and iterative reconstruction techniques. The specific techniques used on this CT exam have been documented in the patient's electronic medical record.  Digital Imaging and Communications in Medicine (DICOM) format image data are available to nonaffiliated external healthcare facilities or entities on a secure, media free, reciprocally searchable basis with patient authorization for at least a 12-month period after this study. _______________ FINDINGS: BRAIN AND POSTERIOR FOSSA: The sulci, folia, ventricles and basal cisterns are within normal limits for the patient's age. There is no intracranial hemorrhage, mass effect, or midline shift. There are no areas of abnormal parenchymal attenuation. EXTRA-AXIAL SPACES AND MENINGES: There are no abnormal extra-axial fluid collections. CALVARIUM: Intact. SINUSES: Mucosal thickening left maxillary antrum. OTHER: None. _______________     No acute intracranial abnormalities. Chronic left maxillary sinusitis. XR CHEST PORT    Result Date: 10/24/2021  EXAM: XR CHEST PORT CLINICAL INDICATION/HISTORY: trach, cough, congestion -Additional: None COMPARISON: Radiographs 10/15/2021 TECHNIQUE: Portable frontal view of the chest _______________ FINDINGS: SUPPORT DEVICES: Tracheostomy catheter projects in stable position. HEART AND MEDIASTINUM: Stable cardiac size and mediastinal contours. LUNGS AND PLEURAL SPACES: No focal pneumonic opacity. No evidence of pneumothorax or pleural effusion. BONY THORAX AND SOFT TISSUES: No acute osseous abnormality appear _______________     No acute radiographic cardiopulmonary abnormality. NC XR TECHNOLOGIST SERVICE    Result Date: 11/5/2021  See impression. Fluoroscopy was provided for this procedure under the supervision and/or direction of the attending provider. ? For further information regarding this procedure, see patient medical record. DUPLEX UPPER EXT VENOUS RIGHT    Result Date: 10/18/2021  · No evidence of acute DVT and chronic DVT in the right upper extremity. · Thrombus noted within the right basilic upper arm vein(s). No results found for this or any previous visit.            CC: Marcin, MD Luisa .

## 2021-11-24 NOTE — PROGRESS NOTES
11/24/21 1230: Care manager received call from [facility] [phone] who stated that patient was now at [facility] and she was attempting to see if the previously arranged housing that had been initiated at [facility] for [facility] could still be an option - this care manager had facilitated a lLevel II review which was required for patient to be accepted. Unfortunately, on day of Level II assessment, patient chose to go out AMA after assessment had been completed but not sent (it was a Saturday and it would have been sent for evaluation on Monday). CM contacted [facility]  Laura Dasilva  [phone] to see if the Level II had been processed and he stated that it had stopped due to patient leaving AMA; he said that if needed, the Health Dept could reissue a UAI or use what had previously been sent to restart the process. PERFECTO has called EMILIE Velazquez and left VM of this info.

## 2021-12-17 ENCOUNTER — HOSPITAL ENCOUNTER (EMERGENCY)
Age: 41
Discharge: HOME | End: 2021-12-17
Attending: STUDENT IN AN ORGANIZED HEALTH CARE EDUCATION/TRAINING PROGRAM
Payer: MEDICAID

## 2021-12-17 VITALS
SYSTOLIC BLOOD PRESSURE: 106 MMHG | TEMPERATURE: 97 F | BODY MASS INDEX: 27.6 KG/M2 | RESPIRATION RATE: 16 BRPM | HEIGHT: 62 IN | WEIGHT: 150 LBS | OXYGEN SATURATION: 100 % | DIASTOLIC BLOOD PRESSURE: 68 MMHG | HEART RATE: 71 BPM

## 2021-12-17 DIAGNOSIS — R11.2 NAUSEA AND VOMITING IN ADULT: ICD-10-CM

## 2021-12-17 DIAGNOSIS — Z20.822 CONTACT WITH AND (SUSPECTED) EXPOSURE TO COVID-19: Primary | ICD-10-CM

## 2021-12-17 LAB
COVID-19 RAPID TEST, COVR: NOT DETECTED
SOURCE, COVRS: NORMAL

## 2021-12-17 PROCEDURE — 99283 EMERGENCY DEPT VISIT LOW MDM: CPT

## 2021-12-17 PROCEDURE — 74011250637 HC RX REV CODE- 250/637: Performed by: STUDENT IN AN ORGANIZED HEALTH CARE EDUCATION/TRAINING PROGRAM

## 2021-12-17 PROCEDURE — 87635 SARS-COV-2 COVID-19 AMP PRB: CPT

## 2021-12-17 RX ORDER — GABAPENTIN 100 MG/1
CAPSULE ORAL 3 TIMES DAILY
COMMUNITY

## 2021-12-17 RX ORDER — PAROXETINE 10 MG/1
TABLET, FILM COATED ORAL DAILY
COMMUNITY

## 2021-12-17 RX ORDER — ONDANSETRON 4 MG/1
4 TABLET, ORALLY DISINTEGRATING ORAL
Status: COMPLETED | OUTPATIENT
Start: 2021-12-17 | End: 2021-12-17

## 2021-12-17 RX ORDER — QUETIAPINE FUMARATE 100 MG/1
50 TABLET, FILM COATED ORAL 2 TIMES DAILY
COMMUNITY

## 2021-12-17 RX ADMIN — ONDANSETRON 4 MG: 4 TABLET, ORALLY DISINTEGRATING ORAL at 22:32

## 2021-12-18 NOTE — ED NOTES
[employer] non-emergent line contacted for transport to [facility] [address]. Per dispatch, there will be an officer to the facility shortly.

## 2021-12-18 NOTE — ED NOTES
COVID swab collected per MD order. Pt noted to be vomiting. Endorses that, \"it's just because of my medication. I need water! \". Pt educated that she cannot drink water if she is actively vomiting. Pt states she will, \"just go to the bathroom then\".

## 2021-12-18 NOTE — ED PROVIDER NOTES
EMERGENCY DEPARTMENT HISTORY AND PHYSICAL EXAM    Date: 12/17/2021  Patient Name: Ginger Severs    History of Presenting Illness     Chief Complaint   Patient presents with    Vomiting         History Provided By: Patient  Additional History (Context): Ginger Severs is a 39 y.o. female with cocaine abuse, psychiatry who presents with complaint of vomiting. Says she tried Seroquel for the first time yesterday is prescribed her and she wonders if she vomited because she does not agree with her. She denies any abdominal pain feels well now. Had recent received Zofran here. She needed Covid test to be able to remain at the shelter where she is a staying tonight. PCP: Luisa Burch MD    Current Outpatient Medications   Medication Sig Dispense Refill    gabapentin (NEURONTIN) 100 mg capsule Take  by mouth three (3) times daily.  PARoxetine (PaxiL) 10 mg tablet Take  by mouth daily.  QUEtiapine (SEROqueL) 100 mg tablet Take 50 mg by mouth two (2) times a day.  albuterol (PROVENTIL HFA, VENTOLIN HFA, PROAIR HFA) 90 mcg/actuation inhaler Take 2 Puffs by inhalation every four (4) hours as needed for Wheezing or Shortness of Breath. 1 Inhaler 1    metFORMIN (GLUCOPHAGE) 500 mg tablet TAKE 1 TABLET BY MOUTH ONCE DAILY  3       Past History     Past Medical History:  Past Medical History:   Diagnosis Date    Asthma     Bilateral ovarian cysts     Cocaine abuse ([address]     Mental and behavioral problem     Pancreatitis     Pancreatitis     [facility]osis [facility]        Past Surgical History:  Past Surgical History:   Procedure Laterality Date    HX GYN      D&C, Hysterectomy    IR INSERT GASTROSTOMY TUBE PERC  10/1/2021       Family History:  History reviewed. No pertinent family history.     Social History:  Social History     Tobacco Use    Smoking status: Current Every Day Smoker     Packs/day: 0.50    Smokeless tobacco: Never Used   Substance Use Topics    Alcohol use: Not Currently    Drug use: Not Currently     Types: Cocaine, Marijuana     Comment: used with in past 24 hours       Allergies: Allergies   Allergen Reactions    Fish Containing Products Itching    Toradol [Ketorolac] Rash and Itching         Review of Systems   Review of Systems   Constitutional: Negative for fever. Respiratory: Negative for shortness of breath. Cardiovascular: Negative for chest pain. Gastrointestinal: Positive for nausea and vomiting. Genitourinary: Negative for dysuria. All Other Systems Negative  Physical Exam     Vitals:    12/17/21 2207   BP: 106/68   Pulse: 71   Resp: 16   Temp: 97 °F (36.1 °C)   SpO2: 100%   Weight: 68 kg (150 lb)   Height: 5' 2\" (1.575 m)     Physical Exam  Vitals and nursing note reviewed. Constitutional:       Appearance: She is well-developed. HENT:      Head: Normocephalic and atraumatic. Eyes:      Pupils: Pupils are equal, round, and reactive to light. Neck:      Thyroid: No thyromegaly. Vascular: No JVD. Trachea: No tracheal deviation. Cardiovascular:      Rate and Rhythm: Normal rate and regular rhythm. Heart sounds: Normal heart sounds. No murmur heard. No friction rub. No gallop. Pulmonary:      Effort: Pulmonary effort is normal. No respiratory distress. Breath sounds: Normal breath sounds. No stridor. No wheezing or rales. Chest:      Chest wall: No tenderness. Abdominal:      General: There is no distension. Palpations: Abdomen is soft. There is no mass. Tenderness: There is no abdominal tenderness. There is no guarding or rebound. Musculoskeletal:         General: No tenderness. Lymphadenopathy:      Cervical: No cervical adenopathy. Skin:     General: Skin is warm and dry. Coloration: Skin is not pale. Findings: No erythema or rash. Neurological:      Mental Status: She is alert and oriented to person, place, and time. Psychiatric:         Behavior: Behavior normal.         Thought Content:  Thought content normal.            Diagnostic Study Results     Labs -     Recent Results (from the past 12 hour(s))   COVID-19 RAPID TEST    Collection Time: 12/17/21 10:20 PM   Result Value Ref Range    Specimen source Nasopharyngeal      COVID-19 rapid test Not detected NOTD         Radiologic Studies -   No orders to display     CT Results  (Last 48 hours)    None        CXR Results  (Last 48 hours)    None            Medical Decision Making   I am the first provider for this patient. I reviewed the vital signs, available nursing notes, past medical history, past surgical history, family history and social history. Vital Signs-Reviewed the patient's vital signs. Provider Notes (Medical Decision Making): No longer nauseous. Covid negative discharge. MED RECONCILIATION:  No current facility-administered medications for this encounter. Current Outpatient Medications   Medication Sig    gabapentin (NEURONTIN) 100 mg capsule Take  by mouth three (3) times daily.  PARoxetine (PaxiL) 10 mg tablet Take  by mouth daily.  QUEtiapine (SEROqueL) 100 mg tablet Take 50 mg by mouth two (2) times a day.  albuterol (PROVENTIL HFA, VENTOLIN HFA, PROAIR HFA) 90 mcg/actuation inhaler Take 2 Puffs by inhalation every four (4) hours as needed for Wheezing or Shortness of Breath.  metFORMIN (GLUCOPHAGE) 500 mg tablet TAKE 1 TABLET BY MOUTH ONCE DAILY       Disposition:  home    DISCHARGE NOTE:   11:13 PM    Pt has been reexamined. Patient has no new complaints, changes, or physical findings. Care plan outlined and precautions discussed. Results of labs were reviewed with the patient. All medications were reviewed with the patient. All of pt's questions and concerns were addressed. Patient was instructed and agrees to follow up with PCP, as well as to return to the ED upon further deterioration. Patient is ready to go home.     Follow-up Information     Follow up With Specialties Details Why Contact Info    [facility]  Schedule an appointment as soon as possible for a visit in 3 days  [address] [address], [address]    [facility] EMERGENCY DEPT Emergency Medicine  If symptoms worsen return immediately [address]  [phone]          Current Discharge Medication List          Diagnosis     Clinical Impression:   1. Contact with and (suspected) exposure to covid-19    2.  Nausea and vomiting in adult

## 2021-12-18 NOTE — ED TRIAGE NOTES
Patient arrives via ambulance from [facility] after vomiting once. Was told by shelter that she must have a covid test in order to stay.  Patient states she vomited due recent to medication change

## 2022-02-10 NOTE — PROGRESS NOTES
Care Management    CM met with patient to discuss home care and Milrinone infusion. Plan is for patient to discharge home when medically cleared with Milrinone drip. Patient would like [facility]. Referral sent.    Suction patient for RLL mucus plugging. Secretions were large yellow and thick.

## 2022-03-18 PROBLEM — R09.02 HYPOXIA: Status: ACTIVE | Noted: 2021-09-12

## 2022-03-18 PROBLEM — Z93.0 STATUS POST TRACHEOSTOMY (HCC): Status: ACTIVE | Noted: 2021-09-28

## 2022-03-18 PROBLEM — N17.9 AKI (ACUTE KIDNEY INJURY) (HCC): Status: ACTIVE | Noted: 2021-10-16

## 2022-03-19 PROBLEM — T50.902A DRUG OVERDOSE, INTENTIONAL SELF-HARM, INITIAL ENCOUNTER (HCC): Status: ACTIVE | Noted: 2021-09-12

## 2022-03-19 PROBLEM — F19.20: Status: ACTIVE | Noted: 2019-12-07

## 2022-03-19 PROBLEM — G93.41 ACUTE METABOLIC ENCEPHALOPATHY: Status: ACTIVE | Noted: 2021-09-12

## 2022-03-19 PROBLEM — E87.6 HYPOKALEMIA: Status: ACTIVE | Noted: 2021-09-21

## 2022-03-19 PROBLEM — R78.81 BACTEREMIA DUE TO GRAM-POSITIVE BACTERIA: Status: ACTIVE | Noted: 2021-10-06

## 2022-03-19 PROBLEM — F22 EKBOM'S DELUSIONAL PARASITOSIS (HCC): Status: ACTIVE | Noted: 2020-09-06

## 2022-03-19 PROBLEM — I95.81 HYPOTENSION AFTER PROCEDURE: Status: ACTIVE | Noted: 2021-09-12

## 2022-03-20 PROBLEM — S62.102G: Status: ACTIVE | Noted: 2021-09-12

## 2022-03-20 PROBLEM — R79.89 INCREASED AMMONIA LEVEL: Status: ACTIVE | Noted: 2021-09-14

## 2023-02-08 NOTE — PROGRESS NOTES
Patient was scheduled for today at 1:45   Transported Pt to IR on vent for procedure and back to ICU without incident

## 2023-06-22 NOTE — PROGRESS NOTES
Problem: At Risk for Falls  Goal: # Patient does not fall  Outcome: Outcome Met, Continue evaluating goal progress toward completion  Goal: # Takes action to control fall-related risks  Outcome: Outcome Met, Continue evaluating goal progress toward completion     Problem: Pain  Goal: #Acceptable pain level achieved/maintained at rest using NRS/Faces  Description: This goal is used for patients who can self-report.  Acceptable means the level is at or below the identified comfort/function goal.  Outcome: Outcome Met, Continue evaluating goal progress toward completion     Problem: Activity Intolerance  Goal: # Functional status is maintained or returned to baseline  Outcome: Outcome Met, Continue evaluating goal progress toward completion     Problem: Impaired Physical Mobility  Goal: # Bed mobility, ambulation, and ADLs are maintained or returned to baseline during hospitalization  Outcome: Outcome Met, Continue evaluating goal progress toward completion      0730   Pt asleep on rounds. Sitter at bedside. 0936   Pt  resting in bed. Ace wrap dressing to left arm dry and intact. 0944   Pt's dressing change to left arm. Pt has a small wound to left forearm with scant amt of light greenish d/c in the old dressing. Packed with wet to dry small gauze dressing  then ABD then Kerlix. Left arm splint held in place with Ace wrap. 1005   Pt's fentanyl patch applied to upper back. Pain level 6/10.   1220    Pt was seen by PT. Pt ambulated in hallway then back in bed. 12   Pt requesting for Valium, Dilaudid, Percocet, . Pt does not seem to be in much pain. Pt received Tylenol and Klonopin, and Atarax. .   1538  Pt is sleeping at this time. Sitter at bedside. 1709  PEG tube intact. Area around tube has brownish crusts. Kept clean. Gauze dressing applied. 1851   Pt eating poorly. As per pt, she does not want the food.

## 2023-07-02 NOTE — PROGRESS NOTES
PT session held due to:  []  Nausea/vomiting  [x]  RN Communication/ suggestion  [x]  Pt resting deeply  []  Dialysis treatment in progress. Will f/u later as pt's schedule allows. Thank you.   Judie Moser, PTA .

## 2023-09-13 ENCOUNTER — HOSPITAL ENCOUNTER (EMERGENCY)
Facility: HOSPITAL | Age: 43
Discharge: LEFT BEFORE DISCHARGE | DRG: 133 | End: 2023-09-13
Attending: EMERGENCY MEDICINE
Payer: MEDICAID

## 2023-09-13 ENCOUNTER — HOSPITAL ENCOUNTER (INPATIENT)
Facility: HOSPITAL | Age: 43
LOS: 1 days | Discharge: STILL ADMITTED OR ADMINISTRATIVE | DRG: 133 | End: 2023-09-13
Attending: STUDENT IN AN ORGANIZED HEALTH CARE EDUCATION/TRAINING PROGRAM | Admitting: FAMILY MEDICINE
Payer: MEDICAID

## 2023-09-13 ENCOUNTER — APPOINTMENT (OUTPATIENT)
Facility: HOSPITAL | Age: 43
DRG: 133 | End: 2023-09-13
Payer: MEDICAID

## 2023-09-13 VITALS
HEIGHT: 62 IN | DIASTOLIC BLOOD PRESSURE: 84 MMHG | HEART RATE: 88 BPM | TEMPERATURE: 98.1 F | BODY MASS INDEX: 36.8 KG/M2 | OXYGEN SATURATION: 98 % | WEIGHT: 200 LBS | SYSTOLIC BLOOD PRESSURE: 121 MMHG | RESPIRATION RATE: 12 BRPM

## 2023-09-13 VITALS
SYSTOLIC BLOOD PRESSURE: 137 MMHG | TEMPERATURE: 97 F | OXYGEN SATURATION: 91 % | RESPIRATION RATE: 19 BRPM | DIASTOLIC BLOOD PRESSURE: 82 MMHG | BODY MASS INDEX: 36.8 KG/M2 | HEART RATE: 119 BPM | WEIGHT: 200 LBS | HEIGHT: 62 IN

## 2023-09-13 DIAGNOSIS — F19.10 POLYSUBSTANCE ABUSE (HCC): ICD-10-CM

## 2023-09-13 DIAGNOSIS — F41.1 ANXIETY STATE: Primary | ICD-10-CM

## 2023-09-13 DIAGNOSIS — R09.02 HYPOXEMIA: ICD-10-CM

## 2023-09-13 DIAGNOSIS — J96.02 ACUTE RESPIRATORY FAILURE WITH HYPOXIA AND HYPERCAPNIA (HCC): Primary | ICD-10-CM

## 2023-09-13 DIAGNOSIS — J96.01 ACUTE RESPIRATORY FAILURE WITH HYPOXIA AND HYPERCAPNIA (HCC): Primary | ICD-10-CM

## 2023-09-13 PROBLEM — R78.81 BACTEREMIA DUE TO GRAM-POSITIVE BACTERIA: Status: RESOLVED | Noted: 2021-10-06 | Resolved: 2023-09-13

## 2023-09-13 PROBLEM — F19.20: Status: RESOLVED | Noted: 2019-12-07 | Resolved: 2023-09-13

## 2023-09-13 PROBLEM — Z93.0 STATUS POST TRACHEOSTOMY (HCC): Status: RESOLVED | Noted: 2021-09-28 | Resolved: 2023-09-13

## 2023-09-13 PROBLEM — F17.210 CIGARETTE SMOKER: Status: ACTIVE | Noted: 2023-09-13

## 2023-09-13 PROBLEM — N17.9 AKI (ACUTE KIDNEY INJURY) (HCC): Status: RESOLVED | Noted: 2021-10-16 | Resolved: 2023-09-13

## 2023-09-13 PROBLEM — F22 EKBOM'S DELUSIONAL PARASITOSIS (HCC): Status: RESOLVED | Noted: 2020-09-06 | Resolved: 2023-09-13

## 2023-09-13 PROBLEM — T50.902A DRUG OVERDOSE, INTENTIONAL SELF-HARM, INITIAL ENCOUNTER (HCC): Status: RESOLVED | Noted: 2021-09-12 | Resolved: 2023-09-13

## 2023-09-13 PROBLEM — G93.41 METABOLIC ENCEPHALOPATHY: Status: ACTIVE | Noted: 2021-09-12

## 2023-09-13 PROBLEM — E87.6 HYPOKALEMIA: Status: RESOLVED | Noted: 2021-09-21 | Resolved: 2023-09-13

## 2023-09-13 PROBLEM — I95.81 HYPOTENSION AFTER PROCEDURE: Status: RESOLVED | Noted: 2021-09-12 | Resolved: 2023-09-13

## 2023-09-13 PROBLEM — Z91.199 MEDICALLY NONCOMPLIANT: Status: ACTIVE | Noted: 2023-09-13

## 2023-09-13 PROBLEM — R79.89 INCREASED AMMONIA LEVEL: Status: RESOLVED | Noted: 2021-09-14 | Resolved: 2023-09-13

## 2023-09-13 PROBLEM — J96.00 ACUTE RESPIRATORY FAILURE (HCC): Status: ACTIVE | Noted: 2023-09-13

## 2023-09-13 PROBLEM — S62.102G: Status: RESOLVED | Noted: 2021-09-12 | Resolved: 2023-09-13

## 2023-09-13 LAB
ALBUMIN SERPL-MCNC: 3.2 G/DL (ref 3.4–5)
ALBUMIN/GLOB SERPL: 1.1 (ref 0.8–1.7)
ALP SERPL-CCNC: 98 U/L (ref 45–117)
ALT SERPL-CCNC: 40 U/L (ref 13–56)
AMPHET UR QL SCN: NEGATIVE
ANION GAP SERPL CALC-SCNC: 6 MMOL/L (ref 3–18)
APAP SERPL-MCNC: <2 UG/ML (ref 10–30)
APPEARANCE UR: CLEAR
ARTERIAL PATENCY WRIST A: POSITIVE
AST SERPL-CCNC: 25 U/L (ref 10–38)
BARBITURATES UR QL SCN: NEGATIVE
BASE EXCESS BLD CALC-SCNC: 2.1 MMOL/L
BASE EXCESS BLD CALC-SCNC: 2.5 MMOL/L
BASE EXCESS BLD CALC-SCNC: 2.6 MMOL/L
BASE EXCESS BLD CALC-SCNC: 2.8 MMOL/L
BASOPHILS # BLD: 0 K/UL (ref 0–0.1)
BASOPHILS NFR BLD: 0 % (ref 0–2)
BDY SITE: ABNORMAL
BENZODIAZ UR QL: POSITIVE
BILIRUB SERPL-MCNC: 0.3 MG/DL (ref 0.2–1)
BILIRUB UR QL: NEGATIVE
BODY TEMPERATURE: 98
BUN SERPL-MCNC: 7 MG/DL (ref 7–18)
BUN/CREAT SERPL: 12 (ref 12–20)
CALCIUM SERPL-MCNC: 8.6 MG/DL (ref 8.5–10.1)
CANNABINOIDS UR QL SCN: NEGATIVE
CHLORIDE SERPL-SCNC: 107 MMOL/L (ref 100–111)
CO2 SERPL-SCNC: 27 MMOL/L (ref 21–32)
COCAINE UR QL SCN: NEGATIVE
COLOR UR: YELLOW
CREAT SERPL-MCNC: 0.6 MG/DL (ref 0.6–1.3)
D DIMER PPP FEU-MCNC: 0.46 UG/ML(FEU)
DIFFERENTIAL METHOD BLD: ABNORMAL
EKG ATRIAL RATE: 104 BPM
EKG ATRIAL RATE: 93 BPM
EKG DIAGNOSIS: NORMAL
EKG DIAGNOSIS: NORMAL
EKG P AXIS: 52 DEGREES
EKG P-R INTERVAL: 168 MS
EKG P-R INTERVAL: 170 MS
EKG Q-T INTERVAL: 302 MS
EKG Q-T INTERVAL: 380 MS
EKG QRS DURATION: 66 MS
EKG QRS DURATION: 74 MS
EKG QTC CALCULATION (BAZETT): 397 MS
EKG QTC CALCULATION (BAZETT): 472 MS
EKG R AXIS: 12 DEGREES
EKG R AXIS: 18 DEGREES
EKG T AXIS: -28 DEGREES
EKG T AXIS: 22 DEGREES
EKG VENTRICULAR RATE: 104 BPM
EKG VENTRICULAR RATE: 93 BPM
EOSINOPHIL # BLD: 0.6 K/UL (ref 0–0.4)
EOSINOPHIL NFR BLD: 7 % (ref 0–5)
ERYTHROCYTE [DISTWIDTH] IN BLOOD BY AUTOMATED COUNT: 12.4 % (ref 11.6–14.5)
ETHANOL SERPL-MCNC: <3 MG/DL (ref 0–3)
FLUAV RNA SPEC QL NAA+PROBE: NOT DETECTED
FLUBV RNA SPEC QL NAA+PROBE: NOT DETECTED
GAS FLOW.O2 O2 DELIVERY SYS: ABNORMAL
GLOBULIN SER CALC-MCNC: 2.8 G/DL (ref 2–4)
GLUCOSE SERPL-MCNC: 151 MG/DL (ref 74–99)
GLUCOSE UR STRIP.AUTO-MCNC: NEGATIVE MG/DL
HCG UR QL: NEGATIVE
HCO3 BLD-SCNC: 29.8 MMOL/L (ref 22–26)
HCO3 BLD-SCNC: 30 MMOL/L (ref 22–26)
HCO3 BLD-SCNC: 31.1 MMOL/L (ref 22–26)
HCO3 BLD-SCNC: 31.1 MMOL/L (ref 22–26)
HCT VFR BLD AUTO: 37.2 % (ref 35–45)
HGB BLD-MCNC: 12.2 G/DL (ref 12–16)
HGB UR QL STRIP: NEGATIVE
IMM GRANULOCYTES # BLD AUTO: 0 K/UL (ref 0–0.04)
IMM GRANULOCYTES NFR BLD AUTO: 0 % (ref 0–0.5)
KETONES UR QL STRIP.AUTO: NEGATIVE MG/DL
LACTATE BLD-SCNC: 1.07 MMOL/L (ref 0.4–2)
LEUKOCYTE ESTERASE UR QL STRIP.AUTO: NEGATIVE
LYMPHOCYTES # BLD: 2.6 K/UL (ref 0.9–3.6)
LYMPHOCYTES NFR BLD: 34 % (ref 21–52)
Lab: ABNORMAL
MCH RBC QN AUTO: 29.8 PG (ref 24–34)
MCHC RBC AUTO-ENTMCNC: 32.8 G/DL (ref 31–37)
MCV RBC AUTO: 91 FL (ref 78–100)
METHADONE UR QL: NEGATIVE
MONOCYTES # BLD: 0.5 K/UL (ref 0.05–1.2)
MONOCYTES NFR BLD: 6 % (ref 3–10)
NEUTS SEG # BLD: 4.1 K/UL (ref 1.8–8)
NEUTS SEG NFR BLD: 53 % (ref 40–73)
NITRITE UR QL STRIP.AUTO: NEGATIVE
NRBC # BLD: 0 K/UL (ref 0–0.01)
NRBC BLD-RTO: 0 PER 100 WBC
O2/TOTAL GAS SETTING VFR VENT: 2 %
O2/TOTAL GAS SETTING VFR VENT: 28 %
O2/TOTAL GAS SETTING VFR VENT: 34 %
O2/TOTAL GAS SETTING VFR VENT: 35 %
OPIATES UR QL: NEGATIVE
PCO2 BLD: 56.9 MMHG (ref 35–45)
PCO2 BLD: 60.2 MMHG (ref 35–45)
PCO2 BLD: 64.4 MMHG (ref 35–45)
PCO2 BLD: 66.4 MMHG (ref 35–45)
PCP UR QL: NEGATIVE
PEEP RESPIRATORY: 5 CMH2O
PH BLD: 7.28 (ref 7.35–7.45)
PH BLD: 7.29 (ref 7.35–7.45)
PH BLD: 7.3 (ref 7.35–7.45)
PH BLD: 7.33 (ref 7.35–7.45)
PH UR STRIP: 6 (ref 5–8)
PLATELET # BLD AUTO: 188 K/UL (ref 135–420)
PMV BLD AUTO: 10.4 FL (ref 9.2–11.8)
PO2 BLD: 61 MMHG (ref 80–100)
PO2 BLD: 68 MMHG (ref 80–100)
PO2 BLD: 69 MMHG (ref 80–100)
PO2 BLD: 73 MMHG (ref 80–100)
POTASSIUM SERPL-SCNC: 3.7 MMOL/L (ref 3.5–5.5)
PRESSURE SUPPORT SETTING VENT: 10 CMH2O
PROT SERPL-MCNC: 6 G/DL (ref 6.4–8.2)
PROT UR STRIP-MCNC: NEGATIVE MG/DL
RBC # BLD AUTO: 4.09 M/UL (ref 4.2–5.3)
SALICYLATES SERPL-MCNC: <1.7 MG/DL (ref 2.8–20)
SAO2 % BLD: 88.2 % (ref 92–97)
SAO2 % BLD: 90.1 % (ref 92–97)
SAO2 % BLD: 91.6 % (ref 92–97)
SAO2 % BLD: 91.7 % (ref 92–97)
SARS-COV-2 RNA RESP QL NAA+PROBE: NOT DETECTED
SERVICE CMNT-IMP: ABNORMAL
SODIUM SERPL-SCNC: 140 MMOL/L (ref 136–145)
SP GR UR REFRACTOMETRY: 1.01 (ref 1–1.03)
SPECIMEN TYPE: ABNORMAL
TROPONIN I SERPL HS-MCNC: <3 NG/L (ref 0–54)
TSH SERPL DL<=0.05 MIU/L-ACNC: 0.82 UIU/ML (ref 0.36–3.74)
UROBILINOGEN UR QL STRIP.AUTO: 0.2 EU/DL (ref 0.2–1)
WBC # BLD AUTO: 7.9 K/UL (ref 4.6–13.2)

## 2023-09-13 PROCEDURE — 82803 BLOOD GASES ANY COMBINATION: CPT

## 2023-09-13 PROCEDURE — 96365 THER/PROPH/DIAG IV INF INIT: CPT

## 2023-09-13 PROCEDURE — 85025 COMPLETE CBC W/AUTO DIFF WBC: CPT

## 2023-09-13 PROCEDURE — 85379 FIBRIN DEGRADATION QUANT: CPT

## 2023-09-13 PROCEDURE — 81003 URINALYSIS AUTO W/O SCOPE: CPT

## 2023-09-13 PROCEDURE — 87636 SARSCOV2 & INF A&B AMP PRB: CPT

## 2023-09-13 PROCEDURE — 80053 COMPREHEN METABOLIC PANEL: CPT

## 2023-09-13 PROCEDURE — 82077 ASSAY SPEC XCP UR&BREATH IA: CPT

## 2023-09-13 PROCEDURE — 93005 ELECTROCARDIOGRAM TRACING: CPT | Performed by: EMERGENCY MEDICINE

## 2023-09-13 PROCEDURE — 6370000000 HC RX 637 (ALT 250 FOR IP): Performed by: INTERNAL MEDICINE

## 2023-09-13 PROCEDURE — 81025 URINE PREGNANCY TEST: CPT

## 2023-09-13 PROCEDURE — 80143 DRUG ASSAY ACETAMINOPHEN: CPT

## 2023-09-13 PROCEDURE — 93005 ELECTROCARDIOGRAM TRACING: CPT | Performed by: STUDENT IN AN ORGANIZED HEALTH CARE EDUCATION/TRAINING PROGRAM

## 2023-09-13 PROCEDURE — G0378 HOSPITAL OBSERVATION PER HR: HCPCS

## 2023-09-13 PROCEDURE — 80307 DRUG TEST PRSMV CHEM ANLYZR: CPT

## 2023-09-13 PROCEDURE — 36600 WITHDRAWAL OF ARTERIAL BLOOD: CPT

## 2023-09-13 PROCEDURE — 6370000000 HC RX 637 (ALT 250 FOR IP): Performed by: EMERGENCY MEDICINE

## 2023-09-13 PROCEDURE — 84484 ASSAY OF TROPONIN QUANT: CPT

## 2023-09-13 PROCEDURE — 96372 THER/PROPH/DIAG INJ SC/IM: CPT

## 2023-09-13 PROCEDURE — 96366 THER/PROPH/DIAG IV INF ADDON: CPT

## 2023-09-13 PROCEDURE — 6360000002 HC RX W HCPCS: Performed by: INTERNAL MEDICINE

## 2023-09-13 PROCEDURE — 6360000002 HC RX W HCPCS: Performed by: FAMILY MEDICINE

## 2023-09-13 PROCEDURE — 83605 ASSAY OF LACTIC ACID: CPT

## 2023-09-13 PROCEDURE — 71045 X-RAY EXAM CHEST 1 VIEW: CPT

## 2023-09-13 PROCEDURE — 2580000003 HC RX 258: Performed by: INTERNAL MEDICINE

## 2023-09-13 PROCEDURE — 80179 DRUG ASSAY SALICYLATE: CPT

## 2023-09-13 PROCEDURE — 1100000003 HC PRIVATE W/ TELEMETRY

## 2023-09-13 PROCEDURE — 99285 EMERGENCY DEPT VISIT HI MDM: CPT

## 2023-09-13 PROCEDURE — 84443 ASSAY THYROID STIM HORMONE: CPT

## 2023-09-13 RX ORDER — SODIUM CHLORIDE 0.9 % (FLUSH) 0.9 %
5-40 SYRINGE (ML) INJECTION EVERY 12 HOURS SCHEDULED
Status: DISCONTINUED | OUTPATIENT
Start: 2023-09-13 | End: 2023-09-13 | Stop reason: HOSPADM

## 2023-09-13 RX ORDER — LORAZEPAM 2 MG/ML
1 INJECTION INTRAMUSCULAR EVERY 4 HOURS PRN
Status: DISCONTINUED | OUTPATIENT
Start: 2023-09-13 | End: 2023-09-13 | Stop reason: HOSPADM

## 2023-09-13 RX ORDER — ENOXAPARIN SODIUM 100 MG/ML
40 INJECTION SUBCUTANEOUS DAILY
Status: DISCONTINUED | OUTPATIENT
Start: 2023-09-13 | End: 2023-09-13 | Stop reason: HOSPADM

## 2023-09-13 RX ORDER — SODIUM CHLORIDE 0.9 % (FLUSH) 0.9 %
5-40 SYRINGE (ML) INJECTION PRN
Status: DISCONTINUED | OUTPATIENT
Start: 2023-09-13 | End: 2023-09-13 | Stop reason: HOSPADM

## 2023-09-13 RX ORDER — LEVOFLOXACIN 5 MG/ML
750 INJECTION, SOLUTION INTRAVENOUS EVERY 24 HOURS
Status: DISCONTINUED | OUTPATIENT
Start: 2023-09-13 | End: 2023-09-13 | Stop reason: HOSPADM

## 2023-09-13 RX ORDER — ONDANSETRON 2 MG/ML
4 INJECTION INTRAMUSCULAR; INTRAVENOUS EVERY 6 HOURS PRN
Status: DISCONTINUED | OUTPATIENT
Start: 2023-09-13 | End: 2023-09-13 | Stop reason: HOSPADM

## 2023-09-13 RX ORDER — SODIUM CHLORIDE 9 MG/ML
INJECTION, SOLUTION INTRAVENOUS PRN
Status: DISCONTINUED | OUTPATIENT
Start: 2023-09-13 | End: 2023-09-13 | Stop reason: HOSPADM

## 2023-09-13 RX ORDER — POLYETHYLENE GLYCOL 3350 17 G/17G
17 POWDER, FOR SOLUTION ORAL DAILY PRN
Status: DISCONTINUED | OUTPATIENT
Start: 2023-09-13 | End: 2023-09-13 | Stop reason: HOSPADM

## 2023-09-13 RX ORDER — ONDANSETRON 4 MG/1
4 TABLET, ORALLY DISINTEGRATING ORAL EVERY 8 HOURS PRN
Status: DISCONTINUED | OUTPATIENT
Start: 2023-09-13 | End: 2023-09-13 | Stop reason: HOSPADM

## 2023-09-13 RX ORDER — ACETAMINOPHEN 650 MG/1
650 SUPPOSITORY RECTAL EVERY 6 HOURS PRN
Status: DISCONTINUED | OUTPATIENT
Start: 2023-09-13 | End: 2023-09-13 | Stop reason: HOSPADM

## 2023-09-13 RX ORDER — ENOXAPARIN SODIUM 100 MG/ML
40 INJECTION SUBCUTANEOUS DAILY
Status: DISCONTINUED | OUTPATIENT
Start: 2023-09-13 | End: 2023-09-13

## 2023-09-13 RX ORDER — ACETAMINOPHEN 325 MG/1
650 TABLET ORAL EVERY 6 HOURS PRN
Status: DISCONTINUED | OUTPATIENT
Start: 2023-09-13 | End: 2023-09-13 | Stop reason: HOSPADM

## 2023-09-13 RX ORDER — FAMOTIDINE 20 MG/1
20 TABLET, FILM COATED ORAL DAILY
Status: DISCONTINUED | OUTPATIENT
Start: 2023-09-13 | End: 2023-09-13 | Stop reason: HOSPADM

## 2023-09-13 RX ORDER — IPRATROPIUM BROMIDE AND ALBUTEROL SULFATE 2.5; .5 MG/3ML; MG/3ML
1 SOLUTION RESPIRATORY (INHALATION)
Status: DISCONTINUED | OUTPATIENT
Start: 2023-09-13 | End: 2023-09-13 | Stop reason: HOSPADM

## 2023-09-13 RX ORDER — NICOTINE 21 MG/24HR
1 PATCH, TRANSDERMAL 24 HOURS TRANSDERMAL DAILY
Status: DISCONTINUED | OUTPATIENT
Start: 2023-09-13 | End: 2023-09-13 | Stop reason: HOSPADM

## 2023-09-13 RX ORDER — SODIUM CHLORIDE 9 MG/ML
INJECTION, SOLUTION INTRAVENOUS CONTINUOUS
Status: DISCONTINUED | OUTPATIENT
Start: 2023-09-13 | End: 2023-09-13 | Stop reason: HOSPADM

## 2023-09-13 RX ORDER — CLONAZEPAM 0.5 MG/1
0.5 TABLET ORAL
Status: COMPLETED | OUTPATIENT
Start: 2023-09-13 | End: 2023-09-13

## 2023-09-13 RX ORDER — BUDESONIDE 0.5 MG/2ML
0.5 INHALANT ORAL
Status: DISCONTINUED | OUTPATIENT
Start: 2023-09-13 | End: 2023-09-13 | Stop reason: HOSPADM

## 2023-09-13 RX ADMIN — ENOXAPARIN SODIUM 40 MG: 100 INJECTION SUBCUTANEOUS at 17:43

## 2023-09-13 RX ADMIN — CLONAZEPAM 0.5 MG: 0.5 TABLET ORAL at 02:36

## 2023-09-13 RX ADMIN — SODIUM CHLORIDE: 9 INJECTION, SOLUTION INTRAVENOUS at 17:42

## 2023-09-13 RX ADMIN — LEVOFLOXACIN 750 MG: 5 INJECTION, SOLUTION INTRAVENOUS at 17:42

## 2023-09-13 RX ADMIN — WATER 40 MG: 1 INJECTION INTRAMUSCULAR; INTRAVENOUS; SUBCUTANEOUS at 17:43

## 2023-09-13 ASSESSMENT — PAIN - FUNCTIONAL ASSESSMENT
PAIN_FUNCTIONAL_ASSESSMENT: NONE - DENIES PAIN
PAIN_FUNCTIONAL_ASSESSMENT: NONE - DENIES PAIN

## 2023-09-13 NOTE — ED NOTES
Pt found to be dressed with a cigarette in her mouth attempting to leave the ED to \"smoke a cigarette\" informed patient she could not go outside and smoke and she stated that she would like to \"check-out\" due to not being able to smoke. Provider at bedside.       Christopher Silva RN  09/13/23 4030

## 2023-09-13 NOTE — DISCHARGE INSTRUCTIONS
You likely have low oxygen due to sleep apnea and possibly copd/emphysema. Dicuss with your primary ASAP need for oxygen and medication refills. Return to emergency department if worsening breathing, thoughts of suicide.

## 2023-09-13 NOTE — ED PROVIDER NOTES
pain [RK]      ED Course User Index  [RK] Jeanne Rule, DO       None    Screenings                          Medical Decision Making     DISCUSSION:  Patient is a 42-year-old female with history of polysubstance abuse, was in the emergency department at [facility] overnight for visit requesting refill on medications, found to have hypoxemia and be somnolent and hypercapnia but left AGAINST MEDICAL ADVICE due to having the capacity to make decisions and wanting to go smoke. She is back for reassessment. Plan is to repeat ABG, cardiac monitoring, repeat EKG, SPO2. Last night, troponin, CBC, chemistry, urine drug screen, ethanol level, chest x-ray were checked and all nonacute except she did have evidence of benzodiazepines in the urine drug screen. Do not feel there will be any benefit to repeating this as she will likely still be positive with benzodiazepines that she was given Klonopin while in the emergency department last night with a very small dose. It is possible she was other substance wall being in the waiting room if it was from her personal belongings. D-dimer to rule out PE with low suspicion. Discussed w/ RT will place on bipap. Smoking hx likely has undiagnosed emphysema/copd causing her lab deranagements. Re-eval  Minimal improvement after ABG. Patient remains somnolent. Plan to admit to ICU on BiPAP. Discussed with Dr. Iliana Valenzuela and Dr. Steffi Kanner. D-dimer pending. Critical Care Time:     I have spent 0 minutes of critical care time involved in lab review, consultations with specialist, family decision-making, and documentation. This time does not include time spent on separately billable procedures. During this entire length of time, I was immediately available to the patient. Critical Care:   The reason for providing this level of medical care for this critically ill patient was due a critical illness that impaired one or more vital organ systems such that there was a high

## 2023-09-13 NOTE — ED NOTES
Patient on cardiac, bp, and spo2 monitoring. Patient on 2L o2 via nasal cannula. Sats dropped to 88% and nurse notified provider.       Darcy Pond RN  09/13/23 [id number]

## 2023-09-13 NOTE — ED NOTES
EKG obtained, pt remains very lethargic. Pt did nto open eyes to respond to this RN. Pt did follow commands and requested a blanket. Provided with warm blanket. No further needs at this time. Pt remains on BiPap.       Juan Francisco Barry RN  09/13/23 0750

## 2023-09-13 NOTE — PROGRESS NOTES
Patient was wearing bipap and RT was try ing to snug mask because she had a leak of 120. She pulled it off and would not allow me to place it back on. Also would not allow a nc to be placed.  Notified nurse

## 2023-09-13 NOTE — ED NOTES
Pt sitting on stretcher eating. Pt denies needs at this time. Pt on RA.       Love Young RN  09/13/23 4798

## 2023-09-13 NOTE — ED TRIAGE NOTES
Pt arrives alert oriented with NNPD c/o \" I have anxiety ans I missed some appointments with my psychiatric doctor and so they haven't prescribed me my adderall and clonidine and I need that because the adderall helps with my anxiety. \"  Pt denies any SI or HI simply states she wants her medications refilled.

## 2023-09-13 NOTE — ED PROVIDER NOTES
[facility] EMERGENCY DEPT  EMERGENCY DEPARTMENT HISTORY AND PHYSICAL EXAM      Date: 9/13/2023  Patient Name: Johnathon Martin      History of Presenting Illness       Chief Complaint   Patient presents with    Psychiatric Evaluation       History was provided by: Patient    Location/Duration/Severity/Modifying factors     Johnathon Martin is a 37 y.o. female who arrived to the emergency department by Ambulatory with complaints of Psychiatric Evaluation        Patient is a 42-year-old female with history of pancreatitis, alcohol abuse, polysubstance abuse, psychosis presenting to ED for complaint of anxiety and wanting medication refill. She says she would like refill of her clonazepam and her Adderall though on further questioning she has not been prescribed this in a couple of years. She reports that the Adderall helps her with her anxiety and she feels as if she has been disorganized and anxious. She is also taking clonidine which she says she has been taking currently. She is prescribed Paxil and Seroquel. She denies any somatic symptoms of chest pain or shortness of breath. Not having any current SI or HI. She says she has been off of alcohol and drugs for almost 1 year. She reports that she is however prescribed Suboxone but she thinks she does not need it anymore. She thinks she needs psychiatric hospitalization. There are no other complaints, changes, or physical findings at this time. PCP: No primary care provider on file. No current facility-administered medications for this encounter. No current outpatient medications on file.      Facility-Administered Medications Ordered in Other Encounters   Medication Dose Route Frequency Provider Last Rate Last Admin    ipratropium 0.5 mg-albuterol 2.5 mg (DUONEB) nebulizer solution 1 Dose  1 Dose Inhalation 4x Daily RT Nidia Ramos MD        budesonide (PULMICORT) nebulizer suspension 500 mcg  0.5 mg Nebulization BID RT Erlin Ford
dysfunction or leukocytosis [DENIS]   6834 Patient's lab work-up reassuring, medically cleared at this time. [DENIS]   0333 EKG interpretation: sinus tachycardia rate of 104  bpm, normal axis no ST elevation or depression. Overall sinus tachycardia no acute ischemic changes   [DENIS]   6857 Patient reassessed. Very drowsy, does arouse to voice however. Suspect ingestion of benzodiazepine prior to arrival, based on UDS, likely did not take effect until after patient seen in room and likely took effect afterwards as drowsiness that came on seem to take effect almost immediately after receiving the clonazepam which would likely be too fast for absorption. Patient initially very erratic, now very drowsy. Has no complaints on reassessment. Will allow patient metabolize and reassess her. I am reluctant to give her [DENIS]   0616 On my attempt to arouse the patient, she is very lethargic, opens eyes only briefly, equal pupils 3mm bilateral, moving all four extremities, slurred speech, symmetric face, rhonchi to auscultation, 92 spo2 on 2LNC. Plan to get VBG, reassess. CXR reviewed and no signs pneumonia. [RK]   3086 The patient is now more awake, after getting her ABG done, oriented to self, time and place. ABG shows very slight acidosis 7.29 pco2 61. Pao2 in 60s. I feel reason is 2/2 somnolence 2/2 benzos and sleep. Plan re-evaluate in 1 hour, attempt to wean off oxygen as she is still likely a safe discharge. [RK]   5606 Called to bedside patient wants to leave. Patient oriented to self, time, place. Patient wants to leave to smoke a cigarette. Patient has been smoking for at least 11 years. Patient offered nicotine patch declined. Stated wanted to monitor patient for her need for oxygen next 1-2 hours but she does not want to stay for this. On room air she is currently 92% which I suspect is near her baseline given 11+ pack year history. Patient does have capacity to make decisions at this time.  Plan discharge AMA, she is

## 2023-09-13 NOTE — ED NOTES
Attempted to wake up patient to re-assess. Patient arousable to calling, alert to self and situation.  Pt      Renetta Coyle RN  09/13/23 0895

## 2023-09-13 NOTE — ED NOTES
Respiratory at bedside, noted patient to be drowsy, aroused to pain at this time. Patient states she does not get sleep at home, is just tired. Questioned if she has taken any medications since leaving ER previously, pt states she has not.       Viviana Lucas RN  09/13/23 1039

## 2023-09-13 NOTE — ED TRIAGE NOTES
Pt admits to midsternal sharp chest pain when she gets anxiety, also states last time she took her benzo medication was 3 days ago

## 2023-09-13 NOTE — ED NOTES
Assumed care of patient. Patient resting comfortably on stretcher with eyes closed, chest rise and fall noted. Vital signs WNL at this time. Patient is on 2LPM O2 NC.      Lesa Stack RN  09/13/23 9777

## 2023-09-13 NOTE — H&P
History & Physical    Patient: Herminia Fuentes MRN: [id number]  CSN: [id number]    YOB: 1980  Age: 37 y.o. Sex: female      DOA: 9/13/2023  Primary Care Provider:  No primary care provider on file. Assessment/Plan   37 y.o. female with h/o  bipolar affective disorder, polysubstance abuse, polysubstance induced psychosis, tobacco abuse , medical noncompliance with h/o leaving hospitalizations and ED visit AMA. Admitted for acute respiratory failure with hypercapnia and hypoxemia. Acute respiratory failure with hypercapnia and hypoxemia -  Complicated by medical noncompliance and metal health conditions   Weaned off BiPAP with improved ABG  02 supplementation as necessary to keep 02 sats >92  Follow ABG  Keep SPO2 >=92%. HOB 30 degree elevation all the time. Aspiration precautions. Incentive spirometry  Pulmonology consulted by the ED, appreciate Dr. Ray Herrera advice     COPD likely -  Associated with long smoking history   Current active smoker  Appreciate Dr. Ray Herrera expertise -Pulmonology   Bronchodilators-budesonide nebs twice daily; ipratropium/albuterol nebs 4 times daily. IV steroids-Solu-Medrol 40 mg every 6 hours. BiPAP at nighttime as tolerated.   Recommend stop smoking    CAP -  CXR shows possible LLL PNA   Empirically treat woth Levaquin for 7 days    Polysubstance abuse -  Associated with extreme somnolence   Initial UDS reported as being positive for benzos  Order placed for repeat UDS because the pt was out of sight because she left AMA    Metabolic encephalopathy -  Due to above   Will need CT head     Bipolar affective disorder -  Pt needs assistance finding correct medication regimen  Will need inpatient psychiatry consult before DC to assist with medication regimen     DVT and GI prophylaxis       Active Hospital Problems    Diagnosis Date Noted    Medically noncompliant [Z91.199] 09/13/2023    Cigarette smoker [F17.210] 09/13/2023    Acute respiratory failure ([address])

## 2023-09-13 NOTE — ED NOTES
Pt requests to go outside and smoke a cigarette, RN advised pt many times that she will need to stay in the room. Pt has multiple bags that she states \"Im not leaving that, I just bought these bags with brand new clothes and that one has a couple hundred dollars in it. \"  RN asked pt of she would like security to hold anything pt declines. Pt states \" I want my Adderall and clonopin refilled and I know I have to be admitted in a mental institution to get that. \"   RN advised that she would be evaluated and once again confirmed she is not having any SI or HI thoughts.         Sarah Reina RN  09/13/23 5131

## 2023-09-13 NOTE — ED NOTES
Labs sent, unable to obtain IV access after 2 sticks.  RRT notified      Levi Tatum RN  09/13/23 9875

## 2023-09-13 NOTE — CONSULTS
[facility]  Pulmonary, Critical Care, and Sleep Medicine    Name: Clarita Ayala MRN: [id number]   : 1980 Hospital: [facility]    Date: 2023  Room: [address] Note                                                                             Consult requesting physician: Paulina Harvey DO  Reason for Consult: Somnolent, respiratory failure needing BiPAP support    IMPRESSION:     Acute respiratory failure with hypoxemia and hypercapnia  Metabolic encephalopathy  Medical noncompliance      Patient Active Problem List   Diagnosis Code    Acute respiratory failure with hypoxia and hypercapnia (HCC) J96.01, T38.19    Metabolic encephalopathy Y47.93    Medically noncompliant Z91.199    Cigarette smoker F17.210       Code status: No Order       RECOMMENDATIONS:     Respiratory: Patient likely has COPD; active smoker. Chest x-ray-left retrocardiac atelectasis/infiltrates. ABG-mild respiratory acidosis; hypoxemic-FiO2 35%. Patient took off BiPAP; historically, noncompliant patient. Bronchodilators-budesonide nebs twice daily; ipratropium/albuterol nebs 4 times daily. IV steroids-Solu-Medrol 40 mg every 6 hours. BiPAP at nighttime as tolerated. Recommend stop smoking. Keep SPO2 >=92%. HOB 30 degree elevation all the time. Aspiration precautions. Incentive spirometry. CVS: Stable hemodynamics; telemetry-sinus rhythm. Echo-prior echo normal LV function; no pulmonary hypertension. Troponin-not elevated. ID: Chest x-ray-? Left lower lobe pneumonia  Antibiotic-levofloxacin for 7 days. Lactic acid-not elevated. Hematology/Oncology: Monitor hemoglobin and platelets. No active bleeding issues. Renal: Monitor renal function and urine output. BUN and creatinine-normal.  GI: No active issues. LFT-normal.  Endocrine: Monitor BS. SSI if needed. Status post hysterectomy per patient. Neurology: Mentation-improved.   Toxicology: Serum alcohol level

## 2023-09-13 NOTE — ED NOTES
TRANSFER - OUT REPORT:    Verbal report given to [address] [facility]  on Odilia Arriaga  being transferred to [facility] for routine progression of patient care       Report consisted of patient's Situation, Background, Assessment and   Recommendations(SBAR). Information from the following report(s) Nurse Handoff Report, ED SBAR, MAR, and Neuro Assessment was reviewed with the receiving nurse. [place] Assessment:                           Lines:   Peripheral IV 09/13/23 Right; Anterior; Upper Cephalic (Active)        Opportunity for questions and clarification was provided.       Patient transported with:  Monitor, O2 @ 2lpm, and Tech          Tanya Barnes  09/13/23 2697

## 2023-09-14 NOTE — PROGRESS NOTES
Followed up with Tomer Young on 9/13/2023 at 8:57 PM. Patient left the floorwith a disposition of AMA on . Patient cited personal obligations as reason. Advised patient to follow up with a primary care physician or return to the Emergency Department if symptoms worsen.    Sylvia Turner MD

## 2023-09-19 ENCOUNTER — HOSPITAL ENCOUNTER (EMERGENCY)
Facility: HOSPITAL | Age: 43
Discharge: HOME | End: 2023-09-19
Attending: EMERGENCY MEDICINE
Payer: MEDICAID

## 2023-09-19 ENCOUNTER — APPOINTMENT (OUTPATIENT)
Facility: HOSPITAL | Age: 43
End: 2023-09-19
Payer: MEDICAID

## 2023-09-19 VITALS
BODY MASS INDEX: 36.8 KG/M2 | SYSTOLIC BLOOD PRESSURE: 133 MMHG | DIASTOLIC BLOOD PRESSURE: 91 MMHG | WEIGHT: 200 LBS | OXYGEN SATURATION: 100 % | RESPIRATION RATE: 16 BRPM | HEIGHT: 62 IN | HEART RATE: 85 BPM | TEMPERATURE: 98.3 F

## 2023-09-19 DIAGNOSIS — F41.1 ANXIETY STATE: Primary | ICD-10-CM

## 2023-09-19 DIAGNOSIS — F17.200 TOBACCO DEPENDENCE: ICD-10-CM

## 2023-09-19 LAB
ALBUMIN SERPL-MCNC: 3 G/DL (ref 3.4–5)
ALBUMIN/GLOB SERPL: 1 (ref 0.8–1.7)
ALP SERPL-CCNC: 87 U/L (ref 45–117)
ALT SERPL-CCNC: 29 U/L (ref 13–56)
ANION GAP SERPL CALC-SCNC: 5 MMOL/L (ref 3–18)
ARTERIAL PATENCY WRIST A: POSITIVE
AST SERPL-CCNC: 15 U/L (ref 10–38)
BASE EXCESS BLD CALC-SCNC: 4 MMOL/L
BASOPHILS # BLD: 0 K/UL (ref 0–0.1)
BASOPHILS NFR BLD: 1 % (ref 0–2)
BDY SITE: ABNORMAL
BILIRUB SERPL-MCNC: 0.3 MG/DL (ref 0.2–1)
BUN SERPL-MCNC: 9 MG/DL (ref 7–18)
BUN/CREAT SERPL: 15 (ref 12–20)
CALCIUM SERPL-MCNC: 8.5 MG/DL (ref 8.5–10.1)
CHLORIDE SERPL-SCNC: 106 MMOL/L (ref 100–111)
CO2 SERPL-SCNC: 31 MMOL/L (ref 21–32)
CREAT SERPL-MCNC: 0.59 MG/DL (ref 0.6–1.3)
DIFFERENTIAL METHOD BLD: ABNORMAL
EKG ATRIAL RATE: 88 BPM
EKG DIAGNOSIS: NORMAL
EKG P AXIS: 25 DEGREES
EKG P-R INTERVAL: 176 MS
EKG Q-T INTERVAL: 348 MS
EKG QRS DURATION: 70 MS
EKG QTC CALCULATION (BAZETT): 421 MS
EKG R AXIS: 29 DEGREES
EKG T AXIS: 132 DEGREES
EKG VENTRICULAR RATE: 88 BPM
EOSINOPHIL # BLD: 0.4 K/UL (ref 0–0.4)
EOSINOPHIL NFR BLD: 5 % (ref 0–5)
ERYTHROCYTE [DISTWIDTH] IN BLOOD BY AUTOMATED COUNT: 12.7 % (ref 11.6–14.5)
GAS FLOW.O2 O2 DELIVERY SYS: ABNORMAL
GLOBULIN SER CALC-MCNC: 3.1 G/DL (ref 2–4)
GLUCOSE SERPL-MCNC: 104 MG/DL (ref 74–99)
HCO3 BLD-SCNC: 31 MMOL/L (ref 22–26)
HCT VFR BLD AUTO: 40.7 % (ref 35–45)
HGB BLD-MCNC: 13.2 G/DL (ref 12–16)
IMM GRANULOCYTES # BLD AUTO: 0.1 K/UL (ref 0–0.04)
IMM GRANULOCYTES NFR BLD AUTO: 1 % (ref 0–0.5)
LACTATE BLD-SCNC: 0.93 MMOL/L (ref 0.4–2)
LYMPHOCYTES # BLD: 3.5 K/UL (ref 0.9–3.6)
LYMPHOCYTES NFR BLD: 43 % (ref 21–52)
MCH RBC QN AUTO: 29.7 PG (ref 24–34)
MCHC RBC AUTO-ENTMCNC: 32.4 G/DL (ref 31–37)
MCV RBC AUTO: 91.5 FL (ref 78–100)
MONOCYTES # BLD: 0.4 K/UL (ref 0.05–1.2)
MONOCYTES NFR BLD: 5 % (ref 3–10)
NEUTS SEG # BLD: 3.7 K/UL (ref 1.8–8)
NEUTS SEG NFR BLD: 46 % (ref 40–73)
NRBC # BLD: 0 K/UL (ref 0–0.01)
NRBC BLD-RTO: 0 PER 100 WBC
O2/TOTAL GAS SETTING VFR VENT: 21 %
PCO2 BLD: 56.6 MMHG (ref 35–45)
PH BLD: 7.35 (ref 7.35–7.45)
PLATELET # BLD AUTO: 239 K/UL (ref 135–420)
PMV BLD AUTO: 10.1 FL (ref 9.2–11.8)
PO2 BLD: 63 MMHG (ref 80–100)
POTASSIUM SERPL-SCNC: 3.6 MMOL/L (ref 3.5–5.5)
PROT SERPL-MCNC: 6.1 G/DL (ref 6.4–8.2)
RBC # BLD AUTO: 4.45 M/UL (ref 4.2–5.3)
SAO2 % BLD: 89.9 % (ref 92–97)
SERVICE CMNT-IMP: ABNORMAL
SODIUM SERPL-SCNC: 142 MMOL/L (ref 136–145)
SPECIMEN TYPE: ABNORMAL
TROPONIN I SERPL HS-MCNC: 4 NG/L (ref 0–54)
WBC # BLD AUTO: 8.1 K/UL (ref 4.6–13.2)

## 2023-09-19 PROCEDURE — 80053 COMPREHEN METABOLIC PANEL: CPT

## 2023-09-19 PROCEDURE — 82803 BLOOD GASES ANY COMBINATION: CPT

## 2023-09-19 PROCEDURE — 83605 ASSAY OF LACTIC ACID: CPT

## 2023-09-19 PROCEDURE — 99285 EMERGENCY DEPT VISIT HI MDM: CPT

## 2023-09-19 PROCEDURE — 6370000000 HC RX 637 (ALT 250 FOR IP): Performed by: EMERGENCY MEDICINE

## 2023-09-19 PROCEDURE — 93005 ELECTROCARDIOGRAM TRACING: CPT | Performed by: EMERGENCY MEDICINE

## 2023-09-19 PROCEDURE — 84484 ASSAY OF TROPONIN QUANT: CPT

## 2023-09-19 PROCEDURE — 71045 X-RAY EXAM CHEST 1 VIEW: CPT

## 2023-09-19 PROCEDURE — 85025 COMPLETE CBC W/AUTO DIFF WBC: CPT

## 2023-09-19 RX ORDER — CLONIDINE HYDROCHLORIDE 0.1 MG/1
0.2 TABLET ORAL ONCE
Status: COMPLETED | OUTPATIENT
Start: 2023-09-19 | End: 2023-09-19

## 2023-09-19 RX ADMIN — CLONIDINE HYDROCHLORIDE 0.2 MG: 0.1 TABLET ORAL at 06:03

## 2023-09-19 ASSESSMENT — PAIN SCALES - GENERAL: PAINLEVEL_OUTOF10: 10

## 2023-09-19 ASSESSMENT — LIFESTYLE VARIABLES
HOW MANY STANDARD DRINKS CONTAINING ALCOHOL DO YOU HAVE ON A TYPICAL DAY: PATIENT DOES NOT DRINK
HOW OFTEN DO YOU HAVE A DRINK CONTAINING ALCOHOL: NEVER

## 2023-09-19 ASSESSMENT — PAIN - FUNCTIONAL ASSESSMENT: PAIN_FUNCTIONAL_ASSESSMENT: 0-10

## 2023-09-19 ASSESSMENT — PAIN DESCRIPTION - LOCATION: LOCATION: CHEST

## 2023-09-19 ASSESSMENT — PAIN DESCRIPTION - DESCRIPTORS: DESCRIPTORS: PRESSURE

## 2023-09-19 ASSESSMENT — PAIN DESCRIPTION - PAIN TYPE: TYPE: ACUTE PAIN

## 2023-09-19 NOTE — ED NOTES
Patient found eloped from room. Found by nursing staff outside the ER entrance smoking states she needed anxiety relief. INT remains intact and ambulated back into room. Charge RN and MD informed, MD Angeles Memos to bedside educating patient.       Lawyer Teressa RN  09/19/23 9806

## 2023-09-19 NOTE — ED TRIAGE NOTES
Shortness of breath and anxiety with nonradiating chest pain and nausea since last night, diagnosed with PNA 9/13 but left AMA declining hospital admission, history of anxiety disorder, took 800mg motrin two hours ago    Brought in by Ketan

## 2023-09-19 NOTE — ED PROVIDER NOTES
problems have been discussed, and they are in agreement with the care plan formulated and outlined with them. I have encouraged them to ask questions as they arise throughout their visit. Diagnosis and Disposition       DISCHARGE NOTE:  ***  Mariam Méndez's  results have been reviewed with her. She has been counseled regarding her diagnosis, treatment, and plan. She verbally conveys understanding and agreement of the signs, symptoms, diagnosis, treatment and prognosis and additionally agrees to follow up as discussed. She also agrees with the care-plan and conveys that all of her questions have been answered. I have also provided discharge instructions for her that include: educational information regarding their diagnosis and treatment, and list of reasons why they would want to return to the ED prior to their follow-up appointment, should her condition change. She has been provided with education for proper emergency department utilization. CLINICAL IMPRESSION:    1. Anxiety state    2. Tobacco dependence        PLAN:  1. D/C Home  2. Medication List        ASK your doctor about these medications      PARoxetine 10 MG tablet  Commonly known as: PAXIL     QUEtiapine 100 MG tablet  Commonly known as: SEROQUEL            3. [unfilled]  _______________________________    This note was partially transcribed via voice recognition software. Although efforts have been made to catch any discrepancies, it may contain sound alike words, grammatical errors, or nonsensical words. list of reasons why they would want to return to the ED prior to their follow-up appointment, should her condition change. She has been provided with education for proper emergency department utilization. CLINICAL IMPRESSION:    1. Anxiety state    2. Tobacco dependence        PLAN:  1. D/C Home  2. Medication List        ASK your doctor about these medications      PARoxetine 10 MG tablet  Commonly known as: PAXIL     QUEtiapine 100 MG tablet  Commonly known as: SEROQUEL            3. @[facility]@  _______________________________    This note was partially transcribed via voice recognition software. Although efforts have been made to catch any discrepancies, it may contain sound alike words, grammatical errors, or nonsensical words.              Sommer Watson MD  09/28/23 7226

## 2023-09-23 ENCOUNTER — HOSPITAL ENCOUNTER (EMERGENCY)
Facility: HOSPITAL | Age: 43
Discharge: HOME | End: 2023-09-24
Attending: STUDENT IN AN ORGANIZED HEALTH CARE EDUCATION/TRAINING PROGRAM
Payer: MEDICAID

## 2023-09-23 ENCOUNTER — APPOINTMENT (OUTPATIENT)
Facility: HOSPITAL | Age: 43
End: 2023-09-23
Payer: MEDICAID

## 2023-09-23 DIAGNOSIS — F13.129 BENZODIAZEPINE INTOXICATION (HCC): ICD-10-CM

## 2023-09-23 DIAGNOSIS — F23 ACUTE PSYCHOSIS (HCC): ICD-10-CM

## 2023-09-23 DIAGNOSIS — R41.0 DISORIENTATION: Primary | ICD-10-CM

## 2023-09-23 DIAGNOSIS — R44.3 HALLUCINATIONS: ICD-10-CM

## 2023-09-23 LAB
ALBUMIN SERPL-MCNC: 3.3 G/DL (ref 3.4–5)
ALBUMIN/GLOB SERPL: 1.1 (ref 0.8–1.7)
ALP SERPL-CCNC: 113 U/L (ref 45–117)
ALT SERPL-CCNC: 27 U/L (ref 13–56)
AMPHET UR QL SCN: NEGATIVE
ANION GAP SERPL CALC-SCNC: 3 MMOL/L (ref 3–18)
APAP SERPL-MCNC: <2 UG/ML (ref 10–30)
APPEARANCE UR: CLEAR
ARTERIAL PATENCY WRIST A: POSITIVE
AST SERPL-CCNC: 20 U/L (ref 10–38)
BACTERIA URNS QL MICRO: ABNORMAL /HPF
BARBITURATES UR QL SCN: NEGATIVE
BASE EXCESS BLD CALC-SCNC: 2.3 MMOL/L
BDY SITE: ABNORMAL
BENZODIAZ UR QL: POSITIVE
BILIRUB SERPL-MCNC: 0.3 MG/DL (ref 0.2–1)
BILIRUB UR QL: NEGATIVE
BUN SERPL-MCNC: 8 MG/DL (ref 7–18)
BUN/CREAT SERPL: 12 (ref 12–20)
CALCIUM SERPL-MCNC: 8.7 MG/DL (ref 8.5–10.1)
CANNABINOIDS UR QL SCN: NEGATIVE
CHLORIDE SERPL-SCNC: 106 MMOL/L (ref 100–111)
CO2 SERPL-SCNC: 31 MMOL/L (ref 21–32)
COCAINE UR QL SCN: NEGATIVE
COLOR UR: YELLOW
CREAT SERPL-MCNC: 0.69 MG/DL (ref 0.6–1.3)
EPITH CASTS URNS QL MICRO: ABNORMAL /LPF (ref 0–5)
ERYTHROCYTE [DISTWIDTH] IN BLOOD BY AUTOMATED COUNT: 12.8 % (ref 11.6–14.5)
ETHANOL SERPL-MCNC: <3 MG/DL (ref 0–3)
FLUAV RNA SPEC QL NAA+PROBE: NOT DETECTED
FLUBV RNA SPEC QL NAA+PROBE: NOT DETECTED
GAS FLOW.O2 O2 DELIVERY SYS: ABNORMAL
GLOBULIN SER CALC-MCNC: 3.1 G/DL (ref 2–4)
GLUCOSE BLD STRIP.AUTO-MCNC: 143 MG/DL (ref 70–110)
GLUCOSE SERPL-MCNC: 108 MG/DL (ref 74–99)
GLUCOSE UR STRIP.AUTO-MCNC: NEGATIVE MG/DL
HCG SERPL QL: NEGATIVE
HCO3 BLD-SCNC: 31 MMOL/L (ref 22–26)
HCT VFR BLD AUTO: 43 % (ref 35–45)
HGB BLD-MCNC: 14.2 G/DL (ref 12–16)
HGB UR QL STRIP: NEGATIVE
KETONES UR QL STRIP.AUTO: NEGATIVE MG/DL
LEUKOCYTE ESTERASE UR QL STRIP.AUTO: ABNORMAL
Lab: ABNORMAL
MCH RBC QN AUTO: 30.1 PG (ref 24–34)
MCHC RBC AUTO-ENTMCNC: 33 G/DL (ref 31–37)
MCV RBC AUTO: 91.3 FL (ref 78–100)
METHADONE UR QL: NEGATIVE
NITRITE UR QL STRIP.AUTO: NEGATIVE
NRBC # BLD: 0 K/UL (ref 0–0.01)
NRBC BLD-RTO: 0 PER 100 WBC
NT PRO BNP: 69 PG/ML (ref 0–450)
O2/TOTAL GAS SETTING VFR VENT: 6 %
OPIATES UR QL: NEGATIVE
PCO2 BLD: 65.9 MMHG (ref 35–45)
PCP UR QL: NEGATIVE
PH BLD: 7.28 (ref 7.35–7.45)
PH UR STRIP: 6.5 (ref 5–8)
PLATELET # BLD AUTO: 245 K/UL (ref 135–420)
PMV BLD AUTO: 10 FL (ref 9.2–11.8)
PO2 BLD: 87 MMHG (ref 80–100)
POTASSIUM SERPL-SCNC: 5.4 MMOL/L (ref 3.5–5.5)
PROT SERPL-MCNC: 6.4 G/DL (ref 6.4–8.2)
PROT UR STRIP-MCNC: NEGATIVE MG/DL
RBC # BLD AUTO: 4.71 M/UL (ref 4.2–5.3)
RBC #/AREA URNS HPF: ABNORMAL /HPF (ref 0–5)
SALICYLATES SERPL-MCNC: 2.4 MG/DL (ref 2.8–20)
SAO2 % BLD: 94.8 % (ref 92–97)
SARS-COV-2 RNA RESP QL NAA+PROBE: NOT DETECTED
SERVICE CMNT-IMP: ABNORMAL
SODIUM SERPL-SCNC: 140 MMOL/L (ref 136–145)
SP GR UR REFRACTOMETRY: 1.01 (ref 1–1.03)
SPECIMEN TYPE: ABNORMAL
TROPONIN I SERPL HS-MCNC: 9 NG/L (ref 0–54)
UROBILINOGEN UR QL STRIP.AUTO: 1 EU/DL (ref 0.2–1)
WBC # BLD AUTO: 8.3 K/UL (ref 4.6–13.2)
WBC URNS QL MICRO: ABNORMAL /HPF (ref 0–5)

## 2023-09-23 PROCEDURE — 87636 SARSCOV2 & INF A&B AMP PRB: CPT

## 2023-09-23 PROCEDURE — 82962 GLUCOSE BLOOD TEST: CPT

## 2023-09-23 PROCEDURE — 96374 THER/PROPH/DIAG INJ IV PUSH: CPT

## 2023-09-23 PROCEDURE — 81001 URINALYSIS AUTO W/SCOPE: CPT

## 2023-09-23 PROCEDURE — 85027 COMPLETE CBC AUTOMATED: CPT

## 2023-09-23 PROCEDURE — 93005 ELECTROCARDIOGRAM TRACING: CPT | Performed by: STUDENT IN AN ORGANIZED HEALTH CARE EDUCATION/TRAINING PROGRAM

## 2023-09-23 PROCEDURE — 2500000003 HC RX 250 WO HCPCS: Performed by: STUDENT IN AN ORGANIZED HEALTH CARE EDUCATION/TRAINING PROGRAM

## 2023-09-23 PROCEDURE — 36600 WITHDRAWAL OF ARTERIAL BLOOD: CPT

## 2023-09-23 PROCEDURE — 82803 BLOOD GASES ANY COMBINATION: CPT

## 2023-09-23 PROCEDURE — 2580000003 HC RX 258: Performed by: STUDENT IN AN ORGANIZED HEALTH CARE EDUCATION/TRAINING PROGRAM

## 2023-09-23 PROCEDURE — 70450 CT HEAD/BRAIN W/O DYE: CPT

## 2023-09-23 PROCEDURE — 99285 EMERGENCY DEPT VISIT HI MDM: CPT

## 2023-09-23 PROCEDURE — 6360000004 HC RX CONTRAST MEDICATION: Performed by: STUDENT IN AN ORGANIZED HEALTH CARE EDUCATION/TRAINING PROGRAM

## 2023-09-23 PROCEDURE — 6370000000 HC RX 637 (ALT 250 FOR IP): Performed by: STUDENT IN AN ORGANIZED HEALTH CARE EDUCATION/TRAINING PROGRAM

## 2023-09-23 PROCEDURE — 82077 ASSAY SPEC XCP UR&BREATH IA: CPT

## 2023-09-23 PROCEDURE — 84484 ASSAY OF TROPONIN QUANT: CPT

## 2023-09-23 PROCEDURE — 6360000002 HC RX W HCPCS: Performed by: STUDENT IN AN ORGANIZED HEALTH CARE EDUCATION/TRAINING PROGRAM

## 2023-09-23 PROCEDURE — 80307 DRUG TEST PRSMV CHEM ANLYZR: CPT

## 2023-09-23 PROCEDURE — 96372 THER/PROPH/DIAG INJ SC/IM: CPT

## 2023-09-23 PROCEDURE — 6360000002 HC RX W HCPCS

## 2023-09-23 PROCEDURE — 80143 DRUG ASSAY ACETAMINOPHEN: CPT

## 2023-09-23 PROCEDURE — 80053 COMPREHEN METABOLIC PANEL: CPT

## 2023-09-23 PROCEDURE — 74177 CT ABD & PELVIS W/CONTRAST: CPT

## 2023-09-23 PROCEDURE — 80179 DRUG ASSAY SALICYLATE: CPT

## 2023-09-23 PROCEDURE — 84703 CHORIONIC GONADOTROPIN ASSAY: CPT

## 2023-09-23 PROCEDURE — 83880 ASSAY OF NATRIURETIC PEPTIDE: CPT

## 2023-09-23 RX ORDER — IPRATROPIUM BROMIDE AND ALBUTEROL SULFATE 2.5; .5 MG/3ML; MG/3ML
1 SOLUTION RESPIRATORY (INHALATION)
Status: COMPLETED | OUTPATIENT
Start: 2023-09-23 | End: 2023-09-23

## 2023-09-23 RX ORDER — NALOXONE HYDROCHLORIDE 0.4 MG/ML
INJECTION, SOLUTION INTRAMUSCULAR; INTRAVENOUS; SUBCUTANEOUS
Status: COMPLETED
Start: 2023-09-23 | End: 2023-09-23

## 2023-09-23 RX ORDER — 0.9 % SODIUM CHLORIDE 0.9 %
1000 INTRAVENOUS SOLUTION INTRAVENOUS ONCE
Status: COMPLETED | OUTPATIENT
Start: 2023-09-23 | End: 2023-09-23

## 2023-09-23 RX ORDER — NALOXONE HYDROCHLORIDE 0.4 MG/ML
0.4 INJECTION, SOLUTION INTRAMUSCULAR; INTRAVENOUS; SUBCUTANEOUS
Status: COMPLETED | OUTPATIENT
Start: 2023-09-23 | End: 2023-09-23

## 2023-09-23 RX ORDER — OLANZAPINE 10 MG/1
5 INJECTION, POWDER, LYOPHILIZED, FOR SOLUTION INTRAMUSCULAR ONCE
Status: COMPLETED | OUTPATIENT
Start: 2023-09-23 | End: 2023-09-23

## 2023-09-23 RX ADMIN — WATER 125 MG: 1 INJECTION INTRAMUSCULAR; INTRAVENOUS; SUBCUTANEOUS at 09:30

## 2023-09-23 RX ADMIN — IOPAMIDOL 100 ML: 612 INJECTION, SOLUTION INTRAVENOUS at 11:43

## 2023-09-23 RX ADMIN — NALOXONE HYDROCHLORIDE 0.4 MG: 0.4 INJECTION, SOLUTION INTRAMUSCULAR; INTRAVENOUS; SUBCUTANEOUS at 07:38

## 2023-09-23 RX ADMIN — OLANZAPINE 5 MG: 10 INJECTION, POWDER, FOR SOLUTION INTRAMUSCULAR at 15:24

## 2023-09-23 RX ADMIN — IPRATROPIUM BROMIDE AND ALBUTEROL SULFATE 1 DOSE: 2.5; .5 SOLUTION RESPIRATORY (INHALATION) at 07:40

## 2023-09-23 RX ADMIN — SODIUM CHLORIDE 1000 ML: 9 INJECTION, SOLUTION INTRAVENOUS at 12:06

## 2023-09-23 ASSESSMENT — PAIN - FUNCTIONAL ASSESSMENT: PAIN_FUNCTIONAL_ASSESSMENT: NONE - DENIES PAIN

## 2023-09-24 VITALS
SYSTOLIC BLOOD PRESSURE: 121 MMHG | OXYGEN SATURATION: 97 % | HEIGHT: 62 IN | HEART RATE: 73 BPM | TEMPERATURE: 98.6 F | RESPIRATION RATE: 19 BRPM | DIASTOLIC BLOOD PRESSURE: 90 MMHG | WEIGHT: 200 LBS | BODY MASS INDEX: 36.8 KG/M2

## 2023-09-24 LAB
EKG ATRIAL RATE: 115 BPM
EKG DIAGNOSIS: NORMAL
EKG P AXIS: 48 DEGREES
EKG P-R INTERVAL: 176 MS
EKG Q-T INTERVAL: 324 MS
EKG QRS DURATION: 72 MS
EKG QTC CALCULATION (BAZETT): 448 MS
EKG R AXIS: 1 DEGREES
EKG T AXIS: 36 DEGREES
EKG VENTRICULAR RATE: 115 BPM

## 2023-09-24 PROCEDURE — 93010 ELECTROCARDIOGRAM REPORT: CPT | Performed by: INTERNAL MEDICINE

## 2023-09-24 PROCEDURE — 6370000000 HC RX 637 (ALT 250 FOR IP): Performed by: STUDENT IN AN ORGANIZED HEALTH CARE EDUCATION/TRAINING PROGRAM

## 2023-09-24 RX ORDER — NICOTINE 21 MG/24HR
1 PATCH, TRANSDERMAL 24 HOURS TRANSDERMAL DAILY
Status: DISCONTINUED | OUTPATIENT
Start: 2023-09-24 | End: 2023-09-24 | Stop reason: HOSPADM

## 2023-09-24 RX ORDER — OLANZAPINE 10 MG/1
5 INJECTION, POWDER, LYOPHILIZED, FOR SOLUTION INTRAMUSCULAR
Status: DISCONTINUED | OUTPATIENT
Start: 2023-09-24 | End: 2023-09-24 | Stop reason: HOSPADM

## 2023-09-24 NOTE — ED NOTES
EDIE conducting bedside interview via [employer] [address], RN  09/23/23 0230
NNPD at bedside with patient cuffed to bed     Eric Gayle RN  09/23/23 6167
NNPD uncuffed patient and nurse assisted patient to bathroom. Nurse provided patient with a dry gown. Patient ambulated to and from bathroom. Patient stated she wanted to lay on the floor at that time and nurse advised otherwise. Patient agreed to sit in chair for a moment. Nurse provided patient with clean linen. Patient in bed, cuffed to bed, on bp and spo2 monitoring.      NNPD at bedside      Santiago Miller RN  09/24/23 3434
Nurse gave report to FARHAN Hunter RN  09/24/23 1179
Nurse provided patient with water     Leon Torres RN  09/23/23 1985
Nurse reviewed patient's chart and assumed care of patient. Received report from [address], RN    Patient is cuffed to bed with NNPD at bedside. Patient is resting in NAD.       Steven Anand RN  09/23/23 2469
Patient found out of bed A&O x 2 with unsteady gait. Patient was helped back to bed side rails up x 2 in lowest position, bed alarm engaged, call bell within reach.     Primary RN Will made aware of safety inventions     Uma Madera RN  09/23/23 6068
Patient having visual hallucinations and yelling out that people are putting needles in her     Jerson Rhea, FARHAN  09/23/23 4781
Patient in bed resting in NAD. NN[employer] at bedside.       Lana Nascimento RN  09/24/23 7173
Patient in room resting. Nurse placed patient back on bp and spo2 monitoring. PD assisted nurse and adjusted cuff and nurse placed padding between patient skin and cuff. Patient moved while sleeping and had redness to her arm.       Kalyani Lamb RN  09/23/23 9163
Patient was accepted to [facility]    Phone number for report :[phone]     Accepting is Dr. Patria Lucas RN  09/24/23 FARHAN Ramírez  09/24/23 FARHAN Ramírez  09/24/23 6901
Patient's  called to check on patient. Nurse informed him that we are still waiting for [facility] decision. Patient's  would like to be updated with patient's care.       Sofía Aparicio RN  09/23/23 2645
Pt. Agitated per waking up this morning and is demanding food. Audibly raised voice heard in hallways upset. RN rounded on patient in room who was upset and was consolable when diet order was placed. Pt. Informed that a tray was on the way to the room and would likely take an hour to be delivered to the room. NN[employer] still at bedside. MD is aware. Will continue to further monitor.      Janine Youssef RN  09/24/23 [facility], RN  09/24/23 2011
Pt. Having episodic panic episodes that medical staff is trying to hurt her. Ran out of the room into the hallway and security was able to lead patient back to the room. Is consolable but very confused about her situation and why she is here. Witnessed by medical staff. MD is aware.      Vangie Fang RN  09/23/23 3191
Pt. Stated she needed to go to the bathroom and when being led to the bathroom the pt. Began to get increasingly agitated and start panicking. Pt. Appears to now be hallucinating and is stating she is fearful that someone is trying to put a needle in her back. Reassessed and patient is AxO x 2     MD is aware.      Vangie Fang RN  09/23/23 [address] [place] [address] Road, RN  09/23/23 [address] [place] [address], RN  09/23/23 1891
Report given Cleopatra Yeh at [place], [place]  09/24/23 1277
Spoke with [facility]. States they currently have TDO signed and waiting for placement. States they will contact ED with any updates and will fax TDO when the patient has placement.      Claude Howard RN  09/24/23 7086
Unable to find IV access through observation RRT called for ultrasound line placement.       Monique Alfred RN  09/23/23 5649
evidence of acute process in the abdomen or pelvis. 2. Hepatic steatosis. [KS]   7657 CT HEAD WO CONTRAST  No acute intracranial process. There is no intracranial mass or hemorrhage.  [KS]   1230 Pt more alert now. States she does not use alcohol or any other illicit drugs. however now pt hallucinating. Requiring redirection. Will place tdo on patient to be evaluated by psychiatry. [KS]   0377 Patient has ECO placed by magistrate arredondo. [KS]      ED Course User Index  [KS] Minus MD Walter       Patient was given the following medications:  Medications   ipratropium 0.5 mg-albuterol 2.5 mg (DUONEB) nebulizer solution 1 Dose (1 Dose Inhalation Given 9/23/23 0740)   methylPREDNISolone sodium succ (SOLU-MEDROL) 125 mg in sterile water 2 mL injection (125 mg IntraVENous Given 9/23/23 0930)   naloxone (NARCAN) injection 0.4 mg (0.4 mg SubCUTAneous Given 9/23/23 0738)   sodium chloride 0.9 % bolus 1,000 mL (0 mLs IntraVENous Stopped 9/23/23 1433)   iopamidol (ISOVUE-300) 61 % injection 100 mL (100 mLs IntraVENous Given 9/23/23 1143)   OLANZapine (ZyPREXA) injection 5 mg (5 mg IntraMUSCular Given 9/23/23 1524)       CONSULTS: (Who and What was discussed)  None    Chronic Conditions: refer to [facility]     Social Determinants affecting Dx or Tx: Patient has a substance abuse problem. Records Reviewed (source and summary of external notes): Nursing Notes and Old Medical Records    Procedures    Critical Care Time: none       Diagnosis     Clinical Impression:   1. Disorientation    2. Benzodiazepine intoxication (HCC)    3. Hallucinations    4. Acute psychosis [facility])        Disposition: pending psych eval      [facility] EM[facility]  [facility] [address]  [phone]    As needed, If symptoms worsen       Disclaimer: Sections of this note are dictated using utilizing voice recognition software. Minor typographical errors may be present.  If questions arise, please do not hesitate to contact me or

## 2023-09-24 NOTE — ED PROVIDER NOTES
Patient was signed out to me by the previous physician. Please refer to the previous physician's dictation for more complete history. In summary the patient presents with with altered mental status. She currently has a ECO and police at bedside. The patient is awaiting psychiatric placement. Patient was seen resting comfortably. No acute events overnight. Patient signed out to Dr. Joy Patino pending psychiatric placement. ECO was updated to TDO.             Lidia Baig MD  09/25/23 6471

## 2023-09-28 PROBLEM — F19.11 HISTORY OF DRUG ABUSE (HCC): Status: ACTIVE | Noted: 2018-09-18

## 2023-09-28 PROBLEM — F19.94 DRUG-INDUCED MOOD DISORDER (HCC): Status: ACTIVE | Noted: 2021-01-09

## 2023-09-28 PROBLEM — K85.20 ALCOHOLIC PANCREATITIS: Status: ACTIVE | Noted: 2018-10-24

## 2023-09-28 PROBLEM — E66.9 OBESITY (BMI 30-39.9): Status: ACTIVE | Noted: 2017-07-20

## 2023-09-28 PROBLEM — F31.4 BIPOLAR AFFECTIVE DISORDER, DEPRESSED, SEVERE (HCC): Status: ACTIVE | Noted: 2021-01-21

## 2023-09-28 PROBLEM — F14.20 COCAINE USE DISORDER, SEVERE, DEPENDENCE (HCC): Status: ACTIVE | Noted: 2021-02-18

## 2023-09-28 PROBLEM — F23 BRIEF PSYCHOTIC DISORDER (HCC): Status: ACTIVE | Noted: 2020-07-05

## 2023-09-28 PROBLEM — F10.20 ALCOHOL USE DISORDER, SEVERE, DEPENDENCE (HCC): Status: ACTIVE | Noted: 2022-05-21

## 2023-09-28 PROBLEM — F31.63 SEVERE MIXED BIPOLAR I DISORDER WITHOUT PSYCHOTIC FEATURES (HCC): Status: ACTIVE | Noted: 2020-04-10

## 2023-09-28 PROBLEM — M54.6 CHRONIC BILATERAL THORACIC BACK PAIN: Status: ACTIVE | Noted: 2017-07-20

## 2023-09-28 PROBLEM — F41.0 PANIC DISORDER: Status: ACTIVE | Noted: 2017-07-20

## 2023-09-28 PROBLEM — G89.29 CHRONIC BILATERAL THORACIC BACK PAIN: Status: ACTIVE | Noted: 2017-07-20

## 2023-09-28 PROBLEM — D06.9 HIGH GRADE SQUAMOUS INTRAEPITHELIAL LESION (HGSIL), GRADE 3 CIN, ON BIOPSY OF CERVIX: Status: ACTIVE | Noted: 2020-11-15

## 2023-09-28 PROBLEM — R56.9 SEIZURE (HCC): Status: ACTIVE | Noted: 2023-09-28

## 2023-09-28 PROBLEM — F33.9 RECURRENT MAJOR DEPRESSION (HCC): Status: ACTIVE | Noted: 2021-07-29

## 2023-09-28 PROBLEM — D06.9 CIN III (CERVICAL INTRAEPITHELIAL NEOPLASIA GRADE III) WITH SEVERE DYSPLASIA: Status: ACTIVE | Noted: 2018-09-18

## 2023-11-17 NOTE — PROGRESS NOTES
Assisted Tayo CHENG (Wound RN), with wound care, non-selective debridement performed using wound cleanser/NS and gauze. Please see Tayo CHENG (Wound RN) wound note for further wound care details.      DC Plan: TBD    Care manager rounded and PT was in room with patient sitting up in chair; spouse called from intermediate and updated that no placement has been secured as of yet. Sitter remains at bedside.   Care Management Interventions  Transition of Care Consult (CM Consult): Discharge Planning  The Plan for Transition of Care is Related to the Following Treatment Goals : intentional drug overdose  Discharge Location  Discharge Placement:  (TBD)

## 2024-05-16 NOTE — PROGRESS NOTES
Comprehensive Nutrition Assessment    Type and Reason for Visit: Reassess    Nutrition Recommendations/Plan: Continue tube feeds via peg at goal rate of 45 ml/hr to meet nutrition needs. Enteral Nutrition   Feeding Route PEG   EN Formula Standard with fiber   Schedule Continuous   Feeding Regimen Jevity 1.5 @ 45ml/hr   Water Flushes 250ml Q4   Goal EN & Flush Order Provides Jevity 1.5 @ 45ml + water flushes provides 1485kcals, 63g PRO, 214g CHO, and 2252ml free water. Nutrition Assessment:  Pt admit for Gabapentin overdose, pt remains in ICU- s/p trach, s/p PEG. TF restarted- currently Jevity 1.5 @ 40ml/hr (goal rate is 45ml/hr). 0ml of gastric residuals- Seems to be tolerating well. Estimated Daily Nutrient Needs:  Energy (kcal): 4436; Weight Used for Energy Requirements: Admission  Protein (g): 71; Weight Used for Protein Requirements: Current (1g)  Fluid (ml/day): 1609; Method Used for Fluid Requirements: 1 ml/kcal    Nutrition Related Findings:  Labs and Meds reviewed- thiamine, MVI, melatoning, Pepcid, Humalog, Zofran. Last BM 10/10. Wounds:    None       Current Nutrition Therapies:  ADULT TUBE FEEDING Nasogastric; Standard with Fiber; Delivery Method: Continuous; Continuous Initial Rate (mL/hr): 10; Continuous Advance Tube Feeding: Yes; Advancement Volume (mL/hr): 10; Advancement Frequency: Q 6 hours; Continuous Goal Rate (mL. .. Anthropometric Measures:  · Height:  5' 2\" (157.5 cm)  · Current Body Wt:  71.2 kg (156 lb 15.5 oz)   · Admission Body Wt:  160 lb 0.9 oz    · Usual Body Wt:  72 kg (158 lb 11.7 oz)     · Ideal Body Wt:  110 lbs:  142.7 %   · BMI Category: Overweight (BMI 25.0-29. 9)       Nutrition Diagnosis:   · Inadequate oral intake related to altered GI function as evidenced by nutrition support-enteral nutrition (PEG)    Nutrition Interventions:   Food and/or Nutrient Delivery: Continue tube feeding  Nutrition Education and Counseling: No recommendations at this Dr. Barcenas aware of result time  Coordination of Nutrition Care: Continue to monitor while inpatient    Goals:  Continue EN at goal rate of 45 ml/hr to meet nutrition needs throughout the nexr 5-7 days. Nutrition Monitoring and Evaluation:   Behavioral-Environmental Outcomes: None identified  Food/Nutrient Intake Outcomes: Diet advancement/tolerance, Enteral nutrition intake/tolerance  Physical Signs/Symptoms Outcomes: Biochemical data, GI status, Weight, Skin, Nausea/vomiting    Discharge Planning:     Too soon to determine     Electronically signed by Destin Miller RD on 10/11/2021 at 10:59 AM Pt Lactate 5.6 Dr. Barcenas aware of result

## 2025-05-12 NOTE — PROGRESS NOTES
Date/Time:  8/27/2021 10:28 AM   Call within 2 business days of discharge: Yes   Attempted to reach patient by telephone. Unable to leave HIPPA compliant message requesting a return call. Will attempt to reach patient again. no

## (undated) DEVICE — PACK PROCEDURE SURG EXTREMITY CUST

## (undated) DEVICE — GLOVE SURG SZ 75 L12IN FNGR THK79MIL GRN LTX FREE

## (undated) DEVICE — DERMABOND SKIN ADH 0.7ML --

## (undated) DEVICE — HEAD DONUT FOAM POSITIONER: Brand: CARDINAL HEALTH

## (undated) DEVICE — SUT SLK 2-0SH 30IN BLK --

## (undated) DEVICE — ROUND DISSECTORS: Brand: DEROYAL

## (undated) DEVICE — SUT MONOCRYL PLUS UD 4-0 --

## (undated) DEVICE — SLING ARM ENV PD DLX LG BLU --

## (undated) DEVICE — BANDAGE COMPR W3INXL5YD BGE POLY COT E RECOVERABLE BRTH W/

## (undated) DEVICE — ZIMMER® STERILE DISPOSABLE TOURNIQUET CUFF WITH PROTECTIVE SLEEVE AND PLC, DUAL PORT, SINGLE BLADDER, 24 IN. (61 CM)

## (undated) DEVICE — SOL IRRIGATION INJ NACL 0.9% 500ML BTL

## (undated) DEVICE — INTENDED FOR TISSUE SEPARATION, AND OTHER PROCEDURES THAT REQUIRE A SHARP SURGICAL BLADE TO PUNCTURE OR CUT.: Brand: BARD-PARKER ® CARBON RIB-BACK BLADES

## (undated) DEVICE — HOLDER TRACH TB W1IN FIT UPTO 19.5IN NK 2 PC ADJ BLU

## (undated) DEVICE — GARMENT,MEDLINE,DVT,INT,CALF,MED, GEN2: Brand: MEDLINE

## (undated) DEVICE — DISPOSABLE HARDY BAYONET INSULATED BIPOLAR FORCEPS: Brand: INTEGRA® JARIT®

## (undated) DEVICE — OPTIFOAM GENTLE LITE, BORDERED, 4X4: Brand: MEDLINE

## (undated) DEVICE — PAD,ABDOMINAL,5"X9",ST,LF,25/BX: Brand: MEDLINE INDUSTRIES, INC.

## (undated) DEVICE — ENT PACK: Brand: MEDLINE INDUSTRIES, INC.

## (undated) DEVICE — ELASTIC BANDAGE: Brand: DEROYAL

## (undated) DEVICE — NEEDLE HYPO 25GA L1.5IN BLU POLYPR HUB S STL REG BVL STR

## (undated) DEVICE — TRAY PREP DRY W/ PREM GLV 2 APPL 6 SPNG 2 UNDPD 1 OVERWRAP

## (undated) DEVICE — SPONGE HEMOSTAT CELLULS 4X8IN -- SURGICEL

## (undated) DEVICE — GLOVE ORANGE PI 7 1/2   MSG9075

## (undated) DEVICE — SUT SLK 3-0 30IN SH BLK --

## (undated) DEVICE — DRAPE XR C ARM 41X74IN LF --

## (undated) DEVICE — PREP SKN CHLRAPRP APL 26ML STR --

## (undated) DEVICE — GLOVE SURG SZ 8 L12IN THK75MIL DK GRN LTX FREE

## (undated) DEVICE — SYR 10ML LUER LOK 1/5ML GRAD --

## (undated) DEVICE — SUTURE PERMAHAND SZ 2-0 L30IN NONABSORBABLE BLK SILK W/O A305H

## (undated) DEVICE — REM POLYHESIVE ADULT PATIENT RETURN ELECTRODE: Brand: VALLEYLAB

## (undated) DEVICE — SPONGE GZ W4XL4IN COT 12 PLY TYP VII WVN C FLD DSGN

## (undated) DEVICE — SUT VCRL + 1 36IN CT1 VIO --